# Patient Record
Sex: MALE | Race: WHITE | NOT HISPANIC OR LATINO | Employment: OTHER | URBAN - METROPOLITAN AREA
[De-identification: names, ages, dates, MRNs, and addresses within clinical notes are randomized per-mention and may not be internally consistent; named-entity substitution may affect disease eponyms.]

---

## 2017-01-17 ENCOUNTER — APPOINTMENT (OUTPATIENT)
Dept: RADIOLOGY | Facility: MEDICAL CENTER | Age: 57
DRG: 603 | End: 2017-01-17
Attending: EMERGENCY MEDICINE
Payer: MEDICARE

## 2017-01-17 ENCOUNTER — APPOINTMENT (OUTPATIENT)
Dept: RADIOLOGY | Facility: MEDICAL CENTER | Age: 57
DRG: 603 | End: 2017-01-17
Attending: HOSPITALIST
Payer: MEDICARE

## 2017-01-17 ENCOUNTER — RESOLUTE PROFESSIONAL BILLING HOSPITAL PROF FEE (OUTPATIENT)
Dept: HOSPITALIST | Facility: MEDICAL CENTER | Age: 57
End: 2017-01-17
Payer: MEDICARE

## 2017-01-17 ENCOUNTER — HOSPITAL ENCOUNTER (INPATIENT)
Facility: MEDICAL CENTER | Age: 57
LOS: 7 days | DRG: 603 | End: 2017-01-24
Attending: EMERGENCY MEDICINE | Admitting: HOSPITALIST
Payer: MEDICARE

## 2017-01-17 DIAGNOSIS — L03.119 CELLULITIS OF LOWER EXTREMITY, UNSPECIFIED LATERALITY: ICD-10-CM

## 2017-01-17 PROBLEM — R79.89 TROPONIN LEVEL ELEVATED: Status: ACTIVE | Noted: 2017-01-17

## 2017-01-17 LAB
ALBUMIN SERPL BCP-MCNC: 3.3 G/DL (ref 3.2–4.9)
ALBUMIN/GLOB SERPL: 0.9 G/DL
ALP SERPL-CCNC: 76 U/L (ref 30–99)
ALT SERPL-CCNC: 624 U/L (ref 2–50)
ANION GAP SERPL CALC-SCNC: 11 MMOL/L (ref 0–11.9)
AST SERPL-CCNC: 111 U/L (ref 12–45)
BASOPHILS # BLD AUTO: 0.5 % (ref 0–1.8)
BASOPHILS # BLD: 0.04 K/UL (ref 0–0.12)
BILIRUB SERPL-MCNC: 1.2 MG/DL (ref 0.1–1.5)
BNP SERPL-MCNC: 5 PG/ML (ref 0–100)
BUN SERPL-MCNC: 15 MG/DL (ref 8–22)
CALCIUM SERPL-MCNC: 9.4 MG/DL (ref 8.4–10.2)
CHLORIDE SERPL-SCNC: 95 MMOL/L (ref 96–112)
CO2 SERPL-SCNC: 30 MMOL/L (ref 20–33)
CREAT SERPL-MCNC: 0.74 MG/DL (ref 0.5–1.4)
CRP SERPL HS-MCNC: 12.96 MG/DL (ref 0–0.75)
EKG IMPRESSION: NORMAL
EOSINOPHIL # BLD AUTO: 0.39 K/UL (ref 0–0.51)
EOSINOPHIL NFR BLD: 4.4 % (ref 0–6.9)
ERYTHROCYTE [DISTWIDTH] IN BLOOD BY AUTOMATED COUNT: 51.1 FL (ref 35.9–50)
ERYTHROCYTE [SEDIMENTATION RATE] IN BLOOD BY WESTERGREN METHOD: 41 MM/HOUR (ref 0–20)
EST. AVERAGE GLUCOSE BLD GHB EST-MCNC: 157 MG/DL
GFR SERPL CREATININE-BSD FRML MDRD: >60 ML/MIN/1.73 M 2
GLOBULIN SER CALC-MCNC: 3.6 G/DL (ref 1.9–3.5)
GLUCOSE BLD-MCNC: 127 MG/DL (ref 65–99)
GLUCOSE BLD-MCNC: 128 MG/DL (ref 65–99)
GLUCOSE SERPL-MCNC: 124 MG/DL (ref 65–99)
HBA1C MFR BLD: 7.1 % (ref 0–5.6)
HCT VFR BLD AUTO: 44.1 % (ref 42–52)
HGB BLD-MCNC: 13.8 G/DL (ref 14–18)
IMM GRANULOCYTES # BLD AUTO: 0.04 K/UL (ref 0–0.11)
IMM GRANULOCYTES NFR BLD AUTO: 0.5 % (ref 0–0.9)
LACTATE BLD-SCNC: 1.44 MMOL/L (ref 0.5–2)
LACTATE BLD-SCNC: 1.89 MMOL/L (ref 0.5–2)
LYMPHOCYTES # BLD AUTO: 0.89 K/UL (ref 1–4.8)
LYMPHOCYTES NFR BLD: 10 % (ref 22–41)
MCH RBC QN AUTO: 27.5 PG (ref 27–33)
MCHC RBC AUTO-ENTMCNC: 31.3 G/DL (ref 33.7–35.3)
MCV RBC AUTO: 88 FL (ref 81.4–97.8)
MONOCYTES # BLD AUTO: 0.78 K/UL (ref 0–0.85)
MONOCYTES NFR BLD AUTO: 8.8 % (ref 0–13.4)
NEUTROPHILS # BLD AUTO: 6.73 K/UL (ref 1.82–7.42)
NEUTROPHILS NFR BLD: 75.8 % (ref 44–72)
NRBC # BLD AUTO: 0 K/UL
NRBC BLD AUTO-RTO: 0 /100 WBC
PLATELET # BLD AUTO: 164 K/UL (ref 164–446)
PMV BLD AUTO: 10.7 FL (ref 9–12.9)
POTASSIUM SERPL-SCNC: 3.8 MMOL/L (ref 3.6–5.5)
PROT SERPL-MCNC: 6.9 G/DL (ref 6–8.2)
RBC # BLD AUTO: 5.01 M/UL (ref 4.7–6.1)
SODIUM SERPL-SCNC: 136 MMOL/L (ref 135–145)
SPECIMEN DRAWN FROM PATIENT: NORMAL
SPECIMEN DRAWN FROM PATIENT: NORMAL
TROPONIN I SERPL-MCNC: 0.25 NG/ML (ref 0–0.04)
TROPONIN I SERPL-MCNC: 0.3 NG/ML (ref 0–0.04)
WBC # BLD AUTO: 8.9 K/UL (ref 4.8–10.8)

## 2017-01-17 PROCEDURE — 99285 EMERGENCY DEPT VISIT HI MDM: CPT

## 2017-01-17 PROCEDURE — 304562 HCHG STAT O2 MASK/CANNULA

## 2017-01-17 PROCEDURE — 71010 DX-CHEST-PORTABLE (1 VIEW): CPT

## 2017-01-17 PROCEDURE — 36415 COLL VENOUS BLD VENIPUNCTURE: CPT

## 2017-01-17 PROCEDURE — 87086 URINE CULTURE/COLONY COUNT: CPT

## 2017-01-17 PROCEDURE — A9270 NON-COVERED ITEM OR SERVICE: HCPCS | Performed by: HOSPITALIST

## 2017-01-17 PROCEDURE — 96375 TX/PRO/DX INJ NEW DRUG ADDON: CPT

## 2017-01-17 PROCEDURE — 80053 COMPREHEN METABOLIC PANEL: CPT

## 2017-01-17 PROCEDURE — 83880 ASSAY OF NATRIURETIC PEPTIDE: CPT

## 2017-01-17 PROCEDURE — 87040 BLOOD CULTURE FOR BACTERIA: CPT

## 2017-01-17 PROCEDURE — 96366 THER/PROPH/DIAG IV INF ADDON: CPT

## 2017-01-17 PROCEDURE — 700111 HCHG RX REV CODE 636 W/ 250 OVERRIDE (IP): Performed by: EMERGENCY MEDICINE

## 2017-01-17 PROCEDURE — 700111 HCHG RX REV CODE 636 W/ 250 OVERRIDE (IP): Performed by: HOSPITALIST

## 2017-01-17 PROCEDURE — 84134 ASSAY OF PREALBUMIN: CPT

## 2017-01-17 PROCEDURE — 84484 ASSAY OF TROPONIN QUANT: CPT

## 2017-01-17 PROCEDURE — 770006 HCHG ROOM/CARE - MED/SURG/GYN SEMI*

## 2017-01-17 PROCEDURE — 81003 URINALYSIS AUTO W/O SCOPE: CPT

## 2017-01-17 PROCEDURE — 96367 TX/PROPH/DG ADDL SEQ IV INF: CPT

## 2017-01-17 PROCEDURE — 700102 HCHG RX REV CODE 250 W/ 637 OVERRIDE(OP): Performed by: HOSPITALIST

## 2017-01-17 PROCEDURE — 700105 HCHG RX REV CODE 258: Performed by: EMERGENCY MEDICINE

## 2017-01-17 PROCEDURE — 304561 HCHG STAT O2

## 2017-01-17 PROCEDURE — 99223 1ST HOSP IP/OBS HIGH 75: CPT | Mod: AI | Performed by: HOSPITALIST

## 2017-01-17 PROCEDURE — 85025 COMPLETE CBC W/AUTO DIFF WBC: CPT

## 2017-01-17 PROCEDURE — 700102 HCHG RX REV CODE 250 W/ 637 OVERRIDE(OP): Performed by: EMERGENCY MEDICINE

## 2017-01-17 PROCEDURE — 94760 N-INVAS EAR/PLS OXIMETRY 1: CPT

## 2017-01-17 PROCEDURE — 770001 HCHG ROOM/CARE - MED/SURG/GYN PRIV*

## 2017-01-17 PROCEDURE — 700105 HCHG RX REV CODE 258: Performed by: HOSPITALIST

## 2017-01-17 PROCEDURE — 76937 US GUIDE VASCULAR ACCESS: CPT

## 2017-01-17 PROCEDURE — 36569 INSJ PICC 5 YR+ W/O IMAGING: CPT

## 2017-01-17 PROCEDURE — 86140 C-REACTIVE PROTEIN: CPT

## 2017-01-17 PROCEDURE — 96376 TX/PRO/DX INJ SAME DRUG ADON: CPT

## 2017-01-17 PROCEDURE — 83036 HEMOGLOBIN GLYCOSYLATED A1C: CPT

## 2017-01-17 PROCEDURE — 85652 RBC SED RATE AUTOMATED: CPT

## 2017-01-17 PROCEDURE — 83605 ASSAY OF LACTIC ACID: CPT

## 2017-01-17 PROCEDURE — 82962 GLUCOSE BLOOD TEST: CPT | Mod: 91

## 2017-01-17 PROCEDURE — A9270 NON-COVERED ITEM OR SERVICE: HCPCS | Performed by: EMERGENCY MEDICINE

## 2017-01-17 PROCEDURE — 96365 THER/PROPH/DIAG IV INF INIT: CPT

## 2017-01-17 PROCEDURE — 93005 ELECTROCARDIOGRAM TRACING: CPT | Performed by: EMERGENCY MEDICINE

## 2017-01-17 RX ORDER — LISINOPRIL 20 MG/1
20 TABLET ORAL DAILY
Status: DISCONTINUED | OUTPATIENT
Start: 2017-01-17 | End: 2017-01-18

## 2017-01-17 RX ORDER — AMOXICILLIN 250 MG
1 CAPSULE ORAL NIGHTLY
Status: DISCONTINUED | OUTPATIENT
Start: 2017-01-17 | End: 2017-01-21

## 2017-01-17 RX ORDER — BUDESONIDE AND FORMOTEROL FUMARATE DIHYDRATE 160; 4.5 UG/1; UG/1
2 AEROSOL RESPIRATORY (INHALATION) 2 TIMES DAILY
Status: DISCONTINUED | OUTPATIENT
Start: 2017-01-17 | End: 2017-01-24 | Stop reason: HOSPADM

## 2017-01-17 RX ORDER — ONDANSETRON 2 MG/ML
4 INJECTION INTRAMUSCULAR; INTRAVENOUS ONCE
Status: COMPLETED | OUTPATIENT
Start: 2017-01-17 | End: 2017-01-17

## 2017-01-17 RX ORDER — ALBUTEROL SULFATE 90 UG/1
2 AEROSOL, METERED RESPIRATORY (INHALATION) EVERY 6 HOURS PRN
COMMUNITY

## 2017-01-17 RX ORDER — ONDANSETRON 4 MG/1
4 TABLET, ORALLY DISINTEGRATING ORAL EVERY 4 HOURS PRN
Status: DISCONTINUED | OUTPATIENT
Start: 2017-01-17 | End: 2017-01-24 | Stop reason: HOSPADM

## 2017-01-17 RX ORDER — BUDESONIDE AND FORMOTEROL FUMARATE DIHYDRATE 160; 4.5 UG/1; UG/1
2 AEROSOL RESPIRATORY (INHALATION) 2 TIMES DAILY
COMMUNITY

## 2017-01-17 RX ORDER — OXYCODONE HYDROCHLORIDE 30 MG/1
30 TABLET ORAL EVERY 4 HOURS
Status: ON HOLD | COMMUNITY
End: 2017-02-02

## 2017-01-17 RX ORDER — POTASSIUM CHLORIDE 750 MG/1
10 CAPSULE, EXTENDED RELEASE ORAL DAILY
Status: ON HOLD | COMMUNITY
End: 2018-01-18

## 2017-01-17 RX ORDER — OXYCODONE HYDROCHLORIDE 10 MG/1
30 TABLET ORAL EVERY 4 HOURS
Status: DISCONTINUED | OUTPATIENT
Start: 2017-01-17 | End: 2017-01-19

## 2017-01-17 RX ORDER — BISACODYL 10 MG
10 SUPPOSITORY, RECTAL RECTAL
Status: DISCONTINUED | OUTPATIENT
Start: 2017-01-17 | End: 2017-01-22

## 2017-01-17 RX ORDER — HEPARIN SODIUM 5000 [USP'U]/ML
5000 INJECTION, SOLUTION INTRAVENOUS; SUBCUTANEOUS EVERY 8 HOURS
Status: DISCONTINUED | OUTPATIENT
Start: 2017-01-17 | End: 2017-01-24 | Stop reason: HOSPADM

## 2017-01-17 RX ORDER — PROMETHAZINE HYDROCHLORIDE 25 MG/1
12.5-25 TABLET ORAL EVERY 4 HOURS PRN
Status: DISCONTINUED | OUTPATIENT
Start: 2017-01-17 | End: 2017-01-24 | Stop reason: HOSPADM

## 2017-01-17 RX ORDER — ASPIRIN 81 MG/1
81 TABLET, CHEWABLE ORAL ONCE
Status: COMPLETED | OUTPATIENT
Start: 2017-01-17 | End: 2017-01-17

## 2017-01-17 RX ORDER — DEXTROSE MONOHYDRATE 25 G/50ML
25 INJECTION, SOLUTION INTRAVENOUS
Status: DISCONTINUED | OUTPATIENT
Start: 2017-01-17 | End: 2017-01-24 | Stop reason: HOSPADM

## 2017-01-17 RX ORDER — ONDANSETRON 2 MG/ML
4 INJECTION INTRAMUSCULAR; INTRAVENOUS EVERY 4 HOURS PRN
Status: DISCONTINUED | OUTPATIENT
Start: 2017-01-17 | End: 2017-01-24 | Stop reason: HOSPADM

## 2017-01-17 RX ORDER — PROMETHAZINE HYDROCHLORIDE 25 MG/1
12.5-25 SUPPOSITORY RECTAL EVERY 4 HOURS PRN
Status: DISCONTINUED | OUTPATIENT
Start: 2017-01-17 | End: 2017-01-24 | Stop reason: HOSPADM

## 2017-01-17 RX ORDER — ACETAMINOPHEN 325 MG/1
650 TABLET ORAL EVERY 6 HOURS PRN
Status: DISCONTINUED | OUTPATIENT
Start: 2017-01-17 | End: 2017-01-24 | Stop reason: HOSPADM

## 2017-01-17 RX ORDER — AMPICILLIN AND SULBACTAM 2; 1 G/1; G/1
3 INJECTION, POWDER, FOR SOLUTION INTRAMUSCULAR; INTRAVENOUS ONCE
Status: COMPLETED | OUTPATIENT
Start: 2017-01-17 | End: 2017-01-17

## 2017-01-17 RX ORDER — AMOXICILLIN 250 MG
1 CAPSULE ORAL
Status: DISCONTINUED | OUTPATIENT
Start: 2017-01-17 | End: 2017-01-22

## 2017-01-17 RX ORDER — CARVEDILOL 6.25 MG/1
6.25 TABLET ORAL 2 TIMES DAILY WITH MEALS
Status: DISCONTINUED | OUTPATIENT
Start: 2017-01-17 | End: 2017-01-24 | Stop reason: HOSPADM

## 2017-01-17 RX ORDER — DOCUSATE SODIUM 100 MG/1
100 CAPSULE, LIQUID FILLED ORAL EVERY MORNING
Status: DISCONTINUED | OUTPATIENT
Start: 2017-01-18 | End: 2017-01-21

## 2017-01-17 RX ORDER — ENEMA 19; 7 G/133ML; G/133ML
1 ENEMA RECTAL
Status: DISCONTINUED | OUTPATIENT
Start: 2017-01-17 | End: 2017-01-22

## 2017-01-17 RX ORDER — FUROSEMIDE 40 MG/1
40 TABLET ORAL DAILY
Status: DISCONTINUED | OUTPATIENT
Start: 2017-01-17 | End: 2017-01-18

## 2017-01-17 RX ORDER — POTASSIUM CHLORIDE 750 MG/1
10 TABLET, FILM COATED, EXTENDED RELEASE ORAL DAILY
Status: DISCONTINUED | OUTPATIENT
Start: 2017-01-17 | End: 2017-01-18

## 2017-01-17 RX ORDER — LACTULOSE 20 G/30ML
30 SOLUTION ORAL
Status: DISCONTINUED | OUTPATIENT
Start: 2017-01-17 | End: 2017-01-22

## 2017-01-17 RX ORDER — MORPHINE SULFATE 4 MG/ML
4 INJECTION, SOLUTION INTRAMUSCULAR; INTRAVENOUS
Status: DISCONTINUED | OUTPATIENT
Start: 2017-01-17 | End: 2017-01-18

## 2017-01-17 RX ADMIN — HEPARIN SODIUM 5000 UNITS: 5000 INJECTION, SOLUTION INTRAVENOUS; SUBCUTANEOUS at 21:44

## 2017-01-17 RX ADMIN — AMPICILLIN SODIUM AND SULBACTAM SODIUM 3 G: 2; 1 INJECTION, POWDER, FOR SOLUTION INTRAMUSCULAR; INTRAVENOUS at 18:11

## 2017-01-17 RX ADMIN — SENNOSIDES AND DOCUSATE SODIUM 1 TABLET: 8.6; 5 TABLET ORAL at 21:33

## 2017-01-17 RX ADMIN — MORPHINE SULFATE 4 MG: 4 INJECTION INTRAVENOUS at 10:26

## 2017-01-17 RX ADMIN — MORPHINE SULFATE 4 MG: 4 INJECTION INTRAVENOUS at 12:49

## 2017-01-17 RX ADMIN — AMPICILLIN SODIUM AND SULBACTAM SODIUM 3 G: 2; 1 INJECTION, POWDER, FOR SOLUTION INTRAMUSCULAR; INTRAVENOUS at 23:55

## 2017-01-17 RX ADMIN — AMPICILLIN SODIUM AND SULBACTAM SODIUM 3 G: 2; 1 INJECTION, POWDER, FOR SOLUTION INTRAMUSCULAR; INTRAVENOUS at 10:31

## 2017-01-17 RX ADMIN — ONDANSETRON 4 MG: 2 INJECTION, SOLUTION INTRAMUSCULAR; INTRAVENOUS at 10:26

## 2017-01-17 RX ADMIN — OXYCODONE HYDROCHLORIDE 30 MG: 10 TABLET ORAL at 21:46

## 2017-01-17 RX ADMIN — ASPIRIN 81 MG CHEWABLE TABLET 81 MG: 81 TABLET CHEWABLE at 14:57

## 2017-01-17 RX ADMIN — VANCOMYCIN HYDROCHLORIDE 3000 MG: 10 INJECTION, POWDER, LYOPHILIZED, FOR SOLUTION INTRAVENOUS at 11:01

## 2017-01-17 RX ADMIN — OXYCODONE HYDROCHLORIDE 30 MG: 10 TABLET ORAL at 17:01

## 2017-01-17 ASSESSMENT — PAIN SCALES - GENERAL
PAINLEVEL_OUTOF10: 8
PAINLEVEL_OUTOF10: 6
PAINLEVEL_OUTOF10: 10
PAINLEVEL_OUTOF10: 4

## 2017-01-17 ASSESSMENT — COPD QUESTIONNAIRES
DURING THE PAST 4 WEEKS HOW MUCH DID YOU FEEL SHORT OF BREATH: SOME OF THE TIME
HAVE YOU SMOKED AT LEAST 100 CIGARETTES IN YOUR ENTIRE LIFE: YES
COPD SCREENING SCORE: 4
DO YOU EVER COUGH UP ANY MUCUS OR PHLEGM?: NO/ONLY WITH OCCASIONAL COLDS OR INFECTIONS

## 2017-01-17 ASSESSMENT — LIFESTYLE VARIABLES: EVER_SMOKED: YES

## 2017-01-17 NOTE — ED NOTES
Pt for transfer to room-hx of MRSA noted. Pt iv site LAC with sluggish blood return. erp aware of same. Orders for PICC line obtained

## 2017-01-17 NOTE — ED NOTES
Med rec completed per pt at bedside  Allergies reviewed - NKDA  Pt states that he took 3 doses of a leftover rx for  Keflex that he had. States that he took these last friday

## 2017-01-17 NOTE — ED NOTES
Vs as charted, lunch tray provided as well as med for bilat leg pain per pt request 10/10 with Morphine. Await room assignment

## 2017-01-17 NOTE — ED NOTES
Patient difficult IV access. IV Attempt x1  by Rolf Oneil RN and x 1Sbranden Torres RN. IV placement attempted by [facility] x 2 both attempts unsuccessful.. Lab called and had difficult time obtaining labs. Dr. Reed aware and will attempt to gain access via [facility]. Primary RN Darby made aware. Patient c/o of buttock pain requesting pain medication. Repositioned for comfort.

## 2017-01-17 NOTE — ED PROVIDER NOTES
ED Provider Note    CHIEF COMPLAINT  Chief Complaint   Patient presents with   • Difficulty Breathing   • Wound Infection   • Leg Pain     chronic       HPI  Fer Estrada is a 56 y.o. male who presents to the ER with leg swelling and pain. Patient has a history of chronic venous stasis dermatitis and has had associated cellulitis with resultant sepsis in the past. He was receiving home care. In order to continue home care he had to see his primary doctor, Dr. Aparicio, once a week. He was unable to make his appointments because he had to take a taxi there was costing too much money. His home care was subsequently discontinued. As part of his home care his legs have been wrapped with a compressive dressing. He ended up leaving these dressings on for at least a month probably more. He started noticing fluid weeping through his dressings. He called ambulance and in requested transport to the hospital. He has had some mild cough and feels mildly short of breath. He has not had fever or chest pain. No abdominal pain. He has felt flushed at times, no fevers noted. He has severe burning throbbing neuropathic type pain of his lower extremities equal bilaterally.    REVIEW OF SYSTEMS  As per HPI, otherwise a 10 point review of systems is negative    PAST MEDICAL HISTORY  Past Medical History   Diagnosis Date   • Type II or unspecified type diabetes mellitus without mention of complication, not stated as uncontrolled    • Congestive heart failure (CMS-[facility])    • Hypertension    • Asthma    • Chronic obstructive pulmonary disease (CMS-[facility])        SOCIAL HISTORY  Social History   Substance Use Topics   • Smoking status: Former Smoker     Quit date: 12/14/2005   • Smokeless tobacco: Never Used   • Alcohol Use: No       SURGICAL HISTORY  History reviewed. No pertinent past surgical history.    CURRENT MEDICATIONS  Home Medications     **Home medications have not yet been reviewed for this encounter**          ALLERGIES  No Known  "Allergies    PHYSICAL EXAM  VITAL SIGNS: /68 mmHg  Pulse 104  Temp(Src) 36.8 °C (98.2 °F)  Resp 24  Ht 1.727 m (5' 8\")  Wt 136.079 kg (300 lb)  BMI 45.63 kg/m2  SpO2 97%   Constitutional: Awake and alert, wearing oxygen  HENT:  Atraumatic, Normocephalic.Oropharynx moist mucus membranes, Nose normal inspection.   Eyes: Normal inspection  Neck: Supple  Cardiovascular: Normal heart rate, Normal rhythm.  Symmetric peripheral pulses.   Thorax & Lungs: No respiratory distress, No wheezing, No rales, No rhonchi, No chest tenderness.   Abdomen: Bowel sounds normal, soft, non-distended, nontender, no mass  Skin: Bilateral lower extremities with erythema to the upper and lower legs. There are multiple areas of blistering. Several of these blisters have unroofed. No tracking erythema.  Extremities: Pedal edema and as described above  Neurologic: Grossly normal   Psychiatric: Anxious appearing        Labs:  Results for orders placed or performed during the hospital encounter of 01/17/17   LACTIC ACID   Result Value Ref Range    Lactic Acid 1.89 0.50 - 2.00 mmol/L    Specimen Venous    CBC WITH DIFFERENTIAL   Result Value Ref Range    WBC 8.9 4.8 - 10.8 K/uL    RBC 5.01 4.70 - 6.10 M/uL    Hemoglobin 13.8 (L) 14.0 - 18.0 g/dL    Hematocrit 44.1 42.0 - 52.0 %    MCV 88.0 81.4 - 97.8 fL    MCH 27.5 27.0 - 33.0 pg    MCHC 31.3 (L) 33.7 - 35.3 g/dL    RDW 51.1 (H) 35.9 - 50.0 fL    Platelet Count 164 164 - 446 K/uL    MPV 10.7 9.0 - 12.9 fL    Neutrophils-Polys 75.80 (H) 44.00 - 72.00 %    Lymphocytes 10.00 (L) 22.00 - 41.00 %    Monocytes 8.80 0.00 - 13.40 %    Eosinophils 4.40 0.00 - 6.90 %    Basophils 0.50 0.00 - 1.80 %    Immature Granulocytes 0.50 0.00 - 0.90 %    Nucleated RBC 0.00 /100 WBC    Neutrophils (Absolute) 6.73 1.82 - 7.42 K/uL    Lymphs (Absolute) 0.89 (L) 1.00 - 4.80 K/uL    Monos (Absolute) 0.78 0.00 - 0.85 K/uL    Eos (Absolute) 0.39 0.00 - 0.51 K/uL    Baso (Absolute) 0.04 0.00 - 0.12 K/uL    " Immature Granulocytes (abs) 0.04 0.00 - 0.11 K/uL    NRBC (Absolute) 0.00 K/uL   COMP METABOLIC PANEL   Result Value Ref Range    Sodium 136 135 - 145 mmol/L    Potassium 3.8 3.6 - 5.5 mmol/L    Chloride 95 (L) 96 - 112 mmol/L    Co2 30 20 - 33 mmol/L    Anion Gap 11.0 0.0 - 11.9    Bun 15 8 - 22 mg/dL    Calcium 9.4 8.4 - 10.2 mg/dL    AST(SGOT) 111 (H) 12 - 45 U/L    ALT(SGPT) 624 (H) 2 - 50 U/L    Total Bilirubin 1.2 0.1 - 1.5 mg/dL    Glucose 124 (H) 65 - 99 mg/dL    Creatinine 0.74 0.50 - 1.40 mg/dL    Alkaline Phosphatase 76 30 - 99 U/L    Albumin 3.3 3.2 - 4.9 g/dL    Total Protein 6.9 6.0 - 8.2 g/dL    Globulin 3.6 (H) 1.9 - 3.5 g/dL    A-G Ratio 0.9 g/dL   TROPONIN   Result Value Ref Range    Troponin I 0.25 (H) 0.00 - 0.04 ng/mL   ESTIMATED GFR   Result Value Ref Range    GFR If African American >60 >60 mL/min/1.73 m 2    GFR If Non African American >60 >60 mL/min/1.73 m 2   EKG   Result Value Ref Range    Report       [facility] Emergency Dept.    Test Date:  2017  Pt Name:    TANIA CARRASCO                 Department: [facility]  MRN:        [id number]                      [facility]  Gender:     M                            Technician: SAVANAH  :        1960                   Requested By:LEN SHANNON  Order #:    [id number]                    Reading MD: LEN SHANNON MD    Measurements  Intervals                                Axis  Rate:       105                          P:          25  IN:         181                          QRS:        124  QRSD:       93                           T:          3  QT:         326  QTc:        431    Interpretive Statements  Sinus tachycardia  Probable left atrial enlargement  Left posterior fascicular block  Low voltage, precordial leads  Consider anterior infarct  Compared to ECG 2014 02:25:35  no significant change    Electronically Signed On 2017 11:11:17 PST by LEN SHANNON MD       DX-CHEST-PORTABLE (1  VIEW)   Final Result      Stable bibasilar basilar opacity most likely representing atelectasis. This could obscure consolidation        Procedure note  Ultrasound-guided peripheral IV access by physician  Indication: Unable to obtain IV by traditional methods  Description: Patient was prepped with chlorhexidine. Original attempt in the right antecubital fossa was unsuccessful. Left antecubital fossa was prepped. Ultrasound visualized vein. 18-gauge angiocatheter was inserted with good flow saline. No complications.    COURSE & MEDICAL DECISION MAKING  Patient presents to the ER with lower extremity edema weeping wounds and cellulitis. Additionally complained of some dyspnea. EKG was unremarkable in triage. Laboratory data was ordered. Ordered antibiotics of vancomycin and Unasyn intravenously. Placed an IV as noted above. Data returns notable for an elevated troponin in the indeterminate range. Patient was given aspirin. He does not have any chest pain ongoing dyspnea while in the ER. This will need to be followed. I consulted Dr. Landers. Patient will be admitted to the hospital.    FINAL IMPRESSION  1. Cellulitis, bilateral lower extremities  2. Elevated troponin  3. Elevated liver function tests      This dictation was created using voice recognition software. The accuracy of the dictation is limited to the abilities of the software.  The nursing notes were reviewed and certain aspects of this information were incorporated into this note.      Electronically signed by: Ray Reed, 1/17/2017 8:21 AM

## 2017-01-17 NOTE — PROGRESS NOTES
"Pharmacy Kinetics Vancomycin Day # 1     2017    56 y.o. male    Estimated CrCl =  Estimated Creatinine Clearance: 148.8 mL/min (by C-G formula based on Cr of 0.74).     Recent Labs      17   0954   WBC  8.9   NEUTSPOLYS  75.80*   BUN  15   CREATININE  0.74        Blood pressure 144/91, pulse 121, temperature 36.9 °C (98.4 °F), resp. rate 18, height 1.727 m (5' 8\"), weight 136.079 kg (300 lb), SpO2 91 %.    Temp (24hrs), Av.8 °C (98.3 °F), Min:36.8 °C (98.2 °F), Max:36.9 °C (98.4 °F)          A/P 1.   Vancomycin 3000 mg IVPB X 1 dose given at 1101 hours in ER for LE cellulitis.              2.  BMI 45.6              3.  Vancomycin 2300 mg IV x 1 dose at 0100 hours on 17              4.  Random vancomycin level at 1400 hours on 17 for further dosing.    Rosas Ojeda [facility]    "

## 2017-01-17 NOTE — ED NOTES
Arrived via [facility], hx of chronic pain and chronic wound infection to both lower legs, state legs are worse this week

## 2017-01-17 NOTE — ED NOTES
Assumed care of pt-c/o burning to bilat lower legs. Ivf/meds infusing. Aware of pending admit. Call light in reach vs as charted

## 2017-01-18 ENCOUNTER — APPOINTMENT (OUTPATIENT)
Dept: RADIOLOGY | Facility: MEDICAL CENTER | Age: 57
DRG: 603 | End: 2017-01-18
Attending: HOSPITALIST
Payer: MEDICARE

## 2017-01-18 LAB
ALBUMIN SERPL BCP-MCNC: 3.3 G/DL (ref 3.2–4.9)
ALBUMIN/GLOB SERPL: 0.9 G/DL
ALP SERPL-CCNC: 80 U/L (ref 30–99)
ALT SERPL-CCNC: 425 U/L (ref 2–50)
ANION GAP SERPL CALC-SCNC: 5 MMOL/L (ref 0–11.9)
APPEARANCE UR: CLEAR
AST SERPL-CCNC: 49 U/L (ref 12–45)
BILIRUB SERPL-MCNC: 0.8 MG/DL (ref 0.1–1.5)
BILIRUB UR QL STRIP.AUTO: NEGATIVE
BUN SERPL-MCNC: 16 MG/DL (ref 8–22)
CALCIUM SERPL-MCNC: 8.4 MG/DL (ref 8.4–10.2)
CHLORIDE SERPL-SCNC: 96 MMOL/L (ref 96–112)
CHOLEST SERPL-MCNC: 168 MG/DL (ref 100–199)
CO2 SERPL-SCNC: 37 MMOL/L (ref 20–33)
COLOR UR: YELLOW
CREAT SERPL-MCNC: 0.82 MG/DL (ref 0.5–1.4)
GFR SERPL CREATININE-BSD FRML MDRD: >60 ML/MIN/1.73 M 2
GLOBULIN SER CALC-MCNC: 3.5 G/DL (ref 1.9–3.5)
GLUCOSE BLD-MCNC: 125 MG/DL (ref 65–99)
GLUCOSE BLD-MCNC: 132 MG/DL (ref 65–99)
GLUCOSE BLD-MCNC: 145 MG/DL (ref 65–99)
GLUCOSE BLD-MCNC: 149 MG/DL (ref 65–99)
GLUCOSE SERPL-MCNC: 153 MG/DL (ref 65–99)
GLUCOSE UR STRIP.AUTO-MCNC: NEGATIVE MG/DL
HDLC SERPL-MCNC: 27 MG/DL
KETONES UR STRIP.AUTO-MCNC: NEGATIVE MG/DL
LDLC SERPL CALC-MCNC: 112 MG/DL
LEUKOCYTE ESTERASE UR QL STRIP.AUTO: NEGATIVE
MICRO URNS: NORMAL
NITRITE UR QL STRIP.AUTO: NEGATIVE
PH UR STRIP.AUTO: 6 [PH]
POTASSIUM SERPL-SCNC: 4.3 MMOL/L (ref 3.6–5.5)
PREALB SERPL-MCNC: 5 MG/DL (ref 18–38)
PROT SERPL-MCNC: 6.8 G/DL (ref 6–8.2)
PROT UR QL STRIP: NEGATIVE MG/DL
RBC UR QL AUTO: NEGATIVE
SODIUM SERPL-SCNC: 138 MMOL/L (ref 135–145)
SP GR UR STRIP.AUTO: 1.02
TRIGL SERPL-MCNC: 145 MG/DL (ref 0–149)
TROPONIN I SERPL-MCNC: 0.19 NG/ML (ref 0–0.04)
TROPONIN I SERPL-MCNC: 0.29 NG/ML (ref 0–0.04)

## 2017-01-18 PROCEDURE — 94760 N-INVAS EAR/PLS OXIMETRY 1: CPT

## 2017-01-18 PROCEDURE — 700105 HCHG RX REV CODE 258: Performed by: INTERNAL MEDICINE

## 2017-01-18 PROCEDURE — 82962 GLUCOSE BLOOD TEST: CPT

## 2017-01-18 PROCEDURE — 80061 LIPID PANEL: CPT

## 2017-01-18 PROCEDURE — 99233 SBSQ HOSP IP/OBS HIGH 50: CPT | Performed by: HOSPITALIST

## 2017-01-18 PROCEDURE — 97602 WOUND(S) CARE NON-SELECTIVE: CPT

## 2017-01-18 PROCEDURE — 700112 HCHG RX REV CODE 229: Performed by: HOSPITALIST

## 2017-01-18 PROCEDURE — 700102 HCHG RX REV CODE 250 W/ 637 OVERRIDE(OP): Performed by: HOSPITALIST

## 2017-01-18 PROCEDURE — 80053 COMPREHEN METABOLIC PANEL: CPT

## 2017-01-18 PROCEDURE — 302009 SKINTEGRITY WOUND CLEANSER: Performed by: HOSPITALIST

## 2017-01-18 PROCEDURE — 700105 HCHG RX REV CODE 258: Performed by: HOSPITALIST

## 2017-01-18 PROCEDURE — 700101 HCHG RX REV CODE 250: Performed by: INTERNAL MEDICINE

## 2017-01-18 PROCEDURE — A9270 NON-COVERED ITEM OR SERVICE: HCPCS | Performed by: INTERNAL MEDICINE

## 2017-01-18 PROCEDURE — 700102 HCHG RX REV CODE 250 W/ 637 OVERRIDE(OP): Performed by: INTERNAL MEDICINE

## 2017-01-18 PROCEDURE — G8979 MOBILITY GOAL STATUS: HCPCS | Mod: CK

## 2017-01-18 PROCEDURE — 770001 HCHG ROOM/CARE - MED/SURG/GYN PRIV*

## 2017-01-18 PROCEDURE — 84484 ASSAY OF TROPONIN QUANT: CPT | Mod: 91

## 2017-01-18 PROCEDURE — 700111 HCHG RX REV CODE 636 W/ 250 OVERRIDE (IP): Performed by: HOSPITALIST

## 2017-01-18 PROCEDURE — A9270 NON-COVERED ITEM OR SERVICE: HCPCS | Performed by: HOSPITALIST

## 2017-01-18 PROCEDURE — G8978 MOBILITY CURRENT STATUS: HCPCS | Mod: CM

## 2017-01-18 PROCEDURE — 97162 PT EVAL MOD COMPLEX 30 MIN: CPT

## 2017-01-18 RX ORDER — FLUCONAZOLE 200 MG/1
400 TABLET ORAL DAILY
Status: DISCONTINUED | OUTPATIENT
Start: 2017-01-18 | End: 2017-01-24 | Stop reason: HOSPADM

## 2017-01-18 RX ORDER — POTASSIUM CHLORIDE 1.5 G/1.58G
10 POWDER, FOR SOLUTION ORAL DAILY
Status: DISCONTINUED | OUTPATIENT
Start: 2017-01-18 | End: 2017-01-18

## 2017-01-18 RX ORDER — LISINOPRIL 20 MG/1
20 TABLET ORAL DAILY
Status: DISCONTINUED | OUTPATIENT
Start: 2017-01-19 | End: 2017-01-19

## 2017-01-18 RX ORDER — HYDRALAZINE HYDROCHLORIDE 25 MG/1
25 TABLET, FILM COATED ORAL EVERY 8 HOURS
Status: DISCONTINUED | OUTPATIENT
Start: 2017-01-18 | End: 2017-01-19

## 2017-01-18 RX ORDER — MORPHINE SULFATE 4 MG/ML
1-2 INJECTION, SOLUTION INTRAMUSCULAR; INTRAVENOUS EVERY 4 HOURS PRN
Status: DISCONTINUED | OUTPATIENT
Start: 2017-01-18 | End: 2017-01-24 | Stop reason: HOSPADM

## 2017-01-18 RX ADMIN — CLINDAMYCIN PHOSPHATE 900 MG: 150 INJECTION, SOLUTION INTRAVENOUS at 22:18

## 2017-01-18 RX ADMIN — AMPICILLIN SODIUM AND SULBACTAM SODIUM 3 G: 2; 1 INJECTION, POWDER, FOR SOLUTION INTRAMUSCULAR; INTRAVENOUS at 06:22

## 2017-01-18 RX ADMIN — OXYCODONE HYDROCHLORIDE 30 MG: 10 TABLET ORAL at 13:22

## 2017-01-18 RX ADMIN — HEPARIN SODIUM 5000 UNITS: 5000 INJECTION, SOLUTION INTRAVENOUS; SUBCUTANEOUS at 22:18

## 2017-01-18 RX ADMIN — OXYCODONE HYDROCHLORIDE 30 MG: 10 TABLET ORAL at 06:24

## 2017-01-18 RX ADMIN — HEPARIN SODIUM 5000 UNITS: 5000 INJECTION, SOLUTION INTRAVENOUS; SUBCUTANEOUS at 13:22

## 2017-01-18 RX ADMIN — DOCUSATE SODIUM 100 MG: 100 CAPSULE, LIQUID FILLED ORAL at 09:14

## 2017-01-18 RX ADMIN — POTASSIUM CHLORIDE 10 MEQ: 20 POWDER ORAL at 09:23

## 2017-01-18 RX ADMIN — CLINDAMYCIN PHOSPHATE 900 MG: 150 INJECTION, SOLUTION INTRAVENOUS at 13:34

## 2017-01-18 RX ADMIN — METFORMIN HYDROCHLORIDE 850 MG: 850 TABLET, FILM COATED ORAL at 12:24

## 2017-01-18 RX ADMIN — METFORMIN HYDROCHLORIDE 850 MG: 850 TABLET, FILM COATED ORAL at 09:14

## 2017-01-18 RX ADMIN — CARVEDILOL 6.25 MG: 6.25 TABLET, FILM COATED ORAL at 16:38

## 2017-01-18 RX ADMIN — HEPARIN SODIUM 5000 UNITS: 5000 INJECTION, SOLUTION INTRAVENOUS; SUBCUTANEOUS at 06:23

## 2017-01-18 RX ADMIN — BUDESONIDE AND FORMOTEROL FUMARATE DIHYDRATE 2 PUFF: 160; 4.5 AEROSOL RESPIRATORY (INHALATION) at 20:14

## 2017-01-18 RX ADMIN — ASPIRIN 81 MG: 81 TABLET, COATED ORAL at 09:14

## 2017-01-18 RX ADMIN — OXYCODONE HYDROCHLORIDE 30 MG: 10 TABLET ORAL at 10:15

## 2017-01-18 RX ADMIN — AMPICILLIN SODIUM AND SULBACTAM SODIUM 3 G: 2; 1 INJECTION, POWDER, FOR SOLUTION INTRAMUSCULAR; INTRAVENOUS at 18:00

## 2017-01-18 RX ADMIN — AMPICILLIN SODIUM AND SULBACTAM SODIUM 3 G: 2; 1 INJECTION, POWDER, FOR SOLUTION INTRAMUSCULAR; INTRAVENOUS at 12:24

## 2017-01-18 RX ADMIN — FLUCONAZOLE 400 MG: 200 TABLET ORAL at 13:22

## 2017-01-18 RX ADMIN — OXYCODONE HYDROCHLORIDE 30 MG: 10 TABLET ORAL at 01:31

## 2017-01-18 RX ADMIN — VANCOMYCIN HYDROCHLORIDE 2300 MG: 10 INJECTION, POWDER, LYOPHILIZED, FOR SOLUTION INTRAVENOUS at 01:16

## 2017-01-18 RX ADMIN — FUROSEMIDE 40 MG: 40 TABLET ORAL at 09:14

## 2017-01-18 RX ADMIN — ASPIRIN 325 MG: 325 TABLET, DELAYED RELEASE ORAL at 16:38

## 2017-01-18 RX ADMIN — LISINOPRIL 20 MG: 20 TABLET ORAL at 09:14

## 2017-01-18 RX ADMIN — METFORMIN HYDROCHLORIDE 850 MG: 850 TABLET, FILM COATED ORAL at 16:38

## 2017-01-18 RX ADMIN — CARVEDILOL 6.25 MG: 6.25 TABLET, FILM COATED ORAL at 09:14

## 2017-01-18 RX ADMIN — HYDRALAZINE HYDROCHLORIDE 25 MG: 25 TABLET ORAL at 16:38

## 2017-01-18 RX ADMIN — OXYCODONE HYDROCHLORIDE 30 MG: 10 TABLET ORAL at 18:51

## 2017-01-18 RX ADMIN — AMPICILLIN SODIUM AND SULBACTAM SODIUM 3 G: 2; 1 INJECTION, POWDER, FOR SOLUTION INTRAMUSCULAR; INTRAVENOUS at 23:45

## 2017-01-18 ASSESSMENT — ENCOUNTER SYMPTOMS
CHILLS: 0
COUGH: 0
VOMITING: 0
FEVER: 0
NAUSEA: 0
SHORTNESS OF BREATH: 1

## 2017-01-18 ASSESSMENT — PAIN SCALES - GENERAL
PAINLEVEL_OUTOF10: 4
PAINLEVEL_OUTOF10: 7
PAINLEVEL_OUTOF10: 5
PAINLEVEL_OUTOF10: 8

## 2017-01-18 ASSESSMENT — LIFESTYLE VARIABLES: EVER_SMOKED: YES

## 2017-01-18 ASSESSMENT — GAIT ASSESSMENTS: GAIT LEVEL OF ASSIST: REFUSED

## 2017-01-18 NOTE — PROGRESS NOTES
MD order and indication(s) for peripherally inserted central catheter confirmed. Labs reviewed.Nursing care plan includes knowledge deficit, potential for pain and anxiety, potential for infection. Risks and benefits of procedure explained to patent/family emphasizing risk of central line associated bloodstream infection.POC includes pt teaching, comfort measures, and sterile technique using the 5 step bundle to prevent CR-BSI. Questions answered. Patient verbalized understanding. Consent obtained/ confirmed.    Vessel patency confirmed with ultrasound.    Sterile procedure followed and patient draped per protocol.2.5cc of1% lidocaine injected intradermally to insertion site at RUE. 21 gauge Micro-introducer needle used to access basilic vein. Modified Seldinger technique used to insert  4Fr single lumen 48cm catheter  with blood return noted through each lumen. Each lumen flushed without resistance with 10 cc 0.9% normal saline. PICC secured with Statlock. Biopatch and Tegaderm applied over insertion site.  Patient tolerated procedure well.  # of attempts  1   BARD Power PICC REF# [id number] LOT # [id number].  Post-PICC CXR ordered. Radiologist will interpret to confirm PICC tip location.    Time out and LDA documentation completed. Patient condition and procedure outcome reported to unit RN and/or ordering physician via this post-procedure note.    Pt teaching done with patient/family regarding PICC care, handout given.

## 2017-01-18 NOTE — PROGRESS NOTES
Late Entry    1/18/17, 0745- Patient sitting up to bedside. Patient resting. Diet ordered and patient awaiting breakfast. Call bell at side and patient advised to call for needs.     1/18/17, 1015- AM medications given as charted in MAR.    1/18/17, 1400- Patient up to restroom previously and currently resting back to bed. No distress noted at this time. Patient to have dressing changes to legs completed shortly.

## 2017-01-18 NOTE — DIETARY
Nutrition Services Consult for Diabetic Education.  Visited with patient.  Patient not very interested in education.  Stated he has had this for 11 years and gets education every time he is in the hospital.  He did take some handouts on diabetic meal planning.    Pt with BMI >40 (45.63). Weight loss counseling not appropriate in acute care setting. RECOMMEND - Referral to outpatient nutrition services for weight management after D/C.

## 2017-01-18 NOTE — PROGRESS NOTES
No serial troponins ordered, last trop 0.30. Placed call to MD regarding order for troponin level. MD updated on pts status and pts refusal to wear tele monitor, orders received. Pt up to edge of bed, SOB with activity, denies chest pain. Will continue to monitor.

## 2017-01-18 NOTE — PROGRESS NOTES
Pt resting in bed, no signs of disress, VSS. Discussed POC. Pt denies needs at this time, call light in reach, hourly rounding, fall precautions in place, will continue to monitor.

## 2017-01-18 NOTE — PROGRESS NOTES
Pt refusing NPO status and morning ultrasound. Pt educated on importance of ultrasound and NPO status, pt continues to refuse. Placed call to MD and informed him of pt status. MD aware of refusal, okay to start on ADA diet. Day shift MD to be informed. Pt updated, verbalizes understanding. Will continue to monitor.

## 2017-01-18 NOTE — THERAPY
"Physical Therapy Evaluation completed.   Bed Mobility:  Supine to Sit: Modified Independent  Transfers: Sit to Stand: Refused  Gait: Level Of Assist: Refused with Front-Wheel Walker       Plan of Care: Will benefit from Physical Therapy 3 times per week  Discharge Recommendations: Equipment: motorized wheelchair or electric scooter. Post-acute therapy recommended before discharged home.    55 yo male admitted w/ BLE cellulitis. See chart for complicated PMH. Pt refused ambulation or transfers today due to LE pain and unable to tolerate socks/shoes to protect feet. His goal is to return to his apartment and do OP wound care at [facility] as he lives nearby. He may also benefit from a motorized scooter or electric WC to allow him to get to his appointments as he cannot drive a car and has financial restraints to taxi services. RN updated. PT issued a seated HEP to continue and PT will continue to see patient to progress mobility, strength, ROM while in house.     See \"Rehab Therapy-Acute\" Patient Summary Report for complete documentation.     "

## 2017-01-18 NOTE — H&P
PRIMARY CARE:  Giovani Lara MD    CHIEF COMPLAINT:  Leg swelling and leaking.    HISTORY OF PRESENT ILLNESS:  The patient is a 56-year-old morbidly obese   gentleman with history of multiple medical problems including bilateral lower   extremity edema with stasis dermatitis changes.  The patient has been   receiving wound care.  However, because of cost involved with going to the   wound care evaluation, he stopped going.  This has been approximately 1.5-2   months ago.  In the last couple of days, patient noted that his leg has been   more sore with swelling and leakage.  There has been open wound.  Patient   denied fevers, but has subjective chills.  Patient denied any nausea,   vomiting, or diarrhea.  Patient denied trauma to the lower extremities.  The   patient states that he has lost approximately 40 pounds over the past few   months with intent.    PAST MEDICAL HISTORY:  1.  Recent hospitalization for bilateral lower extremity cellulitis.  2.  Venous stasis ulcers.  3.  Bilateral stasis dermatitis changes.  4.  Morbid obesity with BMI of greater than 40.  5.  Depression.  6.  Chronic pain syndrome, on narcotics.  7.  COPD.  8.  Hypertension.  9.  Diabetes type 2.  10.  Congestive heart failure.    PAST SURGICAL HISTORY:  Unknown.    FAMILY HISTORY:  Reviewed and found to be noncontributory.    SOCIAL HISTORY:  No alcohol, tobacco, or IV drug use.    ALLERGIES:  NKDA.    MEDICATIONS:  Albuterol MDI 2 puffs q. 6 hours p.r.n. for dyspnea, Symbicort 2   puffs b.i.d., furosemide 40 mg daily, lisinopril 20 mg daily, metformin 850   mg b.i.d., oxycodone 30 mg q 4 hours, and potassium chloride 10 mEq daily.    REVIEW OF SYSTEMS:  Patient has been in his usual state of health.  No recent   ill encounters.  No upper respiratory symptoms of cough, sore throat,   rhinorrhea, chest pain, palpitations, nausea, vomiting, or diarrhea.  No   hemoptysis or hematemesis.  No GI or  dysfunction or GI or  bleed.  No    focal neurologic complaints.  Significant ulcerations and redness in the lower   extremities.    PHYSICAL EXAMINATION:  GENERAL:  Morbidly obese white male in no acute distress.  VITAL SIGNS:  Temperature 36.8, heart rate 108, respirations 19, blood   pressure 191/60, saturating 96% on 4 L nasal cannula.  HEENT/NECK:  Normocephalic, atraumatic. Pupils are equal, round and reactive   to light.  Anicteric sclerae.  Extraocular muscles intact.  No cervical   lymphadenopathy appreciated.  Oropharynx is small with Mallampati score of   2-3.  Mucosa is moist and clear without ulcerations or exudates.  PULMONARY:  Clear to auscultation bilaterally.  CARDIOVASCULAR:  Regular rate and rhythm without murmurs, rubs, or gallops.  ABDOMEN:  Obese, positive bowel sounds, soft, nontender, nondistended.  EXTREMITIES:  No clubbing, cyanosis and +4 edema bilaterally in the lower   extremities.  NEUROLOGIC:  Cranial nerves II-XII intact.  SKIN:  Patient has significant stasis dermatitis changes of the lower   extremities bilaterally extending to the proximal shin with ulcerations and   leakage.    LABORATORY:  WBC of 8.9, hemoglobin 13.8, and platelets 164.  Sodium 136,   potassium 3.8, chloride 95, bicarbonate 30, BUN 15, creatinine 0.74, glucose   124, calcium 9.4, alkaline phosphatase 76, , , total bili of   1.2.  Lactic acid 1.44.  Troponin 0.25.  BNP of 5.    IMAGING:  EKG shows sinus tachycardia.  Chest x-ray shows bibasilar   atelectasis.    ASSESSMENT:  A 56-year-old gentleman with history of bilateral lower extremity   stasis dermatitis changes, now with findings of open ulcerations and findings   concerning for cellulitis.    PLAN:  1.  Bilateral lower extremity cellulitis:  We will empirically start patient   on Unasyn and vancomycin.  We will encourage leg elevation.  2.  Hypertension and tachycardia:  We will change the patient's beta blocker   to Coreg for added benefit.  3.  Transaminitis:  We will check  right upper quadrant ultrasound.  We will   follow CMP.  4.  Troponin elevation:  We will monitor patient on telemetry and follow   troponin levels.  Patient is asymptomatic of chest pain.  We will check   fasting lipid profile and initiate low-dose aspirin.  5.  Insomnia:  Recommend outpatient followup.  6.  Morbid obesity:  Encouraged continued dietary kilo calorie restrictions   and weight loss.  7.  Chronic obstructive pulmonary disease:  Provide supplementary oxygen,   respiratory protocol, incentive spirometry, Pneumovax and Fluvax as   appropriate.  8.  Diabetes type 2: Home regimen and cover with insulin sliding scale.  9.  Congestive heart failure history.  We will provide cautious fluid   management.  10.  Stable issues:  Medical history including depression.  11.  Preventives:  Provide stool softener and DVT prophylaxis.    DISPOSITION:  Complex and guarded.       ____________________________________     LEORA SAAB MD    TSM / NTS    DD:  01/17/2017 15:24:07  DT:  01/17/2017 17:11:14    D#:  [id number]  Job#:  [id number]    cc: TERESA ROMERO MD

## 2017-01-18 NOTE — PROGRESS NOTES
Pt. Refusing NPO status and gt SHOEMAKER cancel USD, re-order if patient decides to have study done.

## 2017-01-18 NOTE — THERAPY
Occupational Therapy-  OT orders rec'd, however will hold eval today due to pt with pain and limited tolerance for OOB activities and ADL's.  Refused to stand for PT or attempt to don socks.  Discussed pt's d/c barriers with PT, and perhaps if pt could get scooter or electric w/c from [facility] or [facility] he could more easily access outpt wound care since he lives near to [facility] wound [facility].  Will monitor pt and complete OT eval when pt more able to tolerate and demonstrate simple transfers needed for toileting, dressing, bathing, etc.

## 2017-01-18 NOTE — CARE PLAN
Problem: Venous Thromboembolism (VTW)/Deep Vein Thrombosis (DVT) Prevention:  Goal: Patient will participate in Venous Thrombosis (VTE)/Deep Vein Thrombosis (DVT)Prevention Measures    01/18/17 0800   OTHER   Risk Assessment Score 4   VTE RISK High   Pharmacologic Prophylaxis Used Unfractionated Heparin         Problem: Pain Management  Goal: Pain level will decrease to patient’s comfort goal  Medications ordered and in place to treat patient complaints of pain on an as needed basis per patient request and nursing assessment.

## 2017-01-18 NOTE — PROGRESS NOTES
Hospital Medicine Progress Note, Adult, Complex               Author: Bravo Landers Date & Time created: 1/18/2017  3:11 PM     Interval History:  Admitted for bilateral LE cellulitis and failure to follow up c [facility].  Feeling about the same c slightly increased UOP.    Review of Systems:  Review of Systems   Constitutional: Negative for fever and chills.   Respiratory: Positive for shortness of breath. Negative for cough.    Gastrointestinal: Negative for nausea and vomiting.   Skin: Positive for itching and rash.   All other systems reviewed and are negative.      Physical Exam:  Physical Exam  Nursing note and vitals reviewed.  Constitutional: He is oriented to person, place, and time. He appears well-developed and well-nourished obese.   HENT:   Head: Normocephalic and atraumatic.   Right Ear: External ear normal.   Left Ear: External ear normal.   Nose: Nose normal.   Mouth/Throat: Oropharynx is small with Mallanpati score of 4.  Mucosa is clear and moist.   Eyes: Conjunctivae and extraocular motions are normal. Pupils are equal, round, and reactive to light.   Neck: Normal range of motion. Neck supple.   Cardiovascular: Normal rate, regular rhythm, normal heart sounds and intact distal pulses.    Pulmonary/Chest: Effort normal and breath sounds normal.   Abdominal: Soft. Bowel sounds are normal.   Musculoskeletal: Normal range of motion.   Neurological: He is alert and oriented to person, place, and time.   Skin: Skin is warm and dry. Bilateral LE stasis derm changes, open ulcers and erythema.      Labs:  Recent Labs      01/17/17   0909  01/17/17   1154   ISTATSPEC  Venous  Venous     Recent Labs      01/17/17   0954  01/17/17   1705 01/18/17 01/18/17   0635   TROPONINI  0.25*  0.30*  0.29*  0.19*   BNPBTYPENAT  5   --    --    --      Recent Labs      01/17/17   0954  01/18/17   0635   SODIUM  136  138   POTASSIUM  3.8  4.3   CHLORIDE  95*  96   CO2  30  37*   BUN  15  16   CREATININE  0.74  0.82    CALCIUM  9.4  8.4     Recent Labs      17   0954  17   0635   ALTSGPT  624*  425*   ASTSGOT  111*  49*   ALKPHOSPHAT  76  80   TBILIRUBIN  1.2  0.8   PREALBUMIN  5.0*   --    GLUCOSE  124*  153*     Recent Labs      17   0954   RBC  5.01   HEMOGLOBIN  13.8*   HEMATOCRIT  44.1   PLATELETCT  164     Recent Labs      17   0954  17   0635   WBC  8.9   --    NEUTSPOLYS  75.80*   --    LYMPHOCYTES  10.00*   --    MONOCYTES  8.80   --    EOSINOPHILS  4.40   --    BASOPHILS  0.50   --    ASTSGOT  111*  49*   ALTSGPT  624*  425*   ALKPHOSPHAT  76  80   TBILIRUBIN  1.2  0.8           Hemodynamics:  Temp (24hrs), Av.5 °C (97.7 °F), Min:36.4 °C (97.5 °F), Max:36.8 °C (98.2 °F)  Temperature: 36.8 °C (98.2 °F)  Pulse  Av  Min: 83  Max: 121Heart Rate (Monitored): (!) 112  Blood Pressure: 106/53 mmHg     Respiratory:    Respiration: 18, Pulse Oximetry: 92 %, O2 Daily Delivery Respiratory : Nasal Cannula        RUL Breath Sounds: Clear, RML Breath Sounds: Diminished, RLL Breath Sounds: Diminished, LARY Breath Sounds: Clear, LLL Breath Sounds: Diminished  Fluids:    Intake/Output Summary (Last 24 hours) at 17 1511  Last data filed at 17 1400   Gross per 24 hour   Intake   1650 ml   Output    850 ml   Net    800 ml       GI/Nutrition:  Orders Placed This Encounter   Procedures   • DIET ORDER     Standing Status: Standing      Number of Occurrences: 1      Standing Expiration Date:      Order Specific Question:  Diet:     Answer:  Diabetic [3]     Medical Decision Making, by Problem:  Active Hospital Problems    Diagnosis   • Cellulitis [L03.90] - Unasyn and vancomycin.  Wound care consult and culture.  ID consult.   • Troponin level elevated [R79.89] - asymptomatic of CP and levels decreasing.  Change ASA to 325 mg daily.   Morbid obesity - encourage Kcal restriction.  HTN/Tachycardia - improving.  Encourage taking of Coreg.  Transaminitis - decreasing.  Check hepatitis profile and  AMARI TRIPP.  Insomnia - outpatient follow up.  Chronic pain - judicious use of narcotics.  Decrease IV PRN morphine.  DM 2 - reasonable profile.  Metformin and check HbA1c.  COPD hx - O2, RT, IS, Vax.  Stable issues - med hx (depression),  Preventives - stool soft, DVTP.  Dispo - complex/guarded.        Medications reviewed and Labs reviewed  Montoya catheter: No Montoya      DVT Prophylaxis: Heparin    Ulcer prophylaxis: Not indicated  Antibiotics: Treating active infection/contamination beyond 24 hours perioperative coverage  Assessed for rehab: Patient unable to tolerate rehabilitation therapeutic regimen

## 2017-01-19 LAB
ALBUMIN SERPL BCP-MCNC: 2.7 G/DL (ref 3.2–4.9)
ALBUMIN/GLOB SERPL: 0.7 G/DL
ALP SERPL-CCNC: 78 U/L (ref 30–99)
ALT SERPL-CCNC: 292 U/L (ref 2–50)
ANION GAP SERPL CALC-SCNC: 7 MMOL/L (ref 0–11.9)
AST SERPL-CCNC: 31 U/L (ref 12–45)
BILIRUB SERPL-MCNC: 0.8 MG/DL (ref 0.1–1.5)
BUN SERPL-MCNC: 21 MG/DL (ref 8–22)
CALCIUM SERPL-MCNC: 8.6 MG/DL (ref 8.4–10.2)
CHLORIDE SERPL-SCNC: 95 MMOL/L (ref 96–112)
CO2 SERPL-SCNC: 35 MMOL/L (ref 20–33)
CREAT SERPL-MCNC: 2.06 MG/DL (ref 0.5–1.4)
GFR SERPL CREATININE-BSD FRML MDRD: 33 ML/MIN/1.73 M 2
GLOBULIN SER CALC-MCNC: 3.9 G/DL (ref 1.9–3.5)
GLUCOSE BLD-MCNC: 109 MG/DL (ref 65–99)
GLUCOSE BLD-MCNC: 116 MG/DL (ref 65–99)
GLUCOSE BLD-MCNC: 123 MG/DL (ref 65–99)
GLUCOSE BLD-MCNC: 149 MG/DL (ref 65–99)
GLUCOSE SERPL-MCNC: 145 MG/DL (ref 65–99)
POTASSIUM SERPL-SCNC: 4.8 MMOL/L (ref 3.6–5.5)
PROT SERPL-MCNC: 6.6 G/DL (ref 6–8.2)
SODIUM SERPL-SCNC: 137 MMOL/L (ref 135–145)

## 2017-01-19 PROCEDURE — 770006 HCHG ROOM/CARE - MED/SURG/GYN SEMI*

## 2017-01-19 PROCEDURE — A9270 NON-COVERED ITEM OR SERVICE: HCPCS | Performed by: INTERNAL MEDICINE

## 2017-01-19 PROCEDURE — 700105 HCHG RX REV CODE 258: Performed by: INTERNAL MEDICINE

## 2017-01-19 PROCEDURE — A9270 NON-COVERED ITEM OR SERVICE: HCPCS | Performed by: HOSPITALIST

## 2017-01-19 PROCEDURE — 700102 HCHG RX REV CODE 250 W/ 637 OVERRIDE(OP): Performed by: INTERNAL MEDICINE

## 2017-01-19 PROCEDURE — 80053 COMPREHEN METABOLIC PANEL: CPT

## 2017-01-19 PROCEDURE — 82962 GLUCOSE BLOOD TEST: CPT | Mod: 91

## 2017-01-19 PROCEDURE — 700105 HCHG RX REV CODE 258: Performed by: HOSPITALIST

## 2017-01-19 PROCEDURE — 700111 HCHG RX REV CODE 636 W/ 250 OVERRIDE (IP): Performed by: HOSPITALIST

## 2017-01-19 PROCEDURE — 700102 HCHG RX REV CODE 250 W/ 637 OVERRIDE(OP): Performed by: HOSPITALIST

## 2017-01-19 PROCEDURE — 99233 SBSQ HOSP IP/OBS HIGH 50: CPT | Performed by: HOSPITALIST

## 2017-01-19 PROCEDURE — 700111 HCHG RX REV CODE 636 W/ 250 OVERRIDE (IP): Performed by: INTERNAL MEDICINE

## 2017-01-19 PROCEDURE — 700101 HCHG RX REV CODE 250: Performed by: INTERNAL MEDICINE

## 2017-01-19 RX ORDER — OXYCODONE HYDROCHLORIDE 10 MG/1
30 TABLET ORAL EVERY 4 HOURS PRN
Status: DISCONTINUED | OUTPATIENT
Start: 2017-01-19 | End: 2017-01-24 | Stop reason: HOSPADM

## 2017-01-19 RX ORDER — SODIUM CHLORIDE 9 MG/ML
500 INJECTION, SOLUTION INTRAVENOUS ONCE
Status: COMPLETED | OUTPATIENT
Start: 2017-01-19 | End: 2017-01-19

## 2017-01-19 RX ORDER — WITCH HAZEL 50 %
2 PADS, MEDICATED (EA) TOPICAL
Status: DISCONTINUED | OUTPATIENT
Start: 2017-01-19 | End: 2017-01-24 | Stop reason: HOSPADM

## 2017-01-19 RX ORDER — SODIUM CHLORIDE 9 MG/ML
250 INJECTION, SOLUTION INTRAVENOUS ONCE
Status: COMPLETED | OUTPATIENT
Start: 2017-01-19 | End: 2017-01-19

## 2017-01-19 RX ORDER — SODIUM CHLORIDE 9 MG/ML
INJECTION, SOLUTION INTRAVENOUS CONTINUOUS
Status: DISCONTINUED | OUTPATIENT
Start: 2017-01-19 | End: 2017-01-22

## 2017-01-19 RX ORDER — LISINOPRIL 5 MG/1
5 TABLET ORAL DAILY
Status: DISCONTINUED | OUTPATIENT
Start: 2017-01-19 | End: 2017-01-24 | Stop reason: HOSPADM

## 2017-01-19 RX ADMIN — LACTOBACILLUS TAB 2 TABLET: TAB at 17:57

## 2017-01-19 RX ADMIN — HEPARIN SODIUM 5000 UNITS: 5000 INJECTION, SOLUTION INTRAVENOUS; SUBCUTANEOUS at 05:45

## 2017-01-19 RX ADMIN — CLINDAMYCIN PHOSPHATE 900 MG: 150 INJECTION, SOLUTION INTRAVENOUS at 06:27

## 2017-01-19 RX ADMIN — FLUCONAZOLE 400 MG: 200 TABLET ORAL at 08:45

## 2017-01-19 RX ADMIN — LISINOPRIL 5 MG: 5 TABLET ORAL at 08:45

## 2017-01-19 RX ADMIN — METFORMIN HYDROCHLORIDE 850 MG: 850 TABLET, FILM COATED ORAL at 08:45

## 2017-01-19 RX ADMIN — SODIUM CHLORIDE 500 ML: 9 INJECTION, SOLUTION INTRAVENOUS at 05:45

## 2017-01-19 RX ADMIN — CLINDAMYCIN PHOSPHATE 900 MG: 150 INJECTION, SOLUTION INTRAVENOUS at 14:04

## 2017-01-19 RX ADMIN — OXYCODONE HYDROCHLORIDE 30 MG: 10 TABLET ORAL at 10:25

## 2017-01-19 RX ADMIN — CARVEDILOL 6.25 MG: 6.25 TABLET, FILM COATED ORAL at 17:57

## 2017-01-19 RX ADMIN — MORPHINE SULFATE 2 MG: 4 INJECTION INTRAVENOUS at 21:20

## 2017-01-19 RX ADMIN — BUDESONIDE AND FORMOTEROL FUMARATE DIHYDRATE 2 PUFF: 160; 4.5 AEROSOL RESPIRATORY (INHALATION) at 21:07

## 2017-01-19 RX ADMIN — OXYCODONE HYDROCHLORIDE 30 MG: 10 TABLET ORAL at 19:00

## 2017-01-19 RX ADMIN — AMPICILLIN AND SULBACTAM 1.5 G: 1; .5 INJECTION, POWDER, FOR SOLUTION INTRAVENOUS at 18:00

## 2017-01-19 RX ADMIN — METFORMIN HYDROCHLORIDE 850 MG: 850 TABLET, FILM COATED ORAL at 12:03

## 2017-01-19 RX ADMIN — CARVEDILOL 6.25 MG: 6.25 TABLET, FILM COATED ORAL at 08:45

## 2017-01-19 RX ADMIN — OXYCODONE HYDROCHLORIDE 30 MG: 10 TABLET ORAL at 14:49

## 2017-01-19 RX ADMIN — SODIUM CHLORIDE: 9 INJECTION, SOLUTION INTRAVENOUS at 08:47

## 2017-01-19 RX ADMIN — AMPICILLIN SODIUM AND SULBACTAM SODIUM 3 G: 2; 1 INJECTION, POWDER, FOR SOLUTION INTRAMUSCULAR; INTRAVENOUS at 05:45

## 2017-01-19 RX ADMIN — OXYCODONE HYDROCHLORIDE 30 MG: 10 TABLET ORAL at 23:07

## 2017-01-19 RX ADMIN — HEPARIN SODIUM 5000 UNITS: 5000 INJECTION, SOLUTION INTRAVENOUS; SUBCUTANEOUS at 21:07

## 2017-01-19 RX ADMIN — MORPHINE SULFATE 2 MG: 4 INJECTION INTRAVENOUS at 16:15

## 2017-01-19 RX ADMIN — HEPARIN SODIUM 5000 UNITS: 5000 INJECTION, SOLUTION INTRAVENOUS; SUBCUTANEOUS at 14:04

## 2017-01-19 RX ADMIN — SODIUM CHLORIDE 250 ML: 9 INJECTION, SOLUTION INTRAVENOUS at 01:12

## 2017-01-19 RX ADMIN — OXYCODONE HYDROCHLORIDE 30 MG: 10 TABLET ORAL at 06:27

## 2017-01-19 RX ADMIN — BUDESONIDE AND FORMOTEROL FUMARATE DIHYDRATE 2 PUFF: 160; 4.5 AEROSOL RESPIRATORY (INHALATION) at 10:11

## 2017-01-19 RX ADMIN — SODIUM CHLORIDE: 9 INJECTION, SOLUTION INTRAVENOUS at 19:00

## 2017-01-19 RX ADMIN — ASPIRIN 325 MG: 325 TABLET, DELAYED RELEASE ORAL at 08:45

## 2017-01-19 RX ADMIN — CLINDAMYCIN PHOSPHATE 900 MG: 150 INJECTION, SOLUTION INTRAVENOUS at 21:06

## 2017-01-19 RX ADMIN — AMPICILLIN AND SULBACTAM 1.5 G: 1; .5 INJECTION, POWDER, FOR SOLUTION INTRAVENOUS at 12:00

## 2017-01-19 RX ADMIN — AMPICILLIN AND SULBACTAM 1.5 G: 1; .5 INJECTION, POWDER, FOR SOLUTION INTRAVENOUS at 23:03

## 2017-01-19 ASSESSMENT — PAIN SCALES - GENERAL
PAINLEVEL_OUTOF10: 8
PAINLEVEL_OUTOF10: 8
PAINLEVEL_OUTOF10: 6
PAINLEVEL_OUTOF10: 8
PAINLEVEL_OUTOF10: 8

## 2017-01-19 ASSESSMENT — ENCOUNTER SYMPTOMS
COUGH: 0
SHORTNESS OF BREATH: 1
ABDOMINAL PAIN: 0
FEVER: 0
VOMITING: 0
CHILLS: 0
NAUSEA: 0

## 2017-01-19 NOTE — WOUND TEAM
"[facility]  Inpatient Services  Initial Wound & Skin Care Evaluation    Admission Date:  1/17/2017   HPI, PMH, SH: Reviewed  Unit where seen by Wound Team: 2208/00    WOUND CONSULT RELATED TO: BLE wounds    SUBJECTIVE:   \"The Dr wanted to see me once a week and I can't afford it. That's my grocery money.\"    Self Report / Pain Level: c/o pain with movement and touching despite pre-med      OBJECTIVE:pt was sitting up SOB to eat lunch, drainage onto  Pad underneath legs on floor  WOUND TYPE, LOCATION, CHARACTERISTICS (Pressure ulcers: location, stage, POA or date identified)  Wound POA Venous Ulcer Leg Right Lower-\  Periwound Skin: Discolored (dried dead skin, hemosiderin staining)  Drainage : Moderate, Serous       Tissue Type and %:    Red, purple, yellow and brown  Wound Edges:    Irregular, some open    Odor:     None   Exposed structure(s):   No   Signs and Symptoms of Infection: Edema, Erythema and Pain     Wound POA Venous Ulcer Leg Left Lower  Periwound Skin: Discolored (dried dead skin, hemosiderin staining)  Drainage : Moderate, Serous       Tissue Type and %:    Red, purple, yellow and brown  Wound Edges:    Irregular, some open    Odor:     None   Exposed structure(s):   No   Signs and Symptoms of Infection: Edema, Erythema and Pain     Measurements:     Right/left  Length (cm):  58/54  Width (cm):  47/40  Depth (cm):  (nm eschar)  Tracts/undermining:   None     INTERVENTIONS BY WOUND TEAM: cleaned wounds with wound cleanser, non-selective debridement with gauze. Applied xeroform to BLE covered with abd pads and wrapped with roll gauze. Moisturizer then applied to dorsal foot.   Dressing Options: Petrolatum Gauze (yellow), Absorbent Abdominal Pad, Roll Gauze    Interdisciplinary consultation:   With Nursing;With Patient    EVALUATION: pt has venous stasis ulcer and possible pressure ulcers to posterior calf area BLE r/t pt sleeps in recliner and weight of legs presses against foot rest. " Wound beds are blisters, open red tissue and eschar and slough. He has moderate amount of drainage when legs are dependent causing dried build up of dead skin to soften to where it loosens. Pt unable to raise his feet up on the bed without assistance. He can't lift them off the bed even an inch. Moisturizer to dorsal feet to loosen dried skin. Pt will need follow up with outpt wound care or HH. He reported he's got Circ-aid and regular compression socks but is unable to apply to self. Drsg for drainage and try to debride slough with removal.     Factors affecting wound healing: obesity, DM, CHF, COPD  Goals: drainage control, don't expect healing until infection decreased      NURSING PLAN OF CARE ORDERS (X):    Dressing changes: See Dressing Maintenance orders: x  Skin care: See Skin Care orders:   Rectal tube care: See Rectal Tube Care orders:   Other orders:    RSKIN: CURRENT (X) ORDERED (O)  Q shift Hugh:  X  Q shift pressure point assessments:  X  Pressure redistribution mattress  x      JOSE LUIS      Bariatric JOSE LUIS      Bariatric foam        Heel float boots       Heels floated on pillows      Barrier wipes      Barrier Cream      Barrier paste      Sacral silicone dressing      Padded O2 tubing      Anchorfast      Trach with Optifoam split foam       Waffle cushion      Rectal tube or BMS      Antifungal tx    Turn q 2 hours x encourage pt  Up to chair  Ambulate to bathroom  PT/OT     Dietician      PO  x   TF   TPN     PVN    NPO   # days   Other       WOUND TEAM PLAN OF CARE (X):   NPWT change 3 x week:        Dressing changes by wound team:       Follow up as needed:    x   Other (explain):    Anticipated discharge plans (X):  SNF:           Home Care:           Outpatient Wound Center:            Self Care:            Other:  tbd

## 2017-01-19 NOTE — PROGRESS NOTES
Hospital Medicine Progress Note, Adult, Complex               Author: Bravo Landers Date & Time created: 1/19/2017  2:18 PM     Interval History:  Admitted for bilateral LE cellulitis and failure to follow up c Wound Care Clinic.  Feeling Ok.  Leg swelling decreased slightly but still weeping.    Review of Systems:  Review of Systems   Respiratory: Positive for shortness of breath. Negative for cough.    Cardiovascular: Positive for leg swelling. Negative for chest pain.   Gastrointestinal: Negative for nausea and vomiting.   Skin: Positive for itching and rash.   All other systems reviewed and are negative.      Physical Exam:  Physical Exam  Nursing note and vitals reviewed.  Constitutional: He is oriented to person, place, and time. He appears well-developed and well-nourished obese.   HENT:   Head: Normocephalic and atraumatic.   Right Ear: External ear normal.   Left Ear: External ear normal.   Nose: Nose normal.   Eyes: Conjunctivae and extraocular motions are normal.    Neck: Normal range of motion. Neck supple.   Cardiovascular: Normal rate.    Pulmonary/Chest: Effort normal.   Abdominal: Soft. Bowel sounds are normal.   Musculoskeletal: Normal range of motion.   Neurological: He is alert and oriented to person, place, and time.   Skin: Skin is warm and dry. Bilateral LE stasis derm changes, open ulcers and erythema.  Dressing c serosanguinous fluid stains.      Labs:  Recent Labs      01/17/17   0909  01/17/17   1154   ISTATSPEC  Venous  Venous     Recent Labs      01/17/17   0954  01/17/17   1705 01/18/17 01/18/17   0635   TROPONINI  0.25*  0.30*  0.29*  0.19*   BNPBTYPENAT  5   --    --    --      Recent Labs      01/17/17   0954  01/18/17   0635  01/19/17   0350   SODIUM  136  138  137   POTASSIUM  3.8  4.3  4.8   CHLORIDE  95*  96  95*   CO2  30  37*  35*   BUN  15  16  21   CREATININE  0.74  0.82  2.06*   CALCIUM  9.4  8.4  8.6     Recent Labs      01/17/17   0954  01/18/17   0635  01/19/17   0350    ALTSGPT  624*  425*  292*   ASTSGOT  111*  49*  31   ALKPHOSPHAT  76  80  78   TBILIRUBIN  1.2  0.8  0.8   PREALBUMIN  5.0*   --    --    GLUCOSE  124*  153*  145*     Recent Labs      17   0954   RBC  5.01   HEMOGLOBIN  13.8*   HEMATOCRIT  44.1   PLATELETCT  164     Recent Labs      17   0954  17   0635  17   0350   WBC  8.9   --    --    NEUTSPOLYS  75.80*   --    --    LYMPHOCYTES  10.00*   --    --    MONOCYTES  8.80   --    --    EOSINOPHILS  4.40   --    --    BASOPHILS  0.50   --    --    ASTSGOT  111*  49*  31   ALTSGPT  624*  425*  292*   ALKPHOSPHAT  76  80  78   TBILIRUBIN  1.2  0.8  0.8           Hemodynamics:  Temp (24hrs), Av.8 °C (98.2 °F), Min:36.5 °C (97.7 °F), Max:37.1 °C (98.8 °F)  Temperature: 37.1 °C (98.8 °F)  Pulse  Av  Min: 75  Max: 121  Blood Pressure: 118/65 mmHg     Respiratory:    Respiration: 18, Pulse Oximetry: 95 %     Work Of Breathing / Effort: Mild  RUL Breath Sounds: Clear, RML Breath Sounds: Diminished, RLL Breath Sounds: Diminished, LARY Breath Sounds: Clear, LLL Breath Sounds: Diminished  Fluids:    Intake/Output Summary (Last 24 hours) at 17 1418  Last data filed at 17 0300   Gross per 24 hour   Intake    400 ml   Output    100 ml   Net    300 ml     Weight: (!) 137.5 kg (303 lb 2.1 oz)  GI/Nutrition:  Orders Placed This Encounter   Procedures   • DIET ORDER     Standing Status: Standing      Number of Occurrences: 1      Standing Expiration Date:      Order Specific Question:  Diet:     Answer:  Diabetic [3]     Order Specific Question:  Calorie modifications:     Answer:  1800 kcals [4]     Medical Decision Making, by Problem:  Active Hospital Problems    Diagnosis   • Cellulitis [L03.90] - Abx per ID.  Wound care and ID on.   • Troponin level elevated [R79.89] - asymptomatic of CP and levels decreasing.  ASA to 325 mg daily.   Morbid obesity - encourage Kcal restriction.  Change diet to 1800 Kcal DM.  HTN/Tachycardia - now  hypotensive.  DC Lasix and hydralazine.  IVF bolus and maintenance fluids.  Transaminitis - decreasing.  Await hepatitis profile and RUQ US.  ARF - likely due to hypotension.  Follow c cessation of Lasix and hydralazine and initiation of IVF.  Insomnia - outpatient follow up.  Chronic pain - judicious use of narcotics.  Decrease IV PRN morphine.  DM 2 - reasonable profile.  DC metformin in light of ARF.  HbA1c is 7.1.  COPD hx - O2, RT, IS, Vax.  Stable issues - med hx (depression), preventives.  Dispo - complex/guarded.        Medications reviewed and Labs reviewed  Montoya catheter: No Montoya      DVT Prophylaxis: Heparin    Ulcer prophylaxis: Not indicated  Antibiotics: Treating active infection/contamination beyond 24 hours perioperative coverage  Assessed for rehab: Patient unable to tolerate rehabilitation therapeutic regimen

## 2017-01-19 NOTE — PROGRESS NOTES
Lexie hospitalist regarding patient's low blood pressure of 90/37. Pt asymptomatic. Spoke with Dr. Aguayo and no new orders at this time, but parameters added to blood pressure medications to hold if SBP <90 or HR <50.

## 2017-01-19 NOTE — PROGRESS NOTES
Spoke with Dr. Underwood regarding patient's low urine output and continued low blood pressures, new order received for 500 mL bolus to infuse over 2 hours.

## 2017-01-19 NOTE — PROGRESS NOTES
Pt with low urine output, total of 100 mL dark janneth during shift. Pt denies any lower abdominal pressure or pain. He is refusing a bladder scan. In addition, blood pressures remain low after 250 mL bolus. Paged hospitalist.

## 2017-01-19 NOTE — PROGRESS NOTES
Infectious Disease Progress Note    Author: Annabella Capps M.D. Date & Time created: 2017  10:23 AM    Interval History:  56-year-old morbidly obese male chronic venous stasis with stasis dermatitis, morbid obesity, diabetes, oxygen dependent chronic obstructive pulmonary disease and congestive heart failure who   presents to the hospital with worsening leg ulcerations with associated cellulitis and ulceration   AF WBC 8.9 Apologized for being rude yesterday-tolerating abx well. Eating breakfast  Labs Reviewed, Medications Reviewed, Radiology Reviewed and Wound Reviewed.    Review of Systems:  Review of Systems   Constitutional: Negative for fever and chills.   Respiratory: Negative for cough.    Cardiovascular: Positive for leg swelling.   Gastrointestinal: Negative for nausea, vomiting and abdominal pain.   Skin: Positive for rash.       Hemodynamics:  Temp (24hrs), Av.7 °C (98.1 °F), Min:36.5 °C (97.7 °F), Max:37 °C (98.6 °F)  Temperature: 37 °C (98.6 °F)  Pulse  Av.6  Min: 75  Max: 121  Blood Pressure: 102/56 mmHg       Physical Exam:  Physical Exam   Constitutional: He is oriented to person, place, and time. He appears well-developed and well-nourished. No distress.   HENT:   Head: Normocephalic and atraumatic.   Eyes: EOM are normal. Pupils are equal, round, and reactive to light.   Neck: Neck supple.   Cardiovascular: Normal rate and regular rhythm.    Pulmonary/Chest: Effort normal. No respiratory distress. He has no wheezes.   Abdominal: Soft. He exhibits no distension. There is no tenderness. There is no rebound.   Musculoskeletal: He exhibits edema.   Neurological: He is alert and oriented to person, place, and time.   Skin: Rash noted. There is erythema.   Severe tinea pedis  See pictures and wound care notes  Stasis dermatitis  Multiple ulcerations   Nursing note and vitals reviewed.      Labs:  Recent Labs      17   0954   WBC  8.9   RBC  5.01   HEMOGLOBIN  13.8*    HEMATOCRIT  44.1   MCV  88.0   MCH  27.5   RDW  51.1*   PLATELETCT  164   MPV  10.7   NEUTSPOLYS  75.80*   LYMPHOCYTES  10.00*   MONOCYTES  8.80   EOSINOPHILS  4.40   BASOPHILS  0.50     Recent Labs      01/17/17   0954  01/18/17   0635  01/19/17   0350   SODIUM  136  138  137   POTASSIUM  3.8  4.3  4.8   CHLORIDE  95*  96  95*   CO2  30  37*  35*   GLUCOSE  124*  153*  145*   BUN  15  16  21     Recent Labs      01/17/17   0954  01/18/17   0635  01/19/17   0350   ALBUMIN  3.3  3.3  2.7*   TBILIRUBIN  1.2  0.8  0.8   ALKPHOSPHAT  76  80  78   TOTPROTEIN  6.9  6.8  6.6   ALTSGPT  624*  425*  292*   ASTSGOT  111*  49*  31   CREATININE  0.74  0.82  2.06*       Wound:      BLOOD CULTURE   Date Value Ref Range Status   01/17/2017   Preliminary    No Growth    Note: Blood cultures are incubated for 5 days and  are monitored continuously.Positive blood cultures  are called to the RN and reported as soon as  they are identified.  Blood culture testing and Gram stain, if indicated, are  performed at [facility], [address] [place] [address].  Positive blood cultures are  sent to [facility], [address] [place] [address], for organism identification and  susceptibility testing.            Fluids:  Intake/Output       01/17/17 0700 - 01/18/17 0659 01/18/17 0700 - 01/19/17 0659 01/19/17 0700 - 01/20/17 0659      0375-8974 7599-5384 Total 6296-0413 3756-1720 Total 6187-6673 6514-9538 Total       Intake    P.O.  --  250 250  700  400 1100  --  -- --    P.O. -- 250 250  -- -- --    I.V.  --  600 600  360  -- 360  --  -- --    IV Volume (Normal Saline) -- -- -- 160 -- 160 -- -- --    IV Piggyback Volume -- 600 600 200 -- 200 -- -- --    Total Intake --  400 1460 -- -- --       Output    Urine  --  650 650  200  100 300  --  -- --    Void (ml) -- 650 650 200 100 300 -- -- --    Total Output -- 650 650 200 100 300 -- -- --       Net I/O     -- 200 200 860  300 1160 -- -- --        Weight: (!) 137.5 kg (303 lb 2.1 oz)    Assessment:  Active Hospital Problems    Diagnosis   • Cellulitis [L03.90]   • Troponin level elevated [R79.89]       Plan:  56-year-old morbidly obese male chronic venous stasis with stasis dermatitis, morbid obesity, diabetes, oxygen dependent chronic obstructive pulmonary disease and congestive heart failure who   presents to the hospital with worsening leg ulcerations with associated cellulitis and ulceration  Noncompliant with wound care due to cost  Afebrile  No leukocytosis.    Demand ischemia   ALT decreasing  JOE-multifactorial. oliguric Vanco already stopped  Appreciate wound care evaluation.    Continue clindamycin and Unasyn and clindamycin for cellulitis and ulcerations  Fluc for severe tinea  OOB as tolerated  Prognosis guarded  DW IM

## 2017-01-19 NOTE — CONSULTS
DATE OF SERVICE:  01/18/2017    REASON FOR CONSULTATION:  Bilateral lower extremity stasis dermatitis with   ulcerations.      CONSULTING PHYSICIAN:  Dr. Landers.    HISTORY OF PRESENT ILLNESS:  This is a 56-year-old morbidly obese white male   with history of chronic venous stasis, stasis dermatitis, chronic pain on   narcotics, diabetes, CHF, and noncompliance who presented to the hospital   yesterday due to complaints of increasing leg pain, swelling and chills.  He   did not have any fever, nausea, vomiting or diarrhea.  He does not recall any   trauma.  Patient is known to the ID service from his hospitalization last   July.  When he was seen for worsening cellulitis, stasis dermatitis, and tinea   pedis, his symptoms at that time were not nearly as severe as this admission.    He was supposed to follow up with wound care, but he never did.  Patient   states that it was due to financial difficulty.  He was started on vancomycin   and Unasyn and infectious disease is consulted for antibiotic recommendations   and management.    ALLERGIES:  He has no known antibiotic allergies.    REVIEW OF SYSTEMS:  Negative except for stated in HPI.    PAST MEDICAL HISTORY:  Diabetes, congestive heart failure, hypertension,   asthma, COPD, venous stasis, chronic lower extremity edema, depression,   hypothyroidism and MRSA bacteremia in June 2016.  He is oxygen dependent.    FAMILY HISTORY:  Cancer.    SOCIAL HISTORY:  He is a former smoker.  Quit in 2005.  Denies any alcohol or   illicit drug use.      PHYSICAL EXAMINATION:  GENERAL:  He is a morbidly obese male in no acute distress, currently   afebrile.  VITAL SIGNS:  Temperature is 98.2, blood pressure 106/53, pulse 83,   respiratory rate 18 and oxygen saturation 92% on 3 L nasal cannula.  He weighs   136 kilos.  HEENT:  Normocephalic, atraumatic.  Pupils are equal, round, and reactive to   light.  Extraocular movements intact.  Oropharynx clear.  NECK:   Supple.  CARDIOVASCULAR:  Regular rate and rhythm with occasional ectopic beats.  Heart   sounds are distant.  CHEST:  Grossly clear to auscultation bilaterally, unlabored.  He has   decreased breath sounds at bases.  ABDOMEN:  Obese, soft, nontender.  EXTREMITIES:  Show chronic stasis changes with thickening of the skin and   scaliness.  He has significant deterioration in his wounds with significant   tinea pedis and multiple ulcerations which were not present previously.    Please see media for picture review.  He has a right upper extremity   PICC line in place.  NEURO: Alert and oriented poor strength in LE    CURRENT LABORATORY DATA:  White blood cell count 8.9, H and H 13.8 and 44.1,   platelets 164 and 75% neutrophils.  ESR is 41.  Sodium 138, potassium 4.3,   chloride 96, bicarbonate 37, glucose 153, BUN 16, creatinine 0.82, calcium   8.4, AST 49, ALT of 425, alkaline phosphatase of 80.  Glycohemoglobin was 7.1.    Lactic acid was normal.  Troponins were elevated as high as 0.3.  BNP was   normal.  UA was negative.  Blood cultures x2 are negative.  Chest x-ray,   atelectasis bibasilar.    ASSESSMENT AND PLAN:  This is a 56-year-old morbidly obese male with history   of multiple chronic medical problems, most significantly chronic venous   stasis with stasis dermatitis, morbid obesity, diabetes, oxygen dependent   chronic obstructive pulmonary disease and congestive heart failure who   presents to the hospital with worsening leg ulcerations with associated   cellulitis and evidence of necrosis.  He was noncompliant with wound care.  He   is currently afebrile and without leukocytosis.  However, he does have   evidence of demand ischemia and clinically his wounds have significantly   worsened.  Diuresis will be per the primary care team.  I recommend wound care   evaluation.  I will discontinue vancomycin due to concern for nephrotoxicity.    I will start clindamycin in case of strep infection to shut down  toxic   production.  Continue the Unasyn and clindamycin should add MRSA coverage for   his wounds.  We will escalate if appropriate.  Further recommendations per   culture results and clinical course. Discussed with Internal Medicine    Thank you and we will follow with you.       ____________________________________     MD TAMELA VERA / MATT    DD:  01/18/2017 15:40:17  DT:  01/18/2017 17:27:02    D#:  [id number]  Job#:  [id number]

## 2017-01-19 NOTE — CARE PLAN
Problem: Safety  Goal: Will remain free from injury  Outcome: PROGRESSING AS EXPECTED  Call light and personal belongings within reach, bed in low locked position, room free of clutter. Pt asked to call for assistance prior to getting up and verbalizes/demonstrates understanding of all fall precautions. He is x1-2 person assist using 1 point cane. Hourly rounding in place to ensure pt safety and needs are met.     Problem: Infection  Goal: Will remain free from infection  Outcome: PROGRESSING AS EXPECTED  Wound care team and infectious disease involved in patient's case, IV abx administered per MAR, pt remains afebrile.

## 2017-01-19 NOTE — PROGRESS NOTES
Spoke with Dr. Guillaume regarding patient's continued low blood pressures, new order received for 250 mL NS bolus.

## 2017-01-20 LAB
ANION GAP SERPL CALC-SCNC: 7 MMOL/L (ref 0–11.9)
BACTERIA UR CULT: NORMAL
BUN SERPL-MCNC: 19 MG/DL (ref 8–22)
CALCIUM SERPL-MCNC: 8.9 MG/DL (ref 8.4–10.2)
CHLORIDE SERPL-SCNC: 98 MMOL/L (ref 96–112)
CO2 SERPL-SCNC: 32 MMOL/L (ref 20–33)
CREAT SERPL-MCNC: 1.01 MG/DL (ref 0.5–1.4)
GFR SERPL CREATININE-BSD FRML MDRD: >60 ML/MIN/1.73 M 2
GLUCOSE BLD-MCNC: 104 MG/DL (ref 65–99)
GLUCOSE BLD-MCNC: 111 MG/DL (ref 65–99)
GLUCOSE BLD-MCNC: 132 MG/DL (ref 65–99)
GLUCOSE BLD-MCNC: 142 MG/DL (ref 65–99)
GLUCOSE SERPL-MCNC: 137 MG/DL (ref 65–99)
POTASSIUM SERPL-SCNC: 5.2 MMOL/L (ref 3.6–5.5)
SIGNIFICANT IND 70042: NORMAL
SITE SITE: NORMAL
SODIUM SERPL-SCNC: 137 MMOL/L (ref 135–145)
SOURCE SOURCE: NORMAL

## 2017-01-20 PROCEDURE — A9270 NON-COVERED ITEM OR SERVICE: HCPCS | Performed by: INTERNAL MEDICINE

## 2017-01-20 PROCEDURE — 700102 HCHG RX REV CODE 250 W/ 637 OVERRIDE(OP): Performed by: INTERNAL MEDICINE

## 2017-01-20 PROCEDURE — 82962 GLUCOSE BLOOD TEST: CPT | Mod: 91

## 2017-01-20 PROCEDURE — 700111 HCHG RX REV CODE 636 W/ 250 OVERRIDE (IP): Performed by: INTERNAL MEDICINE

## 2017-01-20 PROCEDURE — A9270 NON-COVERED ITEM OR SERVICE: HCPCS | Performed by: HOSPITALIST

## 2017-01-20 PROCEDURE — 700102 HCHG RX REV CODE 250 W/ 637 OVERRIDE(OP): Performed by: HOSPITALIST

## 2017-01-20 PROCEDURE — 700111 HCHG RX REV CODE 636 W/ 250 OVERRIDE (IP): Performed by: HOSPITALIST

## 2017-01-20 PROCEDURE — 80048 BASIC METABOLIC PNL TOTAL CA: CPT

## 2017-01-20 PROCEDURE — 700101 HCHG RX REV CODE 250: Performed by: INTERNAL MEDICINE

## 2017-01-20 PROCEDURE — 700105 HCHG RX REV CODE 258: Performed by: INTERNAL MEDICINE

## 2017-01-20 PROCEDURE — 770006 HCHG ROOM/CARE - MED/SURG/GYN SEMI*

## 2017-01-20 PROCEDURE — 99233 SBSQ HOSP IP/OBS HIGH 50: CPT | Performed by: HOSPITALIST

## 2017-01-20 RX ORDER — HYDRALAZINE HYDROCHLORIDE 10 MG/1
10 TABLET, FILM COATED ORAL EVERY 8 HOURS
Status: DISCONTINUED | OUTPATIENT
Start: 2017-01-20 | End: 2017-01-21

## 2017-01-20 RX ADMIN — OXYCODONE HYDROCHLORIDE 30 MG: 10 TABLET ORAL at 13:39

## 2017-01-20 RX ADMIN — LACTOBACILLUS TAB 2 TABLET: TAB at 11:29

## 2017-01-20 RX ADMIN — MORPHINE SULFATE 2 MG: 4 INJECTION INTRAVENOUS at 06:20

## 2017-01-20 RX ADMIN — OXYCODONE HYDROCHLORIDE 30 MG: 10 TABLET ORAL at 09:16

## 2017-01-20 RX ADMIN — CARVEDILOL 6.25 MG: 6.25 TABLET, FILM COATED ORAL at 07:48

## 2017-01-20 RX ADMIN — ACETAMINOPHEN 650 MG: 325 TABLET, FILM COATED ORAL at 21:52

## 2017-01-20 RX ADMIN — HYDRALAZINE HYDROCHLORIDE 10 MG: 10 TABLET, FILM COATED ORAL at 13:39

## 2017-01-20 RX ADMIN — OXYCODONE HYDROCHLORIDE 30 MG: 10 TABLET ORAL at 04:10

## 2017-01-20 RX ADMIN — CLINDAMYCIN PHOSPHATE 900 MG: 150 INJECTION, SOLUTION INTRAVENOUS at 21:52

## 2017-01-20 RX ADMIN — OXYCODONE HYDROCHLORIDE 30 MG: 10 TABLET ORAL at 22:05

## 2017-01-20 RX ADMIN — CLINDAMYCIN PHOSPHATE 900 MG: 150 INJECTION, SOLUTION INTRAVENOUS at 06:20

## 2017-01-20 RX ADMIN — CLINDAMYCIN PHOSPHATE 900 MG: 150 INJECTION, SOLUTION INTRAVENOUS at 13:40

## 2017-01-20 RX ADMIN — HEPARIN SODIUM 5000 UNITS: 5000 INJECTION, SOLUTION INTRAVENOUS; SUBCUTANEOUS at 21:52

## 2017-01-20 RX ADMIN — AMPICILLIN AND SULBACTAM 1.5 G: 1; .5 INJECTION, POWDER, FOR SOLUTION INTRAVENOUS at 17:26

## 2017-01-20 RX ADMIN — AMPICILLIN AND SULBACTAM 1.5 G: 1; .5 INJECTION, POWDER, FOR SOLUTION INTRAVENOUS at 05:42

## 2017-01-20 RX ADMIN — LACTOBACILLUS TAB 2 TABLET: TAB at 17:25

## 2017-01-20 RX ADMIN — BUDESONIDE AND FORMOTEROL FUMARATE DIHYDRATE 2 PUFF: 160; 4.5 AEROSOL RESPIRATORY (INHALATION) at 21:52

## 2017-01-20 RX ADMIN — MORPHINE SULFATE 2 MG: 4 INJECTION INTRAVENOUS at 01:36

## 2017-01-20 RX ADMIN — LISINOPRIL 5 MG: 5 TABLET ORAL at 07:48

## 2017-01-20 RX ADMIN — HYDRALAZINE HYDROCHLORIDE 10 MG: 10 TABLET, FILM COATED ORAL at 21:52

## 2017-01-20 RX ADMIN — OXYCODONE HYDROCHLORIDE 30 MG: 10 TABLET ORAL at 17:35

## 2017-01-20 RX ADMIN — CARVEDILOL 6.25 MG: 6.25 TABLET, FILM COATED ORAL at 17:26

## 2017-01-20 RX ADMIN — HEPARIN SODIUM 5000 UNITS: 5000 INJECTION, SOLUTION INTRAVENOUS; SUBCUTANEOUS at 13:39

## 2017-01-20 RX ADMIN — HEPARIN SODIUM 5000 UNITS: 5000 INJECTION, SOLUTION INTRAVENOUS; SUBCUTANEOUS at 06:20

## 2017-01-20 RX ADMIN — LACTOBACILLUS TAB 2 TABLET: TAB at 07:48

## 2017-01-20 RX ADMIN — AMPICILLIN AND SULBACTAM 1.5 G: 1; .5 INJECTION, POWDER, FOR SOLUTION INTRAVENOUS at 11:29

## 2017-01-20 ASSESSMENT — ENCOUNTER SYMPTOMS
NAUSEA: 0
COUGH: 0
VOMITING: 0
CHILLS: 0
ABDOMINAL PAIN: 0
SHORTNESS OF BREATH: 1
FEVER: 0

## 2017-01-20 ASSESSMENT — PAIN SCALES - GENERAL
PAINLEVEL_OUTOF10: 8

## 2017-01-20 NOTE — PROGRESS NOTES
Hospital Medicine Progress Note, Adult, Complex               Author: Bravo Landers Date & Time created: 1/20/2017  3:35 PM     Interval History:  Admitted for bilateral LE cellulitis and failure to follow up [facility].  Feeling Ok.  Leg swelling decreased slightly but still weeping.  Dressings were changed today.      Review of Systems:  Review of Systems   Respiratory: Positive for shortness of breath. Negative for cough.    Cardiovascular: Positive for leg swelling. Negative for chest pain.   Gastrointestinal: Negative for nausea and vomiting.   Skin: Positive for rash. Negative for itching.   All other systems reviewed and are negative.      Physical Exam:  Physical Exam  Nursing note and vitals reviewed.  Constitutional: He is oriented to person, place, and time. He appears well-developed and well-nourished obese.   HENT:   Head: Normocephalic and atraumatic.   Right Ear: External ear normal.   Left Ear: External ear normal.   Nose: Nose normal.   Eyes: Conjunctivae and extraocular motions are normal.    Neck: Normal range of motion. Neck supple.   Pulmonary/Chest: Effort normal.   Abdominal:  Bowel sounds are normal.   Musculoskeletal: Normal range of motion.   Neurological: He is alert and oriented to person, place, and time.   Skin: Skin is warm and dry. Bilateral LE stasis derm changes, open ulcers and erythema.  Dressing without serosanguinous fluid stains.      Labs:        Invalid input(s): [id number]  Recent Labs      01/17/17   1705 01/18/17 01/18/17   0635   TROPONINI  0.30*  0.29*  0.19*     Recent Labs      01/18/17   0635  01/19/17   0350  01/20/17   0414   SODIUM  138  137  137   POTASSIUM  4.3  4.8  5.2   CHLORIDE  96  95*  98   CO2  37*  35*  32   BUN  16  21  19   CREATININE  0.82  2.06*  1.01   CALCIUM  8.4  8.6  8.9     Recent Labs      01/18/17   0635  01/19/17   0350  01/20/17   0414   ALTSGPT  425*  292*   --    ASTSGOT  49*  31   --    ALKPHOSPHAT  80  78   --    TBILIRUBIN   0.8  0.8   --    GLUCOSE  153*  145*  137*     No results for input(s): RBC, HEMOGLOBIN, HEMATOCRIT, PLATELETCT, PROTHROMBTM, APTT, INR, IRON, FERRITIN, TOTIRONBC in the last 72 hours.  Recent Labs      17   0635  17   0350   ASTSGOT  49*  31   ALTSGPT  425*  292*   ALKPHOSPHAT  80  78   TBILIRUBIN  0.8  0.8           Hemodynamics:  Temp (24hrs), Av.9 °C (98.4 °F), Min:36.8 °C (98.2 °F), Max:37 °C (98.6 °F)  Temperature: 36.8 °C (98.2 °F)  Pulse  Av  Min: 75  Max: 121  Blood Pressure: 160/80 mmHg     Respiratory:    Respiration: 20, Pulse Oximetry: 94 %     Work Of Breathing / Effort: Mild  RUL Breath Sounds: Clear;Diminished, RML Breath Sounds: Clear;Diminished, RLL Breath Sounds: Clear;Diminished, LARY Breath Sounds: Clear;Diminished, LLL Breath Sounds: Clear;Diminished  Fluids:    Intake/Output Summary (Last 24 hours) at 17 1535  Last data filed at 17 0800   Gross per 24 hour   Intake   2355 ml   Output   1530 ml   Net    825 ml       GI/Nutrition:  Orders Placed This Encounter   Procedures   • DIET ORDER     Standing Status: Standing      Number of Occurrences: 1      Standing Expiration Date:      Order Specific Question:  Diet:     Answer:  Diabetic [3]     Order Specific Question:  Calorie modifications:     Answer:  1800 kcals [4]     Medical Decision Making, by Problem:  Active Hospital Problems    Diagnosis   • Cellulitis [L03.90] - Abx per ID.  Wound care and ID on.   • Troponin level elevated [R79.89] - asymptomatic of CP and levels decreasing.  ASA to 325 mg daily.   Morbid obesity - encourage Kcal restriction.  Change diet to 1800 Kcal DM.  HTN/Tachycardia - hypotension resolved.  Start low dose hydralazine.  Transaminitis - back to normal.  Await RUQ US.  Insomnia - outpatient follow up.  Chronic pain - judicious use of narcotics.    DM 2 - reasonable profile.  Restart metformin as ARF has resolved.  HbA1c is 7.1.  COPD hx - O2, RT, IS, Vax.  Other - increase  activity.  Stable issues - med hx (depression), preventives, ARF.  Dispo - complex/guarded.        Medications reviewed and Labs reviewed  Montoya catheter: No Montoya      DVT Prophylaxis: Heparin    Ulcer prophylaxis: Not indicated  Antibiotics: Treating active infection/contamination beyond 24 hours perioperative coverage  Assessed for rehab: Patient unable to tolerate rehabilitation therapeutic regimen

## 2017-01-20 NOTE — PROGRESS NOTES
Patient is sitting up at the edge if the bed eating dinner.  Oxycodone 30mg given for c/o pain at 8/10 in his legs.  Gauze dressing to bilat legs dry and intact.  NS infusing at 70ml/hr through PICC.  Patient is alert and oriented and talkative.  Patient appears to be in no distress.

## 2017-01-20 NOTE — PROGRESS NOTES
"Patient removed gauze roll dressings and ABD pads from his left leg but xeroform dressings remain intact.  Patient is refusing to let me reapply outer dressing, stating \"it hurts\".  Morphine 2mg IV given.  Pads changed on bed for weeping of left leg.  Patient has been scratching groin area and bleeding on bedding and floor from groin/upper thigh area - no active bleeding found after area cleaned - will monitor.    "

## 2017-01-20 NOTE — PROGRESS NOTES
Late Entry    1/19/17, 0730- Shift report received from off going RN. Patient resting to bed side. No distress noted at this time. Patient denies any needs at this time. Call bell at side. Patient awaiting breakfast.    1/19/17, 0815- patient sitting to bedside eating breakfast. Blood pressure noted at 102/56, heart rate 77.    1/19/17, 1030- AM medications given as charted in MAR. Patient with complaints of pain to bilateral lower extremities treated with oxycodone as charted in MAR.    1/19/17, 1200- Dressing change to bilateral lower extremities completed.     1/19/17, 1415 Patient resting to bedside. Blood pressure noted at 118/65.     1/19/17, 1615- Patient medicated with Morphine 2 mg as charted in MAR for complaints of breakthrough pain following previously noted dosage of oxycodone.     1/19/17, 1700- Patient resting with legs elevated while in bed.

## 2017-01-20 NOTE — PROGRESS NOTES
1/19/17, 1825- Patient with complaints of burning to legs. Patient request dressing to legs to be removed and reapplied. Such completed. Patient sitting to bedside eating dinner at this time.

## 2017-01-20 NOTE — THERAPY
Occupational Therapy-  Attempted eval again, per RN, pt not approp for therapy intervention at this time.  Pt only sits EOB, does not participate in any other mobility due to severe pain.  Will monitor and eval as approp.

## 2017-01-20 NOTE — CARE PLAN
Problem: Venous Thromboembolism (VTW)/Deep Vein Thrombosis (DVT) Prevention:  Goal: Patient will participate in Venous Thrombosis (VTE)/Deep Vein Thrombosis (DVT)Prevention Measures    01/19/17 2130   OTHER   Risk Assessment Score 3   VTE RISK High   Pharmacologic Prophylaxis Used Unfractionated Heparin         Problem: Pain Management  Goal: Pain level will decrease to patient’s comfort goal  Outcome: PROGRESSING SLOWER THAN EXPECTED    01/19/17 2308   OTHER   Nurse Pain Scale 0 - 10  8   Comfort Goal Comfort at Rest   Patient continues to take oxycodone 30mg Q4H prn and morphine 2mg IV Q4H PRN for breakthrough pain.

## 2017-01-20 NOTE — PROGRESS NOTES
Infectious Disease Progress Note    Author: Annabella Capps M.D. Date & Time created: 2017  2:22 PM    Interval History:  56-year-old morbidly obese male chronic venous stasis with stasis dermatitis, morbid obesity, diabetes, oxygen dependent chronic obstructive pulmonary disease and congestive heart failure who   presents to the hospital with worsening leg ulcerations with associated cellulitis and ulceration   AF WBC 8.9 Apologized for being rude yesterday-tolerating abx well. Eating breakfast   AF no WBC having trouble with any pressure in dressings and keeping legs elevated due to discomfort. Denies SE abx  Labs Reviewed, Medications Reviewed, Radiology Reviewed and Wound Reviewed.    Review of Systems:  Review of Systems   Constitutional: Negative for fever and chills.   Respiratory: Negative for cough.    Cardiovascular: Positive for leg swelling.   Gastrointestinal: Negative for nausea, vomiting and abdominal pain.   Skin: Positive for rash.       Hemodynamics:  Temp (24hrs), Av.9 °C (98.4 °F), Min:36.8 °C (98.2 °F), Max:37 °C (98.6 °F)  Temperature: 36.8 °C (98.2 °F)  Pulse  Av  Min: 75  Max: 121  Blood Pressure: 160/80 mmHg       Physical Exam:  Physical Exam   Constitutional: He is oriented to person, place, and time. He appears well-developed and well-nourished. No distress.   HENT:   Head: Normocephalic and atraumatic.   Eyes: EOM are normal. Pupils are equal, round, and reactive to light.   Neck: Neck supple.   Cardiovascular: Normal rate and regular rhythm.    Pulmonary/Chest: Effort normal. No respiratory distress. He has no wheezes.   Abdominal: Soft. He exhibits no distension. There is no tenderness. There is no rebound.   Musculoskeletal: He exhibits edema.   Neurological: He is alert and oriented to person, place, and time.   Skin: Rash noted. There is erythema.   Severe tinea pedis  See pictures and wound care notes  Stasis dermatitis  Multiple ulcerations   Nursing note  and vitals reviewed.      Labs:  No results for input(s): WBC, RBC, HEMOGLOBIN, HEMATOCRIT, MCV, MCH, RDW, PLATELETCT, MPV, NEUTSPOLYS, LYMPHOCYTES, MONOCYTES, EOSINOPHILS, BASOPHILS, RBCMORPHOLO in the last 72 hours.  Recent Labs      01/18/17   0635  01/19/17   0350  01/20/17   0414   SODIUM  138  137  137   POTASSIUM  4.3  4.8  5.2   CHLORIDE  96  95*  98   CO2  37*  35*  32   GLUCOSE  153*  145*  137*   BUN  16  21  19     Recent Labs      01/18/17   0635  01/19/17   0350  01/20/17   0414   ALBUMIN  3.3  2.7*   --    TBILIRUBIN  0.8  0.8   --    ALKPHOSPHAT  80  78   --    TOTPROTEIN  6.8  6.6   --    ALTSGPT  425*  292*   --    ASTSGOT  49*  31   --    CREATININE  0.82  2.06*  1.01       Wound:      BLOOD CULTURE   Date Value Ref Range Status   01/17/2017   Preliminary    No Growth    Note: Blood cultures are incubated for 5 days and  are monitored continuously.Positive blood cultures  are called to the RN and reported as soon as  they are identified.  Blood culture testing and Gram stain, if indicated, are  performed at [facility], [address] [place] [address].  Positive blood cultures are  sent to [facility], [address] [place] [address], for organism identification and  susceptibility testing.            Fluids:  Intake/Output       01/18/17 0700 - 01/19/17 0659 01/19/17 0700 - 01/20/17 0659 01/20/17 0700 - 01/21/17 0659      8142-2795 1984-9025 Total 1344-9033 7529-2091 Total 9070-5833 1332-2844 Total       Intake    P.O.  700  400 1100  1050  725 1775  --  -- --    P.O.  3476 849 7127 -- -- --    I.V.  360  -- 360  790  940 1730  --  -- --    IV Volume (Normal Saline) 160 -- 160  -- -- --    IV Piggyback Volume 200 -- 200 300 100 400 -- -- --    Total Intake 1554 191 5088 1840 1665 3505 -- -- --       Output    Urine  200  100 300  --  1280 1280  250  -- 250    Number of Times Voided 2 x -- 2 x 3 x 1 x 4 x -- -- --    Void (ml)  200 100 300 -- 1280 1280 250 -- 250    Stool  --  -- --  --  -- --  --  -- --    Number of Times Stooled -- -- -- -- 1 x 1 x -- -- --    Total Output 200 100 300 -- 1280 1280 250 -- 250       Net I/O      6606 742 1478 -250 -- -250            Assessment:  Active Hospital Problems    Diagnosis   • Cellulitis [L03.90]   • Troponin level elevated [R79.89]       Plan:  56-year-old morbidly obese male chronic venous stasis with stasis dermatitis, morbid obesity, diabetes, oxygen dependent chronic obstructive pulmonary disease and congestive heart failure who   presents to the hospital with worsening leg ulcerations with associated cellulitis and ulceration  Noncompliant with wound care due to cost  Afebrile  No leukocytosis.    Demand ischemia   ALT decreasing  JOE-multifactorial. Back to baseline  Appreciate wound care evaluation.    Continue clindamycin and Unasyn and clindamycin for cellulitis and ulcerations  Fluc for severe tinea  Encouraged to keep legs elevated   OOB as tolerated  Prognosis guarded  DW IM

## 2017-01-20 NOTE — CARE PLAN
Problem: Venous Thromboembolism (VTW)/Deep Vein Thrombosis (DVT) Prevention:  Goal: Patient will participate in Venous Thrombosis (VTE)/Deep Vein Thrombosis (DVT)Prevention Measures    01/19/17 1000   OTHER   Risk Assessment Score 3   VTE RISK High   Pharmacologic Prophylaxis Used Unfractionated Heparin         Problem: Pain Management  Goal: Pain level will decrease to patient’s comfort goal  Medications ordered and in place to treat patient complaints of pain on an as needed basis per patient request and nursing assessment.

## 2017-01-21 LAB
ERYTHROCYTE [DISTWIDTH] IN BLOOD BY AUTOMATED COUNT: 53.4 FL (ref 35.9–50)
GLUCOSE BLD-MCNC: 112 MG/DL (ref 65–99)
GLUCOSE BLD-MCNC: 135 MG/DL (ref 65–99)
HCT VFR BLD AUTO: 41 % (ref 42–52)
HGB BLD-MCNC: 12.8 G/DL (ref 14–18)
MCH RBC QN AUTO: 28.1 PG (ref 27–33)
MCHC RBC AUTO-ENTMCNC: 31.2 G/DL (ref 33.7–35.3)
MCV RBC AUTO: 89.9 FL (ref 81.4–97.8)
PLATELET # BLD AUTO: 208 K/UL (ref 164–446)
PMV BLD AUTO: 10.8 FL (ref 9–12.9)
RBC # BLD AUTO: 4.56 M/UL (ref 4.7–6.1)
WBC # BLD AUTO: 7.4 K/UL (ref 4.8–10.8)

## 2017-01-21 PROCEDURE — 700111 HCHG RX REV CODE 636 W/ 250 OVERRIDE (IP): Performed by: HOSPITALIST

## 2017-01-21 PROCEDURE — 700102 HCHG RX REV CODE 250 W/ 637 OVERRIDE(OP): Performed by: INTERNAL MEDICINE

## 2017-01-21 PROCEDURE — 85027 COMPLETE CBC AUTOMATED: CPT

## 2017-01-21 PROCEDURE — 700105 HCHG RX REV CODE 258: Performed by: INTERNAL MEDICINE

## 2017-01-21 PROCEDURE — A9270 NON-COVERED ITEM OR SERVICE: HCPCS | Performed by: INTERNAL MEDICINE

## 2017-01-21 PROCEDURE — 700101 HCHG RX REV CODE 250: Performed by: INTERNAL MEDICINE

## 2017-01-21 PROCEDURE — 82962 GLUCOSE BLOOD TEST: CPT | Mod: 91

## 2017-01-21 PROCEDURE — 700105 HCHG RX REV CODE 258: Performed by: HOSPITALIST

## 2017-01-21 PROCEDURE — 99233 SBSQ HOSP IP/OBS HIGH 50: CPT | Performed by: HOSPITALIST

## 2017-01-21 PROCEDURE — 700111 HCHG RX REV CODE 636 W/ 250 OVERRIDE (IP): Performed by: INTERNAL MEDICINE

## 2017-01-21 PROCEDURE — 700102 HCHG RX REV CODE 250 W/ 637 OVERRIDE(OP): Performed by: HOSPITALIST

## 2017-01-21 PROCEDURE — A9270 NON-COVERED ITEM OR SERVICE: HCPCS | Performed by: HOSPITALIST

## 2017-01-21 PROCEDURE — 770006 HCHG ROOM/CARE - MED/SURG/GYN SEMI*

## 2017-01-21 RX ORDER — HYDRALAZINE HYDROCHLORIDE 25 MG/1
25 TABLET, FILM COATED ORAL EVERY 8 HOURS
Status: DISCONTINUED | OUTPATIENT
Start: 2017-01-21 | End: 2017-01-24 | Stop reason: HOSPADM

## 2017-01-21 RX ORDER — FUROSEMIDE 10 MG/ML
20 INJECTION INTRAMUSCULAR; INTRAVENOUS
Status: DISCONTINUED | OUTPATIENT
Start: 2017-01-21 | End: 2017-01-24 | Stop reason: HOSPADM

## 2017-01-21 RX ADMIN — CLINDAMYCIN PHOSPHATE 900 MG: 150 INJECTION, SOLUTION INTRAVENOUS at 06:35

## 2017-01-21 RX ADMIN — AMPICILLIN AND SULBACTAM 1.5 G: 1; .5 INJECTION, POWDER, FOR SOLUTION INTRAVENOUS at 17:37

## 2017-01-21 RX ADMIN — LACTOBACILLUS TAB 2 TABLET: TAB at 18:30

## 2017-01-21 RX ADMIN — HEPARIN SODIUM 5000 UNITS: 5000 INJECTION, SOLUTION INTRAVENOUS; SUBCUTANEOUS at 22:24

## 2017-01-21 RX ADMIN — AMPICILLIN AND SULBACTAM 1.5 G: 1; .5 INJECTION, POWDER, FOR SOLUTION INTRAVENOUS at 05:40

## 2017-01-21 RX ADMIN — FUROSEMIDE 20 MG: 10 INJECTION, SOLUTION INTRAMUSCULAR; INTRAVENOUS at 18:31

## 2017-01-21 RX ADMIN — CARVEDILOL 6.25 MG: 6.25 TABLET, FILM COATED ORAL at 10:57

## 2017-01-21 RX ADMIN — CLINDAMYCIN PHOSPHATE 900 MG: 150 INJECTION, SOLUTION INTRAVENOUS at 22:25

## 2017-01-21 RX ADMIN — BUDESONIDE AND FORMOTEROL FUMARATE DIHYDRATE 2 PUFF: 160; 4.5 AEROSOL RESPIRATORY (INHALATION) at 10:51

## 2017-01-21 RX ADMIN — HEPARIN SODIUM 5000 UNITS: 5000 INJECTION, SOLUTION INTRAVENOUS; SUBCUTANEOUS at 15:31

## 2017-01-21 RX ADMIN — HYDRALAZINE HYDROCHLORIDE 25 MG: 25 TABLET ORAL at 15:36

## 2017-01-21 RX ADMIN — OXYCODONE HYDROCHLORIDE 30 MG: 10 TABLET ORAL at 15:33

## 2017-01-21 RX ADMIN — SODIUM CHLORIDE: 9 INJECTION, SOLUTION INTRAVENOUS at 22:25

## 2017-01-21 RX ADMIN — HYDRALAZINE HYDROCHLORIDE 10 MG: 10 TABLET, FILM COATED ORAL at 06:35

## 2017-01-21 RX ADMIN — OXYCODONE HYDROCHLORIDE 30 MG: 10 TABLET ORAL at 19:52

## 2017-01-21 RX ADMIN — LACTOBACILLUS TAB 2 TABLET: TAB at 15:35

## 2017-01-21 RX ADMIN — LISINOPRIL 5 MG: 5 TABLET ORAL at 10:55

## 2017-01-21 RX ADMIN — FLUCONAZOLE 400 MG: 200 TABLET ORAL at 10:56

## 2017-01-21 RX ADMIN — AMPICILLIN AND SULBACTAM 1.5 G: 1; .5 INJECTION, POWDER, FOR SOLUTION INTRAVENOUS at 00:26

## 2017-01-21 RX ADMIN — OXYCODONE HYDROCHLORIDE 30 MG: 10 TABLET ORAL at 03:27

## 2017-01-21 RX ADMIN — CARVEDILOL 6.25 MG: 6.25 TABLET, FILM COATED ORAL at 18:30

## 2017-01-21 RX ADMIN — LACTOBACILLUS TAB 2 TABLET: TAB at 10:54

## 2017-01-21 RX ADMIN — OXYCODONE HYDROCHLORIDE 30 MG: 10 TABLET ORAL at 10:55

## 2017-01-21 RX ADMIN — SODIUM CHLORIDE: 9 INJECTION, SOLUTION INTRAVENOUS at 03:30

## 2017-01-21 RX ADMIN — ASPIRIN 325 MG: 325 TABLET, DELAYED RELEASE ORAL at 10:56

## 2017-01-21 RX ADMIN — HEPARIN SODIUM 5000 UNITS: 5000 INJECTION, SOLUTION INTRAVENOUS; SUBCUTANEOUS at 06:35

## 2017-01-21 RX ADMIN — BUDESONIDE AND FORMOTEROL FUMARATE DIHYDRATE 2 PUFF: 160; 4.5 AEROSOL RESPIRATORY (INHALATION) at 22:25

## 2017-01-21 RX ADMIN — MORPHINE SULFATE 2 MG: 4 INJECTION INTRAVENOUS at 22:25

## 2017-01-21 RX ADMIN — AMPICILLIN AND SULBACTAM 1.5 G: 1; .5 INJECTION, POWDER, FOR SOLUTION INTRAVENOUS at 10:58

## 2017-01-21 RX ADMIN — HYDRALAZINE HYDROCHLORIDE 25 MG: 25 TABLET ORAL at 22:24

## 2017-01-21 RX ADMIN — CLINDAMYCIN PHOSPHATE 900 MG: 150 INJECTION, SOLUTION INTRAVENOUS at 15:30

## 2017-01-21 ASSESSMENT — PAIN SCALES - GENERAL
PAINLEVEL_OUTOF10: 8
PAINLEVEL_OUTOF10: 8
PAINLEVEL_OUTOF10: 6
PAINLEVEL_OUTOF10: 8
PAINLEVEL_OUTOF10: 7
PAINLEVEL_OUTOF10: 8

## 2017-01-21 ASSESSMENT — ENCOUNTER SYMPTOMS
VOMITING: 0
SPUTUM PRODUCTION: 0
CHILLS: 0
COUGH: 0
MYALGIAS: 1
FEVER: 0
ABDOMINAL PAIN: 0
SHORTNESS OF BREATH: 1
NAUSEA: 0

## 2017-01-21 NOTE — PROGRESS NOTES
Hospital Medicine Progress Note, Adult, Complex               Author: Bravo Landers Date & Time created: 1/21/2017  3:07 PM     Interval History:  Admitted for bilateral LE cellulitis and failure to follow up c [facility].  Appearing somnolent but wakes up upon calling his name.  Having lots of leg pain either when lowered to the floor or elevated in bed.  Anything that touches it cause pain.      Review of Systems:  Review of Systems   Respiratory: Positive for shortness of breath. Negative for cough and sputum production.    Cardiovascular: Positive for leg swelling. Negative for chest pain.   Gastrointestinal: Negative for nausea and vomiting.   Musculoskeletal: Positive for myalgias.   Skin: Positive for rash. Negative for itching.   All other systems reviewed and are negative.      Physical Exam:  Physical Exam  Nursing note and vitals reviewed.  Constitutional: He is oriented to person, place, and time. He appears well-developed and well-nourished obese.   HENT:   Head: Normocephalic and atraumatic.   Right Ear: External ear normal.   Left Ear: External ear normal.   Nose: Nose normal.   Eyes: Conjunctivae and extraocular motions are normal.    Neck: Normal range of motion. Neck supple.   Pulmonary/Chest: Effort normal.   Musculoskeletal: Normal range of motion.   Neurological: He is alert and oriented to person, place, and time.   Skin: Skin is warm and dry. Bilateral LE stasis derm changes, open ulcers and erythema.  Dressing with serosanguinous fluid stains.      Labs:        Invalid input(s): [id number]      Recent Labs      01/19/17   0350  01/20/17   0414   SODIUM  137  137   POTASSIUM  4.8  5.2   CHLORIDE  95*  98   CO2  35*  32   BUN  21  19   CREATININE  2.06*  1.01   CALCIUM  8.6  8.9     Recent Labs      01/19/17   0350  01/20/17   0414   ALTSGPT  292*   --    ASTSGOT  31   --    ALKPHOSPHAT  78   --    TBILIRUBIN  0.8   --    GLUCOSE  145*  137*     Recent Labs      01/21/17   0330   RBC   4.56*   HEMOGLOBIN  12.8*   HEMATOCRIT  41.0*   PLATELETCT  208     Recent Labs      17   0350  17   0330   WBC   --   7.4   ASTSGOT  31   --    ALTSGPT  292*   --    ALKPHOSPHAT  78   --    TBILIRUBIN  0.8   --            Hemodynamics:  Temp (24hrs), Av.7 °C (98.1 °F), Min:36.4 °C (97.5 °F), Max:36.9 °C (98.5 °F)  Temperature: 36.4 °C (97.5 °F)  Pulse  Av.3  Min: 75  Max: 121  Blood Pressure: 152/73 mmHg     Respiratory:    Respiration: 20, Pulse Oximetry: 98 %     Work Of Breathing / Effort: Mild  RUL Breath Sounds: Clear;Diminished, RML Breath Sounds: Clear;Diminished, RLL Breath Sounds: Clear;Diminished, LARY Breath Sounds: Clear;Diminished, LLL Breath Sounds: Clear;Diminished  Fluids:    Intake/Output Summary (Last 24 hours) at 17 1507  Last data filed at 17 1330   Gross per 24 hour   Intake   1100 ml   Output   1050 ml   Net     50 ml       GI/Nutrition:  Orders Placed This Encounter   Procedures   • DIET ORDER     Standing Status: Standing      Number of Occurrences: 1      Standing Expiration Date:      Order Specific Question:  Diet:     Answer:  Diabetic [3]     Order Specific Question:  Calorie modifications:     Answer:  1800 kcals [4]     Medical Decision Making, by Problem:  Active Hospital Problems    Diagnosis   • Cellulitis [L03.90] - Abx per ID.  Wound care and ID on.   • Somnolence - cautious narc/sed in light of hypoxemia.   Morbid obesity - encourage Kcal restriction.    HTN/Tachycardia - hypotension resolved.  Increase hydralazine.  Transaminitis - back to normal.  Await RUQ US.  Insomnia - outpatient follow up.  Leg edema - encourage leg elevation. Start low dose Lasix.  Chronic pain - judicious use of narcotics.    DM 2 - reasonable profile.  HbA1c is 7.1.  COPD hx - O2, RT, IS, Vax.  Frequent BM's - change laxatives to PRN.  Stable issues - med hx (depression), preventives, ARF, trop elevation.  Dispo - complex/guarded.        Medications reviewed and Labs  reviewed  Montoya catheter: No Montoya      DVT Prophylaxis: Heparin    Ulcer prophylaxis: Not indicated  Antibiotics: Treating active infection/contamination beyond 24 hours perioperative coverage  Assessed for rehab: Patient unable to tolerate rehabilitation therapeutic regimen

## 2017-01-21 NOTE — PROGRESS NOTES
Assumed care of patient at 1900.  Patient is sitting at the edge of the bed with feet on the floor.  Dressing to bilat legs intact.  Discussed plans for the shift including pain control.  Patient denies needs at this time.

## 2017-01-21 NOTE — PROGRESS NOTES
Infectious Disease Progress Note    Author: Olamide Cormier M.D. Date & Time created: 2017  2:48 PM    Interval History:  56-year-old morbidly obese male chronic venous stasis with stasis dermatitis, morbid obesity, diabetes, oxygen dependent chronic obstructive pulmonary disease and congestive heart failure who   presents to the hospital with worsening leg ulcerations with associated cellulitis and ulceration   AF WBC 8.9 Apologized for being rude yesterday-tolerating abx well. Eating breakfast   AF no WBC having trouble with any pressure in dressings and keeping legs elevated due to discomfort. Denies SE abx   AF, WBC 7.4, bilateral lower extremities still wheeping, asking why he is on fluids, diarrhea resolved on probiotics  Labs Reviewed, Medications Reviewed, Radiology Reviewed and Wound Reviewed.    Review of Systems:  Review of Systems   Constitutional: Negative for fever and chills.   Respiratory: Negative for cough.    Cardiovascular: Positive for leg swelling.   Gastrointestinal: Negative for nausea, vomiting and abdominal pain.   Skin: Positive for rash.       Hemodynamics:  Temp (24hrs), Av.7 °C (98.1 °F), Min:36.4 °C (97.5 °F), Max:36.9 °C (98.5 °F)  Temperature: 36.4 °C (97.5 °F)  Pulse  Av.3  Min: 75  Max: 121  Blood Pressure: 152/73 mmHg       Physical Exam:  Physical Exam   Constitutional: He is oriented to person, place, and time. He appears well-developed and well-nourished. No distress.   HENT:   Head: Normocephalic and atraumatic.   Eyes: EOM are normal. Pupils are equal, round, and reactive to light.   Neck: Neck supple.   Cardiovascular: Normal rate and regular rhythm.    Pulmonary/Chest: Effort normal. No respiratory distress. He has no wheezes.   Abdominal: Soft. He exhibits no distension. There is no tenderness. There is no rebound.   Musculoskeletal: He exhibits edema.   Neurological: He is alert and oriented to person, place, and time.   Skin: Rash noted.  There is erythema.   Severe tinea pedis  See pictures and wound care notes  Stasis dermatitis  Multiple ulcerations  +wheeping   Nursing note and vitals reviewed.      Labs:  Recent Labs      01/21/17   0330   WBC  7.4   RBC  4.56*   HEMOGLOBIN  12.8*   HEMATOCRIT  41.0*   MCV  89.9   MCH  28.1   RDW  53.4*   PLATELETCT  208   MPV  10.8     Recent Labs      01/19/17   0350  01/20/17   0414   SODIUM  137  137   POTASSIUM  4.8  5.2   CHLORIDE  95*  98   CO2  35*  32   GLUCOSE  145*  137*   BUN  21 19     Recent Labs      01/19/17   0350  01/20/17   0414   ALBUMIN  2.7*   --    TBILIRUBIN  0.8   --    ALKPHOSPHAT  78   --    TOTPROTEIN  6.6   --    ALTSGPT  292*   --    ASTSGOT  31   --    CREATININE  2.06*  1.01       Wound:      BLOOD CULTURE   Date Value Ref Range Status   01/17/2017   Preliminary    No Growth    Note: Blood cultures are incubated for 5 days and  are monitored continuously.Positive blood cultures  are called to the RN and reported as soon as  they are identified.  Blood culture testing and Gram stain, if indicated, are  performed at [facility], [address] [place] [address].  Positive blood cultures are  sent to [facility], [address] [place] [address], for organism identification and  susceptibility testing.            Fluids:  Intake/Output       01/19/17 0700 - 01/20/17 0659 01/20/17 0700 - 01/21/17 0659 01/21/17 0700 - 01/22/17 0659      5640-6387 6213-1377 Total 5213-1515 4158-1265 Total 8611-8710 2627-5537 Total       Intake    P.O.  1050  725 1775  --  720 720  --  -- --    P.O. 2273 473 0819 -- 720 720 -- -- --    I.V.  790  940 1730  --  380 380  --  -- --    IV Volume (Normal Saline)  -- 280 280 -- -- --    IV Piggyback Volume 300 100 400 -- 100 100 -- -- --    Total Intake 1840 1665 3505 -- 1100 1100 -- -- --       Output    Urine  --  1280 1280  550  500 1050  550  -- 550    Number of Times Voided 3 x 1 x 4 x -- -- -- --  -- --    Void (ml) -- 1280 1280  550 -- 550    Stool  --  -- --  --  -- --  --  -- --    Number of Times Stooled -- 1 x 1 x -- -- -- -- -- --    Total Output -- 1280 1280  550 -- 550       Net I/O     6099 415 2821 -550 600 50 -550 -- -550            Assessment:  Active Hospital Problems    Diagnosis   • Cellulitis [L03.90]   • Troponin level elevated [R79.89]       Plan:  56-year-old morbidly obese male chronic venous stasis with stasis dermatitis, morbid obesity, diabetes, oxygen dependent chronic obstructive pulmonary disease and congestive heart failure who presents to the hospital with worsening leg ulcerations with associated cellulitis and ulceration.  Noncompliant with wound care due to cost  Afebrile  No leukocytosis.    Demand ischemia   ALT decreasing  JOE-multifactorial. Back to baseline  Appreciate wound care evaluation.    Continue clindamycin and Unasyn and clindamycin for cellulitis and ulcerations  Fluc for severe tinea  Encouraged to keep legs elevated   OOB as tolerated  Prognosis guarded  DW IM

## 2017-01-21 NOTE — CARE PLAN
Problem: Venous Thromboembolism (VTW)/Deep Vein Thrombosis (DVT) Prevention:  Goal: Patient will participate in Venous Thrombosis (VTE)/Deep Vein Thrombosis (DVT)Prevention Measures    01/20/17 2204   OTHER   Risk Assessment Score 4   VTE RISK High   Pharmacologic Prophylaxis Used Unfractionated Heparin         Problem: Pain Management  Goal: Pain level will decrease to patient’s comfort goal  Outcome: PROGRESSING AS EXPECTED

## 2017-01-21 NOTE — PROGRESS NOTES
PICC line dressing to VANESSA changed with sterile field.  Dressings to bilat legs with moderate drainage but patient refusing to have them changed.

## 2017-01-22 LAB
ANION GAP SERPL CALC-SCNC: 5 MMOL/L (ref 0–11.9)
BACTERIA BLD CULT: NORMAL
BACTERIA BLD CULT: NORMAL
BUN SERPL-MCNC: 13 MG/DL (ref 8–22)
CALCIUM SERPL-MCNC: 9.1 MG/DL (ref 8.4–10.2)
CHLORIDE SERPL-SCNC: 93 MMOL/L (ref 96–112)
CO2 SERPL-SCNC: 36 MMOL/L (ref 20–33)
CREAT SERPL-MCNC: 0.85 MG/DL (ref 0.5–1.4)
GFR SERPL CREATININE-BSD FRML MDRD: >60 ML/MIN/1.73 M 2
GLUCOSE BLD-MCNC: 121 MG/DL (ref 65–99)
GLUCOSE BLD-MCNC: 124 MG/DL (ref 65–99)
GLUCOSE BLD-MCNC: 142 MG/DL (ref 65–99)
GLUCOSE BLD-MCNC: 148 MG/DL (ref 65–99)
GLUCOSE BLD-MCNC: 95 MG/DL (ref 65–99)
GLUCOSE SERPL-MCNC: 107 MG/DL (ref 65–99)
POTASSIUM SERPL-SCNC: 5.2 MMOL/L (ref 3.6–5.5)
SIGNIFICANT IND 70042: NORMAL
SIGNIFICANT IND 70042: NORMAL
SITE SITE: NORMAL
SITE SITE: NORMAL
SODIUM SERPL-SCNC: 134 MMOL/L (ref 135–145)
SOURCE SOURCE: NORMAL
SOURCE SOURCE: NORMAL

## 2017-01-22 PROCEDURE — 700102 HCHG RX REV CODE 250 W/ 637 OVERRIDE(OP): Performed by: INTERNAL MEDICINE

## 2017-01-22 PROCEDURE — 700102 HCHG RX REV CODE 250 W/ 637 OVERRIDE(OP): Performed by: HOSPITALIST

## 2017-01-22 PROCEDURE — 700105 HCHG RX REV CODE 258: Performed by: INTERNAL MEDICINE

## 2017-01-22 PROCEDURE — A9270 NON-COVERED ITEM OR SERVICE: HCPCS | Performed by: HOSPITALIST

## 2017-01-22 PROCEDURE — 700111 HCHG RX REV CODE 636 W/ 250 OVERRIDE (IP): Performed by: HOSPITALIST

## 2017-01-22 PROCEDURE — 80048 BASIC METABOLIC PNL TOTAL CA: CPT

## 2017-01-22 PROCEDURE — 82962 GLUCOSE BLOOD TEST: CPT | Mod: 91

## 2017-01-22 PROCEDURE — 700101 HCHG RX REV CODE 250: Performed by: INTERNAL MEDICINE

## 2017-01-22 PROCEDURE — 770006 HCHG ROOM/CARE - MED/SURG/GYN SEMI*

## 2017-01-22 PROCEDURE — A9270 NON-COVERED ITEM OR SERVICE: HCPCS | Performed by: INTERNAL MEDICINE

## 2017-01-22 PROCEDURE — 700111 HCHG RX REV CODE 636 W/ 250 OVERRIDE (IP): Performed by: INTERNAL MEDICINE

## 2017-01-22 PROCEDURE — 99233 SBSQ HOSP IP/OBS HIGH 50: CPT | Performed by: HOSPITALIST

## 2017-01-22 RX ORDER — BISACODYL 10 MG
10 SUPPOSITORY, RECTAL RECTAL
Status: DISCONTINUED | OUTPATIENT
Start: 2017-01-22 | End: 2017-01-24 | Stop reason: HOSPADM

## 2017-01-22 RX ORDER — DOCUSATE SODIUM 100 MG/1
100 CAPSULE, LIQUID FILLED ORAL EVERY MORNING
Status: DISCONTINUED | OUTPATIENT
Start: 2017-01-22 | End: 2017-01-24 | Stop reason: HOSPADM

## 2017-01-22 RX ORDER — AMOXICILLIN 250 MG
1 CAPSULE ORAL NIGHTLY
Status: DISCONTINUED | OUTPATIENT
Start: 2017-01-22 | End: 2017-01-24 | Stop reason: HOSPADM

## 2017-01-22 RX ORDER — ENEMA 19; 7 G/133ML; G/133ML
1 ENEMA RECTAL
Status: DISCONTINUED | OUTPATIENT
Start: 2017-01-22 | End: 2017-01-24 | Stop reason: HOSPADM

## 2017-01-22 RX ORDER — AMOXICILLIN 250 MG
1 CAPSULE ORAL
Status: DISCONTINUED | OUTPATIENT
Start: 2017-01-22 | End: 2017-01-24 | Stop reason: HOSPADM

## 2017-01-22 RX ORDER — LACTULOSE 20 G/30ML
30 SOLUTION ORAL
Status: DISCONTINUED | OUTPATIENT
Start: 2017-01-22 | End: 2017-01-24 | Stop reason: HOSPADM

## 2017-01-22 RX ORDER — ISOSORBIDE DINITRATE 10 MG/1
5 TABLET ORAL 3 TIMES DAILY
Status: DISCONTINUED | OUTPATIENT
Start: 2017-01-22 | End: 2017-01-23

## 2017-01-22 RX ADMIN — OXYCODONE HYDROCHLORIDE 30 MG: 10 TABLET ORAL at 13:07

## 2017-01-22 RX ADMIN — FLUCONAZOLE 400 MG: 200 TABLET ORAL at 09:20

## 2017-01-22 RX ADMIN — OXYCODONE HYDROCHLORIDE 30 MG: 10 TABLET ORAL at 04:06

## 2017-01-22 RX ADMIN — FUROSEMIDE 20 MG: 10 INJECTION, SOLUTION INTRAMUSCULAR; INTRAVENOUS at 09:22

## 2017-01-22 RX ADMIN — HEPARIN SODIUM 5000 UNITS: 5000 INJECTION, SOLUTION INTRAVENOUS; SUBCUTANEOUS at 22:01

## 2017-01-22 RX ADMIN — LACTOBACILLUS TAB 2 TABLET: TAB at 17:03

## 2017-01-22 RX ADMIN — OXYCODONE HYDROCHLORIDE 30 MG: 10 TABLET ORAL at 21:58

## 2017-01-22 RX ADMIN — OXYCODONE HYDROCHLORIDE 30 MG: 10 TABLET ORAL at 09:17

## 2017-01-22 RX ADMIN — CLINDAMYCIN PHOSPHATE 900 MG: 150 INJECTION, SOLUTION INTRAVENOUS at 17:01

## 2017-01-22 RX ADMIN — HYDRALAZINE HYDROCHLORIDE 25 MG: 25 TABLET ORAL at 07:11

## 2017-01-22 RX ADMIN — ASPIRIN 325 MG: 325 TABLET, DELAYED RELEASE ORAL at 09:17

## 2017-01-22 RX ADMIN — ISOSORBIDE DINITRATE 5 MG: 10 TABLET ORAL at 09:21

## 2017-01-22 RX ADMIN — LACTOBACILLUS TAB 2 TABLET: TAB at 13:08

## 2017-01-22 RX ADMIN — AMPICILLIN AND SULBACTAM 1.5 G: 1; .5 INJECTION, POWDER, FOR SOLUTION INTRAVENOUS at 18:00

## 2017-01-22 RX ADMIN — OXYCODONE HYDROCHLORIDE 30 MG: 10 TABLET ORAL at 00:03

## 2017-01-22 RX ADMIN — CARVEDILOL 6.25 MG: 6.25 TABLET, FILM COATED ORAL at 09:21

## 2017-01-22 RX ADMIN — ISOSORBIDE DINITRATE 5 MG: 10 TABLET ORAL at 21:59

## 2017-01-22 RX ADMIN — AMPICILLIN AND SULBACTAM 1.5 G: 1; .5 INJECTION, POWDER, FOR SOLUTION INTRAVENOUS at 07:13

## 2017-01-22 RX ADMIN — HEPARIN SODIUM 5000 UNITS: 5000 INJECTION, SOLUTION INTRAVENOUS; SUBCUTANEOUS at 07:11

## 2017-01-22 RX ADMIN — AMPICILLIN AND SULBACTAM 1.5 G: 1; .5 INJECTION, POWDER, FOR SOLUTION INTRAVENOUS at 00:03

## 2017-01-22 RX ADMIN — ISOSORBIDE DINITRATE 5 MG: 10 TABLET ORAL at 17:05

## 2017-01-22 RX ADMIN — CLINDAMYCIN PHOSPHATE 900 MG: 150 INJECTION, SOLUTION INTRAVENOUS at 09:47

## 2017-01-22 RX ADMIN — LACTOBACILLUS TAB 2 TABLET: TAB at 09:19

## 2017-01-22 RX ADMIN — AMPICILLIN AND SULBACTAM 1.5 G: 1; .5 INJECTION, POWDER, FOR SOLUTION INTRAVENOUS at 13:04

## 2017-01-22 RX ADMIN — HYDRALAZINE HYDROCHLORIDE 25 MG: 25 TABLET ORAL at 13:09

## 2017-01-22 RX ADMIN — OXYCODONE HYDROCHLORIDE 30 MG: 10 TABLET ORAL at 17:04

## 2017-01-22 RX ADMIN — HYDRALAZINE HYDROCHLORIDE 25 MG: 25 TABLET ORAL at 21:59

## 2017-01-22 RX ADMIN — LISINOPRIL 5 MG: 5 TABLET ORAL at 09:22

## 2017-01-22 RX ADMIN — MORPHINE SULFATE 2 MG: 4 INJECTION INTRAVENOUS at 02:00

## 2017-01-22 RX ADMIN — CARVEDILOL 6.25 MG: 6.25 TABLET, FILM COATED ORAL at 17:05

## 2017-01-22 ASSESSMENT — PAIN SCALES - GENERAL
PAINLEVEL_OUTOF10: 5
PAINLEVEL_OUTOF10: 8
PAINLEVEL_OUTOF10: 7
PAINLEVEL_OUTOF10: 5
PAINLEVEL_OUTOF10: 8
PAINLEVEL_OUTOF10: 7
PAINLEVEL_OUTOF10: 8

## 2017-01-22 ASSESSMENT — ENCOUNTER SYMPTOMS
VOMITING: 0
MYALGIAS: 1
SPUTUM PRODUCTION: 0
SHORTNESS OF BREATH: 0
ABDOMINAL PAIN: 0
CHILLS: 0
NAUSEA: 0
COUGH: 0
FEVER: 0

## 2017-01-22 NOTE — CARE PLAN
Problem: Knowledge Deficit  Goal: Knowledge of disease process/condition, treatment plan, diagnostic tests, and medications will improve  Intervention: Assess knowledge level of disease process/condition, treatment plan, diagnostic tests, and medications  Discussed meds and treatment plan  Encouraged participation in care but patient very disinterested and non motivated with overall health.

## 2017-01-22 NOTE — PROGRESS NOTES
Patient care.  Medications administered as ordered.  Labs collected.  Patient refused dressing change at this time.

## 2017-01-22 NOTE — PROGRESS NOTES
Patient care.  Pain medications administered as ordered.  Assessment complete.  Patient aware of poc and has no questions or concerns at this time.

## 2017-01-22 NOTE — PROGRESS NOTES
Hospital Medicine Progress Note, Adult, Complex               Author: Bravo Landers Date & Time created: 1/22/2017  3:56 PM     Interval History:  Admitted for bilateral LE cellulitis and failure to follow up c [facility].  Feeling better today.  He thinks the size of his leg is decreasing.    Review of Systems:  Review of Systems   Respiratory: Negative for cough, sputum production and shortness of breath.    Cardiovascular: Positive for leg swelling. Negative for chest pain.   Gastrointestinal: Negative for nausea and vomiting.   Musculoskeletal: Positive for myalgias.   Skin: Positive for rash. Negative for itching.   All other systems reviewed and are negative.      Physical Exam:  Physical Exam  Nursing note and vitals reviewed.  Constitutional: He is oriented to person, place, and time. He appears well-developed and well-nourished obese.   HENT:   Head: Normocephalic and atraumatic.   Right Ear: External ear normal.   Left Ear: External ear normal.   Nose: Nose normal.   Eyes: Conjunctivae and extraocular motions are normal.    Neck: Normal range of motion. Neck supple.   Cardio: +4 BLEE - with slight wrinkling in the dorsum of the L-foot.  Pulmonary/Chest: Effort normal.   Musculoskeletal: Normal range of motion.   Neurological: He is alert and oriented to person, place, and time.   Skin: Skin is warm and dry. Bilateral LE stasis derm changes, open ulcers and erythema.  Dressing with serosanguinous fluid stains.      Labs:        Invalid input(s): [id number]      Recent Labs      01/20/17   0414  01/22/17   0420   SODIUM  137  134*   POTASSIUM  5.2  5.2   CHLORIDE  98  93*   CO2  32  36*   BUN  19  13   CREATININE  1.01  0.85   CALCIUM  8.9  9.1     Recent Labs      01/20/17   0414  01/22/17   0420   GLUCOSE  137*  107*     Recent Labs      01/21/17   0330   RBC  4.56*   HEMOGLOBIN  12.8*   HEMATOCRIT  41.0*   PLATELETCT  208     Recent Labs      01/21/17   0330   WBC  7.4            Hemodynamics:  Temp (24hrs), Av.8 °C (98.2 °F), Min:36.6 °C (97.8 °F), Max:36.9 °C (98.4 °F)  Temperature: 36.6 °C (97.8 °F)  Pulse  Av.7  Min: 70  Max: 121  Blood Pressure: 148/64 mmHg     Respiratory:    Respiration: 20, Pulse Oximetry: 95 %     Work Of Breathing / Effort: Mild  RUL Breath Sounds: Diminished;Clear, RML Breath Sounds: Clear;Diminished, RLL Breath Sounds: Clear;Diminished, LARY Breath Sounds: Diminished;Clear, LLL Breath Sounds: Clear;Diminished  Fluids:    Intake/Output Summary (Last 24 hours) at 17 1556  Last data filed at 17 1400   Gross per 24 hour   Intake   3255 ml   Output   2225 ml   Net   1030 ml       GI/Nutrition:  Orders Placed This Encounter   Procedures   • DIET ORDER     Standing Status: Standing      Number of Occurrences: 1      Standing Expiration Date:      Order Specific Question:  Diet:     Answer:  Diabetic [3]     Order Specific Question:  Calorie modifications:     Answer:  1800 kcals [4]     Medical Decision Making, by Problem:  Active Hospital Problems    Diagnosis   • Cellulitis [L03.90] - Abx per ID.  Wound care and ID on.   • Somnolence - cautious narc/sed in light of hypoxemia.   Morbid obesity - encourage Kcal restriction.    HTN/Tachycardia - SLIVF and start Isordil.  Insomnia - outpatient follow up.  Leg edema - encourage leg elevation. Follow c SLIVF.  Chronic pain - judicious use of narcotics.    DM 2 - reasonable profile.  HbA1c is 7.1.  COPD hx - O2, RT, IS, Vax.  Check ABG.  Debility - increase activity.  Stable issues - med hx (depression), preventives, ARF, trop elevation, transaminitis, frequent BM's.  Dispo - complex/guarded.        Medications reviewed and Labs reviewed  Montoya catheter: No Montoya      DVT Prophylaxis: Heparin    Ulcer prophylaxis: Not indicated  Antibiotics: Treating active infection/contamination beyond 24 hours perioperative coverage  Assessed for rehab: Patient unable to tolerate rehabilitation therapeutic  regimen

## 2017-01-22 NOTE — PROGRESS NOTES
Infectious Disease Progress Note    Author: Olamide Cormier M.D. Date & Time created: 2017  1:23 PM    Interval History:  56-year-old morbidly obese male chronic venous stasis with stasis dermatitis, morbid obesity, diabetes, oxygen dependent chronic obstructive pulmonary disease and congestive heart failure who   presents to the hospital with worsening leg ulcerations with associated cellulitis and ulceration   AF WBC 8.9 Apologized for being rude yesterday-tolerating abx well. Eating breakfast   AF no WBC having trouble with any pressure in dressings and keeping legs elevated due to discomfort. Denies SE abx   AF, WBC 7.4, bilateral lower extremities still wheeping, asking why he is on fluids, diarrhea resolved on probiotics   AF, no WBC, sitting at the edge of the bed, legs and feet are very tender, hoping he can go to SNF near his home  Labs Reviewed, Medications Reviewed, Radiology Reviewed and Wound Reviewed.    Review of Systems:  Review of Systems   Constitutional: Negative for fever and chills.   Respiratory: Negative for cough.    Cardiovascular: Positive for leg swelling.   Gastrointestinal: Negative for nausea, vomiting and abdominal pain.   Skin: Positive for rash.       Hemodynamics:  Temp (24hrs), Av.8 °C (98.2 °F), Min:36.6 °C (97.8 °F), Max:36.9 °C (98.4 °F)  Temperature: 36.6 °C (97.8 °F)  Pulse  Av.7  Min: 70  Max: 121  Blood Pressure: 148/64 mmHg       Physical Exam:  Physical Exam   Constitutional: He is oriented to person, place, and time. He appears well-developed and well-nourished. No distress.   HENT:   Head: Normocephalic and atraumatic.   Eyes: EOM are normal. Pupils are equal, round, and reactive to light.   Neck: Neck supple.   Cardiovascular: Normal rate and regular rhythm.    Pulmonary/Chest: Effort normal. No respiratory distress. He has no wheezes.   Abdominal: Soft. He exhibits no distension. There is no tenderness. There is no rebound.    Musculoskeletal: He exhibits edema.   Neurological: He is alert and oriented to person, place, and time.   Skin: Rash noted. There is erythema.   Severe tinea pedis  See pictures and wound care notes  Stasis dermatitis  Multiple ulcerations  +wheeping   Nursing note and vitals reviewed.      Labs:  Recent Labs      01/21/17   0330   WBC  7.4   RBC  4.56*   HEMOGLOBIN  12.8*   HEMATOCRIT  41.0*   MCV  89.9   MCH  28.1   RDW  53.4*   PLATELETCT  208   MPV  10.8     Recent Labs      01/20/17   0414  01/22/17   0420   SODIUM  137  134*   POTASSIUM  5.2  5.2   CHLORIDE  98  93*   CO2  32  36*   GLUCOSE  137*  107*   BUN  19  13     Recent Labs      01/20/17   0414  01/22/17   0420   CREATININE  1.01  0.85       Wound:      BLOOD CULTURE   Date Value Ref Range Status   01/17/2017   Final    Blood culture testing and Gram stain, if indicated, are  performed at [facility], [address] [place] [address].  Positive blood cultures are  sent to [facility], [address] [place] [address], for organism identification and  susceptibility testing.  No growth after 5 days of incubation.            Fluids:  Intake/Output       01/20/17 0700 - 01/21/17 0659 01/21/17 0700 - 01/22/17 0659 01/22/17 0700 - 01/23/17 0659      3440-3438 0216-7698 Total 4197-2312 1920-4094 Total 8302-8169 9010-1987 Total       Intake    P.O.  --  720 720  480  240 720  1220  -- 1220    P.O. -- 720 720 480  -- 1220    I.V.  --  380 380  900  415 1315  --  -- --    IV Volume (Normal Saline) -- 280 280  -- -- --    IV Piggyback Volume -- 100 100 200 100 300 -- -- --    Total Intake -- 1100 1100 2589 975 4169 1220 -- 1220       Output    Urine  550  500 1050  650  950 1600  875  -- 875    Void (ml)   875 -- 875    Stool  --  -- --  --  -- --  --  -- --    Number of Times Stooled -- -- -- -- -- -- 1 x -- 1 x    Total Output   875 -- 874        Net I/O     -550 600 50 730 -295 435 345 -- 345            Assessment:  Active Hospital Problems    Diagnosis   • Cellulitis [L03.90]   • Troponin level elevated [R79.89]       Plan:  56-year-old morbidly obese male chronic venous stasis with stasis dermatitis, morbid obesity, diabetes, oxygen dependent chronic obstructive pulmonary disease and congestive heart failure who presents to the hospital with worsening leg ulcerations with associated cellulitis and ulceration.  Noncompliant with wound care due to cost  Afebrile  No leukocytosis.    Demand ischemia   ALT decreasing  JOE-multifactorial. Back to baseline  Appreciate wound care evaluation.    Continue clindamycin and Unasyn and clindamycin for cellulitis and ulcerations  Fluc for severe tinea  Encouraged to keep legs elevated   OOB as tolerated  SNF eval  Prognosis guarded  DW IM

## 2017-01-23 PROBLEM — G47.00 INSOMNIA DISORDER: Status: ACTIVE | Noted: 2017-01-23

## 2017-01-23 PROBLEM — E11.9 DIABETES TYPE 2, CONTROLLED (HCC): Status: ACTIVE | Noted: 2017-01-23

## 2017-01-23 PROBLEM — Z91.148 NON COMPLIANCE W MEDICATION REGIMEN: Status: ACTIVE | Noted: 2017-01-23

## 2017-01-23 PROBLEM — I87.2 ACUTE BILATERAL VENOUS STASIS DERMATITIS: Status: ACTIVE | Noted: 2017-01-23

## 2017-01-23 PROBLEM — E66.01 MORBID OBESITY WITH BMI OF 45.0-49.9, ADULT (HCC): Status: ACTIVE | Noted: 2017-01-23

## 2017-01-23 PROBLEM — R74.01 TRANSAMINITIS: Status: ACTIVE | Noted: 2017-01-23

## 2017-01-23 LAB
GLUCOSE BLD-MCNC: 111 MG/DL (ref 65–99)
GLUCOSE BLD-MCNC: 119 MG/DL (ref 65–99)
GLUCOSE BLD-MCNC: 120 MG/DL (ref 65–99)
GLUCOSE BLD-MCNC: 159 MG/DL (ref 65–99)

## 2017-01-23 PROCEDURE — 700111 HCHG RX REV CODE 636 W/ 250 OVERRIDE (IP): Performed by: INTERNAL MEDICINE

## 2017-01-23 PROCEDURE — 700105 HCHG RX REV CODE 258: Performed by: INTERNAL MEDICINE

## 2017-01-23 PROCEDURE — G8989 SELF CARE D/C STATUS: HCPCS | Mod: CJ

## 2017-01-23 PROCEDURE — G8987 SELF CARE CURRENT STATUS: HCPCS | Mod: CJ

## 2017-01-23 PROCEDURE — 97535 SELF CARE MNGMENT TRAINING: CPT

## 2017-01-23 PROCEDURE — 700101 HCHG RX REV CODE 250: Performed by: INTERNAL MEDICINE

## 2017-01-23 PROCEDURE — 97530 THERAPEUTIC ACTIVITIES: CPT

## 2017-01-23 PROCEDURE — 97116 GAIT TRAINING THERAPY: CPT

## 2017-01-23 PROCEDURE — 97165 OT EVAL LOW COMPLEX 30 MIN: CPT

## 2017-01-23 PROCEDURE — A9270 NON-COVERED ITEM OR SERVICE: HCPCS | Performed by: INTERNAL MEDICINE

## 2017-01-23 PROCEDURE — 700102 HCHG RX REV CODE 250 W/ 637 OVERRIDE(OP): Performed by: INTERNAL MEDICINE

## 2017-01-23 PROCEDURE — 99233 SBSQ HOSP IP/OBS HIGH 50: CPT | Performed by: HOSPITALIST

## 2017-01-23 PROCEDURE — 82962 GLUCOSE BLOOD TEST: CPT | Mod: 91

## 2017-01-23 PROCEDURE — 770006 HCHG ROOM/CARE - MED/SURG/GYN SEMI*

## 2017-01-23 PROCEDURE — G8988 SELF CARE GOAL STATUS: HCPCS | Mod: CJ

## 2017-01-23 PROCEDURE — 700111 HCHG RX REV CODE 636 W/ 250 OVERRIDE (IP): Performed by: HOSPITALIST

## 2017-01-23 PROCEDURE — A9270 NON-COVERED ITEM OR SERVICE: HCPCS | Performed by: HOSPITALIST

## 2017-01-23 PROCEDURE — 700102 HCHG RX REV CODE 250 W/ 637 OVERRIDE(OP): Performed by: HOSPITALIST

## 2017-01-23 RX ORDER — ISOSORBIDE DINITRATE 10 MG/1
10 TABLET ORAL 3 TIMES DAILY
Status: DISCONTINUED | OUTPATIENT
Start: 2017-01-23 | End: 2017-01-24 | Stop reason: HOSPADM

## 2017-01-23 RX ADMIN — ISOSORBIDE DINITRATE 10 MG: 10 TABLET ORAL at 09:43

## 2017-01-23 RX ADMIN — HYDRALAZINE HYDROCHLORIDE 25 MG: 25 TABLET ORAL at 22:00

## 2017-01-23 RX ADMIN — HEPARIN SODIUM 5000 UNITS: 5000 INJECTION, SOLUTION INTRAVENOUS; SUBCUTANEOUS at 22:00

## 2017-01-23 RX ADMIN — CARVEDILOL 6.25 MG: 6.25 TABLET, FILM COATED ORAL at 18:00

## 2017-01-23 RX ADMIN — CLINDAMYCIN PHOSPHATE 900 MG: 150 INJECTION, SOLUTION INTRAVENOUS at 03:03

## 2017-01-23 RX ADMIN — AMPICILLIN AND SULBACTAM 1.5 G: 1; .5 INJECTION, POWDER, FOR SOLUTION INTRAVENOUS at 12:38

## 2017-01-23 RX ADMIN — OXYCODONE HYDROCHLORIDE 30 MG: 10 TABLET ORAL at 02:23

## 2017-01-23 RX ADMIN — OXYCODONE HYDROCHLORIDE 30 MG: 10 TABLET ORAL at 18:42

## 2017-01-23 RX ADMIN — OXYCODONE HYDROCHLORIDE 30 MG: 10 TABLET ORAL at 14:48

## 2017-01-23 RX ADMIN — CLINDAMYCIN PHOSPHATE 900 MG: 150 INJECTION, SOLUTION INTRAVENOUS at 09:43

## 2017-01-23 RX ADMIN — ISOSORBIDE DINITRATE 10 MG: 10 TABLET ORAL at 15:00

## 2017-01-23 RX ADMIN — LACTOBACILLUS TAB 2 TABLET: TAB at 06:44

## 2017-01-23 RX ADMIN — LACTOBACILLUS TAB 2 TABLET: TAB at 18:00

## 2017-01-23 RX ADMIN — FUROSEMIDE 20 MG: 10 INJECTION, SOLUTION INTRAMUSCULAR; INTRAVENOUS at 09:43

## 2017-01-23 RX ADMIN — HEPARIN SODIUM 5000 UNITS: 5000 INJECTION, SOLUTION INTRAVENOUS; SUBCUTANEOUS at 06:44

## 2017-01-23 RX ADMIN — ISOSORBIDE DINITRATE 10 MG: 10 TABLET ORAL at 22:00

## 2017-01-23 RX ADMIN — OXYCODONE HYDROCHLORIDE 30 MG: 10 TABLET ORAL at 10:41

## 2017-01-23 RX ADMIN — CLINDAMYCIN PHOSPHATE 900 MG: 150 INJECTION, SOLUTION INTRAVENOUS at 14:48

## 2017-01-23 RX ADMIN — AMPICILLIN AND SULBACTAM 1.5 G: 1; .5 INJECTION, POWDER, FOR SOLUTION INTRAVENOUS at 18:00

## 2017-01-23 RX ADMIN — FLUCONAZOLE 400 MG: 200 TABLET ORAL at 09:43

## 2017-01-23 RX ADMIN — LACTOBACILLUS TAB 2 TABLET: TAB at 10:40

## 2017-01-23 RX ADMIN — AMPICILLIN AND SULBACTAM 1.5 G: 1; .5 INJECTION, POWDER, FOR SOLUTION INTRAVENOUS at 07:33

## 2017-01-23 RX ADMIN — AMPICILLIN AND SULBACTAM 1.5 G: 1; .5 INJECTION, POWDER, FOR SOLUTION INTRAVENOUS at 02:23

## 2017-01-23 RX ADMIN — HEPARIN SODIUM 5000 UNITS: 5000 INJECTION, SOLUTION INTRAVENOUS; SUBCUTANEOUS at 13:22

## 2017-01-23 RX ADMIN — CARVEDILOL 6.25 MG: 6.25 TABLET, FILM COATED ORAL at 06:44

## 2017-01-23 RX ADMIN — OXYCODONE HYDROCHLORIDE 30 MG: 10 TABLET ORAL at 06:53

## 2017-01-23 RX ADMIN — ASPIRIN 325 MG: 325 TABLET, DELAYED RELEASE ORAL at 09:43

## 2017-01-23 RX ADMIN — HYDRALAZINE HYDROCHLORIDE 25 MG: 25 TABLET ORAL at 13:21

## 2017-01-23 RX ADMIN — HYDRALAZINE HYDROCHLORIDE 25 MG: 25 TABLET ORAL at 06:44

## 2017-01-23 RX ADMIN — LISINOPRIL 5 MG: 5 TABLET ORAL at 09:44

## 2017-01-23 RX ADMIN — CLINDAMYCIN PHOSPHATE 900 MG: 150 INJECTION, SOLUTION INTRAVENOUS at 22:00

## 2017-01-23 ASSESSMENT — ENCOUNTER SYMPTOMS
CHILLS: 0
VOMITING: 0
SPUTUM PRODUCTION: 0
FEVER: 0
COUGH: 0
DIARRHEA: 0
ABDOMINAL PAIN: 0
NAUSEA: 0
SHORTNESS OF BREATH: 0
MYALGIAS: 1

## 2017-01-23 ASSESSMENT — ACTIVITIES OF DAILY LIVING (ADL): TOILETING: INDEPENDENT

## 2017-01-23 ASSESSMENT — GAIT ASSESSMENTS
DEVIATION: INCREASED BASE OF SUPPORT;BRADYKINETIC;ANTALGIC
GAIT LEVEL OF ASSIST: CONTACT GUARD ASSIST
DISTANCE (FEET): 15
ASSISTIVE DEVICE: FRONT WHEEL WALKER

## 2017-01-23 ASSESSMENT — PAIN SCALES - GENERAL
PAINLEVEL_OUTOF10: 8
PAINLEVEL_OUTOF10: 7
PAINLEVEL_OUTOF10: 5
PAINLEVEL_OUTOF10: 8

## 2017-01-23 NOTE — PROGRESS NOTES
Infectious Disease Progress Note    Author: Olamide Cormier M.D. Date & Time created: 2017  8:18 AM    Interval History:  56-year-old morbidly obese male chronic venous stasis with stasis dermatitis, morbid obesity, diabetes, oxygen dependent chronic obstructive pulmonary disease and congestive heart failure who   presents to the hospital with worsening leg ulcerations with associated cellulitis and ulceration   AF WBC 8.9 Apologized for being rude yesterday-tolerating abx well. Eating breakfast   AF no WBC having trouble with any pressure in dressings and keeping legs elevated due to discomfort. Denies SE abx   AF, WBC 7.4, bilateral lower extremities still wheeping, asking why he is on fluids, diarrhea resolved on probiotics   AF, no WBC, sitting at the edge of the bed, legs and feet are very tender, hoping he can go to SNF near his home   AF, dressing change yesterday afternoon, states legs are getting better but still have lots of blisters   Labs Reviewed, Medications Reviewed, Radiology Reviewed and Wound Reviewed.    Review of Systems:  Review of Systems   Constitutional: Negative for fever and chills.   Respiratory: Negative for cough.    Cardiovascular: Positive for leg swelling.   Gastrointestinal: Negative for nausea, vomiting and abdominal pain.   Skin: Positive for rash.       Hemodynamics:  Temp (24hrs), Av.5 °C (97.7 °F), Min:36.5 °C (97.7 °F), Max:36.5 °C (97.7 °F)  Temperature: 36.5 °C (97.7 °F)  Pulse  Av.1  Min: 70  Max: 121  Blood Pressure: 139/68 mmHg       Physical Exam:  Physical Exam   Constitutional: He is oriented to person, place, and time. He appears well-developed and well-nourished. No distress.   HENT:   Head: Normocephalic and atraumatic.   Eyes: EOM are normal. Pupils are equal, round, and reactive to light.   Neck: Neck supple.   Cardiovascular: Normal rate and regular rhythm.    Pulmonary/Chest: Effort normal. No respiratory distress. He has no  wheezes.   Abdominal: Soft. He exhibits no distension. There is no tenderness. There is no rebound.   Musculoskeletal: He exhibits edema.   Neurological: He is alert and oriented to person, place, and time.   Skin: Rash noted. There is erythema.   Severe tinea pedis  See pictures and wound care notes - improving  Stasis dermatitis  Multiple ulcerations  +wheeping   Nursing note and vitals reviewed.      Labs:  Recent Labs      01/21/17   0330   WBC  7.4   RBC  4.56*   HEMOGLOBIN  12.8*   HEMATOCRIT  41.0*   MCV  89.9   MCH  28.1   RDW  53.4*   PLATELETCT  208   MPV  10.8     Recent Labs      01/22/17   0420   SODIUM  134*   POTASSIUM  5.2   CHLORIDE  93*   CO2  36*   GLUCOSE  107*   BUN  13     Recent Labs      01/22/17   0420   CREATININE  0.85       Wound:      BLOOD CULTURE   Date Value Ref Range Status   01/17/2017   Final    Blood culture testing and Gram stain, if indicated, are  performed at [facility], [address] [place] [address].  Positive blood cultures are  sent to [facility], [address] [place] [address], for organism identification and  susceptibility testing.  No growth after 5 days of incubation.            Fluids:  Intake/Output       01/21/17 0700 - 01/22/17 0659 01/22/17 0700 - 01/23/17 0659 01/23/17 0700 - 01/24/17 0659      3781-9242 6913-0982 Total 9648-4965 1589-3061 Total 3785-0988 7357-5004 Total       Intake    P.O.  480  240 720  1520  -- 1520  --  -- --    P.O. 480  -- 1520 -- -- --    I.V.  900  415 1315  --  -- --  --  -- --    IV Volume (Normal Saline)  -- -- -- -- -- --    IV Piggyback Volume 200 100 300 -- -- -- -- -- --    Total Intake 2527 355 1715 1520 -- 1520 -- -- --       Output    Urine  650  950 1600  1375  525 1900  --  -- --    Void (ml)  1689 272 9587 -- -- --    Stool  --  -- --  --  -- --  --  -- --    Number of Times Stooled -- -- -- 1 x -- 1 x -- -- --    Total Output 650 950  1600 9114 718 4463 -- -- --       Net I/O     730 -295 435 145 525 -372 -- -- --            Assessment:  Active Hospital Problems    Diagnosis   • Cellulitis [L03.90]   • Troponin level elevated [R79.89]       Plan:  56-year-old morbidly obese male chronic venous stasis with stasis dermatitis, morbid obesity, diabetes, oxygen dependent chronic obstructive pulmonary disease and congestive heart failure who presents to the hospital with worsening leg ulcerations with associated cellulitis and ulceration.  Noncompliant with wound care due to cost  Afebrile  No leukocytosis.    Demand ischemia   ALT decreasing  JOE-multifactorial. Back to baseline  Appreciate wound care evaluation.    Continue clindamycin and Unasyn and clindamycin for cellulitis and ulcerations  Fluconazole for severe tinea  Anticipate 2 weeks of abx  Encouraged to keep legs elevated   OOB as tolerated  SNF eval  Prognosis guarded  DW IM

## 2017-01-23 NOTE — PROGRESS NOTES
Received report sitting at the side of the bed dressing to BLE with serous drainge,agreed to have it changed at 3pm today.Discussed POC encouraged to elevate legs in bed as much as possible,c/o pain on his legs and generalized pain.Recently had pain medications.Instructed to call for any needs call light with in reach.

## 2017-01-23 NOTE — CARE PLAN
Problem: Infection  Goal: Will remain free from infection  Intervention: Assess signs and symptoms of infection  Remains on Contact Isolation for MRSA Drsg change performed to both lower extremeties with still obvious weeping visible  And old dressings completely saturated with yellow serous drainage no pus. Skin reddened, multiple intact blisters and some open Pain well controlled with po Oxycodone. Attempt to have patient more active participant in care and he was helpful and responsive with opening drsg packages and tearing tape for nurse. Dressing change takes 1/2 hour with 5 Xeroform, 5 ABDs and 2.5 kerlix to each leg.Enc to keep legs up in bed or recyliner but declines.

## 2017-01-23 NOTE — PROGRESS NOTES
Patient care.  Administered medications as ordered.  Significant weeping noted from wounds.  Encouraged dressing change to patient.  Patient refused.  Replaced pillows/pillow cases and covered with chucks pad to absorb.

## 2017-01-23 NOTE — THERAPY
"Physical Therapy Treatment completed.   Bed Mobility:  Supine to Sit:  (NT, pt sitting at EOB)  Transfers: Sit to Stand: Minimal Assist  Gait: Level Of Assist: Contact Guard Assist with Front-Wheel Walker x15 ft      Plan of Care: Will benefit from Physical Therapy 3 times per week  Discharge Recommendations: Equipment: Will Continue to Assess for Equipment Needs. Post-acute therapy recommended before discharged home.     See \"Rehab Therapy-Acute\" Patient Summary Report for complete documentation.       "

## 2017-01-23 NOTE — PROGRESS NOTES
Hospital Medicine Progress Note, Adult, Complex               Author: Bravo Landers Date & Time created: 1/23/2017  3:09 PM     Interval History:  Admitted for bilateral LE cellulitis and failure to follow up c Wound Care Clinic.  No new complaints. Nursing relayed that the patient is refusing more than 1/day dressing changes and does not keep his legs elevated.    Review of Systems:  Review of Systems   Respiratory: Negative for cough, sputum production and shortness of breath.    Cardiovascular: Positive for leg swelling. Negative for chest pain.   Gastrointestinal: Negative for nausea, vomiting and diarrhea.   Musculoskeletal: Positive for myalgias.   Skin: Positive for rash. Negative for itching.   All other systems reviewed and are negative.      Physical Exam:  Physical Exam  Nursing note and vitals reviewed.  Constitutional: He is oriented to person, place, and time. He appears well-developed and well-nourished obese.   HENT:   Head: Normocephalic and atraumatic.   Right Ear: External ear normal.   Left Ear: External ear normal.   Nose: Nose normal.   Eyes: Conjunctivae and extraocular motions are normal.    Neck: Normal range of motion. Neck supple.   Cardio: +4 BLEE - with slight wrinkling in the dorsum of the L-foot.  No sig change.  Pulmonary/Chest: Effort normal.   Musculoskeletal: Normal range of motion.   Neurological: He is alert and oriented to person, place, and time.   Skin: Skin is warm and dry. Bilateral LE stasis derm changes, open ulcers and erythema.  Dressing with serous fluid stains.      Labs:        Invalid input(s): [id number]      Recent Labs      01/22/17   0420   SODIUM  134*   POTASSIUM  5.2   CHLORIDE  93*   CO2  36*   BUN  13   CREATININE  0.85   CALCIUM  9.1     Recent Labs      01/22/17   0420   GLUCOSE  107*     Recent Labs      01/21/17   0330   RBC  4.56*   HEMOGLOBIN  12.8*   HEMATOCRIT  41.0*   PLATELETCT  208     Recent Labs      01/21/17   0330   WBC  7.4            Hemodynamics:  Temp (24hrs), Av.8 °C (98.2 °F), Min:36.5 °C (97.7 °F), Max:37 °C (98.6 °F)  Temperature: 37 °C (98.6 °F)  Pulse  Av.4  Min: 70  Max: 121  Blood Pressure: 120/60 mmHg     Respiratory:    Respiration: 19, Pulse Oximetry: 92 %        RUL Breath Sounds: Diminished;Clear, RML Breath Sounds: Diminished, RLL Breath Sounds: Diminished, LARY Breath Sounds: Diminished;Clear, LLL Breath Sounds: Diminished  Fluids:    Intake/Output Summary (Last 24 hours) at 17 1509  Last data filed at 17 1342   Gross per 24 hour   Intake   1280 ml   Output   1425 ml   Net   -145 ml       GI/Nutrition:  Orders Placed This Encounter   Procedures   • DIET ORDER     Standing Status: Standing      Number of Occurrences: 1      Standing Expiration Date:      Order Specific Question:  Diet:     Answer:  Diabetic [3]     Order Specific Question:  Calorie modifications:     Answer:  1800 kcals [4]     Medical Decision Making, by Problem:  Active Hospital Problems    Diagnosis   • Cellulitis [L03.90] - Abx per ID.  Wound care and ID on.   • Somnolence - cautious narc/sed in light of hypoxemia.   Morbid obesity - encourage Kcal restriction.    HTN/Tachycardia - better.  Increase Isordil.  Insomnia - outpatient follow up.  Leg edema - encourage leg elevation.   Chronic pain - judicious use of narcotics.    DM 2 - reasonable profile.  HbA1c is 7.1.  COPD hx - O2, RT, IS, Vax.  Await ABG.  Debility - increase activity.  SNF eval.  Stable issues - med hx (depression), preventives, ARF, trop elevation, transaminitis, frequent BM's.  Dispo - complex/guarded.          Medications reviewed and Labs reviewed  Montoya catheter: No Montoya      DVT Prophylaxis: Heparin    Ulcer prophylaxis: Not indicated  Antibiotics: Treating active infection/contamination beyond 24 hours perioperative coverage  Assessed for rehab: Patient unable to tolerate rehabilitation therapeutic regimen

## 2017-01-23 NOTE — RESPIRATORY CARE
COPD EDUCATION by COPD CLINICAL EDUCATOR  1/23/2017 at 10:52 AM by Lovely Tapia     Patient reviewed by COPD education team. Patient does not qualify for COPD program. SNF.

## 2017-01-24 VITALS
RESPIRATION RATE: 18 BRPM | BODY MASS INDEX: 45.94 KG/M2 | TEMPERATURE: 97.9 F | HEART RATE: 80 BPM | HEIGHT: 68 IN | OXYGEN SATURATION: 98 % | DIASTOLIC BLOOD PRESSURE: 68 MMHG | SYSTOLIC BLOOD PRESSURE: 134 MMHG | WEIGHT: 303.13 LBS

## 2017-01-24 PROBLEM — I25.10 CAD (CORONARY ARTERY DISEASE): Status: ACTIVE | Noted: 2017-01-17

## 2017-01-24 LAB
ANION GAP SERPL CALC-SCNC: 6 MMOL/L (ref 0–11.9)
ANION GAP SERPL CALC-SCNC: 8 MMOL/L (ref 0–11.9)
BUN SERPL-MCNC: 22 MG/DL (ref 8–22)
BUN SERPL-MCNC: 23 MG/DL (ref 8–22)
CALCIUM SERPL-MCNC: 8.9 MG/DL (ref 8.4–10.2)
CALCIUM SERPL-MCNC: 9 MG/DL (ref 8.4–10.2)
CHLORIDE SERPL-SCNC: 90 MMOL/L (ref 96–112)
CHLORIDE SERPL-SCNC: 91 MMOL/L (ref 96–112)
CO2 SERPL-SCNC: 34 MMOL/L (ref 20–33)
CO2 SERPL-SCNC: 36 MMOL/L (ref 20–33)
CREAT SERPL-MCNC: 1.13 MG/DL (ref 0.5–1.4)
CREAT SERPL-MCNC: 1.15 MG/DL (ref 0.5–1.4)
GFR SERPL CREATININE-BSD FRML MDRD: >60 ML/MIN/1.73 M 2
GFR SERPL CREATININE-BSD FRML MDRD: >60 ML/MIN/1.73 M 2
GLUCOSE BLD-MCNC: 112 MG/DL (ref 65–99)
GLUCOSE BLD-MCNC: 143 MG/DL (ref 65–99)
GLUCOSE SERPL-MCNC: 126 MG/DL (ref 65–99)
GLUCOSE SERPL-MCNC: 137 MG/DL (ref 65–99)
POTASSIUM SERPL-SCNC: 5 MMOL/L (ref 3.6–5.5)
POTASSIUM SERPL-SCNC: 5.9 MMOL/L (ref 3.6–5.5)
SODIUM SERPL-SCNC: 132 MMOL/L (ref 135–145)
SODIUM SERPL-SCNC: 133 MMOL/L (ref 135–145)

## 2017-01-24 PROCEDURE — 700111 HCHG RX REV CODE 636 W/ 250 OVERRIDE (IP): Performed by: INTERNAL MEDICINE

## 2017-01-24 PROCEDURE — 80048 BASIC METABOLIC PNL TOTAL CA: CPT

## 2017-01-24 PROCEDURE — 700102 HCHG RX REV CODE 250 W/ 637 OVERRIDE(OP): Performed by: INTERNAL MEDICINE

## 2017-01-24 PROCEDURE — 82962 GLUCOSE BLOOD TEST: CPT

## 2017-01-24 PROCEDURE — 700101 HCHG RX REV CODE 250: Performed by: INTERNAL MEDICINE

## 2017-01-24 PROCEDURE — 99239 HOSP IP/OBS DSCHRG MGMT >30: CPT | Performed by: INTERNAL MEDICINE

## 2017-01-24 PROCEDURE — 700105 HCHG RX REV CODE 258: Performed by: INTERNAL MEDICINE

## 2017-01-24 PROCEDURE — A9270 NON-COVERED ITEM OR SERVICE: HCPCS | Performed by: INTERNAL MEDICINE

## 2017-01-24 PROCEDURE — A9270 NON-COVERED ITEM OR SERVICE: HCPCS | Performed by: HOSPITALIST

## 2017-01-24 PROCEDURE — 700102 HCHG RX REV CODE 250 W/ 637 OVERRIDE(OP): Performed by: HOSPITALIST

## 2017-01-24 PROCEDURE — 700111 HCHG RX REV CODE 636 W/ 250 OVERRIDE (IP): Performed by: HOSPITALIST

## 2017-01-24 RX ORDER — CARVEDILOL 6.25 MG/1
6.25 TABLET ORAL 2 TIMES DAILY WITH MEALS
Qty: 60 TAB | Refills: 0 | Status: SHIPPED | OUTPATIENT
Start: 2017-01-24 | End: 2017-02-23

## 2017-01-24 RX ORDER — WITCH HAZEL 50 %
2 PADS, MEDICATED (EA) TOPICAL
Qty: 180 TAB | Refills: 0 | Status: ON HOLD | OUTPATIENT
Start: 2017-01-24 | End: 2017-01-31

## 2017-01-24 RX ORDER — ISOSORBIDE DINITRATE 10 MG/1
10 TABLET ORAL 3 TIMES DAILY
Qty: 90 TAB | Refills: 0 | Status: SHIPPED | OUTPATIENT
Start: 2017-01-24 | End: 2017-02-23

## 2017-01-24 RX ORDER — HYDRALAZINE HYDROCHLORIDE 25 MG/1
25 TABLET, FILM COATED ORAL EVERY 8 HOURS
Qty: 90 TAB | Refills: 0 | Status: SHIPPED | OUTPATIENT
Start: 2017-01-24 | End: 2017-02-23

## 2017-01-24 RX ORDER — SODIUM POLYSTYRENE SULFONATE 15 G/60ML
15 SUSPENSION ORAL; RECTAL ONCE
Status: DISCONTINUED | OUTPATIENT
Start: 2017-01-24 | End: 2017-01-24 | Stop reason: HOSPADM

## 2017-01-24 RX ORDER — FLUCONAZOLE 200 MG/1
400 TABLET ORAL DAILY
Qty: 10 TAB | Refills: 0 | Status: ON HOLD | OUTPATIENT
Start: 2017-01-24 | End: 2017-01-31

## 2017-01-24 RX ADMIN — FLUCONAZOLE 400 MG: 200 TABLET ORAL at 08:38

## 2017-01-24 RX ADMIN — AMPICILLIN AND SULBACTAM 1.5 G: 1; .5 INJECTION, POWDER, FOR SOLUTION INTRAVENOUS at 00:15

## 2017-01-24 RX ADMIN — CARVEDILOL 6.25 MG: 6.25 TABLET, FILM COATED ORAL at 08:38

## 2017-01-24 RX ADMIN — OXYCODONE HYDROCHLORIDE 30 MG: 10 TABLET ORAL at 04:36

## 2017-01-24 RX ADMIN — ISOSORBIDE DINITRATE 10 MG: 10 TABLET ORAL at 08:39

## 2017-01-24 RX ADMIN — AMPICILLIN AND SULBACTAM 1.5 G: 1; .5 INJECTION, POWDER, FOR SOLUTION INTRAVENOUS at 06:18

## 2017-01-24 RX ADMIN — FUROSEMIDE 20 MG: 10 INJECTION, SOLUTION INTRAMUSCULAR; INTRAVENOUS at 08:39

## 2017-01-24 RX ADMIN — ASPIRIN 325 MG: 325 TABLET, DELAYED RELEASE ORAL at 08:39

## 2017-01-24 RX ADMIN — OXYCODONE HYDROCHLORIDE 30 MG: 10 TABLET ORAL at 12:29

## 2017-01-24 RX ADMIN — OXYCODONE HYDROCHLORIDE 30 MG: 10 TABLET ORAL at 00:14

## 2017-01-24 RX ADMIN — HYDRALAZINE HYDROCHLORIDE 25 MG: 25 TABLET ORAL at 06:24

## 2017-01-24 RX ADMIN — LISINOPRIL 5 MG: 5 TABLET ORAL at 08:39

## 2017-01-24 RX ADMIN — CLINDAMYCIN PHOSPHATE 900 MG: 150 INJECTION, SOLUTION INTRAVENOUS at 13:20

## 2017-01-24 RX ADMIN — OXYCODONE HYDROCHLORIDE 30 MG: 10 TABLET ORAL at 08:38

## 2017-01-24 RX ADMIN — CLINDAMYCIN PHOSPHATE 900 MG: 150 INJECTION, SOLUTION INTRAVENOUS at 06:59

## 2017-01-24 RX ADMIN — LACTOBACILLUS TAB 2 TABLET: TAB at 11:30

## 2017-01-24 RX ADMIN — AMPICILLIN AND SULBACTAM 1.5 G: 1; .5 INJECTION, POWDER, FOR SOLUTION INTRAVENOUS at 12:00

## 2017-01-24 RX ADMIN — HEPARIN SODIUM 5000 UNITS: 5000 INJECTION, SOLUTION INTRAVENOUS; SUBCUTANEOUS at 06:22

## 2017-01-24 RX ADMIN — LACTOBACILLUS TAB 2 TABLET: TAB at 08:38

## 2017-01-24 ASSESSMENT — ENCOUNTER SYMPTOMS
SHORTNESS OF BREATH: 0
MYALGIAS: 1
CHILLS: 0
FEVER: 0
SPUTUM PRODUCTION: 0
COUGH: 0
DIARRHEA: 0
VOMITING: 0
NAUSEA: 0
ABDOMINAL PAIN: 0

## 2017-01-24 ASSESSMENT — PAIN SCALES - GENERAL
PAINLEVEL_OUTOF10: 5
PAINLEVEL_OUTOF10: 5
PAINLEVEL_OUTOF10: 9
PAINLEVEL_OUTOF10: 8

## 2017-01-24 NOTE — ASSESSMENT & PLAN NOTE
- pt states unable to afford wound care  - encourage medication compliance while inpt as well as outpt

## 2017-01-24 NOTE — DISCHARGE SUMMARY
DATE OF ADMISSION:  01/17/2017    DATE OF DISCHARGE:  01/24/2017    PRIMARY DIAGNOSES:  1.  Bilateral lower extremity cellulitis.  2.  Morbid obesity.  3.  Hypertension.  4.  Tachycardia.  5.  Insomnia.  6.  Bilateral lower extremity edema.  7.  Chronic venous stasis syndrome.  8.  Chronic pain syndrome.  9.  Diabetes mellitus type 2.  10.  Chronic obstructive pulmonary disease.  11.  Debility.    HOSPITAL COURSE:  Please see H and P for details regarding initial hospital   presentation.  In summary, this is a 56-year-old male with past medical   history of diabetes mellitus type 2, COPD, chronic venous stasis syndrome of   lower extremities, presented to the hospital with worsening bilateral lower   extremity edema, and redness.  Patient was found to have worsening bilateral   lower extremity cellulitis with worsening lower leg ulcerations.  Patient was   started on IV antibiotic therapy during his inpatient course and wound care   was provided.  Infectious disease was consulted and provided the   recommendations for antibiotic therapy.  Patient was started on IV Unasyn and   vancomycin initially, and deescalated to Unasyn and clindamycin.  Patient was   also started on fluconazole and will continue antibiotics for an additional 2   weeks through 02/01/2017.  Patient was encouraged to keep legs elevated and   encouraged to ambulate as tolerated.    During hospital course, patient also was found to have elevated troponins,   most likely due to demand ischemia.  Troponins continued to trend down with no   other clinical signs of ACS.  Patient to continue outpatient regimen as   stated below.    CONSULTANTS:  Infectious disease, Annabella Capps MD/Olamide Cormier MD    DISCHARGE MEDICATIONS:  1.  Lactinex 2 tabs p.o. t.i.d. to 02/01/2017.  2.  Carvedilol 6.25 mg p.o. b.i.d.  3.  Fluconazole 400 mg p.o. q. 8 hours for 8 days.  4.  Hydralazine 25 mg p.o. t.i.d.  5.  Isosorbide dinitrate 10 mg p.o. t.i.d.  6.   Unasyn 1.5 g IV q.  6 hours for 8 days.  7.  Clindamycin  mg IV q. 8 hours for 8 days.  8.  Albuterol inhaler q. 6 hours p.r.n. shortness of breath.  9.  Symbicort 2 puffs inhaled b.i.d.  10.  Lasix 1 tab p.o. daily.  11.  Lisinopril 20 mg p.o. daily.  12.  Metformin 850 mg p.o. b.i.d.  13.  Oxycodone 30 mg p.o. q. 4 hours p.r.n. for pain.  14.  Potassium chloride 10 mEq p.o. daily.    DISPOSITION:  Skilled nursing facility.    DIET:  ADA.    ACTIVITY:  As tolerated.    INSTRUCTIONS:  The patient was instructed to return to the ED in the event of   worsening symptoms.  I have counseled the patient on importance of medical   compliance and the patient has agreed to proceed with all medical   recommendations and followup as planned.  The patient understands.  The   patient understands that all medications come with benefits and risks.  Risks   may include permanent injury or death and these risks can be minimized with   close assessment and monitoring.      PRIMARY CARE PHYSICIAN:  Giovani Lara MD    FOLLOWUP APPOINTMENTS:  Please follow with primary care physician in the next   1-2 weeks.    Time spent on discharge 41 minutes.       ____________________________________     Waylon Walker MD    MORALES / MATT    DD:  01/24/2017 13:48:52  DT:  01/24/2017 14:26:10    D#:  [id number]  Job#:  [id number]

## 2017-01-24 NOTE — ASSESSMENT & PLAN NOTE
- ID recs appreciated  - cont Unasyn and Clinda for 2 wks (through 2/1)  - cont wound care and encourage compliance if possible

## 2017-01-24 NOTE — CARE PLAN
Problem: Safety  Goal: Will remain free from falls  Outcome: PROGRESSING AS EXPECTED  Educated pt on fall risks and importance of calling staff when getting out of bed, pt refused bed alarm. Fall precautions in place, hourly rounding, call light in reach.    Problem: Bowel/Gastric:  Goal: Normal bowel function is maintained or improved  Outcome: PROGRESSING AS EXPECTED  Last BM 1/22/17, stool softener refused. Educated on importance of maintaining regular bowel function and importance of stool softener.

## 2017-01-24 NOTE — PROGRESS NOTES
"Refused the Kayexalate despite explanation of it's need due to elevated potassium level.Will try and have BM on his own- \"the last time I had one of that I was in the bathroom for a longtime\".  "

## 2017-01-24 NOTE — PROGRESS NOTES
Hospital Medicine Progress Note, Adult, Complex               Author: Waylon Walker Date & Time created: 2017  9:40 AM     Interval History:  B/L LE pain controlled with meds    Review of Systems:  Review of Systems   Respiratory: Negative for cough, sputum production and shortness of breath.    Cardiovascular: Positive for leg swelling. Negative for chest pain.   Gastrointestinal: Negative for nausea, vomiting and diarrhea.   Musculoskeletal: Positive for myalgias.   Skin: Positive for rash. Negative for itching.   All other systems reviewed and are negative.      Physical Exam  Nursing note and vitals reviewed.  Constitutional: He is oriented to person, place, and time. He appears well-developed and well-nourished obese.   HENT:    Head: Normocephalic and atraumatic.   Right Ear: External ear normal.   Left Ear: External ear normal.    Nose: Nose normal.    Eyes: Conjunctivae and extraocular motions are normal.    Neck: Normal range of motion. Neck supple.   Cardio: +4 BLEE - with slight wrinkling in the dorsum of the L-foot.  No sig change.  Pulmonary/Chest: Effort normal.   Musculoskeletal: Normal range of motion.   Neurological: He is alert and oriented to person, place, and time.   Skin: Skin is warm and dry. Bilateral LE stasis derm changes, open ulcers and erythema.  Dressing with serous fluid stains.    Labs:        Invalid input(s): [id number]      Recent Labs      17   0420  17   0345   SODIUM  134*  133*   POTASSIUM  5.2  5.9*   CHLORIDE  93*  91*   CO2  36*  36*   BUN  13  22   CREATININE  0.85  1.15   CALCIUM  9.1  9.0     Recent Labs      17   0420  17   0345   GLUCOSE  107*  126*     No results for input(s): RBC, HEMOGLOBIN, HEMATOCRIT, PLATELETCT, PROTHROMBTM, APTT, INR, IRON, FERRITIN, TOTIRONBC in the last 72 hours.            Hemodynamics:  Temp (24hrs), Av.7 °C (98.1 °F), Min:35.9 °C (96.6 °F), Max:37 °C (98.6 °F)  Temperature: 36.6 °C (97.9 °F)  Pulse  Avg:  85.5  Min: 70  Max: 121  Blood Pressure: 134/68 mmHg     Respiratory:    Respiration: 18, Pulse Oximetry: 98 %        RUL Breath Sounds: Diminished, RML Breath Sounds: Diminished, RLL Breath Sounds: Diminished, LARY Breath Sounds: Diminished, LLL Breath Sounds: Diminished  Fluids:    Intake/Output Summary (Last 24 hours) at 01/24/17 0940  Last data filed at 01/24/17 0858   Gross per 24 hour   Intake   2380 ml   Output   1450 ml   Net    930 ml       GI/Nutrition:  Orders Placed This Encounter   Procedures   • DIET ORDER     Standing Status: Standing      Number of Occurrences: 1      Standing Expiration Date:      Order Specific Question:  Diet:     Answer:  Diabetic [3]     Order Specific Question:  Calorie modifications:     Answer:  1800 kcals [4]     Medical Decision Making, by Problem:  Active Hospital Problems    Diagnosis   • Cellulitis [L03.90]   • Morbid obesity with BMI of 45.0-49.9, adult (CMS-HCC) [E66.01, Z68.42]   • Transaminitis [R74.0]   • Non compliance w medication regimen [Z91.14]   • Insomnia disorder [G47.00]   • Acute bilateral venous stasis dermatitis [I83.11, I83.12]   • Diabetes type 2, controlled (CMS-HCC) [E11.9]   • Troponin level elevated [R79.89]     Troponin level elevated  - trending down; most likely demand ischemia  - clinically asx; cont outpt meds    Cellulitis  - ID recs appreciated  - cont Unasyn and Clinda for 2 wks (through 2/1)  - cont wound care and encourage compliance if possible     Morbid obesity with BMI of 45.0-49.9, adult (CMS-HCC)  - nutrition counseling provided  - encourage sampson restriction and exercise     Transaminitis  - improving    Non compliance w medication regimen  - pt states unable to afford wound care  - encourage medication compliance while inpt as well as outpt    Acute bilateral venous stasis dermatitis  - cont to encourage leg elevation   - cont Lasix to assist with edema; monitoring 'lytes    Diabetes type 2, controlled (CMS-HCC)  - con SSI and  monitor accu-cheks      All other chronic issues appear stable; cont outpt meds and monitor       Labs reviewed, Medications reviewed and Radiology images reviewed  Montoya catheter: No Montoya  Central line in place: Concentrated IV drugs    DVT Prophylaxis: Heparin      Antibiotics: Treating active infection/contamination beyond 24 hours perioperative coverage

## 2017-01-24 NOTE — CARE PLAN
Problem: Knowledge Deficit  Goal: Knowledge of disease process/condition, treatment plan, diagnostic tests, and medications will improve  Outcome: PROGRESSING SLOWER THAN EXPECTED  Continue to encourage to elevate BLE to decrease swelling-swelling and blisters same    Problem: Discharge Barriers/Planning  Goal: Patient’s continuum of care needs will be met  Intervention: Collaborate with Transitional Care Team and Interdisciplinary Team to meet discharge needs  Needs long term antibiotics,wound care,SNF placement in place

## 2017-01-24 NOTE — PROGRESS NOTES
Received report in bed sleeping  HOB up at 45 degrees and right right leg off the bed,rouses easily when name called.Discussed POC,educated on elevation of his legs duue to swelling.Instructed to call for any needs call light with in reach.

## 2017-01-24 NOTE — PROGRESS NOTES
Infectious Disease Progress Note    Author: Olamide Cormier M.D. Date & Time created: 2017  8:06 AM    Interval History:  56-year-old morbidly obese male chronic venous stasis with stasis dermatitis, morbid obesity, diabetes, oxygen dependent chronic obstructive pulmonary disease and congestive heart failure who   presents to the hospital with worsening leg ulcerations with associated cellulitis and ulceration   AF WBC 8.9 Apologized for being rude yesterday-tolerating abx well. Eating breakfast   AF no WBC having trouble with any pressure in dressings and keeping legs elevated due to discomfort. Denies SE abx   AF, WBC 7.4, bilateral lower extremities still wheeping, asking why he is on fluids, diarrhea resolved on probiotics   AF, no WBC, sitting at the edge of the bed, legs and feet are very tender, hoping he can go to SNF near his home   AF, dressing change yesterday afternoon, states legs are getting better but still have lots of blisters    AF, asking why his feet always hurt, having some burning at PICC site with abx infusions  Labs Reviewed, Medications Reviewed, Radiology Reviewed and Wound Reviewed.    Review of Systems:  Review of Systems   Constitutional: Negative for fever and chills.   Respiratory: Negative for cough.    Cardiovascular: Positive for leg swelling.   Gastrointestinal: Negative for nausea, vomiting and abdominal pain.   Skin: Positive for rash.       Hemodynamics:  Temp (24hrs), Av.7 °C (98.1 °F), Min:35.9 °C (96.6 °F), Max:37 °C (98.6 °F)  Temperature: 36.6 °C (97.9 °F)  Pulse  Av.5  Min: 70  Max: 121  Blood Pressure: 134/68 mmHg       Physical Exam:  Physical Exam   Constitutional: He is oriented to person, place, and time. He appears well-developed and well-nourished. No distress.   HENT:   Head: Normocephalic and atraumatic.   Eyes: EOM are normal. Pupils are equal, round, and reactive to light.   Neck: Neck supple.   Cardiovascular: Normal  rate and regular rhythm.    Pulmonary/Chest: Effort normal. No respiratory distress. He has no wheezes.   Abdominal: Soft. He exhibits no distension. There is no tenderness. There is no rebound.   Musculoskeletal: He exhibits edema.   RUE PIC   Neurological: He is alert and oriented to person, place, and time.   Skin: Rash noted. There is erythema.   Severe tinea pedis  See pictures and wound care notes - improving  Stasis dermatitis  Multiple ulcerations  +wheeping   Nursing note and vitals reviewed.      Labs:  No results for input(s): WBC, RBC, HEMOGLOBIN, HEMATOCRIT, MCV, MCH, RDW, PLATELETCT, MPV, NEUTSPOLYS, LYMPHOCYTES, MONOCYTES, EOSINOPHILS, BASOPHILS, RBCMORPHOLO in the last 72 hours.  Recent Labs      01/22/17   0420  01/24/17   0345   SODIUM  134*  133*   POTASSIUM  5.2  5.9*   CHLORIDE  93*  91*   CO2  36*  36*   GLUCOSE  107*  126*   BUN  13  22     Recent Labs      01/22/17   0420  01/24/17   0345   CREATININE  0.85  1.15       Wound:      BLOOD CULTURE   Date Value Ref Range Status   01/17/2017   Final    Blood culture testing and Gram stain, if indicated, are  performed at [facility], [address] [place] [address].  Positive blood cultures are  sent to [facility], [address] [place] [address], for organism identification and  susceptibility testing.  No growth after 5 days of incubation.            Fluids:  Intake/Output       01/22/17 0700 - 01/23/17 0659 01/23/17 0700 - 01/24/17 0659 01/24/17 0700 - 01/25/17 0659      4501-2208 5378-4659 Total 2897-0055 1462-8354 Total 4393-7785 5092-3772 Total       Intake    P.O.  1520  -- 1520  980  1200 2180  --  -- --    P.O. 1520 -- 3545 582 8524 2180 -- -- --    I.V.  --  -- --  300  300 600  --  -- --    IV Piggyback Volume -- -- -- 300 300 600 -- -- --    Total Intake 1520 -- 1520 1280 1500 2780 -- -- --       Output    Urine  1375  525 1900  850  800 1650  --  -- --    Void (ml) 3215 611 9771 850  800 1650 -- -- --    Stool  --  -- --  --  -- --  --  -- --    Number of Times Stooled 1 x -- 1 x -- -- -- -- -- --    Total Output 7445 368 8612  -- -- --       Net I/O     145 -525 -380  -- -- --            Assessment:  Active Hospital Problems    Diagnosis   • Cellulitis [L03.90]   • Troponin level elevated [R79.89]       Plan:  56-year-old morbidly obese male chronic venous stasis with stasis dermatitis, morbid obesity, diabetes, oxygen dependent chronic obstructive pulmonary disease and congestive heart failure who presents to the hospital with worsening leg ulcerations with associated cellulitis and ulceration.  Noncompliant with wound care due to cost  Afebrile  No leukocytosis.    Demand ischemia   ALT decreasing  JOE-multifactorial. Back to baseline  Appreciate wound care evaluation.    Continue clindamycin and Unasyn and clindamycin for cellulitis and ulcerations  Fluconazole for severe tinea  Anticipate 2 weeks of abx. Stop date 02/01/2017  Encouraged to keep legs elevated   OOB as tolerated  [facility] eval  Prognosis guarded  DW IM    ADDENDUM:  Pt accepted to SNF today.  FU ID clinic 1-2 weeks after d/c from SNF

## 2017-01-24 NOTE — PROGRESS NOTES
Pt resting in bed no signs of distress. VSS. AOx4 discussed POC, assessment complete, pt denies needs at this time. Educated on fall risks and to use call light before getting out of bed. Fall precautions in place, call light in reach will continue to monitor.

## 2017-01-30 ENCOUNTER — APPOINTMENT (OUTPATIENT)
Dept: RADIOLOGY | Facility: MEDICAL CENTER | Age: 57
DRG: 683 | End: 2017-01-30
Attending: EMERGENCY MEDICINE
Payer: MEDICARE

## 2017-01-30 ENCOUNTER — HOSPITAL ENCOUNTER (INPATIENT)
Facility: MEDICAL CENTER | Age: 57
LOS: 2 days | DRG: 683 | End: 2017-02-02
Attending: EMERGENCY MEDICINE | Admitting: HOSPITALIST
Payer: MEDICARE

## 2017-01-30 LAB
ALBUMIN SERPL BCP-MCNC: 3.6 G/DL (ref 3.2–4.9)
ALBUMIN/GLOB SERPL: 0.9 G/DL
ALP SERPL-CCNC: 68 U/L (ref 30–99)
ALT SERPL-CCNC: 11 U/L (ref 2–50)
ANION GAP SERPL CALC-SCNC: 9 MMOL/L (ref 0–11.9)
AST SERPL-CCNC: 13 U/L (ref 12–45)
BASOPHILS # BLD AUTO: 0.6 % (ref 0–1.8)
BASOPHILS # BLD: 0.04 K/UL (ref 0–0.12)
BILIRUB SERPL-MCNC: 0.2 MG/DL (ref 0.1–1.5)
BUN SERPL-MCNC: 70 MG/DL (ref 8–22)
CALCIUM SERPL-MCNC: 9.5 MG/DL (ref 8.5–10.5)
CHLORIDE SERPL-SCNC: 96 MMOL/L (ref 96–112)
CO2 SERPL-SCNC: 28 MMOL/L (ref 20–33)
CREAT SERPL-MCNC: 2.5 MG/DL (ref 0.5–1.4)
EOSINOPHIL # BLD AUTO: 0.39 K/UL (ref 0–0.51)
EOSINOPHIL NFR BLD: 6 % (ref 0–6.9)
ERYTHROCYTE [DISTWIDTH] IN BLOOD BY AUTOMATED COUNT: 54.9 FL (ref 35.9–50)
GFR SERPL CREATININE-BSD FRML MDRD: 27 ML/MIN/1.73 M 2
GLOBULIN SER CALC-MCNC: 4.1 G/DL (ref 1.9–3.5)
GLUCOSE SERPL-MCNC: 124 MG/DL (ref 65–99)
HCT VFR BLD AUTO: 40.8 % (ref 42–52)
HGB BLD-MCNC: 12.5 G/DL (ref 14–18)
IMM GRANULOCYTES # BLD AUTO: 0.07 K/UL (ref 0–0.11)
IMM GRANULOCYTES NFR BLD AUTO: 1.1 % (ref 0–0.9)
LACTATE BLD-SCNC: 1.8 MMOL/L (ref 0.5–2)
LYMPHOCYTES # BLD AUTO: 1.41 K/UL (ref 1–4.8)
LYMPHOCYTES NFR BLD: 21.7 % (ref 22–41)
MCH RBC QN AUTO: 27.5 PG (ref 27–33)
MCHC RBC AUTO-ENTMCNC: 30.6 G/DL (ref 33.7–35.3)
MCV RBC AUTO: 89.9 FL (ref 81.4–97.8)
MONOCYTES # BLD AUTO: 0.74 K/UL (ref 0–0.85)
MONOCYTES NFR BLD AUTO: 11.4 % (ref 0–13.4)
NEUTROPHILS # BLD AUTO: 3.84 K/UL (ref 1.82–7.42)
NEUTROPHILS NFR BLD: 59.2 % (ref 44–72)
NRBC # BLD AUTO: 0 K/UL
NRBC BLD AUTO-RTO: 0 /100 WBC
PLATELET # BLD AUTO: 284 K/UL (ref 164–446)
PMV BLD AUTO: 11.1 FL (ref 9–12.9)
POTASSIUM SERPL-SCNC: 5.6 MMOL/L (ref 3.6–5.5)
PROT SERPL-MCNC: 7.7 G/DL (ref 6–8.2)
RBC # BLD AUTO: 4.54 M/UL (ref 4.7–6.1)
SODIUM SERPL-SCNC: 133 MMOL/L (ref 135–145)
WBC # BLD AUTO: 6.5 K/UL (ref 4.8–10.8)

## 2017-01-30 PROCEDURE — 304562 HCHG STAT O2 MASK/CANNULA

## 2017-01-30 PROCEDURE — 700102 HCHG RX REV CODE 250 W/ 637 OVERRIDE(OP): Performed by: EMERGENCY MEDICINE

## 2017-01-30 PROCEDURE — 36415 COLL VENOUS BLD VENIPUNCTURE: CPT

## 2017-01-30 PROCEDURE — 96361 HYDRATE IV INFUSION ADD-ON: CPT

## 2017-01-30 PROCEDURE — 85025 COMPLETE CBC W/AUTO DIFF WBC: CPT

## 2017-01-30 PROCEDURE — 96375 TX/PRO/DX INJ NEW DRUG ADDON: CPT

## 2017-01-30 PROCEDURE — 83605 ASSAY OF LACTIC ACID: CPT

## 2017-01-30 PROCEDURE — 304561 HCHG STAT O2

## 2017-01-30 PROCEDURE — 99285 EMERGENCY DEPT VISIT HI MDM: CPT

## 2017-01-30 PROCEDURE — 71010 DX-CHEST-PORTABLE (1 VIEW): CPT

## 2017-01-30 PROCEDURE — 93005 ELECTROCARDIOGRAM TRACING: CPT | Performed by: EMERGENCY MEDICINE

## 2017-01-30 PROCEDURE — 80053 COMPREHEN METABOLIC PANEL: CPT

## 2017-01-30 PROCEDURE — 700101 HCHG RX REV CODE 250: Performed by: EMERGENCY MEDICINE

## 2017-01-30 PROCEDURE — 700111 HCHG RX REV CODE 636 W/ 250 OVERRIDE (IP): Performed by: EMERGENCY MEDICINE

## 2017-01-30 PROCEDURE — 87040 BLOOD CULTURE FOR BACTERIA: CPT

## 2017-01-30 PROCEDURE — A9270 NON-COVERED ITEM OR SERVICE: HCPCS | Performed by: EMERGENCY MEDICINE

## 2017-01-30 RX ORDER — ONDANSETRON 2 MG/ML
4 INJECTION INTRAMUSCULAR; INTRAVENOUS ONCE
Status: COMPLETED | OUTPATIENT
Start: 2017-01-30 | End: 2017-01-30

## 2017-01-30 RX ORDER — SODIUM POLYSTYRENE SULFONATE 15 G/60ML
30 SUSPENSION ORAL; RECTAL ONCE
Status: COMPLETED | OUTPATIENT
Start: 2017-01-30 | End: 2017-01-30

## 2017-01-30 RX ORDER — DEXTROSE MONOHYDRATE 25 G/50ML
25 INJECTION, SOLUTION INTRAVENOUS ONCE
Status: COMPLETED | OUTPATIENT
Start: 2017-01-30 | End: 2017-01-30

## 2017-01-30 RX ORDER — SODIUM CHLORIDE 9 MG/ML
2000 INJECTION, SOLUTION INTRAVENOUS ONCE
Status: COMPLETED | OUTPATIENT
Start: 2017-01-30 | End: 2017-01-30

## 2017-01-30 RX ADMIN — SODIUM POLYSTYRENE SULFONATE 30 G: 15 SUSPENSION ORAL; RECTAL at 23:07

## 2017-01-30 RX ADMIN — DEXTROSE MONOHYDRATE 50 ML: 500 INJECTION PARENTERAL at 23:07

## 2017-01-30 RX ADMIN — HYDROMORPHONE HYDROCHLORIDE 1 MG: 1 INJECTION, SOLUTION INTRAMUSCULAR; INTRAVENOUS; SUBCUTANEOUS at 23:07

## 2017-01-30 RX ADMIN — INSULIN HUMAN 10 UNITS: 100 INJECTION, SOLUTION PARENTERAL at 23:08

## 2017-01-30 RX ADMIN — SODIUM CHLORIDE 2000 ML: 9 INJECTION, SOLUTION INTRAVENOUS at 22:58

## 2017-01-30 RX ADMIN — ONDANSETRON 4 MG: 2 INJECTION, SOLUTION INTRAMUSCULAR; INTRAVENOUS at 23:07

## 2017-01-30 RX ADMIN — SODIUM BICARBONATE 50 MEQ: 84 INJECTION, SOLUTION INTRAVENOUS at 23:07

## 2017-01-30 ASSESSMENT — PAIN SCALES - GENERAL
PAINLEVEL_OUTOF10: 7
PAINLEVEL_OUTOF10: 6

## 2017-01-30 NOTE — IP AVS SNAPSHOT
in3Depth Access Code: [email or web address]  Expires: 2/10/2017  3:34 PM    Your email address is not on file at [facility]  Email Addresses are required for you to sign up for in3Depth, please contact [phone] to verify your personal information and to provide your email address prior to attempting to register for in3Depth.    Fer Natalie  [address] [place] [address]    in3Depth  A secure, online tool to manage your health information     [facility]s in3Depth® is a secure, online tool that connects you to your personalized health information from the privacy of your home -- day or night - making it very easy for you to manage your healthcare. Once the activation process is completed, you can even access your medical information using the in3Depth clifford, which is available for free in the Apple Clifford store or Google Play store.     To learn more about in3Depth, visit [email or web address]    There are two levels of access available (as shown below):   My Chart Features  [facility] Primary Care Doctor [facility]  Specialists [facility]  Urgent  Care [facility] Primary Care Doctor   Email your healthcare team securely and privately 24/7 X X X    Manage appointments: schedule your next appointment; view details of past/upcoming appointments X      Request prescription refills. X      View recent personal medical records, including lab and immunizations X X X X   View health record, including health history, allergies, medications X X X X   Read reports about your outpatient visits, procedures, consult and ER notes X X X X   See your discharge summary, which is a recap of your hospital and/or ER visit that includes your diagnosis, lab results, and care plan X X  X     How to register for [employer]:  Once your e-mail address has been verified, follow the following steps to sign up for [employer].     1. Go to  [email or web address]  2. Click on the Sign Up Now box, which takes you to the New Member Sign Up page. You will  need to provide the following information:  a. Enter your [employer] Access Code exactly as it appears at the top of this page. (You will not need to use this code after you’ve completed the sign-up process. If you do not sign up before the expiration date, you must request a new code.)   b. Enter your date of birth.   c. Enter your home email address.   d. Click Submit, and follow the next screen’s instructions.  3. Create a CreateTripst ID. This will be your [employer] login ID and cannot be changed, so think of one that is secure and easy to remember.  4. Create a [employer] password. You can change your password at any time.  5. Enter your Password Reset Question and Answer. This can be used at a later time if you forget your password.   6. Enter your e-mail address. This allows you to receive e-mail notifications when new information is available in [employer].  7. Click Sign Up. You can now view your health information.    For assistance activating your [employer] account, call [phone]

## 2017-01-30 NOTE — IP AVS SNAPSHOT
" <p align=\"LEFT\"><IMG SRC=\"[email or web address]\" alt=\"Image\" WIDTH=\"50%\" HEIGHT=\"200\" BORDER=\"\"></p>                   Name:Fer Estrada  Medical Record Number:[id number]  CSN: [id number]    YOB: 1960   Age: 56 y.o.  Sex: male  HT:1.727 m (5' 8\") WT: 135 kg (297 lb 9.9 oz)          Admit Date: 1/30/2017     Discharge Date:   Today's Date: 2/2/2017  Attending Doctor:  Nabeel Hahn M.D.                  Allergies:  Review of patient's allergies indicates no known allergies.          Follow-up Information     1. Follow up with Giovani Lara M.D.. Schedule an appointment as soon as possible for a visit in 1 week.    Specialty:  Family Medicine    Contact information    [address] [address] [phone]           Medication List      Take these Medications        Instructions    * albuterol 108 (90 BASE) MCG/ACT Aers inhalation aerosol    Inhale 2 Puffs by mouth every 6 hours as needed for Shortness of Breath.   Dose:  2 Puff       * albuterol 2.5mg/0.5ml Nebu   Commonly known as:  PROVENTIL    2.5 mg by Nebulization route every four hours as needed for Shortness of Breath.   Dose:  2.5 mg       budesonide-formoterol 160-4.5 MCG/ACT Aero   Commonly known as:  SYMBICORT    Inhale 2 Puffs by mouth 2 Times a Day.   Dose:  2 Puff       carvedilol 6.25 MG Tabs   Commonly known as:  COREG    Take 1 Tab by mouth 2 times a day, with meals for 30 days.   Dose:  6.25 mg       gabapentin 100 MG Caps   Commonly known as:  NEURONTIN    Take 1 Cap by mouth 3 times a day.   Dose:  100 mg       hydrALAZINE 25 MG Tabs   Commonly known as:  APRESOLINE    Take 1 Tab by mouth every 8 hours for 30 days.   Dose:  25 mg       isosorbide dinitrate 10 MG Tabs   Commonly known as:  ISORDIL    Take 1 Tab by mouth 3 times a day for 30 days.   Dose:  10 mg       LACTOBACILLUS RHAMNOSUS (GG) PO    Take 1 Cap by mouth 3 times a day.   Dose:  1 Cap       lisinopril 20 MG Tabs   What changed:  " how much to take   Commonly known as:  PRINIVIL    Take 0.5 Tabs by mouth every day.   Dose:  10 mg       metformin 850 MG Tabs   Commonly known as:  GLUCOPHAGE    Take 1 Tab by mouth 2 times a day, with meals.   Dose:  850 mg       * OxyCODONE HCl ER 30 MG T12a    Take 30 mg by mouth every 12 hours.   Dose:  30 mg       * oxycodone immediate release 10 MG immediate release tablet   Commonly known as:  ROXICODONE    Take 1 Tab by mouth every 6 hours as needed for Severe Pain.   Dose:  10 mg       potassium chloride 10 MEQ capsule   Commonly known as:  MICRO-K    Take 10 mEq by mouth every day. Pt states he cannot take the tablets   Dose:  10 mEq       * Notice:  This list has 4 medication(s) that are the same as other medications prescribed for you. Read the directions carefully, and ask your doctor or other care provider to review them with you.

## 2017-01-30 NOTE — IP AVS SNAPSHOT
2/2/2017          Fer Estrada  [address]  [facility] [address]    Dear Fer:    [facility] wants to ensure your discharge home is safe and you or your loved ones have had all your questions answered regarding your care after you leave the hospital.    You may receive a telephone call within two days of your discharge.  This call is to make certain you understand your discharge instructions as well as ensure we provided you with the best care possible during your stay with us.     The call will only last approximately 3-5 minutes and will be done by a nurse.    Once again, we want to ensure your discharge home is safe and that you have a clear understanding of any next steps in your care.  If you have any questions or concerns, please do not hesitate to contact us, we are here for you.  Thank you for choosing [facility] for your healthcare needs.    Sincerely,    Antonio Gardner    [facility]

## 2017-01-30 NOTE — IP AVS SNAPSHOT
" After Visit Summary                                                                                                                  Name:Fer Estrada  Medical Record Number:[id number]  CSN: [id number]    YOB: 1960   Age: 56 y.o.  Sex: male  HT:1.727 m (5' 8\") WT: 135 kg (297 lb 9.9 oz)          Admit Date: 1/30/2017     Discharge Date:   Today's Date: 2/2/2017  Attending Doctor:  Nabeel Hahn M.D.                  Allergies:  Review of patient's allergies indicates no known allergies.            Discharge Instructions       Discharge Instructions    Discharged to Skilled Nursing Facility by medical transportation with escort. Discharged via wheelchair, hospital escort: Yes.  Special equipment needed: Oxygen    Be sure to schedule a follow-up appointment with your primary care doctor or any specialists as instructed.     Discharge Plan: Diet low fat, low sugar, heart healthy with 9-13 servings of fruits and vegetables, Activities as tolerated, Follow ups with primary care provider in 7-10 days, call for appointment, take your medications as prescribed, no smoking, no alcohol, no caffeine, wear seat belt in motorized vehicle, keep appointments.  If symptoms worsen call primary care doctor, 911 or urgent care.  Diet Plan: Discussed  Activity Level: Discussed  Confirmed Follow up Appointment: Patient to Call and Schedule Appointment  Confirmed Symptoms Management: Discussed  Medication Reconciliation Updated: Yes  Influenza Vaccine Indication: Not indicated: Previously immunized this influenza season and > 8 years of age    I understand that a diet low in cholesterol, fat, and sodium is recommended for good health. Unless I have been given specific instructions below for another diet, I accept this instruction as my diet prescription.   Other diet: Diabetic    Special Instructions: Hyperkalemia  Hyperkalemia is when you have too much potassium in your blood. Potassium is normally removed (excreted) from " your body by your kidneys. If there is too much potassium in your blood, it can affect your heart's ability to function.   CAUSES   Hyperkalemia may be caused by:   · Taking in too much potassium. You can do this by:  · Using salt substitutes. They contain large amounts of potassium.  · Taking potassium supplements.  · Eating foods high in potassium.  · Excreting too little potassium. This can happen if:  · Your kidneys are not working properly. Kidney (renal) disease, including short- or long-term renal failure, is a very common cause of hyperkalemia.  · You are taking medicines that lower your excretion of potassium.  · You have Worth disease.  · You have a urinary tract blockage, such as kidney stones.  · You are on treatment to mechanically clean your blood (dialysis) and you skip a treatment.  · Releasing a high amount of potassium from your cells into your blood. This can happen with:  · Injury to muscles (rhabdomyolysis) or other tissues. Most potassium is stored in your muscles.  · Severe burns or infections.  · Acidic blood plasma (acidosis). Acidosis can result from many diseases, such as uncontrolled diabetes.  RISK FACTORS  The most common risk factor of hyperkalemia is kidney disease. Other risk factors of hyperkalemia include:  · Worth disease. This is a condition where your glands do not produce enough hormones.  · Alcoholism or heavy drug use.    · Using certain blood pressure medicines, such as angiotensin-converting enzyme (ACE) inhibitors, angiotensin II receptor blockers (ARBs), or potassium-sparing diuretics such as spironolactone.  · Severe injury or burn.  SIGNS AND SYMPTOMS   Oftentimes, there are no signs or symptoms of hyperkalemia.  However, when your potassium level becomes high enough, you may experience symptoms such as:  · Irregular or very slow heartbeat.  · Nausea.  · Fatigue.  · Tingling of the skin or numbness of the hands or feet.  · Muscle weakness.  · Fatigue.  · Not being  able to move (paralysis).  You may not have any symptoms of hyperkalemia.   DIAGNOSIS   Hyperkalemia may be diagnosed by:  · Physical exam.  · Blood tests.  · ECG (electrocardiogram).  · Discussion of prescription and non-prescription drug use.  TREATMENT   Treatment for hyperkalemia is often directed at the underlying cause. In some instances, treatment may include:   · Insulin.  · Glucose (sugar) and water solution given through a vein (intravenous or IV ).  · Dialysis.  · Medicines to remove the potassium from your body.  · Medicines to move calcium from your bloodstream into your tissues.  HOME CARE INSTRUCTIONS   · Take medicines only as directed by your health care provider.  · Do not take any supplements, natural products, herbs, or vitamins without reviewing them with your health care provider. Certain supplements and natural food products can have high amounts of potassium.  · Limit your alcohol intake as directed by your health care provider.  · Stop illegal drug use. If you need help quitting, ask your health care provider.  · Keep all follow-up visits as directed by your health care provider. This is important.  · If you have kidney disease, you may need to follow a low potassium diet. A dietitian can help educate you on low potassium foods.  SEEK MEDICAL CARE IF:   · You notice an irregular or very slow heartbeat.  · You feel light-headed.  · You feel weak.  · You are nauseous.  · You have tingling or numbness in your hands or feet.  SEEK IMMEDIATE MEDICAL CARE IF:   · You have shortness of breath.  · You have chest pain or discomfort.  · You pass out.  · You have muscle paralysis.  MAKE SURE YOU:   · Understand these instructions.  · Will watch your condition.  · Will get help right away if you are not doing well or get worse.     This information is not intended to replace advice given to you by your health care provider. Make sure you discuss any questions you have with your health care provider.        Acute Kidney Injury  Acute kidney injury is any condition in which there is sudden (acute) damage to the kidneys. Acute kidney injury was previously known as acute kidney failure or acute renal failure. The kidneys are two organs that lie on either side of the spine between the middle of the back and the front of the abdomen. The kidneys:  · Remove wastes and extra water from the blood.    · Produce important hormones. These help keep bones strong, regulate blood pressure, and help create red blood cells.    · Balance the fluids and chemicals in the blood and tissues.  A small amount of kidney damage may not cause problems, but a large amount of damage may make it difficult or impossible for the kidneys to work the way they should. Acute kidney injury may develop into long-lasting (chronic) kidney disease. It may also develop into a life-threatening disease called end-stage kidney disease. Acute kidney injury can get worse very quickly, so it should be treated right away. Early treatment may prevent other kidney diseases from developing.  CAUSES   · A problem with blood flow to the kidneys. This may be caused by:    · Blood loss.    · Heart disease.    · Severe burns.    · Liver disease.  · Direct damage to the kidneys. This may be caused by:  · Some medicines.    · A kidney infection.    · Poisoning or consuming toxic substances.    · A surgical wound.    · A blow to the kidney area.    · A problem with urine flow. This may be caused by:    · Cancer.    · Kidney stones.    · An enlarged prostate.  SIGNS AND SYMPTOMS   · Swelling (edema) of the legs, ankles, or feet.    · Tiredness (lethargy).    · Nausea or vomiting.    · Confusion.    · Problems with urination, such as:    · Painful or burning feeling during urination.    · Decreased urine production.    · Frequent accidents in children who are potty trained.    · Bloody urine.    · Muscle twitches and cramps.    · Shortness of breath.    · Seizures.    · Chest  pain or pressure.  Sometimes, no symptoms are present.   DIAGNOSIS  Acute kidney injury may be detected and diagnosed by tests, including blood, urine, imaging, or kidney biopsy tests.   TREATMENT  Treatment of acute kidney injury varies depending on the cause and severity of the kidney damage. In mild cases, no treatment may be needed. The kidneys may heal on their own. If acute kidney injury is more severe, your health care provider will treat the cause of the kidney damage, help the kidneys heal, and prevent complications from occurring. Severe cases may require a procedure to remove toxic wastes from the body (dialysis) or surgery to repair kidney damage. Surgery may involve:   · Repair of a torn kidney.    · Removal of an obstruction.  HOME CARE INSTRUCTIONS  · Follow your prescribed diet.  · Take medicines only as directed by your health care provider.   · Do not take any new medicines (prescription, over-the-counter, or nutritional supplements) unless approved by your health care provider. Many medicines can worsen your kidney damage or may need to have the dose adjusted.    · Keep all follow-up visits as directed by your health care provider. This is important.  · Observe your condition to make sure you are healing as expected.  SEEK IMMEDIATE MEDICAL CARE IF:  · You are feeling ill or have severe pain in the back or side.    · Your symptoms return or you have new symptoms.  · You have any symptoms of end-stage kidney disease. These include:    · Persistent itchiness.    · Loss of appetite.    · Headaches.    · Abnormally dark or light skin.  · Numbness in the hands or feet.    · Easy bruising.    · Frequent hiccups.    · Menstruation stops.    · You have a fever.  · You have increased urine production.  · You have pain or bleeding when urinating.  MAKE SURE YOU:   · Understand these instructions.  · Will watch your condition.  · Will get help right away if you are not doing well or get worse.     This  information is not intended to replace advice given to you by your health care provider. Make sure you discuss any questions you have with your health care provider.     Wound Care  Taking care of your wound properly can help to prevent pain and infection. It can also help your wound to heal more quickly.   HOW TO CARE FOR YOUR WOUND   · Take or apply over-the-counter and prescription medicines only as told by your health care provider.  · If you were prescribed antibiotic medicine, take or apply it as told by your health care provider. Do not stop using the antibiotic even if your condition improves.  · Clean the wound each day or as told by your health care provider.  · Wash the wound with mild soap and water.  · Rinse the wound with water to remove all soap.  · Pat the wound dry with a clean towel. Do not rub it.  · There are many different ways to close and cover a wound. For example, a wound can be covered with stitches (sutures), skin glue, or adhesive strips. Follow instructions from your health care provider about:  · How to take care of your wound.  · When and how you should change your bandage (dressing).  · When you should remove your dressing.  · Removing whatever was used to close your wound.  · Check your wound every day for signs of infection. Watch for:  · Redness, swelling, or pain.  · Fluid, blood, or pus.  · Keep the dressing dry until your health care provider says it can be removed. Do not take baths, swim, use a hot tub, or do anything that would put your wound underwater until your health care provider approves.  · Raise (elevate) the injured area above the level of your heart while you are sitting or lying down.  · Do not scratch or pick at the wound.  · Keep all follow-up visits as told by your health care provider. This is important.  SEEK MEDICAL CARE IF:  · You received a tetanus shot and you have swelling, severe pain, redness, or bleeding at the injection site.  · You have a  "fever.  · Your pain is not controlled with medicine.  · You have increased redness, swelling, or pain at the site of your wound.  · You have fluid, blood, or pus coming from your wound.  · You notice a bad smell coming from your wound or your dressing.  SEEK IMMEDIATE MEDICAL CARE IF:  · You have a red streak going away from your wound.     This information is not intended to replace advice given to you by your health care provider. Make sure you discuss any questions you have with your health care provider.    Change dressing when saturated: remove dressings on legs and wash with warm soap and water, removing as much dead skin as is tolerable by patient.  Dry legs thoroughly, especially between all toes.  Then apply hydrofiber silver over open areas and place abdominal dressing pads over these.  Wrap with kerlix ( secure with tape that does not touch skin). Cover bilateral feet dorsums with Xeroform then wrap legs with ace wraps ( two 4\" wraps per leg ) up to knees.       Venous Stasis or Chronic Venous Insufficiency  Chronic venous insufficiency, also called venous stasis, is a condition that affects the veins in the legs. The condition prevents blood from being pumped through these veins effectively. Blood may no longer be pumped effectively from the legs back to the heart. This condition can range from mild to severe. With proper treatment, you should be able to continue with an active life.  CAUSES   Chronic venous insufficiency occurs when the vein walls become stretched, weakened, or damaged or when valves within the vein are damaged. Some common causes of this include:  · High blood pressure inside the veins (venous hypertension).  · Increased blood pressure in the leg veins from long periods of sitting or standing.  · A blood clot that blocks blood flow in a vein (deep vein thrombosis).  · Inflammation of a superficial vein (phlebitis) that causes a blood clot to form.  RISK FACTORS  Various things can make " "you more likely to develop chronic venous insufficiency, including:  · Family history of this condition.  · Obesity.  · Pregnancy.  · Sedentary lifestyle.  · Smoking.  · Jobs requiring long periods of standing or sitting in one place.  · Being a certain age. Women in their 40s and 50s and men in their 70s are more likely to develop this condition.  SIGNS AND SYMPTOMS   Symptoms may include:   · Varicose veins.  · Skin breakdown or ulcers.  · Reddened or discolored skin on the leg.  · Brown, smooth, tight, and painful skin just above the ankle, usually on the inside surface (lipodermatosclerosis).  · Swelling.  DIAGNOSIS   To diagnose this condition, your health care provider will take a medical history and do a physical exam. The following tests may be ordered to confirm the diagnosis:  · Duplex ultrasound--A procedure that produces a picture of a blood vessel and nearby organs and also provides information on blood flow through the blood vessel.  · Plethysmography--A procedure that tests blood flow.  · A venogram, or venography--A procedure used to look at the veins using X-ray and dye.  TREATMENT  The goals of treatment are to help you return to an active life and to minimize pain or disability. Treatment will depend on the severity of the condition. Medical procedures may be needed for severe cases. Treatment options may include:   · Use of compression stockings. These can help with symptoms and lower the chances of the problem getting worse, but they do not cure the problem.  · Sclerotherapy--A procedure involving an injection of a material that \"dissolves\" the damaged veins. Other veins in the network of blood vessels take over the function of the damaged veins.  · Surgery to remove the vein or cut off blood flow through the vein (vein stripping or laser ablation surgery).  · Surgery to repair a valve.  HOME CARE INSTRUCTIONS   · Wear compression stockings as directed by your health care provider.  · Only take " over-the-counter or prescription medicines for pain, discomfort, or fever as directed by your health care provider.  · Follow up with your health care provider as directed.  SEEK MEDICAL CARE IF:   · You have redness, swelling, or increasing pain in the affected area.  · You see a red streak or line that extends up or down from the affected area.  · You have a breakdown or loss of skin in the affected area, even if the breakdown is small.  · You have an injury to the affected area.  SEEK IMMEDIATE MEDICAL CARE IF:   · You have an injury and open wound in the affected area.  · Your pain is severe and does not improve with medicine.  · You have sudden numbness or weakness in the foot or ankle below the affected area, or you have trouble moving your foot or ankle.  · You have a fever or persistent symptoms for more than 2-3 days.  · You have a fever and your symptoms suddenly get worse.  MAKE SURE YOU:   · Understand these instructions.  · Will watch your condition.  · Will get help right away if you are not doing well or get worse.     This information is not intended to replace advice given to you by your health care provider. Make sure you discuss any questions you have with your health care provider.       · Is patient discharged on Warfarin / Coumadin?   No     · Is patient Post Blood Transfusion?  No    Depression / Suicide Risk    As you are discharged from this [facility] facility, it is important to learn how to keep safe from harming yourself.    Recognize the warning signs:  · Abrupt changes in personality, positive or negative- including increase in energy   · Giving away possessions  · Change in eating patterns- significant weight changes-  positive or negative  · Change in sleeping patterns- unable to sleep or sleeping all the time   · Unwillingness or inability to communicate  · Depression  · Unusual sadness, discouragement and loneliness  · Talk of wanting to die  · Neglect of personal  appearance   · Rebelliousness- reckless behavior  · Withdrawal from people/activities they love  · Confusion- inability to concentrate     If you or a loved one observes any of these behaviors or has concerns about self-harm, here's what you can do:  · Talk about it- your feelings and reasons for harming yourself  · Remove any means that you might use to hurt yourself (examples: pills, rope, extension cords, firearm)  · Get professional help from the community (Mental Health, Substance Abuse, psychological counseling)  · Do not be alone:Call your Safe Contact- someone whom you trust who will be there for you.  · Call your local CRISIS HOTLINE 174-7493 or 960-007-1963  · Call your local Children's Mobile Crisis Response Team [place] (231) 911-6147 or www.Vizibility  · Call the toll free National Suicide Prevention Hotlines   · National Suicide Prevention Lifeline 246-931-NNQP (5608)  · [facility] Advance Line Network 800-SUICIDE (323-6857)        Follow-up Information     1. Follow up with Giovani Lara M.D.. Schedule an appointment as soon as possible for a visit in 1 week.    Specialty:  Family Medicine    Contact information    [address] [address]  [phone]           Discharge Medication Instructions:    Below are the medications your physician expects you to take upon discharge:    Review all your home medications and newly ordered medications with your doctor and/or pharmacist. Follow medication instructions as directed by your doctor and/or pharmacist.    Please keep your medication list with you and share with your physician.               Medication List      START taking these medications        Instructions    gabapentin 100 MG Caps   Last time this was given:  100 mg on 2/2/2017  9:28 AM   Commonly known as:  NEURONTIN    Take 1 Cap by mouth 3 times a day.   Dose:  100 mg       * OxyCODONE HCl ER 30 MG T12a   Last time this was given:  30 mg on 2/2/2017  9:28 AM     Take 30 mg by mouth every 12 hours.   Dose:  30 mg       * oxycodone immediate release 10 MG immediate release tablet   Last time this was given:  10 mg on 2/2/2017  7:48 AM   Commonly known as:  ROXICODONE    Take 1 Tab by mouth every 6 hours as needed for Severe Pain.   Dose:  10 mg       * Notice:  This list has 2 medication(s) that are the same as other medications prescribed for you. Read the directions carefully, and ask your doctor or other care provider to review them with you.      CHANGE how you take these medications        Instructions    lisinopril 20 MG Tabs   What changed:  how much to take   Commonly known as:  PRINIVIL    Take 0.5 Tabs by mouth every day.   Dose:  10 mg         CONTINUE taking these medications        Instructions    * albuterol 108 (90 BASE) MCG/ACT Aers inhalation aerosol    Inhale 2 Puffs by mouth every 6 hours as needed for Shortness of Breath.   Dose:  2 Puff       * albuterol 2.5mg/0.5ml Nebu   Commonly known as:  PROVENTIL    2.5 mg by Nebulization route every four hours as needed for Shortness of Breath.   Dose:  2.5 mg       budesonide-formoterol 160-4.5 MCG/ACT Aero   Last time this was given:  2 Puffs on 1/31/2017 10:29 AM   Commonly known as:  SYMBICORT    Inhale 2 Puffs by mouth 2 Times a Day.   Dose:  2 Puff       carvedilol 6.25 MG Tabs   Last time this was given:  3.125 mg on 2/2/2017  7:48 AM   Commonly known as:  COREG    Take 1 Tab by mouth 2 times a day, with meals for 30 days.   Dose:  6.25 mg       hydrALAZINE 25 MG Tabs   Last time this was given:  25 mg on 2/2/2017  5:44 AM   Commonly known as:  APRESOLINE    Take 1 Tab by mouth every 8 hours for 30 days.   Dose:  25 mg       isosorbide dinitrate 10 MG Tabs   Last time this was given:  10 mg on 2/2/2017  9:28 AM   Commonly known as:  ISORDIL    Take 1 Tab by mouth 3 times a day for 30 days.   Dose:  10 mg       LACTOBACILLUS RHAMNOSUS (GG) PO    Take 1 Cap by mouth 3 times a day.   Dose:  1 Cap        metformin 850 MG Tabs   Commonly known as:  GLUCOPHAGE    Take 1 Tab by mouth 2 times a day, with meals.   Dose:  850 mg       potassium chloride 10 MEQ capsule   Commonly known as:  MICRO-K    Take 10 mEq by mouth every day. Pt states he cannot take the tablets   Dose:  10 mEq       * Notice:  This list has 2 medication(s) that are the same as other medications prescribed for you. Read the directions carefully, and ask your doctor or other care provider to review them with you.            Instructions           Diet / Nutrition:    Follow any diet instructions given to you by your doctor or the dietician, including how much salt (sodium) you are allowed each day.    If you are overweight, talk to your doctor about a weight reduction plan.    Activity:    Remain physically active following your doctor's instructions about exercise and activity.    Rest often.     Any time you become even a little tired or short of breath, SIT DOWN and rest.    Worsening Symptoms:    Report any of the following signs and symptoms to the doctor's office immediately:    *Pain of jaw, arm, or neck  *Chest pain not relieved by medication                               *Dizziness or loss of consciousness  *Difficulty breathing even when at rest   *More tired than usual                                       *Bleeding drainage or swelling of surgical site  *Swelling of feet, ankles, legs or stomach                 *Fever (>100ºF)  *Pink or blood tinged sputum  *Weight gain (3lbs/day or 5lbs /week)           *Shock from internal defibrillator (if applicable)  *Palpitations or irregular heartbeats                *Cool and/or numb extremities    Stroke Awareness    Common Risk Factors for Stroke include:    Age  Atrial Fibrillation  Carotid Artery Stenosis  Diabetes Mellitus  Excessive alcohol consumption  High blood pressure  Overweight   Physical inactivity  Smoking    Warning signs and symptoms of a stroke include:    *Sudden numbness or  weakness of the face, arm or leg (especially on one side of the body).  *Sudden confusion, trouble speaking or understanding.  *Sudden trouble seeing in one or both eyes.  *Sudden trouble walking, dizziness, loss of balance or coordination.Sudden severe headache with no known cause.    It is very important to get treatment quickly when a stroke occurs. If you experience any of the above warning signs, call 911 immediately.                   Disclaimer         Quit Smoking / Tobacco Use:    I understand the use of any tobacco products increases my chance of suffering from future heart disease or stroke and could cause other illnesses which may shorten my life. Quitting the use of tobacco products is the single most important thing I can do to improve my health. For further information on smoking / tobacco cessation call a Toll Free Quit Line at 1-856.877.8714 (*[employer] [place]) or 1-206.255.4480 (American [employer] Association) or you can access the web based program at www.lungusa.org.    [place] Tobacco Users Help Line:  (449) 640-4934       Toll Free: 1-789.491.3910  Quit Tobacco Program [facility] (432)[phone]    Crisis Hotline:    [place] Crisis Hotline:  0-170-EYKJLJW or 1-396.216.9332    [place] Crisis Hotline:    1-592.492.5079 or 618-567-3187    Discharge Survey:   Thank you for choosing [facility]. We hope we did everything we could to make your hospital stay a pleasant one. You may be receiving a phone survey and we would appreciate your time and participation in answering the questions. Your input is very valuable to us in our efforts to improve our service to our patients and their families.        My signature on this form indicates that:    1. I have reviewed and understand the above information.  2. My questions regarding this information have been answered to my satisfaction.  3. I have formulated a plan with my discharge nurse to obtain my prescribed medications for  home.                  Disclaimer         __________________________________                     __________       ________                       Patient Signature                                                 Date                    Time

## 2017-01-31 ENCOUNTER — RESOLUTE PROFESSIONAL BILLING HOSPITAL PROF FEE (OUTPATIENT)
Dept: HOSPITALIST | Facility: MEDICAL CENTER | Age: 57
End: 2017-01-31
Payer: MEDICARE

## 2017-01-31 ENCOUNTER — APPOINTMENT (OUTPATIENT)
Dept: RADIOLOGY | Facility: MEDICAL CENTER | Age: 57
DRG: 683 | End: 2017-01-31
Attending: INTERNAL MEDICINE
Payer: MEDICARE

## 2017-01-31 PROBLEM — N17.9 AKI (ACUTE KIDNEY INJURY) (HCC): Status: ACTIVE | Noted: 2017-01-31

## 2017-01-31 PROBLEM — E87.5 HYPERKALEMIA: Status: ACTIVE | Noted: 2017-01-31

## 2017-01-31 LAB
ANION GAP SERPL CALC-SCNC: 8 MMOL/L (ref 0–11.9)
ANION GAP SERPL CALC-SCNC: 8 MMOL/L (ref 0–11.9)
APPEARANCE UR: CLEAR
BILIRUB UR QL STRIP.AUTO: NEGATIVE
BUN SERPL-MCNC: 62 MG/DL (ref 8–22)
BUN SERPL-MCNC: 68 MG/DL (ref 8–22)
CALCIUM SERPL-MCNC: 9.5 MG/DL (ref 8.5–10.5)
CALCIUM SERPL-MCNC: 9.6 MG/DL (ref 8.5–10.5)
CHLORIDE SERPL-SCNC: 100 MMOL/L (ref 96–112)
CHLORIDE SERPL-SCNC: 98 MMOL/L (ref 96–112)
CO2 SERPL-SCNC: 27 MMOL/L (ref 20–33)
CO2 SERPL-SCNC: 29 MMOL/L (ref 20–33)
COLOR UR: COLORLESS
CREAT SERPL-MCNC: 1.92 MG/DL (ref 0.5–1.4)
CREAT SERPL-MCNC: 2.3 MG/DL (ref 0.5–1.4)
EKG IMPRESSION: NORMAL
GFR SERPL CREATININE-BSD FRML MDRD: 29 ML/MIN/1.73 M 2
GFR SERPL CREATININE-BSD FRML MDRD: 36 ML/MIN/1.73 M 2
GLUCOSE BLD-MCNC: 143 MG/DL (ref 65–99)
GLUCOSE BLD-MCNC: 94 MG/DL (ref 65–99)
GLUCOSE BLD-MCNC: 98 MG/DL (ref 65–99)
GLUCOSE BLD-MCNC: 99 MG/DL (ref 65–99)
GLUCOSE SERPL-MCNC: 108 MG/DL (ref 65–99)
GLUCOSE SERPL-MCNC: 110 MG/DL (ref 65–99)
GLUCOSE UR STRIP.AUTO-MCNC: ABNORMAL MG/DL
KETONES UR STRIP.AUTO-MCNC: NEGATIVE MG/DL
LACTATE BLD-SCNC: 1.8 MMOL/L (ref 0.5–2)
LEUKOCYTE ESTERASE UR QL STRIP.AUTO: NEGATIVE
MICRO URNS: ABNORMAL
NITRITE UR QL STRIP.AUTO: NEGATIVE
PH UR STRIP.AUTO: 5 [PH]
POTASSIUM SERPL-SCNC: 5.8 MMOL/L (ref 3.6–5.5)
POTASSIUM SERPL-SCNC: 6 MMOL/L (ref 3.6–5.5)
PROT UR QL STRIP: NEGATIVE MG/DL
RBC UR QL AUTO: NEGATIVE
SODIUM SERPL-SCNC: 135 MMOL/L (ref 135–145)
SODIUM SERPL-SCNC: 135 MMOL/L (ref 135–145)
SP GR UR STRIP.AUTO: 1.01

## 2017-01-31 PROCEDURE — 700102 HCHG RX REV CODE 250 W/ 637 OVERRIDE(OP): Performed by: HOSPITALIST

## 2017-01-31 PROCEDURE — 700102 HCHG RX REV CODE 250 W/ 637 OVERRIDE(OP): Performed by: NURSE PRACTITIONER

## 2017-01-31 PROCEDURE — 770020 HCHG ROOM/CARE - TELE (206)

## 2017-01-31 PROCEDURE — 87040 BLOOD CULTURE FOR BACTERIA: CPT

## 2017-01-31 PROCEDURE — 700105 HCHG RX REV CODE 258: Performed by: HOSPITALIST

## 2017-01-31 PROCEDURE — 700111 HCHG RX REV CODE 636 W/ 250 OVERRIDE (IP): Performed by: HOSPITALIST

## 2017-01-31 PROCEDURE — A9270 NON-COVERED ITEM OR SERVICE: HCPCS | Performed by: NURSE PRACTITIONER

## 2017-01-31 PROCEDURE — A9270 NON-COVERED ITEM OR SERVICE: HCPCS | Performed by: HOSPITALIST

## 2017-01-31 PROCEDURE — 96365 THER/PROPH/DIAG IV INF INIT: CPT

## 2017-01-31 PROCEDURE — 81003 URINALYSIS AUTO W/O SCOPE: CPT

## 2017-01-31 PROCEDURE — 83605 ASSAY OF LACTIC ACID: CPT

## 2017-01-31 PROCEDURE — 87086 URINE CULTURE/COLONY COUNT: CPT

## 2017-01-31 PROCEDURE — 302128 INFUSION PUMP: Performed by: EMERGENCY MEDICINE

## 2017-01-31 PROCEDURE — 99232 SBSQ HOSP IP/OBS MODERATE 35: CPT | Performed by: INTERNAL MEDICINE

## 2017-01-31 PROCEDURE — 700111 HCHG RX REV CODE 636 W/ 250 OVERRIDE (IP): Performed by: INTERNAL MEDICINE

## 2017-01-31 PROCEDURE — 99223 1ST HOSP IP/OBS HIGH 75: CPT | Mod: AI | Performed by: HOSPITALIST

## 2017-01-31 PROCEDURE — 96361 HYDRATE IV INFUSION ADD-ON: CPT

## 2017-01-31 PROCEDURE — 700102 HCHG RX REV CODE 250 W/ 637 OVERRIDE(OP): Performed by: INTERNAL MEDICINE

## 2017-01-31 PROCEDURE — 97161 PT EVAL LOW COMPLEX 20 MIN: CPT

## 2017-01-31 PROCEDURE — 700112 HCHG RX REV CODE 229: Performed by: HOSPITALIST

## 2017-01-31 PROCEDURE — 96375 TX/PRO/DX INJ NEW DRUG ADDON: CPT

## 2017-01-31 PROCEDURE — A9270 NON-COVERED ITEM OR SERVICE: HCPCS | Performed by: INTERNAL MEDICINE

## 2017-01-31 PROCEDURE — 80048 BASIC METABOLIC PNL TOTAL CA: CPT

## 2017-01-31 PROCEDURE — 36415 COLL VENOUS BLD VENIPUNCTURE: CPT

## 2017-01-31 PROCEDURE — 82962 GLUCOSE BLOOD TEST: CPT | Mod: 91

## 2017-01-31 RX ORDER — LACTULOSE 20 G/30ML
30 SOLUTION ORAL
Status: DISCONTINUED | OUTPATIENT
Start: 2017-01-31 | End: 2017-02-02 | Stop reason: HOSPADM

## 2017-01-31 RX ORDER — DIPHENHYDRAMINE HYDROCHLORIDE 50 MG/ML
12.5-25 INJECTION INTRAMUSCULAR; INTRAVENOUS EVERY 6 HOURS PRN
Status: DISCONTINUED | OUTPATIENT
Start: 2017-01-31 | End: 2017-02-02 | Stop reason: HOSPADM

## 2017-01-31 RX ORDER — HYDRALAZINE HYDROCHLORIDE 25 MG/1
25 TABLET, FILM COATED ORAL EVERY 8 HOURS
Status: DISCONTINUED | OUTPATIENT
Start: 2017-01-31 | End: 2017-02-02 | Stop reason: HOSPADM

## 2017-01-31 RX ORDER — BUDESONIDE AND FORMOTEROL FUMARATE DIHYDRATE 160; 4.5 UG/1; UG/1
2 AEROSOL RESPIRATORY (INHALATION) 2 TIMES DAILY
Status: DISCONTINUED | OUTPATIENT
Start: 2017-01-31 | End: 2017-02-02 | Stop reason: HOSPADM

## 2017-01-31 RX ORDER — LORAZEPAM 2 MG/ML
0.5 INJECTION INTRAMUSCULAR EVERY 6 HOURS PRN
Status: DISCONTINUED | OUTPATIENT
Start: 2017-01-31 | End: 2017-02-02 | Stop reason: HOSPADM

## 2017-01-31 RX ORDER — DEXTROSE MONOHYDRATE 25 G/50ML
25 INJECTION, SOLUTION INTRAVENOUS
Status: DISCONTINUED | OUTPATIENT
Start: 2017-01-31 | End: 2017-02-02 | Stop reason: HOSPADM

## 2017-01-31 RX ORDER — GABAPENTIN 100 MG/1
100 CAPSULE ORAL 3 TIMES DAILY
Status: DISCONTINUED | OUTPATIENT
Start: 2017-01-31 | End: 2017-02-02 | Stop reason: HOSPADM

## 2017-01-31 RX ORDER — OXYCODONE HYDROCHLORIDE 30 MG/1
30 TABLET ORAL EVERY 4 HOURS
Status: DISCONTINUED | OUTPATIENT
Start: 2017-01-31 | End: 2017-01-31

## 2017-01-31 RX ORDER — PROMETHAZINE HYDROCHLORIDE 25 MG/1
12.5-25 TABLET ORAL EVERY 4 HOURS PRN
Status: DISCONTINUED | OUTPATIENT
Start: 2017-01-31 | End: 2017-02-02 | Stop reason: HOSPADM

## 2017-01-31 RX ORDER — OXYCODONE HCL 20 MG/1
20 TABLET, FILM COATED, EXTENDED RELEASE ORAL EVERY 12 HOURS
Status: DISCONTINUED | OUTPATIENT
Start: 2017-01-31 | End: 2017-02-01

## 2017-01-31 RX ORDER — ISOSORBIDE DINITRATE 10 MG/1
10 TABLET ORAL 3 TIMES DAILY
Status: DISCONTINUED | OUTPATIENT
Start: 2017-01-31 | End: 2017-02-02 | Stop reason: HOSPADM

## 2017-01-31 RX ORDER — SODIUM CHLORIDE 9 MG/ML
INJECTION, SOLUTION INTRAVENOUS
Status: ACTIVE
Start: 2017-01-31 | End: 2017-02-01

## 2017-01-31 RX ORDER — ONDANSETRON 2 MG/ML
4 INJECTION INTRAMUSCULAR; INTRAVENOUS EVERY 4 HOURS PRN
Status: DISCONTINUED | OUTPATIENT
Start: 2017-01-31 | End: 2017-02-02 | Stop reason: HOSPADM

## 2017-01-31 RX ORDER — PROMETHAZINE HYDROCHLORIDE 25 MG/1
12.5-25 SUPPOSITORY RECTAL EVERY 4 HOURS PRN
Status: DISCONTINUED | OUTPATIENT
Start: 2017-01-31 | End: 2017-02-02 | Stop reason: HOSPADM

## 2017-01-31 RX ORDER — HEPARIN SODIUM 5000 [USP'U]/ML
5000 INJECTION, SOLUTION INTRAVENOUS; SUBCUTANEOUS EVERY 8 HOURS
Status: DISCONTINUED | OUTPATIENT
Start: 2017-01-31 | End: 2017-02-02 | Stop reason: HOSPADM

## 2017-01-31 RX ORDER — FLUCONAZOLE 200 MG/1
400 TABLET ORAL DAILY
Status: ON HOLD | COMMUNITY
End: 2017-02-02

## 2017-01-31 RX ORDER — SODIUM CHLORIDE 9 MG/ML
1000 INJECTION, SOLUTION INTRAVENOUS ONCE
Status: COMPLETED | OUTPATIENT
Start: 2017-01-31 | End: 2017-01-31

## 2017-01-31 RX ORDER — CARVEDILOL 3.12 MG/1
3.12 TABLET ORAL 2 TIMES DAILY WITH MEALS
Status: DISCONTINUED | OUTPATIENT
Start: 2017-01-31 | End: 2017-02-02 | Stop reason: HOSPADM

## 2017-01-31 RX ORDER — OXYCODONE HYDROCHLORIDE 10 MG/1
10 TABLET ORAL EVERY 4 HOURS PRN
Status: DISCONTINUED | OUTPATIENT
Start: 2017-01-31 | End: 2017-02-02 | Stop reason: HOSPADM

## 2017-01-31 RX ORDER — AMOXICILLIN 250 MG
1 CAPSULE ORAL
Status: DISCONTINUED | OUTPATIENT
Start: 2017-01-31 | End: 2017-02-02 | Stop reason: HOSPADM

## 2017-01-31 RX ORDER — KETOROLAC TROMETHAMINE 30 MG/ML
15 INJECTION, SOLUTION INTRAMUSCULAR; INTRAVENOUS EVERY 6 HOURS
Status: DISCONTINUED | OUTPATIENT
Start: 2017-01-31 | End: 2017-01-31

## 2017-01-31 RX ORDER — AMOXICILLIN 250 MG
1 CAPSULE ORAL NIGHTLY
Status: DISCONTINUED | OUTPATIENT
Start: 2017-01-31 | End: 2017-02-02 | Stop reason: HOSPADM

## 2017-01-31 RX ORDER — DIPHENHYDRAMINE HCL 25 MG
25 TABLET ORAL EVERY 6 HOURS PRN
Status: DISCONTINUED | OUTPATIENT
Start: 2017-01-31 | End: 2017-02-02 | Stop reason: HOSPADM

## 2017-01-31 RX ORDER — ONDANSETRON 4 MG/1
4 TABLET, ORALLY DISINTEGRATING ORAL EVERY 4 HOURS PRN
Status: DISCONTINUED | OUTPATIENT
Start: 2017-01-31 | End: 2017-02-02 | Stop reason: HOSPADM

## 2017-01-31 RX ORDER — DOCUSATE SODIUM 100 MG/1
100 CAPSULE, LIQUID FILLED ORAL EVERY MORNING
Status: DISCONTINUED | OUTPATIENT
Start: 2017-01-31 | End: 2017-02-02 | Stop reason: HOSPADM

## 2017-01-31 RX ORDER — ACETAMINOPHEN 500 MG
1000 TABLET ORAL EVERY 6 HOURS
Status: DISCONTINUED | OUTPATIENT
Start: 2017-01-31 | End: 2017-02-02 | Stop reason: HOSPADM

## 2017-01-31 RX ORDER — ENEMA 19; 7 G/133ML; G/133ML
1 ENEMA RECTAL
Status: DISCONTINUED | OUTPATIENT
Start: 2017-01-31 | End: 2017-02-02 | Stop reason: HOSPADM

## 2017-01-31 RX ORDER — CARVEDILOL 6.25 MG/1
6.25 TABLET ORAL 2 TIMES DAILY WITH MEALS
Status: DISCONTINUED | OUTPATIENT
Start: 2017-01-31 | End: 2017-01-31

## 2017-01-31 RX ORDER — LORAZEPAM 0.5 MG/1
0.5 TABLET ORAL EVERY 6 HOURS PRN
Status: DISCONTINUED | OUTPATIENT
Start: 2017-01-31 | End: 2017-02-02 | Stop reason: HOSPADM

## 2017-01-31 RX ORDER — ALBUTEROL SULFATE 90 UG/1
2 AEROSOL, METERED RESPIRATORY (INHALATION) EVERY 6 HOURS PRN
Status: DISCONTINUED | OUTPATIENT
Start: 2017-01-31 | End: 2017-02-02 | Stop reason: HOSPADM

## 2017-01-31 RX ORDER — BISACODYL 10 MG
10 SUPPOSITORY, RECTAL RECTAL
Status: DISCONTINUED | OUTPATIENT
Start: 2017-01-31 | End: 2017-02-02 | Stop reason: HOSPADM

## 2017-01-31 RX ADMIN — GABAPENTIN 100 MG: 100 CAPSULE ORAL at 09:53

## 2017-01-31 RX ADMIN — SODIUM BICARBONATE 150 MEQ: 84 INJECTION, SOLUTION INTRAVENOUS at 15:27

## 2017-01-31 RX ADMIN — OXYCODONE HYDROCHLORIDE 30 MG: 30 TABLET ORAL at 09:53

## 2017-01-31 RX ADMIN — OXYCODONE HYDROCHLORIDE 30 MG: 30 TABLET ORAL at 05:09

## 2017-01-31 RX ADMIN — DOCUSATE SODIUM 100 MG: 100 CAPSULE ORAL at 01:36

## 2017-01-31 RX ADMIN — ACETAMINOPHEN 1000 MG: 500 TABLET, FILM COATED ORAL at 12:39

## 2017-01-31 RX ADMIN — HEPARIN SODIUM 5000 UNITS: 5000 INJECTION, SOLUTION INTRAVENOUS; SUBCUTANEOUS at 13:52

## 2017-01-31 RX ADMIN — GABAPENTIN 100 MG: 100 CAPSULE ORAL at 13:52

## 2017-01-31 RX ADMIN — ACETAMINOPHEN 1000 MG: 500 TABLET, FILM COATED ORAL at 05:10

## 2017-01-31 RX ADMIN — SODIUM CHLORIDE 1000 ML: 9 INJECTION, SOLUTION INTRAVENOUS at 01:38

## 2017-01-31 RX ADMIN — KETOROLAC TROMETHAMINE 15 MG: 30 INJECTION, SOLUTION INTRAMUSCULAR; INTRAVENOUS at 01:36

## 2017-01-31 RX ADMIN — AMPICILLIN SODIUM AND SULBACTAM SODIUM 1.5 G: 1; .5 INJECTION, POWDER, FOR SOLUTION INTRAMUSCULAR; INTRAVENOUS at 23:25

## 2017-01-31 RX ADMIN — AMPICILLIN SODIUM AND SULBACTAM SODIUM 1.5 G: 1; .5 INJECTION, POWDER, FOR SOLUTION INTRAMUSCULAR; INTRAVENOUS at 01:37

## 2017-01-31 RX ADMIN — OXYCODONE HYDROCHLORIDE 30 MG: 30 TABLET ORAL at 13:52

## 2017-01-31 RX ADMIN — DOCUSATE SODIUM 100 MG: 100 CAPSULE ORAL at 09:53

## 2017-01-31 RX ADMIN — BUDESONIDE AND FORMOTEROL FUMARATE DIHYDRATE 2 PUFF: 160; 4.5 AEROSOL RESPIRATORY (INHALATION) at 10:29

## 2017-01-31 RX ADMIN — HYDROMORPHONE HYDROCHLORIDE 1 MG: 1 INJECTION, SOLUTION INTRAMUSCULAR; INTRAVENOUS; SUBCUTANEOUS at 17:33

## 2017-01-31 RX ADMIN — OXYCODONE HYDROCHLORIDE 20 MG: 20 TABLET, FILM COATED, EXTENDED RELEASE ORAL at 20:17

## 2017-01-31 RX ADMIN — ACETAMINOPHEN 1000 MG: 500 TABLET, FILM COATED ORAL at 23:25

## 2017-01-31 RX ADMIN — OXYCODONE HYDROCHLORIDE 30 MG: 30 TABLET ORAL at 01:36

## 2017-01-31 RX ADMIN — HYDROMORPHONE HYDROCHLORIDE 1 MG: 1 INJECTION, SOLUTION INTRAMUSCULAR; INTRAVENOUS; SUBCUTANEOUS at 04:57

## 2017-01-31 RX ADMIN — CARVEDILOL 3.12 MG: 3.12 TABLET, FILM COATED ORAL at 18:42

## 2017-01-31 RX ADMIN — HEPARIN SODIUM 5000 UNITS: 5000 INJECTION, SOLUTION INTRAVENOUS; SUBCUTANEOUS at 20:17

## 2017-01-31 RX ADMIN — HYDROMORPHONE HYDROCHLORIDE 1 MG: 1 INJECTION, SOLUTION INTRAMUSCULAR; INTRAVENOUS; SUBCUTANEOUS at 09:55

## 2017-01-31 RX ADMIN — AMPICILLIN SODIUM AND SULBACTAM SODIUM 1.5 G: 1; .5 INJECTION, POWDER, FOR SOLUTION INTRAMUSCULAR; INTRAVENOUS at 12:39

## 2017-01-31 RX ADMIN — ONDANSETRON 4 MG: 2 INJECTION, SOLUTION INTRAMUSCULAR; INTRAVENOUS at 17:35

## 2017-01-31 RX ADMIN — GABAPENTIN 100 MG: 100 CAPSULE ORAL at 20:20

## 2017-01-31 RX ADMIN — AMPICILLIN SODIUM AND SULBACTAM SODIUM 1.5 G: 1; .5 INJECTION, POWDER, FOR SOLUTION INTRAMUSCULAR; INTRAVENOUS at 17:42

## 2017-01-31 RX ADMIN — HYDROMORPHONE HYDROCHLORIDE 1 MG: 1 INJECTION, SOLUTION INTRAMUSCULAR; INTRAVENOUS; SUBCUTANEOUS at 23:25

## 2017-01-31 RX ADMIN — AMPICILLIN SODIUM AND SULBACTAM SODIUM 1.5 G: 1; .5 INJECTION, POWDER, FOR SOLUTION INTRAMUSCULAR; INTRAVENOUS at 06:45

## 2017-01-31 RX ADMIN — ACETAMINOPHEN 1000 MG: 500 TABLET, FILM COATED ORAL at 01:36

## 2017-01-31 RX ADMIN — OXYCODONE HYDROCHLORIDE 10 MG: 10 TABLET ORAL at 23:25

## 2017-01-31 RX ADMIN — SODIUM BICARBONATE 150 MEQ: 84 INJECTION, SOLUTION INTRAVENOUS at 04:44

## 2017-01-31 ASSESSMENT — PAIN SCALES - GENERAL
PAINLEVEL_OUTOF10: 8
PAINLEVEL_OUTOF10: 5
PAINLEVEL_OUTOF10: 8
PAINLEVEL_OUTOF10: 5
PAINLEVEL_OUTOF10: 6
PAINLEVEL_OUTOF10: 8
PAINLEVEL_OUTOF10: 6
PAINLEVEL_OUTOF10: 8
PAINLEVEL_OUTOF10: 8
PAINLEVEL_OUTOF10: 10
PAINLEVEL_OUTOF10: 4

## 2017-01-31 ASSESSMENT — COPD QUESTIONNAIRES
HAVE YOU SMOKED AT LEAST 100 CIGARETTES IN YOUR ENTIRE LIFE: NO/DON'T KNOW
DURING THE PAST 4 WEEKS HOW MUCH DID YOU FEEL SHORT OF BREATH: NONE/LITTLE OF THE TIME
COPD SCREENING SCORE: 1
DO YOU EVER COUGH UP ANY MUCUS OR PHLEGM?: NO/ONLY WITH OCCASIONAL COLDS OR INFECTIONS

## 2017-01-31 ASSESSMENT — LIFESTYLE VARIABLES
EVER_SMOKED: YES
DO YOU DRINK ALCOHOL: NO
ALCOHOL_USE: NO
EVER_SMOKED: YES

## 2017-01-31 NOTE — ED NOTES
Patient transferred into bedside chair per his request. Currently sitting in bedside chair with call bell within reach.

## 2017-01-31 NOTE — PROGRESS NOTES
Med rec complete per med list from [facility]. Per staff, pt last took medications last night.No changes made to med rec.

## 2017-01-31 NOTE — PROGRESS NOTES
"Pt refusing Renal ultrasound.  Pt states: \"I can't lay on my back because as soon as my legs touch the sheets it feels like my legs are scrapping glass.  It hurts too much.  Besides I had this same test about 4 months ago.\"  Educated pt on need for ultrasound. Received verbal understanding.  However, pt continues to refuse at this time.  Will attempt to do test at a later time.  "

## 2017-01-31 NOTE — CARE PLAN
Problem: Pain Management  Goal: Pain level will decrease to patient’s comfort goal  Pt has 8 out of 10 bilateral lower extremity pain.  Administered PRN pain medications per MAR and MD order.  Discussed pain management with MD during rounds.    Problem: Respiratory:  Goal: Respiratory status will improve  Pt on respiratory protocol.  Inhaler administered.    Problem: Mobility  Goal: Risk for activity intolerance will decrease  Pt able to sit at edge of bed independently.  Requires 2 person assistance with walking.    Problem: Infection  Goal: Will remain free from infection  Pt has no s/s of infection at this time.  Blood cultures pending.

## 2017-01-31 NOTE — ED NOTES
Chief Complaint   Patient presents with   • Abnormal Labs     Pt sent from [facility] for elevated potassium. Pts potassium changed from 6.6 to 7.2 in 4 hours per report.     /47 mmHg  Pulse 90  Temp(Src) 36.6 °C (97.9 °F)  Resp 18  Wt 133.358 kg (294 lb)  SpO2 96%    Pt brought in by [facility] from [facility], pt currently receiving abx treatment for bilateral leg cellulitis at facility. PICC in place pta, blood drawn and sent to lab.Placed in room RD 2. Complaints and vitals as above. Pt on monitors, all alarms audible. ERP at bedside.

## 2017-01-31 NOTE — PROGRESS NOTES
Received report from night shift RN. Assumed care of patient. Pt assessed and stable. VSS. Patient reports 0/10 pain at this time. Discussed plan of care for day with patient and received verbal understanding. Call light within reach, bed in low position.  Educated pt re fall precautions and received verbal understanding.  However, pt continues to refuse bed alarms.  Pt is alert, oriented, and calls appropriately.

## 2017-01-31 NOTE — DIETARY
NUTRITION SERVICES: BMI - Pt with BMI >40 (=44.71). Weight loss counseling not appropriate in acute care setting. Pt with hx of chronic edema, Chronic bilateral lower extremity wounds with cellulitis and venous  insufficiency, chronic venous stasis ulcers,stasis dermatitis, morbid obesity, depression, chronic pain syndrome, chronic obstructive pulmonary disease, hypertension, and type 2 diabetes. Diet= Diabetic. Per ADL, pt ate % of breakfast this am.     Pertinent Labs:  K+ 6.0, am Glu 110, BUN 62, Creat 1.92. FSBS within acceptable limits. Pt admitted with JOE, Nephrology consulted.  Pertinent Meds: Bowel protocol, SSI, Isordil, Zofran PRN. Completed med: Kayexalate.      RECOMMEND - Referral to outpatient nutrition services for weight management after D/C. If Renal labs remain elevated, consider Renal diet restrictions. RD available PRN.

## 2017-01-31 NOTE — ED PROVIDER NOTES
ED Provider Note    Scribed for Ronnie Trivedi M.D. by Yanna Bcaon. 1/30/2017, 10:37 PM.    Primary care provider: Pcp Not In Computer  Means of arrival: Ambulance  History obtained from: Patient  History limited by: None    CHIEF COMPLAINT  Chief Complaint   Patient presents with   • Abnormal Labs     Pt sent from [facility] for elevated potassium. Pts potassium changed from 6.6 to 7.2 in 4 hours per report.       HPI  Fer Estrada is a 56 y.o. male who presents to the Emergency Department for abnormal labs. Patient reports he was sent from [facility] today for an elevated potassium level. Per [facility] report, his potassium went from 6.6 to 7.2 in a period of 4 hours. Patient has a chronic infection in his lower legs with redness, swelling, and drainage and takes vancomycin and clindamycin. He complains of constant associated pain in his bilateral legs, rated 8/10 on the pain scale. He denies fever, chest pain, abdominal pain, or other symptoms.     REVIEW OF SYSTEMS  Pertinent positives include redness, drainage, swelling, leg pain. Pertinent negatives include no fever, chest pain, abdominal pain. Otherwise ten systems reviewed and otherwise negative.     PAST MEDICAL HISTORY   has a past medical history of Type II or unspecified type diabetes mellitus without mention of complication, not stated as uncontrolled; Congestive heart failure (CMS-[facility]); Hypertension; Asthma; and Chronic obstructive pulmonary disease (CMS-[facility]).    SURGICAL HISTORY  patient denies any surgical history    SOCIAL HISTORY  Social History   Substance Use Topics   • Smoking status: Former Smoker     Quit date: 12/14/2005   • Smokeless tobacco: Never Used   • Alcohol Use: No      History   Drug Use No       FAMILY HISTORY  Non-Contributory    CURRENT MEDICATIONS  Reviewed.  See Encounter Summary.     ALLERGIES  No Known Allergies    PHYSICAL EXAM  VITAL SIGNS: /47 mmHg  Pulse 90  Temp(Src) 36.6 °C (97.9 °F)  Resp  18  Wt 133.358 kg (294 lb)  SpO2 96%  Pulse ox interpretation: I interpret this pulse ox as normal.  Constitutional: Alert and oriented x 3, moderate Distress  HEENT: Atraumatic normocephalic, pupils are equal round reactive to light extraocular movements are intact.   Neck: Supple, no JVD no tracheal deviation  Cardiovascular: Regular rate and rhythm no murmur rub or gallop 2+ pulses peripherally x4  Thorax & Lungs: No respiratory distress, no wheezes rales or rhonchi, No chest tenderness.   GI: Soft nontender nondistended positive bowel sounds, no peritoneal signs  Skin: Grade 3+ pitting edema in bilateral lower extremities, circumferential erythema to the knees bilaterally with purulent weeping discharge.   Musculoskeletal: Moving all extremities with full range and 5 of 5 strength, no acute  deformity  Neurologic: Cranial nerves III through XII are grossly intact, no sensory deficit, no cerebellar dysfunction   Psychiatric: Appropriate affect for situation at this time      DIAGNOSTIC STUDIES / PROCEDURES  LABS  Results for orders placed or performed during the hospital encounter of 01/30/17   LACTIC ACID   Result Value Ref Range    Lactic Acid 1.8 0.5 - 2.0 mmol/L   CBC WITH DIFFERENTIAL   Result Value Ref Range    WBC 6.5 4.8 - 10.8 K/uL    RBC 4.54 (L) 4.70 - 6.10 M/uL    Hemoglobin 12.5 (L) 14.0 - 18.0 g/dL    Hematocrit 40.8 (L) 42.0 - 52.0 %    MCV 89.9 81.4 - 97.8 fL    MCH 27.5 27.0 - 33.0 pg    MCHC 30.6 (L) 33.7 - 35.3 g/dL    RDW 54.9 (H) 35.9 - 50.0 fL    Platelet Count 284 164 - 446 K/uL    MPV 11.1 9.0 - 12.9 fL    Neutrophils-Polys 59.20 44.00 - 72.00 %    Lymphocytes 21.70 (L) 22.00 - 41.00 %    Monocytes 11.40 0.00 - 13.40 %    Eosinophils 6.00 0.00 - 6.90 %    Basophils 0.60 0.00 - 1.80 %    Immature Granulocytes 1.10 (H) 0.00 - 0.90 %    Nucleated RBC 0.00 /100 WBC    Neutrophils (Absolute) 3.84 1.82 - 7.42 K/uL    Lymphs (Absolute) 1.41 1.00 - 4.80 K/uL    Monos (Absolute) 0.74 0.00 - 0.85  K/uL    Eos (Absolute) 0.39 0.00 - 0.51 K/uL    Baso (Absolute) 0.04 0.00 - 0.12 K/uL    Immature Granulocytes (abs) 0.07 0.00 - 0.11 K/uL    NRBC (Absolute) 0.00 K/uL   COMP METABOLIC PANEL   Result Value Ref Range    Sodium 133 (L) 135 - 145 mmol/L    Potassium 5.6 (H) 3.6 - 5.5 mmol/L    Chloride 96 96 - 112 mmol/L    Co2 28 20 - 33 mmol/L    Anion Gap 9.0 0.0 - 11.9    Glucose 124 (H) 65 - 99 mg/dL    Bun 70 (H) 8 - 22 mg/dL    Creatinine 2.50 (H) 0.50 - 1.40 mg/dL    Calcium 9.5 8.5 - 10.5 mg/dL    AST(SGOT) 13 12 - 45 U/L    ALT(SGPT) 11 2 - 50 U/L    Alkaline Phosphatase 68 30 - 99 U/L    Total Bilirubin 0.2 0.1 - 1.5 mg/dL    Albumin 3.6 3.2 - 4.9 g/dL    Total Protein 7.7 6.0 - 8.2 g/dL    Globulin 4.1 (H) 1.9 - 3.5 g/dL    A-G Ratio 0.9 g/dL   URINALYSIS   Result Value Ref Range    Color Colorless     Character Clear     Specific Gravity 1.013 <1.035    Ph 5.0 5.0-8.0    Glucose Trace (A) Negative mg/dL    Ketones Negative Negative mg/dL    Protein Negative Negative mg/dL    Bilirubin Negative Negative    Nitrite Negative Negative    Leukocyte Esterase Negative Negative    Occult Blood Negative Negative    Micro Urine Req see below    ESTIMATED GFR   Result Value Ref Range    GFR If  32 (A) >60 mL/min/1.73 m 2    GFR If Non  27 (A) >60 mL/min/1.73 m 2   EKG (ER)   Result Value Ref Range    Report       [facility] Emergency Dept.    Test Date:  2017  Pt Name:    TANIA CARRASCO                 Department: ER  MRN:        [id number]                      Room:       RD 02  Gender:     M                            Technician: [id number]  :        1960                   Requested By:LAURA HUIZAR  Order #:    [id number]                    Reading MD:    Measurements  Intervals                                Axis  Rate:       95                           P:          38  MS:         200                          QRS:        109  QRSD:       96                            T:          8  QT:         332  QTc:        418    Interpretive Statements  SINUS RHYTHM  BORDERLINE AV CONDUCTION DELAY  RIGHT AXIS DEVIATION  Compared to ECG 01/17/2017 09:30:43  Right-axis deviation now present  Sinus tachycardia no longer present  Left posterior fascicular block no longer present  Myocardial infarct finding no longer present        All labs reviewed by me.    EKG Interpretation  Interpreted by me    Rhythm:  Normal sinus rhythm   Rate: 95  Axis: Rightward deviation   Ectopy: none  Conduction: normal  ST Segments: ST elevation in V3  T Waves: inversion in lead III, no peaked T waves  Q Waves: none  No elongation in the QRS segment  Clinical Impression: No changes related to hypokalemia     RADIOLOGY  DX-CHEST-PORTABLE (1 VIEW)   Final Result      Mild pulmonary interstitial edema        The radiologist's interpretation of all radiological studies have been reviewed by me.    COURSE & MEDICAL DECISION MAKING  Pertinent Labs & Imaging studies reviewed. (See chart for details)    Reviewed patient's old medical records which showed the following lab results:  Potassium 6.6  Creatinine 2.4  BUN 58  Repeat potassium 7.2    10:37 PM - Patient seen and examined at bedside. Patient was not given 30 mL/kg fluids due to concern of third spacing. Patient will be treated with Dilaudid 1 mg IV, Zofran 4 mg IV, Kayexalate 30 g IV, sodium bicarbonate 50 mEq IV, Humulin R 10 units IV, dextrose 50 mL IV. Ordered DX-chest, lactic acid, CBC, CMP, urinalysis, urine culture, blood culture, EKG to evaluate his symptoms.     11:35 PM Lexie hospitalist.     11:39 PM Spoke with Dr. Fitzpatrick, hospitalist, concerning patient case. She requested I consult nephrology and that she will admit the patient. [facility] nephrology.    11:49 PM I discussed the patient's case and the above findings with Dr. Vu (nephrology) who will consult the patient in the morning.     11:57 PM I spoke to Dr. Fitzpatrick (hospitalist)  regarding my consult with Dr. Vu. Care transferred to Dr. Fitzpatrick at this time.       DISPOSITION:  Patient will be admitted to Dr. Fitzpatrick in guarded condition.     FINAL IMPRESSION  1. Acute renal dysfunction  2. Hyperkalemia  3. Peripheral edema  4. Cellulitis, chronic       This dictation has been created using voice recognition software and/or scribes. The accuracy of the dictation is limited by the abilities of the software and the expertise of the scribes. I expect there may be some errors of grammar and possibly content. I made every attempt to manually correct the errors within my dictation. However, errors related to voice recognition software and/or scribes may still exist and should be interpreted within the appropriate context.     IYanna (Scribe), am scribing for, and in the presence of, Ronnie Trivedi M.D..    Electronically signed by: Yanna Bacon (Scribe), 1/30/2017    IRonnie M.D. personally performed the services described in this documentation, as scribed by Yanna Bacon in my presence, and it is both accurate and complete.    The note accurately reflects work and decisions made by me.  Ronnie Trivedi  1/31/2017  1:02 AM

## 2017-01-31 NOTE — H&P
CHIEF COMPLAINT:  Abnormal labs.    PRIMARY CARE PROVIDER:  Not listed.    HISTORY OF PRESENT ILLNESS:  This is a 56-year-old man with severe brawny   edema.  He has chronic wounds of the lower leg that weep constantly.  He was   admitted here [facility] and he was discharged on 01/24/2017.  The   patient states that he has been feeling dehydrated.  They have been working   on his chronic edema but they had him on IV vancomycin and clindamycin at the   advanced care facility.  They joanie labs and found that he had a potassium of   7.2.  He was transferred here for further evaluation.  The patient states that   he constantly has pain in his legs, itching, burning, numbness of the toes   and really nothing has really relieved it very well.  He has tried Atarax and   he has tried Benadryl without relief.  He is on scheduled oral oxycodone as   well.  His legs have been constantly weeping serous fluid.  He denies any   fever or chills.  Blood and urine cultures from his previous admission did not   grow anything.    REVIEW OF SYSTEMS:  A complete review of systems has been performed and other   than what is stated in the history present illness is otherwise negative.    PAST MEDICAL HISTORY:  1.  Chronic bilateral lower extremity wounds with cellulitis and venous   insufficiency.  2.  Chronic venous stasis ulcers.  3.  Stasis dermatitis.  4.  Morbid obesity.  5.  Depression.  6.  Chronic pain syndrome.  7.  Chronic obstructive pulmonary disease.  8.  Hypertension.  9.  Type 2 diabetes.    PAST SURGICAL HISTORY:  None.    FAMILY HISTORY:  No history of diabetes to the patient's knowledge.    SOCIAL HISTORY:  He denies use of tobacco, alcohol or drugs.    ALLERGIES:  He has no known drug allergies.    CURRENT MEDICATIONS:  Lactinex 2 tablets 3 times daily with meals for 30 days,   albuterol 1-2 puffs every 4-6 hours as needed for shortness of breath or   wheezing or nebulized Symbicort 160/4.5 two puffs b.i.d.,  carvedilol 6.25 mg   b.i.d., Diflucan 400 mg daily for 8 days, furosemide 40 mg daily, hydralazine   25 mg every 8 hours, isosorbide 10 mg 3 times daily, Prinivil 20 mg daily,   metformin 850 mg twice daily, ampicillin sulbactam 1.5 g every 6 hours for 8   days, clindamycin 900 mg IV every 8 hours for 8 days, oxycodone 30 mg every 4   hours, potassium chloride 10 mEq daily.    PHYSICAL EXAMINATION:  VITAL SIGNS:  Temperature 97.9 degrees, heart rate 106, respirations 19, pulse   oximetry 96% on 4 L per nasal cannula, blood pressure is 126/42.  GENERAL:  This is an obese man.  He is in distress from pain in his legs.  He   is otherwise awake, alert, oriented x3 and he cooperates with the examination.  HEENT:  Normocephalic, atraumatic.  Pupils are equal and reactive to light.    Sclerae are nonicteric.  Oropharynx is clear.  Mucous membranes slightly dry.  NECK:  Supple, without lymphadenopathy.  No jugular venous distension is   present.  The trachea is midline without stridor.  CHEST:  Clear to auscultation bilaterally.  Distant breath sounds without   rales, rhonchi, or wheezes.  No tachypnea or accessory muscle use is noted.  CARDIOVASCULAR:  Distant heart tones, regular rate.  No murmur, rub or gallop   is appreciated.  No ventricular heave is present.  Radial pulses are distant   due to body habitus are palpable bilaterally.  Capillary refill is within   normal limits.  MUSCULOSKELETAL:  He has chronic severe venous stasis ulcerations and changes   of both lower extremities from the knees down.  He has raw weeping wounds   present in the circumference of both legs.  He has firm swelling of both feet.    He has tenderness even to light palpation of any portion of the affected   parts of the leg.  I am unable to palpate peripheral pulses secondary to pain.  SKIN:  Warm, dry and he has flaking with greasy flakes on the face and on his   upper legs and dermatitis changes as described above.  No petechia or    ecchymoses are seen.  NEUROLOGIC:  He is alert and oriented x3.  He is moving all of his extremities   equally.  Cranial nerves are intact.    LABORATORY DATA:  White blood cell count is 6.5, hemoglobin is 12.5,   hematocrit is 40.8, MCV is 89.9, and platelet count is 284.  Sodium is 133,   potassium 5.6, after being given Kayexalate and insulin, chloride is 96,   bicarbonate is 28, glucose is 124, BUN 70, creatinine 2.5, calcium 9.5, AST is   13, ALT is 11, alkaline phosphatase is 68, total bilirubin is 0.2, albumin is   3.6.  Urinalysis is unremarkable.  Chest x-ray, mild pulmonary interstitial   edema.  I do not see any infiltrate or effusion.    ASSESSMENT:  1.  Acute kidney injury with hyperkalemia:  he was given Kayexalate and   insulin, his glucose on arrival to emergency department with improvement in   his potassium.  I am going to draw a followup BMP now, to see where he is at   and start him on a bicarbonate drip.  I am going to give him another liter of   saline here.  The patient has listed a history of heart failure; however, on   recent echocardiogram within the last 2 years he has a normal ejection   fraction and no evidence of diastolic dysfunction or valve failure.  The   patient currently does not appear to be overt heart failure and I do not see   that he has any severe pulmonary edema.  He sort of got some chronic changes   on his chest x-ray.  Otherwise, he is at his baseline of his chronic oxygen   use.  Nephrology has been consulted.  2.  Chronic venous stasis ulcerations and chronic wounds of the lower legs:  I   am going to resume Unasyn.  I have to stop the vancomycin and the fluconazole   because of his renal function.  We will call infectious disease again in the   morning.  I have ordered a wound care consult.  He has severe pain associated   with this and I put him on Dilaudid IV 1 mg every 2 hours as needed, besides   his scheduled oral oxycodone.  Benadryl given 25 mg IV q. 4 hours  p.r.n. for   his itching in his legs and hopefully will help some of the anxiety as well.    I have also ordered Toradol p.r.n. and scheduled acetaminophen.  3.  Type 2 diabetes Accu-Cheks, diabetic diet and sliding scale insulin,   holding metformin due to his renal failure.  4.  History of hypertension, continue Coreg, hydralazine and isosorbide as   above.  This is a critically ill patient with elevated potassium level in his   decision making level is complex.       ____________________________________     MD RM KELLY / NTS    DD:  01/31/2017 01:44:00  DT:  01/31/2017 02:43:32    D#:  [id number]  Job#:  [id number]

## 2017-01-31 NOTE — ED NOTES
Lab called to draw second set of blood cultures per orders.    Patient informed of need for urine sample - states that he does not need to urinate at this time. Specimen container provided to patient.

## 2017-01-31 NOTE — ED NOTES
Patient transported to floor in stable condition accompanied by transport. All belongings accounted for.

## 2017-02-01 ENCOUNTER — APPOINTMENT (OUTPATIENT)
Dept: RADIOLOGY | Facility: MEDICAL CENTER | Age: 57
DRG: 683 | End: 2017-02-01
Attending: INTERNAL MEDICINE
Payer: MEDICARE

## 2017-02-01 ENCOUNTER — TELEPHONE (OUTPATIENT)
Dept: INFECTIOUS DISEASES | Facility: MEDICAL CENTER | Age: 57
End: 2017-02-01

## 2017-02-01 LAB
25(OH)D3 SERPL-MCNC: 9 NG/ML (ref 30–100)
ANION GAP SERPL CALC-SCNC: 7 MMOL/L (ref 0–11.9)
BASOPHILS # BLD AUTO: 0.6 % (ref 0–1.8)
BASOPHILS # BLD: 0.04 K/UL (ref 0–0.12)
BUN SERPL-MCNC: 49 MG/DL (ref 8–22)
CALCIUM SERPL-MCNC: 9.6 MG/DL (ref 8.5–10.5)
CHLORIDE SERPL-SCNC: 94 MMOL/L (ref 96–112)
CO2 SERPL-SCNC: 36 MMOL/L (ref 20–33)
CREAT SERPL-MCNC: 1.27 MG/DL (ref 0.5–1.4)
EOSINOPHIL # BLD AUTO: 0.41 K/UL (ref 0–0.51)
EOSINOPHIL NFR BLD: 6.5 % (ref 0–6.9)
ERYTHROCYTE [DISTWIDTH] IN BLOOD BY AUTOMATED COUNT: 54.8 FL (ref 35.9–50)
FERRITIN SERPL-MCNC: 230.5 NG/ML (ref 22–322)
GFR SERPL CREATININE-BSD FRML MDRD: 58 ML/MIN/1.73 M 2
GLUCOSE BLD-MCNC: 113 MG/DL (ref 65–99)
GLUCOSE BLD-MCNC: 159 MG/DL (ref 65–99)
GLUCOSE BLD-MCNC: 90 MG/DL (ref 65–99)
GLUCOSE BLD-MCNC: 94 MG/DL (ref 65–99)
GLUCOSE SERPL-MCNC: 103 MG/DL (ref 65–99)
HCT VFR BLD AUTO: 41.8 % (ref 42–52)
HGB BLD-MCNC: 12.7 G/DL (ref 14–18)
IMM GRANULOCYTES # BLD AUTO: 0.05 K/UL (ref 0–0.11)
IMM GRANULOCYTES NFR BLD AUTO: 0.8 % (ref 0–0.9)
IRON SATN MFR SERPL: 21 % (ref 15–55)
IRON SERPL-MCNC: 73 UG/DL (ref 50–180)
LYMPHOCYTES # BLD AUTO: 1.26 K/UL (ref 1–4.8)
LYMPHOCYTES NFR BLD: 20.1 % (ref 22–41)
MAGNESIUM SERPL-MCNC: 2 MG/DL (ref 1.5–2.5)
MCH RBC QN AUTO: 27.5 PG (ref 27–33)
MCHC RBC AUTO-ENTMCNC: 30.4 G/DL (ref 33.7–35.3)
MCV RBC AUTO: 90.7 FL (ref 81.4–97.8)
MONOCYTES # BLD AUTO: 0.75 K/UL (ref 0–0.85)
MONOCYTES NFR BLD AUTO: 12 % (ref 0–13.4)
NEUTROPHILS # BLD AUTO: 3.75 K/UL (ref 1.82–7.42)
NEUTROPHILS NFR BLD: 60 % (ref 44–72)
NRBC # BLD AUTO: 0 K/UL
NRBC BLD AUTO-RTO: 0 /100 WBC
PHOSPHATE SERPL-MCNC: 4.1 MG/DL (ref 2.5–4.5)
PLATELET # BLD AUTO: 267 K/UL (ref 164–446)
PMV BLD AUTO: 11.3 FL (ref 9–12.9)
POTASSIUM SERPL-SCNC: 5.6 MMOL/L (ref 3.6–5.5)
PTH-INTACT SERPL-MCNC: 150.7 PG/ML (ref 14–72)
RBC # BLD AUTO: 4.61 M/UL (ref 4.7–6.1)
SODIUM SERPL-SCNC: 137 MMOL/L (ref 135–145)
TIBC SERPL-MCNC: 344 UG/DL (ref 250–450)
WBC # BLD AUTO: 6.3 K/UL (ref 4.8–10.8)

## 2017-02-01 PROCEDURE — 82962 GLUCOSE BLOOD TEST: CPT

## 2017-02-01 PROCEDURE — 700102 HCHG RX REV CODE 250 W/ 637 OVERRIDE(OP): Performed by: INTERNAL MEDICINE

## 2017-02-01 PROCEDURE — 83735 ASSAY OF MAGNESIUM: CPT

## 2017-02-01 PROCEDURE — A9270 NON-COVERED ITEM OR SERVICE: HCPCS | Performed by: INTERNAL MEDICINE

## 2017-02-01 PROCEDURE — 85025 COMPLETE CBC W/AUTO DIFF WBC: CPT

## 2017-02-01 PROCEDURE — A9270 NON-COVERED ITEM OR SERVICE: HCPCS | Performed by: HOSPITALIST

## 2017-02-01 PROCEDURE — 700111 HCHG RX REV CODE 636 W/ 250 OVERRIDE (IP): Performed by: INTERNAL MEDICINE

## 2017-02-01 PROCEDURE — 700102 HCHG RX REV CODE 250 W/ 637 OVERRIDE(OP): Performed by: NURSE PRACTITIONER

## 2017-02-01 PROCEDURE — 80048 BASIC METABOLIC PNL TOTAL CA: CPT

## 2017-02-01 PROCEDURE — 770020 HCHG ROOM/CARE - TELE (206)

## 2017-02-01 PROCEDURE — A9270 NON-COVERED ITEM OR SERVICE: HCPCS | Performed by: NURSE PRACTITIONER

## 2017-02-01 PROCEDURE — 97166 OT EVAL MOD COMPLEX 45 MIN: CPT

## 2017-02-01 PROCEDURE — 82728 ASSAY OF FERRITIN: CPT

## 2017-02-01 PROCEDURE — 700105 HCHG RX REV CODE 258: Performed by: HOSPITALIST

## 2017-02-01 PROCEDURE — 700112 HCHG RX REV CODE 229: Performed by: HOSPITALIST

## 2017-02-01 PROCEDURE — 84100 ASSAY OF PHOSPHORUS: CPT

## 2017-02-01 PROCEDURE — 83540 ASSAY OF IRON: CPT

## 2017-02-01 PROCEDURE — 83550 IRON BINDING TEST: CPT

## 2017-02-01 PROCEDURE — 700105 HCHG RX REV CODE 258: Performed by: INTERNAL MEDICINE

## 2017-02-01 PROCEDURE — G8987 SELF CARE CURRENT STATUS: HCPCS | Mod: CK

## 2017-02-01 PROCEDURE — G8988 SELF CARE GOAL STATUS: HCPCS | Mod: CJ

## 2017-02-01 PROCEDURE — 99232 SBSQ HOSP IP/OBS MODERATE 35: CPT | Performed by: INTERNAL MEDICINE

## 2017-02-01 PROCEDURE — 700102 HCHG RX REV CODE 250 W/ 637 OVERRIDE(OP): Performed by: HOSPITALIST

## 2017-02-01 PROCEDURE — 97535 SELF CARE MNGMENT TRAINING: CPT

## 2017-02-01 PROCEDURE — 83970 ASSAY OF PARATHORMONE: CPT

## 2017-02-01 PROCEDURE — 700111 HCHG RX REV CODE 636 W/ 250 OVERRIDE (IP): Performed by: HOSPITALIST

## 2017-02-01 PROCEDURE — 82306 VITAMIN D 25 HYDROXY: CPT

## 2017-02-01 RX ORDER — OXYCODONE HCL 10 MG/1
30 TABLET, FILM COATED, EXTENDED RELEASE ORAL EVERY 12 HOURS
Status: DISCONTINUED | OUTPATIENT
Start: 2017-02-01 | End: 2017-02-02 | Stop reason: HOSPADM

## 2017-02-01 RX ORDER — SODIUM CHLORIDE 9 MG/ML
INJECTION, SOLUTION INTRAVENOUS CONTINUOUS
Status: DISPENSED | OUTPATIENT
Start: 2017-02-01 | End: 2017-02-02

## 2017-02-01 RX ORDER — SODIUM POLYSTYRENE SULFONATE 15 G/60ML
30 SUSPENSION ORAL; RECTAL ONCE
Status: DISCONTINUED | OUTPATIENT
Start: 2017-02-01 | End: 2017-02-02 | Stop reason: HOSPADM

## 2017-02-01 RX ADMIN — SODIUM BICARBONATE 150 MEQ: 84 INJECTION, SOLUTION INTRAVENOUS at 01:35

## 2017-02-01 RX ADMIN — ACETAMINOPHEN 1000 MG: 500 TABLET, FILM COATED ORAL at 05:33

## 2017-02-01 RX ADMIN — ACETAMINOPHEN 1000 MG: 500 TABLET, FILM COATED ORAL at 18:19

## 2017-02-01 RX ADMIN — ISOSORBIDE DINITRATE 10 MG: 10 TABLET ORAL at 08:33

## 2017-02-01 RX ADMIN — HYDRALAZINE HYDROCHLORIDE 25 MG: 25 TABLET ORAL at 21:02

## 2017-02-01 RX ADMIN — HEPARIN SODIUM 5000 UNITS: 5000 INJECTION, SOLUTION INTRAVENOUS; SUBCUTANEOUS at 21:00

## 2017-02-01 RX ADMIN — CARVEDILOL 3.12 MG: 3.12 TABLET, FILM COATED ORAL at 18:19

## 2017-02-01 RX ADMIN — GABAPENTIN 100 MG: 100 CAPSULE ORAL at 08:34

## 2017-02-01 RX ADMIN — ISOSORBIDE DINITRATE 10 MG: 10 TABLET ORAL at 15:54

## 2017-02-01 RX ADMIN — GABAPENTIN 100 MG: 100 CAPSULE ORAL at 15:54

## 2017-02-01 RX ADMIN — SODIUM CHLORIDE 1000 ML: 9 INJECTION, SOLUTION INTRAVENOUS at 09:23

## 2017-02-01 RX ADMIN — ISOSORBIDE DINITRATE 10 MG: 10 TABLET ORAL at 21:01

## 2017-02-01 RX ADMIN — AMPICILLIN SODIUM AND SULBACTAM SODIUM 1.5 G: 1; .5 INJECTION, POWDER, FOR SOLUTION INTRAMUSCULAR; INTRAVENOUS at 05:33

## 2017-02-01 RX ADMIN — OXYCODONE HYDROCHLORIDE 10 MG: 10 TABLET ORAL at 12:58

## 2017-02-01 RX ADMIN — OXYCODONE HYDROCHLORIDE 20 MG: 20 TABLET, FILM COATED, EXTENDED RELEASE ORAL at 08:34

## 2017-02-01 RX ADMIN — AMPICILLIN SODIUM AND SULBACTAM SODIUM 1.5 G: 1; .5 INJECTION, POWDER, FOR SOLUTION INTRAMUSCULAR; INTRAVENOUS at 12:58

## 2017-02-01 RX ADMIN — OXYCODONE HYDROCHLORIDE 10 MG: 10 TABLET ORAL at 03:53

## 2017-02-01 RX ADMIN — VITAMIN D, TAB 1000IU (100/BT) 5000 UNITS: 25 TAB at 08:34

## 2017-02-01 RX ADMIN — DOCUSATE SODIUM 100 MG: 100 CAPSULE ORAL at 08:34

## 2017-02-01 RX ADMIN — HEPARIN SODIUM 5000 UNITS: 5000 INJECTION, SOLUTION INTRAVENOUS; SUBCUTANEOUS at 12:58

## 2017-02-01 RX ADMIN — OXYCODONE HYDROCHLORIDE 10 MG: 10 TABLET ORAL at 08:33

## 2017-02-01 RX ADMIN — HYDROMORPHONE HYDROCHLORIDE 1 MG: 1 INJECTION, SOLUTION INTRAMUSCULAR; INTRAVENOUS; SUBCUTANEOUS at 19:14

## 2017-02-01 RX ADMIN — CARVEDILOL 3.12 MG: 3.12 TABLET, FILM COATED ORAL at 08:33

## 2017-02-01 RX ADMIN — AMPICILLIN SODIUM AND SULBACTAM SODIUM 1.5 G: 1; .5 INJECTION, POWDER, FOR SOLUTION INTRAMUSCULAR; INTRAVENOUS at 18:19

## 2017-02-01 RX ADMIN — HYDROMORPHONE HYDROCHLORIDE 1 MG: 1 INJECTION, SOLUTION INTRAMUSCULAR; INTRAVENOUS; SUBCUTANEOUS at 05:33

## 2017-02-01 RX ADMIN — HYDRALAZINE HYDROCHLORIDE 25 MG: 25 TABLET ORAL at 12:58

## 2017-02-01 RX ADMIN — GABAPENTIN 100 MG: 100 CAPSULE ORAL at 21:01

## 2017-02-01 RX ADMIN — HYDROMORPHONE HYDROCHLORIDE 1 MG: 1 INJECTION, SOLUTION INTRAMUSCULAR; INTRAVENOUS; SUBCUTANEOUS at 12:55

## 2017-02-01 RX ADMIN — HEPARIN SODIUM 5000 UNITS: 5000 INJECTION, SOLUTION INTRAVENOUS; SUBCUTANEOUS at 05:33

## 2017-02-01 RX ADMIN — OXYCODONE HYDROCHLORIDE 30 MG: 10 TABLET, FILM COATED, EXTENDED RELEASE ORAL at 21:01

## 2017-02-01 RX ADMIN — OXYCODONE HYDROCHLORIDE 10 MG: 10 TABLET ORAL at 18:19

## 2017-02-01 RX ADMIN — ACETAMINOPHEN 1000 MG: 500 TABLET, FILM COATED ORAL at 12:58

## 2017-02-01 ASSESSMENT — PAIN SCALES - GENERAL
PAINLEVEL_OUTOF10: 9
PAINLEVEL_OUTOF10: 8
PAINLEVEL_OUTOF10: 9
PAINLEVEL_OUTOF10: 9
PAINLEVEL_OUTOF10: 7
PAINLEVEL_OUTOF10: 9
PAINLEVEL_OUTOF10: 7
PAINLEVEL_OUTOF10: 9
PAINLEVEL_OUTOF10: 7
PAINLEVEL_OUTOF10: 9
PAINLEVEL_OUTOF10: 9

## 2017-02-01 ASSESSMENT — ENCOUNTER SYMPTOMS
EYE PAIN: 0
DEPRESSION: 0
CLAUDICATION: 0
BACK PAIN: 1
ABDOMINAL PAIN: 0
CHILLS: 0
LOSS OF CONSCIOUSNESS: 0
SHORTNESS OF BREATH: 0
COUGH: 0
DIAPHORESIS: 0
BACK PAIN: 0
NECK PAIN: 0
MYALGIAS: 0
PALPITATIONS: 0
SPEECH CHANGE: 0
WEAKNESS: 1
DIARRHEA: 0
WEIGHT LOSS: 0
SPUTUM PRODUCTION: 0
FOCAL WEAKNESS: 0
NERVOUS/ANXIOUS: 0
HEADACHES: 0
HALLUCINATIONS: 0
BLOOD IN STOOL: 0
PND: 0
HEMOPTYSIS: 0
VOMITING: 0
BLURRED VISION: 0
EYE DISCHARGE: 0
HEARTBURN: 0
WHEEZING: 0
CONSTIPATION: 0
DIZZINESS: 0
FEVER: 0
ORTHOPNEA: 0
NAUSEA: 0
SENSORY CHANGE: 0

## 2017-02-01 ASSESSMENT — ACTIVITIES OF DAILY LIVING (ADL): TOILETING: INDEPENDENT

## 2017-02-01 NOTE — CARE PLAN
Problem: Pain Management  Goal: Pain level will decrease to patient’s comfort goal  Outcome: PROGRESSING SLOWER THAN EXPECTED  Pt constantly complaining of pain, MD added another PRN med.     Problem: Mobility  Goal: Risk for activity intolerance will decrease  Outcome: PROGRESSING SLOWER THAN EXPECTED  Pain continues to impede on patients ability to participate in moving, standing, and getting around.    Problem: Skin Integrity  Goal: Risk for impaired skin integrity will decrease  Outcome: PROGRESSING SLOWER THAN EXPECTED

## 2017-02-01 NOTE — THERAPY
"Physical Therapy Evaluation attempted    Bed Mobility:  Supine to Sit:  (recieved seated eob )    PT consult received; pt with medicare coverage, return to SNF tomorrow as plan; when discussed with pt, pt would like to wait to begin physical therapy upon return to facility as he does not have his offloading shoes here. Will defer acute PT evaluation currently given aforementioned factors, if d/c is delayed will follow up; pt would like to discuss with  Medicaid eligiability.   Stephania Couch, PT, DPT Pager: [phone]    See \"Rehab Therapy-Acute\" Patient Summary Report for complete documentation.     "

## 2017-02-01 NOTE — PROGRESS NOTES
[facility] Nephrology Progress Note, Adult, Complex               Author: Karlo Camacho Date & Time created: 2/1/2017  1:00 PM     Interval History:  56-year-old male with a longstanding history of   chronic extremity edema, stasis changes, history of venous ulcers and    bilateral lower extremity cellulitis.  The patient was discharged from [facility] on January 24 following admission for lower extremity cellulitis.  The   [facility].  The patient    was feeling slightly better with some decrease in his edema and redness of the   lower extremities.  However, he had blood work done at that facility and was    found to have a potassium of 7.2.  He was sent to the emergency room.     Evaluation in the emergency room revealed a creatinine of 2.3 and a potassium    of 5.8.  He had a normal urinalysis.  He was admitted for inpatient evaluation   and therapy.    DAILY NEPHROLOGY SUMMARY:  01/31/17 - Consult done.On IV Bicarbonate.  02/01/17 - Feels OK.Comfortable.Legs about the same.Creatinine down to 1.27.    Review of Systems:  Review of Systems   Constitutional: Positive for malaise/fatigue. Negative for fever, chills, weight loss and diaphoresis.   HENT: Negative for congestion.    Eyes: Negative for pain and discharge.   Respiratory: Negative for cough, hemoptysis, sputum production, shortness of breath and wheezing.    Cardiovascular: Positive for leg swelling. Negative for chest pain, palpitations, orthopnea, claudication and PND.   Gastrointestinal: Negative for heartburn, nausea, vomiting, abdominal pain, diarrhea, constipation and blood in stool.   Genitourinary: Negative for dysuria, urgency and frequency.   Musculoskeletal: Negative for back pain and neck pain.   Skin: Positive for rash.   Neurological: Positive for weakness. Negative for dizziness, sensory change, speech change, focal weakness and headaches.   Psychiatric/Behavioral: Negative for  depression and hallucinations. The patient is not nervous/anxious.        Physical Exam:  Physical Exam   Constitutional: He is oriented to person, place, and time. He appears well-developed and well-nourished. No distress.   HENT:   Head: Normocephalic and atraumatic.   Nose: Nose normal.   Eyes: Conjunctivae are normal. Pupils are equal, round, and reactive to light. No scleral icterus.   Neck: Normal range of motion. Neck supple. No thyromegaly present.   Cardiovascular: Normal rate and regular rhythm.  Exam reveals no friction rub.    No murmur heard.  Pulmonary/Chest: Effort normal and breath sounds normal. No respiratory distress. He has no wheezes. He has no rales.   Abdominal: Soft. Bowel sounds are normal. He exhibits no distension. There is no tenderness. There is no rebound and no guarding.   OBESE   Musculoskeletal: He exhibits edema.   Legs wrapped   Lymphadenopathy:     He has no cervical adenopathy.   Neurological: He is alert and oriented to person, place, and time.   Skin: Skin is warm and dry.   Psychiatric: He has a normal mood and affect. His behavior is normal. Judgment and thought content normal.   Nursing note and vitals reviewed.      Labs:        Invalid input(s): [id number]      Recent Labs      01/31/17 0112 01/31/17 0530  02/01/17   0400   SODIUM  135  135  137   POTASSIUM  5.8*  6.0*  5.6*   CHLORIDE  98  100  94*   CO2  29  27  36*   BUN  68*  62*  49*   CREATININE  2.30*  1.92*  1.27   MAGNESIUM   --    --   2.0   PHOSPHORUS   --    --   4.1   CALCIUM  9.6  9.5  9.6     Recent Labs      01/30/17 2235 01/31/17 0112 01/31/17 0530  02/01/17   0400   ALTSGPT  11   --    --    --    ASTSGOT  13   --    --    --    ALKPHOSPHAT  68   --    --    --    TBILIRUBIN  0.2   --    --    --    GLUCOSE  124*  108*  110*  103*     Recent Labs      01/30/17 2235 02/01/17   0400   RBC  4.54*  4.61*   HEMOGLOBIN  12.5*  12.7*   HEMATOCRIT  40.8*  41.8*   PLATELETCT  284  267   IRON    --   73   FERRITIN   --   230.5   [facility]   --   344     Recent Labs      17   2235  17   0400   WBC  6.5  6.3   NEUTSPOLYS  59.20  60.00   LYMPHOCYTES  21.70*  20.10*   MONOCYTES  11.40  12.00   EOSINOPHILS  6.00  6.50   BASOPHILS  0.60  0.60   ASTSGOT  13   --    ALTSGPT  11   --    ALKPHOSPHAT  68   --    TBILIRUBIN  0.2   --            Hemodynamics:  Temp (24hrs), Av.4 °C (97.5 °F), Min:36.1 °C (97 °F), Max:36.7 °C (98.1 °F)  Temperature: 36.2 °C (97.2 °F)  Pulse  Av.6  Min: 77  Max: 115   Blood Pressure: 101/54 mmHg     Respiratory:    Respiration: 16, Pulse Oximetry: 97 %     Work Of Breathing / Effort: Mild  RUL Breath Sounds: Diminished, RML Breath Sounds: Diminished, RLL Breath Sounds: Diminished, LARY Breath Sounds: Diminished, LLL Breath Sounds: Diminished  Fluids:    Intake/Output Summary (Last 24 hours) at 17 1300  Last data filed at 17 0800   Gross per 24 hour   Intake   2480 ml   Output   1100 ml   Net   1380 ml     Weight: (!) 134.5 kg (296 lb 8.3 oz)  GI/Nutrition:  Orders Placed This Encounter   Procedures   • Diet Order     Standing Status: Standing      Number of Occurrences: 1      Standing Expiration Date:      Order Specific Question:  Diet:     Answer:  Diabetic [3]     Medical Decision Making, by Problem:  Active Hospital Problems    Diagnosis   • Stasis dermatitis [I83.10]   • Cellulitis [L03.90]   • Hypothyroidism [E03.9]   • COPD (chronic obstructive pulmonary disease) (CMS-HCC) [J44.9]   • HTN (hypertension) [I10]   • DM type 2, uncontrolled, with renal complications (CMS-HCC) [E11.29, E11.65]   • Hyperkalemia [E87.5]   • JOE (acute kidney injury) (CMS-HCC) [N17.9]   • Morbid obesity with BMI of 45.0-49.9, adult (CMS-HCC) [E66.01, Z68.42]   • Chronic respiratory failure (CMS-HCC) [J96.10]       PMH// reviewed    IMPRESSION:  1.  Acute kidney injury.  His urinalysis is completely normal.  Probably   hypoperfusion secondary to hypovolemia secondary to  diuresis. Was also on Toradol.    His creatinine is getting better.  2.  Hyperkalemia, secondary to acute kidney injury plus use of angiotensin    converting enzyme inhibitor and potassium supplements.Was also on Toradol.  3.  Chronic lower extremity edema with venous insufficiency.  4.  Chronic lower extremity wounds.  5.  Bilateral lower extremity cellulitis, improving.  6.  Venous stasis ulcers.  7.  Morbid obesity.  8.  History of depression.  9.  Chronic pain.  10.  Chronic obstructive pulmonary disease, on home oxygen.  11.  Hypertension, controlled.  12.  Diabetes mellitus.Per primary team.  13.  Congestive heart failure, questionable.  Reportedly    the echocardiogram was unremarkable.  14.  Anemia, chronic.  15.  Elevation of liver function tests, resolved.  16.  Hypothyroidism.    PLAN:  #   Stop intravenous bicarbonate.IV NS at lower rate  #   Low Potassium diet  #   Renal ultrasound pending.  #   Urine protein/creatinine pending  #   We are going to follow his labs closely.    #   He is off angiotensin converting   enzyme inhibitor,Toradol  and potassium supplements.  #   He is off Metformin. May restart later depending on renal function  #   Add vitamin D  #  There is no indication for hemodialysis at this time.      Labs reviewed, Medications reviewed and Radiology images reviewed

## 2017-02-01 NOTE — THERAPY
"Occupational Therapy Evaluation completed.   Functional Status:  Pt presented to hospital from SNF follwing JOE, hyperkalemia. Pt has mutiple co-morbidities and longstanding hx of chronic extremity edema, stasis changes, history of venous ulcers and bilateral lower extremity cellulitis. Pt demonstrating significant decline with LB ADLs and functional mobility 2/2 above mentioned deficits. Pt reports not having enough care available at home to assist with IADLs and reporting family is \"feeling burn out\" from caring for pt. Pt would benefit from discussion with SW prior to d/c home to address above mentioned concerns. Pt would benefit from acute and post acute skilled services prior to d/c home.     Plan of Care: Will benefit from Occupational Therapy 3 times per week  Discharge Recommendations:  Equipment: Will Continue to Assess for Equipment Needs. Post-acute therapy recommended before discharged home.    See \"Rehab Therapy-Acute\" Patient Summary Report for complete documentation.    "

## 2017-02-01 NOTE — PROGRESS NOTES
Pt is refusing kidney ultrasound today due to inability to lay back on bed, and uncontrolled pain.  Will inform MD in rounds.

## 2017-02-01 NOTE — PROGRESS NOTES
Hospital Medicine Progress Note, Adult, Complex               Author: Nabeel Hahn Date & Time created: 2/1/2017  2:10 PM     Interval History:  57 y/o male with JOE and hyperkalemia    JOE/hyperkalemia-Cr is improving, K+ is slowly coming down. No events on tele. Urinating well.  Chronic venous stasis ulcers-stop abx's today. Pt feels that his pain is not well controlled.    Review of Systems:  Review of Systems   Constitutional: Negative for fever and chills.   Eyes: Negative for blurred vision.   Respiratory: Negative for shortness of breath.    Cardiovascular: Positive for leg swelling. Negative for chest pain.   Gastrointestinal: Negative for nausea, vomiting and abdominal pain.   Genitourinary: Negative for dysuria.   Musculoskeletal: Positive for back pain and joint pain. Negative for myalgias.   Skin: Negative for itching.   Neurological: Negative for dizziness, focal weakness, loss of consciousness and headaches.   Psychiatric/Behavioral: Negative for depression.       Physical Exam:  Physical Exam   Constitutional: He is oriented to person, place, and time. He appears well-developed and well-nourished. No distress.   HENT:   Head: Normocephalic and atraumatic.   Mouth/Throat: No oropharyngeal exudate.   Eyes: Pupils are equal, round, and reactive to light. No scleral icterus.   Neck: Normal range of motion. Neck supple. No thyromegaly present.   Cardiovascular: Normal rate, regular rhythm, normal heart sounds and intact distal pulses.    No murmur heard.  Pulmonary/Chest: Effort normal. No respiratory distress. He has rales.   Abdominal: Soft. Bowel sounds are normal. There is no tenderness.   Musculoskeletal: Normal range of motion. He exhibits edema and tenderness.   B/L LE's wrapped in bandages, e/o chronic venous stasis, no e/o weeping ulcers at this time  Massive non pitting edema of the B/L LE's, 4+   Neurological: He is alert and oriented to person, place, and time. No cranial nerve deficit.    Skin: Skin is warm and dry. No rash noted.   Psychiatric: He has a normal mood and affect.   Nursing note and vitals reviewed.      Labs:        Invalid input(s): [id number]      Recent Labs      17   0400   SODIUM  135  135  137   POTASSIUM  5.8*  6.0*  5.6*   CHLORIDE  98  100  94*   CO2  29  27  36*   BUN  68*  62*  49*   CREATININE  2.30*  1.92*  1.27   MAGNESIUM   --    --   2.0   PHOSPHORUS   --    --   4.1   CALCIUM  9.6  9.5  9.6     Recent Labs      17   040   ALTSGPT  11   --    --    --    ASTSGOT  13   --    --    --    ALKPHOSPHAT  68   --    --    --    TBILIRUBIN  0.2   --    --    --    GLUCOSE  124*  108*  110*  103*     Recent Labs      17   040   RBC  4.54*  4.61*   HEMOGLOBIN  12.5*  12.7*   HEMATOCRIT  40.8*  41.8*   PLATELETCT  284  267   IRON   --   73   FERRITIN   --   230.5   TOTIRONBC   --   344     Recent Labs      17   0400   WBC  6.5  6.3   NEUTSPOLYS  59.20  60.00   LYMPHOCYTES  21.70*  20.10*   MONOCYTES  11.40  12.00   EOSINOPHILS  6.00  6.50   BASOPHILS  0.60  0.60   ASTSGOT  13   --    ALTSGPT  11   --    ALKPHOSPHAT  68   --    TBILIRUBIN  0.2   --            Hemodynamics:  Temp (24hrs), Av.4 °C (97.5 °F), Min:36.1 °C (97 °F), Max:36.7 °C (98.1 °F)  Temperature: 36.2 °C (97.2 °F)  Pulse  Av.6  Min: 77  Max: 115   Blood Pressure: 101/54 mmHg     Respiratory:    Respiration: 16, Pulse Oximetry: 97 %     Work Of Breathing / Effort: Mild  RUL Breath Sounds: Diminished, RML Breath Sounds: Diminished, RLL Breath Sounds: Diminished, LARY Breath Sounds: Diminished, LLL Breath Sounds: Diminished  Fluids:    Intake/Output Summary (Last 24 hours) at 17 1410  Last data filed at 17 0800   Gross per 24 hour   Intake   2480 ml   Output    700 ml   Net   1780 ml     Weight: (!) 134.5 kg (296 lb 8.3 oz)  GI/Nutrition:  Orders  Placed This Encounter   Procedures   • Diet Order     Standing Status: Standing      Number of Occurrences: 1      Standing Expiration Date:      Order Specific Question:  Diet:     Answer:  Diabetic [3]     Medical Decision Making, by Problem:  Active Hospital Problems    Diagnosis   • Stasis dermatitis [I83.10]-wound care  -stop abx's tonight  -remove PICC tomorrow before going back to SNF  -elevate legs as much as possible  -increase oxycontin to 30 mg BID, continue IV dilaudid PRN   • Cellulitis [L03.90]-as above   • Hypothyroidism [E03.9]-replacement   • COPD (chronic obstructive pulmonary disease) (CMS-HCC) [J44.9]-at baseline, no current exacerbation  -continue current meds   • HTN (hypertension) [I10]-welll controlled  -continue current meds   • DM type 2, uncontrolled, with renal complications (CMS-HCC) [E11.29, E11.65]-sugars are ok  -continue lantus and ISS, adjust PRN   • Hyperkalemia [E87.5]-slowly coming down, give another round of kayexelate   • JOE (acute kidney injury) (CMS-HCC) [N17.9]-improving with fluids and holding vanc  -continue to trend Cr and UOP   • Morbid obesity with BMI of 45.0-49.9, adult (CMS-HCC) [E66.01, Z68.42]-encourage weight loss   • Chronic respiratory failure (CMS-HCC) [J96.10]-at baseline     Will go back to SNF tomorrow    Labs reviewed and Medications reviewed  Montoya catheter: No Montoya      DVT Prophylaxis: Heparin        Assessed for rehab: Patient was assess for and/or received rehabilitation services during this hospitalization

## 2017-02-01 NOTE — WOUND TEAM
"[facility]  Inpatient Services  Initial Wound and Skin Care Evaluation    Admission Date:   1/30/17  HPI, PMH, SH: Reviewed  Unit where seen by Wound Team: T 7    WOUND CONSULT RELATED TO: Chronic venous ulcers, BLE, brawny edema and chronic weeping BLE., cellulitis BLE    SUBJECTIVE: \" These hurt when you even touch them.\"     Self Report / Pain Level: 10/10 posteriorly on BLE    OBJECTIVE: Cooperative, familiar with the tx he has had before, tolerated cleaning and wrapping.    WOUND TYPE, LOCATION, CHARACTERISTICS (Pressure ulcers: location, stage, POA or date identified)    Location and type of wound: RLE venous ulcers and suspected unstageable pressure ulcers posterior leg        Periwound: circumferentially leg is red     Drainage: moderate serous, continuous     Tissue Type and %:  suspected unstageable PU  On post leg- brown/black, venous ulcers red,yellow   Wound Edges: open    Odor:  none     Exposed structure(s): none  S&S of Infection: edema, erythema      Measurements:   RLE posterior 2 suspected unstageable pressure ulcers: proximal ulcer-2cm x4.5cm, distal ulcer 2cm x4cm.         Location and type of wound: LLE venous ulcers        Periwound: red     Drainage: mod serous     Tissue Type and %:  90% yellow/brown   Wound Edges: open    Odor:  none     Exposed structure(s): none  S&S of Infection: erythema, edema      Measurements:   LLE post/lateral venous ulcer 10cm x 14 cmx .1 cm  LLE medial venous ulcer 8 cm x 6 cm x .1 cm  LLE ant leg venous ulcer 5 cm x 3 cm x .1 cm     Location and type of wound:  POA bilateral ischial DTI's Patient would not wait for me to picture and measure. Both DTI's are painful to palpation, purple with red periwound L is approx 1 cm x 1 cm, R is approx 1 cm x .5 cm          INTERVENTIONS BY WOUND TEAM: BLE were covered with thick zinc( appearing )  paste. Patient said it was sprayed on but wound care is unaware of a product that could be sprayed on. BLE " "washed carefully with warm soap and water, carefully non selectively debriding skin to patients tolerance. Wounds were pictured and measured then dressed with hydrofiber silver covered with abd pads then kerlix. Dorsal surfaces of both feet were covered with Xeroform and then BLE ace wrapped to patients tolerance up to the knees with two 4\" wraps per leg.Dressing maintenance and skin care orders written. Nsg communication written re: picture and measurement of DTI's when patient is standing next.      Interdisciplinary consultation: Nsg, patient, wound team consulting    EVALUATION: Patient is known to service. Patient was up at EOB. BLE were extremely sensitive to touch. Patient has had no recent arterial studies.  Nsg noted patient has a suspected pressure over sacrum/coccyx but patient refused return to bed to allow examination of wounds noted by nsg - later  needed to go to bathroom and allowed me to look at his wounds briefly without allowing me to measure or picture because he was in a hurry. Bilateral DTIs over ischiums noted. Intact blisters noted on dorsal and lateral surfaces of both feet.    Factors affecting wound healing:obesity, cellulitis, chronic venous insufficiency, DM, COPD, stasis dermatitis  Goals: decrease wound sizes by 1%/ week.    NURSING PLAN OF CARE ORDERS (X):    Dressing changes: See Dressing Maintenance orders: X  Skin care: See Skin Care orders:   Rectal tube care: See Rectal Tube Care orders:   Other orders:    RSKIN: CURRENT (X) ORDERED (O)  Q shift Hugh:  X  Q shift pressure point assessments:  X  Pressure redistribution mattress X       JOSE LUIS      Bariatric JOSE LUIS      Bariatric foam        Heel float boots       Heels floated on pillows X     Barrier wipes      Barrier Cream      Barrier paste      Sacral silicone dressing      Silicone O2 tubing      Anchorfast      Trach with Optifoam split foam       Waffle cushion      Rectal tube or BMS      Antifungal tx    Turn q 2 hours   "   Up to chair   X  Ambulate   PT/OT     Dietician      PO     TF   TPN     PVN    NPO   # days   Other       WOUND TEAM PLAN OF CARE (X):   NPWT change 3 x week:        Dressing changes by wound team:       Follow up as needed:  X     Other (explain):    Anticipated discharge plans (X):  SNF:           Home Care:           Outpatient Wound Center:            Self Care:            Other:  tbd

## 2017-02-01 NOTE — PROGRESS NOTES
Accepted patient admitted by Dr Fitzpatrick. Stopped IV toradol given JOE. Tapered out IV dilaudid to Q6 hours PRN, do not escalate further. Spoke with ID, no change in abx's. Hold vancomycin, continue HCO3 gtt, and finish unasyn tomorrow. Will need to go back to SNF.

## 2017-02-01 NOTE — PROGRESS NOTES
Assumed care of pt, bedside report received.  Pt denies chest pain or SOB.  VSS.  Pt complains of uncontrolled pain.  Pt states was taking oxycontin 30 mg QID previously, and the skilled nursing facility had rotated him to oxycontin ER 30 mg BID with 10 mg oxy IR QID for breakthrough.  Pt had not had a chance to see if this new regimen would work prior to admission here.  Since admission, he has had his regimen changed and pain is now out of control    Bed low and locked, call light in reach.  Discussed POC.

## 2017-02-01 NOTE — DISCHARGE PLANNING
[facility] received a SNF choice form. Per the choice form the referral has been sent to [facility]

## 2017-02-01 NOTE — RESPIRATORY CARE
COPD EDUCATION by COPD CLINICAL EDUCATOR  2/1/2017 at 6:20 AM by Elis Russell     Patient reviewed by COPD education team. Patient does not qualify for COPD program.

## 2017-02-01 NOTE — CONSULTS
DATE OF SERVICE:  01/31/2017    REQUESTING PHYSICIAN:  Dr. Fitzpatrick.    CONSULTING PHYSICIAN:  Karlo Camacho MD.    REASON FOR CONSULTATION:  Renal failure.    HISTORY OF PRESENT ILLNESS:  A 56-year-old male with a longstanding history of   chronic extremity edema, stasis changes, history of venous ulcers and   bilateral lower extremity cellulitis.  The patient was discharged from [facility] on January 24 following admission for lower extremity cellulitis.  [facility]  The patient   was feeling slightly better with some decrease in his edema and redness of the   lower extremities.  However, he had blood work done at that facility and was   found to have a potassium of 7.2.  He was sent to the emergency room.    Evaluation in the emergency room revealed a creatinine of 2.3 and a potassium   of 5.8.  He had a normal urinalysis.  He was admitted for inpatient evaluation   and therapy.    PAST SURGICAL HISTORY:  1.  Bilateral carpal tunnel release.  2.  Cervical spine, 5-6 neck fusion twice.    MEDICAL ILLNESSES:  1.  Hypertension, on pharmacologic therapy.  2.  Diabetes mellitus type 2.  3.  Chronic lower extremity edema.  4.  Chronic stasis changes in the lower extremities.  5.  History of lower extremity cellulitis.  6.  History of venous stasis ulcers.  7.  Morbid obesity.  8.  Depression.  9.  Chronic pain.  10.  Chronic obstructive pulmonary disease on home oxygen.  11.  History of congestive heart failure, although his echocardiogram has been   apparently unremarkable.  12.  Anemia, chronic.  13.  History of elevated liver function test.  14.  Hypothyroidism, on replacement.  15.  History of MRSA bacteremia.    ALLERGIES:  None known.    OUTPATIENT MEDICINES:  Lactinex, albuterol p.r.n., Symbicort 160-4.5 two puffs   twice a day, carvedilol 6.25 mg twice a day, Diflucan 400 mg a day,   furosemide 40 mg a day, Hydralazine 25 mg every 8 hours, Isordil  10 mg 3 times   a day, Prinivil 20 mg a day, metformin 850 mg twice a day, ampicillin   sulbactam 1.5 g every 6 hours, clindamycin 100 mg every 8 hours, oxycodone   p.r.n., potassium chloride 10 mEq a day.    FAMILY HISTORY:  Negative for kidney disease.    SOCIAL HISTORY:  Patient is .  [facility].  He used to smoke on/off for  total for 15 years and stopped   in 2006.  He does not drink alcohol.  He is  and has 2 children.  He   used to work as a  and  as a .    REVIEW OF SYSTEMS:  He is feeling better than he had been before.  His edema   is down.  He has no dyspnea.  No cough.  No fever, chills, sputum production,   skin rash.  He has had a fair appetite with no nausea, vomiting, diarrhea,   change in bowel habits.  He has had no voiding dysfunction, burning with   urination and urinary retention.  No focal neurologic symptoms.    PHYSICAL EXAMINATION:  GENERAL:  Patient is chronic ill appearing.  He is in no acute distress.  VITAL SIGNS:  Blood pressure is 108/52, temperature 36.9, output not recorded.  SKIN:  With no generalized rash.  He has erythema of the lower extremities.  LYMPH NODES:  No cervical adenopathies.  HEENT:  Pupils are round.  Oral mucosa with no lesions.  Dentition in good   state of repair.  NECK:  With no bruits, masses, or thyroid enlargement.  LUNGS:  Clear to percussion and auscultation.  No wheezes, no crackles.  HEART:  Regular rhythm with no pericardial rub present, no murmurs.  ABDOMEN:  Obese.  Bowel sounds present, soft and nontender, no organomegaly or   masses.  EXTREMITIES:  With the venous stasis changes and ulcerations.  Difficult to   examine because it is covered with cream now.  He had wounds on both   legs.  NEUROLOGIC:  Nonfocal.    LABORATORY DATA:  Chest x-ray revealed basilar opacities.  White count of 6.5,   hemoglobin 12.5, platelets 194,000.  Latest sodium 135,  potassium 6, chloride   100, CO2 of 27, glucose 110, BUN 62, creatinine 1.92, calcium 9.5.  On   admission, he had a creatinine of 2.5, potassium of 5.6, CO2 of 28, albumin   3.6.  SGPT 11.  On previous admission, he had high SGPT .  Urinalysis   completely normal with no protein on blood, cultures are pending.    IMPRESSION:  1.  Acute kidney injury.  His urinalysis is completely normal.  He could have   some element of hypoperfusion secondary to hypovolemia secondary to diuresis.    His creatinine is getting better.  2.  Hyperkalemia, secondary to acute kidney injury plus use of angiotensin   converting enzyme inhibitor and potassium supplements.  3.  Chronic lower extremity edema with venous insufficiency.  4.  Chronic lower extremity wounds.  5.  Bilateral lower extremity cellulitis, improving.  6.  Venous stasis ulcers.  7.  Morbid obesity.  8.  History of depression.  9.  Chronic pain.  10.  Chronic obstructive pulmonary disease, on home oxygen.  11.  Hypertension, controlled.  12.  Diabetes mellitus.  13.  Congestive heart failure, questionable.  Reportedly   the echocardiogram was unremarkable.  14.  Anemia, chronic.  15.  Elevation of liver function tests, resolved.  16.  Hypothyroidism.  17.  History of MRSA bacteremia.  18.  Past history of tobacco use.    PLAN:  1.  Patient has been getting intravenous bicarbonate.  2.  We are going to send urine for protein creatinine ratio.  3.  We are going to order a renal ultrasound.  4.  We are going to follow his labs closely.  He is off angiotensin converting   enzyme inhibitor and potassium supplements.  5.  He is off metformin.  6.  We are going to check PTH, vitamin D, iron, TIBC and ferritin.  7.  There is no indication for hemodialysis at this time.    Thanks for this consultation.  We will follow with you.       ____________________________________     MD JUICE BARRERA / MATT    DD:  01/31/2017 13:30:42  DT:  01/31/2017 18:13:13    D#:  [id number]   Job#:  [id number]

## 2017-02-01 NOTE — DISCHARGE PLANNING
Transitional Care Navigator:    Met with pt at bedside to discuss transitional care services. Discussed d/c plan. Pt is agreeable to returning to SNF. Choice is to return to [facility]. Choice form completed and faxed to [facility]. TCN to follow as needed.

## 2017-02-02 ENCOUNTER — APPOINTMENT (OUTPATIENT)
Dept: RADIOLOGY | Facility: MEDICAL CENTER | Age: 57
DRG: 683 | End: 2017-02-02
Attending: INTERNAL MEDICINE
Payer: MEDICARE

## 2017-02-02 VITALS
RESPIRATION RATE: 16 BRPM | OXYGEN SATURATION: 90 % | SYSTOLIC BLOOD PRESSURE: 112 MMHG | BODY MASS INDEX: 45.11 KG/M2 | WEIGHT: 297.62 LBS | DIASTOLIC BLOOD PRESSURE: 51 MMHG | HEART RATE: 87 BPM | TEMPERATURE: 98 F | HEIGHT: 68 IN

## 2017-02-02 LAB
ANION GAP SERPL CALC-SCNC: 7 MMOL/L (ref 0–11.9)
ANISOCYTOSIS BLD QL SMEAR: ABNORMAL
BACTERIA UR CULT: NORMAL
BASOPHILS # BLD AUTO: 2.6 % (ref 0–1.8)
BASOPHILS # BLD: 0.13 K/UL (ref 0–0.12)
BUN SERPL-MCNC: 32 MG/DL (ref 8–22)
CALCIUM SERPL-MCNC: 9.1 MG/DL (ref 8.5–10.5)
CHLORIDE SERPL-SCNC: 98 MMOL/L (ref 96–112)
CO2 SERPL-SCNC: 34 MMOL/L (ref 20–33)
CREAT SERPL-MCNC: 0.87 MG/DL (ref 0.5–1.4)
CREAT UR-MCNC: 146.6 MG/DL
EOSINOPHIL # BLD AUTO: 0.27 K/UL (ref 0–0.51)
EOSINOPHIL NFR BLD: 5.3 % (ref 0–6.9)
ERYTHROCYTE [DISTWIDTH] IN BLOOD BY AUTOMATED COUNT: 56.1 FL (ref 35.9–50)
GFR SERPL CREATININE-BSD FRML MDRD: >60 ML/MIN/1.73 M 2
GLUCOSE BLD-MCNC: 88 MG/DL (ref 65–99)
GLUCOSE SERPL-MCNC: 93 MG/DL (ref 65–99)
HCT VFR BLD AUTO: 38.7 % (ref 42–52)
HGB BLD-MCNC: 11.2 G/DL (ref 14–18)
LG PLATELETS BLD QL SMEAR: NORMAL
LYMPHOCYTES # BLD AUTO: 1.54 K/UL (ref 1–4.8)
LYMPHOCYTES NFR BLD: 30.7 % (ref 22–41)
MAGNESIUM SERPL-MCNC: 1.8 MG/DL (ref 1.5–2.5)
MANUAL DIFF BLD: NORMAL
MCH RBC QN AUTO: 27 PG (ref 27–33)
MCHC RBC AUTO-ENTMCNC: 28.9 G/DL (ref 33.7–35.3)
MCV RBC AUTO: 93.3 FL (ref 81.4–97.8)
MONOCYTES # BLD AUTO: 0.27 K/UL (ref 0–0.85)
MONOCYTES NFR BLD AUTO: 5.3 % (ref 0–13.4)
MORPHOLOGY BLD-IMP: NORMAL
NEUTROPHILS # BLD AUTO: 2.81 K/UL (ref 1.82–7.42)
NEUTROPHILS NFR BLD: 51.7 % (ref 44–72)
NEUTS BAND NFR BLD MANUAL: 4.4 % (ref 0–10)
NRBC # BLD AUTO: 0 K/UL
NRBC BLD AUTO-RTO: 0 /100 WBC
PHOSPHATE SERPL-MCNC: 3.4 MG/DL (ref 2.5–4.5)
PLATELET # BLD AUTO: 214 K/UL (ref 164–446)
PLATELET BLD QL SMEAR: NORMAL
PMV BLD AUTO: 12 FL (ref 9–12.9)
POTASSIUM SERPL-SCNC: 4.9 MMOL/L (ref 3.6–5.5)
PROT UR-MCNC: 17.9 MG/DL (ref 0–15)
RBC # BLD AUTO: 4.15 M/UL (ref 4.7–6.1)
RBC BLD AUTO: PRESENT
SIGNIFICANT IND 70042: NORMAL
SITE SITE: NORMAL
SODIUM SERPL-SCNC: 139 MMOL/L (ref 135–145)
SOURCE SOURCE: NORMAL
WBC # BLD AUTO: 5 K/UL (ref 4.8–10.8)

## 2017-02-02 PROCEDURE — 99239 HOSP IP/OBS DSCHRG MGMT >30: CPT | Performed by: INTERNAL MEDICINE

## 2017-02-02 PROCEDURE — A9270 NON-COVERED ITEM OR SERVICE: HCPCS | Performed by: INTERNAL MEDICINE

## 2017-02-02 PROCEDURE — 700102 HCHG RX REV CODE 250 W/ 637 OVERRIDE(OP): Performed by: NURSE PRACTITIONER

## 2017-02-02 PROCEDURE — 85027 COMPLETE CBC AUTOMATED: CPT

## 2017-02-02 PROCEDURE — A9270 NON-COVERED ITEM OR SERVICE: HCPCS | Performed by: HOSPITALIST

## 2017-02-02 PROCEDURE — 700102 HCHG RX REV CODE 250 W/ 637 OVERRIDE(OP): Performed by: INTERNAL MEDICINE

## 2017-02-02 PROCEDURE — 700112 HCHG RX REV CODE 229: Performed by: HOSPITALIST

## 2017-02-02 PROCEDURE — 83735 ASSAY OF MAGNESIUM: CPT

## 2017-02-02 PROCEDURE — 700102 HCHG RX REV CODE 250 W/ 637 OVERRIDE(OP): Performed by: HOSPITALIST

## 2017-02-02 PROCEDURE — 82570 ASSAY OF URINE CREATININE: CPT

## 2017-02-02 PROCEDURE — 80048 BASIC METABOLIC PNL TOTAL CA: CPT

## 2017-02-02 PROCEDURE — 82962 GLUCOSE BLOOD TEST: CPT

## 2017-02-02 PROCEDURE — 84156 ASSAY OF PROTEIN URINE: CPT

## 2017-02-02 PROCEDURE — 700111 HCHG RX REV CODE 636 W/ 250 OVERRIDE (IP): Performed by: INTERNAL MEDICINE

## 2017-02-02 PROCEDURE — 85007 BL SMEAR W/DIFF WBC COUNT: CPT

## 2017-02-02 PROCEDURE — A9270 NON-COVERED ITEM OR SERVICE: HCPCS | Performed by: NURSE PRACTITIONER

## 2017-02-02 PROCEDURE — 84100 ASSAY OF PHOSPHORUS: CPT

## 2017-02-02 RX ORDER — LISINOPRIL 20 MG/1
10 TABLET ORAL DAILY
Qty: 30 TAB | Refills: 1 | Status: ON HOLD | DISCHARGE
Start: 2017-02-02 | End: 2018-04-15

## 2017-02-02 RX ORDER — GABAPENTIN 100 MG/1
100 CAPSULE ORAL 3 TIMES DAILY
Qty: 90 CAP | Refills: 0 | Status: ON HOLD | DISCHARGE
Start: 2017-02-02 | End: 2018-01-18

## 2017-02-02 RX ORDER — OXYCODONE HYDROCHLORIDE 30 MG/1
30 TABLET, FILM COATED, EXTENDED RELEASE ORAL EVERY 12 HOURS
Qty: 60 EACH | Refills: 0 | Status: ON HOLD | DISCHARGE
Start: 2017-02-02 | End: 2018-01-18

## 2017-02-02 RX ORDER — OXYCODONE HYDROCHLORIDE 10 MG/1
10 TABLET ORAL EVERY 6 HOURS PRN
Qty: 30 TAB | Refills: 0 | Status: SHIPPED | DISCHARGE
Start: 2017-02-02 | End: 2018-01-12

## 2017-02-02 RX ADMIN — ACETAMINOPHEN 1000 MG: 500 TABLET, FILM COATED ORAL at 00:27

## 2017-02-02 RX ADMIN — OXYCODONE HYDROCHLORIDE 10 MG: 10 TABLET ORAL at 02:15

## 2017-02-02 RX ADMIN — GABAPENTIN 100 MG: 100 CAPSULE ORAL at 09:28

## 2017-02-02 RX ADMIN — HYDROMORPHONE HYDROCHLORIDE 1 MG: 1 INJECTION, SOLUTION INTRAMUSCULAR; INTRAVENOUS; SUBCUTANEOUS at 07:52

## 2017-02-02 RX ADMIN — ISOSORBIDE DINITRATE 10 MG: 10 TABLET ORAL at 09:28

## 2017-02-02 RX ADMIN — CARVEDILOL 3.12 MG: 3.12 TABLET, FILM COATED ORAL at 07:48

## 2017-02-02 RX ADMIN — DOCUSATE SODIUM 100 MG: 100 CAPSULE ORAL at 09:28

## 2017-02-02 RX ADMIN — HYDRALAZINE HYDROCHLORIDE 25 MG: 25 TABLET ORAL at 05:44

## 2017-02-02 RX ADMIN — VITAMIN D, TAB 1000IU (100/BT) 5000 UNITS: 25 TAB at 09:28

## 2017-02-02 RX ADMIN — HEPARIN SODIUM 5000 UNITS: 5000 INJECTION, SOLUTION INTRAVENOUS; SUBCUTANEOUS at 05:43

## 2017-02-02 RX ADMIN — ACETAMINOPHEN 1000 MG: 500 TABLET, FILM COATED ORAL at 05:44

## 2017-02-02 RX ADMIN — OXYCODONE HYDROCHLORIDE 30 MG: 10 TABLET, FILM COATED, EXTENDED RELEASE ORAL at 09:28

## 2017-02-02 RX ADMIN — OXYCODONE HYDROCHLORIDE 10 MG: 10 TABLET ORAL at 07:48

## 2017-02-02 ASSESSMENT — ENCOUNTER SYMPTOMS
HEMOPTYSIS: 0
HALLUCINATIONS: 0
ABDOMINAL PAIN: 0
HEARTBURN: 0
NERVOUS/ANXIOUS: 0
PALPITATIONS: 0
VOMITING: 0
NECK PAIN: 0
DEPRESSION: 0
DIZZINESS: 0
DIARRHEA: 0
SPEECH CHANGE: 0
BACK PAIN: 0
EYE DISCHARGE: 0
WHEEZING: 0
CONSTIPATION: 0
SENSORY CHANGE: 0
ORTHOPNEA: 0
COUGH: 0
WEIGHT LOSS: 0
SPUTUM PRODUCTION: 0
SHORTNESS OF BREATH: 0
CHILLS: 0
FOCAL WEAKNESS: 0
BLOOD IN STOOL: 0
NAUSEA: 0
PND: 0
EYE PAIN: 0
DIAPHORESIS: 0
CLAUDICATION: 0
FEVER: 0
WEAKNESS: 1
HEADACHES: 0

## 2017-02-02 ASSESSMENT — PAIN SCALES - GENERAL
PAINLEVEL_OUTOF10: 7
PAINLEVEL_OUTOF10: 5
PAINLEVEL_OUTOF10: 10
PAINLEVEL_OUTOF10: 8

## 2017-02-02 NOTE — DISCHARGE PLANNING
Medical Social Work    Update: Per IDT rounds, pt medically clear to d/c to [facility]. Sw faxed transport form to [facility].

## 2017-02-02 NOTE — PROGRESS NOTES
Discharge orders received.  IV discontinued.  Instructions and education given to patient.  Medications reviewed.  Follow up appointments discussed.  Patient verbalized understanding of dc instructions and medications.  Patient signed dc instructions.  Patient verbalized he had all belongings with him.  Patient waiting for transport to take him to [facility].

## 2017-02-02 NOTE — DISCHARGE PLANNING
[facility] received a transport form to arrange transportation to transfer the patient to [facility]. [facility] called and spoke to Orlando at [facility]. Orlando has arranged for the [facility] Care van to transfer the patient today at 11:30. ALEXX on floor Lela has been notified via phone.

## 2017-02-02 NOTE — DISCHARGE PLANNING
Care Transition Team Final Discharge Disposition    Actual Discharge Information  Actual Discharge Date: 02/02/17  Care Transitions Team Assiting with Transportation: Yes  Method of Transportation: Van  Scheduled Transportation Date: 02/02/17  Scheduled Transportation Time: 1130  Actual Disposition: D/T to SNF with Medicare cert in anticipation of skilled care (03)

## 2017-02-02 NOTE — CARE PLAN
Problem: Psychosocial Needs:  Goal: Level of anxiety will decrease  Outcome: PROGRESSING AS EXPECTED    Problem: Bowel/Gastric:  Goal: Normal bowel function is maintained or improved  Outcome: PROGRESSING AS EXPECTED

## 2017-02-02 NOTE — PROGRESS NOTES
[facility] Nephrology Progress Note, Adult, Complex               Author: Karlo Camacho Date & Time created: 2/2/2017  9:59 AM     Interval History:  56-year-old male with a longstanding history of   chronic extremity edema, stasis changes, history of venous ulcers and    bilateral lower extremity cellulitis.  The patient was discharged from [facility] on January 24 following admission for lower extremity cellulitis.  The   [facility].  The patient    was feeling slightly better with some decrease in his edema and redness of the   lower extremities.  However, he had blood work done at that facility and was    found to have a potassium of 7.2.  He was sent to the emergency room.     Evaluation in the emergency room revealed a creatinine of 2.3 and a potassium    of 5.8.  He had a normal urinalysis.  He was admitted for inpatient evaluation   and therapy.    DAILY NEPHROLOGY SUMMARY:  01/31/17 - Consult done.On IV Bicarbonate.  02/01/17 - Feels OK.Comfortable.Legs about the same.Creatinine down to 1.27.  02/02/17 - No new events.UOP 1300 mL.Creatinine down to 0.87.K 4.9.Edema better.    Review of Systems:  Review of Systems   Constitutional: Positive for malaise/fatigue. Negative for fever, chills, weight loss and diaphoresis.   HENT: Negative for congestion.    Eyes: Negative for pain and discharge.   Respiratory: Negative for cough, hemoptysis, sputum production, shortness of breath and wheezing.    Cardiovascular: Positive for leg swelling. Negative for chest pain, palpitations, orthopnea, claudication and PND.   Gastrointestinal: Negative for heartburn, nausea, vomiting, abdominal pain, diarrhea, constipation and blood in stool.   Genitourinary: Negative for dysuria, urgency and frequency.   Musculoskeletal: Negative for back pain and neck pain.   Skin: Positive for rash.   Neurological: Positive for weakness. Negative for dizziness, sensory change,  speech change, focal weakness and headaches.   Psychiatric/Behavioral: Negative for depression and hallucinations. The patient is not nervous/anxious.        Physical Exam:  Physical Exam   Constitutional: He is oriented to person, place, and time. He appears well-developed and well-nourished. No distress.   HENT:   Head: Normocephalic and atraumatic.   Nose: Nose normal.   Eyes: Conjunctivae are normal. Pupils are equal, round, and reactive to light. No scleral icterus.   Neck: Normal range of motion. Neck supple. No thyromegaly present.   Cardiovascular: Normal rate and regular rhythm.  Exam reveals no friction rub.    No murmur heard.  Pulmonary/Chest: Effort normal and breath sounds normal. No respiratory distress. He has no wheezes. He has no rales.   Abdominal: Soft. Bowel sounds are normal. He exhibits no distension. There is no tenderness. There is no rebound and no guarding.   OBESE   Musculoskeletal: He exhibits edema.   Legs wrapped   Lymphadenopathy:     He has no cervical adenopathy.   Neurological: He is alert and oriented to person, place, and time.   Skin: Skin is warm and dry.   Psychiatric: He has a normal mood and affect. His behavior is normal. Judgment and thought content normal.   Nursing note and vitals reviewed.      Labs:        Invalid input(s): [id number]      Recent Labs      01/31/17 0530 02/01/17   0400  02/02/17   0345   SODIUM  135  137  139   POTASSIUM  6.0*  5.6*  4.9   CHLORIDE  100  94*  98   CO2  27  36*  34*   BUN  62*  49*  32*   CREATININE  1.92*  1.27  0.87   MAGNESIUM   --   2.0  1.8   PHOSPHORUS   --   4.1  3.4   CALCIUM  9.5  9.6  9.1     Recent Labs      01/30/17   2235   01/31/17   0530  02/01/17   0400  02/02/17   0345   ALTSGPT  11   --    --    --    --    ASTSGOT  13   --    --    --    --    ALKPHOSPHAT  68   --    --    --    --    TBILIRUBIN  0.2   --    --    --    --    GLUCOSE  124*   < >  110*  103*  93    < > = values in this interval not displayed.      Recent Labs      17   0400   RBC  4.54*  4.61*   HEMOGLOBIN  12.5*  12.7*   HEMATOCRIT  40.8*  41.8*   PLATELETCT  284  267   IRON   --   73   FERRITIN   --   230.5   TOTIRONBC   --   344     Recent Labs      17   0400   WBC  6.5  6.3   NEUTSPOLYS  59.20  60.00   LYMPHOCYTES  21.70*  20.10*   MONOCYTES  11.40  12.00   EOSINOPHILS  6.00  6.50   BASOPHILS  0.60  0.60   ASTSGOT  13   --    ALTSGPT  11   --    ALKPHOSPHAT  68   --    TBILIRUBIN  0.2   --            Hemodynamics:  Temp (24hrs), Av.4 °C (97.5 °F), Min:36.2 °C (97.2 °F), Max:36.8 °C (98.2 °F)  Temperature: 36.8 °C (98.2 °F)  Pulse  Av.6  Min: 75  Max: 115   Blood Pressure: 134/65 mmHg     Respiratory:    Respiration: 18, Pulse Oximetry: 90 %     Work Of Breathing / Effort: Mild  RUL Breath Sounds: Diminished, RML Breath Sounds: Diminished, RLL Breath Sounds: Diminished, LARY Breath Sounds: Diminished, LLL Breath Sounds: Diminished  Fluids:    Intake/Output Summary (Last 24 hours) at 17 0959  Last data filed at 17 0400   Gross per 24 hour   Intake   1770 ml   Output    900 ml   Net    870 ml     Weight: (!) 135 kg (297 lb 9.9 oz)  GI/Nutrition:  Orders Placed This Encounter   Procedures   • Diet Order     Standing Status: Standing      Number of Occurrences: 1      Standing Expiration Date:      Order Specific Question:  Diet:     Answer:  Diabetic [3]     Medical Decision Making, by Problem:  Active Hospital Problems    Diagnosis   • Stasis dermatitis [I83.10]   • Cellulitis [L03.90]   • Hypothyroidism [E03.9]   • COPD (chronic obstructive pulmonary disease) (CMS-HCC) [J44.9]   • HTN (hypertension) [I10]   • DM type 2, uncontrolled, with renal complications (CMS-HCC) [E11.29, E11.65]   • Hyperkalemia [E87.5]   • JOE (acute kidney injury) (CMS-HCC) [N17.9]   • Morbid obesity with BMI of 45.0-49.9, adult (CMS-HCC) [E66.01, Z68.42]   • Chronic respiratory failure (CMS-HCC) [J96.10]        PMH/FH/SH reviewed    IMPRESSION:  1.  Acute kidney injury.  His urinalysis is completely normal. No proteinuria. Probably   hypoperfusion secondary to hypovolemia secondary to diuresis. Was also on Toradol.    Resolved.  2.  Hyperkalemia, secondary to acute kidney injury plus use of angiotensin    converting enzyme inhibitor and potassium supplements.Was also on Toradol.Corrected.  3.  Chronic lower extremity edema with venous insufficiency.  4.  Chronic lower extremity wounds.  5.  Bilateral lower extremity cellulitis, improving.  6.  Venous stasis ulcers.  7.  Morbid obesity.  8.  History of depression.  9.  Chronic pain.  10.  Chronic obstructive pulmonary disease, on home oxygen.  11.  Hypertension, controlled.  12.  Diabetes mellitus.Per primary team.  13.  Congestive heart failure, questionable.  Reportedly    the echocardiogram was unremarkable.  14.  Anemia, chronic.  15.  Elevation of liver function tests, resolved.  16.  Hypothyroidism.    PLAN:  #   Off IVF's  #   Low Potassium diet  #   He is off angiotensin converting   enzyme inhibitor,Toradol  and potassium supplements.  #   He is off Metformin. May restart given improvement in renal function  #   Added vitamin D  #   OK for transfer to SNF    Labs reviewed, Medications reviewed and Radiology images reviewed

## 2017-02-02 NOTE — PROGRESS NOTES
Pt taken by wheelchair to [facility] by [facility] staff member.  Wished patient a quick recovery.

## 2017-02-02 NOTE — DISCHARGE INSTRUCTIONS
Discharge Instructions    Discharged to Skilled Nursing Facility by medical transportation with escort. Discharged via wheelchair, hospital escort: Yes.  Special equipment needed: Oxygen    Be sure to schedule a follow-up appointment with your primary care doctor or any specialists as instructed.     Discharge Plan: Diet low fat, low sugar, heart healthy with 9-13 servings of fruits and vegetables, Activities as tolerated, Follow ups with primary care provider in 7-10 days, call for appointment, take your medications as prescribed, no smoking, no alcohol, no caffeine, wear seat belt in motorized vehicle, keep appointments.  If symptoms worsen call primary care doctor, 911 or urgent care.  Diet Plan: Discussed  Activity Level: Discussed  Confirmed Follow up Appointment: Patient to Call and Schedule Appointment  Confirmed Symptoms Management: Discussed  Medication Reconciliation Updated: Yes  Influenza Vaccine Indication: Not indicated: Previously immunized this influenza season and > 8 years of age    I understand that a diet low in cholesterol, fat, and sodium is recommended for good health. Unless I have been given specific instructions below for another diet, I accept this instruction as my diet prescription.   Other diet: Diabetic    Special Instructions: Hyperkalemia  Hyperkalemia is when you have too much potassium in your blood. Potassium is normally removed (excreted) from your body by your kidneys. If there is too much potassium in your blood, it can affect your heart's ability to function.   CAUSES   Hyperkalemia may be caused by:   · Taking in too much potassium. You can do this by:  · Using salt substitutes. They contain large amounts of potassium.  · Taking potassium supplements.  · Eating foods high in potassium.  · Excreting too little potassium. This can happen if:  · Your kidneys are not working properly. Kidney (renal) disease, including short- or long-term renal failure, is a very common cause of  hyperkalemia.  · You are taking medicines that lower your excretion of potassium.  · You have Bailey disease.  · You have a urinary tract blockage, such as kidney stones.  · You are on treatment to mechanically clean your blood (dialysis) and you skip a treatment.  · Releasing a high amount of potassium from your cells into your blood. This can happen with:  · Injury to muscles (rhabdomyolysis) or other tissues. Most potassium is stored in your muscles.  · Severe burns or infections.  · Acidic blood plasma (acidosis). Acidosis can result from many diseases, such as uncontrolled diabetes.  RISK FACTORS  The most common risk factor of hyperkalemia is kidney disease. Other risk factors of hyperkalemia include:  · Bailey disease. This is a condition where your glands do not produce enough hormones.  · Alcoholism or heavy drug use.    · Using certain blood pressure medicines, such as angiotensin-converting enzyme (ACE) inhibitors, angiotensin II receptor blockers (ARBs), or potassium-sparing diuretics such as spironolactone.  · Severe injury or burn.  SIGNS AND SYMPTOMS   Oftentimes, there are no signs or symptoms of hyperkalemia.  However, when your potassium level becomes high enough, you may experience symptoms such as:  · Irregular or very slow heartbeat.  · Nausea.  · Fatigue.  · Tingling of the skin or numbness of the hands or feet.  · Muscle weakness.  · Fatigue.  · Not being able to move (paralysis).  You may not have any symptoms of hyperkalemia.   DIAGNOSIS   Hyperkalemia may be diagnosed by:  · Physical exam.  · Blood tests.  · ECG (electrocardiogram).  · Discussion of prescription and non-prescription drug use.  TREATMENT   Treatment for hyperkalemia is often directed at the underlying cause. In some instances, treatment may include:   · Insulin.  · Glucose (sugar) and water solution given through a vein (intravenous or IV ).  · Dialysis.  · Medicines to remove the potassium from your body.  · Medicines to  move calcium from your bloodstream into your tissues.  HOME CARE INSTRUCTIONS   · Take medicines only as directed by your health care provider.  · Do not take any supplements, natural products, herbs, or vitamins without reviewing them with your health care provider. Certain supplements and natural food products can have high amounts of potassium.  · Limit your alcohol intake as directed by your health care provider.  · Stop illegal drug use. If you need help quitting, ask your health care provider.  · Keep all follow-up visits as directed by your health care provider. This is important.  · If you have kidney disease, you may need to follow a low potassium diet. A dietitian can help educate you on low potassium foods.  SEEK MEDICAL CARE IF:   · You notice an irregular or very slow heartbeat.  · You feel light-headed.  · You feel weak.  · You are nauseous.  · You have tingling or numbness in your hands or feet.  SEEK IMMEDIATE MEDICAL CARE IF:   · You have shortness of breath.  · You have chest pain or discomfort.  · You pass out.  · You have muscle paralysis.  MAKE SURE YOU:   · Understand these instructions.  · Will watch your condition.  · Will get help right away if you are not doing well or get worse.     This information is not intended to replace advice given to you by your health care provider. Make sure you discuss any questions you have with your health care provider.     Acute Kidney Injury  Acute kidney injury is any condition in which there is sudden (acute) damage to the kidneys. Acute kidney injury was previously known as acute kidney failure or acute renal failure. The kidneys are two organs that lie on either side of the spine between the middle of the back and the front of the abdomen. The kidneys:  · Remove wastes and extra water from the blood.    · Produce important hormones. These help keep bones strong, regulate blood pressure, and help create red blood cells.    · Balance the fluids and  chemicals in the blood and tissues.  A small amount of kidney damage may not cause problems, but a large amount of damage may make it difficult or impossible for the kidneys to work the way they should. Acute kidney injury may develop into long-lasting (chronic) kidney disease. It may also develop into a life-threatening disease called end-stage kidney disease. Acute kidney injury can get worse very quickly, so it should be treated right away. Early treatment may prevent other kidney diseases from developing.  CAUSES   · A problem with blood flow to the kidneys. This may be caused by:    · Blood loss.    · Heart disease.    · Severe burns.    · Liver disease.  · Direct damage to the kidneys. This may be caused by:  · Some medicines.    · A kidney infection.    · Poisoning or consuming toxic substances.    · A surgical wound.    · A blow to the kidney area.    · A problem with urine flow. This may be caused by:    · Cancer.    · Kidney stones.    · An enlarged prostate.  SIGNS AND SYMPTOMS   · Swelling (edema) of the legs, ankles, or feet.    · Tiredness (lethargy).    · Nausea or vomiting.    · Confusion.    · Problems with urination, such as:    · Painful or burning feeling during urination.    · Decreased urine production.    · Frequent accidents in children who are potty trained.    · Bloody urine.    · Muscle twitches and cramps.    · Shortness of breath.    · Seizures.    · Chest pain or pressure.  Sometimes, no symptoms are present.   DIAGNOSIS  Acute kidney injury may be detected and diagnosed by tests, including blood, urine, imaging, or kidney biopsy tests.   TREATMENT  Treatment of acute kidney injury varies depending on the cause and severity of the kidney damage. In mild cases, no treatment may be needed. The kidneys may heal on their own. If acute kidney injury is more severe, your health care provider will treat the cause of the kidney damage, help the kidneys heal, and prevent complications from  occurring. Severe cases may require a procedure to remove toxic wastes from the body (dialysis) or surgery to repair kidney damage. Surgery may involve:   · Repair of a torn kidney.    · Removal of an obstruction.  HOME CARE INSTRUCTIONS  · Follow your prescribed diet.  · Take medicines only as directed by your health care provider.   · Do not take any new medicines (prescription, over-the-counter, or nutritional supplements) unless approved by your health care provider. Many medicines can worsen your kidney damage or may need to have the dose adjusted.    · Keep all follow-up visits as directed by your health care provider. This is important.  · Observe your condition to make sure you are healing as expected.  SEEK IMMEDIATE MEDICAL CARE IF:  · You are feeling ill or have severe pain in the back or side.    · Your symptoms return or you have new symptoms.  · You have any symptoms of end-stage kidney disease. These include:    · Persistent itchiness.    · Loss of appetite.    · Headaches.    · Abnormally dark or light skin.  · Numbness in the hands or feet.    · Easy bruising.    · Frequent hiccups.    · Menstruation stops.    · You have a fever.  · You have increased urine production.  · You have pain or bleeding when urinating.  MAKE SURE YOU:   · Understand these instructions.  · Will watch your condition.  · Will get help right away if you are not doing well or get worse.     This information is not intended to replace advice given to you by your health care provider. Make sure you discuss any questions you have with your health care provider.     Wound Care  Taking care of your wound properly can help to prevent pain and infection. It can also help your wound to heal more quickly.   HOW TO CARE FOR YOUR WOUND   · Take or apply over-the-counter and prescription medicines only as told by your health care provider.  · If you were prescribed antibiotic medicine, take or apply it as told by your health care  provider. Do not stop using the antibiotic even if your condition improves.  · Clean the wound each day or as told by your health care provider.  · Wash the wound with mild soap and water.  · Rinse the wound with water to remove all soap.  · Pat the wound dry with a clean towel. Do not rub it.  · There are many different ways to close and cover a wound. For example, a wound can be covered with stitches (sutures), skin glue, or adhesive strips. Follow instructions from your health care provider about:  · How to take care of your wound.  · When and how you should change your bandage (dressing).  · When you should remove your dressing.  · Removing whatever was used to close your wound.  · Check your wound every day for signs of infection. Watch for:  · Redness, swelling, or pain.  · Fluid, blood, or pus.  · Keep the dressing dry until your health care provider says it can be removed. Do not take baths, swim, use a hot tub, or do anything that would put your wound underwater until your health care provider approves.  · Raise (elevate) the injured area above the level of your heart while you are sitting or lying down.  · Do not scratch or pick at the wound.  · Keep all follow-up visits as told by your health care provider. This is important.  SEEK MEDICAL CARE IF:  · You received a tetanus shot and you have swelling, severe pain, redness, or bleeding at the injection site.  · You have a fever.  · Your pain is not controlled with medicine.  · You have increased redness, swelling, or pain at the site of your wound.  · You have fluid, blood, or pus coming from your wound.  · You notice a bad smell coming from your wound or your dressing.  SEEK IMMEDIATE MEDICAL CARE IF:  · You have a red streak going away from your wound.     This information is not intended to replace advice given to you by your health care provider. Make sure you discuss any questions you have with your health care provider.    Change dressing when  "saturated: remove dressings on legs and wash with warm soap and water, removing as much dead skin as is tolerable by patient.  Dry legs thoroughly, especially between all toes.  Then apply hydrofiber silver over open areas and place abdominal dressing pads over these.  Wrap with kerlix ( secure with tape that does not touch skin). Cover bilateral feet dorsums with Xeroform then wrap legs with ace wraps ( two 4\" wraps per leg ) up to knees.       Venous Stasis or Chronic Venous Insufficiency  Chronic venous insufficiency, also called venous stasis, is a condition that affects the veins in the legs. The condition prevents blood from being pumped through these veins effectively. Blood may no longer be pumped effectively from the legs back to the heart. This condition can range from mild to severe. With proper treatment, you should be able to continue with an active life.  CAUSES   Chronic venous insufficiency occurs when the vein walls become stretched, weakened, or damaged or when valves within the vein are damaged. Some common causes of this include:  · High blood pressure inside the veins (venous hypertension).  · Increased blood pressure in the leg veins from long periods of sitting or standing.  · A blood clot that blocks blood flow in a vein (deep vein thrombosis).  · Inflammation of a superficial vein (phlebitis) that causes a blood clot to form.  RISK FACTORS  Various things can make you more likely to develop chronic venous insufficiency, including:  · Family history of this condition.  · Obesity.  · Pregnancy.  · Sedentary lifestyle.  · Smoking.  · Jobs requiring long periods of standing or sitting in one place.  · Being a certain age. Women in their 40s and 50s and men in their 70s are more likely to develop this condition.  SIGNS AND SYMPTOMS   Symptoms may include:   · Varicose veins.  · Skin breakdown or ulcers.  · Reddened or discolored skin on the leg.  · Brown, smooth, tight, and painful skin just " "above the ankle, usually on the inside surface (lipodermatosclerosis).  · Swelling.  DIAGNOSIS   To diagnose this condition, your health care provider will take a medical history and do a physical exam. The following tests may be ordered to confirm the diagnosis:  · Duplex ultrasound--A procedure that produces a picture of a blood vessel and nearby organs and also provides information on blood flow through the blood vessel.  · Plethysmography--A procedure that tests blood flow.  · A venogram, or venography--A procedure used to look at the veins using X-ray and dye.  TREATMENT  The goals of treatment are to help you return to an active life and to minimize pain or disability. Treatment will depend on the severity of the condition. Medical procedures may be needed for severe cases. Treatment options may include:   · Use of compression stockings. These can help with symptoms and lower the chances of the problem getting worse, but they do not cure the problem.  · Sclerotherapy--A procedure involving an injection of a material that \"dissolves\" the damaged veins. Other veins in the network of blood vessels take over the function of the damaged veins.  · Surgery to remove the vein or cut off blood flow through the vein (vein stripping or laser ablation surgery).  · Surgery to repair a valve.  HOME CARE INSTRUCTIONS   · Wear compression stockings as directed by your health care provider.  · Only take over-the-counter or prescription medicines for pain, discomfort, or fever as directed by your health care provider.  · Follow up with your health care provider as directed.  SEEK MEDICAL CARE IF:   · You have redness, swelling, or increasing pain in the affected area.  · You see a red streak or line that extends up or down from the affected area.  · You have a breakdown or loss of skin in the affected area, even if the breakdown is small.  · You have an injury to the affected area.  SEEK IMMEDIATE MEDICAL CARE IF:   · You have " an injury and open wound in the affected area.  · Your pain is severe and does not improve with medicine.  · You have sudden numbness or weakness in the foot or ankle below the affected area, or you have trouble moving your foot or ankle.  · You have a fever or persistent symptoms for more than 2-3 days.  · You have a fever and your symptoms suddenly get worse.  MAKE SURE YOU:   · Understand these instructions.  · Will watch your condition.  · Will get help right away if you are not doing well or get worse.     This information is not intended to replace advice given to you by your health care provider. Make sure you discuss any questions you have with your health care provider.       · Is patient discharged on Warfarin / Coumadin?   No     · Is patient Post Blood Transfusion?  No    Depression / Suicide Risk    As you are discharged from this [facility] facility, it is important to learn how to keep safe from harming yourself.    Recognize the warning signs:  · Abrupt changes in personality, positive or negative- including increase in energy   · Giving away possessions  · Change in eating patterns- significant weight changes-  positive or negative  · Change in sleeping patterns- unable to sleep or sleeping all the time   · Unwillingness or inability to communicate  · Depression  · Unusual sadness, discouragement and loneliness  · Talk of wanting to die  · Neglect of personal appearance   · Rebelliousness- reckless behavior  · Withdrawal from people/activities they love  · Confusion- inability to concentrate     If you or a loved one observes any of these behaviors or has concerns about self-harm, here's what you can do:  · Talk about it- your feelings and reasons for harming yourself  · Remove any means that you might use to hurt yourself (examples: pills, rope, extension cords, firearm)  · Get professional help from the community (Mental Health, Substance Abuse, psychological counseling)  · Do not be alone:Call  your Safe Contact- someone whom you trust who will be there for you.  · Call your local CRISIS HOTLINE 429-1870 or 691-304-2111  · Call your local Children's Mobile Crisis Response Team [place] (120) 570-3492 or www.[employer]  · Call the toll free National Suicide Prevention Hotlines   · National Suicide Prevention Lifeline 192-265-LRNB (9123)  · National [place] Line Network 800-SUICIDE (825-9487)

## 2017-02-02 NOTE — PROGRESS NOTES
Received report from night shift RN. Assumed care of patient. Pt assessed and stable. VSS. Patient reports 10/10 pain at this time.  Administered medication for pain.  Discussed plan of care for day with patient and received verbal understanding. Call light within reach, bed in low position.  Educated pt re fall precautions and received verbal understanding.  However, pt continues to refuse bed alarms.  Pt is alert, oriented, and calls appropriately.

## 2017-02-02 NOTE — CARE PLAN
Problem: Venous Thromboembolism (VTW)/Deep Vein Thrombosis (DVT) Prevention:  Goal: Patient will participate in Venous Thrombosis (VTE)/Deep Vein Thrombosis (DVT)Prevention Measures  Pt receiving subq heparin every 8 hours.    Problem: Bowel/Gastric:  Goal: Normal bowel function is maintained or improved  Pt reports normal bowel function.  Pt reports that he only has 2 or 3 bowel movements per week normally.    Problem: Discharge Barriers/Planning  Goal: Patient’s continuum of care needs will be met  Patient to be discharged back to SNF today.  PICC will be discontinued as antibiotics are completed. Dressing changes to be performed at SNF.  Discharge instructions provided.  Received verbal understanding.

## 2017-02-02 NOTE — PROGRESS NOTES
Received report on patient. Patient A&O having pain. Recently medicated. Will continue to monitor his progress.

## 2017-02-02 NOTE — CARE PLAN
Problem: Communication  Goal: The ability to communicate needs accurately and effectively will improve  Outcome: PROGRESSING AS EXPECTED  Patient communicates effectively.  All needs met. Will continue to monitor.         Problem: Safety  Goal: Will remain free from injury  Outcome: PROGRESSING AS EXPECTED  Bed in lowest, locked position.  Treaded slipper socks on, appropriate signs in place, and call light in reach.

## 2017-02-02 NOTE — PROGRESS NOTES
Called [facility] to give report to receiving RN.  No answer.  Left message with Kimberlyn , to have receiving RN return my call.

## 2017-02-02 NOTE — DISCHARGE SUMMARY
CHIEF COMPLAINT ON ADMISSION  Chief Complaint   Patient presents with   • Abnormal Labs     Pt sent from [facility] for elevated potassium. Pts potassium changed from 6.6 to 7.2 in 4 hours per report.       CODE STATUS  Full Code    HPI & HOSPITAL COURSE  This is a 56 y.o. year old male here with hyperkalemia and acute kidney injury. Patient was placed on IV fluids, his IV vancomycin was held and his ACE inhibitor was held. His Cr on admission was 2.50 and down-trended to 0.87 on day of discharge. His potassium normalized. Renal was consulted. Renal U/S was unremarkable. His urine output remained excellent. I talked with infectious disease and patient finished his IV antibiotics on 2/1/17 and no need for further treatment at this time. Wound care was consulted and managed patient's lower extremity chronic venous stasis issues. Afebrile during admission here. I stopped his short acting oxycodone and started oxycontin 30 mg Q12 hours. This could be titrated further in the near future at the SNF. His renal issues were likely a result of dehydration and concurrent use of IV vancomycin for his lower extremity wounds, which is an agent that has well described nephrotoxic qualities. He was evaluated by PT/OT and is in need of additional therapies. He is medically stable for transfer to SNF. Additionally, his PICC line should be removed before discharge today as well.    Therefore, he is discharged in fair and stable condition for further post-acute management.     SPECIFIC OUTPATIENT FOLLOW-UP  -F/U his PCP in 1-2 weeks    DISCHARGE PROBLEM LIST  Active Problems:    Cellulitis POA: Yes    Stasis dermatitis POA: Yes    DM type 2, uncontrolled, with renal complications (CMS-HCC) POA: Yes    HTN (hypertension) POA: Yes    COPD (chronic obstructive pulmonary disease) (CMS-HCC) POA: Yes    Hypothyroidism POA: Yes    [place]ronic respiratory [place] POA: Yes    Morbid obesity with BMI of 45.0-49.9, adult (CMS-HCC)  POA: Yes    Hyperkalemia POA: Yes    JOE (acute kidney injury) (CMS-HCC) POA: Yes  Resolved Problems:   -JOE      FOLLOW UP  No future appointments.  No follow-up provider specified.    MEDICATIONS ON DISCHARGE   Fer Estrada   Home Medication Instructions HIPOLITO:[id number]    Printed on:02/02/17 0803   Medication Information                      albuterol (PROVENTIL) 2.5mg/0.5ml Nebu Soln  2.5 mg by Nebulization route every four hours as needed for Shortness of Breath.             albuterol 108 (90 BASE) MCG/ACT Aero Soln inhalation aerosol  Inhale 2 Puffs by mouth every 6 hours as needed for Shortness of Breath.             budesonide-formoterol (SYMBICORT) 160-4.5 MCG/ACT Aerosol  Inhale 2 Puffs by mouth 2 Times a Day.             carvedilol (COREG) 6.25 MG Tab  Take 1 Tab by mouth 2 times a day, with meals for 30 days.             gabapentin (NEURONTIN) 100 MG Cap  Take 1 Cap by mouth 3 times a day.             hydrALAZINE (APRESOLINE) 25 MG Tab  Take 1 Tab by mouth every 8 hours for 30 days.             isosorbide dinitrate (ISORDIL) 10 MG Tab  Take 1 Tab by mouth 3 times a day for 30 days.             LACTOBACILLUS RHAMNOSUS, GG, PO  Take 1 Cap by mouth 3 times a day.             lisinopril (PRINIVIL) 20 MG Tab  Take 0.5 Tabs by mouth every day.             metformin (GLUCOPHAGE) 850 MG Tab  Take 1 Tab by mouth 2 times a day, with meals.             oxyCODONE CR 30 MG Tablet Extended Release 12 hour Abuse-Deterrent  Take 30 mg by mouth every 12 hours.             oxycodone immediate release (ROXICODONE) 10 MG immediate release tablet  Take 1 Tab by mouth every 6 hours as needed for Severe Pain.             potassium chloride (MICRO-K) 10 MEQ capsule  Take 10 mEq by mouth every day. Pt states he cannot take the tablets                 DIET  Orders Placed This Encounter   Procedures   • Diet Order     Standing Status: Standing      Number of Occurrences: 1      Standing Expiration Date:      Order Specific Question:   Diet:     Answer:  Diabetic [3]       ACTIVITY  As tolerated and directed by skilled nursing.  Class 1 - no symptoms of any kind, and for whom ordinary physical activity does not cause fatigue, palpitations, dyspnea, or anginal pain.    LINES, DRAINS, AND WOUNDS  This is an automated list. Peripheral IVs will be removed prior to discharge.  Central Line Group Right;Basilic Single Lumen;PICC;Power Injection Catheter 4 (Active)   Line Secured Transparent 2/1/2017  8:00 PM   Patency and Function Check Performed at Beginning of Shift 2/1/2017  8:00 PM   Line Necessity Assessed Antibiotic Therapy Greater than 7 Days 2/1/2017  8:00 PM   Consider Removal of Femoral Line Not Applicable 2/1/2017  8:00 PM   Closed Tubing Set Up Yes 2/1/2017  8:00 PM   Hand Washing / Gloves Prior to Every Access Yes 2/1/2017  8:00 PM   Next Daily Chlorhexidine Bath Due (Regional ONLY) 02/01/17 2/1/2017  8:00 PM   Port Access  Scrub the Hub Prior to Access 2/1/2017  8:00 PM   Site Condition / Description Assessed;Patent;Clean;Dry;Intact 2/1/2017  8:00 PM   Signs and Symptoms of Infection None Apparent at this Time 2/1/2017  8:00 PM   Dressing Type / Description Transparent;Clean;Dry;Intact 2/1/2017  8:00 PM   Dressing Status Observed 2/1/2017  8:00 PM   Next Dressing Change  02/04/17 2/1/2017  8:00 PM   Date Primary Tubing Changed 01/31/17 1/31/2017  9:50 AM   Date Secondary Tubing Changed 01/31/17 1/31/2017  9:50 AM   NEXT Primary Tubing Change  02/04/17 2/1/2017  8:00 PM   NEXT Secondary Tubing Change  02/01/17 2/1/2017  8:00 PM   NEXT IV Connector(s) Change Date 02/04/17 1/31/2017  9:50 AM   Waveform Not Applicable 2/1/2017  8:00 PM   Line Calibrated Not Applicable 2/1/2017  8:00 PM       Full Thickness Wound Diabetic Ulcer Left Lateral Digit 2 ;Foot (Active)       Partial Thickness / Superficial Wound Venous Right Lower Leg (Active)       Partial Thickness / Superficial Wound Right;Left Lower Leg (Active)       Pressure Ulcer  Deep Tissue  Injury POA Ischium Right;Left (Active)   Wound Bed Purple 2/1/2017  8:00 PM   Drainage  None 2/1/2017  8:00 PM   Periwound Skin Red 2/1/2017  8:00 PM   Dressing Options Mepilex 2/1/2017  8:00 PM   Dressing Status / Change Observed 2/1/2017  8:00 PM   Dressing Cleansing/Solutions Not Applicable 2/1/2017  8:00 PM   Periwound Protectant Not Applicable 2/1/2017  8:00 PM   Time Spent with Patient (mins) 90 1/31/2017  7:00 PM       Wound POA Venous Ulcer Leg  Lower (Active)       Wound POA Other (comment) Leg Right Lower (Active)       Wound POA Other (comment) Leg Left Lower (Active)       Wound POA Venous Ulcer Leg Right Lower (Active)       Wound POA Venous Ulcer Leg Left Lower (Active)       Wound POA Venous Ulcer Leg Right (Active)   Wound Bed Red;Yellow;Brown 2/1/2017  8:00 PM   Drainage  Moderate;Serous 2/1/2017  8:00 PM   Periwound Skin Red;Discolored;Edematous 2/1/2017  8:00 PM   Cleansing Not Applicable 2/1/2017  8:00 PM   Dressing Options Hydrofiber Silver 2/1/2017  8:00 PM   Dressing Status / Change Observed 2/1/2017  8:00 PM   Dressing Cleansing/Solutions Not Applicable 2/1/2017  8:00 PM   Periwound Protectant Not Applicable 2/1/2017  8:00 PM   Weekly Photo (Inpatient Only) 01/31/17 1/31/2017  6:00 PM   Dressing Change Frequency As Needed 2/1/2017  8:00 PM   Time Spent with Patient (mins) 90 1/31/2017  6:00 PM       Wound POA Venous Ulcer Leg Left (Active)   Wound Bed Red;Yellow 2/1/2017  8:00 PM   Drainage  Moderate;Serous 2/1/2017  8:00 PM   Periwound Skin Pink;Red;Edematous 2/1/2017  8:00 PM   Cleansing Not Applicable 2/1/2017  8:00 PM   Dressing Options Hydrofiber Silver 1/31/2017  8:00 PM   Dressing Status / Change Observed 2/1/2017  8:00 PM   Dressing Cleansing/Solutions Not Applicable 2/1/2017  8:00 PM   Periwound Protectant Not Applicable 2/1/2017  8:00 PM   Weekly Photo (Inpatient Only) 01/31/17 1/31/2017  6:00 PM   Dressing Change Frequency As Needed 2/1/2017  8:00 PM   Time Spent with Patient  (mins) 90 1/31/2017  6:00 PM       Wound Other (comment) Thigh Left Upper (Active)   Wound Bed Red;Pink 1/31/2017  9:50 AM   Drainage  None 1/31/2017  9:50 AM   Periwound Skin Pink 1/31/2017  9:50 AM   Cleansing Not Applicable 1/31/2017  9:50 AM   Dressing Options Open to Air 1/31/2017  9:50 AM   Dressing Status / Change Not Applicable 1/31/2017  9:50 AM   Dressing Cleansing/Solutions Not Applicable 1/31/2017  9:50 AM   Periwound Protectant Not Applicable 1/31/2017  9:50 AM                  MENTAL STATUS ON TRANSFER  Level of Consciousness: Alert  Orientation : Oriented x 4  Speech: Speech Clear    CONSULTATIONS  -Renal    PROCEDURES  -None    IMAGING  Renal U/S-unremarkable    Portable CXR-      Mild pulmonary interstitial edema         LABORATORY  Lab Results   Component Value Date/Time    SODIUM 139 02/02/2017 03:45 AM    POTASSIUM 4.9 02/02/2017 03:45 AM    CHLORIDE 98 02/02/2017 03:45 AM    CO2 34* 02/02/2017 03:45 AM    GLUCOSE 93 02/02/2017 03:45 AM    BUN 32* 02/02/2017 03:45 AM    CREATININE 0.87 02/02/2017 03:45 AM        Lab Results   Component Value Date/Time    WBC 6.3 02/01/2017 04:00 AM    HEMOGLOBIN 12.7* 02/01/2017 04:00 AM    HEMATOCRIT 41.8* 02/01/2017 04:00 AM    PLATELET COUNT 267 02/01/2017 04:00 AM        Total time of the discharge process exceeds 36 minutes.

## 2017-02-05 LAB
BACTERIA BLD CULT: NORMAL
BACTERIA BLD CULT: NORMAL
SIGNIFICANT IND 70042: NORMAL
SIGNIFICANT IND 70042: NORMAL
SITE SITE: NORMAL
SITE SITE: NORMAL
SOURCE SOURCE: NORMAL
SOURCE SOURCE: NORMAL

## 2017-02-07 ENCOUNTER — TELEPHONE (OUTPATIENT)
Dept: INFECTIOUS DISEASES | Facility: MEDICAL CENTER | Age: 57
End: 2017-02-07

## 2017-02-14 ENCOUNTER — TELEPHONE (OUTPATIENT)
Dept: INFECTIOUS DISEASES | Facility: MEDICAL CENTER | Age: 57
End: 2017-02-14

## 2017-02-17 ENCOUNTER — TELEPHONE (OUTPATIENT)
Dept: INFECTIOUS DISEASES | Facility: MEDICAL CENTER | Age: 57
End: 2017-02-17

## 2017-02-17 NOTE — TELEPHONE ENCOUNTER
3rd attempt call. Needs hospital f/v. Left message to call back. Checked with SNF, no patient with this name there.  Letter sent today.

## 2017-02-17 NOTE — Clinical Note
February 17, 2017          Dear Fer Estrada,      Please call us regarding your care.  One of the nurses is available to speak with you Monday through Friday, 8:30 a.m. to 5 p.m.    Please call us at [phone]; thank you for your prompt attention.        Sincerely,          Olamide Cormier M.D.    Electronically Signed

## 2017-02-22 NOTE — TELEPHONE ENCOUNTER
Found out from SNF ([facility]), this patient is no longer with them.  Left message to call back once again

## 2017-07-15 ENCOUNTER — RESOLUTE PROFESSIONAL BILLING HOSPITAL PROF FEE (OUTPATIENT)
Dept: HOSPITALIST | Facility: MEDICAL CENTER | Age: 57
End: 2017-07-15
Payer: MEDICARE

## 2017-07-15 ENCOUNTER — APPOINTMENT (OUTPATIENT)
Dept: RADIOLOGY | Facility: MEDICAL CENTER | Age: 57
DRG: 291 | End: 2017-07-15
Attending: EMERGENCY MEDICINE
Payer: MEDICARE

## 2017-07-15 ENCOUNTER — HOSPITAL ENCOUNTER (INPATIENT)
Facility: MEDICAL CENTER | Age: 57
LOS: 4 days | DRG: 291 | End: 2017-07-19
Attending: EMERGENCY MEDICINE | Admitting: INTERNAL MEDICINE
Payer: MEDICARE

## 2017-07-15 DIAGNOSIS — I50.9 ACUTE CONGESTIVE HEART FAILURE, UNSPECIFIED CONGESTIVE HEART FAILURE TYPE: ICD-10-CM

## 2017-07-15 PROBLEM — I45.10 RIGHT BUNDLE BRANCH BLOCK: Status: ACTIVE | Noted: 2017-07-15

## 2017-07-15 PROBLEM — J96.00 ACUTE RESPIRATORY FAILURE (HCC): Status: ACTIVE | Noted: 2017-07-15

## 2017-07-15 LAB
ALBUMIN SERPL BCP-MCNC: 4.1 G/DL (ref 3.2–4.9)
ALBUMIN/GLOB SERPL: 1.1 G/DL
ALP SERPL-CCNC: 112 U/L (ref 30–99)
ALT SERPL-CCNC: 8 U/L (ref 2–50)
ANION GAP SERPL CALC-SCNC: 8 MMOL/L (ref 0–11.9)
APPEARANCE UR: CLEAR
AST SERPL-CCNC: 16 U/L (ref 12–45)
BASOPHILS # BLD AUTO: 0.2 % (ref 0–1.8)
BASOPHILS # BLD: 0.02 K/UL (ref 0–0.12)
BILIRUB SERPL-MCNC: 0.6 MG/DL (ref 0.1–1.5)
BILIRUB UR QL STRIP.AUTO: NEGATIVE
BNP SERPL-MCNC: 7 PG/ML (ref 0–100)
BUN SERPL-MCNC: 15 MG/DL (ref 8–22)
CALCIUM SERPL-MCNC: 10 MG/DL (ref 8.5–10.5)
CHLORIDE SERPL-SCNC: 99 MMOL/L (ref 96–112)
CO2 SERPL-SCNC: 30 MMOL/L (ref 20–33)
COLOR UR: YELLOW
CREAT SERPL-MCNC: 0.55 MG/DL (ref 0.5–1.4)
EKG IMPRESSION: NORMAL
EOSINOPHIL # BLD AUTO: 0.14 K/UL (ref 0–0.51)
EOSINOPHIL NFR BLD: 1.6 % (ref 0–6.9)
ERYTHROCYTE [DISTWIDTH] IN BLOOD BY AUTOMATED COUNT: 47.1 FL (ref 35.9–50)
GFR SERPL CREATININE-BSD FRML MDRD: >60 ML/MIN/1.73 M 2
GLOBULIN SER CALC-MCNC: 3.9 G/DL (ref 1.9–3.5)
GLUCOSE BLD-MCNC: 149 MG/DL (ref 65–99)
GLUCOSE BLD-MCNC: 217 MG/DL (ref 65–99)
GLUCOSE SERPL-MCNC: 125 MG/DL (ref 65–99)
GLUCOSE UR STRIP.AUTO-MCNC: NEGATIVE MG/DL
HCT VFR BLD AUTO: 43.7 % (ref 42–52)
HGB BLD-MCNC: 13.5 G/DL (ref 14–18)
IMM GRANULOCYTES # BLD AUTO: 0.03 K/UL (ref 0–0.11)
IMM GRANULOCYTES NFR BLD AUTO: 0.3 % (ref 0–0.9)
INR PPP: 1.55 (ref 0.87–1.13)
KETONES UR STRIP.AUTO-MCNC: ABNORMAL MG/DL
LACTATE BLD-SCNC: 1.3 MMOL/L (ref 0.5–2)
LACTATE BLD-SCNC: 1.7 MMOL/L (ref 0.5–2)
LEUKOCYTE ESTERASE UR QL STRIP.AUTO: NEGATIVE
LYMPHOCYTES # BLD AUTO: 1.04 K/UL (ref 1–4.8)
LYMPHOCYTES NFR BLD: 11.8 % (ref 22–41)
MCH RBC QN AUTO: 27.3 PG (ref 27–33)
MCHC RBC AUTO-ENTMCNC: 30.9 G/DL (ref 33.7–35.3)
MCV RBC AUTO: 88.3 FL (ref 81.4–97.8)
MICRO URNS: ABNORMAL
MONOCYTES # BLD AUTO: 0.47 K/UL (ref 0–0.85)
MONOCYTES NFR BLD AUTO: 5.3 % (ref 0–13.4)
NEUTROPHILS # BLD AUTO: 7.13 K/UL (ref 1.82–7.42)
NEUTROPHILS NFR BLD: 80.8 % (ref 44–72)
NITRITE UR QL STRIP.AUTO: NEGATIVE
NRBC # BLD AUTO: 0 K/UL
NRBC BLD AUTO-RTO: 0 /100 WBC
PH UR STRIP.AUTO: 8.5 [PH]
PLATELET # BLD AUTO: 199 K/UL (ref 164–446)
PMV BLD AUTO: 11.6 FL (ref 9–12.9)
POTASSIUM SERPL-SCNC: 4.1 MMOL/L (ref 3.6–5.5)
PROT SERPL-MCNC: 8 G/DL (ref 6–8.2)
PROT UR QL STRIP: NEGATIVE MG/DL
PROTHROMBIN TIME: 19.1 SEC (ref 12–14.6)
RBC # BLD AUTO: 4.95 M/UL (ref 4.7–6.1)
RBC UR QL AUTO: NEGATIVE
SODIUM SERPL-SCNC: 137 MMOL/L (ref 135–145)
SP GR UR STRIP.AUTO: 1.02
TROPONIN I SERPL-MCNC: <0.01 NG/ML (ref 0–0.04)
UROBILINOGEN UR STRIP.AUTO-MCNC: 0.2 MG/DL
WBC # BLD AUTO: 8.8 K/UL (ref 4.8–10.8)

## 2017-07-15 PROCEDURE — 80053 COMPREHEN METABOLIC PANEL: CPT

## 2017-07-15 PROCEDURE — 700102 HCHG RX REV CODE 250 W/ 637 OVERRIDE(OP): Performed by: INTERNAL MEDICINE

## 2017-07-15 PROCEDURE — 96374 THER/PROPH/DIAG INJ IV PUSH: CPT

## 2017-07-15 PROCEDURE — 83880 ASSAY OF NATRIURETIC PEPTIDE: CPT

## 2017-07-15 PROCEDURE — 700111 HCHG RX REV CODE 636 W/ 250 OVERRIDE (IP): Performed by: INTERNAL MEDICINE

## 2017-07-15 PROCEDURE — 93010 ELECTROCARDIOGRAM REPORT: CPT | Performed by: INTERNAL MEDICINE

## 2017-07-15 PROCEDURE — 93005 ELECTROCARDIOGRAM TRACING: CPT | Performed by: EMERGENCY MEDICINE

## 2017-07-15 PROCEDURE — 36415 COLL VENOUS BLD VENIPUNCTURE: CPT

## 2017-07-15 PROCEDURE — 81003 URINALYSIS AUTO W/O SCOPE: CPT

## 2017-07-15 PROCEDURE — 770020 HCHG ROOM/CARE - TELE (206)

## 2017-07-15 PROCEDURE — 96375 TX/PRO/DX INJ NEW DRUG ADDON: CPT

## 2017-07-15 PROCEDURE — 82962 GLUCOSE BLOOD TEST: CPT | Mod: 91

## 2017-07-15 PROCEDURE — 71010 DX-CHEST-PORTABLE (1 VIEW): CPT

## 2017-07-15 PROCEDURE — 93005 ELECTROCARDIOGRAM TRACING: CPT

## 2017-07-15 PROCEDURE — 99285 EMERGENCY DEPT VISIT HI MDM: CPT

## 2017-07-15 PROCEDURE — 93005 ELECTROCARDIOGRAM TRACING: CPT | Performed by: INTERNAL MEDICINE

## 2017-07-15 PROCEDURE — 700111 HCHG RX REV CODE 636 W/ 250 OVERRIDE (IP): Performed by: EMERGENCY MEDICINE

## 2017-07-15 PROCEDURE — 87077 CULTURE AEROBIC IDENTIFY: CPT

## 2017-07-15 PROCEDURE — A9270 NON-COVERED ITEM OR SERVICE: HCPCS | Performed by: NURSE PRACTITIONER

## 2017-07-15 PROCEDURE — 84484 ASSAY OF TROPONIN QUANT: CPT

## 2017-07-15 PROCEDURE — 85610 PROTHROMBIN TIME: CPT

## 2017-07-15 PROCEDURE — 87040 BLOOD CULTURE FOR BACTERIA: CPT

## 2017-07-15 PROCEDURE — 85025 COMPLETE CBC W/AUTO DIFF WBC: CPT

## 2017-07-15 PROCEDURE — 83605 ASSAY OF LACTIC ACID: CPT

## 2017-07-15 PROCEDURE — 94760 N-INVAS EAR/PLS OXIMETRY 1: CPT

## 2017-07-15 PROCEDURE — A9270 NON-COVERED ITEM OR SERVICE: HCPCS | Performed by: INTERNAL MEDICINE

## 2017-07-15 PROCEDURE — 99223 1ST HOSP IP/OBS HIGH 75: CPT | Performed by: INTERNAL MEDICINE

## 2017-07-15 PROCEDURE — 700102 HCHG RX REV CODE 250 W/ 637 OVERRIDE(OP): Performed by: NURSE PRACTITIONER

## 2017-07-15 RX ORDER — ONDANSETRON 2 MG/ML
4 INJECTION INTRAMUSCULAR; INTRAVENOUS EVERY 4 HOURS PRN
Status: DISCONTINUED | OUTPATIENT
Start: 2017-07-15 | End: 2017-07-19 | Stop reason: HOSPADM

## 2017-07-15 RX ORDER — LORAZEPAM 0.5 MG/1
0.5 TABLET ORAL EVERY 6 HOURS PRN
Status: DISCONTINUED | OUTPATIENT
Start: 2017-07-15 | End: 2017-07-19 | Stop reason: HOSPADM

## 2017-07-15 RX ORDER — POTASSIUM CHLORIDE 20 MEQ/1
40 TABLET, EXTENDED RELEASE ORAL 2 TIMES DAILY
Status: DISCONTINUED | OUTPATIENT
Start: 2017-07-15 | End: 2017-07-15

## 2017-07-15 RX ORDER — BUTALBITAL, ACETAMINOPHEN AND CAFFEINE 50; 325; 40 MG/1; MG/1; MG/1
1 TABLET ORAL EVERY 6 HOURS PRN
Status: DISCONTINUED | OUTPATIENT
Start: 2017-07-15 | End: 2017-07-19 | Stop reason: HOSPADM

## 2017-07-15 RX ORDER — BISACODYL 10 MG
10 SUPPOSITORY, RECTAL RECTAL
Status: DISCONTINUED | OUTPATIENT
Start: 2017-07-15 | End: 2017-07-19 | Stop reason: HOSPADM

## 2017-07-15 RX ORDER — POTASSIUM CHLORIDE 20 MEQ/1
40 TABLET, EXTENDED RELEASE ORAL 2 TIMES DAILY
Status: DISCONTINUED | OUTPATIENT
Start: 2017-07-15 | End: 2017-07-19

## 2017-07-15 RX ORDER — SODIUM CHLORIDE 9 MG/ML
1000 INJECTION, SOLUTION INTRAVENOUS
Status: DISCONTINUED | OUTPATIENT
Start: 2017-07-15 | End: 2017-07-15

## 2017-07-15 RX ORDER — OXYCODONE HYDROCHLORIDE 30 MG/1
30 TABLET ORAL EVERY 8 HOURS PRN
Status: DISCONTINUED | OUTPATIENT
Start: 2017-07-15 | End: 2017-07-16

## 2017-07-15 RX ORDER — POLYETHYLENE GLYCOL 3350 17 G/17G
1 POWDER, FOR SOLUTION ORAL
Status: DISCONTINUED | OUTPATIENT
Start: 2017-07-15 | End: 2017-07-19 | Stop reason: HOSPADM

## 2017-07-15 RX ORDER — PROMETHAZINE HYDROCHLORIDE 25 MG/1
12.5-25 SUPPOSITORY RECTAL EVERY 4 HOURS PRN
Status: DISCONTINUED | OUTPATIENT
Start: 2017-07-15 | End: 2017-07-19 | Stop reason: HOSPADM

## 2017-07-15 RX ORDER — POTASSIUM CHLORIDE 20 MEQ/1
40 TABLET, EXTENDED RELEASE ORAL 2 TIMES DAILY
Status: DISCONTINUED | OUTPATIENT
Start: 2017-07-16 | End: 2017-07-15

## 2017-07-15 RX ORDER — ONDANSETRON 4 MG/1
4 TABLET, ORALLY DISINTEGRATING ORAL EVERY 4 HOURS PRN
Status: DISCONTINUED | OUTPATIENT
Start: 2017-07-15 | End: 2017-07-19 | Stop reason: HOSPADM

## 2017-07-15 RX ORDER — OXYCODONE HYDROCHLORIDE 10 MG/1
10 TABLET ORAL EVERY 6 HOURS PRN
Status: DISCONTINUED | OUTPATIENT
Start: 2017-07-15 | End: 2017-07-15

## 2017-07-15 RX ORDER — FUROSEMIDE 10 MG/ML
40 INJECTION INTRAMUSCULAR; INTRAVENOUS
Status: DISCONTINUED | OUTPATIENT
Start: 2017-07-15 | End: 2017-07-18

## 2017-07-15 RX ORDER — ACETAMINOPHEN 325 MG/1
650 TABLET ORAL EVERY 6 HOURS PRN
Status: DISCONTINUED | OUTPATIENT
Start: 2017-07-15 | End: 2017-07-19 | Stop reason: HOSPADM

## 2017-07-15 RX ORDER — HEPARIN SODIUM 5000 [USP'U]/ML
5000 INJECTION, SOLUTION INTRAVENOUS; SUBCUTANEOUS EVERY 8 HOURS
Status: DISCONTINUED | OUTPATIENT
Start: 2017-07-15 | End: 2017-07-19 | Stop reason: HOSPADM

## 2017-07-15 RX ORDER — LORAZEPAM 2 MG/ML
0.5 INJECTION INTRAMUSCULAR EVERY 6 HOURS PRN
Status: DISCONTINUED | OUTPATIENT
Start: 2017-07-15 | End: 2017-07-19 | Stop reason: HOSPADM

## 2017-07-15 RX ORDER — GABAPENTIN 100 MG/1
100 CAPSULE ORAL 3 TIMES DAILY
Status: DISCONTINUED | OUTPATIENT
Start: 2017-07-15 | End: 2017-07-19 | Stop reason: HOSPADM

## 2017-07-15 RX ORDER — LISINOPRIL 10 MG/1
10 TABLET ORAL DAILY
Status: DISCONTINUED | OUTPATIENT
Start: 2017-07-15 | End: 2017-07-18

## 2017-07-15 RX ORDER — OXYCODONE HCL 10 MG/1
30 TABLET, FILM COATED, EXTENDED RELEASE ORAL EVERY 12 HOURS
Status: DISCONTINUED | OUTPATIENT
Start: 2017-07-15 | End: 2017-07-15

## 2017-07-15 RX ORDER — PROMETHAZINE HYDROCHLORIDE 25 MG/1
12.5-25 TABLET ORAL EVERY 4 HOURS PRN
Status: DISCONTINUED | OUTPATIENT
Start: 2017-07-15 | End: 2017-07-19 | Stop reason: HOSPADM

## 2017-07-15 RX ORDER — ONDANSETRON 2 MG/ML
4 INJECTION INTRAMUSCULAR; INTRAVENOUS ONCE
Status: COMPLETED | OUTPATIENT
Start: 2017-07-15 | End: 2017-07-15

## 2017-07-15 RX ORDER — LABETALOL HYDROCHLORIDE 5 MG/ML
10 INJECTION, SOLUTION INTRAVENOUS EVERY 4 HOURS PRN
Status: DISCONTINUED | OUTPATIENT
Start: 2017-07-15 | End: 2017-07-19 | Stop reason: HOSPADM

## 2017-07-15 RX ORDER — AMOXICILLIN 250 MG
2 CAPSULE ORAL 2 TIMES DAILY
Status: DISCONTINUED | OUTPATIENT
Start: 2017-07-15 | End: 2017-07-19 | Stop reason: HOSPADM

## 2017-07-15 RX ORDER — MORPHINE SULFATE 4 MG/ML
4 INJECTION, SOLUTION INTRAMUSCULAR; INTRAVENOUS ONCE
Status: COMPLETED | OUTPATIENT
Start: 2017-07-15 | End: 2017-07-15

## 2017-07-15 RX ORDER — BUDESONIDE AND FORMOTEROL FUMARATE DIHYDRATE 160; 4.5 UG/1; UG/1
2 AEROSOL RESPIRATORY (INHALATION) 2 TIMES DAILY
Status: DISCONTINUED | OUTPATIENT
Start: 2017-07-15 | End: 2017-07-19 | Stop reason: HOSPADM

## 2017-07-15 RX ADMIN — LISINOPRIL 10 MG: 10 TABLET ORAL at 09:12

## 2017-07-15 RX ADMIN — OXYCODONE HYDROCHLORIDE 30 MG: 30 TABLET ORAL at 16:15

## 2017-07-15 RX ADMIN — GABAPENTIN 100 MG: 100 CAPSULE ORAL at 16:15

## 2017-07-15 RX ADMIN — FUROSEMIDE 40 MG: 10 INJECTION, SOLUTION INTRAMUSCULAR; INTRAVENOUS at 05:06

## 2017-07-15 RX ADMIN — OXYCODONE HYDROCHLORIDE 10 MG: 10 TABLET ORAL at 03:44

## 2017-07-15 RX ADMIN — ONDANSETRON 4 MG: 2 INJECTION INTRAMUSCULAR; INTRAVENOUS at 02:57

## 2017-07-15 RX ADMIN — HEPARIN SODIUM 5000 UNITS: 5000 INJECTION, SOLUTION INTRAVENOUS; SUBCUTANEOUS at 13:02

## 2017-07-15 RX ADMIN — ACETAMINOPHEN 650 MG: 325 TABLET, FILM COATED ORAL at 05:05

## 2017-07-15 RX ADMIN — BUTALBITAL, ACETAMINOPHEN, AND CAFFEINE 1 TABLET: 50; 325; 40 TABLET ORAL at 22:49

## 2017-07-15 RX ADMIN — LORAZEPAM 0.5 MG: 0.5 TABLET ORAL at 09:17

## 2017-07-15 RX ADMIN — POTASSIUM CHLORIDE 40 MEQ: 1500 TABLET, EXTENDED RELEASE ORAL at 21:10

## 2017-07-15 RX ADMIN — ACETAMINOPHEN 650 MG: 325 TABLET, FILM COATED ORAL at 13:01

## 2017-07-15 RX ADMIN — MORPHINE SULFATE 4 MG: 4 INJECTION INTRAVENOUS at 01:50

## 2017-07-15 RX ADMIN — OXYCODONE HYDROCHLORIDE 10 MG: 10 TABLET ORAL at 10:43

## 2017-07-15 RX ADMIN — GABAPENTIN 100 MG: 100 CAPSULE ORAL at 20:09

## 2017-07-15 RX ADMIN — GABAPENTIN 100 MG: 100 CAPSULE ORAL at 09:12

## 2017-07-15 RX ADMIN — ONDANSETRON 4 MG: 4 TABLET, ORALLY DISINTEGRATING ORAL at 05:17

## 2017-07-15 RX ADMIN — HEPARIN SODIUM 5000 UNITS: 5000 INJECTION, SOLUTION INTRAVENOUS; SUBCUTANEOUS at 20:06

## 2017-07-15 RX ADMIN — HEPARIN SODIUM 5000 UNITS: 5000 INJECTION, SOLUTION INTRAVENOUS; SUBCUTANEOUS at 05:06

## 2017-07-15 RX ADMIN — OXYCODONE HYDROCHLORIDE 30 MG: 10 TABLET, FILM COATED, EXTENDED RELEASE ORAL at 09:10

## 2017-07-15 RX ADMIN — FUROSEMIDE 40 MG: 10 INJECTION, SOLUTION INTRAMUSCULAR; INTRAVENOUS at 16:15

## 2017-07-15 RX ADMIN — INSULIN LISPRO 3 UNITS: 100 INJECTION, SOLUTION INTRAVENOUS; SUBCUTANEOUS at 18:09

## 2017-07-15 ASSESSMENT — ENCOUNTER SYMPTOMS
COUGH: 0
DIZZINESS: 0
ORTHOPNEA: 0
STRIDOR: 0
LOSS OF CONSCIOUSNESS: 0
ABDOMINAL PAIN: 1
WEAKNESS: 0
CHILLS: 0
SHORTNESS OF BREATH: 1
FEVER: 0
NAUSEA: 1
CONSTIPATION: 0
CLAUDICATION: 0
HEADACHES: 0
FALLS: 0
MYALGIAS: 0
SPUTUM PRODUCTION: 0
DEPRESSION: 0
VOMITING: 1
DIARRHEA: 1
PND: 0
PALPITATIONS: 0
TINGLING: 0

## 2017-07-15 ASSESSMENT — LIFESTYLE VARIABLES
EVER HAD A DRINK FIRST THING IN THE MORNING TO STEADY YOUR NERVES TO GET RID OF A HANGOVER: NO
DO YOU DRINK ALCOHOL: YES
ALCOHOL_USE: NO
TOTAL SCORE: 0
EVER_SMOKED: YES
HAVE PEOPLE ANNOYED YOU BY CRITICIZING YOUR DRINKING: NO
EVER FELT BAD OR GUILTY ABOUT YOUR DRINKING: NO
HAVE YOU EVER FELT YOU SHOULD CUT DOWN ON YOUR DRINKING: NO
TOTAL SCORE: 0
TOTAL SCORE: 0
CONSUMPTION TOTAL: INCOMPLETE

## 2017-07-15 ASSESSMENT — PAIN SCALES - GENERAL
PAINLEVEL_OUTOF10: 6
PAINLEVEL_OUTOF10: 6
PAINLEVEL_OUTOF10: 4
PAINLEVEL_OUTOF10: 10
PAINLEVEL_OUTOF10: 3
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
## 2017-07-15 NOTE — ASSESSMENT & PLAN NOTE
Presented on admission with dyspnea. Improved with diuresis.  - likely non-adherent with low salt diet; medications; missed some lasix doses  - echo nl LVEF with grade II diastolic dysfunction  - he does not have systolic dysfunction  - CHF teaching at bedside provided by navigator  - Reviewed I/Os; net negative  - Switched to Lasix PO  - reduced potassium supplementation as K was rising to 4.8.

## 2017-07-15 NOTE — ED NOTES
"Fer Estrada  56 y.o.  Chief Complaint   Patient presents with   • Shortness of Breath     pt. is having a CHF  exacerbation with increasing SOB and bilateral 4+ pitting edema and redness in his legs.   • Drug Withdrawal     Pt. states having withdrawals from oxycodone.   • Chest Pain     Pt. states centralized chest pain that has not been relieved with 0.4mg of nitro. 325 ASA, and 1inch of nitro paste per EMS.     /72 mmHg  Pulse 87  Temp(Src) 37.1 °C (98.7 °F)  Resp 23  Ht 1.727 m (5' 8\")  Wt 131.543 kg (290 lb)  BMI 44.10 kg/m2  SpO2 98%   Pt. Has a 22 G IV in his Right thumb placed PTA by EMS  "

## 2017-07-15 NOTE — ED NOTES
"Pt. Sitting at edge of gurney with steady posture as pt. States \"it is easier to breath.\" Pt. Updated on the plan of care to be admitted. Will continue to monitor.  "

## 2017-07-15 NOTE — ASSESSMENT & PLAN NOTE
- chronic severe stasis dermatitis and lymphedema of both LE's  - wound care saw him today - no gross evidence of cellulitis or abscess -- there were two spots with superficial drainage noted when scabs removed

## 2017-07-15 NOTE — ASSESSMENT & PLAN NOTE
- d/t chf exacerbation and obesity  - wean O2 as tolerated; as he is diuresed  - exercise oximetry done, he required oxygen at dischagre; O2 being arranged.

## 2017-07-15 NOTE — ASSESSMENT & PLAN NOTE
- on chronic high oxycodone dose at home  - patient does not have any more nausea, and has no withdrawal symptoms at this time; eg no anxiety, tremors, hypertension or tachycardia  - he is requesting to go back on his schedule of outpatient oxycodone (his nurse verified his prescription with his pharmacy and pt received oxycodone IR 30mg Q4-6 hours prn at home  - f/u to his primary care physician/pain clinic for opioid tapering

## 2017-07-15 NOTE — IP AVS SNAPSHOT
" <p align=\"LEFT\"><IMG SRC=\"//[email or web address]$/[email or web address]\" alt=\"Image\" WIDTH=\"50%\" HEIGHT=\"200\" BORDER=\"\"></p>                   Name:Fer Estrada  Medical Record Number:[id number]  CSN: [id number]    YOB: 1960   Age: 56 y.o.  Sex: male  HT:1.727 m (5' 8\") WT: 125.8 kg (277 lb 5.4 oz)          Admit Date: 7/15/2017     Discharge Date:   Today's Date: 7/19/2017  Attending Doctor:  Benjie Marcus M.D.                  Allergies:  Review of patient's allergies indicates no known allergies.          Your appointments     Jul 20, 2017  9:20 AM   CARE MANAGER TELEPHONE VISIT with CARE MANAGER   [address]  [place] [id number] [phone]           ***IMPORTANT**** This is a phone visit only. Do not come into the office. The Care Manager will call you at the scheduled time for Care Manager Telephone Visit.            Jul 21, 2017 10:00 AM   Established Patient with HUNTER Castelan   [address]  [place] [id number] [phone]           You will be receiving a confirmation call a few days before your appointment from our automated call confirmation system.            Aug 21, 2017  3:15 PM   Heart Failure New with Zay Avilez M.D.   [facility] (--)    [address]  [place] [id number] [phone]              Follow-up Information     1. Follow up with [facility] ([facility]) .    Specialty:  Home Health Services    Contact information    [address]  [facility] [address] [phone]         Medication List      Take these Medications        Instructions    acetaminophen 325 MG Tabs   Commonly known as:  TYLENOL    Take 2 Tabs by mouth every 6 hours as needed (Mild Pain; (Pain scale 1-3); Temp greater than 100.5 F).   Dose:  650 mg       * albuterol 108 (90 BASE) MCG/ACT Aers inhalation aerosol    Inhale 2 Puffs by " mouth every 6 hours as needed for Shortness of Breath.   Dose:  2 Puff       * albuterol 2.5mg/0.5ml Nebu   Commonly known as:  PROVENTIL    2.5 mg by Nebulization route every four hours as needed for Shortness of Breath.   Dose:  2.5 mg       ammonium lactate 12 % Lotn   Commonly known as:  LAC-HYDRIN    Apply 1 Application to affected area(s) 2 Times a Day.   Dose:  1 Application       budesonide-formoterol 160-4.5 MCG/ACT Aero   Commonly known as:  SYMBICORT    Inhale 2 Puffs by mouth 2 Times a Day.   Dose:  2 Puff       furosemide 40 MG Tabs   Commonly known as:  LASIX    Take 1 Tab by mouth every day.   Dose:  40 mg       gabapentin 100 MG Caps   Commonly known as:  NEURONTIN    Take 1 Cap by mouth 3 times a day.   Dose:  100 mg       LACTOBACILLUS RHAMNOSUS (GG) PO    Take 1 Cap by mouth 3 times a day.   Dose:  1 Cap       lisinopril 20 MG Tabs   Commonly known as:  PRINIVIL    Take 0.5 Tabs by mouth every day.   Dose:  10 mg       metformin 850 MG Tabs   Commonly known as:  GLUCOPHAGE    Take 1 Tab by mouth 2 times a day, with meals.   Dose:  850 mg       * OxyCODONE HCl ER 30 MG T12a    Take 30 mg by mouth every 12 hours.   Dose:  30 mg       * oxycodone immediate release 10 MG immediate release tablet   Commonly known as:  ROXICODONE    Take 1 Tab by mouth every 6 hours as needed for Severe Pain.   Dose:  10 mg       polyethylene glycol/lytes Pack   Commonly known as:  MIRALAX    Take 1 Packet by mouth 1 time daily as needed (if sennosides and docusate ineffective after 24 hours).   Dose:  17 g       potassium chloride 10 MEQ capsule   Commonly known as:  MICRO-K    Take 10 mEq by mouth every day. Pt states he cannot take the tablets   Dose:  10 mEq       senna-docusate 8.6-50 MG Tabs   Commonly known as:  PERICOLACE or SENOKOT S    Take 2 Tabs by mouth 2 Times a Day.   Dose:  2 Tab       * Notice:  This list has 4 medication(s) that are the same as other medications prescribed for you. Read the directions  carefully, and ask your doctor or other care provider to review them with you.

## 2017-07-15 NOTE — IP AVS SNAPSHOT
Capical Access Code: [email or web address]  Expires: 8/3/2017  4:14 AM    Your email address is not on file at [facility].  Email Addresses are required for you to sign up for Capical, please contact [phone] to verify your personal information and to provide your email address prior to attempting to register for Capical.    Fer Estrada  [address] [place] [address]    Capical  A secure, online tool to manage your health information     [facility]’s Capical® is a secure, online tool that connects you to your personalized health information from the privacy of your home -- day or night - making it very easy for you to manage your healthcare. Once the activation process is completed, you can even access your medical information using the Capical clifford, which is available for free in the Apple Clifford store or Google Play store.     To learn more about Capical, visit [email or web address]    There are two levels of access available (as shown below):   My Chart Features  [facility] Primary Care Doctor [facility]  Specialists [facility]  Urgent  Care Non-[facility] Primary Care Doctor   Email your healthcare team securely and privately 24/7 X X X    Manage appointments: schedule your next appointment; view details of past/upcoming appointments X      Request prescription refills. X      View recent personal medical records, including lab and immunizations X X X X   View health record, including health history, allergies, medications X X X X   Read reports about your outpatient visits, procedures, consult and ER notes X X X X   See your discharge summary, which is a recap of your hospital and/or ER visit that includes your diagnosis, lab results, and care plan X X  X     How to register for Cylandet:  Once your e-mail address has been verified, follow the following steps to sign up for Cylandet.     1. Go to  [email or web address]  2. Click on the Sign Up Now box, which takes you to the New Member Sign Up page. You will  need to provide the following information:  a. Enter your Lingor[facility] Access Code exactly as it appears at the top of this page. (You will not need to use this code after you’ve completed the sign-up process. If you do not sign up before the expiration date, you must request a new code.)   b. Enter your date of birth.   c. Enter your home email address.   d. Click Submit, and follow the next screen’s instructions.  3. Create a Axedat ID. This will be your Lingorami login ID and cannot be changed, so think of one that is secure and easy to remember.  4. Create a Lingorami password. You can change your password at any time.  5. Enter your Password Reset Question and Answer. This can be used at a later time if you forget your password.   6. Enter your e-mail address. This allows you to receive e-mail notifications when new information is available in Lingorami.  7. Click Sign Up. You can now view your health information.    For assistance activating your Lingorami account, call [phone]

## 2017-07-15 NOTE — IP AVS SNAPSHOT
7/19/2017    Fer Estrada  [address]  [facility] [address]    Dear Fer:    [facility] wants to ensure your discharge home is safe and you or your loved ones have had all of your questions answered regarding your care after you leave the hospital.    Below is a list of resources and contact information should you have any questions regarding your hospital stay, follow-up instructions, or active medical symptoms.    Questions or Concerns Regarding… Contact   Medical Questions Related to Your Discharge  (7 days a week, 8am-5pm) Contact a Nurse Care Coordinator   [phone]   Medical Questions Not Related to Your Discharge  (24 hours a day / 7 days a week)  Contact the Nurse Health Line   [phone]    Medications or Discharge Instructions Refer to your discharge packet   or contact your [facility] Primary Care Provider   [phone]   Follow-up Appointment(s) Schedule your appointment via Vig[facility]   or contact Scheduling [phone]   Billing Review your statement via Vigilos  or contact Billing [phone]   Medical Records Review your records via Vigilos   or contact Medical Records [phone]     You may receive a telephone call within two days of discharge. This call is to make certain you understand your discharge instructions and have the opportunity to have any questions answered. You can also easily access your medical information, test results and upcoming appointments via the Vigilos free online health management tool. You can learn more and sign up at Pump!/Vigilos. For assistance setting up your Vigilos account, please call [phone].    Once again, we want to ensure your discharge home is safe and that you have a clear understanding of any next steps in your care. If you have any questions or concerns, please do not hesitate to contact us, we are here for you. Thank you for choosing [facility] for your healthcare needs.    Sincerely,    Your [facility] Healthcare Team

## 2017-07-15 NOTE — ASSESSMENT & PLAN NOTE
- continue lisinopril  - adjust dose as tolerates  - now low normal SBPs, reduced dose of lisinopril and diuretics also transitioned PO

## 2017-07-15 NOTE — CONSULTS
Cardiology Consult Note    Date & Time note created:    7/15/2017   6:09 AM       Patient ID:   Name:             Fer Estrada     YOB: 1960  Age:                 56 y.o.  male   MRN:               [id number]               Referring Physician: Dr. Vicente                                                  Reason for Consult:      STEMI    History of Present Illness:    56-year-old male with reported history of hypertension, diabetes, COPD and congestive heart failure who presented to the ER with worsening shortness of breath and lower extremity edema. Reports that he has been having worsening dyspnea for the past few days. He also reports lower extremity edema for the past many years which has now worsened. Reports compliance with his medications. Denies any chest discomfort. No PND or orthopnea. Patient is unable to give me any history about his congestive heart failure. No cardiology notes that I could find based on my review of the chart.  Complaints about pain right now as he lost his narcotics.    Quit smoking many years ago. No alcohol or recreational drug use.    Review of Systems:      A full review of systems was completed and all pertinent positives and negatives are included in the HPI above.    Past Medical History:   Past Medical History   Diagnosis Date   • Type II or unspecified type diabetes mellitus without mention of complication, not stated as uncontrolled    • Congestive heart failure (CMS-[facility])    • Hypertension    • Asthma    • Chronic obstructive pulmonary disease (CMS-[facility])      Active Hospital Problems    Diagnosis   • Acute on chronic congestive heart failure (CMS-[facility]) [I50.9]     Priority: High   • Acute respiratory failure (CMS-[facility]) [J96.00]     Priority: High   • Stasis dermatitis [I87.2]     Priority: Medium   • COPD (chronic obstructive pulmonary disease) (CMS-[facility]) [J44.9]     Priority: Medium   • HTN (hypertension) [I10]     Priority: Medium   • Narcotic withdrawal  (CMS-[facility]) [F11.23]     Priority: Medium   • Right bundle branch block [I45.10]   • Diabetes type 2, controlled (CMS-[facility]) [E11.9]       Past Surgical History:  History reviewed. No pertinent past surgical history.    Family History:  History reviewed. No pertinent family history.    Social History:  Social History     Social History   • Marital Status:      Spouse Name: N/A   • Number of Children: N/A   • Years of Education: N/A     Occupational History   • Not on file.     Social History Main Topics   • Smoking status: Former Smoker     Quit date: 12/14/2005   • Smokeless tobacco: Never Used   • Alcohol Use: No   • Drug Use: No   • Sexual Activity: Not on file     Other Topics Concern   • Not on file     Social History Narrative     Quit smoking many years ago. No alcohol or recreational drug use.    Hospital Medications:    Current facility-administered medications:   •  albuterol (PROVENTIL) 2.5mg/0.5ml nebulizer solution 2.5 mg, 2.5 mg, Nebulization, Q4HRS PRN, DAVONTE LawsO.  •  budesonide-formoterol (SYMBICORT) 160-4.5 MCG/ACT inhaler 2 Puff, 2 Puff, Inhalation, BID, YVROSE Laws.O.  •  gabapentin (NEURONTIN) capsule 100 mg, 100 mg, Oral, TID, YVROSE Laws.O.  •  lisinopril (PRINIVIL) 10 MG tablet 10 mg, 10 mg, Oral, DAILY, YVROSE Laws.O.  •  oxyCODONE CR (OXYCONTIN) tablet 30 mg, 30 mg, Oral, Q12HRS, DAVONTE LawsO.  •  oxycodone immediate release (ROXICODONE) tablet 10 mg, 10 mg, Oral, Q6HRS PRN, YVROSE Laws.O., 10 mg at 07/15/17 0344  •  senna-docusate (PERICOLACE or SENOKOT S) 8.6-50 MG per tablet 2 Tab, 2 Tab, Oral, BID **AND** polyethylene glycol/lytes (MIRALAX) PACKET 1 Packet, 1 Packet, Oral, QDAY PRN **AND** magnesium hydroxide (MILK OF MAGNESIA) suspension 30 mL, 30 mL, Oral, QDAY PRN **AND** bisacodyl (DULCOLAX) suppository 10 mg, 10 mg, Rectal, QDAY PRN, DAVONTE LawsO.  •  heparin injection 5,000 Units, 5,000 Units, Subcutaneous, Q8HRS, Fer ANDERS  DAVONTE ElO., 5,000 Units at 07/15/17 0506  •  acetaminophen (TYLENOL) tablet 650 mg, 650 mg, Oral, Q6HRS PRN, DAVONTE LawsO., 650 mg at 07/15/17 0505  •  labetalol (NORMODYNE,TRANDATE) injection 10 mg, 10 mg, Intravenous, Q4HRS PRN, DAVONTE LawsO.  •  furosemide (LASIX) injection 40 mg, 40 mg, Intravenous, BID DIURETIC, DAVONTE LawsO., 40 mg at 07/15/17 0506  •  potassium chloride SA (Kdur) tablet 40 mEq, 40 mEq, Oral, BID, DAVONTE LawsO.  •  insulin lispro (HUMALOG) injection 2-9 Units, 2-9 Units, Subcutaneous, 4X/DAY ACHS, DAVONTE LawsO.  •  ondansetron (ZOFRAN) syringe/vial injection 4 mg, 4 mg, Intravenous, Q4HRS PRN, DAVONTE LawsO.  •  ondansetron (ZOFRAN ODT) dispertab 4 mg, 4 mg, Oral, Q4HRS PRN, YVROSE Laws.O., 4 mg at 07/15/17 0517  •  promethazine (PHENERGAN) tablet 12.5-25 mg, 12.5-25 mg, Oral, Q4HRS PRN, DAVONTE LawsO.  •  promethazine (PHENERGAN) suppository 12.5-25 mg, 12.5-25 mg, Rectal, Q4HRS PRN, YVROSE Laws.O.  •  prochlorperazine (COMPAZINE) injection 5-10 mg, 5-10 mg, Intravenous, Q4HRS PRN, YVROSE Laws.O.  •  lorazepam (ATIVAN) tablet 0.5 mg, 0.5 mg, Oral, Q6HRS PRN **OR** lorazepam (ATIVAN) injection 0.5 mg, 0.5 mg, Intravenous, Q6HRS PRN, DAVONTE LawsO.    Current Outpatient Medications:  Prescriptions prior to admission   Medication Sig Dispense Refill Last Dose   • lisinopril (PRINIVIL) 20 MG Tab Take 0.5 Tabs by mouth every day. 30 Tab 1    • gabapentin (NEURONTIN) 100 MG Cap Take 1 Cap by mouth 3 times a day. 90 Cap 0    • oxyCODONE CR 30 MG Tablet Extended Release 12 hour Abuse-Deterrent Take 30 mg by mouth every 12 hours. 60 Each 0    • oxycodone immediate release (ROXICODONE) 10 MG immediate release tablet Take 1 Tab by mouth every 6 hours as needed for Severe Pain. 30 Tab 0    • LACTOBACILLUS RHAMNOSUS, GG, PO Take 1 Cap by mouth 3 times a day.   1/30/2017 at Unknown time   • albuterol (PROVENTIL) 2.5mg/0.5ml  "Nebu Soln 2.5 mg by Nebulization route every four hours as needed for Shortness of Breath.   unknown at Unknown time   • potassium chloride (MICRO-K) 10 MEQ capsule Take 10 mEq by mouth every day. Pt states he cannot take the tablets   1/30/2017 at am   • budesonide-formoterol (SYMBICORT) 160-4.5 MCG/ACT Aerosol Inhale 2 Puffs by mouth 2 Times a Day.   1/30/2017 at pm   • albuterol 108 (90 BASE) MCG/ACT Aero Soln inhalation aerosol Inhale 2 Puffs by mouth every 6 hours as needed for Shortness of Breath.   unknown at Unknown time   • metformin (GLUCOPHAGE) 850 MG Tab Take 1 Tab by mouth 2 times a day, with meals. 60 Tab 1 1/30/2017 at pm       Medication Allergy:  No Known Allergies    Physical Exam:  Vitals/ General Appearance:   Weight/BMI: Body mass index is 44.1 kg/(m^2).  Blood pressure 145/80, pulse 70, temperature 36.4 °C (97.6 °F), resp. rate 14, height 1.727 m (5' 8\"), weight 131.543 kg (290 lb), SpO2 94 %.  Filed Vitals:    07/15/17 0300 07/15/17 0330 07/15/17 0400 07/15/17 0436   BP:    145/80   Pulse: 77 79 71 70   Temp:    36.4 °C (97.6 °F)   Resp: 27 31 30 14   Height:       Weight:       SpO2: 97% 97% 97% 94%       Constitutional:   Well developed, Well nourished, appears short of breath  HEENT:  Normocephalic, Atraumatic  Eyes: Conjunctiva normal  Neck:  Normal range of motion, no JVD.  Cardiovascular: Faint heart sounds, regular. Extremitites with chronic changes suggestive of lymphedema bilaterally   Lungs:  diminished breath sounds bilaterally .   Abdomen:  Soft, No tenderness  Skin: Chronic skin changes in the lower extremities  Neurologic: Alert & oriented x 3  Psychiatric: Affect normal    MDM (Data Review):     Records reviewed and summarized in current documentation    Lab Data Review:  Recent Labs      07/15/17   0142   WBC  8.8   RBC  4.95   HEMOGLOBIN  13.5*   HEMATOCRIT  43.7   MCV  88.3   MCH  27.3   MCHC  30.9*   RDW  47.1   PLATELETCT  199   MPV  11.6     Recent Labs      07/15/17   " 0142   SODIUM  137   POTASSIUM  4.1   CHLORIDE  99   CO2  30   GLUCOSE  125*   BUN  15   CREATININE  0.55   CALCIUM  10.0     Recent Labs      07/15/17   0142   TROPONINI  <0.01   BNPBTYPENAT  7       Labs reviewed as noted above.    Imaging/Procedures Review:      EKG:    From today was reviewed and shows NSR at 89bpm, 1st degree AVB, RBBB. ST-T changes associated with RBBB.     Prior ECG from Jan 2017 showed NSR at 95bpm, 1st degree AVB, possible IVCD (QRS ~100)    ECHO: bedside echo attempted. IVC is plethoric. Unable to visualize LV. Limited acoustic windows.       MDM (Assessment and Plan):     Active Hospital Problems    Diagnosis   • Acute on chronic congestive heart failure (CMS[facility]) [I50.9]     Priority: High   • Acute respiratory failure ([facility]) [J96.00]     Priority: High   • Stasis dermatitis [I87.2]     Priority: Medium   • COPD (chronic obstructive pulmonary disease) (CMS-[facility]) [J44.9]     Priority: Medium   • HTN (hypertension) [I10]     Priority: Medium   • Narcotic withdrawal ([facility]) [F11.23]     Priority: Medium   • Right bundle branch block [I45.10]   • Diabetes type 2, controlled (CMS-[facility]) [E11.9]     Concern for STEMI - ECG was reviewed. Patient has a new right bundle branch block. ST-T changes noted are consistent with a right bundle-branch block. I attempted to perform a bedside echocardiogram however this was limited due to poor acoustic windows. ECG does not meet criteria for STEMI. Continue to manage medically.    Congestive heart failure-we'll need to order an echocardiogram for further evaluation. For now agree with primary team regarding diuresis. On lisinopril. Can consider addition of beta blocker, however will await echocardiogram results.    Hypertension-blood pressure is not at goal, however patient reports severe pain due to loss of his narcotics. Could be pain related. For now will monitor blood pressure after receiving his morning medications if he continues to be  hypertensive, we can consider changing his medications.    Last . Given patient's risk factors, ideally should be on a statin. I will discuss this with him in detail once the echocardiogram results.    Thank you for allowing me to participate in the care of this patient. Please do not hesitate to contact me with any questions.    Yolande Gleason MD  Cardiologist  [facility]

## 2017-07-15 NOTE — DISCHARGE PLANNING
Medical Social Work:  SW called to room for a STEMI. Pt awake, talking. Cardiologist at bedside, cancelled STEMI.    SW contacted Pts daughter Howard Dee [phone] per Pt request. SW able to reach daughter and update on Pt condition.     No other needs at this time.

## 2017-07-15 NOTE — PROGRESS NOTES
Report received at bedside from RN. Patient A & O x 4. Patient resting in bed. No acute distress. Patient complains of pain 6/10 in head. Medicated per MAR. No behavioral pain indicators noted.  No SOB/dyspnea noted. No cough noted. Patient denies chest pain/palpitations.  Patient denies nausea. Abdomen: nondistended, nontender. + bowel sounds in all quadrants. Patient passing flatus.  No S/S hypoglycemia/hyperglycemia noted.  Skin: Intact with bilateral weeping dry and flaky white skin. See two RN skin check. Activity: 2 person assist   Fall and safety precautions maintained.Hourly rounding in progress.

## 2017-07-15 NOTE — IP AVS SNAPSHOT
" Home Care Instructions                                                                                                                  Name:Fer Estrada  Medical Record Number:[id number]  CSN: [id number]    YOB: 1960   Age: 56 y.o.  Sex: male  HT:1.727 m (5' 8\") WT: 125.8 kg (277 lb 5.4 oz)          Admit Date: 7/15/2017     Discharge Date:   Today's Date: 7/19/2017  Attending Doctor:  Benjie Marcus M.D.                  Allergies:  Review of patient's allergies indicates no known allergies.            Discharge Instructions       Discharge Instructions    Discharged to home by [facility] van with self. Discharged via wheelchair, hospital escort: Yes.  Special equipment needed: Cane and Oxygen    Be sure to schedule a follow-up appointment with your primary care doctor or any specialists as instructed.     Discharge Plan:   Diet Plan: Discussed  Activity Level: Discussed  Confirmed Follow up Appointment: Appointment Scheduled  Confirmed Symptoms Management: Discussed  Medication Reconciliation Updated: Yes  Influenza Vaccine Indication: Indicated: Not available from distributor/    I understand that a diet low in cholesterol, fat, and sodium is recommended for good health. Unless I have been given specific instructions below for another diet, I accept this instruction as my diet prescription.   Other diet: Heart-Healthy Eating Plan  Many factors influence your heart health, including eating and exercise habits. Heart (coronary) risk increases with abnormal blood fat (lipid) levels. Heart-healthy meal planning includes limiting unhealthy fats, increasing healthy fats, and making other small dietary changes. This includes maintaining a healthy body weight to help keep lipid levels within a normal range.  WHAT IS MY PLAN?   Your health care provider recommends that you:  1. Get no more than _________% of the total calories in your daily diet from fat.  2. Limit your intake of saturated fat to less " "than _________% of your total calories each day.  3. Limit the amount of cholesterol in your diet to less than _________ mg per day.  WHAT TYPES OF FAT SHOULD I CHOOSE?  1. Choose healthy fats more often. Choose monounsaturated and polyunsaturated fats, such as olive oil and canola oil, flaxseeds, walnuts, almonds, and seeds.  2. Eat more omega-3 fats. Good choices include salmon, mackerel, sardines, tuna, flaxseed oil, and ground flaxseeds. Aim to eat fish at least two times each week.  3. Limit saturated fats. Saturated fats are primarily found in animal products, such as meats, butter, and cream. Plant sources of saturated fats include palm oil, palm kernel oil, and coconut oil.  4. Avoid foods with partially hydrogenated oils in them. These contain trans fats. Examples of foods that contain trans fats are stick margarine, some tub margarines, cookies, crackers, and other baked goods.  WHAT GENERAL GUIDELINES DO I NEED TO FOLLOW?  1. Check food labels carefully to identify foods with trans fats or high amounts of saturated fat.  2. Fill one half of your plate with vegetables and green salads. Eat 4-5 servings of vegetables per day. A serving of vegetables equals 1 cup of raw leafy vegetables, ½ cup of raw or cooked cut-up vegetables, or ½ cup of vegetable juice.  3. Fill one fourth of your plate with whole grains. Look for the word \"whole\" as the first word in the ingredient list.  4. Fill one fourth of your plate with lean protein foods.  5. Eat 4-5 servings of fruit per day. A serving of fruit equals one medium whole fruit, ¼ cup of dried fruit, ½ cup of fresh, frozen, or canned fruit, or ½ cup of 100% fruit juice.  6. Eat more foods that contain soluble fiber. Examples of foods that contain this type of fiber are apples, broccoli, carrots, beans, peas, and barley. Aim to get 20-30 g of fiber per day.  7. Eat more home-cooked food and less restaurant, buffet, and fast food.  8. Limit or avoid alcohol.  9. Limit " foods that are high in starch and sugar.  10. Avoid fried foods.  11. Cook foods by using methods other than frying. Baking, boiling, grilling, and broiling are all great options. Other fat-reducing suggestions include:  1. Removing the skin from poultry.  2. Removing all visible fats from meats.  3. Skimming the fat off of stews, soups, and gravies before serving them.  4. Steaming vegetables in water or broth.  12. Lose weight if you are overweight. Losing just 5-10% of your initial body weight can help your overall health and prevent diseases such as diabetes and heart disease.  13. Increase your consumption of nuts, legumes, and seeds to 4-5 servings per week. One serving of dried beans or legumes equals ½ cup after being cooked, one serving of nuts equals 1½ ounces, and one serving of seeds equals ½ ounce or 1 tablespoon.  14. You may need to monitor your salt (sodium) intake, especially if you have high blood pressure. Talk with your health care provider or dietitian to get more information about reducing sodium.  WHAT FOODS CAN I EAT?  Grains  Breads, including Zimbabwean, white, johnson, wheat, raisin, rye, oatmeal, and Italian. Tortillas that are neither fried nor made with lard or trans fat. Low-fat rolls, including hotdog and hamburger buns and English muffins. Biscuits. Muffins. Waffles. Pancakes. Light popcorn. Whole-grain cereals. Flatbread. Rhonda toast. Pretzels. Breadsticks. Rusks. Low-fat snacks and crackers, including oyster, saltine, matzo, meaghan, animal, and rye. Rice and pasta, including brown rice and those that are made with whole wheat.  Vegetables  All vegetables.  Fruits  All fruits, but limit coconut.  Meats and Other Protein Sources  Lean, well-trimmed beef, veal, pork, and lamb. Chicken and turkey without skin. All fish and shellfish. Wild duck, rabbit, pheasant, and venison. Egg whites or low-cholesterol egg substitutes. Dried beans, peas, lentils, and tofu. Seeds and most  nuts.  Dairy  Low-fat or nonfat cheeses, including ricotta, string, and mozzarella. Skim or 1% milk that is liquid, powdered, or evaporated. Buttermilk that is made with low-fat milk. Nonfat or low-fat yogurt.  Beverages  Mineral water. Diet carbonated beverages.  Sweets and Desserts  Sherbets and fruit ices. Honey, jam, marmalade, jelly, and syrups. Meringues and gelatins. Pure sugar candy, such as hard candy, jelly beans, gumdrops, mints, marshmallows, and small amounts of dark chocolate. Vlad food cake.  Eat all sweets and desserts in moderation.  Fats and Oils  Nonhydrogenated (trans-free) margarines. Vegetable oils, including soybean, sesame, sunflower, olive, peanut, safflower, corn, canola, and cottonseed. Salad dressings or mayonnaise that are made with a vegetable oil. Limit added fats and oils that you use for cooking, baking, salads, and as spreads.  Other  Cocoa powder. Coffee and tea. All seasonings and condiments.  The items listed above may not be a complete list of recommended foods or beverages. Contact your dietitian for more options.  WHAT FOODS ARE NOT RECOMMENDED?  Grains  Breads that are made with saturated or trans fats, oils, or whole milk. Croissants. Butter rolls. Cheese breads. Sweet rolls. Donuts. Buttered popcorn. Chow mein noodles. High-fat crackers, such as cheese or butter crackers.  Meats and Other Protein Sources  Fatty meats, such as hotdogs, short ribs, sausage, spareribs, freeman, ribeye roast or steak, and mutton. High-fat deli meats, such as salami and bologna. Caviar. Domestic duck and goose. Organ meats, such as kidney, liver, sweetbreads, brains, gizzard, chitterlings, and heart.  Dairy  Cream, sour cream, cream cheese, and creamed cottage cheese. Whole milk cheeses, including blue (bryan), [place]haw Oscar, Brie, Harry, American, Havarti, Swiss, cheddar, Camembert, and [place].  Whole or 2% milk that is liquid, evaporated, or condensed. Whole buttermilk. Cream sauce or high-fat  cheese sauce. Yogurt that is made from whole milk.  Beverages  Regular sodas and drinks with added sugar.  Sweets and Desserts  Frosting. Pudding. Cookies. Cakes other than nydia food cake. Candy that has milk chocolate or white chocolate, hydrogenated fat, butter, coconut, or unknown ingredients. Buttered syrups. Full-fat ice cream or ice cream drinks.  Fats and Oils  Gravy that has suet, meat fat, or shortening. Cocoa butter, hydrogenated oils, palm oil, coconut oil, palm kernel oil. These can often be found in baked products, candy, fried foods, nondairy creamers, and whipped toppings. Solid fats and shortenings, including freeman fat, salt pork, lard, and butter. Nondairy cream substitutes, such as coffee creamers and sour cream substitutes. Salad dressings that are made of unknown oils, cheese, or sour cream.  The items listed above may not be a complete list of foods and beverages to avoid. Contact your dietitian for more information.     This information is not intended to replace advice given to you by your health care provider. Make sure you discuss any questions you have with your health care provider.     Document Released: 09/26/2009 Document Revised: 01/08/2016 Document Reviewed: 06/11/2015  [employer] Interactive Patient Education ©2016 [employer]    Diabetes Mellitus and Food  It is important for you to manage your blood sugar (glucose) level. Your blood glucose level can be greatly affected by what you eat. Eating healthier foods in the appropriate amounts throughout the day at about the same time each day will help you control your blood glucose level. It can also help slow or prevent worsening of your diabetes mellitus. Healthy eating may even help you improve the level of your blood pressure and reach or maintain a healthy weight.   General recommendations for healthful eating and cooking habits include:  4. Eating meals and snacks regularly. Avoid going long periods of time without eating to lose  weight.  5. Eating a diet that consists mainly of plant-based foods, such as fruits, vegetables, nuts, legumes, and whole grains.  6. Using low-heat cooking methods, such as baking, instead of high-heat cooking methods, such as deep frying.  Work with your dietitian to make sure you understand how to use the Nutrition Facts information on food labels.  HOW CAN FOOD AFFECT ME?  Carbohydrates  Carbohydrates affect your blood glucose level more than any other type of food. Your dietitian will help you determine how many carbohydrates to eat at each meal and teach you how to count carbohydrates. Counting carbohydrates is important to keep your blood glucose at a healthy level, especially if you are using insulin or taking certain medicines for diabetes mellitus.  Alcohol  Alcohol can cause sudden decreases in blood glucose (hypoglycemia), especially if you use insulin or take certain medicines for diabetes mellitus. Hypoglycemia can be a life-threatening condition. Symptoms of hypoglycemia (sleepiness, dizziness, and disorientation) are similar to symptoms of having too much alcohol.   If your health care provider has given you approval to drink alcohol, do so in moderation and use the following guidelines:  5. Women should not have more than one drink per day, and men should not have more than two drinks per day. One drink is equal to:  1. 12 oz of beer.  2. 5 oz of wine.  3. 1½ oz of hard liquor.  6. Do not drink on an empty stomach.  7. Keep yourself hydrated. Have water, diet soda, or unsweetened iced tea.  8. Regular soda, juice, and other mixers might contain a lot of carbohydrates and should be counted.  WHAT FOODS ARE NOT RECOMMENDED?  As you make food choices, it is important to remember that all foods are not the same. Some foods have fewer nutrients per serving than other foods, even though they might have the same number of calories or carbohydrates. It is difficult to get your body what it needs when you  eat foods with fewer nutrients. Examples of foods that you should avoid that are high in calories and carbohydrates but low in nutrients include:  15. Trans fats (most processed foods list trans fats on the Nutrition Facts label).  16. Regular soda.  17. Juice.  18. Candy.  19. Sweets, such as cake, pie, doughnuts, and cookies.  20. Fried foods.  WHAT FOODS CAN I EAT?  Eat nutrient-rich foods, which will nourish your body and keep you healthy. The food you should eat also will depend on several factors, includin. The calories you need.  2. The medicines you take.  3. Your weight.  4. Your blood glucose level.  5. Your blood pressure level.  6. Your cholesterol level.  You should eat a variety of foods, including:  · Protein.  ¨ Lean cuts of meat.  ¨ Proteins low in saturated fats, such as fish, egg whites, and beans. Avoid processed meats.  · Fruits and vegetables.  ¨ Fruits and vegetables that may help control blood glucose levels, such as apples, mangoes, and yams.  · Dairy products.  ¨ Choose fat-free or low-fat dairy products, such as milk, yogurt, and cheese.  · Grains, bread, pasta, and rice.  ¨ Choose whole grain products, such as multigrain bread, whole oats, and brown rice. These foods may help control blood pressure.  · Fats.  ¨ Foods containing healthful fats, such as nuts, avocado, olive oil, canola oil, and fish.  DOES EVERYONE WITH DIABETES MELLITUS HAVE THE SAME MEAL PLAN?  Because every person with diabetes mellitus is different, there is not one meal plan that works for everyone. It is very important that you meet with a dietitian who will help you create a meal plan that is just right for you.     This information is not intended to replace advice given to you by your health care provider. Make sure you discuss any questions you have with your health care provider.     Document Released: 2006 Document Revised: 2016 Document Reviewed: 2014  [employer] Interactive Patient  Education ©2016 [employer].        Special Instructions:   HF Patient Discharge Instructions  · Monitor your weight daily, and maintain a weight chart, to track your weight changes.   · Activity as tolerated, unless your Doctor has ordered otherwise. Other activity order: walking as tolerated.  · Follow a low fat, low cholesterol, low salt diet unless instructed otherwise by your Doctor. Read the labels on the back of food products and track your intake of fat, cholesterol and salt.   · Fluid Restriction No. If a Fluid Restriction has been ordered by your Doctor, measure fluids with a measuring cup to ensure that you are not exceeding the restriction.   · No smoking.  · Oxygen Yes. If your Doctor has ordered that you wear Oxygen at home, it is important to wear it as ordered.  · Did you receive an explanation from staff on the importance of taking each of your medications and why it is necessary to keep taking them unless your doctor says to stop? Yes  · Were all of your questions answered about how to manage your heart failure and what to do if you have increased signs and symptoms after you go home? Yes  · Do you feel like your heart failure care team involved you in the care treatment plan and allowed you to make decisions regarding your care while in the hospital and addressed any discharge needs you might have? Yes    See the educational handout provided at discharge for more information on monitoring your daily weight, activity and diet. This also explains more about Heart Failure, symptoms of a flare-up and some of the tests that you have undergone.     Warning Signs of a Flare-Up include:  · Swelling in the ankles or lower legs.  · Shortness of breath, while at rest, or while doing normal activities.   · Shortness of breath at night when in bed, or coughing in bed.   · Requiring more pillows to sleep at night, or needing to sit up at night to sleep.  · Feeling weak, dizzy or fatigued.     When to call your  Doctor:  · Call [facility] seven days a week from 8:00 a.m. to 8:00 p.m. for medical questions [phone].  · Call your Primary Care Physician or Cardiologist if:   1. You experience any pain radiating to your jaw or neck.  2. You have any difficulty breathing.  3. You experience weight gain of 3 lbs in a day or 5 lbs in a week.   4. You feel any palpitations or irregular heartbeats.  5. You become dizzy or lose consciousness.   If you have had an angiogram or had a pacemaker or AICD placed, and experience:  1. Bleeding, drainage or swelling at the surgical / puncture site.  2. Fever greater than 100.0 F  3. Shock from internal defibrillator.  4. Cool and / or numb extremities.      · Is patient discharged on Warfarin / Coumadin?   No     · Is patient Post Blood Transfusion?  No    Depression / Suicide Risk    As you are discharged from this [facility], it is important to learn how to keep safe from harming yourself.    Recognize the warning signs:  · Abrupt changes in personality, positive or negative- including increase in energy   · Giving away possessions  · Change in eating patterns- significant weight changes-  positive or negative  · Change in sleeping patterns- unable to sleep or sleeping all the time   · Unwillingness or inability to communicate  · Depression  · Unusual sadness, discouragement and loneliness  · Talk of wanting to die  · Neglect of personal appearance   · Rebelliousness- reckless behavior  · Withdrawal from people/activities they love  · Confusion- inability to concentrate     If you or a loved one observes any of these behaviors or has concerns about self-harm, here's what you can do:  · Talk about it- your feelings and reasons for harming yourself  · Remove any means that you might use to hurt yourself (examples: pills, rope, extension cords, firearm)  · Get professional help from the community (Mental Health, Substance Abuse, psychological counseling)  · Do not  be alone:Call your Safe Contact- someone whom you trust who will be there for you.  · Call your local CRISIS HOTLINE 757-3840 or 472-083-3095  · Call your local Children's Mobile Crisis Response Team [place] (703) 523-6527 or www.[employer]  · Call the toll free National Suicide Prevention Hotlines   · National Suicide Prevention Lifeline 072-923-QFOQ (5232)  · [employer] Hope Line Network 800-SUICIDE (984-7119)        Your appointments     Jul 20, 2017  9:20 AM   CARE MANAGER TELEPHONE VISIT with CARE MANAGER   [facility]    [address]  [place] [address] [phone]           ***IMPORTANT**** This is a phone visit only. Do not come into the office. The Care Manager will call you at the scheduled time for Care Manager Telephone Visit.            Jul 21, 2017 10:00 AM   Established Patient with HUNTER Castelan   [address]  [place] [id number] [phone]           You will be receiving a confirmation call a few days before your appointment from our automated call confirmation system.            Aug 21, 2017  3:15 PM   Heart Failure New with Zay Avilez M.D.   [facility] (--)    [address]  [place] [id number] [phone]              Follow-up Information     1. Follow up with [facility] ([facility]) .    Specialty:  Home Health Services    Contact information    [address]  [facility] [id number] [phone]         Discharge Medication Instructions:    Below are the medications your physician expects you to take upon discharge:    Review all your home medications and newly ordered medications with your doctor and/or pharmacist. Follow medication instructions as directed by your doctor and/or pharmacist.    Please keep your medication list with you and share with your physician.               Medication List       START taking these medications        Instructions    Morning Afternoon Evening Bedtime    acetaminophen 325 MG Tabs   Last time this was given:  650 mg on 7/17/2017  1:43 AM   Commonly known as:  TYLENOL        Take 2 Tabs by mouth every 6 hours as needed (Mild Pain; (Pain scale 1-3); Temp greater than 100.5 F).   Dose:  650 mg                        ammonium lactate 12 % Lotn   Last time this was given:  7/19/2017  9:11 AM   Commonly known as:  LAC-HYDRIN        Apply 1 Application to affected area(s) 2 Times a Day.   Dose:  1 Application                        furosemide 40 MG Tabs   Last time this was given:  40 mg on 7/19/2017  5:18 AM   Commonly known as:  LASIX        Take 1 Tab by mouth every day.   Dose:  40 mg                        polyethylene glycol/lytes Pack   Commonly known as:  MIRALAX        Take 1 Packet by mouth 1 time daily as needed (if sennosides and docusate ineffective after 24 hours).   Dose:  17 g                        senna-docusate 8.6-50 MG Tabs   Last time this was given:  2 Tabs on 7/18/2017  9:35 AM   Commonly known as:  PERICOLACE or SENOKOT S        Take 2 Tabs by mouth 2 Times a Day.   Dose:  2 Tab                          CONTINUE taking these medications        Instructions    Morning Afternoon Evening Bedtime    * albuterol 108 (90 BASE) MCG/ACT Aers inhalation aerosol        Inhale 2 Puffs by mouth every 6 hours as needed for Shortness of Breath.   Dose:  2 Puff                        * albuterol 2.5mg/0.5ml Nebu   Commonly known as:  PROVENTIL        2.5 mg by Nebulization route every four hours as needed for Shortness of Breath.   Dose:  2.5 mg                        budesonide-formoterol 160-4.5 MCG/ACT Aero   Commonly known as:  SYMBICORT        Inhale 2 Puffs by mouth 2 Times a Day.   Dose:  2 Puff                        gabapentin 100 MG Caps   Last time this was given:  100 mg on 7/19/2017  9:11 AM   Commonly known as:  NEURONTIN        Take 1 Cap by mouth 3 times a  day.   Dose:  100 mg                        LACTOBACILLUS RHAMNOSUS (GG) PO        Take 1 Cap by mouth 3 times a day.   Dose:  1 Cap                        lisinopril 20 MG Tabs   Last time this was given:  5 mg on 7/19/2017  9:11 AM   Commonly known as:  PRINIVIL        Take 0.5 Tabs by mouth every day.   Dose:  10 mg                        metformin 850 MG Tabs   Commonly known as:  GLUCOPHAGE        Take 1 Tab by mouth 2 times a day, with meals.   Dose:  850 mg                        * OxyCODONE HCl ER 30 MG T12a   Last time this was given:  30 mg on 7/15/2017  9:10 AM        Take 30 mg by mouth every 12 hours.   Dose:  30 mg                        * oxycodone immediate release 10 MG immediate release tablet   Last time this was given:  30 mg on 7/19/2017 10:49 AM   Commonly known as:  ROXICODONE        Take 1 Tab by mouth every 6 hours as needed for Severe Pain.   Dose:  10 mg                        potassium chloride 10 MEQ capsule   Commonly known as:  MICRO-K        Take 10 mEq by mouth every day. Pt states he cannot take the tablets   Dose:  10 mEq                        * Notice:  This list has 4 medication(s) that are the same as other medications prescribed for you. Read the directions carefully, and ask your doctor or other care provider to review them with you.         Where to Get Your Medications      Information about where to get these medications is not yet available     ! Ask your nurse or doctor about these medications    - ammonium lactate 12 % Lotn  - furosemide 40 MG Tabs            Orders for after discharge     REFERRAL TO HOME HEALTH    Complete by:  As directed    Home health will create and establish a plan of care             Instructions           Diet / Nutrition:    Follow any diet instructions given to you by your doctor or the dietician, including how much salt (sodium) you are allowed each day.    If you are overweight, talk to your doctor about a weight reduction  plan.    Activity:    Remain physically active following your doctor's instructions about exercise and activity.    Rest often.     Any time you become even a little tired or short of breath, SIT DOWN and rest.    Worsening Symptoms:    Report any of the following signs and symptoms to the doctor's office immediately:    *Pain of jaw, arm, or neck  *Chest pain not relieved by medication                               *Dizziness or loss of consciousness  *Difficulty breathing even when at rest   *More tired than usual                                       *Bleeding drainage or swelling of surgical site  *Swelling of feet, ankles, legs or stomach                 *Fever (>100ºF)  *Pink or blood tinged sputum  *Weight gain (3lbs/day or 5lbs /week)           *Shock from internal defibrillator (if applicable)  *Palpitations or irregular heartbeats                *Cool and/or numb extremities    Stroke Awareness    Common Risk Factors for Stroke include:    Age  Atrial Fibrillation  Carotid Artery Stenosis  Diabetes Mellitus  Excessive alcohol consumption  High blood pressure  Overweight   Physical inactivity  Smoking    Warning signs and symptoms of a stroke include:    *Sudden numbness or weakness of the face, arm or leg (especially on one side of the body).  *Sudden confusion, trouble speaking or understanding.  *Sudden trouble seeing in one or both eyes.  *Sudden trouble walking, dizziness, loss of balance or coordination.Sudden severe headache with no known cause.    It is very important to get treatment quickly when a stroke occurs. If you experience any of the above warning signs, call 911 immediately.                   Disclaimer         Quit Smoking / Tobacco Use:    I understand the use of any tobacco products increases my chance of suffering from future heart disease or stroke and could cause other illnesses which may shorten my life. Quitting the use of tobacco products is the single most important thing I can  do to improve my health. For further information on smoking / tobacco cessation call a Toll Free Quit Line at 1-915.918.8815 (*[employer] [place]) or 1-630.171.7261 (American [employer] Association) or you can access the web based program at www.lungusa.org.    [place] Tobacco Users Help Line:  (334) 162-7432       Toll Free: 1-626.226.7483  Quit Tobacco Program [facility] [phone]    Crisis Hotline:    [place] Crisis Hotline:  8-559-HXPTBOL or 1-223.195.4598    [place] Crisis Hotline:    1-787.486.5368 or 152-551-6410    Discharge Survey:   Thank you for choosing [facility]. We hope we did everything we could to make your hospital stay a pleasant one. You may be receiving a phone survey and we would appreciate your time and participation in answering the questions. Your input is very valuable to us in our efforts to improve our service to our patients and their families.        My signature on this form indicates that:    1. I have reviewed and understand the above information.  2. My questions regarding this information have been answered to my satisfaction.  3. I have formulated a plan with my discharge nurse to obtain my prescribed medications for home.                  Disclaimer         __________________________________                     __________       ________                       Patient Signature                                                 Date                    Time

## 2017-07-15 NOTE — PROGRESS NOTES
"Cardiology Progress Note               Author: Denzel Santana Date & Time created: 7/15/2017  12:42 PM     Interval History:  Consultation for \" STEMI\", but EKG did not meet criteria for STEMI, EKG showed new right bundle branch block    Admitted with worsening dyspnea, lower extremity edema , narcotic withdrawal    History of hypertension, diabetes 2, COPD, chronic LE edema, chronic stasis changes, morbid obesity. COPD, depression,      Labs reviewed  Troponin negative ×1  BNP = 7  Sodium, potassium, creatinine stable    BP = 134/65  HR = 79 NSR      Well-preserved EF by echo in     Review of Systems   Respiratory: Positive for shortness of breath.    Cardiovascular: Positive for leg swelling. Negative for chest pain, palpitations, orthopnea, claudication and PND.        Chronic  Stasis dermatitis        Physical Exam   Constitutional: He is oriented to person, place, and time.   HENT:   Head: Normocephalic.   Eyes: Conjunctivae are normal.   Neck: No JVD present. No thyromegaly present.   Cardiovascular: Normal rate and regular rhythm.    Pulses:       Carotid pulses are 2+ on the right side, and 2+ on the left side.       Radial pulses are 2+ on the right side, and 2+ on the left side.   Pulmonary/Chest: He has no wheezes.   Abdominal: Soft.   Musculoskeletal: He exhibits edema.   Neurological: He is oriented to person, place, and time.   Skin: Skin is warm and dry.       Hemodynamics:  Temp (24hrs), Av.7 °C (98 °F), Min:36.4 °C (97.6 °F), Max:37.1 °C (98.7 °F)  Temperature: 36.7 °C (98.1 °F)  Pulse  Av.5  Min: 68  Max: 87Heart Rate (Monitored): 78  Blood Pressure: 134/65 mmHg, NIBP: 145/80 mmHg     Respiratory:    Respiration: 16, Pulse Oximetry: 93 %        RUL Breath Sounds: Diminished, RML Breath Sounds: Diminished;Fine Crackles, RLL Breath Sounds: Crackles, LARY Breath Sounds: Diminished, LLL Breath Sounds: Crackles  Fluids:  Date 07/15/17 0700 - 17 0659   Shift 7201-7543 2963-6463 " 8021-0745 24 Hour Total   I  N  T  A  K  E   Shift Total       O  U  T  P  U  T   Urine 825   825    Shift Total 825   825   Weight (kg) 131.5 131.5 131.5 131.5       Weight: (!) 131.543 kg (290 lb)  GI/Nutrition:  Orders Placed This Encounter   Procedures   • Diet Order     Standing Status: Standing      Number of Occurrences: 1      Standing Expiration Date:      Order Specific Question:  Diet:     Answer:  Diabetic [3]     Order Specific Question:  Diet:     Answer:  Cardiac [6]     Lab Results:  Recent Labs      07/15/17   0142   WBC  8.8   RBC  4.95   HEMOGLOBIN  13.5*   HEMATOCRIT  43.7   MCV  88.3   MCH  27.3   MCHC  30.9*   RDW  47.1   PLATELETCT  199   MPV  11.6     Recent Labs      07/15/17   0142   SODIUM  137   POTASSIUM  4.1   CHLORIDE  99   CO2  30   GLUCOSE  125*   BUN  15   CREATININE  0.55   CALCIUM  10.0     Recent Labs      07/15/17   0142   INR  1.55*     Recent Labs      07/15/17   0142   BNPBTYPENAT  7     Recent Labs      07/15/17   0142   TROPONINI  <0.01   BNPBTYPENAT  7             Medical Decision Making, by Problem:  Active Hospital Problems    Diagnosis   • Acute on chronic congestive heart failure (CMS-HCC) [I50.9]   • Acute respiratory failure (CMS-HCC) [J96.00]   • Stasis dermatitis [I87.2]   • COPD (chronic obstructive pulmonary disease) (CMS-HCC) [J44.9]   • HTN (hypertension) [I10]   • Narcotic withdrawal (CMS-HCC) [F11.23]   • Right bundle branch block [I45.10]   • Diabetes type 2, controlled (CMS-HCC) [E11.9]       Plan:    Echo pending  BNP 7 on admission  CXR with mild edema  ON Lasix 40 IV  BP well controlled on lisinopril 10   Trop neg   Reports untreated sleep apnea  LE edema, chronic, stasis dermatitis, needs wound care  NO rhythm issues over night   Will await for echo    Medications reviewed and Labs reviewed

## 2017-07-15 NOTE — ASSESSMENT & PLAN NOTE
- initially concern for STEMI, cards has seen and no STEMI  - new RBBB  - echo shows nl LVEF; gr II diastolic dysfunction  - cards signed off

## 2017-07-16 LAB
ANION GAP SERPL CALC-SCNC: 9 MMOL/L (ref 0–11.9)
BUN SERPL-MCNC: 15 MG/DL (ref 8–22)
CALCIUM SERPL-MCNC: 9.9 MG/DL (ref 8.5–10.5)
CHLORIDE SERPL-SCNC: 96 MMOL/L (ref 96–112)
CHOLEST SERPL-MCNC: 167 MG/DL (ref 100–199)
CO2 SERPL-SCNC: 33 MMOL/L (ref 20–33)
CREAT SERPL-MCNC: 0.7 MG/DL (ref 0.5–1.4)
EKG IMPRESSION: NORMAL
ERYTHROCYTE [DISTWIDTH] IN BLOOD BY AUTOMATED COUNT: 47 FL (ref 35.9–50)
GFR SERPL CREATININE-BSD FRML MDRD: >60 ML/MIN/1.73 M 2
GLUCOSE BLD-MCNC: 105 MG/DL (ref 65–99)
GLUCOSE BLD-MCNC: 111 MG/DL (ref 65–99)
GLUCOSE BLD-MCNC: 153 MG/DL (ref 65–99)
GLUCOSE BLD-MCNC: 156 MG/DL (ref 65–99)
GLUCOSE BLD-MCNC: 92 MG/DL (ref 65–99)
GLUCOSE SERPL-MCNC: 118 MG/DL (ref 65–99)
HCT VFR BLD AUTO: 46.7 % (ref 42–52)
HDLC SERPL-MCNC: 42 MG/DL
HGB BLD-MCNC: 14.7 G/DL (ref 14–18)
LDLC SERPL CALC-MCNC: 105 MG/DL
LV EJECT FRACT  99904: 60
LV EJECT FRACT MOD 4C 99902: 76.64
MCH RBC QN AUTO: 27.2 PG (ref 27–33)
MCHC RBC AUTO-ENTMCNC: 31.5 G/DL (ref 33.7–35.3)
MCV RBC AUTO: 86.5 FL (ref 81.4–97.8)
PLATELET # BLD AUTO: 205 K/UL (ref 164–446)
PMV BLD AUTO: 12.1 FL (ref 9–12.9)
POTASSIUM SERPL-SCNC: 3.8 MMOL/L (ref 3.6–5.5)
RBC # BLD AUTO: 5.4 M/UL (ref 4.7–6.1)
SODIUM SERPL-SCNC: 138 MMOL/L (ref 135–145)
TRIGL SERPL-MCNC: 102 MG/DL (ref 0–149)
TROPONIN I SERPL-MCNC: <0.01 NG/ML (ref 0–0.04)
WBC # BLD AUTO: 8.6 K/UL (ref 4.8–10.8)

## 2017-07-16 PROCEDURE — 700102 HCHG RX REV CODE 250 W/ 637 OVERRIDE(OP): Performed by: INTERNAL MEDICINE

## 2017-07-16 PROCEDURE — 99233 SBSQ HOSP IP/OBS HIGH 50: CPT | Performed by: INTERNAL MEDICINE

## 2017-07-16 PROCEDURE — 700111 HCHG RX REV CODE 636 W/ 250 OVERRIDE (IP): Performed by: INTERNAL MEDICINE

## 2017-07-16 PROCEDURE — 700102 HCHG RX REV CODE 250 W/ 637 OVERRIDE(OP): Performed by: NURSE PRACTITIONER

## 2017-07-16 PROCEDURE — 93306 TTE W/DOPPLER COMPLETE: CPT | Mod: 26 | Performed by: INTERNAL MEDICINE

## 2017-07-16 PROCEDURE — 82962 GLUCOSE BLOOD TEST: CPT

## 2017-07-16 PROCEDURE — A9270 NON-COVERED ITEM OR SERVICE: HCPCS | Performed by: INTERNAL MEDICINE

## 2017-07-16 PROCEDURE — 93306 TTE W/DOPPLER COMPLETE: CPT

## 2017-07-16 PROCEDURE — A9270 NON-COVERED ITEM OR SERVICE: HCPCS | Performed by: NURSE PRACTITIONER

## 2017-07-16 PROCEDURE — 93010 ELECTROCARDIOGRAM REPORT: CPT | Performed by: INTERNAL MEDICINE

## 2017-07-16 PROCEDURE — 84484 ASSAY OF TROPONIN QUANT: CPT

## 2017-07-16 PROCEDURE — 770020 HCHG ROOM/CARE - TELE (206)

## 2017-07-16 PROCEDURE — 85027 COMPLETE CBC AUTOMATED: CPT

## 2017-07-16 PROCEDURE — 93005 ELECTROCARDIOGRAM TRACING: CPT | Performed by: NURSE PRACTITIONER

## 2017-07-16 PROCEDURE — 87040 BLOOD CULTURE FOR BACTERIA: CPT | Mod: 91

## 2017-07-16 PROCEDURE — 80061 LIPID PANEL: CPT

## 2017-07-16 PROCEDURE — 80048 BASIC METABOLIC PNL TOTAL CA: CPT

## 2017-07-16 PROCEDURE — 36415 COLL VENOUS BLD VENIPUNCTURE: CPT

## 2017-07-16 RX ORDER — AMMONIUM LACTATE 12 G/100G
LOTION TOPICAL TWICE DAILY
Status: DISCONTINUED | OUTPATIENT
Start: 2017-07-16 | End: 2017-07-19 | Stop reason: HOSPADM

## 2017-07-16 RX ORDER — OXYCODONE HYDROCHLORIDE 30 MG/1
30 TABLET ORAL
Status: DISCONTINUED | OUTPATIENT
Start: 2017-07-16 | End: 2017-07-19 | Stop reason: HOSPADM

## 2017-07-16 RX ADMIN — FUROSEMIDE 40 MG: 10 INJECTION, SOLUTION INTRAMUSCULAR; INTRAVENOUS at 05:51

## 2017-07-16 RX ADMIN — GABAPENTIN 100 MG: 100 CAPSULE ORAL at 08:17

## 2017-07-16 RX ADMIN — OXYCODONE HYDROCHLORIDE 30 MG: 30 TABLET ORAL at 16:29

## 2017-07-16 RX ADMIN — OXYCODONE HYDROCHLORIDE 30 MG: 30 TABLET ORAL at 20:52

## 2017-07-16 RX ADMIN — GABAPENTIN 100 MG: 100 CAPSULE ORAL at 20:47

## 2017-07-16 RX ADMIN — INSULIN LISPRO 2 UNITS: 100 INJECTION, SOLUTION INTRAVENOUS; SUBCUTANEOUS at 05:59

## 2017-07-16 RX ADMIN — GABAPENTIN 100 MG: 100 CAPSULE ORAL at 15:19

## 2017-07-16 RX ADMIN — OXYCODONE HYDROCHLORIDE 30 MG: 30 TABLET ORAL at 00:14

## 2017-07-16 RX ADMIN — HEPARIN SODIUM 5000 UNITS: 5000 INJECTION, SOLUTION INTRAVENOUS; SUBCUTANEOUS at 15:19

## 2017-07-16 RX ADMIN — OXYCODONE HYDROCHLORIDE 30 MG: 30 TABLET ORAL at 08:17

## 2017-07-16 RX ADMIN — OXYCODONE HYDROCHLORIDE 30 MG: 30 TABLET ORAL at 12:25

## 2017-07-16 RX ADMIN — POTASSIUM CHLORIDE 40 MEQ: 1500 TABLET, EXTENDED RELEASE ORAL at 20:48

## 2017-07-16 RX ADMIN — HEPARIN SODIUM 5000 UNITS: 5000 INJECTION, SOLUTION INTRAVENOUS; SUBCUTANEOUS at 20:49

## 2017-07-16 RX ADMIN — LISINOPRIL 10 MG: 10 TABLET ORAL at 08:17

## 2017-07-16 RX ADMIN — INSULIN LISPRO 2 UNITS: 100 INJECTION, SOLUTION INTRAVENOUS; SUBCUTANEOUS at 20:57

## 2017-07-16 RX ADMIN — HEPARIN SODIUM 5000 UNITS: 5000 INJECTION, SOLUTION INTRAVENOUS; SUBCUTANEOUS at 05:50

## 2017-07-16 RX ADMIN — FUROSEMIDE 40 MG: 10 INJECTION, SOLUTION INTRAMUSCULAR; INTRAVENOUS at 15:19

## 2017-07-16 ASSESSMENT — PAIN SCALES - GENERAL
PAINLEVEL_OUTOF10: 6
PAINLEVEL_OUTOF10: 6
PAINLEVEL_OUTOF10: 5
PAINLEVEL_OUTOF10: 4
PAINLEVEL_OUTOF10: 8
PAINLEVEL_OUTOF10: 8
PAINLEVEL_OUTOF10: 5
PAINLEVEL_OUTOF10: 8
PAINLEVEL_OUTOF10: 4
PAINLEVEL_OUTOF10: 8

## 2017-07-16 ASSESSMENT — ENCOUNTER SYMPTOMS
SHORTNESS OF BREATH: 0
SHORTNESS OF BREATH: 1
PALPITATIONS: 0
PND: 0
CLAUDICATION: 0
ORTHOPNEA: 0

## 2017-07-16 NOTE — PROGRESS NOTES
Bedside report received, Pt care assumed. Tele box on. VSS, Pt assessment complete. Pt AOX4, no signs of distress noted at this time.  Pt c/o of 10/10 pain, PRN and scheduled pain meds given per MAR. Pt denies any additional needs at this time. Bed in lowest position, bed alarm is on, ambulates with X1 assistance with single-point cane. POC discussed with Pt/family, verbalizes understanding, no questions at this time. Pt educated on fall risk and verbalizes understanding, call light within reach, will continue to monitor.

## 2017-07-16 NOTE — RESPIRATORY CARE
COPD EDUCATION by COPD CLINICAL EDUCATOR  7/16/2017 at 7:01 AM by Tawana Hendrickson     Patient reviewed by COPD education team. Patient does not qualify for COPD program.

## 2017-07-16 NOTE — PROGRESS NOTES
Patient blood culture back positive for staph. MD notified.   Patient resting in bed. All needs met at this time. Call light within reach and hourly rounding in progress. Fall and safety precautions maintained.

## 2017-07-16 NOTE — PROGRESS NOTES
"Cardiology Progress Note               Author: Denzel Santana Date & Time created: 2017  2:15 PM     Interval History:  Consultation for \" STEMI\", but EKG did not meet criteria for STEMI, EKG showed new right bundle branch block    Admitted with worsening dyspnea, lower extremity edema , narcotic withdrawal    History of hypertension, diabetes 2, COPD, chronic LE edema, chronic stasis changes, morbid obesity. COPD, depression,      Labs reviewed  Troponin negative ×1  BNP = 7  Sodium, potassium, creatinine stable    BP = 140/65  HR = 79 NSR      Well-preserved EF by echo in     Review of Systems   Respiratory: Positive for shortness of breath.    Cardiovascular: Positive for leg swelling. Negative for chest pain, palpitations, orthopnea, claudication and PND.        Chronic  Stasis dermatitis        Physical Exam   Constitutional: He is oriented to person, place, and time.   HENT:   Head: Normocephalic.   Eyes: Conjunctivae are normal.   Neck: No JVD present. No thyromegaly present.   Cardiovascular: Normal rate and regular rhythm.    Pulses:       Carotid pulses are 2+ on the right side, and 2+ on the left side.       Radial pulses are 2+ on the right side, and 2+ on the left side.   Pulmonary/Chest: He has no wheezes.   Abdominal: Soft.   Musculoskeletal: He exhibits edema.   Neurological: He is oriented to person, place, and time.   Skin: Skin is warm and dry.       Hemodynamics:  Temp (24hrs), Av.9 °C (98.4 °F), Min:36.6 °C (97.8 °F), Max:37.2 °C (99 °F)  Temperature: 36.6 °C (97.8 °F)  Pulse  Av.7  Min: 68  Max: 93   Blood Pressure: 145/73 mmHg     Respiratory:    Respiration: 18, Pulse Oximetry: 97 %        RUL Breath Sounds: Diminished, RML Breath Sounds: Diminished;Fine Crackles, RLL Breath Sounds: Crackles, LARY Breath Sounds: Diminished, LLL Breath Sounds: Crackles  Fluids:  Date 07/15/17 0700 - 17 0659   Shift 0779-8076 1370-3784 2999-2644 24 Hour Total   I  N  T  A  K  E   Shift " Total       O  U  T  P  U  T   Urine 825   825    Shift Total 825   825   Weight (kg) 131.5 131.5 131.5 131.5       Weight: 124.4 kg (274 lb 4 oz)  GI/Nutrition:  Orders Placed This Encounter   Procedures   • Diet Order     Standing Status: Standing      Number of Occurrences: 1      Standing Expiration Date:      Order Specific Question:  Diet:     Answer:  Diabetic [3]     Order Specific Question:  Diet:     Answer:  Cardiac [6]     Lab Results:  Recent Labs      07/15/17   0142  07/16/17   0214   WBC  8.8  8.6   RBC  4.95  5.40   HEMOGLOBIN  13.5*  14.7   HEMATOCRIT  43.7  46.7   MCV  88.3  86.5   MCH  27.3  27.2   MCHC  30.9*  31.5*   RDW  47.1  47.0   PLATELETCT  199  205   MPV  11.6  12.1     Recent Labs      07/15/17   0142  07/16/17   0214   SODIUM  137  138   POTASSIUM  4.1  3.8   CHLORIDE  99  96   CO2  30  33   GLUCOSE  125*  118*   BUN  15  15   CREATININE  0.55  0.70   CALCIUM  10.0  9.9     Recent Labs      07/15/17   0142   INR  1.55*     Recent Labs      07/15/17   0142   BNPBTYPENAT  7     Recent Labs      07/15/17   0142 07/16/17   0545   TROPONINI  <0.01  <0.01   BNPBTYPENAT  7   --      Recent Labs      07/16/17   0214   TRIGLYCERIDE  102   HDL  42   LDL  105*         Medical Decision Making, by Problem:  Active Hospital Problems    Diagnosis   • Acute on chronic congestive heart failure (CMS-HCC) [I50.9]   • Acute respiratory failure (CMS-HCC) [J96.00]   • Stasis dermatitis [I87.2]   • COPD (chronic obstructive pulmonary disease) (CMS-HCC) [J44.9]   • HTN (hypertension) [I10]   • Narcotic withdrawal (CMS-HCC) [F11.23]   • Right bundle branch block [I45.10]   • Diabetes type 2, controlled (CMS-HCC) [E11.9]       Plan:    Echo done, awaiting result     BNP 7 on admission  CXR with mild edema  Has COPD,but  has never seen pulmonary  ON Lasix 40 IV  I/O= -365,( 2600 intake ) , discussed  fluid discretion  BP semi controlled on lisinopril 10 , SXO=503  Trop neg   Reports untreated sleep  apnea  Obesity  LE edema, chronic, stasis dermatitis, appreciate wound care, Cx was done  NO rhythm issues over night , NSR with RBBB      Medications reviewed and Labs reviewed

## 2017-07-16 NOTE — PROGRESS NOTES
Received bedside report from Tangela AMAYA, Pt assessed, sitting in chair by bed, A&Ox4, VSS, pt has 5/10 of pain, no SOB Noted, on 4L O2.  Pt updated on plan of care and goals for today, Call light within reach, personal belongings within reach.  Bed in lowest position, Bed Strip alarm is on.  Pt ambulates via one person assist. Tele box on. Patient a little agitated about pain control.
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
## 2017-07-16 NOTE — PROGRESS NOTES
"Patient reporting chest pain that feels sharp and intermittent. MD notified. STAT trops and EKG ordered. Patient resting in bed. RN at bedside. Will continue to monitor.   Blood pressure 140/75, pulse 93, temperature 37 °C (98.6 °F), resp. rate 18, height 1.727 m (5' 8\"), weight 124.4 kg (274 lb 4 oz), SpO2 94 %.  "

## 2017-07-16 NOTE — CARE PLAN
Problem: Communication  Goal: The ability to communicate needs accurately and effectively will improve  Outcome: PROGRESSING SLOWER THAN EXPECTED  Patient educated on medications, procedures and use of call light. Patient will continue to verbalize and improve on education and communication in the plan of care.      Problem: Safety  Goal: Will remain free from injury  Outcome: PROGRESSING AS EXPECTED  Educated patient on use of call light, no slip socks on, bed lowest position. All needs attended to. Patient verbalized understanding.

## 2017-07-17 PROBLEM — R78.81 POSITIVE BLOOD CULTURE: Status: ACTIVE | Noted: 2017-07-17

## 2017-07-17 LAB
ANION GAP SERPL CALC-SCNC: 7 MMOL/L (ref 0–11.9)
BACTERIA BLD CULT: ABNORMAL
BACTERIA BLD CULT: ABNORMAL
BASOPHILS # BLD AUTO: 0.5 % (ref 0–1.8)
BASOPHILS # BLD: 0.04 K/UL (ref 0–0.12)
BUN SERPL-MCNC: 20 MG/DL (ref 8–22)
CALCIUM SERPL-MCNC: 9.4 MG/DL (ref 8.5–10.5)
CHLORIDE SERPL-SCNC: 94 MMOL/L (ref 96–112)
CO2 SERPL-SCNC: 35 MMOL/L (ref 20–33)
CREAT SERPL-MCNC: 0.75 MG/DL (ref 0.5–1.4)
EOSINOPHIL # BLD AUTO: 0.38 K/UL (ref 0–0.51)
EOSINOPHIL NFR BLD: 4.8 % (ref 0–6.9)
ERYTHROCYTE [DISTWIDTH] IN BLOOD BY AUTOMATED COUNT: 47.3 FL (ref 35.9–50)
GFR SERPL CREATININE-BSD FRML MDRD: >60 ML/MIN/1.73 M 2
GLUCOSE BLD-MCNC: 118 MG/DL (ref 65–99)
GLUCOSE BLD-MCNC: 121 MG/DL (ref 65–99)
GLUCOSE BLD-MCNC: 124 MG/DL (ref 65–99)
GLUCOSE BLD-MCNC: 134 MG/DL (ref 65–99)
GLUCOSE SERPL-MCNC: 139 MG/DL (ref 65–99)
HCT VFR BLD AUTO: 46.6 % (ref 42–52)
HGB BLD-MCNC: 14.4 G/DL (ref 14–18)
IMM GRANULOCYTES # BLD AUTO: 0.04 K/UL (ref 0–0.11)
IMM GRANULOCYTES NFR BLD AUTO: 0.5 % (ref 0–0.9)
LYMPHOCYTES # BLD AUTO: 1.77 K/UL (ref 1–4.8)
LYMPHOCYTES NFR BLD: 22.5 % (ref 22–41)
MCH RBC QN AUTO: 27.1 PG (ref 27–33)
MCHC RBC AUTO-ENTMCNC: 30.9 G/DL (ref 33.7–35.3)
MCV RBC AUTO: 87.6 FL (ref 81.4–97.8)
MONOCYTES # BLD AUTO: 0.68 K/UL (ref 0–0.85)
MONOCYTES NFR BLD AUTO: 8.6 % (ref 0–13.4)
NEUTROPHILS # BLD AUTO: 4.97 K/UL (ref 1.82–7.42)
NEUTROPHILS NFR BLD: 63.1 % (ref 44–72)
NRBC # BLD AUTO: 0 K/UL
NRBC BLD AUTO-RTO: 0 /100 WBC
PLATELET # BLD AUTO: 175 K/UL (ref 164–446)
PMV BLD AUTO: 11.4 FL (ref 9–12.9)
POTASSIUM SERPL-SCNC: 3.7 MMOL/L (ref 3.6–5.5)
RBC # BLD AUTO: 5.32 M/UL (ref 4.7–6.1)
SIGNIFICANT IND 70042: ABNORMAL
SITE SITE: ABNORMAL
SODIUM SERPL-SCNC: 136 MMOL/L (ref 135–145)
SOURCE SOURCE: ABNORMAL
WBC # BLD AUTO: 7.9 K/UL (ref 4.8–10.8)

## 2017-07-17 PROCEDURE — 85025 COMPLETE CBC W/AUTO DIFF WBC: CPT

## 2017-07-17 PROCEDURE — 80048 BASIC METABOLIC PNL TOTAL CA: CPT

## 2017-07-17 PROCEDURE — 36415 COLL VENOUS BLD VENIPUNCTURE: CPT

## 2017-07-17 PROCEDURE — A9270 NON-COVERED ITEM OR SERVICE: HCPCS | Performed by: INTERNAL MEDICINE

## 2017-07-17 PROCEDURE — 700102 HCHG RX REV CODE 250 W/ 637 OVERRIDE(OP): Performed by: INTERNAL MEDICINE

## 2017-07-17 PROCEDURE — 87040 BLOOD CULTURE FOR BACTERIA: CPT

## 2017-07-17 PROCEDURE — 770020 HCHG ROOM/CARE - TELE (206)

## 2017-07-17 PROCEDURE — 700111 HCHG RX REV CODE 636 W/ 250 OVERRIDE (IP): Performed by: INTERNAL MEDICINE

## 2017-07-17 PROCEDURE — 99233 SBSQ HOSP IP/OBS HIGH 50: CPT | Performed by: INTERNAL MEDICINE

## 2017-07-17 PROCEDURE — 82962 GLUCOSE BLOOD TEST: CPT | Mod: 91

## 2017-07-17 RX ADMIN — OXYCODONE HYDROCHLORIDE 30 MG: 30 TABLET ORAL at 10:06

## 2017-07-17 RX ADMIN — STANDARDIZED SENNA CONCENTRATE AND DOCUSATE SODIUM 2 TABLET: 8.6; 5 TABLET, FILM COATED ORAL at 22:01

## 2017-07-17 RX ADMIN — LISINOPRIL 10 MG: 10 TABLET ORAL at 10:02

## 2017-07-17 RX ADMIN — FUROSEMIDE 40 MG: 10 INJECTION, SOLUTION INTRAMUSCULAR; INTRAVENOUS at 06:13

## 2017-07-17 RX ADMIN — STANDARDIZED SENNA CONCENTRATE AND DOCUSATE SODIUM 2 TABLET: 8.6; 5 TABLET, FILM COATED ORAL at 10:02

## 2017-07-17 RX ADMIN — HEPARIN SODIUM 5000 UNITS: 5000 INJECTION, SOLUTION INTRAVENOUS; SUBCUTANEOUS at 06:13

## 2017-07-17 RX ADMIN — OXYCODONE HYDROCHLORIDE 30 MG: 30 TABLET ORAL at 18:30

## 2017-07-17 RX ADMIN — Medication: at 14:21

## 2017-07-17 RX ADMIN — OXYCODONE HYDROCHLORIDE 30 MG: 30 TABLET ORAL at 06:13

## 2017-07-17 RX ADMIN — OXYCODONE HYDROCHLORIDE 30 MG: 30 TABLET ORAL at 01:43

## 2017-07-17 RX ADMIN — OXYCODONE HYDROCHLORIDE 30 MG: 30 TABLET ORAL at 22:42

## 2017-07-17 RX ADMIN — GABAPENTIN 100 MG: 100 CAPSULE ORAL at 22:01

## 2017-07-17 RX ADMIN — OXYCODONE HYDROCHLORIDE 30 MG: 30 TABLET ORAL at 14:21

## 2017-07-17 RX ADMIN — HEPARIN SODIUM 5000 UNITS: 5000 INJECTION, SOLUTION INTRAVENOUS; SUBCUTANEOUS at 14:20

## 2017-07-17 RX ADMIN — ACETAMINOPHEN 650 MG: 325 TABLET, FILM COATED ORAL at 01:43

## 2017-07-17 RX ADMIN — HEPARIN SODIUM 5000 UNITS: 5000 INJECTION, SOLUTION INTRAVENOUS; SUBCUTANEOUS at 22:01

## 2017-07-17 RX ADMIN — FUROSEMIDE 40 MG: 10 INJECTION, SOLUTION INTRAMUSCULAR; INTRAVENOUS at 16:59

## 2017-07-17 RX ADMIN — GABAPENTIN 100 MG: 100 CAPSULE ORAL at 14:21

## 2017-07-17 RX ADMIN — GABAPENTIN 100 MG: 100 CAPSULE ORAL at 10:02

## 2017-07-17 ASSESSMENT — PAIN SCALES - GENERAL
PAINLEVEL_OUTOF10: 8
PAINLEVEL_OUTOF10: 4
PAINLEVEL_OUTOF10: 8
PAINLEVEL_OUTOF10: 6
PAINLEVEL_OUTOF10: 8
PAINLEVEL_OUTOF10: 8
PAINLEVEL_OUTOF10: 7
PAINLEVEL_OUTOF10: 8
PAINLEVEL_OUTOF10: 7

## 2017-07-17 ASSESSMENT — ENCOUNTER SYMPTOMS
SHORTNESS OF BREATH: 0
ORTHOPNEA: 0
CLAUDICATION: 0
PALPITATIONS: 0
PND: 0

## 2017-07-17 NOTE — WOUND TEAM
"[facility]  Inpatient Services  Initial Wound and Skin Care Evaluation    Admission Date:  07/15/17     HPI, PMH, SH: Reviewed  Unit where seen by Wound Team:  T7    WOUND CONSULT RELATED TO:  BLE      SUBJECTIVE:  \"I have had chills.\"         Self Report / Pain Level:  Tolerated      OBJECTIVE:  Wound crusted over.    WOUND TYPE, LOCATION, CHARACTERISTICS (Pressure ulcers: location, stage, POA or date identified)    Location and type of wound:  ~3 scattered partial thickness wounds hypo dermatosclerosis           Periwound:  Dry, crusted           Drainage:   Scant, purulent at first once cleansed none    Tissue Type and %:  100% pink    Wound Edges:  Attached    Odor:    None    Exposed structure(s): None   S&S of Infection:  Drainage       Measurements:  (Taken 07/16/17)   Length:   0.5 cm    Width:    0.5 cm    Depth:   <0.5 cm     INTERVENTIONS BY WOUND TEAM:  Thin layer of crust removed from left anterior lower leg, small amount of pus expressed, cleansed to reveal pink intact base of wounds.  No tracking or depth noted.  Covered BLE with daily moisturizer and wound beds with cut pieces of hydrofera blue, covered with ABD pad and secured with roll gauze.  Order for ammonium lactate taken from Dr. Suresh.     Interdisciplinary consultation:  Dr. Suresh, patient        EVALUATION:  Ammonium lactate for treatment of hypo dermatosclerosis     Factors affecting wound healing:  CHF, narcotic withdrawal     Goals:  Decrease in wound size by 1% each week.     NURSING PLAN OF CARE ORDERS (X):    Dressing changes: See Dressing Maintenance orders:  X  Skin care: See Skin Care orders:  X  Rectal tube care: See Rectal Tube Care orders:   Other orders:    RSKIN: CURRENT (X) ORDERED (O)  Q shift Hugh:  X  Q shift pressure point assessments:  X  Pressure redistribution mattress      X  JOSE LUIS      Bariatric JSOE LUIS      Bariatric foam        Heel float boots       Heels floated on pillows    X  Barrier wipes    "   Barrier Cream      Barrier paste      Sacral silicone dressing    PRN  Silicone O2 tubing      Anchorfast      Trach with Optifoam split foam       Waffle cushion      Rectal tube or BMS      Antifungal tx    Turn q 2 hours   X  Up to chair     Ambulate   PT/OT     Dietician      PO  X   TF   TPN     PVN    NPO   # days   Other       WOUND TEAM PLAN OF CARE (X):   NPWT change 3 x week:        Dressing changes by wound team:       Follow up as needed:     X  Other (explain):    Anticipated discharge plans (X):  SNF:           Home Care:         X  Outpatient Wound Center:            Self Care:            Other:

## 2017-07-17 NOTE — PROGRESS NOTES
Report received at bedside from RN. Patient A & O x 4. Patient resting in bed. No acute distress. Patient complains of pain 6/10 in legs. Medicated per MAR. No behavioral pain indicators noted.  No SOB/dyspnea noted. No cough noted. Patient denies chest pain/palpitations.  Patient denies nausea. Abdomen: nondistended, nontender. + bowel sounds in all quadrants. Patient passing flatus.  No S/S hypoglycemia/hyperglycemia noted.  Skin: Intact with venous statis effects noted on BLE Activity: 1 person assist   Fall and safety precautions maintained.Hourly rounding in progress.

## 2017-07-17 NOTE — DIETARY
NUTRITION SERVICES: BMI - Pt with BMI >40 (=Body mass index is 42.04 kg/(m^2).  ). Weight loss counseling not appropriate in acute care setting. RECOMMEND - Referral to outpatient nutrition services for weight management after D/C.

## 2017-07-17 NOTE — CARE PLAN
Problem: Communication  Goal: The ability to communicate needs accurately and effectively will improve  Outcome: PROGRESSING AS EXPECTED  Patient educated on medications, procedures and use of call light. Patient will continue to verbalize and improve on education and communication in the plan of care.      Problem: Safety  Goal: Will remain free from injury  Outcome: PROGRESSING AS EXPECTED  Educated patient on use of call light, no slip socks on, bed lowest position. All needs attended to. Patient verbalized understanding.

## 2017-07-17 NOTE — ASSESSMENT & PLAN NOTE
Coagulase negative Staph species growing in one of initial blood cultures on admission 7/15  However pt has no fever, leukocytosis to support infection and false positive due to contamination is possible  Antibiotics were not started by Dr. Suresh because most likely contamination  7/16 blood cultures no growth to date.

## 2017-07-17 NOTE — PROGRESS NOTES
[facility]ist Progress Note    Date of Service: 2017    Chief Complaint  56 y.o. male admitted 7/15/2017 with acute diastolic CHF exacerbation; query staph bacteremia; repeat bl cx's negative; pt with chronic pain history    Interval Problem Update  No signs of sepsis or severe infection  No complaints of pain while on home pain medications  No signs of withdrawal    Consultants/Specialty  CHF navigator    Disposition  TBD        Review of Systems   Respiratory: Negative for shortness of breath.    Cardiovascular: Positive for leg swelling. Negative for chest pain, palpitations, orthopnea, claudication and PND.        Chronic Stasis dermatitis with scabs and desquamation throughout lower extremities. Pt with BL lymphedema and two spots with slight superficial drainage.   Musculoskeletal:        Joint pains mostly in the low back      Physical Exam  Laboratory/Imaging   Hemodynamics  Temp (24hrs), Av.7 °C (98 °F), Min:35.9 °C (96.7 °F), Max:37.4 °C (99.3 °F)   Temperature: 36.8 °C (98.3 °F)  Pulse  Av.1  Min: 68  Max: 93    Blood Pressure: 115/63 mmHg      Respiratory      Respiration: 18, Pulse Oximetry: 98 %        RUL Breath Sounds: Diminished, RML Breath Sounds: Diminished;Fine Crackles, RLL Breath Sounds: Crackles, LARY Breath Sounds: Diminished, LLL Breath Sounds: Fine Crackles    Fluids    Intake/Output Summary (Last 24 hours) at 17 1408  Last data filed at 17 1000   Gross per 24 hour   Intake    500 ml   Output   1600 ml   Net  -1100 ml       Nutrition  Orders Placed This Encounter   Procedures   • Diet Order     Standing Status: Standing      Number of Occurrences: 1      Standing Expiration Date:      Order Specific Question:  Diet:     Answer:  Diabetic [3]     Order Specific Question:  Diet:     Answer:  Cardiac [6]     Physical Exam   Constitutional: He is oriented to person, place, and time.   HENT:   Head: Normocephalic.   Eyes: Conjunctivae are normal.   Neck: No JVD  present. No thyromegaly present.   Cardiovascular: Normal rate and regular rhythm.    Pulses:       Carotid pulses are 2+ on the right side, and 2+ on the left side.       Radial pulses are 2+ on the right side, and 2+ on the left side.   Pulmonary/Chest: He has no wheezes.   Abdominal: Soft.   Musculoskeletal: He exhibits edema.   Neurological: He is alert and oriented to person, place, and time. He displays normal reflexes. A cranial nerve deficit is present. He exhibits normal muscle tone. Coordination normal.   Skin: Skin is warm and dry. Rash noted.   BL LE lymphedema with chronic venous stasis and extensive flaking of the skin. Two spots on left anterior LE with scabs - when removed - superficial drainage noted.   Psychiatric: He has a normal mood and affect. His behavior is normal. Judgment and thought content normal.       Recent Labs      07/15/17   0142  07/16/17   0214  07/17/17   0017   WBC  8.8  8.6  7.9   RBC  4.95  5.40  5.32   HEMOGLOBIN  13.5*  14.7  14.4   HEMATOCRIT  43.7  46.7  46.6   MCV  88.3  86.5  87.6   MCH  27.3  27.2  27.1   MCHC  30.9*  31.5*  30.9*   RDW  47.1  47.0  47.3   PLATELETCT  199  205  175   MPV  11.6  12.1  11.4     Recent Labs      07/15/17   0142  07/16/17   0214  07/17/17   0017   SODIUM  137  138  136   POTASSIUM  4.1  3.8  3.7   CHLORIDE  99  96  94*   CO2  30  33  35*   GLUCOSE  125*  118*  139*   BUN  15  15  20   CREATININE  0.55  0.70  0.75   CALCIUM  10.0  9.9  9.4     Recent Labs      07/15/17   0142   INR  1.55*     Recent Labs      07/15/17   0142   BNPBTYPENAT  7     Recent Labs      07/16/17 0214   TRIGLYCERIDE  102   HDL  42   LDL  105*          Assessment/Plan     Acute on chronic congestive heart failure (CMS-HCC) (present on admission)  Assessment & Plan  - likely non-adherent with low salt diet; medications; missed some lasix doses  - continue lasix 40mg iv bid  - echo nl LVEF with grade II diastolic dysfunction  - he does not have systolic  dysfunction    - CHF teaching at bedside provided by navigator  - continue lasix diuresis and monitor labs  - not sure what is adherence and compliance is.      Acute respiratory failure (CMS-HCC) (present on admission)  Assessment & Plan  - d/t chf exacerbation and obesity  - wean O2 as tolerated; as he is diuresed    Positive blood culture (present on admission)  Assessment & Plan  Coagulase negative Staph species growing in one of initial blood cultures on admission 7/15  However pt has no fever, leukocytosis to support infection and false positive due to contamination is possible  I repeated blood cultures on 7/16 - so far no growth    Hold abx for now  Pt does give hx prior MRSA infection and if MRSA grows in blood; would start antibiotics eg vancomycin and ID consultation  If blood cultures turn out to be true; I would suspect the leg to be the source (small area left ant shin with slight drainage noted)    Narcotic withdrawal (CMS-HCC) (present on admission)  Assessment & Plan  - patient does not have any more nausea, and has no withdrawal symptoms at this time; eg no anxiety, tremors, hypertension or tachycardia    - he is requesting to go back on his schedule of outpatient oxycodone (his nurse verified his prescription with his pharmacy and pt received oxycodone IR 30mg Q4-6 hours prn at home    - will continue this regimen for now; I advised him to seek to taper off opioids in the future    HTN (hypertension) (present on admission)  Assessment & Plan  - continue lisinopril  - adjust dose as tolerates    COPD (chronic obstructive pulmonary disease) (CMS-HCC) (present on admission)  Assessment & Plan  - no acute exacerbation at this time      Stasis dermatitis (present on admission)  Assessment & Plan  - chronic severe stasis dermatitis and lymphedema of both LE's  - wound care saw him today - no gross evidence of cellulitis or abscess -- there were two spots with superficial drainage noted when scabs  removed    - pt had a blood culture reported positive for staph - ID and sens pending  --?clinically he has no fever, leukocytosis, etc and will repeat a blood culture today; may start antibiotics if repeat bl cx also positive or if MRSA present; will follow.    Diabetes type 2, controlled (CMS-HCC) (present on admission)  Assessment & Plan  - will give ISS, adjust as needed    Right bundle branch block (present on admission)  Assessment & Plan  - initially concern for STEMI, cards has seen and no STEMI  - new RBBB  - echo shows nl LVEF; gr II diastolic dysfunction  -  cards signed off      EKG reviewed, Labs reviewed, Medications reviewed and Radiology images reviewed

## 2017-07-17 NOTE — PROGRESS NOTES
[facility]ist Progress Note    Date of Service: 2017    Chief Complaint  56 y.o. male admitted 7/15/2017 with fluid overload; concern for CHF exacerbation;    Interval Problem Update  One blood cx positive for staph today -->pt afebrile, normal WBC, nontoxic, nonseptic appearing  C/o uncontrolled pain and requests his home opioids to be restarted    Consultants/Specialty  Cardiology - signed off    Disposition  TBD        Review of Systems   Respiratory: Negative for shortness of breath.    Cardiovascular: Positive for leg swelling. Negative for chest pain, palpitations, orthopnea, claudication and PND.        Chronic Stasis dermatitis with scabs and desquamation throughout lower extremities. Pt with BL lymphedema and two spots with slight superficial drainage.   Musculoskeletal:        Joint pains mostly in the low back      Physical Exam  Laboratory/Imaging   Hemodynamics  Temp (24hrs), Av °C (98.6 °F), Min:36.6 °C (97.8 °F), Max:37.4 °C (99.3 °F)   Temperature: 37.4 °C (99.3 °F)  Pulse  Av.1  Min: 68  Max: 93    Blood Pressure: 129/76 mmHg      Respiratory      Respiration: 18, Pulse Oximetry: 93 %        RUL Breath Sounds: Diminished, RML Breath Sounds: Diminished;Fine Crackles, RLL Breath Sounds: Crackles, LARY Breath Sounds: Diminished, LLL Breath Sounds: Crackles    Fluids    Intake/Output Summary (Last 24 hours) at 17 1803  Last data filed at 17 1600   Gross per 24 hour   Intake   1180 ml   Output   2700 ml   Net  -1520 ml       Nutrition  Orders Placed This Encounter   Procedures   • Diet Order     Standing Status: Standing      Number of Occurrences: 1      Standing Expiration Date:      Order Specific Question:  Diet:     Answer:  Diabetic [3]     Order Specific Question:  Diet:     Answer:  Cardiac [6]     Physical Exam   Constitutional: He is oriented to person, place, and time.   HENT:   Head: Normocephalic.   Eyes: Conjunctivae are normal.   Neck: No JVD present. No  thyromegaly present.   Cardiovascular: Normal rate and regular rhythm.    Pulses:       Carotid pulses are 2+ on the right side, and 2+ on the left side.       Radial pulses are 2+ on the right side, and 2+ on the left side.   Pulmonary/Chest: He has no wheezes.   Abdominal: Soft.   Musculoskeletal: He exhibits edema.   Neurological: He is alert and oriented to person, place, and time. He displays normal reflexes. A cranial nerve deficit is present. He exhibits normal muscle tone. Coordination normal.   Skin: Skin is warm and dry. Rash noted.   BL LE lymphedema with chronic venous stasis and extensive flaking of the skin. Two spots on left anterior LE with scabs - when removed - superficial drainage noted.   Psychiatric: He has a normal mood and affect. His behavior is normal. Judgment and thought content normal.       Recent Labs      07/15/17   0142  07/16/17   0214   WBC  8.8  8.6   RBC  4.95  5.40   HEMOGLOBIN  13.5*  14.7   HEMATOCRIT  43.7  46.7   MCV  88.3  86.5   MCH  27.3  27.2   MCHC  30.9*  31.5*   RDW  47.1  47.0   PLATELETCT  199  205   MPV  11.6  12.1     Recent Labs      07/15/17   0142  07/16/17   0214   SODIUM  137  138   POTASSIUM  4.1  3.8   CHLORIDE  99  96   CO2  30  33   GLUCOSE  125*  118*   BUN  15  15   CREATININE  0.55  0.70   CALCIUM  10.0  9.9     Recent Labs      07/15/17   0142   INR  1.55*     Recent Labs      07/15/17   0142   BNPBTYPENAT  7     Recent Labs      07/16/17 0214   TRIGLYCERIDE  102   HDL  42   LDL  105*          Assessment/Plan     Acute on chronic congestive heart failure (CMS-HCC) (present on admission)  Assessment & Plan  - likely non-adherent with low salt diet; medications; missed some lasix doses  - continue lasix 40mg iv bid  - echo nl LVEF with grade II diastolic dysfunction  - he does not have systolic dysfunction    Acute respiratory failure (CMS-[facility]) (present on admission)  Assessment & Plan  - d/t chf exacerbation and obesity  - wean O2 as tolerated; as he  is diuresed    Narcotic withdrawal (CMS-HCC) (present on admission)  Assessment & Plan  - patient does not have any more nausea, and has no withdrawal symptoms at this time; eg no anxiety, tremors, hypertension or tachycardia    - he is requesting to go back on his schedule of outpatient oxycodone (his nurse verified his prescription with his pharmacy and pt received oxycodone IR 30mg Q4-6 hours prn at home    - will continue this regimen for now; I advised him to seek to taper off opioids in the future    HTN (hypertension) (present on admission)  Assessment & Plan  - continue lisinopril  - adjust dose as tolerates    COPD (chronic obstructive pulmonary disease) (CMS-HCC) (present on admission)  Assessment & Plan  - no acute exacerbation at this time      Stasis dermatitis (present on admission)  Assessment & Plan  - chronic severe stasis dermatitis and lymphedema of both LE's  - wound care saw him today - no gross evidence of cellulitis or abscess -- there were two spots with superficial drainage noted when scabs removed    - pt had a blood culture reported positive for staph - ID and sens pending  --?clinically he has no fever, leukocytosis, etc and will repeat a blood culture today; may start antibiotics if repeat bl cx also positive or if MRSA present; will follow.    Diabetes type 2, controlled (CMS-HCC) (present on admission)  Assessment & Plan  - will give ISS, adjust as needed    Right bundle branch block (present on admission)  Assessment & Plan  - initially concern for STEMI, cards has seen and no STEMI  - new RBBB  - echo shows nl LVEF; gr II diastolic dysfunction  -  cards signed off      EKG reviewed, Labs reviewed, Medications reviewed and Radiology images reviewed

## 2017-07-17 NOTE — HEART FAILURE PROGRAM
"Cardiovascular Nurse Navigator () Progress Note:     This [employer] met with patient at bedside. Pt resides locally. Pt owns a truck but can not get himself up in to it. Pt has a supportive daughter and son who help take him to appointments. However this CNN received the impression that his children's repeated encouragement for him to \"get out and do something\" is challenging for him to handle. Pt also admits to struggling with depression in the previous months but states that he does not want to take meds because his brother started taking them and gained 80 pounds.     Pt states that at home he hardly sleeps anymore but when he does, it's sitting up in a chair. Here in the hospital is the first time the pt has been able to lay down and sleep \"in years\". This was used as a teaching point for monitoring for worsening HF.    Pt is not eligible for the [facility] due to Medicare Coverage. Order placed for schedulers to arrange f/u at the [facility].    Pt owns a scale. Reviewed anatomy and physiology of HFpEF, COPD, HTN, obesity and DM with patient. Discussed daily weights, sodium restriction, worsening signs and symptoms to report to physician, heart medications, and importance of adherence to medication regimen.   Pt able to participate in teach back successfully. Pt endorses difficulty with transportation. This CNN placed an order to SW to request their help with this and to ensure that pt can afford medications. Pt said he'd like to have his son talk to [facility]. Contact information provided and encouraged pt to have family call.       "

## 2017-07-18 ENCOUNTER — PATIENT OUTREACH (OUTPATIENT)
Dept: HEALTH INFORMATION MANAGEMENT | Facility: OTHER | Age: 57
End: 2017-07-18

## 2017-07-18 PROBLEM — I50.33 ACUTE ON CHRONIC DIASTOLIC (CONGESTIVE) HEART FAILURE (HCC): Status: ACTIVE | Noted: 2017-07-15

## 2017-07-18 PROBLEM — J96.01 ACUTE RESPIRATORY FAILURE WITH HYPOXIA (HCC): Status: ACTIVE | Noted: 2017-07-15

## 2017-07-18 LAB
ANION GAP SERPL CALC-SCNC: 7 MMOL/L (ref 0–11.9)
BUN SERPL-MCNC: 29 MG/DL (ref 8–22)
CALCIUM SERPL-MCNC: 9.9 MG/DL (ref 8.5–10.5)
CHLORIDE SERPL-SCNC: 92 MMOL/L (ref 96–112)
CO2 SERPL-SCNC: 35 MMOL/L (ref 20–33)
CREAT SERPL-MCNC: 0.82 MG/DL (ref 0.5–1.4)
GFR SERPL CREATININE-BSD FRML MDRD: >60 ML/MIN/1.73 M 2
GLUCOSE BLD-MCNC: 112 MG/DL (ref 65–99)
GLUCOSE BLD-MCNC: 124 MG/DL (ref 65–99)
GLUCOSE BLD-MCNC: 130 MG/DL (ref 65–99)
GLUCOSE BLD-MCNC: 134 MG/DL (ref 65–99)
GLUCOSE SERPL-MCNC: 132 MG/DL (ref 65–99)
MAGNESIUM SERPL-MCNC: 1.8 MG/DL (ref 1.5–2.5)
POTASSIUM SERPL-SCNC: 4.1 MMOL/L (ref 3.6–5.5)
SODIUM SERPL-SCNC: 134 MMOL/L (ref 135–145)

## 2017-07-18 PROCEDURE — 700102 HCHG RX REV CODE 250 W/ 637 OVERRIDE(OP): Performed by: NURSE PRACTITIONER

## 2017-07-18 PROCEDURE — 83735 ASSAY OF MAGNESIUM: CPT

## 2017-07-18 PROCEDURE — 700102 HCHG RX REV CODE 250 W/ 637 OVERRIDE(OP): Performed by: INTERNAL MEDICINE

## 2017-07-18 PROCEDURE — A9270 NON-COVERED ITEM OR SERVICE: HCPCS | Performed by: INTERNAL MEDICINE

## 2017-07-18 PROCEDURE — 770020 HCHG ROOM/CARE - TELE (206)

## 2017-07-18 PROCEDURE — 700111 HCHG RX REV CODE 636 W/ 250 OVERRIDE (IP): Performed by: INTERNAL MEDICINE

## 2017-07-18 PROCEDURE — A9270 NON-COVERED ITEM OR SERVICE: HCPCS | Performed by: NURSE PRACTITIONER

## 2017-07-18 PROCEDURE — 80048 BASIC METABOLIC PNL TOTAL CA: CPT

## 2017-07-18 PROCEDURE — 82962 GLUCOSE BLOOD TEST: CPT

## 2017-07-18 PROCEDURE — 36415 COLL VENOUS BLD VENIPUNCTURE: CPT

## 2017-07-18 PROCEDURE — 99233 SBSQ HOSP IP/OBS HIGH 50: CPT | Performed by: INTERNAL MEDICINE

## 2017-07-18 RX ORDER — LISINOPRIL 5 MG/1
5 TABLET ORAL DAILY
Status: DISCONTINUED | OUTPATIENT
Start: 2017-07-18 | End: 2017-07-19 | Stop reason: HOSPADM

## 2017-07-18 RX ORDER — FUROSEMIDE 40 MG/1
40 TABLET ORAL
Status: DISCONTINUED | OUTPATIENT
Start: 2017-07-18 | End: 2017-07-19 | Stop reason: HOSPADM

## 2017-07-18 RX ADMIN — FUROSEMIDE 40 MG: 10 INJECTION, SOLUTION INTRAMUSCULAR; INTRAVENOUS at 05:56

## 2017-07-18 RX ADMIN — HEPARIN SODIUM 5000 UNITS: 5000 INJECTION, SOLUTION INTRAVENOUS; SUBCUTANEOUS at 20:21

## 2017-07-18 RX ADMIN — OXYCODONE HYDROCHLORIDE 30 MG: 30 TABLET ORAL at 06:46

## 2017-07-18 RX ADMIN — GABAPENTIN 100 MG: 100 CAPSULE ORAL at 09:36

## 2017-07-18 RX ADMIN — POTASSIUM CHLORIDE 40 MEQ: 1500 TABLET, EXTENDED RELEASE ORAL at 09:36

## 2017-07-18 RX ADMIN — STANDARDIZED SENNA CONCENTRATE AND DOCUSATE SODIUM 2 TABLET: 8.6; 5 TABLET, FILM COATED ORAL at 09:35

## 2017-07-18 RX ADMIN — LISINOPRIL 5 MG: 5 TABLET ORAL at 09:35

## 2017-07-18 RX ADMIN — HEPARIN SODIUM 5000 UNITS: 5000 INJECTION, SOLUTION INTRAVENOUS; SUBCUTANEOUS at 05:56

## 2017-07-18 RX ADMIN — FUROSEMIDE 40 MG: 40 TABLET ORAL at 15:00

## 2017-07-18 RX ADMIN — Medication: at 09:36

## 2017-07-18 RX ADMIN — HEPARIN SODIUM 5000 UNITS: 5000 INJECTION, SOLUTION INTRAVENOUS; SUBCUTANEOUS at 15:00

## 2017-07-18 RX ADMIN — GABAPENTIN 100 MG: 100 CAPSULE ORAL at 15:00

## 2017-07-18 RX ADMIN — OXYCODONE HYDROCHLORIDE 30 MG: 30 TABLET ORAL at 20:19

## 2017-07-18 RX ADMIN — GABAPENTIN 100 MG: 100 CAPSULE ORAL at 20:19

## 2017-07-18 RX ADMIN — OXYCODONE HYDROCHLORIDE 30 MG: 30 TABLET ORAL at 02:48

## 2017-07-18 RX ADMIN — FUROSEMIDE 40 MG: 40 TABLET ORAL at 09:35

## 2017-07-18 RX ADMIN — OXYCODONE HYDROCHLORIDE 30 MG: 30 TABLET ORAL at 11:06

## 2017-07-18 RX ADMIN — OXYCODONE HYDROCHLORIDE 30 MG: 30 TABLET ORAL at 15:16

## 2017-07-18 ASSESSMENT — ENCOUNTER SYMPTOMS
DIZZINESS: 0
FOCAL WEAKNESS: 0
MYALGIAS: 0
SHORTNESS OF BREATH: 0
CHILLS: 0
NAUSEA: 0
HEADACHES: 0
WEAKNESS: 0
NERVOUS/ANXIOUS: 0
BLOOD IN STOOL: 0
ORTHOPNEA: 0
DIARRHEA: 0
NECK PAIN: 1
PALPITATIONS: 0
CONSTIPATION: 0
INSOMNIA: 0
LOSS OF CONSCIOUSNESS: 0
HEMOPTYSIS: 0
COUGH: 0
TREMORS: 0
EYE PAIN: 0
SEIZURES: 0
PND: 0
EYE REDNESS: 0
VOMITING: 0
WHEEZING: 0
FEVER: 0
CLAUDICATION: 0
ABDOMINAL PAIN: 0
FALLS: 0

## 2017-07-18 ASSESSMENT — PAIN SCALES - GENERAL
PAINLEVEL_OUTOF10: 8
PAINLEVEL_OUTOF10: 4
PAINLEVEL_OUTOF10: 8
PAINLEVEL_OUTOF10: 4
PAINLEVEL_OUTOF10: 3

## 2017-07-18 NOTE — CARE PLAN
Problem: Safety  Goal: Will remain free from falls  Intervention: Assess risk factors for falls  Patient at risk to fall. Sitting up in chair, chair alarm on. Patient refuses to wear socks. Educated patient on importance of calling the nurses before getting out of bed. Call light within patient's reach. Hourly rounding in place.       Problem: Venous Thromboembolism (VTW)/Deep Vein Thrombosis (DVT) Prevention:  Goal: Patient will participate in Venous Thrombosis (VTE)/Deep Vein Thrombosis (DVT)Prevention Measures  Intervention: Assess and monitor for anticoagulation complications  Patient receiving SQ heparin

## 2017-07-18 NOTE — PROGRESS NOTES
Bedside report completed.  Assumed pt care. Patient sitting in bedside chair, in no apparent distress.  Safety precautions in place. Call light & personal belongings within reach.  Plan of care discussed.  Patient reports 7/10 pain in legs and back, no intervention requested at this time.  IV is saline locked, on room air.  No needs expressed at this time.  Will continue to monitor with hourly rounding.

## 2017-07-18 NOTE — PROGRESS NOTES
Care of patient assumed after report. Assessment completed, all needs met. Patient sitting up in chair, denies having any pain at this time. Call light in reach, fall precautions in place. Discussed plan of care with patient. Will continue to monitor.

## 2017-07-18 NOTE — PROGRESS NOTES
· Chart reviewed.  Patient is scheduled for care manager telephone visit today 7/18/17 at 2:20 PM, however currently admitted at [facility].  Placed phone call to Malini in scheduling to request that care manager telephone visit and discharge clinic appt be canceled and/or rescheduled.

## 2017-07-18 NOTE — CARE PLAN
Problem: Pain Management  Goal: Pain level will decrease to patient’s comfort goal  Outcome: PROGRESSING SLOWER THAN EXPECTED  Pt educated about alternative methods of pain management including guided imagery and distraction techniques in an effort to control pain, in addition to opiod management.      Problem: Safety  Goal: Will remain free from injury  Outcome: PROGRESSING AS EXPECTED  Bed in lowest, locked position.  Treaded slipper socks on, appropriate signs in place, and call light in reach.

## 2017-07-18 NOTE — FACE TO FACE
Face to Face Supporting Documentation - Home Health    The encounter with this patient was in whole or in part the primary reason for home health admission.    Date of encounter:   Patient:                    MRN:                       YOB: 2018  Fer Estrada  [id number]  1960     Home health to see patient for:  Skilled Nursing care for assessment, interventions & education  Wound care  Skilled need for:  Medications, wound care    Skilled nursing interventions to include:  Wound Care    Homebound status evidenced by:  Needs the assistance of another person in order to leave the home. Leaving home requires a considerable and taxing effort. There is a normal inability to leave the home.    Community Physician to provide follow up care: Pcp Not In Computer     Optional Interventions? No      I certify the face to face encounter for this home health care referral meets the CMS requirements and the encounter/clinical assessment with the patient was, in whole, or in part, for the medical condition(s) listed above, which is the primary reason for home health care. Based on my clinical findings: the service(s) are medically necessary, support the need for home health care, and the homebound criteria are met.  I certify that this patient has had a face to face encounter by myself.  Benjie Marcus M.D. - NPI: [id number]

## 2017-07-18 NOTE — CARE PLAN
Problem: Communication  Goal: The ability to communicate needs accurately and effectively will improve  Outcome: PROGRESSING AS EXPECTED  Intervention: [place] patient and significant other/support system to call light to alert staff of needs  Patient oriented to surroundings, the unit policies and routines.  Patient educated and understands the use of the call button for assistance.      Problem: Safety  Goal: Will remain free from falls  Outcome: PROGRESSING AS EXPECTED  Intervention: Implement fall precautions  Patient educated and understands the need for the safety precautions that are in place to prevent injury from falls.  Patient educated and understands the use of the call button for assistance with mobility.

## 2017-07-18 NOTE — PROGRESS NOTES
[facility]    Date of Service: 2017    Chief Complaint  56 y.o. male admitted 7/15/2017 with acute diastolic CHF exacerbation; query staph bacteremia; repeat bl cx's negative; pt with chronic pain history    Interval Problem Update  No symptoms of withdrawal, stable. He still has leg swelling. He seems to be anxious at times and wants HHC. He qualifies for oxygen    Consultants/Specialty  CHF navigator    Disposition  HHC being arranged.        Review of Systems   Constitutional: Negative for fever and chills.   HENT: Negative for congestion, hearing loss and nosebleeds.    Eyes: Negative for pain and redness.   Respiratory: Negative for cough, hemoptysis, shortness of breath and wheezing.    Cardiovascular: Positive for leg swelling. Negative for chest pain, palpitations, orthopnea, claudication and PND.   Gastrointestinal: Negative for nausea, vomiting, abdominal pain, diarrhea, constipation and blood in stool.   Genitourinary: Negative for dysuria, frequency and hematuria.   Musculoskeletal: Positive for neck pain. Negative for myalgias, joint pain and falls.   Skin: Positive for rash (venous stasis changes).   Neurological: Negative for dizziness, tremors, focal weakness, seizures, loss of consciousness, weakness and headaches.   Psychiatric/Behavioral: The patient is not nervous/anxious and does not have insomnia.    All other systems reviewed and are negative.     Physical Exam  Laboratory/Imaging   Hemodynamics  Temp (24hrs), Av.3 °C (97.4 °F), Min:36.1 °C (97 °F), Max:36.6 °C (97.8 °F)   Temperature: 36.3 °C (97.3 °F)  Pulse  Av.9  Min: 68  Max: 93    Blood Pressure: (!) 96/50 mmHg (RN notoified)      Respiratory      Respiration: 19, Pulse Oximetry: 92 %        RUL Breath Sounds: Diminished, RML Breath Sounds: Diminished, RLL Breath Sounds: Diminished, LARY Breath Sounds: Diminished, LLL Breath Sounds: Diminished    Fluids    Intake/Output Summary (Last 24 hours) at  07/18/17 1304  Last data filed at 07/18/17 0600   Gross per 24 hour   Intake    700 ml   Output    900 ml   Net   -200 ml       Nutrition  Orders Placed This Encounter   Procedures   • Diet Order     Standing Status: Standing      Number of Occurrences: 1      Standing Expiration Date:      Order Specific Question:  Diet:     Answer:  Diabetic [3]     Order Specific Question:  Diet:     Answer:  Cardiac [6]     Physical Exam   Constitutional: He is oriented to person, place, and time.   HENT:   Head: Normocephalic.   Eyes: Conjunctivae are normal.   Neck: No JVD present. No thyromegaly present.   Cardiovascular: Normal rate and regular rhythm.    Pulses:       Carotid pulses are 2+ on the right side, and 2+ on the left side.       Radial pulses are 2+ on the right side, and 2+ on the left side.   Pulmonary/Chest: He has no wheezes.   Abdominal: Soft.   Musculoskeletal: He exhibits edema.   Neurological: He is alert and oriented to person, place, and time. He displays normal reflexes. A cranial nerve deficit is present. He exhibits normal muscle tone. Coordination normal.   Skin: Skin is warm and dry. Rash (venous stasis changes. R spots L anterior with scabs, currently clean, not draining) noted.   Psychiatric: He has a normal mood and affect. His behavior is normal. Judgment and thought content normal.       Recent Labs      07/16/17   0214  07/17/17   0017   WBC  8.6  7.9   RBC  5.40  5.32   HEMOGLOBIN  14.7  14.4   HEMATOCRIT  46.7  46.6   MCV  86.5  87.6   MCH  27.2  27.1   MCHC  31.5*  30.9*   RDW  47.0  47.3   PLATELETCT  205  175   MPV  12.1  11.4     Recent Labs      07/16/17   0214  07/17/17   0017  07/18/17   0325   SODIUM  138  136  134*   POTASSIUM  3.8  3.7  4.1   CHLORIDE  96  94*  92*   CO2  33  35*  35*   GLUCOSE  118*  139*  132*   BUN  15  20  29*   CREATININE  0.70  0.75  0.82   CALCIUM  9.9  9.4  9.9             Recent Labs      07/16/17 0214   TRIGLYCERIDE  102   HDL  42   LDL  105*           Assessment/Plan     Acute on chronic diastolic (congestive) heart failure (CMS-HCC) (present on admission)  Assessment & Plan  Presented on admission with dyspnea. Improved with diuresis.  - likely non-adherent with low salt diet; medications; missed some lasix doses  - echo nl LVEF with grade II diastolic dysfunction  - he does not have systolic dysfunction  - CHF teaching at bedside provided by navigator  - Reviewed I/Os; net negative  - Switched to Lasix PO      Acute respiratory failure with hypoxia (CMS-HCC) (present on admission)  Assessment & Plan  - d/t chf exacerbation and obesity  - wean O2 as tolerated; as he is diuresed  - exercise oximetry done, he required oxygen at dischagre; O2 being arranged.    Narcotic withdrawal (CMS-HCC) (present on admission)  Assessment & Plan  - on chronic high oxycodone dose at home  - patient does not have any more nausea, and has no withdrawal symptoms at this time; eg no anxiety, tremors, hypertension or tachycardia  - he is requesting to go back on his schedule of outpatient oxycodone (his nurse verified his prescription with his pharmacy and pt received oxycodone IR 30mg Q4-6 hours prn at home  - f/u to his primary care physician/pain clinic for opioid tapering    HTN (hypertension) (present on admission)  Assessment & Plan  - continue lisinopril  - adjust dose as tolerates  - now low normal SBPs, reduced dose of lisinopril and diuretics also transitioned PO    COPD (chronic obstructive pulmonary disease) (CMS-HCC) (present on admission)  Assessment & Plan  - no acute exacerbation at this time      Stasis dermatitis (present on admission)  Assessment & Plan  - chronic severe stasis dermatitis and lymphedema of both LE's  - wound care saw him today - no gross evidence of cellulitis or abscess -- there were two spots with superficial drainage noted when scabs removed    Positive blood culture (present on admission)  Assessment & Plan  Coagulase negative Staph species  growing in one of initial blood cultures on admission 7/15  However pt has no fever, leukocytosis to support infection and false positive due to contamination is possible  Antibiotics were not started by Dr. Suresh because most likely contamination  7/16 blood cultures no growth to date.     Diabetes type 2, controlled (CMS-HCC) (present on admission)  Assessment & Plan  - will give ISS, adjust as needed    Right bundle branch block (present on admission)  Assessment & Plan  - initially concern for STEMI, cards has seen and no STEMI  - new RBBB  - echo shows nl LVEF; gr II diastolic dysfunction  - cards signed off    I spent 36 minutes, reviewing the chart, notes, vitals, labs, imaging, ordering labs, evaluating Fer Estrada for assessment, enacting the plan above. 50% of the time was spent in counseling Fer Estrada andanswering questions. Time was devoted to counseling and coordinating care including review of records, pertinent lab data and studies, as well as discussing diagnostic evaluation and work up, planned therapeutic interventions and future disposition of care. Where indicated, the assessment and plan reflect discussion of patient with consultants, other healthcare providers, family members, and additional research needed to obtain further information in formulating the plan of care for Fer Estrada.     EKG reviewed, Labs reviewed, Medications reviewed and Radiology images reviewed        DVT Prophylaxis: Heparin

## 2017-07-19 ENCOUNTER — PATIENT OUTREACH (OUTPATIENT)
Dept: HEALTH INFORMATION MANAGEMENT | Facility: OTHER | Age: 57
End: 2017-07-19

## 2017-07-19 VITALS
BODY MASS INDEX: 42.03 KG/M2 | WEIGHT: 277.34 LBS | TEMPERATURE: 98.2 F | DIASTOLIC BLOOD PRESSURE: 55 MMHG | SYSTOLIC BLOOD PRESSURE: 108 MMHG | HEART RATE: 75 BPM | RESPIRATION RATE: 18 BRPM | HEIGHT: 68 IN | OXYGEN SATURATION: 95 %

## 2017-07-19 PROBLEM — J96.11 CHRONIC RESPIRATORY FAILURE WITH HYPOXIA (HCC): Status: ACTIVE | Noted: 2017-07-19

## 2017-07-19 LAB
ANION GAP SERPL CALC-SCNC: 7 MMOL/L (ref 0–11.9)
BUN SERPL-MCNC: 32 MG/DL (ref 8–22)
CALCIUM SERPL-MCNC: 9.8 MG/DL (ref 8.5–10.5)
CHLORIDE SERPL-SCNC: 92 MMOL/L (ref 96–112)
CO2 SERPL-SCNC: 34 MMOL/L (ref 20–33)
CREAT SERPL-MCNC: 0.66 MG/DL (ref 0.5–1.4)
ERYTHROCYTE [DISTWIDTH] IN BLOOD BY AUTOMATED COUNT: 48 FL (ref 35.9–50)
GFR SERPL CREATININE-BSD FRML MDRD: >60 ML/MIN/1.73 M 2
GLUCOSE BLD-MCNC: 103 MG/DL (ref 65–99)
GLUCOSE BLD-MCNC: 131 MG/DL (ref 65–99)
GLUCOSE SERPL-MCNC: 98 MG/DL (ref 65–99)
HCT VFR BLD AUTO: 49.5 % (ref 42–52)
HGB BLD-MCNC: 15.2 G/DL (ref 14–18)
MCH RBC QN AUTO: 26.8 PG (ref 27–33)
MCHC RBC AUTO-ENTMCNC: 30.7 G/DL (ref 33.7–35.3)
MCV RBC AUTO: 87.3 FL (ref 81.4–97.8)
PLATELET # BLD AUTO: 177 K/UL (ref 164–446)
PMV BLD AUTO: 12.1 FL (ref 9–12.9)
POTASSIUM SERPL-SCNC: 4.8 MMOL/L (ref 3.6–5.5)
RBC # BLD AUTO: 5.67 M/UL (ref 4.7–6.1)
SODIUM SERPL-SCNC: 133 MMOL/L (ref 135–145)
WBC # BLD AUTO: 8.1 K/UL (ref 4.8–10.8)

## 2017-07-19 PROCEDURE — 700102 HCHG RX REV CODE 250 W/ 637 OVERRIDE(OP): Performed by: INTERNAL MEDICINE

## 2017-07-19 PROCEDURE — 80048 BASIC METABOLIC PNL TOTAL CA: CPT

## 2017-07-19 PROCEDURE — A9270 NON-COVERED ITEM OR SERVICE: HCPCS | Performed by: INTERNAL MEDICINE

## 2017-07-19 PROCEDURE — 36415 COLL VENOUS BLD VENIPUNCTURE: CPT

## 2017-07-19 PROCEDURE — 85027 COMPLETE CBC AUTOMATED: CPT

## 2017-07-19 PROCEDURE — 99239 HOSP IP/OBS DSCHRG MGMT >30: CPT | Performed by: INTERNAL MEDICINE

## 2017-07-19 PROCEDURE — 82962 GLUCOSE BLOOD TEST: CPT

## 2017-07-19 PROCEDURE — 700111 HCHG RX REV CODE 636 W/ 250 OVERRIDE (IP): Performed by: INTERNAL MEDICINE

## 2017-07-19 RX ORDER — AMOXICILLIN 250 MG
2 CAPSULE ORAL 2 TIMES DAILY
Qty: 30 TAB | Refills: 0 | Status: ON HOLD | COMMUNITY
Start: 2017-07-19 | End: 2018-01-18

## 2017-07-19 RX ORDER — POTASSIUM CHLORIDE 20 MEQ/1
20 TABLET, EXTENDED RELEASE ORAL DAILY
Status: DISCONTINUED | OUTPATIENT
Start: 2017-07-19 | End: 2017-07-19 | Stop reason: HOSPADM

## 2017-07-19 RX ORDER — ACETAMINOPHEN 325 MG/1
650 TABLET ORAL EVERY 6 HOURS PRN
Qty: 30 TAB | Refills: 0 | COMMUNITY
Start: 2017-07-19 | End: 2018-04-24

## 2017-07-19 RX ORDER — FUROSEMIDE 40 MG/1
40 TABLET ORAL DAILY
Qty: 30 TAB | Refills: 0 | Status: ON HOLD | OUTPATIENT
Start: 2017-07-19 | End: 2018-04-15

## 2017-07-19 RX ORDER — AMMONIUM LACTATE 12 G/100G
1 LOTION TOPICAL 2 TIMES DAILY
Qty: 1 BOTTLE | Refills: 0 | Status: SHIPPED | OUTPATIENT
Start: 2017-07-19 | End: 2018-03-16

## 2017-07-19 RX ORDER — POLYETHYLENE GLYCOL 3350 17 G/17G
17 POWDER, FOR SOLUTION ORAL
Refills: 3 | COMMUNITY
Start: 2017-07-19 | End: 2018-01-12

## 2017-07-19 RX ADMIN — OXYCODONE HYDROCHLORIDE 30 MG: 30 TABLET ORAL at 10:49

## 2017-07-19 RX ADMIN — OXYCODONE HYDROCHLORIDE 30 MG: 30 TABLET ORAL at 06:20

## 2017-07-19 RX ADMIN — OXYCODONE HYDROCHLORIDE 30 MG: 30 TABLET ORAL at 02:19

## 2017-07-19 RX ADMIN — GABAPENTIN 100 MG: 100 CAPSULE ORAL at 09:11

## 2017-07-19 RX ADMIN — GABAPENTIN 100 MG: 100 CAPSULE ORAL at 15:07

## 2017-07-19 RX ADMIN — POTASSIUM CHLORIDE 20 MEQ: 1500 TABLET, EXTENDED RELEASE ORAL at 09:10

## 2017-07-19 RX ADMIN — FUROSEMIDE 40 MG: 40 TABLET ORAL at 05:18

## 2017-07-19 RX ADMIN — OXYCODONE HYDROCHLORIDE 30 MG: 30 TABLET ORAL at 15:07

## 2017-07-19 RX ADMIN — LISINOPRIL 5 MG: 5 TABLET ORAL at 09:11

## 2017-07-19 RX ADMIN — Medication: at 09:11

## 2017-07-19 RX ADMIN — HEPARIN SODIUM 5000 UNITS: 5000 INJECTION, SOLUTION INTRAVENOUS; SUBCUTANEOUS at 05:18

## 2017-07-19 ASSESSMENT — PAIN SCALES - GENERAL
PAINLEVEL_OUTOF10: 6
PAINLEVEL_OUTOF10: 8
PAINLEVEL_OUTOF10: 6
PAINLEVEL_OUTOF10: 8
PAINLEVEL_OUTOF10: 8

## 2017-07-19 ASSESSMENT — LIFESTYLE VARIABLES: EVER_SMOKED: NEVER

## 2017-07-19 NOTE — CARE PLAN
Problem: Safety  Goal: Will remain free from injury  Intervention: Provide assistance with mobility  Pt mobility assessed at beginning of shift, pt one assist with cane to bathroom, steady.  Fall precautions in place, bed in lowest, locked position and call light is within reach.  Pt educated to call for assistance and verbalizes understanding. Pt refusing bed/chair alarm, but educated regarding fall risk and pt agrees to call before attempting to ambulate on his own.        Problem: Knowledge Deficit  Goal: Knowledge of disease process/condition, treatment plan, diagnostic tests, and medications will improve  Intervention: Assess knowledge level of disease process/condition, treatment plan, diagnostic tests, and medications  Educated pt on disease process, treatment plan and reason for medications he is on.  Pt also educated on how to deep breath and cough efficiently.

## 2017-07-19 NOTE — DISCHARGE PLANNING
Medical Social Work    Transport arranged for 3:30pm via [facility] van to home. Bedside RN updated.

## 2017-07-19 NOTE — PROGRESS NOTES
Discharge instructions given and discussed. Pt verbalized understanding. Paper prescriptions given. Pt discharged home in stable condition on 3L O2. VSS, IV removed and tolerated well. Tele box removed, monitor room notified.

## 2017-07-19 NOTE — DISCHARGE SUMMARY
HOSPITAL MEDICINE DISCHARGE SUMMARY    Name: Fer Estrada  MRN: [id number]  : 1960  Admit Date: 7/15/2017  Discharge Date: 2017  Attending Provider: Benjie Marcus M.D.  Primary Care Physician: Pcp Not In Computer    CHIEF COMPLAINT  Chief Complaint   Patient presents with   • Shortness of Breath     pt. is having a CHF  exacerbation with increasing SOB and bilateral 4+ pitting edema and redness in his legs.   • Drug Withdrawal     Pt. states having withdrawals from oxycodone.   • Chest Pain     Pt. states centralized chest pain that has not been relieved with 0.4mg of nitro. 325 ASA, and 1inch of nitro paste per EMS.       CODE STATUS  Full Code    DISCHARGE DIAGNOSES WITH THEIR RESPECTIVE HOSPITAL COURSE, PLAN AND FOLLOW-UP  Please review Dr. Fer El D.O. notes for further details of history of present illness, past medical/social/family histories, allergies and medications.    Acute on chronic diastolic (congestive) heart failure (CMS-HCC) (present on admission)  Assessment & Plan  Presented on admission with dyspnea. Improved with diuresis.  Likely non-adherent with low salt diet; medications; missed some lasix doses  Echo nl LVEF with grade II diastolic dysfunctionH.  e does not have systolic dysfunction  CHF teaching at bedside provided by navigator  Reviewed I/Os; net negative  Switched to Lasix PO  Reduced potassium supplementation as K was rising to 4.8.  At discharge he is improved. His respiratory status is stable. He still has leg swelling, improved but chronic. He will be provided with compression stockings. HHC has been activated and daily weights as well as symptom management will be addressed at home with coordination to Dr. Avilez, Cardiology and Heart failure outpatient team.  Assign and follow up with [facility] primary provider in 1-2 weeks  Follow up with Dr. Avilez, Cardiology in 1 week. BMP should be obtained to follow Cr- and K+ as he is on Lasix and potassium  supplementation.    Acute respiratory failure with hypoxia (CMS-HCC) (present on admission)  Chronic respiratory failure with hypoxia  Assessment & Plan  - d/t chf exacerbation and obesity  - wean O2 as tolerated; as he is diuresed  - exercise oximetry done, he required oxygen at dischagre; O2 being arranged. He will go home with his chronic oxygen of 3-4 liters  Assign and follow up with [facility] provider in 1-2 weeks, defer Pain Clinic referral.    Narcotic withdrawal (CMS-HCC) (present on admission)  Assessment & Plan  - on chronic high oxycodone dose at home  - patient does not have any more nausea, and has no withdrawal symptoms at this time; eg no anxiety, tremors, hypertension or tachycardia  - he is requesting to go back on his schedule of outpatient oxycodone (his nurse verified his prescription with his pharmacy and pt received oxycodone IR 30mg Q4-6 hours prn at home  - f/u to his primary care physician/pain clinic for opioid tapering  Assign and follow up with [facility] provider in 1-2 weeks, defer Pain Clinic referral.    HTN (hypertension) (present on admission)  Assessment & Plan  Blood pressures remains stable and will resume his home blood pressure doses in addition to Lasix.    COPD (chronic obstructive pulmonary disease) (CMS-HCC) (present on admission)  Assessment & Plan  - no acute exacerbation at this time  Assign and follow up with [facility] provider in 1-2 weeks.    Stasis dermatitis (present on admission)  Assessment & Plan  - chronic severe stasis dermatitis and lymphedema of both LE's  - wound care saw him today - no gross evidence of cellulitis or abscess -- there were two spots with superficial drainage noted when scabs removed    Positive blood culture (present on admission)  Assessment & Plan  Coagulase negative Staph species growing in one of initial blood cultures on admission 7/15  However pt has no fever, leukocytosis to support infection and false positive due to contamination is  possible  Antibiotics were not started by Dr. Suresh because most likely contamination  7/16 blood cultures no growth to date.   No further management.    Diabetes type 2, controlled (CMS-HCC) (present on admission)  Assessment & Plan  - will give ISS, adjust as needed  Assign and follow up with [facility] provider in 1-2 weeks, defer Pain Clinic referral.    Right bundle branch block (present on admission)  Assessment & Plan  - initially concern for STEMI, cards has seen and no STEMI  - new RBBB  - echo shows nl LVEF; gr II diastolic dysfunction  - cards signed off  Follow up with Dr. Avilez, Cardiology in 1 week or as scheduled for further Cardiology needs.      DISCHARGE PHYSICAL EXAM  Physical Exam   Constitutional: He appears well-developed and well-nourished.   HENT:   Head: Normocephalic and atraumatic.   Eyes: Conjunctivae and EOM are normal. No scleral icterus.   Neck: Normal range of motion. Neck supple.   Cardiovascular: Normal rate and regular rhythm.  Exam reveals no gallop and no friction rub.    No murmur heard.  Pulmonary/Chest: Effort normal and breath sounds normal. No respiratory distress. He has no wheezes. He has no rales.   Abdominal: Soft. Bowel sounds are normal. He exhibits no distension. There is no tenderness. There is no rebound and no guarding.   Musculoskeletal: He exhibits edema (bilateral). He exhibits no tenderness.   Neurological: He is alert.   Skin: Skin is warm. Rash (chronic venous stasis changes leg) noted.   Psychiatric: He has a normal mood and affect. His behavior is normal.       Fer Estrada improved and was deemed ready to be discharged from the hospital as there were no further inpatient needs. Fer Estrada felt comfortable going home with home health and on his O2. The discharge plan was discussed with Fer Estrada, and agreed to it. Fer Estrada was subsequently discharged in improved and stable condition.    DISCHARGE MEDICATIONS:    Fer Estrada   Home Medication  Instructions [facility]:[id number]    Printed on:07/19/17 122   Medication Information                      acetaminophen (TYLENOL) 325 MG Tab  Take 2 Tabs by mouth every 6 hours as needed (Mild Pain; (Pain scale 1-3); Temp greater than 100.5 F).             albuterol (PROVENTIL) 2.5mg/0.5ml Nebu Soln  2.5 mg by Nebulization route every four hours as needed for Shortness of Breath.             albuterol 108 (90 BASE) MCG/ACT Aero Soln inhalation aerosol  Inhale 2 Puffs by mouth every 6 hours as needed for Shortness of Breath.             ammonium lactate (LAC-HYDRIN) 12 % Lotion  Apply 1 Application to affected area(s) 2 Times a Day.             budesonide-formoterol (SYMBICORT) 160-4.5 MCG/ACT Aerosol  Inhale 2 Puffs by mouth 2 Times a Day.             furosemide (LASIX) 40 MG Tab  Take 1 Tab by mouth every day.             gabapentin (NEURONTIN) 100 MG Cap  Take 1 Cap by mouth 3 times a day.             LACTOBACILLUS RHAMNOSUS, GG, PO  Take 1 Cap by mouth 3 times a day.             lisinopril (PRINIVIL) 20 MG Tab  Take 0.5 Tabs by mouth every day.             metformin (GLUCOPHAGE) 850 MG Tab  Take 1 Tab by mouth 2 times a day, with meals.             oxyCODONE CR 30 MG Tablet Extended Release 12 hour Abuse-Deterrent  Take 30 mg by mouth every 12 hours.             oxycodone immediate release (ROXICODONE) 10 MG immediate release tablet  Take 1 Tab by mouth every 6 hours as needed for Severe Pain.             polyethylene glycol/lytes (MIRALAX) Pack  Take 1 Packet by mouth 1 time daily as needed (if sennosides and docusate ineffective after 24 hours).             potassium chloride (MICRO-K) 10 MEQ capsule  Take 10 mEq by mouth every day. Pt states he cannot take the tablets             senna-docusate (PERICOLACE OR SENOKOT S) 8.6-50 MG Tab  Take 2 Tabs by mouth 2 Times a Day.                 DISCHARGE VITALS, LABS and IMAGING  Filed Vitals:    07/19/17 0000 07/19/17 0143 07/19/17 0400 07/19/17 0715   BP: 110/52 137/69  115/70 115/65   Pulse: 79 78 89 80   Temp: 36.4 °C (97.6 °F)  36.1 °C (96.9 °F) 36.8 °C (98.3 °F)   Resp: 20 16 20 20   Height:       Weight:       SpO2: 96%  97% 93%     Lab Results   Component Value Date/Time    WBC 8.1 07/19/2017 02:09 AM    RBC 5.67 07/19/2017 02:09 AM    HEMOGLOBIN 15.2 07/19/2017 02:09 AM    HEMATOCRIT 49.5 07/19/2017 02:09 AM    MCV 87.3 07/19/2017 02:09 AM    MCH 26.8* 07/19/2017 02:09 AM    MCHC 30.7* 07/19/2017 02:09 AM    MPV 12.1 07/19/2017 02:09 AM    NEUTROPHILS-POLYS 63.10 07/17/2017 12:17 AM    LYMPHOCYTES 22.50 07/17/2017 12:17 AM    MONOCYTES 8.60 07/17/2017 12:17 AM    EOSINOPHILS 4.80 07/17/2017 12:17 AM    BASOPHILS 0.50 07/17/2017 12:17 AM    HYPOCHROMIA 1+ 05/03/2014 03:54 AM    ANISOCYTOSIS 1+ 02/02/2017 03:45 AM      Lab Results   Component Value Date/Time    SODIUM 133* 07/19/2017 02:09 AM    POTASSIUM 4.8 07/19/2017 02:09 AM    CHLORIDE 92* 07/19/2017 02:09 AM    CO2 34* 07/19/2017 02:09 AM    GLUCOSE 98 07/19/2017 02:09 AM    BUN 32* 07/19/2017 02:09 AM    CREATININE 0.66 07/19/2017 02:09 AM      Lab Results   Component Value Date/Time    PT 19.1* 07/15/2017 01:42 AM    INR 1.55* 07/15/2017 01:42 AM        Dx-chest-portable (1 View)    7/15/2017  7/15/2017 1:15 AM HISTORY/REASON FOR EXAM:  Shortness of breath and chest pain TECHNIQUE/EXAM DESCRIPTION AND NUMBER OF VIEWS: Single portable view of the chest. COMPARISON: 1/30/2017 FINDINGS: The heart is enlarged. Interstitial opacities and coarse curvilinear opacities persist in the lower lung fields bilaterally. The mid and upper lung fields are clear.     7/15/2017  Bibasilar opacities, consistent with a combination of atelectasis and mild pulmonary edema.    Echocardiogram-comp W/ Cont    7/16/2017  Transthoracic Echo Report Echocardiography Laboratory CONCLUSIONS Contrast was used to enhance definition of the endocardial borders. Mild concentric left ventricular hypertrophy. Normal left ventricular size and systolic  function. Left ventricular ejection fraction is visually estimated to be 60%. Grade II diastolic dysfunction. TANIA CARRASCO Exam Date:         2017                    12:42 Exam Location:     Inpatient Priority:          Routine Ordering Physician:        TANIA CRUZ Referring Physician: Sonographer:               Javon Hinson RDCS Age:    56     Gender:    M MRN:    [id number] :    1960 BSA:    2.39   Ht (in):    68     Wt (lb):    290 Exam Type:     Complete Indications:     Congestive Heart Failure ICD Codes:       428 CPT Codes:       29479 BP:   157    /   72     HR: Technical Quality:       Technically difficult study -                          adequate information is obtained MEASUREMENTS  (Male / Female) Normal Values 2D ECHO LV Diastolic Diameter PLAX        3.6 cm                4.2 - 5.9 / 3.9 - 5.3 cm LV Systolic Diameter PLAX         2.7 cm                2.1 - 4.0 cm LV Fractional Shortening PLAX     25.6 %                25 - 46  % IVS Diastolic Thickness           1.3 cm                LVPW Diastolic Thickness          1.3 cm                Estimated LV Ejection Fraction    60 %                  LV Ejection Fraction MOD 4C       76.6 %                M-MODE Aortic Root Diameter MM           3.2 cm                DOPPLER AV Peak Velocity                  1.6 m/s               AV Peak Gradient                  10.6 mmHg             AV Mean Gradient                  6.4 mmHg              LVOT Peak Velocity                1.4 m/s               Mitral E Point Velocity           0.99 m/s              Mitral E to A Ratio               1.2                   Mitral A Duration                 118 ms                MV Pressure Half Time             49.6 ms               MV Area PHT                       4.4 cm²               MV Deceleration Time              171 ms                PV Peak Velocity                  1 m/s                 PV Peak Gradient                  4.4 mmHg              *  Indicates values subject to auto-interpretation LV EF:  60    % FINDINGS Left Ventricle Normal left ventricular size and systolic function. Mild concentric left ventricular hypertrophy. Grade II diastolic dysfunction. Left ventricular ejection fraction is visually estimated to be 60%.normal regional wall motion. Right Ventricle Normal right ventricular size and systolic function. Right Atrium Normal right atrial size.normal inferior vena cava size and inspiratory collapse. Left Atrium Normal left atrial size. Mitral Valve Mitral annular calcification.no mitral regurgitation. Aortic Valve Structurally normal aortic valve without significant stenosis or regurgitation. Tricuspid Valve Structurally normal tricuspid valve without significant stenosis or regurgitation. Pulmonic Valve The pulmonic valve is not well visualized. No pulmonic insufficiency. Pericardium Normal pericardium without effusion. Aorta Normal aortic root for body surface area. Yolande Gleason MD (Electronically Signed) Final Date:     16 July 2017                 15:25      Please see discharge diagnoses, plan and follow up above for details of pending tests and postdischarge instructions for the clinic providers and specialists.    Please CC Assigned [facility] provider, Dr. Avilez, Cardiology    For further details on discharge medications, patient education, diet, and activity, please refer to electronic copy of discharge instructions.       TIME SPENT: 40 minutes, with greater than 50% of the time spent on face-to-face encounter, addressing medical issues, coordination of care, counseling, discharge planning, medication reconciliation, and documentation.

## 2017-07-19 NOTE — DISCHARGE PLANNING
Received transport form from [employer] [facility]. Contacted [facility] transport spoke to [facility] transport time 1530. Trip #[id number]. Notified [employer] [facility] via voicemail.

## 2017-07-19 NOTE — PROGRESS NOTES
Patient refusing bed alarm despite education.  Educated pt on importance of calling before attempting to ambulate on own, pt agrees to do so.  All other safety precautions in place: call light within reach, bed in low and locked position.

## 2017-07-19 NOTE — PROGRESS NOTES
Pt resting in chair at this time (pt did not want to lay in bed, prefers the chair), even visible chest rise, no apparent signs of distress, call light in place, bed in low and locked position. Pt refusing chair alarm, educated, pt agreed to call before ambulating by himself, all other safety precautions in place: Hourly rounding, call light and belongings within reach.  Pt refusing to wear no-slip socks due to severe edema of BLE.

## 2017-07-19 NOTE — DISCHARGE PLANNING
Received call from [facility] needing a PCP. Notified [employer] via phone. PCP is Dr Giovani Whitlock. Notified Ela with [facility] via phone, who will verify and let us know.

## 2017-07-19 NOTE — DISCHARGE PLANNING
Received choice form from [employer] for HH. Notified [employer] via phone that we will need an order for HH before the referral can be sent.

## 2017-07-19 NOTE — DISCHARGE INSTRUCTIONS
Discharge Instructions    Discharged to home by [facility] van with self. Discharged via wheelchair, hospital escort: Yes.  Special equipment needed: Cane and Oxygen    Be sure to schedule a follow-up appointment with your primary care doctor or any specialists as instructed.     Discharge Plan:   Diet Plan: Discussed  Activity Level: Discussed  Confirmed Follow up Appointment: Appointment Scheduled  Confirmed Symptoms Management: Discussed  Medication Reconciliation Updated: Yes  Influenza Vaccine Indication: Indicated: Not available from distributor/    I understand that a diet low in cholesterol, fat, and sodium is recommended for good health. Unless I have been given specific instructions below for another diet, I accept this instruction as my diet prescription.   Other diet: Heart-Healthy Eating Plan  Many factors influence your heart health, including eating and exercise habits. Heart (coronary) risk increases with abnormal blood fat (lipid) levels. Heart-healthy meal planning includes limiting unhealthy fats, increasing healthy fats, and making other small dietary changes. This includes maintaining a healthy body weight to help keep lipid levels within a normal range.  WHAT IS MY PLAN?   Your health care provider recommends that you:  1. Get no more than _________% of the total calories in your daily diet from fat.  2. Limit your intake of saturated fat to less than _________% of your total calories each day.  3. Limit the amount of cholesterol in your diet to less than _________ mg per day.  WHAT TYPES OF FAT SHOULD I CHOOSE?  1. Choose healthy fats more often. Choose monounsaturated and polyunsaturated fats, such as olive oil and canola oil, flaxseeds, walnuts, almonds, and seeds.  2. Eat more omega-3 fats. Good choices include salmon, mackerel, sardines, tuna, flaxseed oil, and ground flaxseeds. Aim to eat fish at least two times each week.  3. Limit saturated fats. Saturated fats are primarily  "found in animal products, such as meats, butter, and cream. Plant sources of saturated fats include palm oil, palm kernel oil, and coconut oil.  4. Avoid foods with partially hydrogenated oils in them. These contain trans fats. Examples of foods that contain trans fats are stick margarine, some tub margarines, cookies, crackers, and other baked goods.  WHAT GENERAL GUIDELINES DO I NEED TO FOLLOW?  1. Check food labels carefully to identify foods with trans fats or high amounts of saturated fat.  2. Fill one half of your plate with vegetables and green salads. Eat 4-5 servings of vegetables per day. A serving of vegetables equals 1 cup of raw leafy vegetables, ½ cup of raw or cooked cut-up vegetables, or ½ cup of vegetable juice.  3. Fill one fourth of your plate with whole grains. Look for the word \"whole\" as the first word in the ingredient list.  4. Fill one fourth of your plate with lean protein foods.  5. Eat 4-5 servings of fruit per day. A serving of fruit equals one medium whole fruit, ¼ cup of dried fruit, ½ cup of fresh, frozen, or canned fruit, or ½ cup of 100% fruit juice.  6. Eat more foods that contain soluble fiber. Examples of foods that contain this type of fiber are apples, broccoli, carrots, beans, peas, and barley. Aim to get 20-30 g of fiber per day.  7. Eat more home-cooked food and less restaurant, buffet, and fast food.  8. Limit or avoid alcohol.  9. Limit foods that are high in starch and sugar.  10. Avoid fried foods.  11. Cook foods by using methods other than frying. Baking, boiling, grilling, and broiling are all great options. Other fat-reducing suggestions include:  1. Removing the skin from poultry.  2. Removing all visible fats from meats.  3. Skimming the fat off of stews, soups, and gravies before serving them.  4. Steaming vegetables in water or broth.  12. Lose weight if you are overweight. Losing just 5-10% of your initial body weight can help your overall health and prevent " diseases such as diabetes and heart disease.  13. Increase your consumption of nuts, legumes, and seeds to 4-5 servings per week. One serving of dried beans or legumes equals ½ cup after being cooked, one serving of nuts equals 1½ ounces, and one serving of seeds equals ½ ounce or 1 tablespoon.  14. You may need to monitor your salt (sodium) intake, especially if you have high blood pressure. Talk with your health care provider or dietitian to get more information about reducing sodium.  WHAT FOODS CAN I EAT?  Grains  Breads, including Kyrgyz, white, johnson, wheat, raisin, rye, oatmeal, and Italian. Tortillas that are neither fried nor made with lard or trans fat. Low-fat rolls, including hotdog and hamburger buns and English muffins. Biscuits. Muffins. Waffles. Pancakes. Light popcorn. Whole-grain cereals. Flatbread. Rhonda toast. Pretzels. Breadsticks. Rusks. Low-fat snacks and crackers, including oyster, saltine, matzo, meaghan, animal, and rye. Rice and pasta, including brown rice and those that are made with whole wheat.  Vegetables  All vegetables.  Fruits  All fruits, but limit coconut.  Meats and Other Protein Sources  Lean, well-trimmed beef, veal, pork, and lamb. Chicken and turkey without skin. All fish and shellfish. Wild duck, rabbit, pheasant, and venison. Egg whites or low-cholesterol egg substitutes. Dried beans, peas, lentils, and tofu. Seeds and most nuts.  Dairy  Low-fat or nonfat cheeses, including ricotta, string, and mozzarella. Skim or 1% milk that is liquid, powdered, or evaporated. Buttermilk that is made with low-fat milk. Nonfat or low-fat yogurt.  Beverages  Mineral water. Diet carbonated beverages.  Sweets and Desserts  Sherbets and fruit ices. Honey, jam, marmalade, jelly, and syrups. Meringues and gelatins. Pure sugar candy, such as hard candy, jelly beans, gumdrops, mints, marshmallows, and small amounts of dark chocolate. Vlad food cake.  Eat all sweets and desserts in moderation.  Fats  and Oils  Nonhydrogenated (trans-free) margarines. Vegetable oils, including soybean, sesame, sunflower, olive, peanut, safflower, corn, canola, and cottonseed. Salad dressings or mayonnaise that are made with a vegetable oil. Limit added fats and oils that you use for cooking, baking, salads, and as spreads.  Other  Cocoa powder. Coffee and tea. All seasonings and condiments.  The items listed above may not be a complete list of recommended foods or beverages. Contact your dietitian for more options.  WHAT FOODS ARE NOT RECOMMENDED?  Grains  Breads that are made with saturated or trans fats, oils, or whole milk. Croissants. Butter rolls. Cheese breads. Sweet rolls. Donuts. Buttered popcorn. Chow mein noodles. High-fat crackers, such as cheese or butter crackers.  Meats and Other Protein Sources  Fatty meats, such as hotdogs, short ribs, sausage, spareribs, freeman, ribeye roast or steak, and mutton. High-fat deli meats, such as salami and bologna. Caviar. Domestic duck and goose. Organ meats, such as kidney, liver, sweetbreads, brains, gizzard, chitterlings, and heart.  Dairy  Cream, sour cream, cream cheese, and creamed cottage cheese. Whole milk cheeses, including blue (bryan), [place] Oscar, Brie, Harry, American, Havarti, Swiss, cheddar, Camembert, and Clinton.  Whole or 2% milk that is liquid, evaporated, or condensed. Whole buttermilk. Cream sauce or high-fat cheese sauce. Yogurt that is made from whole milk.  Beverages  Regular sodas and drinks with added sugar.  Sweets and Desserts  Frosting. Pudding. Cookies. Cakes other than nydia food cake. Candy that has milk chocolate or white chocolate, hydrogenated fat, butter, coconut, or unknown ingredients. Buttered syrups. Full-fat ice cream or ice cream drinks.  Fats and Oils  Gravy that has suet, meat fat, or shortening. Cocoa butter, hydrogenated oils, palm oil, coconut oil, palm kernel oil. These can often be found in baked products, candy, fried foods,  nondairy creamers, and whipped toppings. Solid fats and shortenings, including freeman fat, salt pork, lard, and butter. Nondairy cream substitutes, such as coffee creamers and sour cream substitutes. Salad dressings that are made of unknown oils, cheese, or sour cream.  The items listed above may not be a complete list of foods and beverages to avoid. Contact your dietitian for more information.     This information is not intended to replace advice given to you by your health care provider. Make sure you discuss any questions you have with your health care provider.     Document Released: 09/26/2009 Document Revised: 01/08/2016 Document Reviewed: 06/11/2015  [employer] Interactive Patient Education ©2016 [employer]    Diabetes Mellitus and Food  It is important for you to manage your blood sugar (glucose) level. Your blood glucose level can be greatly affected by what you eat. Eating healthier foods in the appropriate amounts throughout the day at about the same time each day will help you control your blood glucose level. It can also help slow or prevent worsening of your diabetes mellitus. Healthy eating may even help you improve the level of your blood pressure and reach or maintain a healthy weight.   General recommendations for healthful eating and cooking habits include:  4. Eating meals and snacks regularly. Avoid going long periods of time without eating to lose weight.  5. Eating a diet that consists mainly of plant-based foods, such as fruits, vegetables, nuts, legumes, and whole grains.  6. Using low-heat cooking methods, such as baking, instead of high-heat cooking methods, such as deep frying.  Work with your dietitian to make sure you understand how to use the Nutrition Facts information on food labels.  HOW CAN FOOD AFFECT ME?  Carbohydrates  Carbohydrates affect your blood glucose level more than any other type of food. Your dietitian will help you determine how many carbohydrates to eat at each  meal and teach you how to count carbohydrates. Counting carbohydrates is important to keep your blood glucose at a healthy level, especially if you are using insulin or taking certain medicines for diabetes mellitus.  Alcohol  Alcohol can cause sudden decreases in blood glucose (hypoglycemia), especially if you use insulin or take certain medicines for diabetes mellitus. Hypoglycemia can be a life-threatening condition. Symptoms of hypoglycemia (sleepiness, dizziness, and disorientation) are similar to symptoms of having too much alcohol.   If your health care provider has given you approval to drink alcohol, do so in moderation and use the following guidelines:  5. Women should not have more than one drink per day, and men should not have more than two drinks per day. One drink is equal to:  1. 12 oz of beer.  2. 5 oz of wine.  3. 1½ oz of hard liquor.  6. Do not drink on an empty stomach.  7. Keep yourself hydrated. Have water, diet soda, or unsweetened iced tea.  8. Regular soda, juice, and other mixers might contain a lot of carbohydrates and should be counted.  WHAT FOODS ARE NOT RECOMMENDED?  As you make food choices, it is important to remember that all foods are not the same. Some foods have fewer nutrients per serving than other foods, even though they might have the same number of calories or carbohydrates. It is difficult to get your body what it needs when you eat foods with fewer nutrients. Examples of foods that you should avoid that are high in calories and carbohydrates but low in nutrients include:  15. Trans fats (most processed foods list trans fats on the Nutrition Facts label).  16. Regular soda.  17. Juice.  18. Candy.  19. Sweets, such as cake, pie, doughnuts, and cookies.  20. Fried foods.  WHAT FOODS CAN I EAT?  Eat nutrient-rich foods, which will nourish your body and keep you healthy. The food you should eat also will depend on several factors, includin. The calories you need.  2. The  medicines you take.  3. Your weight.  4. Your blood glucose level.  5. Your blood pressure level.  6. Your cholesterol level.  You should eat a variety of foods, including:  · Protein.  ¨ Lean cuts of meat.  ¨ Proteins low in saturated fats, such as fish, egg whites, and beans. Avoid processed meats.  · Fruits and vegetables.  ¨ Fruits and vegetables that may help control blood glucose levels, such as apples, mangoes, and yams.  · Dairy products.  ¨ Choose fat-free or low-fat dairy products, such as milk, yogurt, and cheese.  · Grains, bread, pasta, and rice.  ¨ Choose whole grain products, such as multigrain bread, whole oats, and brown rice. These foods may help control blood pressure.  · Fats.  ¨ Foods containing healthful fats, such as nuts, avocado, olive oil, canola oil, and fish.  DOES EVERYONE WITH DIABETES MELLITUS HAVE THE SAME MEAL PLAN?  Because every person with diabetes mellitus is different, there is not one meal plan that works for everyone. It is very important that you meet with a dietitian who will help you create a meal plan that is just right for you.     This information is not intended to replace advice given to you by your health care provider. Make sure you discuss any questions you have with your health care provider.     Document Released: 09/14/2006 Document Revised: 01/08/2016 Document Reviewed: 11/14/2014  [employer] Interactive Patient Education ©2016 [employer]        Special Instructions:   HF Patient Discharge Instructions  · Monitor your weight daily, and maintain a weight chart, to track your weight changes.   · Activity as tolerated, unless your Doctor has ordered otherwise. Other activity order: walking as tolerated.  · Follow a low fat, low cholesterol, low salt diet unless instructed otherwise by your Doctor. Read the labels on the back of food products and track your intake of fat, cholesterol and salt.   · Fluid Restriction No. If a Fluid Restriction has been ordered by  your Doctor, measure fluids with a measuring cup to ensure that you are not exceeding the restriction.   · No smoking.  · Oxygen Yes. If your Doctor has ordered that you wear Oxygen at home, it is important to wear it as ordered.  · Did you receive an explanation from staff on the importance of taking each of your medications and why it is necessary to keep taking them unless your doctor says to stop? Yes  · Were all of your questions answered about how to manage your heart failure and what to do if you have increased signs and symptoms after you go home? Yes  · Do you feel like your heart failure care team involved you in the care treatment plan and allowed you to make decisions regarding your care while in the hospital and addressed any discharge needs you might have? Yes    See the educational handout provided at discharge for more information on monitoring your daily weight, activity and diet. This also explains more about Heart Failure, symptoms of a flare-up and some of the tests that you have undergone.     Warning Signs of a Flare-Up include:  · Swelling in the ankles or lower legs.  · Shortness of breath, while at rest, or while doing normal activities.   · Shortness of breath at night when in bed, or coughing in bed.   · Requiring more pillows to sleep at night, or needing to sit up at night to sleep.  · Feeling weak, dizzy or fatigued.     When to call your Doctor:  · Call [facility] seven days a week from 8:00 a.m. to 8:00 p.m. for medical questions [phone].  · Call your Primary Care Physician or Cardiologist if:   1. You experience any pain radiating to your jaw or neck.  2. You have any difficulty breathing.  3. You experience weight gain of 3 lbs in a day or 5 lbs in a week.   4. You feel any palpitations or irregular heartbeats.  5. You become dizzy or lose consciousness.   If you have had an angiogram or had a pacemaker or AICD placed, and experience:  1. Bleeding, drainage or  swelling at the surgical / puncture site.  2. Fever greater than 100.0 F  3. Shock from internal defibrillator.  4. Cool and / or numb extremities.      · Is patient discharged on Warfarin / Coumadin?   No     · Is patient Post Blood Transfusion?  No    Depression / Suicide Risk    As you are discharged from this [facility] facility, it is important to learn how to keep safe from harming yourself.    Recognize the warning signs:  · Abrupt changes in personality, positive or negative- including increase in energy   · Giving away possessions  · Change in eating patterns- significant weight changes-  positive or negative  · Change in sleeping patterns- unable to sleep or sleeping all the time   · Unwillingness or inability to communicate  · Depression  · Unusual sadness, discouragement and loneliness  · Talk of wanting to die  · Neglect of personal appearance   · Rebelliousness- reckless behavior  · Withdrawal from people/activities they love  · Confusion- inability to concentrate     If you or a loved one observes any of these behaviors or has concerns about self-harm, here's what you can do:  · Talk about it- your feelings and reasons for harming yourself  · Remove any means that you might use to hurt yourself (examples: pills, rope, extension cords, firearm)  · Get professional help from the community (Mental Health, Substance Abuse, psychological counseling)  · Do not be alone:Call your Safe Contact- someone whom you trust who will be there for you.  · Call your local CRISIS HOTLINE 293-8308 or 549-342-7047  · Call your local Children's Mobile Crisis Response Team [place] (416) 994-4970 or www.mSpoke  · Call the toll free National Suicide Prevention Hotlines   · National Suicide Prevention Lifeline 026-003-YNIA (2510)  · National Hope Line Network 800-SUICIDE (961-0021)

## 2017-07-19 NOTE — PROGRESS NOTES
"Pt requested snack stating he is \"starving\".  Provided microwaveble meal for pt.  Pt sitting in chair at this time, even visible chest rise, no apparent signs of distress, call light in place.  "

## 2017-07-19 NOTE — PROGRESS NOTES
Bedside report completed. Assumed pt care. Pt A&O 4, resting in chair comfortably at bedside, no signs of labored breathing on 4L O2. Pt tele monitor in place, cardiac rhythm being monitored. Pt call light within reach, bed in low position, upper bed rails up. Pt denies any pain or other distress at this time. Patient was updated on the plan of care for the night. All fall precautions in place. Will continue to monitor.

## 2017-07-19 NOTE — PROGRESS NOTES
Pt observed, no change from original assessment, vss, moderate c/o pain at this time; prn pain meds given per MAR.  Pt AAO4, no other signs or symptoms of distress, fall precautions in place, call light within reach, all questions answered, will continue to monitor.

## 2017-07-19 NOTE — PROGRESS NOTES
Bedside report received, Pt care assumed. Tele box on. VSS, Pt assessment complete. Pt AOX4, no signs of distress noted at this time.  Pt c/o of 6/10 pain. Meds given per MAR.  Pt denies any additional needs at this time. Pt is up in chair. pt is up-self and tolerates well. POC discussed with pt and verbalizes understanding, no questions at this time. Pt educated on fall risk and verbalizes understanding, call light within reach, will continue to monitor.  Plan is to discharge pt home today.

## 2017-07-20 ENCOUNTER — PATIENT OUTREACH (OUTPATIENT)
Dept: HEALTH INFORMATION MANAGEMENT | Facility: OTHER | Age: 57
End: 2017-07-20

## 2017-07-20 PROBLEM — R74.01 TRANSAMINITIS: Status: RESOLVED | Noted: 2017-01-23 | Resolved: 2017-07-20

## 2017-07-20 PROBLEM — E87.5 HYPERKALEMIA: Status: RESOLVED | Noted: 2017-01-31 | Resolved: 2017-07-20

## 2017-07-20 LAB
BACTERIA BLD CULT: NORMAL
SIGNIFICANT IND 70042: NORMAL
SITE SITE: NORMAL
SOURCE SOURCE: NORMAL

## 2017-07-20 NOTE — PROGRESS NOTES
07/20/2017 0919 - Discharge Outreach attempt -   07/20/2017 1131 - Discharge Outreach attempt -   07/20/2017 1351 - Discharge Outreach attempt -

## 2017-07-22 LAB
BACTERIA BLD CULT: NORMAL
SIGNIFICANT IND 70042: NORMAL
SITE SITE: NORMAL
SOURCE SOURCE: NORMAL

## 2017-09-06 NOTE — ED NOTES
"Patient insistent that he sit on the edge of the bed - states that he is extremely uncomfortable in the gurney despite the back being at 90 degrees because \"i have a sore on my ass. It's sticking.\" Patient currently sitting on edge of bed.  " Chief Complaint   Patient presents with    Medication Refill       SUBJECTIVE:  Yasmeen Valderrama is a 48 y.o. female here for follow up of her throat condition, has surgery tomorrow to further insure no cancer and to remove scar tissue to help get her voice back.  She has some pain in the neck and throat from strain but most of her pain is in lower back and going into the right leg and seems to be worsening, could be from catastrophizing based on history and the stress/anxiety with her breathing and cancer diagnosis and struggling to quit tobacco.  No abuse/diversion of her medications.  See below for most recent update on her symptoms..  Currently has co-morbidities including per problem list.    Answers for HPI/ROS submitted by the patient on 9/2/2017   Back pain  Chronicity: chronic  Onset: more than 1 year ago  Frequency: constantly  Progression since onset: rapidly worsening  Pain location: sacro-iliac  Pain quality: aching, burning, shooting, stabbing  Radiates to: right foot, right knee, right thigh  Pain - numeric: 9/10  Pain is: the same all the time  Aggravated by: lying down, sitting, standing, twisting  Stiffness is present: in the morning  bladder incontinence: No  bowel incontinence: No  leg pain: Yes  numbness: Yes  paresis: No  paresthesias: Yes  pelvic pain: No  perianal numbness: No  genital pain: No  Risk factors: lack of exercise, menopause  Pain severity: severe  Treatments tried: analgesics, NSAIDs, bed rest  Improvement on treatment: mild    Social History   Substance Use Topics    Smoking status: Current Every Day Smoker     Packs/day: 2.00     Years: 35.00     Types: Cigarettes     Start date: 12/1/1983    Smokeless tobacco: Never Used    Alcohol use No       Patient Active Problem List    Diagnosis Date Noted    Localized cancer of throat 06/02/2017     Squamous cell cancer  Need radiation and possibly surgery      Chronic pain syndrome 05/03/2017     New guidelines  Wean to 0.5  "mg 25/month of xanax  Stay with 10/325 mg of hydrocodone      On home oxygen therapy 04/03/2017     Nightly still not controlled      Anxiety 02/21/2017    Cervical stenosis of spine 01/04/2017 12/2016 MRI severe disease C5/6      Lumbar radiculopathy, chronic 09/09/2015     L3-4, mild annular bulging with superimposed broad based left posterior lateral and far lateral disk protrusion resulting in moderate effacement of the left foramen and may contact the exiting L4 nerve root within the foramen.    Degenerative changes of the L2-3 disk noted with mild bulging of the disk resulting in mild flattening of the adjacent ventral thecal sac.      Electronically signed by: Dr. Spring Lanza  Date: 04/24/15    PT/OT several times prior, modest benefit.    Tylenol/NSAID OTC and Rx of minimal help with pain, some side effects.  cymbalta and norco help      Unilateral emphysema 12/01/2014     Atelectasis or parenchymal scarring in the right lower lobe laterally and anteriorly and in the inferior lingular portion of the left upper lobe.    Paraseptal emphysematous changes in the medial aspect of the right lung apex.      Electronically signed by: JUAN JOSE JOYCE MD  Date: 04/12/16  Time: 07:47     CT scan      Tobacco use, since teenager, has tried to quit, is trying to quit 12/01/2014     Counseled on tobacco cessation 05/03/2017        Chronic hoarseness 12/01/2014     H/o vocal cord nodules as a child      SOB (shortness of breath) 12/01/2014       ROS  Per HPI    OBJECTIVE:  /76   Pulse 93   Temp 98.4 °F (36.9 °C) (Oral)   Ht 5' 3" (1.6 m)   Wt 62 kg (136 lb 11 oz)   SpO2 95%   BMI 24.21 kg/m²     Wt Readings from Last 3 Encounters:   09/06/17 62 kg (136 lb 11 oz)   08/07/17 61 kg (134 lb 7.7 oz)   07/03/17 63.6 kg (140 lb 3.4 oz)     BP Readings from Last 3 Encounters:   09/06/17 134/76   08/07/17 126/74   07/03/17 124/82       She appears somewhat chronically ill, alert and oriented  Very " hoarse, painful to speak loudly  S1 and S2 normal, no murmurs, clicks, gallops or rubs. Regular rate and rhythm. Chest is clear; no wheezes or rales. No edema or JVD.  No overt lesions in mouth/throat  The neck is supple and free of adenopathy or masses, the thyroid is normal without enlargement or nodules.  Back without lesions  Right leg with pain on ROM, mainly in her hip and back  She does have ? Diminished pulses on this side as well    Review of old Records:  Reviewed per epic and [facility]  Weaning to 30 MME will stop for now as she has surgery upcoming    Review of old labs:    Reviewed per epic    Review of old imaging:  Lab Results   Component Value Date    TSH 1.450 03/08/2016     Lab Results   Component Value Date    WBC 13.77 (H) 06/02/2017    HGB 14.5 06/02/2017    HCT 44.3 06/02/2017     (H) 06/02/2017     06/02/2017       Chemistry        Component Value Date/Time     06/02/2017 0815    K 4.2 06/02/2017 0815     06/02/2017 0815    CO2 27 06/02/2017 0815    BUN 15 06/02/2017 0815    CREATININE 0.7 06/02/2017 0815     (H) 06/02/2017 0815        Component Value Date/Time    CALCIUM 10.0 06/02/2017 0815    ALKPHOS 91 06/02/2017 0815    AST 19 06/02/2017 0815    ALT 21 06/02/2017 0815    BILITOT 0.7 06/02/2017 0815    ESTGFRAFRICA >60 06/02/2017 0815    EGFRNONAA >60 06/02/2017 0815        Lab Results   Component Value Date    CHOL 182 09/09/2015     Lab Results   Component Value Date    HDL 54 09/09/2015     Lab Results   Component Value Date    LDLCALC 110.0 09/09/2015     Lab Results   Component Value Date    TRIG 90 09/09/2015     Lab Results   Component Value Date    CHOLHDL 29.7 09/09/2015           ASSESSMENT:  Problem List Items Addressed This Visit     Chronic pain syndrome    Relevant Medications    hydrocodone-acetaminophen 7.5-325mg (NORCO) 7.5-325 mg per tablet    busPIRone (BUSPAR) 30 MG Tab    carisoprodol (SOMA) 350 MG tablet    hydrocodone-acetaminophen  7.5-325mg (NORCO) 7.5-325 mg per tablet (Start on 10/3/2017)    carisoprodol (SOMA) 350 MG tablet (Start on 10/3/2017)      Other Visit Diagnoses     Right leg pain    -  Primary    Relevant Orders    US Ankle Brachial Indices Ext LTD WO Str          ICD-10-CM ICD-9-CM   1. Right leg pain M79.604 729.5   2. Chronic pain syndrome G89.4 338.4               PLAN:     we will get BERNADETTE on legs to assess for vascular stenosis    Continue with her hydrocodone, stop wean for now  Continue with xanax low quantity to use PRN  Focus on tobacco cessation  Use soma very sparingly as well as it has helped her a lot.    We understand xanax/soma/hydrocodone has been linked to dependence/euphoria, but we are using very PRN for worst of flares in a month and not daily.  She has done well, we will continue.  Medication List with Changes/Refills   New Medications    BUSPIRONE (BUSPAR) 30 MG TAB    Take 1 tablet (30 mg total) by mouth 2 (two) times daily.    CARISOPRODOL (SOMA) 350 MG TABLET    Take 1 tablet (350 mg total) by mouth 4 (four) times daily as needed for Muscle spasms.    CARISOPRODOL (SOMA) 350 MG TABLET    Take 1 tablet (350 mg total) by mouth 4 (four) times daily as needed for Muscle spasms.    HYDROCODONE-ACETAMINOPHEN 7.5-325MG (NORCO) 7.5-325 MG PER TABLET    Take 1 tablet by mouth every 4 (four) hours as needed for Pain.   Current Medications    ALBUTEROL (PROVENTIL) 2.5 MG /3 ML (0.083 %) NEBULIZER SOLUTION    Take 3 mLs (2.5 mg total) by nebulization every 6 (six) hours as needed for Wheezing.    ALBUTEROL (VENTOLIN HFA) 90 MCG/ACTUATION INHALER    Inhale 2 puffs into the lungs every 6 (six) hours as needed for Wheezing.    ALPRAZOLAM (XANAX) 2 MG TAB    TAKE ONE TABLET TWICE DAILY *MAY CAUSE DROWSINESS*    DOCUSATE SODIUM (COLACE) 100 MG CAPSULE    Take 1 capsule (100 mg total) by mouth 3 (three) times daily as needed for Constipation.    FLUTICASONE-VILANTEROL (BREO) 200-25 MCG/DOSE DSDV DISKUS INHALER    Inhale 1  puff into the lungs once daily. Controller    LIDOCAINE VISCOUS 2 % SOLUTION        PREDNISONE (DELTASONE) 10 MG TABLET        TIOTROPIUM (SPIRIVA) 18 MCG INHALATION CAPSULE    Inhale 1 capsule (18 mcg total) into the lungs once daily.    VARENICLINE (CHANTIX) 1 MG TAB    Take 1 tablet (1 mg total) by mouth 2 (two) times daily.   Changed and/or Refilled Medications    Modified Medication Previous Medication    HYDROCODONE-ACETAMINOPHEN 7.5-325MG (NORCO) 7.5-325 MG PER TABLET hydrocodone-acetaminophen 7.5-325mg (NORCO) 7.5-325 mg per tablet       Take 1 tablet by mouth every 4 (four) hours as needed for Pain.    Take 1 tablet by mouth every 4 (four) hours as needed for Pain.       Return in about 2 months (around 11/6/2017) for assess treatment plan.

## 2017-11-24 NOTE — PROGRESS NOTES
Informed by SW that patient scheduled for transport to SNF at 1130 today.  Removed PICC line per orders and [facility] policy.  Patient tolerated well.   Statement Selected

## 2017-12-21 ENCOUNTER — HOME HEALTH ADMISSION (OUTPATIENT)
Dept: HOME HEALTH SERVICES | Facility: HOME HEALTHCARE | Age: 57
End: 2017-12-21
Payer: MEDICARE

## 2018-01-12 ENCOUNTER — RESOLUTE PROFESSIONAL BILLING HOSPITAL PROF FEE (OUTPATIENT)
Dept: HOSPITALIST | Facility: MEDICAL CENTER | Age: 58
End: 2018-01-12
Payer: MEDICARE

## 2018-01-12 ENCOUNTER — HOSPITAL ENCOUNTER (INPATIENT)
Facility: MEDICAL CENTER | Age: 58
LOS: 6 days | DRG: 638 | End: 2018-01-18
Attending: EMERGENCY MEDICINE | Admitting: HOSPITALIST
Payer: MEDICARE

## 2018-01-12 DIAGNOSIS — I87.2 VENOUS STASIS DERMATITIS, UNSPECIFIED LATERALITY: ICD-10-CM

## 2018-01-12 DIAGNOSIS — F51.01 PRIMARY INSOMNIA: ICD-10-CM

## 2018-01-12 DIAGNOSIS — G89.4 CHRONIC PAIN SYNDROME: ICD-10-CM

## 2018-01-12 DIAGNOSIS — E66.01 MORBID OBESITY WITH BMI OF 45.0-49.9, ADULT (HCC): ICD-10-CM

## 2018-01-12 DIAGNOSIS — L03.119 CELLULITIS OF LOWER EXTREMITY, UNSPECIFIED LATERALITY: ICD-10-CM

## 2018-01-12 PROBLEM — G89.29 CHRONIC PAIN: Status: ACTIVE | Noted: 2018-01-12

## 2018-01-12 LAB
ALBUMIN SERPL BCP-MCNC: 3.9 G/DL (ref 3.2–4.9)
ALBUMIN/GLOB SERPL: 1 G/DL
ALP SERPL-CCNC: 76 U/L (ref 30–99)
ALT SERPL-CCNC: 5 U/L (ref 2–50)
ANION GAP SERPL CALC-SCNC: 4 MMOL/L (ref 0–11.9)
AST SERPL-CCNC: 13 U/L (ref 12–45)
BASOPHILS # BLD AUTO: 0.3 % (ref 0–1.8)
BASOPHILS # BLD: 0.02 K/UL (ref 0–0.12)
BILIRUB SERPL-MCNC: 0.4 MG/DL (ref 0.1–1.5)
BUN SERPL-MCNC: 16 MG/DL (ref 8–22)
CALCIUM SERPL-MCNC: 9.2 MG/DL (ref 8.5–10.5)
CHLORIDE SERPL-SCNC: 92 MMOL/L (ref 96–112)
CO2 SERPL-SCNC: 42 MMOL/L (ref 20–33)
CREAT SERPL-MCNC: 0.62 MG/DL (ref 0.5–1.4)
EOSINOPHIL # BLD AUTO: 0.39 K/UL (ref 0–0.51)
EOSINOPHIL NFR BLD: 6.1 % (ref 0–6.9)
ERYTHROCYTE [DISTWIDTH] IN BLOOD BY AUTOMATED COUNT: 49.4 FL (ref 35.9–50)
EST. AVERAGE GLUCOSE BLD GHB EST-MCNC: 137 MG/DL
GLOBULIN SER CALC-MCNC: 3.9 G/DL (ref 1.9–3.5)
GLUCOSE BLD-MCNC: 103 MG/DL (ref 65–99)
GLUCOSE BLD-MCNC: 178 MG/DL (ref 65–99)
GLUCOSE SERPL-MCNC: 170 MG/DL (ref 65–99)
HBA1C MFR BLD: 6.4 % (ref 0–5.6)
HCT VFR BLD AUTO: 43.5 % (ref 42–52)
HGB BLD-MCNC: 13.3 G/DL (ref 14–18)
IMM GRANULOCYTES # BLD AUTO: 0.03 K/UL (ref 0–0.11)
IMM GRANULOCYTES NFR BLD AUTO: 0.5 % (ref 0–0.9)
LYMPHOCYTES # BLD AUTO: 0.85 K/UL (ref 1–4.8)
LYMPHOCYTES NFR BLD: 13.4 % (ref 22–41)
MCH RBC QN AUTO: 27.7 PG (ref 27–33)
MCHC RBC AUTO-ENTMCNC: 30.6 G/DL (ref 33.7–35.3)
MCV RBC AUTO: 90.4 FL (ref 81.4–97.8)
MONOCYTES # BLD AUTO: 0.57 K/UL (ref 0–0.85)
MONOCYTES NFR BLD AUTO: 9 % (ref 0–13.4)
NEUTROPHILS # BLD AUTO: 4.49 K/UL (ref 1.82–7.42)
NEUTROPHILS NFR BLD: 70.7 % (ref 44–72)
NRBC # BLD AUTO: 0 K/UL
NRBC BLD-RTO: 0 /100 WBC
PLATELET # BLD AUTO: 152 K/UL (ref 164–446)
PMV BLD AUTO: 11.6 FL (ref 9–12.9)
POTASSIUM SERPL-SCNC: 4.4 MMOL/L (ref 3.6–5.5)
PROT SERPL-MCNC: 7.8 G/DL (ref 6–8.2)
RBC # BLD AUTO: 4.81 M/UL (ref 4.7–6.1)
SODIUM SERPL-SCNC: 138 MMOL/L (ref 135–145)
WBC # BLD AUTO: 6.4 K/UL (ref 4.8–10.8)

## 2018-01-12 PROCEDURE — 700105 HCHG RX REV CODE 258: Performed by: HOSPITALIST

## 2018-01-12 PROCEDURE — 96365 THER/PROPH/DIAG IV INF INIT: CPT

## 2018-01-12 PROCEDURE — 80053 COMPREHEN METABOLIC PANEL: CPT

## 2018-01-12 PROCEDURE — 99285 EMERGENCY DEPT VISIT HI MDM: CPT

## 2018-01-12 PROCEDURE — 87040 BLOOD CULTURE FOR BACTERIA: CPT

## 2018-01-12 PROCEDURE — A9270 NON-COVERED ITEM OR SERVICE: HCPCS | Performed by: HOSPITALIST

## 2018-01-12 PROCEDURE — 700102 HCHG RX REV CODE 250 W/ 637 OVERRIDE(OP): Performed by: HOSPITALIST

## 2018-01-12 PROCEDURE — 700111 HCHG RX REV CODE 636 W/ 250 OVERRIDE (IP): Performed by: STUDENT IN AN ORGANIZED HEALTH CARE EDUCATION/TRAINING PROGRAM

## 2018-01-12 PROCEDURE — 700102 HCHG RX REV CODE 250 W/ 637 OVERRIDE(OP): Performed by: STUDENT IN AN ORGANIZED HEALTH CARE EDUCATION/TRAINING PROGRAM

## 2018-01-12 PROCEDURE — 700111 HCHG RX REV CODE 636 W/ 250 OVERRIDE (IP): Performed by: HOSPITALIST

## 2018-01-12 PROCEDURE — G8987 SELF CARE CURRENT STATUS: HCPCS | Mod: CK

## 2018-01-12 PROCEDURE — 82962 GLUCOSE BLOOD TEST: CPT | Mod: 91

## 2018-01-12 PROCEDURE — 99223 1ST HOSP IP/OBS HIGH 75: CPT | Mod: AI | Performed by: HOSPITALIST

## 2018-01-12 PROCEDURE — 700111 HCHG RX REV CODE 636 W/ 250 OVERRIDE (IP): Performed by: EMERGENCY MEDICINE

## 2018-01-12 PROCEDURE — 770004 HCHG ROOM/CARE - ONCOLOGY PRIVATE *

## 2018-01-12 PROCEDURE — 85025 COMPLETE CBC W/AUTO DIFF WBC: CPT

## 2018-01-12 PROCEDURE — G8988 SELF CARE GOAL STATUS: HCPCS | Mod: CJ

## 2018-01-12 PROCEDURE — 700105 HCHG RX REV CODE 258: Performed by: EMERGENCY MEDICINE

## 2018-01-12 PROCEDURE — 83036 HEMOGLOBIN GLYCOSYLATED A1C: CPT

## 2018-01-12 PROCEDURE — 97165 OT EVAL LOW COMPLEX 30 MIN: CPT

## 2018-01-12 PROCEDURE — A9270 NON-COVERED ITEM OR SERVICE: HCPCS | Performed by: STUDENT IN AN ORGANIZED HEALTH CARE EDUCATION/TRAINING PROGRAM

## 2018-01-12 PROCEDURE — 96375 TX/PRO/DX INJ NEW DRUG ADDON: CPT

## 2018-01-12 RX ORDER — MORPHINE SULFATE 4 MG/ML
2-4 INJECTION, SOLUTION INTRAMUSCULAR; INTRAVENOUS
Status: DISCONTINUED | OUTPATIENT
Start: 2018-01-12 | End: 2018-01-12

## 2018-01-12 RX ORDER — LEVOTHYROXINE SODIUM 0.05 MG/1
50 TABLET ORAL
Status: DISCONTINUED | OUTPATIENT
Start: 2018-01-12 | End: 2018-01-18 | Stop reason: HOSPADM

## 2018-01-12 RX ORDER — ZOLPIDEM TARTRATE 5 MG/1
5 TABLET ORAL
Status: DISCONTINUED | OUTPATIENT
Start: 2018-01-12 | End: 2018-01-18 | Stop reason: HOSPADM

## 2018-01-12 RX ORDER — LEVOTHYROXINE SODIUM 0.05 MG/1
50 TABLET ORAL
COMMUNITY

## 2018-01-12 RX ORDER — OXYCODONE HCL 10 MG/1
30 TABLET, FILM COATED, EXTENDED RELEASE ORAL EVERY 12 HOURS
Status: DISCONTINUED | OUTPATIENT
Start: 2018-01-12 | End: 2018-01-12

## 2018-01-12 RX ORDER — ONDANSETRON 2 MG/ML
4 INJECTION INTRAMUSCULAR; INTRAVENOUS EVERY 4 HOURS PRN
Status: DISCONTINUED | OUTPATIENT
Start: 2018-01-12 | End: 2018-01-18 | Stop reason: HOSPADM

## 2018-01-12 RX ORDER — BISACODYL 10 MG
10 SUPPOSITORY, RECTAL RECTAL
Status: DISCONTINUED | OUTPATIENT
Start: 2018-01-12 | End: 2018-01-18 | Stop reason: HOSPADM

## 2018-01-12 RX ORDER — OXYCODONE HYDROCHLORIDE 5 MG/1
5 TABLET ORAL
Status: DISCONTINUED | OUTPATIENT
Start: 2018-01-12 | End: 2018-01-12

## 2018-01-12 RX ORDER — DEXTROSE MONOHYDRATE 25 G/50ML
25 INJECTION, SOLUTION INTRAVENOUS
Status: DISCONTINUED | OUTPATIENT
Start: 2018-01-12 | End: 2018-01-18 | Stop reason: HOSPADM

## 2018-01-12 RX ORDER — ONDANSETRON 4 MG/1
4 TABLET, ORALLY DISINTEGRATING ORAL EVERY 4 HOURS PRN
Status: DISCONTINUED | OUTPATIENT
Start: 2018-01-12 | End: 2018-01-18 | Stop reason: HOSPADM

## 2018-01-12 RX ORDER — CLONIDINE HYDROCHLORIDE 0.1 MG/1
0.1 TABLET ORAL EVERY 6 HOURS PRN
Status: DISCONTINUED | OUTPATIENT
Start: 2018-01-12 | End: 2018-01-18 | Stop reason: HOSPADM

## 2018-01-12 RX ORDER — OXYCODONE HYDROCHLORIDE 30 MG/1
30 TABLET ORAL EVERY 4 HOURS PRN
Status: DISCONTINUED | OUTPATIENT
Start: 2018-01-12 | End: 2018-01-14

## 2018-01-12 RX ORDER — DEXTROSE MONOHYDRATE 25 G/50ML
25 INJECTION, SOLUTION INTRAVENOUS
Status: DISCONTINUED | OUTPATIENT
Start: 2018-01-12 | End: 2018-01-12

## 2018-01-12 RX ORDER — PROMETHAZINE HYDROCHLORIDE 25 MG/1
12.5-25 TABLET ORAL EVERY 4 HOURS PRN
Status: DISCONTINUED | OUTPATIENT
Start: 2018-01-12 | End: 2018-01-18 | Stop reason: HOSPADM

## 2018-01-12 RX ORDER — FUROSEMIDE 40 MG/1
40 TABLET ORAL DAILY
Status: DISCONTINUED | OUTPATIENT
Start: 2018-01-12 | End: 2018-01-18 | Stop reason: HOSPADM

## 2018-01-12 RX ORDER — MORPHINE SULFATE 4 MG/ML
2-4 INJECTION, SOLUTION INTRAMUSCULAR; INTRAVENOUS
Status: DISCONTINUED | OUTPATIENT
Start: 2018-01-12 | End: 2018-01-14

## 2018-01-12 RX ORDER — PROMETHAZINE HYDROCHLORIDE 25 MG/1
12.5-25 SUPPOSITORY RECTAL EVERY 4 HOURS PRN
Status: DISCONTINUED | OUTPATIENT
Start: 2018-01-12 | End: 2018-01-18 | Stop reason: HOSPADM

## 2018-01-12 RX ORDER — POLYETHYLENE GLYCOL 3350 17 G/17G
1 POWDER, FOR SOLUTION ORAL
Status: DISCONTINUED | OUTPATIENT
Start: 2018-01-12 | End: 2018-01-18 | Stop reason: HOSPADM

## 2018-01-12 RX ORDER — OMEPRAZOLE 20 MG/1
20 CAPSULE, DELAYED RELEASE ORAL DAILY
COMMUNITY

## 2018-01-12 RX ORDER — OXYCODONE HYDROCHLORIDE 5 MG/1
2.5 TABLET ORAL
Status: DISCONTINUED | OUTPATIENT
Start: 2018-01-12 | End: 2018-01-12

## 2018-01-12 RX ORDER — LISINOPRIL 10 MG/1
10 TABLET ORAL DAILY
Status: DISCONTINUED | OUTPATIENT
Start: 2018-01-12 | End: 2018-01-18 | Stop reason: HOSPADM

## 2018-01-12 RX ORDER — SODIUM CHLORIDE 9 MG/ML
INJECTION, SOLUTION INTRAVENOUS CONTINUOUS
Status: DISCONTINUED | OUTPATIENT
Start: 2018-01-12 | End: 2018-01-13

## 2018-01-12 RX ORDER — SODIUM CHLORIDE 9 MG/ML
20 INJECTION, SOLUTION INTRAVENOUS ONCE
Status: COMPLETED | OUTPATIENT
Start: 2018-01-12 | End: 2018-01-12

## 2018-01-12 RX ORDER — OMEPRAZOLE 20 MG/1
20 CAPSULE, DELAYED RELEASE ORAL DAILY
Status: DISCONTINUED | OUTPATIENT
Start: 2018-01-12 | End: 2018-01-18 | Stop reason: HOSPADM

## 2018-01-12 RX ORDER — BUDESONIDE AND FORMOTEROL FUMARATE DIHYDRATE 160; 4.5 UG/1; UG/1
2 AEROSOL RESPIRATORY (INHALATION) 2 TIMES DAILY
Status: DISCONTINUED | OUTPATIENT
Start: 2018-01-12 | End: 2018-01-12

## 2018-01-12 RX ORDER — GABAPENTIN 100 MG/1
200 CAPSULE ORAL 3 TIMES DAILY
Status: DISCONTINUED | OUTPATIENT
Start: 2018-01-12 | End: 2018-01-16

## 2018-01-12 RX ORDER — DIPHENHYDRAMINE HYDROCHLORIDE 50 MG/ML
12.5 INJECTION INTRAMUSCULAR; INTRAVENOUS ONCE
Status: COMPLETED | OUTPATIENT
Start: 2018-01-12 | End: 2018-01-12

## 2018-01-12 RX ORDER — BUDESONIDE AND FORMOTEROL FUMARATE DIHYDRATE 160; 4.5 UG/1; UG/1
1 AEROSOL RESPIRATORY (INHALATION) 2 TIMES DAILY
Status: DISCONTINUED | OUTPATIENT
Start: 2018-01-12 | End: 2018-01-18 | Stop reason: HOSPADM

## 2018-01-12 RX ORDER — AMOXICILLIN 250 MG
2 CAPSULE ORAL 2 TIMES DAILY
Status: DISCONTINUED | OUTPATIENT
Start: 2018-01-12 | End: 2018-01-18 | Stop reason: HOSPADM

## 2018-01-12 RX ORDER — ACETAMINOPHEN 325 MG/1
650 TABLET ORAL EVERY 6 HOURS PRN
Status: DISCONTINUED | OUTPATIENT
Start: 2018-01-12 | End: 2018-01-18 | Stop reason: HOSPADM

## 2018-01-12 RX ORDER — GABAPENTIN 100 MG/1
100 CAPSULE ORAL 3 TIMES DAILY
Status: DISCONTINUED | OUTPATIENT
Start: 2018-01-12 | End: 2018-01-12

## 2018-01-12 RX ORDER — MORPHINE SULFATE 4 MG/ML
4 INJECTION, SOLUTION INTRAMUSCULAR; INTRAVENOUS ONCE
Status: COMPLETED | OUTPATIENT
Start: 2018-01-12 | End: 2018-01-12

## 2018-01-12 RX ORDER — MORPHINE SULFATE 4 MG/ML
2 INJECTION, SOLUTION INTRAMUSCULAR; INTRAVENOUS
Status: DISCONTINUED | OUTPATIENT
Start: 2018-01-12 | End: 2018-01-12

## 2018-01-12 RX ADMIN — MORPHINE SULFATE 4 MG: 4 INJECTION INTRAVENOUS at 13:22

## 2018-01-12 RX ADMIN — FUROSEMIDE 40 MG: 40 TABLET ORAL at 08:13

## 2018-01-12 RX ADMIN — ENOXAPARIN SODIUM 40 MG: 100 INJECTION SUBCUTANEOUS at 08:12

## 2018-01-12 RX ADMIN — OXYCODONE HYDROCHLORIDE 30 MG: 30 TABLET ORAL at 14:56

## 2018-01-12 RX ADMIN — LISINOPRIL 10 MG: 10 TABLET ORAL at 08:13

## 2018-01-12 RX ADMIN — OXYCODONE HYDROCHLORIDE 30 MG: 10 TABLET, FILM COATED, EXTENDED RELEASE ORAL at 06:02

## 2018-01-12 RX ADMIN — OXYCODONE HYDROCHLORIDE 5 MG: 5 TABLET ORAL at 08:45

## 2018-01-12 RX ADMIN — MORPHINE SULFATE 2 MG: 4 INJECTION INTRAVENOUS at 06:02

## 2018-01-12 RX ADMIN — OMEPRAZOLE 20 MG: 20 CAPSULE, DELAYED RELEASE ORAL at 09:44

## 2018-01-12 RX ADMIN — GABAPENTIN 200 MG: 100 CAPSULE ORAL at 22:15

## 2018-01-12 RX ADMIN — VANCOMYCIN HYDROCHLORIDE 3000 MG: 100 INJECTION, POWDER, LYOPHILIZED, FOR SOLUTION INTRAVENOUS at 06:01

## 2018-01-12 RX ADMIN — MORPHINE SULFATE 2 MG: 4 INJECTION INTRAVENOUS at 05:33

## 2018-01-12 RX ADMIN — SODIUM CHLORIDE: 9 INJECTION, SOLUTION INTRAVENOUS at 06:04

## 2018-01-12 RX ADMIN — OXYCODONE HYDROCHLORIDE 30 MG: 30 TABLET ORAL at 09:44

## 2018-01-12 RX ADMIN — GABAPENTIN 100 MG: 100 CAPSULE ORAL at 09:00

## 2018-01-12 RX ADMIN — MORPHINE SULFATE 4 MG: 4 INJECTION INTRAVENOUS at 22:15

## 2018-01-12 RX ADMIN — GABAPENTIN 200 MG: 100 CAPSULE ORAL at 15:48

## 2018-01-12 RX ADMIN — INSULIN HUMAN 1 UNITS: 100 INJECTION, SOLUTION PARENTERAL at 08:21

## 2018-01-12 RX ADMIN — AMPICILLIN SODIUM AND SULBACTAM SODIUM: 100; 50 INJECTION, POWDER, FOR SOLUTION INTRAVENOUS at 17:19

## 2018-01-12 RX ADMIN — OXYCODONE HYDROCHLORIDE 30 MG: 30 TABLET ORAL at 23:28

## 2018-01-12 RX ADMIN — BUDESONIDE AND FORMOTEROL FUMARATE DIHYDRATE 2 PUFF: 160; 4.5 AEROSOL RESPIRATORY (INHALATION) at 08:23

## 2018-01-12 RX ADMIN — VANCOMYCIN HYDROCHLORIDE 1500 MG: 100 INJECTION, POWDER, LYOPHILIZED, FOR SOLUTION INTRAVENOUS at 17:31

## 2018-01-12 RX ADMIN — MORPHINE SULFATE 4 MG: 4 INJECTION INTRAVENOUS at 03:10

## 2018-01-12 RX ADMIN — OXYCODONE HYDROCHLORIDE 30 MG: 30 TABLET ORAL at 18:57

## 2018-01-12 RX ADMIN — AMPICILLIN SODIUM AND SULBACTAM SODIUM 3 G: 2; 1 INJECTION, POWDER, FOR SOLUTION INTRAMUSCULAR; INTRAVENOUS at 03:42

## 2018-01-12 RX ADMIN — SODIUM CHLORIDE 2630 ML: 9 INJECTION, SOLUTION INTRAVENOUS at 03:13

## 2018-01-12 RX ADMIN — DIPHENHYDRAMINE HYDROCHLORIDE 12.5 MG: 50 INJECTION, SOLUTION INTRAMUSCULAR; INTRAVENOUS at 06:30

## 2018-01-12 ASSESSMENT — PAIN SCALES - GENERAL
PAINLEVEL_OUTOF10: 8
PAINLEVEL_OUTOF10: 9
PAINLEVEL_OUTOF10: 8
PAINLEVEL_OUTOF10: 9

## 2018-01-12 ASSESSMENT — ENCOUNTER SYMPTOMS
BACK PAIN: 1
DEPRESSION: 1
NAUSEA: 0
GASTROINTESTINAL NEGATIVE: 1
SHORTNESS OF BREATH: 0
DIZZINESS: 0
DEPRESSION: 0
CONSTIPATION: 0
WEAKNESS: 0
ABDOMINAL PAIN: 0
HEADACHES: 0
FEVER: 0
LOSS OF CONSCIOUSNESS: 0
BRUISES/BLEEDS EASILY: 0
MYALGIAS: 1
SPUTUM PRODUCTION: 0
CARDIOVASCULAR NEGATIVE: 1
COUGH: 0
NERVOUS/ANXIOUS: 0
FLANK PAIN: 0
PSYCHIATRIC NEGATIVE: 1
PALPITATIONS: 0
NEUROLOGICAL NEGATIVE: 1
RESPIRATORY NEGATIVE: 1
CONSTITUTIONAL NEGATIVE: 1
WEIGHT LOSS: 0
DOUBLE VISION: 0
BLURRED VISION: 0
FALLS: 1
CHILLS: 0
VOMITING: 0
DIARRHEA: 0

## 2018-01-12 ASSESSMENT — PATIENT HEALTH QUESTIONNAIRE - PHQ9
SUM OF ALL RESPONSES TO PHQ9 QUESTIONS 1 AND 2: 2
6. FEELING BAD ABOUT YOURSELF - OR THAT YOU ARE A FAILURE OR HAVE LET YOURSELF OR YOUR FAMILY DOWN: SEVERAL DAYS
7. TROUBLE CONCENTRATING ON THINGS, SUCH AS READING THE NEWSPAPER OR WATCHING TELEVISION: NOT AT ALL
5. POOR APPETITE OR OVEREATING: NOT AT ALL
4. FEELING TIRED OR HAVING LITTLE ENERGY: NOT AT ALL
SUM OF ALL RESPONSES TO PHQ QUESTIONS 1-9: 3
3. TROUBLE FALLING OR STAYING ASLEEP OR SLEEPING TOO MUCH: NOT AT ALL
2. FEELING DOWN, DEPRESSED, IRRITABLE, OR HOPELESS: SEVERAL DAYS
8. MOVING OR SPEAKING SO SLOWLY THAT OTHER PEOPLE COULD HAVE NOTICED. OR THE OPPOSITE, BEING SO FIGETY OR RESTLESS THAT YOU HAVE BEEN MOVING AROUND A LOT MORE THAN USUAL: NOT AT ALL
9. THOUGHTS THAT YOU WOULD BE BETTER OFF DEAD, OR OF HURTING YOURSELF: NOT AT ALL
1. LITTLE INTEREST OR PLEASURE IN DOING THINGS: SEVERAL DAYS

## 2018-01-12 ASSESSMENT — COGNITIVE AND FUNCTIONAL STATUS - GENERAL
DRESSING REGULAR UPPER BODY CLOTHING: A LITTLE
TOILETING: A LITTLE
SUGGESTED CMS G CODE MODIFIER DAILY ACTIVITY: CK
DRESSING REGULAR LOWER BODY CLOTHING: TOTAL
HELP NEEDED FOR BATHING: A LOT
DAILY ACTIVITIY SCORE: 17

## 2018-01-12 ASSESSMENT — LIFESTYLE VARIABLES
DO YOU DRINK ALCOHOL: NO
EVER_SMOKED: YES

## 2018-01-12 ASSESSMENT — ACTIVITIES OF DAILY LIVING (ADL): TOILETING: REQUIRES ASSIST

## 2018-01-12 NOTE — ASSESSMENT & PLAN NOTE
Severe and Chronic, with recent worsening.  Unable to wear shoes due to significant swelling and pain, complicated by DM   Discussed with ; patient needs outpatient resources/transportation to get to wound care appointments, consult placed they're working him  Wound care team discussed, no Unna boots due to infection, recommend LacHydrin lotion to legs  Treating cellulitis and pain, bedside debridement  Continue vitamin c, zinc and fish oil supplements to help with healing  Continue lasix  skilled placement at discharge

## 2018-01-12 NOTE — ED NOTES
Fer Estrada  57 y.o. male  Chief Complaint   Patient presents with   • Extremity Pain     BLE pain r/t cellulitis.        Pt BIB [facility] for above complaint.   Per report pt had home health nurse visiting. However pt has not had nursing care for several months. Pt's family has been changing pt's dressings. Dressing has not been changed in aprox one week.  3+ edema and a foul odor noted to BLE

## 2018-01-12 NOTE — DIETARY
NUTRITION SERVICES:   BMI - Pt with BMI >40 (=42.83). Weight loss counseling not appropriate in acute care setting.   RECOMMEND - Referral to outpatient nutrition services for weight management after D/C.     Alert received for newly identified wound. Wound team consult pending, will await wound staging to make recommendations if appropriate. RD will monitor per dept policy.

## 2018-01-12 NOTE — PROGRESS NOTES
"Pharmacy Kinetics 57 y.o. male on vancomycin day # 1 2018    Currently on Vancomycin 3000 mg IV x 1 loading dose    Indication for Treatment: SSTI    Pertinent history per medical record: Admitted on 2018 for BLE pain due to cellulitis. Patient has chronic lymphedema and venous stasis changes to BLE. He reportedly noticed worsening odor from his BLE several days ago with increased pain. Wound consultation ordered. Empiric abx initiated for cellulitis.     Other antibiotics: Ampicillin/Sulbactam 12g CIVI    Allergies: Patient has no known allergies.     List concerns for renal function: BMI 42.83 kg/m2; TIIDM; BUN:SCr>20:1; CHF    Pertinent cultures to date:   18 - blood (peripheral) x 2: in process    Recent Labs      18   0220   WBC  6.4   NEUTSPOLYS  70.70     Recent Labs      18   0220   BUN  16   CREATININE  0.62   ALBUMIN  3.9     Intake/Output Summary (Last 24 hours) at 18 0901  Last data filed at 18 0700   Gross per 24 hour   Intake              480 ml   Output              275 ml   Net              205 ml      Blood pressure 131/66, pulse 90, temperature 36.7 °C (98.1 °F), resp. rate 17, height 1.753 m (5' 9\"), weight (!) 131.5 kg (290 lb), SpO2 95 %. Temp (24hrs), Av.9 °C (98.4 °F), Min:36.7 °C (98.1 °F), Max:36.9 °C (98.5 °F)    A/P   1. Vancomycin dose change: Start vancomycin 1500 mg IV Q12h (~11 mg/kg; due @ )  2. Next vancomycin level:  @ 0530  3. Goal trough: 12-16 mcg/mL  4. A/P: Patient afebrile without leukocytosis. Tachycardic on admit, otherwise vital signs stable. Renal indices appear to be near baseline compared to patient controls. Multiple concerns for accumulation/clearance noted above. Blood cultures currently in process; wound cultures not ordered. Patient has received vancomycin at this facility previously (2016). At that time, vanco trough was supratherapeutic on ~ 14 mg/kg IV Q12h. Will initiate vancomycin ~11 mg/kg IV Q12h as " outlined above and check a level prior to the 3rd total dose. Recommend tight glycemic control to improve wound healing (HbA1c in process) and obtaining wound cx to further guide therapy. Pharmacy to monitor and adjust as needed.     Madelaine Cage, PharmD, BCOP

## 2018-01-12 NOTE — THERAPY
"Occupational Therapy Evaluation completed.   Functional Status:  Pt is SBA sit to stand, SBA xfer to chair, SBA to scoot at EOB, max a for LB bathing or dressing, SBA UB dressing, SBA toileting with urinal. Pt limited by 10/10 pain in bilateral LEs and generalized weakness. Pt with inconsistent social support from family.   Plan of Care: Will benefit from Occupational Therapy 3 times per week  Discharge Recommendations:  Equipment: Bedside Commode. Post-acute therapy Discharge to a transitional care facility for continued skilled therapy services.    See \"Rehab Therapy-Acute\" Patient Summary Report for complete documentation.    "

## 2018-01-12 NOTE — PROGRESS NOTES
"Pharmacy Kinetics 57 y.o. male on vancomycin day # 1 2018    Currently on Vancomycin 3000 mg iv load x1    Indication for Treatment: SSTI    Pertinent history per medical record: Admitted on 2018 for Cellulitis.    56 yo male with PMH of asthma, COPD, CHF, HTN, T2DM, chronic lymphedema reports with worsening smell from bilateral lower extremities and increased pain with walking.  Pt likely experiencing venous stasis changes in the setting of DM. Wound care consultation ordered.    Other antibiotics: Unasyn CIVI    Allergies: Patient has no known allergies.     List concerns for renal function: BMI 42.8; CHF    Pertinent cultures to date:   -Wound cultures taken    Recent Labs      18   0220   WBC  6.4   NEUTSPOLYS  70.70     Recent Labs      18   0220   BUN  16   CREATININE  0.62   ALBUMIN  3.9     No results for input(s): VANCOTROUGH, VANCOPEAK, VANCORANDOM in the last 72 hours.No intake or output data in the 24 hours ending 18 0539   Blood pressure 138/67, pulse (!) 106, temperature 36.9 °C (98.5 °F), resp. rate 18, height 1.753 m (5' 9\"), weight (!) 131.5 kg (290 lb), SpO2 97 %. Temp (24hrs), Av.9 °C (98.5 °F), Min:36.9 °C (98.4 °F), Max:36.9 °C (98.5 °F)      A/P   1. Vancomycin dose change: Pulse dose due to BMI  2. Next vancomycin level: 12 hour random (ordered)  3. Goal trough: 12 to 16 mcg/mL  4. Comments: Due to pt's obesity and risk of accumulation, will pulse dose maintenance dose based on random level. Day time clinical pharmacist will assess random level and adjust dose as clinically appropriate.      Antoine Lobo, PharmD    "

## 2018-01-12 NOTE — ASSESSMENT & PLAN NOTE
With severe venous stasis dermatitis  Cultures done on admission  ID consult appreciated: PO clinda on dc  Clinically slowly improving continue current therapy

## 2018-01-12 NOTE — ASSESSMENT & PLAN NOTE
Glycemic control improving on high dose sliding scale, goal to get most sugars under 150  Diabetic diet, Accu-Cheks.

## 2018-01-12 NOTE — H&P
" Hospital Medicine History and Physical    Date of Service  1/12/2018    Chief Complaint  Chief Complaint   Patient presents with   • Extremity Pain     BLE pain r/t cellulitis.        History of Presenting Illness  57 y.o. male with chronic lymphedema and venous stasis changes to bilateral lower extremities was apparently in his usual state of health until several days prior to admission.  He noticed worsening smell from his bilateral lower extremities.  He reported difficulty walking due to associated pain in the same. The pain is increased with ambulation and somewhat improved with rest.  He reports not taking his health \"seriously\", and not having recent follow up for his medical problems.  He reports intermittently taking his metformin.  He currently denies headache, vision change, chest pain, shortness of breath, abdominal pain, nausea or vomiting, diarrhea or constipation.  No other complaints reported.  No fever or chills.     Primary Care Physician  Giovani Lara M.D.    Consultants  none    Code Status  Full code     Review of Systems  Review of Systems   Constitutional: Negative for chills and fever.   Eyes: Negative for blurred vision and double vision.   Respiratory: Negative for cough, sputum production and shortness of breath.    Cardiovascular: Negative for chest pain and palpitations.   Gastrointestinal: Negative for abdominal pain, constipation, diarrhea, nausea and vomiting.   Genitourinary: Negative for dysuria.   Musculoskeletal:        Bilateral leg pain   Skin: Positive for rash.   Neurological: Negative for headaches.   Psychiatric/Behavioral: Positive for depression.        Past Medical History  Past Medical History:   Diagnosis Date   • Asthma    • Chronic obstructive pulmonary disease (CMS-HCC)    • Congestive heart failure (CMS-HCC)    • Hypertension    • Type II or unspecified type diabetes mellitus without mention of complication, not stated as uncontrolled        Surgical " History  No past surgical history on file.    Medications  No current facility-administered medications on file prior to encounter.      Current Outpatient Prescriptions on File Prior to Encounter   Medication Sig Dispense Refill   • acetaminophen (TYLENOL) 325 MG Tab Take 2 Tabs by mouth every 6 hours as needed (Mild Pain; (Pain scale 1-3); Temp greater than 100.5 F). 30 Tab 0   • ammonium lactate (LAC-HYDRIN) 12 % Lotion Apply 1 Application to affected area(s) 2 Times a Day. 1 Bottle 0   • furosemide (LASIX) 40 MG Tab Take 1 Tab by mouth every day. 30 Tab 0   • senna-docusate (PERICOLACE OR SENOKOT S) 8.6-50 MG Tab Take 2 Tabs by mouth 2 Times a Day. 30 Tab 0   • polyethylene glycol/lytes (MIRALAX) Pack Take 1 Packet by mouth 1 time daily as needed (if sennosides and docusate ineffective after 24 hours).  3   • lisinopril (PRINIVIL) 20 MG Tab Take 0.5 Tabs by mouth every day. 30 Tab 1   • gabapentin (NEURONTIN) 100 MG Cap Take 1 Cap by mouth 3 times a day. 90 Cap 0   • oxyCODONE CR 30 MG Tablet Extended Release 12 hour Abuse-Deterrent Take 30 mg by mouth every 12 hours. 60 Each 0   • oxycodone immediate release (ROXICODONE) 10 MG immediate release tablet Take 1 Tab by mouth every 6 hours as needed for Severe Pain. 30 Tab 0   • LACTOBACILLUS RHAMNOSUS, GG, PO Take 1 Cap by mouth 3 times a day.     • albuterol (PROVENTIL) 2.5mg/0.5ml Nebu Soln 2.5 mg by Nebulization route every four hours as needed for Shortness of Breath.     • potassium chloride (MICRO-K) 10 MEQ capsule Take 10 mEq by mouth every day. Pt states he cannot take the tablets     • budesonide-formoterol (SYMBICORT) 160-4.5 MCG/ACT Aerosol Inhale 2 Puffs by mouth 2 Times a Day.     • albuterol 108 (90 BASE) MCG/ACT Aero Soln inhalation aerosol Inhale 2 Puffs by mouth every 6 hours as needed for Shortness of Breath.     • metformin (GLUCOPHAGE) 850 MG Tab Take 1 Tab by mouth 2 times a day, with meals. 60 Tab 1       Family History  Family history  reviewed and non-contributory     Social History  Social History   Substance Use Topics   • Smoking status: Former Smoker     Quit date: 2005   • Smokeless tobacco: Never Used   • Alcohol use No       Allergies  No Known Allergies     Physical Exam  Laboratory   Hemodynamics  Temp (24hrs), Av.9 °C (98.5 °F), Min:36.9 °C (98.4 °F), Max:36.9 °C (98.5 °F)   Temperature: 36.9 °C (98.5 °F)  Pulse  Av.3  Min: 99  Max: 102 Heart Rate (Monitored): 97  Blood Pressure: (!) 97/61, NIBP: 108/61      Respiratory      Respiration: 14, Pulse Oximetry: 96 %             Physical Exam   Constitutional: He is oriented to person, place, and time. He appears well-developed and well-nourished. No distress.   HENT:   Head: Normocephalic.   Eyes: Pupils are equal, round, and reactive to light.   Neck: Normal range of motion. Neck supple.   Cardiovascular: Normal rate, regular rhythm and normal heart sounds.  Exam reveals no gallop and no friction rub.    No murmur heard.  Pulmonary/Chest: Effort normal and breath sounds normal. No respiratory distress. He has no wheezes.   Abdominal: Soft. Bowel sounds are normal. He exhibits no distension and no mass. There is tenderness. There is no rebound and no guarding.   Musculoskeletal: Normal range of motion. He exhibits deformity. He exhibits no edema.   Bilateral lower extremities with chronic venous stasis changes, weeping noted of the dependent portions bilaterally.  Poor skin care otherwise, pieces of socks seen stuck to the skin.    Neurological: He is alert and oriented to person, place, and time. No cranial nerve deficit.   Skin: He is not diaphoretic.   Nursing note and vitals reviewed.      Recent Labs      18   WBC  6.4   RBC  4.81   HEMOGLOBIN  13.3*   HEMATOCRIT  43.5   MCV  90.4   MCH  27.7   MCHC  30.6*   RDW  49.4   PLATELETCT  152*   MPV  11.6     Recent Labs      18   SODIUM  138   POTASSIUM  4.4   CHLORIDE  92*   CO2  42*   GLUCOSE  170*    BUN  16   CREATININE  0.62   CALCIUM  9.2     Recent Labs      01/12/18   0220   ALTSGPT  5   ASTSGOT  13   ALKPHOSPHAT  76   TBILIRUBIN  0.4   GLUCOSE  170*                 Lab Results   Component Value Date    TROPONINI <0.01 07/16/2017     Urinalysis:    Lab Results  Component Value Date/Time   SPECGRAVITY 1.017 07/15/2017 0534   GLUCOSEUR Negative 07/15/2017 0534   KETONES Trace (A) 07/15/2017 0534   NITRITE Negative 07/15/2017 0534        Imaging  No new imaging for review    Assessment/Plan     I anticipate this patient will require at least two midnights for appropriate medical management, necessitating inpatient admission.    * Cellulitis   Assessment & Plan    In setting of DM, probable venous stasis changes.  Will start IV vancomycin and unasyn, follow up cultures.  Wound care consultation ordered.         Stasis dermatitis- (present on admission)   Assessment & Plan    Chronic, with recent worsening.  Unable to wear shoes due to significant swelling and pain, complicated by DM         COPD (chronic obstructive pulmonary disease) (CMS-HCC)- (present on admission)   Assessment & Plan    No evidence of current exacerbation.  On home oxygen therapy which will be continued        Diabetes type 2, controlled (CMS-HCC)- (present on admission)   Assessment & Plan    Monitor.  Insulin correction dose ordered.          Morbid obesity with BMI of 45.0-49.9, adult (CMS-HCC)- (present on admission)   Assessment & Plan    Body mass index is 42.83 kg/m².              VTE prophylaxis: SCD Lovenox.

## 2018-01-12 NOTE — ED NOTES
"BIB by EMS after patient has had increasing weakness and unable to ambulated for the last week and 1/2. Patient states that he had home health until about 2 months ago. Wounds have not been changed in 1 week.     Chief Complaint   Patient presents with   • Extremity Pain     BLE pain r/t cellulitis.      BP (!) 97/61   Pulse 99   Temp 36.9 °C (98.5 °F)   Resp 20   Ht 1.753 m (5' 9\")   Wt (!) 131.5 kg (290 lb)   SpO2 94%   BMI 42.83 kg/m²     "

## 2018-01-12 NOTE — ED PROVIDER NOTES
"ED Provider Note    Scribed for Malcom Santoro M.D. by Tanisha Roche. 1/12/2018, 2:22 AM.    Primary care provider: Giovani Lara M.D.  Means of arrival: EMS  History obtained from: Patient  History limited by: none    CHIEF COMPLAINT  Chief Complaint   Patient presents with   • Extremity Pain     BLE pain r/t cellulitis.      HPI  Fer Estrada is a 57 y.o. Male with a history of diabetes, congestive heart failure, COPD, and chronic cellulitis who presents to the Emergency Department for bilateral lower leg pain and cellulitis.  He states that his legs have been progressively worsening over the last several months but states that they recently have started smelling. The patient has home health and has been seen by a wound care specialist but he notes that he has been \"slacking on his own healthcare\".  The patient denies any fevers, chills, nausea, vomiting, or diarrhea.  The patient states that he barely is able to ambulate secondary to pain.     REVIEW OF SYSTEMS  Pertinent positives include bilateral lower leg pain, cellulitis smell and discharge.  Patient denies fevers, chills, nausea, vomiting, or diarrhea.   As above, all other systems negative.  C    PAST MEDICAL HISTORY   has a past medical history of Asthma; Chronic obstructive pulmonary disease (CMS-[facility]); Congestive heart failure (CMS-[facility]); Hypertension; and Type II or unspecified type diabetes mellitus without mention of complication, not stated as uncontrolled.    SURGICAL HISTORY  patient denies any surgical history    SOCIAL HISTORY  Social History   Substance Use Topics   • Smoking status: Former Smoker     Quit date: 12/14/2005   • Smokeless tobacco: Never Used   • Alcohol use No      History   Drug Use No     FAMILY HISTORY  No family history noted    CURRENT MEDICATIONS  Reviewed.  See Encounter Summary.     ALLERGIES  No Known Allergies    PHYSICAL EXAM  VITAL SIGNS: BP (!) 97/61   Pulse 99   Temp 36.9 °C (98.5 °F)   Resp " "20   Ht 1.753 m (5' 9\")   Wt (!) 131.5 kg (290 lb)   SpO2 94%   BMI 42.83 kg/m²    Pulse ox interpretation: I interpret this pulse ox as normal.  Constitutional: Alert in no apparent distress.  HENT: Head normocephalic, atraumatic, Bilateral external ears normal, Nose normal.  Eyes: Pupils are equal, extraocular movements intact, Conjunctiva normal, Non-icteric.   Neck: Normal range of motion, Supple, No stridor.   Cardiovascular: Regular rate and rhythm   Thorax & Lungs: No acute respiratory distress, No wheezing, No increased work of breathing.   Abdomen: Soft, Non tender, Non-distended, No pulsatile masses, No peritoneal signs.  Skin: Warm, Dry, No erythema, No rash.   Extremities: Intact distal pulses, 3+ non pitting BLE edema up to the level of the thigh, severe hypercarontenosis up to the level of the knee bilaterally with underlying erythema and induration, on the dependent area of the posterior legs there is stage 3 diabetic pressure ulcers along that area of skin which is grossly purulent and  Neurologic: Alert , Normal motor function, Normal sensory function, No focal deficits noted.   Psychiatric: Affect normal, Judgment normal, Mood normal.     DIAGNOSTIC STUDIES / PROCEDURES     LABS  Results for orders placed or performed during the hospital encounter of 01/12/18   COMP METABOLIC PANEL   Result Value Ref Range    Sodium 138 135 - 145 mmol/L    Potassium 4.4 3.6 - 5.5 mmol/L    Chloride 92 (L) 96 - 112 mmol/L    Co2 42 (HH) 20 - 33 mmol/L    Anion Gap 4.0 0.0 - 11.9    Glucose 170 (H) 65 - 99 mg/dL    Bun 16 8 - 22 mg/dL    Creatinine 0.62 0.50 - 1.40 mg/dL    Calcium 9.2 8.5 - 10.5 mg/dL    AST(SGOT) 13 12 - 45 U/L    ALT(SGPT) 5 2 - 50 U/L    Alkaline Phosphatase 76 30 - 99 U/L    Total Bilirubin 0.4 0.1 - 1.5 mg/dL    Albumin 3.9 3.2 - 4.9 g/dL    Total Protein 7.8 6.0 - 8.2 g/dL    Globulin 3.9 (H) 1.9 - 3.5 g/dL    A-G Ratio 1.0 g/dL   CBC WITH DIFFERENTIAL   Result Value Ref Range    WBC 6.4 " 4.8 - 10.8 K/uL    RBC 4.81 4.70 - 6.10 M/uL    Hemoglobin 13.3 (L) 14.0 - 18.0 g/dL    Hematocrit 43.5 42.0 - 52.0 %    MCV 90.4 81.4 - 97.8 fL    MCH 27.7 27.0 - 33.0 pg    MCHC 30.6 (L) 33.7 - 35.3 g/dL    RDW 49.4 35.9 - 50.0 fL    Platelet Count 152 (L) 164 - 446 K/uL    MPV 11.6 9.0 - 12.9 fL    Neutrophils-Polys 70.70 44.00 - 72.00 %    Lymphocytes 13.40 (L) 22.00 - 41.00 %    Monocytes 9.00 0.00 - 13.40 %    Eosinophils 6.10 0.00 - 6.90 %    Basophils 0.30 0.00 - 1.80 %    Immature Granulocytes 0.50 0.00 - 0.90 %    Nucleated RBC 0.00 /100 WBC    Neutrophils (Absolute) 4.49 1.82 - 7.42 K/uL    Lymphs (Absolute) 0.85 (L) 1.00 - 4.80 K/uL    Monos (Absolute) 0.57 0.00 - 0.85 K/uL    Eos (Absolute) 0.39 0.00 - 0.51 K/uL    Baso (Absolute) 0.02 0.00 - 0.12 K/uL    Immature Granulocytes (abs) 0.03 0.00 - 0.11 K/uL    NRBC (Absolute) 0.00 K/uL   ESTIMATED GFR   Result Value Ref Range    GFR If African American >60 >60 mL/min/1.73 m 2    GFR If Non African American >60 >60 mL/min/1.73 m 2   All labs were reviewed by me.    COURSE & MEDICAL DECISION MAKING  Pertinent Labs & Imaging studies reviewed. (See chart for details)    2:22 AM - Patient seen and examined at bedside. Patient will be treated with NS infusion 2630 mL for dehydration, morphine 4mg IV, Unasyn 3g in NS 100mL IVPB. Ordered CMP, CBC with differential, blood culture, estimated GFR to evaluate his symptoms.     3:09 AM Paged Hospitalist     3:12 AM Spoke with Dr. Martin, Hospitalist, about the patient's condition. He will admit the patient, care is transferred at this time.     3:58 AM Patient has a critical lab value CO2 40    Decision Making:  This is a 57 y.o. year old male who presents with bilateral lower extremity swelling, pain, and foul odor. The patient has a history of congestive heart failure, and chronic lower extremity edema as well as wounds. Here on examination the patient has edematous legs, and indurated skin suggestive of significant  bilateral lower extremity cellulitis. Additionally, the patient has diabetic pressure ulcers over the dependent areas of his legs, and these appear to be grossly infected. The patient also has severe hyperkeratosis of the skin bilaterally, and likely is going to need extensive debridement of the lower extremities. The patient also appeared somewhat dehydrated, was borderline tachycardic on arrival, and given normal saline for this. Additionally, the patient was given 3 g of Unasyn and treatment for his infection, will be admitted to the hospitalist service for continued evaluation and treatment.    DISPOSITION:  Patient will be admitted to Dr. Martin in guarded condition.    FINAL IMPRESSION  1. Bilateral lower extremity cellulitis  2. Grade 3 Diabetic pressure ulcers  3. Chronic lower extremity edema  4. Congestive heart failure  5. Type II diabetes   6. COPD     Tanisha AIKEN (Scribe), am scribing for, and in the presence of, Malcom Santoro M.D..    Electronically signed by: Tanisha Roche (Mery), 1/12/2018    Malcom AIKEN M.D. personally performed the services described in this documentation, as scribed by Tanisha Roche in my presence, and it is both accurate and complete.    The note accurately reflects work and decisions made by me.  Malcom Santoro  1/12/2018  4:25 AM

## 2018-01-12 NOTE — PROGRESS NOTES
Received bedside report from ED RN. Maikel foley. Patient reports 9/10 pain, medicated see EMAR. Denies nausea and vomiting. PIV + flushing, patent. BLE 4+ generalized, peeling, redness. Tachycardic, MD aware. POC discussed with patient. Patient very anxious, crying, irritable and agitated. Therapeutic conversations given. Call light within reach. Bed in lowest and locked position. Q2 rounding in place.

## 2018-01-13 ENCOUNTER — APPOINTMENT (OUTPATIENT)
Dept: RADIOLOGY | Facility: MEDICAL CENTER | Age: 58
DRG: 638 | End: 2018-01-13
Attending: STUDENT IN AN ORGANIZED HEALTH CARE EDUCATION/TRAINING PROGRAM
Payer: MEDICARE

## 2018-01-13 LAB
ANION GAP SERPL CALC-SCNC: 10 MMOL/L (ref 0–11.9)
BASOPHILS # BLD AUTO: 0.3 % (ref 0–1.8)
BASOPHILS # BLD: 0.02 K/UL (ref 0–0.12)
BUN SERPL-MCNC: 21 MG/DL (ref 8–22)
CALCIUM SERPL-MCNC: 8.3 MG/DL (ref 8.5–10.5)
CHLORIDE SERPL-SCNC: 97 MMOL/L (ref 96–112)
CO2 SERPL-SCNC: 23 MMOL/L (ref 20–33)
COMMENT 1642: NORMAL
CREAT SERPL-MCNC: 0.97 MG/DL (ref 0.5–1.4)
EOSINOPHIL # BLD AUTO: 0.39 K/UL (ref 0–0.51)
EOSINOPHIL NFR BLD: 5.7 % (ref 0–6.9)
ERYTHROCYTE [DISTWIDTH] IN BLOOD BY AUTOMATED COUNT: 49.7 FL (ref 35.9–50)
GLUCOSE BLD-MCNC: 131 MG/DL (ref 65–99)
GLUCOSE BLD-MCNC: 134 MG/DL (ref 65–99)
GLUCOSE BLD-MCNC: 150 MG/DL (ref 65–99)
GLUCOSE BLD-MCNC: 151 MG/DL (ref 65–99)
GLUCOSE BLD-MCNC: 166 MG/DL (ref 65–99)
GLUCOSE SERPL-MCNC: 144 MG/DL (ref 65–99)
HCT VFR BLD AUTO: 46.5 % (ref 42–52)
HGB BLD-MCNC: 13.9 G/DL (ref 14–18)
IMM GRANULOCYTES # BLD AUTO: 0.04 K/UL (ref 0–0.11)
IMM GRANULOCYTES NFR BLD AUTO: 0.6 % (ref 0–0.9)
LYMPHOCYTES # BLD AUTO: 0.62 K/UL (ref 1–4.8)
LYMPHOCYTES NFR BLD: 9 % (ref 22–41)
MCH RBC QN AUTO: 27.2 PG (ref 27–33)
MCHC RBC AUTO-ENTMCNC: 29.9 G/DL (ref 33.7–35.3)
MCV RBC AUTO: 91 FL (ref 81.4–97.8)
MONOCYTES # BLD AUTO: 0.53 K/UL (ref 0–0.85)
MONOCYTES NFR BLD AUTO: 7.7 % (ref 0–13.4)
MORPHOLOGY BLD-IMP: NORMAL
NEUTROPHILS # BLD AUTO: 5.3 K/UL (ref 1.82–7.42)
NEUTROPHILS NFR BLD: 76.7 % (ref 44–72)
NRBC # BLD AUTO: 0 K/UL
NRBC BLD-RTO: 0 /100 WBC
PLATELET # BLD AUTO: 138 K/UL (ref 164–446)
PLATELET BLD QL SMEAR: NORMAL
PMV BLD AUTO: 11.6 FL (ref 9–12.9)
POTASSIUM SERPL-SCNC: 4.6 MMOL/L (ref 3.6–5.5)
RBC # BLD AUTO: 5.11 M/UL (ref 4.7–6.1)
RBC BLD AUTO: NORMAL
SODIUM SERPL-SCNC: 130 MMOL/L (ref 135–145)
VANCOMYCIN SERPL-MCNC: 21 UG/ML
WBC # BLD AUTO: 6.9 K/UL (ref 4.8–10.8)

## 2018-01-13 PROCEDURE — 80202 ASSAY OF VANCOMYCIN: CPT

## 2018-01-13 PROCEDURE — 700111 HCHG RX REV CODE 636 W/ 250 OVERRIDE (IP): Performed by: HOSPITALIST

## 2018-01-13 PROCEDURE — 82962 GLUCOSE BLOOD TEST: CPT

## 2018-01-13 PROCEDURE — 770004 HCHG ROOM/CARE - ONCOLOGY PRIVATE *

## 2018-01-13 PROCEDURE — 90686 IIV4 VACC NO PRSV 0.5 ML IM: CPT | Performed by: HOSPITALIST

## 2018-01-13 PROCEDURE — 80048 BASIC METABOLIC PNL TOTAL CA: CPT

## 2018-01-13 PROCEDURE — 700105 HCHG RX REV CODE 258: Performed by: HOSPITALIST

## 2018-01-13 PROCEDURE — 85025 COMPLETE CBC W/AUTO DIFF WBC: CPT

## 2018-01-13 PROCEDURE — 700102 HCHG RX REV CODE 250 W/ 637 OVERRIDE(OP): Performed by: HOSPITALIST

## 2018-01-13 PROCEDURE — 36415 COLL VENOUS BLD VENIPUNCTURE: CPT

## 2018-01-13 PROCEDURE — 3E0234Z INTRODUCTION OF SERUM, TOXOID AND VACCINE INTO MUSCLE, PERCUTANEOUS APPROACH: ICD-10-PCS | Performed by: HOSPITALIST

## 2018-01-13 PROCEDURE — 90471 IMMUNIZATION ADMIN: CPT

## 2018-01-13 PROCEDURE — A9270 NON-COVERED ITEM OR SERVICE: HCPCS | Performed by: HOSPITALIST

## 2018-01-13 PROCEDURE — 99233 SBSQ HOSP IP/OBS HIGH 50: CPT | Performed by: HOSPITALIST

## 2018-01-13 PROCEDURE — 360977 HOYER XLARGE SLING FROM 301-500LBS: Performed by: HOSPITALIST

## 2018-01-13 RX ORDER — ASCORBIC ACID 500 MG
500 TABLET ORAL DAILY
Status: DISCONTINUED | OUTPATIENT
Start: 2018-01-13 | End: 2018-01-18 | Stop reason: HOSPADM

## 2018-01-13 RX ORDER — CHLORAL HYDRATE 500 MG
1000 CAPSULE ORAL
Status: DISCONTINUED | OUTPATIENT
Start: 2018-01-13 | End: 2018-01-18 | Stop reason: HOSPADM

## 2018-01-13 RX ORDER — ZINC SULFATE 50(220)MG
220 CAPSULE ORAL DAILY
Status: DISCONTINUED | OUTPATIENT
Start: 2018-01-13 | End: 2018-01-18 | Stop reason: HOSPADM

## 2018-01-13 RX ADMIN — OMEGA-3 FATTY ACIDS CAP 1000 MG 1000 MG: 1000 CAP at 18:16

## 2018-01-13 RX ADMIN — INFLUENZA A VIRUS A/MICHIGAN/45/2015 X-275 (H1N1) ANTIGEN (FORMALDEHYDE INACTIVATED), INFLUENZA A VIRUS A/HONG KONG/4801/2014 X-263B (H3N2) ANTIGEN (FORMALDEHYDE INACTIVATED), INFLUENZA B VIRUS B/PHUKET/3073/2013 ANTIGEN (FORMALDEHYDE INACTIVATED), AND INFLUENZA B VIRUS B/BRISBANE/60/2008 ANTIGEN (FORMALDEHYDE INACTIVATED) 0.5 ML: 15; 15; 15; 15 INJECTION, SUSPENSION INTRAMUSCULAR at 05:46

## 2018-01-13 RX ADMIN — FUROSEMIDE 40 MG: 40 TABLET ORAL at 08:21

## 2018-01-13 RX ADMIN — GABAPENTIN 200 MG: 100 CAPSULE ORAL at 14:32

## 2018-01-13 RX ADMIN — INSULIN HUMAN 3 UNITS: 100 INJECTION, SOLUTION PARENTERAL at 19:52

## 2018-01-13 RX ADMIN — BUDESONIDE AND FORMOTEROL FUMARATE DIHYDRATE 1 PUFF: 160; 4.5 AEROSOL RESPIRATORY (INHALATION) at 08:20

## 2018-01-13 RX ADMIN — Medication 220 MG: at 15:44

## 2018-01-13 RX ADMIN — OXYCODONE HYDROCHLORIDE 30 MG: 30 TABLET ORAL at 22:31

## 2018-01-13 RX ADMIN — LEVOTHYROXINE SODIUM 50 MCG: 50 TABLET ORAL at 05:45

## 2018-01-13 RX ADMIN — MORPHINE SULFATE 4 MG: 4 INJECTION INTRAVENOUS at 09:52

## 2018-01-13 RX ADMIN — MORPHINE SULFATE 4 MG: 4 INJECTION INTRAVENOUS at 19:36

## 2018-01-13 RX ADMIN — VANCOMYCIN HYDROCHLORIDE 1500 MG: 100 INJECTION, POWDER, LYOPHILIZED, FOR SOLUTION INTRAVENOUS at 05:53

## 2018-01-13 RX ADMIN — ONDANSETRON 4 MG: 4 TABLET, ORALLY DISINTEGRATING ORAL at 15:56

## 2018-01-13 RX ADMIN — ONDANSETRON 4 MG: 2 INJECTION INTRAMUSCULAR; INTRAVENOUS at 02:38

## 2018-01-13 RX ADMIN — MORPHINE SULFATE 4 MG: 4 INJECTION INTRAVENOUS at 14:28

## 2018-01-13 RX ADMIN — OXYCODONE HYDROCHLORIDE AND ACETAMINOPHEN 500 MG: 500 TABLET ORAL at 15:44

## 2018-01-13 RX ADMIN — LISINOPRIL 10 MG: 10 TABLET ORAL at 08:21

## 2018-01-13 RX ADMIN — STANDARDIZED SENNA CONCENTRATE AND DOCUSATE SODIUM 2 TABLET: 8.6; 5 TABLET, FILM COATED ORAL at 08:21

## 2018-01-13 RX ADMIN — OXYCODONE HYDROCHLORIDE 30 MG: 30 TABLET ORAL at 04:04

## 2018-01-13 RX ADMIN — OXYCODONE HYDROCHLORIDE 30 MG: 30 TABLET ORAL at 08:21

## 2018-01-13 RX ADMIN — MORPHINE SULFATE 4 MG: 4 INJECTION INTRAVENOUS at 05:45

## 2018-01-13 RX ADMIN — OXYCODONE HYDROCHLORIDE 30 MG: 30 TABLET ORAL at 18:14

## 2018-01-13 RX ADMIN — ENOXAPARIN SODIUM 40 MG: 100 INJECTION SUBCUTANEOUS at 08:22

## 2018-01-13 RX ADMIN — OXYCODONE HYDROCHLORIDE 30 MG: 30 TABLET ORAL at 12:58

## 2018-01-13 RX ADMIN — BUDESONIDE AND FORMOTEROL FUMARATE DIHYDRATE 1 PUFF: 160; 4.5 AEROSOL RESPIRATORY (INHALATION) at 19:38

## 2018-01-13 RX ADMIN — INSULIN HUMAN 3 UNITS: 100 INJECTION, SOLUTION PARENTERAL at 12:55

## 2018-01-13 RX ADMIN — GABAPENTIN 200 MG: 100 CAPSULE ORAL at 19:36

## 2018-01-13 RX ADMIN — AMPICILLIN SODIUM AND SULBACTAM SODIUM: 100; 50 INJECTION, POWDER, FOR SOLUTION INTRAVENOUS at 15:47

## 2018-01-13 RX ADMIN — OMEPRAZOLE 20 MG: 20 CAPSULE, DELAYED RELEASE ORAL at 08:21

## 2018-01-13 RX ADMIN — GABAPENTIN 200 MG: 100 CAPSULE ORAL at 09:00

## 2018-01-13 ASSESSMENT — ENCOUNTER SYMPTOMS
CARDIOVASCULAR NEGATIVE: 1
CONSTITUTIONAL NEGATIVE: 1
FALLS: 1
RESPIRATORY NEGATIVE: 1
BRUISES/BLEEDS EASILY: 0
GASTROINTESTINAL NEGATIVE: 1
COUGH: 0
MYALGIAS: 1
NEUROLOGICAL NEGATIVE: 1
VOMITING: 0
ABDOMINAL PAIN: 0
DEPRESSION: 1
FEVER: 0
NERVOUS/ANXIOUS: 0
DIZZINESS: 0
LOSS OF CONSCIOUSNESS: 0
BACK PAIN: 1
WEAKNESS: 0
NAUSEA: 0
WEIGHT LOSS: 0
CHILLS: 0
FLANK PAIN: 0
SHORTNESS OF BREATH: 0

## 2018-01-13 ASSESSMENT — PAIN SCALES - GENERAL
PAINLEVEL_OUTOF10: 8
PAINLEVEL_OUTOF10: 10
PAINLEVEL_OUTOF10: 8

## 2018-01-13 ASSESSMENT — LIFESTYLE VARIABLES: DO YOU DRINK ALCOHOL: NO

## 2018-01-13 NOTE — PROGRESS NOTES
[facility] Hospitalist Progress Note    Date of Service: 2018    Chief Complaint  57 y.o. male admitted 2018 with cellulitis of the legs, chronic venous stasis.    Interval Problem Update  Pain is better  Patient states he feels sad and unmotivated a lot due to being so limited in activity due to his feet and legs  Discussed strategies of care and care delivery, education greater than 50% of time; 38 minutes.    Consultants/Specialty  NOne.    Disposition  TBD.        Review of Systems   Constitutional: Negative.  Negative for chills, fever, malaise/fatigue and weight loss.   HENT: Negative.    Respiratory: Negative.  Negative for cough and shortness of breath.    Cardiovascular: Negative.  Negative for chest pain and leg swelling.   Gastrointestinal: Negative.  Negative for abdominal pain, nausea and vomiting.   Genitourinary: Negative.  Negative for dysuria and flank pain.   Musculoskeletal: Positive for back pain, falls, joint pain and myalgias.   Skin: Positive for itching and rash.   Neurological: Negative.  Negative for dizziness, loss of consciousness and weakness.   Endo/Heme/Allergies: Negative.  Does not bruise/bleed easily.   Psychiatric/Behavioral: Positive for depression. The patient is not nervous/anxious.    All other systems reviewed and are negative.     Physical Exam  Laboratory/Imaging   Hemodynamics  Temp (24hrs), Av.9 °C (98.5 °F), Min:36.6 °C (97.9 °F), Max:37.2 °C (98.9 °F)   Temperature: 37.2 °C (98.9 °F)  Pulse  Av  Min: 73  Max: 111   Blood Pressure: 111/58      Respiratory      Respiration: 18, Pulse Oximetry: 94 %     Work Of Breathing / Effort: Moderate       Fluids    Intake/Output Summary (Last 24 hours) at 18 1548  Last data filed at 18 1400   Gross per 24 hour   Intake                0 ml   Output             1225 ml   Net            -1225 ml       Nutrition  Orders Placed This Encounter   Procedures   • Diet Order     Standing Status:   Standing     Number  of Occurrences:   1     Order Specific Question:   Diet:     Answer:   Consistent Carbohydrate [4]     Physical Exam   Constitutional: He appears well-developed and well-nourished. No distress.   HENT:   Nose: Nose normal.   Mouth/Throat: Oropharynx is clear and moist. No oropharyngeal exudate.   Eyes: Conjunctivae are normal. Right eye exhibits no discharge. Left eye exhibits no discharge. No scleral icterus.   Neck: No JVD present. No tracheal deviation present.   Cardiovascular: Normal rate, regular rhythm and normal heart sounds.    Pulmonary/Chest: Effort normal and breath sounds normal. No stridor. No respiratory distress. He has no wheezes. He has no rales. He exhibits no tenderness.   Abdominal: Soft. Bowel sounds are normal. He exhibits no distension. There is no tenderness.   Musculoskeletal: He exhibits edema, tenderness and deformity.   Severe, chronic changes of both lower legs, woody brawny edema present, erythema, white/dry flaking skin present   Neurological: He is alert. No cranial nerve deficit. He exhibits normal muscle tone.   Skin: Skin is warm and dry. He is not diaphoretic. No pallor.   Psychiatric: He has a normal mood and affect. His behavior is normal.   Nursing note and vitals reviewed.      Recent Labs      01/12/18   0220  01/13/18   0046   WBC  6.4  6.9   RBC  4.81  5.11   HEMOGLOBIN  13.3*  13.9*   HEMATOCRIT  43.5  46.5   MCV  90.4  91.0   MCH  27.7  27.2   MCHC  30.6*  29.9*   RDW  49.4  49.7   PLATELETCT  152*  138*   MPV  11.6  11.6     Recent Labs      01/12/18   0220  01/13/18   0006   SODIUM  138  130*   POTASSIUM  4.4  4.6   CHLORIDE  92*  97   CO2  42*  23   GLUCOSE  170*  144*   BUN  16  21   CREATININE  0.62  0.97   CALCIUM  9.2  8.3*                      Assessment/Plan     * Cellulitis   Assessment & Plan    With severe venous stasis dermatitis  Continuous Unasyn  Small but definite clinical improvement  Cultures done on admission        Stasis dermatitis- (present on  admission)   Assessment & Plan    SevereChronic, with recent worsening.  Unable to wear shoes due to significant swelling and pain, complicated by DM   Discussed with ; patient needs outpatient resources/transportation to get to wound care appointments, consult placed  Wound care team planning to place Unna boots  Treating cellulitis and pain  Recommend vitamin c, zinc and fish oil supplements to help with healing        COPD (chronic obstructive pulmonary disease) (CMS-HCC)- (present on admission)   Assessment & Plan    No evidence of current exacerbation.  On home oxygen therapy which will be continued        Chronic pain   Assessment & Plan    Associated with the severe dermatitis  Patient takes oxycodone immediate release at home, continue. She has not been able to afford long-acting pain medication. He states his pain control is satisfactory until the medication wears off.  Pain is better today with the treatment of the infection        Diabetes type 2, controlled (CMS-HCC)- (present on admission)   Assessment & Plan    Glycemic control improving on high dose sliding scale, goal to get most sugars under 150  Diabetic diet, Accu-Cheks.          Morbid obesity with BMI of 45.0-49.9, adult (CMS-HCC)- (present on admission)   Assessment & Plan    Body mass index is 42.83 kg/m².            Quality-Core Measures

## 2018-01-13 NOTE — RESPIRATORY CARE
COPD EDUCATION by COPD CLINICAL EDUCATOR  1/13/2018 at 6:30 AM by Carmen Martinez     Patient reviewed by COPD education team. Patient does not qualify for COPD program.

## 2018-01-13 NOTE — ASSESSMENT & PLAN NOTE
Associated with the severe dermatitis  Trial of long acting pain medication, currently inadequate dose  MS Contin ineffective, better with Oxycontin 60 q 12 hours but still having frequent need of PRN oxy - will change to q8h  20mg of oxycodone q 4 hours for breakthrough  Continue to titrate up on neurontin 300mg tid.

## 2018-01-13 NOTE — CARE PLAN
Problem: Safety  Goal: Will remain free from injury  Outcome: PROGRESSING AS EXPECTED  Pt aware of limitations. Calling for assist as needed. Call bell in reach, bed in low locked position.     Problem: Infection  Goal: Will remain free from infection  Outcome: PROGRESSING AS EXPECTED  Continues on IV abx w/o issue. No fevers this shift.

## 2018-01-13 NOTE — PROGRESS NOTES
Patient AAOx4, sitting at side of bed, unable to karan bear on b/l legs. C/O pain x1, medication administered as ordered. Continues on 4L NC.  No further needs at this time.  Will continue to monitor.

## 2018-01-13 NOTE — PROGRESS NOTES
"Pharmacy Kinetics 57 y.o. male on vancomycin day # 2 2018    Currently on Vancomycin 1500 mg iv q12hr    Indication for Treatment: SSTI    Pertinent history per medical record: Admitted on 2018 for BLE pain due to cellulitis. Patient has chronic lymphedema and venous stasis changes to BLE. He reportedly noticed worsening odor from his BLE several days ago with increased pain. Wound consultation ordered. Empiric abx initiated for cellulitis.      Other antibiotics: Ampicillin/Sulbactam 12g CIVI     Allergies: Patient has no known allergies.      List concerns for renal function: BMI 42.83 kg/m2; TIIDM; BUN:SCr>20:1; CHF     Pertinent cultures to date:   18 - blood (peripheral) x 2: NGTD    Recent Labs      18   0046   WBC  6.4  6.9   NEUTSPOLYS  70.70  76.70*     Recent Labs      18   02218   0006   BUN  16  21   CREATININE  0.62  0.97   ALBUMIN  3.9   --      Recent Labs      18   0521   VANCORANDOM  21.0     Intake/Output Summary (Last 24 hours) at 18 1034  Last data filed at 18 0500   Gross per 24 hour   Intake                0 ml   Output             1425 ml   Net            -1425 ml      Blood pressure 111/58, pulse 73, temperature 37.2 °C (98.9 °F), resp. rate 18, height 1.753 m (5' 9\"), weight (!) 131.5 kg (290 lb), SpO2 94 %. Temp (24hrs), Av.9 °C (98.5 °F), Min:36.6 °C (97.9 °F), Max:37.2 °C (98.9 °F)    A/P   1. Vancomycin dose change: Start vancomycin 2000 mg IV Q24h (~15 mg/kg; due @ )  2. Next vancomycin level: 2-3 days (not ordered)  3. Goal trough: 12-16 mcg/mL  4. A/P: Patient afebrile overnight without leukocytosis. Vital signs stable. Bump in SCr overnight. Additionally, vanco trough prior to the 3rd total dose resulted supratherapeutic at 21 mcg/mL. Preliminary blood cx negative thus far; recommend obtaining wound cx (if possible) to further guide antibiotic therapy. Wound images reviewed, no portal of entry or pus " production overtly present; wounds appear dry and flaky for the most part. Vanco trough likely to continue to trend up given administration of 3rd dose of vanco and patient's larger volume of distribution. Given bump in SCr and risk factors for accumulation/clearance will dose vanco conservatively. It is unlikely that patient will achieve steady state levels on lowest (10 mg/kg) Q12h dosing per protocol, thus reasoning behind starting Q24h dosing. Will start vancomycin ~15 mg/kg IV Q24h dosing as outlined above, starting 24 hours from previous dose to allow for washout period. Repeat vanco level in 2-3 days. Pharmacy to monitor and adjust as needed.     Madelaine Cage, PharmD, BCOP

## 2018-01-13 NOTE — CARE PLAN
Problem: Communication  Goal: The ability to communicate needs accurately and effectively will improve  Outcome: PROGRESSING AS EXPECTED  Patient calls appropriately.      Problem: Safety  Goal: Will remain free from falls  Patient calls appropriately, call light with in reach, bed in lowest position, staff provides assistance.

## 2018-01-13 NOTE — PROGRESS NOTES
[facility] Hospitalist Progress Note    Date of Service: 2018    Chief Complaint  57 y.o. male admitted 2018 with cellulitis of the legs, chronic venous stasis.    Interval Problem Update  Pain in feet severe, can't stand.  Severe flaking and itching of legs    Consultants/Specialty  NOne.    Disposition  TBD.        Review of Systems   Constitutional: Negative.  Negative for chills, fever, malaise/fatigue and weight loss.   HENT: Negative.    Respiratory: Negative.  Negative for cough and shortness of breath.    Cardiovascular: Negative.  Negative for chest pain and leg swelling.   Gastrointestinal: Negative.  Negative for abdominal pain, nausea and vomiting.   Genitourinary: Negative.  Negative for dysuria and flank pain.   Musculoskeletal: Positive for back pain, falls, joint pain and myalgias.   Skin: Positive for itching and rash.   Neurological: Negative.  Negative for dizziness, loss of consciousness and weakness.   Endo/Heme/Allergies: Negative.  Does not bruise/bleed easily.   Psychiatric/Behavioral: Negative.  Negative for depression. The patient is not nervous/anxious.    All other systems reviewed and are negative.     Physical Exam  Laboratory/Imaging   Hemodynamics  Temp (24hrs), Av.8 °C (98.3 °F), Min:36.6 °C (97.9 °F), Max:36.9 °C (98.5 °F)   Temperature: 36.6 °C (97.9 °F)  Pulse  Av.6  Min: 90  Max: 106 Heart Rate (Monitored): (!) 105  Blood Pressure: 130/63, NIBP: 110/63      Respiratory      Respiration: 17, Pulse Oximetry: 93 %     Work Of Breathing / Effort: Moderate       Fluids    Intake/Output Summary (Last 24 hours) at 18 1716  Last data filed at 18 1300   Gross per 24 hour   Intake              480 ml   Output             1675 ml   Net            -1195 ml       Nutrition  Orders Placed This Encounter   Procedures   • Diet Order     Standing Status:   Standing     Number of Occurrences:   1     Order Specific Question:   Diet:     Answer:   Consistent Carbohydrate  [4]     Physical Exam   Constitutional: He appears well-developed and well-nourished. No distress.   HENT:   Nose: Nose normal.   Mouth/Throat: Oropharynx is clear and moist. No oropharyngeal exudate.   Eyes: Conjunctivae are normal. Right eye exhibits no discharge. Left eye exhibits no discharge. No scleral icterus.   Neck: No JVD present. No tracheal deviation present.   Cardiovascular: Normal rate, regular rhythm and normal heart sounds.    Pulmonary/Chest: Effort normal and breath sounds normal. No stridor. No respiratory distress. He has no wheezes. He has no rales. He exhibits no tenderness.   Abdominal: Soft. Bowel sounds are normal. He exhibits no distension. There is no tenderness.   Musculoskeletal: He exhibits edema, tenderness and deformity.   Severe, chronic changes of both lower legs, woody brawny edema present, erythema, white/dry flaking skin present   Neurological: He is alert. No cranial nerve deficit. He exhibits normal muscle tone.   Skin: Skin is warm and dry. He is not diaphoretic. No pallor.   Psychiatric: He has a normal mood and affect. His behavior is normal.   Nursing note and vitals reviewed.      Recent Labs      01/12/18   0220   WBC  6.4   RBC  4.81   HEMOGLOBIN  13.3*   HEMATOCRIT  43.5   MCV  90.4   MCH  27.7   MCHC  30.6*   RDW  49.4   PLATELETCT  152*   MPV  11.6     Recent Labs      01/12/18   0220   SODIUM  138   POTASSIUM  4.4   CHLORIDE  92*   CO2  42*   GLUCOSE  170*   BUN  16   CREATININE  0.62   CALCIUM  9.2                      Assessment/Plan     * Cellulitis   Assessment & Plan    With severe venous stasis dermatitis  Continuous Unasyn ordered  Cultures done on admission        Stasis dermatitis- (present on admission)   Assessment & Plan    SevereChronic, with recent worsening.  Unable to wear shoes due to significant swelling and pain, complicated by DM   Discussed with ; patient needs outpatient resources/transportation to get to wound care  appointments  Wound care team ordered  Treat cellulitis and pain        COPD (chronic obstructive pulmonary disease) (CMS-HCC)- (present on admission)   Assessment & Plan    No evidence of current exacerbation.  On home oxygen therapy which will be continued        Chronic pain   Assessment & Plan    Associated with the severe dermatitis  Patient takes oxycodone immediate release at home, continue. She has not been able to afford long-acting pain medication. He states his pain control is satisfactory until the medication wears off.        Diabetes type 2, controlled (CMS-HCC)- (present on admission)   Assessment & Plan    Sugars over 150 consistently, older get sugars 150 or lower for wound healing.  Diabetic diet, Accu-Cheks.  Increased to high dose sliding scale.        Morbid obesity with BMI of 45.0-49.9, adult (CMS-HCC)- (present on admission)   Assessment & Plan    Body mass index is 42.83 kg/m².            Quality-Core Measures

## 2018-01-14 LAB
GLUCOSE BLD-MCNC: 115 MG/DL (ref 65–99)
GLUCOSE BLD-MCNC: 131 MG/DL (ref 65–99)
GLUCOSE BLD-MCNC: 148 MG/DL (ref 65–99)
GLUCOSE BLD-MCNC: 168 MG/DL (ref 65–99)

## 2018-01-14 PROCEDURE — 700111 HCHG RX REV CODE 636 W/ 250 OVERRIDE (IP): Performed by: HOSPITALIST

## 2018-01-14 PROCEDURE — 770004 HCHG ROOM/CARE - ONCOLOGY PRIVATE *

## 2018-01-14 PROCEDURE — 700102 HCHG RX REV CODE 250 W/ 637 OVERRIDE(OP): Performed by: INTERNAL MEDICINE

## 2018-01-14 PROCEDURE — 700105 HCHG RX REV CODE 258: Performed by: HOSPITALIST

## 2018-01-14 PROCEDURE — 306398 CUSHION, WAFFLE BARIATRIC 28-22: Performed by: HOSPITALIST

## 2018-01-14 PROCEDURE — 99233 SBSQ HOSP IP/OBS HIGH 50: CPT | Performed by: HOSPITALIST

## 2018-01-14 PROCEDURE — 700101 HCHG RX REV CODE 250: Performed by: INTERNAL MEDICINE

## 2018-01-14 PROCEDURE — 700102 HCHG RX REV CODE 250 W/ 637 OVERRIDE(OP): Performed by: HOSPITALIST

## 2018-01-14 PROCEDURE — 82962 GLUCOSE BLOOD TEST: CPT | Mod: 91

## 2018-01-14 PROCEDURE — A9270 NON-COVERED ITEM OR SERVICE: HCPCS | Performed by: HOSPITALIST

## 2018-01-14 PROCEDURE — A9270 NON-COVERED ITEM OR SERVICE: HCPCS | Performed by: INTERNAL MEDICINE

## 2018-01-14 RX ORDER — MORPHINE SULFATE 60 MG/1
60 TABLET, FILM COATED, EXTENDED RELEASE ORAL EVERY 12 HOURS
Status: DISCONTINUED | OUTPATIENT
Start: 2018-01-14 | End: 2018-01-15

## 2018-01-14 RX ORDER — MORPHINE SULFATE 4 MG/ML
2-4 INJECTION, SOLUTION INTRAMUSCULAR; INTRAVENOUS
Status: DISCONTINUED | OUTPATIENT
Start: 2018-01-14 | End: 2018-01-17

## 2018-01-14 RX ORDER — FLUCONAZOLE 100 MG/1
200 TABLET ORAL DAILY
Status: DISCONTINUED | OUTPATIENT
Start: 2018-01-14 | End: 2018-01-18 | Stop reason: HOSPADM

## 2018-01-14 RX ORDER — OXYCODONE HYDROCHLORIDE 5 MG/1
15 TABLET ORAL EVERY 4 HOURS PRN
Status: DISCONTINUED | OUTPATIENT
Start: 2018-01-14 | End: 2018-01-17

## 2018-01-14 RX ORDER — AMMONIUM LACTATE 12 G/100G
LOTION TOPICAL
Status: DISCONTINUED | OUTPATIENT
Start: 2018-01-14 | End: 2018-01-18 | Stop reason: HOSPADM

## 2018-01-14 RX ORDER — CLINDAMYCIN PHOSPHATE 600 MG/50ML
600 INJECTION, SOLUTION INTRAVENOUS EVERY 8 HOURS
Status: DISCONTINUED | OUTPATIENT
Start: 2018-01-14 | End: 2018-01-18 | Stop reason: HOSPADM

## 2018-01-14 RX ORDER — L. ACIDOPHILUS/L.BULGARICUS 100MM CELL
1 GRANULES IN PACKET (EA) ORAL
Status: DISCONTINUED | OUTPATIENT
Start: 2018-01-14 | End: 2018-01-18 | Stop reason: HOSPADM

## 2018-01-14 RX ADMIN — OXYCODONE HYDROCHLORIDE AND ACETAMINOPHEN 500 MG: 500 TABLET ORAL at 08:06

## 2018-01-14 RX ADMIN — CLINDAMYCIN IN 5 PERCENT DEXTROSE 600 MG: 12 INJECTION, SOLUTION INTRAVENOUS at 20:50

## 2018-01-14 RX ADMIN — INSULIN HUMAN 12 UNITS: 100 INJECTION, SOLUTION PARENTERAL at 21:00

## 2018-01-14 RX ADMIN — GABAPENTIN 200 MG: 100 CAPSULE ORAL at 14:36

## 2018-01-14 RX ADMIN — VANCOMYCIN HYDROCHLORIDE 2000 MG: 100 INJECTION, POWDER, LYOPHILIZED, FOR SOLUTION INTRAVENOUS at 05:30

## 2018-01-14 RX ADMIN — GABAPENTIN 200 MG: 100 CAPSULE ORAL at 20:50

## 2018-01-14 RX ADMIN — STANDARDIZED SENNA CONCENTRATE AND DOCUSATE SODIUM 2 TABLET: 8.6; 5 TABLET, FILM COATED ORAL at 08:05

## 2018-01-14 RX ADMIN — ENOXAPARIN SODIUM 40 MG: 100 INJECTION SUBCUTANEOUS at 08:06

## 2018-01-14 RX ADMIN — OXYCODONE HYDROCHLORIDE 30 MG: 30 TABLET ORAL at 05:39

## 2018-01-14 RX ADMIN — MORPHINE SULFATE 60 MG: 60 TABLET, EXTENDED RELEASE ORAL at 12:37

## 2018-01-14 RX ADMIN — BUDESONIDE AND FORMOTEROL FUMARATE DIHYDRATE 1 PUFF: 160; 4.5 AEROSOL RESPIRATORY (INHALATION) at 08:18

## 2018-01-14 RX ADMIN — BUDESONIDE AND FORMOTEROL FUMARATE DIHYDRATE 1 PUFF: 160; 4.5 AEROSOL RESPIRATORY (INHALATION) at 20:50

## 2018-01-14 RX ADMIN — MORPHINE SULFATE 60 MG: 60 TABLET, EXTENDED RELEASE ORAL at 20:50

## 2018-01-14 RX ADMIN — LISINOPRIL 10 MG: 10 TABLET ORAL at 08:06

## 2018-01-14 RX ADMIN — FUROSEMIDE 40 MG: 40 TABLET ORAL at 08:06

## 2018-01-14 RX ADMIN — ONDANSETRON 4 MG: 2 INJECTION INTRAMUSCULAR; INTRAVENOUS at 19:05

## 2018-01-14 RX ADMIN — OXYCODONE HYDROCHLORIDE 15 MG: 5 TABLET ORAL at 14:36

## 2018-01-14 RX ADMIN — OMEGA-3 FATTY ACIDS CAP 1000 MG 1000 MG: 1000 CAP at 17:39

## 2018-01-14 RX ADMIN — FLUCONAZOLE 200 MG: 100 TABLET ORAL at 12:36

## 2018-01-14 RX ADMIN — LACTOBACILLUS ACIDOPHILUS / LACTOBACILLUS BULGARICUS 1 PACKET: 100 MILLION CFU STRENGTH GRANULES at 17:38

## 2018-01-14 RX ADMIN — OMEGA-3 FATTY ACIDS CAP 1000 MG 1000 MG: 1000 CAP at 05:41

## 2018-01-14 RX ADMIN — LEVOTHYROXINE SODIUM 50 MCG: 50 TABLET ORAL at 05:39

## 2018-01-14 RX ADMIN — AMPICILLIN SODIUM AND SULBACTAM SODIUM: 100; 50 INJECTION, POWDER, FOR SOLUTION INTRAVENOUS at 16:44

## 2018-01-14 RX ADMIN — Medication 220 MG: at 08:07

## 2018-01-14 RX ADMIN — OMEPRAZOLE 20 MG: 20 CAPSULE, DELAYED RELEASE ORAL at 08:07

## 2018-01-14 RX ADMIN — OMEGA-3 FATTY ACIDS CAP 1000 MG 1000 MG: 1000 CAP at 12:41

## 2018-01-14 RX ADMIN — MORPHINE SULFATE 4 MG: 4 INJECTION INTRAVENOUS at 08:07

## 2018-01-14 RX ADMIN — MORPHINE SULFATE 4 MG: 4 INJECTION INTRAVENOUS at 00:11

## 2018-01-14 RX ADMIN — CLINDAMYCIN IN 5 PERCENT DEXTROSE 600 MG: 12 INJECTION, SOLUTION INTRAVENOUS at 14:37

## 2018-01-14 RX ADMIN — GABAPENTIN 200 MG: 100 CAPSULE ORAL at 08:07

## 2018-01-14 ASSESSMENT — ENCOUNTER SYMPTOMS
FEVER: 0
TREMORS: 0
FLANK PAIN: 0
NERVOUS/ANXIOUS: 0
WEAKNESS: 1
CARDIOVASCULAR NEGATIVE: 1
SEIZURES: 0
SENSORY CHANGE: 0
GASTROINTESTINAL NEGATIVE: 1
LOSS OF CONSCIOUSNESS: 0
WEIGHT LOSS: 0
ABDOMINAL PAIN: 0
MYALGIAS: 1
RESPIRATORY NEGATIVE: 1
DEPRESSION: 0
FALLS: 1
TINGLING: 0
DIZZINESS: 0
BRUISES/BLEEDS EASILY: 0
SPEECH CHANGE: 0
SHORTNESS OF BREATH: 0
NAUSEA: 0
FOCAL WEAKNESS: 0
BACK PAIN: 1
HEADACHES: 0
VOMITING: 0
CHILLS: 0
COUGH: 0

## 2018-01-14 ASSESSMENT — PAIN SCALES - GENERAL
PAINLEVEL_OUTOF10: 9
PAINLEVEL_OUTOF10: 7
PAINLEVEL_OUTOF10: 7
PAINLEVEL_OUTOF10: 9
PAINLEVEL_OUTOF10: 9

## 2018-01-14 ASSESSMENT — LIFESTYLE VARIABLES: DO YOU DRINK ALCOHOL: NO

## 2018-01-14 NOTE — PROGRESS NOTES
Patient yelling and found on floor.  Stated was adjusting with his cane and slide to the floor.  Hospitalist paged, charge nurse notified.

## 2018-01-14 NOTE — PROGRESS NOTES
"AAOx4, VS stable BP (!) 89/45 Comment: RN notified.  Pulse 86   Temp 36.6 °C (97.9 °F)   Resp 18   Ht 1.753 m (5' 9\")   Wt (!) 131.5 kg (290 lb)   SpO2 96%   BMI 42.83 kg/m²   MD notified of low BP - pt asymptomatic, denies light headed/dizzyness, just slight nausea. Zofran given with good effect. No new orders per M.D. Continued pain in BLE. Pt calling appropriately, PRNS given with good effect. No new complaints at this time.   "

## 2018-01-14 NOTE — WOUND TEAM
"[facility]  Inpatient Services  Initial Wound & Skin Care Evaluation    Admission Date:  1/12/2018   HPI, PMH, SH: Reviewed  Unit where seen by Wound Team: R325/00    WOUND CONSULT RELATED TO: BLE    SUBJECTIVE: \"I finished home health last month. It's gotten worse so I had to come in.\"      Self Report / Pain Level: c/o pain with lifting legs off floor      OBJECTIVE:sitting on sob both feet on saturated towel  WOUND TYPE, LOCATION, CHARACTERISTICS (Pressure ulcers: location, stage, POA or date identified)  Wound POA Venous Ulcer Leg Right Lower  Periwound Skin: Edematous (build up dead skin, dry drainage)  Drainage : Moderate, Serous  Tissue Type and %:    Macerated, pink red  Wound Edges:    attached    Odor:     foul   Exposed structure(s):   No   Signs and Symptoms of Infection: Edema, Erythema, Malodorous and Pain       Wound POA Venous Ulcer Leg Left Lower  Periwound Skin: Other (Comments), Edematous (build up dead skin, dry drainage)  Drainage : Moderate, Serous     Tissue Type and %:    Macerated, pink red  Wound Edges:    attached    Odor:     foul   Exposed structure(s):   No   Signs and Symptoms of Infection: Edema, Erythema, Malodorous and Pain     Measurements: Taken 1/14/18    RLE/LLE  Length (cm):  42/40  Width (cm):  64/60  Depth (cm):   nm both    Tracts/undermining: None     INTERVENTIONS BY WOUND TEAM: placed clean pad underneath pt. Applied moist warm towels with foaming cleanser around BLE and pt held in place. Let soak x 5 min while supplies gathered. Gently scrubbed BLE and cleaned between toes with warm moist washcloths and foaming cleanser. NS to opened areas to BLE, dried. Covered wet areas with silver hydrofiber and covered with abd pads and wrapped with roll gauze. Discussion with MD about using amlactin to try to decrease build up of dry skin and crust to BLE. Discussion with RN about placing bariatric waffle cushion under pt while he is sitting sob.   Dressing Options: " Hydrofiber Silver, Absorbent Abdominal Pad, Roll Gauze    Interdisciplinary consultation:   With Nursing;With Patient;With Physician    EVALUATION:pt has stasis ulcers on BLE, unable to view wounds well r/t build up of dead skin and crust to BLE, wounds mostly to posterior BLE and lateral RLE. Both heels wet in creases. Amlactin for build up. Silver hydrofiber for antimicrobial, abd pads for drainage. Difficult to assess wounds r/t pt pain and inability to raise his legs. Concern that pt sits @ sob with legs dependent.     Factors affecting wound healing:chronic lymphedema, obesity, venous stasis, COPD, DM, CHF, HTN  Goals: decreased build up of dead skin 5% each week      NURSING PLAN OF CARE ORDERS (X):    Dressing changes: See Dressing Maintenance orders: x  Skin care: See Skin Care orders: x  Rectal tube care: See Rectal Tube Care orders:   Other orders: amlactin order    RSKIN: CURRENT (X) ORDERED (O)  Q shift Hugh:  X  Q shift pressure point assessments:  X  Pressure redistribution mattress  x      JOSE LUIS      Bariatric JOSE LUIS      Bariatric foam        Heel float boots       Heels floated on pillows      Barrier wipes      Barrier Cream      Barrier paste      Sacral silicone dressing      Padded O2 tubing      Anchorfast      Trach with Optifoam split foam       Waffle cushion    o  Rectal tube or BMS      Antifungal tx    Turn q 2 hours x remind pt sitting on sob  Up to chair  Ambulate   PT/OT     Dietician      PO   x  TF   TPN     PVN    NPO   # days   Other       WOUND TEAM PLAN OF CARE (X):   NPWT change 3 x week:        Dressing changes by wound team:       Follow up as needed:   x    Other (explain):    Anticipated discharge plans (X):  SNF:           Home Care:           Outpatient Wound Center:            Self Care:            Other:   tbd

## 2018-01-14 NOTE — PROGRESS NOTES
[facility]ist Progress Note    Date of Service: 2018    Chief Complaint  57 y.o. male admitted 2018 with cellulitis of the legs, chronic venous stasis.    Interval Problem Update  Feeling better psychologically, he feels like [facility] is really trying to help him with his issues.  He wants to try long acting pain medications again  C/o yellow drainage from both legs  Slipped on chucks and fell last night did not sustain injury, refused head CT  Denies neurological changes    Consultants/Specialty  NOne.    Disposition  TBD.        Review of Systems   Constitutional: Negative for chills, fever, malaise/fatigue and weight loss.   HENT: Negative.    Respiratory: Negative.  Negative for cough and shortness of breath.    Cardiovascular: Negative.  Negative for chest pain and leg swelling.   Gastrointestinal: Negative.  Negative for abdominal pain, nausea and vomiting.   Genitourinary: Negative.  Negative for dysuria and flank pain.   Musculoskeletal: Positive for back pain, falls, joint pain and myalgias.   Skin: Positive for itching and rash.   Neurological: Positive for weakness. Negative for dizziness, tingling, tremors, sensory change, speech change, focal weakness, seizures, loss of consciousness and headaches.   Endo/Heme/Allergies: Negative.  Does not bruise/bleed easily.   Psychiatric/Behavioral: Negative for depression. The patient is not nervous/anxious.    All other systems reviewed and are negative.     Physical Exam  Laboratory/Imaging   Hemodynamics  Temp (24hrs), Av.5 °C (97.7 °F), Min:36.3 °C (97.4 °F), Max:36.6 °C (97.9 °F)   Temperature: 36.3 °C (97.4 °F)  Pulse  Av.7  Min: 73  Max: 111   Blood Pressure: 107/54      Respiratory      Respiration: 18, Pulse Oximetry: 94 %     Work Of Breathing / Effort: Moderate  RUL Breath Sounds: Clear, RLL Breath Sounds: Diminished, LARY Breath Sounds: Clear, LLL Breath Sounds: Diminished    Fluids    Intake/Output Summary (Last 24 hours) at  01/14/18 1226  Last data filed at 01/14/18 1100   Gross per 24 hour   Intake              450 ml   Output             1400 ml   Net             -950 ml       Nutrition  Orders Placed This Encounter   Procedures   • Diet Order     Standing Status:   Standing     Number of Occurrences:   1     Order Specific Question:   Diet:     Answer:   Consistent Carbohydrate [4]     Physical Exam   Constitutional: He appears well-developed and well-nourished. No distress.   HENT:   Nose: Nose normal.   Mouth/Throat: Oropharynx is clear and moist. No oropharyngeal exudate.   Eyes: Conjunctivae are normal. Right eye exhibits no discharge. Left eye exhibits no discharge. No scleral icterus.   Neck: No JVD present. No tracheal deviation present.   Cardiovascular: Normal rate, regular rhythm and normal heart sounds.    Pulmonary/Chest: Effort normal and breath sounds normal. No stridor. No respiratory distress. He has no wheezes. He has no rales. He exhibits no tenderness.   Abdominal: Soft. Bowel sounds are normal. He exhibits no distension. There is no tenderness.   Musculoskeletal: He exhibits edema, tenderness and deformity.   Severe, chronic changes of both lower legs, woody brawny edema present, erythema, white/dry flaking skin present   Neurological: He is alert. No cranial nerve deficit. He exhibits normal muscle tone.   Skin: Skin is warm and dry. He is not diaphoretic. No pallor.   Psychiatric: He has a normal mood and affect. His behavior is normal.   Nursing note and vitals reviewed.      Recent Labs      01/12/18   0220  01/13/18   0046   WBC  6.4  6.9   RBC  4.81  5.11   HEMOGLOBIN  13.3*  13.9*   HEMATOCRIT  43.5  46.5   MCV  90.4  91.0   MCH  27.7  27.2   MCHC  30.6*  29.9*   RDW  49.4  49.7   PLATELETCT  152*  138*   MPV  11.6  11.6     Recent Labs      01/12/18   0220  01/13/18   0006   SODIUM  138  130*   POTASSIUM  4.4  4.6   CHLORIDE  92*  97   CO2  42*  23   GLUCOSE  170*  144*   BUN  16  21   CREATININE  0.62   0.97   CALCIUM  9.2  8.3*                      Assessment/Plan     * Cellulitis   Assessment & Plan    With severe venous stasis dermatitis  Cultures done on admission  ID consult appreciated: unasyn, vancomycin and fluconazole        Stasis dermatitis- (present on admission)   Assessment & Plan    SevereChronic, with recent worsening.  Unable to wear shoes due to significant swelling and pain, complicated by DM   Discussed with ; patient needs outpatient resources/transportation to get to wound care appointments, consult placed  Wound care team discussed, no Unna boots due to infection, recommend LacHydrin lotion to legs  Treating cellulitis and pain  Recommend vitamin c, zinc and fish oil supplements to help with healing  Continue lasix        COPD (chronic obstructive pulmonary disease) (CMS-HCC)- (present on admission)   Assessment & Plan    No evidence of current exacerbation.  On home oxygen therapy which will be continued        Chronic pain   Assessment & Plan    Associated with the severe dermatitis  Trial of long acting pain medicatoins  MS contin 60 mg Q12 hours; patient was on 180mg of oxycodone per day in divided doses; per online calculator the equivalent dose is around 135mg per day (25% dose reduction)  Start at 120 mg per day with 15mg of oxycodone q 4 hours for breakthrough  Continue to titrate up on neurontin dose; pt was on 100 tid on admission now up to 200mg tid        Diabetes type 2, controlled (CMS-HCC)- (present on admission)   Assessment & Plan    Glycemic control improving on high dose sliding scale, goal to get most sugars under 150  Diabetic diet, Accu-Cheks.          Morbid obesity with BMI of 45.0-49.9, adult (CMS-HCC)- (present on admission)   Assessment & Plan    Body mass index is 42.83 kg/m².            Quality-Core Measures

## 2018-01-14 NOTE — CARE PLAN
Problem: Fluid Volume:  Goal: Will maintain balanced intake and output  Outcome: PROGRESSING SLOWER THAN EXPECTED  Pt continues to have BLE swelling r/t venous stasis. Lasix given as ordered. No change in size of BLE. Continuous IVF stopped per orders. Will continue to monitor I/Os.    Problem: Pain Management  Goal: Pain level will decrease to patient's comfort goal  Outcome: PROGRESSING AS EXPECTED  Continues to c/o BLE pain. Calling appropriately for pain interventions. PRNs given with good effect.

## 2018-01-14 NOTE — CARE PLAN
Problem: Safety  Goal: Will remain free from falls  Outcome: PROGRESSING AS EXPECTED  Fall precautions in place: mobility signs posted, hourly rounding, bed in lowest position, belongings and call light are within reach    Problem: Pain Management  Goal: Pain level will decrease to patient's comfort goal  Outcome: PROGRESSING AS EXPECTED  Pain managed well with PRN medication at this time.  Patient uses pharmacological and non pharmacological pain-relief strategies. Patient displays improvement in mood, coping.    Problem: Skin Integrity  Goal: Risk for impaired skin integrity will decrease  Outcome: PROGRESSING AS EXPECTED  Wound team following, dressing orders in place, waffle cushion under buttocks, buttocks pink and blanching. Educated patient about the importance of frequent repositioning to prevent skin breakdown. Patient expressed understanding of importance of repositioning.

## 2018-01-14 NOTE — CONSULTS
INFECTIOUS DISEASES INPATIENT CONSULT NOTE     Date of Service: 01/14/18    Consult Requested By: Debra Fitzpatrick M.D.    Reason for Consultation: Bilateral lower extremity cellulitis    History of Present Illness:   Fer Estrada is a 57 y.o. Morbidly obese man well known to ID for multiple admissions for bilateral lower extremity cellulitis previously noncompliant with wound care with a history of chronic lymphedema, venous stasis, DM2, HTN and O2 dependent COPD admitted 1/12/2018 progressive swelling and malodorous drainage from his lower extremities. Most of his drainage is on the posterior side of his legs. He states he has had increasing swelling and drainage for 2 weeks. The drainage has been bloody and foul smelling. He does not wear shoes as he cannot find any that fit him. At home he wears socks. He denies any recent fevers or chills. He is unable to move his legs well. Due to concerns for worsening infection, he presented to the ED for further evaluation. On presentation, he was afebrile and without leukocytosis. No imaging was performed on admission. He was started on vancomycin and unasyn. Blood cultures are negative. Due to concerns for lack of improvement, ID consulted for additional abx recommendations.     Review Of Systems:  All other ROS were reviewed and are negative except as noted above in the HPI.    PMH:   Past Medical History:   Diagnosis Date   • Asthma    • Chronic obstructive pulmonary disease (CMS-HCC)    • Congestive heart failure (CMS-HCC)    • Hypertension    • Type II or unspecified type diabetes mellitus without mention of complication, not stated as uncontrolled    Venous stasis  Chronic LE edema  Depression  Hypothyroidism  Recurrent cellulitis of BLE    PSH:  Carpal tunnel release  Cervical spine surgery    FAMILY HX:  Positive for cancer    SOCIAL HX:  Social History     Social History   • Marital status:      Spouse name: N/A   • Number of children: N/A   • Years of  education: N/A     Occupational History   • Not on file.     Social History Main Topics   • Smoking status: Former Smoker     Quit date: 12/14/2005   • Smokeless tobacco: Never Used   • Alcohol use No   • Drug use: No   • Sexual activity: Not on file     Other Topics Concern   • Not on file     Social History Narrative   • No narrative on file     History   Smoking Status   • Former Smoker   • Quit date: 12/14/2005   Smokeless Tobacco   • Never Used     History   Alcohol Use No       Allergies/Intolerances:  No Known Allergies    History reviewed with the patient    Other Current Medications:    Current Facility-Administered Medications:   •  ammonium lactate (LAC-HYDRIN) 12 % lotion, , Topical, Daily-1400, Debra Fitzpatrick M.D.  •  vancomycin 2,000 mg in  mL IVPB, 2,000 mg, Intravenous, Q24HR, Debra Fitzpatrick M.D., Stopped at 01/14/18 0730  •  ascorbic acid (ascorbic acid) tablet 500 mg, 500 mg, Oral, DAILY, Debra Fitzpatrick M.D., 500 mg at 01/14/18 0806  •  fish oil capsule 1,000 mg, 1,000 mg, Oral, TID WITH MEALS, Debra Fitzpatrick M.D., 1,000 mg at 01/14/18 0541  •  zinc sulfate (ZINCATE) capsule 220 mg, 220 mg, Oral, DAILY, Debra Fitzpatrick M.D., 220 mg at 01/14/18 0807  •  furosemide (LASIX) tablet 40 mg, 40 mg, Oral, DAILY, Cralos Martin M.D., 40 mg at 01/14/18 0806  •  lisinopril (PRINIVIL) 10 MG tablet 10 mg, 10 mg, Oral, DAILY, Carlos Martin M.D., 10 mg at 01/14/18 0806  •  [DISCONTINUED] ampicillin/sulbactam (UNASYN) 3 g in  mL IVPB, 3 g, Intravenous, Once **AND** ampicillin-sulbactam 12 g in  mL infusion, , Intravenous, Continuous Abx, Carlos Martin M.D., Last Rate: 20.83 mL/hr at 01/13/18 1547  •  MD ALERT... vancomycin per pharmacy protocol, , Other, pharmacy to dose, Carlos Martin M.D.  •  enoxaparin (LOVENOX) inj 40 mg, 40 mg, Subcutaneous, DAILY, Carlos Martin M.D., 40 mg at 01/14/18 0806  •  clonidine (CATAPRES) tablet 0.1 mg, 0.1 mg, Oral, Q6HRS PRN, Carlos Martin M.D.  •   ondansetron (ZOFRAN) syringe/vial injection 4 mg, 4 mg, Intravenous, Q4HRS PRN, Carlos Martin M.D., 4 mg at 01/13/18 0238  •  ondansetron (ZOFRAN ODT) dispertab 4 mg, 4 mg, Oral, Q4HRS PRN, Carlos Martin M.D., 4 mg at 01/13/18 1556  •  promethazine (PHENERGAN) tablet 12.5-25 mg, 12.5-25 mg, Oral, Q4HRS PRN, Carlos Martin M.D.  •  promethazine (PHENERGAN) suppository 12.5-25 mg, 12.5-25 mg, Rectal, Q4HRS PRN, Carlos Martin M.D.  •  prochlorperazine (COMPAZINE) injection 5-10 mg, 5-10 mg, Intravenous, Q4HRS PRN, Carlos Martin M.D.  •  zolpidem (AMBIEN) tablet 5 mg, 5 mg, Oral, HS PRN - MR X 1, Carlos Martin M.D.  •  senna-docusate (PERICOLACE or SENOKOT S) 8.6-50 MG per tablet 2 Tab, 2 Tab, Oral, BID, 2 Tab at 01/14/18 0805 **AND** polyethylene glycol/lytes (MIRALAX) PACKET 1 Packet, 1 Packet, Oral, QDAY PRN **AND** magnesium hydroxide (MILK OF MAGNESIA) suspension 30 mL, 30 mL, Oral, QDAY PRN **AND** bisacodyl (DULCOLAX) suppository 10 mg, 10 mg, Rectal, QDAY PRN, Carlos Martin M.D.  •  acetaminophen (TYLENOL) tablet 650 mg, 650 mg, Oral, Q6HRS PRN, Carlos Martin M.D.  •  budesonide-formoterol (SYMBICORT) 160-4.5 MCG/ACT inhaler 1 Puff, 1 Puff, Inhalation, BID, Debra Fitzpatrick M.D., 1 Puff at 01/14/18 0818  •  levothyroxine (SYNTHROID) tablet 50 mcg, 50 mcg, Oral, AM ES, Debra Fitzpatrick M.D., 50 mcg at 01/14/18 0539  •  omeprazole (PRILOSEC) capsule 20 mg, 20 mg, Oral, DAILY, Debra Fitzpatrick M.D., 20 mg at 01/14/18 0807  •  Notify provider if pain remains uncontrolled, , , CONTINUOUS **AND** Use the numeric rating scale (NRS-11) on regular floors and Critical-Care Pain Observation Tool (CPOT) on ICUs/Trauma to assess pain, , , CONTINUOUS **AND** Pulse Ox (Oximetry), , , CONTINUOUS **AND** Pharmacy Consult Request ...Pain Management Review, , Other, PRN **AND** If patient difficult to arouse and/or has respiratory depression, stop any opiates that are currently infusing and call a Rapid Response., , ,  "CONTINUOUS **AND** [DISCONTINUED] oxycodone immediate-release (ROXICODONE) tablet 2.5 mg, 2.5 mg, Oral, Q3HRS PRN **AND** oxycodone (OXY-IR) TABS 30 mg, 30 mg, Oral, Q4HRS PRN, 30 mg at 18 0539 **AND** morphine (pf) 4 mg/ml injection 2-4 mg, 2-4 mg, Intravenous, Q3HRS PRN, Debra Fitzpatrick M.D., 4 mg at 18 0807  •  gabapentin (NEURONTIN) capsule 200 mg, 200 mg, Oral, TID, Debra Fitzpatrick M.D., 200 mg at 18 0807  •  insulin regular (HUMULIN R) injection 3-14 Units, 3-14 Units, Subcutaneous, 4X/DAY ACHS, Stopped at 18 0700 **AND** Accu-Chek ACHS, , , Q AC AND BEDTIME(S) **AND** NOTIFY MD and PharmD, , , Once **AND** glucose 4 g chewable tablet 16 g, 16 g, Oral, Q15 MIN PRN **AND** dextrose 50% (D50W) injection 25 mL, 25 mL, Intravenous, Q15 MIN PRN, Debra Fitzpatrick M.D.  [unfilled]    Most Recent Vital Signs:  /54   Pulse (!) 103   Temp 36.3 °C (97.4 °F)   Resp 18   Ht 1.753 m (5' 9\")   Wt (!) 131.5 kg (290 lb)   SpO2 94%   BMI 42.83 kg/m²   Temp  Av.8 °C (98.2 °F)  Min: 36.3 °C (97.4 °F)  Max: 37.2 °C (98.9 °F)    Physical Exam:  General: morbidly obese but well-appearing, no acute distress  HEENT: sclera anicteric, PERRL, EOMI, MMM, no oral lesions, +seborrheic dermatitis on forehead  Neck: supple, no lymphadenopathy  Chest: CTAB, no r/r/w, normal work of breathing.  Cardiac: RRR, normal S1 S2, no m/r/g   Abdomen: + bowel sounds, soft, non-tender, non-distended, no HSM  Extremities: bilateral lower extremity chronic lymphedema with severe tinea pedis. Thick skin plaques on both feet. +serous drainage  Skin: see above  Neuro: Alert and oriented times 3, non-focal exam, speech fluent, moves all extremities but has more limited ROM of lower extremities    Pertinent Lab Results:  Recent Labs      18   0220  18   0046   WBC  6.4  6.9      Recent Labs      180  18   0046   HEMOGLOBIN  13.3*  13.9*   HEMATOCRIT  43.5  46.5   MCV  90.4  91.0   MCH  " "27.7  27.2   PLATELETCT  152*  138*         Recent Labs      01/12/18 0220 01/13/18   0006   SODIUM  138  130*   POTASSIUM  4.4  4.6   CHLORIDE  92*  97   CO2  42*  23   CREATININE  0.62  0.97        Recent Labs      01/12/18 0220   ALBUMIN  3.9        Pertinent Micro:  Results     Procedure Component Value Units Date/Time    BLOOD CULTURE [[id number]] Collected:  01/12/18 0220    Order Status:  Completed Specimen:  Blood from Peripheral Updated:  01/13/18 0847     Significant Indicator NEG     Source BLD     Site PERIPHERAL     Blood Culture --     No Growth    Note: Blood cultures are incubated for 5 days and  are monitored continuously.Positive blood cultures  are called to the RN and reported as soon as  they are identified.      Narrative:       Per Hospital Policy: Only change Specimen Src: to \"Line\" if  specified by physician order.    BLOOD CULTURE [[id number]] Collected:  01/12/18 0305    Order Status:  Completed Specimen:  Blood from Peripheral Updated:  01/13/18 0847     Significant Indicator NEG     Source BLD     Site PERIPHERAL     Blood Culture --     No Growth    Note: Blood cultures are incubated for 5 days and  are monitored continuously.Positive blood cultures  are called to the RN and reported as soon as  they are identified.      Narrative:       Per Hospital Policy: Only change Specimen Src: to \"Line\" if  specified by physician order.        Blood Culture   Date Value Ref Range Status   01/12/2018   Preliminary    No Growth    Note: Blood cultures are incubated for 5 days and  are monitored continuously.Positive blood cultures  are called to the RN and reported as soon as  they are identified.          Studies:  None new to review    IMPRESSION:   1. Cellulitis of bilateral lower extremities  2. Severe tinea pedis  3. Chronic lymphedema  4. DM2      PLAN:   Fer Estrada is a 57 y.o. Morbidly obese man with a history of DM2, chronic venous stasis with chronic lymphedema, and recurrent " cellulitis admitted for progressive swelling and malodorous drainage from his lower extremities. He has significant swelling and evidence of significant tinea pedis on exam. Will DC vancomycin and start clindamycin and fluconazole. Continue wound care and leg elevation with diuresis. Further recommendations per clinical course.    Plan of care discussed with DANIEL Fitzpatrick M.D.. Will continue to follow    Olamide Cormier M.D.

## 2018-01-14 NOTE — PROGRESS NOTES
Patient is AAOx4, sitting on side of bed.  B/L painful and weeping, unable to weight bear.  PRN medication as ordered. Continues on 4L Nc.  No further needs at this time.  Will continue to monitor.

## 2018-01-15 LAB
GLUCOSE BLD-MCNC: 144 MG/DL (ref 65–99)
GLUCOSE BLD-MCNC: 190 MG/DL (ref 65–99)
GLUCOSE BLD-MCNC: 377 MG/DL (ref 65–99)

## 2018-01-15 PROCEDURE — 306398 CUSHION, WAFFLE BARIATRIC 28-22: Performed by: HOSPITALIST

## 2018-01-15 PROCEDURE — G8979 MOBILITY GOAL STATUS: HCPCS | Mod: CJ

## 2018-01-15 PROCEDURE — 82962 GLUCOSE BLOOD TEST: CPT

## 2018-01-15 PROCEDURE — G8978 MOBILITY CURRENT STATUS: HCPCS | Mod: CL

## 2018-01-15 PROCEDURE — 700105 HCHG RX REV CODE 258: Performed by: HOSPITALIST

## 2018-01-15 PROCEDURE — 700102 HCHG RX REV CODE 250 W/ 637 OVERRIDE(OP): Performed by: HOSPITALIST

## 2018-01-15 PROCEDURE — 700111 HCHG RX REV CODE 636 W/ 250 OVERRIDE (IP): Performed by: HOSPITALIST

## 2018-01-15 PROCEDURE — 700102 HCHG RX REV CODE 250 W/ 637 OVERRIDE(OP): Performed by: INTERNAL MEDICINE

## 2018-01-15 PROCEDURE — A9270 NON-COVERED ITEM OR SERVICE: HCPCS | Performed by: HOSPITALIST

## 2018-01-15 PROCEDURE — 97162 PT EVAL MOD COMPLEX 30 MIN: CPT

## 2018-01-15 PROCEDURE — 770004 HCHG ROOM/CARE - ONCOLOGY PRIVATE *

## 2018-01-15 PROCEDURE — 99233 SBSQ HOSP IP/OBS HIGH 50: CPT | Performed by: HOSPITALIST

## 2018-01-15 PROCEDURE — A9270 NON-COVERED ITEM OR SERVICE: HCPCS | Performed by: INTERNAL MEDICINE

## 2018-01-15 PROCEDURE — 700101 HCHG RX REV CODE 250: Performed by: INTERNAL MEDICINE

## 2018-01-15 RX ORDER — MORPHINE SULFATE 60 MG/1
60 TABLET, FILM COATED, EXTENDED RELEASE ORAL EVERY 8 HOURS
Status: DISCONTINUED | OUTPATIENT
Start: 2018-01-15 | End: 2018-01-16

## 2018-01-15 RX ADMIN — OXYCODONE HYDROCHLORIDE 15 MG: 5 TABLET ORAL at 12:01

## 2018-01-15 RX ADMIN — CLINDAMYCIN IN 5 PERCENT DEXTROSE 600 MG: 12 INJECTION, SOLUTION INTRAVENOUS at 21:44

## 2018-01-15 RX ADMIN — GABAPENTIN 200 MG: 100 CAPSULE ORAL at 08:55

## 2018-01-15 RX ADMIN — ONDANSETRON 4 MG: 2 INJECTION INTRAMUSCULAR; INTRAVENOUS at 04:26

## 2018-01-15 RX ADMIN — OXYCODONE HYDROCHLORIDE 15 MG: 5 TABLET ORAL at 00:21

## 2018-01-15 RX ADMIN — MORPHINE SULFATE 60 MG: 60 TABLET, EXTENDED RELEASE ORAL at 08:55

## 2018-01-15 RX ADMIN — LACTOBACILLUS ACIDOPHILUS / LACTOBACILLUS BULGARICUS 1 PACKET: 100 MILLION CFU STRENGTH GRANULES at 12:01

## 2018-01-15 RX ADMIN — MORPHINE SULFATE 60 MG: 60 TABLET, EXTENDED RELEASE ORAL at 15:57

## 2018-01-15 RX ADMIN — AMPICILLIN SODIUM AND SULBACTAM SODIUM: 100; 50 INJECTION, POWDER, FOR SOLUTION INTRAVENOUS at 15:56

## 2018-01-15 RX ADMIN — CLINDAMYCIN IN 5 PERCENT DEXTROSE 600 MG: 12 INJECTION, SOLUTION INTRAVENOUS at 15:57

## 2018-01-15 RX ADMIN — FUROSEMIDE 40 MG: 40 TABLET ORAL at 08:55

## 2018-01-15 RX ADMIN — INSULIN HUMAN 3 UNITS: 100 INJECTION, SOLUTION PARENTERAL at 22:00

## 2018-01-15 RX ADMIN — OXYCODONE HYDROCHLORIDE AND ACETAMINOPHEN 500 MG: 500 TABLET ORAL at 08:55

## 2018-01-15 RX ADMIN — Medication: at 15:57

## 2018-01-15 RX ADMIN — OXYCODONE HYDROCHLORIDE 15 MG: 5 TABLET ORAL at 06:05

## 2018-01-15 RX ADMIN — GABAPENTIN 200 MG: 100 CAPSULE ORAL at 15:57

## 2018-01-15 RX ADMIN — OMEPRAZOLE 20 MG: 20 CAPSULE, DELAYED RELEASE ORAL at 08:55

## 2018-01-15 RX ADMIN — MORPHINE SULFATE 60 MG: 60 TABLET, EXTENDED RELEASE ORAL at 21:45

## 2018-01-15 RX ADMIN — OXYCODONE HYDROCHLORIDE 15 MG: 5 TABLET ORAL at 15:56

## 2018-01-15 RX ADMIN — GABAPENTIN 200 MG: 100 CAPSULE ORAL at 21:45

## 2018-01-15 RX ADMIN — CLINDAMYCIN IN 5 PERCENT DEXTROSE 600 MG: 12 INJECTION, SOLUTION INTRAVENOUS at 05:23

## 2018-01-15 RX ADMIN — LEVOTHYROXINE SODIUM 50 MCG: 50 TABLET ORAL at 05:22

## 2018-01-15 RX ADMIN — ENOXAPARIN SODIUM 40 MG: 100 INJECTION SUBCUTANEOUS at 08:54

## 2018-01-15 RX ADMIN — INSULIN HUMAN 3 UNITS: 100 INJECTION, SOLUTION PARENTERAL at 17:32

## 2018-01-15 RX ADMIN — Medication 220 MG: at 08:54

## 2018-01-15 RX ADMIN — LISINOPRIL 10 MG: 10 TABLET ORAL at 08:55

## 2018-01-15 RX ADMIN — LACTOBACILLUS ACIDOPHILUS / LACTOBACILLUS BULGARICUS 1 PACKET: 100 MILLION CFU STRENGTH GRANULES at 08:54

## 2018-01-15 RX ADMIN — FLUCONAZOLE 200 MG: 100 TABLET ORAL at 08:54

## 2018-01-15 ASSESSMENT — ENCOUNTER SYMPTOMS
TREMORS: 0
SPEECH CHANGE: 0
WEAKNESS: 1
RESPIRATORY NEGATIVE: 1
BRUISES/BLEEDS EASILY: 0
FOCAL WEAKNESS: 0
FEVER: 0
SENSORY CHANGE: 0
VOMITING: 0
CHILLS: 0
COUGH: 0
CARDIOVASCULAR NEGATIVE: 1
LOSS OF CONSCIOUSNESS: 0
SEIZURES: 0
GASTROINTESTINAL NEGATIVE: 1
TINGLING: 0
WEIGHT LOSS: 0
FALLS: 1
NAUSEA: 0
INSOMNIA: 1
NERVOUS/ANXIOUS: 0
FLANK PAIN: 0
BACK PAIN: 1
HEADACHES: 0
SHORTNESS OF BREATH: 0
DIZZINESS: 0
DEPRESSION: 0
ABDOMINAL PAIN: 0
MYALGIAS: 1

## 2018-01-15 ASSESSMENT — GAIT ASSESSMENTS: GAIT LEVEL OF ASSIST: UNABLE TO PARTICIPATE

## 2018-01-15 ASSESSMENT — COGNITIVE AND FUNCTIONAL STATUS - GENERAL
WALKING IN HOSPITAL ROOM: A LOT
MOVING TO AND FROM BED TO CHAIR: UNABLE
SUGGESTED CMS G CODE MODIFIER MOBILITY: CL
MOVING FROM LYING ON BACK TO SITTING ON SIDE OF FLAT BED: A LOT
STANDING UP FROM CHAIR USING ARMS: A LITTLE
MOBILITY SCORE: 10
TURNING FROM BACK TO SIDE WHILE IN FLAT BAD: UNABLE
CLIMB 3 TO 5 STEPS WITH RAILING: TOTAL

## 2018-01-15 ASSESSMENT — PAIN SCALES - GENERAL
PAINLEVEL_OUTOF10: 8
PAINLEVEL_OUTOF10: 9
PAINLEVEL_OUTOF10: 8
PAINLEVEL_OUTOF10: 8
PAINLEVEL_OUTOF10: 9
PAINLEVEL_OUTOF10: 8

## 2018-01-15 ASSESSMENT — LIFESTYLE VARIABLES: DO YOU DRINK ALCOHOL: NO

## 2018-01-15 NOTE — PROGRESS NOTES
[facility] is NOT responsible for bariatric beds, bed control takes care of bariatric bed equipment. If any further assistance needed, please call extension [phone] or place order for Ortho Technician assistance as a communication order in [facility].

## 2018-01-15 NOTE — THERAPY
"Physical Therapy Evaluation completed.   Bed Mobility:   Found in chair; returned to chair; pt reports sleeps in chair/recliner at home  Transfers: Sit to Stand: Minimal Assist (from lower chair surface; anticiapte CGA from EOB)  Gait: Level Of Assist: Unable to Participate 2' to current pain   Plan of Care: Will benefit from Physical Therapy 3 times per week  Discharge Recommendations: Equipment: Will Continue to Assess for Equipment Needs. See below    Pt presents to PT for risk reduction for LOB and falling as well as impaired balance, gait, endurance and general locomotion associated with deconditoining and medical co-morbidities. He is notably limited 2' to pain with standing/WB activity during initial evaluation though noted pt has been able to tranfsers with min A/CGA/HHA when pain controlled. He will benefit from continued skilled acute PT while here. In current condition, would strongly recommend continued skilled PT/placement prior to medical d/c to home for optimal functional recovery and progression of independence. Of note, pt reports that he has been to SNFs in past and reports regains OOB activity/mobility though still has difficulty with independence with ADls as regarding to hygeine and self care when returning home (which he then self limits mobility 2' to pain and deconditions with worsening co-morbidities and returns to acute facility). WIll continue to follow. Note that pt may be best served with additional caregiver assist/group home environment upon eventual d/c to home (given multiple readmissions and current home care and pt's seemingly inability to effectively care for self, would defer to OT recs as well with d/c planning)    See \"Rehab Therapy-Acute\" Patient Summary Report for complete documentation.     "

## 2018-01-15 NOTE — DISCHARGE PLANNING
Received choice form from [facility] for SNF. Referral sent to [facility] and [facility] at 1221. Notified [facility] that we will need a SNF order.

## 2018-01-15 NOTE — DISCHARGE PLANNING
Received voicemail from [place] [facility] with [facility] accepting. Notified [facility] via voicemail.

## 2018-01-15 NOTE — DISCHARGE PLANNING
"Clinical Note per Chart Review:   CC: Extremity pain   PMH: Venous stasis, chronic lymphedema  DM II, COPD  ID Consult  Wound Consult  PT/OT- recommending SNF for continued skilled care.   Sp02 94% 5 lpm NC  Bariatric Bed    Discharge Plan: Met with pt at bedside.  Sitting up in chair, Aox4.  Pt agreeable to SNF, sts \"I want to go to [facility].  I owe them $300.00 but I can pay them. I also would go to the new one.\" Provided choice form, Regine COFFEY to f/u with referral. Pt has had multiple HHC agencies in the past as well as multiple SNF admissions.      Care Transition Team Assessment    Information Source  Orientation : Oriented x 4  Information Given By: Patient  Who is responsible for making decisions for patient? : Patient    Readmission Evaluation  Is this a readmission?: No    Elopement Risk  Legal Hold: No  Ambulatory or Self Mobile in Wheelchair: No-Not an Elopement Risk  Disoriented: No  Psychiatric Symptoms: None  History of Wandering: No  Elopement this Admit: No  Vocalizing Wanting to Leave: No  Displays Behaviors, Body Language Wanting to Leave: No-Not at Risk for Elopement  Elopement Risk: Not at Risk for Elopement    Interdisciplinary Discharge Planning  Does Admitting Nurse Feel This Could be a Complex Discharge?: No  Primary Care Physician:  (Dr. Lara)  Lives with - Patient's Self Care Capacity: Adult Children  Patient or legal guardian wants to designate a caregiver (see row info): No  Support Systems: Home Care Staff, Children  Housing / Facility: 1 Story Apartment / Condo  Able to Return to Previous ADL's: Future Time w/Therapy  Mobility Issues: Yes  Prior Services: Skilled Home Health Services  Patient Expects to be Discharged to:: Home with support of dgt and HHC or SNF  Assistance Needed: Yes  Durable Medical Equipment: Home Oxygen, Walker, Commode  DME Provider / Phone: [employer] [facility]    Discharge Preparedness  What is your plan after discharge?: Uncertain - pending medical " team collaboration, Skilled nursing facility, Home health care, Home with help  What are your discharge supports?: Child  Prior Functional Level: Needs Assist with Activities of Daily Living, Uses Cane, Uses Walker  Difficulity with ADLs: Toileting, Walking, Bathing    Functional Assesment  Prior Functional Level: Needs Assist with Activities of Daily Living, Uses Cane, Uses Walker    Finances  Financial Barriers to Discharge: Yes  Average Monthly Income: 1262 $  Source of Income: Social Security Disability  Prescription Coverage: Yes (Preferred pharmacy: [facility])         Values / Beliefs / Concerns  Values / Beliefs Concerns : No    [facility] Directive  [facility] Directive?: None (would like this information)    Domestic Abuse  Have you ever been the victim of abuse or violence?: Not Sure         Discharge Risks or Barriers  Discharge risks or barriers?: Transportation, Post-acute placement / services, Complex medical needs, Non-adherence to medication or treatment    Anticipated Discharge Information  Anticipated discharge disposition: Assist with transportation, HHC, Home, SNF, Wound Care Center (outpatient)    SW to continue with discharge plan as appropriate.

## 2018-01-15 NOTE — PROGRESS NOTES
Infectious Disease Progress Note    Author: Annabella Capps M.D. Date & Time of service: 1/15/2018  1:39 PM    Chief Complaint:  Bilateral lower extremity stasis ulceration/tinea      Interval History:  57 y.o. morbidly obese diabetic WM with multiple admissions for bilateral lower extremity cellulitis due to noncompliance with wound care/lymphedema treatment   1/15 AF up to chair-legs not raised-staing he regrets noncompliance with care and self-neglect    Labs Reviewed, Medications Reviewed, Radiology Reviewed and Wound Reviewed.    Review of Systems:  Review of Systems   Constitutional: Negative for chills and fever.   Musculoskeletal: Positive for joint pain and myalgias.   Psychiatric/Behavioral: The patient has insomnia.    All other systems reviewed and are negative.      Hemodynamics:  Temp (24hrs), Av.7 °C (98.1 °F), Min:36.6 °C (97.9 °F), Max:37 °C (98.6 °F)  Temperature: 36.7 °C (98 °F)  Pulse  Av.6  Min: 73  Max: 111  Blood Pressure: 121/69       Physical Exam:  Physical Exam   Constitutional: He is oriented to person, place, and time. He appears well-developed. No distress.   Obese   HENT:   Head: Normocephalic and atraumatic.   Eyes: EOM are normal. Pupils are equal, round, and reactive to light.   Cardiovascular: Normal rate.    Pulmonary/Chest: Effort normal. No respiratory distress.   Decreased breath sounds   Abdominal: Soft. He exhibits no distension.   Musculoskeletal: He exhibits edema and tenderness.   Neurological: He is alert and oriented to person, place, and time.   Skin: There is erythema.   Skin thickened and crusted  Pressure ulcerations posterior calves with serous drainage   Nursing note and vitals reviewed.      Meds:    Current Facility-Administered Medications:   •  ammonium lactate  •  clindamycin (CLEOCIN) IV  •  fluconazole  •  morphine ER  •  Notify provider if pain remains uncontrolled **AND** Use the numeric rating scale (NRS-11) on regular floors and  Critical-Care Pain Observation Tool (CPOT) on ICUs/Trauma to assess pain **AND** Pulse Ox (Oximetry) **AND** Pharmacy Consult Request **AND** If patient difficult to arouse and/or has respiratory depression, stop any opiates that are currently infusing and call a Rapid Response. **AND** [DISCONTINUED] oxycodone immediate-release **AND** oxycodone immediate-release **AND** morphine injection  •  lactobacillus granules  •  ascorbic acid  •  fish oil  •  zinc sulfate  •  furosemide  •  lisinopril  •  [DISCONTINUED] ampicillin-sulbactam (UNASYN) IV **AND** ampicillin-sulbactam (UNASYN) continuous infusion  •  enoxaparin  •  clonidine  •  ondansetron  •  ondansetron  •  promethazine  •  promethazine  •  prochlorperazine  •  zolpidem  •  senna-docusate **AND** polyethylene glycol/lytes **AND** magnesium hydroxide **AND** bisacodyl  •  acetaminophen  •  budesonide-formoterol  •  levothyroxine  •  omeprazole  •  gabapentin  •  insulin regular **AND** Accu-Chek ACHS **AND** NOTIFY MD and PharmD **AND** glucose 4 g **AND** dextrose 50%    Labs:  Recent Labs      01/13/18   0046   WBC  6.9   RBC  5.11   HEMOGLOBIN  13.9*   HEMATOCRIT  46.5   MCV  91.0   MCH  27.2   RDW  49.7   PLATELETCT  138*   MPV  11.6   NEUTSPOLYS  76.70*   LYMPHOCYTES  9.00*   MONOCYTES  7.70   EOSINOPHILS  5.70   BASOPHILS  0.30   RBCMORPHOLO  Normal     Recent Labs      01/13/18   0006   SODIUM  130*   POTASSIUM  4.6   CHLORIDE  97   CO2  23   GLUCOSE  144*   BUN  21     Recent Labs      01/13/18   0006   CREATININE  0.97       Imaging:  No results found.    Micro:  Results     Procedure Component Value Units Date/Time    BLOOD CULTURE [[id number]] Collected:  01/12/18 0220    Order Status:  Completed Specimen:  Blood from Peripheral Updated:  01/13/18 0838     Significant Indicator NEG     Source BLD     Site PERIPHERAL     Blood Culture --     No Growth    Note: Blood cultures are incubated for 5 days and  are monitored continuously.Positive blood  "cultures  are called to the RN and reported as soon as  they are identified.      Narrative:       Per Hospital Policy: Only change Specimen Src: to \"Line\" if  specified by physician order.    BLOOD CULTURE [[id number]] Collected:  01/12/18 0305    Order Status:  Completed Specimen:  Blood from Peripheral Updated:  01/13/18 0847     Significant Indicator NEG     Source BLD     Site PERIPHERAL     Blood Culture --     No Growth    Note: Blood cultures are incubated for 5 days and  are monitored continuously.Positive blood cultures  are called to the RN and reported as soon as  they are identified.      Narrative:       Per Hospital Policy: Only change Specimen Src: to \"Line\" if  specified by physician order.          Assessment:  Active Hospital Problems    Diagnosis   • *Cellulitis [L03.90]   • Stasis dermatitis [I87.2]   • COPD (chronic obstructive pulmonary disease) (CMS-HCC) [J44.9]   • Chronic pain [G89.29]   • Morbid obesity with BMI of 45.0-49.9, adult (CMS-HCC) [E66.01, Z68.42]   • Diabetes type 2, controlled (CMS-HCC) [E11.9]       Plan:  BLE stasis ulcerations  Afebrile  No leukocytosis  Significant swelling still  Blood cxs neg  Continue clinda for 10 days  Continue wound care, leg elevation with diuresis    Tinea pedis    Continue fluconazole for 10 days    Diabetes  Keep BS under 150 to help control current infection    Discussed with internal medicine.  "

## 2018-01-15 NOTE — DOCUMENTATION QUERY
"DOCUMENTATION QUERY    PROVIDERS: Please select “Cosign w/ note”to reply to query.    To better represent the severity of illness of your patient, please review the following information and exercise your independent professional judgment in responding to this query.     \"COPD on home oxygen therapy\" is documented in the Progress Notes. Based upon the clinical findings, risk factors, and treatment, can a diagnosis be provided to support this finding?     • Chronic Respiratory Failure  • Chronic Hypoxemia  • Other explanation of clinical findings  • Unable to determine    The medical record reflects the following:   Clinical Findings  \"COPD on home oxygen therapy\" documented.  Has been on 5L O2 since admission.     Treatment Supplemental Oxygen, monitoring O2 saturations.    Risk Factors  Cellulitis BLE, Morbidly Obese, COPD, DM type 2   Location within medical record  Progress Notes     Thank you,   Barbara Mcgraw RN  Clinical   [phone]      "

## 2018-01-15 NOTE — DISCHARGE PLANNING
Received call from Marley with [facility]. Patient is declined, due to being non compliant. Notified ALEXX Weiner via phone.

## 2018-01-15 NOTE — DISCHARGE PLANNING
Clinical Note per Chart Review:   CC: Extremity pain   PMH: Venous stasis, chronic lymphedema  DM II, COPD  ID Consult  Wound Consult  PT/OT- recommending SNF for continued skilled care.   Sp02 94% 5 lpm NC  Bariatric Bed    Discharge Plan:   SW to continue with discharge plan as appropriate.

## 2018-01-15 NOTE — PROGRESS NOTES
Patient is AAOx4, sitting on side of bed.  B/L painful and weeping. Pain control a little better tonight with adjustments made earlier. Dessing CDI. Patient up to BSC.  PRN medication as ordered. Continues on 4L NC.  No further needs at this time.  Will continue to monitor.

## 2018-01-15 NOTE — CARE PLAN
Problem: Safety  Goal: Will remain free from falls  Outcome: PROGRESSING AS EXPECTED  Fall precautions in place: treaded slipper socks on, mobility signs posted, hourly rounding, bed in lowest position, belongings and call light are within reach.    Problem: Venous Thromboembolism (VTW)/Deep Vein Thrombosis (DVT) Prevention:  Goal: Patient will participate in Venous Thrombosis (VTE)/Deep Vein Thrombosis (DVT)Prevention Measures  Outcome: PROGRESSING AS EXPECTED  Pt given Lovenox for DVT/VTE prophylaxis.    Problem: Pain Management  Goal: Pain level will decrease to patient's comfort goal  Outcome: PROGRESSING AS EXPECTED  Pain managed well with PRN medication at this time.  Patient uses pharmacological and non pharmacological pain-relief strategies. Patient displays improvement in mood, coping.

## 2018-01-15 NOTE — PROGRESS NOTES
Due to patient weight, Hugh score of 13, and current b/l wounds bariatric bed ordered. EVS checking if available and will deliver.

## 2018-01-16 LAB
GLUCOSE BLD-MCNC: 120 MG/DL (ref 65–99)
GLUCOSE BLD-MCNC: 137 MG/DL (ref 65–99)
GLUCOSE BLD-MCNC: 144 MG/DL (ref 65–99)
GLUCOSE BLD-MCNC: 170 MG/DL (ref 65–99)
GLUCOSE BLD-MCNC: 191 MG/DL (ref 65–99)
GLUCOSE BLD-MCNC: 98 MG/DL (ref 65–99)

## 2018-01-16 PROCEDURE — 700101 HCHG RX REV CODE 250: Performed by: INTERNAL MEDICINE

## 2018-01-16 PROCEDURE — 97110 THERAPEUTIC EXERCISES: CPT

## 2018-01-16 PROCEDURE — 700102 HCHG RX REV CODE 250 W/ 637 OVERRIDE(OP): Performed by: INTERNAL MEDICINE

## 2018-01-16 PROCEDURE — 700102 HCHG RX REV CODE 250 W/ 637 OVERRIDE(OP): Performed by: HOSPITALIST

## 2018-01-16 PROCEDURE — A9270 NON-COVERED ITEM OR SERVICE: HCPCS | Performed by: INTERNAL MEDICINE

## 2018-01-16 PROCEDURE — 99233 SBSQ HOSP IP/OBS HIGH 50: CPT | Performed by: INTERNAL MEDICINE

## 2018-01-16 PROCEDURE — 82962 GLUCOSE BLOOD TEST: CPT | Mod: 91

## 2018-01-16 PROCEDURE — A9270 NON-COVERED ITEM OR SERVICE: HCPCS | Performed by: HOSPITALIST

## 2018-01-16 PROCEDURE — 700111 HCHG RX REV CODE 636 W/ 250 OVERRIDE (IP): Performed by: HOSPITALIST

## 2018-01-16 PROCEDURE — 770004 HCHG ROOM/CARE - ONCOLOGY PRIVATE *

## 2018-01-16 RX ORDER — NYSTATIN 100000 U/G
CREAM TOPICAL 2 TIMES DAILY
Status: DISCONTINUED | OUTPATIENT
Start: 2018-01-16 | End: 2018-01-18 | Stop reason: HOSPADM

## 2018-01-16 RX ORDER — OXYCODONE HCL 20 MG/1
60 TABLET, FILM COATED, EXTENDED RELEASE ORAL EVERY 12 HOURS
Status: DISCONTINUED | OUTPATIENT
Start: 2018-01-16 | End: 2018-01-17

## 2018-01-16 RX ORDER — GABAPENTIN 300 MG/1
300 CAPSULE ORAL 3 TIMES DAILY
Status: DISCONTINUED | OUTPATIENT
Start: 2018-01-16 | End: 2018-01-18 | Stop reason: HOSPADM

## 2018-01-16 RX ADMIN — INSULIN HUMAN 3 UNITS: 100 INJECTION, SOLUTION PARENTERAL at 21:08

## 2018-01-16 RX ADMIN — NYSTATIN: 100000 CREAM TOPICAL at 11:31

## 2018-01-16 RX ADMIN — Medication: at 15:34

## 2018-01-16 RX ADMIN — OXYCODONE HYDROCHLORIDE 15 MG: 5 TABLET ORAL at 04:44

## 2018-01-16 RX ADMIN — CLINDAMYCIN IN 5 PERCENT DEXTROSE 600 MG: 12 INJECTION, SOLUTION INTRAVENOUS at 04:44

## 2018-01-16 RX ADMIN — LEVOTHYROXINE SODIUM 50 MCG: 50 TABLET ORAL at 04:41

## 2018-01-16 RX ADMIN — OXYCODONE HYDROCHLORIDE 15 MG: 5 TABLET ORAL at 12:39

## 2018-01-16 RX ADMIN — MORPHINE SULFATE 60 MG: 60 TABLET, EXTENDED RELEASE ORAL at 04:41

## 2018-01-16 RX ADMIN — ENOXAPARIN SODIUM 40 MG: 100 INJECTION SUBCUTANEOUS at 08:30

## 2018-01-16 RX ADMIN — OXYCODONE HYDROCHLORIDE AND ACETAMINOPHEN 500 MG: 500 TABLET ORAL at 08:30

## 2018-01-16 RX ADMIN — OXYCODONE HYDROCHLORIDE 15 MG: 5 TABLET ORAL at 16:47

## 2018-01-16 RX ADMIN — BUDESONIDE AND FORMOTEROL FUMARATE DIHYDRATE 1 PUFF: 160; 4.5 AEROSOL RESPIRATORY (INHALATION) at 21:04

## 2018-01-16 RX ADMIN — GABAPENTIN 300 MG: 300 CAPSULE ORAL at 21:00

## 2018-01-16 RX ADMIN — OXYCODONE HYDROCHLORIDE 15 MG: 5 TABLET ORAL at 08:30

## 2018-01-16 RX ADMIN — MORPHINE SULFATE 60 MG: 60 TABLET, EXTENDED RELEASE ORAL at 12:39

## 2018-01-16 RX ADMIN — CLINDAMYCIN IN 5 PERCENT DEXTROSE 600 MG: 12 INJECTION, SOLUTION INTRAVENOUS at 21:08

## 2018-01-16 RX ADMIN — LISINOPRIL 10 MG: 10 TABLET ORAL at 08:29

## 2018-01-16 RX ADMIN — CLINDAMYCIN IN 5 PERCENT DEXTROSE 600 MG: 12 INJECTION, SOLUTION INTRAVENOUS at 14:49

## 2018-01-16 RX ADMIN — GABAPENTIN 200 MG: 100 CAPSULE ORAL at 08:44

## 2018-01-16 RX ADMIN — GABAPENTIN 200 MG: 100 CAPSULE ORAL at 15:34

## 2018-01-16 RX ADMIN — OMEPRAZOLE 20 MG: 20 CAPSULE, DELAYED RELEASE ORAL at 08:28

## 2018-01-16 RX ADMIN — Medication 220 MG: at 08:30

## 2018-01-16 RX ADMIN — LACTOBACILLUS ACIDOPHILUS / LACTOBACILLUS BULGARICUS 1 PACKET: 100 MILLION CFU STRENGTH GRANULES at 16:47

## 2018-01-16 RX ADMIN — FLUCONAZOLE 200 MG: 100 TABLET ORAL at 08:29

## 2018-01-16 RX ADMIN — OXYCODONE HYDROCHLORIDE 15 MG: 5 TABLET ORAL at 23:53

## 2018-01-16 RX ADMIN — LACTOBACILLUS ACIDOPHILUS / LACTOBACILLUS BULGARICUS 1 PACKET: 100 MILLION CFU STRENGTH GRANULES at 11:31

## 2018-01-16 RX ADMIN — OXYCODONE HYDROCHLORIDE 60 MG: 20 TABLET, FILM COATED, EXTENDED RELEASE ORAL at 21:00

## 2018-01-16 RX ADMIN — LACTOBACILLUS ACIDOPHILUS / LACTOBACILLUS BULGARICUS 1 PACKET: 100 MILLION CFU STRENGTH GRANULES at 08:30

## 2018-01-16 RX ADMIN — FUROSEMIDE 40 MG: 40 TABLET ORAL at 08:28

## 2018-01-16 ASSESSMENT — ENCOUNTER SYMPTOMS
NERVOUS/ANXIOUS: 0
ROS SKIN COMMENTS: FACE
ABDOMINAL PAIN: 0
BLURRED VISION: 0
MYALGIAS: 1
SPEECH CHANGE: 0
CONSTIPATION: 0
NECK PAIN: 1
SHORTNESS OF BREATH: 0
CHILLS: 0
HEADACHES: 0
INSOMNIA: 0
SENSORY CHANGE: 0
DEPRESSION: 1
COUGH: 0
CLAUDICATION: 0
WEAKNESS: 0
INSOMNIA: 1
FEVER: 0
PHOTOPHOBIA: 0
HEARTBURN: 0
BACK PAIN: 1
DIZZINESS: 0
VOMITING: 0
DIARRHEA: 0

## 2018-01-16 ASSESSMENT — PAIN SCALES - GENERAL
PAINLEVEL_OUTOF10: 9
PAINLEVEL_OUTOF10: 8
PAINLEVEL_OUTOF10: 9
PAINLEVEL_OUTOF10: 8

## 2018-01-16 ASSESSMENT — LIFESTYLE VARIABLES: DO YOU DRINK ALCOHOL: NO

## 2018-01-16 NOTE — PROGRESS NOTES
Patient AAOx4, sleeping upright at times.  Moved Bariatric bed and patient sat on side for a while.  Pain better controlled but still needing breakthrough pain at regular intervals.  IV patent. ABT running as ordered. Up 2 assist.  No further needs at this time.  Will continue to monitor.

## 2018-01-16 NOTE — PROGRESS NOTES
Patient sitting up in chair, legs wrapped upon assessment, c/o bilateral legs and feet pain throbbing, stabbing type pain 9 out of 10, lungs clear, abdomen firm and obese, large soft BM this morning, will medicate for pain, cane at side and call light in reach.

## 2018-01-16 NOTE — PROGRESS NOTES
[facility] Hospitalist Progress Note    Date of Service: 1/15/2018    Chief Complaint  57 y.o. male admitted 2018 with cellulitis of the legs, chronic venous stasis.    Interval Problem Update  He reports that he is not getting sustained pain relief from the current dose of MS Contin. I discovered that I miscalculated his daily dose of oxycodone at 120 mg when it was actually 180 mg per day.  Otherwise his legs are better as far as drainage and smell.    Consultants/Specialty  [facility].    Disposition  TBD. Recommend skilled        Review of Systems   Constitutional: Negative for chills, fever, malaise/fatigue and weight loss.   HENT: Negative.    Respiratory: Negative.  Negative for cough and shortness of breath.    Cardiovascular: Negative.  Negative for chest pain and leg swelling.   Gastrointestinal: Negative.  Negative for abdominal pain, nausea and vomiting.   Genitourinary: Negative.  Negative for dysuria and flank pain.   Musculoskeletal: Positive for back pain, falls, joint pain and myalgias.   Skin: Positive for itching and rash.   Neurological: Positive for weakness. Negative for dizziness, tingling, tremors, sensory change, speech change, focal weakness, seizures, loss of consciousness and headaches.   Endo/Heme/Allergies: Negative.  Does not bruise/bleed easily.   Psychiatric/Behavioral: Negative for depression. The patient is not nervous/anxious.    All other systems reviewed and are negative.     Physical Exam  Laboratory/Imaging   Hemodynamics  Temp (24hrs), Av.7 °C (98 °F), Min:36.6 °C (97.9 °F), Max:36.7 °C (98 °F)   Temperature: 36.7 °C (98 °F)  Pulse  Av.6  Min: 73  Max: 111   Blood Pressure: 121/69      Respiratory      Respiration: 18, Pulse Oximetry: 94 %     Work Of Breathing / Effort: Moderate  RUL Breath Sounds: Clear, RLL Breath Sounds: Diminished, LARY Breath Sounds: Clear, LLL Breath Sounds: Diminished    Fluids    Intake/Output Summary (Last 24 hours) at 01/15/18 2698  Last data  filed at 01/15/18 1300   Gross per 24 hour   Intake              820 ml   Output             1850 ml   Net            -1030 ml       Nutrition  Orders Placed This Encounter   Procedures   • Diet Order     Standing Status:   Standing     Number of Occurrences:   1     Order Specific Question:   Diet:     Answer:   Consistent Carbohydrate [4]     Physical Exam   Constitutional: He appears well-developed. No distress.   Morbidly obese   HENT:   Nose: Nose normal.   Mouth/Throat: Oropharynx is clear and moist. No oropharyngeal exudate.   Eyes: Conjunctivae are normal. Right eye exhibits no discharge. Left eye exhibits no discharge. No scleral icterus.   Neck: No JVD present. No tracheal deviation present.   Cardiovascular: Normal rate, regular rhythm and normal heart sounds.    Pulmonary/Chest: Effort normal and breath sounds normal. No stridor. No respiratory distress. He has no wheezes. He has no rales. He exhibits no tenderness.   Abdominal: Soft. Bowel sounds are normal. He exhibits no distension. There is no tenderness.   Musculoskeletal: He exhibits edema, tenderness and deformity.   Severe, chronic changes of both lower legs, woody brawny edema present, erythema, white/dry flaking skin present  Foul smell improving  Exudate decreasing   Neurological: He is alert. No cranial nerve deficit. He exhibits normal muscle tone.   Skin: Skin is warm and dry. He is not diaphoretic. No pallor.   Psychiatric: He has a normal mood and affect. His behavior is normal.   Nursing note and vitals reviewed.      Recent Labs      01/13/18   0046   WBC  6.9   RBC  5.11   HEMOGLOBIN  13.9*   HEMATOCRIT  46.5   MCV  91.0   MCH  27.2   MCHC  29.9*   RDW  49.7   PLATELETCT  138*   MPV  11.6     Recent Labs      01/13/18   0006   SODIUM  130*   POTASSIUM  4.6   CHLORIDE  97   CO2  23   GLUCOSE  144*   BUN  21   CREATININE  0.97   CALCIUM  8.3*                      Assessment/Plan     * Cellulitis   Assessment & Plan    With severe venous  stasis dermatitis  Cultures done on admission  ID consult appreciated: unasyn, vancomycin and fluconazole  Clinically slowly improving continue current therapy        Stasis dermatitis- (present on admission)   Assessment & Plan    Severe and Chronic, with recent worsening.  Unable to wear shoes due to significant swelling and pain, complicated by DM   Discussed with ; patient needs outpatient resources/transportation to get to wound care appointments, consult placed they're working him  Wound care team discussed, no Unna boots due to infection, recommend LacHydrin lotion to legs  Treating cellulitis and pain, bedside debridement  Continue vitamin c, zinc and fish oil supplements to help with healing  Continue lasix  Recommended skilled placement at discharge        COPD (chronic obstructive pulmonary disease) (CMS-HCC)- (present on admission)   Assessment & Plan    No evidence of current exacerbation.  On home oxygen therapy which will be continued        Chronic pain   Assessment & Plan    Associated with the severe dermatitis  Trial of long acting pain medication, currently inadequate dose  Continued MS contin patient was actually on 240mg of oxycodone per day in divided doses; per online calculator the equivalent dose is around 180 mg per day (25% dose reduction)  Start at 180 mg per day of MS Contin in 3 divided doses with 15mg of oxycodone q 4 hours for breakthrough  Continue to titrate up on neurontin dose; pt was on 100 tid on admission now up to 200mg tid        Diabetes type 2, controlled (CMS-HCC)- (present on admission)   Assessment & Plan    Glycemic control improving on high dose sliding scale, goal to get most sugars under 150  Diabetic diet, Accu-Cheks.          Morbid obesity with BMI of 45.0-49.9, adult (CMS-HCC)- (present on admission)   Assessment & Plan    Body mass index is 42.83 kg/m².            Quality-Core Measures

## 2018-01-16 NOTE — PROGRESS NOTES
Infectious Disease Progress Note    Author: Annabella Capps M.D. Date & Time of service: 2018  8:16 AM    Chief Complaint:  Bilateral lower extremity stasis ulceration/tinea      Interval History:  57 y.o. morbidly obese diabetic WM with multiple admissions for bilateral lower extremity cellulitis due to noncompliance with wound care/lymphedema treatment   1/15 AF up to chair-legs not raised-staing he regrets noncompliance with care and self-neglect   AF Up to chair-ate all of breakfast. appears depressed  Labs Reviewed, Medications Reviewed, Radiology Reviewed and Wound Reviewed.    Review of Systems:  Review of Systems   Constitutional: Negative for chills and fever.   Musculoskeletal: Positive for joint pain and myalgias.   Skin: Positive for rash.        face   Psychiatric/Behavioral: The patient has insomnia.    All other systems reviewed and are negative.      Hemodynamics:  Temp (24hrs), Av.9 °C (98.4 °F), Min:36.2 °C (97.2 °F), Max:38 °C (100.4 °F)  Temperature: 36.4 °C (97.5 °F)  Pulse  Av.5  Min: 69  Max: 111  Blood Pressure: 109/74       Physical Exam:  Physical Exam   Constitutional: He is oriented to person, place, and time. He appears well-developed. No distress.   Obese   HENT:   Head: Normocephalic and atraumatic.   Flaky rash on face   Eyes: EOM are normal. Pupils are equal, round, and reactive to light.   Cardiovascular: Normal rate.    Pulmonary/Chest: Effort normal. No respiratory distress.   Decreased breath sounds   Abdominal: Soft. He exhibits no distension.   Musculoskeletal: He exhibits edema and tenderness.   Neurological: He is alert and oriented to person, place, and time.   Skin: There is erythema.   Skin thickened and crusted  Pressure ulcerations posterior calves with serous drainage   Nursing note and vitals reviewed.      Meds:    Current Facility-Administered Medications:   •  nystatin  •  morphine ER  •  ammonium lactate  •  clindamycin (CLEOCIN) IV  •   fluconazole  •  Notify provider if pain remains uncontrolled **AND** Use the numeric rating scale (NRS-11) on regular floors and Critical-Care Pain Observation Tool (CPOT) on ICUs/Trauma to assess pain **AND** Pulse Ox (Oximetry) **AND** Pharmacy Consult Request **AND** If patient difficult to arouse and/or has respiratory depression, stop any opiates that are currently infusing and call a Rapid Response. **AND** [DISCONTINUED] oxycodone immediate-release **AND** oxycodone immediate-release **AND** morphine injection  •  lactobacillus granules  •  ascorbic acid  •  fish oil  •  zinc sulfate  •  furosemide  •  lisinopril  •  enoxaparin  •  clonidine  •  ondansetron  •  ondansetron  •  promethazine  •  promethazine  •  prochlorperazine  •  zolpidem  •  senna-docusate **AND** polyethylene glycol/lytes **AND** magnesium hydroxide **AND** bisacodyl  •  acetaminophen  •  budesonide-formoterol  •  levothyroxine  •  omeprazole  •  gabapentin  •  insulin regular **AND** Accu-Chek ACHS **AND** NOTIFY MD and PharmD **AND** glucose 4 g **AND** dextrose 50%    Labs:  No results for input(s): WBC, RBC, HEMOGLOBIN, HEMATOCRIT, MCV, MCH, RDW, PLATELETCT, MPV, NEUTSPOLYS, LYMPHOCYTES, MONOCYTES, EOSINOPHILS, BASOPHILS, RBCMORPHOLO in the last 72 hours.  No results for input(s): SODIUM, POTASSIUM, CHLORIDE, CO2, GLUCOSE, BUN, CPKTOTAL in the last 72 hours.  No results for input(s): ALBUMIN, TBILIRUBIN, ALKPHOSPHAT, TOTPROTEIN, ALTSGPT, ASTSGOT, CREATININE in the last 72 hours.    Imaging:  No results found.    Micro:  Results     Procedure Component Value Units Date/Time    BLOOD CULTURE [[id number]] Collected:  01/12/18 0220    Order Status:  Completed Specimen:  Blood from Peripheral Updated:  01/13/18 0847     Significant Indicator NEG     Source BLD     Site PERIPHERAL     Blood Culture --     No Growth    Note: Blood cultures are incubated for 5 days and  are monitored continuously.Positive blood cultures  are called to the RN  "and reported as soon as  they are identified.      Narrative:       Per Hospital Policy: Only change Specimen Src: to \"Line\" if  specified by physician order.    BLOOD CULTURE [[id number]] Collected:  01/12/18 0305    Order Status:  Completed Specimen:  Blood from Peripheral Updated:  01/13/18 0847     Significant Indicator NEG     Source BLD     Site PERIPHERAL     Blood Culture --     No Growth    Note: Blood cultures are incubated for 5 days and  are monitored continuously.Positive blood cultures  are called to the RN and reported as soon as  they are identified.      Narrative:       Per Hospital Policy: Only change Specimen Src: to \"Line\" if  specified by physician order.          Assessment:  Active Hospital Problems    Diagnosis   • *Cellulitis [L03.90]   • Stasis dermatitis [I87.2]   • COPD (chronic obstructive pulmonary disease) (CMS-HCC) [J44.9]   • Chronic pain [G89.29]   • Morbid obesity with BMI of 45.0-49.9, adult (CMS-HCC) [E66.01, Z68.42]   • Diabetes type 2, controlled (CMS-HCC) [E11.9]       Plan:  BLE stasis ulcerations  Afebrile  No leukocytosis  Significant swelling-noncompliant with keeping elevated  Blood cxs neg  Continue clinda for 10 days  Continue wound care, encouraged leg elevation     Tinea pedis    Continue fluconazole for 10 days    Senorrheic dermatitis, new  Nizoral cream to face BID    Diabetes  Keep BS under 150 to help control current infection    Discussed with internal medicine.Dr Beck  "

## 2018-01-16 NOTE — DISCHARGE PLANNING
Received voicemail to call Edinson with [facility]. Contacted Edinson left message for a call back.

## 2018-01-17 LAB
BACTERIA BLD CULT: NORMAL
BACTERIA BLD CULT: NORMAL
C DIFF DNA SPEC QL NAA+PROBE: NEGATIVE
C DIFF TOX GENS STL QL NAA+PROBE: NEGATIVE
GLUCOSE BLD-MCNC: 120 MG/DL (ref 65–99)
GLUCOSE BLD-MCNC: 143 MG/DL (ref 65–99)
GLUCOSE BLD-MCNC: 148 MG/DL (ref 65–99)
GLUCOSE BLD-MCNC: 149 MG/DL (ref 65–99)
SIGNIFICANT IND 70042: NORMAL
SIGNIFICANT IND 70042: NORMAL
SITE SITE: NORMAL
SITE SITE: NORMAL
SOURCE SOURCE: NORMAL
SOURCE SOURCE: NORMAL

## 2018-01-17 PROCEDURE — 700102 HCHG RX REV CODE 250 W/ 637 OVERRIDE(OP): Performed by: HOSPITALIST

## 2018-01-17 PROCEDURE — A9270 NON-COVERED ITEM OR SERVICE: HCPCS | Performed by: INTERNAL MEDICINE

## 2018-01-17 PROCEDURE — 99232 SBSQ HOSP IP/OBS MODERATE 35: CPT | Performed by: INTERNAL MEDICINE

## 2018-01-17 PROCEDURE — 700102 HCHG RX REV CODE 250 W/ 637 OVERRIDE(OP): Performed by: INTERNAL MEDICINE

## 2018-01-17 PROCEDURE — 770004 HCHG ROOM/CARE - ONCOLOGY PRIVATE *

## 2018-01-17 PROCEDURE — 700101 HCHG RX REV CODE 250: Performed by: INTERNAL MEDICINE

## 2018-01-17 PROCEDURE — 97535 SELF CARE MNGMENT TRAINING: CPT

## 2018-01-17 PROCEDURE — A9270 NON-COVERED ITEM OR SERVICE: HCPCS | Performed by: HOSPITALIST

## 2018-01-17 PROCEDURE — 700111 HCHG RX REV CODE 636 W/ 250 OVERRIDE (IP): Performed by: HOSPITALIST

## 2018-01-17 PROCEDURE — 87493 C DIFF AMPLIFIED PROBE: CPT

## 2018-01-17 PROCEDURE — 82962 GLUCOSE BLOOD TEST: CPT

## 2018-01-17 PROCEDURE — 97530 THERAPEUTIC ACTIVITIES: CPT

## 2018-01-17 RX ORDER — OXYCODONE HYDROCHLORIDE 10 MG/1
20 TABLET ORAL EVERY 4 HOURS PRN
Status: DISCONTINUED | OUTPATIENT
Start: 2018-01-17 | End: 2018-01-18 | Stop reason: HOSPADM

## 2018-01-17 RX ORDER — OXYCODONE HCL 20 MG/1
60 TABLET, FILM COATED, EXTENDED RELEASE ORAL EVERY 8 HOURS
Status: DISCONTINUED | OUTPATIENT
Start: 2018-01-17 | End: 2018-01-18 | Stop reason: HOSPADM

## 2018-01-17 RX ORDER — MORPHINE SULFATE 4 MG/ML
2-4 INJECTION, SOLUTION INTRAMUSCULAR; INTRAVENOUS
Status: DISCONTINUED | OUTPATIENT
Start: 2018-01-17 | End: 2018-01-18 | Stop reason: HOSPADM

## 2018-01-17 RX ADMIN — FUROSEMIDE 40 MG: 40 TABLET ORAL at 08:02

## 2018-01-17 RX ADMIN — LEVOTHYROXINE SODIUM 50 MCG: 50 TABLET ORAL at 06:06

## 2018-01-17 RX ADMIN — Medication 220 MG: at 08:04

## 2018-01-17 RX ADMIN — CLONIDINE HYDROCHLORIDE 0.1 MG: 0.1 TABLET ORAL at 08:04

## 2018-01-17 RX ADMIN — LACTOBACILLUS ACIDOPHILUS / LACTOBACILLUS BULGARICUS 1 PACKET: 100 MILLION CFU STRENGTH GRANULES at 08:02

## 2018-01-17 RX ADMIN — OXYCODONE HYDROCHLORIDE 60 MG: 20 TABLET, FILM COATED, EXTENDED RELEASE ORAL at 08:03

## 2018-01-17 RX ADMIN — NYSTATIN: 100000 CREAM TOPICAL at 08:05

## 2018-01-17 RX ADMIN — ENOXAPARIN SODIUM 40 MG: 100 INJECTION SUBCUTANEOUS at 08:04

## 2018-01-17 RX ADMIN — OXYCODONE HYDROCHLORIDE 60 MG: 20 TABLET, FILM COATED, EXTENDED RELEASE ORAL at 14:49

## 2018-01-17 RX ADMIN — GABAPENTIN 300 MG: 300 CAPSULE ORAL at 22:02

## 2018-01-17 RX ADMIN — OXYCODONE HYDROCHLORIDE AND ACETAMINOPHEN 500 MG: 500 TABLET ORAL at 08:03

## 2018-01-17 RX ADMIN — NYSTATIN: 100000 CREAM TOPICAL at 21:00

## 2018-01-17 RX ADMIN — GABAPENTIN 300 MG: 300 CAPSULE ORAL at 14:50

## 2018-01-17 RX ADMIN — LACTOBACILLUS ACIDOPHILUS / LACTOBACILLUS BULGARICUS 1 PACKET: 100 MILLION CFU STRENGTH GRANULES at 11:30

## 2018-01-17 RX ADMIN — BUDESONIDE AND FORMOTEROL FUMARATE DIHYDRATE 1 PUFF: 160; 4.5 AEROSOL RESPIRATORY (INHALATION) at 08:04

## 2018-01-17 RX ADMIN — OMEPRAZOLE 20 MG: 20 CAPSULE, DELAYED RELEASE ORAL at 08:03

## 2018-01-17 RX ADMIN — CLINDAMYCIN IN 5 PERCENT DEXTROSE 600 MG: 12 INJECTION, SOLUTION INTRAVENOUS at 06:06

## 2018-01-17 RX ADMIN — FLUCONAZOLE 200 MG: 100 TABLET ORAL at 08:03

## 2018-01-17 RX ADMIN — MORPHINE SULFATE 4 MG: 4 INJECTION INTRAVENOUS at 08:04

## 2018-01-17 RX ADMIN — OXYCODONE HYDROCHLORIDE 60 MG: 20 TABLET, FILM COATED, EXTENDED RELEASE ORAL at 22:02

## 2018-01-17 RX ADMIN — GABAPENTIN 300 MG: 300 CAPSULE ORAL at 08:03

## 2018-01-17 RX ADMIN — OXYCODONE HYDROCHLORIDE 15 MG: 5 TABLET ORAL at 11:56

## 2018-01-17 RX ADMIN — LISINOPRIL 10 MG: 10 TABLET ORAL at 08:03

## 2018-01-17 RX ADMIN — OXYCODONE HYDROCHLORIDE 15 MG: 5 TABLET ORAL at 06:09

## 2018-01-17 RX ADMIN — CLINDAMYCIN IN 5 PERCENT DEXTROSE 600 MG: 12 INJECTION, SOLUTION INTRAVENOUS at 14:50

## 2018-01-17 RX ADMIN — OXYCODONE HYDROCHLORIDE 20 MG: 10 TABLET ORAL at 21:04

## 2018-01-17 RX ADMIN — ONDANSETRON 4 MG: 4 TABLET, ORALLY DISINTEGRATING ORAL at 22:06

## 2018-01-17 ASSESSMENT — COGNITIVE AND FUNCTIONAL STATUS - GENERAL
SUGGESTED CMS G CODE MODIFIER DAILY ACTIVITY: CK
DRESSING REGULAR UPPER BODY CLOTHING: A LITTLE
TOILETING: A LOT
HELP NEEDED FOR BATHING: A LOT
DRESSING REGULAR LOWER BODY CLOTHING: TOTAL
DAILY ACTIVITIY SCORE: 15
PERSONAL GROOMING: A LITTLE

## 2018-01-17 ASSESSMENT — ENCOUNTER SYMPTOMS
NERVOUS/ANXIOUS: 0
ABDOMINAL PAIN: 1
DIARRHEA: 1
CHILLS: 0
SENSORY CHANGE: 0
HEARTBURN: 0
PHOTOPHOBIA: 0
MYALGIAS: 1
HEADACHES: 0
NECK PAIN: 1
FEVER: 0
DIZZINESS: 0
DEPRESSION: 1
CLAUDICATION: 0
BACK PAIN: 1
ROS SKIN COMMENTS: FACE
SHORTNESS OF BREATH: 0
VOMITING: 0
CONSTIPATION: 0
WEAKNESS: 0
COUGH: 0
BLURRED VISION: 0
INSOMNIA: 1
SPEECH CHANGE: 0
INSOMNIA: 0

## 2018-01-17 ASSESSMENT — PAIN SCALES - GENERAL
PAINLEVEL_OUTOF10: 8
PAINLEVEL_OUTOF10: 8
PAINLEVEL_OUTOF10: 10
PAINLEVEL_OUTOF10: 9
PAINLEVEL_OUTOF10: 8

## 2018-01-17 NOTE — DISCHARGE PLANNING
[facility] unable to accept patient due to no Bariatric bed.  Zari(ALEXX) notified.

## 2018-01-17 NOTE — CARE PLAN
Problem: Safety  Goal: Will remain free from falls  Outcome: PROGRESSING AS EXPECTED  Call light in reach, cane at bedside

## 2018-01-17 NOTE — DISCHARGE PLANNING
ALEXX received authorization for approved services from supervisor Gus Blanc for bariatric bed from [facility] for a one-month rental ($200.00).  ALEXX faxed completed approved services form to [facility] and left VM for Carmen to request we order bed to be delivered to [facility].

## 2018-01-17 NOTE — DISCHARGE PLANNING
Received call for [facility] to get prices for a bariatric bed.   Contacted [facility] spoke to Annabella they don't have a bariactric bed in stock but it would be $350 per month or purchase for $2000 and would take a week to week 1/2.  Contacted [facility] spoke to Gavin purchase $2000, Monthly rental is $200. Notified ALEXX Weiner via phone.

## 2018-01-17 NOTE — PROGRESS NOTES
Infectious Disease Progress Note    Author: Annabella Capps M.D. Date & Time of service: 2018  1:40 PM    Chief Complaint:  Bilateral lower extremity stasis ulceration/tinea      Interval History:  57 y.o. morbidly obese diabetic WM with multiple admissions for bilateral lower extremity cellulitis due to noncompliance with wound care/lymphedema treatment   1/15 AF up to chair-legs not raised-staing he regrets noncompliance with care and self-neglect   AF Up to chair-ate all of breakfast. appears depressed   AF remains depressed  Labs Reviewed, Medications Reviewed, Radiology Reviewed and Wound Reviewed.    Review of Systems:  Review of Systems   Constitutional: Negative for chills and fever.   Musculoskeletal: Positive for joint pain and myalgias.   Skin: Positive for rash.        face   Psychiatric/Behavioral: The patient has insomnia.    All other systems reviewed and are negative.      Hemodynamics:  Temp (24hrs), Av.6 °C (97.8 °F), Min:36.3 °C (97.4 °F), Max:36.8 °C (98.2 °F)  Temperature: 36.6 °C (97.8 °F)  Pulse  Av.1  Min: 69  Max: 111  Blood Pressure: 114/61       Physical Exam:  Physical Exam   Constitutional: He is oriented to person, place, and time. He appears well-developed. No distress.   Obese   HENT:   Head: Normocephalic and atraumatic.   Flaky rash on face   Eyes: EOM are normal. Pupils are equal, round, and reactive to light.   Cardiovascular: Normal rate.    Pulmonary/Chest: Effort normal. No respiratory distress.   Decreased breath sounds   Abdominal: Soft. He exhibits no distension.   Musculoskeletal: He exhibits edema and tenderness.   Neurological: He is alert and oriented to person, place, and time.   Skin: There is erythema.   Skin thickened and crusted  Pressure ulcerations posterior calves with serous drainage   Nursing note and vitals reviewed.      Meds:    Current Facility-Administered Medications:   •  nystatin  •  oxyCODONE CR  •  gabapentin  •  ammonium  "lactate  •  clindamycin (CLEOCIN) IV  •  fluconazole  •  Notify provider if pain remains uncontrolled **AND** Use the numeric rating scale (NRS-11) on regular floors and Critical-Care Pain Observation Tool (CPOT) on ICUs/Trauma to assess pain **AND** Pulse Ox (Oximetry) **AND** Pharmacy Consult Request **AND** If patient difficult to arouse and/or has respiratory depression, stop any opiates that are currently infusing and call a Rapid Response. **AND** [DISCONTINUED] oxycodone immediate-release **AND** oxycodone immediate-release **AND** morphine injection  •  lactobacillus granules  •  ascorbic acid  •  fish oil  •  zinc sulfate  •  furosemide  •  lisinopril  •  enoxaparin  •  clonidine  •  ondansetron  •  ondansetron  •  promethazine  •  promethazine  •  prochlorperazine  •  zolpidem  •  senna-docusate **AND** polyethylene glycol/lytes **AND** magnesium hydroxide **AND** bisacodyl  •  acetaminophen  •  budesonide-formoterol  •  levothyroxine  •  omeprazole  •  insulin regular **AND** Accu-Chek ACHS **AND** NOTIFY MD and PharmD **AND** glucose 4 g **AND** dextrose 50%    Labs:  No results for input(s): WBC, RBC, HEMOGLOBIN, HEMATOCRIT, MCV, MCH, RDW, PLATELETCT, MPV, NEUTSPOLYS, LYMPHOCYTES, MONOCYTES, EOSINOPHILS, BASOPHILS, RBCMORPHOLO in the last 72 hours.  No results for input(s): SODIUM, POTASSIUM, CHLORIDE, CO2, GLUCOSE, BUN, CPKTOTAL in the last 72 hours.  No results for input(s): ALBUMIN, TBILIRUBIN, ALKPHOSPHAT, TOTPROTEIN, ALTSGPT, ASTSGOT, CREATININE in the last 72 hours.    Imaging:  No results found.    Micro:  Results     Procedure Component Value Units Date/Time    BLOOD CULTURE [[id number]] Collected:  01/12/18 0220    Order Status:  Completed Specimen:  Blood from Peripheral Updated:  01/17/18 0500     Significant Indicator NEG     Source BLD     Site PERIPHERAL     Blood Culture No growth after 5 days of incubation.    Narrative:       Per Hospital Policy: Only change Specimen Src: to \"Line\" " "if  specified by physician order.    BLOOD CULTURE [[id number]] Collected:  01/12/18 0305    Order Status:  Completed Specimen:  Blood from Peripheral Updated:  01/17/18 0500     Significant Indicator NEG     Source BLD     Site PERIPHERAL     Blood Culture No growth after 5 days of incubation.    Narrative:       Per Hospital Policy: Only change Specimen Src: to \"Line\" if  specified by physician order.          Assessment:  Active Hospital Problems    Diagnosis   • *Cellulitis [L03.90]   • Stasis dermatitis [I87.2]   • COPD (chronic obstructive pulmonary disease) (CMS-HCC) [J44.9]   • Chronic pain [G89.29]   • Morbid obesity with BMI of 45.0-49.9, adult (CMS-HCC) [E66.01, Z68.42]   • Diabetes type 2, controlled (CMS-HCC) [E11.9]       Plan:  BLE stasis ulcerations  Afebrile  No leukocytosis  Significant swelling-noncompliant with keeping elevated  Blood cxs neg  Continue clinda for 10 days. Stop date 1/21  Can be PO  Continue wound care, encouraged leg elevation     Tinea pedis    Continue fluconazole for 10 days    Senorrheic dermatitis  Nizoral cream to face BID    Diabetes  Keep BS under 150 to help control current infection    placement  "

## 2018-01-17 NOTE — THERAPY
"Occupational Therapy Treatment completed with focus on ADLs, ADL transfers and patient education.  Functional Status:  Pt was seen for Occupational Therapy treatment today, see Therapy Kardex for details. Treatment included education in breath control with activity and at rest, self pacing techs and energy conservation for pain management. Educated pt in safety awareness techs as well.  Psychosocial intervention addressed.pt is self limiting due to pain, fatigue and appears to be depressed. When asked if he would work on oral hygiene and grooming, pt stated, \"Why do I have bad breath and do I stink?\" Informed pt that we are working on building endurance and strength through activity. SBA for sit to stand with SPC from chair; SBA/CGA for transfers to BS. Pt required Max A for toilet hygiene standing. Pt stated he cannot reach buttocks (around girth). Pt demonstrated SBA for UB dressing change of gown, declined to attempt LB dressing due to pain 10/10 in  BLE's. Max A for bathing LB refusing to attempt. But did stated he needed a lot of help. \"I'm not supposed  to shower because I'll get my feet wet and the MD says', \"Don't get your feet wet\". Pt stated he is unable to walk to BR wanting to keep BSC at his side. Pt was left up in the recliner chair sitting upright , call light in reach, bedside table in front of him  and nursing is aware. RN updated on OT tx and recommendations. Pt did apologize at end of session stating, \"I am just so exhausted and fed up with these health issues\". Will continue Occupational Therapy services as per plan.    Plan of Care: Will benefit from Occupational Therapy 3 times per week  Discharge Recommendations:  Equipment Will Continue to Assess for Equipment Needs. Post-acute therapy Discharge to a transitional care facility for continued skilled therapy services.    See \"Rehab Therapy-Acute\" Patient Summary Report for complete documentation.   "

## 2018-01-17 NOTE — PROGRESS NOTES
Assumed care of patient. Receiving scheduled and PRN pain medication per MAR for pain management of BLE. Elevated BP at 189/91, received PRN medication per MAR for HTN, will monitor for effect. Patient sitting upright in bedside chair. A+Ox4. Calls appropriately. Continues on 4l/min supplemental oxygen, denies SOB. Denies abd pain, states having frequent Bm's, stool softeners held. Safety maintained.

## 2018-01-17 NOTE — DISCHARGE PLANNING
Received call from Clair with [facility]. Care exceeds capacity. Notified ALEXX Weiner via phone.

## 2018-01-17 NOTE — DISCHARGE PLANNING
F/u phone contact with [facility] director Crystal (#[phone]) to see if they are willing to re-consider referral as pt has expressed this would be his first choice and he is willing to take care of an outstanding balance per ALEXX Herrera.  Spoke with Crystal who states she staffed case with her director of rehab and director of nursing who both declined referral due to hx of poor participation with therapies.  Crystal confirmed they are not declining pt due to outstanding balance.    [facility] had initially accepted pt but then declined due to not having any bariatric beds.     Left VM for admissions/Cortney at [facility] (p#[phone]) to see if they would be willing to accept pt if we are able to help facilitate renting a bariatric bed.  Also left VM for snf liaison Coreen. Sent email communication to leader-on-call Sandy Forrest to see if renting bariatric bed/DME is something care transitions can help facilitate.      Verified PAS#[id number].

## 2018-01-17 NOTE — CARE PLAN
Problem: Infection  Goal: Will remain free from infection  Outcome: PROGRESSING AS EXPECTED  Clindamycin in use for cellulitis. Q day dressing changes  For BLE.     Problem: Pain Management  Goal: Pain level will decrease to patient's comfort goal  Outcome: PROGRESSING AS EXPECTED  Appropriately verbalizes 0 to 10 pain scale. Calls for medications as needed.

## 2018-01-17 NOTE — DISCHARGE PLANNING
ALEXX received phone contact from [facility] and [facility] calling to confirm they will accept pt if we can assist with bariatric bed.  ALEXX sent email communication to leader-on-call DAVONTE Forrest in order to initiate approved services process.     Per DAVONTE Forrest have [facility] begin process for approved services.  ALEXX spoke with [facility] to request we please begin approved services process for bariatric bed rental.

## 2018-01-17 NOTE — PROGRESS NOTES
"[facility] Hospitalist Progress Note    Date of Service: 2018    Chief Complaint  57 y.o. male admitted 2018 with worsening BLE and uncontrolled pain.    Interval Problem Update  Patient states his pain remains uncontrolled and the \"morphine just doesn't work\".  I switched his long acting pain medication to oxycontin 60mg bid and will continue with PRN oxycodone.  He spent 5 minutes describing all of the things that are causing him pain ranging from his feet to a rotator cuff tear and everything in-between.  He is requiring placement at SNF for continued wound care and PO antibiotics on discharge.    Consultants/Specialty  ID - Génesis    Disposition  snf        Review of Systems   Constitutional: Negative for chills and fever.   HENT: Negative for congestion.    Eyes: Negative for blurred vision and photophobia.   Respiratory: Negative for cough and shortness of breath.    Cardiovascular: Negative for chest pain, claudication and leg swelling.   Gastrointestinal: Negative for abdominal pain, constipation, diarrhea, heartburn and vomiting.   Genitourinary: Negative for dysuria and hematuria.   Musculoskeletal: Positive for back pain, joint pain, myalgias and neck pain.   Skin: Negative for itching and rash.   Neurological: Negative for dizziness, sensory change, speech change, weakness and headaches.   Psychiatric/Behavioral: Positive for depression. The patient is not nervous/anxious and does not have insomnia.       Physical Exam  Laboratory/Imaging   Hemodynamics  Temp (24hrs), Av.3 °C (97.4 °F), Min:36.1 °C (97 °F), Max:36.6 °C (97.8 °F)   Temperature: 36.6 °C (97.8 °F)  Pulse  Av.8  Min: 69  Max: 111   Blood Pressure: 136/64      Respiratory      Respiration: 18, Pulse Oximetry: 97 %     Work Of Breathing / Effort: Moderate  RUL Breath Sounds: Clear, RLL Breath Sounds: Diminished, LARY Breath Sounds: Clear, LLL Breath Sounds: Diminished    Fluids    Intake/Output Summary (Last 24 hours) at 18 " 1910  Last data filed at 01/16/18 1800   Gross per 24 hour   Intake                0 ml   Output             1500 ml   Net            -1500 ml       Nutrition  Orders Placed This Encounter   Procedures   • Diet Order     Standing Status:   Standing     Number of Occurrences:   1     Order Specific Question:   Diet:     Answer:   Consistent Carbohydrate [4]     Physical Exam   Constitutional: He is oriented to person, place, and time. He appears well-developed and well-nourished. No distress.   HENT:   Head: Normocephalic and atraumatic.   Eyes: Conjunctivae are normal. No scleral icterus.   Neck: Neck supple. No JVD present.   Cardiovascular: Normal rate and regular rhythm.  Exam reveals no gallop and no friction rub.    No murmur heard.  Pulmonary/Chest: Effort normal and breath sounds normal. No respiratory distress. He has no wheezes. He exhibits no tenderness.   Abdominal: Soft. Bowel sounds are normal. He exhibits no distension and no mass. There is no tenderness.   Musculoskeletal: He exhibits no edema or tenderness.   Neurological: He is alert and oriented to person, place, and time. No cranial nerve deficit.   Skin: Skin is warm and dry. He is not diaphoretic. No erythema. No pallor.   Severely crusted feet bilaterally, edematous.   Psychiatric: He has a normal mood and affect. His behavior is normal.   Nursing note and vitals reviewed.                               Assessment/Plan     * Cellulitis   Assessment & Plan    With severe venous stasis dermatitis  Cultures done on admission  ID consult appreciated: PO clinda on dc  Clinically slowly improving continue current therapy        Stasis dermatitis- (present on admission)   Assessment & Plan    Severe and Chronic, with recent worsening.  Unable to wear shoes due to significant swelling and pain, complicated by DM   Discussed with ; patient needs outpatient resources/transportation to get to wound care appointments, consult placed they're  working him  Wound care team discussed, no Unna boots due to infection, recommend LacHydrin lotion to legs  Treating cellulitis and pain, bedside debridement  Continue vitamin c, zinc and fish oil supplements to help with healing  Continue lasix  skilled placement at discharge        COPD (chronic obstructive pulmonary disease) (CMS-HCC)- (present on admission)   Assessment & Plan    No evidence of current exacerbation.  On home oxygen therapy which will be continued        Chronic pain   Assessment & Plan    Associated with the severe dermatitis  Trial of long acting pain medication, currently inadequate dose  MS Contin ineffective, start Oxycontin 60 q 12 hours  15mg of oxycodone q 4 hours for breakthrough  Continue to titrate up on neurontin 300mg tid.        Diabetes type 2, controlled (CMS-HCC)- (present on admission)   Assessment & Plan    Glycemic control improving on high dose sliding scale, goal to get most sugars under 150  Diabetic diet, Accu-Cheks.          Morbid obesity with BMI of 45.0-49.9, adult (CMS-HCC)- (present on admission)   Assessment & Plan    Body mass index is 42.83 kg/m².              Reviewed items::  Labs reviewed, Medications reviewed and Radiology images reviewed  Montoya catheter::  No Montoya  DVT prophylaxis pharmacological::  Enoxaparin (Lovenox)  Antibiotics:  Treating active infection/contamination beyond 24 hours perioperative coverage

## 2018-01-17 NOTE — PROGRESS NOTES
Assumed care of patient Patient A&O. POC discussed. Calls for assistance as needed. Pain in lower extremities. Improved pain with ER oxy, gabapentin and IR oxy.

## 2018-01-17 NOTE — CARE PLAN
Problem: Skin Integrity  Goal: Risk for impaired skin integrity will decrease  Outcome: PROGRESSING AS EXPECTED  Dressing change daily to BLE, legs wrapped

## 2018-01-17 NOTE — DISCHARGE PLANNING
"Care Transition Team Discharge Planning    Anticipated Discharge Information  Anticipated discharge disposition: Assist with transportation, HHC, Home, SNF, Wound Care Center (outpatient)              Discharge Plan:  Discussed pt's case in MDT rounds, pt is medically cleared to transfer to SNF. [facility] has [facility] accepted [facility]. Called [facility] to f/u with [facility] and inquire about bed availability.      Received call back from [facility] relaying that [facility] does not have a bariatric bed and the company they use do not have any in stock therefore they have to decline pt at this time. Updated attending MD.     Met with pt at bedside and met with him at length and discussed referring to other local SNF's. Pt has concerns about most of the local SNF's and states \"I would not go back to any of them\", provided re-assurance and support to pt. Provided choice form and discussed each facility and pt states he has not been to [facility] and states \"I have actually heard good things\". Pt provided verbal permission only for referral to be sent to [facility]. Pt expressed he really liked [facility] and states he owes them approx. $300 and he is willing to pay them if this is the reason for their decline. Relayed to pt that this writer will call and leave a message for admissions updating them on this information and if they are willing to re-consider.    Called and left a message for Crystal admissions director #[phone] and requested return call to discuss.     Needs:   [facility] sending referral to [facility], will await to see if they can accept pt and accommodate his needs.     Message left for Crystal with [facility] to discuss pt's case as he really enjoyed their facility and is willing to pay what he thinks he owes approx. $300.       "

## 2018-01-17 NOTE — CARE PLAN
Problem: Respiratory:  Goal: Respiratory status will improve    Intervention: Assess and monitor pulmonary status  Patient received scheduled inhaler, continues on supplemental oxygen humidified. Denies SOB at rest. Encouraged to use incentive spirometer.       Problem: Pain Management  Goal: Pain level will decrease to patient's comfort goal  Outcome: PROGRESSING SLOWER THAN EXPECTED  Patient continues to require PRN medication in addition to scheduled medication in order to feel comfortable sitting/lying down. Patient states pain is never truly gone, just more manageable with medications.

## 2018-01-18 VITALS
SYSTOLIC BLOOD PRESSURE: 135 MMHG | BODY MASS INDEX: 46.65 KG/M2 | RESPIRATION RATE: 18 BRPM | WEIGHT: 315 LBS | DIASTOLIC BLOOD PRESSURE: 70 MMHG | HEART RATE: 86 BPM | OXYGEN SATURATION: 94 % | HEIGHT: 69 IN | TEMPERATURE: 97.3 F

## 2018-01-18 LAB
ANION GAP SERPL CALC-SCNC: 5 MMOL/L (ref 0–11.9)
BASOPHILS # BLD AUTO: 0.5 % (ref 0–1.8)
BASOPHILS # BLD: 0.03 K/UL (ref 0–0.12)
BUN SERPL-MCNC: 24 MG/DL (ref 8–22)
CALCIUM SERPL-MCNC: 9.4 MG/DL (ref 8.5–10.5)
CHLORIDE SERPL-SCNC: 91 MMOL/L (ref 96–112)
CO2 SERPL-SCNC: 40 MMOL/L (ref 20–33)
CREAT SERPL-MCNC: 0.78 MG/DL (ref 0.5–1.4)
EOSINOPHIL # BLD AUTO: 0.49 K/UL (ref 0–0.51)
EOSINOPHIL NFR BLD: 7.6 % (ref 0–6.9)
ERYTHROCYTE [DISTWIDTH] IN BLOOD BY AUTOMATED COUNT: 49.6 FL (ref 35.9–50)
GLUCOSE BLD-MCNC: 126 MG/DL (ref 65–99)
GLUCOSE BLD-MCNC: 131 MG/DL (ref 65–99)
GLUCOSE BLD-MCNC: 161 MG/DL (ref 65–99)
GLUCOSE SERPL-MCNC: 160 MG/DL (ref 65–99)
HCT VFR BLD AUTO: 40.9 % (ref 42–52)
HGB BLD-MCNC: 11.9 G/DL (ref 14–18)
IMM GRANULOCYTES # BLD AUTO: 0.03 K/UL (ref 0–0.11)
IMM GRANULOCYTES NFR BLD AUTO: 0.5 % (ref 0–0.9)
LYMPHOCYTES # BLD AUTO: 1.03 K/UL (ref 1–4.8)
LYMPHOCYTES NFR BLD: 15.9 % (ref 22–41)
MCH RBC QN AUTO: 26.6 PG (ref 27–33)
MCHC RBC AUTO-ENTMCNC: 29.1 G/DL (ref 33.7–35.3)
MCV RBC AUTO: 91.5 FL (ref 81.4–97.8)
MONOCYTES # BLD AUTO: 0.65 K/UL (ref 0–0.85)
MONOCYTES NFR BLD AUTO: 10 % (ref 0–13.4)
NEUTROPHILS # BLD AUTO: 4.24 K/UL (ref 1.82–7.42)
NEUTROPHILS NFR BLD: 65.5 % (ref 44–72)
NRBC # BLD AUTO: 0 K/UL
NRBC BLD-RTO: 0 /100 WBC
PLATELET # BLD AUTO: 225 K/UL (ref 164–446)
PMV BLD AUTO: 10.7 FL (ref 9–12.9)
POTASSIUM SERPL-SCNC: 4.8 MMOL/L (ref 3.6–5.5)
RBC # BLD AUTO: 4.47 M/UL (ref 4.7–6.1)
SODIUM SERPL-SCNC: 136 MMOL/L (ref 135–145)
WBC # BLD AUTO: 6.5 K/UL (ref 4.8–10.8)

## 2018-01-18 PROCEDURE — 700102 HCHG RX REV CODE 250 W/ 637 OVERRIDE(OP): Performed by: HOSPITALIST

## 2018-01-18 PROCEDURE — 85025 COMPLETE CBC W/AUTO DIFF WBC: CPT

## 2018-01-18 PROCEDURE — A9270 NON-COVERED ITEM OR SERVICE: HCPCS | Performed by: INTERNAL MEDICINE

## 2018-01-18 PROCEDURE — 80048 BASIC METABOLIC PNL TOTAL CA: CPT

## 2018-01-18 PROCEDURE — 82962 GLUCOSE BLOOD TEST: CPT | Mod: 91

## 2018-01-18 PROCEDURE — 36415 COLL VENOUS BLD VENIPUNCTURE: CPT

## 2018-01-18 PROCEDURE — 700111 HCHG RX REV CODE 636 W/ 250 OVERRIDE (IP): Performed by: HOSPITALIST

## 2018-01-18 PROCEDURE — 700102 HCHG RX REV CODE 250 W/ 637 OVERRIDE(OP): Performed by: INTERNAL MEDICINE

## 2018-01-18 PROCEDURE — A9270 NON-COVERED ITEM OR SERVICE: HCPCS | Performed by: HOSPITALIST

## 2018-01-18 PROCEDURE — 700101 HCHG RX REV CODE 250: Performed by: INTERNAL MEDICINE

## 2018-01-18 PROCEDURE — 99239 HOSP IP/OBS DSCHRG MGMT >30: CPT | Performed by: INTERNAL MEDICINE

## 2018-01-18 RX ORDER — CHLORAL HYDRATE 500 MG
1000 CAPSULE ORAL
Qty: 90 CAP
Start: 2018-01-18

## 2018-01-18 RX ORDER — GABAPENTIN 300 MG/1
300 CAPSULE ORAL 3 TIMES DAILY
Qty: 90 CAP
Start: 2018-01-18 | End: 2018-03-16

## 2018-01-18 RX ORDER — L. ACIDOPHILUS/L.BULGARICUS 100MM CELL
1 GRANULES IN PACKET (EA) ORAL
Start: 2018-01-18 | End: 2018-03-16

## 2018-01-18 RX ORDER — ZOLPIDEM TARTRATE 5 MG/1
5 TABLET ORAL
Qty: 30 TAB | Refills: 0
Start: 2018-01-18 | End: 2018-01-19

## 2018-01-18 RX ORDER — OXYCODONE HYDROCHLORIDE 20 MG/1
20 TABLET ORAL EVERY 4 HOURS PRN
Qty: 28 TAB | Refills: 0 | Status: SHIPPED | OUTPATIENT
Start: 2018-01-18 | End: 2018-01-25

## 2018-01-18 RX ORDER — CLINDAMYCIN HYDROCHLORIDE 300 MG/1
300 CAPSULE ORAL 3 TIMES DAILY
Qty: 42 CAP | Refills: 0
Start: 2018-01-18 | End: 2018-02-01

## 2018-01-18 RX ORDER — OXYCODONE HYDROCHLORIDE 60 MG/1
60 TABLET, FILM COATED, EXTENDED RELEASE ORAL EVERY 8 HOURS
Qty: 21 EACH | Refills: 0 | Status: SHIPPED | OUTPATIENT
Start: 2018-01-18 | End: 2018-01-25

## 2018-01-18 RX ORDER — NYSTATIN 100000 U/G
CREAM TOPICAL
Start: 2018-01-18 | End: 2018-03-16

## 2018-01-18 RX ORDER — ASCORBIC ACID 500 MG
500 TABLET ORAL DAILY
Qty: 30 TAB | Refills: 3 | Status: SHIPPED | OUTPATIENT
Start: 2018-01-19

## 2018-01-18 RX ORDER — ZINC SULFATE 50(220)MG
220 CAPSULE ORAL DAILY
Qty: 30 CAP | Refills: 3 | Status: SHIPPED | OUTPATIENT
Start: 2018-01-19 | End: 2018-03-16

## 2018-01-18 RX ORDER — FLUCONAZOLE 200 MG/1
200 TABLET ORAL DAILY
Qty: 14 TAB | Refills: 0
Start: 2018-01-19 | End: 2018-02-02

## 2018-01-18 RX ADMIN — FLUCONAZOLE 200 MG: 100 TABLET ORAL at 09:15

## 2018-01-18 RX ADMIN — LEVOTHYROXINE SODIUM 50 MCG: 50 TABLET ORAL at 05:56

## 2018-01-18 RX ADMIN — CLINDAMYCIN IN 5 PERCENT DEXTROSE 600 MG: 12 INJECTION, SOLUTION INTRAVENOUS at 00:08

## 2018-01-18 RX ADMIN — GABAPENTIN 300 MG: 300 CAPSULE ORAL at 14:22

## 2018-01-18 RX ADMIN — LACTOBACILLUS ACIDOPHILUS / LACTOBACILLUS BULGARICUS 1 PACKET: 100 MILLION CFU STRENGTH GRANULES at 09:15

## 2018-01-18 RX ADMIN — Medication 220 MG: at 09:16

## 2018-01-18 RX ADMIN — CLINDAMYCIN IN 5 PERCENT DEXTROSE 600 MG: 12 INJECTION, SOLUTION INTRAVENOUS at 09:26

## 2018-01-18 RX ADMIN — OXYCODONE HYDROCHLORIDE 20 MG: 10 TABLET ORAL at 09:25

## 2018-01-18 RX ADMIN — NYSTATIN: 100000 CREAM TOPICAL at 09:33

## 2018-01-18 RX ADMIN — OXYCODONE HYDROCHLORIDE 20 MG: 10 TABLET ORAL at 19:39

## 2018-01-18 RX ADMIN — ENOXAPARIN SODIUM 40 MG: 100 INJECTION SUBCUTANEOUS at 09:15

## 2018-01-18 RX ADMIN — CLINDAMYCIN IN 5 PERCENT DEXTROSE 600 MG: 12 INJECTION, SOLUTION INTRAVENOUS at 16:16

## 2018-01-18 RX ADMIN — OXYCODONE HYDROCHLORIDE 60 MG: 20 TABLET, FILM COATED, EXTENDED RELEASE ORAL at 14:22

## 2018-01-18 RX ADMIN — Medication: at 14:00

## 2018-01-18 RX ADMIN — INSULIN HUMAN 3 UNITS: 100 INJECTION, SOLUTION PARENTERAL at 09:11

## 2018-01-18 RX ADMIN — OXYCODONE HYDROCHLORIDE 20 MG: 10 TABLET ORAL at 15:09

## 2018-01-18 RX ADMIN — OXYCODONE HYDROCHLORIDE 60 MG: 20 TABLET, FILM COATED, EXTENDED RELEASE ORAL at 05:56

## 2018-01-18 RX ADMIN — OMEPRAZOLE 20 MG: 20 CAPSULE, DELAYED RELEASE ORAL at 09:16

## 2018-01-18 RX ADMIN — LISINOPRIL 10 MG: 10 TABLET ORAL at 09:16

## 2018-01-18 RX ADMIN — OXYCODONE HYDROCHLORIDE AND ACETAMINOPHEN 500 MG: 500 TABLET ORAL at 09:16

## 2018-01-18 RX ADMIN — GABAPENTIN 300 MG: 300 CAPSULE ORAL at 09:15

## 2018-01-18 RX ADMIN — FUROSEMIDE 40 MG: 40 TABLET ORAL at 09:16

## 2018-01-18 ASSESSMENT — PAIN SCALES - GENERAL
PAINLEVEL_OUTOF10: 8

## 2018-01-18 NOTE — DISCHARGE PLANNING
Contacted [facility] for a [place] quote is $50 Tranportation arranged for pickup at 1900. Notified ALEXX Weiner via phone. Notified Edinson with [facility].

## 2018-01-18 NOTE — PROGRESS NOTES
Patient A&O X4. Repositions self in chair. Requesting to sleep in recliner. Refusing to have toes palpated due to nerve pain. Wiggles toes and reports feeling in toes. IV access lost at 2130. Unable to gain new IV access ICU nurse came to unit and placed a 20 gauge in the left forearm. POC discussed with patient. Patient verbalize understanding. Non compliant with low carb diet.

## 2018-01-18 NOTE — ASSESSMENT & PLAN NOTE
Started today, h/o c diff, on clinda  R/o c diff with reflex toxin, contact isolation pending result.

## 2018-01-18 NOTE — DISCHARGE PLANNING
Received voicemail from [facility] with [place]. Returned call to [facility], patient will be in room 122A. Follow up with [facility] spoke to Mervat who requested referral be faxed to [phone]. Done. Notified ALEXX Weiner via phone.

## 2018-01-18 NOTE — DISCHARGE PLANNING
Received call from ALEXX Weiner that referral should be sent to [facility] for bed. Referral sent to [facility] at 0922.

## 2018-01-18 NOTE — DISCHARGE PLANNING
ALEXX met with pt at bedside to notify him of 1630 transfer time.  Bedside RN was assisting pt with wound care at the time of visit, he is aware of transfer time as well.  Pt consented to transfer, declined having family contacted.  ALEXX placed COBRA packet on pt chart.    Faxed D/C summary to [facility] for continuity of care, fax#[phone].

## 2018-01-18 NOTE — DISCHARGE PLANNING
Phone contact with [facility] and [facility] who confirmed pt's accepting physician will be Dr. Cho.    Spoke with [facility] Jared who reports she had f/u phone contact with [facility] and they are expecting bed to be delivered this afternoon and are requesting transport between [phone].  SW faxed internal transport request form to [facility].  Spoke with transport supervisor re: pt's weight (350.24lbs), supervisor states they will be able to accommodate transport.

## 2018-01-18 NOTE — CARE PLAN
Problem: Pain Management  Goal: Pain level will decrease to patient's comfort goal  Outcome: PROGRESSING AS EXPECTED  Appropriately verbalizes 0 to 10 pain scale. Calls for medication as needed.     Problem: Skin Integrity  Goal: Risk for impaired skin integrity will decrease  Outcome: PROGRESSING AS EXPECTED  Refusing waffle cushing. Moves self appropriately in bed. Q day dressing changes on lower extremities.

## 2018-01-18 NOTE — FACE TO FACE
Face to Face Note  -  Durable Medical Equipment    Sanjuanita Beck D.O. - NPI: [id number]  I certify that this patient is under my care and that they had a durable medical equipment(DME)face to face encounter by myself that meets the physician DME face-to-face encounter requirements with this patient on:    Date of encounter:   Patient:                    MRN:                       YOB: 2019  Fer Estrada  [id number]  1960     The encounter with the patient was in whole, or in part, for the following medical condition, which is the primary reason for durable medical equipment:  Other - massive stasis dermatitis, morbid obesity    I certify that, based on my findings, the following durable medical equipment is medically necessary:  Beds.    HOME O2 Saturation Measurements:(Values must be present for Home Oxygen orders)         ,     ,         My Clinical findings support the need for the above equipment due to:  Other - stasis dermatitis and cellulitis, morbidly obese    Supporting Symptoms: bariatric bed needed

## 2018-01-18 NOTE — PROGRESS NOTES
"[facility] Hospitalist Progress Note    Date of Service: 2018    Chief Complaint  57 y.o. male admitted 2018 with worsening BLE and uncontrolled pain.    Interval Problem Update   Patient states his pain remains uncontrolled and the \"morphine just doesn't work\".  I switched his long acting pain medication to oxycontin 60mg bid and will continue with PRN oxycodone.  He spent 5 minutes describing all of the things that are causing him pain ranging from his feet to a rotator cuff tear and everything in-between.  He is requiring placement at SNF for continued wound care and PO antibiotics on discharge.   Patient feeling better pain control today but still states it is not well controlled.  I will increase his oxycontin tid.  He started having diarrhea today with severe cramping and has a h/o c diff colitis years prior.  Stool study pending and could be antibiotic induced diarrhea only.  Bariatric bed ordered pending dc to snf.    Consultants/Specialty  ID - Génesis    Disposition  snf        Review of Systems   Constitutional: Negative for chills and fever.   HENT: Negative for congestion.    Eyes: Negative for blurred vision and photophobia.   Respiratory: Negative for cough and shortness of breath.    Cardiovascular: Negative for chest pain, claudication and leg swelling.   Gastrointestinal: Positive for abdominal pain and diarrhea. Negative for constipation, heartburn and vomiting.   Genitourinary: Negative for dysuria and hematuria.   Musculoskeletal: Positive for back pain, joint pain, myalgias and neck pain.   Skin: Negative for itching and rash.   Neurological: Negative for dizziness, sensory change, speech change, weakness and headaches.   Psychiatric/Behavioral: Positive for depression. The patient is not nervous/anxious and does not have insomnia.       Physical Exam  Laboratory/Imaging   Hemodynamics  Temp (24hrs), Av.6 °C (97.9 °F), Min:36.3 °C (97.4 °F), Max:36.8 °C (98.2 °F)   Temperature: " 36.7 °C (98.1 °F)  Pulse  Av.3  Min: 69  Max: 111   Blood Pressure: 119/72      Respiratory      Respiration: 18, Pulse Oximetry: 94 %     Work Of Breathing / Effort: Moderate  RUL Breath Sounds: Clear, RLL Breath Sounds: Diminished, LARY Breath Sounds: Clear, LLL Breath Sounds: Diminished    Fluids    Intake/Output Summary (Last 24 hours) at 18 1651  Last data filed at 18 0936   Gross per 24 hour   Intake              905 ml   Output             1200 ml   Net             -295 ml       Nutrition  Orders Placed This Encounter   Procedures   • Diet Order     Standing Status:   Standing     Number of Occurrences:   1     Order Specific Question:   Diet:     Answer:   Consistent Carbohydrate [4]     Physical Exam   Constitutional: He is oriented to person, place, and time. He appears well-developed and well-nourished. No distress.   HENT:   Head: Normocephalic and atraumatic.   Eyes: Conjunctivae are normal. No scleral icterus.   Neck: Neck supple. No JVD present.   Cardiovascular: Normal rate, regular rhythm and normal heart sounds.  Exam reveals no gallop and no friction rub.    No murmur heard.  Pulmonary/Chest: Effort normal and breath sounds normal. No respiratory distress. He has no wheezes. He exhibits no tenderness.   Abdominal: Soft. Bowel sounds are normal. He exhibits no distension and no mass. There is no tenderness.   Musculoskeletal: He exhibits no edema or tenderness.   Neurological: He is alert and oriented to person, place, and time. No cranial nerve deficit.   Skin: Skin is warm and dry. He is not diaphoretic. No erythema. No pallor.   Severely crusted feet bilaterally, edematous.   Psychiatric: He has a normal mood and affect. His behavior is normal.   Nursing note and vitals reviewed.                               Assessment/Plan     * Cellulitis   Assessment & Plan    With severe venous stasis dermatitis  Cultures done on admission  ID consult appreciated: PO clinda on dc  Clinically  slowly improving continue current therapy        Stasis dermatitis- (present on admission)   Assessment & Plan    Severe and Chronic, with recent worsening.  Unable to wear shoes due to significant swelling and pain, complicated by DM   Discussed with ; patient needs outpatient resources/transportation to get to wound care appointments, consult placed they're working him  Wound care team discussed, no Unna boots due to infection, recommend LacHydrin lotion to legs  Treating cellulitis and pain, bedside debridement  Continue vitamin c, zinc and fish oil supplements to help with healing  Continue lasix  skilled placement at discharge        COPD (chronic obstructive pulmonary disease) (CMS-HCC)- (present on admission)   Assessment & Plan    No evidence of current exacerbation.  On home oxygen therapy which will be continued        Chronic pain   Assessment & Plan    Associated with the severe dermatitis  Trial of long acting pain medication, currently inadequate dose  MS Contin ineffective, better with Oxycontin 60 q 12 hours but still having frequent need of PRN oxy - will change to q8h  20mg of oxycodone q 4 hours for breakthrough  Continue to titrate up on neurontin 300mg tid.        Diabetes type 2, controlled (CMS-HCC)- (present on admission)   Assessment & Plan    Glycemic control improving on high dose sliding scale, goal to get most sugars under 150  Diabetic diet, Accu-Cheks.          Morbid obesity with BMI of 45.0-49.9, adult (CMS-HCC)- (present on admission)   Assessment & Plan    Body mass index is 42.83 kg/m².          Diarrhea- (present on admission)   Assessment & Plan    Started today, h/o c diff, on clinda  R/o c diff with reflex toxin, contact isolation pending result.            Reviewed items::  Labs reviewed, Medications reviewed and Radiology images reviewed  Montoya catheter::  No Montoya  DVT prophylaxis pharmacological::  Enoxaparin (Lovenox)  Antibiotics:  Treating active  infection/contamination beyond 24 hours perioperative coverage

## 2018-01-18 NOTE — DISCHARGE PLANNING
Received voicemail from Remy with [facility]. Contacted [facility] Mervat re-faxed Approved services form to [phone].   Received voicemail from Edinson with [facility] that the bed should be delivered by 1500. [facility] can accept patient between [id number]. Notified ALEXX Weiner via voicemail.

## 2018-01-18 NOTE — DISCHARGE PLANNING
Received call from ALEXX Weiner that [facility] transport can take patient in the van.  Received voicemail from Ross with [facility] transport the only time is 1630.   Contacted [employer] transportation arranged for pickup at 1630.    Contacted [employer] spoke to Diego and cancelled transport. Contacted ALEXX Weiner via phone.  Notified Edinson with [facility] of new transport time.

## 2018-01-18 NOTE — DISCHARGE PLANNING
F/u phone contact with Edinson in admissions at [facility] to confirm pt's accepting physician.  Accepting MD is Dr. Cho.  Edinson states the bed has not been delivered yet but agrees to f/u with AURELIA Coleman once it is delivered so that we can make arrangements for transportation.

## 2018-01-18 NOTE — DISCHARGE SUMMARY
CHIEF COMPLAINT ON ADMISSION  Chief Complaint   Patient presents with   • Extremity Pain     BLE pain r/t cellulitis.        CODE STATUS  Full Code    HPI & HOSPITAL COURSE  This is a 57 y.o. male here with complaints of increased pain of bilateral lower extremity secondary to return of cellulitis. [facility] however noticed that he started having worsening smell come from his bilateral lower extremities and worsening pain with ambulation. Patient reports he's had poor follow-up for his medical problems and has been intermittently taking care of his diabetes. The patient was seen by infectious disease and started on IV antibiotics. He's had difficult to control pain and is requiring large doses of oxycodone and OxyContin to adequately treat his pain but is doing better. His been seen by wound care as well and needs continued treatment of his bilateral lower extremities to help with healing. This time is appropriate to transition to oral antibiotics and continue with wound care and therapies to return to his independent state. Currently he is on clindamycin will be transitioned from IV to by mouth and will also continue with fluconazole and Nizoral.  On discharge from the skilled nursing facility patient will likely be needing to be on insulin to maintain appropriate glucose control as oral anti-hyperglycemics have been ineffective, especially with the patient's intermittent compliance.    Therefore, he is discharged in fair and stable condition with close outpatient follow-up.    SPECIFIC OUTPATIENT FOLLOW-UP  Follow-up with primary care practitioner after discharge from skilled nursing    DISCHARGE PROBLEM LIST  Principal Problem:    Cellulitis POA: Unknown  Active Problems:    COPD (chronic obstructive pulmonary disease) (CMS-HCC) POA: Yes    Stasis dermatitis POA: Yes    Diarrhea POA: Yes    Morbid obesity with BMI of 45.0-49.9, adult (CMS-HCC)  POA: Yes    Diabetes type 2, controlled (CMS-[facility]) POA: Yes    Chronic pain POA: Unknown  Resolved Problems:    * No resolved hospital problems. *      FOLLOW UP  No future appointments.  [facility] ([facility])  [address] [address]  [phone]        Giovani Lara M.D.  [address]  [address]  [facility] [address]  [phone]            MEDICATIONS ON DISCHARGE   Fer Estrada   Home Medication Instructions HIPOLITO:[id number]    Printed on:01/18/18 1518   Medication Information                      acetaminophen (TYLENOL) 325 MG Tab  Take 2 Tabs by mouth every 6 hours as needed (Mild Pain; (Pain scale 1-3); Temp greater than 100.5 F).             albuterol (PROVENTIL) 2.5mg/0.5ml Nebu Soln  2.5 mg by Nebulization route every four hours as needed for Shortness of Breath.             albuterol 108 (90 BASE) MCG/ACT Aero Soln inhalation aerosol  Inhale 2 Puffs by mouth every 6 hours as needed for Shortness of Breath.             ammonium lactate (LAC-HYDRIN) 12 % Lotion  Apply 1 Application to affected area(s) 2 Times a Day.             ascorbic acid (VITAMIN C) 500 MG tablet  Take 1 Tab by mouth every day.             budesonide-formoterol (SYMBICORT) 160-4.5 MCG/ACT Aerosol  Inhale 2 Puffs by mouth 2 Times a Day.             clindamycin (CLEOCIN) 300 MG Cap  Take 1 Cap by mouth 3 times a day for 14 days.             fluconazole (DIFLUCAN) 200 MG Tab  Take 1 Tab by mouth every day for 14 days.             furosemide (LASIX) 40 MG Tab  Take 1 Tab by mouth every day.             gabapentin (NEURONTIN) 300 MG Cap  Take 1 Cap by mouth 3 times a day.             insulin regular (HUMULIN R) 100 Unit/mL Solution  Inject 3-14 Units as instructed 4 Times a Day,Before Meals and at Bedtime.             lactobacillus granules (LACTINEX/FLORANEX) Pack  Take 1 Packet by mouth 3 times a day, with meals.             LACTOBACILLUS RHAMNOSUS, GG, PO  Take 1 Cap by mouth 3 times a day.              levothyroxine (SYNTHROID) 50 MCG Tab  Take 50 mcg by mouth Every morning on an empty stomach.             lisinopril (PRINIVIL) 20 MG Tab  Take 0.5 Tabs by mouth every day.             nystatin (MYCOSTATIN) 176459 UNIT/GM Cream topical cream  Apply to face BID             Omega-3 Fatty Acids (FISH OIL) 1000 MG Cap capsule  Take 1 Cap by mouth 3 times a day, with meals.             omeprazole (PRILOSEC) 20 MG delayed-release capsule  Take 20 mg by mouth every day.             oxyCODONE CR (OXYCONTIN) 60 MG Tablet Extended Release 12 hour Abuse-Deterrent tablet  Take 1 Tab by mouth every 8 hours for 7 days.             oxycodone immediate release 20 MG Tab  Take 1 Tab by mouth every four hours as needed for Moderate Pain or Severe Pain (Severe Pain (NRS Pain Scale 7-10; CPOT Pain Scale 6-8) this is the patient's home dose) for up to 7 days.             zinc sulfate (ZINCATE) 220 (50 Zn) MG Cap  Take 1 Cap by mouth every day.             zolpidem (AMBIEN) 5 MG Tab  Take 1 Tab by mouth at bedtime as needed.  May repeat 1 time. for up to 1 day.                 DIET  Orders Placed This Encounter   Procedures   • Diet Order     Standing Status:   Standing     Number of Occurrences:   1     Order Specific Question:   Diet:     Answer:   Consistent Carbohydrate [4]       ACTIVITY  As tolerated and directed by skilled nursing.  Weight bearing as tolerated      CONSULTATIONS  Infectious disease-Dr. Cormier    PROCEDURES  none    LABORATORY  Lab Results   Component Value Date/Time    SODIUM 136 01/18/2018 12:12 AM    POTASSIUM 4.8 01/18/2018 12:12 AM    CHLORIDE 91 (L) 01/18/2018 12:12 AM    CO2 40 (H) 01/18/2018 12:12 AM    GLUCOSE 160 (H) 01/18/2018 12:12 AM    BUN 24 (H) 01/18/2018 12:12 AM    CREATININE 0.78 01/18/2018 12:12 AM        Lab Results   Component Value Date/Time    WBC 6.5 01/18/2018 12:12 AM    HEMOGLOBIN 11.9 (L) 01/18/2018 12:12 AM    HEMATOCRIT 40.9 (L) 01/18/2018 12:12 AM    PLATELETCT 225 01/18/2018  12:12 AM        Total time of the discharge process exceeds 36 minutes

## 2018-01-18 NOTE — DISCHARGE PLANNING
ALEXX met with pt at bedside to discuss discharge plan and community resources.  Updated pt that [facility] has declined pt as well as [facility] snf.  He is open to going to [place].  ALEXX advised we are working on getting a bariatric bed for pt.    ALEXX provided pt with [facility] application.  Pt states he is familiar with [facility] and has applied in the past but was unable to attend the initial screening appointment.  He states his daughter lives with him now and she may be able to assist him with getting to this appointment.  No other questions/concerns at this time.    ALEXX will continue to work on arrangements for snf transfer.

## 2018-01-18 NOTE — DISCHARGE PLANNING
Spoke with [facility] Jared who confirmed 1630 transport time via [facility] van.  SW notified Dr. Beck of transfer time and requested D/C Summary.  Attempted to meet with pt to notify of transfer time, pt was using bedside commode.  SW will return.

## 2018-01-19 NOTE — DISCHARGE PLANNING
Medical Social Work     Estella received a page from the oncology unit requesting assistance with setting up transportation for the pt to [facility]. Estella faxed PCS form to [facility]. Estella spoke with Leonardo and set up transport time for 1917. ESTELLA called an updated the oncology unit of transport time.     ESTELLA will remain available for pt support.

## 2018-01-19 NOTE — PROGRESS NOTES
Pt back from transport stating that he is unable to tolerate being on the transport chair and that transport is unable to maneuver the chair to face forward. Pt their way back to the unit. Charge and MD notified. Md stated that she will follow up with on call  .

## 2018-01-19 NOTE — DISCHARGE INSTRUCTIONS
Discharge Instructions    Discharged to group home by ambulance with self. Discharged via ambulance, hospital escort: Refused.  Special equipment needed: Oxygen    Be sure to schedule a follow-up appointment with your primary care doctor or any specialists as instructed.     Discharge Plan:   Influenza Vaccine Indication: Indicated: Adults > or equal to 18 years of age with severe allergy to eggs (Flublok vaccine)  Influenza Vaccine Given - only chart on this line when given: Influenza Vaccine Given (See MAR)    I understand that a diet low in cholesterol, fat, and sodium is recommended for good health. Unless I have been given specific instructions below for another diet, I accept this instruction as my diet prescription.   Other diet: consistent carb    Special Instructions: None    · Is patient discharged on Warfarin / Coumadin?   No     Depression / Suicide Risk    As you are discharged from this [facility] facility, it is important to learn how to keep safe from harming yourself.    Recognize the warning signs:  · Abrupt changes in personality, positive or negative- including increase in energy   · Giving away possessions  · Change in eating patterns- significant weight changes-  positive or negative  · Change in sleeping patterns- unable to sleep or sleeping all the time   · Unwillingness or inability to communicate  · Depression  · Unusual sadness, discouragement and loneliness  · Talk of wanting to die  · Neglect of personal appearance   · Rebelliousness- reckless behavior  · Withdrawal from people/activities they love  · Confusion- inability to concentrate     If you or a loved one observes any of these behaviors or has concerns about self-harm, here's what you can do:  · Talk about it- your feelings and reasons for harming yourself  · Remove any means that you might use to hurt yourself (examples: pills, rope, extension cords, firearm)  · Get professional help from the community (Mental Health,  Substance Abuse, psychological counseling)  · Do not be alone:Call your Safe Contact- someone whom you trust who will be there for you.  · Call your local CRISIS HOTLINE 388-9222 or 402-459-8130  · Call your local Children's Mobile Crisis Response Team [place] (676) 051-3585 or www.Sweeten  · Call the toll free National Suicide Prevention Hotlines   · National Suicide Prevention Lifeline 960-691-QYWO (6795)  · National Hope Line Network 800-SUICIDE (837-5907)

## 2018-01-19 NOTE — PROGRESS NOTES
Dressing cleaned and changed to bilateral lower extremities. Pt updated on POC to DC to [facility]. Pt agreed to POC.  at bedside.

## 2018-01-22 ENCOUNTER — TELEPHONE (OUTPATIENT)
Dept: INFECTIOUS DISEASES | Facility: MEDICAL CENTER | Age: 58
End: 2018-01-22

## 2018-01-29 ENCOUNTER — TELEPHONE (OUTPATIENT)
Dept: INFECTIOUS DISEASES | Facility: MEDICAL CENTER | Age: 58
End: 2018-01-29

## 2018-02-02 ENCOUNTER — TELEPHONE (OUTPATIENT)
Dept: INFECTIOUS DISEASES | Facility: MEDICAL CENTER | Age: 58
End: 2018-02-02

## 2018-02-02 NOTE — TELEPHONE ENCOUNTER
Called and LM for pt to return my call to schedule a follow up appointment. JAH    Letter sent. JAH

## 2018-03-16 ENCOUNTER — APPOINTMENT (OUTPATIENT)
Dept: RADIOLOGY | Facility: MEDICAL CENTER | Age: 58
DRG: 987 | End: 2018-03-16
Attending: EMERGENCY MEDICINE
Payer: MEDICARE

## 2018-03-16 ENCOUNTER — RESOLUTE PROFESSIONAL BILLING HOSPITAL PROF FEE (OUTPATIENT)
Dept: HOSPITALIST | Facility: MEDICAL CENTER | Age: 58
End: 2018-03-16
Payer: MEDICARE

## 2018-03-16 ENCOUNTER — HOSPITAL ENCOUNTER (INPATIENT)
Facility: MEDICAL CENTER | Age: 58
LOS: 31 days | DRG: 987 | End: 2018-04-16
Attending: EMERGENCY MEDICINE | Admitting: INTERNAL MEDICINE
Payer: MEDICARE

## 2018-03-16 DIAGNOSIS — L03.115 BILATERAL LOWER LEG CELLULITIS: ICD-10-CM

## 2018-03-16 DIAGNOSIS — R19.7 DIARRHEA, UNSPECIFIED TYPE: ICD-10-CM

## 2018-03-16 DIAGNOSIS — L03.116 BILATERAL LOWER LEG CELLULITIS: ICD-10-CM

## 2018-03-16 DIAGNOSIS — J96.22 ACUTE ON CHRONIC RESPIRATORY FAILURE WITH HYPOXIA AND HYPERCAPNIA (HCC): ICD-10-CM

## 2018-03-16 DIAGNOSIS — L03.90 CELLULITIS, UNSPECIFIED CELLULITIS SITE: ICD-10-CM

## 2018-03-16 DIAGNOSIS — J44.9 CHRONIC OBSTRUCTIVE PULMONARY DISEASE, UNSPECIFIED COPD TYPE (HCC): ICD-10-CM

## 2018-03-16 DIAGNOSIS — G89.4 CHRONIC PAIN SYNDROME: ICD-10-CM

## 2018-03-16 DIAGNOSIS — J96.21 ACUTE ON CHRONIC RESPIRATORY FAILURE WITH HYPOXIA AND HYPERCAPNIA (HCC): ICD-10-CM

## 2018-03-16 DIAGNOSIS — I87.2 VENOUS STASIS DERMATITIS OF BOTH LOWER EXTREMITIES: ICD-10-CM

## 2018-03-16 DIAGNOSIS — E43 SEVERE PROTEIN-CALORIE MALNUTRITION (HCC): ICD-10-CM

## 2018-03-16 DIAGNOSIS — R60.9 PERIPHERAL EDEMA: ICD-10-CM

## 2018-03-16 DIAGNOSIS — E66.01 MORBID OBESITY (HCC): ICD-10-CM

## 2018-03-16 DIAGNOSIS — F11.20 CHRONIC NARCOTIC DEPENDENCE (HCC): ICD-10-CM

## 2018-03-16 DIAGNOSIS — Z91.148 NON COMPLIANCE W MEDICATION REGIMEN: ICD-10-CM

## 2018-03-16 DIAGNOSIS — R53.1 WEAKNESS: ICD-10-CM

## 2018-03-16 PROBLEM — R62.7 ADULT FAILURE TO THRIVE: Status: ACTIVE | Noted: 2018-03-16

## 2018-03-16 PROBLEM — R60.0 BILATERAL EDEMA OF LOWER EXTREMITY: Status: ACTIVE | Noted: 2018-03-16

## 2018-03-16 LAB
ALBUMIN SERPL BCP-MCNC: 4 G/DL (ref 3.2–4.9)
ALBUMIN/GLOB SERPL: 0.9 G/DL
ALP SERPL-CCNC: 75 U/L (ref 30–99)
ALT SERPL-CCNC: 12 U/L (ref 2–50)
ANION GAP SERPL CALC-SCNC: 5 MMOL/L (ref 0–11.9)
ANISOCYTOSIS BLD QL SMEAR: ABNORMAL
APPEARANCE UR: CLEAR
AST SERPL-CCNC: 17 U/L (ref 12–45)
BASE EXCESS BLDA CALC-SCNC: 5 MMOL/L (ref -4–3)
BASO STIPL BLD QL SMEAR: NORMAL
BASOPHILS # BLD AUTO: 0.9 % (ref 0–1.8)
BASOPHILS # BLD: 0.09 K/UL (ref 0–0.12)
BILIRUB SERPL-MCNC: 0.5 MG/DL (ref 0.1–1.5)
BILIRUB UR QL STRIP.AUTO: NEGATIVE
BNP SERPL-MCNC: <2 PG/ML (ref 0–100)
BODY TEMPERATURE: ABNORMAL CENTIGRADE
BUN SERPL-MCNC: 33 MG/DL (ref 8–22)
C DIFF DNA SPEC QL NAA+PROBE: NEGATIVE
C DIFF TOX GENS STL QL NAA+PROBE: NEGATIVE
CALCIUM SERPL-MCNC: 10.5 MG/DL (ref 8.5–10.5)
CHLORIDE SERPL-SCNC: 90 MMOL/L (ref 96–112)
CO2 SERPL-SCNC: 39 MMOL/L (ref 20–33)
COLOR UR: YELLOW
CREAT SERPL-MCNC: 0.79 MG/DL (ref 0.5–1.4)
EKG IMPRESSION: NORMAL
EOSINOPHIL # BLD AUTO: 0.17 K/UL (ref 0–0.51)
EOSINOPHIL NFR BLD: 1.7 % (ref 0–6.9)
ERYTHROCYTE [DISTWIDTH] IN BLOOD BY AUTOMATED COUNT: 54.9 FL (ref 35.9–50)
GLOBULIN SER CALC-MCNC: 4.7 G/DL (ref 1.9–3.5)
GLUCOSE BLD-MCNC: 96 MG/DL (ref 65–99)
GLUCOSE SERPL-MCNC: 100 MG/DL (ref 65–99)
GLUCOSE UR STRIP.AUTO-MCNC: NEGATIVE MG/DL
HCO3 BLDA-SCNC: 34 MMOL/L (ref 17–25)
HCT VFR BLD AUTO: 44.8 % (ref 42–52)
HGB BLD-MCNC: 13.2 G/DL (ref 14–18)
KETONES UR STRIP.AUTO-MCNC: ABNORMAL MG/DL
LACTATE BLD-SCNC: 1.2 MMOL/L (ref 0.5–2)
LACTATE BLD-SCNC: 1.4 MMOL/L (ref 0.5–2)
LACTATE BLD-SCNC: 1.6 MMOL/L (ref 0.5–2)
LACTATE BLD-SCNC: 2 MMOL/L (ref 0.5–2)
LEUKOCYTE ESTERASE UR QL STRIP.AUTO: NEGATIVE
LYMPHOCYTES # BLD AUTO: 0.43 K/UL (ref 1–4.8)
LYMPHOCYTES NFR BLD: 4.3 % (ref 22–41)
MACROCYTES BLD QL SMEAR: ABNORMAL
MANUAL DIFF BLD: NORMAL
MCH RBC QN AUTO: 28 PG (ref 27–33)
MCHC RBC AUTO-ENTMCNC: 29.5 G/DL (ref 33.7–35.3)
MCV RBC AUTO: 95.1 FL (ref 81.4–97.8)
MICRO URNS: ABNORMAL
MONOCYTES # BLD AUTO: 0.61 K/UL (ref 0–0.85)
MONOCYTES NFR BLD AUTO: 6.1 % (ref 0–13.4)
MORPHOLOGY BLD-IMP: NORMAL
NEUTROPHILS # BLD AUTO: 8.7 K/UL (ref 1.82–7.42)
NEUTROPHILS NFR BLD: 86.1 % (ref 44–72)
NEUTS BAND NFR BLD MANUAL: 0.9 % (ref 0–10)
NITRITE UR QL STRIP.AUTO: NEGATIVE
NRBC # BLD AUTO: 0 K/UL
NRBC BLD-RTO: 0 /100 WBC
O2/TOTAL GAS SETTING VFR VENT: 60 % (ref 30–60)
PCO2 BLDA: 68.1 MMHG (ref 26–37)
PH BLDA: 7.31 [PH] (ref 7.4–7.5)
PH UR STRIP.AUTO: 5.5 [PH]
PLATELET # BLD AUTO: 220 K/UL (ref 164–446)
PMV BLD AUTO: 10.5 FL (ref 9–12.9)
PO2 BLDA: 88.7 MMHG (ref 64–87)
POIKILOCYTOSIS BLD QL SMEAR: NORMAL
POTASSIUM SERPL-SCNC: 4.4 MMOL/L (ref 3.6–5.5)
PROCALCITONIN SERPL-MCNC: <0.05 NG/ML
PROT SERPL-MCNC: 8.7 G/DL (ref 6–8.2)
PROT UR QL STRIP: NEGATIVE MG/DL
RBC # BLD AUTO: 4.71 M/UL (ref 4.7–6.1)
RBC BLD AUTO: PRESENT
RBC UR QL AUTO: NEGATIVE
SAO2 % BLDA: 95.5 % (ref 93–99)
SODIUM SERPL-SCNC: 134 MMOL/L (ref 135–145)
SP GR UR STRIP.AUTO: 1.02
STOMATOCYTES BLD QL SMEAR: NORMAL
TROPONIN I SERPL-MCNC: <0.01 NG/ML (ref 0–0.04)
TROPONIN I SERPL-MCNC: <0.01 NG/ML (ref 0–0.04)
UROBILINOGEN UR STRIP.AUTO-MCNC: 1 MG/DL
WBC # BLD AUTO: 10 K/UL (ref 4.8–10.8)

## 2018-03-16 PROCEDURE — 700105 HCHG RX REV CODE 258: Performed by: INTERNAL MEDICINE

## 2018-03-16 PROCEDURE — 96365 THER/PROPH/DIAG IV INF INIT: CPT

## 2018-03-16 PROCEDURE — 700111 HCHG RX REV CODE 636 W/ 250 OVERRIDE (IP): Performed by: EMERGENCY MEDICINE

## 2018-03-16 PROCEDURE — 71045 X-RAY EXAM CHEST 1 VIEW: CPT

## 2018-03-16 PROCEDURE — 84145 PROCALCITONIN (PCT): CPT

## 2018-03-16 PROCEDURE — 87493 C DIFF AMPLIFIED PROBE: CPT

## 2018-03-16 PROCEDURE — 85007 BL SMEAR W/DIFF WBC COUNT: CPT

## 2018-03-16 PROCEDURE — 94760 N-INVAS EAR/PLS OXIMETRY 1: CPT

## 2018-03-16 PROCEDURE — 85027 COMPLETE CBC AUTOMATED: CPT

## 2018-03-16 PROCEDURE — 700111 HCHG RX REV CODE 636 W/ 250 OVERRIDE (IP): Performed by: INTERNAL MEDICINE

## 2018-03-16 PROCEDURE — 93005 ELECTROCARDIOGRAM TRACING: CPT

## 2018-03-16 PROCEDURE — A9270 NON-COVERED ITEM OR SERVICE: HCPCS | Performed by: INTERNAL MEDICINE

## 2018-03-16 PROCEDURE — 82803 BLOOD GASES ANY COMBINATION: CPT

## 2018-03-16 PROCEDURE — 99291 CRITICAL CARE FIRST HOUR: CPT | Performed by: INTERNAL MEDICINE

## 2018-03-16 PROCEDURE — 700102 HCHG RX REV CODE 250 W/ 637 OVERRIDE(OP): Performed by: INTERNAL MEDICINE

## 2018-03-16 PROCEDURE — 82962 GLUCOSE BLOOD TEST: CPT

## 2018-03-16 PROCEDURE — 87086 URINE CULTURE/COLONY COUNT: CPT

## 2018-03-16 PROCEDURE — 80053 COMPREHEN METABOLIC PANEL: CPT

## 2018-03-16 PROCEDURE — 81003 URINALYSIS AUTO W/O SCOPE: CPT

## 2018-03-16 PROCEDURE — 96366 THER/PROPH/DIAG IV INF ADDON: CPT

## 2018-03-16 PROCEDURE — 96367 TX/PROPH/DG ADDL SEQ IV INF: CPT

## 2018-03-16 PROCEDURE — 87186 SC STD MICRODIL/AGAR DIL: CPT

## 2018-03-16 PROCEDURE — 96361 HYDRATE IV INFUSION ADD-ON: CPT

## 2018-03-16 PROCEDURE — 94002 VENT MGMT INPAT INIT DAY: CPT

## 2018-03-16 PROCEDURE — 770022 HCHG ROOM/CARE - ICU (200)

## 2018-03-16 PROCEDURE — 87077 CULTURE AEROBIC IDENTIFY: CPT

## 2018-03-16 PROCEDURE — 83605 ASSAY OF LACTIC ACID: CPT | Mod: 91

## 2018-03-16 PROCEDURE — 99291 CRITICAL CARE FIRST HOUR: CPT

## 2018-03-16 PROCEDURE — 87070 CULTURE OTHR SPECIMN AEROBIC: CPT

## 2018-03-16 PROCEDURE — 84484 ASSAY OF TROPONIN QUANT: CPT

## 2018-03-16 PROCEDURE — 700105 HCHG RX REV CODE 258: Performed by: EMERGENCY MEDICINE

## 2018-03-16 PROCEDURE — 83880 ASSAY OF NATRIURETIC PEPTIDE: CPT

## 2018-03-16 PROCEDURE — 87205 SMEAR GRAM STAIN: CPT

## 2018-03-16 PROCEDURE — 93005 ELECTROCARDIOGRAM TRACING: CPT | Performed by: EMERGENCY MEDICINE

## 2018-03-16 PROCEDURE — 87040 BLOOD CULTURE FOR BACTERIA: CPT | Mod: 91

## 2018-03-16 RX ORDER — OXYCODONE HYDROCHLORIDE 30 MG/1
30 TABLET ORAL EVERY 4 HOURS PRN
Status: ON HOLD | COMMUNITY
End: 2018-04-09

## 2018-03-16 RX ORDER — BISACODYL 10 MG
10 SUPPOSITORY, RECTAL RECTAL
Status: DISCONTINUED | OUTPATIENT
Start: 2018-03-16 | End: 2018-03-19

## 2018-03-16 RX ORDER — POLYETHYLENE GLYCOL 3350 17 G/17G
1 POWDER, FOR SOLUTION ORAL
Status: DISCONTINUED | OUTPATIENT
Start: 2018-03-16 | End: 2018-03-19

## 2018-03-16 RX ORDER — ASPIRIN 300 MG/1
300 SUPPOSITORY RECTAL DAILY
Status: DISCONTINUED | OUTPATIENT
Start: 2018-03-16 | End: 2018-03-18

## 2018-03-16 RX ORDER — L. ACIDOPHILUS/L.BULGARICUS 100MM CELL
1 GRANULES IN PACKET (EA) ORAL
Status: DISCONTINUED | OUTPATIENT
Start: 2018-03-16 | End: 2018-03-19

## 2018-03-16 RX ORDER — FUROSEMIDE 40 MG/1
40 TABLET ORAL DAILY
Status: DISCONTINUED | OUTPATIENT
Start: 2018-03-16 | End: 2018-03-19

## 2018-03-16 RX ORDER — SODIUM CHLORIDE 9 MG/ML
500 INJECTION, SOLUTION INTRAVENOUS
Status: DISCONTINUED | OUTPATIENT
Start: 2018-03-16 | End: 2018-03-17

## 2018-03-16 RX ORDER — LINEZOLID 2 MG/ML
600 INJECTION, SOLUTION INTRAVENOUS EVERY 12 HOURS
Status: DISCONTINUED | OUTPATIENT
Start: 2018-03-17 | End: 2018-03-17

## 2018-03-16 RX ORDER — ACETAMINOPHEN 325 MG/1
650 TABLET ORAL EVERY 6 HOURS PRN
Status: DISCONTINUED | OUTPATIENT
Start: 2018-03-16 | End: 2018-03-19

## 2018-03-16 RX ORDER — SODIUM CHLORIDE 9 MG/ML
30 INJECTION, SOLUTION INTRAVENOUS ONCE
Status: COMPLETED | OUTPATIENT
Start: 2018-03-16 | End: 2018-03-16

## 2018-03-16 RX ORDER — ASPIRIN 325 MG
325 TABLET ORAL DAILY
Status: DISCONTINUED | OUTPATIENT
Start: 2018-03-16 | End: 2018-03-18

## 2018-03-16 RX ORDER — LISINOPRIL 20 MG/1
10 TABLET ORAL DAILY
Status: DISCONTINUED | OUTPATIENT
Start: 2018-03-17 | End: 2018-03-19

## 2018-03-16 RX ORDER — DEXTROSE MONOHYDRATE 25 G/50ML
25 INJECTION, SOLUTION INTRAVENOUS
Status: DISCONTINUED | OUTPATIENT
Start: 2018-03-16 | End: 2018-03-20

## 2018-03-16 RX ORDER — LEVOTHYROXINE SODIUM 0.03 MG/1
50 TABLET ORAL
Status: DISCONTINUED | OUTPATIENT
Start: 2018-03-16 | End: 2018-03-19

## 2018-03-16 RX ORDER — AMOXICILLIN 250 MG
2 CAPSULE ORAL 2 TIMES DAILY
Status: DISCONTINUED | OUTPATIENT
Start: 2018-03-16 | End: 2018-03-19

## 2018-03-16 RX ORDER — BUDESONIDE AND FORMOTEROL FUMARATE DIHYDRATE 160; 4.5 UG/1; UG/1
2 AEROSOL RESPIRATORY (INHALATION) 2 TIMES DAILY
Status: DISCONTINUED | OUTPATIENT
Start: 2018-03-16 | End: 2018-03-20

## 2018-03-16 RX ORDER — CHLORAL HYDRATE 500 MG
1000 CAPSULE ORAL
Status: DISCONTINUED | OUTPATIENT
Start: 2018-03-16 | End: 2018-03-19

## 2018-03-16 RX ORDER — OMEPRAZOLE 20 MG/1
20 CAPSULE, DELAYED RELEASE ORAL DAILY
Status: DISCONTINUED | OUTPATIENT
Start: 2018-03-16 | End: 2018-03-19

## 2018-03-16 RX ORDER — IPRATROPIUM BROMIDE AND ALBUTEROL SULFATE 2.5; .5 MG/3ML; MG/3ML
3 SOLUTION RESPIRATORY (INHALATION)
Status: DISCONTINUED | OUTPATIENT
Start: 2018-03-16 | End: 2018-04-16 | Stop reason: HOSPADM

## 2018-03-16 RX ORDER — ASCORBIC ACID 500 MG
500 TABLET ORAL DAILY
Status: DISCONTINUED | OUTPATIENT
Start: 2018-03-16 | End: 2018-03-19

## 2018-03-16 RX ORDER — SODIUM CHLORIDE 9 MG/ML
1000 INJECTION, SOLUTION INTRAVENOUS
Status: DISCONTINUED | OUTPATIENT
Start: 2018-03-16 | End: 2018-03-17

## 2018-03-16 RX ORDER — ASPIRIN 81 MG/1
324 TABLET, CHEWABLE ORAL DAILY
Status: DISCONTINUED | OUTPATIENT
Start: 2018-03-16 | End: 2018-03-18

## 2018-03-16 RX ORDER — FUROSEMIDE 10 MG/ML
60 INJECTION INTRAMUSCULAR; INTRAVENOUS ONCE
Status: COMPLETED | OUTPATIENT
Start: 2018-03-16 | End: 2018-03-16

## 2018-03-16 RX ORDER — ALBUTEROL SULFATE 90 UG/1
2 AEROSOL, METERED RESPIRATORY (INHALATION) EVERY 6 HOURS PRN
Status: DISCONTINUED | OUTPATIENT
Start: 2018-03-16 | End: 2018-04-16 | Stop reason: HOSPADM

## 2018-03-16 RX ORDER — OXYCODONE HYDROCHLORIDE 10 MG/1
30 TABLET ORAL EVERY 4 HOURS PRN
Status: DISCONTINUED | OUTPATIENT
Start: 2018-03-16 | End: 2018-03-19

## 2018-03-16 RX ADMIN — ASPIRIN 300 MG: 300 SUPPOSITORY RECTAL at 20:02

## 2018-03-16 RX ADMIN — AMPICILLIN SODIUM AND SULBACTAM SODIUM 3 G: 2; 1 INJECTION, POWDER, FOR SOLUTION INTRAMUSCULAR; INTRAVENOUS at 23:27

## 2018-03-16 RX ADMIN — SODIUM CHLORIDE 4422 ML: 9 INJECTION, SOLUTION INTRAVENOUS at 14:08

## 2018-03-16 RX ADMIN — VANCOMYCIN HYDROCHLORIDE 3000 MG: 100 INJECTION, POWDER, LYOPHILIZED, FOR SOLUTION INTRAVENOUS at 16:58

## 2018-03-16 RX ADMIN — FUROSEMIDE 60 MG: 10 INJECTION, SOLUTION INTRAMUSCULAR; INTRAVENOUS at 17:22

## 2018-03-16 RX ADMIN — AMPICILLIN SODIUM AND SULBACTAM SODIUM 3 G: 2; 1 INJECTION, POWDER, FOR SOLUTION INTRAMUSCULAR; INTRAVENOUS at 16:18

## 2018-03-16 ASSESSMENT — PATIENT HEALTH QUESTIONNAIRE - PHQ9
1. LITTLE INTEREST OR PLEASURE IN DOING THINGS: NOT AT ALL
SUM OF ALL RESPONSES TO PHQ QUESTIONS 1-9: 0
2. FEELING DOWN, DEPRESSED, IRRITABLE, OR HOPELESS: NOT AT ALL
SUM OF ALL RESPONSES TO PHQ9 QUESTIONS 1 AND 2: 0

## 2018-03-16 ASSESSMENT — PULMONARY FUNCTION TESTS
EPAP_CMH2O: 8

## 2018-03-16 ASSESSMENT — COPD QUESTIONNAIRES
HAVE YOU SMOKED AT LEAST 100 CIGARETTES IN YOUR ENTIRE LIFE: YES
COPD SCREENING SCORE: 5
DO YOU EVER COUGH UP ANY MUCUS OR PHLEGM?: NO/ONLY WITH OCCASIONAL COLDS OR INFECTIONS
DURING THE PAST 4 WEEKS HOW MUCH DID YOU FEEL SHORT OF BREATH: SOME OF THE TIME

## 2018-03-16 ASSESSMENT — LIFESTYLE VARIABLES
EVER_SMOKED: YES
ALCOHOL_USE: NO
EVER_SMOKED: YES

## 2018-03-16 ASSESSMENT — PAIN SCALES - GENERAL
PAINLEVEL_OUTOF10: 8
PAINLEVEL_OUTOF10: 5
PAINLEVEL_OUTOF10: 0

## 2018-03-16 NOTE — ED TRIAGE NOTES
Pt arrived via [facility] from home. Per EMS pt daughter called 911 because she can't take care of him any longer. Per EMS pt is having difficulty standing to go to the bathroom. Pt has bilateral LE swelling with wounds noted, states this has been ongoing for 13 years.     Per EMS pt also had sores to his buttocks region. Pt c/o bilateral lower abdomen, +diarrhea.     Pt is on 4L NC at home. Pt a/o x4, speaking in full sentences.

## 2018-03-16 NOTE — ED NOTES
Med rec partially complete per pt and Moustapha (2).  Unable to verify anything with pharmacies except Oxy-IR.  Per Pt he is on Metformin not Insulin.    Neither pharmacies have  filled Metformin. Last fill for any other medications April 2017.  Unable to reach daughter.   Allergies reviewed  Asked pt if he fills anywhere else and he shakes his head No.

## 2018-03-16 NOTE — ASSESSMENT & PLAN NOTE
Reinforced compliance   Patient has also been noncompliant with BIPAP as he does not like the feeling  Counseled and educated again  Improving compliance now  Will continue to reinforce care

## 2018-03-16 NOTE — ED NOTES
Pt taken off of bed pan. Pt had large amount of soft brown stool noted. Pt cleaned. Noted to have healing pressure ulcer to L buttocks. Pt also had skin breakdown noted to L hip/buttocks. Pt states he receives wound care 3x a week.

## 2018-03-16 NOTE — ED PROVIDER NOTES
ED Provider Note    CHIEF COMPLAINT  Chief Complaint   Patient presents with   • Open Wound   • Leg Swelling   • Abdominal Pain       HPI  Fer Estrada is a 57 y.o. male who presents complaining of leg swelling. The patient states she's had this for about 13 years. It gets worse and better. This is a time that it is worse. He denies any fever. He did have some diarrhea beginning yesterday and then again today. He was recent antibiotics, clindamycin. Initially he does not endorse any shortness of breath or breathing difficulty, however upon further questioning, he does admit that he is somewhat more short of breath than usual. He is on 4 L nasal cannula. He carries a history of congestive heart failure as well as COPD. He is not sure how long this has been going on. He denies any bladder changes. He denies any fever or chills. He just does not feel well. Per EMS, the patient is at home with his daughter who is his primary caregiver. Although they do have home health, she is unable to care for him any longer. The patient describes a progressive decline. The record is notable for cellulitis lower extremities in January, his last admission was in July prior to that for congestive heart failure.    PAST MEDICAL HISTORY  Past Medical History:   Diagnosis Date   • Asthma    • Chronic obstructive pulmonary disease (CMS-HCC)    • Congestive heart failure (CMS-[facility])    • Hypertension    • Type II or unspecified type diabetes mellitus without mention of complication, not stated as uncontrolled        FAMILY HISTORY  History reviewed. No pertinent family history.    SOCIAL HISTORY  Social History   Substance Use Topics   • Smoking status: Former Smoker     Quit date: 12/14/2005   • Smokeless tobacco: Never Used   • Alcohol use No         SURGICAL HISTORY  History reviewed. No pertinent surgical history.    CURRENT MEDICATIONS  Home Medications     Reviewed by Gabriella Calixto R.N. (Registered Nurse) on 03/16/18 at 1258  Med  "List Status: Partial   Medication Last Dose Status   acetaminophen (TYLENOL) 325 MG Tab 11/1/2017 Active   albuterol (PROVENTIL) 2.5mg/0.5ml Nebu Soln 12/1/2017 Active   albuterol 108 (90 BASE) MCG/ACT Aero Soln inhalation aerosol 12/1/2017 Active   ammonium lactate (LAC-HYDRIN) 12 % Lotion 11/1/2017 Active   ascorbic acid (VITAMIN C) 500 MG tablet  Active   budesonide-formoterol (SYMBICORT) 160-4.5 MCG/ACT Aerosol 1/11/2018 Active   furosemide (LASIX) 40 MG Tab 1/11/2018 Active   gabapentin (NEURONTIN) 300 MG Cap  Active   insulin regular (HUMULIN R) 100 Unit/mL Solution  Active   lactobacillus granules (LACTINEX/FLORANEX) Pack  Active   LACTOBACILLUS RHAMNOSUS, GG, PO 10/1/2016 Active   levothyroxine (SYNTHROID) 50 MCG Tab 1/11/2018 Active   lisinopril (PRINIVIL) 20 MG Tab 1/11/2018 Active   nystatin (MYCOSTATIN) 068489 UNIT/GM Cream topical cream  Active   Omega-3 Fatty Acids (FISH OIL) 1000 MG Cap capsule  Active   omeprazole (PRILOSEC) 20 MG delayed-release capsule 1/11/2018 Active   zinc sulfate (ZINCATE) 220 (50 Zn) MG Cap  Active                I have reviewed the nurses notes and/or the list brought with the patient.    ALLERGIES  No Known Allergies    REVIEW OF SYSTEMS  See HPI for further details. Review of systems as above, otherwise all other systems are negative.     PHYSICAL EXAM  VITAL SIGNS: /70   Pulse 83   Temp 35.9 °C (96.6 °F)   Resp 20   Ht 1.753 m (5' 9\")   Wt (!) 147.4 kg (325 lb)   SpO2 92%   BMI 47.99 kg/m²    Constitutional: Appears chronically ill. Not acutely toxic. However, he does have an increased respiratory rate.  HENT: Mucus membranes moist.  Oropharynx is clear.  Eyes: Pupils equally round.  No scleral icterus.   Neck: Full nontender range of motion.  Lymphatic: No cervical lymphadenopathy noted.   Cardiovascular: Regular heart rate and rhythm.  No murmurs, rubs, nor gallop appreciated. Habitus precludes evaluation of JVD.  Thorax & Lungs: Chest is nontender.  Lungs " are notable for Rales at the bases. Increased respiratory rate as noted.  Abdomen: Soft, with no tenderness, rebound nor guarding.  No mass, pulsatile mass, nor hepatosplenomegaly appreciated.  Skin: No purpura nor petechia noted. Skin exam on his buttocks noted in nursing chart. His lower extremities both exhibit red, brawny, chronic appearing edema of both legs, the left which is weeping. There is little bit of edema cephalad to this bilaterally as well over the thighs.  Extremities/Musculoskeletal: No sign of trauma. As noted above. There is no Homans sign. Pulses are intact throughout.  Neurologic: Alert & oriented.  Strength and sensation is intact all around.  Gait is not assessed.  Psychiatric: Normal affect appropriate for the clinical situation.    EKG #1  I interpreted this EKG myself.  This is a 12-lead study.  The rhythm is sinus with a rate of 76.  There is a right bundle with a left posterior fascicular block, appears unchanged from his prior tracing. No ST segment changes.  Interpretation: No ST segment elevation myocardial infarction. No change from prior.    EKG #2  I interpreted this EKG myself.  This is a 12-lead study.  The rhythm is sinus tachycardia with a rate of 110.  There conduction delay is unchanged..  Interpretation: New sinus tachycardia    LABS  Labs Reviewed   CBC WITH DIFFERENTIAL - Abnormal; Notable for the following:        Result Value    Hemoglobin 13.2 (*)     MCHC 29.5 (*)     RDW 54.9 (*)     Neutrophils-Polys 86.10 (*)     Lymphocytes 4.30 (*)     Neutrophils (Absolute) 8.70 (*)     Lymphs (Absolute) 0.43 (*)     All other components within normal limits   COMP METABOLIC PANEL - Abnormal; Notable for the following:     Sodium 134 (*)     Chloride 90 (*)     Co2 39 (*)     Glucose 100 (*)     Bun 33 (*)     Total Protein 8.7 (*)     Globulin 4.7 (*)     All other components within normal limits   LACTIC ACID   LACTIC ACID   BLOOD CULTURE    Narrative:     Per Hospital Policy:  "Only change Specimen Src: to \"Line\" if  specified by physician order.   BLOOD CULTURE    Narrative:     Per Hospital Policy: Only change Specimen Src: to \"Line\" if  specified by physician order.   TROPONIN   BTYPE NATRIURETIC PEPTIDE   CULTURE WOUND W/ GRAM STAIN   CDIFF BY PCR RFLX TOXIN    Narrative:     Does this patient have risk factors for C-diff?->Yes   ESTIMATED GFR   DIFFERENTIAL MANUAL   PERIPHERAL SMEAR REVIEW   MORPHOLOGY   URINALYSIS   URINE CULTURE(NEW)   LACTIC ACID       MEDICAL RECORD  I have reviewed patient's medical record and pertinent results are listed above.    COURSE & MEDICAL DECISION MAKING  I have reviewed any medical record information, laboratory studies and radiographic results as noted above.  Patient presents with leg swelling and edema, increasing weakness. He is not febrile. Although his respiratory rate is somewhat elevated, he is on home oxygen chronically and I don't see a change in his oxygen requirements. His lungs have some rales at the bases but are otherwise clear. His legs are erythematous and brawny. I spoke with the pharmacist about this to cover him for cellulitis, however also concerned about that recent diarrhea, as obviously concern for C. diff. a PCR is pending. His laboratories returned showing a normal lactate. I spoke with the [facility] hospitalist, Dr. Carr who has seen and admitted the patient.    Although the patient has already been admitted, nursing did contact me that the patient was having worsening breathing. As Dr. Carr was tied up with another patient, I took the liberty of seeing the patient, and he is in fact breathing harder. His lungs are unchanged to auscultation. However, I see that he has gotten IV fluids. When I initiated a sepsis protocol, I included a fluid bolus to be delivered should he have an elevated lactate or become hypotensive; this was read as my intention was to give it to him right away. I've asked nursing to stop that fluid " bolus, give him a dose of Lasix, and to initiate BiPAP. I discussed the patient's case as well with pulmonary medicine, Dr. Lepe, who will be seeing him. I again discussed the patient with Dr. Carr who has reevaluated him and is again taking over his care.    FINAL IMPRESSION  1. Cellulitis, unspecified cellulitis site    2. Peripheral edema    3. Diarrhea, unspecified type    4. Weakness    5. Dyspnea  6.Critical care time, 35 minutes, which includes obtaining history from patient and/or family/prehospital historians, examination of patient, reevaluation of patient, direction of nursing staff, and discussion with admitting and consulting physicians. It does not include any procedures.       This dictation was created using voice recognition software.    Electronically signed by: Estiven Gomes, 3/16/2018 1:35 PM

## 2018-03-16 NOTE — ED NOTES
"Xray at bedside, pt refusing.     Pt states he is \"too cold\" to do xray at this time. Pt given warm blankets, xray called to come to bedside to do xray.   "

## 2018-03-17 ENCOUNTER — APPOINTMENT (OUTPATIENT)
Dept: RADIOLOGY | Facility: MEDICAL CENTER | Age: 58
DRG: 987 | End: 2018-03-17
Attending: INTERNAL MEDICINE
Payer: MEDICARE

## 2018-03-17 LAB
ANION GAP SERPL CALC-SCNC: 6 MMOL/L (ref 0–11.9)
BUN SERPL-MCNC: 30 MG/DL (ref 8–22)
CALCIUM SERPL-MCNC: 8.9 MG/DL (ref 8.5–10.5)
CHLORIDE SERPL-SCNC: 98 MMOL/L (ref 96–112)
CO2 SERPL-SCNC: 35 MMOL/L (ref 20–33)
CREAT SERPL-MCNC: 0.61 MG/DL (ref 0.5–1.4)
EKG IMPRESSION: NORMAL
ERYTHROCYTE [DISTWIDTH] IN BLOOD BY AUTOMATED COUNT: 56 FL (ref 35.9–50)
GLUCOSE BLD-MCNC: 102 MG/DL (ref 65–99)
GLUCOSE BLD-MCNC: 122 MG/DL (ref 65–99)
GLUCOSE BLD-MCNC: 87 MG/DL (ref 65–99)
GLUCOSE BLD-MCNC: 88 MG/DL (ref 65–99)
GLUCOSE SERPL-MCNC: 98 MG/DL (ref 65–99)
GRAM STN SPEC: NORMAL
HCT VFR BLD AUTO: 38.3 % (ref 42–52)
HGB BLD-MCNC: 11.3 G/DL (ref 14–18)
MCH RBC QN AUTO: 28.1 PG (ref 27–33)
MCHC RBC AUTO-ENTMCNC: 29.5 G/DL (ref 33.7–35.3)
MCV RBC AUTO: 95.3 FL (ref 81.4–97.8)
MORPHOLOGY BLD-IMP: NORMAL
PLATELET # BLD AUTO: 202 K/UL (ref 164–446)
PMV BLD AUTO: 10.9 FL (ref 9–12.9)
POTASSIUM SERPL-SCNC: 4.6 MMOL/L (ref 3.6–5.5)
RBC # BLD AUTO: 4.02 M/UL (ref 4.7–6.1)
SIGNIFICANT IND 70042: NORMAL
SITE SITE: NORMAL
SODIUM SERPL-SCNC: 139 MMOL/L (ref 135–145)
SOURCE SOURCE: NORMAL
WBC # BLD AUTO: 12.3 K/UL (ref 4.8–10.8)

## 2018-03-17 PROCEDURE — 94003 VENT MGMT INPAT SUBQ DAY: CPT

## 2018-03-17 PROCEDURE — 700111 HCHG RX REV CODE 636 W/ 250 OVERRIDE (IP): Performed by: INTERNAL MEDICINE

## 2018-03-17 PROCEDURE — A9270 NON-COVERED ITEM OR SERVICE: HCPCS | Performed by: INTERNAL MEDICINE

## 2018-03-17 PROCEDURE — 700102 HCHG RX REV CODE 250 W/ 637 OVERRIDE(OP): Performed by: INTERNAL MEDICINE

## 2018-03-17 PROCEDURE — 71045 X-RAY EXAM CHEST 1 VIEW: CPT

## 2018-03-17 PROCEDURE — 770022 HCHG ROOM/CARE - ICU (200)

## 2018-03-17 PROCEDURE — 80048 BASIC METABOLIC PNL TOTAL CA: CPT

## 2018-03-17 PROCEDURE — 97602 WOUND(S) CARE NON-SELECTIVE: CPT

## 2018-03-17 PROCEDURE — A9270 NON-COVERED ITEM OR SERVICE: HCPCS | Performed by: HOSPITALIST

## 2018-03-17 PROCEDURE — 85027 COMPLETE CBC AUTOMATED: CPT

## 2018-03-17 PROCEDURE — 700102 HCHG RX REV CODE 250 W/ 637 OVERRIDE(OP): Performed by: HOSPITALIST

## 2018-03-17 PROCEDURE — 82962 GLUCOSE BLOOD TEST: CPT

## 2018-03-17 PROCEDURE — 99232 SBSQ HOSP IP/OBS MODERATE 35: CPT | Performed by: HOSPITALIST

## 2018-03-17 PROCEDURE — 700105 HCHG RX REV CODE 258: Performed by: INTERNAL MEDICINE

## 2018-03-17 RX ORDER — LINEZOLID 600 MG/1
600 TABLET, FILM COATED ORAL EVERY 12 HOURS
Status: DISCONTINUED | OUTPATIENT
Start: 2018-03-17 | End: 2018-03-18

## 2018-03-17 RX ORDER — ZINC SULFATE 50(220)MG
220 CAPSULE ORAL DAILY
Status: DISCONTINUED | OUTPATIENT
Start: 2018-03-18 | End: 2018-04-16 | Stop reason: HOSPADM

## 2018-03-17 RX ADMIN — AMPICILLIN SODIUM AND SULBACTAM SODIUM 3 G: 2; 1 INJECTION, POWDER, FOR SOLUTION INTRAMUSCULAR; INTRAVENOUS at 17:36

## 2018-03-17 RX ADMIN — OMEPRAZOLE 20 MG: 20 CAPSULE, DELAYED RELEASE ORAL at 09:25

## 2018-03-17 RX ADMIN — LACTOBACILLUS ACIDOPHILUS / LACTOBACILLUS BULGARICUS 1 PACKET: 100 MILLION CFU STRENGTH GRANULES at 11:30

## 2018-03-17 RX ADMIN — ASPIRIN 324 MG: 81 TABLET, CHEWABLE ORAL at 09:25

## 2018-03-17 RX ADMIN — FUROSEMIDE 40 MG: 40 TABLET ORAL at 11:35

## 2018-03-17 RX ADMIN — LISINOPRIL 10 MG: 20 TABLET ORAL at 09:25

## 2018-03-17 RX ADMIN — AMPICILLIN SODIUM AND SULBACTAM SODIUM 3 G: 2; 1 INJECTION, POWDER, FOR SOLUTION INTRAMUSCULAR; INTRAVENOUS at 06:03

## 2018-03-17 RX ADMIN — OMEGA-3 FATTY ACIDS CAP 1000 MG 1000 MG: 1000 CAP at 17:00

## 2018-03-17 RX ADMIN — LEVOTHYROXINE SODIUM 50 MCG: 25 TABLET ORAL at 09:25

## 2018-03-17 RX ADMIN — OMEGA-3 FATTY ACIDS CAP 1000 MG 1000 MG: 1000 CAP at 11:43

## 2018-03-17 RX ADMIN — LACTOBACILLUS ACIDOPHILUS / LACTOBACILLUS BULGARICUS 1 PACKET: 100 MILLION CFU STRENGTH GRANULES at 17:00

## 2018-03-17 RX ADMIN — STANDARDIZED SENNA CONCENTRATE AND DOCUSATE SODIUM 2 TABLET: 8.6; 5 TABLET, FILM COATED ORAL at 09:26

## 2018-03-17 RX ADMIN — OMEGA-3 FATTY ACIDS CAP 1000 MG 1000 MG: 1000 CAP at 09:26

## 2018-03-17 RX ADMIN — BUDESONIDE AND FORMOTEROL FUMARATE DIHYDRATE 2 PUFF: 160; 4.5 AEROSOL RESPIRATORY (INHALATION) at 21:07

## 2018-03-17 RX ADMIN — OXYCODONE HYDROCHLORIDE 30 MG: 10 TABLET ORAL at 23:31

## 2018-03-17 RX ADMIN — AMPICILLIN SODIUM AND SULBACTAM SODIUM 3 G: 2; 1 INJECTION, POWDER, FOR SOLUTION INTRAMUSCULAR; INTRAVENOUS at 11:36

## 2018-03-17 RX ADMIN — STANDARDIZED SENNA CONCENTRATE AND DOCUSATE SODIUM 2 TABLET: 8.6; 5 TABLET, FILM COATED ORAL at 21:06

## 2018-03-17 RX ADMIN — BUDESONIDE AND FORMOTEROL FUMARATE DIHYDRATE 2 PUFF: 160; 4.5 AEROSOL RESPIRATORY (INHALATION) at 07:06

## 2018-03-17 RX ADMIN — LINEZOLID 600 MG: 600 INJECTION, SOLUTION INTRAVENOUS at 09:29

## 2018-03-17 RX ADMIN — LINEZOLID 600 MG: 600 TABLET, FILM COATED ORAL at 21:06

## 2018-03-17 RX ADMIN — ENOXAPARIN SODIUM 40 MG: 100 INJECTION SUBCUTANEOUS at 09:24

## 2018-03-17 RX ADMIN — ENOXAPARIN SODIUM 40 MG: 100 INJECTION SUBCUTANEOUS at 21:06

## 2018-03-17 ASSESSMENT — ENCOUNTER SYMPTOMS
BLURRED VISION: 0
SORE THROAT: 0
CONSTIPATION: 0
SPUTUM PRODUCTION: 0
NAUSEA: 0
WEAKNESS: 1
PALPITATIONS: 0
SHORTNESS OF BREATH: 1
DIARRHEA: 0
ABDOMINAL PAIN: 0
COUGH: 0
FEVER: 0
NERVOUS/ANXIOUS: 0
SPEECH CHANGE: 0
DIZZINESS: 0
CHILLS: 0

## 2018-03-17 ASSESSMENT — PAIN SCALES - GENERAL
PAINLEVEL_OUTOF10: 0
PAINLEVEL_OUTOF10: 5
PAINLEVEL_OUTOF10: 0
PAINLEVEL_OUTOF10: 3
PAINLEVEL_OUTOF10: 9
PAINLEVEL_OUTOF10: 0

## 2018-03-17 ASSESSMENT — LIFESTYLE VARIABLES: DO YOU DRINK ALCOHOL: NO

## 2018-03-17 ASSESSMENT — PULMONARY FUNCTION TESTS
EPAP_CMH2O: 8

## 2018-03-17 NOTE — PROGRESS NOTES
Daughter Howard arrived at bedside to visit patient. Family updated per patient request and plan of care discussed. Per Howard, patient has been home from SNF for approx 3 weeks and has not been able to care for self. Patient has been nodding off in chair, but not sleeping and not ambulating.   Daughter placed white rosary at patient bedside.  Daughter requesting social work consult to discuss Power of  paper work. Consult to be placed.

## 2018-03-17 NOTE — ASSESSMENT & PLAN NOTE
Stop amlodipine as this will contribute to worsening edema  Hold lisinopril due to AKA and hypotension  Consider resuming at a very low dose, 2.5 daily, holding off now due to potassium on the high end of normal  Titrate therapy as clinically appropriate

## 2018-03-17 NOTE — PROGRESS NOTES
Received report from ER nurse at 1820.  Went with critical care tech and brought patient up with RT and tech.  Patient in room by 1905.  Patient not in any distress at this time.

## 2018-03-17 NOTE — PROGRESS NOTES
Patient states that a cell phone, ID card, and gray shirt were present on arrival to ED . Upon admission to ICU items were not present with patient. ER was called to locate items as well as security and transport.

## 2018-03-17 NOTE — CARE PLAN
Problem: Ventilation Defect:  Goal: Ability to achieve and maintain unassisted ventilation or tolerate decreased levels of ventilator support    Intervention: Support and monitor invasive and noninvasive mechanical ventilation  Adult Noninvasive Ventilation    BiPap Day 2  IPAP (cmH2O): 12 (03/17/18 0241)  EPAP (cm H2O): 8 (03/17/18 0241)  FiO2: 50 (03/17/18 0241)    Events/Summary/Plan: BiPAP (03/17/18 0241)

## 2018-03-17 NOTE — PROGRESS NOTES
Patient arrived from ED on monitor with vancomycin infusing. Patient on BiPAP with no respiratory distress noted. Patient lethargic and A&O x4. Denies chest pain. Dual RN skin assessment performed with charge RN Sallie on arrival. Patient found to have DTI pressure ulcer to left posterior thigh- large area is red/pink/purple with 1 x 4 cm open area. A pressure ulcer to the left lateral foot- appears purple, red, pink. BL LE reddened/pink with flaking/crusty/ yellow skin from knee to toes. Pictures taken. Wound consult already placed. Patient turned and heels floating with pillows. Sacrum pink/blanchable, barrier cream applied.

## 2018-03-17 NOTE — ASSESSMENT & PLAN NOTE
Body mass index is 48.02 kg/m².   Complicated by obstructive sleep apnea and obesity hypoventilation syndrome  Encouraged weight loss

## 2018-03-17 NOTE — ASSESSMENT & PLAN NOTE
No apparent manifestations  ASA 81, Atorvastatin 40 mg  Consider Januvia rather than metformin due to developing JOE

## 2018-03-17 NOTE — ASSESSMENT & PLAN NOTE
Chronic issues, nearly baseline now  Intubated 3-20 extubated 3/25/2018 this hospital stay  Morbid obesity, Body mass index is 42.97 kg/m².  Obesity hypoventilation syndrome / ALIREZA is contributing   Suspect underlying pulmonary hypertension / RV dysfunction  Underlying history of COPD  Non compliance with intervention / medical recommendations & therapy complicates care  Underlying debility with poor inspiratory effort  Narcotic dependence further decreasing inspiratory efforts    Plan:  At least 8 hours of BIPAP therapy at night  Recommend BIPAP twice daily for 1 hours (11am-12 pm) & (3pm-4pm)  Aggressive nursing interventions, RT protocol  Ambulate as able  Hold lasix due to JOE  Symbicort / Spiriva  Wean off opioids as these are not helping him  Nocturnal desaturation study done, have pulm request outpatient BIPAP, arranged    Poor prognosis 2/2 multiple factors listed above most importantly 2/2 patient's personal non compliance and lack of motivation

## 2018-03-17 NOTE — ASSESSMENT & PLAN NOTE
With MSSA cellulitis this hospital course   Completed antibiotic course   Continue wound care / Skin care- he has extensive dry skin, however refuses showering and hygiene.  Aggressive nursing interventions  He has a 24-hour caregiver at home who can assist PT, OT and wound care.

## 2018-03-17 NOTE — ASSESSMENT & PLAN NOTE
Chronic lymphedema and stasis dermatitis  Hold Lasix due to JOE, consider resuming at a very low-dose  Monitor intake output and fluid status  Ambulation as able  Significant contribution from debility

## 2018-03-17 NOTE — H&P
Hospital Medicine History and Physical    Date of Service  3/16/2018    Chief Complaint  Chief Complaint   Patient presents with   • Open Wound   • Leg Swelling   • Abdominal Pain       History of Presenting Illness  57 y.o. male who presented 3/16/2018 with Open Wound; Leg Swelling; and Abdominal Pain  He is somewhat somnolent but arousable. When aroused, he reminds me that he should be on his oxycodone. Therefore he is a poor historian.   Basically he has chronic leg edema, poor self cares, has HHC and also cared for by daughter, who wasn't present. He mentioned he came in because he is weak, ca';t get up, sleepy. He is pretty guarded in the interview.  He is on chronic O2. He has diagnoses of COPD and possible ALIREZA  At the ED, he is afebrile, hemodynamically stable. CXR looks clear. ED wanted to admit for cellulitis and placement.  When I saw him at ED, somnolent but arousable. Auscultation distant. LE edema with venous stasis changes, erythema and crusts. No discharge.  Later on ED physician was called because he woke up hypoxic and seemed to be jolting. Lasix was given as he had been given boluses by him for infection. BiPap started. Intensivist notified.     Primary Care Physician  Giovani Lara M.D.    Consultants  Dr. figueredo intensivist    Code Status  full    Review of Systems  Review of Systems   Unable to perform ROS: Medical condition        Past Medical History  Past Medical History:   Diagnosis Date   • Asthma    • Chronic obstructive pulmonary disease (CMS-[facility])    • Congestive heart failure (CMS-HCC)    • Hypertension    • Type II or unspecified type diabetes mellitus without mention of complication, not stated as uncontrolled        Surgical History  No past surgical history on file.    Medications  No current facility-administered medications on file prior to encounter.      Current Outpatient Prescriptions on File Prior to Encounter   Medication Sig Dispense Refill   • ascorbic acid  (VITAMIN C) 500 MG tablet Take 1 Tab by mouth every day. 30 Tab 3   • Omega-3 Fatty Acids (FISH OIL) 1000 MG Cap capsule Take 1 Cap by mouth 3 times a day, with meals. 90 Cap    • levothyroxine (SYNTHROID) 50 MCG Tab Take 50 mcg by mouth Every morning on an empty stomach.     • omeprazole (PRILOSEC) 20 MG delayed-release capsule Take 20 mg by mouth every day.     • acetaminophen (TYLENOL) 325 MG Tab Take 2 Tabs by mouth every 6 hours as needed (Mild Pain; (Pain scale 1-3); Temp greater than 100.5 F). 30 Tab 0   • furosemide (LASIX) 40 MG Tab Take 1 Tab by mouth every day. 30 Tab 0   • lisinopril (PRINIVIL) 20 MG Tab Take 0.5 Tabs by mouth every day. 30 Tab 1   • budesonide-formoterol (SYMBICORT) 160-4.5 MCG/ACT Aerosol Inhale 2 Puffs by mouth 2 Times a Day.     • albuterol 108 (90 BASE) MCG/ACT Aero Soln inhalation aerosol Inhale 2 Puffs by mouth every 6 hours as needed for Shortness of Breath.         Family History  History reviewed. No pertinent family history.    Social History  Social History   Substance Use Topics   • Smoking status: Former Smoker     Quit date: 2005   • Smokeless tobacco: Never Used   • Alcohol use No       Allergies  No Known Allergies     Physical Exam  Laboratory   Hemodynamics  Temp (24hrs), Av.9 °C (96.6 °F), Min:35.9 °C (96.6 °F), Max:35.9 °C (96.6 °F)   Temperature: 35.9 °C (96.6 °F)  Pulse  Av.8  Min: 83  Max: 111 Heart Rate (Monitored): 99  Blood Pressure: 142/70, NIBP: 141/61      Respiratory      Respiration: (!) 28, Pulse Oximetry: 100 %     Work Of Breathing / Effort: Moderate  RUL Breath Sounds: Diminished, RML Breath Sounds: Diminished, RLL Breath Sounds: Diminished, LARY Breath Sounds: Diminished, LLL Breath Sounds: Diminished    Physical Exam   Constitutional: He appears well-developed and well-nourished.   Unkempt   HENT:   Head: Normocephalic and atraumatic.   Eyes: Conjunctivae and EOM are normal. No scleral icterus.   Neck: Normal range of motion. Neck  supple.   Cardiovascular: Normal rate and regular rhythm.  Exam reveals no gallop and no friction rub.    No murmur heard.  Distant heart sounds   Pulmonary/Chest: Effort normal and breath sounds normal. No respiratory distress. He has no wheezes. He has no rales.   Distant lung sounds   Abdominal: Soft. Bowel sounds are normal. He exhibits no distension. There is no tenderness. There is no rebound and no guarding.   Musculoskeletal: He exhibits no edema or tenderness.   Neurological: He is alert.   Somnolent   Skin: Skin is warm. Rash (Stasis dermatitis, bilateral lower ext crusting and erythema; no discharge) noted.   Psychiatric: He has a normal mood and affect. His behavior is normal.   Guarded, withdrawn, flat affect       Recent Labs      03/16/18   1330   WBC  10.0   RBC  4.71   HEMOGLOBIN  13.2*   HEMATOCRIT  44.8   MCV  95.1   MCH  28.0   MCHC  29.5*   RDW  54.9*   PLATELETCT  220   MPV  10.5     Recent Labs      03/16/18   1330   SODIUM  134*   POTASSIUM  4.4   CHLORIDE  90*   CO2  39*   GLUCOSE  100*   BUN  33*   CREATININE  0.79   CALCIUM  10.5     Recent Labs      03/16/18   1330   ALTSGPT  12   ASTSGOT  17   ALKPHOSPHAT  75   TBILIRUBIN  0.5   GLUCOSE  100*         Recent Labs      03/16/18   1330   BNPBTYPENAT  <2         Lab Results   Component Value Date    TROPONINI <0.01 03/16/2018     Urinalysis:    Lab Results  Component Value Date/Time   SPECGRAVITY 1.024 03/16/2018 1800   GLUCOSEUR Negative 03/16/2018 1800   KETONES Trace (A) 03/16/2018 1800   NITRITE Negative 03/16/2018 1800        Imaging  Dx-chest-portable (1 View)    Result Date: 3/16/2018  3/16/2018 5:10 PM HISTORY/REASON FOR EXAM: Sepsis. Leg swelling. TECHNIQUE/EXAM DESCRIPTION AND NUMBER OF VIEWS: Single portable view of the chest. COMPARISON: None FINDINGS: There is bibasilar atelectasis. There is no pleural effusion. The heart is mildly enlarged.     1.  Bibasilar atelectasis. 2.  Mild cardiomegaly.     Assessment/Plan     I  anticipate this patient will require at least two midnights for appropriate medical management, necessitating inpatient admission.    * Acute on chronic respiratory failure with hypoxia (CMS-HCC)- (present on admission)   Assessment & Plan    Occurred at the ER  Seemed to be like an apneic event  Was already somnolent on presentation and asking for narcotics  Also received IVF boluses for infection        Bilateral lower leg cellulitis- (present on admission)   Assessment & Plan    Has chronic edema, poor self care; HHC at home for wound care  Venous stasis changes; no drainage  IV antibiotics  Wound Care consult        Narcotic addiction (CMS-HCC)- (present on admission)   Assessment & Plan    Already asking for pain meds while he is somnolent  Avoid giving him narcotics        Stasis dermatitis- (present on admission)   Assessment & Plan    Wound care consult        Hypothyroidism- (present on admission)   Assessment & Plan    Continue Synthroid  Check TSH        COPD (chronic obstructive pulmonary disease) (CMS-HCC)- (present on admission)   Assessment & Plan    Resp and O2 per protocol        HTN (hypertension)- (present on admission)   Assessment & Plan    Stable. Continue home antihypertensives        Adult failure to thrive   Assessment & Plan    Per collateral information, he does not seem to be able to take care of himself despite reported HHC        Bilateral edema of lower extremity   Assessment & Plan    Chronic  Get Duplex        Diabetes type 2, controlled (CMS-HCC)- (present on admission)   Assessment & Plan    Correctional insulin, get A1c, hold metformin        Non compliance w medication regimen- (present on admission)   Assessment & Plan    Missed appointments with primary provider and Cardiology        Morbid obesity (CMS-HCC)- (present on admission)   Assessment & Plan    Body mass index is 48.02 kg/m².  When better encourage weight loss, lifestyle changes            VTE prophylaxis:  pharmacologic.    I spent 110 minutes, 40 minutes of critical care, reviewing the chart, notes, vitals, labs, imaging, ordering labs, evaluating Fer Estrada for assessment, enacting the plan above. 50% of the time was spent in evaluating Fer Estrada and doping ED mangement. Discussed with ED physician. Discussed with Dr. Lepe. Medical decision making is therefore complex. Time was devoted to counseling and coordinating care including review of records, pertinent lab data and studies, as well as discussing diagnostic evaluation and work up, planned therapeutic interventions and future disposition of care. Where indicated, the assessment and plan reflect discussion of patient with consultants, other healthcare providers, family members, and additional research needed to obtain further information in formulating the plan of care for Fer Estrada.

## 2018-03-17 NOTE — CONSULTS
Critical Care/Pulmonary Consultation    Date of service: 3/16/2018    Consulting Physician: Estiven Gomes MD    Chief Complaint: Leg wounds, dyspnea    History of Present Illness: Fer Estrada is a 57 y.o. male with a past medical history of multiple admissions for bilateral lower extremity cellulitis. In the past he has been noncompliant with wound care. He has a history of chronic lymphedema, venous stasis, diabetes mellitus type 2, systemic hypertension and actually dependent COPD. He is morbidly obese and has chronic pain with narcotic dependence. He was most recently admitted in January 2018 for recurrent bilateral lower extremity cellulitis. Today he presented to the ED after his daughter called EMS stating she could not take care of him any longer. He has been having difficulty standing and going to the bathroom. He has been having recent diarrhea and increasing sores to his buttocks as well as her chronic leg wounds. He denies fever or chills. He did report some diarrhea starting yesterday. He had a soft brown stool in the ED. He is recently been on clindamycin. In the ED he was given IV fluid and began having increasing dyspnea. He is noted to have diastolic heart dysfunction. He was given some Lasix but developed increasing respiratory distress and was placed on BiPAP. At time I evaluation is in BiPAP in the ICU. He has diminished air entry throughout. He arouses but is somewhat somnolent. He follows commands but is unable to give much additional history.    Review of Systems   Unable to perform ROS: Acuity of condition       No current facility-administered medications on file prior to encounter.      Current Outpatient Prescriptions on File Prior to Encounter   Medication Sig Dispense Refill   • ascorbic acid (VITAMIN C) 500 MG tablet Take 1 Tab by mouth every day. 30 Tab 3   • Omega-3 Fatty Acids (FISH OIL) 1000 MG Cap capsule Take 1 Cap by mouth 3 times a day, with meals. 90 Cap    • levothyroxine  "(SYNTHROID) 50 MCG Tab Take 50 mcg by mouth Every morning on an empty stomach.     • omeprazole (PRILOSEC) 20 MG delayed-release capsule Take 20 mg by mouth every day.     • acetaminophen (TYLENOL) 325 MG Tab Take 2 Tabs by mouth every 6 hours as needed (Mild Pain; (Pain scale 1-3); Temp greater than 100.5 F). 30 Tab 0   • furosemide (LASIX) 40 MG Tab Take 1 Tab by mouth every day. 30 Tab 0   • lisinopril (PRINIVIL) 20 MG Tab Take 0.5 Tabs by mouth every day. 30 Tab 1   • budesonide-formoterol (SYMBICORT) 160-4.5 MCG/ACT Aerosol Inhale 2 Puffs by mouth 2 Times a Day.     • albuterol 108 (90 BASE) MCG/ACT Aero Soln inhalation aerosol Inhale 2 Puffs by mouth every 6 hours as needed for Shortness of Breath.         Social History   Substance Use Topics   • Smoking status: Former Smoker     Quit date: 12/14/2005   • Smokeless tobacco: Never Used   • Alcohol use No        Past Medical History:   Diagnosis Date   • Asthma    • Chronic obstructive pulmonary disease (CMS-HCC)    • Congestive heart failure (CMS-HCC)    • Hypertension    • Type II or unspecified type diabetes mellitus without mention of complication, not stated as uncontrolled        History reviewed. No pertinent surgical history.    Allergies: Patient has no known allergies.    History reviewed. No pertinent family history.    Vitals:    03/16/18 1256 03/16/18 1401 03/16/18 1420 03/16/18 1431   Height: 1.753 m (5' 9\")      Weight: (!) 147.4 kg (325 lb)      Weight % change since last entry.: 0 %      BP: 142/70      Pulse: 83 95 87 91   BMI (Calculated): 47.99      Resp: 20 20 18 (!) 24   Temp: 35.9 °C (96.6 °F)       03/16/18 1530 03/16/18 1621 03/16/18 1630 03/16/18 1702   Height:       Weight:       Weight % change since last entry.:       BP:       Pulse: 85 92 89 (!) 111   BMI (Calculated):       Resp: 19 (!) 22 20 (!) 24   Temp:        03/16/18 1731 03/16/18 1745 03/16/18 1800 03/16/18 1815   Height:       Weight:       Weight % change since last " "entry.:       BP:       Pulse: (!) 104 (!) 102  (!) 102   BMI (Calculated):       Resp: (!) 26 (!) 25 (!) 28 (!) 27   Temp:        03/16/18 1817 03/16/18 1900 03/16/18 2000 03/16/18 2100   Height:   1.753 m (5' 9\")    Weight:   (!) 147.5 kg (325 lb 2.9 oz)    Weight % change since last entry.:   0.06 %    BP:       Pulse: (!) 108 (!) 103 83 87   BMI (Calculated):   48.02    Resp: (!) 28 (!) 23 16 15   Temp:   36.7 °C (98 °F)     03/16/18 2200 03/16/18 2300 03/17/18 0000   Height:      Weight:      Weight % change since last entry.:      BP:      Pulse: 88 94    BMI (Calculated):      Resp: (!) 23 18    Temp: 36.1 °C (97 °F)  36.2 °C (97.2 °F)       Physical Examination  General: Morbidly obese, currently on BiPAP, lethargic but arouses, follows appropriately  Neuro/Psych: Results from asymmetrically, somnolent but no focal neurologic findings  HEENT: PERRL, mucous membranes not examined, BiPAP in place, neck thick, supple, trachea midline, no crepitus  CVS: Distant, regular, no official murmur  Respiratory: Diminished breath sounds throughout left more diminished than right, mild bruits  Abdomen/: Obese, soft, nontender, hypoactive bowel sounds  Extremities: Chronic venous stasis, erythema and crusting scaly skin lesions throughout the lower extremities, foul odor, no clear fluctuance or discharge, there is a focal skin breakdown and discoloration over the left buttocks and upper thigh.  Skin: As above    Recent Labs      03/16/18   1330   WBC  10.0   NEUTSPOLYS  86.10*   LYMPHOCYTES  4.30*   MONOCYTES  6.10   EOSINOPHILS  1.70   BASOPHILS  0.90   ASTSGOT  17   ALTSGPT  12   ALKPHOSPHAT  75   TBILIRUBIN  0.5     Recent Labs      03/16/18   1330   SODIUM  134*   POTASSIUM  4.4   CHLORIDE  90*   CO2  39*   BUN  33*   CREATININE  0.79   CALCIUM  10.5     Recent Labs      03/16/18   1330   ALTSGPT  12   ASTSGOT  17   ALKPHOSPHAT  75   TBILIRUBIN  0.5   GLUCOSE  100*     Recent Labs      03/16/18   1836   OWYNP12J  " 7.31*   ZBLMSJ305L  68.1*   [id number]  88.7*   AELB4RFA  95.5   ARTHCO3  34*   FIO2  60   ARTBE  5*     DX-CHEST-PORTABLE (1 VIEW)   Final Result      1.  Bibasilar atelectasis.      2.  Mild cardiomegaly.      LE VENOUS DUPLEX (DVT)    (Results Pending)   ECHOCARDIOGRAM COMP W/O CONT    (Results Pending)       Assessment and Plan:    Acute on chronic hypoxemic and hypercapnic respiratory failure   - Multifactorial, possibly exacerbated by some fluid administration the ED   - Underlying COPD with some component of acute bronchospasm and hypercapnia   - He received IV Lasix and BiPAP was placed.   - I will continue with intermittent noninvasive BiPAP therapy tonight with short breaks and trial of the morning  Oxygen dependent COPD   - Acute exacerbation as above   - He's been placed on IV antibiotics for possible lower extremity cellulitis.   - Will continue as scheduled and as needed bronchodilators   - He is diabetic and I'll hold off on IV corticosteroids at this time   - He is a former smoker, cessation 2005  Chronic lymphedema and significant stasis dermatitis   - Now with recurrent lower extremity cellulitis   - Somatic chronic recurrent problem over the years, followed by infectious disease   - Continue with antibiotic therapy   - Wound care and infectious disease consults to follow   - C. difficile was excluded  Morbid obesity  Diabetes mellitus, type II   - Isometric control  Systemic hypertension, currently controlled  Chronic pain syndrome and chronic narcotic dependence  He's been appropriately admitted to the intensive care unit. We'll continue with noninvasive BiPAP rescue ventilation for hypoxemic and hypercapnic respiratory failure. He does have risk for further vital organ deterioration may require an inpatient. He received IV furosemide and will follow fluid status closely. Overall he appears mildly volume overloaded and continue with diuretic particularly in the setting of his lower extremity  edema. Will follow electrolytes and fluid status closely and further diuretic as appropriate. He'll continue on antibiotic therapy. Wound care will be consulted. It appears he may not be able to function at home independently as his daughter is his primary caregiver may require skilled care long-term.  will be consulted as well.     The patient remains critically ill.  Critical care time = 43 minutes in directly providing and coordinating critical care and extensive data review.  No time overlap and excludes procedures.

## 2018-03-17 NOTE — ED NOTES
Vancomycin started. Pt on call light immediately after starting stating he feels very SOB. Pt pulse ox reading does not have good wave form, placed on non rebreather at 15L as pt does have increased WOB and RR 34. EKG ordered as pt is tachycardic with HR at 109. MD Gomes and Angeline notified.

## 2018-03-17 NOTE — PROGRESS NOTES
[facility]    Date of Service: 3/17/2018    Chief Complaint  57 y.o. male admitted 3/16/2018 with ongoing cellulitis of bilateral lower extremities with a history of chronic venous stasis and lymphedema. He had acute respiratory distress in ER.    Interval Problem Update  In bed on 4L NC.  A+O with flat affect.  NSR/BBB.  Stable BP. Generalized edema.  Wound care seeing patient.  ADA diet.  Had BM.  Had 1L UOP overnight.  Off bipap since 10am.     Consultants/Specialty  Intensivist    Disposition  Home vs SNF placement.        Review of Systems   Constitutional: Positive for malaise/fatigue. Negative for chills and fever.   HENT: Negative for congestion and sore throat.    Eyes: Negative for blurred vision.   Respiratory: Positive for shortness of breath. Negative for cough and sputum production.    Cardiovascular: Positive for leg swelling. Negative for palpitations.   Gastrointestinal: Negative for abdominal pain, constipation, diarrhea and nausea.   Genitourinary: Negative for dysuria and hematuria.   Neurological: Positive for weakness. Negative for dizziness and speech change.   Psychiatric/Behavioral: The patient is not nervous/anxious.       Physical Exam  Laboratory/Imaging   Hemodynamics  Temp (24hrs), Av.6 °C (97.9 °F), Min:36.2 °C (97.2 °F), Max:36.9 °C (98.4 °F)   Temperature: 36.3 °C (97.4 °F)  Pulse  Av.6  Min: 68  Max: 111 Heart Rate (Monitored): 90  NIBP: (!) 95/46      Respiratory      Respiration: (!) 21, Pulse Oximetry: 93 %, O2 Daily Delivery Respiratory : Silicone Nasal Cannula     #MDI/DPI Given: MDI/DPI x 1, Work Of Breathing / Effort: Moderate  RUL Breath Sounds: Clear, RML Breath Sounds: Clear, RLL Breath Sounds: Diminished, LARY Breath Sounds: Clear, LLL Breath Sounds: Diminished    Fluids    Intake/Output Summary (Last 24 hours) at 18 2250  Last data filed at 18 2200   Gross per 24 hour   Intake             1065 ml   Output             1300 ml    Net             -235 ml       Nutrition  Orders Placed This Encounter   Procedures   • Diet Order     Standing Status:   Standing     Number of Occurrences:   1     Order Specific Question:   Diet:     Answer:   Diabetic [3]     Order Specific Question:   Diet:     Answer:   Cardiac [6]     Physical Exam   Constitutional: He is oriented to person, place, and time. No distress.   obesity   HENT:   Head: Normocephalic and atraumatic.   Nose: Nose normal.   Mouth/Throat: No oropharyngeal exudate.   Eyes: Conjunctivae and EOM are normal. Right eye exhibits no discharge. Left eye exhibits no discharge.   Neck: No tracheal deviation present.   Cardiovascular: Normal rate, regular rhythm, normal heart sounds and intact distal pulses.    No murmur heard.  Pulmonary/Chest: Effort normal. No stridor. No respiratory distress. He has no wheezes.   Abdominal: Soft. Bowel sounds are normal. He exhibits no distension. There is no tenderness.   Musculoskeletal: He exhibits edema.   Neurological: He is alert and oriented to person, place, and time. No cranial nerve deficit.   Skin: Skin is warm. He is not diaphoretic.   Bilateral lower legs covered with ACE wrap.  Some oozing of left lateral foot. Thickened skin bilateral feet.   Psychiatric: He has a normal mood and affect. His behavior is normal.   Vitals reviewed.      Recent Labs      03/16/18   1330  03/17/18   0523   WBC  10.0  12.3*   RBC  4.71  4.02*   HEMOGLOBIN  13.2*  11.3*   HEMATOCRIT  44.8  38.3*   MCV  95.1  95.3   MCH  28.0  28.1   MCHC  29.5*  29.5*   RDW  54.9*  56.0*   PLATELETCT  220  202   MPV  10.5  10.9     Recent Labs      03/16/18   1330  03/17/18   0523   SODIUM  134*  139   POTASSIUM  4.4  4.6   CHLORIDE  90*  98   CO2  39*  35*   GLUCOSE  100*  98   BUN  33*  30*   CREATININE  0.79  0.61   CALCIUM  10.5  8.9         Recent Labs      03/16/18   1330   BNPBTYPENAT  <2              Assessment/Plan     * Acute on chronic respiratory failure with hypoxia  (CMS-HCC)- (present on admission)   Assessment & Plan    Care with narcotics.  RT protocol  Intensivist consulting.  Likely chronic ALIREZA component  Supplemental oxygen to keep SpO2>90  Improving CO2 with bipap use        Bilateral lower leg cellulitis- (present on admission)   Assessment & Plan    Wound care  Antibiotics  Dressing changes  Vit C and zinc.  Tight glucose control        Narcotic addiction (CMS-HCC)- (present on admission)   Assessment & Plan    Judicial pain management.  Attempt additional nonnarcotic pain medications        Stasis dermatitis- (present on admission)   Assessment & Plan    Wound care consult  Vitamin C.  Add zinc        Hypothyroidism- (present on admission)   Assessment & Plan    Continue Synthroid  Check TSH        COPD (chronic obstructive pulmonary disease) (CMS-HCC)- (present on admission)   Assessment & Plan    Resp and O2 per protocol  Monitor SpO2        HTN (hypertension)- (present on admission)   Assessment & Plan    Monitor vitals  Continue active treatment with lisinopril and furosemide        Adult failure to thrive   Assessment & Plan    Per H+P the patient isnot able to care for himself despite home health care        Bilateral edema of lower extremity   Assessment & Plan    Chronic        Diabetes type 2, controlled (CMS-HCC)- (present on admission)   Assessment & Plan    HgbA1c:6.4  SSI  Was on metformin outpatient.  Needs ongoing weight loss, dietary and lifestyle modifications        Non compliance w medication regimen- (present on admission)   Assessment & Plan    Will need future strict adhearence and f/u with PCP/specialist        Morbid obesity (CMS-HCC)- (present on admission)   Assessment & Plan    Body mass index is 48.02 kg/m².  Future weight loss and lifestyle modification needed          Quality-Core Measures   Reviewed items::  Radiology images reviewed, Labs reviewed and Medications reviewed  Montoya catheter::  No Montoya  DVT prophylaxis pharmacological::   Enoxaparin (Lovenox)  Antibiotics:  Treating active infection/contamination beyond 24 hours perioperative coverage

## 2018-03-17 NOTE — CARE PLAN
Problem: Bowel/Gastric:  Goal: Normal bowel function is maintained or improved  Outcome: PROGRESSING AS EXPECTED  Patient experiencing multiple loose BM at home and on arrival to hospital. C diff sample sent and resulted negative. Stool softeners held.     Problem: Respiratory:  Goal: Respiratory status will improve  Outcome: PROGRESSING AS EXPECTED  Patient placed on BiPAP after reports of SOB in ED . Patient LS diminished throughout. Patient educated on importance of use of BiPAP. Per MD patient to remain on BiPAP overnight. Adequate O2 saturations.     Problem: Skin Integrity  Goal: Risk for impaired skin integrity will decrease  Outcome: PROGRESSING AS EXPECTED  Patient admitted to ICU overnight with pressure ulcers present on arrival. Photos were taken. Wound consult placed. Patient educated on importance of frequent repositioning. Barrier wipes utilizes. BL heels floating with pillows. Patient turned and repositioned q 2 hours.

## 2018-03-17 NOTE — PROGRESS NOTES
"Pulmonary Critical Care Progress Note        Admission Date: 3/16/2018    Chief Complaint: dyspnea    History of Present Illness: \"Fer Estrada is a 57 y.o. male with a past medical history of multiple admissions for bilateral lower extremity cellulitis. In the past he has been noncompliant with wound care. He has a history of chronic lymphedema, venous stasis, diabetes mellitus type 2, systemic hypertension and actually dependent COPD. He is morbidly obese and has chronic pain with narcotic dependence. He was most recently admitted in January 2018 for recurrent bilateral lower extremity cellulitis. Today he presented to the ED after his daughter called EMS stating she could not take care of him any longer. He has been having difficulty standing and going to the bathroom. He has been having recent diarrhea and increasing sores to his buttocks as well as her chronic leg wounds. He denies fever or chills. He did report some diarrhea starting yesterday. He had a soft brown stool in the ED. He is recently been on clindamycin. In the ED he was given IV fluid and began having increasing dyspnea. He is noted to have diastolic heart dysfunction. He was given some Lasix but developed increasing respiratory distress and was placed on BiPAP. At time I evaluation is in BiPAP in the ICU. He has diminished air entry throughout. He arouses but is somewhat somnolent. He follows commands but is unable to give much additional history.\"    Review of Systems   Respiratory: Positive for cough, sputum production and shortness of breath.    Cardiovascular: Positive for leg swelling.       Interval Events:  24 hour interval history reviewed   - Brought in for inability to ambulate/care for at home   - Neuro: drowsy, AOx4   - HR: 80s-100s SR-ST, BBB   - BP: 120-150s systolic   - GI: card, ADA   - UOP: 1L ON   - Montoya: condom   - Tm: afeb   - Lines: PIV   - PPx: GI PPI - home, DVT lovenox   - off BiPAP   - CXR (compared to prior): " hypoinflation, edema   - C diff negative    PFSH:  No change.    Physical Exam   Constitutional: He appears unhealthy. He appears distressed.   HENT:   Head: Normocephalic and atraumatic.   Right Ear: External ear normal.   Left Ear: External ear normal.   Nose: Nose normal.   Mouth/Throat: Oropharynx is clear and moist.   Eyes: Conjunctivae and EOM are normal. Right eye exhibits no discharge. Left eye exhibits no discharge.   Neck: Neck supple. No JVD present. No tracheal deviation present.   Cardiovascular: Normal rate, regular rhythm, normal heart sounds and intact distal pulses.    No murmur heard.  Pulmonary/Chest: He is in respiratory distress. He has decreased breath sounds. He has wheezes.   Abdominal: Soft. Bowel sounds are normal. He exhibits no distension. There is no tenderness.   obese   Musculoskeletal: He exhibits edema (3-4+ b/l LE) and tenderness (calves).   Neurological: He is alert.   Sleepy, but arouses. Not very co-operative with exam.   Skin:   Chronic LE venous stasis changes with thick crusting, area of erythema and warmth. See pictures below. Left posterior thigh wound as well   Nursing note and vitals reviewed.            Respiratory:     Pulse Oximetry: 96 %  Chest Tube Drains:  ImagingAvailable data reviewed   Recent Labs      03/16/18   1836   [id number]  7.31*   [id number]  68.1*   [id number]  88.7*   [id number]  95.5   ARTHCO3  34*   FIO2  60   ARTBE  5*       HemoDynamics:  Pulse: 98, Heart Rate (Monitored): 75  Blood Pressure: 142/70, NIBP: 134/61     Imaging: Available data reviewed  Recent Labs      03/16/18   1330  03/16/18   2139   TROPONINI  <0.01  <0.01   BNPBTYPENAT  <2   --        Neuro:  GCS Total [place] Coma Score: 14  Imaging: Available data reviewed    Fluids:  Intake/Output       03/15/18 0700 - 03/16/18 0659 (Not Admitted) 03/16/18 0700 - 03/17/18 0659 03/17/18 0700 - 03/18/18 0659      3043-9281 7326-2415 Total 2827-9266 1877-9295 Total 4367-3528 9627-2712 Total        Intake    I.V.  --  -- --  --  730 730  --  -- --    IV Piggyback Volume (IV Piggyback) -- -- -- -- 700 700 -- -- --    IV Volume (NS TKO) -- -- -- -- 30 30 -- -- --    Total Intake -- -- -- -- 730 730 -- -- --       Output    Urine  --  -- --  550  1000 1550  --  -- --    Condom Catheter -- -- -- -- 1000 1000 -- -- --    Void (ml) -- -- -- 550 -- 550 -- -- --    Total Output -- -- -- 550 1000 1550 -- -- --       Net I/O     -- -- -- -550 -270 -820 -- -- --        Weight: (!) 147.5 kg (325 lb 2.9 oz)  Recent Labs      18   0523   SODIUM  134*  139   POTASSIUM  4.4  4.6   CHLORIDE  90*  98   CO2  39*  35*   BUN  33*  30*   CREATININE  0.79  0.61   CALCIUM  10.5  8.9       GI/Nutrition:  taking PO  Liver Function  Recent Labs      18   0523   ALTSGPT  12   --    ASTSGOT  17   --    ALKPHOSPHAT  75   --    TBILIRUBIN  0.5   --    GLUCOSE  100*  98       Heme:  Recent Labs      18   0523   RBC  4.71  4.02*   HEMOGLOBIN  13.2*  11.3*   HEMATOCRIT  44.8  38.3*   PLATELETCT  220  202       Infectious Disease:  Temp  Av.4 °C (97.6 °F)  Min: 35.9 °C (96.6 °F)  Max: 36.9 °C (98.4 °F)  Micro: antibiotics reviewed and cultures pending  Recent Labs      18   0523   WBC  10.0  12.3*   NEUTSPOLYS  86.10*   --    LYMPHOCYTES  4.30*   --    MONOCYTES  6.10   --    EOSINOPHILS  1.70   --    BASOPHILS  0.90   --    ASTSGOT  17   --    ALTSGPT  12   --    ALKPHOSPHAT  75   --    TBILIRUBIN  0.5   --      Current Facility-Administered Medications   Medication Dose Frequency Provider Last Rate Last Dose   • NS (BOLUS) infusion 1,000 mL  1,000 mL Once PRN Estiven Gomes M.D.       • albuterol inhaler 2 Puff  2 Puff Q6HRS PRN Benjie Marcus M.D.       • ascorbic acid (ascorbic acid) tablet 500 mg  500 mg DAILY Benjie Marcus M.D.   Stopped at 18   • budesonide-formoterol (SYMBICORT) 160-4.5 MCG/ACT inhaler 2 Puff  2 Puff BID  Benjie Marcus M.D.   2 Puff at 03/17/18 0706   • furosemide (LASIX) tablet 40 mg  40 mg DAILY Benjie Marcus M.D.   Stopped at 03/16/18 1615   • levothyroxine (SYNTHROID) tablet 50 mcg  50 mcg AM ES Benjie Marcus M.D.   50 mcg at 03/17/18 0925   • lisinopril (PRINIVIL) 10 MG tablet 10 mg  10 mg DAILY Benjie Marcus M.D.   10 mg at 03/17/18 0925   • fish oil capsule 1,000 mg  1,000 mg TID WITH MEALS Benjie Marcus M.D.   1,000 mg at 03/17/18 0926   • omeprazole (PRILOSEC) capsule 20 mg  20 mg DAILY Benjie Marcus M.D.   20 mg at 03/17/18 0925   • oxyCODONE (OXY-IR) TABS 30 mg  30 mg Q4HRS PRN Benjie Marcus M.D.       • senna-docusate (PERICOLACE or SENOKOT S) 8.6-50 MG per tablet 2 Tab  2 Tab BID Benjie Marcus M.D.   2 Tab at 03/17/18 0926    And   • polyethylene glycol/lytes (MIRALAX) PACKET 1 Packet  1 Packet QDAY PRN Benjie Marcus M.D.        And   • magnesium hydroxide (MILK OF MAGNESIA) suspension 30 mL  30 mL QDAY PRN Benjie Marcus M.D.        And   • bisacodyl (DULCOLAX) suppository 10 mg  10 mg QDAY PRN Benjie Marcus M.D.       • enoxaparin (LOVENOX) inj 40 mg  40 mg DAILY Benjie Marcus M.D.   40 mg at 03/17/18 0924   • acetaminophen (TYLENOL) tablet 650 mg  650 mg Q6HRS PRN Benjie Marcus M.D.       • insulin regular (HUMULIN R) injection 1-6 Units  1-6 Units 4X/DAY ACHS Benjie Marcus M.D.   Stopped at 03/16/18 2100    And   • glucose 4 g chewable tablet 16 g  16 g Q15 MIN PRN Benjie Marcus M.D.        And   • dextrose 50% (D50W) injection 25 mL  25 mL Q15 MIN PRN Benjie Marcus M.D.       • NS (BOLUS) infusion 500 mL  500 mL Once PRN Benjie Marcus M.D.       • ampicillin/sulbactam (UNASYN) 3 g in  mL IVPB  3 g Q6HRS Benjie Marcus M.D.   Stopped at 03/17/18 0633   • lactobacillus granules (LACTINEX/FLORANEX) packet 1 Packet  1 Packet TID WITH MEALS Benjie Marcus M.D.   Stopped at 03/16/18 1954   • Respiratory Care per Protocol    Continuous RT Benjie Marcus M.D.       • aspirin (ASA) tablet 325 mg  325 mg DAILY Benjie Marcus M.D.        Or   • aspirin (ASA) chewable tab 324 mg  324 mg DAILY Benjie Marcus M.D.   324 mg at 03/17/18 0925    Or   • aspirin (ASA) suppository 300 mg  300 mg DAILY Benjie Mracus M.D.   300 mg at 03/16/18 2002   • ipratropium-albuterol (DUONEB) nebulizer solution 3 mL  3 mL Q4H PRN (RT) Benjie Marcus M.D.       • Linezolid (ZYVOX) premix 600 mg  600 mg Q12HRS Benjie Marcus M.D.         Last reviewed on 3/16/2018  3:34 PM by [facility]    Quality  Measures:  Radiology images reviewed, Medications reviewed and Labs reviewed  Montoya catheter: Critically Ill - Requiring Accurate Measurement of Urinary Output      DVT Prophylaxis: Enoxaparin (Lovenox)    Ulcer prophylaxis: Not indicated  Antibiotics: Treating active infection/contamination beyond 24 hours perioperative coverage        Problems/Plan:  Acute on chronic hypoxemic and hypercapnic respiratory failure              - Multifactorial, possibly exacerbated by some fluid administration the ED              - Underlying COPD with some component of acute bronchospasm and hypercapnia              - Continue IV Lasix and BiPAP.              - I will continue with intermittent noninvasive BiPAP therapy tonight with short breaks and trial of the morning    Oxygen dependent COPD              - Acute exacerbation as above              - on IV antibiotics for possible lower extremity cellulitis.              - Will continue as scheduled and as needed bronchodilators              - no steroid due to DM              - He is a former smoker, cessation 2005    Chronic lymphedema and significant stasis dermatitis              - Now with recurrent lower extremity cellulitis              - chronic recurrent problem over the years, followed by infectious disease as outpatient              - Continue with antibiotic therapy              - Wound care  consult to follow   - wound culture pending              - C. difficile was excluded    Morbid obesity    Diabetes mellitus, type II              - Glycemic control with ISS and accucheks    Systemic hypertension   - currently controlled    Chronic pain syndrome and chronic narcotic dependence    Discussed patient condition and risk of morbidity and/or mortality with RN, RT, Pharmacy, , Charge nurse / hot rounds, Patient and hospitalist.    The patient remains critically ill.  Critical care time = 37 minutes in directly providing and coordinating critical care and extensive data review.  No time overlap and excludes procedures.

## 2018-03-18 ENCOUNTER — APPOINTMENT (OUTPATIENT)
Dept: RADIOLOGY | Facility: MEDICAL CENTER | Age: 58
DRG: 987 | End: 2018-03-18
Attending: INTERNAL MEDICINE
Payer: MEDICARE

## 2018-03-18 LAB
ANION GAP SERPL CALC-SCNC: 2 MMOL/L (ref 0–11.9)
BACTERIA UR CULT: NORMAL
BACTERIA WND AEROBE CULT: ABNORMAL
BACTERIA WND AEROBE CULT: ABNORMAL
BUN SERPL-MCNC: 27 MG/DL (ref 8–22)
CALCIUM SERPL-MCNC: 9.1 MG/DL (ref 8.5–10.5)
CHLORIDE SERPL-SCNC: 97 MMOL/L (ref 96–112)
CO2 SERPL-SCNC: 42 MMOL/L (ref 20–33)
CREAT SERPL-MCNC: 0.63 MG/DL (ref 0.5–1.4)
ERYTHROCYTE [DISTWIDTH] IN BLOOD BY AUTOMATED COUNT: 58.7 FL (ref 35.9–50)
GLUCOSE BLD-MCNC: 103 MG/DL (ref 65–99)
GLUCOSE BLD-MCNC: 118 MG/DL (ref 65–99)
GLUCOSE BLD-MCNC: 132 MG/DL (ref 65–99)
GLUCOSE BLD-MCNC: 132 MG/DL (ref 65–99)
GLUCOSE SERPL-MCNC: 121 MG/DL (ref 65–99)
GRAM STN SPEC: ABNORMAL
HCT VFR BLD AUTO: 37.3 % (ref 42–52)
HGB BLD-MCNC: 10.5 G/DL (ref 14–18)
LV EJECT FRACT  99904: 70
MCH RBC QN AUTO: 27.9 PG (ref 27–33)
MCHC RBC AUTO-ENTMCNC: 28.2 G/DL (ref 33.7–35.3)
MCV RBC AUTO: 98.9 FL (ref 81.4–97.8)
PLATELET # BLD AUTO: 200 K/UL (ref 164–446)
PMV BLD AUTO: 10.5 FL (ref 9–12.9)
POTASSIUM SERPL-SCNC: 4.5 MMOL/L (ref 3.6–5.5)
RBC # BLD AUTO: 3.77 M/UL (ref 4.7–6.1)
SIGNIFICANT IND 70042: ABNORMAL
SIGNIFICANT IND 70042: NORMAL
SITE SITE: ABNORMAL
SITE SITE: NORMAL
SODIUM SERPL-SCNC: 141 MMOL/L (ref 135–145)
SOURCE SOURCE: ABNORMAL
SOURCE SOURCE: NORMAL
TSH SERPL DL<=0.005 MIU/L-ACNC: 1.15 UIU/ML (ref 0.38–5.33)
WBC # BLD AUTO: 7.4 K/UL (ref 4.8–10.8)

## 2018-03-18 PROCEDURE — 700111 HCHG RX REV CODE 636 W/ 250 OVERRIDE (IP): Performed by: INTERNAL MEDICINE

## 2018-03-18 PROCEDURE — 93970 EXTREMITY STUDY: CPT

## 2018-03-18 PROCEDURE — 700105 HCHG RX REV CODE 258: Performed by: INTERNAL MEDICINE

## 2018-03-18 PROCEDURE — A9270 NON-COVERED ITEM OR SERVICE: HCPCS | Performed by: HOSPITALIST

## 2018-03-18 PROCEDURE — 93010 ELECTROCARDIOGRAM REPORT: CPT | Performed by: INTERNAL MEDICINE

## 2018-03-18 PROCEDURE — 80048 BASIC METABOLIC PNL TOTAL CA: CPT

## 2018-03-18 PROCEDURE — A9270 NON-COVERED ITEM OR SERVICE: HCPCS | Performed by: INTERNAL MEDICINE

## 2018-03-18 PROCEDURE — 71045 X-RAY EXAM CHEST 1 VIEW: CPT

## 2018-03-18 PROCEDURE — 94660 CPAP INITIATION&MGMT: CPT

## 2018-03-18 PROCEDURE — 51798 US URINE CAPACITY MEASURE: CPT

## 2018-03-18 PROCEDURE — 93306 TTE W/DOPPLER COMPLETE: CPT

## 2018-03-18 PROCEDURE — 700102 HCHG RX REV CODE 250 W/ 637 OVERRIDE(OP): Performed by: HOSPITALIST

## 2018-03-18 PROCEDURE — 93306 TTE W/DOPPLER COMPLETE: CPT | Mod: 26 | Performed by: INTERNAL MEDICINE

## 2018-03-18 PROCEDURE — 93970 EXTREMITY STUDY: CPT | Mod: 26 | Performed by: SURGERY

## 2018-03-18 PROCEDURE — 770022 HCHG ROOM/CARE - ICU (200)

## 2018-03-18 PROCEDURE — 84443 ASSAY THYROID STIM HORMONE: CPT

## 2018-03-18 PROCEDURE — 82962 GLUCOSE BLOOD TEST: CPT | Mod: 91

## 2018-03-18 PROCEDURE — 99232 SBSQ HOSP IP/OBS MODERATE 35: CPT | Performed by: HOSPITALIST

## 2018-03-18 PROCEDURE — 93005 ELECTROCARDIOGRAM TRACING: CPT | Performed by: INTERNAL MEDICINE

## 2018-03-18 PROCEDURE — 94003 VENT MGMT INPAT SUBQ DAY: CPT

## 2018-03-18 PROCEDURE — 85027 COMPLETE CBC AUTOMATED: CPT

## 2018-03-18 PROCEDURE — 700102 HCHG RX REV CODE 250 W/ 637 OVERRIDE(OP): Performed by: INTERNAL MEDICINE

## 2018-03-18 RX ORDER — ASPIRIN 81 MG/1
81 TABLET, CHEWABLE ORAL DAILY
Status: DISCONTINUED | OUTPATIENT
Start: 2018-03-19 | End: 2018-03-19

## 2018-03-18 RX ADMIN — AMPICILLIN SODIUM AND SULBACTAM SODIUM 3 G: 2; 1 INJECTION, POWDER, FOR SOLUTION INTRAMUSCULAR; INTRAVENOUS at 06:31

## 2018-03-18 RX ADMIN — OMEGA-3 FATTY ACIDS CAP 1000 MG 1000 MG: 1000 CAP at 10:21

## 2018-03-18 RX ADMIN — AMPICILLIN SODIUM AND SULBACTAM SODIUM 3 G: 2; 1 INJECTION, POWDER, FOR SOLUTION INTRAMUSCULAR; INTRAVENOUS at 00:08

## 2018-03-18 RX ADMIN — ASPIRIN 325 MG: 325 TABLET ORAL at 10:22

## 2018-03-18 RX ADMIN — OXYCODONE HYDROCHLORIDE 30 MG: 10 TABLET ORAL at 23:27

## 2018-03-18 RX ADMIN — LEVOTHYROXINE SODIUM 50 MCG: 25 TABLET ORAL at 06:31

## 2018-03-18 RX ADMIN — OXYCODONE HYDROCHLORIDE 30 MG: 10 TABLET ORAL at 13:48

## 2018-03-18 RX ADMIN — AMPICILLIN SODIUM AND SULBACTAM SODIUM 3 G: 2; 1 INJECTION, POWDER, FOR SOLUTION INTRAMUSCULAR; INTRAVENOUS at 17:57

## 2018-03-18 RX ADMIN — AMPICILLIN SODIUM AND SULBACTAM SODIUM 3 G: 2; 1 INJECTION, POWDER, FOR SOLUTION INTRAMUSCULAR; INTRAVENOUS at 12:37

## 2018-03-18 RX ADMIN — ENOXAPARIN SODIUM 40 MG: 100 INJECTION SUBCUTANEOUS at 10:21

## 2018-03-18 RX ADMIN — OXYCODONE HYDROCHLORIDE AND ACETAMINOPHEN 500 MG: 500 TABLET ORAL at 10:22

## 2018-03-18 RX ADMIN — OMEPRAZOLE 20 MG: 20 CAPSULE, DELAYED RELEASE ORAL at 10:22

## 2018-03-18 RX ADMIN — LISINOPRIL 10 MG: 20 TABLET ORAL at 10:22

## 2018-03-18 RX ADMIN — OXYCODONE HYDROCHLORIDE 30 MG: 10 TABLET ORAL at 17:56

## 2018-03-18 RX ADMIN — Medication 220 MG: at 10:21

## 2018-03-18 RX ADMIN — BUDESONIDE AND FORMOTEROL FUMARATE DIHYDRATE 2 PUFF: 160; 4.5 AEROSOL RESPIRATORY (INHALATION) at 10:38

## 2018-03-18 RX ADMIN — FUROSEMIDE 40 MG: 40 TABLET ORAL at 10:22

## 2018-03-18 RX ADMIN — LACTOBACILLUS ACIDOPHILUS / LACTOBACILLUS BULGARICUS 1 PACKET: 100 MILLION CFU STRENGTH GRANULES at 17:56

## 2018-03-18 RX ADMIN — ENOXAPARIN SODIUM 40 MG: 100 INJECTION SUBCUTANEOUS at 21:02

## 2018-03-18 RX ADMIN — STANDARDIZED SENNA CONCENTRATE AND DOCUSATE SODIUM 2 TABLET: 8.6; 5 TABLET, FILM COATED ORAL at 10:22

## 2018-03-18 RX ADMIN — OXYCODONE HYDROCHLORIDE 30 MG: 10 TABLET ORAL at 09:02

## 2018-03-18 RX ADMIN — AMPICILLIN SODIUM AND SULBACTAM SODIUM 3 G: 2; 1 INJECTION, POWDER, FOR SOLUTION INTRAMUSCULAR; INTRAVENOUS at 23:32

## 2018-03-18 RX ADMIN — LINEZOLID 600 MG: 600 TABLET, FILM COATED ORAL at 10:22

## 2018-03-18 RX ADMIN — OMEGA-3 FATTY ACIDS CAP 1000 MG 1000 MG: 1000 CAP at 17:56

## 2018-03-18 RX ADMIN — LACTOBACILLUS ACIDOPHILUS / LACTOBACILLUS BULGARICUS 1 PACKET: 100 MILLION CFU STRENGTH GRANULES at 10:22

## 2018-03-18 ASSESSMENT — ENCOUNTER SYMPTOMS
SPEECH CHANGE: 0
ABDOMINAL PAIN: 0
SHORTNESS OF BREATH: 1
NERVOUS/ANXIOUS: 0
COUGH: 0
MYALGIAS: 0
SPUTUM PRODUCTION: 0
PALPITATIONS: 0
NAUSEA: 0
SORE THROAT: 0
WEAKNESS: 1
FEVER: 0
DIZZINESS: 0

## 2018-03-18 ASSESSMENT — PULMONARY FUNCTION TESTS
EPAP_CMH2O: 10

## 2018-03-18 ASSESSMENT — PAIN SCALES - GENERAL
PAINLEVEL_OUTOF10: 3
PAINLEVEL_OUTOF10: 3
PAINLEVEL_OUTOF10: 0
PAINLEVEL_OUTOF10: 9
PAINLEVEL_OUTOF10: 9
PAINLEVEL_OUTOF10: 0
PAINLEVEL_OUTOF10: 9
PAINLEVEL_OUTOF10: 4
PAINLEVEL_OUTOF10: 0
PAINLEVEL_OUTOF10: 0
PAINLEVEL_OUTOF10: 3
PAINLEVEL_OUTOF10: 0
PAINLEVEL_OUTOF10: 2
PAINLEVEL_OUTOF10: 2
PAINLEVEL_OUTOF10: 9

## 2018-03-18 ASSESSMENT — PATIENT HEALTH QUESTIONNAIRE - PHQ9
SUM OF ALL RESPONSES TO PHQ9 QUESTIONS 1 AND 2: 0
SUM OF ALL RESPONSES TO PHQ QUESTIONS 1-9: 0
1. LITTLE INTEREST OR PLEASURE IN DOING THINGS: NOT AT ALL
2. FEELING DOWN, DEPRESSED, IRRITABLE, OR HOPELESS: NOT AT ALL

## 2018-03-18 NOTE — PROGRESS NOTES
Daughter of patient at bedside. All personal belongings including cell phone, ID, and credit cards given to patient's daughter.

## 2018-03-18 NOTE — PROGRESS NOTES
"Pulmonary Critical Care Progress Note        Chief Complaint: dyspnea    History of Present Illness: \"Fer Estrada is a 57 y.o. male with a past medical history of multiple admissions for bilateral lower extremity cellulitis. In the past he has been noncompliant with wound care. He has a history of chronic lymphedema, venous stasis, diabetes mellitus type 2, systemic hypertension and actually dependent COPD. He is morbidly obese and has chronic pain with narcotic dependence. He was most recently admitted in January 2018 for recurrent bilateral lower extremity cellulitis. Today he presented to the ED after his daughter called EMS stating she could not take care of him any longer. He has been having difficulty standing and going to the bathroom. He has been having recent diarrhea and increasing sores to his buttocks as well as her chronic leg wounds. He denies fever or chills. He did report some diarrhea starting yesterday. He had a soft brown stool in the ED. He is recently been on clindamycin. In the ED he was given IV fluid and began having increasing dyspnea. He is noted to have diastolic heart dysfunction. He was given some Lasix but developed increasing respiratory distress and was placed on BiPAP. At time I evaluation is in BiPAP in the ICU. He has diminished air entry throughout. He arouses but is somewhat somnolent. He follows commands but is unable to give much additional history.\"    ROS    Interval Events:  24 hour interval history reviewed   - refuse NIPPV overnight, CO2 up, more somnulent   - Neuro: agitated AOx4   - HR: 60s-90s sinus   - BP: 90s-120s systolic   - GI: tolerating diet   - UOP: 850 mL - low   - Montoya: condom   - Tm: afeb   - Lines: PIV   - PPx: GI PPI, DVT lovenox   - on NC   - CXR (compared to prior): continued edema/effusions    Yesterday    - Brought in for inability to ambulate/care for at home   - Neuro: drowsy, AOx4   - HR: 80s-100s SR-ST, BBB   - BP: 120-150s systolic   - GI: card, " ADA   - UOP: 1L ON   - Montoya: condom   - Tm: afeb   - Lines: PIV   - PPx: GI PPI - home, DVT lovenox   - off BiPAP   - CXR (compared to prior): hypoinflation, edema   - C diff negative    PFSH:  No change.    Physical Exam   Constitutional: He is oriented to person, place, and time. He appears unhealthy. He has a sickly appearance. He appears distressed.   HENT:   Head: Normocephalic and atraumatic.   Right Ear: External ear normal.   Left Ear: External ear normal.   Nose: Nose normal.   Mouth/Throat: Oropharynx is clear and moist.   Eyes: Conjunctivae and EOM are normal.   Neck: Neck supple. Tracheal deviation present.   Pulmonary/Chest: Accessory muscle usage present. He has decreased breath sounds. He has wheezes.   Abdominal: Soft. Bowel sounds are normal. He exhibits no distension. There is no rebound.   obese   Musculoskeletal: He exhibits edema (3-4+ B/L LE) and tenderness.   Neurological: He is alert and oriented to person, place, and time. No cranial nerve deficit. GCS score is 15.   Skin: Rash (significant B/L LE venous stasis changes and wounds on LE. See attached images) noted.   Psychiatric: His affect is blunt. He is agitated.   Nursing note and vitals reviewed.                    Respiratory:     Pulse Oximetry: 93 %  Chest Tube Drains:  ImagingAvailable data reviewed   Recent Labs      03/16/18   1836   [id number]  7.31*   [id number]  68.1*   [id number]  88.7*   [id number]  95.5   ARTHCO3  34*   FIO2  60   ARTBE  5*       HemoDynamics:  Pulse: 88, Heart Rate (Monitored): 87  NIBP: 103/53     Imaging: Available data reviewed  Recent Labs      03/16/18   1330  03/16/18   2139   TROPONINI  <0.01  <0.01   BNPBTYPENAT  <2   --        Neuro:  GCS Total [place] Coma Score: 15  Imaging: Available data reviewed    Fluids:  Intake/Output       03/16/18 0700 - 03/17/18 0659 03/17/18 0700 - 03/18/18 0659 03/18/18 0700 - 03/19/18 0659      2115-9222 2047-7918 Total 7934-6838 8482-1223 Total 1060-8011 0222-4048 Total          Intake    P.O.  --  -- --  495  390 885  --  -- --    P.O. -- -- -- 495 390 885 -- -- --    I.V.  --  730 730  120  257.5 377.5  --  -- --    IV Piggyback Volume (IV Piggyback) -- 700 700 -- 200 200 -- -- --    IV Volume (NS TKO) -- 30 30 120 57.5 177.5 -- -- --    Total Intake -- 730 730 615 647.5 1262.5 -- -- --       Output    Urine  550  1000 1550  750  100 850  --  -- --    Condom Catheter -- 1000 1000 750 100 850 -- -- --    Void (ml) 550 -- 550 -- -- -- -- -- --    Stool  --  -- --  --  -- --  --  -- --    Number of Times Stooled -- -- -- -- 0 x 0 x -- -- --    Total Output 550 1000 1550 750 100 850 -- -- --       Net I/O     -550 -270 -820 -135 547.5 412.5 -- -- --          Recent Labs      18   1330  18   0523  18   0609   SODIUM  134*  139  141   POTASSIUM  4.4  4.6  4.5   CHLORIDE  90*  98  97   CO2  39*  35*  42*   BUN  33*  30*  27*   CREATININE  0.79  0.61  0.63   CALCIUM  10.5  8.9  9.1       GI/Nutrition:  taking PO  Liver Function  Recent Labs      18   1330  18   0523  18   0609   ALTSGPT  12   --    --    ASTSGOT  17   --    --    ALKPHOSPHAT  75   --    --    TBILIRUBIN  0.5   --    --    GLUCOSE  100*  98  121*       Heme:  Recent Labs      18   1330  18   0523  18   0609   RBC  4.71  4.02*  3.77*   HEMOGLOBIN  13.2*  11.3*  10.5*   HEMATOCRIT  44.8  38.3*  37.3*   PLATELETCT  220  202  200       Infectious Disease:  Temp  Av.6 °C (97.8 °F)  Min: 36.3 °C (97.3 °F)  Max: 37 °C (98.6 °F)  Micro: antibiotics reviewed and cultures reviewed  Recent Labs      18   1330  18   0523  18   0609   WBC  10.0  12.3*  7.4   NEUTSPOLYS  86.10*   --    --    LYMPHOCYTES  4.30*   --    --    MONOCYTES  6.10   --    --    EOSINOPHILS  1.70   --    --    BASOPHILS  0.90   --    --    ASTSGOT  17   --    --    ALTSGPT  12   --    --    ALKPHOSPHAT  75   --    --    TBILIRUBIN  0.5   --    --      Current Facility-Administered  Medications   Medication Dose Frequency Provider Last Rate Last Dose   • enoxaparin (LOVENOX) inj 40 mg  40 mg BID DAVONTE Connolly Jr.ODorina   40 mg at 03/17/18 2106   • linezolid (ZYVOX) tablet 600 mg  600 mg Q12HRS Scott Almodovar D.O.   600 mg at 03/17/18 2106   • zinc sulfate (ZINCATE) capsule 220 mg  220 mg DAILY Scott Almodovar D.O.       • albuterol inhaler 2 Puff  2 Puff Q6HRS PRN Benjie Marcus M.D.       • ascorbic acid (ascorbic acid) tablet 500 mg  500 mg DAILY Benjie Marcus M.D.   Stopped at 03/16/18 1955   • budesonide-formoterol (SYMBICORT) 160-4.5 MCG/ACT inhaler 2 Puff  2 Puff BID Benjie Marcus M.D.   2 Puff at 03/17/18 2107   • furosemide (LASIX) tablet 40 mg  40 mg DAILY Benjie Marcus M.D.   40 mg at 03/17/18 1135   • levothyroxine (SYNTHROID) tablet 50 mcg  50 mcg AM ES Benjie Marcus M.D.   50 mcg at 03/18/18 0631   • lisinopril (PRINIVIL) 10 MG tablet 10 mg  10 mg DAILY Benjie Marcus M.D.   10 mg at 03/17/18 0925   • fish oil capsule 1,000 mg  1,000 mg TID WITH MEALS Bejnie Marcus M.D.   1,000 mg at 03/17/18 1700   • omeprazole (PRILOSEC) capsule 20 mg  20 mg DAILY Benjie Marcus M.D.   20 mg at 03/17/18 0925   • oxyCODONE (OXY-IR) TABS 30 mg  30 mg Q4HRS PRN Benjie Marcus M.D.   30 mg at 03/18/18 0902   • senna-docusate (PERICOLACE or SENOKOT S) 8.6-50 MG per tablet 2 Tab  2 Tab BID Benjie Marcus M.D.   2 Tab at 03/17/18 2106    And   • polyethylene glycol/lytes (MIRALAX) PACKET 1 Packet  1 Packet QDAY PRN Benjie Marcus M.D.        And   • magnesium hydroxide (MILK OF MAGNESIA) suspension 30 mL  30 mL QDAY PRN Benjie Marcus M.D.        And   • bisacodyl (DULCOLAX) suppository 10 mg  10 mg QDAY PRN Benjie Marcus M.D.       • acetaminophen (TYLENOL) tablet 650 mg  650 mg Q6HRS PRN Benjie Marcus M.D.       • insulin regular (HUMULIN R) injection 1-6 Units  1-6 Units 4X/DAY ACHS Benjie Marcus M.D.   Stopped at 03/16/18 2100    And   •  glucose 4 g chewable tablet 16 g  16 g Q15 MIN PRN Benjie Marcus M.D.        And   • dextrose 50% (D50W) injection 25 mL  25 mL Q15 MIN PRN Benjie Marcus M.D.       • ampicillin/sulbactam (UNASYN) 3 g in  mL IVPB  3 g Q6HRS Benjie Marcus M.D.   Stopped at 03/18/18 0701   • lactobacillus granules (LACTINEX/FLORANEX) packet 1 Packet  1 Packet TID WITH MEALS Benjie Marcus M.D.   1 Packet at 03/17/18 1700   • Respiratory Care per Protocol   Continuous RT Benjie Marcus M.D.       • aspirin (ASA) tablet 325 mg  325 mg DAILY Benjie Marcus M.D.        Or   • aspirin (ASA) chewable tab 324 mg  324 mg DAILY Benjie Marcus M.D.   324 mg at 03/17/18 0925    Or   • aspirin (ASA) suppository 300 mg  300 mg DAILY Benjie Marcus M.D.   300 mg at 03/16/18 2002   • ipratropium-albuterol (DUONEB) nebulizer solution 3 mL  3 mL Q4H PRN (RT) Benjie Marcus M.D.         Last reviewed on 3/16/2018  3:34 PM by Luc[facility]    Quality  Measures:  Core Measures & Quality Metrics    Problems/Plan:  Acute on chronic hypoxemic and hypercapnic respiratory failure              - Multifactorial, possibly exacerbated by some fluid administration the ED              - Underlying COPD with some component of acute bronchospasm and hypercapnia              - continue IV Lasix and BiPAP   - Needs NIPPV overnight without fail   - will place back on NIPPV now to correct hypercapnia    Oxygen dependent COPD              - Acute exacerbation as above              - He's been placed on IV antibiotics for possible lower extremity cellulitis.              - Will continue as scheduled and as needed bronchodilators              - No  IV corticosteroids at this time              - He is a former smoker, cessation 2005    Chronic lymphedema and significant stasis dermatitis              - Now with recurrent lower extremity cellulitis              - chronic recurrent problem over the years, followed by infectious  disease              - Continue with antibiotic therapy              - Wound care to follow              - C. difficile was excluded    Morbid obesity   - RD consult    Diabetes mellitus, type II              - accucheks and ISS    Systemic hypertension, currently controlled    Chronic pain syndrome and chronic narcotic dependence    Discussed patient condition and risk of morbidity and/or mortality with RN, RT, Pharmacy, Charge nurse / hot rounds, Patient and hospitalist.      The patient remains critically ill.  Critical care time = 32 minutes in directly providing and coordinating critical care and extensive data review.  No time overlap and excludes procedures.

## 2018-03-18 NOTE — PROGRESS NOTES
Israel from Lab called with critical result of Co2 of 42 at 0706. Critical lab result read back to [place].   Dr. Pendleton notified of critical lab result at 0717.  Critical lab result read back by Dr. Pendleton.

## 2018-03-18 NOTE — CONSULTS
"Reason for PC Consult: Advance Care Planning    Consulted by: Dr. Scott Almodovar    Assessment:  General: Pt is a 57 year old male w/pmh of asthma, COPD, CHF, HTN, and DMII; admit for COPD exacerbation, and bilateral LE cellulitis.     Dyspnea: Yes  (pt currently on supplemental O2; BiPAP therapy initiating.)  Last BM: 03/16/18    Pain: No    Depression: Mood appropriate for situation    Dementia: No       Spiritual:  Is Restorationist or spirituality important for coping with this illness? No Pt states he grew-up Pentec[employer]alism; denies need for  at this time.  Has a  or spiritual provider visit been requested? No    Palliative Performance Scale: 40%    Advance Directive: None    DPOA:      POLST: No      Code Status: Full      Outcome:  Palliative RN met w/pt at bedside. Introduced Palliative role/support and answered all questions. Education offered regarding pt's code status and wishes. Pt states, \"well yeah I want you to try and save of me, of course.\" Further discussion had regarding pt's goals and Advance Directives. Pt reports his daughter is currently working on a medical POA for him and has the AD paperwork. Palliative RN offered a blank packet should pt's daughter need one. Pt also educated by RT regarding his BiPAP therapy. Pt hesitant to begin BiPAP but verbalizes understanding that this is the only option available to help him at this time. Emotional support offered throughout conversation. Contact information left at bedside and encouraged pt to call w/further questions/needs.     Updated: Bedside RN    Plan: Continue to follow for education and support.     Recommendations: I do not recommend an ethics or hospice consult at this time because pt is able to make his own decisions and wishes to continue w/aggressive treatment. .    Thank you for allowing Palliative Care to participate in this patient's care. Please feel free to call [phone] with any questions or concerns.  "

## 2018-03-18 NOTE — PROGRESS NOTES
[facility]    Date of Service: 3/18/2018    Chief Complaint  57 y.o. male admitted 3/16/2018 with ongoing cellulitis of bilateral lower extremities with a history of chronic venous stasis and lymphedema. He had acute respiratory distress in ER.    Interval Problem Update  On 10L oxymask this am.  Question of his use of bipap overnight per RN (patient state he used it, RT states patient refused bipap).  Ate entire breakfast.  UOP:750cc yesterday.  S/P Echo and LE doppler and await results.  Afebrile. Later in day RT got patient to be compliant wearing bipap.  Patient follows commands.    Consultants/Specialty  Intensivist    Disposition  Home vs SNF placement.        Review of Systems   Constitutional: Positive for malaise/fatigue. Negative for fever.   HENT: Negative for congestion and sore throat.    Respiratory: Positive for shortness of breath. Negative for cough and sputum production.    Cardiovascular: Positive for leg swelling. Negative for palpitations.   Gastrointestinal: Negative for abdominal pain and nausea.   Genitourinary: Negative for dysuria and hematuria.   Musculoskeletal: Negative for myalgias.   Neurological: Positive for weakness. Negative for dizziness and speech change.   Psychiatric/Behavioral: The patient is not nervous/anxious.       Physical Exam  Laboratory/Imaging   Hemodynamics  Temp (24hrs), Av °C (98.6 °F), Min:36.3 °C (97.3 °F), Max:37.3 °C (99.1 °F)   Temperature: 37 °C (98.6 °F)  Pulse  Av.2  Min: 68  Max: 111 Heart Rate (Monitored): 78  NIBP: (!) 95/48      Respiratory      Respiration: (!) 21, Pulse Oximetry: 97 %, O2 Daily Delivery Respiratory : Silicone Nasal Cannula     Work Of Breathing / Effort: Moderate  RUL Breath Sounds: Clear, RML Breath Sounds: Clear, RLL Breath Sounds: Diminished, LARY Breath Sounds: Clear, LLL Breath Sounds: Diminished    Fluids    Intake/Output Summary (Last 24 hours) at 18 0836  Last data filed at 18    Gross per 24 hour   Intake           1132.5 ml   Output              650 ml   Net            482.5 ml       Nutrition  Orders Placed This Encounter   Procedures   • Diet Order     Standing Status:   Standing     Number of Occurrences:   1     Order Specific Question:   Diet:     Answer:   Diabetic [3]     Order Specific Question:   Diet:     Answer:   Cardiac [6]     Physical Exam   Constitutional: He is oriented to person, place, and time. No distress.   obesity   HENT:   Head: Normocephalic and atraumatic.   Nose: Nose normal.   Eyes: Conjunctivae and EOM are normal. Right eye exhibits no discharge. Left eye exhibits no discharge.   Neck: No tracheal deviation present.   Cardiovascular: Normal rate, regular rhythm, normal heart sounds and intact distal pulses.    No murmur heard.  Pulmonary/Chest: Effort normal. No respiratory distress. He has no wheezes.   Abdominal: Soft. Bowel sounds are normal. He exhibits no distension. There is no tenderness.   Musculoskeletal: He exhibits edema.   Lymphadenopathy:     He has no cervical adenopathy.   Neurological: He is alert and oriented to person, place, and time. No cranial nerve deficit.   Skin: Skin is warm. He is not diaphoretic.   Bilateral lower legs covered with ACE wrap.  Some oozing of left lateral foot. Thickened skin bilateral feet.   Psychiatric: He has a normal mood and affect. His behavior is normal.   Vitals reviewed.      Recent Labs      03/16/18   1330  03/17/18   0523  03/18/18   0609   WBC  10.0  12.3*  7.4   RBC  4.71  4.02*  3.77*   HEMOGLOBIN  13.2*  11.3*  10.5*   HEMATOCRIT  44.8  38.3*  37.3*   MCV  95.1  95.3  98.9*   MCH  28.0  28.1  27.9   MCHC  29.5*  29.5*  28.2*   RDW  54.9*  56.0*  58.7*   PLATELETCT  220  202  200   MPV  10.5  10.9  10.5     Recent Labs      03/16/18   1330  03/17/18   0523  03/18/18   0609   SODIUM  134*  139  141   POTASSIUM  4.4  4.6  4.5   CHLORIDE  90*  98  97   CO2  39*  35*  42*   GLUCOSE  100*  98  121*   BUN  33*   30*  27*   CREATININE  0.79  0.61  0.63   CALCIUM  10.5  8.9  9.1         Recent Labs      03/16/18   1330   BNPBTYPENAT  <2              Assessment/Plan     * Acute on chronic respiratory failure with hypoxia (CMS-HCC)- (present on admission)   Assessment & Plan    Care with narcotics.  RT protocol  Intensivist consulting.  Likely chronic ALIREZA component  Supplemental oxygen to keep SpO2>90  Improving CO2 with bipap use        Bilateral lower leg cellulitis- (present on admission)   Assessment & Plan    Wound care  Antibiotics  Dressing changes  Vit C and zinc.  Tight glucose control        Narcotic addiction (CMS-HCC)- (present on admission)   Assessment & Plan    Judicial pain management.  Attempt additional nonnarcotic pain medications        Stasis dermatitis- (present on admission)   Assessment & Plan    Wound care consulting  Vitamin C & zinc        Hypothyroidism- (present on admission)   Assessment & Plan    Continue Synthroid  TSH: 1.15        COPD (chronic obstructive pulmonary disease) (CMS-HCC)- (present on admission)   Assessment & Plan    Resp and O2 per protocol  Monitor SpO2        HTN (hypertension)- (present on admission)   Assessment & Plan    Monitor vitals  Continue active treatment with lisinopril and furosemide        Adult failure to thrive   Assessment & Plan    Per H+P the patient isnot able to care for himself despite home health care        Bilateral edema of lower extremity   Assessment & Plan    Chronic        Diabetes type 2, controlled (CMS-HCC)- (present on admission)   Assessment & Plan    HgbA1c:6.4  SSI  Was on metformin outpatient.  Needs ongoing weight loss, dietary and lifestyle modifications        Non compliance w medication regimen- (present on admission)   Assessment & Plan    Will need future strict adhearence and f/u with PCP/specialist        Morbid obesity (CMS-HCC)- (present on admission)   Assessment & Plan    Body mass index is 48.02 kg/m².  Future weight loss and  lifestyle modification needed          Quality-Core Measures   Reviewed items::  Radiology images reviewed, Labs reviewed and Medications reviewed  Montoya catheter::  No Montoya  DVT prophylaxis pharmacological::  Enoxaparin (Lovenox)  Antibiotics:  Treating active infection/contamination beyond 24 hours perioperative coverage

## 2018-03-18 NOTE — DIETARY
Nutrition Services: Consult received for diabetes diet education.     56 yo M admitted for Lower extremity cellulitis, Acute and chronic respiratory failure with hypoxia. On day 2 of admit and on a diabetic/cardiac diet.     Attempted to visit pt for diet education however pt was with MD at time of visit. Noted pt was also wearing Bipap at time of visit. As pt is still in ICU and on high O2 requirements, pt is not appropriate for diet education at this time per RD judgment. RD to follow up closer to d/c as pt will likely be more appropriate and more receptive to diet education.     RD to follow up for diabetes diet education consult

## 2018-03-18 NOTE — CARE PLAN
Problem: Safety  Goal: Will remain free from injury  Outcome: PROGRESSING AS EXPECTED  Patient room near nursing station. Patient's bed in lowest locked position with bed exit alarm set. Patient educated on fall risk precautions and preventions. Personal belongings and call light within reach.    Problem: Skin Integrity  Goal: Risk for impaired skin integrity will decrease  Outcome: PROGRESSING AS EXPECTED  Assess for skin breakdown related to bowel incontinence and instruct patient on proper skin care and strategies to reduce skin irritation. Condom catheter in use for urinary incontinence and to help prevent skin breakdown from urinary incontinence.

## 2018-03-18 NOTE — WOUND TEAM
"[facility]  Inpatient Services  Initial Wound and Skin Care Evaluation    Admission Date: 03/16/2018    HPI, PMH, SH: Reviewed  Unit where seen by Wound Team: [id number]    WOUND CONSULT RELATED TO:  BLE, 3 pressure injuries.    SUBJECTIVE:  \"Go ahead.\"    Self Report / Pain Level: When left thigh wound palpated.    OBJECTIVE: Flat affect, no engagement in care.    WOUND TYPE, LOCATION, CHARACTERISTICS (Pressure ulcers: location, stage, POA or date identified)    Location and type of wound: Bilateral lower extremities: venous stasis, hyperkeratotic, liposcleratodermatosis.        Periwound:     Intact from knees up.  Drainage:     Moderate, yellow.  Tissue Type and %:    40% red/pink, 60% thick pieces of flaking tissue.  Wound Edges:    Attached.  Odor:      Strongly malodorous.  Exposed structure(s):  None.  S&S of Infection:   Exudate and malodor.      Measurements:   03/17/2018 From distal to knee to tips of toes.  Length:      Width:       Depth:      Location and type of wound: Left lateral foot: Deep Tissue Injury (DTI). POA.        Periwound:     See above.  Drainage:      Scant, yellow.  Tissue Type and %:    30% yellow, 70% purple.  Wound Edges:    Attached.  Odor:      None.  Exposed structure(s):  None.  S&S of Infection:   Yellow exudate.      Measurements:   03/17/2018  Length:     7.2 cm  Width:      2.8 cm  Depth:     0.2 cm    Location and type of wound:  Right lateral plantar foot: DTI. POA.        Periwound:     See BLE  Drainage:     None.  Tissue Type and %:    100% red/purple.  Wound Edges:    Attached.  Odor:      None.  Exposed structure(s):  None.  S&S of Infection:   None.      Measurements:   03/17/2018  Length:     3.1 cm  Width:      2.3 cm  Depth:     TR    Location and type of wound: Left posterior upper thigh: DTI with abrasion.         Periwound:     Intact.  Drainage:     None.  Tissue Type and %:    90% red/purple, 10% pink  Wound Edges:    Attached.  Odor:     "  None.  Exposed structure(s):  None.  S&S of Infection:   None.      Measurements:   03/17/2018  Length:     17 cm  Width:      12 cm  Depth:     TR.    INTERVENTIONS BY WOUND TEAM: BLE addressed first. Anterior skin dry, weeping over calves. Able to non-selectively debride off a considerable amount of the hyperkeratotic tissue, Washed BLE from knees to toes with warm water, baby soap, and washcloth. Rinsed well, dried well, especially in between the toes as the tissue there is macerated. Applied thin layer of barrier cream on the web spaces. Dressed BLE with hydrofiber silver to the calves, abd pads to cover circumferentially, Roll gauze. Sent RN stockinet to place over dressings to keep them in place. There is an open area in the DTI on left foot, this was covered with silver hydrofiber and secured with roll gauze. DTI to left posterior upper thigh dressed with 2 sacral mepilex. Right foot DTI left pen to air.      Interdisciplinary consultation: Patient, RN.    EVALUATION: Silver hydrofiber absorbs exudate without laterally wicking to periwound, is also antimicrobial. Abdominal pads for absorption.    Factors affecting wound healing: Poor self care, venous stasis/lipsclerodermatosis, infection.  Goals: Manage exudate, more debridement as needed.    NURSING PLAN OF CARE ORDERS (X):    Dressing changes: See Dressing Care orders: X  Skin care: See Skin Care orders:   Rectal tube care: See Rectal Tube Care orders:   Other orders:    RSKIN: CURRENT (X) ORDERED (O)  Q shift Hugh:  X  Q shift pressure point assessments:  X  Pressure redistribution mattress        JOSE LUIS    X ICU bed.  Bariatric JOSE LUIS      Bariatric foam        Heel float boots       Heels floated on pillows    X  Barrier wipes      Barrier Cream      Barrier paste      Sacral silicone dressing    X  Silicone O2 tubing  X    Anchorfast      Trach with Optifoam split foam       Waffle cushion      Rectal tube or BMS      Antifungal tx    Turn q 2 hours    X  Up to chair     Ambulate   PT/OT     Dietician      PO  X   TF   TPN     PVN    NPO   # days   Other       WOUND TEAM PLAN OF CARE (X):   NPWT change 3 x week:        Dressing changes by wound team:       Follow up as needed:  X     Other (explain):    Anticipated discharge plans (X): To be determined.  SNF:           Home Care:           Outpatient Wound Center:            Self Care:            Other:

## 2018-03-19 ENCOUNTER — APPOINTMENT (OUTPATIENT)
Dept: RADIOLOGY | Facility: MEDICAL CENTER | Age: 58
DRG: 987 | End: 2018-03-19
Attending: INTERNAL MEDICINE
Payer: MEDICARE

## 2018-03-19 LAB
ACTION RANGE TRIGGERED IACRT: YES
ANION GAP SERPL CALC-SCNC: 5 MMOL/L (ref 0–11.9)
BASE EXCESS BLDA CALC-SCNC: 20 MMOL/L (ref -4–3)
BODY TEMPERATURE: ABNORMAL DEGREES
BUN SERPL-MCNC: 22 MG/DL (ref 8–22)
CALCIUM SERPL-MCNC: 8.8 MG/DL (ref 8.5–10.5)
CHLORIDE SERPL-SCNC: 96 MMOL/L (ref 96–112)
CO2 BLDA-SCNC: >50 MMOL/L (ref 20–33)
CO2 SERPL-SCNC: 39 MMOL/L (ref 20–33)
CREAT SERPL-MCNC: 0.6 MG/DL (ref 0.5–1.4)
EKG IMPRESSION: NORMAL
ERYTHROCYTE [DISTWIDTH] IN BLOOD BY AUTOMATED COUNT: 57 FL (ref 35.9–50)
GLUCOSE BLD-MCNC: 109 MG/DL (ref 65–99)
GLUCOSE BLD-MCNC: 117 MG/DL (ref 65–99)
GLUCOSE BLD-MCNC: 119 MG/DL (ref 65–99)
GLUCOSE BLD-MCNC: 143 MG/DL (ref 65–99)
GLUCOSE SERPL-MCNC: 133 MG/DL (ref 65–99)
HCO3 BLDA-SCNC: 49.3 MMOL/L (ref 17–25)
HCT VFR BLD AUTO: 34.3 % (ref 42–52)
HGB BLD-MCNC: 9.9 G/DL (ref 14–18)
INST. QUALIFIED PATIENT IIQPT: YES
MCH RBC QN AUTO: 28.4 PG (ref 27–33)
MCHC RBC AUTO-ENTMCNC: 28.9 G/DL (ref 33.7–35.3)
MCV RBC AUTO: 98.6 FL (ref 81.4–97.8)
O2/TOTAL GAS SETTING VFR VENT: 70 %
PCO2 BLDA: 91.7 MMHG (ref 26–37)
PCO2 TEMP ADJ BLDA: 93.3 MMHG (ref 26–37)
PH BLDA: 7.34 [PH] (ref 7.4–7.5)
PH TEMP ADJ BLDA: 7.33 [PH] (ref 7.4–7.5)
PLATELET # BLD AUTO: 182 K/UL (ref 164–446)
PMV BLD AUTO: 10.4 FL (ref 9–12.9)
PO2 BLDA: 69 MMHG (ref 64–87)
PO2 TEMP ADJ BLDA: 71 MMHG (ref 64–87)
POTASSIUM SERPL-SCNC: 4.2 MMOL/L (ref 3.6–5.5)
RBC # BLD AUTO: 3.48 M/UL (ref 4.7–6.1)
SAO2 % BLDA: 90 % (ref 93–99)
SODIUM SERPL-SCNC: 140 MMOL/L (ref 135–145)
SPECIMEN DRAWN FROM PATIENT: ABNORMAL
UFH PPP CHRO-ACNC: 0.1 U/ML
WBC # BLD AUTO: 7.1 K/UL (ref 4.8–10.8)

## 2018-03-19 PROCEDURE — 700102 HCHG RX REV CODE 250 W/ 637 OVERRIDE(OP): Performed by: INTERNAL MEDICINE

## 2018-03-19 PROCEDURE — 700111 HCHG RX REV CODE 636 W/ 250 OVERRIDE (IP): Performed by: INTERNAL MEDICINE

## 2018-03-19 PROCEDURE — 770022 HCHG ROOM/CARE - ICU (200)

## 2018-03-19 PROCEDURE — 85027 COMPLETE CBC AUTOMATED: CPT

## 2018-03-19 PROCEDURE — 82962 GLUCOSE BLOOD TEST: CPT | Mod: 91

## 2018-03-19 PROCEDURE — 82803 BLOOD GASES ANY COMBINATION: CPT

## 2018-03-19 PROCEDURE — A9270 NON-COVERED ITEM OR SERVICE: HCPCS | Performed by: HOSPITALIST

## 2018-03-19 PROCEDURE — A9270 NON-COVERED ITEM OR SERVICE: HCPCS | Performed by: INTERNAL MEDICINE

## 2018-03-19 PROCEDURE — 700102 HCHG RX REV CODE 250 W/ 637 OVERRIDE(OP): Performed by: HOSPITALIST

## 2018-03-19 PROCEDURE — 97162 PT EVAL MOD COMPLEX 30 MIN: CPT

## 2018-03-19 PROCEDURE — 97166 OT EVAL MOD COMPLEX 45 MIN: CPT

## 2018-03-19 PROCEDURE — 71045 X-RAY EXAM CHEST 1 VIEW: CPT

## 2018-03-19 PROCEDURE — G8987 SELF CARE CURRENT STATUS: HCPCS | Mod: CM

## 2018-03-19 PROCEDURE — G8979 MOBILITY GOAL STATUS: HCPCS | Mod: CL

## 2018-03-19 PROCEDURE — G8978 MOBILITY CURRENT STATUS: HCPCS | Mod: CM

## 2018-03-19 PROCEDURE — 99232 SBSQ HOSP IP/OBS MODERATE 35: CPT | Performed by: HOSPITALIST

## 2018-03-19 PROCEDURE — 700105 HCHG RX REV CODE 258: Performed by: INTERNAL MEDICINE

## 2018-03-19 PROCEDURE — G8988 SELF CARE GOAL STATUS: HCPCS | Mod: CK

## 2018-03-19 PROCEDURE — 85520 HEPARIN ASSAY: CPT

## 2018-03-19 PROCEDURE — 80048 BASIC METABOLIC PNL TOTAL CA: CPT

## 2018-03-19 PROCEDURE — 97602 WOUND(S) CARE NON-SELECTIVE: CPT

## 2018-03-19 PROCEDURE — 94003 VENT MGMT INPAT SUBQ DAY: CPT

## 2018-03-19 PROCEDURE — 36600 WITHDRAWAL OF ARTERIAL BLOOD: CPT

## 2018-03-19 RX ORDER — BISACODYL 10 MG
10 SUPPOSITORY, RECTAL RECTAL
Status: DISCONTINUED | OUTPATIENT
Start: 2018-03-19 | End: 2018-03-20

## 2018-03-19 RX ORDER — OXYCODONE HYDROCHLORIDE 10 MG/1
30 TABLET ORAL EVERY 4 HOURS PRN
Status: DISCONTINUED | OUTPATIENT
Start: 2018-03-19 | End: 2018-03-19

## 2018-03-19 RX ORDER — OXYCODONE HYDROCHLORIDE 10 MG/1
40 TABLET ORAL EVERY 4 HOURS PRN
Status: DISCONTINUED | OUTPATIENT
Start: 2018-03-19 | End: 2018-03-19

## 2018-03-19 RX ORDER — LEVOTHYROXINE SODIUM 0.05 MG/1
50 TABLET ORAL
Status: DISCONTINUED | OUTPATIENT
Start: 2018-03-20 | End: 2018-04-16 | Stop reason: HOSPADM

## 2018-03-19 RX ORDER — POLYETHYLENE GLYCOL 3350 17 G/17G
1 POWDER, FOR SOLUTION ORAL
Status: DISCONTINUED | OUTPATIENT
Start: 2018-03-19 | End: 2018-03-20

## 2018-03-19 RX ORDER — L. ACIDOPHILUS/L.BULGARICUS 100MM CELL
1 GRANULES IN PACKET (EA) ORAL
Status: DISCONTINUED | OUTPATIENT
Start: 2018-03-19 | End: 2018-04-06

## 2018-03-19 RX ORDER — FUROSEMIDE 10 MG/ML
20 INJECTION INTRAMUSCULAR; INTRAVENOUS EVERY 6 HOURS
Status: COMPLETED | OUTPATIENT
Start: 2018-03-19 | End: 2018-03-20

## 2018-03-19 RX ORDER — ASCORBIC ACID 500 MG
500 TABLET ORAL DAILY
Status: DISCONTINUED | OUTPATIENT
Start: 2018-03-20 | End: 2018-04-06

## 2018-03-19 RX ORDER — AMOXICILLIN 250 MG
2 CAPSULE ORAL 2 TIMES DAILY
Status: DISCONTINUED | OUTPATIENT
Start: 2018-03-19 | End: 2018-03-20

## 2018-03-19 RX ORDER — POTASSIUM CHLORIDE 20 MEQ/1
20 TABLET, EXTENDED RELEASE ORAL 2 TIMES DAILY
Status: DISCONTINUED | OUTPATIENT
Start: 2018-03-19 | End: 2018-03-19

## 2018-03-19 RX ORDER — LISINOPRIL 10 MG/1
10 TABLET ORAL DAILY
Status: DISCONTINUED | OUTPATIENT
Start: 2018-03-20 | End: 2018-04-06

## 2018-03-19 RX ORDER — OXYCODONE HYDROCHLORIDE 10 MG/1
40 TABLET ORAL EVERY 4 HOURS PRN
Status: DISCONTINUED | OUTPATIENT
Start: 2018-03-19 | End: 2018-04-01

## 2018-03-19 RX ORDER — ASPIRIN 81 MG/1
81 TABLET, CHEWABLE ORAL DAILY
Status: DISCONTINUED | OUTPATIENT
Start: 2018-03-20 | End: 2018-04-06

## 2018-03-19 RX ORDER — CHLORAL HYDRATE 500 MG
1000 CAPSULE ORAL
Status: DISCONTINUED | OUTPATIENT
Start: 2018-03-19 | End: 2018-03-23

## 2018-03-19 RX ORDER — ACETAMINOPHEN 325 MG/1
650 TABLET ORAL EVERY 6 HOURS PRN
Status: DISCONTINUED | OUTPATIENT
Start: 2018-03-19 | End: 2018-04-16 | Stop reason: HOSPADM

## 2018-03-19 RX ORDER — AMMONIUM LACTATE 12 G/100G
LOTION TOPICAL DAILY
Status: DISCONTINUED | OUTPATIENT
Start: 2018-03-19 | End: 2018-03-26

## 2018-03-19 RX ORDER — OXYCODONE HYDROCHLORIDE 30 MG/1
30 TABLET ORAL EVERY 4 HOURS PRN
Status: DISCONTINUED | OUTPATIENT
Start: 2018-03-19 | End: 2018-04-03

## 2018-03-19 RX ADMIN — Medication: at 22:01

## 2018-03-19 RX ADMIN — OXYCODONE HYDROCHLORIDE 40 MG: 10 TABLET ORAL at 23:53

## 2018-03-19 RX ADMIN — OMEGA-3 FATTY ACIDS CAP 1000 MG 1000 MG: 1000 CAP at 12:46

## 2018-03-19 RX ADMIN — STANDARDIZED SENNA CONCENTRATE AND DOCUSATE SODIUM 2 TABLET: 8.6; 5 TABLET, FILM COATED ORAL at 09:57

## 2018-03-19 RX ADMIN — LACTOBACILLUS ACIDOPHILUS / LACTOBACILLUS BULGARICUS 1 PACKET: 100 MILLION CFU STRENGTH GRANULES at 17:25

## 2018-03-19 RX ADMIN — POTASSIUM BICARBONATE 25 MEQ: 25 TABLET, EFFERVESCENT ORAL at 22:01

## 2018-03-19 RX ADMIN — BUDESONIDE AND FORMOTEROL FUMARATE DIHYDRATE 2 PUFF: 160; 4.5 AEROSOL RESPIRATORY (INHALATION) at 09:56

## 2018-03-19 RX ADMIN — ENOXAPARIN SODIUM 40 MG: 100 INJECTION SUBCUTANEOUS at 22:01

## 2018-03-19 RX ADMIN — AMPICILLIN SODIUM AND SULBACTAM SODIUM 3 G: 2; 1 INJECTION, POWDER, FOR SOLUTION INTRAMUSCULAR; INTRAVENOUS at 06:05

## 2018-03-19 RX ADMIN — OXYCODONE HYDROCHLORIDE AND ACETAMINOPHEN 500 MG: 500 TABLET ORAL at 09:08

## 2018-03-19 RX ADMIN — Medication 220 MG: at 09:00

## 2018-03-19 RX ADMIN — LACTOBACILLUS ACIDOPHILUS / LACTOBACILLUS BULGARICUS 1 PACKET: 100 MILLION CFU STRENGTH GRANULES at 12:46

## 2018-03-19 RX ADMIN — FUROSEMIDE 20 MG: 10 INJECTION, SOLUTION INTRAMUSCULAR; INTRAVENOUS at 17:26

## 2018-03-19 RX ADMIN — AMPICILLIN SODIUM AND SULBACTAM SODIUM 3 G: 2; 1 INJECTION, POWDER, FOR SOLUTION INTRAMUSCULAR; INTRAVENOUS at 12:48

## 2018-03-19 RX ADMIN — ASPIRIN 81 MG: 81 TABLET, CHEWABLE ORAL at 09:06

## 2018-03-19 RX ADMIN — POTASSIUM BICARBONATE 25 MEQ: 25 TABLET, EFFERVESCENT ORAL at 12:48

## 2018-03-19 RX ADMIN — OMEPRAZOLE 20 MG: 20 CAPSULE, DELAYED RELEASE ORAL at 09:04

## 2018-03-19 RX ADMIN — AMPICILLIN SODIUM AND SULBACTAM SODIUM 3 G: 2; 1 INJECTION, POWDER, FOR SOLUTION INTRAMUSCULAR; INTRAVENOUS at 17:27

## 2018-03-19 RX ADMIN — LEVOTHYROXINE SODIUM 50 MCG: 25 TABLET ORAL at 06:05

## 2018-03-19 RX ADMIN — LISINOPRIL 10 MG: 20 TABLET ORAL at 09:58

## 2018-03-19 RX ADMIN — FUROSEMIDE 20 MG: 10 INJECTION, SOLUTION INTRAMUSCULAR; INTRAVENOUS at 12:46

## 2018-03-19 RX ADMIN — OXYCODONE HYDROCHLORIDE 40 MG: 10 TABLET ORAL at 17:24

## 2018-03-19 RX ADMIN — FUROSEMIDE 40 MG: 40 TABLET ORAL at 09:57

## 2018-03-19 RX ADMIN — OMEGA-3 FATTY ACIDS CAP 1000 MG 1000 MG: 1000 CAP at 09:04

## 2018-03-19 RX ADMIN — OMEGA-3 FATTY ACIDS CAP 1000 MG 1000 MG: 1000 CAP at 17:23

## 2018-03-19 RX ADMIN — OXYCODONE HYDROCHLORIDE 30 MG: 10 TABLET ORAL at 09:09

## 2018-03-19 RX ADMIN — OXYCODONE HYDROCHLORIDE 30 MG: 10 TABLET ORAL at 04:00

## 2018-03-19 RX ADMIN — OXYCODONE HYDROCHLORIDE 40 MG: 10 TABLET ORAL at 13:13

## 2018-03-19 RX ADMIN — LACTOBACILLUS ACIDOPHILUS / LACTOBACILLUS BULGARICUS 1 PACKET: 100 MILLION CFU STRENGTH GRANULES at 09:07

## 2018-03-19 RX ADMIN — ENOXAPARIN SODIUM 40 MG: 100 INJECTION SUBCUTANEOUS at 09:57

## 2018-03-19 ASSESSMENT — COGNITIVE AND FUNCTIONAL STATUS - GENERAL
DAILY ACTIVITIY SCORE: 13
PERSONAL GROOMING: A LITTLE
STANDING UP FROM CHAIR USING ARMS: TOTAL
MOBILITY SCORE: 6
TOILETING: TOTAL
SUGGESTED CMS G CODE MODIFIER MOBILITY: CN
HELP NEEDED FOR BATHING: A LOT
WALKING IN HOSPITAL ROOM: TOTAL
MOVING FROM LYING ON BACK TO SITTING ON SIDE OF FLAT BED: UNABLE
TURNING FROM BACK TO SIDE WHILE IN FLAT BAD: UNABLE
DRESSING REGULAR LOWER BODY CLOTHING: TOTAL
DRESSING REGULAR UPPER BODY CLOTHING: A LITTLE
MOVING TO AND FROM BED TO CHAIR: UNABLE
EATING MEALS: A LITTLE
CLIMB 3 TO 5 STEPS WITH RAILING: TOTAL
SUGGESTED CMS G CODE MODIFIER DAILY ACTIVITY: CL

## 2018-03-19 ASSESSMENT — GAIT ASSESSMENTS: GAIT LEVEL OF ASSIST: UNABLE TO PARTICIPATE

## 2018-03-19 ASSESSMENT — PAIN SCALES - GENERAL
PAINLEVEL_OUTOF10: 3
PAINLEVEL_OUTOF10: 5
PAINLEVEL_OUTOF10: 9
PAINLEVEL_OUTOF10: 9
PAINLEVEL_OUTOF10: 10
PAINLEVEL_OUTOF10: 9
PAINLEVEL_OUTOF10: 8
PAINLEVEL_OUTOF10: 8
PAINLEVEL_OUTOF10: 9
PAINLEVEL_OUTOF10: 9
PAINLEVEL_OUTOF10: 7
PAINLEVEL_OUTOF10: 8
PAINLEVEL_OUTOF10: 9
PAINLEVEL_OUTOF10: 7
PAINLEVEL_OUTOF10: 9

## 2018-03-19 ASSESSMENT — ENCOUNTER SYMPTOMS
NERVOUS/ANXIOUS: 0
COUGH: 0
NAUSEA: 0
SHORTNESS OF BREATH: 1
SORE THROAT: 0
DIZZINESS: 0
PALPITATIONS: 0
SPUTUM PRODUCTION: 0
COUGH: 1
SPUTUM PRODUCTION: 1
SPEECH CHANGE: 0
MYALGIAS: 0
FEVER: 0
WEAKNESS: 1
ABDOMINAL PAIN: 0

## 2018-03-19 ASSESSMENT — PULMONARY FUNCTION TESTS
EPAP_CMH2O: 10
EPAP_CMH2O: 8
EPAP_CMH2O: 10
EPAP_CMH2O: 10

## 2018-03-19 ASSESSMENT — ACTIVITIES OF DAILY LIVING (ADL): TOILETING: REQUIRES ASSIST

## 2018-03-19 NOTE — RESPIRATORY CARE
COPD EDUCATION by COPD CLINICAL EDUCATOR  3/18/2018 at 5:35 PM by Carmen Martinez     Patient reviewed by COPD education team. Patient does not qualify for COPD program at this time. When condition changes we can re-visit.

## 2018-03-19 NOTE — CARE PLAN
Problem: Pain Management  Goal: Pain level will decrease to patient's comfort goal  Outcome: PROGRESSING AS EXPECTED  Assess and classify patient's pain/physical discomfort using Nurse Pain 0-10 scale. Perform ongoing assessment to determine if the pain management plan meets the patient's stated goal.        Problem: Skin Integrity  Goal: Risk for impaired skin integrity will decrease  Outcome: PROGRESSING AS EXPECTED  Assess for skin breakdown related to bowel incontinence and instruct patient on proper skin care and strategies to reduce skin irritation.

## 2018-03-19 NOTE — THERAPY
"Physical Therapy Evaluation completed.   Bed Mobility:  Supine to Sit: Maximal Assist (X2)  Transfers: Sit to Stand: Maximal Assist (x2)  Gait: Level Of Assist: Unable to Participate   Plan of Care: Will benefit from Physical Therapy 3 times per week and Plan to complete next treatment by Thursday 3/22  Discharge Recommendations: Equipment: Will Continue to Assess for Equipment Needs. Post-acute therapy Discharge to a transitional care facility for continued skilled therapy services.    Pt is a 57 year old male admitted to the hospital for B LE swelling and cellulitis. Pt's daughter also reports she is not longer able to properly care for him at home. Pt reports hospitalization at the end of last year then spent 2 months in SNF following DC. Pt had only been home about 1 week prior to re-admission. At time of initial evaluation, pt presents with decreased functional mobility, decreased functional strength, difficulty with bed mobility and transfers, inability to ambulate, poor activity tolerance and pain limiting function. Pt required maximal assist X 2 for all mobility. Pt would benefit from skilled PT intervention while in the acute care setting to address the listed deficits and improve mobility prior to DC. PT will require post acute transitional care with SNF upon DC as he is unable to care for himself and burden of care is too great for family.     See \"Rehab Therapy-Acute\" Patient Summary Report for complete documentation.     "

## 2018-03-19 NOTE — THERAPY
"Occupational Therapy Evaluation completed.   Functional Status: Pt is a 56 y/o male admitted with respiratory failure, BLE cellulitis, and failure to thrive. He is cooperative, but requires frequent encouragement from therapists as he lacks initiation for tasks due to pain and fatigue. He requires maxA x2 for bed mobility. Once seated EOB, maxA x2 for sit<>stand three times with HHA for toileting. Setup for grooming seated EOB. TotalA for LB cares. Pt limited by weakness, fatigue, impaired balance which impacts independence in ADLs and functional mobility.   Plan of Care: Will benefit from Occupational Therapy 3 times per week  Discharge Recommendations:  Equipment: Will Continue to Assess for Equipment Needs. Post-acute therapy before d/c home.    See \"Rehab Therapy-Acute\" Patient Summary Report for complete documentation.    "

## 2018-03-19 NOTE — PROGRESS NOTES
[facility]    Date of Service: 3/19/2018    Chief Complaint  57 y.o. male admitted 3/16/2018 with ongoing cellulitis of bilateral lower extremities with a history of chronic venous stasis and lymphedema. He had acute respiratory distress in ER.    Interval Problem Update  On bipap this am. BP:'s.  More compliant with bipap and states it is helpin g him breath better.  His speech is clear while on nasal canula.  Talking in full sentences.        Consultants/Specialty  Intensivist    Disposition  Home vs SNF placement.        Review of Systems   Constitutional: Positive for malaise/fatigue. Negative for fever.   HENT: Negative for congestion and sore throat.    Respiratory: Positive for shortness of breath. Negative for cough and sputum production.    Cardiovascular: Positive for leg swelling. Negative for palpitations.   Gastrointestinal: Negative for abdominal pain and nausea.   Genitourinary: Negative for dysuria and hematuria.   Musculoskeletal: Negative for myalgias.   Neurological: Positive for weakness. Negative for dizziness and speech change.   Psychiatric/Behavioral: The patient is not nervous/anxious.       Physical Exam  Laboratory/Imaging   Hemodynamics  Temp (24hrs), Av.7 °C (98 °F), Min:35.9 °C (96.7 °F), Max:37.4 °C (99.3 °F)   Temperature: 36.4 °C (97.6 °F)  Pulse  Av.5  Min: 63  Max: 111 Heart Rate (Monitored): 90  NIBP: 108/54      Respiratory      Respiration: 15, Pulse Oximetry: 95 %, O2 Daily Delivery Respiratory : Silicone Nasal Cannula     Work Of Breathing / Effort: Moderate  RUL Breath Sounds: Clear;Diminished, RML Breath Sounds: Clear;Diminished, RLL Breath Sounds: Diminished, LARY Breath Sounds: Clear;Diminished, LLL Breath Sounds: Diminished    Fluids    Intake/Output Summary (Last 24 hours) at 188  Last data filed at 18   Gross per 24 hour   Intake           2137.5 ml   Output             2500 ml   Net           -362.5 ml        Nutrition  Orders Placed This Encounter   Procedures   • Diet Order     Standing Status:   Standing     Number of Occurrences:   1     Order Specific Question:   Diet:     Answer:   Diabetic [3]     Order Specific Question:   Diet:     Answer:   Cardiac [6]     Physical Exam   Constitutional: He is oriented to person, place, and time. No distress.   obesity   HENT:   Head: Normocephalic and atraumatic.   Nose: Nose normal.   Eyes: Conjunctivae and EOM are normal. Right eye exhibits no discharge. Left eye exhibits no discharge.   Neck: No tracheal deviation present.   Cardiovascular: Normal rate, regular rhythm, normal heart sounds and intact distal pulses.    No murmur heard.  Pulmonary/Chest: Effort normal. No respiratory distress. He has no wheezes.   Abdominal: Soft. Bowel sounds are normal. He exhibits no distension. There is no tenderness.   Musculoskeletal: He exhibits edema.   Lymphadenopathy:     He has no cervical adenopathy.   Neurological: He is alert and oriented to person, place, and time. No cranial nerve deficit.   Skin: Skin is warm. He is not diaphoretic.   Bilateral lower legs covered with ACE wrap.  Some oozing of left lateral foot. Thickened skin bilateral feet.   Psychiatric: He has a normal mood and affect. His behavior is normal.   Vitals reviewed.      Recent Labs      03/17/18   0523  03/18/18   0609  03/19/18   0444   WBC  12.3*  7.4  7.1   RBC  4.02*  3.77*  3.48*   HEMOGLOBIN  11.3*  10.5*  9.9*   HEMATOCRIT  38.3*  37.3*  34.3*   MCV  95.3  98.9*  98.6*   MCH  28.1  27.9  28.4   MCHC  29.5*  28.2*  28.9*   RDW  56.0*  58.7*  57.0*   PLATELETCT  202  200  182   MPV  10.9  10.5  10.4     Recent Labs      03/17/18   0523  03/18/18   0609  03/19/18   0444   SODIUM  139  141  140   POTASSIUM  4.6  4.5  4.2   CHLORIDE  98  97  96   CO2  35*  42*  39*   GLUCOSE  98  121*  133*   BUN  30*  27*  22   CREATININE  0.61  0.63  0.60   CALCIUM  8.9  9.1  8.8                      Assessment/Plan      * Acute on chronic respiratory failure with hypoxia (CMS-HCC)- (present on admission)   Assessment & Plan    Care with narcotics.  RT protocol  Intensivist consulting.  Likely chronic ALIREZA component  Supplemental oxygen to keep SpO2>90  Improving CO2 with bipap use        Bilateral lower leg cellulitis- (present on admission)   Assessment & Plan    Wound care  pictures from 3/16 in media tab reviewed  Antibiotics  Dressing changes  Vit C and zinc.  Tight glucose control  Prevent further posterior skin breakdown.  Needs frequent turns, bedding change, and keep skin clean/dry        Narcotic addiction (CMS-HCC)- (present on admission)   Assessment & Plan    Judicial pain management.  Attempt additional nonnarcotic pain medications        Stasis dermatitis- (present on admission)   Assessment & Plan    Wound care consulting  Vitamin C & zinc        Hypothyroidism- (present on admission)   Assessment & Plan    Continue Synthroid  TSH: 1.15        COPD (chronic obstructive pulmonary disease) (CMS-HCC)- (present on admission)   Assessment & Plan    Resp and O2 per protocol  Monitor SpO2        HTN (hypertension)- (present on admission)   Assessment & Plan    Monitor vitals  Continue active treatment with lisinopril and furosemide        Adult failure to thrive   Assessment & Plan    Per H+P the patient isnot able to care for himself despite home health care        Bilateral edema of lower extremity   Assessment & Plan    Chronic        Diabetes type 2, controlled (CMS-HCC)- (present on admission)   Assessment & Plan    HgbA1c:6.4  SSI  Was on metformin outpatient.  Needs ongoing weight loss, dietary and lifestyle modifications        Non compliance w medication regimen- (present on admission)   Assessment & Plan    Will need future strict adhearence and f/u with PCP/specialist        Morbid obesity (CMS-HCC)- (present on admission)   Assessment & Plan    Body mass index is 48.02 kg/m².  Future weight loss and lifestyle  modification needed          Quality-Core Measures   Reviewed items::  Radiology images reviewed, Labs reviewed and Medications reviewed  Montoya catheter::  No Montoya  DVT prophylaxis pharmacological::  Enoxaparin (Lovenox)  Antibiotics:  Treating active infection/contamination beyond 24 hours perioperative coverage

## 2018-03-19 NOTE — PROGRESS NOTES
Pulmonary Critical Care Progress Note        Chief Complaint: Acute hypoxemic respiratory failure, chronic leg wounds with possible cellulitis.    History of Present Illness: Fer Estrada is a 57 y.o. male with a past medical history of multiple admissions for bilateral lower extremity cellulitis. In the past he has been noncompliant with wound care. He has a history of chronic lymphedema, venous stasis, diabetes mellitus type 2, systemic hypertension and actually dependent COPD. He is morbidly obese and has chronic pain with narcotic dependence. He was most recently admitted in January 2018 for recurrent bilateral lower extremity cellulitis. Today he presented to the ED after his daughter called EMS stating she could not take care of him any longer. He has been having difficulty standing and going to the bathroom. He has been having recent diarrhea and increasing sores to his buttocks as well as her chronic leg wounds. He denies fever or chills. He did report some diarrhea starting yesterday. He had a soft brown stool in the ED. He is recently been on clindamycin. In the ED he was given IV fluid and began having increasing dyspnea. He is noted to have diastolic heart dysfunction. He was given some Lasix but developed increasing respiratory distress and was placed on BiPAP. At time I evaluation is in BiPAP in the ICU. He has diminished air entry throughout. He arouses but is somewhat somnolent. He follows commands but is unable to give much additional history.    Please note above history obtained by my partner Dr. Sewell on 3/16/2018.    ROS:  Respiratory: unable to perform due to the patient's inability to effectively communicate, Cardiac: unable to perform due to the patient's inability to effectively communicate, GI: unable to perform due to the patient's inability to effectively communicate.  Note that the patient on BiPAP and difficult to talk. All other systems negative.    Interval Events:  24 hour interval  history reviewed    LE cellulitis bilaterally with probable right heart failure.  High LA, and then fluid overloaded initially.  MS intact  Hx of 5 second pause cardiac rhythm days ago, however no recurrence  Diabetic diet.   Condom cath, though not consistently staying in place   KVO IF  IPAP/EPAP settings 14/10, 40%  7.33/930/71  Lasix 20 q6 hours with k-supplement  Increase OXY by 10 mg  Anti Xa level pending  MSSA from wound.   Diamox 500 mg to be started..     PFSH:  No change.    Respiratory:     Pulse Oximetry: 91 %  Chest Tube Drains:          Exam: tachypnea  ImagingCXR  I have personally reviewed the chest x-ray my impression is  there is slight improvement in his bibasilar atelectasis with evidence of possible infiltrate/effusion.  Recent Labs      03/16/18   1836  03/19/18   0354   [id number]  7.31*   --    [id number]  68.1*   --    [id number]  88.7*   --    [id number]  95.5   --    ARTHCO3  34*   --    FIO2  60   --    ARTBE  5*   --    ISTATAPH   --   7.339*   ISTATAPCO2   --   91.7*   ISTATAPO2   --   69   ISTATATCO2   --   >50*   [id number]   --   90*   ISTATARTHCO3   --   49.3*   ISTATARTBE   --   20*   ISTATTEMP   --   99.3 F   ISTATFIO2   --   70   ISTATSPEC   --   Arterial   ISTATAPHTC   --   7.333*   [id number]   --   71   Interpretation of arterial blood gas shows significant hypercapnia as related to ongoing diuresis and restrictive and obstructive lung disease. This is on BiPAP modality.    HemoDynamics:  Pulse: 63, Heart Rate (Monitored): 62  NIBP: (!) 93/47       Exam: regular rate and rhythm  Imaging: echo Reviewed     Echocardiogram from 3/18/2018:    CONCLUSIONS  Technically difficult study incomplete information is obtained.   Grossly normal left ventricular systolic function.  Right ventricle not well visualized.  No valve disease noted by blind   Doppler interrogation.  No noted changes from prior.  Recent Labs      03/16/18   1330  03/16/18   2139   TROPONINI  <0.01  <0.01   BNPBTYPENAT   <2   --        Neuro:  GCS Total Siomara Coma Score: 15       Exam: follows commands, raises two fingers. Patient has significant weakness on the lower extremities due to severe edema and chronic venous stasis changes. Patient has crusting scaly lesions as well. There is evidence of lichenification of the lower extremities. Dressings were not removed based on wound care request.  Imaging: None - Reviewed    Fluids:  Intake/Output       03/17/18 0700 - 03/18/18 0659 03/18/18 0700 - 03/19/18 0659 03/19/18 0700 - 03/20/18 0659      0700-1859 6829-9993 Total 2213-1802 4309-5744 Total 1602-4729 7753-8533 Total       Intake    P.O.  495  390 885  220  500 720  --  -- --    P.O. 495 390 885 220 500 720 -- -- --    I.V.  120  257.5 377.5  255  257.5 512.5  --  -- --    IV Piggyback Volume (IV Piggyback) -- 200 200 200 200 400 -- -- --    IV Volume (NS TKO) 120 57.5 177.5 55 57.5 112.5 -- -- --    Total Intake 615 647.5 1262.5 475 757.5 1232.5 -- -- --       Output    Urine  750  100 850  550  500 1050  --  -- --    Condom Catheter 750 100 850  -- -- --    Stool  --  -- --  --  -- --  --  -- --    Number of Times Stooled -- 0 x 0 x -- -- -- -- -- --    Total Output 750 100 850  -- -- --       Net I/O     -135 547.5 412.5 -75 257.5 182.5 -- -- --          Recent Labs      03/17/18   0523  03/18/18   0609  03/19/18   0444   SODIUM  139  141  140   POTASSIUM  4.6  4.5  4.2   CHLORIDE  98  97  96   CO2  35*  42*  39*   BUN  30*  27*  22   CREATININE  0.61  0.63  0.60   CALCIUM  8.9  9.1  8.8       GI/Nutrition:  Exam: normal active bowel sounds morbidly obese.  Imaging: None - Reviewed  taking PO  Liver Function  Recent Labs      03/16/18   1330  03/17/18   0523  03/18/18   0609  03/19/18   0444   ALTSGPT  12   --    --    --    ASTSGOT  17   --    --    --    ALKPHOSPHAT  75   --    --    --    TBILIRUBIN  0.5   --    --    --    GLUCOSE  100*  98  121*  133*       Heme:  Recent Labs      03/17/18    0518   0609  18   0444   RBC  4.02*  3.77*  3.48*   HEMOGLOBIN  11.3*  10.5*  9.9*   HEMATOCRIT  38.3*  37.3*  34.3*   PLATELETCT  202  200  182       Infectious Disease:  Temp  Av.2 °C (98.9 °F)  Min: 36.8 °C (98.3 °F)  Max: 37.4 °C (99.3 °F)  Micro: cultures reviewed. On Unasyn at this time. Large ulceration noted in the left proximal posterior thigh. Was previously on linezolid.  Recent Labs      18   1330  18   0523  18   0609  18   0444   WBC  10.0  12.3*  7.4  7.1   NEUTSPOLYS  86.10*   --    --    --    LYMPHOCYTES  4.30*   --    --    --    MONOCYTES  6.10   --    --    --    EOSINOPHILS  1.70   --    --    --    BASOPHILS  0.90   --    --    --    ASTSGOT  17   --    --    --    ALTSGPT  12   --    --    --    ALKPHOSPHAT  75   --    --    --    TBILIRUBIN  0.5   --    --    --      Current Facility-Administered Medications   Medication Dose Frequency Provider Last Rate Last Dose   • aspirin (ASA) chewable tab 81 mg  81 mg DAILY Scott Almodovar, D.O.       • enoxaparin (LOVENOX) inj 40 mg  40 mg BID Vic Pendleton Jr., D.O.   40 mg at 18 2102   • zinc sulfate (ZINCATE) capsule 220 mg  220 mg DAILY YVROSE Romero.O.   220 mg at 18 1021   • albuterol inhaler 2 Puff  2 Puff Q6HRS PRN Benjie Marcus M.D.       • ascorbic acid (ascorbic acid) tablet 500 mg  500 mg DAILY Benjie Marcus M.D.   500 mg at 18 1022   • budesonide-formoterol (SYMBICORT) 160-4.5 MCG/ACT inhaler 2 Puff  2 Puff BID Benjie Marcus M.D.   2 Puff at 18 1038   • furosemide (LASIX) tablet 40 mg  40 mg DAILY Waldrop O M Angeline, M.D.   40 mg at 18 1022   • levothyroxine (SYNTHROID) tablet 50 mcg  50 mcg AM ES Benjie Marcus M.D.   50 mcg at 18 0605   • lisinopril (PRINIVIL) 10 MG tablet 10 mg  10 mg DAILY Benjie Marcus M.D.   10 mg at 18 1022   • fish oil capsule 1,000 mg  1,000 mg TID WITH MEALS Benjie Marcus M.D.   1,000 mg at 18  1756   • omeprazole (PRILOSEC) capsule 20 mg  20 mg DAILY Benjie Marcus M.D.   20 mg at 03/18/18 1022   • oxyCODONE (OXY-IR) TABS 30 mg  30 mg Q4HRS PRN Benjie Marcus M.D.   30 mg at 03/19/18 0400   • senna-docusate (PERICOLACE or SENOKOT S) 8.6-50 MG per tablet 2 Tab  2 Tab BID Benjie Marcus M.D.   2 Tab at 03/18/18 1022    And   • polyethylene glycol/lytes (MIRALAX) PACKET 1 Packet  1 Packet QDAY PRN Benjie Marcus M.D.        And   • magnesium hydroxide (MILK OF MAGNESIA) suspension 30 mL  30 mL QDAY PRN Benjie Marcus M.D.        And   • bisacodyl (DULCOLAX) suppository 10 mg  10 mg QDAY PRN Benjie Marcus M.D.       • acetaminophen (TYLENOL) tablet 650 mg  650 mg Q6HRS PRN Benjie Marcus M.D.       • insulin regular (HUMULIN R) injection 1-6 Units  1-6 Units 4X/DAY ACHS Benjie Marcus M.D.   Stopped at 03/16/18 2100    And   • glucose 4 g chewable tablet 16 g  16 g Q15 MIN PRN Benjie Marcus M.D.        And   • dextrose 50% (D50W) injection 25 mL  25 mL Q15 MIN PRN Benjie Marcus M.D.       • ampicillin/sulbactam (UNASYN) 3 g in  mL IVPB  3 g Q6HRS Benjie Marcus M.D. 200 mL/hr at 03/19/18 0605 3 g at 03/19/18 0605   • lactobacillus granules (LACTINEX/FLORANEX) packet 1 Packet  1 Packet TID WITH MEALS Benjie Marcus M.D.   1 Packet at 03/18/18 1756   • Respiratory Care per Protocol   Continuous RT Benjie Marcus M.D.       • ipratropium-albuterol (DUONEB) nebulizer solution 3 mL  3 mL Q4H PRN (RT) Benjie Marcus M.D.         Last reviewed on 3/16/2018  3:34 PM by May Degroot    Quality  Measures:  Labs reviewed, Medications reviewed and Radiology images reviewed  Montoya catheter: Critically Ill - Requiring Accurate Measurement of Urinary Output  Central line in place: Sepsis              Assessment and plan:     1.  Acute on chronic hypoxemic and hypercapnic respiratory failure.    -Patient's arterial blood gases showing slightly worsened hypercapnia  although patient tolerating lower levels of noninvasive ventilation today.  -Plan to trial off BiPAP as able.  -Continue with aggressive diuresis and add Diamox today.  - Follow-up BNP level tomorrow.  -Continue with treatment for obstructive lung disease as well.    2. Chronic severe lymphedema with stasis dermatitis.    -Continue with wound care treatment.  -Patient also to possibly have evaluation of the left posterior thigh by surgical team.  -Will observe wound more closely tomorrow.  -Will likely require infectious disease evaluation pending patient's response to ongoing treatment.    3. Type II diabetes mellitus.    -Continue with sliding scale at this time.  -Avoiding IV steroids as this may worsen his glycemic control.    4. Morbid obesity.    5. Hypertension.    -Under adequate control at this time.  -Continue with oral therapy.    Discussed patient condition and risk of morbidity and/or mortality with at length on M.D. rounds.   The patient remains critically ill.  Critical care time = 35 minutes in directly providing and coordinating critical care and extensive data review.  No time overlap and excludes procedures.    Chris Ching D.O.

## 2018-03-19 NOTE — DISCHARGE PLANNING
Received Consult for family questions regarding a POA. TC to pt's daughter, Howard [phone]. Left message requesting she call this SW back.

## 2018-03-20 ENCOUNTER — APPOINTMENT (OUTPATIENT)
Dept: RADIOLOGY | Facility: MEDICAL CENTER | Age: 58
DRG: 987 | End: 2018-03-20
Attending: INTERNAL MEDICINE
Payer: MEDICARE

## 2018-03-20 LAB
ACTION RANGE TRIGGERED IACRT: YES
ANION GAP SERPL CALC-SCNC: 5 MMOL/L (ref 0–11.9)
APPEARANCE UR: CLEAR
BACTERIA #/AREA URNS HPF: NEGATIVE /HPF
BASE EXCESS BLDA CALC-SCNC: 22 MMOL/L (ref -4–3)
BASE EXCESS BLDA CALC-SCNC: 25 MMOL/L (ref -4–3)
BASE EXCESS BLDA CALC-SCNC: 26 MMOL/L (ref -4–3)
BILIRUB UR QL STRIP.AUTO: NEGATIVE
BNP SERPL-MCNC: 20 PG/ML (ref 0–100)
BODY TEMPERATURE: ABNORMAL DEGREES
BUN SERPL-MCNC: 18 MG/DL (ref 8–22)
CALCIUM SERPL-MCNC: 9.2 MG/DL (ref 8.5–10.5)
CHLORIDE SERPL-SCNC: 92 MMOL/L (ref 96–112)
CO2 BLDA-SCNC: 48 MMOL/L (ref 20–33)
CO2 BLDA-SCNC: >50 MMOL/L (ref 20–33)
CO2 BLDA-SCNC: >50 MMOL/L (ref 20–33)
CO2 SERPL-SCNC: 44 MMOL/L (ref 20–33)
COLOR UR: YELLOW
CREAT SERPL-MCNC: 0.67 MG/DL (ref 0.5–1.4)
EKG IMPRESSION: NORMAL
EKG IMPRESSION: NORMAL
EPI CELLS #/AREA URNS HPF: ABNORMAL /HPF
ERYTHROCYTE [DISTWIDTH] IN BLOOD BY AUTOMATED COUNT: 56.4 FL (ref 35.9–50)
GLUCOSE BLD-MCNC: 112 MG/DL (ref 65–99)
GLUCOSE BLD-MCNC: 114 MG/DL (ref 65–99)
GLUCOSE BLD-MCNC: 128 MG/DL (ref 65–99)
GLUCOSE SERPL-MCNC: 105 MG/DL (ref 65–99)
GLUCOSE UR STRIP.AUTO-MCNC: NEGATIVE MG/DL
HCO3 BLDA-SCNC: 46.4 MMOL/L (ref 17–25)
HCO3 BLDA-SCNC: 50.7 MMOL/L (ref 17–25)
HCO3 BLDA-SCNC: 53.4 MMOL/L (ref 17–25)
HCT VFR BLD AUTO: 34.4 % (ref 42–52)
HGB BLD-MCNC: 10 G/DL (ref 14–18)
HYALINE CASTS #/AREA URNS LPF: ABNORMAL /LPF
INST. QUALIFIED PATIENT IIQPT: YES
KETONES UR STRIP.AUTO-MCNC: NEGATIVE MG/DL
LEUKOCYTE ESTERASE UR QL STRIP.AUTO: ABNORMAL
MCH RBC QN AUTO: 28.3 PG (ref 27–33)
MCHC RBC AUTO-ENTMCNC: 29.1 G/DL (ref 33.7–35.3)
MCV RBC AUTO: 97.5 FL (ref 81.4–97.8)
MICRO URNS: ABNORMAL
NITRITE UR QL STRIP.AUTO: NEGATIVE
O2/TOTAL GAS SETTING VFR VENT: 100 %
O2/TOTAL GAS SETTING VFR VENT: 100 %
O2/TOTAL GAS SETTING VFR VENT: 75 %
PCO2 BLDA: 46.1 MMHG (ref 26–37)
PCO2 BLDA: 54.1 MMHG (ref 26–37)
PCO2 BLDA: 81.2 MMHG (ref 26–37)
PCO2 TEMP ADJ BLDA: 46.1 MMHG (ref 26–37)
PCO2 TEMP ADJ BLDA: 54.1 MMHG (ref 26–37)
PCO2 TEMP ADJ BLDA: 80.7 MMHG (ref 26–37)
PH BLDA: 7.43 [PH] (ref 7.4–7.5)
PH BLDA: 7.58 [PH] (ref 7.4–7.5)
PH BLDA: 7.61 [PH] (ref 7.4–7.5)
PH TEMP ADJ BLDA: 7.43 [PH] (ref 7.4–7.5)
PH TEMP ADJ BLDA: 7.58 [PH] (ref 7.4–7.5)
PH TEMP ADJ BLDA: 7.61 [PH] (ref 7.4–7.5)
PH UR STRIP.AUTO: >=9 [PH]
PLATELET # BLD AUTO: 175 K/UL (ref 164–446)
PMV BLD AUTO: 10.6 FL (ref 9–12.9)
PO2 BLDA: 34 MMHG (ref 64–87)
PO2 BLDA: 46 MMHG (ref 64–87)
PO2 BLDA: 63 MMHG (ref 64–87)
PO2 TEMP ADJ BLDA: 34 MMHG (ref 64–87)
PO2 TEMP ADJ BLDA: 46 MMHG (ref 64–87)
PO2 TEMP ADJ BLDA: 63 MMHG (ref 64–87)
POTASSIUM SERPL-SCNC: 4 MMOL/L (ref 3.6–5.5)
PROT UR QL STRIP: 30 MG/DL
RBC # BLD AUTO: 3.53 M/UL (ref 4.7–6.1)
RBC # URNS HPF: ABNORMAL /HPF
RBC UR QL AUTO: NEGATIVE
RENAL EPI CELLS #/AREA URNS HPF: NEGATIVE /HPF
SAO2 % BLDA: 73 % (ref 93–99)
SAO2 % BLDA: 88 % (ref 93–99)
SAO2 % BLDA: 91 % (ref 93–99)
SODIUM SERPL-SCNC: 141 MMOL/L (ref 135–145)
SP GR UR STRIP.AUTO: 1.03
SPECIMEN DRAWN FROM PATIENT: ABNORMAL
TRIGL SERPL-MCNC: 233 MG/DL (ref 0–149)
UROBILINOGEN UR STRIP.AUTO-MCNC: 0.2 MG/DL
WBC # BLD AUTO: 6.2 K/UL (ref 4.8–10.8)
WBC #/AREA URNS HPF: ABNORMAL /HPF

## 2018-03-20 PROCEDURE — 31500 INSERT EMERGENCY AIRWAY: CPT

## 2018-03-20 PROCEDURE — 302214 INTUBATION BOX: Performed by: INTERNAL MEDICINE

## 2018-03-20 PROCEDURE — 0B9J8ZX DRAINAGE OF LEFT LOWER LUNG LOBE, VIA NATURAL OR ARTIFICIAL OPENING ENDOSCOPIC, DIAGNOSTIC: ICD-10-PCS | Performed by: INTERNAL MEDICINE

## 2018-03-20 PROCEDURE — 700111 HCHG RX REV CODE 636 W/ 250 OVERRIDE (IP): Performed by: HOSPITALIST

## 2018-03-20 PROCEDURE — 700105 HCHG RX REV CODE 258: Performed by: HOSPITALIST

## 2018-03-20 PROCEDURE — 87205 SMEAR GRAM STAIN: CPT

## 2018-03-20 PROCEDURE — 700105 HCHG RX REV CODE 258: Performed by: INTERNAL MEDICINE

## 2018-03-20 PROCEDURE — 36556 INSERT NON-TUNNEL CV CATH: CPT

## 2018-03-20 PROCEDURE — 0BH17EZ INSERTION OF ENDOTRACHEAL AIRWAY INTO TRACHEA, VIA NATURAL OR ARTIFICIAL OPENING: ICD-10-PCS | Performed by: INTERNAL MEDICINE

## 2018-03-20 PROCEDURE — A9270 NON-COVERED ITEM OR SERVICE: HCPCS | Performed by: HOSPITALIST

## 2018-03-20 PROCEDURE — 99233 SBSQ HOSP IP/OBS HIGH 50: CPT | Performed by: HOSPITALIST

## 2018-03-20 PROCEDURE — B543ZZA ULTRASONOGRAPHY OF RIGHT JUGULAR VEINS, GUIDANCE: ICD-10-PCS | Performed by: INTERNAL MEDICINE

## 2018-03-20 PROCEDURE — 83880 ASSAY OF NATRIURETIC PEPTIDE: CPT

## 2018-03-20 PROCEDURE — 94640 AIRWAY INHALATION TREATMENT: CPT

## 2018-03-20 PROCEDURE — 92950 HEART/LUNG RESUSCITATION CPR: CPT

## 2018-03-20 PROCEDURE — 82803 BLOOD GASES ANY COMBINATION: CPT

## 2018-03-20 PROCEDURE — 87102 FUNGUS ISOLATION CULTURE: CPT | Mod: 91

## 2018-03-20 PROCEDURE — 81001 URINALYSIS AUTO W/SCOPE: CPT

## 2018-03-20 PROCEDURE — 302978 HCHG BRONCHOSCOPY-DIAGNOSTIC

## 2018-03-20 PROCEDURE — 5A1955Z RESPIRATORY VENTILATION, GREATER THAN 96 CONSECUTIVE HOURS: ICD-10-PCS | Performed by: INTERNAL MEDICINE

## 2018-03-20 PROCEDURE — 700102 HCHG RX REV CODE 250 W/ 637 OVERRIDE(OP): Performed by: HOSPITALIST

## 2018-03-20 PROCEDURE — 94002 VENT MGMT INPAT INIT DAY: CPT

## 2018-03-20 PROCEDURE — 80048 BASIC METABOLIC PNL TOTAL CA: CPT

## 2018-03-20 PROCEDURE — 87206 SMEAR FLUORESCENT/ACID STAI: CPT | Mod: 91

## 2018-03-20 PROCEDURE — 87015 SPECIMEN INFECT AGNT CONCNTJ: CPT | Mod: 91

## 2018-03-20 PROCEDURE — 0B9F8ZX DRAINAGE OF RIGHT LOWER LUNG LOBE, VIA NATURAL OR ARTIFICIAL OPENING ENDOSCOPIC, DIAGNOSTIC: ICD-10-PCS | Performed by: INTERNAL MEDICINE

## 2018-03-20 PROCEDURE — 02HV33Z INSERTION OF INFUSION DEVICE INTO SUPERIOR VENA CAVA, PERCUTANEOUS APPROACH: ICD-10-PCS | Performed by: INTERNAL MEDICINE

## 2018-03-20 PROCEDURE — C1751 CATH, INF, PER/CENT/MIDLINE: HCPCS

## 2018-03-20 PROCEDURE — 700111 HCHG RX REV CODE 636 W/ 250 OVERRIDE (IP): Performed by: INTERNAL MEDICINE

## 2018-03-20 PROCEDURE — 770022 HCHG ROOM/CARE - ICU (200)

## 2018-03-20 PROCEDURE — 94003 VENT MGMT INPAT SUBQ DAY: CPT

## 2018-03-20 PROCEDURE — 71045 X-RAY EXAM CHEST 1 VIEW: CPT

## 2018-03-20 PROCEDURE — 700101 HCHG RX REV CODE 250: Performed by: INTERNAL MEDICINE

## 2018-03-20 PROCEDURE — 88112 CYTOPATH CELL ENHANCE TECH: CPT | Mod: 91

## 2018-03-20 PROCEDURE — 71250 CT THORAX DX C-: CPT

## 2018-03-20 PROCEDURE — 93010 ELECTROCARDIOGRAM REPORT: CPT | Performed by: INTERNAL MEDICINE

## 2018-03-20 PROCEDURE — 93005 ELECTROCARDIOGRAM TRACING: CPT | Performed by: INTERNAL MEDICINE

## 2018-03-20 PROCEDURE — 82962 GLUCOSE BLOOD TEST: CPT

## 2018-03-20 PROCEDURE — 87116 MYCOBACTERIA CULTURE: CPT | Mod: 91

## 2018-03-20 PROCEDURE — 36600 WITHDRAWAL OF ARTERIAL BLOOD: CPT

## 2018-03-20 PROCEDURE — 85027 COMPLETE CBC AUTOMATED: CPT

## 2018-03-20 PROCEDURE — 87070 CULTURE OTHR SPECIMN AEROBIC: CPT

## 2018-03-20 PROCEDURE — 700102 HCHG RX REV CODE 250 W/ 637 OVERRIDE(OP): Performed by: INTERNAL MEDICINE

## 2018-03-20 PROCEDURE — A9270 NON-COVERED ITEM OR SERVICE: HCPCS | Performed by: INTERNAL MEDICINE

## 2018-03-20 PROCEDURE — 87106 FUNGI IDENTIFICATION YEAST: CPT

## 2018-03-20 PROCEDURE — 84478 ASSAY OF TRIGLYCERIDES: CPT

## 2018-03-20 RX ORDER — BISACODYL 10 MG
10 SUPPOSITORY, RECTAL RECTAL
Status: DISCONTINUED | OUTPATIENT
Start: 2018-03-20 | End: 2018-04-06

## 2018-03-20 RX ORDER — IPRATROPIUM BROMIDE AND ALBUTEROL SULFATE 2.5; .5 MG/3ML; MG/3ML
3 SOLUTION RESPIRATORY (INHALATION)
Status: DISCONTINUED | OUTPATIENT
Start: 2018-03-20 | End: 2018-03-23

## 2018-03-20 RX ORDER — ROCURONIUM BROMIDE 10 MG/ML
60 INJECTION, SOLUTION INTRAVENOUS ONCE
Status: COMPLETED | OUTPATIENT
Start: 2018-03-20 | End: 2018-03-20

## 2018-03-20 RX ORDER — CHLORHEXIDINE GLUCONATE ORAL RINSE 1.2 MG/ML
15 SOLUTION DENTAL 2 TIMES DAILY
Status: DISCONTINUED | OUTPATIENT
Start: 2018-03-20 | End: 2018-03-25

## 2018-03-20 RX ORDER — POLYETHYLENE GLYCOL 3350 17 G/17G
1 POWDER, FOR SOLUTION ORAL
Status: DISCONTINUED | OUTPATIENT
Start: 2018-03-20 | End: 2018-04-06

## 2018-03-20 RX ORDER — AMOXICILLIN 250 MG
2 CAPSULE ORAL 2 TIMES DAILY
Status: DISCONTINUED | OUTPATIENT
Start: 2018-03-20 | End: 2018-04-06

## 2018-03-20 RX ORDER — IPRATROPIUM BROMIDE AND ALBUTEROL SULFATE 2.5; .5 MG/3ML; MG/3ML
3 SOLUTION RESPIRATORY (INHALATION)
Status: DISCONTINUED | OUTPATIENT
Start: 2018-03-20 | End: 2018-03-25

## 2018-03-20 RX ORDER — ETOMIDATE 2 MG/ML
20 INJECTION INTRAVENOUS ONCE
Status: COMPLETED | OUTPATIENT
Start: 2018-03-20 | End: 2018-03-20

## 2018-03-20 RX ORDER — DEXTROSE MONOHYDRATE 25 G/50ML
25 INJECTION, SOLUTION INTRAVENOUS
Status: DISCONTINUED | OUTPATIENT
Start: 2018-03-20 | End: 2018-03-20

## 2018-03-20 RX ADMIN — OXYCODONE HYDROCHLORIDE 40 MG: 10 TABLET ORAL at 05:17

## 2018-03-20 RX ADMIN — OXYCODONE HYDROCHLORIDE 10 MG: 10 TABLET ORAL at 09:41

## 2018-03-20 RX ADMIN — PROPOFOL 65 MCG/KG/MIN: 10 INJECTION, EMULSION INTRAVENOUS at 14:10

## 2018-03-20 RX ADMIN — OXYCODONE HYDROCHLORIDE AND ACETAMINOPHEN 500 MG: 500 TABLET ORAL at 09:05

## 2018-03-20 RX ADMIN — POTASSIUM BICARBONATE 25 MEQ: 25 TABLET, EFFERVESCENT ORAL at 09:05

## 2018-03-20 RX ADMIN — STANDARDIZED SENNA CONCENTRATE AND DOCUSATE SODIUM 2 TABLET: 8.6; 5 TABLET, FILM COATED ORAL at 20:20

## 2018-03-20 RX ADMIN — OXYCODONE HYDROCHLORIDE 40 MG: 10 TABLET ORAL at 17:48

## 2018-03-20 RX ADMIN — ENOXAPARIN SODIUM 40 MG: 100 INJECTION SUBCUTANEOUS at 09:03

## 2018-03-20 RX ADMIN — PIPERACILLIN SODIUM AND TAZOBACTAM SODIUM 3.38 G: 2; .25 INJECTION, POWDER, FOR SOLUTION INTRAVENOUS at 20:20

## 2018-03-20 RX ADMIN — PROPOFOL 50 MCG/KG/MIN: 10 INJECTION, EMULSION INTRAVENOUS at 21:49

## 2018-03-20 RX ADMIN — AMPICILLIN SODIUM AND SULBACTAM SODIUM 3 G: 2; 1 INJECTION, POWDER, FOR SOLUTION INTRAMUSCULAR; INTRAVENOUS at 06:15

## 2018-03-20 RX ADMIN — LACTOBACILLUS ACIDOPHILUS / LACTOBACILLUS BULGARICUS 1 PACKET: 100 MILLION CFU STRENGTH GRANULES at 17:48

## 2018-03-20 RX ADMIN — ROCURONIUM BROMIDE 60 MG: 10 INJECTION, SOLUTION INTRAVENOUS at 12:20

## 2018-03-20 RX ADMIN — Medication 220 MG: at 11:42

## 2018-03-20 RX ADMIN — FENTANYL CITRATE 100 MCG: 50 INJECTION, SOLUTION INTRAMUSCULAR; INTRAVENOUS at 12:20

## 2018-03-20 RX ADMIN — AMPICILLIN SODIUM AND SULBACTAM SODIUM 3 G: 2; 1 INJECTION, POWDER, FOR SOLUTION INTRAMUSCULAR; INTRAVENOUS at 11:41

## 2018-03-20 RX ADMIN — Medication: at 09:20

## 2018-03-20 RX ADMIN — ACETAMINOPHEN 650 MG: 325 TABLET, FILM COATED ORAL at 09:05

## 2018-03-20 RX ADMIN — ETOMIDATE 20 MG: 2 INJECTION INTRAVENOUS at 12:20

## 2018-03-20 RX ADMIN — ASPIRIN 81 MG: 81 TABLET, CHEWABLE ORAL at 09:03

## 2018-03-20 RX ADMIN — PROPOFOL 10 MCG/KG/MIN: 10 INJECTION, EMULSION INTRAVENOUS at 12:28

## 2018-03-20 RX ADMIN — OMEGA-3 FATTY ACIDS CAP 1000 MG 1000 MG: 1000 CAP at 17:48

## 2018-03-20 RX ADMIN — OMEGA-3 FATTY ACIDS CAP 1000 MG 1000 MG: 1000 CAP at 11:41

## 2018-03-20 RX ADMIN — FENTANYL CITRATE 100 MCG: 50 INJECTION, SOLUTION INTRAMUSCULAR; INTRAVENOUS at 12:24

## 2018-03-20 RX ADMIN — LACTOBACILLUS ACIDOPHILUS / LACTOBACILLUS BULGARICUS 1 PACKET: 100 MILLION CFU STRENGTH GRANULES at 11:42

## 2018-03-20 RX ADMIN — BUDESONIDE AND FORMOTEROL FUMARATE DIHYDRATE 2 PUFF: 160; 4.5 AEROSOL RESPIRATORY (INHALATION) at 09:13

## 2018-03-20 RX ADMIN — PROPOFOL 60 MCG/KG/MIN: 10 INJECTION, EMULSION INTRAVENOUS at 19:25

## 2018-03-20 RX ADMIN — OXYCODONE HYDROCHLORIDE 30 MG: 10 TABLET ORAL at 09:44

## 2018-03-20 RX ADMIN — ACETAMINOPHEN 650 MG: 325 TABLET, FILM COATED ORAL at 09:03

## 2018-03-20 RX ADMIN — VANCOMYCIN HYDROCHLORIDE 3000 MG: 100 INJECTION, POWDER, LYOPHILIZED, FOR SOLUTION INTRAVENOUS at 20:20

## 2018-03-20 RX ADMIN — CHLORHEXIDINE GLUCONATE 15 ML: 1.2 RINSE ORAL at 14:13

## 2018-03-20 RX ADMIN — IPRATROPIUM BROMIDE AND ALBUTEROL SULFATE 3 ML: .5; 3 SOLUTION RESPIRATORY (INHALATION) at 14:38

## 2018-03-20 RX ADMIN — PROPOFOL 65 MCG/KG/MIN: 10 INJECTION, EMULSION INTRAVENOUS at 17:57

## 2018-03-20 RX ADMIN — OXYCODONE HYDROCHLORIDE 40 MG: 10 TABLET ORAL at 22:33

## 2018-03-20 RX ADMIN — PROPOFOL 65 MCG/KG/MIN: 10 INJECTION, EMULSION INTRAVENOUS at 16:00

## 2018-03-20 RX ADMIN — LEVOTHYROXINE SODIUM 50 MCG: 50 TABLET ORAL at 06:15

## 2018-03-20 RX ADMIN — IPRATROPIUM BROMIDE AND ALBUTEROL SULFATE 3 ML: .5; 3 SOLUTION RESPIRATORY (INHALATION) at 20:41

## 2018-03-20 RX ADMIN — AMPICILLIN SODIUM AND SULBACTAM SODIUM 3 G: 2; 1 INJECTION, POWDER, FOR SOLUTION INTRAMUSCULAR; INTRAVENOUS at 17:48

## 2018-03-20 RX ADMIN — FUROSEMIDE 20 MG: 10 INJECTION, SOLUTION INTRAMUSCULAR; INTRAVENOUS at 01:13

## 2018-03-20 RX ADMIN — LACTOBACILLUS ACIDOPHILUS / LACTOBACILLUS BULGARICUS 1 PACKET: 100 MILLION CFU STRENGTH GRANULES at 09:03

## 2018-03-20 RX ADMIN — AMPICILLIN SODIUM AND SULBACTAM SODIUM 3 G: 2; 1 INJECTION, POWDER, FOR SOLUTION INTRAMUSCULAR; INTRAVENOUS at 01:13

## 2018-03-20 RX ADMIN — OMEPRAZOLE 40 MG: 20 CAPSULE, DELAYED RELEASE ORAL at 09:03

## 2018-03-20 RX ADMIN — OMEGA-3 FATTY ACIDS CAP 1000 MG 1000 MG: 1000 CAP at 09:05

## 2018-03-20 RX ADMIN — FUROSEMIDE 20 MG: 10 INJECTION, SOLUTION INTRAMUSCULAR; INTRAVENOUS at 06:14

## 2018-03-20 RX ADMIN — LISINOPRIL 10 MG: 20 TABLET ORAL at 09:06

## 2018-03-20 RX ADMIN — CHLORHEXIDINE GLUCONATE 15 ML: 1.2 RINSE ORAL at 20:21

## 2018-03-20 RX ADMIN — IPRATROPIUM BROMIDE AND ALBUTEROL SULFATE 3 ML: .5; 3 SOLUTION RESPIRATORY (INHALATION) at 23:03

## 2018-03-20 RX ADMIN — STANDARDIZED SENNA CONCENTRATE AND DOCUSATE SODIUM 2 TABLET: 8.6; 5 TABLET, FILM COATED ORAL at 14:13

## 2018-03-20 ASSESSMENT — PAIN SCALES - GENERAL
PAINLEVEL_OUTOF10: 9
PAINLEVEL_OUTOF10: 8
PAINLEVEL_OUTOF10: 9
PAINLEVEL_OUTOF10: 5
PAINLEVEL_OUTOF10: 9
PAINLEVEL_OUTOF10: 8
PAINLEVEL_OUTOF10: 8
PAINLEVEL_OUTOF10: 5
PAINLEVEL_OUTOF10: 8

## 2018-03-20 ASSESSMENT — PULMONARY FUNCTION TESTS
EPAP_CMH2O: 8

## 2018-03-20 NOTE — CARE PLAN
Problem: Infection  Goal: Will remain free from infection  Outcome: PROGRESSING AS EXPECTED  Blood pressures being monitored for signs of infection along with heart rate, temperature, and respirations. Patient has only indicated lines and standard precautions and hand hygiene is followed by staff. Patient received education about hand hygiene and was given sani-hands to perform hand hygiene before and after meals and before and after personal hygiene.     Problem: Venous Thromboembolism (VTW)/Deep Vein Thrombosis (DVT) Prevention:  Goal: Patient will participate in Venous Thrombosis (VTE)/Deep Vein Thrombosis (DVT)Prevention Measures  Outcome: PROGRESSING AS EXPECTED  Lovenox given as scheduled. No SCDs due to cellulitis

## 2018-03-20 NOTE — WOUND TEAM
"[facility]  Inpatient Services  Wound and Skin Care Progress Note    HPI, PMH, SH: Reviewed  Unit where seen by Wound Team: R110/00    WOUND TEAM FOLLOW UP: BLE wounds     SUBJECTIVE:  \"I don't even remember who did it.\"    Self Report / Pain Level: c/o pain moving legs and cleaning  OBJECTIVE: drsg intact , on JOSE LUIS bed  WOUND TYPE, LOCATION, CHARACTERISTICS:  Pressure Ulcer  Deep Tissue Injury POA Foot Left Lateral  Periwound Skin: Other (Comments) (see venous ulcer)  Drainage : None  Tissue Type and %:    100% red/purple  Wound Edges:    attached    Odor:     None   Exposed structure(s):   No   Signs and Symptoms of Infection: none    Wound POA Venous Ulcer Leg Left Lower  Periwound Skin: Discolored (lipodermatoclerosis)  Drainage : Scant, Serosanguinous  Tissue Type and %:    100% red/pink  Wound Edges:    attached    Odor:     None   Exposed structure(s):   No   Signs and Symptoms of Infection: Edema    Wound POA Venous Ulcer Leg Right Lower  Periwound Skin: Discolored (lipodermatoclerosis)  Drainage : Scant, Serous  Tissue Type and %:    100% red/pink  Wound Edges:    attached    Odor:     None   Exposed structure(s):   No   Signs and Symptoms of Infection: Edema    Pressure Ulcer  Deep Tissue Injury POA Foot Right Lateral;Plantar  Periwound Skin: Intact  Drainage : None       Tissue Type and %:    100% red/purple  Wound Edges:    attached    Odor:     None   Exposed structure(s):   No   Signs and Symptoms of Infection: None     Measurements : taken 3/17/18    Foot Left Lateral LLE  RLE  R foot  Length (cm):  7.2   knees distal  to toes  Length (cm): 3.1  Width (cm):  2.8       Circumference  2.3  Depth (cm):  0.2    NM   intact     Tracts/undermining:  All None       INTERVENTIONS BY WOUND TEAM: removed drsg. Cleaned BLE with foaming cleanser and wet washcloths. Cleaned wounds with NS, dried. Moisturizer tp adrian-wound. Silver hydrofiber and abd pads over posterior wounds and Lateral RLE, wrapped " with roll gauze.   Foot Left Lateral-Dressing Options: Roll Gauze  Leg Left and Right Lower-Dressing Options: Hydrofiber Silver, Absorbent Abdominal Pad, Roll Gauze  Foot Right Lateral;Plantar-Dressing Options: Open to Air  Interdisciplinary consultation:  RN, pt    EVALUATION AND PROGRESS OF WOUND(S): BLE improved since pt was seen Sat. Edema and drainage has decreased since pt's legs have been in bed vs dependent. Amlactin ordered to try to decrease lipodermatosclerosis changes to skin. Continue silver hydrofiber only on areas of drainage.     Factors affecting wound healing: poor self care, venous stasis, infection, COPD, CHF, DM     Goals: decreased wound size 1% each week    NURSING PLAN OF CARE:    Dressing changes: Continue previous Dressing Maintenance orders:        See new Dressing Maintenance orders:  x     Skin care: See Skin Care orders:        Rectal tube care: See Rectal Tube Care orders:      Other orders:           WOUND TEAM PLAN OF CARE (X):   NPWT change 3 x week:        Dressing changes:       Follow up as needed:     weekly  Other:

## 2018-03-20 NOTE — PROCEDURES
INTUBATION PROCEDURE NOTE    Date: 3/20/2018  Time: 1220      Procedure: Intubation    Indication: Acute respiratory failure, Bipap dependence  Consent: Emergency/implied    Procedure: A time-out was performed. Airway assessed and patient found to have patent airway.  Equipment prepared and RT/RN at bedside. Patient pre-oxygenated with 100% FiO2.  After medication delivery, a 4.0 blade used to acheive direct visualization and a grade one view. A 8.0 ETT was placed with one attempt(s) into the trachea directly through the vocal cords. Appropriate placement confirmed by direct visualization, bilateral chest and epigastric auscultation, misting in the ETT, and ETCO2 detector. ETT secured in place at 23 cm at the teeth and patient connected to ventilator. Sedation/analgesia continued as appropriate. Patient tolerated procedure well without any difficulties and remains in care of bedside nurse and respiratory therapy. CXR will be performed to confirm appropriate placement of ETT.    Medications: 100 mcg Fentanyl, 20 mg etomidate, 60 mg Rocuronium  Complications: None immediate  CXR: STATPCXR pending.     Chris Ching D.O.  Critical Care Medicine

## 2018-03-20 NOTE — CARE PLAN
Problem: Ventilation Defect:  Goal: Ability to achieve and maintain unassisted ventilation or tolerate decreased levels of ventilator support    Pt on and off BiPAP today, BiPAP settings of 12/8 with 10-12L O2 bleed in    Pt tolerated being off BiPAP for 4 hours

## 2018-03-20 NOTE — PROGRESS NOTES
Anjelica from Lab called with critical result of Co2 44 at 0613. Critical lab result read back to Anjelica.   Dr. Ching notified of critical lab result at 0627.  Critical lab result read back by Dr. Ching.

## 2018-03-20 NOTE — CARE PLAN
Problem: Safety  Goal: Free from accidental injury    Intervention: Initiate Safety Measures  Bed in low, locked position, near nursing station, call light within reach. Bed alarm activated, appropriate fall identifiers in place.      Problem: Psychosocial needs  Goal: Anxiety reduction    Intervention: Stimuli reduction, calming techniques  Patient verbalized anxiety regarding intubation and subsequent procedures. RN reviewed procedures at length following MD discussion and provided therapeutic touch, presence and reassurance. Pt verbalizes and demonstrates anxiety reduction.

## 2018-03-20 NOTE — PROGRESS NOTES
MD at bedside for endotracheal intubation, bronchoscopy and central line placement. Consents obtained from patient. See chart. Pt intubated at 1222.

## 2018-03-20 NOTE — PROCEDURES
BRONCHOSCOPY PROCEDURE NOTE    Date: 3/20/2018  Time: 1254    Procedure: Diagnostic bronchoscopy and Therapeutic bronchoscopy    Indication: Acute respiratory failure.   Consent: Informed consent obtained from patient or designated decision maker after explaining the benefits/risks of the procedure including but not limited to bleeding, infection, airway trauma or loss therof, pneumothorax/hemothorax, arrythmia, or death. Patient or surrogate expressed understanding and agreement and signed consent which can be found in the patient's chart.    Procedure: After obtaining consent, a time-out was performed. Respiratory therapy and nursing at bedside throughout procedure. Patient provided sedation and analgesia throughout the procedure. Placed on full ventilator support with an FiO2 of 100% throughout the procedure. The bronchoscope was lubricated. Using a fiberoptic bronchoscope, trachea entered via airways visualized directly and the following intervention was performed. Findings as below. Patient tolerated procedure well without any immediate difficulties.     Medications: As per for the intubation.      Findings: Upper airway - Not visualized as bronchoscope passed through ETT.        Trachea to phylicia - ETT in good position, and normal appearing mucosa without lesions or mass, ETT tip measured 2 cm from the phylicia.        R proximal and distal airways - Essentially normal appearing mucosa without mass/lesion/anatomic variance, secretions: Minimal, with slightly friable mucosa without significant mucous production.         L proximal and distal airways - Mild friable appearing mucosa without mass/lesion/anatomic variance, secretions: Minimal        Samples - 1.  BAL from the right lower lobe.  2.  BAL from the left lower lobe.      Samples: As above     Complications: None immediate     CXR (if applicable): STAT PCXR pending.     Chris Ching D.O.  Critical Care Medicine

## 2018-03-20 NOTE — PROCEDURES
Procedure Note    Date: 3/20/2018  Time: 1245 pm    Procedure: Central Venous Line Placement   Site:Right IJ catheter    Indication: Acute respiratory failure.   Consent: Informed consent obtained from patient or designated decision maker after explaining the benefits/risks of the procedure including but not limited to bleeding, infection, nerve or other deep structure injury, pneumothorax/hemothorax, arrythmia, or death. Patient or surrogate expressed understanding and agreement and signed consent which can be found in the patient's chart.    Procedure:   After obtaining consent, a time-out was performed. Appropriate site confirmed with ultrasound and patient positioned, prepped, and draped in sterile fashion. All those present wearing cap and mask and those physically participating remained adhering to sterile fashion with cap, mask, gloves, and gown. 3 mL of local anesthetic injected (1% lidocaine without epinephrine) achieving appropriate comfort level for patient. Vein localized and accessed using ultrasound guidance/palpation and 7 Fr 3 lumen catheter placed using Seldinger technique. Able to aspirate dark, non-pulsatile blood through each lumen and sterile saline flushed easily before capping. Line secured and dressed by RN.. Patient tolerated procedure well without any difficulties and remains in care of bedside nurse. STAT PCXR will be performed to confirm appropriate placement for internal jugular or subclavian CVLs.    EBL: minimal  Complications: None immediate   CXR: STAT PCXR pending.       Chris Ching D.O.  Critical Care Medicine

## 2018-03-20 NOTE — PROGRESS NOTES
Pt transported to CT at 1445 via ACLS RN, RT and transport, on monitor. No complications, VSS. Pt back in room resting comfortably. Lines, drips verified.

## 2018-03-20 NOTE — CARE PLAN
Problem: Pain Management  Goal: Pain level will decrease to patient's comfort goal  Outcome: PROGRESSING SLOWER THAN EXPECTED  Assess and classify patient's pain/physical discomfort using Nurse Pain 0-10 scale. Perform ongoing assessment to determine if the pain management plan meets the patient's stated goal.        Problem: Skin Integrity  Goal: Risk for impaired skin integrity will decrease  Outcome: PROGRESSING AS EXPECTED  Assess for skin breakdown related to bowel incontinence and instruct patient on proper skin care and strategies to reduce skin irritation. Condom cath in place to help reduce skin breakdown due to urinary incontinence. Q2 hour turns and assessments in place.

## 2018-03-20 NOTE — PROGRESS NOTES
Pulmonary Critical Care Progress Note        Chief Complaint: Acute hypoxemic respiratory failure, chronic leg wounds with possible cellulitis.    History of Present Illness: Fer Estrada is a 57 y.o. male with a past medical history of multiple admissions for bilateral lower extremity cellulitis. In the past he has been noncompliant with wound care. He has a history of chronic lymphedema, venous stasis, diabetes mellitus type 2, systemic hypertension and actually dependent COPD. He is morbidly obese and has chronic pain with narcotic dependence. He was most recently admitted in January 2018 for recurrent bilateral lower extremity cellulitis. Today he presented to the ED after his daughter called EMS stating she could not take care of him any longer. He has been having difficulty standing and going to the bathroom. He has been having recent diarrhea and increasing sores to his buttocks as well as her chronic leg wounds. He denies fever or chills. He did report some diarrhea starting yesterday. He had a soft brown stool in the ED. He is recently been on clindamycin. In the ED he was given IV fluid and began having increasing dyspnea. He is noted to have diastolic heart dysfunction. He was given some Lasix but developed increasing respiratory distress and was placed on BiPAP. At time I evaluation is in BiPAP in the ICU. He has diminished air entry throughout. He arouses but is somewhat somnolent. He follows commands but is unable to give much additional history.    Please note above history obtained by my partner Dr. Sewell on 3/16/2018.    ROS:  Respiratory: unable to perform due to the patient's inability to effectively communicate, Cardiac: unable to perform due to the patient's inability to effectively communicate, GI: unable to perform due to the patient's inability to effectively communicate.  Note that the patient on BiPAP and difficult to talk. All other systems negative.    Interval Events:  24 hour interval  history reviewed    LE cellulitis and cor pulmonale  Mental status change  Diabetic diet  Condom cath, and diuresing  On Bipap, but continues to have desaturation  Wound  BiPAP settings are 12/8.  6 liters and then 10 liter  Intubation? To be discussed with patient and family with follow-up bronchoscopy and CT of the thorax  Lovenox with normal anti Xa level    PFSH:  No change.    Respiratory:     Pulse Oximetry: 93 %  Chest Tube Drains:          Exam: tachypnea  ImagingCXR  I have personally reviewed the chest x-ray my impression is  no chest x-ray prior to intubation.  Recent Labs      03/19/18   0354  03/20/18   0523   ISTATAPH  7.339*  7.426   ISTATAPCO2  91.7*  81.2*   ISTATAPO2  69  63*   ISTATATCO2  >50*  >50*   PBSNAZP3LRM  90*  91*   ISTATARTHCO3  49.3*  53.4*   ISTATARTBE  20*  25*   ISTATTEMP  99.3 F  98.3 F   ISTATFIO2  70  75   ISTATSPEC  Arterial  Arterial   ISTATAPHTC  7.333*  7.428   XYAMSHII6VL  71  63*   Interpretation of arterial blood gas shows significant hypercapnia as related to ongoing diuresis and restrictive and obstructive lung disease. This is on BiPAP modality.    HemoDynamics:  Pulse: 78, Heart Rate (Monitored): 77  NIBP: 113/56       Exam: regular rate and rhythm  Imaging: echo Reviewed     Echocardiogram from 3/18/2018:    CONCLUSIONS  Technically difficult study incomplete information is obtained.   Grossly normal left ventricular systolic function.  Right ventricle not well visualized.  No valve disease noted by blind   Doppler interrogation.  No noted changes from prior.  Recent Labs      03/20/18   0513   BNPBTYPENAT  20       Neuro:  GCS Total Casa Coma Score: 15       Exam: follows commands, raises two fingers. Patient has significant weakness on the lower extremities due to severe edema and chronic venous stasis changes. Patient has crusting scaly lesions as well. There is evidence of lichenification of the lower extremities. Dressings were not removed based on wound care  request. There was a Mepelex dressing over the left posterior thigh wound which is reportedly improved per the nursing and wound care specialist.  Imaging: None - Reviewed    Fluids:  Intake/Output       18 07 - 18 0659 18 07 - 18 0659 18 07 - 18 0659      8930-6021 9728-3235 Total 8218-5702 3923-8232 Total 3924-1435 5218-4903 Total       Intake    P.O.  220  500 720  1200  600 1800  --  -- --    P.O. 220  600 1800 -- -- --    I.V.  255  257.5 512.5  150  157.5 307.5  --  -- --    IV Piggyback Volume (IV Piggyback) 200 200 400 100 100 200 -- -- --    IV Volume (NS TKO) 55 57.5 112.5 50 57.5 107.5 -- -- --    Total Intake 475 757.5 1232.5 1350 757.5 2107.5 -- -- --       Output    Urine  550  500 1050    1200 3200  --  -- --    Condom Catheter   1200 3200 -- -- --    Stool  --  -- --  --  -- --  --  -- --    Number of Times Stooled -- -- -- 2 x -- 2 x -- -- --    Total Output   1200 3200 -- -- --       Net I/O     -75 257.5 182.5 -650 -442.5 -1092.5 -- -- --          Recent Labs      18   0609  18   0444  18   0513   SODIUM  141  140  141   POTASSIUM  4.5  4.2  4.0   CHLORIDE  97  96  92*   CO2  42*  39*  44*   BUN  27*  22  18   CREATININE  0.63  0.60  0.67   CALCIUM  9.1  8.8  9.2       GI/Nutrition:  Exam: normal active bowel sounds morbidly obese. No obvious rebound rigidity or guarding.  Imaging: None - Reviewed  taking PO  Liver Function  Recent Labs      18   0609  18   0444  18   0513   GLUCOSE  121*  133*  105*       Heme:  Recent Labs      18   0609  18   0444  18   0513   RBC  3.77*  3.48*  3.53*   HEMOGLOBIN  10.5*  9.9*  10.0*   HEMATOCRIT  37.3*  34.3*  34.4*   PLATELETCT  200  182  175       Infectious Disease:  Temp  Av.7 °C (98.1 °F)  Min: 35.9 °C (96.7 °F)  Max: 37.5 °C (99.5 °F)  Micro: cultures reviewed. On Unasyn at this time. Large ulceration noted  in the left proximal posterior thigh, slightly improved. Was previously on linezolid. After intubation changed to Zosyn and vancomycin at this time. Bronchoscopy and BAL was performed although no significant mucus is noted. CT imaging does show bibasilar pneumonia. Will likely require infectious disease consultation.  Recent Labs      03/18/18   0609  03/19/18   0444  03/20/18   0513   WBC  7.4  7.1  6.2     Current Facility-Administered Medications   Medication Dose Frequency Provider Last Rate Last Dose   • Pharmacy Consult: Enteral tube feeding - review meds/change route/product selection   Once Chris Ching D.O.       • ascorbic acid (ascorbic acid) tablet 500 mg  500 mg DAILY Chris Ching D.O.       • aspirin (ASA) chewable tab 81 mg  81 mg DAILY Chris Ching D.O.       • fish oil capsule 1,000 mg  1,000 mg TID WITH MEALS DAVONTE ColladoO.   1,000 mg at 03/19/18 1723   • lactobacillus granules (LACTINEX/FLORANEX) packet 1 Packet  1 Packet TID WITH MEALS DAVONTE ColladoO.   1 Packet at 03/19/18 1725   • levothyroxine (SYNTHROID) tablet 50 mcg  50 mcg AM ES YVROSE Collado.O.   50 mcg at 03/20/18 0615   • lisinopril (PRINIVIL) tablet 10 mg  10 mg DAILY Chris Ching D.O.       • omeprazole 2 mg/mL in sodium bicarbonate (PRILOSEC) oral susp 40 mg  40 mg DAILY Chris Ching D.O.       • potassium bicarbonate (KLYTE) 25 MEQ effervescent tablet TBEF 25 mEq  25 mEq BID YVROSE Collado.O.   25 mEq at 03/19/18 2201   • senna-docusate (PERICOLACE or SENOKOT S) 8.6-50 MG per tablet 2 Tab  2 Tab BID YVROSE Collado.O.   Stopped at 03/19/18 2100    And   • polyethylene glycol/lytes (MIRALAX) PACKET 1 Packet  1 Packet QDAY PRN DAVONTE ColladoO.        And   • magnesium hydroxide (MILK OF MAGNESIA) suspension 30 mL  30 mL QDAY PRN DAVONTE ColladoO.        And   • bisacodyl (DULCOLAX) suppository 10 mg  10 mg QDAY PRN Chris Ching D.O.       • acetaminophen (TYLENOL) tablet 650 mg   650 mg Q6HRS PRN Chris Ching D.O.       • oxyCODONE immediate release (ROXICODONE) tablet 30 mg  30 mg Q4HRS PRN Chris Ching D.O.       • oxyCODONE immediate release (ROXICODONE) tablet 40 mg  40 mg Q4HRS PRN YVROSE Collado.O.   40 mg at 03/20/18 0517   • ammonium lactate (LAC-HYDRIN) 12 % lotion   DAILY Chris Ching D.O.       • enoxaparin (LOVENOX) inj 40 mg  40 mg BID Vic Pendleton Jr. D.O.   40 mg at 03/19/18 2201   • zinc sulfate (ZINCATE) capsule 220 mg  220 mg DAILY YVROSE Romero.ODorina   220 mg at 03/19/18 0900   • albuterol inhaler 2 Puff  2 Puff Q6HRS PRN Benjie Marcus M.D.       • budesonide-formoterol (SYMBICORT) 160-4.5 MCG/ACT inhaler 2 Puff  2 Puff BID Benjie Marcus M.D.   2 Puff at 03/19/18 0956   • insulin regular (HUMULIN R) injection 1-6 Units  1-6 Units 4X/DAY ACHS Benjie Marcus M.D.   Stopped at 03/16/18 2100    And   • glucose 4 g chewable tablet 16 g  16 g Q15 MIN PRN Benjie Marcus M.D.        And   • dextrose 50% (D50W) injection 25 mL  25 mL Q15 MIN PRN Benjie Marcus M.D.       • ampicillin/sulbactam (UNASYN) 3 g in  mL IVPB  3 g Q6HRS Benjie Marcus M.D. 200 mL/hr at 03/20/18 0615 3 g at 03/20/18 0615   • Respiratory Care per Protocol   Continuous RT Benjie Marcus M.D.       • ipratropium-albuterol (DUONEB) nebulizer solution 3 mL  3 mL Q4H PRN (RT) Benjie Marcus M.D.         Last reviewed on 3/16/2018  3:34 PM by Lucía Scott TOLU    Quality  Measures:   Labs reviewed, Medications reviewed and Radiology images reviewed   Montoya catheter: Critically Ill - Requiring Accurate Measurement of Urinary Output   Central line in place: Sepsis              Assessment and plan:     1.  Acute on chronic hypoxemic and hypercapnic respiratory failure.    -Both the patient remains alert, he has failed BiPAP and has been on at least 3 days of support.  -Patient is failed BiPAP and is elected to agree to intubation, bronchoscopy, and CT scan  imaging. Of also discussed with patient's daughter personally and patient consents as well.  -Continue with aggressive diuresis and add Diamox today.  - Follow-up BNP level tomorrow. BNP was low and therefore I believe adequate diuresis is being performed.  -Continue with treatment for obstructive lung disease as well.  -Will broaden antibiotics again to Zosyn and vancomycin considering worsen respiratory failure.    2. Chronic severe lymphedema with stasis dermatitis.    -Continue with wound care treatment.  -Patient also to possibly have evaluation of the left posterior thigh by surgical team.  -Will observe wound more closely tomorrow.  -Will likely require infectious disease evaluation pending patient's response to ongoing treatment.    3. Type II diabetes mellitus.    -Continue with sliding scale at this time.  -Avoiding IV steroids as this may worsen his glycemic control.    4. Morbid obesity.    5. Hypertension.    -Under adequate control at this time.  -Continue with oral therapy.    6. Chronic right bundle-branch block with wide QRS. No obvious sign of acute ischemic event although EKG is been ordered as well as troponin.     Discussed patient condition and risk of morbidity and/or mortality with at length on M.D. rounds.   The patient remains critically ill.  Critical care time = 50 minutes in directly providing and coordinating critical care and extensive data review.  No time overlap and excludes procedures.    Chris Ching D.O.

## 2018-03-20 NOTE — CARE PLAN
Problem: Ventilation Defect:  Goal: Ability to achieve and maintain unassisted ventilation or tolerate decreased levels of ventilator support  Outcome: PROGRESSING AS EXPECTED  Adult Ventilation Update    Total Vent Days: 1    Patient Lines/Drains/Airways Status    Active Airway     Name: Placement date: Placement time: Site: Days:    Airway Group ET Tube 8.0 03/20/18 1215      less than 1              Static Compliance (ml / cm H2O): 29 (03/20/18 1439)    Patient failed trials because of Barriers to Wean:  (recently intubated) (03/20/18 1215)  Barriers to SBT    Length of Weaning Trial      Cough: Productive (03/20/18 1439)  Sputum Amount: Large (03/20/18 1600)  Sputum Color: Clear (03/20/18 1600)  Sputum Consistency: Thin (03/20/18 1600)    Mobility Group  Activity Performed: Unable to mobilize (03/20/18 1400)  Reason Not Mobilized: Unstable condition (03/20/18 1400)  Mobilization Comments: FiO2 100%, PEEP 14 (03/20/18 1400)    Events/Summary/Plan: decreased RR to 14 and increased PEEP to 14 post ABG (03/20/18 1439)      Problem: Ventilation Defect:  Goal: Ability to achieve and maintain unassisted ventilation or tolerate decreased levels of ventilator support  Outcome: PROGRESSING AS EXPECTED  Adult Ventilation Update    Total Vent Days: 1    Patient Lines/Drains/Airways Status    Active Airway     Name: Placement date: Placement time: Site: Days:    Airway Group ET Tube 8.0 03/20/18 1215      less than 1              Static Compliance (ml / cm H2O): 29 (03/20/18 1439)    Patient failed trials because of Barriers to Wean:  (recently intubated) (03/20/18 1215)  Barriers to SBT    Length of Weaning Trial      Cough: Productive (03/20/18 1439)  Sputum Amount: Large (03/20/18 1600)  Sputum Color: Clear (03/20/18 1600)  Sputum Consistency: Thin (03/20/18 1600)    Mobility Group  Activity Performed: Unable to mobilize (03/20/18 1400)  Reason Not Mobilized: Unstable condition (03/20/18 1400)  Mobilization Comments:  FiO2 100%, PEEP 14 (03/20/18 1400)    Events/Summary/Plan: decreased RR to 14 and increased PEEP to 14 post ABG (03/20/18 3249)

## 2018-03-21 ENCOUNTER — APPOINTMENT (OUTPATIENT)
Dept: RADIOLOGY | Facility: MEDICAL CENTER | Age: 58
DRG: 987 | End: 2018-03-21
Attending: INTERNAL MEDICINE
Payer: MEDICARE

## 2018-03-21 LAB
ACTION RANGE TRIGGERED IACRT: YES
ACTION RANGE TRIGGERED IACRT: YES
ANION GAP SERPL CALC-SCNC: 7 MMOL/L (ref 0–11.9)
BACTERIA BLD CULT: NORMAL
BACTERIA BLD CULT: NORMAL
BASE EXCESS BLDA CALC-SCNC: 17 MMOL/L (ref -4–3)
BASE EXCESS BLDA CALC-SCNC: 17 MMOL/L (ref -4–3)
BASOPHILS # BLD AUTO: 0.3 % (ref 0–1.8)
BASOPHILS # BLD: 0.02 K/UL (ref 0–0.12)
BODY TEMPERATURE: ABNORMAL DEGREES
BODY TEMPERATURE: ABNORMAL DEGREES
BUN SERPL-MCNC: 18 MG/DL (ref 8–22)
CALCIUM SERPL-MCNC: 9.3 MG/DL (ref 8.5–10.5)
CHLORIDE SERPL-SCNC: 91 MMOL/L (ref 96–112)
CO2 BLDA-SCNC: 44 MMOL/L (ref 20–33)
CO2 BLDA-SCNC: 44 MMOL/L (ref 20–33)
CO2 SERPL-SCNC: 39 MMOL/L (ref 20–33)
CREAT SERPL-MCNC: 0.75 MG/DL (ref 0.5–1.4)
EOSINOPHIL # BLD AUTO: 0.32 K/UL (ref 0–0.51)
EOSINOPHIL NFR BLD: 4.3 % (ref 0–6.9)
ERYTHROCYTE [DISTWIDTH] IN BLOOD BY AUTOMATED COUNT: 55.4 FL (ref 35.9–50)
GLUCOSE BLD-MCNC: 138 MG/DL (ref 65–99)
GLUCOSE SERPL-MCNC: 118 MG/DL (ref 65–99)
GRAM STN SPEC: NORMAL
GRAM STN SPEC: NORMAL
HCO3 BLDA-SCNC: 41.9 MMOL/L (ref 17–25)
HCO3 BLDA-SCNC: 42.2 MMOL/L (ref 17–25)
HCT VFR BLD AUTO: 33.9 % (ref 42–52)
HGB BLD-MCNC: 10.5 G/DL (ref 14–18)
IMM GRANULOCYTES # BLD AUTO: 0.05 K/UL (ref 0–0.11)
IMM GRANULOCYTES NFR BLD AUTO: 0.7 % (ref 0–0.9)
INST. QUALIFIED PATIENT IIQPT: YES
INST. QUALIFIED PATIENT IIQPT: YES
LYMPHOCYTES # BLD AUTO: 1.05 K/UL (ref 1–4.8)
LYMPHOCYTES NFR BLD: 14.2 % (ref 22–41)
MAGNESIUM SERPL-MCNC: 1.5 MG/DL (ref 1.5–2.5)
MCH RBC QN AUTO: 29.1 PG (ref 27–33)
MCHC RBC AUTO-ENTMCNC: 31 G/DL (ref 33.7–35.3)
MCV RBC AUTO: 93.9 FL (ref 81.4–97.8)
MONOCYTES # BLD AUTO: 0.52 K/UL (ref 0–0.85)
MONOCYTES NFR BLD AUTO: 7 % (ref 0–13.4)
NEUTROPHILS # BLD AUTO: 5.45 K/UL (ref 1.82–7.42)
NEUTROPHILS NFR BLD: 73.5 % (ref 44–72)
NRBC # BLD AUTO: 0 K/UL
NRBC BLD-RTO: 0 /100 WBC
O2/TOTAL GAS SETTING VFR VENT: 50 %
O2/TOTAL GAS SETTING VFR VENT: 50 %
PCO2 BLDA: 51.1 MMHG (ref 26–37)
PCO2 BLDA: 53.6 MMHG (ref 26–37)
PCO2 TEMP ADJ BLDA: 49.7 MMHG (ref 26–37)
PCO2 TEMP ADJ BLDA: 53.3 MMHG (ref 26–37)
PH BLDA: 7.5 [PH] (ref 7.4–7.5)
PH BLDA: 7.53 [PH] (ref 7.4–7.5)
PH TEMP ADJ BLDA: 7.5 [PH] (ref 7.4–7.5)
PH TEMP ADJ BLDA: 7.54 [PH] (ref 7.4–7.5)
PHOSPHATE SERPL-MCNC: 2.9 MG/DL (ref 2.5–4.5)
PLATELET # BLD AUTO: 186 K/UL (ref 164–446)
PMV BLD AUTO: 11.1 FL (ref 9–12.9)
PO2 BLDA: 60 MMHG (ref 64–87)
PO2 BLDA: 89 MMHG (ref 64–87)
PO2 TEMP ADJ BLDA: 58 MMHG (ref 64–87)
PO2 TEMP ADJ BLDA: 88 MMHG (ref 64–87)
POTASSIUM SERPL-SCNC: 3.7 MMOL/L (ref 3.6–5.5)
RBC # BLD AUTO: 3.61 M/UL (ref 4.7–6.1)
RHODAMINE-AURAMINE STN SPEC: NORMAL
RHODAMINE-AURAMINE STN SPEC: NORMAL
SAO2 % BLDA: 93 % (ref 93–99)
SAO2 % BLDA: 97 % (ref 93–99)
SIGNIFICANT IND 70042: NORMAL
SITE SITE: NORMAL
SODIUM SERPL-SCNC: 137 MMOL/L (ref 135–145)
SOURCE SOURCE: NORMAL
SPECIMEN DRAWN FROM PATIENT: ABNORMAL
SPECIMEN DRAWN FROM PATIENT: ABNORMAL
TROPONIN I SERPL-MCNC: <0.01 NG/ML (ref 0–0.04)
VANCOMYCIN SERPL-MCNC: 53.7 UG/ML
WBC # BLD AUTO: 7.4 K/UL (ref 4.8–10.8)

## 2018-03-21 PROCEDURE — 71045 X-RAY EXAM CHEST 1 VIEW: CPT

## 2018-03-21 PROCEDURE — 83735 ASSAY OF MAGNESIUM: CPT

## 2018-03-21 PROCEDURE — 700102 HCHG RX REV CODE 250 W/ 637 OVERRIDE(OP): Performed by: HOSPITALIST

## 2018-03-21 PROCEDURE — 700105 HCHG RX REV CODE 258: Performed by: HOSPITALIST

## 2018-03-21 PROCEDURE — A9270 NON-COVERED ITEM OR SERVICE: HCPCS | Performed by: HOSPITALIST

## 2018-03-21 PROCEDURE — 700111 HCHG RX REV CODE 636 W/ 250 OVERRIDE (IP): Performed by: INTERNAL MEDICINE

## 2018-03-21 PROCEDURE — 82803 BLOOD GASES ANY COMBINATION: CPT

## 2018-03-21 PROCEDURE — 94003 VENT MGMT INPAT SUBQ DAY: CPT

## 2018-03-21 PROCEDURE — 770022 HCHG ROOM/CARE - ICU (200)

## 2018-03-21 PROCEDURE — 700111 HCHG RX REV CODE 636 W/ 250 OVERRIDE (IP): Performed by: HOSPITALIST

## 2018-03-21 PROCEDURE — 36600 WITHDRAWAL OF ARTERIAL BLOOD: CPT

## 2018-03-21 PROCEDURE — 80202 ASSAY OF VANCOMYCIN: CPT

## 2018-03-21 PROCEDURE — 302136 NUTRITION PUMP: Performed by: INTERNAL MEDICINE

## 2018-03-21 PROCEDURE — A6250 SKIN SEAL PROTECT MOISTURIZR: HCPCS | Performed by: INTERNAL MEDICINE

## 2018-03-21 PROCEDURE — 700101 HCHG RX REV CODE 250: Performed by: INTERNAL MEDICINE

## 2018-03-21 PROCEDURE — A9270 NON-COVERED ITEM OR SERVICE: HCPCS | Performed by: INTERNAL MEDICINE

## 2018-03-21 PROCEDURE — 82962 GLUCOSE BLOOD TEST: CPT

## 2018-03-21 PROCEDURE — 84484 ASSAY OF TROPONIN QUANT: CPT

## 2018-03-21 PROCEDURE — 94640 AIRWAY INHALATION TREATMENT: CPT

## 2018-03-21 PROCEDURE — 84100 ASSAY OF PHOSPHORUS: CPT

## 2018-03-21 PROCEDURE — 80048 BASIC METABOLIC PNL TOTAL CA: CPT

## 2018-03-21 PROCEDURE — 85025 COMPLETE CBC W/AUTO DIFF WBC: CPT

## 2018-03-21 PROCEDURE — 99233 SBSQ HOSP IP/OBS HIGH 50: CPT | Performed by: HOSPITALIST

## 2018-03-21 PROCEDURE — 700102 HCHG RX REV CODE 250 W/ 637 OVERRIDE(OP): Performed by: INTERNAL MEDICINE

## 2018-03-21 RX ORDER — FUROSEMIDE 10 MG/ML
20 INJECTION INTRAMUSCULAR; INTRAVENOUS DAILY
Status: DISCONTINUED | OUTPATIENT
Start: 2018-03-21 | End: 2018-03-22

## 2018-03-21 RX ORDER — MAGNESIUM SULFATE HEPTAHYDRATE 40 MG/ML
4 INJECTION, SOLUTION INTRAVENOUS ONCE
Status: COMPLETED | OUTPATIENT
Start: 2018-03-21 | End: 2018-03-21

## 2018-03-21 RX ADMIN — OXYCODONE HYDROCHLORIDE AND ACETAMINOPHEN 500 MG: 500 TABLET ORAL at 09:05

## 2018-03-21 RX ADMIN — MAGNESIUM SULFATE IN WATER 4 G: 40 INJECTION, SOLUTION INTRAVENOUS at 10:08

## 2018-03-21 RX ADMIN — ENOXAPARIN SODIUM 40 MG: 100 INJECTION SUBCUTANEOUS at 20:50

## 2018-03-21 RX ADMIN — IPRATROPIUM BROMIDE AND ALBUTEROL SULFATE 3 ML: .5; 3 SOLUTION RESPIRATORY (INHALATION) at 22:51

## 2018-03-21 RX ADMIN — IPRATROPIUM BROMIDE AND ALBUTEROL SULFATE 3 ML: .5; 3 SOLUTION RESPIRATORY (INHALATION) at 10:13

## 2018-03-21 RX ADMIN — IPRATROPIUM BROMIDE AND ALBUTEROL SULFATE 3 ML: .5; 3 SOLUTION RESPIRATORY (INHALATION) at 03:18

## 2018-03-21 RX ADMIN — PROPOFOL 55 MCG/KG/MIN: 10 INJECTION, EMULSION INTRAVENOUS at 21:54

## 2018-03-21 RX ADMIN — OMEGA-3 FATTY ACIDS CAP 1000 MG 1000 MG: 1000 CAP at 17:22

## 2018-03-21 RX ADMIN — PROPOFOL 50 MCG/KG/MIN: 10 INJECTION, EMULSION INTRAVENOUS at 06:33

## 2018-03-21 RX ADMIN — OMEGA-3 FATTY ACIDS CAP 1000 MG 1000 MG: 1000 CAP at 11:38

## 2018-03-21 RX ADMIN — PROPOFOL 40 MCG/KG/MIN: 10 INJECTION, EMULSION INTRAVENOUS at 04:08

## 2018-03-21 RX ADMIN — PROPOFOL 50 MCG/KG/MIN: 10 INJECTION, EMULSION INTRAVENOUS at 00:36

## 2018-03-21 RX ADMIN — LACTOBACILLUS ACIDOPHILUS / LACTOBACILLUS BULGARICUS 1 PACKET: 100 MILLION CFU STRENGTH GRANULES at 17:22

## 2018-03-21 RX ADMIN — OXYCODONE HYDROCHLORIDE 40 MG: 10 TABLET ORAL at 05:05

## 2018-03-21 RX ADMIN — VANCOMYCIN HYDROCHLORIDE 2000 MG: 100 INJECTION, POWDER, LYOPHILIZED, FOR SOLUTION INTRAVENOUS at 07:50

## 2018-03-21 RX ADMIN — PIPERACILLIN SODIUM AND TAZOBACTAM SODIUM 3.38 G: 2; .25 INJECTION, POWDER, FOR SOLUTION INTRAVENOUS at 11:39

## 2018-03-21 RX ADMIN — PROPOFOL 50 MCG/KG/MIN: 10 INJECTION, EMULSION INTRAVENOUS at 02:19

## 2018-03-21 RX ADMIN — PROPOFOL 50 MCG/KG/MIN: 10 INJECTION, EMULSION INTRAVENOUS at 15:15

## 2018-03-21 RX ADMIN — OXYCODONE HYDROCHLORIDE 40 MG: 10 TABLET ORAL at 11:38

## 2018-03-21 RX ADMIN — PIPERACILLIN SODIUM AND TAZOBACTAM SODIUM 3.38 G: 2; .25 INJECTION, POWDER, FOR SOLUTION INTRAVENOUS at 00:37

## 2018-03-21 RX ADMIN — STANDARDIZED SENNA CONCENTRATE AND DOCUSATE SODIUM 2 TABLET: 8.6; 5 TABLET, FILM COATED ORAL at 20:50

## 2018-03-21 RX ADMIN — CHLORHEXIDINE GLUCONATE 15 ML: 1.2 RINSE ORAL at 20:50

## 2018-03-21 RX ADMIN — OMEGA-3 FATTY ACIDS CAP 1000 MG 1000 MG: 1000 CAP at 07:22

## 2018-03-21 RX ADMIN — LACTOBACILLUS ACIDOPHILUS / LACTOBACILLUS BULGARICUS 1 PACKET: 100 MILLION CFU STRENGTH GRANULES at 11:38

## 2018-03-21 RX ADMIN — STANDARDIZED SENNA CONCENTRATE AND DOCUSATE SODIUM 2 TABLET: 8.6; 5 TABLET, FILM COATED ORAL at 09:05

## 2018-03-21 RX ADMIN — PIPERACILLIN SODIUM AND TAZOBACTAM SODIUM 3.38 G: 2; .25 INJECTION, POWDER, FOR SOLUTION INTRAVENOUS at 05:12

## 2018-03-21 RX ADMIN — CHLORHEXIDINE GLUCONATE 15 ML: 1.2 RINSE ORAL at 09:04

## 2018-03-21 RX ADMIN — PROPOFOL 50 MCG/KG/MIN: 10 INJECTION, EMULSION INTRAVENOUS at 10:55

## 2018-03-21 RX ADMIN — IPRATROPIUM BROMIDE AND ALBUTEROL SULFATE 3 ML: .5; 3 SOLUTION RESPIRATORY (INHALATION) at 20:04

## 2018-03-21 RX ADMIN — LISINOPRIL 10 MG: 20 TABLET ORAL at 09:05

## 2018-03-21 RX ADMIN — PROPOFOL 50 MCG/KG/MIN: 10 INJECTION, EMULSION INTRAVENOUS at 17:26

## 2018-03-21 RX ADMIN — ASPIRIN 81 MG: 81 TABLET, CHEWABLE ORAL at 09:05

## 2018-03-21 RX ADMIN — LEVOTHYROXINE SODIUM 50 MCG: 50 TABLET ORAL at 05:13

## 2018-03-21 RX ADMIN — PROPOFOL 50 MCG/KG/MIN: 10 INJECTION, EMULSION INTRAVENOUS at 12:54

## 2018-03-21 RX ADMIN — POTASSIUM BICARBONATE 25 MEQ: 25 TABLET, EFFERVESCENT ORAL at 20:50

## 2018-03-21 RX ADMIN — IPRATROPIUM BROMIDE AND ALBUTEROL SULFATE 3 ML: .5; 3 SOLUTION RESPIRATORY (INHALATION) at 14:02

## 2018-03-21 RX ADMIN — PROPOFOL 50 MCG/KG/MIN: 10 INJECTION, EMULSION INTRAVENOUS at 20:00

## 2018-03-21 RX ADMIN — OXYCODONE HYDROCHLORIDE 30 MG: 10 TABLET ORAL at 21:04

## 2018-03-21 RX ADMIN — FUROSEMIDE 20 MG: 10 INJECTION, SOLUTION INTRAMUSCULAR; INTRAVENOUS at 18:45

## 2018-03-21 RX ADMIN — PIPERACILLIN SODIUM AND TAZOBACTAM SODIUM 3.38 G: 2; .25 INJECTION, POWDER, FOR SOLUTION INTRAVENOUS at 18:47

## 2018-03-21 RX ADMIN — POTASSIUM BICARBONATE 25 MEQ: 25 TABLET, EFFERVESCENT ORAL at 09:05

## 2018-03-21 RX ADMIN — Medication 220 MG: at 09:05

## 2018-03-21 RX ADMIN — OMEPRAZOLE 40 MG: 20 CAPSULE, DELAYED RELEASE ORAL at 09:05

## 2018-03-21 RX ADMIN — LACTOBACILLUS ACIDOPHILUS / LACTOBACILLUS BULGARICUS 1 PACKET: 100 MILLION CFU STRENGTH GRANULES at 07:22

## 2018-03-21 RX ADMIN — IPRATROPIUM BROMIDE AND ALBUTEROL SULFATE 3 ML: .5; 3 SOLUTION RESPIRATORY (INHALATION) at 07:31

## 2018-03-21 RX ADMIN — ENOXAPARIN SODIUM 40 MG: 100 INJECTION SUBCUTANEOUS at 09:04

## 2018-03-21 RX ADMIN — PROPOFOL 50 MCG/KG/MIN: 10 INJECTION, EMULSION INTRAVENOUS at 08:53

## 2018-03-21 ASSESSMENT — LIFESTYLE VARIABLES: DO YOU DRINK ALCOHOL: NO

## 2018-03-21 NOTE — CARE PLAN
Problem: Skin Integrity  Goal: Risk for impaired skin integrity will decrease  Outcome: PROGRESSING AS EXPECTED    Intervention: Implement precautions to protect skin integrity in collaboration with the interdisciplinary team  In place.  Intervention: Assess and monitor skin integrity, appearance and/or temperature  In place. Following wound team's instructions for wound care.       Problem: Oxygenation/Respiratory Function  Goal: Patient will Achieve/Maintain Optimum Respiratory Rate/Effort  Outcome: PROGRESSING SLOWER THAN EXPECTED  Patient was intubated today.

## 2018-03-21 NOTE — PROGRESS NOTES
[facility] Hospitalist Progress Note    Date of Service: 3/20/2018    Chief Complaint  57 y.o. male admitted 3/16/2018 with ongoing cellulitis of bilateral lower extremities with a history of chronic venous stasis and lymphedema. He had acute respiratory distress in ER.    Interval Problem Update  worsening respiratory failure requiring intubation    Consultants/Specialty  Intensivist    Disposition  Home vs SNF placement.        Review of Systems   Unable to perform ROS: Intubated      Physical Exam  Laboratory/Imaging   Hemodynamics  Temp (24hrs), Av.9 °C (98.5 °F), Min:36.3 °C (97.4 °F), Max:37.5 °C (99.5 °F)   Temperature: 37.4 °C (99.4 °F)  Pulse  Av.1  Min: 63  Max: 111 Heart Rate (Monitored): 68  NIBP: 126/70      Respiratory  Anthony Vent Mode: APVCMV, Rate (breaths/min): 14, PEEP/CPAP: 14, PEEP/CPAP: 14, FiO2: 80, P Peak (PIP): 29, P MEAN: 17   Respiration: 14, Pulse Oximetry: 98 %     Work Of Breathing / Effort: Moderate  RUL Breath Sounds: Clear;Diminished, RML Breath Sounds: Diminished, RLL Breath Sounds: Diminished, LARY Breath Sounds: Clear;Diminished, LLL Breath Sounds: Diminished    Fluids    Intake/Output Summary (Last 24 hours) at 18 1924  Last data filed at 18 1800   Gross per 24 hour   Intake          1865.33 ml   Output             3810 ml   Net         -1944.67 ml       Nutrition  Orders Placed This Encounter   Procedures   • Diet NPO     Standing Status:   Standing     Number of Occurrences:   1     Order Specific Question:   Type:     Answer:   Now [1]     Order Specific Question:   Restrict to:     Answer:   Strict [1]     Physical Exam   Constitutional: He is intubated.   obese   HENT:   Head: Normocephalic and atraumatic.   Right Ear: External ear normal.   Left Ear: External ear normal.   Nose: Nose normal.   Eyes: Conjunctivae and EOM are normal. Pupils are equal, round, and reactive to light. Right eye exhibits no discharge. Left eye exhibits no discharge.   Neck: No  JVD present. No tracheal deviation present.   Cardiovascular: Normal rate, regular rhythm and normal heart sounds.    No murmur heard.  Pulmonary/Chest: He is intubated. No respiratory distress. He has decreased breath sounds. He has no wheezes. He has rales. He exhibits no tenderness.   Abdominal: Soft. Bowel sounds are normal. He exhibits no distension. There is no tenderness. There is no rebound and no guarding.   Musculoskeletal: He exhibits edema.   Lymphadenopathy:     He has no cervical adenopathy.   Neurological: No cranial nerve deficit. He exhibits normal muscle tone.   sedated   Skin: Skin is warm. He is not diaphoretic. There is erythema.   Chronic stasis dermatitis changes  Lower extremity wounds  dressed   Psychiatric:   Sedated   Vitals reviewed.      Recent Labs      03/18/18   0609  03/19/18   0444  03/20/18   0513   WBC  7.4  7.1  6.2   RBC  3.77*  3.48*  3.53*   HEMOGLOBIN  10.5*  9.9*  10.0*   HEMATOCRIT  37.3*  34.3*  34.4*   MCV  98.9*  98.6*  97.5   MCH  27.9  28.4  28.3   MCHC  28.2*  28.9*  29.1*   RDW  58.7*  57.0*  56.4*   PLATELETCT  200  182  175   MPV  10.5  10.4  10.6     Recent Labs      03/18/18   0609  03/19/18   0444  03/20/18   0513   SODIUM  141  140  141   POTASSIUM  4.5  4.2  4.0   CHLORIDE  97  96  92*   CO2  42*  39*  44*   GLUCOSE  121*  133*  105*   BUN  27*  22  18   CREATININE  0.63  0.60  0.67   CALCIUM  9.1  8.8  9.2         Recent Labs      03/20/18   0513   BNPBTYPENAT  20     Recent Labs      03/20/18   1420   TRIGLYCERIDE  233*          Assessment/Plan     * Acute on chronic respiratory failure with hypoxia (CMS-HCC)- (present on admission)   Assessment & Plan    Intubated 3-20  Vent management per CC discussed with Dr Ching        Bilateral lower leg cellulitis- (present on admission)   Assessment & Plan    Wound care  Sec to MSSA on unasyn abx broadened to cover HCAP        Pneumonia- (present on admission)   Assessment & Plan    Start Zosyn and  vancomycin  Follow-up on bronch results and de-escalate accordingly        Narcotic addiction (CMS-HCC)- (present on admission)   Assessment & Plan    Judicial pain management.          Stasis dermatitis- (present on admission)   Assessment & Plan    With MSSA cellulitis    Wound care consulting  Vitamin C & zinc        Hypothyroidism- (present on admission)   Assessment & Plan    Stable on  Synthroid           COPD (chronic obstructive pulmonary disease) (CMS-HCC)- (present on admission)   Assessment & Plan    RT protocol        HTN (hypertension)- (present on admission)   Assessment & Plan    Stable on lisinopril        Adult failure to thrive   Assessment & Plan    Per H+P the patient isnot able to care for himself despite home health care        Bilateral edema of lower extremity   Assessment & Plan    Chronic  Consider resumin diuresis        Diabetes type 2, controlled (CMS-HCC)- (present on admission)   Assessment & Plan    HgbA1c:6.4  ISS and monitor         Non compliance w medication regimen- (present on admission)   Assessment & Plan    Will need future strict adhearence and f/u with PCP/specialist        Morbid obesity (CMS-HCC)- (present on admission)   Assessment & Plan    Body mass index is 48.02 kg/m².            Quality-Core Measures   Reviewed items::  Radiology images reviewed, Labs reviewed and Medications reviewed  Montoya catheter::  Unconscious / Sedated Patient on a Ventilator  Central line in place:  Need for access  DVT prophylaxis pharmacological::  Enoxaparin (Lovenox)  Antibiotics:  Treating active infection/contamination beyond 24 hours perioperative coverage

## 2018-03-21 NOTE — PROGRESS NOTES
Patient's necklace was placed in a specimen cup and locked in medication drawer for safe keeping.

## 2018-03-21 NOTE — PROGRESS NOTES
1500: Central line bleeding excessively. Correct placement previously verified via STAT CXR. Placement correct. Sandbag placed at 1530.    1800: Bleeding continues, MD notified. Dr. Ching to place another suture.

## 2018-03-21 NOTE — DIETARY
"Nutrition Services: Nutrition Support Assessment     Day 5 of admit.  56 yo male with admitting diagnosis: ongoing cellulitis of bilateral lower extremities      Current problem list:  1. Acute on Chronic Respiratory Failure with Hypoxia  2. PNA  3. COPD  4. HTN  5. Adult FTT   6. DM type 2     Assessment:  Estimated Nutritional Needs: based on height: 69\", weight 143.3 kg vai bed fatuma 3/20, IBW 72.5 kg (197% of ideal body weight), BMI: 46.6 - morbid obesity, class III      Calculation/Equation: MSJ x 1 = 2255 kcals; PSU 2003b = 2360 kcals; 65-70% of RMR = 1535 - 1650 kcals   Calories/day: 1585 - 2015 kcals (11 - 14 kcals/kg of actual body weight)   Protein/day: >180 gm protein (>2.5 gm/kg of ideal body weight)      Evaluation:   1. Pt is currently intubated therefore nutrition support is to be initiated   2. Gastric cortrak placed and TF protocol initiated   3. Pt was intubated 3/20 d/t respiratory failure   4. Prior to intubation pt was receiving a DM, cardiac diet with great PO intake of %   5. Receiving Ascorbic Acid, Zinc, Fish Oil, Lactobacillus, Synthroid and Propofol infusing at 44.1 mL/hr which is providing 1165 kcals/day  6. Per wound team, pt noted with DTI to bilateral feet and venous ulcer to right lower leg   7. Due to pt's morbid obesity, will hypocalorically feed pt per SCCM guidelines   8. Will provide specialized, high-protein TF formula of Peptamen Intense VHP to ensure pt receives adequate nutrition        Recommendations/Plan:  1. On propofol, TF goal: Peptamen Intense VHP at 50 mL/hr which will provide 1200 kcals (+Kcals from prop), 110 gm protein and 1010 mL free water epr day   2. Off propofol, TF goal: Peptamen Intense VHP at 80 mL/hr which will provide 1920 kcals, 177 gm protein and 1615 mL free water per day   3. Fluids per MD   4. Monitor wt and lab trends   5. RD monitor TF tolerance and meds; will adjust accordingly     RD following         "

## 2018-03-21 NOTE — PROCEDURES
CRITICAL CARE MEDICINE   BRIEF PROGRESS NOTE    Date of service: 3/20/2018  Time: 1846      I was asked to evaluate the central line was placed earlier in the right IJ position due to some moderate bleeding. She was adequately clotting and after removal of the dressing and sandbag, there was no evidence of active bleeding. However because of the degree of bleeding previously noted, I did place under sterile conditions (1) 2-0 Vicryl stitch at the stab incision site and there was adequate hemostasis.    Patient tolerated procedure well and dressing placed by nursing. No active bleeding is noted.    Chris Ching D.O.  Critical Care Medicine

## 2018-03-21 NOTE — PROGRESS NOTES
"Pharmacy Kinetics 57 y.o. male on vancomycin day # 1 3/21/2018    Currently on Vancomycin 3000 mg iv loading dose followed by Vancomycin 2000 mg q12 hours    Indication for Treatment: Pneumonia    Pertinent history per medical record: Admitted on 3/16/2018 for cellulitis.  Patient has ongoing bilateral lower extremity cellulitis which has grown MSSA.  Antibiotics broadened to cover for Pneumonia.      Other antibiotics: Zosyn 3.375 g q6 hours    Allergies: Patient has no known allergies.     List concerns for renal function: BUN/SCr > 20:1, BMI 46    Pertinent cultures to date:      Wound culture -- MSSA    Bronch culture -- in process    Recent Labs      18   0609  18   0444  18   0513   WBC  7.4  7.1  6.2     Recent Labs      18   0609  18   0444  18   0513   BUN  27*  22  18   CREATININE  0.63  0.60  0.67     No results for input(s): VANCOTROUGH, VANCOPEAK, VANCORANDOM in the last 72 hours.  Intake/Output Summary (Last 24 hours) at 18 0150  Last data filed at 18 0000   Gross per 24 hour   Intake          2539.46 ml   Output             4265 ml   Net         -1725.54 ml      Blood pressure 142/70, pulse 75, temperature 37 °C (98.6 °F), resp. rate 14, height 1.753 m (5' 9\"), weight (!) 143.3 kg (315 lb 14.7 oz), SpO2 99 %. Temp (24hrs), Av.9 °C (98.5 °F), Min:36.3 °C (97.4 °F), Max:37.5 °C (99.5 °F)      A/P   1. Vancomycin dose change: New start  2. Next vancomycin level: Prior to 4th dose (not ordered)  3. Goal trough: 16-20 mcg/mL  4. Comments: Vancomycin started for r/o MRSA pneumonia.    a. Patient has been on this dose in the past with a trough level of 18 prior to the 4th dose  b. Pharmacy will continue to follow.      Leonel Hutchison, PharmD    Addendum:  Trough referenced above was in 2016  Pt had a random level prior to the 3rd dose of 21 on 1500mg q12hr in January of this year. SCr increased after starting vanco.  Will DC scheduled dosing and " obtain a 12 hour level after 2000mg dose (3/21 @2000)  Pharmacy will follow and adjust as needed    Kingsley Ayala, PharmD, BCPS

## 2018-03-21 NOTE — PROGRESS NOTES
Pulmonary Critical Care Progress Note        Chief Complaint: Acute hypoxemic respiratory failure, chronic leg wounds with possible cellulitis.    History of Present Illness: Fer Estrada is a 57 y.o. male with a past medical history of multiple admissions for bilateral lower extremity cellulitis. In the past he has been noncompliant with wound care. He has a history of chronic lymphedema, venous stasis, diabetes mellitus type 2, systemic hypertension and actually dependent COPD. He is morbidly obese and has chronic pain with narcotic dependence. He was most recently admitted in January 2018 for recurrent bilateral lower extremity cellulitis. Today he presented to the ED after his daughter called EMS stating she could not take care of him any longer. He has been having difficulty standing and going to the bathroom. He has been having recent diarrhea and increasing sores to his buttocks as well as her chronic leg wounds. He denies fever or chills. He did report some diarrhea starting yesterday. He had a soft brown stool in the ED. He is recently been on clindamycin. In the ED he was given IV fluid and began having increasing dyspnea. He is noted to have diastolic heart dysfunction. He was given some Lasix but developed increasing respiratory distress and was placed on BiPAP. At time I evaluation is in BiPAP in the ICU. He has diminished air entry throughout. He arouses but is somewhat somnolent. He follows commands but is unable to give much additional history.    Please note above history obtained by my partner Dr. Sewell on 3/16/2018.    ROS:  Respiratory: unable to perform due to the patient's inability to effectively communicate, Cardiac: unable to perform due to the patient's inability to effectively communicate, GI: unable to perform due to the patient's inability to effectively communicate.  Note that the patient on BiPAP and difficult to talk. All other systems negative.    Interval Events:  24 hour interval  history reviewed    Overnight intubated  S/p bronchoscopy  Neuro intact  BP stable  Cortrack and feeds ongoing  Normal BM  Pull back ETT 2 CM  Propofol  PEEP 14, 50%, however O2 sat 98 and decrease to 50%.  ABG at 2 pm for today planned.  Lovenox 40 bid to continue.  Anti Xa level lower rate.  Prilosec  Vanco/Zosyn  BAL neg so far.  Was on Unasyn, but switched to broad-spectrum hospital associated pneumonia coverage. Yesterday.    PFSH:  No change.    Respiratory:  Anthony Vent Mode: APVCMV, Rate (breaths/min): 14, Vt Target (mL): 430, PEEP/CPAP: 14, FiO2: 50, Static Compliance (ml / cm H2O): 36, Control VTE (exp VT): 428  Pulse Oximetry: 99 %  Chest Tube Drains:          Exam: tachypnea  ImagingCXR  I have personally reviewed the chest x-ray my impression is  patient has ET tube slightly caudad in position which was adjusted. There was bibasilar infiltrates. Low lung volumes remain. Slightly improved with increased PEEP compared to yesterday's exam. Right IJ central catheter in good position.  Recent Labs      03/20/18   1342  03/20/18   1353  03/21/18   0426   ISTATAPH  7.579*  7.611*  7.526*   ISTATAPCO2  54.1*  46.1*  51.1*   ISTATAPO2  34*  46*  60*   ISTATATCO2  >50*  48*  44*   [id number]  73*  88*  93   ISTATARTHCO3  50.7*  46.4*  42.2*   ISTATARTBE  26*  22*  17*   ISTATTEMP  37.0 C  37.0 C  97.5 F   ISTATFIO2  100  100  50   ISTATSPEC  Arterial  Arterial  Arterial   ISTATAPHTC  7.579*  7.611*  7.535*   [id number]  34*  46*  58*     HemoDynamics:  Pulse: 79, Heart Rate (Monitored): 79  NIBP: 127/58       Exam: regular rate and rhythm  Imaging: echo Reviewed     Echocardiogram from 3/18/2018:    CONCLUSIONS  Technically difficult study incomplete information is obtained.   Grossly normal left ventricular systolic function.  Right ventricle not well visualized.  No valve disease noted by blind   Doppler interrogation.  No noted changes from prior.    Recent Labs      03/20/18   0513  03/21/18   4951    TROPONINI   --   <0.01   BNPBTYPENAT  20   --      Neuro:  GCS Total Sidney Coma Score: 10     Exam: follows commands, raises two fingers. Patient has significant weakness on the lower extremities due to severe edema and chronic venous stasis changes. Patient has crusting scaly lesions as well. There is evidence of lichenification of the lower extremities. Dressings were not removed based on wound care request. There was a Mepelex dressing over the left posterior thigh wound which is reportedly improved per the nursing and wound care specialist.  Imaging: None - Reviewed    Fluids:  Intake/Output       03/19/18 0700 - 03/20/18 0659 03/20/18 0700 - 03/21/18 0659 03/21/18 0700 - 03/22/18 0659      0700-1859 2643-4358 Total 2010-4123 4396-8533 Total 8795-1385 2890-7949 Total       Intake    P.O.  1200  600 1800  400  -- 400  --  -- --    P.O. 5655 870 7007 400 -- 400 -- -- --    I.V.  150  157.5 307.5  527.8  1460.7 1988.6  --  -- --    Propofol Volume -- -- -- 312.8 540.7 853.6 -- -- --    IV Piggyback Volume (IV Piggyback) 100 100 200 100 800 900 -- -- --    IV Volume (NS TKO) 50 57.5 107.5 115 120 235 -- -- --    Other  --  -- --  60  90 150  --  -- --    Medications (P.O./ Enteral Liquids) -- -- -- 60 90 150 -- -- --    Enteral  --  -- --  120  260 380  --  -- --    Enteral Volume -- -- -- -- 50 50 -- -- --    Free Water / Tube Flush -- -- -- 120 210 330 -- -- --    Total Intake 1350 757.5 2107.5 1107.8 1810.7 2918.6 -- -- --       Output    Urine  2000  1200 3200  2610  855 3465  --  -- --    Condom Catheter 2000 1200 3200 1150 -- 1150 -- -- --    Indwelling Cathether -- -- -- 7229 694 6369 -- -- --    Stool  --  -- --  --  -- --  --  -- --    Number of Times Stooled 2 x -- 2 x 1 x -- 1 x -- -- --    Total Output 2000 1200 3200 [phone] -- -- --       Net I/O     -650 -442.5 -1092.5 -1502.2 955.7 -546.4 -- -- --        Weight: (!) 143.3 kg (315 lb 14.7 oz)  Recent Labs      03/19/18   0444  03/20/18   0513   18   0410   SODIUM  140  141  137   POTASSIUM  4.2  4.0  3.7   CHLORIDE  96  92*  91*   CO2  39*  44*  39*   BUN     CREATININE  0.60  0.67  0.75   MAGNESIUM   --    --   1.5   PHOSPHORUS   --    --   2.9   CALCIUM  8.8  9.2  9.3       GI/Nutrition:  Exam: normal active bowel sounds morbidly obese. No obvious rebound rigidity or guarding.  Imaging: None - Reviewed  taking PO  Liver Function  Recent Labs      184  18   0518   GLUCOSE  133*  105*  118*       Heme:  Recent Labs      18   0518   RBC  3.48*  3.53*  3.61*   HEMOGLOBIN  9.9*  10.0*  10.5*   HEMATOCRIT  34.3*  34.4*  33.9*   PLATELETCT  182  175  186       Infectious Disease:  Temp  Av.8 °C (98.3 °F)  Min: 36.3 °C (97.4 °F)  Max: 37.4 °C (99.4 °F)  Micro: cultures reviewed. On Unasyn at this time. Large ulceration noted in the left proximal posterior thigh, slightly improved. Was previously on linezolid. After intubation changed to Zosyn and vancomycin at this time. Bronchoscopy and BAL was performed although no significant mucus is noted. CT imaging does show bibasilar pneumonia. Will likely require infectious disease consultation which was ordered today.  Recent Labs      18   WBC  7.1  6.2  7.4   NEUTSPOLYS   --    --   73.50*   LYMPHOCYTES   --    --   14.20*   MONOCYTES   --    --   7.00   EOSINOPHILS   --    --   4.30   BASOPHILS   --    --   0.30     Current Facility-Administered Medications   Medication Dose Frequency Provider Last Rate Last Dose   • vancomycin 2,000 mg in  mL IVPB  2,000 mg Q12HR Amarjit Martines M.D.       • propofol (DIPRIVAN) injection  0-80 mcg/kg/min Continuous Chris Ching D.O. 44.3 mL/hr at 18 0633 50 mcg/kg/min at 18 0633   • Respiratory Care per Protocol   Continuous RT Chris Ching D.O.       • senna-docusate (PERICOLACE or SENOKOT S) 8.6-50 MG per  tablet 2 Tab  2 Tab BID Chris Ching D.O.   2 Tab at 03/20/18 2020    And   • polyethylene glycol/lytes (MIRALAX) PACKET 1 Packet  1 Packet QDAY PRN Chris Ching D.O.        And   • magnesium hydroxide (MILK OF MAGNESIA) suspension 30 mL  30 mL QDAY PRN Chris Ching D.O.        And   • bisacodyl (DULCOLAX) suppository 10 mg  10 mg QDAY PRN Chris Ching D.O.       • chlorhexidine (PERIDEX) 0.12 % solution 15 mL  15 mL BID DAVONTE ColladoODorina   15 mL at 03/20/18 2021   • MD ALERT...Adult ICU Electrolyte Replacement per Pharmacy Protocol   pharmacy to dose Chris Ching D.O.       • Pharmacy Consult: Enteral tube feeding - review meds/change route/product selection   Once Chris Ching D.O.   Stopped at 03/20/18 1315   • fentaNYL (SUBLIMAZE) injection 25 mcg  25 mcg Q HOUR PRN Chris Ching D.O.        Or   • fentaNYL (SUBLIMAZE) injection 50 mcg  50 mcg Q HOUR PRN Chris Ching D.O.        Or   • fentaNYL (SUBLIMAZE) injection 100 mcg  100 mcg Q HOUR PRN DAVONTE ColladoO.       • ipratropium-albuterol (DUONEB) nebulizer solution 3 mL  3 mL Q2HRS PRN (RT) Chris Ching D.O.       • ipratropium-albuterol (DUONEB) nebulizer solution 3 mL  3 mL Q4HRS (RT) DAVONTE ColladoO.   3 mL at 03/21/18 0318   • piperacillin-tazobactam (ZOSYN) 3.375 g in  mL IVPB  3.375 g Q6HRS Amarjit Martines M.D.   Stopped at 03/21/18 0542   • MD ALERT... vancomycin per pharmacy protocol   pharmacy to dose Amarjit Martines M.D.       • ascorbic acid (ascorbic acid) tablet 500 mg  500 mg DAILY DAVONTE ColladoO.   500 mg at 03/20/18 0905   • aspirin (ASA) chewable tab 81 mg  81 mg DAILY DAVONTE ColladoODorina   81 mg at 03/20/18 0903   • fish oil capsule 1,000 mg  1,000 mg TID WITH MEALS DAVONTE ColladoO.   1,000 mg at 03/20/18 1748   • lactobacillus granules (LACTINEX/FLORANEX) packet 1 Packet  1 Packet TID WITH MEALS DAVONTE ColladoO.   1 Packet at 03/20/18 1748   • levothyroxine  (SYNTHROID) tablet 50 mcg  50 mcg AM ES Chris Ching D.O.   50 mcg at 03/21/18 0513   • lisinopril (PRINIVIL) tablet 10 mg  10 mg DAILY YVROSE Collado.O.   10 mg at 03/20/18 0906   • omeprazole 2 mg/mL in sodium bicarbonate (PRILOSEC) oral susp 40 mg  40 mg DAILY YVROSE Collado.O.   40 mg at 03/20/18 0903   • acetaminophen (TYLENOL) tablet 650 mg  650 mg Q6HRS PRN Chris Ching D.O.   650 mg at 03/20/18 0905   • oxyCODONE immediate release (ROXICODONE) tablet 30 mg  30 mg Q4HRS PRN YVROSE Collado.O.   30 mg at 03/20/18 0944   • oxyCODONE immediate release (ROXICODONE) tablet 40 mg  40 mg Q4HRS PRN YVROSE Collado.O.   40 mg at 03/21/18 0505   • ammonium lactate (LAC-HYDRIN) 12 % lotion   DAILY Chris Ching D.O.       • enoxaparin (LOVENOX) inj 40 mg  40 mg BID Vic Pendleton Jr., D.O.   Stopped at 03/20/18 2100   • zinc sulfate (ZINCATE) capsule 220 mg  220 mg DAILY Scott Almodovar D.O.   220 mg at 03/20/18 1142   • albuterol inhaler 2 Puff  2 Puff Q6HRS PRN Benjie Marcus M.D.       • Respiratory Care per Protocol   Continuous RT Benjie Marcus M.D.       • ipratropium-albuterol (DUONEB) nebulizer solution 3 mL  3 mL Q4H PRN (RT) Benjie Marcus M.D.         Last reviewed on 3/16/2018  3:34 PM by May Degroot    Quality  Measures:  Labs reviewed, Medications reviewed and Radiology images reviewed  Montoya catheter: Critically Ill - Requiring Accurate Measurement of Urinary Output  Central line in place: Sepsis              Assessment and plan:     1.  Acute on chronic hypoxemic and hypercapnic respiratory failure.    -Patient is failed BiPAP and is elected to agree to intubation, bronchoscopy, and CT scan imaging, performed yesterday which revealed significant by lateral pneumonia. There is also compressive atelectasis. Of also discussed with patient's daughter personally and patient consents as well.  -BNP was low and therefore I believe adequate diuresis is being  performed.  -Continue with treatment for obstructive lung disease as well.  -Will broaden antibiotics again to Zosyn and vancomycin considering worsen respiratory failure.    2. Chronic severe lymphedema with stasis dermatitis.    -Continue with wound care treatment.  -Patient also to possibly have evaluation of the left posterior thigh by surgical team, no follow-up exam today shows improvement and no need for surgical intervention.  -Will likely require infectious disease evaluation pending patient's response to ongoing treatment.    3. Type II diabetes mellitus.    -Continue with sliding scale at this time.  -Avoiding IV steroids as this may worsen his glycemic control.    4. Morbid obesity.    5. Hypertension.    -Under adequate control at this time.  -Continue with oral therapy, with ACE inhibitor but observe for worsening renal function. At this time tolerating.    6. Chronic right bundle-branch block with wide QRS.     -No obvious sign of acute ischemic event although EKG is been ordered as well as troponin.   -Levels were negative overnight.  -Compared to previous EKGs no significant change.    7. COPD.    -Continue with respiratory therapy protocols.  -Holding on IV steroids at this point due to multiple wounds of the lower extremities.  -May consider as possible rescue therapy.    Discussed patient condition and risk of morbidity and/or mortality with at length on M.D. rounds.   The patient remains critically ill.  Critical care time = 40 minutes in directly providing and coordinating critical care and extensive data review.  No time overlap and excludes procedures.    Chris Ching D.O.

## 2018-03-22 ENCOUNTER — APPOINTMENT (OUTPATIENT)
Dept: RADIOLOGY | Facility: MEDICAL CENTER | Age: 58
DRG: 987 | End: 2018-03-22
Attending: INTERNAL MEDICINE
Payer: MEDICARE

## 2018-03-22 LAB
ACTION RANGE TRIGGERED IACRT: YES
ANION GAP SERPL CALC-SCNC: 6 MMOL/L (ref 0–11.9)
BACTERIA BRONCH AEROBE CULT: NORMAL
BACTERIA BRONCH AEROBE CULT: NORMAL
BASE EXCESS BLDA CALC-SCNC: 17 MMOL/L (ref -4–3)
BASOPHILS # BLD AUTO: 0.3 % (ref 0–1.8)
BASOPHILS # BLD: 0.02 K/UL (ref 0–0.12)
BODY TEMPERATURE: ABNORMAL DEGREES
BUN SERPL-MCNC: 17 MG/DL (ref 8–22)
CALCIUM SERPL-MCNC: 8.5 MG/DL (ref 8.5–10.5)
CHLORIDE SERPL-SCNC: 97 MMOL/L (ref 96–112)
CO2 BLDA-SCNC: 44 MMOL/L (ref 20–33)
CO2 SERPL-SCNC: 37 MMOL/L (ref 20–33)
CREAT SERPL-MCNC: 0.74 MG/DL (ref 0.5–1.4)
EOSINOPHIL # BLD AUTO: 0.3 K/UL (ref 0–0.51)
EOSINOPHIL NFR BLD: 4.2 % (ref 0–6.9)
ERYTHROCYTE [DISTWIDTH] IN BLOOD BY AUTOMATED COUNT: 56.4 FL (ref 35.9–50)
GLUCOSE BLD-MCNC: 133 MG/DL (ref 65–99)
GLUCOSE SERPL-MCNC: 139 MG/DL (ref 65–99)
GRAM STN SPEC: NORMAL
GRAM STN SPEC: NORMAL
HCO3 BLDA-SCNC: 42.7 MMOL/L (ref 17–25)
HCT VFR BLD AUTO: 30.4 % (ref 42–52)
HGB BLD-MCNC: 9.4 G/DL (ref 14–18)
IMM GRANULOCYTES # BLD AUTO: 0.05 K/UL (ref 0–0.11)
IMM GRANULOCYTES NFR BLD AUTO: 0.7 % (ref 0–0.9)
INST. QUALIFIED PATIENT IIQPT: YES
LYMPHOCYTES # BLD AUTO: 0.84 K/UL (ref 1–4.8)
LYMPHOCYTES NFR BLD: 11.6 % (ref 22–41)
MAGNESIUM SERPL-MCNC: 2.3 MG/DL (ref 1.5–2.5)
MCH RBC QN AUTO: 28.8 PG (ref 27–33)
MCHC RBC AUTO-ENTMCNC: 30.9 G/DL (ref 33.7–35.3)
MCV RBC AUTO: 93.3 FL (ref 81.4–97.8)
MONOCYTES # BLD AUTO: 0.58 K/UL (ref 0–0.85)
MONOCYTES NFR BLD AUTO: 8 % (ref 0–13.4)
NEUTROPHILS # BLD AUTO: 5.43 K/UL (ref 1.82–7.42)
NEUTROPHILS NFR BLD: 75.2 % (ref 44–72)
NRBC # BLD AUTO: 0 K/UL
NRBC BLD-RTO: 0 /100 WBC
O2/TOTAL GAS SETTING VFR VENT: 40 %
PCO2 BLDA: 59.2 MMHG (ref 26–37)
PCO2 TEMP ADJ BLDA: 61.2 MMHG (ref 26–37)
PH BLDA: 7.47 [PH] (ref 7.4–7.5)
PH TEMP ADJ BLDA: 7.45 [PH] (ref 7.4–7.5)
PHOSPHATE SERPL-MCNC: 3.7 MG/DL (ref 2.5–4.5)
PLATELET # BLD AUTO: 174 K/UL (ref 164–446)
PMV BLD AUTO: 11 FL (ref 9–12.9)
PO2 BLDA: 72 MMHG (ref 64–87)
PO2 TEMP ADJ BLDA: 76 MMHG (ref 64–87)
POTASSIUM SERPL-SCNC: 3.5 MMOL/L (ref 3.6–5.5)
RBC # BLD AUTO: 3.26 M/UL (ref 4.7–6.1)
SAO2 % BLDA: 95 % (ref 93–99)
SIGNIFICANT IND 70042: NORMAL
SIGNIFICANT IND 70042: NORMAL
SITE SITE: NORMAL
SITE SITE: NORMAL
SODIUM SERPL-SCNC: 140 MMOL/L (ref 135–145)
SOURCE SOURCE: NORMAL
SOURCE SOURCE: NORMAL
SPECIMEN DRAWN FROM PATIENT: ABNORMAL
TRIGL SERPL-MCNC: 210 MG/DL (ref 0–149)
TROPONIN I SERPL-MCNC: <0.01 NG/ML (ref 0–0.04)
VANCOMYCIN SERPL-MCNC: 31 UG/ML
WBC # BLD AUTO: 7.2 K/UL (ref 4.8–10.8)

## 2018-03-22 PROCEDURE — 770022 HCHG ROOM/CARE - ICU (200)

## 2018-03-22 PROCEDURE — 71045 X-RAY EXAM CHEST 1 VIEW: CPT

## 2018-03-22 PROCEDURE — 700111 HCHG RX REV CODE 636 W/ 250 OVERRIDE (IP): Performed by: HOSPITALIST

## 2018-03-22 PROCEDURE — 84478 ASSAY OF TRIGLYCERIDES: CPT

## 2018-03-22 PROCEDURE — 36600 WITHDRAWAL OF ARTERIAL BLOOD: CPT

## 2018-03-22 PROCEDURE — 82803 BLOOD GASES ANY COMBINATION: CPT

## 2018-03-22 PROCEDURE — 82962 GLUCOSE BLOOD TEST: CPT

## 2018-03-22 PROCEDURE — 700105 HCHG RX REV CODE 258: Performed by: HOSPITALIST

## 2018-03-22 PROCEDURE — 700102 HCHG RX REV CODE 250 W/ 637 OVERRIDE(OP): Performed by: HOSPITALIST

## 2018-03-22 PROCEDURE — A9270 NON-COVERED ITEM OR SERVICE: HCPCS | Performed by: HOSPITALIST

## 2018-03-22 PROCEDURE — A9270 NON-COVERED ITEM OR SERVICE: HCPCS | Performed by: INTERNAL MEDICINE

## 2018-03-22 PROCEDURE — 700111 HCHG RX REV CODE 636 W/ 250 OVERRIDE (IP): Performed by: INTERNAL MEDICINE

## 2018-03-22 PROCEDURE — 80048 BASIC METABOLIC PNL TOTAL CA: CPT

## 2018-03-22 PROCEDURE — 94003 VENT MGMT INPAT SUBQ DAY: CPT

## 2018-03-22 PROCEDURE — 84484 ASSAY OF TROPONIN QUANT: CPT

## 2018-03-22 PROCEDURE — 700102 HCHG RX REV CODE 250 W/ 637 OVERRIDE(OP): Performed by: INTERNAL MEDICINE

## 2018-03-22 PROCEDURE — 83735 ASSAY OF MAGNESIUM: CPT

## 2018-03-22 PROCEDURE — 94640 AIRWAY INHALATION TREATMENT: CPT

## 2018-03-22 PROCEDURE — 84100 ASSAY OF PHOSPHORUS: CPT

## 2018-03-22 PROCEDURE — 85025 COMPLETE CBC W/AUTO DIFF WBC: CPT

## 2018-03-22 PROCEDURE — 700101 HCHG RX REV CODE 250: Performed by: INTERNAL MEDICINE

## 2018-03-22 PROCEDURE — 80202 ASSAY OF VANCOMYCIN: CPT

## 2018-03-22 PROCEDURE — 99233 SBSQ HOSP IP/OBS HIGH 50: CPT | Performed by: HOSPITALIST

## 2018-03-22 RX ORDER — DEXTROSE MONOHYDRATE 25 G/50ML
25 INJECTION, SOLUTION INTRAVENOUS
Status: DISCONTINUED | OUTPATIENT
Start: 2018-03-22 | End: 2018-04-06

## 2018-03-22 RX ORDER — FUROSEMIDE 10 MG/ML
40 INJECTION INTRAMUSCULAR; INTRAVENOUS 2 TIMES DAILY
Status: DISCONTINUED | OUTPATIENT
Start: 2018-03-22 | End: 2018-03-23

## 2018-03-22 RX ORDER — METHYLPREDNISOLONE SODIUM SUCCINATE 40 MG/ML
40 INJECTION, POWDER, LYOPHILIZED, FOR SOLUTION INTRAMUSCULAR; INTRAVENOUS ONCE
Status: COMPLETED | OUTPATIENT
Start: 2018-03-22 | End: 2018-03-22

## 2018-03-22 RX ORDER — FUROSEMIDE 10 MG/ML
20 INJECTION INTRAMUSCULAR; INTRAVENOUS ONCE
Status: COMPLETED | OUTPATIENT
Start: 2018-03-22 | End: 2018-03-22

## 2018-03-22 RX ADMIN — Medication: at 08:24

## 2018-03-22 RX ADMIN — PROPOFOL 60 MCG/KG/MIN: 10 INJECTION, EMULSION INTRAVENOUS at 04:15

## 2018-03-22 RX ADMIN — IPRATROPIUM BROMIDE AND ALBUTEROL SULFATE 3 ML: .5; 3 SOLUTION RESPIRATORY (INHALATION) at 11:06

## 2018-03-22 RX ADMIN — PROPOFOL 60 MCG/KG/MIN: 10 INJECTION, EMULSION INTRAVENOUS at 10:28

## 2018-03-22 RX ADMIN — PIPERACILLIN SODIUM AND TAZOBACTAM SODIUM 3.38 G: 2; .25 INJECTION, POWDER, FOR SOLUTION INTRAVENOUS at 17:12

## 2018-03-22 RX ADMIN — ENOXAPARIN SODIUM 40 MG: 100 INJECTION SUBCUTANEOUS at 08:22

## 2018-03-22 RX ADMIN — LEVOTHYROXINE SODIUM 50 MCG: 50 TABLET ORAL at 06:15

## 2018-03-22 RX ADMIN — IPRATROPIUM BROMIDE AND ALBUTEROL SULFATE 3 ML: .5; 3 SOLUTION RESPIRATORY (INHALATION) at 19:54

## 2018-03-22 RX ADMIN — PROPOFOL 60 MCG/KG/MIN: 10 INJECTION, EMULSION INTRAVENOUS at 00:06

## 2018-03-22 RX ADMIN — IPRATROPIUM BROMIDE AND ALBUTEROL SULFATE 3 ML: .5; 3 SOLUTION RESPIRATORY (INHALATION) at 07:02

## 2018-03-22 RX ADMIN — LACTOBACILLUS ACIDOPHILUS / LACTOBACILLUS BULGARICUS 1 PACKET: 100 MILLION CFU STRENGTH GRANULES at 08:22

## 2018-03-22 RX ADMIN — PROPOFOL 80 MCG/KG/MIN: 10 INJECTION, EMULSION INTRAVENOUS at 17:12

## 2018-03-22 RX ADMIN — Medication 220 MG: at 08:22

## 2018-03-22 RX ADMIN — PROPOFOL 80 MCG/KG/MIN: 10 INJECTION, EMULSION INTRAVENOUS at 15:41

## 2018-03-22 RX ADMIN — OXYCODONE HYDROCHLORIDE 40 MG: 10 TABLET ORAL at 13:26

## 2018-03-22 RX ADMIN — POTASSIUM BICARBONATE 25 MEQ: 25 TABLET, EFFERVESCENT ORAL at 08:23

## 2018-03-22 RX ADMIN — PIPERACILLIN SODIUM AND TAZOBACTAM SODIUM 3.38 G: 2; .25 INJECTION, POWDER, FOR SOLUTION INTRAVENOUS at 05:07

## 2018-03-22 RX ADMIN — PROPOFOL 70 MCG/KG/MIN: 10 INJECTION, EMULSION INTRAVENOUS at 23:36

## 2018-03-22 RX ADMIN — PROPOFOL 60 MCG/KG/MIN: 10 INJECTION, EMULSION INTRAVENOUS at 08:21

## 2018-03-22 RX ADMIN — PROPOFOL 60 MCG/KG/MIN: 10 INJECTION, EMULSION INTRAVENOUS at 18:40

## 2018-03-22 RX ADMIN — OMEGA-3 FATTY ACIDS CAP 1000 MG 1000 MG: 1000 CAP at 12:27

## 2018-03-22 RX ADMIN — PROPOFOL 60 MCG/KG/MIN: 10 INJECTION, EMULSION INTRAVENOUS at 12:20

## 2018-03-22 RX ADMIN — INSULIN HUMAN 2 UNITS: 100 INJECTION, SOLUTION PARENTERAL at 23:56

## 2018-03-22 RX ADMIN — OXYCODONE HYDROCHLORIDE 40 MG: 10 TABLET ORAL at 22:04

## 2018-03-22 RX ADMIN — LACTOBACILLUS ACIDOPHILUS / LACTOBACILLUS BULGARICUS 1 PACKET: 100 MILLION CFU STRENGTH GRANULES at 17:12

## 2018-03-22 RX ADMIN — OXYCODONE HYDROCHLORIDE AND ACETAMINOPHEN 500 MG: 500 TABLET ORAL at 08:22

## 2018-03-22 RX ADMIN — FUROSEMIDE 20 MG: 10 INJECTION, SOLUTION INTRAMUSCULAR; INTRAVENOUS at 10:33

## 2018-03-22 RX ADMIN — CHLORHEXIDINE GLUCONATE 15 ML: 1.2 RINSE ORAL at 08:22

## 2018-03-22 RX ADMIN — OMEGA-3 FATTY ACIDS CAP 1000 MG 1000 MG: 1000 CAP at 17:12

## 2018-03-22 RX ADMIN — OMEPRAZOLE 40 MG: 20 CAPSULE, DELAYED RELEASE ORAL at 08:22

## 2018-03-22 RX ADMIN — IPRATROPIUM BROMIDE AND ALBUTEROL SULFATE 3 ML: .5; 3 SOLUTION RESPIRATORY (INHALATION) at 02:58

## 2018-03-22 RX ADMIN — METHYLPREDNISOLONE SODIUM SUCCINATE 40 MG: 40 INJECTION, POWDER, FOR SOLUTION INTRAMUSCULAR; INTRAVENOUS at 17:12

## 2018-03-22 RX ADMIN — PROPOFOL 60 MCG/KG/MIN: 10 INJECTION, EMULSION INTRAVENOUS at 01:57

## 2018-03-22 RX ADMIN — PIPERACILLIN SODIUM AND TAZOBACTAM SODIUM 3.38 G: 2; .25 INJECTION, POWDER, FOR SOLUTION INTRAVENOUS at 12:28

## 2018-03-22 RX ADMIN — PIPERACILLIN SODIUM AND TAZOBACTAM SODIUM 3.38 G: 2; .25 INJECTION, POWDER, FOR SOLUTION INTRAVENOUS at 00:07

## 2018-03-22 RX ADMIN — ASPIRIN 81 MG: 81 TABLET, CHEWABLE ORAL at 08:22

## 2018-03-22 RX ADMIN — ENOXAPARIN SODIUM 40 MG: 100 INJECTION SUBCUTANEOUS at 21:03

## 2018-03-22 RX ADMIN — OMEGA-3 FATTY ACIDS CAP 1000 MG 1000 MG: 1000 CAP at 07:30

## 2018-03-22 RX ADMIN — PROPOFOL 60 MCG/KG/MIN: 10 INJECTION, EMULSION INTRAVENOUS at 21:00

## 2018-03-22 RX ADMIN — PROPOFOL 55 MCG/KG/MIN: 10 INJECTION, EMULSION INTRAVENOUS at 06:15

## 2018-03-22 RX ADMIN — FUROSEMIDE 20 MG: 10 INJECTION, SOLUTION INTRAMUSCULAR; INTRAVENOUS at 08:22

## 2018-03-22 RX ADMIN — PIPERACILLIN SODIUM AND TAZOBACTAM SODIUM 3.38 G: 2; .25 INJECTION, POWDER, FOR SOLUTION INTRAVENOUS at 23:55

## 2018-03-22 RX ADMIN — PROPOFOL 80 MCG/KG/MIN: 10 INJECTION, EMULSION INTRAVENOUS at 14:07

## 2018-03-22 RX ADMIN — POTASSIUM BICARBONATE 25 MEQ: 25 TABLET, EFFERVESCENT ORAL at 21:03

## 2018-03-22 RX ADMIN — IPRATROPIUM BROMIDE AND ALBUTEROL SULFATE 3 ML: .5; 3 SOLUTION RESPIRATORY (INHALATION) at 22:29

## 2018-03-22 RX ADMIN — STANDARDIZED SENNA CONCENTRATE AND DOCUSATE SODIUM 2 TABLET: 8.6; 5 TABLET, FILM COATED ORAL at 21:03

## 2018-03-22 RX ADMIN — LACTOBACILLUS ACIDOPHILUS / LACTOBACILLUS BULGARICUS 1 PACKET: 100 MILLION CFU STRENGTH GRANULES at 12:27

## 2018-03-22 RX ADMIN — STANDARDIZED SENNA CONCENTRATE AND DOCUSATE SODIUM 2 TABLET: 8.6; 5 TABLET, FILM COATED ORAL at 08:22

## 2018-03-22 RX ADMIN — IPRATROPIUM BROMIDE AND ALBUTEROL SULFATE 3 ML: .5; 3 SOLUTION RESPIRATORY (INHALATION) at 15:14

## 2018-03-22 RX ADMIN — LISINOPRIL 10 MG: 20 TABLET ORAL at 08:22

## 2018-03-22 RX ADMIN — CHLORHEXIDINE GLUCONATE 15 ML: 1.2 RINSE ORAL at 21:03

## 2018-03-22 RX ADMIN — FUROSEMIDE 40 MG: 10 INJECTION, SOLUTION INTRAMUSCULAR; INTRAVENOUS at 21:03

## 2018-03-22 ASSESSMENT — LIFESTYLE VARIABLES: REASON UNABLE TO ASSESS: PT INTUBATED

## 2018-03-22 NOTE — PROGRESS NOTES
Pharmacy Kinetics Addendum:    57 y.o. male on vancomycin day # 1 3/22/2018    Currently on Vancomycin 3000 mg iv loading dose followed by Vancomycin 2000 mg x1 12 hours later    Indication for Treatment: Pneumonia    Recent Labs      03/20/18   0513  03/21/18   0410   BUN  18 18   CREATININE  0.67  0.75     Recent Labs      03/21/18   2120   VANCORANDOM  53.7*       A/P   1. Vancomycin dose change: Pulse dose vancomycin  2. Next vancomycin level: 3/22 @0800  3. Goal trough: 16-20 mcg/mL  4. Comments: 12 hour random level very high. Will obtain level 12 hours from now, (24 hour post dose level) and adjust based on levels. UOP appears adequate.    Kingsley Ayala, PharmD, BCPS

## 2018-03-22 NOTE — CARE PLAN
Problem: Safety  Goal: Will remain free from falls    Intervention: Implement fall precautions  All safety and fall precautions in place.      Problem: Risk of Aspiration  Goal: Absence of aspiration    Intervention: Maintain upright position during and after feeding  HOB maintained at 30 degrees while TFs infusing.

## 2018-03-22 NOTE — PROGRESS NOTES
Pulmonary Critical Care Progress Note        Chief Complaint: Acute hypoxemic respiratory failure, chronic leg wounds with possible cellulitis.    History of Present Illness: Fer Estrada is a 57 y.o. male with a past medical history of multiple admissions for bilateral lower extremity cellulitis. In the past he has been noncompliant with wound care. He has a history of chronic lymphedema, venous stasis, diabetes mellitus type 2, systemic hypertension and actually dependent COPD. He is morbidly obese and has chronic pain with narcotic dependence. He was most recently admitted in January 2018 for recurrent bilateral lower extremity cellulitis. Today he presented to the ED after his daughter called EMS stating she could not take care of him any longer. He has been having difficulty standing and going to the bathroom. He has been having recent diarrhea and increasing sores to his buttocks as well as her chronic leg wounds. He denies fever or chills. He did report some diarrhea starting yesterday. He had a soft brown stool in the ED. He is recently been on clindamycin. In the ED he was given IV fluid and began having increasing dyspnea. He is noted to have diastolic heart dysfunction. He was given some Lasix but developed increasing respiratory distress and was placed on BiPAP. At time I evaluation is in BiPAP in the ICU. He has diminished air entry throughout. He arouses but is somewhat somnolent. He follows commands but is unable to give much additional history.    Please note above history obtained by my partner Dr. Sewell on 3/16/2018.    ROS:  Respiratory: unable to perform due to the patient's inability to effectively communicate, Cardiac: unable to perform due to the patient's inability to effectively communicate, GI: unable to perform due to the patient's inability to effectively communicate.  Note that the patient on BiPAP and difficult to talk. All other systems negative.    Interval Events:  24 hour interval  history reviewed    Bilateral wounds to legs and thigh  Day 3 of vent 40% PEEP 12  Recruitment maneuvers to be performed today.  Increase with Lasix 40 bid  Lovenox and PPI  Vanco and Zosyn, however bronchoscopy data does not reveal gram-positive cocci and therefore treat withZosyn for now.     PFSH:  No change.    Respiratory:  Anthony Vent Mode: APVCMV, Rate (breaths/min): 12, Vt Target (mL): 430, PEEP/CPAP: 12, FiO2: 40, Static Compliance (ml / cm H2O): 33.5, Control VTE (exp VT): 422  Pulse Oximetry: 94 %  Chest Tube Drains:          Exam: tachypnea bilateral basilar rhonchi with slight inspiratory crackles noted anteriorly laterally.  ImagingCXR  I have personally reviewed the chest x-ray my impression is  right IJ central catheter in good position. Patient's ET tube is in good position. There remains bibasilar infiltrates with probable pleural effusions and atelectasis and infiltrates. Infiltrates are slightly improved from yesterday.  Recent Labs      03/21/18   0426  03/21/18   1354  03/22/18   0439   ISTATAPH  7.526*  7.501*  7.466   ISTATAPCO2  51.1*  53.6*  59.2*   ISTATAPO2  60*  89*  72   ISTATATCO2  44*  44*  44*   FAVCNHR2AYE  93  97  95   ISTATARTHCO3  42.2*  41.9*  42.7*   ISTATARTBE  17*  17*  17*   ISTATTEMP  97.5 F  98.4 F  100.0 F   ISTATFIO2  50  50  40   ISTATSPEC  Arterial  Arterial  Arterial   ISTATAPHTC  7.535*  7.503*  7.454   QDAXTNPO0CE  58*  88*  76     HemoDynamics:  Pulse: 73, Heart Rate (Monitored): 73  NIBP: 132/60       Exam: regular rate and rhythm  Imaging: echo Reviewed     Echocardiogram from 3/18/2018:    CONCLUSIONS  Technically difficult study incomplete information is obtained.   Grossly normal left ventricular systolic function.  Right ventricle not well visualized.  No valve disease noted by blind   Doppler interrogation.  No noted changes from prior.    Recent Labs      03/20/18   0513  03/21/18   0410  03/22/18   0440   TROPONINI   --   <0.01  <0.01   BNPBTYPENAT  20    --    --      Neuro:  GCS Total Siomara Coma Score: 10     Exam: follows commands, raises two fingers. Patient follows commands of his upper extremities with bilateral  strength to command however he is quite heavily sedate at this time. Because of his significant edema and of the bilateral lower extremities, the patient is unable to lift his legs against gravity. He wiggles his toes to command with stimulation.   Imaging: None - Reviewed    Fluids:  Intake/Output       03/20/18 0700 - 03/21/18 0659 03/21/18 0700 - 03/22/18 0659 03/22/18 0700 - 03/23/18 0659      6689-7022 8208-0484 Total 3106-7822 9541-1066 Total 0489-5987 0054-0677 Total       Intake    P.O.  400  -- 400  --  -- --  --  -- --    P.O. 400 -- 400 -- -- -- -- -- --    I.V.  527.8  1460.7 1988.6  1231.6  923.1 2154.7  222.1  -- 222.1    Propofol Volume 312.8 540.7 853.6 531.6 503.1 1034.7 202.1 -- 202.1    IV Piggyback Volume (IV Piggyback) 100 800 900 600 330 930 -- -- --    IV Volume (NS TKO) 115 120 235 100 90 190 20 -- 20    Other  60  90 150  195  130 325  60  -- 60    Medications (P.O./ Enteral Liquids) 60 90 150 195 130 325 60 -- 60    Enteral  120  260 380  560  750 1310  230  -- 230    Enteral Volume -- 50 50 350 600 950 200 -- 200    Free Water / Tube Flush 120 210 330 210 150 360 30 -- 30    Total Intake 1107.8 1810.7 2918.6 1986.6 1803.1 3789.7 512.1 -- 512.1       Output    Urine  2610  855 3465  1615  2685 4300  450  -- 450    Condom Catheter 1150 -- 1150 -- -- -- -- -- --    Indwelling Cathether 9857 926 0999 1615 2685 4300 450 -- 450    Stool  --  -- --  --  -- --  --  -- --    Number of Times Stooled 1 x -- 1 x 0 x 0 x 0 x -- -- --    Total Output 2610 855 3465 1615 2685 4300 450 -- 450       Net I/O     -1502.2 955.7 -546.4 371.6 -881.9 -510.3 62.1 -- 62.1        Weight: (!) 143.8 kg (317 lb 0.3 oz)  Recent Labs      03/20/18   0513  03/21/18   0410  03/22/18   0440   SODIUM  141  137  140   POTASSIUM  4.0  3.7  3.5*   CHLORIDE   92*  91*  97   CO2  44*  39*  37*   BUN    17   CREATININE  0.67  0.75  0.74   MAGNESIUM   --   1.5  2.3   PHOSPHORUS   --   2.9  3.7   CALCIUM  9.2  9.3  8.5       GI/Nutrition:  Exam: normal active bowel sounds morbidly obese. No obvious rebound rigidity or guarding. Morbidly obese with mild intertriginous use noted.  Imaging: None - Reviewed  Tube feeds.  Liver Function  Recent Labs      18   GLUCOSE  105*  118*  139*       Heme:  Recent Labs      18   05180   RBC  3.53*  3.61*  3.26*   HEMOGLOBIN  10.0*  10.5*  9.4*   HEMATOCRIT  34.4*  33.9*  30.4*   PLATELETCT  175  186  174       Infectious Disease:  Temp  Av.4 °C (99.3 °F)  Min: 36.6 °C (97.8 °F)  Max: 37.9 °C (100.3 °F)  Micro: cultures reviewed. On Unasyn at this time. Large ulceration noted in the left proximal posterior thigh, reportedly improved by wound care. Was previously on linezolid. After intubation changed to Zosyn and vancomycin, but consolidation of antibiotics to Zosyn alone. Bronchoscopy and BAL data was performed, and full culture sensitivities pending although no significant mucus is noted. CT imaging does show bibasilar pneumonia.  Recent Labs      18   WBC  6.2  7.4  7.2   NEUTSPOLYS   --   73.50*  75.20*   LYMPHOCYTES   --   14.20*  11.60*   MONOCYTES   --   7.00  8.00   EOSINOPHILS   --   4.30  4.20   BASOPHILS   --   0.30  0.30     Current Facility-Administered Medications   Medication Dose Frequency Provider Last Rate Last Dose   • furosemide (LASIX) injection 20 mg  20 mg DAILY Amarjit Martines M.D.   20 mg at 18 0822   • potassium bicarbonate (KLYTE) 25 MEQ effervescent tablet TBEF 25 mEq  25 mEq BID Amarjit Martines M.D.   25 mEq at 03/22/18 0823   • propofol (DIPRIVAN) injection  0-80 mcg/kg/min Continuous Chris Ching D.O. 53.1 mL/hr at 18 0821 60 mcg/kg/min at  03/22/18 0821   • Respiratory Care per Protocol   Continuous RT Chris Ching D.O.       • senna-docusate (PERICOLACE or SENOKOT S) 8.6-50 MG per tablet 2 Tab  2 Tab BID YVROSE Collado.O.   2 Tab at 03/22/18 0822    And   • polyethylene glycol/lytes (MIRALAX) PACKET 1 Packet  1 Packet QDAY PRN Chris Ching D.O.        And   • magnesium hydroxide (MILK OF MAGNESIA) suspension 30 mL  30 mL QDAY PRN YVROSE Collado.O.        And   • bisacodyl (DULCOLAX) suppository 10 mg  10 mg QDAY PRN DAVONTE ColladoO.       • chlorhexidine (PERIDEX) 0.12 % solution 15 mL  15 mL BID YVROSE Collado.ODorina   15 mL at 03/22/18 0822   • MD ALERT...Adult ICU Electrolyte Replacement per Pharmacy Protocol   pharmacy to dose Chris Ching D.O.       • fentaNYL (SUBLIMAZE) injection 25 mcg  25 mcg Q HOUR PRN Chris Ching D.O.        Or   • fentaNYL (SUBLIMAZE) injection 50 mcg  50 mcg Q HOUR PRN Chris Ching D.O.        Or   • fentaNYL (SUBLIMAZE) injection 100 mcg  100 mcg Q HOUR PRN YVROSE Collado.O.       • ipratropium-albuterol (DUONEB) nebulizer solution 3 mL  3 mL Q2HRS PRN (RT) Chris Ching D.O.       • ipratropium-albuterol (DUONEB) nebulizer solution 3 mL  3 mL Q4HRS (RT) DAVONTE ColladoO.   3 mL at 03/22/18 0702   • piperacillin-tazobactam (ZOSYN) 3.375 g in  mL IVPB  3.375 g Q6HRS Amarjit Martines M.D.   Stopped at 03/22/18 0537   • MD ALERT... vancomycin per pharmacy protocol   pharmacy to dose Amarjit Martines M.D.       • ascorbic acid (ascorbic acid) tablet 500 mg  500 mg DAILY DAVONTE ColladoO.   500 mg at 03/22/18 0822   • aspirin (ASA) chewable tab 81 mg  81 mg DAILY DAVONTE ColladoODorina   81 mg at 03/22/18 0822   • fish oil capsule 1,000 mg  1,000 mg TID WITH MEALS DAVONTE ColladoO.   1,000 mg at 03/21/18 1722   • lactobacillus granules (LACTINEX/FLORANEX) packet 1 Packet  1 Packet TID WITH MEALS DAVONTE ColladoO.   1 Packet at 03/22/18 0822   •  levothyroxine (SYNTHROID) tablet 50 mcg  50 mcg AM ES YVROSE Collado.O.   50 mcg at 03/22/18 0615   • lisinopril (PRINIVIL) tablet 10 mg  10 mg DAILY Chris Ching D.O.   10 mg at 03/22/18 0822   • omeprazole 2 mg/mL in sodium bicarbonate (PRILOSEC) oral susp 40 mg  40 mg DAILY YVROSE Collado.O.   40 mg at 03/22/18 0822   • acetaminophen (TYLENOL) tablet 650 mg  650 mg Q6HRS PRN Chris Ching D.O.   650 mg at 03/20/18 0905   • oxyCODONE immediate release (ROXICODONE) tablet 30 mg  30 mg Q4HRS PRN YVROSE Collado.O.   30 mg at 03/21/18 2104   • oxyCODONE immediate release (ROXICODONE) tablet 40 mg  40 mg Q4HRS PRN Chris Ching D.O.   40 mg at 03/21/18 1138   • ammonium lactate (LAC-HYDRIN) 12 % lotion   DAILY Chris Ching D.O.       • enoxaparin (LOVENOX) inj 40 mg  40 mg BID Vic Pendleton Jr., D.O.   40 mg at 03/22/18 0822   • zinc sulfate (ZINCATE) capsule 220 mg  220 mg DAILY Scott Almodovar D.O.   220 mg at 03/22/18 0822   • albuterol inhaler 2 Puff  2 Puff Q6HRS PRN Benjie Marcus M.D.       • ipratropium-albuterol (DUONEB) nebulizer solution 3 mL  3 mL Q4H PRN (RT) Benjie Marcus M.D.         Last reviewed on 3/16/2018  3:34 PM by May Degroot    Quality  Measures:   Labs reviewed, Medications reviewed and Radiology images reviewed   Montoya catheter: Critically Ill - Requiring Accurate Measurement of Urinary Output   Central line in place: Sepsis              Assessment and plan:     1.  Acute on chronic hypoxemic and hypercapnic respiratory failure.    -Patient is failed BiPAP and is elected to agree to intubation, bronchoscopy, and CT scan imaging, performed yesterday which revealed significant by lateral pneumonia. There is also compressive atelectasis. Of also discussed with patient's daughter personally and patient consents as well.  -BNP was low and therefore I believe adequate diuresis is being performed.  -Continue with treatment for obstructive lung disease as  well.  -Will broaden antibiotics again to Zosyn considering worsen respiratory failure.    2. Chronic severe lymphedema with stasis dermatitis.    -Continue with wound care treatment.  -Patient also to possibly have evaluation of the left posterior thigh by surgical team, no follow-up exam today shows improvement and no need for surgical intervention.  -Will likely require infectious disease evaluation pending patient's response to ongoing treatment.  -We'll begin more aggressive diuresis again.    3. Type II diabetes mellitus.    -Continue with sliding scale at this time.  -Avoiding IV steroids as this may worsen his glycemic control.    4. Morbid obesity.    5. Hypertension.    -Under adequate control at this time.  -Continue with oral therapy, with ACE inhibitor but observe for worsening renal function. At this time tolerating.    6. Chronic right bundle-branch block with wide QRS.     -No obvious sign of acute ischemic event although EKG is been ordered as well as troponin.   -Levels were negative overnight.  -Compared to previous EKGs no significant change.    7. Restrictive and obstructive lung disease.    -Continue with respiratory therapy protocols.  -Holding on IV steroids at this point due to multiple wounds of the lower extremities.  -May consider as possible rescue therapy.  -Will begin steroids to attempt to optimize current pulmonary treatment on mechanical ventilation.  -Discussion regarding Flolan with the pulmonary partners as well as pharmacy today.    Discussed patient condition and risk of morbidity and/or mortality with at length on M.D. rounds.   The patient remains critically ill.  Critical care time = 35 minutes in directly providing and coordinating critical care and extensive data review.  No time overlap and excludes procedures.    Chris Ching D.O.

## 2018-03-22 NOTE — CARE PLAN
Problem: Bronchoconstriction:  Goal: Improve in air movement and diminished wheezing  Duoneb Q4    Problem: Ventilation Defect:  Goal: Ability to achieve and maintain unassisted ventilation or tolerate decreased levels of ventilator support  Adult Ventilation Update    Total Vent Days: 2    APVcmv 12, 430, +12, 40%    Patient Lines/Drains/Airways Status    Active Airway     Name: Placement date: Placement time: Site: Days:    Airway Group ET Tube Oral 8.0 03/20/18   1215   Oral   1              Cough: Productive (03/21/18 0732)  Sputum Amount: Small (03/21/18 1600)  Sputum Color: Clear;White (03/21/18 1600)  Sputum Consistency: Thin (03/21/18 1600)    Mobility Group  Activity Performed: Unable to mobilize (03/20/18 2000)  Reason Not Mobilized: Unstable condition;Other (comment) (P +14/FIO2 60%) (03/21/18 0733)  Mobilization Comments:  (Intubated today) (03/20/18 2000)    Events/Summary/Plan: ETT withdrawn 2cm per CXR, no SBTs today for high PEEP, rate and PEEP decreased to 12 from 14 today,

## 2018-03-22 NOTE — PROGRESS NOTES
[facility]    Date of Service: 3/21/2018    Chief Complaint  57 y.o. male admitted 3/16/2018 with ongoing cellulitis of bilateral lower extremities with a history of chronic venous stasis and lymphedema. He had acute respiratory distress in ER.    Interval Problem Update  Remains intubated following command  On propofol 50  Consultants/Specialty  Intensivist    Disposition  Home vs SNF placement.        Review of Systems   Unable to perform ROS: Intubated      Physical Exam  Laboratory/Imaging   Hemodynamics  Temp (24hrs), Av.9 °C (98.4 °F), Min:36.4 °C (97.5 °F), Max:37.3 °C (99.1 °F)   Temperature: 37 °C (98.6 °F)  Pulse  Av.2  Min: 63  Max: 111 Heart Rate (Monitored): 75  NIBP: (!) 99/50      Respiratory  Anthony Vent Mode: APVCMV, Rate (breaths/min): 14, PEEP/CPAP: 14, PEEP/CPAP: 14, FiO2: 40, P Peak (PIP): 27, P MEAN: 17   Respiration: 14, Pulse Oximetry: 95 %     Work Of Breathing / Effort: Mild  RUL Breath Sounds: Clear, RML Breath Sounds: Clear, RLL Breath Sounds: Diminished, LARY Breath Sounds: Clear, LLL Breath Sounds: Diminished    Fluids    Intake/Output Summary (Last 24 hours) at 18 1831  Last data filed at 18 1600   Gross per 24 hour   Intake          3588.73 ml   Output             1995 ml   Net          1593.73 ml       Nutrition  Orders Placed This Encounter   Procedures   • Diet NPO     Standing Status:   Standing     Number of Occurrences:   1     Order Specific Question:   Type:     Answer:   Now [1]     Order Specific Question:   Restrict to:     Answer:   Strict [1]     Physical Exam   Constitutional: He appears well-developed.   obese   HENT:   Head: Normocephalic and atraumatic.   Mouth/Throat: No oropharyngeal exudate.   Eyes: EOM are normal. Pupils are equal, round, and reactive to light. Right eye exhibits no discharge. Left eye exhibits no discharge. No scleral icterus.   Neck: No JVD present. No tracheal deviation present.   Cardiovascular:  Normal rate, regular rhythm and normal heart sounds.    No murmur heard.  Pulmonary/Chest: No stridor. No respiratory distress. He has decreased breath sounds. He has no wheezes. He has no rhonchi. He has rales. He exhibits no tenderness.   Intubated   Abdominal: Soft. Bowel sounds are normal. He exhibits no distension. There is no tenderness. There is no rebound and no guarding.   Musculoskeletal: He exhibits edema.   Lymphadenopathy:     He has no cervical adenopathy.   Neurological:   Sedated, no focal deficits noted   Skin: Skin is warm. He is not diaphoretic. There is erythema.   Chronic stasis dermatitis changes  Lower extremity wounds  dressed   Psychiatric:   Sedated   Vitals reviewed.      Recent Labs      03/19/18 0444  03/20/18   0513  03/21/18   0410   WBC  7.1  6.2  7.4   RBC  3.48*  3.53*  3.61*   HEMOGLOBIN  9.9*  10.0*  10.5*   HEMATOCRIT  34.3*  34.4*  33.9*   MCV  98.6*  97.5  93.9   MCH  28.4  28.3  29.1   MCHC  28.9*  29.1*  31.0*   RDW  57.0*  56.4*  55.4*   PLATELETCT  182  175  186   MPV  10.4  10.6  11.1     Recent Labs      03/19/18   0444  03/20/18   0513  03/21/18   0410   SODIUM  140  141  137   POTASSIUM  4.2  4.0  3.7   CHLORIDE  96  92*  91*   CO2  39*  44*  39*   GLUCOSE  133*  105*  118*   BUN  22 18  18   CREATININE  0.60  0.67  0.75   CALCIUM  8.8  9.2  9.3         Recent Labs      03/20/18   0513   BNPBTYPENAT  20     Recent Labs      03/20/18   1420   TRIGLYCERIDE  233*          Assessment/Plan     * Acute on chronic respiratory failure with hypoxia (CMS-HCC)- (present on admission)   Assessment & Plan    Intubated 3-20  Vent management per CC discussed with Dr Ching        Bilateral lower leg cellulitis- (present on admission)   Assessment & Plan    Continue Wound care  Cx  MSSA on Zosyn for HCAP coverage        Pneumonia- (present on admission)   Assessment & Plan    Start Zosyn and vancomycin complete 5 day course  Follow-up on bronch results and de-escalate accordingly         Narcotic addiction (CMS-HCC)- (present on admission)   Assessment & Plan    Judicial pain management.          Stasis dermatitis- (present on admission)   Assessment & Plan    With MSSA cellulitis    Wound care consulting  Vitamin C & zinc        Hypothyroidism- (present on admission)   Assessment & Plan    Stable on  Synthroid           COPD (chronic obstructive pulmonary disease) (CMS-HCC)- (present on admission)   Assessment & Plan    RT protocol        HTN (hypertension)- (present on admission)   Assessment & Plan    Continue lisinopril        Adult failure to thrive   Assessment & Plan    Per H+P the patient isnot able to care for himself despite home health care        Bilateral edema of lower extremity   Assessment & Plan    Chronic  Resume Lasix        Diabetes type 2, controlled (CMS-HCC)- (present on admission)   Assessment & Plan    HgbA1c:6.4  Stable on ISS  , monitor CBG's        Non compliance w medication regimen- (present on admission)   Assessment & Plan    Reinforced compliance after extubation        Morbid obesity (CMS-HCC)- (present on admission)   Assessment & Plan    Body mass index is 48.02 kg/m².            Quality-Core Measures   Reviewed items::  Radiology images reviewed, Labs reviewed and Medications reviewed  Montoya catheter::  Unconscious / Sedated Patient on a Ventilator  Central line in place:  Need for access  DVT prophylaxis pharmacological::  Enoxaparin (Lovenox)  Antibiotics:  Treating active infection/contamination beyond 24 hours perioperative coverage

## 2018-03-22 NOTE — PROGRESS NOTES
Report received from off-going RN. Chart, MAR and orders reviewed. All lines and drips verified.

## 2018-03-23 ENCOUNTER — APPOINTMENT (OUTPATIENT)
Dept: RADIOLOGY | Facility: MEDICAL CENTER | Age: 58
DRG: 987 | End: 2018-03-23
Attending: INTERNAL MEDICINE
Payer: MEDICARE

## 2018-03-23 ENCOUNTER — APPOINTMENT (OUTPATIENT)
Dept: RADIOLOGY | Facility: MEDICAL CENTER | Age: 58
DRG: 987 | End: 2018-03-23
Attending: HOSPITALIST
Payer: MEDICARE

## 2018-03-23 PROBLEM — K56.7 ILEUS (HCC): Status: ACTIVE | Noted: 2018-03-23

## 2018-03-23 PROBLEM — E87.6 HYPOKALEMIA: Status: ACTIVE | Noted: 2018-03-23

## 2018-03-23 PROBLEM — R41.0 DELIRIUM: Status: ACTIVE | Noted: 2018-03-23

## 2018-03-23 LAB
ACTION RANGE TRIGGERED IACRT: YES
ANION GAP SERPL CALC-SCNC: 6 MMOL/L (ref 0–11.9)
ANION GAP SERPL CALC-SCNC: 7 MMOL/L (ref 0–11.9)
BASE EXCESS BLDA CALC-SCNC: 17 MMOL/L (ref -4–3)
BASOPHILS # BLD AUTO: 0.1 % (ref 0–1.8)
BASOPHILS # BLD: 0.01 K/UL (ref 0–0.12)
BODY TEMPERATURE: ABNORMAL DEGREES
BUN SERPL-MCNC: 21 MG/DL (ref 8–22)
BUN SERPL-MCNC: 22 MG/DL (ref 8–22)
CALCIUM SERPL-MCNC: 8.9 MG/DL (ref 8.5–10.5)
CALCIUM SERPL-MCNC: 9 MG/DL (ref 8.5–10.5)
CHLORIDE SERPL-SCNC: 95 MMOL/L (ref 96–112)
CHLORIDE SERPL-SCNC: 96 MMOL/L (ref 96–112)
CO2 BLDA-SCNC: 45 MMOL/L (ref 20–33)
CO2 SERPL-SCNC: 38 MMOL/L (ref 20–33)
CO2 SERPL-SCNC: 39 MMOL/L (ref 20–33)
CREAT SERPL-MCNC: 0.63 MG/DL (ref 0.5–1.4)
CREAT SERPL-MCNC: 0.7 MG/DL (ref 0.5–1.4)
EOSINOPHIL # BLD AUTO: 0.01 K/UL (ref 0–0.51)
EOSINOPHIL NFR BLD: 0.1 % (ref 0–6.9)
ERYTHROCYTE [DISTWIDTH] IN BLOOD BY AUTOMATED COUNT: 54.5 FL (ref 35.9–50)
GLUCOSE BLD-MCNC: 112 MG/DL (ref 65–99)
GLUCOSE BLD-MCNC: 137 MG/DL (ref 65–99)
GLUCOSE BLD-MCNC: 167 MG/DL (ref 65–99)
GLUCOSE BLD-MCNC: 191 MG/DL (ref 65–99)
GLUCOSE SERPL-MCNC: 137 MG/DL (ref 65–99)
GLUCOSE SERPL-MCNC: 176 MG/DL (ref 65–99)
HCO3 BLDA-SCNC: 43 MMOL/L (ref 17–25)
HCT VFR BLD AUTO: 33.7 % (ref 42–52)
HGB BLD-MCNC: 10.4 G/DL (ref 14–18)
IMM GRANULOCYTES # BLD AUTO: 0.08 K/UL (ref 0–0.11)
IMM GRANULOCYTES NFR BLD AUTO: 1.1 % (ref 0–0.9)
INST. QUALIFIED PATIENT IIQPT: YES
LYMPHOCYTES # BLD AUTO: 0.54 K/UL (ref 1–4.8)
LYMPHOCYTES NFR BLD: 7.5 % (ref 22–41)
MAGNESIUM SERPL-MCNC: 2.2 MG/DL (ref 1.5–2.5)
MCH RBC QN AUTO: 28.8 PG (ref 27–33)
MCHC RBC AUTO-ENTMCNC: 30.9 G/DL (ref 33.7–35.3)
MCV RBC AUTO: 93.4 FL (ref 81.4–97.8)
MONOCYTES # BLD AUTO: 0.38 K/UL (ref 0–0.85)
MONOCYTES NFR BLD AUTO: 5.3 % (ref 0–13.4)
NEUTROPHILS # BLD AUTO: 6.15 K/UL (ref 1.82–7.42)
NEUTROPHILS NFR BLD: 85.9 % (ref 44–72)
NRBC # BLD AUTO: 0 K/UL
NRBC BLD-RTO: 0 /100 WBC
O2/TOTAL GAS SETTING VFR VENT: 50 %
PCO2 BLDA: 61.5 MMHG (ref 26–37)
PCO2 TEMP ADJ BLDA: 59.8 MMHG (ref 26–37)
PH BLDA: 7.45 [PH] (ref 7.4–7.5)
PH TEMP ADJ BLDA: 7.46 [PH] (ref 7.4–7.5)
PHOSPHATE SERPL-MCNC: 3.9 MG/DL (ref 2.5–4.5)
PLATELET # BLD AUTO: 168 K/UL (ref 164–446)
PMV BLD AUTO: 10.7 FL (ref 9–12.9)
PO2 BLDA: 74 MMHG (ref 64–87)
PO2 TEMP ADJ BLDA: 71 MMHG (ref 64–87)
POTASSIUM SERPL-SCNC: 3.2 MMOL/L (ref 3.6–5.5)
POTASSIUM SERPL-SCNC: 3.9 MMOL/L (ref 3.6–5.5)
RBC # BLD AUTO: 3.61 M/UL (ref 4.7–6.1)
SAO2 % BLDA: 95 % (ref 93–99)
SODIUM SERPL-SCNC: 139 MMOL/L (ref 135–145)
SODIUM SERPL-SCNC: 142 MMOL/L (ref 135–145)
SPECIMEN DRAWN FROM PATIENT: ABNORMAL
TROPONIN I SERPL-MCNC: <0.01 NG/ML (ref 0–0.04)
WBC # BLD AUTO: 7.2 K/UL (ref 4.8–10.8)

## 2018-03-23 PROCEDURE — 82962 GLUCOSE BLOOD TEST: CPT | Mod: 91

## 2018-03-23 PROCEDURE — 85025 COMPLETE CBC W/AUTO DIFF WBC: CPT

## 2018-03-23 PROCEDURE — 36600 WITHDRAWAL OF ARTERIAL BLOOD: CPT

## 2018-03-23 PROCEDURE — A9270 NON-COVERED ITEM OR SERVICE: HCPCS | Performed by: INTERNAL MEDICINE

## 2018-03-23 PROCEDURE — A9270 NON-COVERED ITEM OR SERVICE: HCPCS | Performed by: HOSPITALIST

## 2018-03-23 PROCEDURE — 94640 AIRWAY INHALATION TREATMENT: CPT

## 2018-03-23 PROCEDURE — 84484 ASSAY OF TROPONIN QUANT: CPT

## 2018-03-23 PROCEDURE — 80048 BASIC METABOLIC PNL TOTAL CA: CPT

## 2018-03-23 PROCEDURE — 700105 HCHG RX REV CODE 258: Performed by: HOSPITALIST

## 2018-03-23 PROCEDURE — 71045 X-RAY EXAM CHEST 1 VIEW: CPT

## 2018-03-23 PROCEDURE — 700111 HCHG RX REV CODE 636 W/ 250 OVERRIDE (IP): Performed by: INTERNAL MEDICINE

## 2018-03-23 PROCEDURE — 700111 HCHG RX REV CODE 636 W/ 250 OVERRIDE (IP)

## 2018-03-23 PROCEDURE — 700111 HCHG RX REV CODE 636 W/ 250 OVERRIDE (IP): Performed by: HOSPITALIST

## 2018-03-23 PROCEDURE — 82803 BLOOD GASES ANY COMBINATION: CPT

## 2018-03-23 PROCEDURE — 700102 HCHG RX REV CODE 250 W/ 637 OVERRIDE(OP): Performed by: HOSPITALIST

## 2018-03-23 PROCEDURE — 97530 THERAPEUTIC ACTIVITIES: CPT

## 2018-03-23 PROCEDURE — 99233 SBSQ HOSP IP/OBS HIGH 50: CPT | Performed by: HOSPITALIST

## 2018-03-23 PROCEDURE — 94003 VENT MGMT INPAT SUBQ DAY: CPT

## 2018-03-23 PROCEDURE — 700105 HCHG RX REV CODE 258: Performed by: INTERNAL MEDICINE

## 2018-03-23 PROCEDURE — 770022 HCHG ROOM/CARE - ICU (200)

## 2018-03-23 PROCEDURE — 700102 HCHG RX REV CODE 250 W/ 637 OVERRIDE(OP): Performed by: INTERNAL MEDICINE

## 2018-03-23 PROCEDURE — 700101 HCHG RX REV CODE 250: Performed by: INTERNAL MEDICINE

## 2018-03-23 PROCEDURE — 83735 ASSAY OF MAGNESIUM: CPT

## 2018-03-23 PROCEDURE — 84100 ASSAY OF PHOSPHORUS: CPT

## 2018-03-23 RX ORDER — QUETIAPINE FUMARATE 25 MG/1
50 TABLET, FILM COATED ORAL 3 TIMES DAILY
Status: DISCONTINUED | OUTPATIENT
Start: 2018-03-23 | End: 2018-03-25

## 2018-03-23 RX ORDER — METHYLPREDNISOLONE SODIUM SUCCINATE 40 MG/ML
40 INJECTION, POWDER, LYOPHILIZED, FOR SOLUTION INTRAMUSCULAR; INTRAVENOUS EVERY 6 HOURS
Status: COMPLETED | OUTPATIENT
Start: 2018-03-23 | End: 2018-03-24

## 2018-03-23 RX ORDER — FUROSEMIDE 10 MG/ML
INJECTION INTRAMUSCULAR; INTRAVENOUS
Status: COMPLETED
Start: 2018-03-23 | End: 2018-03-23

## 2018-03-23 RX ORDER — FUROSEMIDE 10 MG/ML
20 INJECTION INTRAMUSCULAR; INTRAVENOUS EVERY 4 HOURS
Status: DISCONTINUED | OUTPATIENT
Start: 2018-03-23 | End: 2018-03-23

## 2018-03-23 RX ADMIN — FUROSEMIDE 20 MG: 10 INJECTION, SOLUTION INTRAMUSCULAR; INTRAVENOUS at 08:45

## 2018-03-23 RX ADMIN — LEVOTHYROXINE SODIUM 50 MCG: 50 TABLET ORAL at 06:11

## 2018-03-23 RX ADMIN — POTASSIUM BICARBONATE 25 MEQ: 25 TABLET, EFFERVESCENT ORAL at 08:35

## 2018-03-23 RX ADMIN — BISACODYL 10 MG: 10 SUPPOSITORY RECTAL at 15:25

## 2018-03-23 RX ADMIN — PROPOFOL 60 MCG/KG/MIN: 10 INJECTION, EMULSION INTRAVENOUS at 19:25

## 2018-03-23 RX ADMIN — AMPICILLIN SODIUM AND SULBACTAM SODIUM 3 G: 2; 1 INJECTION, POWDER, FOR SOLUTION INTRAMUSCULAR; INTRAVENOUS at 17:43

## 2018-03-23 RX ADMIN — PROPOFOL 50 MCG/KG/MIN: 10 INJECTION, EMULSION INTRAVENOUS at 23:38

## 2018-03-23 RX ADMIN — QUETIAPINE FUMARATE 50 MG: 25 TABLET ORAL at 20:21

## 2018-03-23 RX ADMIN — ENOXAPARIN SODIUM 40 MG: 100 INJECTION SUBCUTANEOUS at 08:34

## 2018-03-23 RX ADMIN — FUROSEMIDE 20 MG: 10 INJECTION, SOLUTION INTRAMUSCULAR; INTRAVENOUS at 10:15

## 2018-03-23 RX ADMIN — CHLORHEXIDINE GLUCONATE 15 ML: 1.2 RINSE ORAL at 08:34

## 2018-03-23 RX ADMIN — LISINOPRIL 10 MG: 20 TABLET ORAL at 08:35

## 2018-03-23 RX ADMIN — PROPOFOL 60 MCG/KG/MIN: 10 INJECTION, EMULSION INTRAVENOUS at 08:00

## 2018-03-23 RX ADMIN — Medication: at 12:31

## 2018-03-23 RX ADMIN — PROPOFOL 70 MCG/KG/MIN: 10 INJECTION, EMULSION INTRAVENOUS at 00:27

## 2018-03-23 RX ADMIN — ASPIRIN 81 MG: 81 TABLET, CHEWABLE ORAL at 08:35

## 2018-03-23 RX ADMIN — PROPOFOL 50 MCG/KG/MIN: 10 INJECTION, EMULSION INTRAVENOUS at 17:09

## 2018-03-23 RX ADMIN — FUROSEMIDE 40 MG: 10 INJECTION, SOLUTION INTRAMUSCULAR; INTRAVENOUS at 08:35

## 2018-03-23 RX ADMIN — Medication 220 MG: at 09:00

## 2018-03-23 RX ADMIN — OXYCODONE HYDROCHLORIDE AND ACETAMINOPHEN 500 MG: 500 TABLET ORAL at 08:35

## 2018-03-23 RX ADMIN — IPRATROPIUM BROMIDE AND ALBUTEROL SULFATE 3 ML: .5; 3 SOLUTION RESPIRATORY (INHALATION) at 07:07

## 2018-03-23 RX ADMIN — OXYCODONE HYDROCHLORIDE 40 MG: 10 TABLET ORAL at 10:05

## 2018-03-23 RX ADMIN — PROPOFOL 50 MCG/KG/MIN: 10 INJECTION, EMULSION INTRAVENOUS at 10:32

## 2018-03-23 RX ADMIN — OXYCODONE HYDROCHLORIDE 40 MG: 10 TABLET ORAL at 20:21

## 2018-03-23 RX ADMIN — POTASSIUM BICARBONATE 25 MEQ: 25 TABLET, EFFERVESCENT ORAL at 20:21

## 2018-03-23 RX ADMIN — IPRATROPIUM BROMIDE AND ALBUTEROL SULFATE 3 ML: .5; 3 SOLUTION RESPIRATORY (INHALATION) at 22:55

## 2018-03-23 RX ADMIN — PROPOFOL 50 MCG/KG/MIN: 10 INJECTION, EMULSION INTRAVENOUS at 21:49

## 2018-03-23 RX ADMIN — CHLORHEXIDINE GLUCONATE 15 ML: 1.2 RINSE ORAL at 20:21

## 2018-03-23 RX ADMIN — PROPOFOL 50 MCG/KG/MIN: 10 INJECTION, EMULSION INTRAVENOUS at 14:56

## 2018-03-23 RX ADMIN — PIPERACILLIN SODIUM AND TAZOBACTAM SODIUM 3.38 G: 2; .25 INJECTION, POWDER, FOR SOLUTION INTRAVENOUS at 06:11

## 2018-03-23 RX ADMIN — PROPOFOL 50 MCG/KG/MIN: 10 INJECTION, EMULSION INTRAVENOUS at 02:22

## 2018-03-23 RX ADMIN — QUETIAPINE FUMARATE 50 MG: 25 TABLET ORAL at 17:11

## 2018-03-23 RX ADMIN — OMEPRAZOLE 40 MG: 20 CAPSULE, DELAYED RELEASE ORAL at 08:35

## 2018-03-23 RX ADMIN — LACTOBACILLUS ACIDOPHILUS / LACTOBACILLUS BULGARICUS 1 PACKET: 100 MILLION CFU STRENGTH GRANULES at 08:00

## 2018-03-23 RX ADMIN — IPRATROPIUM BROMIDE AND ALBUTEROL SULFATE 3 ML: .5; 3 SOLUTION RESPIRATORY (INHALATION) at 11:38

## 2018-03-23 RX ADMIN — PROPOFOL 50 MCG/KG/MIN: 10 INJECTION, EMULSION INTRAVENOUS at 12:31

## 2018-03-23 RX ADMIN — PIPERACILLIN SODIUM AND TAZOBACTAM SODIUM 3.38 G: 2; .25 INJECTION, POWDER, FOR SOLUTION INTRAVENOUS at 13:00

## 2018-03-23 RX ADMIN — ENOXAPARIN SODIUM 40 MG: 100 INJECTION SUBCUTANEOUS at 20:20

## 2018-03-23 RX ADMIN — STANDARDIZED SENNA CONCENTRATE AND DOCUSATE SODIUM 2 TABLET: 8.6; 5 TABLET, FILM COATED ORAL at 20:21

## 2018-03-23 RX ADMIN — PROPOFOL 60 MCG/KG/MIN: 10 INJECTION, EMULSION INTRAVENOUS at 06:14

## 2018-03-23 RX ADMIN — STANDARDIZED SENNA CONCENTRATE AND DOCUSATE SODIUM 2 TABLET: 8.6; 5 TABLET, FILM COATED ORAL at 08:35

## 2018-03-23 RX ADMIN — PROPOFOL 60 MCG/KG/MIN: 10 INJECTION, EMULSION INTRAVENOUS at 04:29

## 2018-03-23 RX ADMIN — IPRATROPIUM BROMIDE AND ALBUTEROL SULFATE 3 ML: .5; 3 SOLUTION RESPIRATORY (INHALATION) at 18:23

## 2018-03-23 RX ADMIN — AMPICILLIN SODIUM AND SULBACTAM SODIUM 3 G: 2; 1 INJECTION, POWDER, FOR SOLUTION INTRAMUSCULAR; INTRAVENOUS at 23:38

## 2018-03-23 RX ADMIN — FUROSEMIDE 5 MG/HR: 10 INJECTION, SOLUTION INTRAMUSCULAR; INTRAVENOUS at 11:35

## 2018-03-23 RX ADMIN — IPRATROPIUM BROMIDE AND ALBUTEROL SULFATE 3 ML: .5; 3 SOLUTION RESPIRATORY (INHALATION) at 15:31

## 2018-03-23 RX ADMIN — IPRATROPIUM BROMIDE AND ALBUTEROL SULFATE 3 ML: .5; 3 SOLUTION RESPIRATORY (INHALATION) at 02:48

## 2018-03-23 RX ADMIN — METHYLPREDNISOLONE SODIUM SUCCINATE 40 MG: 40 INJECTION, POWDER, FOR SOLUTION INTRAMUSCULAR; INTRAVENOUS at 21:08

## 2018-03-23 RX ADMIN — INSULIN HUMAN 2 UNITS: 100 INJECTION, SOLUTION PARENTERAL at 06:09

## 2018-03-23 ASSESSMENT — COGNITIVE AND FUNCTIONAL STATUS - GENERAL
TOILETING: TOTAL
SUGGESTED CMS G CODE MODIFIER DAILY ACTIVITY: CM
DRESSING REGULAR LOWER BODY CLOTHING: TOTAL
EATING MEALS: TOTAL
PERSONAL GROOMING: A LOT
DAILY ACTIVITIY SCORE: 7
HELP NEEDED FOR BATHING: TOTAL
DRESSING REGULAR UPPER BODY CLOTHING: TOTAL

## 2018-03-23 ASSESSMENT — LIFESTYLE VARIABLES: REASON UNABLE TO ASSESS: PT INTUBATED

## 2018-03-23 NOTE — CARE PLAN
Problem: Safety  Goal: Will remain free from injury  Outcome: PROGRESSING AS EXPECTED  Soft bilateral wrist restraints in place per MD order. Patient educated on use and purpose of soft bilateral wrist restraints. Q2 hour assessments in place. Patient's room near nursing station with bed in lowest locked position with bed exit alarm set.     Problem: Skin Integrity  Goal: Risk for impaired skin integrity will decrease  Outcome: PROGRESSING AS EXPECTED  Assess for skin breakdown related to bowel incontinence and instruct patient on proper skin care and strategies to reduce skin irritation. Catheter in place for urinary incontinence and to help prevent skin breakdown from urinary incontinence.

## 2018-03-23 NOTE — CARE PLAN
Problem: Ventilation Defect:  Goal: Ability to achieve and maintain unassisted ventilation or tolerate decreased levels of ventilator support    Intervention: Support and monitor invasive and noninvasive mechanical ventilation  Adult Ventilation Update    Total Vent Days: 3  Vent:  x 12, 40% +12    Patient Lines/Drains/Airways Status    Active Airway     Name: Placement date: Placement time: Site: Days:    Airway Group ET Tube Oral 8.0 03/20/18   1215   Oral   3              Mobility Group  Activity Performed: Unable to mobilize     Events/Summary/Plan: Patient stable on vent. No changes.

## 2018-03-23 NOTE — PROGRESS NOTES
[facility] Hospitalist Progress Note    Date of Service: 3/22/2018    Chief Complaint  57 y.o. male admitted 3/16/2018 with ongoing cellulitis of bilateral lower extremities with a history of chronic venous stasis and lymphedema. He had acute respiratory distress in ER.    Interval Problem Update    Remains intubated on high vent settings    Consultants/Specialty  Intensivist    Disposition  TBD        Review of Systems   Unable to perform ROS: Intubated      Physical Exam  Laboratory/Imaging   Hemodynamics  Temp (24hrs), Av.6 °C (99.7 °F), Min:36.9 °C (98.5 °F), Max:37.9 °C (100.3 °F)   Temperature: 37.7 °C (99.9 °F)  Pulse  Av.9  Min: 63  Max: 111 Heart Rate (Monitored): 65  NIBP: 118/55      Respiratory  Anthony Vent Mode: APVCMV, Rate (breaths/min): 12, PEEP/CPAP: 12, PEEP/CPAP: 12, FiO2: 40, P Peak (PIP): 25, P MEAN: 15   Respiration: (!) 26, Pulse Oximetry: 94 %     Work Of Breathing / Effort: Vented  RUL Breath Sounds: Clear, RML Breath Sounds: Clear, RLL Breath Sounds: Diminished, LARY Breath Sounds: Clear, LLL Breath Sounds: Diminished    Fluids    Intake/Output Summary (Last 24 hours) at 18 1803  Last data filed at 18 1800   Gross per 24 hour   Intake          3500.79 ml   Output             5995 ml   Net         -2494.21 ml       Nutrition  Orders Placed This Encounter   Procedures   • Diet NPO     Standing Status:   Standing     Number of Occurrences:   1     Order Specific Question:   Type:     Answer:   Now [1]     Order Specific Question:   Restrict to:     Answer:   Strict [1]     Physical Exam   Constitutional: He appears well-developed.   Morbidly obese   HENT:   Head: Normocephalic and atraumatic.   Right Ear: External ear normal.   Left Ear: External ear normal.   Mouth/Throat: No oropharyngeal exudate.   Eyes: Conjunctivae and EOM are normal. Pupils are equal, round, and reactive to light. Right eye exhibits no discharge. Left eye exhibits no discharge. No scleral icterus.    Neck: No JVD present. No tracheal deviation present.   Cardiovascular: Normal rate, regular rhythm and normal heart sounds.    No murmur heard.  Pulmonary/Chest: No stridor. No respiratory distress. He has rhonchi. He has rales. He exhibits no tenderness and no crepitus.   Intubated   Abdominal: Soft. Bowel sounds are normal. He exhibits no distension. There is no tenderness. There is no rebound and no guarding.   Musculoskeletal: He exhibits edema (2+).   Neurological: No cranial nerve deficit. He exhibits normal muscle tone.   Sedated    Skin: Skin is warm. He is not diaphoretic.   Chronic stasis dermatitis changes in both lower extremities  Lower extremity wounds  dressed   Psychiatric:   Sedated   Vitals reviewed.      Recent Labs      03/20/18   0513 03/21/18   0410  03/22/18   0440   WBC  6.2  7.4  7.2   RBC  3.53*  3.61*  3.26*   HEMOGLOBIN  10.0*  10.5*  9.4*   HEMATOCRIT  34.4*  33.9*  30.4*   MCV  97.5  93.9  93.3   MCH  28.3  29.1  28.8   MCHC  29.1*  31.0*  30.9*   RDW  56.4*  55.4*  56.4*   PLATELETCT  175  186  174   MPV  10.6  11.1  11.0     Recent Labs      03/20/18   0513  03/21/18   0410  03/22/18   0440   SODIUM  141  137  140   POTASSIUM  4.0  3.7  3.5*   CHLORIDE  92*  91*  97   CO2  44*  39*  37*   GLUCOSE  105*  118*  139*   BUN  18  18  17   CREATININE  0.67  0.75  0.74   CALCIUM  9.2  9.3  8.5         Recent Labs      03/20/18   0513   BNPBTYPENAT  20     Recent Labs      03/20/18   1420  03/22/18   0440   TRIGLYCERIDE  233*  210*          Assessment/Plan     * Acute on chronic respiratory failure with hypoxia (CMS-HCC)- (present on admission)   Assessment & Plan    Intubated 3-20  Vent management per CC discussed with Dr Humza blanco IV lasix        Bilateral lower leg cellulitis- (present on admission)   Assessment & Plan    Continue Wound care  Cx  MSSA continue  Zosyn also for HCAP coverage        Pneumonia- (present on admission)   Assessment & Plan    De-escalate antibiotics to Zosyn  and stop vancomycin   Cultures are negative follow-up on final results        Narcotic addiction (CMS-HCC)- (present on admission)   Assessment & Plan    Judicial pain management.          Stasis dermatitis- (present on admission)   Assessment & Plan    With MSSA cellulitis    Continue wound care  Continue Zosyn  Vitamin C & zinc        Hypothyroidism- (present on admission)   Assessment & Plan    Stable continue Synthroid           COPD (chronic obstructive pulmonary disease) (CMS-HCC)- (present on admission)   Assessment & Plan    RT protocol        HTN (hypertension)- (present on admission)   Assessment & Plan    Continue lisinopril  Add Lasix        Adult failure to thrive   Assessment & Plan    Per H+P the patient isnot able to care for himself despite home health care        Bilateral edema of lower extremity   Assessment & Plan    Chronic  IV Lasix. Monitor intake and output          Diabetes type 2, controlled (CMS-HCC)- (present on admission)   Assessment & Plan    HgbA1c:6.4  -138 continuen ISS  , monitor CBG's        Non compliance w medication regimen- (present on admission)   Assessment & Plan    Reinforce compliance after extubation        Morbid obesity (CMS-HCC)- (present on admission)   Assessment & Plan    Body mass index is 48.02 kg/m².            Quality-Core Measures   Reviewed items::  Radiology images reviewed, Labs reviewed and Medications reviewed  Montoya catheter::  Unconscious / Sedated Patient on a Ventilator  Central line in place:  Need for access  DVT prophylaxis pharmacological::  Enoxaparin (Lovenox)  Antibiotics:  Treating active infection/contamination beyond 24 hours perioperative coverage

## 2018-03-23 NOTE — CARE PLAN
Problem: Nutritional:  Goal: Nutrition support tolerated and meeting greater than 85% of estimated needs  Outcome: PROGRESSING SLOWER THAN EXPECTED  Pt previously at goal while on prop @44.1ml/hr (1164kcals); however, TF held at this time 2' pt c/o abdominal distention, and nausea/vomiting. Per RN - will f/u in a few hours to determine pt's ability to tolerate TF re-initiation. RD to monitor and leave recommendations accordingly.

## 2018-03-23 NOTE — PROGRESS NOTES
Mobilized to EOB with PT this morning.  Tolerated well, after 4 minutes his feet went from pink to black.  Back to bed, feet returned to a pink color.

## 2018-03-23 NOTE — PROGRESS NOTES
Pulmonary Critical Care Progress Note        Chief Complaint: Acute hypoxemic respiratory failure, chronic leg wounds with possible cellulitis.    History of Present Illness: Fer Estrada is a 57 y.o. male with a past medical history of multiple admissions for bilateral lower extremity cellulitis. In the past he has been noncompliant with wound care. He has a history of chronic lymphedema, venous stasis, diabetes mellitus type 2, systemic hypertension and actually dependent COPD. He is morbidly obese and has chronic pain with narcotic dependence. He was most recently admitted in January 2018 for recurrent bilateral lower extremity cellulitis. Today he presented to the ED after his daughter called EMS stating she could not take care of him any longer. He has been having difficulty standing and going to the bathroom. He has been having recent diarrhea and increasing sores to his buttocks as well as her chronic leg wounds. He denies fever or chills. He did report some diarrhea starting yesterday. He had a soft brown stool in the ED. He is recently been on clindamycin. In the ED he was given IV fluid and began having increasing dyspnea. He is noted to have diastolic heart dysfunction. He was given some Lasix but developed increasing respiratory distress and was placed on BiPAP. At time I evaluation is in BiPAP in the ICU. He has diminished air entry throughout. He arouses but is somewhat somnolent. He follows commands but is unable to give much additional history.    Please note above history obtained by my partner Dr. Sewell on 3/16/2018.    ROS:  Respiratory: unable to perform due to the patient's inability to effectively communicate, Cardiac: unable to perform due to the patient's inability to effectively communicate, GI: unable to perform due to the patient's inability to effectively communicate.  Note that the patient on BiPAP and difficult to talk. All other systems negative.    Interval Events:  24 hour interval  history reviewed    Complaning of pain of legs  MS intact, but requiring increase in sedation.   SBP improved  Diuresis has been adequate, but still not tolerating SBT well.   Loose stool and held miralax  1650 from dasilva  BNP today.  Propofol infusion high  Vent day 4, PEEP 10   SBT trial   Lovenox and PPI  Zosyn, and change to Unasyn  Lasix at 5 mg/hour  BMP q8  K supplement increase    PFSH:  No change.    Respiratory:  Anthony Vent Mode: APVCMV, Rate (breaths/min): 12, Vt Target (mL): 430, PEEP/CPAP: 12, FiO2: 50, Static Compliance (ml / cm H2O): 40, Control VTE (exp VT): 432  Pulse Oximetry: 93 %  Chest Tube Drains:          Exam: Patient sent for this with ventilator and there is bibasilar diminished breath sounds. Respiratory crackles are still heard but improved.  ImagingCXR  I have personally reviewed the chest x-ray my impression is  ET tube is in good position. Right IJ central catheter in good position. There continues to be bibasilar infiltrates with atelectasis. There may be slight worsening on the left side which may be related to BAL performed previously. Suspect predominantly pneumonia and atelectasis.  Recent Labs      03/21/18   1354  03/22/18   0439  03/23/18   0416   ISTATAPH  7.501*  7.466  7.452   ISTATAPCO2  53.6*  59.2*  61.5*   ISTATAPO2  89*  72  74   ISTATATCO2  44*  44*  45*   [id number]  97  95  95   ISTATARTHCO3  41.9*  42.7*  43.0*   ISTATARTBE  17*  17*  17*   ISTATTEMP  98.4 F  100.0 F  97.4 F   ISTATFIO2  50  40  50   ISTATSPEC  Arterial  Arterial  Arterial   ISTATAPHTC  7.503*  7.454  7.463   [id number]  88*  76  71     HemoDynamics:  Pulse: (!) 57, Heart Rate (Monitored): (!) 56  NIBP: (!) 97/54       Exam: regular rate and rhythm occasional bradycardia noted.  Imaging: echo Reviewed     Echocardiogram from 3/18/2018:    CONCLUSIONS  Technically difficult study incomplete information is obtained.   Grossly normal left ventricular systolic function.  Right ventricle not well  visualized.  No valve disease noted by blind   Doppler interrogation.  No noted changes from prior.    Recent Labs      03/21/18   0410  03/22/18   0440  03/23/18   0356   TROPONINI  <0.01  <0.01  <0.01     Neuro:  GCS Total Siomara Coma Score: 11     Exam: follows commands, raises two fingers. Patient has significant weakness on the lower extremities due to severe edema and chronic venous stasis changes. Patient has crusting scaly lesions as well. There is evidence of lichenification of the lower extremities. Dressings were not removed based on wound care request. There was a Mepelex dressing over the left posterior thigh wound which is reportedly improved per the nursing and wound care specialist. As reported by nursing today, there is less induration. There is no significant palpable mass nor fluctuance.  Imaging: None - Reviewed    Fluids:  Intake/Output       03/21/18 0700 - 03/22/18 0659 03/22/18 0700 - 03/23/18 0659 03/23/18 0700 - 03/24/18 0659      6457-7927 5423-4132 Total 1525-8378 4994-2436 Total 1356-0384 0830-5650 Total       Intake    I.V.  1231.6  923.1 2154.7  947.7  927.8 1875.4  --  -- --    Propofol Volume 531.6 503.1 1034.7 697.7 670.3 1367.9 -- -- --    IV Piggyback Volume (IV Piggyback) 600 330 930 200 200 400 -- -- --    IV Volume (NS TKO) 100 90 190 50 57.5 107.5 -- -- --    Other  195  130 325  60  105 165  --  -- --    Medications (P.O./ Enteral Liquids) 195 130 325 60 105 165 -- -- --    Enteral  560  750 1310  690  720 1410  --  -- --    Enteral Volume 350 600 950  -- -- --    Free Water / Tube Flush 210 150 360 90 120 210 -- -- --    Total Intake 1986.6 1803.1 3789.7 1697.7 1752.8 3450.4 -- -- --       Output    Urine  1615  2685 4300  3310  1775 0722  --  -- --    Indwelling Cathether 1615 2685 4304 3313 7478 0275 -- -- --    Stool  --  -- --  --  -- --  --  -- --    Number of Times Stooled 0 x 0 x 0 x -- 1 x 1 x -- -- --    Total Output 5231 3028 4309 3319 0255 2062 -- --  --       Net I/O     371.6 -881.9 -510.3 -1612.3 -22.2 -1634.6 -- -- --          Recent Labs      18   035   SODIUM  137  140  142   POTASSIUM  3.7  3.5*  3.9   CHLORIDE  91*  97  96   CO2  39*  37*  39*   BUN     CREATININE  0.75  0.74  0.70   MAGNESIUM  1.5  2.3  2.2   PHOSPHORUS  2.9  3.7  3.9   CALCIUM  9.3  8.5  9.0       GI/Nutrition:  Exam: normal active bowel sounds morbidly obese. No obvious rebound rigidity or guarding. Slightly more distended today. Bowel sounds are present. There is reported bowel movement. KUB is pending.  Imaging: None - Reviewed  taking PO  Liver Function  Recent Labs      18   GLUCOSE  118*  139*  176*       Heme:  Recent Labs      18   RBC  3.61*  3.26*  3.61*   HEMOGLOBIN  10.5*  9.4*  10.4*   HEMATOCRIT  33.9*  30.4*  33.7*   PLATELETCT  186  174  168       Infectious Disease:  Temp  Av.3 °C (99.1 °F)  Min: 36.3 °C (97.3 °F)  Max: 37.7 °C (99.9 °F)  Micro: cultures reviewed. On Unasyn at this time. Large ulceration noted in the left proximal posterior thigh, slightly improved. Was previously on linezolid. After intubation changed to Zosyn and vancomycin at this time. Today we have consolidated to Unasyn. Bronchoscopy and BAL was performed although no significant mucus is noted. CT imaging does show bibasilar pneumonia. Will likely require infectious disease consultation which was ordered today. BAL done at this point is negative for organisms.  Recent Labs      18   035   WBC  7.4  7.2  7.2   NEUTSPOLYS  73.50*  75.20*  85.90*   LYMPHOCYTES  14.20*  11.60*  7.50*   MONOCYTES  7.00  8.00  5.30   EOSINOPHILS  4.30  4.20  0.10   BASOPHILS  0.30  0.30  0.10     Current Facility-Administered Medications   Medication Dose Frequency Provider Last Rate Last Dose   • furosemide (LASIX) injection 40 mg   40 mg BID Chris Ching D.O.   40 mg at 03/22/18 2103   • insulin regular (HUMULIN R) injection 2-9 Units  2-9 Units Q6HRS Chris Ching D.O.   2 Units at 03/23/18 0609    And   • glucose 4 g chewable tablet 16 g  16 g Q15 MIN PRN Chris Ching D.O.        And   • dextrose 50% (D50W) injection 25 mL  25 mL Q15 MIN PRN Chris Ching D.O.       • potassium bicarbonate (KLYTE) 25 MEQ effervescent tablet TBEF 25 mEq  25 mEq BID Amarjit MARLO Martines M.D.   25 mEq at 03/22/18 2103   • propofol (DIPRIVAN) injection  0-80 mcg/kg/min Continuous Chris Ching D.O. 53.1 mL/hr at 03/23/18 0614 60 mcg/kg/min at 03/23/18 0614   • Respiratory Care per Protocol   Continuous RT Chris Ching D.O.       • senna-docusate (PERICOLACE or SENOKOT S) 8.6-50 MG per tablet 2 Tab  2 Tab BID Chris Ching D.O.   2 Tab at 03/22/18 2103    And   • polyethylene glycol/lytes (MIRALAX) PACKET 1 Packet  1 Packet QDAY PRN Chris Ching D.O.        And   • magnesium hydroxide (MILK OF MAGNESIA) suspension 30 mL  30 mL QDAY PRN Chris Ching D.O.        And   • bisacodyl (DULCOLAX) suppository 10 mg  10 mg QDAY PRN Chris Ching D.O.       • chlorhexidine (PERIDEX) 0.12 % solution 15 mL  15 mL BID DAVONTE ColladoODorina   15 mL at 03/22/18 2103   • MD ALERT...Adult ICU Electrolyte Replacement per Pharmacy Protocol   pharmacy to dose Chris Ching D.O.       • fentaNYL (SUBLIMAZE) injection 25 mcg  25 mcg Q HOUR PRN Chris Ching D.O.        Or   • fentaNYL (SUBLIMAZE) injection 50 mcg  50 mcg Q HOUR PRN Chris Ching D.O.        Or   • fentaNYL (SUBLIMAZE) injection 100 mcg  100 mcg Q HOUR PRN Chris Ching D.O.       • ipratropium-albuterol (DUONEB) nebulizer solution 3 mL  3 mL Q2HRS PRN (RT) Chris Ching D.O.       • ipratropium-albuterol (DUONEB) nebulizer solution 3 mL  3 mL Q4HRS (RT) DAVONTE ColladoODorina   3 mL at 03/23/18 0248   • piperacillin-tazobactam (ZOSYN) 3.375 g in  mL IVPB  3.375 g  Q6HRS Amarjit Martines M.D. 200 mL/hr at 03/23/18 0611 3.375 g at 03/23/18 0611   • ascorbic acid (ascorbic acid) tablet 500 mg  500 mg DAILY YVROSE Collado.O.   500 mg at 03/22/18 0822   • aspirin (ASA) chewable tab 81 mg  81 mg DAILY VYROSE Collado.O.   81 mg at 03/22/18 0822   • fish oil capsule 1,000 mg  1,000 mg TID WITH MEALS YVROSE Collado.O.   1,000 mg at 03/22/18 1712   • lactobacillus granules (LACTINEX/FLORANEX) packet 1 Packet  1 Packet TID WITH MEALS DAVONTE ColladoO.   1 Packet at 03/22/18 1712   • levothyroxine (SYNTHROID) tablet 50 mcg  50 mcg AM ES YVROSE Collado.O.   50 mcg at 03/23/18 0611   • lisinopril (PRINIVIL) tablet 10 mg  10 mg DAILY YVROSE Collado.O.   10 mg at 03/22/18 0822   • omeprazole 2 mg/mL in sodium bicarbonate (PRILOSEC) oral susp 40 mg  40 mg DAILY YVROSE Collado.O.   40 mg at 03/22/18 0822   • acetaminophen (TYLENOL) tablet 650 mg  650 mg Q6HRS PRN YVROSE Collado.O.   650 mg at 03/20/18 0905   • oxyCODONE immediate release (ROXICODONE) tablet 30 mg  30 mg Q4HRS PRN YVROSE Collado.O.   30 mg at 03/21/18 2104   • oxyCODONE immediate release (ROXICODONE) tablet 40 mg  40 mg Q4HRS PRN YVROSE Collado.O.   40 mg at 03/22/18 2204   • ammonium lactate (LAC-HYDRIN) 12 % lotion   DAILY YVROSE Collado.O.       • enoxaparin (LOVENOX) inj 40 mg  40 mg BID Vic Pendleton Jr., D.O.   40 mg at 03/22/18 2103   • zinc sulfate (ZINCATE) capsule 220 mg  220 mg DAILY YVROSE Romero.O.   220 mg at 03/22/18 0822   • albuterol inhaler 2 Puff  2 Puff Q6HRS PRN Benjie Marcus M.D.       • ipratropium-albuterol (DUONEB) nebulizer solution 3 mL  3 mL Q4H PRN (RT) Benjie Marcus M.D.         Last reviewed on 3/16/2018  3:34 PM by May Degroot    Quality  Measures:   Labs reviewed, Medications reviewed and Radiology images reviewed   Montoya catheter: Critically Ill - Requiring Accurate Measurement of Urinary Output   Central line in place:  Sepsis              Assessment and plan:     1.  Acute on chronic hypoxemic and hypercapnic respiratory failure.    -Patient is failed BiPAP and is elected to agree to intubation, bronchoscopy, and CT scan imaging, performed 2 days ago which revealed significant by lateral pneumonia. There is also compressive atelectasis. Of also discussed with patient's daughter personally and patient consents as well.  -BNP was low and therefore I believe adequate diuresis is being performed.  -Today, the patient does have possible ongoing moderate fluid overload and an attempt at aggressive diuresis with diuretic infusion was agreed upon.  -Continue with treatment for obstructive lung disease as well.  -Consolidation to Unasyn from Zosyn and vancomycin.  -We'll rediscuss the use of Flolan if patient fails current strategy.    2. Chronic severe lymphedema with stasis dermatitis.    -Continue with wound care treatment.  -Patient also to possibly have evaluation of the left posterior thigh by surgical team, no follow-up exam today shows improvement and no need for surgical intervention. Please see physical exam above.  -Will likely require infectious disease evaluation pending patient's response to ongoing treatment.    3. Type II diabetes mellitus.    -Continue with sliding scale at this time.  -Have elected to begin low-dose steroids to attempt at treating instructed component of his underlying lung disease. He has probable potential worsening of his blood sugar and longer acting insulin such as NPH or Lantus may be considered.    4. Morbid obesity.    5. Hypertension.    -Under adequate control at this time.  -Continue with oral therapy, with ACE inhibitor but observe for worsening renal function. At this time tolerating.    6. Chronic right bundle-branch block with wide QRS.     -No obvious sign of acute ischemic event although EKG is been ordered as well as troponin.   -Levels were negative overnight.  -Compared to previous  EKGs no significant change.    7. COPD.    -Continue with respiratory therapy protocols.  -Adding IV steroids for treatment of obstructive disease component.   -May consider as possible rescue therapy.    Discussed patient condition and risk of morbidity and/or mortality with at length on M.D. rounds.   The patient remains critically ill.  Critical care time = 35 minutes in directly providing and coordinating critical care and extensive data review.  No time overlap and excludes procedures.     Chris Ching D.O.

## 2018-03-24 ENCOUNTER — APPOINTMENT (OUTPATIENT)
Dept: RADIOLOGY | Facility: MEDICAL CENTER | Age: 58
DRG: 987 | End: 2018-03-24
Attending: INTERNAL MEDICINE
Payer: MEDICARE

## 2018-03-24 LAB
ACTION RANGE TRIGGERED IACRT: YES
ANION GAP SERPL CALC-SCNC: 11 MMOL/L (ref 0–11.9)
BASE EXCESS BLDA CALC-SCNC: 15 MMOL/L (ref -4–3)
BASOPHILS # BLD AUTO: 0.3 % (ref 0–1.8)
BASOPHILS # BLD: 0.03 K/UL (ref 0–0.12)
BNP SERPL-MCNC: 29 PG/ML (ref 0–100)
BODY TEMPERATURE: ABNORMAL DEGREES
BUN SERPL-MCNC: 22 MG/DL (ref 8–22)
CALCIUM SERPL-MCNC: 8.9 MG/DL (ref 8.5–10.5)
CHLORIDE SERPL-SCNC: 94 MMOL/L (ref 96–112)
CO2 BLDA-SCNC: 44 MMOL/L (ref 20–33)
CO2 SERPL-SCNC: 36 MMOL/L (ref 20–33)
COMMENT 1642: NORMAL
CREAT SERPL-MCNC: 0.76 MG/DL (ref 0.5–1.4)
EOSINOPHIL # BLD AUTO: 0.03 K/UL (ref 0–0.51)
EOSINOPHIL NFR BLD: 0.3 % (ref 0–6.9)
ERYTHROCYTE [DISTWIDTH] IN BLOOD BY AUTOMATED COUNT: 54.4 FL (ref 35.9–50)
GLUCOSE BLD-MCNC: 164 MG/DL (ref 65–99)
GLUCOSE BLD-MCNC: 174 MG/DL (ref 65–99)
GLUCOSE BLD-MCNC: 178 MG/DL (ref 65–99)
GLUCOSE BLD-MCNC: 197 MG/DL (ref 65–99)
GLUCOSE SERPL-MCNC: 193 MG/DL (ref 65–99)
HCO3 BLDA-SCNC: 41.9 MMOL/L (ref 17–25)
HCT VFR BLD AUTO: 35.2 % (ref 42–52)
HGB BLD-MCNC: 10.3 G/DL (ref 14–18)
IMM GRANULOCYTES # BLD AUTO: 0.11 K/UL (ref 0–0.11)
IMM GRANULOCYTES NFR BLD AUTO: 1.1 % (ref 0–0.9)
INST. QUALIFIED PATIENT IIQPT: YES
LYMPHOCYTES # BLD AUTO: 0.54 K/UL (ref 1–4.8)
LYMPHOCYTES NFR BLD: 5.4 % (ref 22–41)
MCH RBC QN AUTO: 27.5 PG (ref 27–33)
MCHC RBC AUTO-ENTMCNC: 29.3 G/DL (ref 33.7–35.3)
MCV RBC AUTO: 93.9 FL (ref 81.4–97.8)
MONOCYTES # BLD AUTO: 0.28 K/UL (ref 0–0.85)
MONOCYTES NFR BLD AUTO: 2.8 % (ref 0–13.4)
MORPHOLOGY BLD-IMP: NORMAL
NEUTROPHILS # BLD AUTO: 9.08 K/UL (ref 1.82–7.42)
NEUTROPHILS NFR BLD: 90.1 % (ref 44–72)
NRBC # BLD AUTO: 0 K/UL
NRBC BLD-RTO: 0 /100 WBC
O2/TOTAL GAS SETTING VFR VENT: 50 %
PCO2 BLDA: 62.5 MMHG (ref 26–37)
PCO2 TEMP ADJ BLDA: 62.5 MMHG (ref 26–37)
PH BLDA: 7.43 [PH] (ref 7.4–7.5)
PH TEMP ADJ BLDA: 7.43 [PH] (ref 7.4–7.5)
PLATELET # BLD AUTO: 193 K/UL (ref 164–446)
PLATELET BLD QL SMEAR: NORMAL
PMV BLD AUTO: 10.8 FL (ref 9–12.9)
PO2 BLDA: 83 MMHG (ref 64–87)
PO2 TEMP ADJ BLDA: 83 MMHG (ref 64–87)
POLYCHROMASIA BLD QL SMEAR: NORMAL
POTASSIUM SERPL-SCNC: 3.6 MMOL/L (ref 3.6–5.5)
RBC # BLD AUTO: 3.75 M/UL (ref 4.7–6.1)
RBC BLD AUTO: PRESENT
SAO2 % BLDA: 96 % (ref 93–99)
SODIUM SERPL-SCNC: 141 MMOL/L (ref 135–145)
SPECIMEN DRAWN FROM PATIENT: ABNORMAL
TROPONIN I SERPL-MCNC: <0.01 NG/ML (ref 0–0.04)
WBC # BLD AUTO: 10.1 K/UL (ref 4.8–10.8)

## 2018-03-24 PROCEDURE — 84484 ASSAY OF TROPONIN QUANT: CPT

## 2018-03-24 PROCEDURE — 700101 HCHG RX REV CODE 250: Performed by: INTERNAL MEDICINE

## 2018-03-24 PROCEDURE — 700111 HCHG RX REV CODE 636 W/ 250 OVERRIDE (IP): Performed by: INTERNAL MEDICINE

## 2018-03-24 PROCEDURE — 36600 WITHDRAWAL OF ARTERIAL BLOOD: CPT

## 2018-03-24 PROCEDURE — 700101 HCHG RX REV CODE 250: Performed by: HOSPITALIST

## 2018-03-24 PROCEDURE — 94003 VENT MGMT INPAT SUBQ DAY: CPT

## 2018-03-24 PROCEDURE — 770022 HCHG ROOM/CARE - ICU (200)

## 2018-03-24 PROCEDURE — 71045 X-RAY EXAM CHEST 1 VIEW: CPT

## 2018-03-24 PROCEDURE — 82803 BLOOD GASES ANY COMBINATION: CPT

## 2018-03-24 PROCEDURE — 94640 AIRWAY INHALATION TREATMENT: CPT

## 2018-03-24 PROCEDURE — 700102 HCHG RX REV CODE 250 W/ 637 OVERRIDE(OP): Performed by: HOSPITALIST

## 2018-03-24 PROCEDURE — A9270 NON-COVERED ITEM OR SERVICE: HCPCS | Performed by: HOSPITALIST

## 2018-03-24 PROCEDURE — 83880 ASSAY OF NATRIURETIC PEPTIDE: CPT

## 2018-03-24 PROCEDURE — 700102 HCHG RX REV CODE 250 W/ 637 OVERRIDE(OP): Performed by: INTERNAL MEDICINE

## 2018-03-24 PROCEDURE — 80048 BASIC METABOLIC PNL TOTAL CA: CPT

## 2018-03-24 PROCEDURE — 74018 RADEX ABDOMEN 1 VIEW: CPT

## 2018-03-24 PROCEDURE — 99233 SBSQ HOSP IP/OBS HIGH 50: CPT | Performed by: HOSPITALIST

## 2018-03-24 PROCEDURE — A9270 NON-COVERED ITEM OR SERVICE: HCPCS | Performed by: INTERNAL MEDICINE

## 2018-03-24 PROCEDURE — 700105 HCHG RX REV CODE 258: Performed by: INTERNAL MEDICINE

## 2018-03-24 PROCEDURE — 85025 COMPLETE CBC W/AUTO DIFF WBC: CPT

## 2018-03-24 PROCEDURE — 700111 HCHG RX REV CODE 636 W/ 250 OVERRIDE (IP): Performed by: HOSPITALIST

## 2018-03-24 PROCEDURE — 82962 GLUCOSE BLOOD TEST: CPT | Mod: 91

## 2018-03-24 RX ORDER — METOCLOPRAMIDE HYDROCHLORIDE 5 MG/ML
10 INJECTION INTRAMUSCULAR; INTRAVENOUS EVERY 6 HOURS
Status: DISCONTINUED | OUTPATIENT
Start: 2018-03-24 | End: 2018-03-25

## 2018-03-24 RX ORDER — AMLODIPINE BESYLATE 5 MG/1
5 TABLET ORAL
Status: DISCONTINUED | OUTPATIENT
Start: 2018-03-24 | End: 2018-04-06

## 2018-03-24 RX ORDER — LABETALOL HYDROCHLORIDE 5 MG/ML
10-20 INJECTION, SOLUTION INTRAVENOUS EVERY 4 HOURS PRN
Status: DISCONTINUED | OUTPATIENT
Start: 2018-03-24 | End: 2018-04-06

## 2018-03-24 RX ADMIN — FENTANYL CITRATE 100 MCG: 50 INJECTION, SOLUTION INTRAMUSCULAR; INTRAVENOUS at 17:51

## 2018-03-24 RX ADMIN — PROPOFOL 50 MCG/KG/MIN: 10 INJECTION, EMULSION INTRAVENOUS at 14:17

## 2018-03-24 RX ADMIN — Medication 220 MG: at 09:20

## 2018-03-24 RX ADMIN — LABETALOL HYDROCHLORIDE 10 MG: 5 INJECTION, SOLUTION INTRAVENOUS at 11:48

## 2018-03-24 RX ADMIN — OMEPRAZOLE 40 MG: 20 CAPSULE, DELAYED RELEASE ORAL at 09:22

## 2018-03-24 RX ADMIN — PROPOFOL 50 MCG/KG/MIN: 10 INJECTION, EMULSION INTRAVENOUS at 17:33

## 2018-03-24 RX ADMIN — AMLODIPINE BESYLATE 5 MG: 5 TABLET ORAL at 21:29

## 2018-03-24 RX ADMIN — PROPOFOL 60 MCG/KG/MIN: 10 INJECTION, EMULSION INTRAVENOUS at 22:58

## 2018-03-24 RX ADMIN — LISINOPRIL 10 MG: 20 TABLET ORAL at 09:21

## 2018-03-24 RX ADMIN — INSULIN HUMAN 2 UNITS: 100 INJECTION, SOLUTION PARENTERAL at 05:59

## 2018-03-24 RX ADMIN — POTASSIUM BICARBONATE 25 MEQ: 25 TABLET, EFFERVESCENT ORAL at 09:21

## 2018-03-24 RX ADMIN — INSULIN HUMAN 2 UNITS: 100 INJECTION, SOLUTION PARENTERAL at 00:42

## 2018-03-24 RX ADMIN — FENTANYL CITRATE 100 MCG: 50 INJECTION, SOLUTION INTRAMUSCULAR; INTRAVENOUS at 21:29

## 2018-03-24 RX ADMIN — QUETIAPINE FUMARATE 50 MG: 25 TABLET ORAL at 13:26

## 2018-03-24 RX ADMIN — CHLORHEXIDINE GLUCONATE 15 ML: 1.2 RINSE ORAL at 21:29

## 2018-03-24 RX ADMIN — OXYCODONE HYDROCHLORIDE 40 MG: 10 TABLET ORAL at 21:29

## 2018-03-24 RX ADMIN — OXYCODONE HYDROCHLORIDE AND ACETAMINOPHEN 500 MG: 500 TABLET ORAL at 09:20

## 2018-03-24 RX ADMIN — METOCLOPRAMIDE 10 MG: 5 INJECTION, SOLUTION INTRAMUSCULAR; INTRAVENOUS at 17:33

## 2018-03-24 RX ADMIN — STANDARDIZED SENNA CONCENTRATE AND DOCUSATE SODIUM 2 TABLET: 8.6; 5 TABLET, FILM COATED ORAL at 21:29

## 2018-03-24 RX ADMIN — POTASSIUM BICARBONATE 25 MEQ: 25 TABLET, EFFERVESCENT ORAL at 13:26

## 2018-03-24 RX ADMIN — PROPOFOL 60 MCG/KG/MIN: 10 INJECTION, EMULSION INTRAVENOUS at 03:49

## 2018-03-24 RX ADMIN — IPRATROPIUM BROMIDE AND ALBUTEROL SULFATE 3 ML: .5; 3 SOLUTION RESPIRATORY (INHALATION) at 12:40

## 2018-03-24 RX ADMIN — LACTOBACILLUS ACIDOPHILUS / LACTOBACILLUS BULGARICUS 1 PACKET: 100 MILLION CFU STRENGTH GRANULES at 09:20

## 2018-03-24 RX ADMIN — PROPOFOL 60 MCG/KG/MIN: 10 INJECTION, EMULSION INTRAVENOUS at 21:00

## 2018-03-24 RX ADMIN — IPRATROPIUM BROMIDE AND ALBUTEROL SULFATE 3 ML: .5; 3 SOLUTION RESPIRATORY (INHALATION) at 03:49

## 2018-03-24 RX ADMIN — QUETIAPINE FUMARATE 50 MG: 25 TABLET ORAL at 09:22

## 2018-03-24 RX ADMIN — PROPOFOL 60 MCG/KG/MIN: 10 INJECTION, EMULSION INTRAVENOUS at 18:56

## 2018-03-24 RX ADMIN — METOCLOPRAMIDE 10 MG: 5 INJECTION, SOLUTION INTRAMUSCULAR; INTRAVENOUS at 23:05

## 2018-03-24 RX ADMIN — INSULIN HUMAN 2 UNITS: 100 INJECTION, SOLUTION PARENTERAL at 17:44

## 2018-03-24 RX ADMIN — IPRATROPIUM BROMIDE AND ALBUTEROL SULFATE 3 ML: .5; 3 SOLUTION RESPIRATORY (INHALATION) at 22:28

## 2018-03-24 RX ADMIN — METHYLPREDNISOLONE SODIUM SUCCINATE 40 MG: 40 INJECTION, POWDER, FOR SOLUTION INTRAMUSCULAR; INTRAVENOUS at 15:06

## 2018-03-24 RX ADMIN — PROPOFOL 50 MCG/KG/MIN: 10 INJECTION, EMULSION INTRAVENOUS at 05:46

## 2018-03-24 RX ADMIN — PROPOFOL 50 MCG/KG/MIN: 10 INJECTION, EMULSION INTRAVENOUS at 10:23

## 2018-03-24 RX ADMIN — OXYCODONE HYDROCHLORIDE 40 MG: 10 TABLET ORAL at 13:26

## 2018-03-24 RX ADMIN — INSULIN HUMAN 2 UNITS: 100 INJECTION, SOLUTION PARENTERAL at 12:03

## 2018-03-24 RX ADMIN — AMPICILLIN SODIUM AND SULBACTAM SODIUM 3 G: 2; 1 INJECTION, POWDER, FOR SOLUTION INTRAMUSCULAR; INTRAVENOUS at 11:49

## 2018-03-24 RX ADMIN — IPRATROPIUM BROMIDE AND ALBUTEROL SULFATE 3 ML: .5; 3 SOLUTION RESPIRATORY (INHALATION) at 07:23

## 2018-03-24 RX ADMIN — ASPIRIN 81 MG: 81 TABLET, CHEWABLE ORAL at 09:20

## 2018-03-24 RX ADMIN — STANDARDIZED SENNA CONCENTRATE AND DOCUSATE SODIUM 2 TABLET: 8.6; 5 TABLET, FILM COATED ORAL at 09:22

## 2018-03-24 RX ADMIN — AMPICILLIN SODIUM AND SULBACTAM SODIUM 3 G: 2; 1 INJECTION, POWDER, FOR SOLUTION INTRAMUSCULAR; INTRAVENOUS at 17:33

## 2018-03-24 RX ADMIN — OXYCODONE HYDROCHLORIDE 40 MG: 10 TABLET ORAL at 09:22

## 2018-03-24 RX ADMIN — ENOXAPARIN SODIUM 40 MG: 100 INJECTION SUBCUTANEOUS at 21:35

## 2018-03-24 RX ADMIN — PROPOFOL 50 MCG/KG/MIN: 10 INJECTION, EMULSION INTRAVENOUS at 12:05

## 2018-03-24 RX ADMIN — PROPOFOL 60 MCG/KG/MIN: 10 INJECTION, EMULSION INTRAVENOUS at 01:45

## 2018-03-24 RX ADMIN — Medication: at 09:23

## 2018-03-24 RX ADMIN — IPRATROPIUM BROMIDE AND ALBUTEROL SULFATE 3 ML: .5; 3 SOLUTION RESPIRATORY (INHALATION) at 19:04

## 2018-03-24 RX ADMIN — INSULIN HUMAN 2 UNITS: 100 INJECTION, SOLUTION PARENTERAL at 23:12

## 2018-03-24 RX ADMIN — METOCLOPRAMIDE 10 MG: 5 INJECTION, SOLUTION INTRAMUSCULAR; INTRAVENOUS at 11:49

## 2018-03-24 RX ADMIN — POTASSIUM BICARBONATE 25 MEQ: 25 TABLET, EFFERVESCENT ORAL at 21:29

## 2018-03-24 RX ADMIN — LEVOTHYROXINE SODIUM 50 MCG: 50 TABLET ORAL at 06:04

## 2018-03-24 RX ADMIN — IPRATROPIUM BROMIDE AND ALBUTEROL SULFATE 3 ML: .5; 3 SOLUTION RESPIRATORY (INHALATION) at 15:38

## 2018-03-24 RX ADMIN — METHYLPREDNISOLONE SODIUM SUCCINATE 40 MG: 40 INJECTION, POWDER, FOR SOLUTION INTRAMUSCULAR; INTRAVENOUS at 03:34

## 2018-03-24 RX ADMIN — PROPOFOL 50 MCG/KG/MIN: 10 INJECTION, EMULSION INTRAVENOUS at 09:27

## 2018-03-24 RX ADMIN — METHYLPREDNISOLONE SODIUM SUCCINATE 40 MG: 40 INJECTION, POWDER, FOR SOLUTION INTRAMUSCULAR; INTRAVENOUS at 09:20

## 2018-03-24 RX ADMIN — AMPICILLIN SODIUM AND SULBACTAM SODIUM 3 G: 2; 1 INJECTION, POWDER, FOR SOLUTION INTRAMUSCULAR; INTRAVENOUS at 23:06

## 2018-03-24 RX ADMIN — QUETIAPINE FUMARATE 50 MG: 25 TABLET ORAL at 21:29

## 2018-03-24 RX ADMIN — AMPICILLIN SODIUM AND SULBACTAM SODIUM 3 G: 2; 1 INJECTION, POWDER, FOR SOLUTION INTRAMUSCULAR; INTRAVENOUS at 05:56

## 2018-03-24 RX ADMIN — ENOXAPARIN SODIUM 40 MG: 100 INJECTION SUBCUTANEOUS at 09:21

## 2018-03-24 RX ADMIN — CHLORHEXIDINE GLUCONATE 15 ML: 1.2 RINSE ORAL at 09:21

## 2018-03-24 ASSESSMENT — LIFESTYLE VARIABLES
DO YOU DRINK ALCOHOL: NO
REASON UNABLE TO ASSESS: PT INTUBATED

## 2018-03-24 NOTE — PROGRESS NOTES
Patient transported to X-Ray. Patient transported on the monitor and ventilator with RN, RT and unit tech. Patient transported with IV medications infusing. No signs of pain or discomfort. Vital signs stable. Patient transported back to ICU unit. Patient handled transport well.

## 2018-03-24 NOTE — PROGRESS NOTES
Pulmonary Critical Care Progress Note        Chief Complaint: Acute hypoxemic respiratory failure, chronic leg wounds with possible cellulitis.    History of Present Illness: Fer Estrada is a 57 y.o. male with a past medical history of multiple admissions for bilateral lower extremity cellulitis. In the past he has been noncompliant with wound care. He has a history of chronic lymphedema, venous stasis, diabetes mellitus type 2, systemic hypertension and actually dependent COPD. He is morbidly obese and has chronic pain with narcotic dependence. He was most recently admitted in January 2018 for recurrent bilateral lower extremity cellulitis. Today he presented to the ED after his daughter called EMS stating she could not take care of him any longer. He has been having difficulty standing and going to the bathroom. He has been having recent diarrhea and increasing sores to his buttocks as well as her chronic leg wounds. He denies fever or chills. He did report some diarrhea starting yesterday. He had a soft brown stool in the ED. He is recently been on clindamycin. In the ED he was given IV fluid and began having increasing dyspnea. He is noted to have diastolic heart dysfunction. He was given some Lasix but developed increasing respiratory distress and was placed on BiPAP. At time I evaluation is in BiPAP in the ICU. He has diminished air entry throughout. He arouses but is somewhat somnolent. He follows commands but is unable to give much additional history.    Please note above history obtained by my partner Dr. Sewell on 3/16/2018.    ROS:  Respiratory: unable to perform due to the patient's inability to effectively communicate, Cardiac: unable to perform due to the patient's inability to effectively communicate, GI: unable to perform due to the patient's inability to effectively communicate.  Note that the patient on BiPAP and difficult to talk. All other systems negative.    Interval Events:  24 hour interval  history reviewed    S/p intubation for respiratory failure  Moderately agitated  Hypertensive overnight  PRN BP meds  Ileus on Xray  Day 5 on vent, PEEP to 10 SBT at 9 RSBI at 54  No VC or NIF  1.5 SBT  Lovenox and PPI  Unasyn, and day 9/10   D/C lasix drip  Reglan 10 q 6 today.     PFSH:  No change.    Respiratory:  Anthony Vent Mode: APVCMV, Rate (breaths/min): 12, Vt Target (mL): 430, PEEP/CPAP: 12, FiO2: 50, Static Compliance (ml / cm H2O): 36, Control VTE (exp VT): 424  Pulse Oximetry: 96 %  Chest Tube Drains:          Exam: Patient sent for this with ventilator and there is bibasilar diminished breath sounds. Respiratory crackles are still heard but improved.  ImagingCXR  I have personally reviewed the chest x-ray my impression is  ET tube is in good position. Right IJ central catheter in good position. There continues to be bibasilar infiltrates with atelectasis. There may be slight worsening on the left side which may be related to BAL performed previously. Suspect predominantly pneumonia and atelectasis.  Recent Labs      03/22/18   0439  03/23/18   0416  03/24/18   0431   ISTATAPH  7.466  7.452  7.434   ISTATAPCO2  59.2*  61.5*  62.5*   ISTATAPO2  72  74  83   ISTATATCO2  44*  45*  44*   QAZOJAI1ELE  95  95  96   ISTATARTHCO3  42.7*  43.0*  41.9*   ISTATARTBE  17*  17*  15*   ISTATTEMP  100.0 F  97.4 F  98.6 F   ISTATFIO2  40  50  50   ISTATSPEC  Arterial  Arterial  Arterial   ISTATAPHTC  7.454  7.463  7.434   URGFSOCO8AF  76  71  83     HemoDynamics:  Pulse: (!) 59, Heart Rate (Monitored): (!) 58  NIBP: 124/60       Exam: regular rate and rhythm occasional bradycardia noted.  Imaging: echo Reviewed     Echocardiogram from 3/18/2018:    CONCLUSIONS  Technically difficult study incomplete information is obtained.   Grossly normal left ventricular systolic function.  Right ventricle not well visualized.  No valve disease noted by blind   Doppler interrogation.  No noted changes from prior.    Recent Labs       03/22/18   0440  03/23/18   0356  03/24/18   0342   TROPONINI  <0.01  <0.01  <0.01   BNPBTYPENAT   --    --   29     Neuro:  GCS Total Siomara Coma Score: 10     Exam: follows commands, raises two fingers. Patient has significant weakness on the lower extremities due to severe edema and chronic venous stasis changes. Patient has crusting scaly lesions as well. There is evidence of lichenification of the lower extremities. Dressings were not removed based on wound care request. There was a Mepelex dressing over the left posterior thigh wound which is reportedly improved per the nursing and wound care specialist. As reported by nursing today, there is less induration. There is no significant palpable mass nor fluctuance.  Imaging: None - Reviewed    Fluids:  Intake/Output       03/22/18 0700 - 03/23/18 0659 03/23/18 0700 - 03/24/18 0659 03/24/18 0700 - 03/25/18 0659      6745-3363 4160-3351 Total 8700-1223 9641-5783 Total 8089-6729 8684-7540 Total       Intake    I.V.  947.7  927.8 1875.4  476.4  1223.9 1700.2  --  -- --    Propofol Volume 697.7 670.3 1367.9 256.4 853.9 1110.2 -- -- --    IV Piggyback Volume (IV Piggyback) 200 200 400 200 200 400 -- -- --    IV Volume (Lasix) -- -- -- -- 60 60 -- -- --    IV Volume (NS TKO) 50 57.5 107.5 20 110 130 -- -- --    Other  60  105 165  50  135 185  --  -- --    Medications (P.O./ Enteral Liquids) 60 105 165 50 135 185 -- -- --    Enteral  690  720 1410  375  120 495  --  -- --    Enteral Volume  175 -- 175 -- -- --    Free Water / Tube Flush 90 120 210 200 120 320 -- -- --    Total Intake 1697.7 1752.8 3450.4 901.4 1478.9 2380.2 -- -- --       Output    Urine  3310  1775 5085  2460  1725 4185  --  -- --    Indwelling Cathether 3310 1775 2832 9604 1725 4475 -- -- --    Stool  --  -- --  --  -- --  --  -- --    Number of Times Stooled -- 1 x 1 x -- 1 x 1 x -- -- --    Total Output 3310 1773 2453 4830 4667 1853 -- -- --       Net I/O     -1612.3 -22.2 -1634.6  -1558.6 -246.1 -1804.8 -- -- --          Recent Labs      18   0440  18   0356  18   1614  18   0342   SODIUM  140  142  139  141   POTASSIUM  3.5*  3.9  3.2*  3.6   CHLORIDE  97  96  95*  94*   CO2  37*  39*  38*  36*   BUN  17    22   CREATININE  0.74  0.70  0.63  0.76   MAGNESIUM  2.3  2.2   --    --    PHOSPHORUS  3.7  3.9   --    --    CALCIUM  8.5  9.0  8.9  8.9       GI/Nutrition:  Exam: normal active bowel sounds morbidly obese. No obvious rebound rigidity or guarding. Slightly more distended today. Bowel sounds are present. There is reported bowel movement. KUB is pending.  Imaging: None - Reviewed  taking PO  Liver Function  Recent Labs      18   1614  18   0342   GLUCOSE  176*  137*  193*       Heme:  Recent Labs      186  18   0342   RBC  3.26*  3.61*  3.75*   HEMOGLOBIN  9.4*  10.4*  10.3*   HEMATOCRIT  30.4*  33.7*  35.2*   PLATELETCT  174  168  193       Infectious Disease:  Temp  Av.1 °C (98.7 °F)  Min: 36.2 °C (97.1 °F)  Max: 37.5 °C (99.5 °F)  Micro: cultures reviewed. On Unasyn at this time. Large ulceration noted in the left proximal posterior thigh, slightly improved. Was previously on linezolid. After intubation changed to Zosyn and vancomycin at this time. Today we have consolidated to Unasyn. Bronchoscopy and BAL was performed although no significant mucus is noted. CT imaging does show bibasilar pneumonia. Will likely require infectious disease consultation which was ordered today. BAL done at this point is negative for organisms.   Recent Labs      18   0356  18   0342   WBC  7.2  7.2  10.1   NEUTSPOLYS  75.20*  85.90*  90.10*   LYMPHOCYTES  11.60*  7.50*  5.40*   MONOCYTES  8.00  5.30  2.80   EOSINOPHILS  4.20  0.10  0.30   BASOPHILS  0.30  0.10  0.30     Current Facility-Administered Medications   Medication Dose Frequency Provider Last Rate Last Dose   •  furosemide (LASIX) 100 mg in  mL infusion  5 mg/hr Continuous YVROSE Collado.O. 5 mL/hr at 03/23/18 1135 5 mg/hr at 03/23/18 1135   • ampicillin/sulbactam (UNASYN) 3 g in  mL IVPB  3 g Q6HRS Chris Ching D.O. 200 mL/hr at 03/24/18 0556 3 g at 03/24/18 0556   • QUEtiapine (SEROQUEL) tablet 50 mg  50 mg TID Amarjit Martines M.D.   50 mg at 03/23/18 2021   • potassium bicarbonate (KLYTE) 25 MEQ effervescent tablet TBEF 25 mEq  25 mEq TID Amarjit Martines M.D.   25 mEq at 03/23/18 2021   • methylPREDNISolone (SOLU-MEDROL) 40 MG injection 40 mg  40 mg Q6HRS YVROSE Collado.O.   40 mg at 03/24/18 0334   • insulin regular (HUMULIN R) injection 2-9 Units  2-9 Units Q6HRS Chris Ching D.O.   2 Units at 03/24/18 0559    And   • glucose 4 g chewable tablet 16 g  16 g Q15 MIN PRN YVROSE Collado.O.        And   • dextrose 50% (D50W) injection 25 mL  25 mL Q15 MIN PRN YVROSE Collado.O.       • propofol (DIPRIVAN) injection  0-80 mcg/kg/min Continuous YVROSE Collado.O. 44.3 mL/hr at 03/24/18 0546 50 mcg/kg/min at 03/24/18 0546   • Respiratory Care per Protocol   Continuous RT YVROSE Collado.O.       • senna-docusate (PERICOLACE or SENOKOT S) 8.6-50 MG per tablet 2 Tab  2 Tab BID YVROSE Collado.O.   2 Tab at 03/23/18 2021    And   • polyethylene glycol/lytes (MIRALAX) PACKET 1 Packet  1 Packet QDAY PRN DAVONTE ColladoO.        And   • magnesium hydroxide (MILK OF MAGNESIA) suspension 30 mL  30 mL QDAY PRN Chris Ching D.O.        And   • bisacodyl (DULCOLAX) suppository 10 mg  10 mg QDAY PRN Chris Ching D.O.   10 mg at 03/23/18 1525   • chlorhexidine (PERIDEX) 0.12 % solution 15 mL  15 mL BID Chris Ching D.O.   15 mL at 03/23/18 2021   • MD ALERT...Adult ICU Electrolyte Replacement per Pharmacy Protocol   pharmacy to dose Chris Ching D.O.       • fentaNYL (SUBLIMAZE) injection 25 mcg  25 mcg Q HOUR PRN Chris Ching D.O.        Or   • fentaNYL  (SUBLIMAZE) injection 50 mcg  50 mcg Q HOUR PRN DAVONTE ColladoO.        Or   • fentaNYL (SUBLIMAZE) injection 100 mcg  100 mcg Q HOUR PRN DAVONTE ColladoO.       • ipratropium-albuterol (DUONEB) nebulizer solution 3 mL  3 mL Q4HRS (RT) YVROSE Collado.O.   3 mL at 03/24/18 0349   • ascorbic acid (ascorbic acid) tablet 500 mg  500 mg DAILY YVROSE Collado.O.   500 mg at 03/23/18 0835   • aspirin (ASA) chewable tab 81 mg  81 mg DAILY YVROSE Collado.O.   81 mg at 03/23/18 0835   • lactobacillus granules (LACTINEX/FLORANEX) packet 1 Packet  1 Packet TID WITH MEALS DAVONTE ColladoODorina   Stopped at 03/23/18 1130   • levothyroxine (SYNTHROID) tablet 50 mcg  50 mcg AM ES YVROSE Collado.O.   50 mcg at 03/24/18 0604   • lisinopril (PRINIVIL) tablet 10 mg  10 mg DAILY YVROSE Collado.O.   10 mg at 03/23/18 0835   • omeprazole 2 mg/mL in sodium bicarbonate (PRILOSEC) oral susp 40 mg  40 mg DAILY YVROSE Collado.O.   40 mg at 03/23/18 0835   • acetaminophen (TYLENOL) tablet 650 mg  650 mg Q6HRS PRN YVROSE Collado.O.   650 mg at 03/20/18 0905   • oxyCODONE immediate release (ROXICODONE) tablet 30 mg  30 mg Q4HRS PRN YVROSE Collado.O.   30 mg at 03/21/18 2104   • oxyCODONE immediate release (ROXICODONE) tablet 40 mg  40 mg Q4HRS PRN YVROSE Collado.O.   40 mg at 03/23/18 2021   • ammonium lactate (LAC-HYDRIN) 12 % lotion   DAILY YVROSE Collado.O.       • enoxaparin (LOVENOX) inj 40 mg  40 mg BID Vic Pendleton Jr., D.O.   40 mg at 03/23/18 2020   • zinc sulfate (ZINCATE) capsule 220 mg  220 mg DAILY Scott Almodovar D.O.   220 mg at 03/23/18 0900   • albuterol inhaler 2 Puff  2 Puff Q6HRS PRN Benjie Marcus M.D.       • ipratropium-albuterol (DUONEB) nebulizer solution 3 mL  3 mL Q4H PRN (RT) Benjie Marcus M.D.         Last reviewed on 3/16/2018  3:34 PM by May Degroot    Quality  Measures:  Labs reviewed, Medications reviewed and Radiology images reviewed  Lindsay  catheter: Critically Ill - Requiring Accurate Measurement of Urinary Output  Central line in place: Sepsis              Assessment and plan:     1.  Acute on chronic hypoxemic and hypercapnic respiratory failure.    -Patient is failed BiPAP and is elected to agree to intubation, bronchoscopy, and CT scan imaging, performed 5 days ago which revealed significant by lateral pneumonia.   -There is also compressive atelectasis.   -BNP was low and therefore I believe adequate diuresis is being performed.  -Today, the patient does have possible ongoing moderate fluid overload and an attempt at aggressive diuresis with diuretic infusion was agreed upon.  -Continue with treatment for obstructive lung disease as well.  -Consolidation to Unasyn alone yesterday  -Aggressive Lasix diuresis infusion was discontinued today as he did have significant output.  -Decrease PEEP level and will attempt spontaneous breathing trials again this afternoon with plan to work to extubation by tomorrow.  -We'll discuss this with the family further tomorrow as well.    2. Chronic severe lymphedema with stasis dermatitis.    -Continue with wound care treatment.  -Left posterior thigh wound is improved with conservative treatment. No need for I&D.  -Will likely require infectious disease evaluation pending patient's response to ongoing treatment.    3. Type II diabetes mellitus.    -Continue with sliding scale at this time.  -Have elected to begin low-dose steroids to attempt at treating instructed component of his underlying lung disease. He has probable potential worsening of his blood sugar and longer acting insulin such as NPH or Lantus may be considered.    4. Morbid obesity.    5. Hypertension.    -Under adequate control at this time, though did require intermittent medication for hypertension.  -Continue with oral therapy, with ACE inhibitor but observe for worsening renal function.   -At this time tolerating.    6. Chronic right  bundle-branch block with wide QRS.     -No obvious sign of acute ischemic event although EKG is been ordered as well as troponin.   -Levels were negative overnight.  -Compared to previous EKGs no significant change.    7. COPD.    -Continue with respiratory therapy protocols.  -Adding IV steroids for treatment of obstructive disease component.   -May consider as possible rescue therapy.    Discussed patient condition and risk of morbidity and/or mortality with at length on M.D. rounds.   The patient remains critically ill.  Critical care time = 40 minutes in directly providing and coordinating critical care and extensive data review.  No time overlap and excludes procedures.     Chris Ching D.O.

## 2018-03-24 NOTE — CARE PLAN
Problem: Bowel/Gastric:  Goal: Will not experience complications related to bowel motility  Outcome: PROGRESSING AS EXPECTED  Instruct patient in strategies to promote bowel elimination and management of diarrhea, constipation and incontinence.      Problem: Pain Management  Goal: Pain level will decrease to patient's comfort goal  Outcome: PROGRESSING AS EXPECTED  Assess and classify patient's pain/physical discomfort using CPOT scale. Perform ongoing assessment to determine if the pain management plan meets the patient's stated goal.

## 2018-03-24 NOTE — CARE PLAN
Problem: Ventilation Defect:  Goal: Ability to achieve and maintain unassisted ventilation or tolerate decreased levels of ventilator support  Outcome: PROGRESSING SLOWER THAN EXPECTED  Adult Ventilation Update    Total Vent Days: 4  CMV 12/430/10/50%    Patient Lines/Drains/Airways Status    Active Airway     Name: Placement date: Placement time: Site: Days:    Airway Group ET Tube Oral 8.0 03/20/18   1215   Oral   3              In the last 24 hours, No SBT due to 12 of peep.     Plateau Pressure (Q Shift): 24 (03/23/18 0707)  Static Compliance (ml / cm H2O): 34 (03/23/18 1531)    Patient failed trials because of Barriers to Wean: FiO2 >60% or PEEP >10 CM H2O (03/21/18 0732)  Barriers to SBT    Length of Weaning Trial      Cough: Productive (03/23/18 1531)  Sputum Amount: Small (03/23/18 1531)  Sputum Color: White (03/23/18 1531)  Sputum Consistency: Thick (03/23/18 1531)    Mobility Group  Activity Performed: Edge of bed (03/23/18 1012)  Time Activity Tolerated: 4 min (03/23/18 1012)  Assistance / Tolerance: Assistance of Two or More;Tolerates Well (03/23/18 1012)  Pt Calls for Assistance: No (03/23/18 1012)  Staff Present for Mobilization: RN;PT (03/23/18 1012)  Reason Not Mobilized: Unstable condition (03/22/18 2000)  Mobilization Comments: Desats with turns; PEEP 12 (03/22/18 2000)    Events/Summary/Plan: Peep decreased to 10.  No other vent changes at this time.  Will continue to monitor patient.

## 2018-03-24 NOTE — PROGRESS NOTES
[facility] Hospitalist Progress Note    Date of Service: 3/23/2018    Chief Complaint  57 y.o. male admitted 3/16/2018 with ongoing cellulitis of bilateral lower extremities with a history of chronic venous stasis and lymphedema. He had acute respiratory distress in ER.    Interval Problem Update    Remains intubated on high vent settings  Distended abdomen  Agitated    Consultants/Specialty  Intensivist    Disposition  TBD        Review of Systems   Unable to perform ROS: Intubated      Physical Exam  Laboratory/Imaging   Hemodynamics  Temp (24hrs), Av.9 °C (98.5 °F), Min:36.2 °C (97.1 °F), Max:37.5 °C (99.5 °F)   Temperature: 37.5 °C (99.5 °F)  Pulse  Av.7  Min: 57  Max: 111 Heart Rate (Monitored): 93  NIBP: 101/49      Respiratory  Anthony Vent Mode: APVCMV, Rate (breaths/min): 12, PEEP/CPAP: 10, PEEP/CPAP: 10, FiO2: 50, P Peak (PIP): 24, P MEAN: 14   Respiration: (!) 33, Pulse Oximetry: 94 %     Work Of Breathing / Effort: Vented  RUL Breath Sounds: Clear, RML Breath Sounds: Diminished, RLL Breath Sounds: Diminished, LARY Breath Sounds: Clear, LLL Breath Sounds: Diminished    Fluids    Intake/Output Summary (Last 24 hours) at 18 1806  Last data filed at 18 1800   Gross per 24 hour   Intake          2654.15 ml   Output             4235 ml   Net         -1580.85 ml       Nutrition  Orders Placed This Encounter   Procedures   • Diet NPO     Standing Status:   Standing     Number of Occurrences:   1     Order Specific Question:   Type:     Answer:   Now [1]     Order Specific Question:   Restrict to:     Answer:   Strict [1]     Physical Exam   Constitutional: He appears well-developed.   Morbidly obese   HENT:   Head: Normocephalic and atraumatic.   Right Ear: External ear normal.   Left Ear: External ear normal.   Mouth/Throat: No oropharyngeal exudate.   Eyes: Conjunctivae and EOM are normal. Pupils are equal, round, and reactive to light. Right eye exhibits no discharge. Left eye exhibits no  discharge. No scleral icterus.   Neck: No JVD present. No tracheal deviation present.   Cardiovascular: Regular rhythm and normal heart sounds.    No murmur heard.  Tachycardia   Pulmonary/Chest: No stridor. No respiratory distress. He has rhonchi. He has rales. He exhibits no tenderness and no crepitus.   Intubated   Abdominal: Soft. He exhibits distension. There is no tenderness. There is no rebound and no guarding.   Musculoskeletal: He exhibits edema (2+). He exhibits no tenderness.   Neurological: No cranial nerve deficit. He exhibits normal muscle tone.   Sedated    Skin: Skin is warm and dry. He is not diaphoretic.   Chronic stasis dermatitis changes in both lower extremities    LE wounds with clean dressing   Psychiatric:   Sedated   Vitals reviewed.      Recent Labs      03/21/18   0410  03/22/18   0440  03/23/18   0356   WBC  7.4  7.2  7.2   RBC  3.61*  3.26*  3.61*   HEMOGLOBIN  10.5*  9.4*  10.4*   HEMATOCRIT  33.9*  30.4*  33.7*   MCV  93.9  93.3  93.4   MCH  29.1  28.8  28.8   MCHC  31.0*  30.9*  30.9*   RDW  55.4*  56.4*  54.5*   PLATELETCT  186  174  168   MPV  11.1  11.0  10.7     Recent Labs      03/22/18   0440  03/23/18   0356  03/23/18   1614   SODIUM  140  142  139   POTASSIUM  3.5*  3.9  3.2*   CHLORIDE  97  96  95*   CO2  37*  39*  38*   GLUCOSE  139*  176*  137*   BUN  17  21  22   CREATININE  0.74  0.70  0.63   CALCIUM  8.5  9.0  8.9             Recent Labs      03/22/18   0440   TRIGLYCERIDE  210*          Assessment/Plan     * Acute on chronic respiratory failure with hypoxia (CMS-HCC)- (present on admission)   Assessment & Plan    Intubated 3-20  Vent management per CC discussed with Dr Humza blanco IV lasix        Bilateral lower leg cellulitis- (present on admission)   Assessment & Plan    Continue Wound care  Cx  MSSA     IV Unasyn        Pneumonia- (present on admission)   Assessment & Plan    Cultures negative will de-escalate antibiotic to Unasyn           Narcotic addiction  (CMS-HCC)- (present on admission)   Assessment & Plan    Continue pain management          Stasis dermatitis- (present on admission)   Assessment & Plan    With MSSA cellulitis    Continue wound care    unasyn  Vitamin C & zinc        Hypothyroidism- (present on admission)   Assessment & Plan    Stable continue Synthroid           COPD (chronic obstructive pulmonary disease) (CMS-HCC)- (present on admission)   Assessment & Plan    RT protocol        HTN (hypertension)- (present on admission)   Assessment & Plan    Continue lisinopril  Started on Lasix drip        Hypokalemia   Assessment & Plan    Replete and monitor         Delirium   Assessment & Plan    Start seroquel        Ileus (CMS-HCC)   Assessment & Plan    abd x-ray  Hold TF  Consider reglan        Adult failure to thrive   Assessment & Plan    Per H+P the patient isnot able to care for himself despite home health care        Bilateral edema of lower extremity   Assessment & Plan    Chronic  Lasix drip          Diabetes type 2, controlled (CMS-HCC)- (present on admission)   Assessment & Plan    HgbA1c:6.4    ISS, monitor chem BG        Non compliance w medication regimen- (present on admission)   Assessment & Plan    Reinforce compliance after extubation        Morbid obesity (CMS-HCC)- (present on admission)   Assessment & Plan    Body mass index is 48.02 kg/m².            Quality-Core Measures   Reviewed items::  Radiology images reviewed, Labs reviewed and Medications reviewed  Montoya catheter::  Unconscious / Sedated Patient on a Ventilator  Central line in place:  Need for access  DVT prophylaxis pharmacological::  Enoxaparin (Lovenox)  Antibiotics:  Treating active infection/contamination beyond 24 hours perioperative coverage

## 2018-03-25 ENCOUNTER — APPOINTMENT (OUTPATIENT)
Dept: RADIOLOGY | Facility: MEDICAL CENTER | Age: 58
DRG: 987 | End: 2018-03-25
Attending: INTERNAL MEDICINE
Payer: MEDICARE

## 2018-03-25 LAB
ACTION RANGE TRIGGERED IACRT: YES
ANION GAP SERPL CALC-SCNC: 7 MMOL/L (ref 0–11.9)
BASE EXCESS BLDA CALC-SCNC: 14 MMOL/L (ref -4–3)
BASOPHILS # BLD AUTO: 0.2 % (ref 0–1.8)
BASOPHILS # BLD: 0.02 K/UL (ref 0–0.12)
BODY TEMPERATURE: ABNORMAL DEGREES
BUN SERPL-MCNC: 26 MG/DL (ref 8–22)
CALCIUM SERPL-MCNC: 9.4 MG/DL (ref 8.5–10.5)
CHLORIDE SERPL-SCNC: 96 MMOL/L (ref 96–112)
CO2 BLDA-SCNC: 42 MMOL/L (ref 20–33)
CO2 SERPL-SCNC: 38 MMOL/L (ref 20–33)
CREAT SERPL-MCNC: 0.48 MG/DL (ref 0.5–1.4)
EOSINOPHIL # BLD AUTO: 0.02 K/UL (ref 0–0.51)
EOSINOPHIL NFR BLD: 0.2 % (ref 0–6.9)
ERYTHROCYTE [DISTWIDTH] IN BLOOD BY AUTOMATED COUNT: 51.8 FL (ref 35.9–50)
GLUCOSE BLD-MCNC: 103 MG/DL (ref 65–99)
GLUCOSE BLD-MCNC: 104 MG/DL (ref 65–99)
GLUCOSE BLD-MCNC: 113 MG/DL (ref 65–99)
GLUCOSE BLD-MCNC: 131 MG/DL (ref 65–99)
GLUCOSE BLD-MCNC: 152 MG/DL (ref 65–99)
GLUCOSE SERPL-MCNC: 142 MG/DL (ref 65–99)
HCO3 BLDA-SCNC: 40.6 MMOL/L (ref 17–25)
HCT VFR BLD AUTO: 32.5 % (ref 42–52)
HGB BLD-MCNC: 9.9 G/DL (ref 14–18)
IMM GRANULOCYTES # BLD AUTO: 0.1 K/UL (ref 0–0.11)
IMM GRANULOCYTES NFR BLD AUTO: 1 % (ref 0–0.9)
INST. QUALIFIED PATIENT IIQPT: YES
LYMPHOCYTES # BLD AUTO: 1.32 K/UL (ref 1–4.8)
LYMPHOCYTES NFR BLD: 13.2 % (ref 22–41)
MCH RBC QN AUTO: 28.2 PG (ref 27–33)
MCHC RBC AUTO-ENTMCNC: 30.5 G/DL (ref 33.7–35.3)
MCV RBC AUTO: 92.6 FL (ref 81.4–97.8)
MONOCYTES # BLD AUTO: 0.9 K/UL (ref 0–0.85)
MONOCYTES NFR BLD AUTO: 9 % (ref 0–13.4)
NEUTROPHILS # BLD AUTO: 7.67 K/UL (ref 1.82–7.42)
NEUTROPHILS NFR BLD: 76.4 % (ref 44–72)
NRBC # BLD AUTO: 0 K/UL
NRBC BLD-RTO: 0 /100 WBC
O2/TOTAL GAS SETTING VFR VENT: 40 %
PCO2 BLDA: 61.6 MMHG (ref 26–37)
PCO2 TEMP ADJ BLDA: 64.3 MMHG (ref 26–37)
PH BLDA: 7.43 [PH] (ref 7.4–7.5)
PH TEMP ADJ BLDA: 7.41 [PH] (ref 7.4–7.5)
PLATELET # BLD AUTO: 202 K/UL (ref 164–446)
PMV BLD AUTO: 10.8 FL (ref 9–12.9)
PO2 BLDA: 61 MMHG (ref 64–87)
PO2 TEMP ADJ BLDA: 66 MMHG (ref 64–87)
POTASSIUM SERPL-SCNC: 3.1 MMOL/L (ref 3.6–5.5)
RBC # BLD AUTO: 3.51 M/UL (ref 4.7–6.1)
SAO2 % BLDA: 91 % (ref 93–99)
SODIUM SERPL-SCNC: 141 MMOL/L (ref 135–145)
SPECIMEN DRAWN FROM PATIENT: ABNORMAL
TRIGL SERPL-MCNC: 331 MG/DL (ref 0–149)
TROPONIN I SERPL-MCNC: <0.01 NG/ML (ref 0–0.04)
WBC # BLD AUTO: 10 K/UL (ref 4.8–10.8)

## 2018-03-25 PROCEDURE — 700111 HCHG RX REV CODE 636 W/ 250 OVERRIDE (IP): Performed by: HOSPITALIST

## 2018-03-25 PROCEDURE — 99233 SBSQ HOSP IP/OBS HIGH 50: CPT | Performed by: HOSPITALIST

## 2018-03-25 PROCEDURE — 770022 HCHG ROOM/CARE - ICU (200)

## 2018-03-25 PROCEDURE — 80048 BASIC METABOLIC PNL TOTAL CA: CPT

## 2018-03-25 PROCEDURE — 94640 AIRWAY INHALATION TREATMENT: CPT

## 2018-03-25 PROCEDURE — 700102 HCHG RX REV CODE 250 W/ 637 OVERRIDE(OP): Performed by: INTERNAL MEDICINE

## 2018-03-25 PROCEDURE — 84484 ASSAY OF TROPONIN QUANT: CPT

## 2018-03-25 PROCEDURE — 700111 HCHG RX REV CODE 636 W/ 250 OVERRIDE (IP): Performed by: INTERNAL MEDICINE

## 2018-03-25 PROCEDURE — 700102 HCHG RX REV CODE 250 W/ 637 OVERRIDE(OP): Performed by: HOSPITALIST

## 2018-03-25 PROCEDURE — A9270 NON-COVERED ITEM OR SERVICE: HCPCS | Performed by: HOSPITALIST

## 2018-03-25 PROCEDURE — 82803 BLOOD GASES ANY COMBINATION: CPT

## 2018-03-25 PROCEDURE — 94003 VENT MGMT INPAT SUBQ DAY: CPT

## 2018-03-25 PROCEDURE — 84478 ASSAY OF TRIGLYCERIDES: CPT

## 2018-03-25 PROCEDURE — 71045 X-RAY EXAM CHEST 1 VIEW: CPT

## 2018-03-25 PROCEDURE — 82962 GLUCOSE BLOOD TEST: CPT | Mod: 91

## 2018-03-25 PROCEDURE — 85025 COMPLETE CBC W/AUTO DIFF WBC: CPT

## 2018-03-25 PROCEDURE — 700105 HCHG RX REV CODE 258: Performed by: INTERNAL MEDICINE

## 2018-03-25 PROCEDURE — 700101 HCHG RX REV CODE 250: Performed by: INTERNAL MEDICINE

## 2018-03-25 PROCEDURE — A9270 NON-COVERED ITEM OR SERVICE: HCPCS | Performed by: INTERNAL MEDICINE

## 2018-03-25 PROCEDURE — 36600 WITHDRAWAL OF ARTERIAL BLOOD: CPT

## 2018-03-25 RX ORDER — QUETIAPINE FUMARATE 25 MG/1
25 TABLET, FILM COATED ORAL 3 TIMES DAILY
Status: DISCONTINUED | OUTPATIENT
Start: 2018-03-25 | End: 2018-03-25

## 2018-03-25 RX ORDER — FUROSEMIDE 10 MG/ML
40 SOLUTION ORAL
Status: DISCONTINUED | OUTPATIENT
Start: 2018-03-25 | End: 2018-03-25

## 2018-03-25 RX ORDER — FUROSEMIDE 40 MG/1
40 TABLET ORAL
Status: DISCONTINUED | OUTPATIENT
Start: 2018-03-25 | End: 2018-04-12

## 2018-03-25 RX ORDER — BUDESONIDE AND FORMOTEROL FUMARATE DIHYDRATE 160; 4.5 UG/1; UG/1
2 AEROSOL RESPIRATORY (INHALATION)
Status: DISCONTINUED | OUTPATIENT
Start: 2018-03-25 | End: 2018-03-26

## 2018-03-25 RX ORDER — ACETAZOLAMIDE 500 MG/5ML
500 INJECTION, POWDER, LYOPHILIZED, FOR SOLUTION INTRAVENOUS ONCE
Status: COMPLETED | OUTPATIENT
Start: 2018-03-25 | End: 2018-03-25

## 2018-03-25 RX ORDER — QUETIAPINE FUMARATE 25 MG/1
25 TABLET, FILM COATED ORAL 2 TIMES DAILY
Status: DISCONTINUED | OUTPATIENT
Start: 2018-03-25 | End: 2018-03-26

## 2018-03-25 RX ADMIN — AMPICILLIN SODIUM AND SULBACTAM SODIUM 3 G: 2; 1 INJECTION, POWDER, FOR SOLUTION INTRAMUSCULAR; INTRAVENOUS at 12:00

## 2018-03-25 RX ADMIN — METOCLOPRAMIDE 10 MG: 5 INJECTION, SOLUTION INTRAMUSCULAR; INTRAVENOUS at 05:29

## 2018-03-25 RX ADMIN — FUROSEMIDE 40 MG: 40 TABLET ORAL at 11:42

## 2018-03-25 RX ADMIN — IPRATROPIUM BROMIDE AND ALBUTEROL SULFATE 3 ML: .5; 3 SOLUTION RESPIRATORY (INHALATION) at 06:19

## 2018-03-25 RX ADMIN — PROPOFOL 60 MCG/KG/MIN: 10 INJECTION, EMULSION INTRAVENOUS at 03:08

## 2018-03-25 RX ADMIN — Medication 220 MG: at 09:00

## 2018-03-25 RX ADMIN — QUETIAPINE FUMARATE 25 MG: 25 TABLET ORAL at 13:02

## 2018-03-25 RX ADMIN — AMPICILLIN SODIUM AND SULBACTAM SODIUM 3 G: 2; 1 INJECTION, POWDER, FOR SOLUTION INTRAMUSCULAR; INTRAVENOUS at 17:07

## 2018-03-25 RX ADMIN — QUETIAPINE FUMARATE 25 MG: 25 TABLET ORAL at 20:56

## 2018-03-25 RX ADMIN — AMPICILLIN SODIUM AND SULBACTAM SODIUM 3 G: 2; 1 INJECTION, POWDER, FOR SOLUTION INTRAMUSCULAR; INTRAVENOUS at 05:29

## 2018-03-25 RX ADMIN — PROPOFOL 60 MCG/KG/MIN: 10 INJECTION, EMULSION INTRAVENOUS at 01:47

## 2018-03-25 RX ADMIN — LACTOBACILLUS ACIDOPHILUS / LACTOBACILLUS BULGARICUS 1 PACKET: 100 MILLION CFU STRENGTH GRANULES at 06:27

## 2018-03-25 RX ADMIN — POTASSIUM BICARBONATE 25 MEQ: 25 TABLET, EFFERVESCENT ORAL at 09:00

## 2018-03-25 RX ADMIN — BUDESONIDE AND FORMOTEROL FUMARATE DIHYDRATE 2 PUFF: 160; 4.5 AEROSOL RESPIRATORY (INHALATION) at 19:43

## 2018-03-25 RX ADMIN — OXYCODONE HYDROCHLORIDE AND ACETAMINOPHEN 500 MG: 500 TABLET ORAL at 09:01

## 2018-03-25 RX ADMIN — ENOXAPARIN SODIUM 40 MG: 100 INJECTION SUBCUTANEOUS at 09:01

## 2018-03-25 RX ADMIN — OMEPRAZOLE 40 MG: 20 CAPSULE, DELAYED RELEASE ORAL at 09:03

## 2018-03-25 RX ADMIN — ENOXAPARIN SODIUM 40 MG: 100 INJECTION SUBCUTANEOUS at 20:55

## 2018-03-25 RX ADMIN — OXYCODONE HYDROCHLORIDE 40 MG: 10 TABLET ORAL at 13:01

## 2018-03-25 RX ADMIN — Medication: at 09:06

## 2018-03-25 RX ADMIN — LEVOTHYROXINE SODIUM 50 MCG: 50 TABLET ORAL at 06:27

## 2018-03-25 RX ADMIN — QUETIAPINE FUMARATE 50 MG: 25 TABLET ORAL at 05:32

## 2018-03-25 RX ADMIN — LACTOBACILLUS ACIDOPHILUS / LACTOBACILLUS BULGARICUS 1 PACKET: 100 MILLION CFU STRENGTH GRANULES at 17:06

## 2018-03-25 RX ADMIN — ASPIRIN 81 MG: 81 TABLET, CHEWABLE ORAL at 09:02

## 2018-03-25 RX ADMIN — POTASSIUM CHLORIDE 40 MEQ: 2 INJECTION, SOLUTION, CONCENTRATE INTRAVENOUS at 11:26

## 2018-03-25 RX ADMIN — ACETAZOLAMIDE 500 MG: 500 INJECTION, POWDER, LYOPHILIZED, FOR SOLUTION INTRAVENOUS at 11:42

## 2018-03-25 RX ADMIN — OXYCODONE HYDROCHLORIDE 40 MG: 10 TABLET ORAL at 17:06

## 2018-03-25 RX ADMIN — OXYCODONE HYDROCHLORIDE 40 MG: 10 TABLET ORAL at 09:02

## 2018-03-25 RX ADMIN — POTASSIUM BICARBONATE 25 MEQ: 25 TABLET, EFFERVESCENT ORAL at 17:06

## 2018-03-25 RX ADMIN — OXYCODONE HYDROCHLORIDE 40 MG: 10 TABLET ORAL at 20:56

## 2018-03-25 RX ADMIN — POTASSIUM BICARBONATE 25 MEQ: 25 TABLET, EFFERVESCENT ORAL at 20:56

## 2018-03-25 RX ADMIN — CHLORHEXIDINE GLUCONATE 15 ML: 1.2 RINSE ORAL at 09:00

## 2018-03-25 RX ADMIN — LISINOPRIL 10 MG: 20 TABLET ORAL at 09:02

## 2018-03-25 RX ADMIN — IPRATROPIUM BROMIDE AND ALBUTEROL SULFATE 3 ML: .5; 3 SOLUTION RESPIRATORY (INHALATION) at 02:29

## 2018-03-25 RX ADMIN — PROPOFOL 40 MCG/KG/MIN: 10 INJECTION, EMULSION INTRAVENOUS at 05:30

## 2018-03-25 RX ADMIN — AMLODIPINE BESYLATE 5 MG: 5 TABLET ORAL at 09:01

## 2018-03-25 RX ADMIN — LACTOBACILLUS ACIDOPHILUS / LACTOBACILLUS BULGARICUS 1 PACKET: 100 MILLION CFU STRENGTH GRANULES at 11:49

## 2018-03-25 RX ADMIN — STANDARDIZED SENNA CONCENTRATE AND DOCUSATE SODIUM 2 TABLET: 8.6; 5 TABLET, FILM COATED ORAL at 09:01

## 2018-03-25 ASSESSMENT — PAIN SCALES - GENERAL
PAINLEVEL_OUTOF10: 6
PAINLEVEL_OUTOF10: 8
PAINLEVEL_OUTOF10: 5
PAINLEVEL_OUTOF10: 3
PAINLEVEL_OUTOF10: 4
PAINLEVEL_OUTOF10: 5
PAINLEVEL_OUTOF10: 8
PAINLEVEL_OUTOF10: 4
PAINLEVEL_OUTOF10: 8
PAINLEVEL_OUTOF10: 8

## 2018-03-25 NOTE — PROGRESS NOTES
Despite patient being in soft wrist restraints and mittens patient is flexible and wiggled down in bed and able to displace enteral tube.  Patient medicated and tube advanced back in place.  Continue to monitor.  Enteral tube now secured to patient's forehead with 2 tegaderms.  Continue to monitor.

## 2018-03-25 NOTE — PROGRESS NOTES
Pulmonary Critical Care Progress Note        Chief Complaint: Acute hypoxemic respiratory failure, chronic leg wounds with possible cellulitis.    History of Present Illness: Fer Estrada is a 57 y.o. male with a past medical history of multiple admissions for bilateral lower extremity cellulitis. In the past he has been noncompliant with wound care. He has a history of chronic lymphedema, venous stasis, diabetes mellitus type 2, systemic hypertension and actually dependent COPD. He is morbidly obese and has chronic pain with narcotic dependence. He was most recently admitted in January 2018 for recurrent bilateral lower extremity cellulitis. Today he presented to the ED after his daughter called EMS stating she could not take care of him any longer. He has been having difficulty standing and going to the bathroom. He has been having recent diarrhea and increasing sores to his buttocks as well as her chronic leg wounds. He denies fever or chills. He did report some diarrhea starting yesterday. He had a soft brown stool in the ED. He is recently been on clindamycin. In the ED he was given IV fluid and began having increasing dyspnea. He is noted to have diastolic heart dysfunction. He was given some Lasix but developed increasing respiratory distress and was placed on BiPAP. At time I evaluation is in BiPAP in the ICU. He has diminished air entry throughout. He arouses but is somewhat somnolent. He follows commands but is unable to give much additional history.    Please note above history obtained by my partner Dr. Sewell on 3/16/2018.    ROS:  Respiratory: unable to perform due to the patient's inability to effectively communicate, Cardiac: unable to perform due to the patient's inability to effectively communicate, GI: unable to perform due to the patient's inability to effectively communicate.  Note that the patient on BiPAP and difficult to talk. All other systems negative.    Interval Events:  24 hour interval  history reviewed    Extubation today 840  5l NC  Swallow evaluation today.  No diuretics today  NS TKO  PT/OT order  CPAP at night  BM again today  D/c reglan  Lovenox and prilsec  10 days of Unasyn  Seroquel wean  Symbicort  Lasix 40 qd and diamox x 1    PFSH:  No change.    Respiratory:  Anthony Vent Mode: APVCMV, Rate (breaths/min): 10, Vt Target (mL): 430, PEEP/CPAP: 10, FiO2: 40, Static Compliance (ml / cm H2O): 54, Control VTE (exp VT): 427  Pulse Oximetry: 100 %  Chest Tube Drains:        Exam: Patient sent for this with ventilator and there is bibasilar diminished breath sounds. Respiratory crackles are still heard but improved.  ImagingCXR  I have personally reviewed the chest x-ray my impression is    Recent Labs      03/23/18   0416  03/24/18   0431  03/25/18   0413   ISTATAPH  7.452  7.434  7.427   ISTATAPCO2  61.5*  62.5*  61.6*   ISTATAPO2  74  83  61*   ISTATATCO2  45*  44*  42*   ENUXUHY0OVE  95  96  91*   ISTATARTHCO3  43.0*  41.9*  40.6*   ISTATARTBE  17*  15*  14*   ISTATTEMP  97.4 F  98.6 F  38.0 C   ISTATFIO2  50  50  40   ISTATSPEC  Arterial  Arterial  Arterial   ISTATAPHTC  7.463  7.434  7.412   GAZUMNKE0KO  71  83  66     HemoDynamics:  Pulse: 65, Heart Rate (Monitored): 63  NIBP: 105/52       Exam: regular rate and rhythm occasional bradycardia noted.  Imaging: echo Reviewed     Echocardiogram from 3/18/2018:    CONCLUSIONS  Technically difficult study incomplete information is obtained.   Grossly normal left ventricular systolic function.  Right ventricle not well visualized.  No valve disease noted by blind   Doppler interrogation.  No noted changes from prior.    Recent Labs      03/23/18   0356  03/24/18   0342  03/25/18   0520   TROPONINI  <0.01  <0.01  <0.01   BNPBTYPENAT   --   29   --      Neuro:  GCS Total Siomara Coma Score: 11     Exam: follows commands, raises two fingers. Patient continues to have weak bilateral lower extremities due to the significant edema, chronic venous  stasis ulcerations and pulmonary hypertension. Patient does have normal  strength. Patient is coming off of all sedation at this time. He has decreased sensation peripherally due to the edema. Normal upper extremity strength without significant edema.  Imaging: None - Reviewed    Fluids:  Intake/Output       03/23/18 0700 - 03/24/18 0659 03/24/18 0700 - 03/25/18 0659 03/25/18 0700 - 03/26/18 0659      0700-1859 1900-0659 Total 0700-1859 1900-0659 Total 1840-3483 6121-8426 Total       Intake    I.V.  476.4  1223.9 1700.2  741.6  918.7 1660.3  --  -- --    Propofol Volume 256.4 853.9 1110.2 531.6 598.7 1130.3 -- -- --    IV Piggyback Volume (IV Piggyback) 200 200 400 100 200 300 -- -- --    IV Volume (Lasix) -- 60 60 10 -- 10 -- -- --    IV Volume (NS TKO) 20 110 130 100 120 220 -- -- --    Other  50  135 185  --  -- --  --  -- --    Medications (P.O./ Enteral Liquids) 50 135 185 -- -- -- -- -- --    Enteral  375  120 495  --  -- --  --  -- --    Enteral Volume 175 -- 175 -- -- -- -- -- --    Free Water / Tube Flush 200 120 320 -- -- -- -- -- --    Total Intake 901.4 1478.9 2380.2 741.6 918.7 1660.3 -- -- --       Output    Urine  2460  1725 4185  1375  855 2230  --  -- --    Indwelling Cathether 2460 1725 4185 7840 837 6617 -- -- --    Stool  --  -- --  --  -- --  --  -- --    Number of Times Stooled -- 1 x 1 x 0 x 2 x 2 x -- -- --    Total Output 2460 1725 4185 7730 058 5729 -- -- --       Net I/O     -1558.6 -246.1 -1804.8 -633.4 63.7 -569.7 -- -- --        Weight: (!) 138.5 kg (305 lb 5.4 oz)  Recent Labs      03/23/18   0356  03/23/18   1614  03/24/18   0342  03/25/18   0520   SODIUM  142  139  141  141   POTASSIUM  3.9  3.2*  3.6  3.1*   CHLORIDE  96  95*  94*  96   CO2  39*  38*  36*  38*   BUN  21  22  22  26*   CREATININE  0.70  0.63  0.76  0.48*   MAGNESIUM  2.2   --    --    --    PHOSPHORUS  3.9   --    --    --    CALCIUM  9.0  8.9  8.9  9.4       GI/Nutrition:  Exam: normal active bowel sounds  morbidly obese. No obvious rebound rigidity or guarding. Less abdominal distention and patient's KUB appears improved. There is only a mild ileus noted. Bowel sounds are present. There is reported bowel movement.  Imaging: None - Reviewed  taking PO  Liver Function  Recent Labs      18   1614  18   0342  18   0520   GLUCOSE  137*  193*  142*       Heme:  Recent Labs      18   0356  18   0342  18   0520   RBC  3.61*  3.75*  3.51*   HEMOGLOBIN  10.4*  10.3*  9.9*   HEMATOCRIT  33.7*  35.2*  32.5*   PLATELETCT  168  193  202       Infectious Disease:  Temp  Av.8 °C (98.2 °F)  Min: 36.6 °C (97.8 °F)  Max: 36.9 °C (98.5 °F)  Micro: cultures reviewed. On Unasyn at this time. Large ulceration noted in the left proximal posterior thigh, slightly improved. Was previously on linezolid. After intubation changed to Zosyn and vancomycin at this time. Today we have consolidated to Unasyn. Bronchoscopy and BAL was performed although no significant mucus is noted. CT imaging does show bibasilar pneumonia. BAL done at this point is negative for organisms.   Recent Labs      18   0356  18   0342  18   0520   WBC  7.2  10.1  10.0   NEUTSPOLYS  85.90*  90.10*  76.40*   LYMPHOCYTES  7.50*  5.40*  13.20*   MONOCYTES  5.30  2.80  9.00   EOSINOPHILS  0.10  0.30  0.20   BASOPHILS  0.10  0.30  0.20     Current Facility-Administered Medications   Medication Dose Frequency Provider Last Rate Last Dose   • labetalol (NORMODYNE,TRANDATE) injection 10-20 mg  10-20 mg Q4HRS PRN Amarjit Martines M.D.   10 mg at 18 1148   • metoclopramide (REGLAN) injection 10 mg  10 mg Q6HRS Amarjit Martines M.D.   10 mg at 18 0529   • amLODIPine (NORVASC) tablet 5 mg  5 mg Q DAY Amarjit Martines M.D.   5 mg at 18   • ampicillin/sulbactam (UNASYN) 3 g in  mL IVPB  3 g Q6HRS Chris Ching D.O.   Stopped at 18 8554   • QUEtiapine (SEROQUEL) tablet 50 mg  50 mg  TID Amarjit Martines M.D.   50 mg at 03/25/18 0532   • potassium bicarbonate (KLYTE) 25 MEQ effervescent tablet TBEF 25 mEq  25 mEq TID Amarjit Martines M.D.   25 mEq at 03/24/18 2129   • insulin regular (HUMULIN R) injection 2-9 Units  2-9 Units Q6HRS DAVONTE ColladoODorian   Stopped at 03/25/18 0600    And   • glucose 4 g chewable tablet 16 g  16 g Q15 MIN PRN DAVONTE ColladoO.        And   • dextrose 50% (D50W) injection 25 mL  25 mL Q15 MIN PRN DAVONTE ColladoO.       • propofol (DIPRIVAN) injection  0-80 mcg/kg/min Continuous DAVONTE ColladoO. 26.6 mL/hr at 03/25/18 0543 30 mcg/kg/min at 03/25/18 0543   • Respiratory Care per Protocol   Continuous RT Chris Ching D.O.       • senna-docusate (PERICOLACE or SENOKOT S) 8.6-50 MG per tablet 2 Tab  2 Tab BID DAVONTE ColladoO.   2 Tab at 03/24/18 2129    And   • polyethylene glycol/lytes (MIRALAX) PACKET 1 Packet  1 Packet QDAY PRN Chris Ching D.O.        And   • magnesium hydroxide (MILK OF MAGNESIA) suspension 30 mL  30 mL QDAY PRN Chris Ching D.O.        And   • bisacodyl (DULCOLAX) suppository 10 mg  10 mg QDAY PRN YVROSE Collado.O.   10 mg at 03/23/18 1525   • chlorhexidine (PERIDEX) 0.12 % solution 15 mL  15 mL BID DAVONTE ColladoO.   15 mL at 03/24/18 2129   • MD ALERT...Adult ICU Electrolyte Replacement per Pharmacy Protocol   pharmacy to dose Chris Ching D.O.       • fentaNYL (SUBLIMAZE) injection 25 mcg  25 mcg Q HOUR PRN Chris Ching D.O.        Or   • fentaNYL (SUBLIMAZE) injection 50 mcg  50 mcg Q HOUR PRN DAVONTE ColladoO.        Or   • fentaNYL (SUBLIMAZE) injection 100 mcg  100 mcg Q HOUR PRN Chris Ching D.O.   100 mcg at 03/24/18 2129   • ipratropium-albuterol (DUONEB) nebulizer solution 3 mL  3 mL Q4HRS (RT) Chris Ching D.O.   3 mL at 03/25/18 0619   • ascorbic acid (ascorbic acid) tablet 500 mg  500 mg DAILY DAVONTE ColladoODorina   500 mg at 03/24/18 0920   • aspirin (ASA) chewable  tab 81 mg  81 mg DAILY YVROSE Collado.O.   81 mg at 03/24/18 0920   • lactobacillus granules (LACTINEX/FLORANEX) packet 1 Packet  1 Packet TID WITH MEALS DAVONTE ColladoO.   1 Packet at 03/25/18 0627   • levothyroxine (SYNTHROID) tablet 50 mcg  50 mcg AM ES YVROSE Collado.O.   50 mcg at 03/25/18 0627   • lisinopril (PRINIVIL) tablet 10 mg  10 mg DAILY YVROSE Collado.O.   10 mg at 03/24/18 0921   • omeprazole 2 mg/mL in sodium bicarbonate (PRILOSEC) oral susp 40 mg  40 mg DAILY YVROSE Collado.O.   40 mg at 03/24/18 0922   • acetaminophen (TYLENOL) tablet 650 mg  650 mg Q6HRS PRN YVROSE Collado.O.   650 mg at 03/20/18 0905   • oxyCODONE immediate release (ROXICODONE) tablet 30 mg  30 mg Q4HRS PRN YVROSE Collado.O.   30 mg at 03/21/18 2104   • oxyCODONE immediate release (ROXICODONE) tablet 40 mg  40 mg Q4HRS PRN YVROSE Collado.O.   40 mg at 03/24/18 2129   • ammonium lactate (LAC-HYDRIN) 12 % lotion   DAILY YVROSE Collado.O.       • enoxaparin (LOVENOX) inj 40 mg  40 mg BID Vic Pendleton Jr., D.O.   40 mg at 03/24/18 2135   • zinc sulfate (ZINCATE) capsule 220 mg  220 mg DAILY Scott Almodovar, D.O.   220 mg at 03/24/18 0920   • albuterol inhaler 2 Puff  2 Puff Q6HRS PRN Benjie Marcus M.D.       • ipratropium-albuterol (DUONEB) nebulizer solution 3 mL  3 mL Q4H PRN (RT) Benjie Marcus M.D.         Last reviewed on 3/16/2018  3:34 PM by May Degroot    Quality  Measures:   Labs reviewed, Medications reviewed and Radiology images reviewed   Montoya catheter: Critically Ill - Requiring Accurate Measurement of Urinary Output   Central line in place: Sepsis              Assessment and plan:     1.  Acute on chronic hypoxemic and hypercapnic respiratory failure.    -Patient is failed BiPAP and is elected to agree to intubation, bronchoscopy, and CT scan imaging, performed 5 days ago which revealed significant by lateral pneumonia.   -There is also compressive atelectasis.    -BNP was low and therefore I believe adequate diuresis is being performed.  -Today, the patient does have possible ongoing moderate fluid overload and an attempt at aggressive diuresis with diuretic infusion was agreed upon.  -Continue with treatment for obstructive lung disease as well.  -Consolidation to Unasyn alone yesterday  -Aggressive Lasix diuresis infusion was discontinued today as he did have significant output.  -2 aggressive treatments with diuresis were performed.  -Extubation today. Patient may require BiPAP at night due to his obstructive sleep apnea which we discussed.    2. Chronic severe lymphedema with stasis dermatitis.    -Continue with wound care treatment.  -Left posterior thigh wound is improved with conservative treatment. No need for I&D.  -Will likely require infectious disease evaluation pending patient's response to ongoing treatment.    3. Type II diabetes mellitus.    -Continue with sliding scale at this time.  -Have elected to begin low-dose steroids to attempt at treating instructed component of his underlying lung disease. He has probable potential worsening of his blood sugar and longer acting insulin such as NPH or Lantus may be considered.    4. Morbid obesity.    -Obstructive sleep apnea will place the patient on BiPAP at night if the patient tolerates.    5. Hypertension.    -Under adequate control at this time, though did require intermittent medication for hypertension.  -Continue with oral therapy, with ACE inhibitor but observe for worsening renal function.   -At this time tolerating.    6. Chronic right bundle-branch block with wide QRS.     -No obvious sign of acute ischemic event although EKG is been ordered as well as troponin.   -Levels were negative overnight.  -Compared to previous EKGs no significant change.    7. COPD.    -Continue with respiratory therapy protocols.  -Adding IV steroids for treatment of obstructive disease component.   -May consider as possible  rescue therapy.    Discussed patient condition and risk of morbidity and/or mortality with at length on M.D. rounds.   The patient remains critically ill.  Critical care time = 40 minutes in directly providing and coordinating critical care and extensive data review.  No time overlap and excludes procedures.     Chris Ching D.O.

## 2018-03-25 NOTE — FLOWSHEET NOTE
Ventilator Weaning Update    Patient is on vent day 2.5 Hrs.  SBT was tolerated for a minimum of 2.5 (Per Dr Ching) hours on settings of 8/10    Events/Summary/Plan: (P) PT extubated to 3 L N/C

## 2018-03-25 NOTE — PROGRESS NOTES
[facility] Hospitalist Progress Note    Date of Service: 3/24/2018    Chief Complaint  57 y.o. male admitted 3/16/2018 with ongoing cellulitis of bilateral lower extremities with a history of chronic venous stasis and lymphedema. He had acute respiratory distress in ER.    Interval Problem Update    X-ray suggestive of ileus every feeding on hold  Blood pressure elevated      Consultants/Specialty  Intensivist    Disposition  TBD        Review of Systems   Unable to perform ROS: Intubated      Physical Exam  Laboratory/Imaging   Hemodynamics  Temp (24hrs), Av.9 °C (98.5 °F), Min:36.6 °C (97.8 °F), Max:37.4 °C (99.3 °F)   Temperature: 36.9 °C (98.4 °F)  Pulse  Av  Min: 57  Max: 111 Heart Rate (Monitored): 75  NIBP: (!) 161/72      Respiratory  Anthony Vent Mode: APVCMV, Rate (breaths/min): 10, PEEP/CPAP: 10, PEEP/CPAP: 10, FiO2: 50, P Peak (PIP): 27, P MEAN: 16   Respiration: 15, Pulse Oximetry: 96 %     Work Of Breathing / Effort: Vented  RUL Breath Sounds: Clear, RML Breath Sounds: Clear, RLL Breath Sounds: Diminished, LARY Breath Sounds: Clear, LLL Breath Sounds: Diminished    Fluids    Intake/Output Summary (Last 24 hours) at 18 1845  Last data filed at 18 1800   Gross per 24 hour   Intake          2220.47 ml   Output             3100 ml   Net          -879.53 ml       Nutrition  Orders Placed This Encounter   Procedures   • Diet NPO     Standing Status:   Standing     Number of Occurrences:   1     Order Specific Question:   Type:     Answer:   Now [1]     Order Specific Question:   Restrict to:     Answer:   Strict [1]     Physical Exam   Constitutional: He appears well-developed.   Morbidly obese   HENT:   Head: Normocephalic and atraumatic.   Mouth/Throat: No oropharyngeal exudate.   Eyes: Right eye exhibits no discharge. Left eye exhibits no discharge. No scleral icterus.   Neck: No JVD present. No tracheal deviation present.   Cardiovascular: Regular rhythm and normal heart sounds.    No  murmur heard.  Tachycardia   Pulmonary/Chest: No respiratory distress. He has no wheezes. He has rhonchi. He has rales. He exhibits no tenderness and no crepitus.   Intubated   Abdominal: Soft. He exhibits distension. There is no tenderness. There is no rebound and no guarding.   Bowel sounds diminished   Musculoskeletal: He exhibits edema (improved). He exhibits no tenderness.   Neurological: No cranial nerve deficit. He exhibits normal muscle tone.   Sedated    Skin: Skin is warm and dry. He is not diaphoretic.   Chronic stasis dermatitis changes in both lower extremities    Lower extremity wounds dressed with clean dressing   Psychiatric:   Sedated   Vitals reviewed.      Recent Labs      03/22/18   0440  03/23/18   0356  03/24/18   0342   WBC  7.2  7.2  10.1   RBC  3.26*  3.61*  3.75*   HEMOGLOBIN  9.4*  10.4*  10.3*   HEMATOCRIT  30.4*  33.7*  35.2*   MCV  93.3  93.4  93.9   MCH  28.8  28.8  27.5   MCHC  30.9*  30.9*  29.3*   RDW  56.4*  54.5*  54.4*   PLATELETCT  174  168  193   MPV  11.0  10.7  10.8     Recent Labs      03/23/18   0356  03/23/18   1614  03/24/18   0342   SODIUM  142  139  141   POTASSIUM  3.9  3.2*  3.6   CHLORIDE  96  95*  94*   CO2  39*  38*  36*   GLUCOSE  176*  137*  193*   BUN  21  22  22   CREATININE  0.70  0.63  0.76   CALCIUM  9.0  8.9  8.9         Recent Labs      03/24/18   0342   BNPBTYPENAT  29     Recent Labs      03/22/18   0440   TRIGLYCERIDE  210*          Assessment/Plan     * Acute on chronic respiratory failure with hypoxia (CMS-[facility])- (present on admission)   Assessment & Plan    Intubated 3-20  Vent management per CC discussed with Dr Ching          Bilateral lower leg cellulitis- (present on admission)   Assessment & Plan    Continue Wound care  Cx  MSSA     IV Unasyn day 9 of 10        Pneumonia- (present on admission)   Assessment & Plan    Cultures negative will de-escalate antibiotic to Unasyn           Narcotic addiction (CMS-[facility])- (present on admission)   Assessment  & Plan    Continue pain management          Stasis dermatitis- (present on admission)   Assessment & Plan    With MSSA cellulitis    Continue wound care    unasyn complete 10 day course tomorrow  Vitamin C & zinc        Hypothyroidism- (present on admission)   Assessment & Plan    Stable continue Synthroid           COPD (chronic obstructive pulmonary disease) (CMS-HCC)- (present on admission)   Assessment & Plan    RT protocol        HTN (hypertension)- (present on admission)   Assessment & Plan    Uncontrolled will add amlodipine Continue lisinopril          Hypokalemia   Assessment & Plan    K 3.6 Replete and monitor         Delirium   Assessment & Plan    Continue seroquel        Ileus (CMS-HCC)   Assessment & Plan    Start reglan  Close clinical monitoring  Consider relistor        Bilateral edema of lower extremity   Assessment & Plan    Chronic  Improved Lasix drip stopped        Diabetes type 2, controlled (CMS-HCC)- (present on admission)   Assessment & Plan    HgbA1c:6.4    ISS, monitor chem BG        Non compliance w medication regimen- (present on admission)   Assessment & Plan    Reinforce compliance after extubation        Morbid obesity (CMS-HCC)- (present on admission)   Assessment & Plan    Body mass index is 48.02 kg/m².            Quality-Core Measures   Reviewed items::  Radiology images reviewed, Labs reviewed and Medications reviewed  Montoya catheter::  Unconscious / Sedated Patient on a Ventilator  Central line in place:  Need for access  DVT prophylaxis pharmacological::  Enoxaparin (Lovenox)  Antibiotics:  Treating active infection/contamination beyond 24 hours perioperative coverage

## 2018-03-25 NOTE — CARE PLAN
Problem: Pain Management  Goal: Pain level will decrease to patient's comfort goal    Intervention: Follow pain managment plan developed in collaboration with patient and Interdisciplinary Team  Assess and medicate as needed for signs/symptoms of chronic pain      Problem: Respiratory:  Goal: Respiratory status will improve    Intervention: Assess and monitor pulmonary status  Wean off ventilator,   Titrate FIO2 to keep SPO2>92

## 2018-03-25 NOTE — CARE PLAN
Problem: Risk for injury related to physical restraint use  Goal: Safe and appropriate use of physical restraints. Restraints discontinued at the earliest possibility while ensuring patient safety.    Intervention: Restraint risk assessment; Psychosocial alternatives, Environmental alternatives, Physiological alternatives, Daily reatraint documentation, Restraint Discontinuation  Assessed need for continuation of soft wrist restraints.       Problem: Skin Integrity  Goal: Skin Integrity is maintained or improved    Intervention: Hugh Skin Risk Assessment  Hugh Skin Risk Assessment completed see flow sheet. Moisturizers in use  used, Q 2 hour turns, draw sheets/ pillows used for repositioning.

## 2018-03-25 NOTE — CARE PLAN
Problem: Ventilation Defect:  Goal: Ability to achieve and maintain unassisted ventilation or tolerate decreased levels of ventilator support  Outcome: PROGRESSING AS EXPECTED  Adult Ventilation Update    Total Vent Days: 5  CMV 12/430/10/40%    Patient Lines/Drains/Airways Status    Active Airway     Name: Placement date: Placement time: Site: Days:    Airway Group ET Tube Oral 8.0 03/20/18   1215   Oral   4              In the last 24 hours, the patient tolerated SBT for 2.5 on settings of 5/10.          Rapid Shallow Breathing Index (RR/VT): 54 (03/24/18 1222)  Plateau Pressure (Q Shift): 24 (03/24/18 1241)  Static Compliance (ml / cm H2O): 28 (03/24/18 1538)    Patient failed trials because of Barriers to Wean: FiO2 >60% or PEEP >10 CM H2O (03/21/18 0732)  Barriers to SBT    Length of Weaning Trial Length of Weaning Trial (Hours): 2.5 (Per Dr Ching) (03/24/18 1222)      Cough: Productive (03/24/18 1600)  Sputum Amount: Moderate (03/24/18 1600)  Sputum Color: Tan;White (03/24/18 1600)  Sputum Consistency: Thick (03/24/18 1600)    Mobility Group  Activity Performed: Unable to mobilize (03/23/18 2200)  Time Activity Tolerated: 4 min (03/23/18 1012)  Assistance / Tolerance: Assistance of Two or More;Tolerates Well (03/23/18 1012)  Pt Calls for Assistance: No (03/23/18 1012)  Staff Present for Mobilization: RN;PT (03/23/18 1012)  Reason Not Mobilized: Unstable condition (03/23/18 2200)  Mobilization Comments: Desats when turing; PEEP of 12 (03/23/18 2200)    Events/Summary/Plan:Decreased peep to 10.  No other vent changes at this time. Will continue to monitor patient.

## 2018-03-25 NOTE — CARE PLAN
Problem: Safety  Goal: Free from accidental injury  Patient in soft restraints to prevent him from pulling out tubes and devices. Patient mouthing that he does not want the ET Tube in his mouth restrains in use to prevent unplanned extubation.     Problem: Pain  Goal: Alleviation of Pain or a reduction in pain to the patient's comfort goal    Intervention: Pain Management--Medications  Patient assessed for pain medications given.

## 2018-03-26 ENCOUNTER — APPOINTMENT (OUTPATIENT)
Dept: RADIOLOGY | Facility: MEDICAL CENTER | Age: 58
DRG: 987 | End: 2018-03-26
Attending: INTERNAL MEDICINE
Payer: MEDICARE

## 2018-03-26 LAB
ALBUMIN SERPL BCP-MCNC: 3.3 G/DL (ref 3.2–4.9)
ALBUMIN/GLOB SERPL: 1 G/DL
ALP SERPL-CCNC: 61 U/L (ref 30–99)
ALT SERPL-CCNC: 15 U/L (ref 2–50)
ANION GAP SERPL CALC-SCNC: 4 MMOL/L (ref 0–11.9)
AST SERPL-CCNC: 12 U/L (ref 12–45)
BASOPHILS # BLD AUTO: 0.5 % (ref 0–1.8)
BASOPHILS # BLD: 0.05 K/UL (ref 0–0.12)
BILIRUB SERPL-MCNC: 0.3 MG/DL (ref 0.1–1.5)
BUN SERPL-MCNC: 28 MG/DL (ref 8–22)
CALCIUM SERPL-MCNC: 9.1 MG/DL (ref 8.5–10.5)
CHLORIDE SERPL-SCNC: 99 MMOL/L (ref 96–112)
CO2 SERPL-SCNC: 38 MMOL/L (ref 20–33)
CREAT SERPL-MCNC: 0.64 MG/DL (ref 0.5–1.4)
CRP SERPL HS-MCNC: 3.2 MG/DL (ref 0–0.75)
EOSINOPHIL # BLD AUTO: 0.22 K/UL (ref 0–0.51)
EOSINOPHIL NFR BLD: 2.3 % (ref 0–6.9)
ERYTHROCYTE [DISTWIDTH] IN BLOOD BY AUTOMATED COUNT: 54.5 FL (ref 35.9–50)
GLOBULIN SER CALC-MCNC: 3.3 G/DL (ref 1.9–3.5)
GLUCOSE BLD-MCNC: 103 MG/DL (ref 65–99)
GLUCOSE BLD-MCNC: 110 MG/DL (ref 65–99)
GLUCOSE BLD-MCNC: 123 MG/DL (ref 65–99)
GLUCOSE BLD-MCNC: 131 MG/DL (ref 65–99)
GLUCOSE SERPL-MCNC: 110 MG/DL (ref 65–99)
HCT VFR BLD AUTO: 37.2 % (ref 42–52)
HGB BLD-MCNC: 10.6 G/DL (ref 14–18)
IMM GRANULOCYTES # BLD AUTO: 0.09 K/UL (ref 0–0.11)
IMM GRANULOCYTES NFR BLD AUTO: 0.9 % (ref 0–0.9)
INR PPP: 1.11 (ref 0.87–1.13)
LYMPHOCYTES # BLD AUTO: 1.35 K/UL (ref 1–4.8)
LYMPHOCYTES NFR BLD: 14.2 % (ref 22–41)
MCH RBC QN AUTO: 27.5 PG (ref 27–33)
MCHC RBC AUTO-ENTMCNC: 28.5 G/DL (ref 33.7–35.3)
MCV RBC AUTO: 96.4 FL (ref 81.4–97.8)
MONOCYTES # BLD AUTO: 0.95 K/UL (ref 0–0.85)
MONOCYTES NFR BLD AUTO: 10 % (ref 0–13.4)
NEUTROPHILS # BLD AUTO: 6.84 K/UL (ref 1.82–7.42)
NEUTROPHILS NFR BLD: 72.1 % (ref 44–72)
NRBC # BLD AUTO: 0 K/UL
NRBC BLD-RTO: 0 /100 WBC
PLATELET # BLD AUTO: 205 K/UL (ref 164–446)
PMV BLD AUTO: 10.5 FL (ref 9–12.9)
POTASSIUM SERPL-SCNC: 3.5 MMOL/L (ref 3.6–5.5)
PREALB SERPL-MCNC: 22 MG/DL (ref 18–38)
PROT SERPL-MCNC: 6.6 G/DL (ref 6–8.2)
PROTHROMBIN TIME: 14 SEC (ref 12–14.6)
RBC # BLD AUTO: 3.86 M/UL (ref 4.7–6.1)
SODIUM SERPL-SCNC: 141 MMOL/L (ref 135–145)
TROPONIN I SERPL-MCNC: <0.01 NG/ML (ref 0–0.04)
WBC # BLD AUTO: 9.5 K/UL (ref 4.8–10.8)

## 2018-03-26 PROCEDURE — 85025 COMPLETE CBC W/AUTO DIFF WBC: CPT

## 2018-03-26 PROCEDURE — 85610 PROTHROMBIN TIME: CPT

## 2018-03-26 PROCEDURE — 86140 C-REACTIVE PROTEIN: CPT

## 2018-03-26 PROCEDURE — 84134 ASSAY OF PREALBUMIN: CPT

## 2018-03-26 PROCEDURE — G8996 SWALLOW CURRENT STATUS: HCPCS | Mod: CL

## 2018-03-26 PROCEDURE — 92610 EVALUATE SWALLOWING FUNCTION: CPT

## 2018-03-26 PROCEDURE — A9270 NON-COVERED ITEM OR SERVICE: HCPCS | Performed by: INTERNAL MEDICINE

## 2018-03-26 PROCEDURE — 700102 HCHG RX REV CODE 250 W/ 637 OVERRIDE(OP): Performed by: INTERNAL MEDICINE

## 2018-03-26 PROCEDURE — 302136 NUTRITION PUMP: Performed by: HOSPITALIST

## 2018-03-26 PROCEDURE — 99232 SBSQ HOSP IP/OBS MODERATE 35: CPT | Performed by: HOSPITALIST

## 2018-03-26 PROCEDURE — 97530 THERAPEUTIC ACTIVITIES: CPT

## 2018-03-26 PROCEDURE — 82962 GLUCOSE BLOOD TEST: CPT

## 2018-03-26 PROCEDURE — A9270 NON-COVERED ITEM OR SERVICE: HCPCS | Performed by: HOSPITALIST

## 2018-03-26 PROCEDURE — G8997 SWALLOW GOAL STATUS: HCPCS | Mod: CH

## 2018-03-26 PROCEDURE — 700102 HCHG RX REV CODE 250 W/ 637 OVERRIDE(OP): Performed by: HOSPITALIST

## 2018-03-26 PROCEDURE — 302242 IV POLE: Performed by: HOSPITALIST

## 2018-03-26 PROCEDURE — 84484 ASSAY OF TROPONIN QUANT: CPT

## 2018-03-26 PROCEDURE — 700111 HCHG RX REV CODE 636 W/ 250 OVERRIDE (IP): Performed by: HOSPITALIST

## 2018-03-26 PROCEDURE — 770020 HCHG ROOM/CARE - TELE (206)

## 2018-03-26 PROCEDURE — 80053 COMPREHEN METABOLIC PANEL: CPT

## 2018-03-26 PROCEDURE — 71045 X-RAY EXAM CHEST 1 VIEW: CPT

## 2018-03-26 PROCEDURE — 700111 HCHG RX REV CODE 636 W/ 250 OVERRIDE (IP): Performed by: INTERNAL MEDICINE

## 2018-03-26 RX ORDER — AMMONIUM LACTATE 12 G/100G
LOTION TOPICAL DAILY
Status: DISCONTINUED | OUTPATIENT
Start: 2018-03-27 | End: 2018-04-16 | Stop reason: HOSPADM

## 2018-03-26 RX ORDER — AMMONIUM LACTATE 12 G/100G
LOTION TOPICAL
Status: DISCONTINUED | OUTPATIENT
Start: 2018-03-27 | End: 2018-03-26

## 2018-03-26 RX ORDER — BUDESONIDE AND FORMOTEROL FUMARATE DIHYDRATE 160; 4.5 UG/1; UG/1
2 AEROSOL RESPIRATORY (INHALATION) 2 TIMES DAILY
Status: DISCONTINUED | OUTPATIENT
Start: 2018-03-26 | End: 2018-04-16 | Stop reason: HOSPADM

## 2018-03-26 RX ADMIN — POTASSIUM BICARBONATE 25 MEQ: 25 TABLET, EFFERVESCENT ORAL at 08:00

## 2018-03-26 RX ADMIN — OXYCODONE HYDROCHLORIDE 40 MG: 10 TABLET ORAL at 18:10

## 2018-03-26 RX ADMIN — LACTOBACILLUS ACIDOPHILUS / LACTOBACILLUS BULGARICUS 1 PACKET: 100 MILLION CFU STRENGTH GRANULES at 11:03

## 2018-03-26 RX ADMIN — OXYCODONE HYDROCHLORIDE 40 MG: 10 TABLET ORAL at 01:22

## 2018-03-26 RX ADMIN — BUDESONIDE AND FORMOTEROL FUMARATE DIHYDRATE 2 PUFF: 160; 4.5 AEROSOL RESPIRATORY (INHALATION) at 07:27

## 2018-03-26 RX ADMIN — POTASSIUM BICARBONATE 50 MEQ: 25 TABLET, EFFERVESCENT ORAL at 11:03

## 2018-03-26 RX ADMIN — AMMONIUM LACTATE: 12 LOTION TOPICAL at 16:00

## 2018-03-26 RX ADMIN — OXYCODONE HYDROCHLORIDE 40 MG: 10 TABLET ORAL at 22:14

## 2018-03-26 RX ADMIN — ENOXAPARIN SODIUM 40 MG: 100 INJECTION SUBCUTANEOUS at 08:00

## 2018-03-26 RX ADMIN — POTASSIUM BICARBONATE 25 MEQ: 25 TABLET, EFFERVESCENT ORAL at 21:13

## 2018-03-26 RX ADMIN — FUROSEMIDE 40 MG: 40 TABLET ORAL at 08:00

## 2018-03-26 RX ADMIN — STANDARDIZED SENNA CONCENTRATE AND DOCUSATE SODIUM 2 TABLET: 8.6; 5 TABLET, FILM COATED ORAL at 11:03

## 2018-03-26 RX ADMIN — OMEPRAZOLE 40 MG: 20 CAPSULE, DELAYED RELEASE ORAL at 08:00

## 2018-03-26 RX ADMIN — OXYCODONE HYDROCHLORIDE AND ACETAMINOPHEN 500 MG: 500 TABLET ORAL at 08:00

## 2018-03-26 RX ADMIN — AMLODIPINE BESYLATE 5 MG: 5 TABLET ORAL at 08:00

## 2018-03-26 RX ADMIN — BUDESONIDE AND FORMOTEROL FUMARATE DIHYDRATE 2 PUFF: 160; 4.5 AEROSOL RESPIRATORY (INHALATION) at 21:13

## 2018-03-26 RX ADMIN — ENOXAPARIN SODIUM 40 MG: 100 INJECTION SUBCUTANEOUS at 21:13

## 2018-03-26 RX ADMIN — METHYLNALTREXONE BROMIDE 12 MG: 12 INJECTION, SOLUTION SUBCUTANEOUS at 11:45

## 2018-03-26 RX ADMIN — POTASSIUM BICARBONATE 25 MEQ: 25 TABLET, EFFERVESCENT ORAL at 14:05

## 2018-03-26 RX ADMIN — OXYCODONE HYDROCHLORIDE 40 MG: 10 TABLET ORAL at 10:09

## 2018-03-26 RX ADMIN — LISINOPRIL 10 MG: 20 TABLET ORAL at 08:00

## 2018-03-26 RX ADMIN — OXYCODONE HYDROCHLORIDE 40 MG: 10 TABLET ORAL at 14:04

## 2018-03-26 RX ADMIN — LACTOBACILLUS ACIDOPHILUS / LACTOBACILLUS BULGARICUS 1 PACKET: 100 MILLION CFU STRENGTH GRANULES at 06:11

## 2018-03-26 RX ADMIN — OXYCODONE HYDROCHLORIDE 40 MG: 10 TABLET ORAL at 05:23

## 2018-03-26 RX ADMIN — ASPIRIN 81 MG: 81 TABLET, CHEWABLE ORAL at 08:00

## 2018-03-26 RX ADMIN — LACTOBACILLUS ACIDOPHILUS / LACTOBACILLUS BULGARICUS 1 PACKET: 100 MILLION CFU STRENGTH GRANULES at 18:10

## 2018-03-26 RX ADMIN — QUETIAPINE FUMARATE 25 MG: 25 TABLET ORAL at 08:00

## 2018-03-26 RX ADMIN — Medication 220 MG: at 08:00

## 2018-03-26 RX ADMIN — LEVOTHYROXINE SODIUM 50 MCG: 50 TABLET ORAL at 06:11

## 2018-03-26 ASSESSMENT — COGNITIVE AND FUNCTIONAL STATUS - GENERAL
PERSONAL GROOMING: A LITTLE
SUGGESTED CMS G CODE MODIFIER DAILY ACTIVITY: CL
TOILETING: A LOT
DRESSING REGULAR LOWER BODY CLOTHING: A LOT
DRESSING REGULAR UPPER BODY CLOTHING: A LOT
DAILY ACTIVITIY SCORE: 12
HELP NEEDED FOR BATHING: A LOT
EATING MEALS: TOTAL

## 2018-03-26 ASSESSMENT — PAIN SCALES - GENERAL
PAINLEVEL_OUTOF10: 7
PAINLEVEL_OUTOF10: 8
PAINLEVEL_OUTOF10: 8
PAINLEVEL_OUTOF10: 4
PAINLEVEL_OUTOF10: 8
PAINLEVEL_OUTOF10: 5
PAINLEVEL_OUTOF10: 6
PAINLEVEL_OUTOF10: 5
PAINLEVEL_OUTOF10: 8
PAINLEVEL_OUTOF10: 6
PAINLEVEL_OUTOF10: 5

## 2018-03-26 ASSESSMENT — ENCOUNTER SYMPTOMS
COUGH: 1
MEMORY LOSS: 0
EYE DISCHARGE: 0
SEIZURES: 0
FEVER: 0
FLANK PAIN: 0
FOCAL WEAKNESS: 0
HALLUCINATIONS: 0
VOMITING: 0
BACK PAIN: 0
EYE REDNESS: 0
BLOOD IN STOOL: 0
ABDOMINAL PAIN: 0
CHILLS: 0
NAUSEA: 0
SHORTNESS OF BREATH: 1
PALPITATIONS: 0
SPUTUM PRODUCTION: 1

## 2018-03-26 NOTE — THERAPY
"Speech Language Therapy Clinical Swallow Evaluation completed.    Functional Status: Patient AAOx3 with confusion regarding day. Patient slow to respond to verbal stimuli and presented with slowed speech rate. Oral motor examination was WFL. PO trials were given and consisted of 3 ice chips and 2 oz NTL via tsp and single cup sips. Patient presented with minimum delay in swallow trigger, weak laryngeal elevation, and immediate and persisting coughing in >75% of all trials. No further trials were given d/t serious concern for aspiration. Patient required extensive education regarding current swallow function and SLP recs. At this time, patient remains at high risk for aspiration and would recommend continue NPO/cortrak.     Recommendations - Diet: continue NPO/cortrak                             Strategies: To be determined                             Medication Administration: Via Gastric Tube     Plan of Care: Will benefit from Speech Therapy 3 times per week    Post-Acute Therapy: To be determined     See \"Rehab Therapy-Acute\" Patient Summary Report for complete documentation. Thank you for the consult.         "

## 2018-03-26 NOTE — CARE PLAN
Problem: Nutritional:  Goal: Nutrition support tolerated and meeting greater than 85% of estimated needs  Outcome: PROGRESSING SLOWER THAN EXPECTED  TF running at 25 mL/hr

## 2018-03-26 NOTE — CARE PLAN
Problem: Communication  Goal: The ability to communicate needs accurately and effectively will improve  Outcome: PROGRESSING AS EXPECTED  Pt is now extubated and able to communicate his needs effectively and participates in care.    Problem: Venous Thromboembolism (VTW)/Deep Vein Thrombosis (DVT) Prevention:  Goal: Patient will participate in Venous Thrombosis (VTE)/Deep Vein Thrombosis (DVT)Prevention Measures  Outcome: PROGRESSING AS EXPECTED  Pt is unable to wear SCDs due to chronic cellulitis/wounds to BLE, but it receiving Lovenox subQ and educated to move legs around as much as possible to prevent DVTs.

## 2018-03-26 NOTE — PROGRESS NOTES
[facility] Hospitalist Progress Note    Date of Service: 3/25/2018    Chief Complaint  57 y.o. male admitted 3/16/2018 with ongoing cellulitis of bilateral lower extremities with a history of chronic venous stasis and lymphedema. He had acute respiratory distress in ER.    Interval Problem Update    Extubated this morning  On high flow nasal cannula  Abdominal distention improved had bowel movement    Consultants/Specialty  Intensivist    Disposition  TBD        Review of Systems   Unable to perform ROS: Acuity of condition      Physical Exam  Laboratory/Imaging   Hemodynamics  Temp (24hrs), Av.7 °C (98.1 °F), Min:36.2 °C (97.1 °F), Max:37.2 °C (99 °F)   Temperature: 37.2 °C (99 °F)  Pulse  Av  Min: 54  Max: 111 Heart Rate (Monitored): 96  NIBP: (!) 166/69      Respiratory  Anthony Vent Mode: Spont, Rate (breaths/min): 10, PEEP/CPAP: 10, PEEP/CPAP: 10, FiO2: 40, P Peak (PIP): 20, P MEAN: 14   Respiration: 16, Pulse Oximetry: 95 %, O2 Daily Delivery Respiratory : Highflow Nasal Cannula     Work Of Breathing / Effort: Mild  RUL Breath Sounds: Rhonchi, RML Breath Sounds: Rhonchi, RLL Breath Sounds: Diminished, LARY Breath Sounds: Rhonchi, LLL Breath Sounds: Diminished    Fluids    Intake/Output Summary (Last 24 hours) at 18 1756  Last data filed at 18 0600   Gross per 24 hour   Intake          1027.26 ml   Output             1030 ml   Net            -2.74 ml       Nutrition  Orders Placed This Encounter   Procedures   • Diet NPO     Standing Status:   Standing     Number of Occurrences:   1     Order Specific Question:   Type:     Answer:   Now [1]     Order Specific Question:   Restrict to:     Answer:   Strict [1]     Physical Exam   Constitutional: He is oriented to person, place, and time. He appears well-developed.   Morbidly obese   HENT:   Head: Normocephalic and atraumatic.   Mouth/Throat: No oropharyngeal exudate.   Hoarseness   Eyes: EOM are normal. Pupils are equal, round, and reactive to  light. Right eye exhibits no discharge. Left eye exhibits no discharge. No scleral icterus.   Neck: No JVD present. No tracheal deviation present.   Cardiovascular: Regular rhythm and normal heart sounds.    No murmur heard.  Tachycardia   Pulmonary/Chest: No respiratory distress. He has decreased breath sounds. He has no wheezes. He has rhonchi. He exhibits no tenderness and no crepitus.   Abdominal: Soft. Bowel sounds are normal. He exhibits distension (improved). There is no tenderness. There is no rebound.   Musculoskeletal: He exhibits edema (2+). He exhibits no tenderness.   Neurological: He is alert and oriented to person, place, and time. No cranial nerve deficit. He exhibits normal muscle tone.   Skin: Skin is warm and dry. He is not diaphoretic.     LE wounds dressed  with clean dressing   Psychiatric: He has a normal mood and affect. His behavior is normal.   Vitals reviewed.      Recent Labs      03/23/18   0356  03/24/18   0342  03/25/18   0520   WBC  7.2  10.1  10.0   RBC  3.61*  3.75*  3.51*   HEMOGLOBIN  10.4*  10.3*  9.9*   HEMATOCRIT  33.7*  35.2*  32.5*   MCV  93.4  93.9  92.6   MCH  28.8  27.5  28.2   MCHC  30.9*  29.3*  30.5*   RDW  54.5*  54.4*  51.8*   PLATELETCT  168  193  202   MPV  10.7  10.8  10.8     Recent Labs      03/23/18   1614  03/24/18   0342  03/25/18   0520   SODIUM  139  141  141   POTASSIUM  3.2*  3.6  3.1*   CHLORIDE  95*  94*  96   CO2  38*  36*  38*   GLUCOSE  137*  193*  142*   BUN  22  22  26*   CREATININE  0.63  0.76  0.48*   CALCIUM  8.9  8.9  9.4         Recent Labs      03/24/18   0342   BNPBTYPENAT  29     Recent Labs      03/25/18   0520   TRIGLYCERIDE  331*          Assessment/Plan     * Acute on chronic respiratory failure with hypoxia (CMS-HCC)- (present on admission)   Assessment & Plan    Intubated 3-20 extubated 3/25/2018  Continue close monitoring in ICU currently on high flow nasal cannula PRN Bipap          Bilateral lower leg cellulitis- (present on  admission)   Assessment & Plan    Continue Wound care  Cx  MSSA     Will complete 10 day course of Unasyn today        Pneumonia- (present on admission)   Assessment & Plan    Completing course of Unasyn           Narcotic addiction (CMS-HCC)- (present on admission)   Assessment & Plan     pain management minimize sedating agents and closely monitor respiratory status          Stasis dermatitis- (present on admission)   Assessment & Plan    With MSSA cellulitis    Continue wound care    Will complete Unasyn 10 day course today  Vitamin C & zinc        Hypothyroidism- (present on admission)   Assessment & Plan    Stable continue Synthroid           COPD (chronic obstructive pulmonary disease) (CMS-HCC)- (present on admission)   Assessment & Plan    RT protocol        HTN (hypertension)- (present on admission)   Assessment & Plan    Continue  amlodipine and  Lisinopril  Monitor BP and adjust          Hypokalemia   Assessment & Plan    Replete and monitor         Delirium   Assessment & Plan    Improved  Start tapering seroquel        Ileus (CMS-HCC)   Assessment & Plan    Clinically resolved  DC Reglan  Resume feeding as tolerated with close monitoring        Bilateral edema of lower extremity   Assessment & Plan    Chronic  Transition to oral Lasix  Monitor intake output and fluid status        Diabetes type 2, controlled (CMS-HCC)- (present on admission)   Assessment & Plan    HgbA1c:6.4  Stable continue  ISS, monitor BS and adjst        Non compliance w medication regimen- (present on admission)   Assessment & Plan    Reinforce compliance after extubation        Morbid obesity (CMS-HCC)- (present on admission)   Assessment & Plan    Body mass index is 48.02 kg/m².            Quality-Core Measures   Reviewed items::  Radiology images reviewed, Labs reviewed and Medications reviewed  Montoya catheter::  Unconscious / Sedated Patient on a Ventilator  Central line in place:  Need for access  DVT prophylaxis  pharmacological::  Enoxaparin (Lovenox)  Antibiotics:  Treating active infection/contamination beyond 24 hours perioperative coverage

## 2018-03-26 NOTE — CARE PLAN
Problem: Mobility  Goal: Risk for activity intolerance will decrease  Outcome: PROGRESSING SLOWER THAN EXPECTED  PT/OT involved in patient care.  Pt sat up at edge of bed for approximately 10 minutes, tolerated well but feet turned dusky while sitting up.  Will continue to mobilize patient Qshift and PRN.    Problem: Urinary Elimination:  Goal: Ability to reestablish a normal urinary elimination pattern will improve  Outcome: PROGRESSING AS EXPECTED  Discussed discontinuation of dasilva with patient.  He states that he has issues with urinals, discussed placement of condom catheter and pt agreeable to this option.  Will discontinue dasilva and place condom catheter and watch for s/s of urinary retention.

## 2018-03-27 ENCOUNTER — APPOINTMENT (OUTPATIENT)
Dept: RADIOLOGY | Facility: MEDICAL CENTER | Age: 58
DRG: 987 | End: 2018-03-27
Attending: INTERNAL MEDICINE
Payer: MEDICARE

## 2018-03-27 LAB
ANION GAP SERPL CALC-SCNC: 11 MMOL/L (ref 0–11.9)
BASOPHILS # BLD AUTO: 0.4 % (ref 0–1.8)
BASOPHILS # BLD: 0.03 K/UL (ref 0–0.12)
BUN SERPL-MCNC: 32 MG/DL (ref 8–22)
CALCIUM SERPL-MCNC: 9.6 MG/DL (ref 8.5–10.5)
CHLORIDE SERPL-SCNC: 98 MMOL/L (ref 96–112)
CO2 SERPL-SCNC: 29 MMOL/L (ref 20–33)
CREAT SERPL-MCNC: 0.56 MG/DL (ref 0.5–1.4)
EOSINOPHIL # BLD AUTO: 0.37 K/UL (ref 0–0.51)
EOSINOPHIL NFR BLD: 4.4 % (ref 0–6.9)
ERYTHROCYTE [DISTWIDTH] IN BLOOD BY AUTOMATED COUNT: 51.3 FL (ref 35.9–50)
GLUCOSE BLD-MCNC: 119 MG/DL (ref 65–99)
GLUCOSE BLD-MCNC: 123 MG/DL (ref 65–99)
GLUCOSE BLD-MCNC: 156 MG/DL (ref 65–99)
GLUCOSE SERPL-MCNC: 134 MG/DL (ref 65–99)
HCT VFR BLD AUTO: 37.9 % (ref 42–52)
HGB BLD-MCNC: 11.6 G/DL (ref 14–18)
IMM GRANULOCYTES # BLD AUTO: 0.1 K/UL (ref 0–0.11)
IMM GRANULOCYTES NFR BLD AUTO: 1.2 % (ref 0–0.9)
LYMPHOCYTES # BLD AUTO: 1.13 K/UL (ref 1–4.8)
LYMPHOCYTES NFR BLD: 13.5 % (ref 22–41)
MCH RBC QN AUTO: 28.6 PG (ref 27–33)
MCHC RBC AUTO-ENTMCNC: 30.6 G/DL (ref 33.7–35.3)
MCV RBC AUTO: 93.6 FL (ref 81.4–97.8)
MONOCYTES # BLD AUTO: 0.74 K/UL (ref 0–0.85)
MONOCYTES NFR BLD AUTO: 8.9 % (ref 0–13.4)
NEUTROPHILS # BLD AUTO: 5.97 K/UL (ref 1.82–7.42)
NEUTROPHILS NFR BLD: 71.6 % (ref 44–72)
NRBC # BLD AUTO: 0 K/UL
NRBC BLD-RTO: 0 /100 WBC
PLATELET # BLD AUTO: 187 K/UL (ref 164–446)
PMV BLD AUTO: 11.3 FL (ref 9–12.9)
POTASSIUM SERPL-SCNC: 4 MMOL/L (ref 3.6–5.5)
RBC # BLD AUTO: 4.05 M/UL (ref 4.7–6.1)
SODIUM SERPL-SCNC: 138 MMOL/L (ref 135–145)
TROPONIN I SERPL-MCNC: <0.01 NG/ML (ref 0–0.04)
WBC # BLD AUTO: 8.3 K/UL (ref 4.8–10.8)

## 2018-03-27 PROCEDURE — 71045 X-RAY EXAM CHEST 1 VIEW: CPT

## 2018-03-27 PROCEDURE — 82962 GLUCOSE BLOOD TEST: CPT

## 2018-03-27 PROCEDURE — 700102 HCHG RX REV CODE 250 W/ 637 OVERRIDE(OP): Performed by: HOSPITALIST

## 2018-03-27 PROCEDURE — 700111 HCHG RX REV CODE 636 W/ 250 OVERRIDE (IP): Performed by: HOSPITALIST

## 2018-03-27 PROCEDURE — 80048 BASIC METABOLIC PNL TOTAL CA: CPT

## 2018-03-27 PROCEDURE — 84484 ASSAY OF TROPONIN QUANT: CPT

## 2018-03-27 PROCEDURE — A9270 NON-COVERED ITEM OR SERVICE: HCPCS | Performed by: INTERNAL MEDICINE

## 2018-03-27 PROCEDURE — 700102 HCHG RX REV CODE 250 W/ 637 OVERRIDE(OP): Performed by: INTERNAL MEDICINE

## 2018-03-27 PROCEDURE — A9270 NON-COVERED ITEM OR SERVICE: HCPCS | Performed by: HOSPITALIST

## 2018-03-27 PROCEDURE — 94760 N-INVAS EAR/PLS OXIMETRY 1: CPT

## 2018-03-27 PROCEDURE — 36415 COLL VENOUS BLD VENIPUNCTURE: CPT

## 2018-03-27 PROCEDURE — 770020 HCHG ROOM/CARE - TELE (206)

## 2018-03-27 PROCEDURE — 97530 THERAPEUTIC ACTIVITIES: CPT

## 2018-03-27 PROCEDURE — 85025 COMPLETE CBC W/AUTO DIFF WBC: CPT

## 2018-03-27 PROCEDURE — 99232 SBSQ HOSP IP/OBS MODERATE 35: CPT | Performed by: INTERNAL MEDICINE

## 2018-03-27 RX ADMIN — ENOXAPARIN SODIUM 40 MG: 100 INJECTION SUBCUTANEOUS at 09:45

## 2018-03-27 RX ADMIN — LEVOTHYROXINE SODIUM 50 MCG: 50 TABLET ORAL at 06:14

## 2018-03-27 RX ADMIN — ACETAMINOPHEN 650 MG: 325 TABLET, FILM COATED ORAL at 03:17

## 2018-03-27 RX ADMIN — INSULIN HUMAN 2 UNITS: 100 INJECTION, SOLUTION PARENTERAL at 12:33

## 2018-03-27 RX ADMIN — STANDARDIZED SENNA CONCENTRATE AND DOCUSATE SODIUM 2 TABLET: 8.6; 5 TABLET, FILM COATED ORAL at 09:46

## 2018-03-27 RX ADMIN — POTASSIUM BICARBONATE 25 MEQ: 25 TABLET, EFFERVESCENT ORAL at 15:59

## 2018-03-27 RX ADMIN — BUDESONIDE AND FORMOTEROL FUMARATE DIHYDRATE 2 PUFF: 160; 4.5 AEROSOL RESPIRATORY (INHALATION) at 20:11

## 2018-03-27 RX ADMIN — OMEPRAZOLE 40 MG: 20 CAPSULE, DELAYED RELEASE ORAL at 10:12

## 2018-03-27 RX ADMIN — ENOXAPARIN SODIUM 40 MG: 100 INJECTION SUBCUTANEOUS at 20:11

## 2018-03-27 RX ADMIN — OXYCODONE HYDROCHLORIDE 40 MG: 10 TABLET ORAL at 20:11

## 2018-03-27 RX ADMIN — OXYCODONE HYDROCHLORIDE 40 MG: 10 TABLET ORAL at 10:17

## 2018-03-27 RX ADMIN — OXYCODONE HYDROCHLORIDE 40 MG: 10 TABLET ORAL at 15:59

## 2018-03-27 RX ADMIN — ASPIRIN 81 MG: 81 TABLET, CHEWABLE ORAL at 09:46

## 2018-03-27 RX ADMIN — ACETAMINOPHEN 650 MG: 325 TABLET, FILM COATED ORAL at 10:12

## 2018-03-27 RX ADMIN — Medication: at 09:48

## 2018-03-27 RX ADMIN — FUROSEMIDE 40 MG: 40 TABLET ORAL at 09:45

## 2018-03-27 RX ADMIN — BUDESONIDE AND FORMOTEROL FUMARATE DIHYDRATE 2 PUFF: 160; 4.5 AEROSOL RESPIRATORY (INHALATION) at 09:47

## 2018-03-27 RX ADMIN — AMLODIPINE BESYLATE 5 MG: 5 TABLET ORAL at 09:46

## 2018-03-27 RX ADMIN — LACTOBACILLUS ACIDOPHILUS / LACTOBACILLUS BULGARICUS 1 PACKET: 100 MILLION CFU STRENGTH GRANULES at 18:51

## 2018-03-27 RX ADMIN — LISINOPRIL 10 MG: 20 TABLET ORAL at 09:45

## 2018-03-27 RX ADMIN — POTASSIUM BICARBONATE 25 MEQ: 25 TABLET, EFFERVESCENT ORAL at 09:45

## 2018-03-27 RX ADMIN — LACTOBACILLUS ACIDOPHILUS / LACTOBACILLUS BULGARICUS 1 PACKET: 100 MILLION CFU STRENGTH GRANULES at 12:44

## 2018-03-27 RX ADMIN — OXYCODONE HYDROCHLORIDE AND ACETAMINOPHEN 500 MG: 500 TABLET ORAL at 09:46

## 2018-03-27 RX ADMIN — LACTOBACILLUS ACIDOPHILUS / LACTOBACILLUS BULGARICUS 1 PACKET: 100 MILLION CFU STRENGTH GRANULES at 06:14

## 2018-03-27 RX ADMIN — OXYCODONE HYDROCHLORIDE 40 MG: 10 TABLET ORAL at 02:05

## 2018-03-27 RX ADMIN — Medication 220 MG: at 09:47

## 2018-03-27 RX ADMIN — OXYCODONE HYDROCHLORIDE 40 MG: 10 TABLET ORAL at 06:14

## 2018-03-27 ASSESSMENT — COGNITIVE AND FUNCTIONAL STATUS - GENERAL
TURNING FROM BACK TO SIDE WHILE IN FLAT BAD: UNABLE
SUGGESTED CMS G CODE MODIFIER MOBILITY: CN
MOBILITY SCORE: 6
MOVING FROM LYING ON BACK TO SITTING ON SIDE OF FLAT BED: UNABLE
STANDING UP FROM CHAIR USING ARMS: TOTAL
WALKING IN HOSPITAL ROOM: TOTAL
MOVING TO AND FROM BED TO CHAIR: UNABLE
CLIMB 3 TO 5 STEPS WITH RAILING: TOTAL

## 2018-03-27 ASSESSMENT — ENCOUNTER SYMPTOMS
FEVER: 0
BLOOD IN STOOL: 0
SPUTUM PRODUCTION: 1
PALPITATIONS: 0
COUGH: 1
ABDOMINAL PAIN: 0
BACK PAIN: 0
CHILLS: 0
EYE REDNESS: 0
FLANK PAIN: 0
MEMORY LOSS: 0
SEIZURES: 0
NAUSEA: 0
VOMITING: 0
FOCAL WEAKNESS: 0
SHORTNESS OF BREATH: 1
EYE DISCHARGE: 0
HALLUCINATIONS: 0

## 2018-03-27 ASSESSMENT — PAIN SCALES - GENERAL
PAINLEVEL_OUTOF10: 8
PAINLEVEL_OUTOF10: 8
PAINLEVEL_OUTOF10: 7
PAINLEVEL_OUTOF10: 8
PAINLEVEL_OUTOF10: 8
PAINLEVEL_OUTOF10: 6
PAINLEVEL_OUTOF10: 8
PAINLEVEL_OUTOF10: 4
PAINLEVEL_OUTOF10: 6

## 2018-03-27 ASSESSMENT — GAIT ASSESSMENTS: GAIT LEVEL OF ASSIST: UNABLE TO PARTICIPATE

## 2018-03-27 NOTE — PROGRESS NOTES
Wound care provided to bilateral lower extremities - no open wounds noted to calloused legs.  [place] wound RN notified and down to assess patient.  Amlactin lotion applied and will leave open to air per wound care RN's order.

## 2018-03-27 NOTE — PROGRESS NOTES
Patient alert and oriented and able to make needs known. Tolerating tube feeding, orders updated but dietary said to let the current bag complete and then switch to new formula. Pain controlled with PO oxycodone, pt said 40mg Q4H is home dose, although he would like to switch to longer acting but insurance will not cover. 6L NC in place to keep sat >90%. PT said that patient de sat with movement, oxymask placed in room to use while moving around. Will continue to monitor.

## 2018-03-27 NOTE — PROGRESS NOTES
Report received. Pt A&Ox4, in no signs of distress. LDAs functioning properly. Discussed POC with pt. Educated regarding unit routine. Reports pain at this time, recently medicated per MAR. Call light within reach. Fall precautions in place. Denies any additional needs at this time.

## 2018-03-27 NOTE — CARE PLAN
Problem: Nutritional:  Goal: Nutrition support tolerated and meeting greater than 85% of estimated needs  Outcome: MET Date Met: 03/27/18  PI at 80 ml/hr; patient failed SLP evaluation yesterday.  Cortrak with TF to continue at goal rate.

## 2018-03-27 NOTE — PROGRESS NOTES
[facility]    Date of Service: 3/26/2018    Chief Complaint  57 y.o. male admitted 3/16/2018 with ongoing cellulitis of bilateral lower extremities with a history of chronic venous stasis and lymphedema. He had acute respiratory distress in ER.    Interval Problem Update    Improved oxygen requirements  Failed swallow eval    Consultants/Specialty  Intensivist    Disposition  TBD        Review of Systems   Constitutional: Negative for chills and fever.   HENT: Negative for congestion and nosebleeds.    Eyes: Negative for discharge and redness.   Respiratory: Positive for cough, sputum production and shortness of breath.    Cardiovascular: Negative for chest pain and palpitations.   Gastrointestinal: Negative for abdominal pain, blood in stool, melena, nausea and vomiting.   Genitourinary: Negative for flank pain and hematuria.   Musculoskeletal: Positive for joint pain. Negative for back pain.   Skin: Negative for rash.   Neurological: Negative for focal weakness and seizures.   Psychiatric/Behavioral: Negative for hallucinations and memory loss.      Physical Exam  Laboratory/Imaging   Hemodynamics  Temp (24hrs), Av.6 °C (97.9 °F), Min:36.6 °C (97.8 °F), Max:36.8 °C (98.2 °F)   Temperature: 36.6 °C (97.8 °F)  Pulse  Av.7  Min: 54  Max: 111 Heart Rate (Monitored): 84  NIBP: 136/60      Respiratory      Respiration: 15, Pulse Oximetry: 93 %, O2 Daily Delivery Respiratory : Silicone Nasal Cannula     #MDI/DPI Given: MDI/DPI x 1, Work Of Breathing / Effort: Mild  RUL Breath Sounds: Clear, RML Breath Sounds: Diminished, RLL Breath Sounds: Diminished, LARY Breath Sounds: Clear, LLL Breath Sounds: Diminished    Fluids    Intake/Output Summary (Last 24 hours) at 18 0018  Last data filed at 18 1600   Gross per 24 hour   Intake             1410 ml   Output             2100 ml   Net             -690 ml       Nutrition  Orders Placed This Encounter   Procedures   • Diet NPO      Standing Status:   Standing     Number of Occurrences:   1     Order Specific Question:   Type:     Answer:   Now [1]     Order Specific Question:   Restrict to:     Answer:   Strict [1]     Physical Exam   Constitutional: He is oriented to person, place, and time. He appears well-developed.   Morbidly obese   HENT:   Head: Normocephalic and atraumatic.   Right Ear: External ear normal.   Left Ear: External ear normal.   Mouth/Throat: No oropharyngeal exudate.   Hoarseness   Eyes: Conjunctivae and EOM are normal. Pupils are equal, round, and reactive to light. Right eye exhibits no discharge. Left eye exhibits no discharge. No scleral icterus.   Neck: Neck supple. No JVD present. No tracheal deviation present.   Cardiovascular: Regular rhythm and normal heart sounds.    No murmur heard.  Tachycardia   Pulmonary/Chest: No stridor. No respiratory distress. He has decreased breath sounds. He has no wheezes. He has no rhonchi. He has rales. He exhibits no tenderness and no crepitus.   Abdominal: Soft. Bowel sounds are normal. He exhibits distension (improved). There is no tenderness. There is no rebound and no guarding.   Musculoskeletal: He exhibits edema (2+ LE). He exhibits no tenderness.   Neurological: He is alert and oriented to person, place, and time. No cranial nerve deficit. He exhibits normal muscle tone.   Skin: Skin is warm and dry. He is not diaphoretic.     LE wounds dressed  with clean dressing   Psychiatric: He has a normal mood and affect. His behavior is normal. Thought content normal.   Vitals reviewed.      Recent Labs      03/24/18   0342  03/25/18   0520  03/26/18   0450   WBC  10.1  10.0  9.5   RBC  3.75*  3.51*  3.86*   HEMOGLOBIN  10.3*  9.9*  10.6*   HEMATOCRIT  35.2*  32.5*  37.2*   MCV  93.9  92.6  96.4   MCH  27.5  28.2  27.5   MCHC  29.3*  30.5*  28.5*   RDW  54.4*  51.8*  54.5*   PLATELETCT  193  202  205   MPV  10.8  10.8  10.5     Recent Labs      03/24/18   0342  03/25/18   0520   03/26/18   0450   SODIUM  141  141  141   POTASSIUM  3.6  3.1*  3.5*   CHLORIDE  94*  96  99   CO2  36*  38*  38*   GLUCOSE  193*  142*  110*   BUN  22  26*  28*   CREATININE  0.76  0.48*  0.64   CALCIUM  8.9  9.4  9.1     Recent Labs      03/26/18   1030   INR  1.11     Recent Labs      03/24/18   0342   BNPBTYPENAT  29     Recent Labs      03/25/18   0520   TRIGLYCERIDE  331*          Assessment/Plan     * Acute on chronic respiratory failure with hypoxia (CMS-HCC)- (present on admission)   Assessment & Plan    Intubated 3-20 extubated 3/25/2018  Improved oxygen requirements  RT protocol          Bilateral lower leg cellulitis- (present on admission)   Assessment & Plan    Continue Wound care  Cx  MSSA     Completed 10 days of unasyn        Pneumonia- (present on admission)   Assessment & Plan    Completed 10 day course of Unasyn          Narcotic addiction (CMS-HCC)- (present on admission)   Assessment & Plan      minimize sedating agents        Stasis dermatitis- (present on admission)   Assessment & Plan    With MSSA cellulitis    Completed antibiotic course Continue wound care            Hypothyroidism- (present on admission)   Assessment & Plan    Stable continue Synthroid           COPD (chronic obstructive pulmonary disease) (CMS-HCC)- (present on admission)   Assessment & Plan    RT protocol        HTN (hypertension)- (present on admission)   Assessment & Plan    Stable continue lasix  amlodipine and  Lisinopril  Monitor BP and adjust          Hypokalemia   Assessment & Plan    Replete K and monitor         Delirium   Assessment & Plan    Resolved d/c seroquel        Ileus (CMS-HCC)   Assessment & Plan    Clinically resolved  Failed swallow tube  feeding as tolerated with close monitoring  Received Relistor        Bilateral edema of lower extremity   Assessment & Plan    Chronic  Improved continue po Lasix  Monitor intake output and fluid status        Diabetes type 2, controlled (CMS-HCC)- (present on  admission)   Assessment & Plan    HgbA1c:6.4  Stable on ISS         Non compliance w medication regimen- (present on admission)   Assessment & Plan    Reinforced compliance         Morbid obesity (CMS-HCC)- (present on admission)   Assessment & Plan    Body mass index is 48.02 kg/m².            Quality-Core Measures   Reviewed items::  Radiology images reviewed, Labs reviewed and Medications reviewed  Montoya catheter::  Unconscious / Sedated Patient on a Ventilator  Central line in place:  Need for access  DVT prophylaxis pharmacological::  Enoxaparin (Lovenox)  Antibiotics:  Treating active infection/contamination beyond 24 hours perioperative coverage      Stable to transfer out of ICU  Plan of care reviewed with patient and discussed with nursing staff and Dr Reyes

## 2018-03-27 NOTE — CARE PLAN
Problem: Safety  Goal: Will remain free from falls  Outcome: PROGRESSING AS EXPECTED  Pt educated regarding fall precautions, confirms understanding. Bed alarm on. Call light within reach. Bed in low position. Non-skid socks in place.     Problem: Infection  Goal: Will remain free from infection  Outcome: PROGRESSING AS EXPECTED  Assess for s/s of infection. Promote hand hygiene. Utilizing standard precautions.

## 2018-03-27 NOTE — PROGRESS NOTES
Patient alert and oriented and able to make needs known. Prn oxycodone for chronic pain. Encouraging IS throughout shift, 6L oxymask, occasionally bump up to 8L with boosting or movement. 1BM this shift. Voiding. NPO, changed tube feeding formula, see new orders. Daily wound care, per orders, open to air. Changed mepilex on the back on left thigh after incont episode. Will continue to monitor.

## 2018-03-27 NOTE — PROGRESS NOTES
Charge RN notified this RN of pt transport to [facility], bed 1. Report called to Tele 7 Rn. Medications and belonging packed and at bedside. Awaiting transport for transfer to [facility], bed 1 via bed.

## 2018-03-27 NOTE — DIETARY
Nutrition support weekly update:  Day 11 of admit.  Fer Estrada is a 57 y.o. male with admitting DX of Lower extremity cellulitis, acute on chronic resp failure with hypoxia.  TF via gastric Cortrak - Peptamen Intense VHP at goal rate of 80 ml/hr continuously = 1920 kcal, 177 grams protein, 1615 mL free water.    Assessment:  Weight 138.5 kg, per bed scale on 3/25 - trending downward expected while patient receiving Lasix.   Re-estimation of nutrient needs per MSJ x1 = 9225-7926 kcal (15-17 kcal/kg),  grams protein (1.3-1.5 g/kg IBW)    Evaluation:   1. Patient transferred to floor; ileus resolving s/p Relistor injection.  2. Pt failed SLP evaluation on 3/26 - continue enteral nutrition support to meet est nutrient needs.  3. Semi-elemental TF formula no longer indicated at this time.      Recommendations/Plan:  1. Please change TF formula to Replete with Fiber - start at low rate of 25 ml/hr and advance by 25 ml/hr q8 hours to goal rate of 85 ml/hr continuously (to gradually introduce fiber back to gut) = 2040 kcal (15 kcal/kg), 131 grams protein, 31 grams fiber, 1693 ml free water per day.    2. PO diet per SLP/MD when able.   3. Fluids per MD.

## 2018-03-27 NOTE — PROGRESS NOTES
[facility] Hospitalist Progress Note    Date of Service: 3/27/2018    Chief Complaint  57 y.o. male admitted 3/16/2018 with ongoing cellulitis of bilateral lower extremities with a history of chronic venous stasis and lymphedema. He had acute respiratory distress in ER.    Interval Problem Update  Pt seen and examined, afebrile, transferred overnight from ICU, on 6L O2.      Consultants/Specialty  Intensivist    Disposition  TBD        Review of Systems   Constitutional: Negative for chills and fever.   HENT: Negative for congestion and nosebleeds.    Eyes: Negative for discharge and redness.   Respiratory: Positive for cough, sputum production and shortness of breath.    Cardiovascular: Negative for chest pain and palpitations.   Gastrointestinal: Negative for abdominal pain, blood in stool, melena, nausea and vomiting.   Genitourinary: Negative for flank pain and hematuria.   Musculoskeletal: Positive for joint pain. Negative for back pain.   Skin: Negative for rash.   Neurological: Negative for focal weakness and seizures.   Psychiatric/Behavioral: Negative for hallucinations and memory loss.      Physical Exam  Laboratory/Imaging   Hemodynamics  Temp (24hrs), Av.6 °C (97.9 °F), Min:36.2 °C (97.2 °F), Max:36.8 °C (98.2 °F)   Temperature: 36.7 °C (98 °F)  Pulse  Av.2  Min: 54  Max: 111 Heart Rate (Monitored): 94  Blood Pressure: 148/82, NIBP: 141/64      Respiratory      Respiration: 20, Pulse Oximetry: 92 %, O2 Daily Delivery Respiratory : Silicone Nasal Cannula     Work Of Breathing / Effort: Mild  RUL Breath Sounds: Clear, RML Breath Sounds: Clear, RLL Breath Sounds: Diminished, LARY Breath Sounds: Clear, LLL Breath Sounds: Diminished    Fluids    Intake/Output Summary (Last 24 hours) at 18 1457  Last data filed at 18 1200   Gross per 24 hour   Intake             2190 ml   Output             2875 ml   Net             -685 ml       Nutrition  Orders Placed This Encounter   Procedures   • Diet NPO      Standing Status:   Standing     Number of Occurrences:   1     Order Specific Question:   Type:     Answer:   Now [1]     Order Specific Question:   Restrict to:     Answer:   Strict [1]     Physical Exam   Constitutional: He is oriented to person, place, and time. He appears well-developed.   Morbidly obese   HENT:   Head: Normocephalic and atraumatic.   Right Ear: External ear normal.   Left Ear: External ear normal.   Mouth/Throat: No oropharyngeal exudate.   Hoarseness   Eyes: Conjunctivae and EOM are normal. Pupils are equal, round, and reactive to light. Right eye exhibits no discharge. Left eye exhibits no discharge. No scleral icterus.   Neck: Neck supple. No JVD present. No tracheal deviation present.   Cardiovascular: Regular rhythm and normal heart sounds.    No murmur heard.  Tachycardia   Pulmonary/Chest: No stridor. No respiratory distress. He has decreased breath sounds. He has no wheezes. He has no rhonchi. He has rales. He exhibits no tenderness and no crepitus.   Abdominal: Soft. Bowel sounds are normal. He exhibits distension (improved). There is no tenderness. There is no rebound and no guarding.   Musculoskeletal: He exhibits edema (2+ LE). He exhibits no tenderness.   Neurological: He is alert and oriented to person, place, and time. No cranial nerve deficit. He exhibits normal muscle tone.   Skin: Skin is warm and dry. He is not diaphoretic.     LE wounds dressed  with clean dressing   Psychiatric: He has a normal mood and affect. His behavior is normal. Thought content normal.   Vitals reviewed.      Recent Labs      03/25/18 0520  03/26/18 0450  03/27/18   0456   WBC  10.0  9.5  8.3   RBC  3.51*  3.86*  4.05*   HEMOGLOBIN  9.9*  10.6*  11.6*   HEMATOCRIT  32.5*  37.2*  37.9*   MCV  92.6  96.4  93.6   MCH  28.2  27.5  28.6   MCHC  30.5*  28.5*  30.6*   RDW  51.8*  54.5*  51.3*   PLATELETCT  202  205  187   MPV  10.8  10.5  11.3     Recent Labs      03/25/18   0520  03/26/18   0450   03/27/18   0456   SODIUM  141  141  138   POTASSIUM  3.1*  3.5*  4.0   CHLORIDE  96  99  98   CO2  38*  38*  29   GLUCOSE  142*  110*  134*   BUN  26*  28*  32*   CREATININE  0.48*  0.64  0.56   CALCIUM  9.4  9.1  9.6     Recent Labs      03/26/18   1030   INR  1.11         Recent Labs      03/25/18   0520   TRIGLYCERIDE  331*          Assessment/Plan     * Acute on chronic respiratory failure with hypoxia (CMS-HCC)- (present on admission)   Assessment & Plan    Intubated 3-20 extubated 3/25/2018  Improved oxygen requirements  RT protocol          Bilateral lower leg cellulitis- (present on admission)   Assessment & Plan    Continue Wound care  Cx  MSSA     Completed 10 days of unasyn        Pneumonia- (present on admission)   Assessment & Plan    Completed 10 day course of Unasyn          Narcotic addiction (CMS-HCC)- (present on admission)   Assessment & Plan      minimize sedating agents        Stasis dermatitis- (present on admission)   Assessment & Plan    With MSSA cellulitis    Completed antibiotic course Continue wound care            Hypothyroidism- (present on admission)   Assessment & Plan    Stable continue Synthroid           COPD (chronic obstructive pulmonary disease) (CMS-HCC)- (present on admission)   Assessment & Plan    RT protocol        HTN (hypertension)- (present on admission)   Assessment & Plan    Stable continue lasix  amlodipine and  Lisinopril  Monitor BP and adjust          Hypokalemia   Assessment & Plan    Replete K and monitor         Delirium   Assessment & Plan    Resolved d/c seroquel        Ileus (CMS-HCC)   Assessment & Plan    Clinically resolved  Failed swallow tube  feeding as tolerated with close monitoring  Received Relistor        Bilateral edema of lower extremity   Assessment & Plan    Chronic  Improved continue po Lasix  Monitor intake output and fluid status        Diabetes type 2, controlled (CMS-HCC)- (present on admission)   Assessment & Plan    HgbA1c:6.4  Stable on  ISS         Non compliance w medication regimen- (present on admission)   Assessment & Plan    Reinforced compliance         Morbid obesity (CMS-HCC)- (present on admission)   Assessment & Plan    Body mass index is 48.02 kg/m².            Quality-Core Measures   Reviewed items::  Radiology images reviewed, Labs reviewed and Medications reviewed  Montoya catheter::  Unconscious / Sedated Patient on a Ventilator  Central line in place:  Need for access  DVT prophylaxis pharmacological::  Enoxaparin (Lovenox)  Antibiotics:  Treating active infection/contamination beyond 24 hours perioperative coverage

## 2018-03-27 NOTE — PROGRESS NOTES
Two RN skin check complete with Mayte AMAYA. Venous ulcers on BLE, Deep tissue injury bilateral feet and L thigh, generalized integumentary is dry and flaky secondary to eczema. Behind ears is red and non-blanching. Wound care following pt.

## 2018-03-27 NOTE — PROGRESS NOTES
Daughter notified of pt transfer and new room/bed numbers. Pt administered Oxycodone 40mg PT for chronic pain and pre-med for transport. Transporter to bedside, pt placed on portable O2 and monitor, and this RN assisted transfer to T 708, bed 1. VSS during trip.

## 2018-03-27 NOTE — WOUND TEAM
"[facility]  Inpatient Services  Wound and Skin Care Progress Note    HPI, PMH, SH: Reviewed  Unit where seen by Wound Team: R110/00    WOUND TEAM FOLLOW UP: BLE wounds     SUBJECTIVE:  \"Lift my head up.\"    Self Report / Pain Level: c/o pain moving legs and cleaning  OBJECTIVE: drsg intact , on JOSE LUIS bed  WOUND TYPE, LOCATION, CHARACTERISTICS:  Pressure Ulcer  Deep Tissue Injury POA Foot Left Lateral  Periwound Skin: Discolored (lipodermatoclerosis)  Drainage : None  Tissue Type and %:    100% red/purple  Wound Edges:    attached    Odor:     None   Exposed structure(s):   No   Signs and Symptoms of Infection: none    Wound POA Venous Ulcer Leg Left Lower  Periwound Skin: Discolored (lipodermatoclerosis)  Drainage : none  Tissue Type and %:    100%pink  Wound Edges:    attached    Odor:     None   Exposed structure(s):   No   Signs and Symptoms of Infection: decreasing edema    Wound POA Venous Ulcer Leg Right Lower  Periwound Skin: Discolored (lipodermatoclerosis)  Drainage : none  Tissue Type and %:    100% pink  Wound Edges:    attached    Odor:     None   Exposed structure(s):   No   Signs and Symptoms of Infection: decreasing edema    Pressure Ulcer  Deep Tissue Injury POA Foot Right Lateral;Plantar  Periwound Skin: Intact  Drainage : None       Tissue Type and %:    100% red/purple  Wound Edges:    attached    Odor:     None   Exposed structure(s):   No   Signs and Symptoms of Infection: None     Measurements : taken 3/26/18    Foot Left Lateral  R foot  Length (cm):  10    3.1  Width (cm):  3.2        2.3  Depth (cm):  nm    intact     Tracts/undermining:  All None       INTERVENTIONS BY WOUND TEAM: removed drsg. Cleaned BLE with foaming cleanser and wet washcloths. Cleaned wounds with NS, dried. Lac hydrin  applied to BLE, no cover drsg to wounds r/t no drainage and pink tissue present.  Foot Left Lateral Foot & Right Lateral;Plantar-Dressing Options:DEMARCO  Leg Left and Right Lower-Dressing " "Options: Lac hydrin daily  Interdisciplinary consultation:  RN, pt    EVALUATION AND PROGRESS OF WOUND(S): BLE improved since pt was seen last week. Edema and drainage have decreased since pt's legs have been in bed vs dependent. Wounds no longer open, pink tissue present. Fissure visible in skin since edema decreased. Amlactin helping to decrease lipodermatosclerosis changes to skin. No longer needs a cover drsg while pt's legs are not dependent. Once pt out of ICU and if he sits up on sob then wounds may reopen and edema will return. Educated pt about this happening. He verbalized understanding. \"I need my head up.\" HOB raised.     Factors affecting wound healing: poor self care, venous stasis, infection, COPD, CHF, DM     Goals: decreased wound size 1% each week    NURSING PLAN OF CARE:    Dressing changes: Continue previous Dressing Maintenance orders:        See new Dressing Maintenance orders:       Skin care: See Skin Care orders: Lac hydrin orders       Rectal tube care: See Rectal Tube Care orders:      Other orders:           WOUND TEAM PLAN OF CARE (X):   NPWT change 3 x week:        Dressing changes:       Follow up as needed:     PRN   Other:    "

## 2018-03-27 NOTE — THERAPY
"Physical Therapy Treatment completed.   Bed Mobility:  Supine to Sit: Maximal Assist (x2)  Transfers: Sit to Stand: Maximal Assist (X2)  Gait: Level Of Assist: Unable to Participate   Plan of Care: Will benefit from Physical Therapy 3 times per week and Plan to complete next treatment by Thursday 3/29  Discharge Recommendations: Equipment: Will Continue to Assess for Equipment Needs. Post-acute therapy Discharge to a transitional care facility for continued skilled therapy services.    Pt continues to require maximal assist X 2 for all bed mobility and transfers. Pt with improved sitting balance at EOB today compared to initial evaluation and slight improvements in B LE strength. Today was able to lift LE's against gravity while seated EOB. Pt continues to be significantly limited by pain, endurance and O2 needs. Pt on 6L upon arrival and desaturated frequently while sitting EOB, with therapeutic exercises or with any transitions. Even when O2 bumped up to 7L, still having desaturation into the mid to high 80's and highest O2 saturation was 91%. Pt would benefit from ongoing PT intervention while in the acute care setting. Pt will require post acute SNF as he is unable to care for himself and family was not able to care for him prior to admit.      See \"Rehab Therapy-Acute\" Patient Summary Report for complete documentation.       "

## 2018-03-27 NOTE — PROGRESS NOTES
Report received, pt care assumed, tele box on SR 90 per monitor tech. VSS, pt assessment complete. Pt aaox4, no signs of distress noted at this time. POC discussed with pt and verbalizes no questions. Pt c/o of chronic pain in BLE, PRN pain med administered this am. Pt denies any additional needs at this time. Bed in lowest position, bed alarm on, pt educated on fall risk and verbalized understanding, call light within reach, will continue to monitor.

## 2018-03-28 ENCOUNTER — APPOINTMENT (OUTPATIENT)
Dept: RADIOLOGY | Facility: MEDICAL CENTER | Age: 58
DRG: 987 | End: 2018-03-28
Attending: INTERNAL MEDICINE
Payer: MEDICARE

## 2018-03-28 LAB
ANION GAP SERPL CALC-SCNC: 5 MMOL/L (ref 0–11.9)
BASOPHILS # BLD AUTO: 0.4 % (ref 0–1.8)
BASOPHILS # BLD: 0.03 K/UL (ref 0–0.12)
BUN SERPL-MCNC: 33 MG/DL (ref 8–22)
CALCIUM SERPL-MCNC: 9.4 MG/DL (ref 8.5–10.5)
CHLORIDE SERPL-SCNC: 92 MMOL/L (ref 96–112)
CO2 SERPL-SCNC: 38 MMOL/L (ref 20–33)
CREAT SERPL-MCNC: 0.67 MG/DL (ref 0.5–1.4)
EOSINOPHIL # BLD AUTO: 0.44 K/UL (ref 0–0.51)
EOSINOPHIL NFR BLD: 5.3 % (ref 0–6.9)
ERYTHROCYTE [DISTWIDTH] IN BLOOD BY AUTOMATED COUNT: 51.8 FL (ref 35.9–50)
GLUCOSE BLD-MCNC: 123 MG/DL (ref 65–99)
GLUCOSE BLD-MCNC: 124 MG/DL (ref 65–99)
GLUCOSE BLD-MCNC: 127 MG/DL (ref 65–99)
GLUCOSE BLD-MCNC: 146 MG/DL (ref 65–99)
GLUCOSE SERPL-MCNC: 148 MG/DL (ref 65–99)
HCT VFR BLD AUTO: 40.1 % (ref 42–52)
HGB BLD-MCNC: 12.2 G/DL (ref 14–18)
IMM GRANULOCYTES # BLD AUTO: 0.15 K/UL (ref 0–0.11)
IMM GRANULOCYTES NFR BLD AUTO: 1.8 % (ref 0–0.9)
LYMPHOCYTES # BLD AUTO: 1.08 K/UL (ref 1–4.8)
LYMPHOCYTES NFR BLD: 12.9 % (ref 22–41)
MCH RBC QN AUTO: 28.7 PG (ref 27–33)
MCHC RBC AUTO-ENTMCNC: 30.4 G/DL (ref 33.7–35.3)
MCV RBC AUTO: 94.4 FL (ref 81.4–97.8)
MONOCYTES # BLD AUTO: 0.81 K/UL (ref 0–0.85)
MONOCYTES NFR BLD AUTO: 9.7 % (ref 0–13.4)
NEUTROPHILS # BLD AUTO: 5.85 K/UL (ref 1.82–7.42)
NEUTROPHILS NFR BLD: 69.9 % (ref 44–72)
NRBC # BLD AUTO: 0 K/UL
NRBC BLD-RTO: 0 /100 WBC
PLATELET # BLD AUTO: 241 K/UL (ref 164–446)
PMV BLD AUTO: 10.7 FL (ref 9–12.9)
POTASSIUM SERPL-SCNC: 4.5 MMOL/L (ref 3.6–5.5)
RBC # BLD AUTO: 4.25 M/UL (ref 4.7–6.1)
SODIUM SERPL-SCNC: 135 MMOL/L (ref 135–145)
TROPONIN I SERPL-MCNC: <0.01 NG/ML (ref 0–0.04)
WBC # BLD AUTO: 8.4 K/UL (ref 4.8–10.8)

## 2018-03-28 PROCEDURE — 82962 GLUCOSE BLOOD TEST: CPT | Mod: 91

## 2018-03-28 PROCEDURE — 92526 ORAL FUNCTION THERAPY: CPT

## 2018-03-28 PROCEDURE — A9270 NON-COVERED ITEM OR SERVICE: HCPCS | Performed by: HOSPITALIST

## 2018-03-28 PROCEDURE — 80048 BASIC METABOLIC PNL TOTAL CA: CPT

## 2018-03-28 PROCEDURE — A9270 NON-COVERED ITEM OR SERVICE: HCPCS | Performed by: INTERNAL MEDICINE

## 2018-03-28 PROCEDURE — 36415 COLL VENOUS BLD VENIPUNCTURE: CPT

## 2018-03-28 PROCEDURE — 700102 HCHG RX REV CODE 250 W/ 637 OVERRIDE(OP): Performed by: INTERNAL MEDICINE

## 2018-03-28 PROCEDURE — 71045 X-RAY EXAM CHEST 1 VIEW: CPT

## 2018-03-28 PROCEDURE — 84484 ASSAY OF TROPONIN QUANT: CPT

## 2018-03-28 PROCEDURE — 700102 HCHG RX REV CODE 250 W/ 637 OVERRIDE(OP): Performed by: HOSPITALIST

## 2018-03-28 PROCEDURE — 770020 HCHG ROOM/CARE - TELE (206)

## 2018-03-28 PROCEDURE — 700111 HCHG RX REV CODE 636 W/ 250 OVERRIDE (IP): Performed by: HOSPITALIST

## 2018-03-28 PROCEDURE — 85025 COMPLETE CBC W/AUTO DIFF WBC: CPT

## 2018-03-28 PROCEDURE — 99232 SBSQ HOSP IP/OBS MODERATE 35: CPT | Performed by: INTERNAL MEDICINE

## 2018-03-28 RX ORDER — LISINOPRIL 10 MG/1
10 TABLET ORAL ONCE
Status: COMPLETED | OUTPATIENT
Start: 2018-03-28 | End: 2018-03-28

## 2018-03-28 RX ADMIN — LACTOBACILLUS ACIDOPHILUS / LACTOBACILLUS BULGARICUS 1 PACKET: 100 MILLION CFU STRENGTH GRANULES at 16:56

## 2018-03-28 RX ADMIN — POTASSIUM BICARBONATE 25 MEQ: 25 TABLET, EFFERVESCENT ORAL at 08:12

## 2018-03-28 RX ADMIN — STANDARDIZED SENNA CONCENTRATE AND DOCUSATE SODIUM 2 TABLET: 8.6; 5 TABLET, FILM COATED ORAL at 08:13

## 2018-03-28 RX ADMIN — ASPIRIN 81 MG: 81 TABLET, CHEWABLE ORAL at 08:12

## 2018-03-28 RX ADMIN — Medication 220 MG: at 08:12

## 2018-03-28 RX ADMIN — BUDESONIDE AND FORMOTEROL FUMARATE DIHYDRATE 2 PUFF: 160; 4.5 AEROSOL RESPIRATORY (INHALATION) at 08:12

## 2018-03-28 RX ADMIN — OXYCODONE HYDROCHLORIDE 40 MG: 10 TABLET ORAL at 20:58

## 2018-03-28 RX ADMIN — OXYCODONE HYDROCHLORIDE 40 MG: 10 TABLET ORAL at 00:45

## 2018-03-28 RX ADMIN — OMEPRAZOLE 40 MG: 20 CAPSULE, DELAYED RELEASE ORAL at 08:12

## 2018-03-28 RX ADMIN — LEVOTHYROXINE SODIUM 50 MCG: 50 TABLET ORAL at 05:07

## 2018-03-28 RX ADMIN — ENOXAPARIN SODIUM 40 MG: 100 INJECTION SUBCUTANEOUS at 08:11

## 2018-03-28 RX ADMIN — Medication: at 08:19

## 2018-03-28 RX ADMIN — LACTOBACILLUS ACIDOPHILUS / LACTOBACILLUS BULGARICUS 1 PACKET: 100 MILLION CFU STRENGTH GRANULES at 09:41

## 2018-03-28 RX ADMIN — ALBUTEROL SULFATE 2 PUFF: 90 AEROSOL, METERED RESPIRATORY (INHALATION) at 06:39

## 2018-03-28 RX ADMIN — OXYCODONE HYDROCHLORIDE 40 MG: 10 TABLET ORAL at 04:51

## 2018-03-28 RX ADMIN — FUROSEMIDE 40 MG: 40 TABLET ORAL at 08:13

## 2018-03-28 RX ADMIN — ENOXAPARIN SODIUM 40 MG: 100 INJECTION SUBCUTANEOUS at 20:59

## 2018-03-28 RX ADMIN — OXYCODONE HYDROCHLORIDE 40 MG: 10 TABLET ORAL at 16:55

## 2018-03-28 RX ADMIN — LACTOBACILLUS ACIDOPHILUS / LACTOBACILLUS BULGARICUS 1 PACKET: 100 MILLION CFU STRENGTH GRANULES at 11:10

## 2018-03-28 RX ADMIN — BUDESONIDE AND FORMOTEROL FUMARATE DIHYDRATE 2 PUFF: 160; 4.5 AEROSOL RESPIRATORY (INHALATION) at 20:54

## 2018-03-28 RX ADMIN — POTASSIUM BICARBONATE 25 MEQ: 25 TABLET, EFFERVESCENT ORAL at 15:51

## 2018-03-28 RX ADMIN — OXYCODONE HYDROCHLORIDE AND ACETAMINOPHEN 500 MG: 500 TABLET ORAL at 08:13

## 2018-03-28 RX ADMIN — ACETAMINOPHEN 650 MG: 325 TABLET, FILM COATED ORAL at 08:12

## 2018-03-28 RX ADMIN — LISINOPRIL 10 MG: 10 TABLET ORAL at 13:13

## 2018-03-28 RX ADMIN — OXYCODONE HYDROCHLORIDE 40 MG: 10 TABLET ORAL at 11:10

## 2018-03-28 ASSESSMENT — ENCOUNTER SYMPTOMS
BACK PAIN: 0
FLANK PAIN: 0
SHORTNESS OF BREATH: 1
SPUTUM PRODUCTION: 1
ABDOMINAL PAIN: 0
BLOOD IN STOOL: 0
SEIZURES: 0
VOMITING: 0
COUGH: 1
EYE DISCHARGE: 0
FEVER: 0
MEMORY LOSS: 0
CHILLS: 0
HALLUCINATIONS: 0
PALPITATIONS: 0
FOCAL WEAKNESS: 0
EYE REDNESS: 0
NAUSEA: 0

## 2018-03-28 ASSESSMENT — PAIN SCALES - GENERAL
PAINLEVEL_OUTOF10: 9
PAINLEVEL_OUTOF10: 7
PAINLEVEL_OUTOF10: 7
PAINLEVEL_OUTOF10: 8
PAINLEVEL_OUTOF10: 7
PAINLEVEL_OUTOF10: 7
PAINLEVEL_OUTOF10: 5
PAINLEVEL_OUTOF10: 8
PAINLEVEL_OUTOF10: 7
PAINLEVEL_OUTOF10: 7
PAINLEVEL_OUTOF10: 9

## 2018-03-28 NOTE — DISCHARGE PLANNING
[facility] received notification from [facility] the referral has been denied due to behaviors, Care Exceed Capacity, No Discharge Plan and no Open Beds,

## 2018-03-28 NOTE — PROGRESS NOTES
Spoke with respiratory therapist who said that the CPAP machine should have been delievered last night, assured her that it was not in the room and had not been put on patient. RT will bring a CPAP for tonight.

## 2018-03-28 NOTE — PROGRESS NOTES
Patient alert and oriented and able to make needs known. Pain well controlled with PO tylenol and oxycodone. Continue to turn and reposition every two hours. Wound care as ordered. Changed mepilex on left thigh as it was soiled. Continue to encourage incentive spirometer throughout shift. Ate 50-75% of lunch. Asked Dr Guillaume  If Ok to DC cortrak, he would like to keep overnight and reevaluate in the morning. Will continue to monitor.

## 2018-03-28 NOTE — PROGRESS NOTES
Patient alert and oriented and able to make needs known. Oxycodone and tylenol for pain control. Continue to turn and reposition, although patient has refused 1000 turn. Feeding stopped at 1100 to help patient feel hungry for lunch, per speech. Upgraded to full liquid, nectar thick. AM blood pressure was lower than it has been running, notified Dr Belcher who said OK to hold am lisinopril and amlodipine and monitor BP throughout the day. Decreased O2 to 6l, continue to monitor.

## 2018-03-28 NOTE — DISCHARGE PLANNING
[facility] received an SNF choice form. Per the choice form the referral has been sent to all [facility] and [facility] SNF's

## 2018-03-28 NOTE — PROGRESS NOTES
After giving pt his medications, hand got caught around Cortrak and pulled it out roughly 10 cm. Tube feed stopped, Cortrak advanced back into nostril, X-Ray ordered to verify tube placement.

## 2018-03-28 NOTE — PROGRESS NOTES
[facility] Hospitalist Progress Note    Date of Service: 3/28/2018    Chief Complaint  57 y.o. male admitted 3/16/2018 with ongoing cellulitis of bilateral lower extremities with a history of chronic venous stasis and lymphedema. He had acute respiratory distress in ER.    Interval Problem Update  Pt seen and examined, afebrile, transferred overnight from ICU, was requiring 8 L of o2 overnight. Denies any nausea , vomiting or chest pain.       Consultants/Specialty  Intensivist    Disposition  TBD        Review of Systems   Constitutional: Negative for chills and fever.   HENT: Negative for congestion and nosebleeds.    Eyes: Negative for discharge and redness.   Respiratory: Positive for cough, sputum production and shortness of breath.    Cardiovascular: Negative for chest pain and palpitations.   Gastrointestinal: Negative for abdominal pain, blood in stool, melena, nausea and vomiting.   Genitourinary: Negative for flank pain and hematuria.   Musculoskeletal: Positive for joint pain. Negative for back pain.   Skin: Negative for rash.   Neurological: Negative for focal weakness and seizures.   Psychiatric/Behavioral: Negative for hallucinations and memory loss.      Physical Exam  Laboratory/Imaging   Hemodynamics  Temp (24hrs), Av.3 °C (97.3 °F), Min:36.1 °C (97 °F), Max:36.5 °C (97.7 °F)   Temperature: 36.2 °C (97.1 °F)  Pulse  Av.5  Min: 54  Max: 111    Blood Pressure: 122/62      Respiratory      Respiration: 18, Pulse Oximetry: 96 %     Work Of Breathing / Effort: Mild  RUL Breath Sounds: Clear, RML Breath Sounds: Clear, RLL Breath Sounds: Diminished, LARY Breath Sounds: Clear, LLL Breath Sounds: Diminished    Fluids    Intake/Output Summary (Last 24 hours) at 18 1358  Last data filed at 18 1100   Gross per 24 hour   Intake             1595 ml   Output             1550 ml   Net               45 ml       Nutrition  Orders Placed This Encounter   Procedures   • DIET ORDER     Standing Status:    Standing     Number of Occurrences:   1     Order Specific Question:   Diet:     Answer:   Full Liquid [11]     Order Specific Question:   Consistency/Fluid modifications:     Answer:   Nectar Thick [2]     Physical Exam   Constitutional: He is oriented to person, place, and time. He appears well-developed.   Morbidly obese   HENT:   Head: Normocephalic and atraumatic.   Right Ear: External ear normal.   Left Ear: External ear normal.   Mouth/Throat: No oropharyngeal exudate.   Hoarseness   Eyes: Conjunctivae and EOM are normal. Pupils are equal, round, and reactive to light. Right eye exhibits no discharge. Left eye exhibits no discharge. No scleral icterus.   Neck: Neck supple. No JVD present. No tracheal deviation present.   Cardiovascular: Regular rhythm and normal heart sounds.    No murmur heard.  Tachycardia   Pulmonary/Chest: No stridor. No respiratory distress. He has decreased breath sounds. He has no wheezes. He has no rhonchi. He has rales. He exhibits no tenderness and no crepitus.   Abdominal: Soft. Bowel sounds are normal. He exhibits distension (improved). There is no tenderness. There is no rebound and no guarding.   Musculoskeletal: He exhibits edema (2+ LE). He exhibits no tenderness.   Neurological: He is alert and oriented to person, place, and time. No cranial nerve deficit. He exhibits normal muscle tone.   Skin: Skin is warm and dry. He is not diaphoretic.     LE wounds dressed  with clean dressing   Psychiatric: He has a normal mood and affect. His behavior is normal. Thought content normal.   Vitals reviewed.      Recent Labs      03/26/18   0450  03/27/18   0456  03/28/18   0523   WBC  9.5  8.3  8.4   RBC  3.86*  4.05*  4.25*   HEMOGLOBIN  10.6*  11.6*  12.2*   HEMATOCRIT  37.2*  37.9*  40.1*   MCV  96.4  93.6  94.4   MCH  27.5  28.6  28.7   MCHC  28.5*  30.6*  30.4*   RDW  54.5*  51.3*  51.8*   PLATELETCT  205  187  241   MPV  10.5  11.3  10.7     Recent Labs      03/26/18   0450   03/27/18   0456  03/28/18   0523   SODIUM  141  138  135   POTASSIUM  3.5*  4.0  4.5   CHLORIDE  99  98  92*   CO2  38*  29  38*   GLUCOSE  110*  134*  148*   BUN  28*  32*  33*   CREATININE  0.64  0.56  0.67   CALCIUM  9.1  9.6  9.4     Recent Labs      03/26/18   1030   INR  1.11                  Assessment/Plan     * Acute on chronic respiratory failure with hypoxia (CMS-HCC)- (present on admission)   Assessment & Plan    Intubated 3-20 extubated 3/25/2018  Improved oxygen requirements  RT protocol          Bilateral lower leg cellulitis- (present on admission)   Assessment & Plan    Continue Wound care  Cx  MSSA     Completed 10 days of unasyn        Pneumonia- (present on admission)   Assessment & Plan    Completed 10 day course of Unasyn          Narcotic addiction (CMS-HCC)- (present on admission)   Assessment & Plan      minimize sedating agents        Stasis dermatitis- (present on admission)   Assessment & Plan    With MSSA cellulitis    Completed antibiotic course Continue wound care            Hypothyroidism- (present on admission)   Assessment & Plan    Stable continue Synthroid           COPD (chronic obstructive pulmonary disease) (CMS-HCC)- (present on admission)   Assessment & Plan    RT protocol        HTN (hypertension)- (present on admission)   Assessment & Plan    Stable continue lasix  amlodipine and  Lisinopril  Monitor BP and adjust          Hypokalemia   Assessment & Plan    Replete K and monitor         Delirium   Assessment & Plan    Resolved d/c seroquel        Ileus (CMS-HCC)   Assessment & Plan    Clinically resolved  Failed swallow tube  feeding as tolerated with close monitoring  Received Relistor        Bilateral edema of lower extremity   Assessment & Plan    Chronic  Improved continue po Lasix  Monitor intake output and fluid status        Diabetes type 2, controlled (CMS-HCC)- (present on admission)   Assessment & Plan    HgbA1c:6.4  Stable on ISS         Non compliance w  medication regimen- (present on admission)   Assessment & Plan    Reinforced compliance         Morbid obesity (CMS-HCC)- (present on admission)   Assessment & Plan    Body mass index is 48.02 kg/m².            Quality-Core Measures   Reviewed items::  Radiology images reviewed, Labs reviewed and Medications reviewed  Montoya catheter::  Unconscious / Sedated Patient on a Ventilator  Central line in place:  Need for access  DVT prophylaxis pharmacological::  Enoxaparin (Lovenox)  Antibiotics:  Treating active infection/contamination beyond 24 hours perioperative coverage

## 2018-03-28 NOTE — PROGRESS NOTES
Report received. Pt A&Ox4, in no signs of distress. LDAs functioning properly. Discussed POC with pt. Reports pain at this time, will medicate per MAR. Call light within reach. Fall precautions in place. Denies any additional needs at this time.

## 2018-03-28 NOTE — DISCHARGE PLANNING
CCS received Notification from [facility] and rehab the referral has been denied cannot acccommodate a beriatric bed

## 2018-03-28 NOTE — CARE PLAN
Problem: Pain Management  Goal: Pain level will decrease to patient's comfort goal  Outcome: PROGRESSING AS EXPECTED  Pt encouraged to notified staff if in pain. Pain controlled with non-pharmacologic and pharmacologic methods. Pain under control overnight.        Problem: Skin Integrity  Goal: Risk for impaired skin integrity will decrease  Outcome: PROGRESSING AS EXPECTED  Skin assessed. Reminded pt to shift weight frequently. B0covev in place. Heels elevated. Ensure skin is clean and dry. Pt encouraged to ambulate to bathroom.

## 2018-03-28 NOTE — DISCHARGE PLANNING
"Medical Social Work    Referral: MDT rounds    Intervention: Discussed pt's case in MDT rounds, pt is a new transfer out of [facility] and MD is recommending SNF placement. Met with pt at bedside and discussed d/c planning. This writer is familiar with pt from preveious admission he has had. Pt updated that he was recently at [facility] and stated \"they released me too soon\". Pt states he was at home approx. 2 weeks. Pt is agreeable to going back to SNF and discussed sending out to all local [facility] SNF's and [facility] SNF's. Pt prefers to stay in [facility] area but is willing to consider [facility].    Emailed [facility] to screen pt for Medicaid.     Faxed choice form to [facility] to process SNF referral.     Plan: As above, will await acceptance from SNF (sent to all local [facility] and [facility]). Will await medical clearance.   "

## 2018-03-28 NOTE — PROGRESS NOTES
Report received, pt care assumed, tele box on, SR 96 per tele monitor. VSS, pt assessment complete. Pt aaox4, no signs of distress noted at this time. POC discussed with pt and verbalizes no questions. Pt c/o of  Headache pain in waiting for cortrak placement verification to administer tylenol. Pt denies any additional needs at this time. Bed in lowest position, bed alarm on, pt educated on fall risk and verbalized understanding, call light within reach, will continue to monitor.

## 2018-03-28 NOTE — THERAPY
"Speech Language Therapy dysphagia treatment completed.   Functional Status:  Patient currently NPO with Cortrak and eager for PO trials. Patient requiring assistance and encouragement to elevate HOB to adequate height for PO trials. Patient reported anxiety w/ mild SOB, but SpO2 remained above 94%. Patient consumed PO trials of single ice chips, NTL via tsp, cup sip, and straw, purees, pudding, and thin liquids via tsp and cup sip. Patient presented with throat clear x2 on thin liquids via cup sip. Mild fatigue was noted on PO trials, so no solids were tested. Laryngeal elevation palpated as complete and initiation of swallow trigger was timely. At this time, recommend patient start NTFL diet with assistance as needed. Okay to remove Cortrak from SLP standpoint after patient tolerates first meal without difficulty. RN aware. SLP is following for diet tolerance and trial upgrades.     Recommendations: At this time, recommend patient start NTFL diet with assistance as needed. Okay to remove Cortrak from SLP standpoint after patient tolerates first meal without difficulty.   Plan of Care: Will benefit from Speech Therapy 3 times per week  Post-Acute Therapy: Discharge to a transitional care facility for continued skilled therapy services.    See \"Rehab Therapy-Acute\" Patient Summary Report for complete documentation.     "

## 2018-03-29 LAB
ANION GAP SERPL CALC-SCNC: 7 MMOL/L (ref 0–11.9)
BASOPHILS # BLD AUTO: 0.4 % (ref 0–1.8)
BASOPHILS # BLD: 0.04 K/UL (ref 0–0.12)
BUN SERPL-MCNC: 24 MG/DL (ref 8–22)
CALCIUM SERPL-MCNC: 9.8 MG/DL (ref 8.5–10.5)
CHLORIDE SERPL-SCNC: 92 MMOL/L (ref 96–112)
CO2 SERPL-SCNC: 38 MMOL/L (ref 20–33)
CREAT SERPL-MCNC: 0.51 MG/DL (ref 0.5–1.4)
EOSINOPHIL # BLD AUTO: 0.39 K/UL (ref 0–0.51)
EOSINOPHIL NFR BLD: 3.9 % (ref 0–6.9)
ERYTHROCYTE [DISTWIDTH] IN BLOOD BY AUTOMATED COUNT: 50.2 FL (ref 35.9–50)
GLUCOSE BLD-MCNC: 123 MG/DL (ref 65–99)
GLUCOSE BLD-MCNC: 132 MG/DL (ref 65–99)
GLUCOSE BLD-MCNC: 136 MG/DL (ref 65–99)
GLUCOSE BLD-MCNC: 173 MG/DL (ref 65–99)
GLUCOSE SERPL-MCNC: 127 MG/DL (ref 65–99)
HCT VFR BLD AUTO: 40.6 % (ref 42–52)
HGB BLD-MCNC: 12.5 G/DL (ref 14–18)
IMM GRANULOCYTES # BLD AUTO: 0.12 K/UL (ref 0–0.11)
IMM GRANULOCYTES NFR BLD AUTO: 1.2 % (ref 0–0.9)
LYMPHOCYTES # BLD AUTO: 1.45 K/UL (ref 1–4.8)
LYMPHOCYTES NFR BLD: 14.6 % (ref 22–41)
MCH RBC QN AUTO: 28.7 PG (ref 27–33)
MCHC RBC AUTO-ENTMCNC: 30.8 G/DL (ref 33.7–35.3)
MCV RBC AUTO: 93.3 FL (ref 81.4–97.8)
MONOCYTES # BLD AUTO: 0.92 K/UL (ref 0–0.85)
MONOCYTES NFR BLD AUTO: 9.3 % (ref 0–13.4)
NEUTROPHILS # BLD AUTO: 7.01 K/UL (ref 1.82–7.42)
NEUTROPHILS NFR BLD: 70.6 % (ref 44–72)
NRBC # BLD AUTO: 0 K/UL
NRBC BLD-RTO: 0 /100 WBC
PLATELET # BLD AUTO: 224 K/UL (ref 164–446)
PMV BLD AUTO: 11 FL (ref 9–12.9)
POTASSIUM SERPL-SCNC: 4.4 MMOL/L (ref 3.6–5.5)
RBC # BLD AUTO: 4.35 M/UL (ref 4.7–6.1)
SODIUM SERPL-SCNC: 137 MMOL/L (ref 135–145)
WBC # BLD AUTO: 9.9 K/UL (ref 4.8–10.8)

## 2018-03-29 PROCEDURE — 80048 BASIC METABOLIC PNL TOTAL CA: CPT

## 2018-03-29 PROCEDURE — 700102 HCHG RX REV CODE 250 W/ 637 OVERRIDE(OP): Performed by: HOSPITALIST

## 2018-03-29 PROCEDURE — 700111 HCHG RX REV CODE 636 W/ 250 OVERRIDE (IP): Performed by: HOSPITALIST

## 2018-03-29 PROCEDURE — A9270 NON-COVERED ITEM OR SERVICE: HCPCS | Performed by: HOSPITALIST

## 2018-03-29 PROCEDURE — A9270 NON-COVERED ITEM OR SERVICE: HCPCS | Performed by: INTERNAL MEDICINE

## 2018-03-29 PROCEDURE — 36415 COLL VENOUS BLD VENIPUNCTURE: CPT

## 2018-03-29 PROCEDURE — 770020 HCHG ROOM/CARE - TELE (206)

## 2018-03-29 PROCEDURE — 99232 SBSQ HOSP IP/OBS MODERATE 35: CPT | Performed by: INTERNAL MEDICINE

## 2018-03-29 PROCEDURE — 82962 GLUCOSE BLOOD TEST: CPT

## 2018-03-29 PROCEDURE — 700102 HCHG RX REV CODE 250 W/ 637 OVERRIDE(OP): Performed by: INTERNAL MEDICINE

## 2018-03-29 PROCEDURE — 94760 N-INVAS EAR/PLS OXIMETRY 1: CPT

## 2018-03-29 PROCEDURE — 85025 COMPLETE CBC W/AUTO DIFF WBC: CPT

## 2018-03-29 RX ORDER — OMEPRAZOLE 20 MG/1
20 CAPSULE, DELAYED RELEASE ORAL DAILY
Status: DISCONTINUED | OUTPATIENT
Start: 2018-03-30 | End: 2018-04-16 | Stop reason: HOSPADM

## 2018-03-29 RX ADMIN — OXYCODONE HYDROCHLORIDE 40 MG: 10 TABLET ORAL at 11:57

## 2018-03-29 RX ADMIN — BUDESONIDE AND FORMOTEROL FUMARATE DIHYDRATE 2 PUFF: 160; 4.5 AEROSOL RESPIRATORY (INHALATION) at 08:13

## 2018-03-29 RX ADMIN — ENOXAPARIN SODIUM 40 MG: 100 INJECTION SUBCUTANEOUS at 20:27

## 2018-03-29 RX ADMIN — ASPIRIN 81 MG: 81 TABLET, CHEWABLE ORAL at 08:11

## 2018-03-29 RX ADMIN — STANDARDIZED SENNA CONCENTRATE AND DOCUSATE SODIUM 2 TABLET: 8.6; 5 TABLET, FILM COATED ORAL at 08:11

## 2018-03-29 RX ADMIN — Medication: at 08:12

## 2018-03-29 RX ADMIN — OXYCODONE HYDROCHLORIDE AND ACETAMINOPHEN 500 MG: 500 TABLET ORAL at 08:11

## 2018-03-29 RX ADMIN — OXYCODONE HYDROCHLORIDE 40 MG: 10 TABLET ORAL at 16:02

## 2018-03-29 RX ADMIN — OMEPRAZOLE 40 MG: 20 CAPSULE, DELAYED RELEASE ORAL at 08:13

## 2018-03-29 RX ADMIN — ENOXAPARIN SODIUM 40 MG: 100 INJECTION SUBCUTANEOUS at 08:11

## 2018-03-29 RX ADMIN — BUDESONIDE AND FORMOTEROL FUMARATE DIHYDRATE 2 PUFF: 160; 4.5 AEROSOL RESPIRATORY (INHALATION) at 20:28

## 2018-03-29 RX ADMIN — LACTOBACILLUS ACIDOPHILUS / LACTOBACILLUS BULGARICUS 1 PACKET: 100 MILLION CFU STRENGTH GRANULES at 17:20

## 2018-03-29 RX ADMIN — LACTOBACILLUS ACIDOPHILUS / LACTOBACILLUS BULGARICUS 1 PACKET: 100 MILLION CFU STRENGTH GRANULES at 11:51

## 2018-03-29 RX ADMIN — OXYCODONE HYDROCHLORIDE 40 MG: 10 TABLET ORAL at 06:15

## 2018-03-29 RX ADMIN — AMLODIPINE BESYLATE 5 MG: 5 TABLET ORAL at 08:11

## 2018-03-29 RX ADMIN — INSULIN HUMAN 2 UNITS: 100 INJECTION, SOLUTION PARENTERAL at 11:54

## 2018-03-29 RX ADMIN — POTASSIUM BICARBONATE 25 MEQ: 25 TABLET, EFFERVESCENT ORAL at 08:11

## 2018-03-29 RX ADMIN — LACTOBACILLUS ACIDOPHILUS / LACTOBACILLUS BULGARICUS 1 PACKET: 100 MILLION CFU STRENGTH GRANULES at 08:11

## 2018-03-29 RX ADMIN — LEVOTHYROXINE SODIUM 50 MCG: 50 TABLET ORAL at 06:09

## 2018-03-29 RX ADMIN — LISINOPRIL 10 MG: 20 TABLET ORAL at 08:11

## 2018-03-29 RX ADMIN — OXYCODONE HYDROCHLORIDE 40 MG: 10 TABLET ORAL at 01:24

## 2018-03-29 RX ADMIN — FUROSEMIDE 40 MG: 40 TABLET ORAL at 08:11

## 2018-03-29 RX ADMIN — Medication 220 MG: at 08:13

## 2018-03-29 ASSESSMENT — ENCOUNTER SYMPTOMS
SPUTUM PRODUCTION: 1
SEIZURES: 0
FOCAL WEAKNESS: 0
NAUSEA: 0
FLANK PAIN: 0
VOMITING: 0
FEVER: 0
BLOOD IN STOOL: 0
EYE DISCHARGE: 0
MEMORY LOSS: 0
HALLUCINATIONS: 0
EYE REDNESS: 0
SHORTNESS OF BREATH: 1
PALPITATIONS: 0
BACK PAIN: 0
COUGH: 1
ABDOMINAL PAIN: 0
CHILLS: 0

## 2018-03-29 ASSESSMENT — PAIN SCALES - GENERAL
PAINLEVEL_OUTOF10: 8
PAINLEVEL_OUTOF10: 5
PAINLEVEL_OUTOF10: 8
PAINLEVEL_OUTOF10: 9
PAINLEVEL_OUTOF10: 9
PAINLEVEL_OUTOF10: 5
PAINLEVEL_OUTOF10: 8

## 2018-03-29 NOTE — PROGRESS NOTES
Full liquid diet, nectar thick ordered. Called Dr Guillaume to see what he wanted to do with cortak and TF order. Dr Guillaume said OK to leave the tube feed order open for now. Cortrak will remain in place until patient eating >50%

## 2018-03-29 NOTE — PROGRESS NOTES
Report received at bedside, assumed care. Pt is resting in bed. A&O x 4. Pain present in lower extremities, will administer pain meds. Tube feeding present in right nare at 80 cm, no feed running at this time. Patient ate 75% of dinner, no coughing or difficulty swallowing. No other concerns, complains or distress. Tele box on. Chart reviewed. Bed in lowest position and call light within reach.

## 2018-03-29 NOTE — PROGRESS NOTES
RN noticed Cortrak pulled out to 55 cm accidentally, previously 80 cm, Cortrak completely removed. Patient is resting comfortably in bed, no signs of distress, even unlabored breathing, satting at 95% on 6 L.

## 2018-03-29 NOTE — PROGRESS NOTES
[facility] Hospitalist Progress Note    Date of Service: 3/29/2018    Chief Complaint  57 y.o. male admitted 3/16/2018 with ongoing cellulitis of bilateral lower extremities with a history of chronic venous stasis and lymphedema. He had acute respiratory distress in ER.    Interval Problem Update  3/28: Pt seen and examined, afebrile, transferred overnight from ICU, was requiring 8 L of o2 overnight. Denies any nausea , vomiting or chest pain.     3/29: No overnight events, on ^l of O2, as per pt his baseline is between 2-3 L. Denies any chest pain,     Consultants/Specialty  Intensivist    Disposition  TBD        Review of Systems   Constitutional: Negative for chills and fever.   HENT: Negative for congestion and nosebleeds.    Eyes: Negative for discharge and redness.   Respiratory: Positive for cough, sputum production and shortness of breath.    Cardiovascular: Negative for chest pain and palpitations.   Gastrointestinal: Negative for abdominal pain, blood in stool, melena, nausea and vomiting.   Genitourinary: Negative for flank pain and hematuria.   Musculoskeletal: Positive for joint pain. Negative for back pain.   Skin: Negative for rash.   Neurological: Negative for focal weakness and seizures.   Psychiatric/Behavioral: Negative for hallucinations and memory loss.      Physical Exam  Laboratory/Imaging   Hemodynamics  Temp (24hrs), Av.4 °C (97.5 °F), Min:36 °C (96.8 °F), Max:36.7 °C (98.1 °F)   Temperature: 36.7 °C (98.1 °F)  Pulse  Av.8  Min: 54  Max: 111    Blood Pressure: 119/56      Respiratory      Respiration: 18, Pulse Oximetry: 95 %, O2 Daily Delivery Respiratory : OxyMask     Work Of Breathing / Effort: Mild  RUL Breath Sounds: Clear, RML Breath Sounds: Clear, RLL Breath Sounds: Diminished, LARY Breath Sounds: Clear, LLL Breath Sounds: Diminished    Fluids    Intake/Output Summary (Last 24 hours) at 18 1248  Last data filed at 18 0600   Gross per 24 hour   Intake              720  ml   Output             1675 ml   Net             -955 ml       Nutrition  Orders Placed This Encounter   Procedures   • DIET ORDER     Standing Status:   Standing     Number of Occurrences:   1     Order Specific Question:   Diet:     Answer:   Full Liquid [11]     Order Specific Question:   Consistency/Fluid modifications:     Answer:   Nectar Thick [2]     Physical Exam   Constitutional: He is oriented to person, place, and time. He appears well-developed.   Morbidly obese   HENT:   Head: Normocephalic and atraumatic.   Right Ear: External ear normal.   Left Ear: External ear normal.   Mouth/Throat: No oropharyngeal exudate.   Hoarseness   Eyes: Conjunctivae and EOM are normal. Pupils are equal, round, and reactive to light. Right eye exhibits no discharge. Left eye exhibits no discharge. No scleral icterus.   Neck: Neck supple. No JVD present. No tracheal deviation present.   Cardiovascular: Regular rhythm and normal heart sounds.    No murmur heard.  Tachycardia   Pulmonary/Chest: No stridor. No respiratory distress. He has decreased breath sounds. He has no wheezes. He has no rhonchi. He has rales. He exhibits no tenderness and no crepitus.   Abdominal: Soft. Bowel sounds are normal. He exhibits distension (improved). There is no tenderness. There is no rebound and no guarding.   Musculoskeletal: He exhibits edema (2+ LE). He exhibits no tenderness.   Neurological: He is alert and oriented to person, place, and time. No cranial nerve deficit. He exhibits normal muscle tone.   Skin: Skin is warm and dry. He is not diaphoretic.     LE wounds dressed  with clean dressing   Psychiatric: He has a normal mood and affect. His behavior is normal. Thought content normal.   Vitals reviewed.      Recent Labs      03/27/18   0456  03/28/18   0523  03/29/18   0508   WBC  8.3  8.4  9.9   RBC  4.05*  4.25*  4.35*   HEMOGLOBIN  11.6*  12.2*  12.5*   HEMATOCRIT  37.9*  40.1*  40.6*   MCV  93.6  94.4  93.3   MCH  28.6  28.7   28.7   MCHC  30.6*  30.4*  30.8*   RDW  51.3*  51.8*  50.2*   PLATELETCT  187  241  224   MPV  11.3  10.7  11.0     Recent Labs      03/27/18   0456  03/28/18   0523  03/29/18   0508   SODIUM  138  135  137   POTASSIUM  4.0  4.5  4.4   CHLORIDE  98  92*  92*   CO2  29  38*  38*   GLUCOSE  134*  148*  127*   BUN  32*  33*  24*   CREATININE  0.56  0.67  0.51   CALCIUM  9.6  9.4  9.8                      Assessment/Plan     * Acute on chronic respiratory failure with hypoxia (CMS-HCC)- (present on admission)   Assessment & Plan    Intubated 3-20 extubated 3/25/2018  Improving oxygen requirements  RT protocol          Bilateral lower leg cellulitis- (present on admission)   Assessment & Plan    Continue Wound care  Cx  MSSA     Completed 10 days of unasyn        Pneumonia- (present on admission)   Assessment & Plan    Completed 10 day course of Unasyn          Narcotic addiction (CMS-HCC)- (present on admission)   Assessment & Plan      minimize sedating agents        Stasis dermatitis- (present on admission)   Assessment & Plan    With MSSA cellulitis    Completed antibiotic course Continue wound care            Hypothyroidism- (present on admission)   Assessment & Plan    Stable continue Synthroid           COPD (chronic obstructive pulmonary disease) (CMS-HCC)- (present on admission)   Assessment & Plan    RT protocol        HTN (hypertension)- (present on admission)   Assessment & Plan    Stable continue lasix  amlodipine and  Lisinopril  Monitor BP and adjust          Hypokalemia   Assessment & Plan    Replete K and monitor         Delirium   Assessment & Plan    Resolved d/c seroquel        Ileus (CMS-HCC)   Assessment & Plan    Clinically resolved  Received Relistor        Bilateral edema of lower extremity   Assessment & Plan    Chronic  Improved continue po Lasix  Monitor intake output and fluid status        Diabetes type 2, controlled (CMS-HCC)- (present on admission)   Assessment & Plan    HgbA1c:6.4  Stable  on ISS         Non compliance w medication regimen- (present on admission)   Assessment & Plan    Reinforced compliance         Morbid obesity (CMS-HCC)- (present on admission)   Assessment & Plan    Body mass index is 48.02 kg/m².            Quality-Core Measures   Reviewed items::  Radiology images reviewed, Labs reviewed and Medications reviewed  Montoya catheter::  Unconscious / Sedated Patient on a Ventilator  Central line in place:  Need for access  DVT prophylaxis pharmacological::  Enoxaparin (Lovenox)  Antibiotics:  Treating active infection/contamination beyond 24 hours perioperative coverage

## 2018-03-29 NOTE — CARE PLAN
Problem: Pain Management  Goal: Pain level will decrease to patient's comfort goal    Intervention: Follow pain managment plan developed in collaboration with patient and Interdisciplinary Team  Listened to patients discussion of pain. Discussed with patient pain goal and management through medications. Supported and educated patient by addressing specific questions regarding diagnosis, risks, benefits of pain management treatment.         Problem: Skin Integrity  Goal: Risk for impaired skin integrity will decrease    Intervention: Implement precautions to protect skin integrity in collaboration with the interdisciplinary team  Assessing skin integrity, appearance and temperature as needed. Performing a1symam, floating heels and using extra pillows for positioning. Waffle cushion in place. Changing bed sheets PRN to prevent skin breakdown. Following wound orders.

## 2018-03-29 NOTE — DISCHARGE PLANNING
CCS spoke to Marley at [facility]. Marley did not received the referral CCS re-sent the referral.

## 2018-03-29 NOTE — CARE PLAN
Problem: Communication  Goal: The ability to communicate needs accurately and effectively will improve  Outcome: PROGRESSING AS EXPECTED  Encourage pt to use call light    Problem: Safety  Goal: Will remain free from injury  Outcome: PROGRESSING AS EXPECTED  Bed alarm on, supervision for meals.

## 2018-03-29 NOTE — PROGRESS NOTES
Patient is sleeping comfortably in bed, no signs of distress, even unlabored breathing, satting at 93% on 6 L, will continue to monitor.

## 2018-03-29 NOTE — DIETARY
Nutrition Services: Brief Update    -Pt previously receiving TF and SLP advanced diet to PO NTFL on 3/28.   -Cortrak has been removed, and pt consumed 25-75% 2 meals thus far.   -Glucose: 136 via accu-check and trending up. Note pt is with type 2 Dm.  -Pertinent meds: Ascorbic Acid, Lasix, Klyte, Zinc Sulfate    Recommendations/Plan:    1. Please update diet order to include Diabetic restriction per pt with DM and glucose levels elevated and trending up  2. Encourage intake of meals  3. Document intake of all meals as % taken in ADL's to provide interdisciplinary communication across all shifts.   4. Monitor weight.  5. Nutrition rep will continue to see patient for ongoing meal and snack preferences.     RD following and will make recommendations as appropriate.

## 2018-03-30 LAB
ANION GAP SERPL CALC-SCNC: 4 MMOL/L (ref 0–11.9)
BASOPHILS # BLD AUTO: 0.7 % (ref 0–1.8)
BASOPHILS # BLD: 0.06 K/UL (ref 0–0.12)
BUN SERPL-MCNC: 24 MG/DL (ref 8–22)
CALCIUM SERPL-MCNC: 9.7 MG/DL (ref 8.5–10.5)
CHLORIDE SERPL-SCNC: 93 MMOL/L (ref 96–112)
CO2 SERPL-SCNC: 39 MMOL/L (ref 20–33)
CREAT SERPL-MCNC: 0.47 MG/DL (ref 0.5–1.4)
EOSINOPHIL # BLD AUTO: 0.43 K/UL (ref 0–0.51)
EOSINOPHIL NFR BLD: 4.9 % (ref 0–6.9)
ERYTHROCYTE [DISTWIDTH] IN BLOOD BY AUTOMATED COUNT: 50.5 FL (ref 35.9–50)
GLUCOSE BLD-MCNC: 114 MG/DL (ref 65–99)
GLUCOSE BLD-MCNC: 118 MG/DL (ref 65–99)
GLUCOSE BLD-MCNC: 120 MG/DL (ref 65–99)
GLUCOSE BLD-MCNC: 165 MG/DL (ref 65–99)
GLUCOSE SERPL-MCNC: 135 MG/DL (ref 65–99)
HCT VFR BLD AUTO: 40.6 % (ref 42–52)
HGB BLD-MCNC: 12.4 G/DL (ref 14–18)
IMM GRANULOCYTES # BLD AUTO: 0.17 K/UL (ref 0–0.11)
IMM GRANULOCYTES NFR BLD AUTO: 2 % (ref 0–0.9)
LYMPHOCYTES # BLD AUTO: 1.54 K/UL (ref 1–4.8)
LYMPHOCYTES NFR BLD: 17.7 % (ref 22–41)
MCH RBC QN AUTO: 28.7 PG (ref 27–33)
MCHC RBC AUTO-ENTMCNC: 30.5 G/DL (ref 33.7–35.3)
MCV RBC AUTO: 94 FL (ref 81.4–97.8)
MONOCYTES # BLD AUTO: 0.96 K/UL (ref 0–0.85)
MONOCYTES NFR BLD AUTO: 11 % (ref 0–13.4)
NEUTROPHILS # BLD AUTO: 5.53 K/UL (ref 1.82–7.42)
NEUTROPHILS NFR BLD: 63.7 % (ref 44–72)
NRBC # BLD AUTO: 0 K/UL
NRBC BLD-RTO: 0 /100 WBC
PLATELET # BLD AUTO: 217 K/UL (ref 164–446)
PMV BLD AUTO: 10.6 FL (ref 9–12.9)
POTASSIUM SERPL-SCNC: 4.4 MMOL/L (ref 3.6–5.5)
RBC # BLD AUTO: 4.32 M/UL (ref 4.7–6.1)
SODIUM SERPL-SCNC: 136 MMOL/L (ref 135–145)
WBC # BLD AUTO: 8.7 K/UL (ref 4.8–10.8)

## 2018-03-30 PROCEDURE — 80048 BASIC METABOLIC PNL TOTAL CA: CPT

## 2018-03-30 PROCEDURE — 700102 HCHG RX REV CODE 250 W/ 637 OVERRIDE(OP): Performed by: HOSPITALIST

## 2018-03-30 PROCEDURE — 770020 HCHG ROOM/CARE - TELE (206)

## 2018-03-30 PROCEDURE — 700102 HCHG RX REV CODE 250 W/ 637 OVERRIDE(OP): Performed by: INTERNAL MEDICINE

## 2018-03-30 PROCEDURE — A9270 NON-COVERED ITEM OR SERVICE: HCPCS | Performed by: HOSPITALIST

## 2018-03-30 PROCEDURE — 700111 HCHG RX REV CODE 636 W/ 250 OVERRIDE (IP): Performed by: HOSPITALIST

## 2018-03-30 PROCEDURE — 36415 COLL VENOUS BLD VENIPUNCTURE: CPT

## 2018-03-30 PROCEDURE — A9270 NON-COVERED ITEM OR SERVICE: HCPCS | Performed by: INTERNAL MEDICINE

## 2018-03-30 PROCEDURE — 82962 GLUCOSE BLOOD TEST: CPT | Mod: 91

## 2018-03-30 PROCEDURE — 92526 ORAL FUNCTION THERAPY: CPT

## 2018-03-30 PROCEDURE — 85025 COMPLETE CBC W/AUTO DIFF WBC: CPT

## 2018-03-30 PROCEDURE — 99232 SBSQ HOSP IP/OBS MODERATE 35: CPT | Performed by: HOSPITALIST

## 2018-03-30 RX ADMIN — LISINOPRIL 10 MG: 20 TABLET ORAL at 08:08

## 2018-03-30 RX ADMIN — BUDESONIDE AND FORMOTEROL FUMARATE DIHYDRATE 2 PUFF: 160; 4.5 AEROSOL RESPIRATORY (INHALATION) at 21:14

## 2018-03-30 RX ADMIN — FUROSEMIDE 40 MG: 40 TABLET ORAL at 08:08

## 2018-03-30 RX ADMIN — OXYCODONE HYDROCHLORIDE 40 MG: 10 TABLET ORAL at 10:46

## 2018-03-30 RX ADMIN — Medication: at 08:07

## 2018-03-30 RX ADMIN — OMEPRAZOLE 20 MG: 20 CAPSULE, DELAYED RELEASE ORAL at 08:09

## 2018-03-30 RX ADMIN — OXYCODONE HYDROCHLORIDE 40 MG: 10 TABLET ORAL at 21:13

## 2018-03-30 RX ADMIN — ENOXAPARIN SODIUM 40 MG: 100 INJECTION SUBCUTANEOUS at 08:09

## 2018-03-30 RX ADMIN — AMLODIPINE BESYLATE 5 MG: 5 TABLET ORAL at 08:08

## 2018-03-30 RX ADMIN — OXYCODONE HYDROCHLORIDE 40 MG: 10 TABLET ORAL at 00:17

## 2018-03-30 RX ADMIN — POTASSIUM BICARBONATE 25 MEQ: 25 TABLET, EFFERVESCENT ORAL at 08:09

## 2018-03-30 RX ADMIN — LACTOBACILLUS ACIDOPHILUS / LACTOBACILLUS BULGARICUS 1 PACKET: 100 MILLION CFU STRENGTH GRANULES at 08:08

## 2018-03-30 RX ADMIN — OXYCODONE HYDROCHLORIDE 40 MG: 10 TABLET ORAL at 05:05

## 2018-03-30 RX ADMIN — LACTOBACILLUS ACIDOPHILUS / LACTOBACILLUS BULGARICUS 1 PACKET: 100 MILLION CFU STRENGTH GRANULES at 10:46

## 2018-03-30 RX ADMIN — OXYCODONE HYDROCHLORIDE 40 MG: 10 TABLET ORAL at 16:34

## 2018-03-30 RX ADMIN — OXYCODONE HYDROCHLORIDE AND ACETAMINOPHEN 500 MG: 500 TABLET ORAL at 08:08

## 2018-03-30 RX ADMIN — ACETAMINOPHEN 650 MG: 325 TABLET, FILM COATED ORAL at 17:34

## 2018-03-30 RX ADMIN — BUDESONIDE AND FORMOTEROL FUMARATE DIHYDRATE 2 PUFF: 160; 4.5 AEROSOL RESPIRATORY (INHALATION) at 08:08

## 2018-03-30 RX ADMIN — LEVOTHYROXINE SODIUM 50 MCG: 50 TABLET ORAL at 06:05

## 2018-03-30 RX ADMIN — ENOXAPARIN SODIUM 40 MG: 100 INJECTION SUBCUTANEOUS at 21:14

## 2018-03-30 RX ADMIN — LACTOBACILLUS ACIDOPHILUS / LACTOBACILLUS BULGARICUS 1 PACKET: 100 MILLION CFU STRENGTH GRANULES at 16:34

## 2018-03-30 RX ADMIN — Medication 220 MG: at 08:08

## 2018-03-30 RX ADMIN — ASPIRIN 81 MG: 81 TABLET, CHEWABLE ORAL at 08:08

## 2018-03-30 RX ADMIN — STANDARDIZED SENNA CONCENTRATE AND DOCUSATE SODIUM 2 TABLET: 8.6; 5 TABLET, FILM COATED ORAL at 08:08

## 2018-03-30 ASSESSMENT — ENCOUNTER SYMPTOMS
NAUSEA: 0
SHORTNESS OF BREATH: 1
MEMORY LOSS: 0
VOMITING: 0
COUGH: 1
BACK PAIN: 0
CHILLS: 0
ABDOMINAL PAIN: 0
FEVER: 0
SPUTUM PRODUCTION: 1

## 2018-03-30 ASSESSMENT — PAIN SCALES - GENERAL
PAINLEVEL_OUTOF10: 5
PAINLEVEL_OUTOF10: 8
PAINLEVEL_OUTOF10: 6
PAINLEVEL_OUTOF10: 5
PAINLEVEL_OUTOF10: 8

## 2018-03-30 NOTE — DISCHARGE PLANNING
CCS called and spoke to Marley at [facility]. Marley stated that she did not received the referral. CCS re-sent the referral via email.

## 2018-03-30 NOTE — CARE PLAN
Problem: Safety  Goal: Will remain free from falls  Outcome: PROGRESSING AS EXPECTED  Patient educated on patient safety and fall precautions. Patient verbalized and demonstrated understanding of education. Patient will use call light for assistance. Pt  Unable to mobilize without assistance    Problem: Pain Management  Goal: Pain level will decrease to patient's comfort goal  Outcome: PROGRESSING AS EXPECTED  Pt did not need his pain meds this evening. Will reevaluate later in the shift.

## 2018-03-30 NOTE — DISCHARGE PLANNING
Medical SW    ALEXX spoke with Marley from [facility]. Pt is out of SNF days. Pt has copay amount of 167.50$ a day. Pt reports he can not pay this. SW will ask supervisors for [facility] for SNF.    Plan: SW to f/u with supervisors for [facility]. Pt first choice is advanced.     *SW spoke with pt about income monthly. Pt reports he makes 1200$ a month and 1000$ goes to insurance and rent with the other 200$ left for food.

## 2018-03-30 NOTE — PROGRESS NOTES
[facility] Hospitalist Progress Note    Date of Service: 3/30/2018    Chief Complaint  57 y.o. male admitted 3/16/2018 with ongoing cellulitis of bilateral lower extremities with a history of chronic venous stasis and lymphedema. He had acute respiratory distress in ER.    Interval Problem Update  3/28: Pt seen and examined, afebrile, transferred overnight from ICU, was requiring 8 L of o2 overnight. Denies any nausea , vomiting or chest pain.   3/29: No overnight events, on ^l of O2, as per pt his baseline is between 2-3 L. Denies any chest pain,   3/30: Speech advanced patient's diet to dysphagia two. Continue diuresis, possible transfer to SNF tomorrow if improved    Consultants/Specialty  Intensivist    Disposition  Continue diuresis, possible transfer to SNF tomorrow if improved    Review of Systems   Constitutional: Negative for chills and fever.   Respiratory: Positive for cough, sputum production and shortness of breath.    Cardiovascular: Negative for chest pain.   Gastrointestinal: Negative for abdominal pain, nausea and vomiting.   Musculoskeletal: Positive for joint pain. Negative for back pain.   Skin: Negative for rash.   Psychiatric/Behavioral: Negative for memory loss.      Physical Exam  Laboratory/Imaging   Hemodynamics  Temp (24hrs), Av.6 °C (97.8 °F), Min:36.3 °C (97.3 °F), Max:36.8 °C (98.3 °F)   Temperature: 36.6 °C (97.9 °F)  Pulse  Av  Min: 54  Max: 111    Blood Pressure: 120/61      Respiratory      Respiration: 18, Pulse Oximetry: 95 %     Work Of Breathing / Effort: Mild  RUL Breath Sounds: Clear, RML Breath Sounds: Clear, RLL Breath Sounds: Diminished, LARY Breath Sounds: Clear, LLL Breath Sounds: Diminished    Fluids    Intake/Output Summary (Last 24 hours) at 18 1559  Last data filed at 18 1000   Gross per 24 hour   Intake              640 ml   Output              900 ml   Net             -260 ml       Nutrition  Orders Placed This Encounter   Procedures   • DIET ORDER      Standing Status:   Standing     Number of Occurrences:   1     Order Specific Question:   Diet:     Answer:   Regular [1]     Order Specific Question:   Texture/Fiber modifications:     Answer:   Dysphagia 2(Pureed/Chopped)specify fluid consistency(question 6) [2]     Order Specific Question:   Consistency/Fluid modifications:     Answer:   Nectar Thick [2]     Physical Exam   Constitutional: He is oriented to person, place, and time. He appears well-developed.   Morbidly obese   HENT:   Head: Normocephalic and atraumatic.   Mouth/Throat: No oropharyngeal exudate.   Hoarseness   Eyes: Conjunctivae are normal. Right eye exhibits no discharge. Left eye exhibits no discharge. No scleral icterus.   Cardiovascular: Regular rhythm and normal heart sounds.    No murmur heard.  Tachycardia   Pulmonary/Chest: No respiratory distress. He has decreased breath sounds. He has no wheezes. He has no rhonchi. He has rales. He exhibits no tenderness and no crepitus.   Abdominal: Soft. Bowel sounds are normal. He exhibits distension (improved). There is no tenderness. There is no rebound and no guarding.   Musculoskeletal: He exhibits edema (2+ LE). He exhibits no tenderness.   Neurological: He is alert and oriented to person, place, and time.   Skin: Skin is warm and dry.   Scaly skin on head  Lower extremity wounds   Vitals reviewed.      Recent Labs      03/28/18   0523  03/29/18   0508  03/30/18   0457   WBC  8.4  9.9  8.7   RBC  4.25*  4.35*  4.32*   HEMOGLOBIN  12.2*  12.5*  12.4*   HEMATOCRIT  40.1*  40.6*  40.6*   MCV  94.4  93.3  94.0   MCH  28.7  28.7  28.7   MCHC  30.4*  30.8*  30.5*   RDW  51.8*  50.2*  50.5*   PLATELETCT  241  224  217   MPV  10.7  11.0  10.6     Recent Labs      03/28/18   0523  03/29/18   0508  03/30/18   0457   SODIUM  135  137  136   POTASSIUM  4.5  4.4  4.4   CHLORIDE  92*  92*  93*   CO2  38*  38*  39*   GLUCOSE  148*  127*  135*   BUN  33*  24*  24*   CREATININE  0.67  0.51  0.47*   CALCIUM   9.4  9.8  9.7                      Assessment/Plan     * Acute on chronic respiratory failure with hypoxia (CMS-HCC)- (present on admission)   Assessment & Plan    Intubated 3-20 extubated 3/25/2018  Improving oxygen requirements  RT protocol  Diuresing with Lasix        Bilateral lower leg cellulitis- (present on admission)   Assessment & Plan    Continue Wound care  Cx  MSSA     Completed 10 days of unasyn        Pneumonia- (present on admission)   Assessment & Plan    Completed 10 day course of Unasyn          Narcotic addiction (CMS-HCC)- (present on admission)   Assessment & Plan      minimize sedating agents        Stasis dermatitis- (present on admission)   Assessment & Plan    With MSSA cellulitis    Completed antibiotic course Continue wound care            Hypothyroidism- (present on admission)   Assessment & Plan    Stable continue Synthroid           COPD (chronic obstructive pulmonary disease) (CMS-HCC)- (present on admission)   Assessment & Plan    RT protocol        HTN (hypertension)- (present on admission)   Assessment & Plan    Stable continue lasix  amlodipine and  Lisinopril  Monitor BP and adjust          Hypokalemia   Assessment & Plan    Replete K and monitor         Delirium   Assessment & Plan    Resolved d/c seroquel        Ileus (CMS-HCC)   Assessment & Plan    Clinically resolved  Received Relistor        Bilateral edema of lower extremity   Assessment & Plan    Chronic  Improved continue po Lasix  Monitor intake output and fluid status        Diabetes type 2, controlled (CMS-HCC)- (present on admission)   Assessment & Plan    HgbA1c:6.4  Stable on ISS         Non compliance w medication regimen- (present on admission)   Assessment & Plan    Reinforced compliance         Morbid obesity (CMS-HCC)- (present on admission)   Assessment & Plan    Body mass index is 48.02 kg/m².            Quality-Core Measures   Reviewed items::  Labs reviewed and Medications reviewed  Montoya catheter::   Unconscious / Sedated Patient on a Ventilator  DVT prophylaxis pharmacological::  Enoxaparin (Lovenox)  Antibiotics:  Treating active infection/contamination beyond 24 hours perioperative coverage

## 2018-03-30 NOTE — PROGRESS NOTES
Assumed care of pt. Bedside report received from day R.N. Pt denies chest pain or SOB. VSS. Pt resting comfortably. All needs met. Bed low and locked, call light in reach. Discussed POC.

## 2018-03-30 NOTE — THERAPY
"Speech Language Therapy dysphagia treatment completed.   Functional Status:  Patient seen this date for dysphagia tx session per hospitalist RN and RN request, as patient is eager for upgraded trials. Patient currently on NTFL diet and per RN, appears to be tolerating diet without difficult. Patient consumed PO trials of NTL via cup sip, purees, soft solids, mixed consistencies, crackers, and thin liquids via cup sip. Patient presented with minimal cough x1 on mixed consistencies, and crackers, and cough x1 and throat clear x1 on thin liquids via cup sip. Patient had no overt s/sx of aspiration on soft solids or NTL. No fatigue was noted today, which is improvement from previous tx sessions. Laryngeal elevation palpated as complete and initiation of swallow trigger was timely. Patient required minimal verbal cues to elevate HOB, but followed all other swallow precautions independently. Patient appears at the level to upgrade to Dys2/NTL diet with intermittent supervision. Okay to float pills in applesauce or give whole w/ NTL wash. RN aware. SLP is following.     Recommendations: Patient appears at the level to upgrade to Dys2/NTL diet with intermittent supervision. Okay to float pills in applesauce or give whole w/ NTL wash.   Plan of Care: Will benefit from Speech Therapy 3 times per week  Post-Acute Therapy: Discharge to a transitional care facility for continued skilled therapy services.    See \"Rehab Therapy-Acute\" Patient Summary Report for complete documentation.     "

## 2018-03-31 LAB
ANION GAP SERPL CALC-SCNC: 3 MMOL/L (ref 0–11.9)
BASOPHILS # BLD AUTO: 0.4 % (ref 0–1.8)
BASOPHILS # BLD: 0.04 K/UL (ref 0–0.12)
BUN SERPL-MCNC: 24 MG/DL (ref 8–22)
CALCIUM SERPL-MCNC: 10.1 MG/DL (ref 8.5–10.5)
CHLORIDE SERPL-SCNC: 92 MMOL/L (ref 96–112)
CO2 SERPL-SCNC: 42 MMOL/L (ref 20–33)
CREAT SERPL-MCNC: 0.59 MG/DL (ref 0.5–1.4)
EOSINOPHIL # BLD AUTO: 0.45 K/UL (ref 0–0.51)
EOSINOPHIL NFR BLD: 4.3 % (ref 0–6.9)
ERYTHROCYTE [DISTWIDTH] IN BLOOD BY AUTOMATED COUNT: 51.3 FL (ref 35.9–50)
GLUCOSE SERPL-MCNC: 164 MG/DL (ref 65–99)
HCT VFR BLD AUTO: 40.7 % (ref 42–52)
HGB BLD-MCNC: 12.3 G/DL (ref 14–18)
IMM GRANULOCYTES # BLD AUTO: 0.14 K/UL (ref 0–0.11)
IMM GRANULOCYTES NFR BLD AUTO: 1.3 % (ref 0–0.9)
LYMPHOCYTES # BLD AUTO: 1.36 K/UL (ref 1–4.8)
LYMPHOCYTES NFR BLD: 13.1 % (ref 22–41)
MCH RBC QN AUTO: 28.7 PG (ref 27–33)
MCHC RBC AUTO-ENTMCNC: 30.2 G/DL (ref 33.7–35.3)
MCV RBC AUTO: 94.9 FL (ref 81.4–97.8)
MONOCYTES # BLD AUTO: 1.1 K/UL (ref 0–0.85)
MONOCYTES NFR BLD AUTO: 10.6 % (ref 0–13.4)
NEUTROPHILS # BLD AUTO: 7.3 K/UL (ref 1.82–7.42)
NEUTROPHILS NFR BLD: 70.3 % (ref 44–72)
NRBC # BLD AUTO: 0 K/UL
NRBC BLD-RTO: 0 /100 WBC
PLATELET # BLD AUTO: 232 K/UL (ref 164–446)
PMV BLD AUTO: 10.6 FL (ref 9–12.9)
POTASSIUM SERPL-SCNC: 4.1 MMOL/L (ref 3.6–5.5)
RBC # BLD AUTO: 4.29 M/UL (ref 4.7–6.1)
SODIUM SERPL-SCNC: 137 MMOL/L (ref 135–145)
WBC # BLD AUTO: 10.4 K/UL (ref 4.8–10.8)

## 2018-03-31 PROCEDURE — 700102 HCHG RX REV CODE 250 W/ 637 OVERRIDE(OP): Performed by: HOSPITALIST

## 2018-03-31 PROCEDURE — 770020 HCHG ROOM/CARE - TELE (206)

## 2018-03-31 PROCEDURE — 85025 COMPLETE CBC W/AUTO DIFF WBC: CPT

## 2018-03-31 PROCEDURE — A9270 NON-COVERED ITEM OR SERVICE: HCPCS | Performed by: HOSPITALIST

## 2018-03-31 PROCEDURE — A9270 NON-COVERED ITEM OR SERVICE: HCPCS | Performed by: INTERNAL MEDICINE

## 2018-03-31 PROCEDURE — 99232 SBSQ HOSP IP/OBS MODERATE 35: CPT | Performed by: HOSPITALIST

## 2018-03-31 PROCEDURE — 700102 HCHG RX REV CODE 250 W/ 637 OVERRIDE(OP): Performed by: INTERNAL MEDICINE

## 2018-03-31 PROCEDURE — 36415 COLL VENOUS BLD VENIPUNCTURE: CPT

## 2018-03-31 PROCEDURE — 80048 BASIC METABOLIC PNL TOTAL CA: CPT

## 2018-03-31 PROCEDURE — 700111 HCHG RX REV CODE 636 W/ 250 OVERRIDE (IP): Performed by: HOSPITALIST

## 2018-03-31 RX ADMIN — OXYCODONE HYDROCHLORIDE 40 MG: 10 TABLET ORAL at 20:15

## 2018-03-31 RX ADMIN — ENOXAPARIN SODIUM 40 MG: 100 INJECTION SUBCUTANEOUS at 08:34

## 2018-03-31 RX ADMIN — OXYCODONE HYDROCHLORIDE AND ACETAMINOPHEN 500 MG: 500 TABLET ORAL at 08:34

## 2018-03-31 RX ADMIN — Medication: at 08:36

## 2018-03-31 RX ADMIN — LACTOBACILLUS ACIDOPHILUS / LACTOBACILLUS BULGARICUS 1 PACKET: 100 MILLION CFU STRENGTH GRANULES at 07:07

## 2018-03-31 RX ADMIN — Medication 220 MG: at 08:34

## 2018-03-31 RX ADMIN — STANDARDIZED SENNA CONCENTRATE AND DOCUSATE SODIUM 2 TABLET: 8.6; 5 TABLET, FILM COATED ORAL at 21:00

## 2018-03-31 RX ADMIN — OXYCODONE HYDROCHLORIDE 40 MG: 10 TABLET ORAL at 07:07

## 2018-03-31 RX ADMIN — OXYCODONE HYDROCHLORIDE 40 MG: 10 TABLET ORAL at 01:24

## 2018-03-31 RX ADMIN — OMEPRAZOLE 20 MG: 20 CAPSULE, DELAYED RELEASE ORAL at 08:34

## 2018-03-31 RX ADMIN — BUDESONIDE AND FORMOTEROL FUMARATE DIHYDRATE 2 PUFF: 160; 4.5 AEROSOL RESPIRATORY (INHALATION) at 20:15

## 2018-03-31 RX ADMIN — OXYCODONE HYDROCHLORIDE 40 MG: 10 TABLET ORAL at 14:48

## 2018-03-31 RX ADMIN — ASPIRIN 81 MG: 81 TABLET, CHEWABLE ORAL at 08:34

## 2018-03-31 RX ADMIN — LISINOPRIL 10 MG: 20 TABLET ORAL at 08:34

## 2018-03-31 RX ADMIN — AMLODIPINE BESYLATE 5 MG: 5 TABLET ORAL at 08:34

## 2018-03-31 RX ADMIN — ENOXAPARIN SODIUM 40 MG: 100 INJECTION SUBCUTANEOUS at 20:15

## 2018-03-31 RX ADMIN — BUDESONIDE AND FORMOTEROL FUMARATE DIHYDRATE 2 PUFF: 160; 4.5 AEROSOL RESPIRATORY (INHALATION) at 08:33

## 2018-03-31 RX ADMIN — LEVOTHYROXINE SODIUM 50 MCG: 50 TABLET ORAL at 06:06

## 2018-03-31 RX ADMIN — FUROSEMIDE 40 MG: 40 TABLET ORAL at 08:34

## 2018-03-31 ASSESSMENT — PAIN SCALES - GENERAL
PAINLEVEL_OUTOF10: 8
PAINLEVEL_OUTOF10: 6
PAINLEVEL_OUTOF10: 6
PAINLEVEL_OUTOF10: 8
PAINLEVEL_OUTOF10: 8

## 2018-03-31 ASSESSMENT — ENCOUNTER SYMPTOMS
BACK PAIN: 0
CHILLS: 0
VOMITING: 0
FEVER: 0
MEMORY LOSS: 0
COUGH: 1
NERVOUS/ANXIOUS: 1
ABDOMINAL PAIN: 0
SPUTUM PRODUCTION: 1
SHORTNESS OF BREATH: 0
NAUSEA: 0

## 2018-03-31 ASSESSMENT — PATIENT HEALTH QUESTIONNAIRE - PHQ9
1. LITTLE INTEREST OR PLEASURE IN DOING THINGS: NOT AT ALL
2. FEELING DOWN, DEPRESSED, IRRITABLE, OR HOPELESS: NOT AT ALL
SUM OF ALL RESPONSES TO PHQ9 QUESTIONS 1 AND 2: 0

## 2018-03-31 ASSESSMENT — LIFESTYLE VARIABLES: DO YOU DRINK ALCOHOL: NO

## 2018-03-31 NOTE — CARE PLAN
Problem: Safety  Goal: Will remain free from injury  Outcome: PROGRESSING AS EXPECTED  Bed alarm     Problem: Skin Integrity  Goal: Risk for impaired skin integrity will decrease  Outcome: PROGRESSING SLOWER THAN EXPECTED  Patient refusing turns overnight. Educated patient.

## 2018-03-31 NOTE — PROGRESS NOTES
[facility]    Date of Service: 3/31/2018    Chief Complaint  57 y.o. male admitted 3/16/2018 with ongoing cellulitis of bilateral lower extremities with a history of chronic venous stasis and lymphedema. He had acute respiratory distress in ER.    Interval Problem Update  3/28: Pt seen and examined, afebrile, transferred overnight from ICU, was requiring 8 L of o2 overnight. Denies any nausea , vomiting or chest pain.   3/29: No overnight events, on ^l of O2, as per pt his baseline is between 2-3 L. Denies any chest pain,   3/30: Speech advanced patient's diet to dysphagia two. Continue diuresis, possible transfer to SNF tomorrow if improved  3/31: Still on 6L O2. HCO3 increased to 42, encouraged pt to wear CPAP here and get a sleep study outpt.     Consultants/Specialty  Intensivist    Disposition  Continue diuresis, SNF requires $167/d copay. Counseled pt on getting sleep study outpatient.     Review of Systems   Constitutional: Negative for chills and fever.   Respiratory: Positive for cough and sputum production. Negative for shortness of breath.    Cardiovascular: Negative for chest pain.   Gastrointestinal: Negative for abdominal pain, nausea and vomiting.   Musculoskeletal: Positive for joint pain. Negative for back pain.   Skin: Negative for rash.   Psychiatric/Behavioral: Negative for memory loss. The patient is nervous/anxious.       Physical Exam  Laboratory/Imaging   Hemodynamics  Temp (24hrs), Av.3 °C (97.4 °F), Min:36.1 °C (96.9 °F), Max:37.3 °C (99.2 °F)   Temperature: 37.3 °C (99.2 °F)  Pulse  Av.3  Min: 54  Max: 111    Blood Pressure: 132/78      Respiratory      Respiration: 16, Pulse Oximetry: 94 %     Work Of Breathing / Effort: Shallow  RUL Breath Sounds: Diminished, RML Breath Sounds: Diminished, RLL Breath Sounds: Diminished, LARY Breath Sounds: Diminished, LLL Breath Sounds: Diminished    Fluids    Intake/Output Summary (Last 24 hours) at 18 1433  Last data  filed at 03/31/18 1300   Gross per 24 hour   Intake             1280 ml   Output              900 ml   Net              380 ml       Nutrition  Orders Placed This Encounter   Procedures   • DIET ORDER     Standing Status:   Standing     Number of Occurrences:   1     Order Specific Question:   Diet:     Answer:   Regular [1]     Order Specific Question:   Texture/Fiber modifications:     Answer:   Dysphagia 2(Pureed/Chopped)specify fluid consistency(question 6) [2]     Order Specific Question:   Consistency/Fluid modifications:     Answer:   Nectar Thick [2]     Physical Exam   Constitutional: He is oriented to person, place, and time. He appears well-developed.   Morbidly obese   HENT:   Head: Normocephalic and atraumatic.   Eyes: Conjunctivae are normal.   Cardiovascular: Regular rhythm and normal heart sounds.    No murmur heard.  Tachycardia   Pulmonary/Chest: No respiratory distress. He has decreased breath sounds. He has no wheezes. He has no rhonchi. He has rales. He exhibits no crepitus.   Abdominal: Soft. Bowel sounds are normal. He exhibits distension. There is no tenderness. There is no rebound and no guarding.   Musculoskeletal: He exhibits edema (2+ LE). He exhibits no tenderness.   Neurological: He is alert and oriented to person, place, and time.   Skin: Skin is warm and dry.   Scaly skin on head  Lower extremity wounds   Vitals reviewed.      Recent Labs      03/29/18   0508  03/30/18   0457  03/31/18   0452   WBC  9.9  8.7  10.4   RBC  4.35*  4.32*  4.29*   HEMOGLOBIN  12.5*  12.4*  12.3*   HEMATOCRIT  40.6*  40.6*  40.7*   MCV  93.3  94.0  94.9   MCH  28.7  28.7  28.7   MCHC  30.8*  30.5*  30.2*   RDW  50.2*  50.5*  51.3*   PLATELETCT  224  217  232   MPV  11.0  10.6  10.6     Recent Labs      03/29/18   0508  03/30/18   0457  03/31/18   0452   SODIUM  137  136  137   POTASSIUM  4.4  4.4  4.1   CHLORIDE  92*  93*  92*   CO2  38*  39*  42*   GLUCOSE  127*  135*  164*   BUN  24*  24*  24*    CREATININE  0.51  0.47*  0.59   CALCIUM  9.8  9.7  10.1                      Assessment/Plan     * Acute on chronic respiratory failure with hypoxia (CMS-HCC)- (present on admission)   Assessment & Plan    Intubated 3-20 extubated 3/25/2018  Improving oxygen requirements  RT protocol  Diuresing with Lasix  CPAP at night for now, encouraged pt to get a sleep study outpatient        Bilateral lower leg cellulitis- (present on admission)   Assessment & Plan    Continue Wound care  Cx  MSSA     Completed 10 days of unasyn        Pneumonia- (present on admission)   Assessment & Plan    Completed 10 day course of Unasyn          Narcotic addiction (CMS-HCC)- (present on admission)   Assessment & Plan      minimize sedating agents        Stasis dermatitis- (present on admission)   Assessment & Plan    With MSSA cellulitis    Completed antibiotic course Continue wound care            Hypothyroidism- (present on admission)   Assessment & Plan    Stable continue Synthroid           COPD (chronic obstructive pulmonary disease) (CMS-HCC)- (present on admission)   Assessment & Plan    RT protocol        HTN (hypertension)- (present on admission)   Assessment & Plan    Stable continue lasix  amlodipine and  Lisinopril  Monitor BP and adjust          Hypokalemia   Assessment & Plan    Replete K and monitor         Delirium   Assessment & Plan    Resolved d/c seroquel        Ileus (CMS-HCC)   Assessment & Plan    Clinically resolved  Received Relistor        Bilateral edema of lower extremity   Assessment & Plan    Chronic  Improved continue po Lasix  Monitor intake output and fluid status        Diabetes type 2, controlled (CMS-HCC)- (present on admission)   Assessment & Plan    HgbA1c:6.4  Stable on ISS         Non compliance w medication regimen- (present on admission)   Assessment & Plan    Reinforced compliance         Morbid obesity (CMS-HCC)- (present on admission)   Assessment & Plan    Body mass index is 48.02 kg/m².             Quality-Core Measures   Reviewed items::  Labs reviewed and Medications reviewed  Montoya catheter::  Unconscious / Sedated Patient on a Ventilator  DVT prophylaxis pharmacological::  Enoxaparin (Lovenox)  Antibiotics:  Treating active infection/contamination beyond 24 hours perioperative coverage

## 2018-03-31 NOTE — CARE PLAN
Problem: Skin Integrity  Goal: Risk for impaired skin integrity will decrease  Outcome: PROGRESSING SLOWER THAN EXPECTED  Pt is refusing 2 hour turns when he is comfortable. Educated on importance.

## 2018-03-31 NOTE — PROGRESS NOTES
Assumed care of pt. No pain present. Pt sitting up in bed eating. VSS. Pt resting comfortably. All needs met. Bed low and locked, call light in reach. Discussed POC.

## 2018-03-31 NOTE — PROGRESS NOTES
Dr Dodson notified regarding tachycardia. Full set of vitals taken. Patient remains tachycardic after repositioning and pain medication.

## 2018-04-01 PROBLEM — R06.89 CO2 NARCOSIS: Status: ACTIVE | Noted: 2018-04-01

## 2018-04-01 PROBLEM — J96.22 ACUTE ON CHRONIC RESPIRATORY FAILURE WITH HYPOXIA AND HYPERCAPNIA (HCC): Status: ACTIVE | Noted: 2017-07-19

## 2018-04-01 LAB
ANION GAP SERPL CALC-SCNC: 3 MMOL/L (ref 0–11.9)
BASE EXCESS BLDA CALC-SCNC: 17 MMOL/L (ref -4–3)
BASOPHILS # BLD AUTO: 0.3 % (ref 0–1.8)
BASOPHILS # BLD: 0.04 K/UL (ref 0–0.12)
BODY TEMPERATURE: ABNORMAL CENTIGRADE
BUN SERPL-MCNC: 26 MG/DL (ref 8–22)
CALCIUM SERPL-MCNC: 9.6 MG/DL (ref 8.5–10.5)
CHLORIDE SERPL-SCNC: 90 MMOL/L (ref 96–112)
CO2 SERPL-SCNC: 43 MMOL/L (ref 20–33)
CREAT SERPL-MCNC: 0.56 MG/DL (ref 0.5–1.4)
EOSINOPHIL # BLD AUTO: 0.28 K/UL (ref 0–0.51)
EOSINOPHIL NFR BLD: 2.4 % (ref 0–6.9)
ERYTHROCYTE [DISTWIDTH] IN BLOOD BY AUTOMATED COUNT: 51.4 FL (ref 35.9–50)
GLUCOSE BLD-MCNC: 152 MG/DL (ref 65–99)
GLUCOSE SERPL-MCNC: 147 MG/DL (ref 65–99)
HCO3 BLDA-SCNC: 45 MMOL/L (ref 17–25)
HCT VFR BLD AUTO: 39.6 % (ref 42–52)
HGB BLD-MCNC: 12.1 G/DL (ref 14–18)
IMM GRANULOCYTES # BLD AUTO: 0.13 K/UL (ref 0–0.11)
IMM GRANULOCYTES NFR BLD AUTO: 1.1 % (ref 0–0.9)
LYMPHOCYTES # BLD AUTO: 1.71 K/UL (ref 1–4.8)
LYMPHOCYTES NFR BLD: 14.4 % (ref 22–41)
MCH RBC QN AUTO: 28.9 PG (ref 27–33)
MCHC RBC AUTO-ENTMCNC: 30.6 G/DL (ref 33.7–35.3)
MCV RBC AUTO: 94.5 FL (ref 81.4–97.8)
MONOCYTES # BLD AUTO: 1.02 K/UL (ref 0–0.85)
MONOCYTES NFR BLD AUTO: 8.6 % (ref 0–13.4)
NEUTROPHILS # BLD AUTO: 8.72 K/UL (ref 1.82–7.42)
NEUTROPHILS NFR BLD: 73.2 % (ref 44–72)
NRBC # BLD AUTO: 0 K/UL
NRBC BLD-RTO: 0 /100 WBC
PCO2 BLDA: 74.5 MMHG (ref 26–37)
PH BLDA: 7.4 [PH] (ref 7.4–7.5)
PLATELET # BLD AUTO: 225 K/UL (ref 164–446)
PMV BLD AUTO: 10.7 FL (ref 9–12.9)
PO2 BLDA: 55.6 MMHG (ref 64–87)
POTASSIUM SERPL-SCNC: 4.3 MMOL/L (ref 3.6–5.5)
RBC # BLD AUTO: 4.19 M/UL (ref 4.7–6.1)
SAO2 % BLDA: 89.2 % (ref 93–99)
SODIUM SERPL-SCNC: 136 MMOL/L (ref 135–145)
WBC # BLD AUTO: 11.9 K/UL (ref 4.8–10.8)

## 2018-04-01 PROCEDURE — 82803 BLOOD GASES ANY COMBINATION: CPT

## 2018-04-01 PROCEDURE — 80048 BASIC METABOLIC PNL TOTAL CA: CPT

## 2018-04-01 PROCEDURE — A9270 NON-COVERED ITEM OR SERVICE: HCPCS | Performed by: HOSPITALIST

## 2018-04-01 PROCEDURE — 99291 CRITICAL CARE FIRST HOUR: CPT | Performed by: HOSPITALIST

## 2018-04-01 PROCEDURE — 770020 HCHG ROOM/CARE - TELE (206)

## 2018-04-01 PROCEDURE — 85025 COMPLETE CBC W/AUTO DIFF WBC: CPT

## 2018-04-01 PROCEDURE — 700111 HCHG RX REV CODE 636 W/ 250 OVERRIDE (IP): Performed by: HOSPITALIST

## 2018-04-01 PROCEDURE — A9270 NON-COVERED ITEM OR SERVICE: HCPCS | Performed by: INTERNAL MEDICINE

## 2018-04-01 PROCEDURE — 700102 HCHG RX REV CODE 250 W/ 637 OVERRIDE(OP): Performed by: INTERNAL MEDICINE

## 2018-04-01 PROCEDURE — 82962 GLUCOSE BLOOD TEST: CPT

## 2018-04-01 PROCEDURE — 36415 COLL VENOUS BLD VENIPUNCTURE: CPT

## 2018-04-01 PROCEDURE — 700102 HCHG RX REV CODE 250 W/ 637 OVERRIDE(OP): Performed by: HOSPITALIST

## 2018-04-01 RX ADMIN — ALBUTEROL SULFATE 2 PUFF: 90 AEROSOL, METERED RESPIRATORY (INHALATION) at 09:30

## 2018-04-01 RX ADMIN — Medication 220 MG: at 09:38

## 2018-04-01 RX ADMIN — OXYCODONE HYDROCHLORIDE 40 MG: 10 TABLET ORAL at 09:51

## 2018-04-01 RX ADMIN — AMLODIPINE BESYLATE 5 MG: 5 TABLET ORAL at 09:51

## 2018-04-01 RX ADMIN — ASPIRIN 81 MG: 81 TABLET, CHEWABLE ORAL at 09:39

## 2018-04-01 RX ADMIN — OXYCODONE HYDROCHLORIDE 40 MG: 10 TABLET ORAL at 15:10

## 2018-04-01 RX ADMIN — BUDESONIDE AND FORMOTEROL FUMARATE DIHYDRATE 2 PUFF: 160; 4.5 AEROSOL RESPIRATORY (INHALATION) at 09:30

## 2018-04-01 RX ADMIN — ENOXAPARIN SODIUM 40 MG: 100 INJECTION SUBCUTANEOUS at 09:39

## 2018-04-01 RX ADMIN — OXYCODONE HYDROCHLORIDE AND ACETAMINOPHEN 500 MG: 500 TABLET ORAL at 09:39

## 2018-04-01 RX ADMIN — LACTOBACILLUS ACIDOPHILUS / LACTOBACILLUS BULGARICUS 1 PACKET: 100 MILLION CFU STRENGTH GRANULES at 13:34

## 2018-04-01 RX ADMIN — OXYCODONE HYDROCHLORIDE 30 MG: 10 TABLET ORAL at 19:57

## 2018-04-01 RX ADMIN — ACETAMINOPHEN 650 MG: 325 TABLET, FILM COATED ORAL at 02:36

## 2018-04-01 RX ADMIN — POTASSIUM BICARBONATE 25 MEQ: 25 TABLET, EFFERVESCENT ORAL at 13:34

## 2018-04-01 RX ADMIN — INSULIN HUMAN 2 UNITS: 100 INJECTION, SOLUTION PARENTERAL at 13:36

## 2018-04-01 RX ADMIN — OXYCODONE HYDROCHLORIDE 40 MG: 10 TABLET ORAL at 01:23

## 2018-04-01 RX ADMIN — OMEPRAZOLE 20 MG: 20 CAPSULE, DELAYED RELEASE ORAL at 09:39

## 2018-04-01 RX ADMIN — ENOXAPARIN SODIUM 40 MG: 100 INJECTION SUBCUTANEOUS at 21:04

## 2018-04-01 RX ADMIN — STANDARDIZED SENNA CONCENTRATE AND DOCUSATE SODIUM 2 TABLET: 8.6; 5 TABLET, FILM COATED ORAL at 09:39

## 2018-04-01 RX ADMIN — FUROSEMIDE 40 MG: 40 TABLET ORAL at 09:39

## 2018-04-01 RX ADMIN — LEVOTHYROXINE SODIUM 50 MCG: 50 TABLET ORAL at 06:08

## 2018-04-01 RX ADMIN — OXYCODONE HYDROCHLORIDE 30 MG: 10 TABLET ORAL at 23:59

## 2018-04-01 RX ADMIN — LISINOPRIL 10 MG: 20 TABLET ORAL at 09:39

## 2018-04-01 ASSESSMENT — PAIN SCALES - GENERAL
PAINLEVEL_OUTOF10: 4
PAINLEVEL_OUTOF10: 8
PAINLEVEL_OUTOF10: 8
PAINLEVEL_OUTOF10: 4
PAINLEVEL_OUTOF10: 5
PAINLEVEL_OUTOF10: 7
PAINLEVEL_OUTOF10: 0
PAINLEVEL_OUTOF10: 9
PAINLEVEL_OUTOF10: 7
PAINLEVEL_OUTOF10: 4
PAINLEVEL_OUTOF10: 5
PAINLEVEL_OUTOF10: 0
PAINLEVEL_OUTOF10: 5

## 2018-04-01 ASSESSMENT — ENCOUNTER SYMPTOMS
NERVOUS/ANXIOUS: 1
COUGH: 1
CHILLS: 0
DEPRESSION: 0
FEVER: 0
BACK PAIN: 0
SPUTUM PRODUCTION: 1
VOMITING: 0
NAUSEA: 0
MEMORY LOSS: 0
SHORTNESS OF BREATH: 1
ABDOMINAL PAIN: 0

## 2018-04-01 NOTE — PROGRESS NOTES
Patient assessed and resting comfortably at this time. Patient has no complaints of pain or other needs voiced. Blood pressure running a little low however patient asymptomatic and MAP remains above 60. Call light within reach. Will continue to monitor.

## 2018-04-01 NOTE — PROGRESS NOTES
Patient assessed and resting comfortably at this time with no complaints of pain or other needs voiced. Call light within reach. Continuing to monitor.

## 2018-04-01 NOTE — PROGRESS NOTES
Discussed with patient MD recommendations for Bipap.  Patient verbalized understanding of CO2 retention, but states he does not want Bipap at this time.

## 2018-04-01 NOTE — PROGRESS NOTES
Patient converted to Afib with 1.3-1.9 second paused.  When awoken, patient A&O x 4 but appears somnolent.  Notified Dr. Dodson, new orders received.

## 2018-04-01 NOTE — PROGRESS NOTES
Pavan from Lab called with critical result of blood gasses, pH 7.4, P CO2 75 at 0935. Critical lab result read back to Pavan.   Dr. Dodson notified of critical lab result at 940.  Critical lab result read back by Dr. Dodson.

## 2018-04-01 NOTE — CARE PLAN
Problem: Communication  Goal: The ability to communicate needs accurately and effectively will improve  Outcome: PROGRESSING AS EXPECTED  Patient communicates needs effectively by using call light

## 2018-04-01 NOTE — PROGRESS NOTES
"Pulmonary Progress Note    Patient ID:   Name:             Fer Estrada     YOB: 1960  Age:                 57 y.o.  male   MRN:               [id number]                                                  Subjective:  Non compliant with CPAP ; has SOB    Interval Changes:was on ventilator x 5 days and extubated on 3/25. Subsequent blood gases shows increase in PCO2. The hospitalist me to see the patient again. At the time of interview the patient is alert awake and not having any respiratory distress. He will try to use tonight his CPAP and repeat another ABG. He may need to be on BiPAP to improve his blood gases and eliminate CO2 retention     Objective:   Vitals/ General Appearance:   Weight/BMI: Body mass index is 44.34 kg/m².  Blood pressure 106/64, pulse (!) 103, temperature 36.6 °C (97.8 °F), resp. rate 18, height 1.753 m (5' 9\"), weight (!) 136.2 kg (300 lb 4.3 oz), SpO2 93 %.  Vitals:    04/01/18 0400 04/01/18 0406 04/01/18 0900 04/01/18 1209   BP: (!) 89/49  134/61 106/64   Pulse: 97  65 (!) 103   Resp: 18  18 18   Temp: 36.4 °C (97.6 °F)  36.1 °C (97 °F) 36.6 °C (97.8 °F)   SpO2: (!) 84% 94% 94% 93%   Weight:       Height:         Oxygen Therapy:  Pulse Oximetry: 93 %, O2 (LPM): 6, O2 Delivery: Silicone Nasal Cannula    Constitutional:   Well developed, obese, No acute distress  HENMT:  Normocephalic, Atraumatic, Oropharynx moist mucous membranes, No oral exudates, Nose normal.  No thyromegaly.  Eyes:  EOMI, Conjunctiva normal, No discharge.  Neck:  Normal range of motion, No cervical tenderness,  no JVD.  Cardiovascular:  Normal heart rate, Normal rhythm, No murmurs, No rubs, No gallops.   Extremitites with intact distal pulses, no cyanosis, less edema with persistent eliphantiasis of lower extremities  Lungs:  Normal breath sounds, has few rales to auscultation bilaterally on the basis,   no wheezing.   Abdomen: Bowel sounds normal, Soft, No tenderness, No guarding, No rebound, No masses, " No hepatosplenomegaly.  Skin: Warm, Dry, No erythema, No rash, no induration.  Neurologic: Alert & oriented x 3, No focal deficits noted, cranial nerves II through X are grossly intact.  Psychiatric: Affect normal, Judgment normal, Mood normal.    Labs:  Recent Labs      03/30/18 0457 03/31/18 0452 04/01/18   0433   WBC  8.7  10.4  11.9*   RBC  4.32*  4.29*  4.19*   HEMOGLOBIN  12.4*  12.3*  12.1*   HEMATOCRIT  40.6*  40.7*  39.6*   MCV  94.0  94.9  94.5   MCH  28.7  28.7  28.9   MCHC  30.5*  30.2*  30.6*   RDW  50.5*  51.3*  51.4*   PLATELETCT  217  232  225   MPV  10.6  10.6  10.7                 Recent Labs      03/30/18 0457 03/31/18 0452 04/01/18   0433   SODIUM  136  137  136   POTASSIUM  4.4  4.1  4.3   CHLORIDE  93*  92*  90*   CO2  39*  42*  43*   GLUCOSE  135*  164*  147*   BUN  24*  24*  26*     Recent Labs      03/30/18 0457 03/31/18 0452 04/01/18   0433   SODIUM  136  137  136   POTASSIUM  4.4  4.1  4.3   CHLORIDE  93*  92*  90*   CO2  39*  42*  43*   BUN  24*  24*  26*   CREATININE  0.47*  0.59  0.56   CALCIUM  9.7  10.1  9.6     No results found for this or any previous visit.      Imaging:   DX-CHEST-PORTABLE (1 VIEW)   Final Result         1. No significant interval change.         DX-ABDOMEN FOR TUBE PLACEMENT   Final Result         Feeding tube with tip projecting over the expected area of the stomach.      DX-CHEST-PORTABLE (1 VIEW)   Final Result         1. Interval removal of right central venous catheter. No other significant interval change.         DX-CHEST-PORTABLE (1 VIEW)   Final Result         1. Interval extubation with lower lung volumes and bibasilar atelectasis.      DX-ABDOMEN FOR TUBE PLACEMENT   Final Result      Feeding tube tip overlies the gastric fundus.      DX-CHEST-PORTABLE (1 VIEW)   Final Result      Stable bibasilar opacities, likely due to a combination of pulmonary edema and atelectasis.      DX-CHEST-PORTABLE (1 VIEW)   Final Result      No  significant change compared with 3/23.      [id number] VIEW   Final Result      Nonspecific gaseous distention of colon and small bowel. Adynamic ileus would be a possibility.      DX-CHEST-PORTABLE (1 VIEW)   Final Result      Improving bibasilar opacities as described above.      DX-CHEST-PORTABLE (1 VIEW)   Final Result      Findings consistent with a combination of pulmonary edema and atelectasis, with no significant change.      DX-CHEST-PORTABLE (1 VIEW)   Final Result         1. No significant interval change.      CT-CHEST (THORAX) W/O   Final Result      Patchy left perihilar opacities likely represent pneumonia.      Bilateral lower lobe and right middle lobe opacities likely represent atelectasis/consolidation.      Mild atelectasis versus pneumonitis is seen in the lingula.      Trace pleural fluid bilaterally.      Prominence of the main pulmonary artery compatible with pulmonary arterial hypertension.      Mildly prominent mediastinal and right hilar lymph nodes are nonspecific and may be reactive.      Cardiomegaly.      Follow-up to radiographic resolution is recommended.            DX-CHEST-PORTABLE (1 VIEW)   Final Result      Endotracheal tube projects over the distal trachea 1.8 cm from the phylicia.      Right central line projects over the SVC. No pneumothorax.      Elevation of the right hemidiaphragm with overlying atelectasis.      Retrocardiac opacity may represent atelectasis or consolidation.      Interstitial edema is noted.      Cardiomegaly.         DX-ABDOMEN FOR TUBE PLACEMENT   Final Result      Enteric tube projects over the stomach.      DX-CHEST-PORTABLE (1 VIEW)   Final Result         1. Lower lung volume with hypoventilatory change and scattered bilateral atelectasis.      DX-CHEST-PORTABLE (1 VIEW)   Final Result      Slight increase inflation and improvement of bibasilar atelectasis.      ECHOCARDIOGRAM-COMP W/ CONT   Final Result      LE VENOUS DUPLEX (DVT)   Final Result       DX-CHEST-PORTABLE (1 VIEW)   Final Result      No significant interval change compared to the prior examination. .      DX-CHEST-PORTABLE (1 VIEW)   Final Result      Bibasilar opacities may represent hypoinflation atelectasis. Underlying pleural effusions not excluded.         DX-CHEST-PORTABLE (1 VIEW)   Final Result      1.  Bibasilar atelectasis.      2.  Mild cardiomegaly.          Hospital Medications:    Current Facility-Administered Medications:   •  omeprazole (PRILOSEC) capsule 20 mg, 20 mg, Oral, DAILY, Chris Ching D.ODorina, 20 mg at 04/01/18 0939  •  enoxaparin (LOVENOX) inj 40 mg, 40 mg, Subcutaneous, Q12HRS, Amarjit Martines M.D., 40 mg at 04/01/18 0939  •  budesonide-formoterol (SYMBICORT) 160-4.5 MCG/ACT inhaler 2 Puff, 2 Puff, Inhalation, BID, Baron Sewell M.D., 2 Puff at 04/01/18 0930  •  ammonium lactate (LAC-HYDRIN) 12 % lotion, , Topical, DAILY, Shelley Reyes M.D.  •  furosemide (LASIX) tablet 40 mg, 40 mg, Oral, Q DAY, Chris Ching DDorinaODorina, 40 mg at 04/01/18 0939  •  labetalol (NORMODYNE,TRANDATE) injection 10-20 mg, 10-20 mg, Intravenous, Q4HRS PRN, Amarjit Martines M.D., 10 mg at 03/24/18 1148  •  amLODIPine (NORVASC) tablet 5 mg, 5 mg, Oral, Q DAY, Amarjit Martines M.D., 5 mg at 04/01/18 0951  •  potassium bicarbonate (KLYTE) 25 MEQ effervescent tablet TBEF 25 mEq, 25 mEq, Oral, TID, Amarjit Martines M.D., 25 mEq at 04/01/18 1334  •  insulin regular (HUMULIN R) injection 2-9 Units, 2-9 Units, Subcutaneous, Q6HRS, 2 Units at 04/01/18 1336 **AND** Accu-Chek Q6 if NPO, , , Q6H **AND** NOTIFY MD and PharmD, , , Once **AND** glucose 4 g chewable tablet 16 g, 16 g, Oral, Q15 MIN PRN **AND** dextrose 50% (D50W) injection 25 mL, 25 mL, Intravenous, Q15 MIN PRN, YVROSE Collado.O.  •  Respiratory Care per Protocol, , Nebulization, Continuous RT, Chris Ching D.O.  •  senna-docusate (PERICOLACE or SENOKOT S) 8.6-50 MG per tablet 2 Tab, 2 Tab, Oral, BID, 2 Tab at 04/01/18  0939 **AND** polyethylene glycol/lytes (MIRALAX) PACKET 1 Packet, 1 Packet, Oral, QDAY PRN **AND** magnesium hydroxide (MILK OF MAGNESIA) suspension 30 mL, 30 mL, Oral, QDAY PRN **AND** bisacodyl (DULCOLAX) suppository 10 mg, 10 mg, Rectal, QDAY PRN, Chris Ching D.O., 10 mg at 03/23/18 1525  •  ascorbic acid (ascorbic acid) tablet 500 mg, 500 mg, Per NG Tube, DAILY, YVROSE Collado.O., 500 mg at 04/01/18 0939  •  aspirin (ASA) chewable tab 81 mg, 81 mg, Per NG Tube, DAILY, YVROSE Collado.O., 81 mg at 04/01/18 0939  •  lactobacillus granules (LACTINEX/FLORANEX) packet 1 Packet, 1 Packet, Nasogastric, TID WITH MEALS, YVROSE Collado.O., 1 Packet at 04/01/18 1334  •  levothyroxine (SYNTHROID) tablet 50 mcg, 50 mcg, Per NG Tube, AM ES, YVROSE Collado.O., 50 mcg at 04/01/18 0608  •  lisinopril (PRINIVIL) tablet 10 mg, 10 mg, Per NG Tube, DAILY, YVROSE Collado.O., 10 mg at 04/01/18 0939  •  acetaminophen (TYLENOL) tablet 650 mg, 650 mg, Per NG Tube, Q6HRS PRN, YVROSE Collado.O., 650 mg at 04/01/18 0236  •  oxyCODONE immediate release (ROXICODONE) tablet 30 mg, 30 mg, Per NG Tube, Q4HRS PRN, YVROSE Collado.O., 30 mg at 03/21/18 2104  •  oxyCODONE immediate release (ROXICODONE) tablet 40 mg, 40 mg, Per NG Tube, Q4HRS PRN, YVROSE Collado.O., 40 mg at 04/01/18 0951  •  zinc sulfate (ZINCATE) capsule 220 mg, 220 mg, Oral, DAILY, DAVONTE RomeroO., 220 mg at 04/01/18 0938  •  albuterol inhaler 2 Puff, 2 Puff, Inhalation, Q6HRS PRN, Benjie Marcus M.D., 2 Puff at 04/01/18 0930  •  ipratropium-albuterol (DUONEB) nebulizer solution 3 mL, 3 mL, Nebulization, Q4H PRN (RT), Benjie Marcus M.D.    Current Outpatient Medications:  Prescriptions Prior to Admission   Medication Sig Dispense Refill Last Dose   • oxyCODONE (OXY-IR) 30 MG immediate release tablet Take 30 mg by mouth every four hours as needed for Moderate Pain.   3/15/2018 at Unknown time   • METFORMIN HCL PO Take 1 Tab by mouth 2  Times a Day.   3/15/2018 at pm   • ascorbic acid (VITAMIN C) 500 MG tablet Take 1 Tab by mouth every day. 30 Tab 3 3/15/2018 at Unknown time   • Omega-3 Fatty Acids (FISH OIL) 1000 MG Cap capsule Take 1 Cap by mouth 3 times a day, with meals. 90 Cap  3/15/2018 at Unknown time   • levothyroxine (SYNTHROID) 50 MCG Tab Take 50 mcg by mouth Every morning on an empty stomach.   3/15/2018 at unknown   • omeprazole (PRILOSEC) 20 MG delayed-release capsule Take 20 mg by mouth every day.   3/15/2018 at unknown   • acetaminophen (TYLENOL) 325 MG Tab Take 2 Tabs by mouth every 6 hours as needed (Mild Pain; (Pain scale 1-3); Temp greater than 100.5 F). 30 Tab 0 3/11/2018 at Unknown time   • furosemide (LASIX) 40 MG Tab Take 1 Tab by mouth every day. 30 Tab 0 3/15/2018 at unknown   • lisinopril (PRINIVIL) 20 MG Tab Take 0.5 Tabs by mouth every day. 30 Tab 1 3/15/2018 at unknown   • budesonide-formoterol (SYMBICORT) 160-4.5 MCG/ACT Aerosol Inhale 2 Puffs by mouth 2 Times a Day.   3/14/2018 at unknown   • albuterol 108 (90 BASE) MCG/ACT Aero Soln inhalation aerosol Inhale 2 Puffs by mouth every 6 hours as needed for Shortness of Breath.   3/2/2018 at Unknown time       Medication Allergy:  No Known Allergies    Assessment and Plan:     1.  Acute on chronic hypoxemic and hypercapnic respiratory failure.   Hospital chronic CO2 retention. Patient was on ventilatory support for five days and has been extubated five days ago and now PCO2 has gone up to 74. He has not been compliant with CPAP therapy but after talking to him he'll try to use it again every night and if possible with BiPAP the blowouts CO2 retention    -2. Chronic severe lymphedema with stasis dermatitis.     -Continue with wound care treatment.  -Left posterior thigh wound is improved with conservative treatment. No need for I&D.  -Will likely require infectious disease evaluation pending patient's response to ongoing treatment.     3. Type II diabetes  mellitus.     -Continue with sliding scale at this time.  -Have elected to begin low-dose steroids to attempt at treating instructed component of his underlying lung disease. He has probable potential worsening of his blood sugar and longer acting insulin such as NPH or Lantus may be considered.     4. Morbid obesity.     -Obstructive sleep apnea will place the patient on BiPAP at night if the patient tolerates.   We'll TRY TO AVOID SEDATION MEDICATIONS    5. Hypertension.     -Under adequate control at this time, though did require intermittent medication for hypertension.  -Continue with oral therapy, with ACE inhibitor but observe for worsening renal function.   -At this time tolerating.     6. Chronic right bundle-branch block with wide QRS.      -No obvious sign of acute ischemic event although EKG is been ordered as well as troponin.   -Levels were negative overnight.  -Compared to previous EKGs no significant change.     7. COPD.   Continue with albuterol ipratropium nebulizer and maintenance with Symbicort inhaler  -Continue with respiratory therapy protocols and also again to keep saturation above 90%.  -Adding IV steroids for treatment of obstructive disease component.   -May consider as possible rescue therapy.

## 2018-04-01 NOTE — PROGRESS NOTES
Bedside report received, pt care assumed, tele box on.  Pt sleepingt this time. Bed in lowest position, call light within reach.

## 2018-04-01 NOTE — PROGRESS NOTES
Bedside report received from day shift nurse. Patient assessed and resting comfortably at this time. Patient having some mild pain. SEE MAR for intervention. No other complaints or needs are voiced. Call light within reach. Will continue to monitor.

## 2018-04-01 NOTE — PROGRESS NOTES
Lab called with critical result of co2 at 43. Critical lab result read back to lab.   Dr. Shipman notified of critical lab result at 530.  Critical lab result read back by Dr. Shipman.    Patient continues to refuse ordered cpap. Education provided to patient that this is ordered to assist with his breathing and to prevent his Co2 level from continuing to rise. Will continue to assess for readiness from patient to wear cpap.

## 2018-04-01 NOTE — PROGRESS NOTES
[facility] Hospitalist Progress Note    Date of Service: 4/1/2018    Chief Complaint  57 y.o. male admitted 3/16/2018 with ongoing cellulitis of bilateral lower extremities with a history of chronic venous stasis and lymphedema. He had acute respiratory distress in ER.    Interval Problem Update  3/28: Pt seen and examined, afebrile, transferred overnight from ICU, was requiring 8 L of o2 overnight. Denies any nausea , vomiting or chest pain.   3/29: No overnight events, on ^l of O2, as per pt his baseline is between 2-3 L. Denies any chest pain,   3/30: Speech advanced patient's diet to dysphagia two. Continue diuresis, possible transfer to SNF tomorrow if improved  3/31: Still on 6L O2. HCO3 increased to 42, encouraged pt to wear CPAP here and get a sleep study outpt.   4/1: Patient refused CPAP again overnight but says it was never given to him. PCO2 as well as bicarb kaylynn this morning and patient became somnolent. Consulted pulmonologist Dr. Evans who recommended BiPAP but patient refused and says he will do CPAP tonight.    Patient is critically ill.   The patient continues to have: CO2 narcosis  The vital organ system that is affected is the: Lungs and circulation  If untreated there is a high chance of deterioration into: Shock  And eventually death.   The critical care that I am providing today is: Exam, medications, consultating pulm  The critical that has been undertaken is medically complex.   There has been no overlap in critical care time.   Critical Care Time not including procedures: 34 mins      Consultants/Specialty  Intensivist    Disposition  Continue diuresis, SNF requires $167/d copay. Counseled pt on getting sleep study outpatient.     Review of Systems   Constitutional: Negative for chills and fever.   Respiratory: Positive for cough, sputum production and shortness of breath.    Cardiovascular: Negative for chest pain.   Gastrointestinal: Negative for abdominal pain, nausea and vomiting.    Musculoskeletal: Positive for joint pain. Negative for back pain.   Skin: Negative for rash.   Psychiatric/Behavioral: Negative for depression and memory loss. The patient is nervous/anxious.       Physical Exam  Laboratory/Imaging   Hemodynamics  Temp (24hrs), Av.5 °C (97.7 °F), Min:36.1 °C (97 °F), Max:36.8 °C (98.3 °F)   Temperature: 36.6 °C (97.8 °F)  Pulse  Av.8  Min: 54  Max: 116    Blood Pressure: 106/64      Respiratory      Respiration: 18, Pulse Oximetry: 93 %     Work Of Breathing / Effort: Shallow;Tachypnea;Moderate  RUL Breath Sounds: Diminished, RML Breath Sounds: Diminished, RLL Breath Sounds: Diminished, LARY Breath Sounds: Diminished, LLL Breath Sounds: Diminished    Fluids    Intake/Output Summary (Last 24 hours) at 18 1619  Last data filed at 18 0600   Gross per 24 hour   Intake                0 ml   Output              475 ml   Net             -475 ml       Nutrition  Orders Placed This Encounter   Procedures   • DIET ORDER     Standing Status:   Standing     Number of Occurrences:   1     Order Specific Question:   Diet:     Answer:   Regular [1]     Order Specific Question:   Texture/Fiber modifications:     Answer:   Dysphagia 2(Pureed/Chopped)specify fluid consistency(question 6) [2]     Order Specific Question:   Consistency/Fluid modifications:     Answer:   Nectar Thick [2]     Physical Exam   Constitutional: He is oriented to person, place, and time. He appears well-developed.   Morbidly obese   HENT:   Head: Normocephalic.   Eyes: Conjunctivae are normal.   Cardiovascular: Regular rhythm and normal heart sounds.    No murmur heard.  Tachycardia   Pulmonary/Chest: No respiratory distress. He has decreased breath sounds. He has no wheezes. He has no rhonchi. He has rales. He exhibits no crepitus.   Abdominal: Soft. Bowel sounds are normal. He exhibits distension. There is no tenderness. There is no rebound.   Musculoskeletal: He exhibits edema (2+ LE). He exhibits no  tenderness.   Neurological: He is alert and oriented to person, place, and time.   Skin: Skin is warm and dry.   Scaly skin on head  Lower extremity wounds   Vitals reviewed.      Recent Labs      03/30/18 0457 03/31/18 0452 04/01/18 0433   WBC  8.7  10.4  11.9*   RBC  4.32*  4.29*  4.19*   HEMOGLOBIN  12.4*  12.3*  12.1*   HEMATOCRIT  40.6*  40.7*  39.6*   MCV  94.0  94.9  94.5   MCH  28.7  28.7  28.9   MCHC  30.5*  30.2*  30.6*   RDW  50.5*  51.3*  51.4*   PLATELETCT  217  232  225   MPV  10.6  10.6  10.7     Recent Labs      03/30/18 0457 03/31/18 0452 04/01/18 0433   SODIUM  136  137  136   POTASSIUM  4.4  4.1  4.3   CHLORIDE  93*  92*  90*   CO2  39*  42*  43*   GLUCOSE  135*  164*  147*   BUN  24*  24*  26*   CREATININE  0.47*  0.59  0.56   CALCIUM  9.7  10.1  9.6                      Assessment/Plan     * Acute on chronic respiratory failure with hypoxia and hypercapnia (CMS-HCC)- (present on admission)   Assessment & Plan    Intubated 3-20 extubated 3/25/2018  Improving oxygen requirements  RT protocol  Diuresing with Lasix  CPAP at night for now, encouraged pt to get a sleep study outpatient        Bilateral lower leg cellulitis- (present on admission)   Assessment & Plan    Continue Wound care  Cx  MSSA     Completed 10 days of unasyn        Pneumonia- (present on admission)   Assessment & Plan    Completed 10 day course of Unasyn          Narcotic addiction (CMS-HCC)- (present on admission)   Assessment & Plan      minimize sedating agents        Stasis dermatitis- (present on admission)   Assessment & Plan    With MSSA cellulitis    Completed antibiotic course Continue wound care            Hypothyroidism- (present on admission)   Assessment & Plan    Stable continue Synthroid           COPD (chronic obstructive pulmonary disease) (CMS-HCC)- (present on admission)   Assessment & Plan    RT protocol        HTN (hypertension)- (present on admission)   Assessment & Plan    Stable continue  lasix  amlodipine and  Lisinopril  Monitor BP and adjust          CO2 narcosis- (present on admission)   Assessment & Plan    - Has chronic CO2 retention with metabolic compensation  - Now has additional elevated PCO2  - Pulmonologist Dr. Evans consulted who recommended BiPAP  - Patient refuses BiPAP and will try CPAP at night          Hypokalemia   Assessment & Plan    Replete K and monitor         Delirium   Assessment & Plan    Resolved d/c seroquel        Ileus (CMS-HCC)   Assessment & Plan    Clinically resolved  Received Relistor        Bilateral edema of lower extremity   Assessment & Plan    Chronic  Improved continue po Lasix  Monitor intake output and fluid status        Diabetes type 2, controlled (CMS-HCC)- (present on admission)   Assessment & Plan    HgbA1c:6.4  Stable on ISS         Non compliance w medication regimen- (present on admission)   Assessment & Plan    Reinforced compliance         Morbid obesity (CMS-HCC)- (present on admission)   Assessment & Plan    Body mass index is 48.02 kg/m².            Quality-Core Measures   Reviewed items::  Labs reviewed and Medications reviewed  DVT prophylaxis pharmacological::  Enoxaparin (Lovenox)  Antibiotics:  Treating active infection/contamination beyond 24 hours perioperative coverage

## 2018-04-01 NOTE — DISCHARGE PLANNING
Medical Social Work     Anticipated Discharge Disposition: SNF     Action: Per chart review, pt pending acceptance to SNF with [facility]. [facility] and [facility] are showing as accepted.     Barriers: Pt unable to afford SNF co-pay. SW will need to f/u on Monday with supervisors to see if [facility] for SNF co-pay will be approved.     Plan: F/u with supervisors on Monday.

## 2018-04-01 NOTE — ASSESSMENT & PLAN NOTE
Has chronic CO2 retention with metabolic compensation  Secondary to underlying obesity hypoventilation syndrome, obstructive sleep apnea, morbid obesity  Evaluate by Dr Song  HS BIPAP recommended  LTAC placement if able  Wean oxycodone as this is worsening underlying CO2 retention

## 2018-04-01 NOTE — PROGRESS NOTES
Patient educated about importance of Q2 turns to prevent skin breakdown.  Patient verbalized understanding but continues to refuse.

## 2018-04-02 LAB
ALBUMIN SERPL BCP-MCNC: 3.5 G/DL (ref 3.2–4.9)
ALBUMIN/GLOB SERPL: 0.9 G/DL
ALP SERPL-CCNC: 72 U/L (ref 30–99)
ALT SERPL-CCNC: 7 U/L (ref 2–50)
ANION GAP SERPL CALC-SCNC: 4 MMOL/L (ref 0–11.9)
ANION GAP SERPL CALC-SCNC: 6 MMOL/L (ref 0–11.9)
AST SERPL-CCNC: 10 U/L (ref 12–45)
BASE EXCESS BLDA CALC-SCNC: 13 MMOL/L (ref -4–3)
BASOPHILS # BLD AUTO: 0.4 % (ref 0–1.8)
BASOPHILS # BLD: 0.04 K/UL (ref 0–0.12)
BILIRUB SERPL-MCNC: 0.3 MG/DL (ref 0.1–1.5)
BODY TEMPERATURE: ABNORMAL CENTIGRADE
BUN SERPL-MCNC: 28 MG/DL (ref 8–22)
BUN SERPL-MCNC: 29 MG/DL (ref 8–22)
CALCIUM SERPL-MCNC: 9.6 MG/DL (ref 8.5–10.5)
CALCIUM SERPL-MCNC: 9.8 MG/DL (ref 8.5–10.5)
CHLORIDE SERPL-SCNC: 91 MMOL/L (ref 96–112)
CHLORIDE SERPL-SCNC: 91 MMOL/L (ref 96–112)
CO2 SERPL-SCNC: 40 MMOL/L (ref 20–33)
CO2 SERPL-SCNC: 41 MMOL/L (ref 20–33)
CREAT SERPL-MCNC: 0.58 MG/DL (ref 0.5–1.4)
CREAT SERPL-MCNC: 0.61 MG/DL (ref 0.5–1.4)
CRP SERPL HS-MCNC: 9.19 MG/DL (ref 0–0.75)
EOSINOPHIL # BLD AUTO: 0.33 K/UL (ref 0–0.51)
EOSINOPHIL NFR BLD: 3.5 % (ref 0–6.9)
ERYTHROCYTE [DISTWIDTH] IN BLOOD BY AUTOMATED COUNT: 50 FL (ref 35.9–50)
GLOBULIN SER CALC-MCNC: 3.8 G/DL (ref 1.9–3.5)
GLUCOSE BLD-MCNC: 140 MG/DL (ref 65–99)
GLUCOSE BLD-MCNC: 146 MG/DL (ref 65–99)
GLUCOSE BLD-MCNC: 158 MG/DL (ref 65–99)
GLUCOSE BLD-MCNC: 169 MG/DL (ref 65–99)
GLUCOSE SERPL-MCNC: 144 MG/DL (ref 65–99)
GLUCOSE SERPL-MCNC: 146 MG/DL (ref 65–99)
HCO3 BLDA-SCNC: 40 MMOL/L (ref 17–25)
HCT VFR BLD AUTO: 40.2 % (ref 42–52)
HGB BLD-MCNC: 12.4 G/DL (ref 14–18)
IMM GRANULOCYTES # BLD AUTO: 0.1 K/UL (ref 0–0.11)
IMM GRANULOCYTES NFR BLD AUTO: 1.1 % (ref 0–0.9)
LYMPHOCYTES # BLD AUTO: 1.84 K/UL (ref 1–4.8)
LYMPHOCYTES NFR BLD: 19.6 % (ref 22–41)
MCH RBC QN AUTO: 28.5 PG (ref 27–33)
MCHC RBC AUTO-ENTMCNC: 30.8 G/DL (ref 33.7–35.3)
MCV RBC AUTO: 92.4 FL (ref 81.4–97.8)
MONOCYTES # BLD AUTO: 0.8 K/UL (ref 0–0.85)
MONOCYTES NFR BLD AUTO: 8.5 % (ref 0–13.4)
NEUTROPHILS # BLD AUTO: 6.27 K/UL (ref 1.82–7.42)
NEUTROPHILS NFR BLD: 66.9 % (ref 44–72)
NRBC # BLD AUTO: 0 K/UL
NRBC BLD-RTO: 0 /100 WBC
PCO2 BLDA: 63.3 MMHG (ref 26–37)
PH BLDA: 7.42 [PH] (ref 7.4–7.5)
PLATELET # BLD AUTO: 218 K/UL (ref 164–446)
PMV BLD AUTO: 11 FL (ref 9–12.9)
PO2 BLDA: 81.2 MMHG (ref 64–87)
POTASSIUM SERPL-SCNC: 4.1 MMOL/L (ref 3.6–5.5)
POTASSIUM SERPL-SCNC: 4.1 MMOL/L (ref 3.6–5.5)
PREALB SERPL-MCNC: 9 MG/DL (ref 18–38)
PROT SERPL-MCNC: 7.3 G/DL (ref 6–8.2)
RBC # BLD AUTO: 4.35 M/UL (ref 4.7–6.1)
SAO2 % BLDA: 95.7 % (ref 93–99)
SODIUM SERPL-SCNC: 136 MMOL/L (ref 135–145)
SODIUM SERPL-SCNC: 137 MMOL/L (ref 135–145)
WBC # BLD AUTO: 9.4 K/UL (ref 4.8–10.8)

## 2018-04-02 PROCEDURE — 85025 COMPLETE CBC W/AUTO DIFF WBC: CPT

## 2018-04-02 PROCEDURE — 84134 ASSAY OF PREALBUMIN: CPT

## 2018-04-02 PROCEDURE — 82803 BLOOD GASES ANY COMBINATION: CPT

## 2018-04-02 PROCEDURE — 700102 HCHG RX REV CODE 250 W/ 637 OVERRIDE(OP): Performed by: INTERNAL MEDICINE

## 2018-04-02 PROCEDURE — 700111 HCHG RX REV CODE 636 W/ 250 OVERRIDE (IP): Performed by: HOSPITALIST

## 2018-04-02 PROCEDURE — 770020 HCHG ROOM/CARE - TELE (206)

## 2018-04-02 PROCEDURE — 82962 GLUCOSE BLOOD TEST: CPT | Mod: 91

## 2018-04-02 PROCEDURE — 80053 COMPREHEN METABOLIC PANEL: CPT

## 2018-04-02 PROCEDURE — 86140 C-REACTIVE PROTEIN: CPT

## 2018-04-02 PROCEDURE — A9270 NON-COVERED ITEM OR SERVICE: HCPCS | Performed by: HOSPITALIST

## 2018-04-02 PROCEDURE — 80048 BASIC METABOLIC PNL TOTAL CA: CPT

## 2018-04-02 PROCEDURE — 700102 HCHG RX REV CODE 250 W/ 637 OVERRIDE(OP): Performed by: HOSPITALIST

## 2018-04-02 PROCEDURE — 36415 COLL VENOUS BLD VENIPUNCTURE: CPT

## 2018-04-02 PROCEDURE — A9270 NON-COVERED ITEM OR SERVICE: HCPCS | Performed by: INTERNAL MEDICINE

## 2018-04-02 PROCEDURE — 99232 SBSQ HOSP IP/OBS MODERATE 35: CPT | Performed by: HOSPITALIST

## 2018-04-02 RX ADMIN — OXYCODONE HYDROCHLORIDE 30 MG: 10 TABLET ORAL at 15:03

## 2018-04-02 RX ADMIN — Medication 220 MG: at 09:36

## 2018-04-02 RX ADMIN — AMLODIPINE BESYLATE 5 MG: 5 TABLET ORAL at 09:35

## 2018-04-02 RX ADMIN — LACTOBACILLUS ACIDOPHILUS / LACTOBACILLUS BULGARICUS 1 PACKET: 100 MILLION CFU STRENGTH GRANULES at 09:34

## 2018-04-02 RX ADMIN — OXYCODONE HYDROCHLORIDE 30 MG: 10 TABLET ORAL at 10:45

## 2018-04-02 RX ADMIN — FUROSEMIDE 40 MG: 40 TABLET ORAL at 09:34

## 2018-04-02 RX ADMIN — OMEPRAZOLE 20 MG: 20 CAPSULE, DELAYED RELEASE ORAL at 09:35

## 2018-04-02 RX ADMIN — INSULIN HUMAN 2 UNITS: 100 INJECTION, SOLUTION PARENTERAL at 00:08

## 2018-04-02 RX ADMIN — LEVOTHYROXINE SODIUM 50 MCG: 50 TABLET ORAL at 06:09

## 2018-04-02 RX ADMIN — ENOXAPARIN SODIUM 40 MG: 100 INJECTION SUBCUTANEOUS at 20:13

## 2018-04-02 RX ADMIN — ASPIRIN 81 MG: 81 TABLET, CHEWABLE ORAL at 09:35

## 2018-04-02 RX ADMIN — OXYCODONE HYDROCHLORIDE AND ACETAMINOPHEN 500 MG: 500 TABLET ORAL at 09:35

## 2018-04-02 RX ADMIN — OXYCODONE HYDROCHLORIDE 30 MG: 10 TABLET ORAL at 19:32

## 2018-04-02 RX ADMIN — OXYCODONE HYDROCHLORIDE 30 MG: 10 TABLET ORAL at 06:09

## 2018-04-02 RX ADMIN — ENOXAPARIN SODIUM 40 MG: 100 INJECTION SUBCUTANEOUS at 09:34

## 2018-04-02 RX ADMIN — LISINOPRIL 10 MG: 20 TABLET ORAL at 09:35

## 2018-04-02 ASSESSMENT — PAIN SCALES - GENERAL
PAINLEVEL_OUTOF10: 8
PAINLEVEL_OUTOF10: 8
PAINLEVEL_OUTOF10: 9
PAINLEVEL_OUTOF10: 4
PAINLEVEL_OUTOF10: 5
PAINLEVEL_OUTOF10: 5
PAINLEVEL_OUTOF10: 7
PAINLEVEL_OUTOF10: 4
PAINLEVEL_OUTOF10: 7
PAINLEVEL_OUTOF10: 5
PAINLEVEL_OUTOF10: 8

## 2018-04-02 ASSESSMENT — ENCOUNTER SYMPTOMS
VOMITING: 0
BACK PAIN: 0
SHORTNESS OF BREATH: 1
COUGH: 1
NERVOUS/ANXIOUS: 1
ABDOMINAL PAIN: 0
CHILLS: 0
FEVER: 0
DEPRESSION: 0
SPUTUM PRODUCTION: 1
NAUSEA: 0

## 2018-04-02 NOTE — PROGRESS NOTES
"Discussed with patient need for CPAP. Offered to call RT to set patient up on CPAP, patient refusing at this time. Stating \"I want to speak with my daughter and not go on and off of it.\" Educated patient about about critical CO2 levels and importance of CPAP, all questions answered, patient continued to refuse CPAP. Will continue to offer and provide education when needed.   "

## 2018-04-02 NOTE — PROGRESS NOTES
Venancio from lab called with critical result of PCo2 63.3 at 0321. Critical lab result read back to Venancio.   Dr. Shipman notified of critical lab result at 0329.  Critical lab result read back by Dr. Shipman.

## 2018-04-02 NOTE — CARE PLAN
Problem: Safety  Goal: Will remain free from injury  Outcome: PROGRESSING AS EXPECTED  Fall precautions in place. Treaded socks on pt. Appropriate signs on doorway.. Bedrails up. Bed in lowest position and locked.  Call light and phone within reach. Patient educated on importance of calling nurses before attempting to mobilize, verbalizes understanding.     Problem: Knowledge Deficit  Goal: Knowledge of disease process/condition, treatment plan, diagnostic tests, and medications will improve  Outcome: PROGRESSING AS EXPECTED  Patient educated about POC, including importance of CPAP at night and Q2 turns to prevent skin breakdown.  All questions answered in regards to disease process, treatment, and diet.  Patient verbalized understanding and voiced no further questions at this time.

## 2018-04-02 NOTE — PROGRESS NOTES
"Discussed importance of CPAP with patient, continued to refuse. Education provided. Patient stating he does not wants CPAP because \"It's four o'clock\"  "

## 2018-04-02 NOTE — PROGRESS NOTES
Report received, pt care assumed, tele box on. VSS, pt assessment complete. Pt aaox4, no signs of distress noted at this time. POC discussed with pt and verbalizes no questions. Pt denies any additional needs at this time. Bed in lowest position, bed alarm on, pt educated on fall risk and verbalized understanding, call light within reach, will continue to monitor.

## 2018-04-02 NOTE — WOUND TEAM
"[facility]  Inpatient Services  Wound and Skin Care Progress Note    HPI, PMH, SH: Reviewed  Unit where seen by Wound Team:  T7        WOUND TEAM FOLLOW UP: BLE wounds     SUBJECTIVE:  \"I just had 10 people in here while I was trying to take a sh*t.\"    Self Report / Pain Level: c/o pain moving legs and cleaning  OBJECTIVE: drsg intact , on JOSE LUIS bed  WOUND TYPE, LOCATION, CHARACTERISTICS:  Pressure Ulcer  Deep Tissue Injury POA Foot Left Lateral  Periwound Skin: Discolored (lipodermatoclerosis)  Drainage : None  Tissue Type and %:    100% red/purple  Wound Edges:    attached    Odor:     None   Exposed structure(s):   No   Signs and Symptoms of Infection: none    Wound POA Venous Ulcer Leg Left Lower  Periwound Skin: Discolored (lipodermatoclerosis)  Drainage : none  Tissue Type and %:    100%pink  Wound Edges:    attached    Odor:     None   Exposed structure(s):   No   Signs and Symptoms of Infection: decreasing edema    Wound POA Venous Ulcer Leg Right Lower  Periwound Skin: Discolored (lipodermatoclerosis)  Drainage : none  Tissue Type and %:    100% pink  Wound Edges:    attached    Odor:     None   Exposed structure(s):   No   Signs and Symptoms of Infection: decreasing edema    Pressure Ulcer  Deep Tissue Injury POA Foot Right Lateral;Plantar  Periwound Skin: Intact  Drainage : None       Tissue Type and %:    100% red/purple  Wound Edges:    attached    Odor:     None   Exposed structure(s):   No   Signs and Symptoms of Infection: None     Measurements : taken 3/26/18    Foot Left Lateral  R foot  Length (cm):  10    3.1  Width (cm):  3.2        2.3  Depth (cm):  nm    intact     Tracts/undermining:  All None       INTERVENTIONS BY WOUND TEAM:  Patient reluctant to have assessment today.  Agreeable to allow to look at legs without applying lotion or cleaning.  no drainage and pink tissue present.  Foot Left Lateral Foot & Right Lateral;Plantar-Dressing Options:DEMARCO  Leg Left and Right " Lower-Dressing Options: Lac hydrin daily  Interdisciplinary consultation:  RN, pt    EVALUATION AND PROGRESS OF WOUND(S): BLE improved since pt was seen last week. Edema and drainage have decreased since pt's legs have been in bed vs dependent. Wounds no longer open, pink tissue present. Fissure visible in skin since edema decreased. Amlactin helping to decrease lipodermatosclerosis changes to skin. No longer needs a cover drsg while pt's legs are not dependent. Once pt out of ICU and if he sits up on sob then wounds may reopen and edema will return. Educated pt about this happening. He verbalized understanding.    No other wound care needs at this time, please re-consult if any other needs come up.     Factors affecting wound healing: poor self care, venous stasis, infection, COPD, CHF, DM     Goals: decreased wound size 1% each week    NURSING PLAN OF CARE:    Dressing changes: Continue previous Dressing Maintenance orders:        See new Dressing Maintenance orders:       Skin care: See Skin Care orders: Lac hydrin orders       Rectal tube care: See Rectal Tube Care orders:      Other orders:           WOUND TEAM PLAN OF CARE (X):   NPWT change 3 x week:        Dressing changes:       Follow up as needed:     PRN   Other:

## 2018-04-02 NOTE — CARE PLAN
Problem: Nutritional:  Goal: Achieve adequate nutritional intake  Patient will consume >50% of meals   Outcome: MET Date Met: 04/02/18  Patient eating % of meals in the last few days.   RD is available PRN.

## 2018-04-02 NOTE — PROGRESS NOTES
"RT at bedside. Pt told RT to come back and stated \"I'll think about it\" when re-educated patient about importance of CPAP and his critical Co2 levels. Pt still wants to think about it.  "

## 2018-04-02 NOTE — DISCHARGE PLANNING
Call placed to Kelley with [facility], patient can be accepted for SNF services and [facility] can be taken for Co-pay cost.

## 2018-04-02 NOTE — PROGRESS NOTES
Pt still refusing CPAP at this time. Pt re-educated about importance of CPAP and educated about his critical high Co2 levels. Pt stated he wanted to talk to RT, RT called.

## 2018-04-02 NOTE — PROGRESS NOTES
[facility]ist Progress Note    Date of Service: 2018    Chief Complaint  57 y.o. male admitted 3/16/2018 with ongoing cellulitis of bilateral lower extremities with a history of chronic venous stasis and lymphedema. He had acute respiratory distress in ER.    Interval Problem Update  3/28: Pt seen and examined, afebrile, transferred overnight from ICU, was requiring 8 L of o2 overnight. Denies any nausea , vomiting or chest pain.   3/29: No overnight events, on ^l of O2, as per pt his baseline is between 2-3 L. Denies any chest pain,   3/30: Speech advanced patient's diet to dysphagia two. Continue diuresis, possible transfer to SNF tomorrow if improved  3/31: Still on 6L O2. HCO3 increased to 42, encouraged pt to wear CPAP here and get a sleep study outpt.   : Patient refused CPAP again overnight but says it was never given to him. PCO2 as well as bicarb kaylynn this morning and patient became somnolent. Consulted pulmonologist Dr. Evans who recommended BiPAP but patient refused and says he will do CPAP tonight.  : After significant education overnight, patient continued to refuse CPAP. Discussed with patient today who is agreeable to trying CPAP tonight.    Consultants/Specialty  Intensivist    Disposition  Continue diuresis, SNF requires $167/d copay. Counseled pt on getting sleep study outpatient.     Review of Systems   Constitutional: Negative for chills and fever.   Respiratory: Positive for cough, sputum production and shortness of breath.    Cardiovascular: Negative for chest pain.   Gastrointestinal: Negative for abdominal pain, nausea and vomiting.   Musculoskeletal: Positive for joint pain. Negative for back pain.   Psychiatric/Behavioral: Negative for depression. The patient is nervous/anxious.       Physical Exam  Laboratory/Imaging   Hemodynamics  Temp (24hrs), Av.2 °C (97.1 °F), Min:35.9 °C (96.7 °F), Max:36.3 °C (97.4 °F)   Temperature: 36.2 °C (97.2 °F)  Pulse  Av.8  Min: 54   Max: 116    Blood Pressure: 127/71      Respiratory      Respiration: 18, Pulse Oximetry: 98 %     Work Of Breathing / Effort: Tachypnea;Mild  RUL Breath Sounds: Diminished, RML Breath Sounds: Diminished, RLL Breath Sounds: Diminished, LARY Breath Sounds: Diminished, LLL Breath Sounds: Diminished    Fluids    Intake/Output Summary (Last 24 hours) at 04/02/18 1600  Last data filed at 04/02/18 0600   Gross per 24 hour   Intake              640 ml   Output              225 ml   Net              415 ml       Nutrition  Orders Placed This Encounter   Procedures   • DIET ORDER     Standing Status:   Standing     Number of Occurrences:   1     Order Specific Question:   Diet:     Answer:   Regular [1]     Order Specific Question:   Diet:     Answer:   Diabetic [3]     Order Specific Question:   Texture/Fiber modifications:     Answer:   Dysphagia 2(Pureed/Chopped)specify fluid consistency(question 6) [2]     Order Specific Question:   Consistency/Fluid modifications:     Answer:   Nectar Thick [2]     Physical Exam   Constitutional: He is oriented to person, place, and time. He appears well-developed.   Morbidly obese   HENT:   Head: Normocephalic.   Cardiovascular: Regular rhythm and normal heart sounds.    No murmur heard.  Tachycardia   Pulmonary/Chest: No respiratory distress. He has decreased breath sounds. He has no wheezes. He has no rhonchi. He has rales. He exhibits no crepitus.   Abdominal: Soft. Bowel sounds are normal. He exhibits distension. There is no tenderness. There is no rebound and no guarding.   Musculoskeletal: He exhibits edema (2+ LE).   Neurological: He is alert and oriented to person, place, and time.   Skin: Skin is warm and dry.   Scaly skin on head  Lower extremity wounds   Vitals reviewed.      Recent Labs      03/31/18   0452  04/01/18   0433  04/02/18   0307   WBC  10.4  11.9*  9.4   RBC  4.29*  4.19*  4.35*   HEMOGLOBIN  12.3*  12.1*  12.4*   HEMATOCRIT  40.7*  39.6*  40.2*   MCV  94.9   94.5  92.4   MCH  28.7  28.9  28.5   MCHC  30.2*  30.6*  30.8*   RDW  51.3*  51.4*  50.0   PLATELETCT  232  225  218   MPV  10.6  10.7  11.0     Recent Labs      03/31/18   0452  04/01/18   0433  04/02/18   0307   SODIUM  137  136  136  137   POTASSIUM  4.1  4.3  4.1  4.1   CHLORIDE  92*  90*  91*  91*   CO2  42*  43*  41*  40*   GLUCOSE  164*  147*  146*  144*   BUN  24*  26*  28*  29*   CREATININE  0.59  0.56  0.61  0.58   CALCIUM  10.1  9.6  9.6  9.8                      Assessment/Plan     * Acute on chronic respiratory failure with hypoxia and hypercapnia (CMS-HCC)- (present on admission)   Assessment & Plan    Intubated 3-20 extubated 3/25/2018  Improving oxygen requirements  RT protocol  Diuresing with Lasix  CPAP at night for now, encouraged pt to get a sleep study outpatient        Bilateral lower leg cellulitis- (present on admission)   Assessment & Plan    Continue Wound care  Cx  MSSA     Completed 10 days of unasyn        Pneumonia- (present on admission)   Assessment & Plan    Completed 10 day course of Unasyn          Narcotic addiction (CMS-HCC)- (present on admission)   Assessment & Plan      minimize sedating agents        Stasis dermatitis- (present on admission)   Assessment & Plan    With MSSA cellulitis    Completed antibiotic course Continue wound care            Hypothyroidism- (present on admission)   Assessment & Plan    Stable continue Synthroid           COPD (chronic obstructive pulmonary disease) (CMS-HCC)- (present on admission)   Assessment & Plan    RT protocol        HTN (hypertension)- (present on admission)   Assessment & Plan    Stable continue lasix  amlodipine and  Lisinopril  Monitor BP and adjust          CO2 narcosis- (present on admission)   Assessment & Plan    - Has chronic CO2 retention with metabolic compensation  - Now has additional elevated PCO2  - Pulmonologist Dr. Evans consulted who recommended BiPAP  - Patient refuses BiPAP and will try CPAP at  night          Hypokalemia   Assessment & Plan    Repleting as needed        Delirium   Assessment & Plan    Resolved d/c seroquel        Ileus (CMS-HCC)   Assessment & Plan    Clinically resolved  Received Relistor        Bilateral edema of lower extremity   Assessment & Plan    Chronic  Improved continue po Lasix  Monitor intake output and fluid status        Diabetes type 2, controlled (CMS-HCC)- (present on admission)   Assessment & Plan    HgbA1c:6.4  Stable on ISS         Non compliance w medication regimen- (present on admission)   Assessment & Plan    Reinforced compliance   Patient has also been noncompliant with CPAP as he does not like the feeling        Morbid obesity (CMS-HCC)- (present on admission)   Assessment & Plan    Body mass index is 48.02 kg/m².            Quality-Core Measures   Reviewed items::  Labs reviewed and Medications reviewed  DVT prophylaxis pharmacological::  Enoxaparin (Lovenox)  Antibiotics:  Treating active infection/contamination beyond 24 hours perioperative coverage

## 2018-04-02 NOTE — PROGRESS NOTES
"Pulmonary Progress Note    Patient ID:   Name:             Fer Estrada     YOB: 1960  Age:                 57 y.o.  male   MRN:               [id number]                                                  Subjective:  Non compliant with CPAP ; has SOB    Interval Changes:was on ventilator x 5 days and extubated on 3/25. Has CO2 retention either from significant COPD and/or obesity hypoventilation syndrome. Is noncompliant with suggested BiPAP.    Objective:   Vitals/ General Appearance:   Weight/BMI: Body mass index is 43.85 kg/m².  Blood pressure 104/59, pulse 73, temperature 36.4 °C (97.5 °F), resp. rate 18, height 1.753 m (5' 9\"), weight (!) 134.7 kg (296 lb 15.4 oz), SpO2 95 %.  Vitals:    04/02/18 0450 04/02/18 0850 04/02/18 1314 04/02/18 1618   BP: 106/60 111/70 127/71 104/59   Pulse: 91 72 98 73   Resp: 18 18 18 18   Temp: 36.1 °C (96.9 °F) 36.2 °C (97.1 °F) 36.2 °C (97.2 °F) 36.4 °C (97.5 °F)   SpO2: 95% 92% 98% 95%   Weight:       Height:         Oxygen Therapy:  Pulse Oximetry: 95 %, O2 (LPM): 6, O2 Delivery: Silicone Nasal Cannula    Constitutional:   Well developed, obese, No acute distress  HENMT:  Normocephalic, Atraumatic, Oropharynx moist mucous membranes, No oral exudates, Nose normal.  No thyromegaly.  Eyes:  EOMI, Conjunctiva normal, No discharge.  Neck:  Normal range of motion, No cervical tenderness,  no JVD.  Cardiovascular:  Normal heart rate, Normal rhythm, No murmurs, No rubs, No gallops.   Extremitites with intact distal pulses, no cyanosis, less edema with persistent eliphantiasis of lower extremities  Lungs:  Normal breath sounds, has few rales to auscultation bilaterally on the basis,   no wheezing.   Abdomen: Bowel sounds normal, Soft, No tenderness, No guarding, No rebound, No masses, No hepatosplenomegaly.  Skin: Warm, Dry, No erythema, No rash, no induration.  Neurologic: Alert & oriented x 3, No focal deficits noted, cranial nerves II through X are grossly " intact.  Psychiatric: Affect normal, Judgment normal, Mood normal.    Labs:  Recent Labs      03/31/18 0452 04/01/18 0433 04/02/18   0307   WBC  10.4  11.9*  9.4   RBC  4.29*  4.19*  4.35*   HEMOGLOBIN  12.3*  12.1*  12.4*   HEMATOCRIT  40.7*  39.6*  40.2*   MCV  94.9  94.5  92.4   MCH  28.7  28.9  28.5   MCHC  30.2*  30.6*  30.8*   RDW  51.3*  51.4*  50.0   PLATELETCT  232  225  218   MPV  10.6  10.7  11.0                 Recent Labs      03/31/18 0452 04/01/18 0433 04/02/18   0307   SODIUM  137  136  136  137   POTASSIUM  4.1  4.3  4.1  4.1   CHLORIDE  92*  90*  91*  91*   CO2  42*  43*  41*  40*   GLUCOSE  164*  147*  146*  144*   BUN  24*  26*  28*  29*     Recent Labs      03/31/18 0452 04/01/18 0433 04/02/18   0307   SODIUM  137  136  136  137   POTASSIUM  4.1  4.3  4.1  4.1   CHLORIDE  92*  90*  91*  91*   CO2  42*  43*  41*  40*   BUN  24*  26*  28*  29*   CREATININE  0.59  0.56  0.61  0.58   CALCIUM  10.1  9.6  9.6  9.8     No results found for this or any previous visit.      Imaging:   DX-CHEST-PORTABLE (1 VIEW)   Final Result         1. No significant interval change.         DX-ABDOMEN FOR TUBE PLACEMENT   Final Result         Feeding tube with tip projecting over the expected area of the stomach.      DX-CHEST-PORTABLE (1 VIEW)   Final Result         1. Interval removal of right central venous catheter. No other significant interval change.         DX-CHEST-PORTABLE (1 VIEW)   Final Result         1. Interval extubation with lower lung volumes and bibasilar atelectasis.      DX-ABDOMEN FOR TUBE PLACEMENT   Final Result      Feeding tube tip overlies the gastric fundus.      DX-CHEST-PORTABLE (1 VIEW)   Final Result      Stable bibasilar opacities, likely due to a combination of pulmonary edema and atelectasis.      DX-CHEST-PORTABLE (1 VIEW)   Final Result      No significant change compared with 3/23.      [id number] VIEW   Final Result      Nonspecific gaseous  distention of colon and small bowel. Adynamic ileus would be a possibility.      DX-CHEST-PORTABLE (1 VIEW)   Final Result      Improving bibasilar opacities as described above.      DX-CHEST-PORTABLE (1 VIEW)   Final Result      Findings consistent with a combination of pulmonary edema and atelectasis, with no significant change.      DX-CHEST-PORTABLE (1 VIEW)   Final Result         1. No significant interval change.      CT-CHEST (THORAX) W/O   Final Result      Patchy left perihilar opacities likely represent pneumonia.      Bilateral lower lobe and right middle lobe opacities likely represent atelectasis/consolidation.      Mild atelectasis versus pneumonitis is seen in the lingula.      Trace pleural fluid bilaterally.      Prominence of the main pulmonary artery compatible with pulmonary arterial hypertension.      Mildly prominent mediastinal and right hilar lymph nodes are nonspecific and may be reactive.      Cardiomegaly.      Follow-up to radiographic resolution is recommended.            DX-CHEST-PORTABLE (1 VIEW)   Final Result      Endotracheal tube projects over the distal trachea 1.8 cm from the phylicia.      Right central line projects over the SVC. No pneumothorax.      Elevation of the right hemidiaphragm with overlying atelectasis.      Retrocardiac opacity may represent atelectasis or consolidation.      Interstitial edema is noted.      Cardiomegaly.         DX-ABDOMEN FOR TUBE PLACEMENT   Final Result      Enteric tube projects over the stomach.      DX-CHEST-PORTABLE (1 VIEW)   Final Result         1. Lower lung volume with hypoventilatory change and scattered bilateral atelectasis.      DX-CHEST-PORTABLE (1 VIEW)   Final Result      Slight increase inflation and improvement of bibasilar atelectasis.      ECHOCARDIOGRAM-COMP W/ CONT   Final Result      LE VENOUS DUPLEX (DVT)   Final Result      DX-CHEST-PORTABLE (1 VIEW)   Final Result      No significant interval change compared to the prior  examination. .      DX-CHEST-PORTABLE (1 VIEW)   Final Result      Bibasilar opacities may represent hypoinflation atelectasis. Underlying pleural effusions not excluded.         DX-CHEST-PORTABLE (1 VIEW)   Final Result      1.  Bibasilar atelectasis.      2.  Mild cardiomegaly.          Hospital Medications:    Current Facility-Administered Medications:   •  omeprazole (PRILOSEC) capsule 20 mg, 20 mg, Oral, DAILY, Chris Ching DRYLAN, 20 mg at 04/02/18 0935  •  enoxaparin (LOVENOX) inj 40 mg, 40 mg, Subcutaneous, Q12HRS, Amarjit Martines M.D., 40 mg at 04/02/18 0934  •  budesonide-formoterol (SYMBICORT) 160-4.5 MCG/ACT inhaler 2 Puff, 2 Puff, Inhalation, BID, Baron Sewell M.D., 2 Puff at 04/01/18 0930  •  ammonium lactate (LAC-HYDRIN) 12 % lotion, , Topical, DAILY, Shelley Reyes M.D., Stopped at 04/02/18 0900  •  furosemide (LASIX) tablet 40 mg, 40 mg, Oral, Q DAY, Chris Ching DRYLAN, 40 mg at 04/02/18 0934  •  labetalol (NORMODYNE,TRANDATE) injection 10-20 mg, 10-20 mg, Intravenous, Q4HRS PRN, Amarjit Martines M.D., 10 mg at 03/24/18 1148  •  amLODIPine (NORVASC) tablet 5 mg, 5 mg, Oral, Q DAY, Amarjit Martines M.D., 5 mg at 04/02/18 0935  •  potassium bicarbonate (KLYTE) 25 MEQ effervescent tablet TBEF 25 mEq, 25 mEq, Oral, TID, Amarjit Martines M.D., 25 mEq at 04/01/18 1334  •  insulin regular (HUMULIN R) injection 2-9 Units, 2-9 Units, Subcutaneous, Q6HRS, Stopped at 04/02/18 0600 **AND** Accu-Chek Q6 if NPO, , , Q6H **AND** NOTIFY MD and PharmD, , , Once **AND** glucose 4 g chewable tablet 16 g, 16 g, Oral, Q15 MIN PRN **AND** dextrose 50% (D50W) injection 25 mL, 25 mL, Intravenous, Q15 MIN PRN, YVROSE Collado.O.  •  Respiratory Care per Protocol, , Nebulization, Continuous RT, Chris Ching D.O.  •  senna-docusate (PERICOLACE or SENOKOT S) 8.6-50 MG per tablet 2 Tab, 2 Tab, Oral, BID, 2 Tab at 04/01/18 0939 **AND** polyethylene glycol/lytes (MIRALAX) PACKET 1 Packet, 1 Packet,  Oral, QDAY PRN **AND** magnesium hydroxide (MILK OF MAGNESIA) suspension 30 mL, 30 mL, Oral, QDAY PRN **AND** bisacodyl (DULCOLAX) suppository 10 mg, 10 mg, Rectal, QDAY PRN, YVROSE Collado.O., 10 mg at 03/23/18 1525  •  ascorbic acid (ascorbic acid) tablet 500 mg, 500 mg, Per NG Tube, DAILY, DAVONTE ColladoO., 500 mg at 04/02/18 0935  •  aspirin (ASA) chewable tab 81 mg, 81 mg, Per NG Tube, DAILY, YVROSE Collado.O., 81 mg at 04/02/18 0935  •  lactobacillus granules (LACTINEX/FLORANEX) packet 1 Packet, 1 Packet, Nasogastric, TID WITH MEALS, DAVONTE ColladoO., 1 Packet at 04/02/18 0934  •  levothyroxine (SYNTHROID) tablet 50 mcg, 50 mcg, Per NG Tube, AM ES, YVROSE Collado.O., 50 mcg at 04/02/18 0609  •  lisinopril (PRINIVIL) tablet 10 mg, 10 mg, Per NG Tube, DAILY, DAVONTE ColladoO., 10 mg at 04/02/18 0935  •  acetaminophen (TYLENOL) tablet 650 mg, 650 mg, Per NG Tube, Q6HRS PRN, YVROSE Collado.O., 650 mg at 04/01/18 0236  •  oxyCODONE immediate release (ROXICODONE) tablet 30 mg, 30 mg, Per NG Tube, Q4HRS PRN, YVROSE Collado.O., 30 mg at 04/02/18 1503  •  zinc sulfate (ZINCATE) capsule 220 mg, 220 mg, Oral, DAILY, DAVONTE RomeroODorina, 220 mg at 04/02/18 0936  •  albuterol inhaler 2 Puff, 2 Puff, Inhalation, Q6HRS PRN, Benjie Marcus M.D., 2 Puff at 04/01/18 0930  •  ipratropium-albuterol (DUONEB) nebulizer solution 3 mL, 3 mL, Nebulization, Q4H PRN (RT), Benjie Marcus M.D.    Current Outpatient Medications:  Prescriptions Prior to Admission   Medication Sig Dispense Refill Last Dose   • oxyCODONE (OXY-IR) 30 MG immediate release tablet Take 30 mg by mouth every four hours as needed for Moderate Pain.   3/15/2018 at Unknown time   • METFORMIN HCL PO Take 1 Tab by mouth 2 Times a Day.   3/15/2018 at pm   • ascorbic acid (VITAMIN C) 500 MG tablet Take 1 Tab by mouth every day. 30 Tab 3 3/15/2018 at Unknown time   • Omega-3 Fatty Acids (FISH OIL) 1000 MG Cap capsule Take 1 Cap by  mouth 3 times a day, with meals. 90 Cap  3/15/2018 at Unknown time   • levothyroxine (SYNTHROID) 50 MCG Tab Take 50 mcg by mouth Every morning on an empty stomach.   3/15/2018 at unknown   • omeprazole (PRILOSEC) 20 MG delayed-release capsule Take 20 mg by mouth every day.   3/15/2018 at unknown   • acetaminophen (TYLENOL) 325 MG Tab Take 2 Tabs by mouth every 6 hours as needed (Mild Pain; (Pain scale 1-3); Temp greater than 100.5 F). 30 Tab 0 3/11/2018 at Unknown time   • furosemide (LASIX) 40 MG Tab Take 1 Tab by mouth every day. 30 Tab 0 3/15/2018 at unknown   • lisinopril (PRINIVIL) 20 MG Tab Take 0.5 Tabs by mouth every day. 30 Tab 1 3/15/2018 at unknown   • budesonide-formoterol (SYMBICORT) 160-4.5 MCG/ACT Aerosol Inhale 2 Puffs by mouth 2 Times a Day.   3/14/2018 at unknown   • albuterol 108 (90 BASE) MCG/ACT Aero Soln inhalation aerosol Inhale 2 Puffs by mouth every 6 hours as needed for Shortness of Breath.   3/2/2018 at Unknown time       Medication Allergy:  No Known Allergies    Assessment and Plan:     1.  Acute on chronic hypoxemic and hypercapnic respiratory failure.   Hospital chronic CO2 retention. Patient was on ventilatory support for five days and has been extubated five days ago and now PCO2 has gone up to 74. He has not been compliant with CPAP therapy. Not in any current distress. But obviously could fail and required transfer back to the ICU.    -2. Chronic severe lymphedema with stasis dermatitis.     -Continue with wound care treatment.  -Left posterior thigh wound is improved with conservative treatment. No need for I&D.  -Will likely require infectious disease evaluation pending patient's response to ongoing treatment.     3. Type II diabetes mellitus.     -Continue with sliding scale at this time.  -Have elected to begin low-dose steroids to attempt at treating instructed component of his underlying lung disease. He has probable potential worsening of his blood sugar and longer acting  insulin such as NPH or Lantus may be considered.     4. Morbid obesity.     -Obstructive sleep apnea will place the patient on BiPAP at night if the patient tolerates.   We'll TRY TO AVOID SEDATION MEDICATIONS    5. Hypertension.     -Under adequate control at this time, though did require intermittent medication for hypertension.  -Continue with oral therapy, with ACE inhibitor but observe for worsening renal function.   -At this time tolerating.     6. Chronic right bundle-branch block with wide QRS.      -No obvious sign of acute ischemic event although EKG is been ordered as well as troponin.   -Levels were negative overnight.  -Compared to previous EKGs no significant change.     7. COPD.   Continue with albuterol ipratropium nebulizer and maintenance with Symbicort inhaler  -Continue with respiratory therapy protocols and also again to keep saturation above 90%.  -Adding IV steroids for treatment of obstructive disease component.   -May consider as possible rescue therapy.

## 2018-04-02 NOTE — PROGRESS NOTES
MS  Patient converted to Afib at 0759  Afib  down to 45 nonsustained.  BBB  1.9 second pause  -/12/-

## 2018-04-02 NOTE — DISCHARGE PLANNING
Call placed to [facility], [facility] and [facility], all facility state they are able to a payment plan with patient after stay at SNF is completed.   Call to Marley with [employer], patient will need pay $1100 for 7 days upfront and $1100 for each additional 7 days in advance per week for stay.     ALEXX Bojorquez has been notified.

## 2018-04-03 PROBLEM — K56.7 ILEUS (HCC): Status: RESOLVED | Noted: 2018-03-23 | Resolved: 2018-04-03

## 2018-04-03 PROBLEM — R41.0 DELIRIUM: Status: RESOLVED | Noted: 2018-03-23 | Resolved: 2018-04-03

## 2018-04-03 PROBLEM — E87.6 HYPOKALEMIA: Status: RESOLVED | Noted: 2018-03-23 | Resolved: 2018-04-03

## 2018-04-03 LAB
ANION GAP SERPL CALC-SCNC: 9 MMOL/L (ref 0–11.9)
BASOPHILS # BLD AUTO: 0.7 % (ref 0–1.8)
BASOPHILS # BLD: 0.05 K/UL (ref 0–0.12)
BUN SERPL-MCNC: 27 MG/DL (ref 8–22)
CALCIUM SERPL-MCNC: 9.6 MG/DL (ref 8.4–10.2)
CHLORIDE SERPL-SCNC: 92 MMOL/L (ref 96–112)
CO2 SERPL-SCNC: 38 MMOL/L (ref 20–33)
CREAT SERPL-MCNC: 0.63 MG/DL (ref 0.5–1.4)
EOSINOPHIL # BLD AUTO: 0.37 K/UL (ref 0–0.51)
EOSINOPHIL NFR BLD: 5.1 % (ref 0–6.9)
ERYTHROCYTE [DISTWIDTH] IN BLOOD BY AUTOMATED COUNT: 50.1 FL (ref 35.9–50)
GLUCOSE BLD-MCNC: 114 MG/DL (ref 65–99)
GLUCOSE BLD-MCNC: 133 MG/DL (ref 65–99)
GLUCOSE BLD-MCNC: 168 MG/DL (ref 65–99)
GLUCOSE BLD-MCNC: 181 MG/DL (ref 65–99)
GLUCOSE SERPL-MCNC: 128 MG/DL (ref 65–99)
HCT VFR BLD AUTO: 44 % (ref 42–52)
HGB BLD-MCNC: 13.4 G/DL (ref 14–18)
IMM GRANULOCYTES # BLD AUTO: 0.13 K/UL (ref 0–0.11)
IMM GRANULOCYTES NFR BLD AUTO: 1.8 % (ref 0–0.9)
LYMPHOCYTES # BLD AUTO: 1.77 K/UL (ref 1–4.8)
LYMPHOCYTES NFR BLD: 24.4 % (ref 22–41)
MCH RBC QN AUTO: 28.3 PG (ref 27–33)
MCHC RBC AUTO-ENTMCNC: 30.5 G/DL (ref 33.7–35.3)
MCV RBC AUTO: 93 FL (ref 81.4–97.8)
MONOCYTES # BLD AUTO: 0.77 K/UL (ref 0–0.85)
MONOCYTES NFR BLD AUTO: 10.6 % (ref 0–13.4)
NEUTROPHILS # BLD AUTO: 4.15 K/UL (ref 1.82–7.42)
NEUTROPHILS NFR BLD: 57.4 % (ref 44–72)
NRBC # BLD AUTO: 0 K/UL
NRBC BLD-RTO: 0 /100 WBC
PLATELET # BLD AUTO: 210 K/UL (ref 164–446)
PMV BLD AUTO: 10.8 FL (ref 9–12.9)
POTASSIUM SERPL-SCNC: 4.2 MMOL/L (ref 3.6–5.5)
RBC # BLD AUTO: 4.73 M/UL (ref 4.7–6.1)
SODIUM SERPL-SCNC: 139 MMOL/L (ref 135–145)
WBC # BLD AUTO: 7.2 K/UL (ref 4.8–10.8)

## 2018-04-03 PROCEDURE — 700102 HCHG RX REV CODE 250 W/ 637 OVERRIDE(OP): Performed by: HOSPITALIST

## 2018-04-03 PROCEDURE — 36415 COLL VENOUS BLD VENIPUNCTURE: CPT

## 2018-04-03 PROCEDURE — 770020 HCHG ROOM/CARE - TELE (206)

## 2018-04-03 PROCEDURE — 99232 SBSQ HOSP IP/OBS MODERATE 35: CPT | Performed by: INTERNAL MEDICINE

## 2018-04-03 PROCEDURE — 94660 CPAP INITIATION&MGMT: CPT

## 2018-04-03 PROCEDURE — 82962 GLUCOSE BLOOD TEST: CPT

## 2018-04-03 PROCEDURE — 85025 COMPLETE CBC W/AUTO DIFF WBC: CPT

## 2018-04-03 PROCEDURE — 700111 HCHG RX REV CODE 636 W/ 250 OVERRIDE (IP): Performed by: HOSPITALIST

## 2018-04-03 PROCEDURE — 92526 ORAL FUNCTION THERAPY: CPT

## 2018-04-03 PROCEDURE — 80048 BASIC METABOLIC PNL TOTAL CA: CPT

## 2018-04-03 PROCEDURE — 700102 HCHG RX REV CODE 250 W/ 637 OVERRIDE(OP): Performed by: INTERNAL MEDICINE

## 2018-04-03 PROCEDURE — A9270 NON-COVERED ITEM OR SERVICE: HCPCS | Performed by: HOSPITALIST

## 2018-04-03 PROCEDURE — A9270 NON-COVERED ITEM OR SERVICE: HCPCS | Performed by: INTERNAL MEDICINE

## 2018-04-03 RX ORDER — TIOTROPIUM BROMIDE 18 UG/1
1 CAPSULE ORAL; RESPIRATORY (INHALATION)
Status: DISCONTINUED | OUTPATIENT
Start: 2018-04-03 | End: 2018-04-09

## 2018-04-03 RX ORDER — OXYCODONE HYDROCHLORIDE 10 MG/1
20 TABLET ORAL EVERY 4 HOURS PRN
Status: DISCONTINUED | OUTPATIENT
Start: 2018-04-03 | End: 2018-04-16 | Stop reason: HOSPADM

## 2018-04-03 RX ADMIN — ENOXAPARIN SODIUM 40 MG: 100 INJECTION SUBCUTANEOUS at 09:19

## 2018-04-03 RX ADMIN — BUDESONIDE AND FORMOTEROL FUMARATE DIHYDRATE 2 PUFF: 160; 4.5 AEROSOL RESPIRATORY (INHALATION) at 09:37

## 2018-04-03 RX ADMIN — INSULIN HUMAN 2 UNITS: 100 INJECTION, SOLUTION PARENTERAL at 00:38

## 2018-04-03 RX ADMIN — LEVOTHYROXINE SODIUM 50 MCG: 50 TABLET ORAL at 05:39

## 2018-04-03 RX ADMIN — OXYCODONE HYDROCHLORIDE 30 MG: 10 TABLET ORAL at 21:34

## 2018-04-03 RX ADMIN — OXYCODONE HYDROCHLORIDE 30 MG: 10 TABLET ORAL at 00:31

## 2018-04-03 RX ADMIN — OXYCODONE HYDROCHLORIDE AND ACETAMINOPHEN 500 MG: 500 TABLET ORAL at 09:19

## 2018-04-03 RX ADMIN — STANDARDIZED SENNA CONCENTRATE AND DOCUSATE SODIUM 1 TABLET: 8.6; 5 TABLET, FILM COATED ORAL at 21:34

## 2018-04-03 RX ADMIN — ENOXAPARIN SODIUM 40 MG: 100 INJECTION SUBCUTANEOUS at 21:33

## 2018-04-03 RX ADMIN — INSULIN HUMAN 2 UNITS: 100 INJECTION, SOLUTION PARENTERAL at 16:35

## 2018-04-03 RX ADMIN — OXYCODONE HYDROCHLORIDE 30 MG: 10 TABLET ORAL at 09:32

## 2018-04-03 RX ADMIN — Medication 220 MG: at 09:35

## 2018-04-03 RX ADMIN — OXYCODONE HYDROCHLORIDE 30 MG: 10 TABLET ORAL at 17:39

## 2018-04-03 RX ADMIN — ASPIRIN 81 MG: 81 TABLET, CHEWABLE ORAL at 09:19

## 2018-04-03 RX ADMIN — OXYCODONE HYDROCHLORIDE 30 MG: 10 TABLET ORAL at 13:35

## 2018-04-03 RX ADMIN — LISINOPRIL 10 MG: 20 TABLET ORAL at 09:20

## 2018-04-03 RX ADMIN — OXYCODONE HYDROCHLORIDE 30 MG: 10 TABLET ORAL at 05:31

## 2018-04-03 RX ADMIN — FUROSEMIDE 40 MG: 40 TABLET ORAL at 09:20

## 2018-04-03 ASSESSMENT — COGNITIVE AND FUNCTIONAL STATUS - GENERAL
EATING MEALS: A LOT
HELP NEEDED FOR BATHING: A LOT
SUGGESTED CMS G CODE MODIFIER DAILY ACTIVITY: CL
DAILY ACTIVITIY SCORE: 12
MOBILITY SCORE: 6
WALKING IN HOSPITAL ROOM: TOTAL
CLIMB 3 TO 5 STEPS WITH RAILING: TOTAL
STANDING UP FROM CHAIR USING ARMS: TOTAL
EATING MEALS: A LOT
TOILETING: A LOT
HELP NEEDED FOR BATHING: A LOT
TURNING FROM BACK TO SIDE WHILE IN FLAT BAD: UNABLE
DRESSING REGULAR LOWER BODY CLOTHING: A LOT
MOVING FROM LYING ON BACK TO SITTING ON SIDE OF FLAT BED: UNABLE
MOBILITY SCORE: 6
DRESSING REGULAR LOWER BODY CLOTHING: A LOT
SUGGESTED CMS G CODE MODIFIER MOBILITY: CN
DRESSING REGULAR UPPER BODY CLOTHING: A LOT
MOVING TO AND FROM BED TO CHAIR: UNABLE
SUGGESTED CMS G CODE MODIFIER DAILY ACTIVITY: CL
CLIMB 3 TO 5 STEPS WITH RAILING: TOTAL
DRESSING REGULAR UPPER BODY CLOTHING: A LOT
MOVING FROM LYING ON BACK TO SITTING ON SIDE OF FLAT BED: UNABLE
PERSONAL GROOMING: A LOT
PERSONAL GROOMING: A LOT
STANDING UP FROM CHAIR USING ARMS: TOTAL
SUGGESTED CMS G CODE MODIFIER MOBILITY: CN
TOILETING: A LOT
TURNING FROM BACK TO SIDE WHILE IN FLAT BAD: UNABLE
WALKING IN HOSPITAL ROOM: TOTAL
MOVING TO AND FROM BED TO CHAIR: UNABLE
DAILY ACTIVITIY SCORE: 12

## 2018-04-03 ASSESSMENT — ENCOUNTER SYMPTOMS
COUGH: 1
CHILLS: 0
NERVOUS/ANXIOUS: 1
BACK PAIN: 0
SHORTNESS OF BREATH: 1
DEPRESSION: 0
ABDOMINAL PAIN: 0
VOMITING: 0
FEVER: 0
SPUTUM PRODUCTION: 1
NAUSEA: 0

## 2018-04-03 ASSESSMENT — PAIN SCALES - GENERAL
PAINLEVEL_OUTOF10: 9
PAINLEVEL_OUTOF10: 9
PAINLEVEL_OUTOF10: 7
PAINLEVEL_OUTOF10: 8
PAINLEVEL_OUTOF10: 7
PAINLEVEL_OUTOF10: 9
PAINLEVEL_OUTOF10: 5
PAINLEVEL_OUTOF10: 8
PAINLEVEL_OUTOF10: 6

## 2018-04-03 ASSESSMENT — LIFESTYLE VARIABLES: EVER_SMOKED: YES

## 2018-04-03 NOTE — PROGRESS NOTES
Pt agreeable to CPAP if given pain meds. Pain med given, RT called and at bedside. Pt educated about importance of CPAP and his critical Co2 levels. Pt on CPAP at this time.

## 2018-04-03 NOTE — PROGRESS NOTES
"Pt refused to take several am scheduled medications-see mar. Pt insists on having oxygen up to 6-7 liters nc despite being informed by RT and this nurse that it could increase CO2 level, causing more feelings of sob.  Pt refuses to have oxygen turned down to home O2 baseline 4-5 liters. Pt refused to turn on side for this nurse and for Dr. Ortiz for skin and lung assessment. Pt irritable, currently resting in bed. When RT entered room, pt reports to RT  that \"No one on days does my wound care \". This nurse just had conversation with pt prior to Rt entering room that wound care would be completed after he and his roommates medications where given. Pt also reports that \"no one will help me get out of bed\" but pt refuses to move legs or turn in bed.\"  "

## 2018-04-03 NOTE — PROGRESS NOTES
"Pulmonary Progress Note    Patient ID:   Name:             Fer Estrada     YOB: 1960  Age:                 57 y.o.  male   MRN:               [id number]                                                  Subjective: He did try CPAP last night and was able to tolerate.    Interval Changes:was on ventilator x 5 days and extubated on 3/25. Has CO2 retention either from significant COPD and/or obesity hypoventilation syndrome. Has had difficulty tolerating BiPAP. However, he understands it will help.    Objective:   Vitals/ General Appearance:   Weight/BMI: Body mass index is 43.79 kg/m².  Blood pressure 120/68, pulse (!) 103, temperature 36.2 °C (97.1 °F), resp. rate 19, height 1.753 m (5' 9\"), weight (!) 134.5 kg (296 lb 8.3 oz), SpO2 96 %.  Vitals:    04/03/18 0400 04/03/18 0800 04/03/18 0829 04/03/18 0915   BP: 120/93 (!) 95/58  120/68   Pulse: 80 81 95 (!) 103   Resp: 19 20 19    Temp: 36.1 °C (97 °F) 36.2 °C (97.1 °F)     SpO2: 97% 95% 96%    Weight:       Height:         Oxygen Therapy:  Pulse Oximetry: 96 %, O2 (LPM): 6, O2 Delivery: Silicone Nasal Cannula    Constitutional:   Well developed, obese, No acute distress  HENMT:  Normocephalic, Atraumatic, Oropharynx moist mucous membranes, No oral exudates, Nose normal.  No thyromegaly.  Eyes:  EOMI, Conjunctiva normal, No discharge.  Neck:  Normal range of motion, No cervical tenderness,  no JVD.  Cardiovascular:  Normal heart rate, Normal rhythm, No murmurs, No rubs, No gallops.   Extremitites with intact distal pulses, no cyanosis, less edema with persistent eliphantiasis of lower extremities  Lungs:  Normal breath sounds, has few rales to auscultation bilaterally on the basis,   no wheezing.   Abdomen: Bowel sounds normal, Soft, No tenderness, No guarding, No rebound, No masses, No hepatosplenomegaly.  Skin: Warm, Dry, No erythema, No rash, no induration.  Neurologic: Alert & oriented x 3, No focal deficits noted, cranial nerves II through X are " grossly intact.  Psychiatric: Affect normal, Judgment normal, Mood normal.    Labs:  Recent Labs      04/01/18 0433 04/02/18 0307 04/03/18 0457   WBC  11.9*  9.4  7.2   RBC  4.19*  4.35*  4.73   HEMOGLOBIN  12.1*  12.4*  13.4*   HEMATOCRIT  39.6*  40.2*  44.0   MCV  94.5  92.4  93.0   MCH  28.9  28.5  28.3   MCHC  30.6*  30.8*  30.5*   RDW  51.4*  50.0  50.1*   PLATELETCT  225  218  210   MPV  10.7  11.0  10.8                 Recent Labs      04/01/18 0433 04/02/18 0307 04/03/18 0457   SODIUM  136  136  137  139   POTASSIUM  4.3  4.1  4.1  4.2   CHLORIDE  90*  91*  91*  92*   CO2  43*  41*  40*  38*   GLUCOSE  147*  146*  144*  128*   BUN  26*  28*  29*  27*     Recent Labs      04/01/18 0433 04/02/18 0307 04/03/18 0457   SODIUM  136  136  137  139   POTASSIUM  4.3  4.1  4.1  4.2   CHLORIDE  90*  91*  91*  92*   CO2  43*  41*  40*  38*   BUN  26*  28*  29*  27*   CREATININE  0.56  0.61  0.58  0.63   CALCIUM  9.6  9.6  9.8  9.6     No results found for this or any previous visit.      Imaging:   DX-CHEST-PORTABLE (1 VIEW)   Final Result         1. No significant interval change.         DX-ABDOMEN FOR TUBE PLACEMENT   Final Result         Feeding tube with tip projecting over the expected area of the stomach.      DX-CHEST-PORTABLE (1 VIEW)   Final Result         1. Interval removal of right central venous catheter. No other significant interval change.         DX-CHEST-PORTABLE (1 VIEW)   Final Result         1. Interval extubation with lower lung volumes and bibasilar atelectasis.      DX-ABDOMEN FOR TUBE PLACEMENT   Final Result      Feeding tube tip overlies the gastric fundus.      DX-CHEST-PORTABLE (1 VIEW)   Final Result      Stable bibasilar opacities, likely due to a combination of pulmonary edema and atelectasis.      DX-CHEST-PORTABLE (1 VIEW)   Final Result      No significant change compared with 3/23.      DM-DSLOMRS-4 VIEW   Final Result      Nonspecific gaseous  distention of colon and small bowel. Adynamic ileus would be a possibility.      DX-CHEST-PORTABLE (1 VIEW)   Final Result      Improving bibasilar opacities as described above.      DX-CHEST-PORTABLE (1 VIEW)   Final Result      Findings consistent with a combination of pulmonary edema and atelectasis, with no significant change.      DX-CHEST-PORTABLE (1 VIEW)   Final Result         1. No significant interval change.      CT-CHEST (THORAX) W/O   Final Result      Patchy left perihilar opacities likely represent pneumonia.      Bilateral lower lobe and right middle lobe opacities likely represent atelectasis/consolidation.      Mild atelectasis versus pneumonitis is seen in the lingula.      Trace pleural fluid bilaterally.      Prominence of the main pulmonary artery compatible with pulmonary arterial hypertension.      Mildly prominent mediastinal and right hilar lymph nodes are nonspecific and may be reactive.      Cardiomegaly.      Follow-up to radiographic resolution is recommended.            DX-CHEST-PORTABLE (1 VIEW)   Final Result      Endotracheal tube projects over the distal trachea 1.8 cm from the phylicia.      Right central line projects over the SVC. No pneumothorax.      Elevation of the right hemidiaphragm with overlying atelectasis.      Retrocardiac opacity may represent atelectasis or consolidation.      Interstitial edema is noted.      Cardiomegaly.         DX-ABDOMEN FOR TUBE PLACEMENT   Final Result      Enteric tube projects over the stomach.      DX-CHEST-PORTABLE (1 VIEW)   Final Result         1. Lower lung volume with hypoventilatory change and scattered bilateral atelectasis.      DX-CHEST-PORTABLE (1 VIEW)   Final Result      Slight increase inflation and improvement of bibasilar atelectasis.      ECHOCARDIOGRAM-COMP W/ CONT   Final Result      LE VENOUS DUPLEX (DVT)   Final Result      DX-CHEST-PORTABLE (1 VIEW)   Final Result      No significant interval change compared to the prior  examination. .      DX-CHEST-PORTABLE (1 VIEW)   Final Result      Bibasilar opacities may represent hypoinflation atelectasis. Underlying pleural effusions not excluded.         DX-CHEST-PORTABLE (1 VIEW)   Final Result      1.  Bibasilar atelectasis.      2.  Mild cardiomegaly.          Hospital Medications:    Current Facility-Administered Medications:   •  omeprazole (PRILOSEC) capsule 20 mg, 20 mg, Oral, DAILY, Chris Ching DRYLAN, 20 mg at 04/02/18 0935  •  enoxaparin (LOVENOX) inj 40 mg, 40 mg, Subcutaneous, Q12HRS, Amarjit Martines M.D., 40 mg at 04/03/18 0919  •  budesonide-formoterol (SYMBICORT) 160-4.5 MCG/ACT inhaler 2 Puff, 2 Puff, Inhalation, BID, Baron Sewell M.D., 2 Puff at 04/03/18 0937  •  ammonium lactate (LAC-HYDRIN) 12 % lotion, , Topical, DAILY, Shelley Reyes M.D., Stopped at 04/02/18 0900  •  furosemide (LASIX) tablet 40 mg, 40 mg, Oral, Q DAY, Chris Ching DRYLAN, 40 mg at 04/03/18 0920  •  labetalol (NORMODYNE,TRANDATE) injection 10-20 mg, 10-20 mg, Intravenous, Q4HRS PRN, Amarjit Martines M.D., 10 mg at 03/24/18 1148  •  amLODIPine (NORVASC) tablet 5 mg, 5 mg, Oral, Q DAY, Amarjit Martines M.D., 5 mg at 04/02/18 0935  •  potassium bicarbonate (KLYTE) 25 MEQ effervescent tablet TBEF 25 mEq, 25 mEq, Oral, TID, Amarjit Martines M.D., 25 mEq at 04/01/18 1334  •  insulin regular (HUMULIN R) injection 2-9 Units, 2-9 Units, Subcutaneous, Q6HRS, Stopped at 04/03/18 0600 **AND** Accu-Chek Q6 if NPO, , , Q6H **AND** NOTIFY MD and PharmD, , , Once **AND** glucose 4 g chewable tablet 16 g, 16 g, Oral, Q15 MIN PRN **AND** dextrose 50% (D50W) injection 25 mL, 25 mL, Intravenous, Q15 MIN PRN, YVROSE Collado.O.  •  Respiratory Care per Protocol, , Nebulization, Continuous RT, Chris Ching D.O.  •  senna-docusate (PERICOLACE or SENOKOT S) 8.6-50 MG per tablet 2 Tab, 2 Tab, Oral, BID, 2 Tab at 04/01/18 0939 **AND** polyethylene glycol/lytes (MIRALAX) PACKET 1 Packet, 1 Packet,  Oral, QDAY PRN **AND** magnesium hydroxide (MILK OF MAGNESIA) suspension 30 mL, 30 mL, Oral, QDAY PRN **AND** bisacodyl (DULCOLAX) suppository 10 mg, 10 mg, Rectal, QDAY PRN, YVROSE Collado.O., 10 mg at 03/23/18 1525  •  ascorbic acid (ascorbic acid) tablet 500 mg, 500 mg, Per NG Tube, DAILY, YVROSE Collado.O., 500 mg at 04/03/18 0919  •  aspirin (ASA) chewable tab 81 mg, 81 mg, Per NG Tube, DAILY, YVROSE Collado.O., 81 mg at 04/03/18 0919  •  lactobacillus granules (LACTINEX/FLORANEX) packet 1 Packet, 1 Packet, Nasogastric, TID WITH MEALS, DAVONTE ColladoO., 1 Packet at 04/02/18 0934  •  levothyroxine (SYNTHROID) tablet 50 mcg, 50 mcg, Per NG Tube, AM ES, YVROSE Collado.O., 50 mcg at 04/03/18 0539  •  lisinopril (PRINIVIL) tablet 10 mg, 10 mg, Per NG Tube, DAILY, DAVONTE ColladoO., 10 mg at 04/03/18 0920  •  acetaminophen (TYLENOL) tablet 650 mg, 650 mg, Per NG Tube, Q6HRS PRN, YVROSE Collado.O., 650 mg at 04/01/18 0236  •  oxyCODONE immediate release (ROXICODONE) tablet 30 mg, 30 mg, Per NG Tube, Q4HRS PRN, YVROSE Collado.O., 30 mg at 04/03/18 0932  •  zinc sulfate (ZINCATE) capsule 220 mg, 220 mg, Oral, DAILY, DAVONTE RomeroODorina, 220 mg at 04/03/18 0935  •  albuterol inhaler 2 Puff, 2 Puff, Inhalation, Q6HRS PRN, Benjie Marcus M.D., 2 Puff at 04/01/18 0930  •  ipratropium-albuterol (DUONEB) nebulizer solution 3 mL, 3 mL, Nebulization, Q4H PRN (RT), Benjie Marcus M.D.    Current Outpatient Medications:  Prescriptions Prior to Admission   Medication Sig Dispense Refill Last Dose   • oxyCODONE (OXY-IR) 30 MG immediate release tablet Take 30 mg by mouth every four hours as needed for Moderate Pain.   3/15/2018 at Unknown time   • METFORMIN HCL PO Take 1 Tab by mouth 2 Times a Day.   3/15/2018 at pm   • ascorbic acid (VITAMIN C) 500 MG tablet Take 1 Tab by mouth every day. 30 Tab 3 3/15/2018 at Unknown time   • Omega-3 Fatty Acids (FISH OIL) 1000 MG Cap capsule Take 1 Cap by  mouth 3 times a day, with meals. 90 Cap  3/15/2018 at Unknown time   • levothyroxine (SYNTHROID) 50 MCG Tab Take 50 mcg by mouth Every morning on an empty stomach.   3/15/2018 at unknown   • omeprazole (PRILOSEC) 20 MG delayed-release capsule Take 20 mg by mouth every day.   3/15/2018 at unknown   • acetaminophen (TYLENOL) 325 MG Tab Take 2 Tabs by mouth every 6 hours as needed (Mild Pain; (Pain scale 1-3); Temp greater than 100.5 F). 30 Tab 0 3/11/2018 at Unknown time   • furosemide (LASIX) 40 MG Tab Take 1 Tab by mouth every day. 30 Tab 0 3/15/2018 at unknown   • lisinopril (PRINIVIL) 20 MG Tab Take 0.5 Tabs by mouth every day. 30 Tab 1 3/15/2018 at unknown   • budesonide-formoterol (SYMBICORT) 160-4.5 MCG/ACT Aerosol Inhale 2 Puffs by mouth 2 Times a Day.   3/14/2018 at unknown   • albuterol 108 (90 BASE) MCG/ACT Aero Soln inhalation aerosol Inhale 2 Puffs by mouth every 6 hours as needed for Shortness of Breath.   3/2/2018 at Unknown time       Medication Allergy:  No Known Allergies    Assessment and Plan:     1.  Acute on chronic hypoxemic and hypercapnic respiratory failure.   Hospital chronic CO2 retention. Patient was on ventilatory support for five days and has been extubated five days ago and now PCO2 has gone up to 74. He has not been compliant with CPAP therapy. Not in any current distress. But obviously could fail and required transfer back to the ICU.  This man will require more than BiPAP. He has advanced lung disease with a component of obesity hypoventilation with significant hypoxemia and hypercarbia. He would benefit immensely from nocturnal noninvasive ventilation to prevent recurrent hospitalization and even death.  He may benefit from long-term acute care rather than skilled nursing considering the severity of his respiratory disease.    -2. Chronic severe lymphedema with stasis dermatitis.     -Continue with wound care treatment.  -Left posterior thigh wound is improved with conservative  treatment. No need for I&D.  -Will likely require infectious disease evaluation pending patient's response to ongoing treatment.     3. Type II diabetes mellitus.     -Continue with sliding scale at this time.  -Have elected to begin low-dose steroids to attempt at treating instructed component of his underlying lung disease. He has probable potential worsening of his blood sugar and longer acting insulin such as NPH or Lantus may be considered.     4. Morbid obesity.     -Obstructive sleep apnea will place the patient on BiPAP at night if the patient tolerates.   We'll TRY TO AVOID SEDATION MEDICATIONS    5. Hypertension.     -Under adequate control at this time, though did require intermittent medication for hypertension.  -Continue with oral therapy, with ACE inhibitor but observe for worsening renal function.   -At this time tolerating.     6. Chronic right bundle-branch block with wide QRS.      -No obvious sign of acute ischemic event although EKG is been ordered as well as troponin.   -Levels were negative overnight.  -Compared to previous EKGs no significant change.     7. COPD.   Continue with albuterol ipratropium nebulizer and maintenance with Symbicort inhaler  -Continue with respiratory therapy protocols and also again to keep saturation above 90%.  -Adding IV steroids for treatment of obstructive disease component.   -May consider as possible rescue therapy.

## 2018-04-03 NOTE — CARE PLAN
Problem: Safety  Goal: Will remain free from injury  Outcome: PROGRESSING AS EXPECTED  Bed locked and in lowest position, non skid socks in place, call light in reach    Problem: Knowledge Deficit  Goal: Knowledge of disease process/condition, treatment plan, diagnostic tests, and medications will improve  Outcome: PROGRESSING AS EXPECTED  Updated with plan of care, cont wound care, pain control

## 2018-04-03 NOTE — PALLIATIVE CARE
"Palliative Care follow-up  Updates received from the SW team and MD on plan to DC to LTAC vs SNF with Trilogy BiPap if able to be arranged. PC RN f/u with Fer and his daughter Howard on goals/POC. They are in agreement with DC plan once arranged and understand the needs of the team for a safe DC. PC RN discussed long term goals, code status and AD. He expressed wanting to remain a full code and this RN discussed long term support wishes regarding trach and long term life support if his situation warranted that. He shook his head \"no\" and Howard supported his wish for no trach/long term life support. This was written into his AD appointing Howard as POA and statements 2, 3, and 5. Howard planned to call this RN with her uncle Fransico's phone number for the alternate POA prior to notarizing. Certificate of capacity signed by MD.      Updated:     Plan: f/u for notarization after Fransico's number received    Thank you for allowing Palliative Care to participate in this patient's care. Please feel free to call [phone] with any questions or concerns.  "

## 2018-04-03 NOTE — DISCHARGE PLANNING
Received choice form from W for patients LTAC services, referral has been sent to [facility] per patient request.

## 2018-04-03 NOTE — DISCHARGE PLANNING
"Anticipated Discharge Disposition: LTAC vs SNF    Action: LSW met with pt bedside about new dc plan of doctor recommending LTAC. LSW spoke extensively about LTAC and pt reports that he has been at [facility] in [place] before and reported, \"it was okay\". LSW had pt sign choice for [facility] and in [facility]. Pt is need of trilogy ventilator at night. LSW faxed choice form to [facility].     Barriers to Discharge: None    Plan: LSW to f/u with LTAC for acceptance or denial.    "

## 2018-04-03 NOTE — DISCHARGE PLANNING
Call from Sonali with [facility], patient will have an on-site eval visit with [facility] in the a.m to determine if patient can be accepted for SNF.     ALEXX Bojorquez has been notified.

## 2018-04-03 NOTE — DISCHARGE PLANNING
Anticipated Discharge Disposition: SNF    Action: LSW met with pt bedside about copay and SNF choice. Pt called his daughter about [facility] due to pt out of SNF days and high copay amount of 167$ . Pt reports they would like to go to [facility] and will do a payment plan with them. Per [facility] Pt was noncompliant at [facility] during last stay. LSW explained this to pt and asked if a liaison from [facility] can come see him to discuss compliance. [facility]CS Mahsa reports she spoke with [facility] and liaison Coreen will come evaluate pt in the morning to speak about co pay and compliance. LSW gave pt IMM copy but pt refused to sign. LSW noted refused to sign on IMM form.     Barriers to Discharge: Pt has accepting facilities of [facility] and [facility] with payment plans to help pt but pt refuses to go to facilities reported they are not good.     Plan: Coreen from [facility] to come see pt in the a.m. To speak about compliance and payment plan.

## 2018-04-03 NOTE — PROGRESS NOTES
"Discussed need for CPAP, patient stating \"LATER.\" Education provided, offered to call RT and place patient on CPAP. Patient still refusing, understands importance.   "

## 2018-04-03 NOTE — PROGRESS NOTES
Pt uncooperative with plan of care at times, refusing some care and medications.  Updated with plan of care, call light in reach and encourage to call for assistance.

## 2018-04-03 NOTE — THERAPY
"Speech Language Therapy dysphagia treatment completed.   Functional Status:  Patient currently on Dys2/NTL diet with intermittent supervision. Per RN and patient, patient appears to be tolerating diet without difficulty. Patient awake, alert, pleasant, and cooperative today. Patient consumed PO trials of soft solids, mixed consistencies, crackers, and thin liquids via cup sip and straw. Patient had moderate amount of throat clearing after PO trials of crackers w/ reported feelings of pharyngeal residue, stating \"I can feel those in there. They're stuck a little bit. Those other ones were okay because they were wet\" and patient required thin liquid wash to clear globus sensation. No other overt s/sx of aspiration were noted on any other consistencies trialed. Laryngeal elevation palpated as weak. Of note, patient did report that he has been noncompliant with current diet, reporting that his daughter brings in chicken wings and shrimp from [employer]. Patient educated extensively on dysphagia, s/sx of aspiration, risks for pneumonia, and SLP recs. He verbalized understanding. At this time, recommend patient upgrade to Dys3/thin liquid diet with intermittent supervision. RN aware. SLP is following for continued diet tolerance.     Recommendations: At this time, recommend patient upgrade to Dys3/thin liquid diet with intermittent supervision.   Plan of Care: Will benefit from Speech Therapy 3 times per week  Post-Acute Therapy: Discharge to a transitional care facility for continued skilled therapy services.    See \"Rehab Therapy-Acute\" Patient Summary Report for complete documentation.     "

## 2018-04-04 LAB
ANION GAP SERPL CALC-SCNC: 10 MMOL/L (ref 0–11.9)
BUN SERPL-MCNC: 29 MG/DL (ref 8–22)
CALCIUM SERPL-MCNC: 9.5 MG/DL (ref 8.5–10.5)
CHLORIDE SERPL-SCNC: 91 MMOL/L (ref 96–112)
CO2 SERPL-SCNC: 35 MMOL/L (ref 20–33)
CREAT SERPL-MCNC: 0.63 MG/DL (ref 0.5–1.4)
ERYTHROCYTE [DISTWIDTH] IN BLOOD BY AUTOMATED COUNT: 49 FL (ref 35.9–50)
GLUCOSE BLD-MCNC: 130 MG/DL (ref 65–99)
GLUCOSE BLD-MCNC: 135 MG/DL (ref 65–99)
GLUCOSE BLD-MCNC: 135 MG/DL (ref 65–99)
GLUCOSE BLD-MCNC: 157 MG/DL (ref 65–99)
GLUCOSE BLD-MCNC: 160 MG/DL (ref 65–99)
GLUCOSE SERPL-MCNC: 143 MG/DL (ref 65–99)
HCT VFR BLD AUTO: 41.6 % (ref 42–52)
HGB BLD-MCNC: 12.7 G/DL (ref 14–18)
MCH RBC QN AUTO: 28 PG (ref 27–33)
MCHC RBC AUTO-ENTMCNC: 30.5 G/DL (ref 33.7–35.3)
MCV RBC AUTO: 91.8 FL (ref 81.4–97.8)
PLATELET # BLD AUTO: 238 K/UL (ref 164–446)
PMV BLD AUTO: 11.3 FL (ref 9–12.9)
POTASSIUM SERPL-SCNC: 4.2 MMOL/L (ref 3.6–5.5)
RBC # BLD AUTO: 4.53 M/UL (ref 4.7–6.1)
SODIUM SERPL-SCNC: 136 MMOL/L (ref 135–145)
WBC # BLD AUTO: 8.8 K/UL (ref 4.8–10.8)

## 2018-04-04 PROCEDURE — 80048 BASIC METABOLIC PNL TOTAL CA: CPT

## 2018-04-04 PROCEDURE — 97530 THERAPEUTIC ACTIVITIES: CPT

## 2018-04-04 PROCEDURE — 700102 HCHG RX REV CODE 250 W/ 637 OVERRIDE(OP): Performed by: HOSPITALIST

## 2018-04-04 PROCEDURE — 700102 HCHG RX REV CODE 250 W/ 637 OVERRIDE(OP): Performed by: INTERNAL MEDICINE

## 2018-04-04 PROCEDURE — A9270 NON-COVERED ITEM OR SERVICE: HCPCS | Performed by: INTERNAL MEDICINE

## 2018-04-04 PROCEDURE — 82962 GLUCOSE BLOOD TEST: CPT | Mod: 91

## 2018-04-04 PROCEDURE — 700111 HCHG RX REV CODE 636 W/ 250 OVERRIDE (IP): Performed by: HOSPITALIST

## 2018-04-04 PROCEDURE — 36415 COLL VENOUS BLD VENIPUNCTURE: CPT

## 2018-04-04 PROCEDURE — A9270 NON-COVERED ITEM OR SERVICE: HCPCS | Performed by: HOSPITALIST

## 2018-04-04 PROCEDURE — 94762 N-INVAS EAR/PLS OXIMTRY CONT: CPT

## 2018-04-04 PROCEDURE — 99232 SBSQ HOSP IP/OBS MODERATE 35: CPT | Performed by: INTERNAL MEDICINE

## 2018-04-04 PROCEDURE — 85027 COMPLETE CBC AUTOMATED: CPT

## 2018-04-04 PROCEDURE — 770006 HCHG ROOM/CARE - MED/SURG/GYN SEMI*

## 2018-04-04 RX ADMIN — LISINOPRIL 10 MG: 20 TABLET ORAL at 09:52

## 2018-04-04 RX ADMIN — ASPIRIN 81 MG: 81 TABLET, CHEWABLE ORAL at 09:51

## 2018-04-04 RX ADMIN — Medication: at 23:00

## 2018-04-04 RX ADMIN — OXYCODONE HYDROCHLORIDE 20 MG: 10 TABLET ORAL at 15:22

## 2018-04-04 RX ADMIN — OXYCODONE HYDROCHLORIDE 20 MG: 10 TABLET ORAL at 06:33

## 2018-04-04 RX ADMIN — INSULIN HUMAN 2 UNITS: 100 INJECTION, SOLUTION PARENTERAL at 23:17

## 2018-04-04 RX ADMIN — OMEPRAZOLE 20 MG: 20 CAPSULE, DELAYED RELEASE ORAL at 09:51

## 2018-04-04 RX ADMIN — LEVOTHYROXINE SODIUM 50 MCG: 50 TABLET ORAL at 06:27

## 2018-04-04 RX ADMIN — OXYCODONE HYDROCHLORIDE AND ACETAMINOPHEN 500 MG: 500 TABLET ORAL at 09:51

## 2018-04-04 RX ADMIN — ENOXAPARIN SODIUM 40 MG: 100 INJECTION SUBCUTANEOUS at 20:33

## 2018-04-04 RX ADMIN — AMLODIPINE BESYLATE 5 MG: 5 TABLET ORAL at 09:51

## 2018-04-04 RX ADMIN — Medication 220 MG: at 09:54

## 2018-04-04 RX ADMIN — OXYCODONE HYDROCHLORIDE 20 MG: 10 TABLET ORAL at 10:57

## 2018-04-04 RX ADMIN — OXYCODONE HYDROCHLORIDE 20 MG: 10 TABLET ORAL at 01:48

## 2018-04-04 RX ADMIN — ENOXAPARIN SODIUM 40 MG: 100 INJECTION SUBCUTANEOUS at 09:52

## 2018-04-04 RX ADMIN — OXYCODONE HYDROCHLORIDE 20 MG: 10 TABLET ORAL at 20:33

## 2018-04-04 RX ADMIN — FUROSEMIDE 40 MG: 40 TABLET ORAL at 09:52

## 2018-04-04 ASSESSMENT — ENCOUNTER SYMPTOMS
NAUSEA: 0
SPUTUM PRODUCTION: 1
FEVER: 0
COUGH: 1
DEPRESSION: 0
CHILLS: 0
VOMITING: 0
BACK PAIN: 0
SHORTNESS OF BREATH: 1
NERVOUS/ANXIOUS: 1
ABDOMINAL PAIN: 0

## 2018-04-04 ASSESSMENT — COGNITIVE AND FUNCTIONAL STATUS - GENERAL
SUGGESTED CMS G CODE MODIFIER DAILY ACTIVITY: CL
WALKING IN HOSPITAL ROOM: TOTAL
DRESSING REGULAR LOWER BODY CLOTHING: A LOT
DAILY ACTIVITIY SCORE: 12
SUGGESTED CMS G CODE MODIFIER DAILY ACTIVITY: CL
HELP NEEDED FOR BATHING: A LOT
TOILETING: A LOT
TURNING FROM BACK TO SIDE WHILE IN FLAT BAD: UNABLE
DRESSING REGULAR LOWER BODY CLOTHING: A LOT
MOVING TO AND FROM BED TO CHAIR: UNABLE
DRESSING REGULAR UPPER BODY CLOTHING: A LOT
MOVING FROM LYING ON BACK TO SITTING ON SIDE OF FLAT BED: UNABLE
STANDING UP FROM CHAIR USING ARMS: TOTAL
TOILETING: A LOT
DAILY ACTIVITIY SCORE: 12
MOVING TO AND FROM BED TO CHAIR: UNABLE
STANDING UP FROM CHAIR USING ARMS: TOTAL
SUGGESTED CMS G CODE MODIFIER MOBILITY: CN
CLIMB 3 TO 5 STEPS WITH RAILING: TOTAL
SUGGESTED CMS G CODE MODIFIER MOBILITY: CN
PERSONAL GROOMING: A LOT
EATING MEALS: A LOT
TURNING FROM BACK TO SIDE WHILE IN FLAT BAD: UNABLE
MOBILITY SCORE: 6
MOVING FROM LYING ON BACK TO SITTING ON SIDE OF FLAT BED: UNABLE
HELP NEEDED FOR BATHING: A LOT
PERSONAL GROOMING: A LOT
EATING MEALS: A LOT
CLIMB 3 TO 5 STEPS WITH RAILING: TOTAL
MOBILITY SCORE: 6
WALKING IN HOSPITAL ROOM: TOTAL
DRESSING REGULAR UPPER BODY CLOTHING: A LOT

## 2018-04-04 ASSESSMENT — GAIT ASSESSMENTS: GAIT LEVEL OF ASSIST: UNABLE TO PARTICIPATE

## 2018-04-04 ASSESSMENT — PAIN SCALES - GENERAL
PAINLEVEL_OUTOF10: 9
PAINLEVEL_OUTOF10: 5
PAINLEVEL_OUTOF10: 8
PAINLEVEL_OUTOF10: 7
PAINLEVEL_OUTOF10: 6
PAINLEVEL_OUTOF10: 8
PAINLEVEL_OUTOF10: 9
PAINLEVEL_OUTOF10: 7

## 2018-04-04 NOTE — PROGRESS NOTES
"Pt in good spirits today, but declines several offers for wound care and repositioning reporting to this nurse that \"I will do it tonight\".  Wound nurse notified that pt declines to reposition, and request by this nurse for skin check  With this wound nurse. Pt assessed by wound Rn on 4/2.  "

## 2018-04-04 NOTE — PROGRESS NOTES
"Pulmonary Progress Note    Patient ID:   Name:             Fre Estrada     YOB: 1960  Age:                 57 y.o.  male   MRN:               [id number]                                                  Subjective: He did try CPAP last night and was able to tolerate.    Interval Changes:was on ventilator x 5 days and extubated on 3/25. Has CO2 retention either from significant COPD and/or obesity hypoventilation syndrome. Has used airway pressures as an therapy for the past 2 nights. He is a candidate for home noninvasive nocturnal ventilation when discharged.    Objective:   Vitals/ General Appearance:   Weight/BMI: Body mass index is 43.85 kg/m².  Blood pressure 124/71, pulse 82, temperature 36.7 °C (98.1 °F), resp. rate 17, height 1.753 m (5' 9\"), weight (!) 134.7 kg (296 lb 15.4 oz), SpO2 94 %.  Vitals:    04/03/18 2048 04/03/18 2130 04/03/18 2300 04/04/18 0325   BP: 118/77  138/78 124/71   Pulse: 100  67 82   Resp: 18  18 17   Temp: 36.8 °C (98.3 °F)  36.7 °C (98 °F) 36.7 °C (98.1 °F)   SpO2: 97% 96% 95% 94%   Weight: (!) 134.7 kg (296 lb 15.4 oz)      Height:         Oxygen Therapy:  Pulse Oximetry: 94 %, O2 (LPM): 6, O2 Delivery: Silicone Nasal Cannula    Constitutional:   Well developed, obese, No acute distress  HENMT:  Normocephalic, Atraumatic, Oropharynx moist mucous membranes, No oral exudates, Nose normal.  No thyromegaly.  Eyes:  EOMI, Conjunctiva normal, No discharge.  Neck:  Normal range of motion, No cervical tenderness,  no JVD.  Cardiovascular:  Normal heart rate, Normal rhythm, No murmurs, No rubs, No gallops.   Extremitites with intact distal pulses, no cyanosis, less edema with persistent eliphantiasis of lower extremities  Lungs:  Normal breath sounds, has few rales to auscultation bilaterally on the basis,   no wheezing.   Abdomen: Bowel sounds normal, Soft, No tenderness, No guarding, No rebound, No masses, No hepatosplenomegaly.  Skin: Warm, Dry, No erythema, No rash, no " induration.  Neurologic: Alert & oriented x 3, No focal deficits noted, cranial nerves II through X are grossly intact.  Psychiatric: Affect normal, Judgment normal, Mood normal.    Labs:  Recent Labs      04/02/18 0307 04/03/18 0457 04/04/18 0310   WBC  9.4  7.2  8.8   RBC  4.35*  4.73  4.53*   HEMOGLOBIN  12.4*  13.4*  12.7*   HEMATOCRIT  40.2*  44.0  41.6*   MCV  92.4  93.0  91.8   MCH  28.5  28.3  28.0   MCHC  30.8*  30.5*  30.5*   RDW  50.0  50.1*  49.0   PLATELETCT  218  210  238   MPV  11.0  10.8  11.3                 Recent Labs      04/02/18 0307 04/03/18 0457 04/04/18 0310   SODIUM  136  137  139  136   POTASSIUM  4.1  4.1  4.2  4.2   CHLORIDE  91*  91*  92*  91*   CO2  41*  40*  38*  35*   GLUCOSE  146*  144*  128*  143*   BUN  28*  29*  27*  29*     Recent Labs      04/02/18 0307 04/03/18 0457 04/04/18 0310   SODIUM  136  137  139  136   POTASSIUM  4.1  4.1  4.2  4.2   CHLORIDE  91*  91*  92*  91*   CO2  41*  40*  38*  35*   BUN  28*  29*  27*  29*   CREATININE  0.61  0.58  0.63  0.63   CALCIUM  9.6  9.8  9.6  9.5     No results found for this or any previous visit.      Imaging:   DX-CHEST-PORTABLE (1 VIEW)   Final Result         1. No significant interval change.         DX-ABDOMEN FOR TUBE PLACEMENT   Final Result         Feeding tube with tip projecting over the expected area of the stomach.      DX-CHEST-PORTABLE (1 VIEW)   Final Result         1. Interval removal of right central venous catheter. No other significant interval change.         DX-CHEST-PORTABLE (1 VIEW)   Final Result         1. Interval extubation with lower lung volumes and bibasilar atelectasis.      DX-ABDOMEN FOR TUBE PLACEMENT   Final Result      Feeding tube tip overlies the gastric fundus.      DX-CHEST-PORTABLE (1 VIEW)   Final Result      Stable bibasilar opacities, likely due to a combination of pulmonary edema and atelectasis.      DX-CHEST-PORTABLE (1 VIEW)   Final Result      No  significant change compared with 3/23.      [id number] VIEW   Final Result      Nonspecific gaseous distention of colon and small bowel. Adynamic ileus would be a possibility.      DX-CHEST-PORTABLE (1 VIEW)   Final Result      Improving bibasilar opacities as described above.      DX-CHEST-PORTABLE (1 VIEW)   Final Result      Findings consistent with a combination of pulmonary edema and atelectasis, with no significant change.      DX-CHEST-PORTABLE (1 VIEW)   Final Result         1. No significant interval change.      CT-CHEST (THORAX) W/O   Final Result      Patchy left perihilar opacities likely represent pneumonia.      Bilateral lower lobe and right middle lobe opacities likely represent atelectasis/consolidation.      Mild atelectasis versus pneumonitis is seen in the lingula.      Trace pleural fluid bilaterally.      Prominence of the main pulmonary artery compatible with pulmonary arterial hypertension.      Mildly prominent mediastinal and right hilar lymph nodes are nonspecific and may be reactive.      Cardiomegaly.      Follow-up to radiographic resolution is recommended.            DX-CHEST-PORTABLE (1 VIEW)   Final Result      Endotracheal tube projects over the distal trachea 1.8 cm from the phylicia.      Right central line projects over the SVC. No pneumothorax.      Elevation of the right hemidiaphragm with overlying atelectasis.      Retrocardiac opacity may represent atelectasis or consolidation.      Interstitial edema is noted.      Cardiomegaly.         DX-ABDOMEN FOR TUBE PLACEMENT   Final Result      Enteric tube projects over the stomach.      DX-CHEST-PORTABLE (1 VIEW)   Final Result         1. Lower lung volume with hypoventilatory change and scattered bilateral atelectasis.      DX-CHEST-PORTABLE (1 VIEW)   Final Result      Slight increase inflation and improvement of bibasilar atelectasis.      ECHOCARDIOGRAM-COMP W/ CONT   Final Result      LE VENOUS DUPLEX (DVT)   Final Result       DX-CHEST-PORTABLE (1 VIEW)   Final Result      No significant interval change compared to the prior examination. .      DX-CHEST-PORTABLE (1 VIEW)   Final Result      Bibasilar opacities may represent hypoinflation atelectasis. Underlying pleural effusions not excluded.         DX-CHEST-PORTABLE (1 VIEW)   Final Result      1.  Bibasilar atelectasis.      2.  Mild cardiomegaly.          Hospital Medications:    Current Facility-Administered Medications:   •  oxyCODONE immediate release (ROXICODONE) tablet 20 mg, 20 mg, Per NG Tube, Q4HRS PRN, China Anderson M.D., 20 mg at 04/04/18 1057  •  tiotropium (SPIRIVA) 18 MCG inhalation capsule 1 Cap, 1 Cap, Inhalation, QDAILY (RT), China Anderson M.D.  •  omeprazole (PRILOSEC) capsule 20 mg, 20 mg, Oral, DAILY, Chris Ching, D.ODorina, 20 mg at 04/04/18 0951  •  enoxaparin (LOVENOX) inj 40 mg, 40 mg, Subcutaneous, Q12HRS, Amarjit Martines M.D., 40 mg at 04/04/18 0952  •  budesonide-formoterol (SYMBICORT) 160-4.5 MCG/ACT inhaler 2 Puff, 2 Puff, Inhalation, BID, Baron Sewell M.D., 2 Puff at 04/03/18 0937  •  ammonium lactate (LAC-HYDRIN) 12 % lotion, , Topical, DAILY, Shelley Reyes M.D., Stopped at 04/02/18 0900  •  furosemide (LASIX) tablet 40 mg, 40 mg, Oral, Q DAY, Chris Ching, D.O., 40 mg at 04/04/18 0952  •  labetalol (NORMODYNE,TRANDATE) injection 10-20 mg, 10-20 mg, Intravenous, Q4HRS PRN, Amarjit Martines M.D., 10 mg at 03/24/18 1148  •  amLODIPine (NORVASC) tablet 5 mg, 5 mg, Oral, Q DAY, Amarjit Martines M.D., 5 mg at 04/04/18 0951  •  potassium bicarbonate (KLYTE) 25 MEQ effervescent tablet TBEF 25 mEq, 25 mEq, Oral, TID, Amarjit Martines M.D., 25 mEq at 04/01/18 1334  •  insulin regular (HUMULIN R) injection 2-9 Units, 2-9 Units, Subcutaneous, Q6HRS, Stopped at 04/04/18 0000 **AND** Accu-Chek Q6 if NPO, , , Q6H **AND** NOTIFY MD and PharmD, , , Once **AND** glucose 4 g chewable tablet 16 g, 16 g, Oral, Q15 MIN PRN **AND** dextrose 50% (D50W)  injection 25 mL, 25 mL, Intravenous, Q15 MIN PRN, Chris Ching D.O.  •  Respiratory Care per Protocol, , Nebulization, Continuous RT, Chris Ching D.O.  •  senna-docusate (PERICOLACE or SENOKOT S) 8.6-50 MG per tablet 2 Tab, 2 Tab, Oral, BID, 1 Tab at 04/03/18 2134 **AND** polyethylene glycol/lytes (MIRALAX) PACKET 1 Packet, 1 Packet, Oral, QDAY PRN **AND** magnesium hydroxide (MILK OF MAGNESIA) suspension 30 mL, 30 mL, Oral, QDAY PRN **AND** bisacodyl (DULCOLAX) suppository 10 mg, 10 mg, Rectal, QDAY PRN, DAVONTE ColladoO., 10 mg at 03/23/18 1525  •  ascorbic acid (ascorbic acid) tablet 500 mg, 500 mg, Per NG Tube, DAILY, DAVONTE ColladoO., 500 mg at 04/04/18 0951  •  aspirin (ASA) chewable tab 81 mg, 81 mg, Per NG Tube, DAILY, DAVONTE ColladoO., 81 mg at 04/04/18 0951  •  lactobacillus granules (LACTINEX/FLORANEX) packet 1 Packet, 1 Packet, Nasogastric, TID WITH MEALS, DAVONTE ColladoO., 1 Packet at 04/02/18 0934  •  levothyroxine (SYNTHROID) tablet 50 mcg, 50 mcg, Per NG Tube, AM ES, DAVONTE ColladoO., 50 mcg at 04/04/18 0627  •  lisinopril (PRINIVIL) tablet 10 mg, 10 mg, Per NG Tube, DAILY, DAVONTE ColladoO., 10 mg at 04/04/18 0952  •  acetaminophen (TYLENOL) tablet 650 mg, 650 mg, Per NG Tube, Q6HRS PRN, DAVONTE ColladoO., 650 mg at 04/01/18 0236  •  zinc sulfate (ZINCATE) capsule 220 mg, 220 mg, Oral, DAILY, DAVONTE RomeroODorina, 220 mg at 04/04/18 0954  •  albuterol inhaler 2 Puff, 2 Puff, Inhalation, Q6HRS PRN, Benjie Marcus M.D., 2 Puff at 04/01/18 0930  •  ipratropium-albuterol (DUONEB) nebulizer solution 3 mL, 3 mL, Nebulization, Q4H PRN (RT), Benjie Marcus M.D.    Current Outpatient Medications:  Prescriptions Prior to Admission   Medication Sig Dispense Refill Last Dose   • oxyCODONE (OXY-IR) 30 MG immediate release tablet Take 30 mg by mouth every four hours as needed for Moderate Pain.   3/15/2018 at Unknown time   • METFORMIN HCL PO Take 1 Tab by mouth 2  Times a Day.   3/15/2018 at pm   • ascorbic acid (VITAMIN C) 500 MG tablet Take 1 Tab by mouth every day. 30 Tab 3 3/15/2018 at Unknown time   • Omega-3 Fatty Acids (FISH OIL) 1000 MG Cap capsule Take 1 Cap by mouth 3 times a day, with meals. 90 Cap  3/15/2018 at Unknown time   • levothyroxine (SYNTHROID) 50 MCG Tab Take 50 mcg by mouth Every morning on an empty stomach.   3/15/2018 at unknown   • omeprazole (PRILOSEC) 20 MG delayed-release capsule Take 20 mg by mouth every day.   3/15/2018 at unknown   • acetaminophen (TYLENOL) 325 MG Tab Take 2 Tabs by mouth every 6 hours as needed (Mild Pain; (Pain scale 1-3); Temp greater than 100.5 F). 30 Tab 0 3/11/2018 at Unknown time   • furosemide (LASIX) 40 MG Tab Take 1 Tab by mouth every day. 30 Tab 0 3/15/2018 at unknown   • lisinopril (PRINIVIL) 20 MG Tab Take 0.5 Tabs by mouth every day. 30 Tab 1 3/15/2018 at unknown   • budesonide-formoterol (SYMBICORT) 160-4.5 MCG/ACT Aerosol Inhale 2 Puffs by mouth 2 Times a Day.   3/14/2018 at unknown   • albuterol 108 (90 BASE) MCG/ACT Aero Soln inhalation aerosol Inhale 2 Puffs by mouth every 6 hours as needed for Shortness of Breath.   3/2/2018 at Unknown time       Medication Allergy:  No Known Allergies    Assessment and Plan:     1.  Acute on chronic hypoxemic and hypercapnic respiratory failure.   Chronic CO2 retention. Patient was on ventilatory support for five days and has been extubated  and now PCO2 has gone up to 74.. Not in any current distress. But obviously could fail and required transfer back to the ICU.  This man will require more than BiPAP. He has advanced lung disease with a component of obesity hypoventilation with significant hypoxemia and hypercarbia. He would benefit immensely from nocturnal noninvasive ventilation to prevent recurrent hospitalization and even death.  He may benefit from long-term acute care rather than skilled nursing considering the severity of his respiratory disease.    -2. Chronic  severe lymphedema with stasis dermatitis.     -Continue with wound care treatment.  -Left posterior thigh wound is improved with conservative treatment. No need for I&D.  -Will likely require infectious disease evaluation pending patient's response to ongoing treatment.     3. Type II diabetes mellitus.     -Continue with sliding scale at this time.  -Have elected to begin low-dose steroids to attempt at treating instructed component of his underlying lung disease. He has probable potential worsening of his blood sugar and longer acting insulin such as NPH or Lantus may be considered.     4. Morbid obesity.     -Obstructive sleep apnea will place the patient on BiPAP at night if the patient tolerates.   We'll TRY TO AVOID SEDATION MEDICATIONS    5. Hypertension.     -Under adequate control at this time, though did require intermittent medication for hypertension.  -Continue with oral therapy, with ACE inhibitor but observe for worsening renal function.   -At this time tolerating.     6. Chronic right bundle-branch block with wide QRS.      -No obvious sign of acute ischemic event although EKG is been ordered as well as troponin.   -Levels were negative overnight.  -Compared to previous EKGs no significant change.     7. COPD.   Continue with albuterol ipratropium nebulizer and maintenance with Symbicort inhaler  -Continue with respiratory therapy protocols and also again to keep saturation above 90%.  -Adding IV steroids for treatment of obstructive disease component.   -May consider as possible rescue therapy.

## 2018-04-04 NOTE — DISCHARGE PLANNING
Anticipated Discharge Disposition: SNF VS LTAC    Action: LSW spoke with Kiana garcia from [facility] who reports at this time they are unable to take the patient due to an unclear discharge plan.    Barriers to Discharge: pt has limited support at home due to his daughter working nights. Pt reports compliance with medications.     Pt has accepting facilities of [facility] and [facility] but pt does not want to go to these facilities due to not liking them. Pt is almost out of SNF days and then will go into copay amount of 167$ daily.     Plan: LSW to confirm dc plan with pt after LTAC.

## 2018-04-04 NOTE — CARE PLAN
Problem: Bowel/Gastric:  Goal: Normal bowel function is maintained or improved  Outcome: PROGRESSING AS EXPECTED      Problem: Knowledge Deficit  Goal: Knowledge of the prescribed therapeutic regimen will improve  Outcome: PROGRESSING SLOWER THAN EXPECTED      Problem: Pain Management  Goal: Pain level will decrease to patient's comfort goal  Outcome: PROGRESSING AS EXPECTED

## 2018-04-04 NOTE — PROGRESS NOTES
"Received report and assumed care of patient. Patient is alert and oriented x4. Patient is in no signs of distress very irritable. Patient stating \"No one has gotten me up since I've been here.\" Patient then agreed to get to edge of bed with this RN and when this RN and CNA walked in to get patient to edge of bed, patient stated \"I don't want to get up now I have breathing problems, let me get my meds in first.\" Patient reassured of assistance but continues to refuse at this time. Patient agreeable to getting to edge of bed in about an hour. Will check back with patient. At this time patient refuses to turn for assessment of sacral area. Patient educated on importance and verbalizes understanding but continues to refuse at this time. Patient also refuses to apply daily cleanse of BLE and wants to \"Skip for today.\" Patient was updated on the plan of care for the day. Call light within reach, bed in low position, 2 side rails up. All fall precautions in place. Will continue to monitor.    "

## 2018-04-04 NOTE — PALLIATIVE CARE
Palliative Care follow-up  Message left for patient's daughter to obtain her uncle's phone number for Fer's AD so it can be finished up and notarized. This RN requested a call back when she has the information needed.    Plan: notarize AD when completed    Thank you for allowing Palliative Care to participate in this patient's care. Please feel free to call [phone] with any questions or concerns.

## 2018-04-04 NOTE — PROGRESS NOTES
[facility] Hospitalist Progress Note    Date of Service: 4/3/2018    Chief Complaint  57 y.o. male admitted 3/16/2018 with ongoing cellulitis of bilateral lower extremities with a history of chronic venous stasis and lymphedema. He had acute respiratory distress in ER.    Interval Problem Update  Patient seen and evaluated on rounds  Did wear BIPAP for sometime  HCO3 improved, PCO2 improved  Wean Oxycodone  Discussed with Dr Song  Recommend LTAC  Discussed with [facility], may not be LTAC candidate  SW working on disposition at this time    Consultants/Specialty  Intensivist / Pulmonology  Palliative care    Disposition  Needs LTAC vs SNF  SW working on disposition  Can transfer to medical RNF while disposition is awaited      Review of Systems   Constitutional: Negative for chills and fever.   Respiratory: Positive for cough, sputum production and shortness of breath.    Cardiovascular: Negative for chest pain.   Gastrointestinal: Negative for abdominal pain, nausea and vomiting.   Musculoskeletal: Positive for joint pain. Negative for back pain.   Psychiatric/Behavioral: Negative for depression. The patient is nervous/anxious.       Physical Exam  Laboratory/Imaging   Hemodynamics  Temp (24hrs), Av.2 °C (97.2 °F), Min:35.9 °C (96.7 °F), Max:36.8 °C (98.3 °F)   Temperature: 36.8 °C (98.3 °F)  Pulse  Av.1  Min: 54  Max: 116    Blood Pressure: 118/77      Respiratory      Respiration: 18, Pulse Oximetry: 96 %, O2 Daily Delivery Respiratory : Silicone Nasal Cannula     Work Of Breathing / Effort: Mild  RUL Breath Sounds: Diminished, RML Breath Sounds: Diminished, RLL Breath Sounds: Diminished, LARY Breath Sounds: Diminished, LLL Breath Sounds: Diminished    Fluids    Intake/Output Summary (Last 24 hours) at 18 2226  Last data filed at 18 1700   Gross per 24 hour   Intake              760 ml   Output              800 ml   Net              -40 ml       Nutrition  Orders Placed This Encounter    Procedures   • DIET ORDER     Standing Status:   Standing     Number of Occurrences:   1     Order Specific Question:   Diet:     Answer:   Diabetic [3]     Order Specific Question:   Texture/Fiber modifications:     Answer:   Dysphagia 3(Mechanical Soft)specify fluid consistency(question 6) [3]     Order Specific Question:   Consistency/Fluid modifications:     Answer:   Thin Liquids [3]     Physical Exam   Constitutional: He is oriented to person, place, and time. He appears well-developed.   Morbidly obese   HENT:   Head: Normocephalic.   Cardiovascular: Regular rhythm and normal heart sounds.    No murmur heard.  Tachycardia   Pulmonary/Chest: No respiratory distress. He has decreased breath sounds. He has no wheezes. He has no rhonchi. He has rales. He exhibits no crepitus.   Abdominal: Soft. Bowel sounds are normal. He exhibits distension. There is no tenderness. There is no rebound and no guarding.   Musculoskeletal: He exhibits edema (2+ LE).   Neurological: He is alert and oriented to person, place, and time.   Skin: Skin is warm and dry.   Scaly skin on head  Lower extremity wounds   Vitals reviewed.      Recent Labs      04/01/18   0433  04/02/18 0307 04/03/18 0457   WBC  11.9*  9.4  7.2   RBC  4.19*  4.35*  4.73   HEMOGLOBIN  12.1*  12.4*  13.4*   HEMATOCRIT  39.6*  40.2*  44.0   MCV  94.5  92.4  93.0   MCH  28.9  28.5  28.3   MCHC  30.6*  30.8*  30.5*   RDW  51.4*  50.0  50.1*   PLATELETCT  225  218  210   MPV  10.7  11.0  10.8     Recent Labs      04/01/18   0433  04/02/18 0307 04/03/18 0457   SODIUM  136  136  137  139   POTASSIUM  4.3  4.1  4.1  4.2   CHLORIDE  90*  91*  91*  92*   CO2  43*  41*  40*  38*   GLUCOSE  147*  146*  144*  128*   BUN  26*  28*  29*  27*   CREATININE  0.56  0.61  0.58  0.63   CALCIUM  9.6  9.6  9.8  9.6                      Assessment/Plan     * Acute on chronic respiratory failure with hypoxia and hypercapnia (CMS-HCC)- (present on admission)    Assessment & Plan    Intubated 3-20 extubated 3/25/2018  Morbid obesity  Obesity hypoventilation syndrome / ALIREZA  Improving oxygen requirements  RT protocol  Diuresing with Lasix, PO 40 daily  BIPAP at night  Pulmonology team on board  Needs LTAC or SNF placement with BIPAP  RT protocol  Wean off opioids as these are not helping        CO2 narcosis- (present on admission)   Assessment & Plan    Has chronic CO2 retention with metabolic compensation  Secondary to underlying obesity hypoventilation syndrome, obstructive sleep apnea, morbid obesity  Evaluate by Dr Song  HS BIPAP recommended  LTAC placement if able  Wean oxycodone as this is worsening underlying CO2 retention        Bilateral edema of lower extremity- (present on admission)   Assessment & Plan    Chronic lymphedema and stasis dermatitis  Continue po Lasix  Monitor intake output and fluid status        Diabetes type 2, controlled (CMS-HCC)- (present on admission)   Assessment & Plan    HgbA1c:6.4  Stable on ISS         Non compliance w medication regimen- (present on admission)   Assessment & Plan    Reinforced compliance   Patient has also been noncompliant with CPAP as he does not like the feeling  Counseled and educated again        Narcotic addiction (CMS-HCC)- (present on admission)   Assessment & Plan    Mnimize sedating agents  Wean as not helping respiratory status        Bilateral lower leg cellulitis- (present on admission)   Assessment & Plan    Continue Wound care  Cx  MSSA   Completed 10 days of unasyn  Continue clinical monitoring        Stasis dermatitis- (present on admission)   Assessment & Plan    With MSSA cellulitis    Completed antibiotic course   Continue wound care        Hypothyroidism- (present on admission)   Assessment & Plan    Stable continue Synthroid        COPD (chronic obstructive pulmonary disease) (CMS-HCC)- (present on admission)   Assessment & Plan    RT protocol  Pulm on board  No acute exacerbation at this time         HTN (hypertension)- (present on admission)   Assessment & Plan    Stable continue lasix  amlodipine and  Lisinopril  Monitor BP and adjust        Morbid obesity (CMS-HCC)- (present on admission)   Assessment & Plan    Body mass index is 48.02 kg/m².   Complicated by obstructive sleep apnea and obesity hypoventilation syndrome  Encouraged weight loss          Pneumonia- (present on admission)   Assessment & Plan    Adequately treated          Quality-Core Measures   Reviewed items::  Labs reviewed and Medications reviewed  DVT prophylaxis pharmacological::  Enoxaparin (Lovenox)  Antibiotics:  Treating active infection/contamination beyond 24 hours perioperative coverage

## 2018-04-04 NOTE — THERAPY
"Physical Therapy Treatment completed.   Bed Mobility:  Supine to Sit: Moderate Assist (HOB elevated)  Transfers: Sit to Stand: Moderate Assist (from raised EOB->FWW)  Gait: Level Of Assist: Unable to Participate       Plan of Care: Will benefit from Physical Therapy 3 times per week  Discharge Recommendations: Equipment: Will Continue to Assess for Equipment Needs. Post-acute therapy Discharge to a transitional care facility for continued skilled therapy services.     See \"Rehab Therapy-Acute\" Patient Summary Report for complete documentation.       "

## 2018-04-05 LAB
ANION GAP SERPL CALC-SCNC: 8 MMOL/L (ref 0–11.9)
BUN SERPL-MCNC: 23 MG/DL (ref 8–22)
CALCIUM SERPL-MCNC: 9.7 MG/DL (ref 8.5–10.5)
CHLORIDE SERPL-SCNC: 91 MMOL/L (ref 96–112)
CO2 SERPL-SCNC: 39 MMOL/L (ref 20–33)
CREAT SERPL-MCNC: 0.62 MG/DL (ref 0.5–1.4)
GLUCOSE BLD-MCNC: 110 MG/DL (ref 65–99)
GLUCOSE BLD-MCNC: 135 MG/DL (ref 65–99)
GLUCOSE BLD-MCNC: 172 MG/DL (ref 65–99)
GLUCOSE SERPL-MCNC: 135 MG/DL (ref 65–99)
POTASSIUM SERPL-SCNC: 4.4 MMOL/L (ref 3.6–5.5)
SODIUM SERPL-SCNC: 138 MMOL/L (ref 135–145)

## 2018-04-05 PROCEDURE — 82962 GLUCOSE BLOOD TEST: CPT

## 2018-04-05 PROCEDURE — 770006 HCHG ROOM/CARE - MED/SURG/GYN SEMI*

## 2018-04-05 PROCEDURE — A9270 NON-COVERED ITEM OR SERVICE: HCPCS | Performed by: HOSPITALIST

## 2018-04-05 PROCEDURE — 99232 SBSQ HOSP IP/OBS MODERATE 35: CPT | Performed by: INTERNAL MEDICINE

## 2018-04-05 PROCEDURE — 36415 COLL VENOUS BLD VENIPUNCTURE: CPT

## 2018-04-05 PROCEDURE — 700102 HCHG RX REV CODE 250 W/ 637 OVERRIDE(OP): Performed by: HOSPITALIST

## 2018-04-05 PROCEDURE — 700111 HCHG RX REV CODE 636 W/ 250 OVERRIDE (IP): Performed by: HOSPITALIST

## 2018-04-05 PROCEDURE — 94762 N-INVAS EAR/PLS OXIMTRY CONT: CPT

## 2018-04-05 PROCEDURE — 700102 HCHG RX REV CODE 250 W/ 637 OVERRIDE(OP): Performed by: INTERNAL MEDICINE

## 2018-04-05 PROCEDURE — 97110 THERAPEUTIC EXERCISES: CPT

## 2018-04-05 PROCEDURE — A9270 NON-COVERED ITEM OR SERVICE: HCPCS | Performed by: INTERNAL MEDICINE

## 2018-04-05 PROCEDURE — 97535 SELF CARE MNGMENT TRAINING: CPT

## 2018-04-05 PROCEDURE — 97530 THERAPEUTIC ACTIVITIES: CPT

## 2018-04-05 PROCEDURE — 80048 BASIC METABOLIC PNL TOTAL CA: CPT

## 2018-04-05 RX ADMIN — OXYCODONE HYDROCHLORIDE 20 MG: 10 TABLET ORAL at 13:13

## 2018-04-05 RX ADMIN — OXYCODONE HYDROCHLORIDE 20 MG: 10 TABLET ORAL at 22:00

## 2018-04-05 RX ADMIN — ASPIRIN 81 MG: 81 TABLET, CHEWABLE ORAL at 08:51

## 2018-04-05 RX ADMIN — OXYCODONE HYDROCHLORIDE AND ACETAMINOPHEN 500 MG: 500 TABLET ORAL at 08:51

## 2018-04-05 RX ADMIN — LISINOPRIL 10 MG: 20 TABLET ORAL at 08:51

## 2018-04-05 RX ADMIN — BUDESONIDE AND FORMOTEROL FUMARATE DIHYDRATE 2 PUFF: 160; 4.5 AEROSOL RESPIRATORY (INHALATION) at 22:00

## 2018-04-05 RX ADMIN — OXYCODONE HYDROCHLORIDE 20 MG: 10 TABLET ORAL at 08:52

## 2018-04-05 RX ADMIN — OXYCODONE HYDROCHLORIDE 20 MG: 10 TABLET ORAL at 17:48

## 2018-04-05 RX ADMIN — OMEPRAZOLE 20 MG: 20 CAPSULE, DELAYED RELEASE ORAL at 08:51

## 2018-04-05 RX ADMIN — FUROSEMIDE 40 MG: 40 TABLET ORAL at 08:51

## 2018-04-05 RX ADMIN — LEVOTHYROXINE SODIUM 50 MCG: 50 TABLET ORAL at 08:51

## 2018-04-05 RX ADMIN — OXYCODONE HYDROCHLORIDE 20 MG: 10 TABLET ORAL at 00:51

## 2018-04-05 RX ADMIN — Medication 220 MG: at 10:24

## 2018-04-05 RX ADMIN — ENOXAPARIN SODIUM 40 MG: 100 INJECTION SUBCUTANEOUS at 22:00

## 2018-04-05 RX ADMIN — INSULIN HUMAN 2 UNITS: 100 INJECTION, SOLUTION PARENTERAL at 12:36

## 2018-04-05 RX ADMIN — ALBUTEROL SULFATE 2 PUFF: 90 AEROSOL, METERED RESPIRATORY (INHALATION) at 12:33

## 2018-04-05 RX ADMIN — AMLODIPINE BESYLATE 5 MG: 5 TABLET ORAL at 08:51

## 2018-04-05 RX ADMIN — ENOXAPARIN SODIUM 40 MG: 100 INJECTION SUBCUTANEOUS at 08:51

## 2018-04-05 ASSESSMENT — ENCOUNTER SYMPTOMS
SPUTUM PRODUCTION: 1
VOMITING: 0
ABDOMINAL PAIN: 0
BACK PAIN: 0
NERVOUS/ANXIOUS: 1
DEPRESSION: 0
NAUSEA: 0
CHILLS: 0
COUGH: 1
SHORTNESS OF BREATH: 1
FEVER: 0

## 2018-04-05 ASSESSMENT — PAIN SCALES - GENERAL
PAINLEVEL_OUTOF10: 8
PAINLEVEL_OUTOF10: 9
PAINLEVEL_OUTOF10: 9
PAINLEVEL_OUTOF10: 8
PAINLEVEL_OUTOF10: 8

## 2018-04-05 ASSESSMENT — COGNITIVE AND FUNCTIONAL STATUS - GENERAL
DRESSING REGULAR UPPER BODY CLOTHING: A LITTLE
DAILY ACTIVITIY SCORE: 17
TOILETING: A LOT
HELP NEEDED FOR BATHING: A LOT
DRESSING REGULAR LOWER BODY CLOTHING: A LOT
SUGGESTED CMS G CODE MODIFIER DAILY ACTIVITY: CK

## 2018-04-05 NOTE — PROGRESS NOTES
"Pulmonary Progress Note    Patient ID:   Name:             Fer Estrada     YOB: 1960  Age:                 57 y.o.  male   MRN:               [id number]                                                  Subjective: He did try CPAP last night and was able to tolerate.    Interval Changes:was on ventilator x 5 days and extubated on 3/25. Has CO2 retention either from significant COPD and/or obesity hypoventilation syndrome. Has used airway pressures as an therapy for the past several nights sporadically. He is a candidate for home noninvasive nocturnal ventilation when discharged.    Objective:   Vitals/ General Appearance:   Weight/BMI: Body mass index is 43.04 kg/m².  Blood pressure 126/82, pulse 79, temperature 36.2 °C (97.2 °F), resp. rate 16, height 1.753 m (5' 9\"), weight (!) 132.2 kg (291 lb 7.2 oz), SpO2 96 %.  Vitals:    04/04/18 2252 04/05/18 0236 04/05/18 0600 04/05/18 0800   BP:   111/71 126/82   Pulse: (!) 111  98 79   Resp: (!) 22  20 16   Temp:   36.3 °C (97.3 °F) 36.2 °C (97.2 °F)   SpO2: 94% 95% 95% 96%   Weight:       Height:         Oxygen Therapy:  Pulse Oximetry: 96 %, O2 (LPM): 6, O2 Delivery: Silicone Nasal Cannula    Constitutional:   Well developed, obese, No acute distress  HENMT:  Normocephalic, Atraumatic, Oropharynx moist mucous membranes, No oral exudates, Nose normal.  No thyromegaly.  Eyes:  EOMI, Conjunctiva normal, No discharge.  Neck:  Normal range of motion, No cervical tenderness,  no JVD.  Cardiovascular:  Normal heart rate, Normal rhythm, No murmurs, No rubs, No gallops.   Extremitites with intact distal pulses, no cyanosis, less edema with persistent eliphantiasis of lower extremities  Lungs:  Normal breath sounds, has few rales to auscultation bilaterally on the basis,   no wheezing.   Abdomen: Bowel sounds normal, Soft, No tenderness, No guarding, No rebound, No masses, No hepatosplenomegaly.  Skin: Warm, Dry, No erythema, No rash, no induration.  Neurologic: " Alert & oriented x 3, No focal deficits noted, cranial nerves II through X are grossly intact.  Psychiatric: Affect normal, Judgment normal, Mood normal.    Labs:  Recent Labs      04/03/18 0457 04/04/18 0310   WBC  7.2  8.8   RBC  4.73  4.53*   HEMOGLOBIN  13.4*  12.7*   HEMATOCRIT  44.0  41.6*   MCV  93.0  91.8   MCH  28.3  28.0   MCHC  30.5*  30.5*   RDW  50.1*  49.0   PLATELETCT  210  238   MPV  10.8  11.3                 Recent Labs      04/03/18 0457 04/04/18 0310 04/05/18   0306   SODIUM  139  136  138   POTASSIUM  4.2  4.2  4.4   CHLORIDE  92*  91*  91*   CO2  38*  35*  39*   GLUCOSE  128*  143*  135*   BUN  27*  29*  23*     Recent Labs      04/03/18 0457 04/04/18 0310 04/05/18   0306   SODIUM  139  136  138   POTASSIUM  4.2  4.2  4.4   CHLORIDE  92*  91*  91*   CO2  38*  35*  39*   BUN  27*  29*  23*   CREATININE  0.63  0.63  0.62   CALCIUM  9.6  9.5  9.7     No results found for this or any previous visit.      Imaging:   DX-CHEST-PORTABLE (1 VIEW)   Final Result         1. No significant interval change.         DX-ABDOMEN FOR TUBE PLACEMENT   Final Result         Feeding tube with tip projecting over the expected area of the stomach.      DX-CHEST-PORTABLE (1 VIEW)   Final Result         1. Interval removal of right central venous catheter. No other significant interval change.         DX-CHEST-PORTABLE (1 VIEW)   Final Result         1. Interval extubation with lower lung volumes and bibasilar atelectasis.      DX-ABDOMEN FOR TUBE PLACEMENT   Final Result      Feeding tube tip overlies the gastric fundus.      DX-CHEST-PORTABLE (1 VIEW)   Final Result      Stable bibasilar opacities, likely due to a combination of pulmonary edema and atelectasis.      DX-CHEST-PORTABLE (1 VIEW)   Final Result      No significant change compared with 3/23.      [id number] VIEW   Final Result      Nonspecific gaseous distention of colon and small bowel. Adynamic ileus would be a possibility.       DX-CHEST-PORTABLE (1 VIEW)   Final Result      Improving bibasilar opacities as described above.      DX-CHEST-PORTABLE (1 VIEW)   Final Result      Findings consistent with a combination of pulmonary edema and atelectasis, with no significant change.      DX-CHEST-PORTABLE (1 VIEW)   Final Result         1. No significant interval change.      CT-CHEST (THORAX) W/O   Final Result      Patchy left perihilar opacities likely represent pneumonia.      Bilateral lower lobe and right middle lobe opacities likely represent atelectasis/consolidation.      Mild atelectasis versus pneumonitis is seen in the lingula.      Trace pleural fluid bilaterally.      Prominence of the main pulmonary artery compatible with pulmonary arterial hypertension.      Mildly prominent mediastinal and right hilar lymph nodes are nonspecific and may be reactive.      Cardiomegaly.      Follow-up to radiographic resolution is recommended.            DX-CHEST-PORTABLE (1 VIEW)   Final Result      Endotracheal tube projects over the distal trachea 1.8 cm from the phylicia.      Right central line projects over the SVC. No pneumothorax.      Elevation of the right hemidiaphragm with overlying atelectasis.      Retrocardiac opacity may represent atelectasis or consolidation.      Interstitial edema is noted.      Cardiomegaly.         DX-ABDOMEN FOR TUBE PLACEMENT   Final Result      Enteric tube projects over the stomach.      DX-CHEST-PORTABLE (1 VIEW)   Final Result         1. Lower lung volume with hypoventilatory change and scattered bilateral atelectasis.      DX-CHEST-PORTABLE (1 VIEW)   Final Result      Slight increase inflation and improvement of bibasilar atelectasis.      ECHOCARDIOGRAM-COMP W/ CONT   Final Result      LE VENOUS DUPLEX (DVT)   Final Result      DX-CHEST-PORTABLE (1 VIEW)   Final Result      No significant interval change compared to the prior examination. .      DX-CHEST-PORTABLE (1 VIEW)   Final Result      Bibasilar  opacities may represent hypoinflation atelectasis. Underlying pleural effusions not excluded.         DX-CHEST-PORTABLE (1 VIEW)   Final Result      1.  Bibasilar atelectasis.      2.  Mild cardiomegaly.          Hospital Medications:    Current Facility-Administered Medications:   •  oxyCODONE immediate release (ROXICODONE) tablet 20 mg, 20 mg, Per NG Tube, Q4HRS PRN, China Anderson M.D., 20 mg at 04/05/18 0852  •  tiotropium (SPIRIVA) 18 MCG inhalation capsule 1 Cap, 1 Cap, Inhalation, QDAILY (RT), China Anderson M.D., Stopped at 04/05/18 0800  •  omeprazole (PRILOSEC) capsule 20 mg, 20 mg, Oral, DAILY, Chris Ching D.ODorina, 20 mg at 04/05/18 0851  •  enoxaparin (LOVENOX) inj 40 mg, 40 mg, Subcutaneous, Q12HRS, Amarjit Martines M.D., 40 mg at 04/05/18 0851  •  budesonide-formoterol (SYMBICORT) 160-4.5 MCG/ACT inhaler 2 Puff, 2 Puff, Inhalation, BID, Baron Sewell M.D., 2 Puff at 04/03/18 0937  •  ammonium lactate (LAC-HYDRIN) 12 % lotion, , Topical, DAILY, Shelley Reyes M.D., Stopped at 04/05/18 0900  •  furosemide (LASIX) tablet 40 mg, 40 mg, Oral, Q DAY, Chris Ching D.ODorina, 40 mg at 04/05/18 0851  •  labetalol (NORMODYNE,TRANDATE) injection 10-20 mg, 10-20 mg, Intravenous, Q4HRS PRN, Amarjit Martines M.D., 10 mg at 03/24/18 1148  •  amLODIPine (NORVASC) tablet 5 mg, 5 mg, Oral, Q DAY, Amarjit Martines M.D., 5 mg at 04/05/18 0851  •  potassium bicarbonate (KLYTE) 25 MEQ effervescent tablet TBEF 25 mEq, 25 mEq, Oral, TID, Amarjit Martines M.D., 25 mEq at 04/01/18 1334  •  insulin regular (HUMULIN R) injection 2-9 Units, 2-9 Units, Subcutaneous, Q6HRS, Stopped at 04/05/18 0631 **AND** Accu-Chek Q6 if NPO, , , Q6H **AND** NOTIFY MD and PharmD, , , Once **AND** glucose 4 g chewable tablet 16 g, 16 g, Oral, Q15 MIN PRN **AND** dextrose 50% (D50W) injection 25 mL, 25 mL, Intravenous, Q15 MIN PRN, Chris Ching, D.O.  •  Respiratory Care per Protocol, , Nebulization, Continuous RT, Chris Ching,  D.O.  •  senna-docusate (PERICOLACE or SENOKOT S) 8.6-50 MG per tablet 2 Tab, 2 Tab, Oral, BID, 1 Tab at 04/03/18 2134 **AND** polyethylene glycol/lytes (MIRALAX) PACKET 1 Packet, 1 Packet, Oral, QDAY PRN **AND** magnesium hydroxide (MILK OF MAGNESIA) suspension 30 mL, 30 mL, Oral, QDAY PRN **AND** bisacodyl (DULCOLAX) suppository 10 mg, 10 mg, Rectal, QDAY PRN, Chris Ching D.O., 10 mg at 03/23/18 1525  •  ascorbic acid (ascorbic acid) tablet 500 mg, 500 mg, Per NG Tube, DAILY, YVROSE Collado.O., 500 mg at 04/05/18 0851  •  aspirin (ASA) chewable tab 81 mg, 81 mg, Per NG Tube, DAILY, YVROSE Collado.O., 81 mg at 04/05/18 0851  •  lactobacillus granules (LACTINEX/FLORANEX) packet 1 Packet, 1 Packet, Nasogastric, TID WITH MEALS, YVROSE Collado.O., 1 Packet at 04/02/18 0934  •  levothyroxine (SYNTHROID) tablet 50 mcg, 50 mcg, Per NG Tube, AM ES, Chris Ching D.O., 50 mcg at 04/05/18 0851  •  lisinopril (PRINIVIL) tablet 10 mg, 10 mg, Per NG Tube, DAILY, YVROSE Collado.O., 10 mg at 04/05/18 0851  •  acetaminophen (TYLENOL) tablet 650 mg, 650 mg, Per NG Tube, Q6HRS PRN, YVROSE Collado.O., 650 mg at 04/01/18 0236  •  zinc sulfate (ZINCATE) capsule 220 mg, 220 mg, Oral, DAILY, YVROSE Romero.O., 220 mg at 04/05/18 1024  •  albuterol inhaler 2 Puff, 2 Puff, Inhalation, Q6HRS PRN, Benjie Marcus M.D., 2 Puff at 04/01/18 0930  •  ipratropium-albuterol (DUONEB) nebulizer solution 3 mL, 3 mL, Nebulization, Q4H PRN (RT), Benjie Marcus M.D.    Current Outpatient Medications:  Prescriptions Prior to Admission   Medication Sig Dispense Refill Last Dose   • oxyCODONE (OXY-IR) 30 MG immediate release tablet Take 30 mg by mouth every four hours as needed for Moderate Pain.   3/15/2018 at Unknown time   • METFORMIN HCL PO Take 1 Tab by mouth 2 Times a Day.   3/15/2018 at pm   • ascorbic acid (VITAMIN C) 500 MG tablet Take 1 Tab by mouth every day. 30 Tab 3 3/15/2018 at Unknown time   • Omega-3  Fatty Acids (FISH OIL) 1000 MG Cap capsule Take 1 Cap by mouth 3 times a day, with meals. 90 Cap  3/15/2018 at Unknown time   • levothyroxine (SYNTHROID) 50 MCG Tab Take 50 mcg by mouth Every morning on an empty stomach.   3/15/2018 at unknown   • omeprazole (PRILOSEC) 20 MG delayed-release capsule Take 20 mg by mouth every day.   3/15/2018 at unknown   • acetaminophen (TYLENOL) 325 MG Tab Take 2 Tabs by mouth every 6 hours as needed (Mild Pain; (Pain scale 1-3); Temp greater than 100.5 F). 30 Tab 0 3/11/2018 at Unknown time   • furosemide (LASIX) 40 MG Tab Take 1 Tab by mouth every day. 30 Tab 0 3/15/2018 at unknown   • lisinopril (PRINIVIL) 20 MG Tab Take 0.5 Tabs by mouth every day. 30 Tab 1 3/15/2018 at unknown   • budesonide-formoterol (SYMBICORT) 160-4.5 MCG/ACT Aerosol Inhale 2 Puffs by mouth 2 Times a Day.   3/14/2018 at unknown   • albuterol 108 (90 BASE) MCG/ACT Aero Soln inhalation aerosol Inhale 2 Puffs by mouth every 6 hours as needed for Shortness of Breath.   3/2/2018 at Unknown time       Medication Allergy:  No Known Allergies    Assessment and Plan:     1.  Acute on chronic hypoxemic and hypercapnic respiratory failure.   Chronic CO2 retention. Patient was on ventilatory support for five days and has been extubated  and now PCO2 has gone up to 74.. Not in any current distress. But obviously could fail and required transfer back to the ICU.  This man will require more than BiPAP. He has advanced lung disease with a component of obesity hypoventilation with significant hypoxemia and hypercarbia. He would benefit immensely from nocturnal noninvasive ventilation to prevent recurrent hospitalization and even death.  He may benefit from long-term acute care rather than skilled nursing considering the severity of his respiratory disease.    -2. Chronic severe lymphedema with stasis dermatitis.     -Continue with wound care treatment.  -Left posterior thigh wound is improved with conservative treatment. No  need for I&D.  -Will likely require infectious disease evaluation pending patient's response to ongoing treatment.     3. Type II diabetes mellitus.     -Continue with sliding scale at this time.  -Have elected to begin low-dose steroids to attempt at treating instructed component of his underlying lung disease. He has probable potential worsening of his blood sugar and longer acting insulin such as NPH or Lantus may be considered.     4. Morbid obesity.     -Obstructive sleep apnea will place the patient on BiPAP at night if the patient tolerates.   We'll TRY TO AVOID SEDATION MEDICATIONS    5. Hypertension.     -Under adequate control at this time, though did require intermittent medication for hypertension.  -Continue with oral therapy, with ACE inhibitor but observe for worsening renal function.   -At this time tolerating.     6. Chronic right bundle-branch block with wide QRS.      -No obvious sign of acute ischemic event although EKG is been ordered as well as troponin.   -Levels were negative overnight.  -Compared to previous EKGs no significant change.     7. COPD.   Continue with albuterol ipratropium nebulizer and maintenance with Symbicort inhaler  -Continue with respiratory therapy protocols and also again to keep saturation above 90%.  -Adding IV steroids for treatment of obstructive disease component.   -May consider as possible rescue therapy.

## 2018-04-05 NOTE — CARE PLAN
Problem: Communication  Goal: The ability to communicate needs accurately and effectively will improve  Outcome: PROGRESSING AS EXPECTED  Discussed Plan of Care, daily medications with patient, reviewed diet, and activity.  Patient verbalized understanding, pt verbalized frustration because the legs have been the same, no improvement seen, for 13 years.  Pt is upset that pain meds were lowered.     Problem: Safety  Goal: Will remain free from injury  Outcome: PROGRESSING AS EXPECTED  Bed locked and in lowest position. Bed alarm on. Treaded socks. Call light and belongings with reach. Patient educated to call for assistance. Pt verbalized understanding. Hourly rounding in place.

## 2018-04-05 NOTE — CARE PLAN
Problem: Safety  Goal: Will remain free from injury  Bed alarm on, bed in low position, 2 side rails up, call light within reach, will continue to monitor.    Problem: Respiratory:  Goal: Respiratory status will improve  Patient continues to stay on 6L NC. Respiratory sleep study completed tonight.    Problem: Skin Integrity  Goal: Risk for impaired skin integrity will decrease  Patient refuses to Q2turn. Was able to retake pictures of left posterior thigh wound. Also took picture of sacrum and uploaded into Epic. Patient's bilateral LE washed with foam soap, dried and ammonium lactate applied.

## 2018-04-05 NOTE — CARE PLAN
Problem: Safety  Goal: Will remain free from injury  Outcome: PROGRESSING AS EXPECTED      Problem: Venous Thromboembolism (VTW)/Deep Vein Thrombosis (DVT) Prevention:  Goal: Patient will participate in Venous Thrombosis (VTE)/Deep Vein Thrombosis (DVT)Prevention Measures  Outcome: PROGRESSING AS EXPECTED      Problem: Knowledge Deficit  Goal: Knowledge of disease process/condition, treatment plan, diagnostic tests, and medications will improve  Outcome: PROGRESSING AS EXPECTED    Goal: Knowledge of the prescribed therapeutic regimen will improve  Outcome: PROGRESSING AS EXPECTED      Problem: Mobility  Goal: Risk for activity intolerance will decrease  Outcome: PROGRESSING SLOWER THAN EXPECTED      Problem: Psychosocial Needs:  Goal: Level of anxiety will decrease  Outcome: PROGRESSING AS EXPECTED

## 2018-04-05 NOTE — PROGRESS NOTES
"2030 - Received report and assumed care of patient. Patient is alert and oriented x4. Patient is in no signs of distress, very agitated and irritable. Patient's linen changed and had BM. Patient's sacrum/buttocks area excoriated, picture taken and uploaded into Epic. Also Left posterior thigh picture taken to update wound LDA's in Epic. Patient was updated on the plan of care for the day. Call light within reach, bed in low position, 2 side rails up. All fall precautions in place. Will continue to monitor.    2300 - Respiratory in to connect patient to sleep study. Patient placed on 4L NC for study but could not tolerate and stated \"cannot breath.\" Requested to be on 6L. Now on 6L NC sating 96%. Patient's BLE washed with foam soap and water, dried and ammonium lactate applied. Patient now resting comfortably. Continues to refusing A2Euprn. Educated on importance verbalizes understanding but continues to refuse. Legs elevated on pillows.   "

## 2018-04-05 NOTE — THERAPY
"Occupational Therapy Evaluation completed.   Functional Status:  Pt seen for OT tx today, pt continues to be limited by BLE pain with attempts of mobility, BLE weakness which impairs pt's balance and generalized strength/endurance deficits which is limiting I with eob/oob activities. Pt required mod a for bringing BLE's to eob, able to pull self up using therapist's hand from raised hob, declined bed rail use, max a for LB dressing and pericare in standing, STS eob x4 using FWW, initally required cga/min a and increased assist required with subsequent stands due to increased fatigue. Pt tolerated standing ~50sec on 1st attempt and rest 3 trials tolerated around 10-15 seconds each, with prolonged restbreaks between each stand. Pt will continue to benefit from acute skilled services and post acute recommended.   Plan of Care: Will benefit from Occupational Therapy 3 times per week  Discharge Recommendations:  Equipment: Will Continue to Assess for Equipment Needs. Post-acute therapy Discharge to a transitional care facility for continued skilled therapy services.    See \"Rehab Therapy-Acute\" Patient Summary Report for complete documentation.    "

## 2018-04-05 NOTE — CARE PLAN
Problem: Infection  Goal: Will remain free from infection  Outcome: PROGRESSING AS EXPECTED  Assessed for sign and symptoms of infection. Educated pt on standard precautions, hand washing and bathroom hygiene. Assessed potential routes of infection: BL legs, O2 NC, and PIVs

## 2018-04-05 NOTE — CARE PLAN
Problem: Communication  Goal: The ability to communicate needs accurately and effectively will improve  Outcome: PROGRESSING AS EXPECTED      Problem: Safety  Goal: Will remain free from injury  Outcome: PROGRESSING AS EXPECTED    Goal: Will remain free from falls  Outcome: PROGRESSING AS EXPECTED      Problem: Infection  Goal: Will remain free from infection  Outcome: PROGRESSING AS EXPECTED      Problem: Bowel/Gastric:  Goal: Normal bowel function is maintained or improved  Outcome: PROGRESSING AS EXPECTED      Problem: Knowledge Deficit  Goal: Knowledge of disease process/condition, treatment plan, diagnostic tests, and medications will improve  Outcome: PROGRESSING AS EXPECTED    Goal: Knowledge of the prescribed therapeutic regimen will improve  Outcome: PROGRESSING AS EXPECTED

## 2018-04-05 NOTE — PROGRESS NOTES
[facility] Hospitalist Progress Note    Date of Service: 2018    Chief Complaint  57 y.o. male admitted 3/16/2018 with ongoing cellulitis of bilateral lower extremities with a history of chronic venous stasis and lymphedema. He had acute respiratory distress in ER.    Interval Problem Update  Patient seen and evaluated on rounds  Did wear BIPAP last night  HCO3 improved now  Wean Oxycodone, continue current dose for now  Patient upset regarding above  Advised if HCO2 improved then can be transitioned back to baseline  Needs nocturnal o2 desat study  Recommend LTAC  Discussed with [facility], may not be LTAC candidate  SW working on disposition at this time  Appears local SNF refusing to accept patient at this time  Difficult disposition    Consultants/Specialty  Intensivist / Pulmonology  Palliative care    Disposition  Needs LTAC vs SNF  SW working on disposition  Can transfer to medical RNF while disposition is awaited      Review of Systems   Constitutional: Negative for chills and fever.   Respiratory: Positive for cough, sputum production and shortness of breath.    Cardiovascular: Negative for chest pain.   Gastrointestinal: Negative for abdominal pain, nausea and vomiting.   Musculoskeletal: Positive for joint pain. Negative for back pain.   Psychiatric/Behavioral: Negative for depression. The patient is nervous/anxious.       Physical Exam  Laboratory/Imaging   Hemodynamics  Temp (24hrs), Av.6 °C (97.8 °F), Min:35.9 °C (96.7 °F), Max:36.9 °C (98.4 °F)   Temperature: 35.9 °C (96.7 °F)  Pulse  Av.2  Min: 54  Max: 116    Blood Pressure: 117/69      Respiratory      Respiration: (!) 24, Pulse Oximetry: 96 %     Work Of Breathing / Effort: Mild  RUL Breath Sounds:  (right anterior and left anterior clear), RML Breath Sounds: Diminished, RLL Breath Sounds: Diminished, LARY Breath Sounds: Diminished, LLL Breath Sounds: Diminished    Fluids    Intake/Output Summary (Last 24 hours) at 18  1846  Last data filed at 04/04/18 1701   Gross per 24 hour   Intake             1090 ml   Output             1200 ml   Net             -110 ml       Nutrition  Orders Placed This Encounter   Procedures   • DIET ORDER     Standing Status:   Standing     Number of Occurrences:   1     Order Specific Question:   Diet:     Answer:   Diabetic [3]     Order Specific Question:   Texture/Fiber modifications:     Answer:   Dysphagia 3(Mechanical Soft)specify fluid consistency(question 6) [3]     Order Specific Question:   Consistency/Fluid modifications:     Answer:   Thin Liquids [3]     Physical Exam   Constitutional: He is oriented to person, place, and time. He appears well-developed.   Morbidly obese   HENT:   Head: Normocephalic.   Cardiovascular: Regular rhythm and normal heart sounds.    No murmur heard.  Tachycardia   Pulmonary/Chest: No respiratory distress. He has decreased breath sounds. He has no wheezes. He has no rhonchi. He has rales. He exhibits no crepitus.   Abdominal: Soft. Bowel sounds are normal. He exhibits distension. There is no tenderness. There is no rebound and no guarding.   Musculoskeletal: He exhibits edema (2+ LE).   Neurological: He is alert and oriented to person, place, and time.   Skin: Skin is warm and dry.   Scaly skin on head  Lower extremity wounds   Vitals reviewed.      Recent Labs      04/02/18   0307  04/03/18   0457  04/04/18   0310   WBC  9.4  7.2  8.8   RBC  4.35*  4.73  4.53*   HEMOGLOBIN  12.4*  13.4*  12.7*   HEMATOCRIT  40.2*  44.0  41.6*   MCV  92.4  93.0  91.8   MCH  28.5  28.3  28.0   MCHC  30.8*  30.5*  30.5*   RDW  50.0  50.1*  49.0   PLATELETCT  218  210  238   MPV  11.0  10.8  11.3     Recent Labs      04/02/18   0307  04/03/18   0457  04/04/18   0310   SODIUM  136  137  139  136   POTASSIUM  4.1  4.1  4.2  4.2   CHLORIDE  91*  91*  92*  91*   CO2  41*  40*  38*  35*   GLUCOSE  146*  144*  128*  143*   BUN  28*  29*  27*  29*   CREATININE  0.61  0.58  0.63   0.63   CALCIUM  9.6  9.8  9.6  9.5                      Assessment/Plan     * Acute on chronic respiratory failure with hypoxia and hypercapnia (CMS-HCC)- (present on admission)   Assessment & Plan    Intubated 3-20 extubated 3/25/2018  Morbid obesity  Obesity hypoventilation syndrome / ALIREZA  Improving oxygen requirements  RT protocol  Diuresing with Lasix, PO 40 daily  BIPAP at night  Pulmonology team on board  Needs LTAC or SNF placement with BIPAP  RT protocol  Wean off opioids as these are not helping  Obtain nocturnal desaturation study and have pulm request outpatient BIPAP        CO2 narcosis- (present on admission)   Assessment & Plan    Has chronic CO2 retention with metabolic compensation  Secondary to underlying obesity hypoventilation syndrome, obstructive sleep apnea, morbid obesity  Evaluate by Dr Song  HS BIPAP recommended  LTAC placement if able  Wean oxycodone as this is worsening underlying CO2 retention        Bilateral edema of lower extremity- (present on admission)   Assessment & Plan    Chronic lymphedema and stasis dermatitis  Continue po Lasix  Monitor intake output and fluid status        Diabetes type 2, controlled (CMS-HCC)- (present on admission)   Assessment & Plan    HgbA1c:6.4  Stable on ISS         Non compliance w medication regimen- (present on admission)   Assessment & Plan    Reinforced compliance   Patient has also been noncompliant with CPAP as he does not like the feeling  Counseled and educated again  Improving compliance now        Narcotic addiction (CMS-HCC)- (present on admission)   Assessment & Plan    Mnimize sedating agents  Wean as not helping respiratory status        Bilateral lower leg cellulitis- (present on admission)   Assessment & Plan    Continue Wound care  Cx  MSSA   Completed 10 days of unasyn  Continue clinical monitoring        Stasis dermatitis- (present on admission)   Assessment & Plan    With MSSA cellulitis    Completed antibiotic course   Continue  wound care        Hypothyroidism- (present on admission)   Assessment & Plan    Stable continue Synthroid        COPD (chronic obstructive pulmonary disease) (CMS-HCC)- (present on admission)   Assessment & Plan    RT protocol  Pulm on board  No acute exacerbation at this time        HTN (hypertension)- (present on admission)   Assessment & Plan    Stable continue lasix  amlodipine and  Lisinopril  Monitor BP and adjust        Morbid obesity (CMS-HCC)- (present on admission)   Assessment & Plan    Body mass index is 48.02 kg/m².   Complicated by obstructive sleep apnea and obesity hypoventilation syndrome  Encouraged weight loss          Pneumonia- (present on admission)   Assessment & Plan    Adequately treated          Quality-Core Measures   Reviewed items::  Labs reviewed and Medications reviewed  DVT prophylaxis pharmacological::  Enoxaparin (Lovenox)  Antibiotics:  Treating active infection/contamination beyond 24 hours perioperative coverage

## 2018-04-05 NOTE — PROGRESS NOTES
Pt sat at the edge of the bed for about two hours, had 2 BM. Getting back to bed is very difficult because he can not lay flat - O2 70%. Pt needs a lift to avoid laying flat. Pt refused the inhalers; he is very frustrated about his progress. Complaints of pain - 8/10. Will notified MD to increase pain meds

## 2018-04-06 PROBLEM — R06.89 CO2 NARCOSIS: Status: RESOLVED | Noted: 2018-04-01 | Resolved: 2018-04-06

## 2018-04-06 PROBLEM — E43 SEVERE PROTEIN-CALORIE MALNUTRITION (HCC): Status: ACTIVE | Noted: 2018-04-06

## 2018-04-06 LAB
ANION GAP SERPL CALC-SCNC: 7 MMOL/L (ref 0–11.9)
BUN SERPL-MCNC: 30 MG/DL (ref 8–22)
CALCIUM SERPL-MCNC: 9.5 MG/DL (ref 8.5–10.5)
CHLORIDE SERPL-SCNC: 91 MMOL/L (ref 96–112)
CO2 SERPL-SCNC: 39 MMOL/L (ref 20–33)
CREAT SERPL-MCNC: 0.81 MG/DL (ref 0.5–1.4)
GLUCOSE BLD-MCNC: 120 MG/DL (ref 65–99)
GLUCOSE BLD-MCNC: 125 MG/DL (ref 65–99)
GLUCOSE BLD-MCNC: 131 MG/DL (ref 65–99)
GLUCOSE SERPL-MCNC: 139 MG/DL (ref 65–99)
POTASSIUM SERPL-SCNC: 4.8 MMOL/L (ref 3.6–5.5)
SODIUM SERPL-SCNC: 137 MMOL/L (ref 135–145)

## 2018-04-06 PROCEDURE — 700102 HCHG RX REV CODE 250 W/ 637 OVERRIDE(OP): Performed by: INTERNAL MEDICINE

## 2018-04-06 PROCEDURE — 82962 GLUCOSE BLOOD TEST: CPT | Mod: 91

## 2018-04-06 PROCEDURE — A9270 NON-COVERED ITEM OR SERVICE: HCPCS | Performed by: INTERNAL MEDICINE

## 2018-04-06 PROCEDURE — 94660 CPAP INITIATION&MGMT: CPT

## 2018-04-06 PROCEDURE — A9270 NON-COVERED ITEM OR SERVICE: HCPCS | Performed by: HOSPITALIST

## 2018-04-06 PROCEDURE — 700102 HCHG RX REV CODE 250 W/ 637 OVERRIDE(OP): Performed by: HOSPITALIST

## 2018-04-06 PROCEDURE — 36415 COLL VENOUS BLD VENIPUNCTURE: CPT

## 2018-04-06 PROCEDURE — 92526 ORAL FUNCTION THERAPY: CPT

## 2018-04-06 PROCEDURE — 770006 HCHG ROOM/CARE - MED/SURG/GYN SEMI*

## 2018-04-06 PROCEDURE — 80048 BASIC METABOLIC PNL TOTAL CA: CPT

## 2018-04-06 PROCEDURE — 99232 SBSQ HOSP IP/OBS MODERATE 35: CPT | Performed by: INTERNAL MEDICINE

## 2018-04-06 PROCEDURE — 700111 HCHG RX REV CODE 636 W/ 250 OVERRIDE (IP): Performed by: HOSPITALIST

## 2018-04-06 RX ORDER — POLYETHYLENE GLYCOL 3350 17 G/17G
1 POWDER, FOR SOLUTION ORAL 2 TIMES DAILY
Status: DISCONTINUED | OUTPATIENT
Start: 2018-04-06 | End: 2018-04-16 | Stop reason: HOSPADM

## 2018-04-06 RX ORDER — LISINOPRIL 20 MG/1
20 TABLET ORAL DAILY
Status: DISCONTINUED | OUTPATIENT
Start: 2018-04-07 | End: 2018-04-09

## 2018-04-06 RX ORDER — ATORVASTATIN CALCIUM 40 MG/1
40 TABLET, FILM COATED ORAL
Status: DISCONTINUED | OUTPATIENT
Start: 2018-04-06 | End: 2018-04-16 | Stop reason: HOSPADM

## 2018-04-06 RX ORDER — AMOXICILLIN 250 MG
1 CAPSULE ORAL 2 TIMES DAILY
Status: DISCONTINUED | OUTPATIENT
Start: 2018-04-06 | End: 2018-04-16 | Stop reason: HOSPADM

## 2018-04-06 RX ORDER — ASCORBIC ACID 500 MG
500 TABLET ORAL 3 TIMES DAILY
Status: DISCONTINUED | OUTPATIENT
Start: 2018-04-06 | End: 2018-04-16 | Stop reason: HOSPADM

## 2018-04-06 RX ADMIN — OXYCODONE HYDROCHLORIDE 20 MG: 10 TABLET ORAL at 16:57

## 2018-04-06 RX ADMIN — ENOXAPARIN SODIUM 40 MG: 100 INJECTION SUBCUTANEOUS at 21:35

## 2018-04-06 RX ADMIN — Medication: at 07:25

## 2018-04-06 RX ADMIN — METFORMIN HYDROCHLORIDE 500 MG: 500 TABLET, FILM COATED ORAL at 16:12

## 2018-04-06 RX ADMIN — OXYCODONE HYDROCHLORIDE 20 MG: 10 TABLET ORAL at 12:56

## 2018-04-06 RX ADMIN — ACETAMINOPHEN 650 MG: 325 TABLET, FILM COATED ORAL at 01:16

## 2018-04-06 RX ADMIN — LISINOPRIL 10 MG: 20 TABLET ORAL at 07:24

## 2018-04-06 RX ADMIN — OXYCODONE HYDROCHLORIDE 20 MG: 10 TABLET ORAL at 05:05

## 2018-04-06 RX ADMIN — OMEPRAZOLE 20 MG: 20 CAPSULE, DELAYED RELEASE ORAL at 07:24

## 2018-04-06 RX ADMIN — BUDESONIDE AND FORMOTEROL FUMARATE DIHYDRATE 2 PUFF: 160; 4.5 AEROSOL RESPIRATORY (INHALATION) at 07:25

## 2018-04-06 RX ADMIN — Medication 220 MG: at 07:24

## 2018-04-06 RX ADMIN — THERA TABS 1 TABLET: TAB at 16:12

## 2018-04-06 RX ADMIN — FUROSEMIDE 40 MG: 40 TABLET ORAL at 07:24

## 2018-04-06 RX ADMIN — ENOXAPARIN SODIUM 40 MG: 100 INJECTION SUBCUTANEOUS at 07:24

## 2018-04-06 RX ADMIN — LEVOTHYROXINE SODIUM 50 MCG: 50 TABLET ORAL at 05:05

## 2018-04-06 RX ADMIN — ATORVASTATIN CALCIUM 40 MG: 40 TABLET, FILM COATED ORAL at 21:34

## 2018-04-06 RX ADMIN — OXYCODONE HYDROCHLORIDE AND ACETAMINOPHEN 500 MG: 500 TABLET ORAL at 07:24

## 2018-04-06 RX ADMIN — OXYCODONE HYDROCHLORIDE 20 MG: 10 TABLET ORAL at 21:28

## 2018-04-06 RX ADMIN — OXYCODONE HYDROCHLORIDE AND ACETAMINOPHEN 500 MG: 500 TABLET ORAL at 14:27

## 2018-04-06 RX ADMIN — ASPIRIN 81 MG: 81 TABLET, CHEWABLE ORAL at 07:24

## 2018-04-06 RX ADMIN — BUDESONIDE AND FORMOTEROL FUMARATE DIHYDRATE 2 PUFF: 160; 4.5 AEROSOL RESPIRATORY (INHALATION) at 21:35

## 2018-04-06 RX ADMIN — OXYCODONE HYDROCHLORIDE AND ACETAMINOPHEN 500 MG: 500 TABLET ORAL at 21:33

## 2018-04-06 RX ADMIN — OXYCODONE HYDROCHLORIDE 20 MG: 10 TABLET ORAL at 08:52

## 2018-04-06 RX ADMIN — AMLODIPINE BESYLATE 5 MG: 5 TABLET ORAL at 07:24

## 2018-04-06 ASSESSMENT — PAIN SCALES - GENERAL
PAINLEVEL_OUTOF10: 6
PAINLEVEL_OUTOF10: 8
PAINLEVEL_OUTOF10: 9
PAINLEVEL_OUTOF10: 8
PAINLEVEL_OUTOF10: 9

## 2018-04-06 ASSESSMENT — ENCOUNTER SYMPTOMS
ABDOMINAL PAIN: 0
CHILLS: 0
WHEEZING: 0
HEARTBURN: 0
WEAKNESS: 1
NECK PAIN: 0
DOUBLE VISION: 0
FEVER: 0
SHORTNESS OF BREATH: 1
BACK PAIN: 0
HEMOPTYSIS: 0
NAUSEA: 0
BLURRED VISION: 0
MYALGIAS: 0
CONSTIPATION: 1
BLOOD IN STOOL: 0
DIAPHORESIS: 0
NERVOUS/ANXIOUS: 1
FALLS: 0
SPUTUM PRODUCTION: 0
DIARRHEA: 0
COUGH: 0
HEADACHES: 0
DIZZINESS: 0
DEPRESSION: 0
WEIGHT LOSS: 0
FLANK PAIN: 0
CLAUDICATION: 0
VOMITING: 0

## 2018-04-06 NOTE — DISCHARGE PLANNING
Updated Trilogy rx and [facility]  Call placed to Juan with [facility], notified of fax information.    When  has accepted patient for services, please call Juan @ [phone] to deliver equipment.

## 2018-04-06 NOTE — PROGRESS NOTES
Report received at bedside, patient care assumed, tele box off, pt is medical. Pt AAO x 4, no signs of distress noted at this time. POC discussed with pt and all questions answered. Pt c/o 8/10 pain in lower legs. Medicated per MAR. Pt denies any additional needs at this time. Bed in lowest position, bed alarm off, pt calls appropriately. Call light and personal possessions within reach. Will continue to monitor.

## 2018-04-06 NOTE — PROGRESS NOTES
Filled out prescription for Trilogy.He has OHS E66.2 and Acute on chronic respiratory failure with CO2 retention J96.22 and will need nocturnal ventilation to avoid repeat hospital admissions and significant complications including death.

## 2018-04-06 NOTE — PROGRESS NOTES
Assumed pt care at 0715.  Received report from night RN.  Assessment completed.  Pt AAOx4.  Pt complains of pain.  Bed in lowest position, locked, and bed alarm is on.  Pt calls appropriately.  Treaded socks in place, call light within reach and staff numbers provided.  Pt needs met at this time.

## 2018-04-06 NOTE — DISCHARGE PLANNING
Anticipated Discharge Disposition: Home triology     Action: LSW asked Dr. Song for home triology machine. LSW asked supervisors for [facility] for care giving services for 30 days.     Barriers to Discharge: Pt has no help at home.     Plan: Pt to get home triology machine.

## 2018-04-06 NOTE — DISCHARGE PLANNING
Received visit from Kaylee with [facility], revised Rx has been delivered for MD. Song to fill out and sign. Once form is completed, form will need to be sent to [facility].    Per Regi, [facility] is contacted to monitor patient on Trilogy.  Any question on equipment and set up, can be directed to Juan @[phone].

## 2018-04-06 NOTE — THERAPY
"Speech Language Therapy dysphagia treatment completed.     Functional Status: Patient with poor recall of reason for current diet, dysphagia, and risk of aspiration pneumonia. Extensive education was provided regarding his swallow function and SLP recs and patient stating \"Oh, yeah that's what the other chick said.\" Throat clearing x3 was observed prior to PO intake with patient reporting it was from one of his medications. Patient consumed a D3 texture with no overt s/sx of aspiration. With trials of thin liquid via straw, patient had immediate throat clearing x2 however only had slight delayed throat clear with single cup sips. Patient throat clearing before, during, and in between trials of PO and suspect throat clearing to not be directly related to PO. At this time, recommend continue D3/thins with close monitoring.      Recommendations: continue D3/thins, NO straws     Plan of Care: Will benefit from Speech Therapy 3 times per week    Post-Acute Therapy: Discharge to a transitional care facility for continued skilled therapy services.    See \"Rehab Therapy-Acute\" Patient Summary Report for complete documentation.     "

## 2018-04-06 NOTE — PROGRESS NOTES
No changes in pt's condition, 6L NC, pt refused to turn, and  Repositioned. Pt wants more Oxi to relieve BL leg pain.

## 2018-04-06 NOTE — DISCHARGE PLANNING
Anticipated Discharge Disposition: Pt to dc with HH for respiratory help and PT/OT.    Action: LSW faxed HH choice for [facility] to [facility].    Barriers to Discharge: Pt hasnt walked. Limited support at home.     Plan: LSW to f/u with supervisor anton for [facility] for care giving services.

## 2018-04-06 NOTE — THERAPY
Pt refusing PT @ this time. Willing to participate on Monday, time set for noon. PT to initiate transfers to the w/c.

## 2018-04-06 NOTE — DISCHARGE PLANNING
Anticipated Discharge Disposition: Home trilogy machine     Action: LSW paed Dr. Song about home trilogy order as well as sent him orders in email to update and send back.    Barriers to Discharge: None    Plan: LSW to obtain signed home trilogy machine orders and fax to [facility].

## 2018-04-06 NOTE — FACE TO FACE
Face to Face Supporting Documentation - Home Health    The encounter with this patient was in whole or in part the primary reason for home health admission.    Date of encounter:   Patient:                    MRN:                       YOB: 2019  Fer Estrada  [id number]  1960     Home health to see patient for:  Skilled Nursing care for assessment, interventions & education, Wound Care, Registered dietitian consult, Medical social work consult, Home health aide, Physical Therapy evaluation and treatment and Occupational therapy evaluation and treatment    Skilled need for:  Exacerbation of Chronic Disease State Chronic hypoxemic respiatory failure    Skilled nursing interventions to include:  Comment: Wound care, monitoring of co morbid conditions    Homebound status evidenced by:  Need the aid of supportive devices such as crutches, canes, wheelchairs or walkers, Require the use of special transportation or Needs the assistance of another person in order to leave the home. Leaving home requires a considerable and taxing effort. There is a normal inability to leave the home.    Community Physician to provide follow up care: Giovani Lara M.D.     Optional Interventions? No      I certify the face to face encounter for this home health care referral meets the CMS requirements and the encounter/clinical assessment with the patient was, in whole, or in part, for the medical condition(s) listed above, which is the primary reason for home health care. Based on my clinical findings: the service(s) are medically necessary, support the need for home health care, and the homebound criteria are met.  I certify that this patient has had a face to face encounter by myself.  China Anderson M.D. - NPI: [id number]

## 2018-04-06 NOTE — DISCHARGE PLANNING
Received choice form from [facility] for patients SNF services, referral has been sent to [facility] per patient request.

## 2018-04-06 NOTE — PROGRESS NOTES
"Pulmonary Progress Note    Patient ID:   Name:             Fer Estrada     YOB: 1960  Age:                 57 y.o.  male   MRN:               [id number]                                                  Subjective: Continues on supplemental oxygen. Still having problems with ambulation because of his legs. No change in chronic dyspnea.    Interval Changes:was on ventilator x 5 days and extubated on 3/25. Has CO2 retention either from significant COPD and/or obesity hypoventilation syndrome. Has used airway pressures as an therapy for the past several nights sporadically. He is a candidate for home noninvasive nocturnal ventilation when discharged.    Objective:   Vitals/ General Appearance:   Weight/BMI: Body mass index is 42.97 kg/m².  Blood pressure 117/64, pulse 86, temperature 36.4 °C (97.6 °F), resp. rate 18, height 1.753 m (5' 9\"), weight (!) 132 kg (291 lb 0.1 oz), SpO2 95 %.  Vitals:    04/05/18 1520 04/05/18 1945 04/06/18 0317 04/06/18 0827   BP: (!) 95/75 107/66 101/60 117/64   Pulse: (!) 101 97 100 86   Resp: 18 17 18 18   Temp: 36.4 °C (97.6 °F) 36.4 °C (97.6 °F) 36.3 °C (97.4 °F) 36.4 °C (97.6 °F)   SpO2: 94% 95% 96% 95%   Weight:  (!) 132 kg (291 lb 0.1 oz)     Height:         Oxygen Therapy:  Pulse Oximetry: 95 %, O2 (LPM): 4.5, O2 Delivery: Silicone Nasal Cannula    Constitutional:   Well developed, obese, No acute distress  HENMT:  Normocephalic, Atraumatic, Oropharynx moist mucous membranes, No oral exudates, Nose normal.  No thyromegaly.  Eyes:  EOMI, Conjunctiva normal, No discharge.  Neck:  Normal range of motion, No cervical tenderness,  no JVD.  Cardiovascular:  Normal heart rate, Normal rhythm, No murmurs, No rubs, No gallops.   Extremitites with intact distal pulses, no cyanosis, less edema with persistent eliphantiasis of lower extremities  Lungs:  Normal breath sounds, has few rales to auscultation bilaterally on the basis,   no wheezing.   Abdomen: Bowel sounds normal, " Soft, No tenderness, No guarding, No rebound, No masses, No hepatosplenomegaly.  Skin: Warm, Dry, No erythema, No rash, no induration.  Neurologic: Alert & oriented x 3, No focal deficits noted, cranial nerves II through X are grossly intact.  Psychiatric: Affect normal, Judgment normal, Mood normal.    Labs:  Recent Labs      04/04/18 0310   WBC  8.8   RBC  4.53*   HEMOGLOBIN  12.7*   HEMATOCRIT  41.6*   MCV  91.8   MCH  28.0   MCHC  30.5*   RDW  49.0   PLATELETCT  238   MPV  11.3                 Recent Labs      04/04/18 0310 04/05/18   0306  04/06/18   0239   SODIUM  136  138  137   POTASSIUM  4.2  4.4  4.8   CHLORIDE  91*  91*  91*   CO2  35*  39*  39*   GLUCOSE  143*  135*  139*   BUN  29*  23*  30*     Recent Labs      04/04/18 0310 04/05/18   0306  04/06/18   0239   SODIUM  136  138  137   POTASSIUM  4.2  4.4  4.8   CHLORIDE  91*  91*  91*   CO2  35*  39*  39*   BUN  29*  23*  30*   CREATININE  0.63  0.62  0.81   CALCIUM  9.5  9.7  9.5     No results found for this or any previous visit.      Imaging:   DX-CHEST-PORTABLE (1 VIEW)   Final Result         1. No significant interval change.         DX-ABDOMEN FOR TUBE PLACEMENT   Final Result         Feeding tube with tip projecting over the expected area of the stomach.      DX-CHEST-PORTABLE (1 VIEW)   Final Result         1. Interval removal of right central venous catheter. No other significant interval change.         DX-CHEST-PORTABLE (1 VIEW)   Final Result         1. Interval extubation with lower lung volumes and bibasilar atelectasis.      DX-ABDOMEN FOR TUBE PLACEMENT   Final Result      Feeding tube tip overlies the gastric fundus.      DX-CHEST-PORTABLE (1 VIEW)   Final Result      Stable bibasilar opacities, likely due to a combination of pulmonary edema and atelectasis.      DX-CHEST-PORTABLE (1 VIEW)   Final Result      No significant change compared with 3/23.      [id number] VIEW   Final Result      Nonspecific gaseous distention of  colon and small bowel. Adynamic ileus would be a possibility.      DX-CHEST-PORTABLE (1 VIEW)   Final Result      Improving bibasilar opacities as described above.      DX-CHEST-PORTABLE (1 VIEW)   Final Result      Findings consistent with a combination of pulmonary edema and atelectasis, with no significant change.      DX-CHEST-PORTABLE (1 VIEW)   Final Result         1. No significant interval change.      CT-CHEST (THORAX) W/O   Final Result      Patchy left perihilar opacities likely represent pneumonia.      Bilateral lower lobe and right middle lobe opacities likely represent atelectasis/consolidation.      Mild atelectasis versus pneumonitis is seen in the lingula.      Trace pleural fluid bilaterally.      Prominence of the main pulmonary artery compatible with pulmonary arterial hypertension.      Mildly prominent mediastinal and right hilar lymph nodes are nonspecific and may be reactive.      Cardiomegaly.      Follow-up to radiographic resolution is recommended.            DX-CHEST-PORTABLE (1 VIEW)   Final Result      Endotracheal tube projects over the distal trachea 1.8 cm from the phylicia.      Right central line projects over the SVC. No pneumothorax.      Elevation of the right hemidiaphragm with overlying atelectasis.      Retrocardiac opacity may represent atelectasis or consolidation.      Interstitial edema is noted.      Cardiomegaly.         DX-ABDOMEN FOR TUBE PLACEMENT   Final Result      Enteric tube projects over the stomach.      DX-CHEST-PORTABLE (1 VIEW)   Final Result         1. Lower lung volume with hypoventilatory change and scattered bilateral atelectasis.      DX-CHEST-PORTABLE (1 VIEW)   Final Result      Slight increase inflation and improvement of bibasilar atelectasis.      ECHOCARDIOGRAM-COMP W/ CONT   Final Result      LE VENOUS DUPLEX (DVT)   Final Result      DX-CHEST-PORTABLE (1 VIEW)   Final Result      No significant interval change compared to the prior examination.  .      DX-CHEST-PORTABLE (1 VIEW)   Final Result      Bibasilar opacities may represent hypoinflation atelectasis. Underlying pleural effusions not excluded.         DX-CHEST-PORTABLE (1 VIEW)   Final Result      1.  Bibasilar atelectasis.      2.  Mild cardiomegaly.          Hospital Medications:    Current Facility-Administered Medications:   •  oxyCODONE immediate release (ROXICODONE) tablet 20 mg, 20 mg, Per NG Tube, Q4HRS PRN, China Anderson M.D., 20 mg at 04/06/18 0852  •  tiotropium (SPIRIVA) 18 MCG inhalation capsule 1 Cap, 1 Cap, Inhalation, QDAILY (RT), China Anderson M.D., Stopped at 04/05/18 0800  •  omeprazole (PRILOSEC) capsule 20 mg, 20 mg, Oral, DAILY, Chris Ching DDorinaODorina, 20 mg at 04/06/18 0724  •  enoxaparin (LOVENOX) inj 40 mg, 40 mg, Subcutaneous, Q12HRS, Amarjit Martines M.D., 40 mg at 04/06/18 0724  •  budesonide-formoterol (SYMBICORT) 160-4.5 MCG/ACT inhaler 2 Puff, 2 Puff, Inhalation, BID, Baron Sewell M.D., 2 Puff at 04/06/18 0725  •  ammonium lactate (LAC-HYDRIN) 12 % lotion, , Topical, DAILY, Shelley Reyes M.D.  •  furosemide (LASIX) tablet 40 mg, 40 mg, Oral, Q DAY, Chris Ching D.ODorina, 40 mg at 04/06/18 0724  •  labetalol (NORMODYNE,TRANDATE) injection 10-20 mg, 10-20 mg, Intravenous, Q4HRS PRN, Amarjit Martines M.D., 10 mg at 03/24/18 1148  •  amLODIPine (NORVASC) tablet 5 mg, 5 mg, Oral, Q DAY, Amarjit Martines M.D., 5 mg at 04/06/18 0724  •  potassium bicarbonate (KLYTE) 25 MEQ effervescent tablet TBEF 25 mEq, 25 mEq, Oral, TID, Amarjit Martines M.D., Stopped at 04/05/18 1500  •  insulin regular (HUMULIN R) injection 2-9 Units, 2-9 Units, Subcutaneous, Q6HRS, Stopped at 04/05/18 1800 **AND** Accu-Chek Q6 if NPO, , , Q6H **AND** NOTIFY MD and PharmD, , , Once **AND** glucose 4 g chewable tablet 16 g, 16 g, Oral, Q15 MIN PRN **AND** dextrose 50% (D50W) injection 25 mL, 25 mL, Intravenous, Q15 MIN PRN, Chris Ching D.O.  •  Respiratory Care per Protocol, ,  Nebulization, Continuous RT, DAVONTE ColladoO.  •  senna-docusate (PERICOLACE or SENOKOT S) 8.6-50 MG per tablet 2 Tab, 2 Tab, Oral, BID, 1 Tab at 04/03/18 2134 **AND** polyethylene glycol/lytes (MIRALAX) PACKET 1 Packet, 1 Packet, Oral, QDAY PRN **AND** magnesium hydroxide (MILK OF MAGNESIA) suspension 30 mL, 30 mL, Oral, QDAY PRN **AND** bisacodyl (DULCOLAX) suppository 10 mg, 10 mg, Rectal, QDAY PRN, Chris Ching D.O., 10 mg at 03/23/18 1525  •  ascorbic acid (ascorbic acid) tablet 500 mg, 500 mg, Per NG Tube, DAILY, YVROSE Collado.O., 500 mg at 04/06/18 0724  •  aspirin (ASA) chewable tab 81 mg, 81 mg, Per NG Tube, DAILY, YVROSE Collado.O., 81 mg at 04/06/18 0724  •  lactobacillus granules (LACTINEX/FLORANEX) packet 1 Packet, 1 Packet, Nasogastric, TID WITH MEALS, YVROSE Collado.O., Stopped at 04/05/18 1730  •  levothyroxine (SYNTHROID) tablet 50 mcg, 50 mcg, Per NG Tube, AM ES, YVROSE Collado.O., 50 mcg at 04/06/18 0505  •  lisinopril (PRINIVIL) tablet 10 mg, 10 mg, Per NG Tube, DAILY, Chirs Ching D.O., 10 mg at 04/06/18 0724  •  acetaminophen (TYLENOL) tablet 650 mg, 650 mg, Per NG Tube, Q6HRS PRN, YVROSE Collado.O., 650 mg at 04/06/18 0116  •  zinc sulfate (ZINCATE) capsule 220 mg, 220 mg, Oral, DAILY, DAVONTE RomeroO., 220 mg at 04/06/18 0724  •  albuterol inhaler 2 Puff, 2 Puff, Inhalation, Q6HRS PRN, Benjie Marcus M.D., 2 Puff at 04/05/18 1233  •  ipratropium-albuterol (DUONEB) nebulizer solution 3 mL, 3 mL, Nebulization, Q4H PRN (RT), Benjie Marcus M.D.    Current Outpatient Medications:  Prescriptions Prior to Admission   Medication Sig Dispense Refill Last Dose   • oxyCODONE (OXY-IR) 30 MG immediate release tablet Take 30 mg by mouth every four hours as needed for Moderate Pain.   3/15/2018 at Unknown time   • METFORMIN HCL PO Take 1 Tab by mouth 2 Times a Day.   3/15/2018 at pm   • ascorbic acid (VITAMIN C) 500 MG tablet Take 1 Tab by mouth every day. 30 Tab  3 3/15/2018 at Unknown time   • Omega-3 Fatty Acids (FISH OIL) 1000 MG Cap capsule Take 1 Cap by mouth 3 times a day, with meals. 90 Cap  3/15/2018 at Unknown time   • levothyroxine (SYNTHROID) 50 MCG Tab Take 50 mcg by mouth Every morning on an empty stomach.   3/15/2018 at unknown   • omeprazole (PRILOSEC) 20 MG delayed-release capsule Take 20 mg by mouth every day.   3/15/2018 at unknown   • acetaminophen (TYLENOL) 325 MG Tab Take 2 Tabs by mouth every 6 hours as needed (Mild Pain; (Pain scale 1-3); Temp greater than 100.5 F). 30 Tab 0 3/11/2018 at Unknown time   • furosemide (LASIX) 40 MG Tab Take 1 Tab by mouth every day. 30 Tab 0 3/15/2018 at unknown   • lisinopril (PRINIVIL) 20 MG Tab Take 0.5 Tabs by mouth every day. 30 Tab 1 3/15/2018 at unknown   • budesonide-formoterol (SYMBICORT) 160-4.5 MCG/ACT Aerosol Inhale 2 Puffs by mouth 2 Times a Day.   3/14/2018 at unknown   • albuterol 108 (90 BASE) MCG/ACT Aero Soln inhalation aerosol Inhale 2 Puffs by mouth every 6 hours as needed for Shortness of Breath.   3/2/2018 at Unknown time       Medication Allergy:  No Known Allergies    Assessment and Plan:     1.  Acute on chronic hypoxemic and hypercapnic respiratory failure.   Chronic CO2 retention. Patient was on ventilatory support for five days and has been extubated  and now PCO2 has gone up to 74.. Not in any current distress. But obviously could fail and required transfer back to the ICU.  This man will require more than BiPAP. He has advanced lung disease with a component of obesity hypoventilation with significant hypoxemia and hypercarbia. He would benefit immensely from nocturnal noninvasive ventilation to prevent recurrent hospitalization and even death.  He may benefit from long-term acute care rather than skilled nursing considering the severity of his respiratory disease.    -2. Chronic severe lymphedema with stasis dermatitis.     -Continue with wound care treatment.  -Left posterior thigh wound is  improved with conservative treatment. No need for I&D.  -Will likely require infectious disease evaluation pending patient's response to ongoing treatment.     3. Type II diabetes mellitus.     -Continue with sliding scale at this time.  -Have elected to begin low-dose steroids to attempt at treating instructed component of his underlying lung disease. He has probable potential worsening of his blood sugar and longer acting insulin such as NPH or Lantus may be considered.     4. Morbid obesity.     -Obstructive sleep apnea will place the patient on BiPAP at night if the patient tolerates.   We'll TRY TO AVOID SEDATION MEDICATIONS    5. Hypertension.     -Under adequate control at this time, though did require intermittent medication for hypertension.  -Continue with oral therapy, with ACE inhibitor but observe for worsening renal function.   -At this time tolerating.     6. Chronic right bundle-branch block with wide QRS.      -No obvious sign of acute ischemic event although EKG is been ordered as well as troponin.   -Levels were negative overnight.  -Compared to previous EKGs no significant change.     7. COPD.   Continue with albuterol ipratropium nebulizer and maintenance with Symbicort inhaler  -Continue with respiratory therapy protocols and also again to keep saturation above 90%.  -Adding IV steroids for treatment of obstructive disease component.   -May consider as possible rescue therapy.

## 2018-04-06 NOTE — PROGRESS NOTES
[facility] Hospitalist Progress Note    Date of Service: 2018    Chief Complaint  57 y.o. male admitted 3/16/2018 with ongoing cellulitis of bilateral lower extremities with a history of chronic venous stasis and lymphedema. He had acute respiratory distress in ER.    Interval Problem Update  Patient seen and evaluated on rounds  Noc desaturation done  Was off BIPAP For above, HCO3 worse today  Wean Oxycodone, continue current dose for now  Patient upset regarding above  Advised if HCO3 improved then can be transitioned back to baseline  Recommend LTAC  SW working on this  Appears local SNF refusing to accept patient at this time  Difficult disposition    Consultants/Specialty  Intensivist / Pulmonology  Palliative care    Disposition  Needs LTAC vs SNF  SW working on disposition  Can transfer to medical RNF while disposition is awaited      Review of Systems   Constitutional: Negative for chills and fever.   Respiratory: Positive for cough, sputum production and shortness of breath.    Cardiovascular: Negative for chest pain.   Gastrointestinal: Negative for abdominal pain, nausea and vomiting.   Musculoskeletal: Positive for joint pain. Negative for back pain.   Psychiatric/Behavioral: Negative for depression. The patient is nervous/anxious.       Physical Exam  Laboratory/Imaging   Hemodynamics  Temp (24hrs), Av.3 °C (97.4 °F), Min:36.2 °C (97.2 °F), Max:36.4 °C (97.6 °F)   Temperature: 36.4 °C (97.6 °F)  Pulse  Av.4  Min: 54  Max: 116    Blood Pressure: 107/66      Respiratory      Respiration: 17, Pulse Oximetry: 95 %, O2 Daily Delivery Respiratory : Silicone Nasal Cannula     Work Of Breathing / Effort: Mild  RUL Breath Sounds: Clear, RML Breath Sounds: Diminished, RLL Breath Sounds: Diminished, LARY Breath Sounds: Diminished, LLL Breath Sounds: Diminished    Fluids    Intake/Output Summary (Last 24 hours) at 18 2222  Last data filed at 18 1900   Gross per 24 hour   Intake             1200  ml   Output              900 ml   Net              300 ml       Nutrition  Orders Placed This Encounter   Procedures   • DIET ORDER     Standing Status:   Standing     Number of Occurrences:   1     Order Specific Question:   Diet:     Answer:   Diabetic [3]     Order Specific Question:   Texture/Fiber modifications:     Answer:   Dysphagia 3(Mechanical Soft)specify fluid consistency(question 6) [3]     Order Specific Question:   Consistency/Fluid modifications:     Answer:   Thin Liquids [3]     Physical Exam   Constitutional: He is oriented to person, place, and time. He appears well-developed.   Morbidly obese   HENT:   Head: Normocephalic.   Cardiovascular: Regular rhythm and normal heart sounds.    No murmur heard.  Tachycardia   Pulmonary/Chest: No respiratory distress. He has decreased breath sounds. He has no wheezes. He has no rhonchi. He has rales. He exhibits no crepitus.   Abdominal: Soft. Bowel sounds are normal. He exhibits distension. There is no tenderness. There is no rebound and no guarding.   Musculoskeletal: He exhibits edema (2+ LE).   Neurological: He is alert and oriented to person, place, and time.   Skin: Skin is warm and dry.   Scaly skin on head  Lower extremity wounds   Vitals reviewed.      Recent Labs      04/03/18 0457 04/04/18   0310   WBC  7.2  8.8   RBC  4.73  4.53*   HEMOGLOBIN  13.4*  12.7*   HEMATOCRIT  44.0  41.6*   MCV  93.0  91.8   MCH  28.3  28.0   MCHC  30.5*  30.5*   RDW  50.1*  49.0   PLATELETCT  210  238   MPV  10.8  11.3     Recent Labs      04/03/18 0457  04/04/18   0310  04/05/18   0306   SODIUM  139  136  138   POTASSIUM  4.2  4.2  4.4   CHLORIDE  92*  91*  91*   CO2  38*  35*  39*   GLUCOSE  128*  143*  135*   BUN  27*  29*  23*   CREATININE  0.63  0.63  0.62   CALCIUM  9.6  9.5  9.7                      Assessment/Plan     * Acute on chronic respiratory failure with hypoxia and hypercapnia (CMS-HCC)- (present on admission)   Assessment & Plan    Intubated 3-20  extubated 3/25/2018  Morbid obesity  Obesity hypoventilation syndrome / ALIREZA  Improving oxygen requirements  RT protocol  Diuresing with Lasix, PO 40 daily  BIPAP at night  Pulmonology team on board  Needs LTAC or SNF placement with BIPAP  RT protocol  Wean off opioids as these are not helping  Nocturnal desaturation study done, have pulm request outpatient BIPAP        CO2 narcosis- (present on admission)   Assessment & Plan    Has chronic CO2 retention with metabolic compensation  Secondary to underlying obesity hypoventilation syndrome, obstructive sleep apnea, morbid obesity  Evaluate by Dr Song  HS BIPAP recommended  LTAC placement if able  Wean oxycodone as this is worsening underlying CO2 retention        Bilateral edema of lower extremity- (present on admission)   Assessment & Plan    Chronic lymphedema and stasis dermatitis  Continue po Lasix  Monitor intake output and fluid status        Diabetes type 2, controlled (CMS-HCC)- (present on admission)   Assessment & Plan    HgbA1c:6.4  Stable on ISS         Non compliance w medication regimen- (present on admission)   Assessment & Plan    Reinforced compliance   Patient has also been noncompliant with CPAP as he does not like the feeling  Counseled and educated again  Improving compliance now        Narcotic addiction (CMS-HCC)- (present on admission)   Assessment & Plan    Mnimize sedating agents  Wean as not helping respiratory status        Bilateral lower leg cellulitis- (present on admission)   Assessment & Plan    Continue Wound care  Cx  MSSA   Completed 10 days of unasyn  Continue clinical monitoring        Stasis dermatitis- (present on admission)   Assessment & Plan    With MSSA cellulitis    Completed antibiotic course   Continue wound care        Hypothyroidism- (present on admission)   Assessment & Plan    Stable continue Synthroid        COPD (chronic obstructive pulmonary disease) (CMS-HCC)- (present on admission)   Assessment & Plan    RT  protocol  Pulm on board  No acute exacerbation at this time        HTN (hypertension)- (present on admission)   Assessment & Plan    Stable continue lasix  amlodipine and  Lisinopril  Monitor BP and adjust        Morbid obesity (CMS-HCC)- (present on admission)   Assessment & Plan    Body mass index is 48.02 kg/m².   Complicated by obstructive sleep apnea and obesity hypoventilation syndrome  Encouraged weight loss          Pneumonia- (present on admission)   Assessment & Plan    Adequately treated          Quality-Core Measures   Reviewed items::  Labs reviewed and Medications reviewed  DVT prophylaxis pharmacological::  Enoxaparin (Lovenox)  Antibiotics:  Treating active infection/contamination beyond 24 hours perioperative coverage

## 2018-04-06 NOTE — PROGRESS NOTES
[facility] Hospitalist Progress Note    Date of Service: 4/6/2018    Chief Complaint  57 y.o. male admitted 3/16/2018 with ongoing cellulitis of bilateral lower extremities with a history of chronic venous stasis and lymphedema. He had acute respiratory distress in ER. S/p ICU stay with MV dependence this hospitalization with treatment for pneumonia and cellulitis.     Interval Problem Update  Patient seen and evaluated on rounds  Feels well  Wants opioid escalated, counseled and educated regarding risks  Remains non motivated. Lack of medical compliance.   Counseled and educated.   SW arranging home BIPAP, RUSSELL for caregiver  Weaning Oxycodone, continue current dose for now  Advised if HCO3 improved then can be transitioned back to baseline  Overall difficult disposition    Consultants/Specialty  Pulmonology   Palliative care    Disposition  Refused by LTACs  Refused by SNFs  [facility] to do RUSSELL for home caregiver  Arrange home health care  Arrange BIPAP for use at home  Once above arranged can discharge home  Can transfer to medical RNF while disposition is awaited, no telemetry needs at this time      Review of Systems   Constitutional: Positive for malaise/fatigue. Negative for chills, diaphoresis, fever and weight loss.   HENT: Negative for hearing loss and tinnitus.    Eyes: Negative for blurred vision and double vision.   Respiratory: Positive for shortness of breath. Negative for cough, hemoptysis, sputum production and wheezing.    Cardiovascular: Positive for leg swelling. Negative for chest pain and claudication.   Gastrointestinal: Positive for constipation. Negative for abdominal pain, blood in stool, diarrhea, heartburn, melena, nausea and vomiting.   Genitourinary: Negative for dysuria, flank pain, frequency, hematuria and urgency.   Musculoskeletal: Positive for joint pain. Negative for back pain, falls, myalgias and neck pain.   Skin: Positive for rash. Negative for itching.   Neurological: Positive for  weakness. Negative for dizziness and headaches.   Psychiatric/Behavioral: Negative for depression. The patient is nervous/anxious.       Physical Exam  Laboratory/Imaging   Hemodynamics  Temp (24hrs), Av.4 °C (97.5 °F), Min:36.3 °C (97.4 °F), Max:36.4 °C (97.6 °F)   Temperature: 36.4 °C (97.6 °F)  Pulse  Av.5  Min: 54  Max: 116    Blood Pressure: 117/64      Respiratory      Respiration: 18, Pulse Oximetry: 95 %        RUL Breath Sounds: Clear, RML Breath Sounds: Diminished, RLL Breath Sounds: Diminished, LARY Breath Sounds: Clear, LLL Breath Sounds: Diminished    Fluids    Intake/Output Summary (Last 24 hours) at 18 1539  Last data filed at 18 1900   Gross per 24 hour   Intake              480 ml   Output                0 ml   Net              480 ml       Nutrition  Orders Placed This Encounter   Procedures   • DIET ORDER     Standing Status:   Standing     Number of Occurrences:   1     Order Specific Question:   Diet:     Answer:   Diabetic [3]     Order Specific Question:   Texture/Fiber modifications:     Answer:   Dysphagia 3(Mechanical Soft)specify fluid consistency(question 6) [3]     Order Specific Question:   Consistency/Fluid modifications:     Answer:   Thin Liquids [3]     Comments:   NO straws     Physical Exam   Constitutional: He is oriented to person, place, and time. He appears well-developed. No distress.   Morbidly obese, Body mass index is 42.97 kg/m².   HENT:   Head: Normocephalic.   Mouth/Throat: Oropharynx is clear and moist. No oropharyngeal exudate.   Eyes: Conjunctivae are normal. Pupils are equal, round, and reactive to light. No scleral icterus.   Neck: No JVD present.   Cardiovascular: Normal rate, regular rhythm and normal heart sounds.  Exam reveals no gallop and no friction rub.    No murmur heard.  Tachycardia   Pulmonary/Chest: No stridor. No respiratory distress. He has decreased breath sounds. He has no wheezes. He has no rhonchi. He has no rales. He exhibits  no crepitus.   Diminished in bases. Poor inspiratory effort   Abdominal: Soft. Bowel sounds are normal. He exhibits distension. There is no tenderness. There is no rebound and no guarding.   Musculoskeletal: He exhibits edema (2+ LE). He exhibits no tenderness or deformity.   Neurological: He is alert and oriented to person, place, and time. No cranial nerve deficit.   Skin: Skin is warm and dry. He is not diaphoretic.   Scaly skin on head  Lower extremity wounds  B/L Severe stasis dermatitis   Psychiatric: He has a normal mood and affect. His behavior is normal. Judgment and thought content normal.   Vitals reviewed.      Recent Labs      04/04/18   0310   WBC  8.8   RBC  4.53*   HEMOGLOBIN  12.7*   HEMATOCRIT  41.6*   MCV  91.8   MCH  28.0   MCHC  30.5*   RDW  49.0   PLATELETCT  238   MPV  11.3     Recent Labs      04/04/18   0310  04/05/18   0306  04/06/18   0239   SODIUM  136  138  137   POTASSIUM  4.2  4.4  4.8   CHLORIDE  91*  91*  91*   CO2  35*  39*  39*   GLUCOSE  143*  135*  139*   BUN  29*  23*  30*   CREATININE  0.63  0.62  0.81   CALCIUM  9.5  9.7  9.5                      Assessment/Plan     * Acute on chronic respiratory failure with hypoxia and hypercapnia (CMS-HCC)- (present on admission)   Assessment & Plan    Chronic issues, nearly baseline now   Intubated 3-20 extubated 3/25/2018 this hospital stay  Morbid obesity, Body mass index is 42.97 kg/m².  Obesity hypoventilation syndrome / ALIREZA is contributing   Suspect underlying pulmonary hypertension / RV dysfunction  Underlying history of COPD  Non compliance with intervention / medical recommendations & therapy complicates care  Underlying debility with poor inspiratory effort  Narcotic dependence further decreasing inspiratory efforts    Plan:  At least 8 hours of BIPAP therapy at night  Recommend BIPAP twice daily for 1 hours (11am-12 pm) & (3pm-4pm)  Aggressive nursing interventions, RT protocol  Ambulate as able  Continue Lasix, PO 40  daily  Monitor HCO3 on chemistries   Symbicort / Spiriva  Wean off opioids as these are not helping him  Nocturnal desaturation study done, have pulm request outpatient BIPAP  Arrange BIPAP for home    Poor prognosis 2/2 multiple factors listed above most importantly 2/2 patient's personal non compliance and lack of motivation         Severe protein-calorie malnutrition (CMS-HCC)   Assessment & Plan    Evident from SC fat loss and pre albumin levels  Optimize nutrition        Bilateral edema of lower extremity- (present on admission)   Assessment & Plan    Chronic lymphedema and stasis dermatitis  Continue po Lasix  Monitor intake output and fluid status  Ambulation as able  Significant contribution from debility        Diabetes type 2, controlled (CMS-HCC)- (present on admission)   Assessment & Plan    No apparent manifestations  Metformin 1000 mg BID  ASA 81, Atorvastatin 40 mg (check lipid profile)        Non compliance w medication regimen- (present on admission)   Assessment & Plan    Reinforced compliance   Patient has also been noncompliant with BIPAP as he does not like the feeling  Counseled and educated again  Improving compliance now  Will continue to reinforce care        Narcotic addiction (CMS-HCC)- (present on admission)   Assessment & Plan    Mnimize sedating agents  Wean as not helping respiratory status        Stasis dermatitis- (present on admission)   Assessment & Plan    With MSSA cellulitis this hospital course   Completed antibiotic course   Continue wound care / Skin care  Aggressive nursing interventions        Hypothyroidism- (present on admission)   Assessment & Plan    Synthroid, TSH wnl 03/2018        COPD (chronic obstructive pulmonary disease) (CMS-HCC)- (present on admission)   Assessment & Plan    RT protocol  Pulm on board  No acute exacerbation at this time        HTN (hypertension)- (present on admission)   Assessment & Plan    Stop amlodipine as this will contribute to worsening  edema  Lisinopril 20 mg  Titrate therapy as clinically appropriate        Morbid obesity (CMS-HCC)- (present on admission)   Assessment & Plan    Body mass index is 48.02 kg/m².   Complicated by obstructive sleep apnea and obesity hypoventilation syndrome  Encouraged weight loss          Quality-Core Measures   Reviewed items::  Labs reviewed and Medications reviewed  DVT prophylaxis pharmacological::  Enoxaparin (Lovenox)  Antibiotics:  Treating active infection/contamination beyond 24 hours perioperative coverage

## 2018-04-06 NOTE — DISCHARGE PLANNING
Received Rx for MD. Song.   Referral with Rx and Oximetry report has been handed to Juan with [facility]. Juan will review all information to process.

## 2018-04-07 LAB
ANION GAP SERPL CALC-SCNC: 7 MMOL/L (ref 0–11.9)
BUN SERPL-MCNC: 33 MG/DL (ref 8–22)
CALCIUM SERPL-MCNC: 9.6 MG/DL (ref 8.5–10.5)
CHLORIDE SERPL-SCNC: 91 MMOL/L (ref 96–112)
CO2 SERPL-SCNC: 35 MMOL/L (ref 20–33)
CREAT SERPL-MCNC: 0.7 MG/DL (ref 0.5–1.4)
GLUCOSE SERPL-MCNC: 123 MG/DL (ref 65–99)
POTASSIUM SERPL-SCNC: 4.7 MMOL/L (ref 3.6–5.5)
SODIUM SERPL-SCNC: 133 MMOL/L (ref 135–145)

## 2018-04-07 PROCEDURE — 770006 HCHG ROOM/CARE - MED/SURG/GYN SEMI*

## 2018-04-07 PROCEDURE — A9270 NON-COVERED ITEM OR SERVICE: HCPCS | Performed by: INTERNAL MEDICINE

## 2018-04-07 PROCEDURE — 94660 CPAP INITIATION&MGMT: CPT

## 2018-04-07 PROCEDURE — 36415 COLL VENOUS BLD VENIPUNCTURE: CPT

## 2018-04-07 PROCEDURE — 700102 HCHG RX REV CODE 250 W/ 637 OVERRIDE(OP): Performed by: INTERNAL MEDICINE

## 2018-04-07 PROCEDURE — 80048 BASIC METABOLIC PNL TOTAL CA: CPT

## 2018-04-07 PROCEDURE — 99232 SBSQ HOSP IP/OBS MODERATE 35: CPT | Performed by: INTERNAL MEDICINE

## 2018-04-07 PROCEDURE — 700111 HCHG RX REV CODE 636 W/ 250 OVERRIDE (IP): Performed by: HOSPITALIST

## 2018-04-07 RX ORDER — ASCORBIC ACID 500 MG
500 TABLET ORAL ONCE
Status: ACTIVE | OUTPATIENT
Start: 2018-04-07 | End: 2018-04-08

## 2018-04-07 RX ADMIN — BUDESONIDE AND FORMOTEROL FUMARATE DIHYDRATE 2 PUFF: 160; 4.5 AEROSOL RESPIRATORY (INHALATION) at 09:27

## 2018-04-07 RX ADMIN — OXYCODONE HYDROCHLORIDE AND ACETAMINOPHEN 500 MG: 500 TABLET ORAL at 09:26

## 2018-04-07 RX ADMIN — OXYCODONE HYDROCHLORIDE 20 MG: 10 TABLET ORAL at 06:42

## 2018-04-07 RX ADMIN — METFORMIN HYDROCHLORIDE 1000 MG: 500 TABLET, FILM COATED ORAL at 09:25

## 2018-04-07 RX ADMIN — FUROSEMIDE 40 MG: 40 TABLET ORAL at 09:26

## 2018-04-07 RX ADMIN — ENOXAPARIN SODIUM 40 MG: 100 INJECTION SUBCUTANEOUS at 09:24

## 2018-04-07 RX ADMIN — OMEPRAZOLE 20 MG: 20 CAPSULE, DELAYED RELEASE ORAL at 09:25

## 2018-04-07 RX ADMIN — ATORVASTATIN CALCIUM 40 MG: 40 TABLET, FILM COATED ORAL at 22:01

## 2018-04-07 RX ADMIN — THERA TABS 1 TABLET: TAB at 09:00

## 2018-04-07 RX ADMIN — LEVOTHYROXINE SODIUM 50 MCG: 50 TABLET ORAL at 06:30

## 2018-04-07 RX ADMIN — OXYCODONE HYDROCHLORIDE 20 MG: 10 TABLET ORAL at 17:51

## 2018-04-07 RX ADMIN — OXYCODONE HYDROCHLORIDE 20 MG: 10 TABLET ORAL at 01:23

## 2018-04-07 RX ADMIN — ASPIRIN 81 MG: 81 TABLET, COATED ORAL at 09:26

## 2018-04-07 RX ADMIN — OXYCODONE HYDROCHLORIDE 20 MG: 10 TABLET ORAL at 22:01

## 2018-04-07 RX ADMIN — OXYCODONE HYDROCHLORIDE 20 MG: 10 TABLET ORAL at 12:39

## 2018-04-07 RX ADMIN — LISINOPRIL 20 MG: 20 TABLET ORAL at 09:26

## 2018-04-07 ASSESSMENT — COGNITIVE AND FUNCTIONAL STATUS - GENERAL
DRESSING REGULAR LOWER BODY CLOTHING: A LOT
DRESSING REGULAR UPPER BODY CLOTHING: A LITTLE
SUGGESTED CMS G CODE MODIFIER MOBILITY: CN
MOVING FROM LYING ON BACK TO SITTING ON SIDE OF FLAT BED: UNABLE
STANDING UP FROM CHAIR USING ARMS: TOTAL
CLIMB 3 TO 5 STEPS WITH RAILING: TOTAL
WALKING IN HOSPITAL ROOM: TOTAL
TURNING FROM BACK TO SIDE WHILE IN FLAT BAD: UNABLE
MOVING TO AND FROM BED TO CHAIR: UNABLE
MOBILITY SCORE: 6

## 2018-04-07 ASSESSMENT — ENCOUNTER SYMPTOMS
NAUSEA: 0
DEPRESSION: 0
SHORTNESS OF BREATH: 1
FALLS: 0
NERVOUS/ANXIOUS: 1
SPUTUM PRODUCTION: 0
CONSTIPATION: 1
NECK PAIN: 0
DIAPHORESIS: 0
DIZZINESS: 0
COUGH: 0
BLURRED VISION: 0
WHEEZING: 0
BACK PAIN: 0
VOMITING: 0
HEARTBURN: 0
HEMOPTYSIS: 0
FEVER: 0
BLOOD IN STOOL: 0
CLAUDICATION: 0
DOUBLE VISION: 0
MYALGIAS: 0
FLANK PAIN: 0
ABDOMINAL PAIN: 0
DIARRHEA: 0
WEAKNESS: 1
HEADACHES: 0
CHILLS: 0
WEIGHT LOSS: 0

## 2018-04-07 ASSESSMENT — PAIN SCALES - GENERAL
PAINLEVEL_OUTOF10: 6
PAINLEVEL_OUTOF10: 6
PAINLEVEL_OUTOF10: 4
PAINLEVEL_OUTOF10: 8
PAINLEVEL_OUTOF10: 8

## 2018-04-07 NOTE — DIETARY
Nutrition Services:  Consult received for supplements.    -Pt on Diabetic, Mechanical Soft, Thin Liquid diet and consistently eating % of meals.  -Pt also has BMI of 42.97 (Obese class III).   -Per RD judgement, supplements are not needed for this pt, but ok for pt to have PRN. Will communicate this to nutrition representatives.     RD available PRN and to rescreen weekly. Consult as needed.

## 2018-04-07 NOTE — PROGRESS NOTES
"Not motivated to move around even with turning from side to sides, unless he has a BM and will use  Bedpan. Complaining and pt stated \" hard for me to turn due to pain\".  "

## 2018-04-07 NOTE — PROGRESS NOTES
"Pulmonary Progress Note    Patient ID:   Name:             Fer Estrada     YOB: 1960  Age:                 57 y.o.  male   MRN:               [id number]                                                  Subjective: Continues on supplemental oxygen. Still having problems with ambulation because of his legs. No change in chronic dyspnea.    Interval Changes:was on ventilator x 5 days and extubated on 3/25. Has CO2 retention either from significant COPD and/or obesity hypoventilation syndrome. Has used airway pressures as an therapy for the past several nights sporadically. He is a candidate for home noninvasive nocturnal ventilation when discharged. We have set up a home Trilogy ventilator for when he is discharged    Objective:   Vitals/ General Appearance:   Weight/BMI: Body mass index is 42.97 kg/m².  Blood pressure 107/76, pulse 84, temperature 36.2 °C (97.2 °F), resp. rate 18, height 1.753 m (5' 9\"), weight (!) 132 kg (291 lb 0.1 oz), SpO2 95 %.  Vitals:    04/06/18 1550 04/06/18 2100 04/07/18 0612 04/07/18 0814   BP: 100/66 (!) 93/58 140/72 107/76   Pulse: 88 90 88 84   Resp: 18 18 18 18   Temp: 36.4 °C (97.5 °F) 36.1 °C (96.9 °F) 36.7 °C (98 °F) 36.2 °C (97.2 °F)   SpO2: 94% 94% 94% 95%   Weight:       Height:         Oxygen Therapy:  Pulse Oximetry: 95 %, O2 (LPM): 5, O2 Delivery: Silicone Nasal Cannula    Constitutional:   Well developed, obese, No acute distress  HENMT:  Normocephalic, Atraumatic, Oropharynx moist mucous membranes, No oral exudates, Nose normal.  No thyromegaly.  Eyes:  EOMI, Conjunctiva normal, No discharge.  Neck:  Normal range of motion, No cervical tenderness,  no JVD.  Cardiovascular:  Normal heart rate, Normal rhythm, No murmurs, No rubs, No gallops.   Extremitites with intact distal pulses, no cyanosis, less edema with persistent eliphantiasis of lower extremities  Lungs:  Normal breath sounds, has few rales to auscultation bilaterally on the basis,   no wheezing. "   Abdomen: Bowel sounds normal, Soft, No tenderness, No guarding, No rebound, No masses, No hepatosplenomegaly.  Skin: Warm, Dry, No erythema, No rash, no induration.  Neurologic: Alert & oriented x 3, No focal deficits noted, cranial nerves II through X are grossly intact.  Psychiatric: Affect normal, Judgment normal, Mood normal.    Labs:                  Recent Labs      04/05/18   0306  04/06/18   0239   SODIUM  138  137   POTASSIUM  4.4  4.8   CHLORIDE  91*  91*   CO2  39*  39*   GLUCOSE  135*  139*   BUN  23*  30*     Recent Labs      04/05/18   0306  04/06/18   0239   SODIUM  138  137   POTASSIUM  4.4  4.8   CHLORIDE  91*  91*   CO2  39*  39*   BUN  23*  30*   CREATININE  0.62  0.81   CALCIUM  9.7  9.5     No results found for this or any previous visit.      Imaging:   DX-CHEST-PORTABLE (1 VIEW)   Final Result         1. No significant interval change.         DX-ABDOMEN FOR TUBE PLACEMENT   Final Result         Feeding tube with tip projecting over the expected area of the stomach.      DX-CHEST-PORTABLE (1 VIEW)   Final Result         1. Interval removal of right central venous catheter. No other significant interval change.         DX-CHEST-PORTABLE (1 VIEW)   Final Result         1. Interval extubation with lower lung volumes and bibasilar atelectasis.      DX-ABDOMEN FOR TUBE PLACEMENT   Final Result      Feeding tube tip overlies the gastric fundus.      DX-CHEST-PORTABLE (1 VIEW)   Final Result      Stable bibasilar opacities, likely due to a combination of pulmonary edema and atelectasis.      DX-CHEST-PORTABLE (1 VIEW)   Final Result      No significant change compared with 3/23.      UM-CYAMADO-2 VIEW   Final Result      Nonspecific gaseous distention of colon and small bowel. Adynamic ileus would be a possibility.      DX-CHEST-PORTABLE (1 VIEW)   Final Result      Improving bibasilar opacities as described above.      DX-CHEST-PORTABLE (1 VIEW)   Final Result      Findings consistent with a  combination of pulmonary edema and atelectasis, with no significant change.      DX-CHEST-PORTABLE (1 VIEW)   Final Result         1. No significant interval change.      CT-CHEST (THORAX) W/O   Final Result      Patchy left perihilar opacities likely represent pneumonia.      Bilateral lower lobe and right middle lobe opacities likely represent atelectasis/consolidation.      Mild atelectasis versus pneumonitis is seen in the lingula.      Trace pleural fluid bilaterally.      Prominence of the main pulmonary artery compatible with pulmonary arterial hypertension.      Mildly prominent mediastinal and right hilar lymph nodes are nonspecific and may be reactive.      Cardiomegaly.      Follow-up to radiographic resolution is recommended.            DX-CHEST-PORTABLE (1 VIEW)   Final Result      Endotracheal tube projects over the distal trachea 1.8 cm from the phylicia.      Right central line projects over the SVC. No pneumothorax.      Elevation of the right hemidiaphragm with overlying atelectasis.      Retrocardiac opacity may represent atelectasis or consolidation.      Interstitial edema is noted.      Cardiomegaly.         DX-ABDOMEN FOR TUBE PLACEMENT   Final Result      Enteric tube projects over the stomach.      DX-CHEST-PORTABLE (1 VIEW)   Final Result         1. Lower lung volume with hypoventilatory change and scattered bilateral atelectasis.      DX-CHEST-PORTABLE (1 VIEW)   Final Result      Slight increase inflation and improvement of bibasilar atelectasis.      ECHOCARDIOGRAM-COMP W/ CONT   Final Result      LE VENOUS DUPLEX (DVT)   Final Result      DX-CHEST-PORTABLE (1 VIEW)   Final Result      No significant interval change compared to the prior examination. .      DX-CHEST-PORTABLE (1 VIEW)   Final Result      Bibasilar opacities may represent hypoinflation atelectasis. Underlying pleural effusions not excluded.         DX-CHEST-PORTABLE (1 VIEW)   Final Result      1.  Bibasilar atelectasis.       2.  Mild cardiomegaly.          Hospital Medications:    Current Facility-Administered Medications:   •  lisinopril (PRINIVIL) tablet 20 mg, 20 mg, Per NG Tube, DAILY, China Anderson M.D., 20 mg at 04/07/18 0926  •  ascorbic acid (ascorbic acid) tablet 500 mg, 500 mg, Oral, TID, China Anderson M.D., 500 mg at 04/07/18 0926  •  aspirin EC (ECOTRIN) tablet 81 mg, 81 mg, Oral, DAILY, China Anderson M.D., 81 mg at 04/07/18 0926  •  metFORMIN (GLUCOPHAGE) tablet 1,000 mg, 1,000 mg, Oral, BID WITH MEALS, China Anderson M.D., 1,000 mg at 04/07/18 0925  •  atorvastatin (LIPITOR) tablet 40 mg, 40 mg, Oral, QHS, China Anderson M.D., 40 mg at 04/06/18 2134  •  polyethylene glycol/lytes (MIRALAX) PACKET 1 Packet, 1 Packet, Oral, BID, China Anderson M.D., 1 Packet at 04/07/18 0925  •  senna-docusate (PERICOLACE or SENOKOT S) 8.6-50 MG per tablet 1 Tab, 1 Tab, Oral, BID, China Anderson M.D., 1 Tab at 04/07/18 0925  •  multivitamin (THERAGRAN) tablet 1 Tab, 1 Tab, Oral, DAILY, China Anderson M.D., 1 Tab at 04/07/18 0900  •  oxyCODONE immediate release (ROXICODONE) tablet 20 mg, 20 mg, Per NG Tube, Q4HRS PRN, China Anderson M.D., 20 mg at 04/07/18 0642  •  tiotropium (SPIRIVA) 18 MCG inhalation capsule 1 Cap, 1 Cap, Inhalation, QDAILY (RT), China Anderson M.D., Stopped at 04/05/18 0800  •  omeprazole (PRILOSEC) capsule 20 mg, 20 mg, Oral, DAILY, Chris Ching D.O., 20 mg at 04/07/18 0925  •  enoxaparin (LOVENOX) inj 40 mg, 40 mg, Subcutaneous, Q12HRS, Amarjit Martines M.D., 40 mg at 04/07/18 0924  •  budesonide-formoterol (SYMBICORT) 160-4.5 MCG/ACT inhaler 2 Puff, 2 Puff, Inhalation, BID, Baron Sewell M.D., 2 Puff at 04/07/18 0927  •  ammonium lactate (LAC-HYDRIN) 12 % lotion, , Topical, DAILY, Shelley Reyes M.D.  •  furosemide (LASIX) tablet 40 mg, 40 mg, Oral, Q DAY, Chris Ching D.O., 40 mg at 04/07/18 0926  •  Respiratory Care per Protocol, , Nebulization, Continuous RT, Chris Ching D.O.  •  levothyroxine (SYNTHROID) tablet  50 mcg, 50 mcg, Per NG Tube, AM ES, Chris Ching D.O., 50 mcg at 04/07/18 0630  •  acetaminophen (TYLENOL) tablet 650 mg, 650 mg, Per NG Tube, Q6HRS PRN, Chris Ching D.O., 650 mg at 04/06/18 0116  •  zinc sulfate (ZINCATE) capsule 220 mg, 220 mg, Oral, DAILY, Scott Almodovar D.O., 220 mg at 04/06/18 0724  •  albuterol inhaler 2 Puff, 2 Puff, Inhalation, Q6HRS PRN, Benjie Marcus M.D., 2 Puff at 04/05/18 1233  •  ipratropium-albuterol (DUONEB) nebulizer solution 3 mL, 3 mL, Nebulization, Q4H PRN (RT), Benjie Marcus M.D.    Current Outpatient Medications:  Prescriptions Prior to Admission   Medication Sig Dispense Refill Last Dose   • oxyCODONE (OXY-IR) 30 MG immediate release tablet Take 30 mg by mouth every four hours as needed for Moderate Pain.   3/15/2018 at Unknown time   • METFORMIN HCL PO Take 1 Tab by mouth 2 Times a Day.   3/15/2018 at pm   • ascorbic acid (VITAMIN C) 500 MG tablet Take 1 Tab by mouth every day. 30 Tab 3 3/15/2018 at Unknown time   • Omega-3 Fatty Acids (FISH OIL) 1000 MG Cap capsule Take 1 Cap by mouth 3 times a day, with meals. 90 Cap  3/15/2018 at Unknown time   • levothyroxine (SYNTHROID) 50 MCG Tab Take 50 mcg by mouth Every morning on an empty stomach.   3/15/2018 at unknown   • omeprazole (PRILOSEC) 20 MG delayed-release capsule Take 20 mg by mouth every day.   3/15/2018 at unknown   • acetaminophen (TYLENOL) 325 MG Tab Take 2 Tabs by mouth every 6 hours as needed (Mild Pain; (Pain scale 1-3); Temp greater than 100.5 F). 30 Tab 0 3/11/2018 at Unknown time   • furosemide (LASIX) 40 MG Tab Take 1 Tab by mouth every day. 30 Tab 0 3/15/2018 at unknown   • lisinopril (PRINIVIL) 20 MG Tab Take 0.5 Tabs by mouth every day. 30 Tab 1 3/15/2018 at unknown   • budesonide-formoterol (SYMBICORT) 160-4.5 MCG/ACT Aerosol Inhale 2 Puffs by mouth 2 Times a Day.   3/14/2018 at unknown   • albuterol 108 (90 BASE) MCG/ACT Aero Soln inhalation aerosol Inhale 2 Puffs by mouth every 6 hours  as needed for Shortness of Breath.   3/2/2018 at Unknown time       Medication Allergy:  No Known Allergies    Assessment and Plan:     1.  Acute on chronic hypoxemic and hypercapnic respiratory failure.   Chronic CO2 retention. Patient was on ventilatory support for five days and has been extubated  and now PCO2 has gone up to 74.. Not in any current distress. But obviously could fail and required transfer back to the ICU.  This man will require more than BiPAP. He has advanced lung disease with a component of obesity hypoventilation with significant hypoxemia and hypercarbia. He would benefit immensely from nocturnal noninvasive ventilation to prevent recurrent hospitalization and even death.  We have ordered a home Trilogy ventilator.  He may benefit from long-term acute care rather than skilled nursing considering the severity of his respiratory disease.    -2. Chronic severe lymphedema with stasis dermatitis.     -Continue with wound care treatment.  -Left posterior thigh wound is improved with conservative treatment. No need for I&D.  -Will likely require infectious disease evaluation pending patient's response to ongoing treatment.     3. Type II diabetes mellitus.     -Continue with sliding scale at this time.  -Have elected to begin low-dose steroids to attempt at treating instructed component of his underlying lung disease. He has probable potential worsening of his blood sugar and longer acting insulin such as NPH or Lantus may be considered.     4. Morbid obesity.     -Obstructive sleep apnea will place the patient on BiPAP at night if the patient tolerates.   We'll TRY TO AVOID SEDATION MEDICATIONS    5. Hypertension.     -Under adequate control at this time, though did require intermittent medication for hypertension.  -Continue with oral therapy, with ACE inhibitor but observe for worsening renal function.   -At this time tolerating.     6. Chronic right bundle-branch block with wide QRS.      -No  obvious sign of acute ischemic event although EKG is been ordered as well as troponin.   -Levels were negative overnight.  -Compared to previous EKGs no significant change.     7. COPD.   Continue with albuterol ipratropium nebulizer and maintenance with Symbicort inhaler  -Continue with respiratory therapy protocols and also again to keep saturation above 90%.  -Adding IV steroids for treatment of obstructive disease component.   -May consider as possible rescue therapy.

## 2018-04-07 NOTE — PROGRESS NOTES
"AM Assessment completed, see Doc flow sheet for reference  Advises to turn side to side to prevent skin breakdown on his back.  During medication time, pt pick only the meds he would take.stated \" I don't take that at home and not starting it now\"  Refuses to get OOB  To be able to wash his legs and apply skin barrier.Pt stated \" It is hard for me to do that\"  I ask \" how are you be able to wash your legs  Or take care of yourself when you go home?\" Pt stated \" that is why I need Home Health Nurse to see me\" relayed this conversation with SW.  "

## 2018-04-07 NOTE — PROGRESS NOTES
Report received at bedside, patient care assumed, tele box off pt is medical. Pt AAO x 4, no signs of distress noted at this time. POC discussed with pt and all questions answered. Pt c/o 8/10 pain in bilateral legs. Medicated per MAR. Pt denies any additional needs at this time. Bed in lowest position, bed alarm off, pt calls appropriately. Call light and personal possessions within reach. Will continue to monitor.

## 2018-04-08 LAB
CHOLEST SERPL-MCNC: 213 MG/DL (ref 100–199)
GLUCOSE BLD-MCNC: 177 MG/DL (ref 65–99)
HDLC SERPL-MCNC: 29 MG/DL
LDLC SERPL CALC-MCNC: 137 MG/DL
TRIGL SERPL-MCNC: 236 MG/DL (ref 0–149)

## 2018-04-08 PROCEDURE — 99232 SBSQ HOSP IP/OBS MODERATE 35: CPT | Performed by: INTERNAL MEDICINE

## 2018-04-08 PROCEDURE — 700102 HCHG RX REV CODE 250 W/ 637 OVERRIDE(OP): Performed by: INTERNAL MEDICINE

## 2018-04-08 PROCEDURE — 36415 COLL VENOUS BLD VENIPUNCTURE: CPT

## 2018-04-08 PROCEDURE — 82962 GLUCOSE BLOOD TEST: CPT

## 2018-04-08 PROCEDURE — A9270 NON-COVERED ITEM OR SERVICE: HCPCS | Performed by: HOSPITALIST

## 2018-04-08 PROCEDURE — 770006 HCHG ROOM/CARE - MED/SURG/GYN SEMI*

## 2018-04-08 PROCEDURE — 94660 CPAP INITIATION&MGMT: CPT

## 2018-04-08 PROCEDURE — A9270 NON-COVERED ITEM OR SERVICE: HCPCS | Performed by: INTERNAL MEDICINE

## 2018-04-08 PROCEDURE — 80061 LIPID PANEL: CPT

## 2018-04-08 PROCEDURE — 700111 HCHG RX REV CODE 636 W/ 250 OVERRIDE (IP): Performed by: HOSPITALIST

## 2018-04-08 PROCEDURE — 700102 HCHG RX REV CODE 250 W/ 637 OVERRIDE(OP): Performed by: HOSPITALIST

## 2018-04-08 RX ADMIN — Medication 220 MG: at 10:36

## 2018-04-08 RX ADMIN — OXYCODONE HYDROCHLORIDE 20 MG: 10 TABLET ORAL at 14:50

## 2018-04-08 RX ADMIN — LEVOTHYROXINE SODIUM 50 MCG: 50 TABLET ORAL at 05:54

## 2018-04-08 RX ADMIN — BUDESONIDE AND FORMOTEROL FUMARATE DIHYDRATE 2 PUFF: 160; 4.5 AEROSOL RESPIRATORY (INHALATION) at 21:08

## 2018-04-08 RX ADMIN — LISINOPRIL 20 MG: 20 TABLET ORAL at 10:38

## 2018-04-08 RX ADMIN — OXYCODONE HYDROCHLORIDE AND ACETAMINOPHEN 500 MG: 500 TABLET ORAL at 10:39

## 2018-04-08 RX ADMIN — OXYCODONE HYDROCHLORIDE 20 MG: 10 TABLET ORAL at 02:09

## 2018-04-08 RX ADMIN — FUROSEMIDE 40 MG: 40 TABLET ORAL at 10:38

## 2018-04-08 RX ADMIN — OMEPRAZOLE 20 MG: 20 CAPSULE, DELAYED RELEASE ORAL at 10:36

## 2018-04-08 RX ADMIN — ASPIRIN 81 MG: 81 TABLET, COATED ORAL at 10:39

## 2018-04-08 RX ADMIN — ENOXAPARIN SODIUM 40 MG: 100 INJECTION SUBCUTANEOUS at 10:39

## 2018-04-08 RX ADMIN — BUDESONIDE AND FORMOTEROL FUMARATE DIHYDRATE 2 PUFF: 160; 4.5 AEROSOL RESPIRATORY (INHALATION) at 10:39

## 2018-04-08 RX ADMIN — ATORVASTATIN CALCIUM 40 MG: 40 TABLET, FILM COATED ORAL at 21:08

## 2018-04-08 RX ADMIN — OXYCODONE HYDROCHLORIDE 20 MG: 10 TABLET ORAL at 19:57

## 2018-04-08 RX ADMIN — METFORMIN HYDROCHLORIDE 500 MG: 500 TABLET, FILM COATED ORAL at 18:42

## 2018-04-08 RX ADMIN — Medication: at 15:00

## 2018-04-08 RX ADMIN — ENOXAPARIN SODIUM 40 MG: 100 INJECTION SUBCUTANEOUS at 21:08

## 2018-04-08 RX ADMIN — OXYCODONE HYDROCHLORIDE 20 MG: 10 TABLET ORAL at 05:54

## 2018-04-08 RX ADMIN — ACETAMINOPHEN 650 MG: 325 TABLET, FILM COATED ORAL at 22:02

## 2018-04-08 RX ADMIN — METFORMIN HYDROCHLORIDE 500 MG: 500 TABLET, FILM COATED ORAL at 10:37

## 2018-04-08 RX ADMIN — THERA TABS 1 TABLET: TAB at 10:36

## 2018-04-08 RX ADMIN — OXYCODONE HYDROCHLORIDE 20 MG: 10 TABLET ORAL at 10:38

## 2018-04-08 ASSESSMENT — ENCOUNTER SYMPTOMS
HEARTBURN: 0
CONSTIPATION: 1
DIZZINESS: 0
CHILLS: 0
NAUSEA: 0
BLOOD IN STOOL: 0
HEMOPTYSIS: 0
BLURRED VISION: 0
WEAKNESS: 1
COUGH: 0
NERVOUS/ANXIOUS: 1
DOUBLE VISION: 0
DIAPHORESIS: 0
FALLS: 0
FEVER: 0
CLAUDICATION: 0
FLANK PAIN: 0
SHORTNESS OF BREATH: 1
MYALGIAS: 0
HEADACHES: 0
BACK PAIN: 0
DEPRESSION: 0
WEIGHT LOSS: 0
DIARRHEA: 0
ABDOMINAL PAIN: 0
WHEEZING: 0
SPUTUM PRODUCTION: 0
VOMITING: 0
NECK PAIN: 0

## 2018-04-08 ASSESSMENT — PAIN SCALES - GENERAL
PAINLEVEL_OUTOF10: 6
PAINLEVEL_OUTOF10: 4
PAINLEVEL_OUTOF10: 9
PAINLEVEL_OUTOF10: 9
PAINLEVEL_OUTOF10: 8
PAINLEVEL_OUTOF10: 9
PAINLEVEL_OUTOF10: 9
PAINLEVEL_OUTOF10: 8
PAINLEVEL_OUTOF10: 0

## 2018-04-08 NOTE — CARE PLAN
Problem: Skin Integrity  Goal: Skin Integrity is maintained or improved    Intervention: TURN EVERY 2 HOURS WHILE ON BEDREST  Full skin assessment completed. Cellulitis/dti on BLE, ulceration on buttock and thigh. Non blanching areas bilateral ears. Mepilex, soft nasal cannula already in place.  Educated patient on importance of turning. q2h turn in place. Verbalizes understanding. Refused cleansing and applying lotion to Bilateral LE. Will continue to monitor.

## 2018-04-08 NOTE — PROGRESS NOTES
Report received at bedside, patient care assumed, tele box off pt is medical. Pt AAO x 4, no signs of distress noted at this time. POC discussed with pt and all questions answered. Pt c/o 8/10 pain in back. Medicated per MAR. Pt denies any additional needs at this time. Bed in lowest position, bed alarm off, pt calls appropriately. Call light and personal possessions within reach. Will continue to monitor.

## 2018-04-08 NOTE — PROGRESS NOTES
Assumed care report received. Pt resting in bed  8/10 generalized. Refused full assessment/turn and medications. Will attempt again after breakfast. Plan of care discussed no other concerns at this time. Call light within reach. Fall precautions in place.

## 2018-04-08 NOTE — PROGRESS NOTES
[facility] Hospitalist Progress Note    Date of Service: 4/8/2018    Chief Complaint  57 y.o. male admitted 3/16/2018 with ongoing cellulitis of bilateral lower extremities with a history of chronic venous stasis and lymphedema. He had acute respiratory distress in ER. S/p ICU stay with MV dependence this hospitalization with treatment for pneumonia and cellulitis.     Interval Problem Update  Patient seen and evaluated on rounds  Feels well  Wants opioid escalated, counseled and educated regarding risks  Remains non motivated. Lack of medical compliance.   Non compliant with nursing intervention, medications use  Counseled and educated.   SW arranging home BIPAP, RUSSELL for caregiver  Weaning Oxycodone, continue current dose for now  Advised if HCO3 improved then can be transitioned back to baseline  Overall difficult disposition  Remains non compliant with medications  Takes them as he wants     Consultants/Specialty  Pulmonology   Palliative care    Disposition  Refused by LTACs  Refused by SNFs  [facility] to do RUSSELL for home caregiver  Arrange home health care  Arrange BIPAP for use at home  Once above arranged can discharge home  Can transfer to medical RNF while disposition is awaited, no telemetry needs at this time      Review of Systems   Constitutional: Positive for malaise/fatigue. Negative for chills, diaphoresis, fever and weight loss.   HENT: Negative for hearing loss and tinnitus.    Eyes: Negative for blurred vision and double vision.   Respiratory: Positive for shortness of breath. Negative for cough, hemoptysis, sputum production and wheezing.    Cardiovascular: Positive for leg swelling. Negative for chest pain and claudication.   Gastrointestinal: Positive for constipation. Negative for abdominal pain, blood in stool, diarrhea, heartburn, melena, nausea and vomiting.   Genitourinary: Negative for dysuria, flank pain, frequency, hematuria and urgency.   Musculoskeletal: Positive for joint pain. Negative for  back pain, falls, myalgias and neck pain.   Skin: Positive for rash. Negative for itching.   Neurological: Positive for weakness. Negative for dizziness and headaches.   Psychiatric/Behavioral: Negative for depression. The patient is nervous/anxious.       Physical Exam  Laboratory/Imaging   Hemodynamics  Temp (24hrs), Av.3 °C (97.4 °F), Min:36.1 °C (97 °F), Max:36.6 °C (97.8 °F)   Temperature: 36.1 °C (97 °F)  Pulse  Av.6  Min: 54  Max: 116    Blood Pressure: 116/60      Respiratory      Respiration: 19, Pulse Oximetry: 92 %        RUL Breath Sounds: Diminished, RML Breath Sounds: Diminished, RLL Breath Sounds: Diminished, LARY Breath Sounds: Diminished, LLL Breath Sounds: Diminished    Fluids    Intake/Output Summary (Last 24 hours) at 18 1611  Last data filed at 18 1400   Gross per 24 hour   Intake                0 ml   Output             2750 ml   Net            -2750 ml       Nutrition  Orders Placed This Encounter   Procedures   • DIET ORDER     Standing Status:   Standing     Number of Occurrences:   1     Order Specific Question:   Diet:     Answer:   Diabetic [3]     Order Specific Question:   Texture/Fiber modifications:     Answer:   Dysphagia 3(Mechanical Soft)specify fluid consistency(question 6) [3]     Order Specific Question:   Consistency/Fluid modifications:     Answer:   Thin Liquids [3]     Comments:   NO straws     Physical Exam   Constitutional: He is oriented to person, place, and time. He appears well-developed. No distress.   Morbidly obese, Body mass index is 42.97 kg/m².   HENT:   Head: Normocephalic.   Mouth/Throat: Oropharynx is clear and moist. No oropharyngeal exudate.   Eyes: Conjunctivae are normal. Pupils are equal, round, and reactive to light. No scleral icterus.   Neck: No JVD present.   Cardiovascular: Normal rate, regular rhythm and normal heart sounds.  Exam reveals no gallop and no friction rub.    No murmur heard.  Tachycardia   Pulmonary/Chest: No  stridor. No respiratory distress. He has decreased breath sounds. He has no wheezes. He has no rhonchi. He has no rales. He exhibits no crepitus.   Diminished in bases. Poor inspiratory effort   Abdominal: Soft. Bowel sounds are normal. He exhibits distension. There is no tenderness. There is no rebound and no guarding.   Musculoskeletal: He exhibits edema (2+ LE). He exhibits no tenderness or deformity.   Neurological: He is alert and oriented to person, place, and time. No cranial nerve deficit.   Skin: Skin is warm and dry. He is not diaphoretic.   Scaly skin on head  Lower extremity wounds  B/L Severe stasis dermatitis   Psychiatric: He has a normal mood and affect. His behavior is normal. Judgment and thought content normal.   Vitals reviewed.          Recent Labs      04/06/18   0239  04/07/18   2108   SODIUM  137  133*   POTASSIUM  4.8  4.7   CHLORIDE  91*  91*   CO2  39*  35*   GLUCOSE  139*  123*   BUN  30*  33*   CREATININE  0.81  0.70   CALCIUM  9.5  9.6             Recent Labs      04/08/18   0340   TRIGLYCERIDE  236*   HDL  29*   LDL  137*          Assessment/Plan     * Acute on chronic respiratory failure with hypoxia and hypercapnia (CMS-HCC)- (present on admission)   Assessment & Plan    Chronic issues, nearly baseline now   Intubated 3-20 extubated 3/25/2018 this hospital stay  Morbid obesity, Body mass index is 42.97 kg/m².  Obesity hypoventilation syndrome / ALIREZA is contributing   Suspect underlying pulmonary hypertension / RV dysfunction  Underlying history of COPD  Non compliance with intervention / medical recommendations & therapy complicates care  Underlying debility with poor inspiratory effort  Narcotic dependence further decreasing inspiratory efforts    Plan:  At least 8 hours of BIPAP therapy at night  Recommend BIPAP twice daily for 1 hours (11am-12 pm) & (3pm-4pm)  Aggressive nursing interventions, RT protocol  Ambulate as able  Continue Lasix, PO 40 daily  Monitor HCO3 on chemistries    Symbicort / Spiriva  Wean off opioids as these are not helping him  Nocturnal desaturation study done, have pulm request outpatient BIPAP  Arrange BIPAP for home    Poor prognosis 2/2 multiple factors listed above most importantly 2/2 patient's personal non compliance and lack of motivation         Severe protein-calorie malnutrition (CMS-HCC)   Assessment & Plan    Evident from SC fat loss and pre albumin levels  Optimize nutrition        Bilateral edema of lower extremity- (present on admission)   Assessment & Plan    Chronic lymphedema and stasis dermatitis  Continue po Lasix  Monitor intake output and fluid status  Ambulation as able  Significant contribution from debility        Diabetes type 2, controlled (CMS-HCC)- (present on admission)   Assessment & Plan    No apparent manifestations  Metformin 1000 mg BID  ASA 81, Atorvastatin 40 mg         Non compliance w medication regimen- (present on admission)   Assessment & Plan    Reinforced compliance   Patient has also been noncompliant with BIPAP as he does not like the feeling  Counseled and educated again  Improving compliance now  Will continue to reinforce care        Narcotic addiction (CMS-HCC)- (present on admission)   Assessment & Plan    Mnimize sedating agents  Wean as not helping respiratory status        Stasis dermatitis- (present on admission)   Assessment & Plan    With MSSA cellulitis this hospital course   Completed antibiotic course   Continue wound care / Skin care  Aggressive nursing interventions        Hypothyroidism- (present on admission)   Assessment & Plan    Synthroid, TSH wnl 03/2018        COPD (chronic obstructive pulmonary disease) (CMS-HCC)- (present on admission)   Assessment & Plan    RT protocol  Pulm on board  No acute exacerbation at this time        HTN (hypertension)- (present on admission)   Assessment & Plan    Stop amlodipine as this will contribute to worsening edema  Lisinopril 20 mg  Titrate therapy as clinically  appropriate        Morbid obesity (CMS-HCC)- (present on admission)   Assessment & Plan    Body mass index is 48.02 kg/m².   Complicated by obstructive sleep apnea and obesity hypoventilation syndrome  Encouraged weight loss          Quality-Core Measures   Reviewed items::  Labs reviewed and Medications reviewed  DVT prophylaxis pharmacological::  Enoxaparin (Lovenox)  Antibiotics:  Treating active infection/contamination beyond 24 hours perioperative coverage

## 2018-04-08 NOTE — PROGRESS NOTES
"Pulmonary Progress Note    Patient ID:   Name:             Fer Estrada     YOB: 1960  Age:                 57 y.o.  male   MRN:               [id number]                                                  Subjective: Continues on supplemental oxygen. Still having problems with ambulation because of his legs. No change in chronic dyspnea.    Interval Changes:was on ventilator x 5 days and extubated on 3/25. Has CO2 retention either from significant COPD and/or obesity hypoventilation syndrome. Has used airway pressures as an therapy for the past several nights sporadically. He is a candidate for home noninvasive nocturnal ventilation when discharged. We have set up a home Trilogy ventilator for when he is discharged    Objective:   Vitals/ General Appearance:   Weight/BMI: Body mass index is 43.04 kg/m².  Blood pressure 116/60, pulse 78, temperature 36.1 °C (97 °F), resp. rate 19, height 1.753 m (5' 9\"), weight (!) 132.2 kg (291 lb 7.2 oz), SpO2 92 %.  Vitals:    04/07/18 1534 04/07/18 2000 04/08/18 0400 04/08/18 0800   BP: (!) 94/52 120/68 129/75 116/60   Pulse: (!) 102 98 80 78   Resp: 18 18 18 19   Temp: 36.7 °C (98 °F) 36.3 °C (97.4 °F) 36.6 °C (97.8 °F) 36.1 °C (97 °F)   SpO2: 91% 94% 98% 92%   Weight:  (!) 132.2 kg (291 lb 7.2 oz)     Height:         Oxygen Therapy:  Pulse Oximetry: 92 %, O2 (LPM): 5, O2 Delivery: Silicone Nasal Cannula    Constitutional:   Well developed, obese, No acute distress  HENMT:  Normocephalic, Atraumatic, Oropharynx moist mucous membranes, No oral exudates, Nose normal.  No thyromegaly.  Eyes:  EOMI, Conjunctiva normal, No discharge.  Neck:  Normal range of motion, No cervical tenderness,  no JVD.  Cardiovascular:  Normal heart rate, Normal rhythm, No murmurs, No rubs, No gallops.   Extremitites with intact distal pulses, no cyanosis, less edema with persistent eliphantiasis of lower extremities  Lungs:  Normal breath sounds, has few rales to auscultation bilaterally on " the basis,   no wheezing.   Abdomen: Bowel sounds normal, Soft, No tenderness, No guarding, No rebound, No masses, No hepatosplenomegaly.  Skin: Warm, Dry, No erythema, No rash, no induration.  Neurologic: Alert & oriented x 3, No focal deficits noted, cranial nerves II through X are grossly intact.  Psychiatric: Affect normal, Judgment normal, Mood normal.    Labs:                  Recent Labs      04/06/18   0239  04/07/18   2108   SODIUM  137  133*   POTASSIUM  4.8  4.7   CHLORIDE  91*  91*   CO2  39*  35*   GLUCOSE  139*  123*   BUN  30*  33*     Recent Labs      04/06/18   0239  04/07/18   2108   SODIUM  137  133*   POTASSIUM  4.8  4.7   CHLORIDE  91*  91*   CO2  39*  35*   BUN  30*  33*   CREATININE  0.81  0.70   CALCIUM  9.5  9.6     No results found for this or any previous visit.      Imaging:   DX-CHEST-PORTABLE (1 VIEW)   Final Result         1. No significant interval change.         DX-ABDOMEN FOR TUBE PLACEMENT   Final Result         Feeding tube with tip projecting over the expected area of the stomach.      DX-CHEST-PORTABLE (1 VIEW)   Final Result         1. Interval removal of right central venous catheter. No other significant interval change.         DX-CHEST-PORTABLE (1 VIEW)   Final Result         1. Interval extubation with lower lung volumes and bibasilar atelectasis.      DX-ABDOMEN FOR TUBE PLACEMENT   Final Result      Feeding tube tip overlies the gastric fundus.      DX-CHEST-PORTABLE (1 VIEW)   Final Result      Stable bibasilar opacities, likely due to a combination of pulmonary edema and atelectasis.      DX-CHEST-PORTABLE (1 VIEW)   Final Result      No significant change compared with 3/23.      [id number] VIEW   Final Result      Nonspecific gaseous distention of colon and small bowel. Adynamic ileus would be a possibility.      DX-CHEST-PORTABLE (1 VIEW)   Final Result      Improving bibasilar opacities as described above.      DX-CHEST-PORTABLE (1 VIEW)   Final Result       Findings consistent with a combination of pulmonary edema and atelectasis, with no significant change.      DX-CHEST-PORTABLE (1 VIEW)   Final Result         1. No significant interval change.      CT-CHEST (THORAX) W/O   Final Result      Patchy left perihilar opacities likely represent pneumonia.      Bilateral lower lobe and right middle lobe opacities likely represent atelectasis/consolidation.      Mild atelectasis versus pneumonitis is seen in the lingula.      Trace pleural fluid bilaterally.      Prominence of the main pulmonary artery compatible with pulmonary arterial hypertension.      Mildly prominent mediastinal and right hilar lymph nodes are nonspecific and may be reactive.      Cardiomegaly.      Follow-up to radiographic resolution is recommended.            DX-CHEST-PORTABLE (1 VIEW)   Final Result      Endotracheal tube projects over the distal trachea 1.8 cm from the phylicia.      Right central line projects over the SVC. No pneumothorax.      Elevation of the right hemidiaphragm with overlying atelectasis.      Retrocardiac opacity may represent atelectasis or consolidation.      Interstitial edema is noted.      Cardiomegaly.         DX-ABDOMEN FOR TUBE PLACEMENT   Final Result      Enteric tube projects over the stomach.      DX-CHEST-PORTABLE (1 VIEW)   Final Result         1. Lower lung volume with hypoventilatory change and scattered bilateral atelectasis.      DX-CHEST-PORTABLE (1 VIEW)   Final Result      Slight increase inflation and improvement of bibasilar atelectasis.      ECHOCARDIOGRAM-COMP W/ CONT   Final Result      LE VENOUS DUPLEX (DVT)   Final Result      DX-CHEST-PORTABLE (1 VIEW)   Final Result      No significant interval change compared to the prior examination. .      DX-CHEST-PORTABLE (1 VIEW)   Final Result      Bibasilar opacities may represent hypoinflation atelectasis. Underlying pleural effusions not excluded.         DX-CHEST-PORTABLE (1 VIEW)   Final Result       1.  Bibasilar atelectasis.      2.  Mild cardiomegaly.          Hospital Medications:    Current Facility-Administered Medications:   •  ascorbic acid (ascorbic acid) tablet 500 mg, 500 mg, Oral, Once, China Anderson M.D.  •  lisinopril (PRINIVIL) tablet 20 mg, 20 mg, Per NG Tube, DAILY, China Anderson M.D., 20 mg at 04/07/18 0926  •  ascorbic acid (ascorbic acid) tablet 500 mg, 500 mg, Oral, TID, China Anderson M.D., 500 mg at 04/07/18 0926  •  aspirin EC (ECOTRIN) tablet 81 mg, 81 mg, Oral, DAILY, China Anderson M.D., 81 mg at 04/07/18 0926  •  metFORMIN (GLUCOPHAGE) tablet 1,000 mg, 1,000 mg, Oral, BID WITH MEALS, China Anderson M.D., 1,000 mg at 04/07/18 0925  •  atorvastatin (LIPITOR) tablet 40 mg, 40 mg, Oral, QHS, China Anderson M.D., 40 mg at 04/07/18 2201  •  polyethylene glycol/lytes (MIRALAX) PACKET 1 Packet, 1 Packet, Oral, BID, China Anderson M.D.  •  senna-docusate (PERICOLACE or SENOKOT S) 8.6-50 MG per tablet 1 Tab, 1 Tab, Oral, BID, China Anderson M.D.  •  multivitamin (THERAGRAN) tablet 1 Tab, 1 Tab, Oral, DAILY, China Anderson M.D., 1 Tab at 04/07/18 0900  •  oxyCODONE immediate release (ROXICODONE) tablet 20 mg, 20 mg, Per NG Tube, Q4HRS PRN, China Anderson M.D., 20 mg at 04/08/18 0554  •  tiotropium (SPIRIVA) 18 MCG inhalation capsule 1 Cap, 1 Cap, Inhalation, QDAILY (RT), China Anderson M.D., Stopped at 04/05/18 0800  •  omeprazole (PRILOSEC) capsule 20 mg, 20 mg, Oral, DAILY, Chris Ching D.O., 20 mg at 04/07/18 0925  •  enoxaparin (LOVENOX) inj 40 mg, 40 mg, Subcutaneous, Q12HRS, Amarjit Martines M.D., 40 mg at 04/07/18 0924  •  budesonide-formoterol (SYMBICORT) 160-4.5 MCG/ACT inhaler 2 Puff, 2 Puff, Inhalation, BID, Baron Sewell M.D., 2 Puff at 04/07/18 0927  •  ammonium lactate (LAC-HYDRIN) 12 % lotion, , Topical, DAILY, Shelley Reyes M.D.  •  furosemide (LASIX) tablet 40 mg, 40 mg, Oral, Q DAY, Chris Ching D.TRACY., 40 mg at 04/07/18 0926  •  Respiratory Care per Protocol, , Nebulization,  Continuous RT, Chris Ching D.O.  •  levothyroxine (SYNTHROID) tablet 50 mcg, 50 mcg, Per NG Tube, AM ES, Chris Ching D.O., 50 mcg at 04/08/18 0554  •  acetaminophen (TYLENOL) tablet 650 mg, 650 mg, Per NG Tube, Q6HRS PRN, Chris Ching D.O., 650 mg at 04/06/18 0116  •  zinc sulfate (ZINCATE) capsule 220 mg, 220 mg, Oral, DAILY, Scott Almodovar D.O., 220 mg at 04/06/18 0724  •  albuterol inhaler 2 Puff, 2 Puff, Inhalation, Q6HRS PRN, Benjie Marcus M.D., 2 Puff at 04/05/18 1233  •  ipratropium-albuterol (DUONEB) nebulizer solution 3 mL, 3 mL, Nebulization, Q4H PRN (RT), Benjie Marcus M.D.    Current Outpatient Medications:  Prescriptions Prior to Admission   Medication Sig Dispense Refill Last Dose   • oxyCODONE (OXY-IR) 30 MG immediate release tablet Take 30 mg by mouth every four hours as needed for Moderate Pain.   3/15/2018 at Unknown time   • METFORMIN HCL PO Take 1 Tab by mouth 2 Times a Day.   3/15/2018 at pm   • ascorbic acid (VITAMIN C) 500 MG tablet Take 1 Tab by mouth every day. 30 Tab 3 3/15/2018 at Unknown time   • Omega-3 Fatty Acids (FISH OIL) 1000 MG Cap capsule Take 1 Cap by mouth 3 times a day, with meals. 90 Cap  3/15/2018 at Unknown time   • levothyroxine (SYNTHROID) 50 MCG Tab Take 50 mcg by mouth Every morning on an empty stomach.   3/15/2018 at unknown   • omeprazole (PRILOSEC) 20 MG delayed-release capsule Take 20 mg by mouth every day.   3/15/2018 at unknown   • acetaminophen (TYLENOL) 325 MG Tab Take 2 Tabs by mouth every 6 hours as needed (Mild Pain; (Pain scale 1-3); Temp greater than 100.5 F). 30 Tab 0 3/11/2018 at Unknown time   • furosemide (LASIX) 40 MG Tab Take 1 Tab by mouth every day. 30 Tab 0 3/15/2018 at unknown   • lisinopril (PRINIVIL) 20 MG Tab Take 0.5 Tabs by mouth every day. 30 Tab 1 3/15/2018 at unknown   • budesonide-formoterol (SYMBICORT) 160-4.5 MCG/ACT Aerosol Inhale 2 Puffs by mouth 2 Times a Day.   3/14/2018 at unknown   • albuterol 108 (90 BASE)  MCG/ACT Aero Soln inhalation aerosol Inhale 2 Puffs by mouth every 6 hours as needed for Shortness of Breath.   3/2/2018 at Unknown time       Medication Allergy:  No Known Allergies    Assessment and Plan:     1.  Acute on chronic hypoxemic and hypercapnic respiratory failure.   Chronic CO2 retention. Patient was on ventilatory support for five days and has been extubated  and now PCO2 has gone up to 74.. Not in any current distress. But obviously could fail and required transfer back to the ICU.  This man will require more than BiPAP. He has advanced lung disease with a component of obesity hypoventilation with significant hypoxemia and hypercarbia. He would benefit immensely from nocturnal noninvasive ventilation to prevent recurrent hospitalization and even death.  We have ordered a home Trilogy ventilator.  He may benefit from long-term acute care rather than skilled nursing considering the severity of his respiratory disease.    -2. Chronic severe lymphedema with stasis dermatitis.     -Continue with wound care treatment.  -Left posterior thigh wound is improved with conservative treatment. No need for I&D.  -Will likely require infectious disease evaluation pending patient's response to ongoing treatment.     3. Type II diabetes mellitus.     -Continue with sliding scale at this time.  -Have elected to begin low-dose steroids to attempt at treating instructed component of his underlying lung disease. He has probable potential worsening of his blood sugar and longer acting insulin such as NPH or Lantus may be considered.     4. Morbid obesity.     -Obstructive sleep apnea will place the patient on BiPAP at night if the patient tolerates.   We'll TRY TO AVOID SEDATION MEDICATIONS    5. Hypertension.     -Under adequate control at this time, though did require intermittent medication for hypertension.  -Continue with oral therapy, with ACE inhibitor but observe for worsening renal function.   -At this time  tolerating.     6. Chronic right bundle-branch block with wide QRS.      -No obvious sign of acute ischemic event although EKG is been ordered as well as troponin.   -Levels were negative overnight.  -Compared to previous EKGs no significant change.     7. COPD.   Continue with albuterol ipratropium nebulizer and maintenance with Symbicort inhaler  -Continue with respiratory therapy protocols and also again to keep saturation above 90%.  -Adding IV steroids for treatment of obstructive disease component.   -May consider as possible rescue therapy.

## 2018-04-09 ENCOUNTER — PATIENT OUTREACH (OUTPATIENT)
Dept: HEALTH INFORMATION MANAGEMENT | Facility: OTHER | Age: 58
End: 2018-04-09

## 2018-04-09 PROCEDURE — A9270 NON-COVERED ITEM OR SERVICE: HCPCS | Performed by: INTERNAL MEDICINE

## 2018-04-09 PROCEDURE — 99232 SBSQ HOSP IP/OBS MODERATE 35: CPT | Performed by: INTERNAL MEDICINE

## 2018-04-09 PROCEDURE — 700111 HCHG RX REV CODE 636 W/ 250 OVERRIDE (IP): Performed by: INTERNAL MEDICINE

## 2018-04-09 PROCEDURE — 97530 THERAPEUTIC ACTIVITIES: CPT

## 2018-04-09 PROCEDURE — 700111 HCHG RX REV CODE 636 W/ 250 OVERRIDE (IP): Performed by: HOSPITALIST

## 2018-04-09 PROCEDURE — A9270 NON-COVERED ITEM OR SERVICE: HCPCS | Performed by: HOSPITALIST

## 2018-04-09 PROCEDURE — 700102 HCHG RX REV CODE 250 W/ 637 OVERRIDE(OP): Performed by: HOSPITALIST

## 2018-04-09 PROCEDURE — 770006 HCHG ROOM/CARE - MED/SURG/GYN SEMI*

## 2018-04-09 PROCEDURE — 700102 HCHG RX REV CODE 250 W/ 637 OVERRIDE(OP): Performed by: INTERNAL MEDICINE

## 2018-04-09 RX ORDER — ONDANSETRON 2 MG/ML
4 INJECTION INTRAMUSCULAR; INTRAVENOUS EVERY 4 HOURS PRN
Status: DISCONTINUED | OUTPATIENT
Start: 2018-04-09 | End: 2018-04-16 | Stop reason: HOSPADM

## 2018-04-09 RX ORDER — TIOTROPIUM BROMIDE 18 UG/1
1 CAPSULE ORAL; RESPIRATORY (INHALATION) DAILY
Status: DISCONTINUED | OUTPATIENT
Start: 2018-04-09 | End: 2018-04-16 | Stop reason: HOSPADM

## 2018-04-09 RX ORDER — OXYCODONE HYDROCHLORIDE 20 MG/1
20 TABLET ORAL EVERY 4 HOURS PRN
Qty: 42 TAB | Refills: 0 | Status: SHIPPED | OUTPATIENT
Start: 2018-04-09 | End: 2018-04-16

## 2018-04-09 RX ORDER — LISINOPRIL 10 MG/1
10 TABLET ORAL DAILY
Status: DISCONTINUED | OUTPATIENT
Start: 2018-04-10 | End: 2018-04-10

## 2018-04-09 RX ORDER — POLYETHYLENE GLYCOL 3350 17 G/17G
17 POWDER, FOR SOLUTION ORAL 2 TIMES DAILY
Qty: 60 EACH | Refills: 0 | Status: SHIPPED | OUTPATIENT
Start: 2018-04-09 | End: 2018-04-24

## 2018-04-09 RX ORDER — ATORVASTATIN CALCIUM 40 MG/1
40 TABLET, FILM COATED ORAL
Qty: 30 TAB | Refills: 0 | Status: SHIPPED | OUTPATIENT
Start: 2018-04-10

## 2018-04-09 RX ORDER — CALCIUM CARBONATE 500 MG/1
1000 TABLET, CHEWABLE ORAL
Status: DISCONTINUED | OUTPATIENT
Start: 2018-04-09 | End: 2018-04-16 | Stop reason: HOSPADM

## 2018-04-09 RX ORDER — AMOXICILLIN 250 MG
1 CAPSULE ORAL 2 TIMES DAILY
Qty: 60 TAB | Refills: 0 | Status: SHIPPED | OUTPATIENT
Start: 2018-04-10

## 2018-04-09 RX ORDER — TIOTROPIUM BROMIDE 18 UG/1
18 CAPSULE ORAL; RESPIRATORY (INHALATION) DAILY
Qty: 30 CAP | Refills: 0 | Status: SHIPPED | OUTPATIENT
Start: 2018-04-10

## 2018-04-09 RX ORDER — ASPIRIN 81 MG/1
81 TABLET ORAL DAILY
Qty: 30 TAB | Refills: 0 | Status: SHIPPED | OUTPATIENT
Start: 2018-04-10

## 2018-04-09 RX ORDER — ZINC SULFATE 50(220)MG
220 CAPSULE ORAL DAILY
Qty: 30 CAP | Refills: 0 | Status: SHIPPED | OUTPATIENT
Start: 2018-04-10

## 2018-04-09 RX ORDER — AMMONIUM LACTATE 12 G/100G
2 LOTION TOPICAL DAILY
Qty: 2 BOTTLE | Refills: 0 | Status: SHIPPED | OUTPATIENT
Start: 2018-04-10

## 2018-04-09 RX ADMIN — METFORMIN HYDROCHLORIDE 500 MG: 500 TABLET, FILM COATED ORAL at 09:35

## 2018-04-09 RX ADMIN — OXYCODONE HYDROCHLORIDE 20 MG: 10 TABLET ORAL at 22:18

## 2018-04-09 RX ADMIN — LISINOPRIL 20 MG: 20 TABLET ORAL at 09:32

## 2018-04-09 RX ADMIN — FUROSEMIDE 40 MG: 40 TABLET ORAL at 09:32

## 2018-04-09 RX ADMIN — ASPIRIN 81 MG: 81 TABLET, COATED ORAL at 09:31

## 2018-04-09 RX ADMIN — OXYCODONE HYDROCHLORIDE AND ACETAMINOPHEN 500 MG: 500 TABLET ORAL at 14:15

## 2018-04-09 RX ADMIN — ANTACID TABLETS 1000 MG: 500 TABLET, CHEWABLE ORAL at 20:14

## 2018-04-09 RX ADMIN — OMEPRAZOLE 20 MG: 20 CAPSULE, DELAYED RELEASE ORAL at 09:32

## 2018-04-09 RX ADMIN — OXYCODONE HYDROCHLORIDE 20 MG: 10 TABLET ORAL at 09:27

## 2018-04-09 RX ADMIN — OXYCODONE HYDROCHLORIDE 20 MG: 10 TABLET ORAL at 01:22

## 2018-04-09 RX ADMIN — THERA TABS 1 TABLET: TAB at 09:32

## 2018-04-09 RX ADMIN — LEVOTHYROXINE SODIUM 50 MCG: 50 TABLET ORAL at 05:40

## 2018-04-09 RX ADMIN — OXYCODONE HYDROCHLORIDE AND ACETAMINOPHEN 500 MG: 500 TABLET ORAL at 09:32

## 2018-04-09 RX ADMIN — ACETAMINOPHEN 650 MG: 325 TABLET, FILM COATED ORAL at 20:14

## 2018-04-09 RX ADMIN — ONDANSETRON 4 MG: 2 INJECTION, SOLUTION INTRAMUSCULAR; INTRAVENOUS at 17:27

## 2018-04-09 RX ADMIN — ENOXAPARIN SODIUM 40 MG: 100 INJECTION SUBCUTANEOUS at 09:37

## 2018-04-09 RX ADMIN — OXYCODONE HYDROCHLORIDE 20 MG: 10 TABLET ORAL at 05:40

## 2018-04-09 RX ADMIN — Medication 220 MG: at 09:31

## 2018-04-09 RX ADMIN — ONDANSETRON 4 MG: 2 INJECTION, SOLUTION INTRAMUSCULAR; INTRAVENOUS at 22:56

## 2018-04-09 RX ADMIN — OXYCODONE HYDROCHLORIDE 20 MG: 10 TABLET ORAL at 13:28

## 2018-04-09 RX ADMIN — BUDESONIDE AND FORMOTEROL FUMARATE DIHYDRATE 2 PUFF: 160; 4.5 AEROSOL RESPIRATORY (INHALATION) at 09:34

## 2018-04-09 RX ADMIN — POLYETHYLENE GLYCOL 3350 1 PACKET: 17 POWDER, FOR SOLUTION ORAL at 09:31

## 2018-04-09 ASSESSMENT — ENCOUNTER SYMPTOMS
NAUSEA: 0
SHORTNESS OF BREATH: 1
MYALGIAS: 0
WHEEZING: 0
BACK PAIN: 0
CLAUDICATION: 0
HEADACHES: 0
FLANK PAIN: 0
SPUTUM PRODUCTION: 0
BLURRED VISION: 0
HEARTBURN: 0
NECK PAIN: 0
WEIGHT LOSS: 0
CHILLS: 0
BLOOD IN STOOL: 0
HEMOPTYSIS: 0
DOUBLE VISION: 0
VOMITING: 0
COUGH: 0
DEPRESSION: 0
DIARRHEA: 0
DIZZINESS: 0
FEVER: 0
FALLS: 0
DIAPHORESIS: 0
NERVOUS/ANXIOUS: 1
ABDOMINAL PAIN: 0
CONSTIPATION: 1
WEAKNESS: 1

## 2018-04-09 ASSESSMENT — COPD QUESTIONNAIRES
COPD SCREENING SCORE: 5
DO YOU EVER COUGH UP ANY MUCUS OR PHLEGM?: NO/ONLY WITH OCCASIONAL COLDS OR INFECTIONS
DURING THE PAST 4 WEEKS HOW MUCH DID YOU FEEL SHORT OF BREATH: SOME OF THE TIME
HAVE YOU SMOKED AT LEAST 100 CIGARETTES IN YOUR ENTIRE LIFE: YES

## 2018-04-09 ASSESSMENT — PATIENT HEALTH QUESTIONNAIRE - PHQ9
1. LITTLE INTEREST OR PLEASURE IN DOING THINGS: NOT AT ALL
SUM OF ALL RESPONSES TO PHQ9 QUESTIONS 1 AND 2: 0
2. FEELING DOWN, DEPRESSED, IRRITABLE, OR HOPELESS: NOT AT ALL
1. LITTLE INTEREST OR PLEASURE IN DOING THINGS: NOT AT ALL
SUM OF ALL RESPONSES TO PHQ9 QUESTIONS 1 AND 2: 0
2. FEELING DOWN, DEPRESSED, IRRITABLE, OR HOPELESS: NOT AT ALL

## 2018-04-09 ASSESSMENT — PAIN SCALES - GENERAL
PAINLEVEL_OUTOF10: 9
PAINLEVEL_OUTOF10: 0
PAINLEVEL_OUTOF10: 0
PAINLEVEL_OUTOF10: 9
PAINLEVEL_OUTOF10: 9
PAINLEVEL_OUTOF10: 4

## 2018-04-09 ASSESSMENT — LIFESTYLE VARIABLES: DO YOU DRINK ALCOHOL: NO

## 2018-04-09 NOTE — DISCHARGE PLANNING
Anticipated Discharge Disposition: Home with 24/7 caregiver through [facility], HH care through [facility], manual 22 in bariatric WC provided through [facility], Triology vent provided through [facility], Home O2 through [facility]    Action: LSW faxed updated written WC order to [facility]. LSW obtained choice for Home O2 through [employer] and LSW faxed choice form to [employer] [facility].    Barriers to Discharge: Pt has appealed discharge through [facility].    Plan: F/u for HH acceptance, O2 delivery , [facility] acceptance of care giving services and [employer] decision of appeal.

## 2018-04-09 NOTE — PROGRESS NOTES
A Trilogy ventilator was prescribed for this man. This man does not primarily have obstructive sleep apnea as a diagnosis.  His primary diagnosis is obesity hypoventilation syndrome with severe chronic obstructive pulmonary disease.  He has acute and chronic respiratory failure. He has chronic CO2 retention.

## 2018-04-09 NOTE — DISCHARGE PLANNING
Anticipated Discharge Disposition: Appeal of Discharge .    Action: LSW met with pt bedside about dc plan. Pt would like to appeal dc. LSW assisted pt in calling [employer] through medicare. LSW given reference number of [id number] .     Barriers to Discharge: Pt does not feel medically safe to go home with current services provided. Pt appealed dc from hospital.    Plan: LSW left VM for Lise at bed about pt appeal of discharge. F/U with [facility] for decision of appeal.

## 2018-04-09 NOTE — DISCHARGE PLANNING
Anticipated Discharge Disposition: Home with 24/7 caregiver, triology machine and HH of [facility].    Action: LSW paged Dr. Song for updated progress note stating sleep apnea is not primary diagnosis for machine. LSW wrote supervisors for 30 day caretaker for pt for 24/7 assistance. Referral sent again to HH of [facility]. LSW also wrote PFA to come screen for medicaid.     Barriers to Discharge: Pt is underinsured. Limited support at home. SNF can not take pt on triology machine.    Plan: Pt to dc home with triology vent,  for PT/OT and 24/7 caregiver for 30 days paid for by [employer].

## 2018-04-09 NOTE — DISCHARGE PLANNING
Spoke with [facility], referral has been re faxed to [phone] as per request.

## 2018-04-09 NOTE — FACE TO FACE
Face to Face Note  -  Durable Medical Equipment    China Anderson M.D. - NPI: [id number]  I certify that this patient is under my care and that they had a durable medical equipment(DME)face to face encounter by myself that meets the physician DME face-to-face encounter requirements with this patient on:    Date of encounter:   Patient:                    MRN:                       YOB: 2019  Fer Estrada  [id number]  1960     The encounter with the patient was in whole, or in part, for the following medical condition, which is the primary reason for durable medical equipment:  COPD    I certify that, based on my findings, the following durable medical equipment is medically necessary:  Oxygen.    HOME O2 Saturation Measurements:(Values must be present for Home Oxygen orders)  Room air sat at rest: 85  Room air sat with amb: 85 (transferring)  With liters of O2: 5, O2 sat at rest with O2: 96  With Liters of O2: 5, O2 sat with amb with O2 : 90  Is the patient mobile?: No    My Clinical findings support the need for the above equipment due to:  Hypoxia    Supporting Symptoms: Chronic hypoxemic RF

## 2018-04-09 NOTE — DISCHARGE PLANNING
Received call from Frederick at [facility] they have accepted patient on service.

## 2018-04-09 NOTE — DISCHARGE PLANNING
Anticipated Discharge Disposition: Home    Action: LSW manually [facility] at [phone]    Barriers to Discharge: right fax and manual fax have continually failed to be sent to HH agency.    Plan: f/u with HH agency for acceptance and recieval of referral.

## 2018-04-09 NOTE — THERAPY
Physical Therapy Treatment completed.   Bed Mobility:  Supine to Sit: Moderate Assist  Transfers: Sit to Stand: Minimal Assist (from w/c height->FWW)  Gait: Level Of Assist: Unable to Participate        Plan of Care: Will benefit from Physical Therapy 3 times per week  Discharge Recommendations: Equipment: Will Continue to Assess for Equipment Needs

## 2018-04-09 NOTE — DISCHARGE PLANNING
Received choice form from YOLETTE Bojorquez.  Referral sent to [facility] at 1350 on 04-09-18

## 2018-04-09 NOTE — PROGRESS NOTES
A&Ox4. Reports moderate BLE pain, medicated per MAR with PO pain medication with good results per pt. BLE remains moderately swollen, discolored and cracking, pt continues to decline to allow this RN to wash and apply ammonium lactate lotion despite extensive education. Adamantly refusing shower, despite education. Remains on 4lpm via nasal canula, continues to c/o SOB on exertion which is baseline. Pt voiced frustration with current mobility status, pt is max assist and unable to perform ADLs self, education done on importance of mobilizing frequently and continue to work with PT for strengthening, pt verbalizes an understanding.

## 2018-04-09 NOTE — WOUND TEAM
Patient seen by wound team per consult order for assessment of bilateral ears. Tops of both ears, beneath oxygen tubing are red, but blanching, skin is intact. Patient is utilizing silicone oxygen tubing with mepilex lite rolled around the tubing. This was removed and gray foam pads applied to oxygen tubing. Patient to keep this in place at all times while using oxygen. Discussed POC with JOSE LUIS Palmer.

## 2018-04-09 NOTE — PROGRESS NOTES
Remains non compliant  [facility] has arranged home BIPAP, care givers, HHC  No further acute inpatient needs. Can be discharged home at this point  Patient wants to appeal his discharge  SW to help with the process    China Anderson M.D.  04/09/18  12:45 PM

## 2018-04-09 NOTE — DISCHARGE PLANNING
Anticipated Discharge Disposition: Bariatric Wc/ motorized WC    Action: LSW met with pt bedside to fill out [employer] application for any other DME needs. LSW explained that a WC is trying to be order for pt and a mobility assessment will be done for motorized WC but in meantime pt will be provided with bariatric manual WC until assessment can be done for motorized. Pt agreeable. LSW to obtain proof of income, address and drivers license so Care chest application can be sent. Care chest can help pt with diabetes education as well and providing pt with help for medicine. Once pt is screened and on medicaid a caregiver can be provided for pt for 40-56 hours a week.     Barriers to Discharge: Pt underinsured. Limited social support.    Plan: get order from doctor for WC.

## 2018-04-09 NOTE — CARE PLAN
Problem: Discharge Barriers/Planning  Goal: Patient's Continuum of care needs are met    Intervention: Identify potential discharge barriers on admission and throughout hospital stay  SW working on complex discharge plan  Pt to discharge 04/10/18 pending  approval      Problem: Pain  Goal: Alleviation of Pain or a reduction in pain to the patient's comfort goal  Outcome: PROGRESSING AS EXPECTED  Pain controlled at this time with PO pain medication per pt  Education done on importance of frequent movement and need to elevate LEs when in bed, pt verbalizes an understanding, but continues to not comply

## 2018-04-10 PROCEDURE — 700111 HCHG RX REV CODE 636 W/ 250 OVERRIDE (IP): Performed by: HOSPITALIST

## 2018-04-10 PROCEDURE — 700102 HCHG RX REV CODE 250 W/ 637 OVERRIDE(OP): Performed by: INTERNAL MEDICINE

## 2018-04-10 PROCEDURE — 700102 HCHG RX REV CODE 250 W/ 637 OVERRIDE(OP): Performed by: HOSPITALIST

## 2018-04-10 PROCEDURE — 770006 HCHG ROOM/CARE - MED/SURG/GYN SEMI*

## 2018-04-10 PROCEDURE — A9270 NON-COVERED ITEM OR SERVICE: HCPCS | Performed by: INTERNAL MEDICINE

## 2018-04-10 PROCEDURE — A9270 NON-COVERED ITEM OR SERVICE: HCPCS | Performed by: HOSPITALIST

## 2018-04-10 PROCEDURE — 99232 SBSQ HOSP IP/OBS MODERATE 35: CPT | Performed by: INTERNAL MEDICINE

## 2018-04-10 RX ORDER — LISINOPRIL 5 MG/1
5 TABLET ORAL DAILY
Status: DISCONTINUED | OUTPATIENT
Start: 2018-04-11 | End: 2018-04-12

## 2018-04-10 RX ADMIN — ASPIRIN 81 MG: 81 TABLET, COATED ORAL at 10:22

## 2018-04-10 RX ADMIN — OXYCODONE HYDROCHLORIDE 20 MG: 10 TABLET ORAL at 05:40

## 2018-04-10 RX ADMIN — Medication: at 10:25

## 2018-04-10 RX ADMIN — OXYCODONE HYDROCHLORIDE 20 MG: 10 TABLET ORAL at 18:58

## 2018-04-10 RX ADMIN — ENOXAPARIN SODIUM 40 MG: 100 INJECTION SUBCUTANEOUS at 10:23

## 2018-04-10 RX ADMIN — Medication 220 MG: at 10:23

## 2018-04-10 RX ADMIN — OXYCODONE HYDROCHLORIDE AND ACETAMINOPHEN 500 MG: 500 TABLET ORAL at 21:46

## 2018-04-10 RX ADMIN — OXYCODONE HYDROCHLORIDE 20 MG: 10 TABLET ORAL at 15:08

## 2018-04-10 RX ADMIN — FUROSEMIDE 40 MG: 40 TABLET ORAL at 10:21

## 2018-04-10 RX ADMIN — ATORVASTATIN CALCIUM 40 MG: 40 TABLET, FILM COATED ORAL at 21:40

## 2018-04-10 RX ADMIN — OXYCODONE HYDROCHLORIDE 20 MG: 10 TABLET ORAL at 10:21

## 2018-04-10 RX ADMIN — ENOXAPARIN SODIUM 40 MG: 100 INJECTION SUBCUTANEOUS at 21:47

## 2018-04-10 RX ADMIN — LEVOTHYROXINE SODIUM 50 MCG: 50 TABLET ORAL at 05:40

## 2018-04-10 RX ADMIN — OXYCODONE HYDROCHLORIDE AND ACETAMINOPHEN 500 MG: 500 TABLET ORAL at 10:23

## 2018-04-10 RX ADMIN — OXYCODONE HYDROCHLORIDE 20 MG: 10 TABLET ORAL at 02:38

## 2018-04-10 RX ADMIN — BUDESONIDE AND FORMOTEROL FUMARATE DIHYDRATE 2 PUFF: 160; 4.5 AEROSOL RESPIRATORY (INHALATION) at 21:46

## 2018-04-10 ASSESSMENT — ENCOUNTER SYMPTOMS
BLOOD IN STOOL: 0
CLAUDICATION: 0
NERVOUS/ANXIOUS: 1
FLANK PAIN: 0
DIZZINESS: 0
SHORTNESS OF BREATH: 1
WHEEZING: 0
SPUTUM PRODUCTION: 0
CONSTIPATION: 1
HEADACHES: 0
FALLS: 0
HEARTBURN: 0
HEMOPTYSIS: 0
BACK PAIN: 0
CHILLS: 0
BLURRED VISION: 0
DIARRHEA: 0
ABDOMINAL PAIN: 0
WEIGHT LOSS: 0
WEAKNESS: 1
DEPRESSION: 0
COUGH: 0
NAUSEA: 0
NECK PAIN: 0
DOUBLE VISION: 0
VOMITING: 0
DIAPHORESIS: 0
FEVER: 0
MYALGIAS: 0

## 2018-04-10 ASSESSMENT — PAIN SCALES - GENERAL
PAINLEVEL_OUTOF10: 9
PAINLEVEL_OUTOF10: 8
PAINLEVEL_OUTOF10: 9
PAINLEVEL_OUTOF10: 8
PAINLEVEL_OUTOF10: 9
PAINLEVEL_OUTOF10: 8
PAINLEVEL_OUTOF10: 8
PAINLEVEL_OUTOF10: 9
PAINLEVEL_OUTOF10: 8

## 2018-04-10 NOTE — DISCHARGE PLANNING
Anticipated Discharge Disposition: Home with Home O2 through [employer], triology [facility], HH through [facility], WC through [employer], 24/7 caregiver through [facility]    Action: Pt has appealed dc. LSW received email from bed day (gala) that [facility] has received appeal paperwork and will get back to bed day this LSW with appeal decision.     Barriers to Discharge: Pt reports he does not have a reclining chair or anywhere to sleep at night.     Plan: LSW to f/u with [facility].

## 2018-04-10 NOTE — PROGRESS NOTES
[facility] Hospitalist Progress Note    Date of Service: 4/9/2018    Chief Complaint  57 y.o. male admitted 3/16/2018 with ongoing cellulitis of bilateral lower extremities with a history of chronic venous stasis and lymphedema. He had acute respiratory distress in ER. S/p ICU stay with MV dependence this hospitalization with treatment for pneumonia and cellulitis.     Interval Problem Update  Patient seen and evaluated on rounds  Feels well  Remains non motivated. Lack of medical compliance.   Would not shower, participate in ambulation  Non compliant with nursing interventions, medication use  Counseled and educated daily  SW arranging home BIPAP, [facility] for caregiver  This has been arranged  Cleared for discharge at this time as no further inpatient needs  Patient is refusing discharge and appealing it with medicare at this time  Slightly lower blood pressures, holding oxycodone and decreased lisinopril  Poor skin care, high risk of infections 2/2 poor hygiene and refusal to participate in care    This patient is at high risk of poor prognosis including death 2.2 to his personal non compliance and lack of motivation and involvement in his personal care at this time    Consultants/Specialty  Pulmonology   Palliative care    Disposition  Refused by LTACs  Refused by SNFs  [facility] to do [facility] for home caregiver  Arrange home health care  Arrange BIPAP for use at home  Once above arranged can discharge home  Can transfer to medical RNF while disposition is awaited, no telemetry needs at this time  Patient has appealed his discharge at this time  Await medicare decision regarding his appeal at this time      Review of Systems   Constitutional: Positive for malaise/fatigue. Negative for chills, diaphoresis, fever and weight loss.   HENT: Negative for hearing loss and tinnitus.    Eyes: Negative for blurred vision and double vision.   Respiratory: Positive for shortness of breath. Negative for cough, hemoptysis, sputum production  and wheezing.    Cardiovascular: Positive for leg swelling. Negative for chest pain and claudication.   Gastrointestinal: Positive for constipation. Negative for abdominal pain, blood in stool, diarrhea, heartburn, melena, nausea and vomiting.   Genitourinary: Negative for dysuria, flank pain, frequency, hematuria and urgency.   Musculoskeletal: Positive for joint pain. Negative for back pain, falls, myalgias and neck pain.   Skin: Positive for rash. Negative for itching.   Neurological: Positive for weakness. Negative for dizziness and headaches.   Psychiatric/Behavioral: Negative for depression. The patient is nervous/anxious.       Physical Exam  Laboratory/Imaging   Hemodynamics  Temp (24hrs), Av.4 °C (97.5 °F), Min:35.9 °C (96.6 °F), Max:36.8 °C (98.3 °F)   Temperature: 36.8 °C (98.3 °F)  Pulse  Av.6  Min: 54  Max: 116    Blood Pressure: (!) 91/63 ( notified)      Respiratory      Respiration: 18, Pulse Oximetry: 92 %     Work Of Breathing / Effort: Mild  RUL Breath Sounds: Diminished, RML Breath Sounds: Diminished, RLL Breath Sounds: Diminished, LARY Breath Sounds: Diminished, LLL Breath Sounds: Diminished    Fluids  No intake or output data in the 24 hours ending 18 2234    Nutrition  Orders Placed This Encounter   Procedures   • DIET ORDER     Standing Status:   Standing     Number of Occurrences:   1     Order Specific Question:   Diet:     Answer:   Diabetic [3]     Order Specific Question:   Texture/Fiber modifications:     Answer:   Dysphagia 3(Mechanical Soft)specify fluid consistency(question 6) [3]     Order Specific Question:   Consistency/Fluid modifications:     Answer:   Thin Liquids [3]     Comments:   NO straws     Physical Exam   Constitutional: He is oriented to person, place, and time. He appears well-developed. No distress.   Morbidly obese, Body mass index is 42.97 kg/m².   HENT:   Head: Normocephalic.   Mouth/Throat: Oropharynx is clear and moist. No oropharyngeal  exudate.   Eyes: Conjunctivae are normal. Pupils are equal, round, and reactive to light. No scleral icterus.   Neck: No JVD present.   Cardiovascular: Normal rate, regular rhythm and normal heart sounds.  Exam reveals no gallop and no friction rub.    No murmur heard.  Tachycardia   Pulmonary/Chest: No stridor. No respiratory distress. He has decreased breath sounds. He has no wheezes. He has no rhonchi. He has no rales. He exhibits no crepitus.   Diminished in bases. Poor inspiratory effort   Abdominal: Soft. Bowel sounds are normal. He exhibits distension. There is no tenderness. There is no rebound and no guarding.   Musculoskeletal: He exhibits edema (2+ LE). He exhibits no tenderness or deformity.   Neurological: He is alert and oriented to person, place, and time. No cranial nerve deficit.   Skin: Skin is warm and dry. He is not diaphoretic.   Scaly skin on head  Lower extremity wounds  B/L Severe stasis dermatitis   Psychiatric: He has a normal mood and affect. His behavior is normal. Judgment and thought content normal.   Vitals reviewed.          Recent Labs      04/07/18   2108   SODIUM  133*   POTASSIUM  4.7   CHLORIDE  91*   CO2  35*   GLUCOSE  123*   BUN  33*   CREATININE  0.70   CALCIUM  9.6             Recent Labs      04/08/18   0340   TRIGLYCERIDE  236*   HDL  29*   LDL  137*          Assessment/Plan     * Acute on chronic respiratory failure with hypoxia and hypercapnia (CMS-HCC)- (present on admission)   Assessment & Plan    Chronic issues, nearly baseline now   Intubated 3-20 extubated 3/25/2018 this hospital stay  Morbid obesity, Body mass index is 42.97 kg/m².  Obesity hypoventilation syndrome / ALIREZA is contributing   Suspect underlying pulmonary hypertension / RV dysfunction  Underlying history of COPD  Non compliance with intervention / medical recommendations & therapy complicates care  Underlying debility with poor inspiratory effort  Narcotic dependence further decreasing inspiratory  efforts    Plan:  At least 8 hours of BIPAP therapy at night  Recommend BIPAP twice daily for 1 hours (11am-12 pm) & (3pm-4pm)  Aggressive nursing interventions, RT protocol  Ambulate as able  Continue Lasix, PO 40 daily  Monitor HCO3 on chemistries   Symbicort / Spiriva  Wean off opioids as these are not helping him  Nocturnal desaturation study done, have pulm request outpatient BIPAP  Arrange BIPAP for home    Poor prognosis 2/2 multiple factors listed above most importantly 2/2 patient's personal non compliance and lack of motivation         Severe protein-calorie malnutrition (CMS-HCC)   Assessment & Plan    Evident from SC fat loss and pre albumin levels  Optimize nutrition        Bilateral edema of lower extremity- (present on admission)   Assessment & Plan    Chronic lymphedema and stasis dermatitis  Continue po Lasix  Monitor intake output and fluid status  Ambulation as able  Significant contribution from debility        Diabetes type 2, controlled (CMS-HCC)- (present on admission)   Assessment & Plan    No apparent manifestations  Metformin 1000 mg BID  ASA 81, Atorvastatin 40 mg         Non compliance w medication regimen- (present on admission)   Assessment & Plan    Reinforced compliance   Patient has also been noncompliant with BIPAP as he does not like the feeling  Counseled and educated again  Improving compliance now  Will continue to reinforce care        Narcotic addiction (CMS-HCC)- (present on admission)   Assessment & Plan    Mnimize sedating agents  Wean as not helping respiratory status        Stasis dermatitis- (present on admission)   Assessment & Plan    With MSSA cellulitis this hospital course   Completed antibiotic course   Continue wound care / Skin care  Aggressive nursing interventions        Hypothyroidism- (present on admission)   Assessment & Plan    Synthroid, TSH wnl 03/2018        COPD (chronic obstructive pulmonary disease) (CMS-HCC)- (present on admission)   Assessment &  Plan    RT protocol  Pulm on board  No acute exacerbation at this time        HTN (hypertension)- (present on admission)   Assessment & Plan    Stop amlodipine as this will contribute to worsening edema  Lisinopril 10 mg as slightly hypotensive with 20 mg  Titrate therapy as clinically appropriate        Morbid obesity (CMS-HCC)- (present on admission)   Assessment & Plan    Body mass index is 48.02 kg/m².   Complicated by obstructive sleep apnea and obesity hypoventilation syndrome  Encouraged weight loss          Quality-Core Measures   Reviewed items::  Labs reviewed and Medications reviewed  DVT prophylaxis pharmacological::  Enoxaparin (Lovenox)  Antibiotics:  Treating active infection/contamination beyond 24 hours perioperative coverage

## 2018-04-10 NOTE — PROGRESS NOTES
Patient resting quietly in bed, no S/S of distress, AA&Ox4. Denies SOB, chest pain, dizziness. No tele as pt is medical status. Call light within reach,  pt calls appropriately and does not get out of bed. Bed in lowest position, bed locked, RN and CNA numbers provided, no further needs at this time. No changes from EPIC. Hourly rounding in place.

## 2018-04-10 NOTE — THERAPY
"Attempted to see pt for Occupational Therapy treatment today. Pt adamantly refused stating, \"I just had therapy yesterday\" . Informed pt, he had Physical Therapy. Educated pt il the difference between OT and PT. Pt continued to state he would not do therapy due to his pain level was 9-10. RN has pre-medicated pt. CNA in room attempting to encourage pt as well. Pt stated again,  adamantly , \"No, not today\". RN informed of OT's attempt and pt's refusal. RN stated pt has been refusing some of his meds as well. Will attempt OT treatment next scheduled OT session as pt is willing to participate.    "

## 2018-04-10 NOTE — PROGRESS NOTES
Report received by dayshift RN. Assumed care of pt. Assessment complete, no telemetry needs at this time. Daughter at bedside. Pt A&Ox4. Pt reports pain 9/10 at this time, but BP is still on the low end (89/49). Oxycodone held per MD orders. Will recheck BP. In the mean time, Tylenol given.

## 2018-04-10 NOTE — DISCHARGE PLANNING
I called to advise ALEXX Maryellen on [facility] and was asked to document in DC planning note for her. Below are CM notes from auth/cert    4/9  3:57 pm  I received a VM from Maryellen COFFEY notifying me of discharge appeal at approx 2 pm. Per website, appeal pending, I called Annabella MOTLEY to check right fax folder for our notification. Once received, I called and spoke to Tanna at [facility] asking how far back they needed records for . She notified me she wanted 4/1 to 4/9 and did not need all physician orders only the admission and discharge orders. I called Maryellen COFFEY asking for IMM, and DND letters. Maryellen attempting to locate or obtain forms. Maryellen able to provide 2nd IMM and DND, the 1st IMM was not located but I advised ALEXX to try and locate it. I faxed a total of 5 faxes including IMM/DND to [phone] via Word/Right Fax. No confirmation available due to source of faxing. I will follow up with [facility] in the morning to confirm all faxes received.     Control ID# [id number]  4/10 1059  I called [facility] and spoke to Veto who reports that he received some of the faxes but each one was cut off. He advises refaxing. I refaxed a total of 11 faxes each 40 pages. I called again and spoke to Barby, advised of above and that the faxes are on their way. She states she will note the file and include my direct line if any aren't received.  4/10 1152  I called [facility] and spoke to Amari who confirms that they have received todays faxed records.  4/10 1524  I called [facility] and per Fiordaliza the case is pending validation (looking through records) then they will forward the case onto a physician. Updated Maryellen COFFEY.

## 2018-04-11 PROCEDURE — 99232 SBSQ HOSP IP/OBS MODERATE 35: CPT | Performed by: INTERNAL MEDICINE

## 2018-04-11 PROCEDURE — 700102 HCHG RX REV CODE 250 W/ 637 OVERRIDE(OP): Performed by: HOSPITALIST

## 2018-04-11 PROCEDURE — 700111 HCHG RX REV CODE 636 W/ 250 OVERRIDE (IP): Performed by: HOSPITALIST

## 2018-04-11 PROCEDURE — 770006 HCHG ROOM/CARE - MED/SURG/GYN SEMI*

## 2018-04-11 PROCEDURE — 92526 ORAL FUNCTION THERAPY: CPT

## 2018-04-11 PROCEDURE — A9270 NON-COVERED ITEM OR SERVICE: HCPCS | Performed by: INTERNAL MEDICINE

## 2018-04-11 PROCEDURE — 700102 HCHG RX REV CODE 250 W/ 637 OVERRIDE(OP): Performed by: INTERNAL MEDICINE

## 2018-04-11 PROCEDURE — A9270 NON-COVERED ITEM OR SERVICE: HCPCS | Performed by: HOSPITALIST

## 2018-04-11 PROCEDURE — G8997 SWALLOW GOAL STATUS: HCPCS | Mod: CH

## 2018-04-11 PROCEDURE — G8998 SWALLOW D/C STATUS: HCPCS | Mod: CH

## 2018-04-11 PROCEDURE — 700111 HCHG RX REV CODE 636 W/ 250 OVERRIDE (IP): Performed by: INTERNAL MEDICINE

## 2018-04-11 RX ADMIN — ACETAMINOPHEN 650 MG: 325 TABLET, FILM COATED ORAL at 02:31

## 2018-04-11 RX ADMIN — LEVOTHYROXINE SODIUM 50 MCG: 50 TABLET ORAL at 06:34

## 2018-04-11 RX ADMIN — Medication: at 09:21

## 2018-04-11 RX ADMIN — BUDESONIDE AND FORMOTEROL FUMARATE DIHYDRATE 2 PUFF: 160; 4.5 AEROSOL RESPIRATORY (INHALATION) at 09:19

## 2018-04-11 RX ADMIN — OXYCODONE HYDROCHLORIDE AND ACETAMINOPHEN 500 MG: 500 TABLET ORAL at 09:18

## 2018-04-11 RX ADMIN — OXYCODONE HYDROCHLORIDE 20 MG: 10 TABLET ORAL at 04:47

## 2018-04-11 RX ADMIN — OXYCODONE HYDROCHLORIDE 20 MG: 10 TABLET ORAL at 00:28

## 2018-04-11 RX ADMIN — OXYCODONE HYDROCHLORIDE 20 MG: 10 TABLET ORAL at 09:18

## 2018-04-11 RX ADMIN — OXYCODONE HYDROCHLORIDE 20 MG: 10 TABLET ORAL at 14:01

## 2018-04-11 RX ADMIN — ACETAMINOPHEN 650 MG: 325 TABLET, FILM COATED ORAL at 21:35

## 2018-04-11 RX ADMIN — Medication 220 MG: at 09:19

## 2018-04-11 RX ADMIN — OXYCODONE HYDROCHLORIDE 20 MG: 10 TABLET ORAL at 18:50

## 2018-04-11 RX ADMIN — ONDANSETRON 4 MG: 2 INJECTION, SOLUTION INTRAMUSCULAR; INTRAVENOUS at 15:56

## 2018-04-11 RX ADMIN — METFORMIN HYDROCHLORIDE 1000 MG: 500 TABLET, FILM COATED ORAL at 09:19

## 2018-04-11 RX ADMIN — FUROSEMIDE 40 MG: 40 TABLET ORAL at 09:19

## 2018-04-11 RX ADMIN — ENOXAPARIN SODIUM 40 MG: 100 INJECTION SUBCUTANEOUS at 09:19

## 2018-04-11 RX ADMIN — ASPIRIN 81 MG: 81 TABLET, COATED ORAL at 09:18

## 2018-04-11 RX ADMIN — OMEPRAZOLE 20 MG: 20 CAPSULE, DELAYED RELEASE ORAL at 09:18

## 2018-04-11 RX ADMIN — OXYCODONE HYDROCHLORIDE 20 MG: 10 TABLET ORAL at 22:49

## 2018-04-11 ASSESSMENT — ENCOUNTER SYMPTOMS
WHEEZING: 0
CLAUDICATION: 0
NECK PAIN: 0
DIARRHEA: 0
FEVER: 0
WEIGHT LOSS: 0
BLURRED VISION: 0
WEAKNESS: 1
FALLS: 0
DOUBLE VISION: 0
BLOOD IN STOOL: 0
CHILLS: 0
BACK PAIN: 0
DIZZINESS: 0
VOMITING: 0
DIAPHORESIS: 0
DEPRESSION: 0
FLANK PAIN: 0
MYALGIAS: 0
SHORTNESS OF BREATH: 1
NERVOUS/ANXIOUS: 1
CONSTIPATION: 1
NAUSEA: 0
HEADACHES: 0
SPUTUM PRODUCTION: 0
COUGH: 0
ABDOMINAL PAIN: 0
HEARTBURN: 0
HEMOPTYSIS: 0

## 2018-04-11 ASSESSMENT — PAIN SCALES - GENERAL
PAINLEVEL_OUTOF10: 8

## 2018-04-11 NOTE — CARE PLAN
Problem: Safety  Goal: Will remain free from falls  Outcome: PROGRESSING AS EXPECTED  Fall risk assessed, pt educated. Bed in lowest and locked position, call light and personal possessions within reach, frequent rounding in place.    Problem: Knowledge Deficit  Goal: Knowledge of disease process/condition, treatment plan, diagnostic tests, and medications will improve  Outcome: PROGRESSING AS EXPECTED  Pt educated about medications with each administration. All questions answered.

## 2018-04-11 NOTE — DISCHARGE PLANNING
Called [facility] spoke to Jenniffer to check status. At this time the case is pending outcome calls and we should know the determination this afternoon. Will continue to follow for determination.

## 2018-04-11 NOTE — PROGRESS NOTES
[facility] Hospitalist Progress Note    Date of Service: 4/10/2018    Chief Complaint  57 y.o. male admitted 3/16/2018 with ongoing cellulitis of bilateral lower extremities with a history of chronic venous stasis and lymphedema. He had acute respiratory distress in ER. S/p ICU stay with MV dependence this hospitalization with treatment for pneumonia and cellulitis. He has chronic respiratory failure that is multifactorial however largely due to medical noncompliance    Interval Problem Update  Remains non motivated. Lack of medical compliance.   Would not shower, participate in ambulation  Non compliant with nursing interventions, medication use  Counseled and educated daily  SW arranging home BIPAP, RUSSELL for caregiver  Cleared for DC at this time as no further inpatient needs, he has 24-hour care at home  Patient is refusing discharge and appealing it with medicare at this time  Slightly lower blood pressures, holding oxycodone and decreased lisinopril  Poor skin care, high risk of infections 2/2 poor hygiene and refusal to participate in care    This patient is at high risk of poor prognosis including death 2.2 to his personal non compliance and lack of motivation and involvement in his personal care at this time    Consultants/Specialty  Pulmonology   Palliative care    Disposition  Refused by LTACs  Refused by SNFs  [facility] to do RUSSELL for home caregiver  Arrange home health care  Arrange BIPAP for use at home  Once above arranged can discharge home  Can transfer to medical RNF while disposition is awaited, no telemetry needs at this time  Patient has appealed his discharge at this time  Await medicare decision regarding his appeal at this time      Review of Systems   Constitutional: Positive for malaise/fatigue. Negative for chills, diaphoresis, fever and weight loss.   HENT: Negative for hearing loss and tinnitus.    Eyes: Negative for blurred vision and double vision.   Respiratory: Positive for shortness of  breath. Negative for cough, hemoptysis, sputum production and wheezing.    Cardiovascular: Positive for leg swelling. Negative for chest pain and claudication.   Gastrointestinal: Positive for constipation. Negative for abdominal pain, blood in stool, diarrhea, heartburn, melena, nausea and vomiting.   Genitourinary: Negative for dysuria, flank pain, frequency, hematuria and urgency.   Musculoskeletal: Positive for joint pain. Negative for back pain, falls, myalgias and neck pain.   Skin: Positive for rash. Negative for itching.   Neurological: Positive for weakness. Negative for dizziness and headaches.   Psychiatric/Behavioral: Negative for depression. The patient is nervous/anxious.       Physical Exam  Laboratory/Imaging   Hemodynamics  Temp (24hrs), Av.4 °C (97.6 °F), Min:36.2 °C (97.2 °F), Max:36.8 °C (98.2 °F)   Temperature: 36.4 °C (97.6 °F)  Pulse  Av.7  Min: 54  Max: 116    Blood Pressure: (!) 97/67      Respiratory      Respiration: 17, Pulse Oximetry: 95 %     Work Of Breathing / Effort: Mild  RUL Breath Sounds: Diminished, RML Breath Sounds: Diminished, RLL Breath Sounds: Diminished, LARY Breath Sounds: Diminished, LLL Breath Sounds: Diminished    Fluids    Intake/Output Summary (Last 24 hours) at 04/10/18 1738  Last data filed at 04/10/18 1031   Gross per 24 hour   Intake              730 ml   Output              150 ml   Net              580 ml       Nutrition  Orders Placed This Encounter   Procedures   • DIET ORDER     Standing Status:   Standing     Number of Occurrences:   1     Order Specific Question:   Diet:     Answer:   Diabetic [3]     Order Specific Question:   Texture/Fiber modifications:     Answer:   Dysphagia 3(Mechanical Soft)specify fluid consistency(question 6) [3]     Order Specific Question:   Consistency/Fluid modifications:     Answer:   Thin Liquids [3]     Comments:   NO straws     Physical Exam   Constitutional: He is oriented to person, place, and time. He appears  well-developed. No distress.   Morbidly obese, Body mass index is 42.97 kg/m².   HENT:   Head: Normocephalic.   Mouth/Throat: Oropharynx is clear and moist. No oropharyngeal exudate.   Eyes: Conjunctivae are normal. Pupils are equal, round, and reactive to light. No scleral icterus.   Neck: No JVD present.   Cardiovascular: Normal rate, regular rhythm and normal heart sounds.  Exam reveals no gallop and no friction rub.    No murmur heard.  Pulmonary/Chest: No stridor. No respiratory distress. He has decreased breath sounds. He has no wheezes. He has no rhonchi. He has no rales. He exhibits no crepitus.   Diminished in bases. Poor inspiratory effort   Abdominal: Soft. Bowel sounds are normal. He exhibits distension. There is no tenderness. There is no rebound and no guarding.   Musculoskeletal: He exhibits edema (2+ LE). He exhibits no tenderness or deformity.   Neurological: He is alert and oriented to person, place, and time. No cranial nerve deficit.   Skin: Skin is warm and dry. He is not diaphoretic.   Scaly skin on head  Lower extremity wounds  B/L Severe stasis dermatitis, thick flaking skin   Psychiatric: He has a normal mood and affect. His behavior is normal. Judgment and thought content normal.   Vitals reviewed.          Recent Labs      04/07/18   2108   SODIUM  133*   POTASSIUM  4.7   CHLORIDE  91*   CO2  35*   GLUCOSE  123*   BUN  33*   CREATININE  0.70   CALCIUM  9.6             Recent Labs      04/08/18   0340   TRIGLYCERIDE  236*   HDL  29*   LDL  137*          Assessment/Plan     * Acute on chronic respiratory failure with hypoxia and hypercapnia (CMS-[facility])- (present on admission)   Assessment & Plan    Chronic issues, nearly baseline now  Intubated 3-20 extubated 3/25/2018 this hospital stay  Morbid obesity, Body mass index is 42.97 kg/m².  Obesity hypoventilation syndrome / ALIREZA is contributing   Suspect underlying pulmonary hypertension / RV dysfunction  Underlying history of COPD  Non compliance  with intervention / medical recommendations & therapy complicates care  Underlying debility with poor inspiratory effort  Narcotic dependence further decreasing inspiratory efforts    Plan:  At least 8 hours of BIPAP therapy at night  Recommend BIPAP twice daily for 1 hours (11am-12 pm) & (3pm-4pm)  Aggressive nursing interventions, RT protocol  Ambulate as able  Continue Lasix, PO 40 daily  Monitor HCO3 on chemistries   Symbicort / Spiriva  Wean off opioids as these are not helping him  Nocturnal desaturation study done, have pulm request outpatient BIPAP  Arrange BIPAP for home    Poor prognosis 2/2 multiple factors listed above most importantly 2/2 patient's personal non compliance and lack of motivation         Severe protein-calorie malnutrition (CMS-HCC)   Assessment & Plan    Evident from SC fat loss and pre albumin levels  Optimize nutrition        Bilateral edema of lower extremity- (present on admission)   Assessment & Plan    Chronic lymphedema and stasis dermatitis  Continue po Lasix  Monitor intake output and fluid status  Ambulation as able  Significant contribution from debility        Diabetes type 2, controlled (CMS-HCC)- (present on admission)   Assessment & Plan    No apparent manifestations  Metformin 1000 mg BID  ASA 81, Atorvastatin 40 mg         Non compliance w medication regimen- (present on admission)   Assessment & Plan    Reinforced compliance   Patient has also been noncompliant with BIPAP as he does not like the feeling  Counseled and educated again  Improving compliance now  Will continue to reinforce care        Narcotic addiction (CMS-HCC)- (present on admission)   Assessment & Plan    Mnimize sedating agents  Wean as not helping respiratory status        Stasis dermatitis- (present on admission)   Assessment & Plan    With MSSA cellulitis this hospital course   Completed antibiotic course   Continue wound care / Skin care- he has extensive dry skin, however refuses showering and  hygiene.  Aggressive nursing interventions  He has a 24-hour caregiver at home who can assist PT, OT and wound care.        Hypothyroidism- (present on admission)   Assessment & Plan    Synthroid, TSH wnl 03/2018        COPD (chronic obstructive pulmonary disease) (CMS-HCC)- (present on admission)   Assessment & Plan    RT protocol  Pulm on board  No acute exacerbation at this time        HTN (hypertension)- (present on admission)   Assessment & Plan    Stop amlodipine as this will contribute to worsening edema  Lisinopril 10 mg as slightly hypotensive with 20 mg  Titrate therapy as clinically appropriate        Morbid obesity (CMS-HCC)- (present on admission)   Assessment & Plan    Body mass index is 48.02 kg/m².   Complicated by obstructive sleep apnea and obesity hypoventilation syndrome  Encouraged weight loss          Quality-Core Measures   Reviewed items::  Labs reviewed and Medications reviewed  DVT prophylaxis pharmacological::  Enoxaparin (Lovenox)  : Has completed abx course.

## 2018-04-11 NOTE — PROGRESS NOTES
Report received at bedside, patient care assumed, tele box off pt is medical. Pt AAO x 4, no signs of distress noted at this time. POC discussed with pt and all questions answered. Pt c/o 8/10 pain in bilat legs. Repositioned for comfort. Pt denies any additional needs at this time. Bed in lowest position, bed alarm off, pt calls appropriately. Call light and personal possessions within reach. Will continue to monitor.

## 2018-04-11 NOTE — THERAPY
"Attempted to see pt for Occupational Therapy therapy treatment today . Pt refused stating, \"I just got up to the chair and you are asking me to do more than I can do\". \"I need to rest\". \" I'm not strong enough\" I\"m too weak\". Informed pt that is why therapy is trying to work with him to increase general endurance and strength. Pt referred OT to the RN stating, \"Go talk to her\"; \"She wants to talk with you\". RN stated she did not need to speak to OT and that pt is consistently refusing activity. Spoke with OTR about dropping frequency for OT treatment due to pt is non-compliant with OT POC. RN informed of refusal. Will continue OT tx as per plan  1X a week as pt is willing to participate.    "

## 2018-04-11 NOTE — THERAPY
"Speech Language Therapy dysphagia treatment completed.   Functional Status: Patient awake, alert, and seen with Dys3/thin liquid diet breakfast tray. Food was noted to be chopped up, but patient reported he requested this d/t weakness in BUE. Patient seen with Dys3/thin liquid breakfast. Patient consumed approximately 90% of breakfast tray with this clinician present. Patient also consumed thins via straw and meds whole w/ thin liquid wash. Patient independently sat HOB up, consumed small bites/sips, and utilized slow feeding rate. Patient reported he prefers this diet, as it is mechanical soft and his baseline. Patient had throat clear x2 during tx session, both before PO trials and during, but throat clear did not appear related to PO intake and patient reported \"I do this all the time.\" Laryngeal elevation palpated as complete. Initiation of swallow trigger was timely. At this time, patient appears at the level to continue Dys3/thin liquid diet with use of swallow precautions. Patient does not appear to require any further acute SLP services, as he reports this is his baseline diet. Patient will be discharged from SLP services 2/2 goals met. Patient educated on dysphagia, s/sx of aspiration, risks for pneumonia, SLP recs, and to inform RN or MD if any dysphagia arises. Patient verbalized understanding of all education. RN present for part of session and aware. Please re-consult SLP if needed.     Recommendations: At this time, patient appears at the level to continue Dys3/thin liquid diet with use of swallow precautions. Patient does not appear to require any further acute SLP services, as he reports this is his baseline diet. Patient will be discharged from SLP services 2/2 goals met. Please re-consult SLP if necessary.   Plan of Care: Patient with no further skilled SLP needs in the acute care setting at this time  Post-Acute Therapy: Currently anticipate no further skilled therapy needs once patient is " "discharged from the inpatient setting.    See \"Rehab Therapy-Acute\" Patient Summary Report for complete documentation.     "

## 2018-04-11 NOTE — DISCHARGE PLANNING
Received JAMAL from Justine Ordoñez. She reports that the outcome of this discharge appeal per the physician advisor is that they agree with the termination of services effective 4/11/18 and the patients date of liability begins 4/12/18. Griffin Ordoñez states she will contact the patient directly. Left VM with ALEXX Bojorquez.

## 2018-04-12 LAB
ALBUMIN SERPL BCP-MCNC: 3.7 G/DL (ref 3.2–4.9)
ALBUMIN SERPL BCP-MCNC: 4 G/DL (ref 3.2–4.9)
BASOPHILS # BLD AUTO: 0.6 % (ref 0–1.8)
BASOPHILS # BLD: 0.06 K/UL (ref 0–0.12)
BNP SERPL-MCNC: 3 PG/ML (ref 0–100)
BUN SERPL-MCNC: 76 MG/DL (ref 8–22)
BUN SERPL-MCNC: 78 MG/DL (ref 8–22)
CALCIUM SERPL-MCNC: 9.6 MG/DL (ref 8.5–10.5)
CALCIUM SERPL-MCNC: 9.7 MG/DL (ref 8.5–10.5)
CHLORIDE SERPL-SCNC: 88 MMOL/L (ref 96–112)
CHLORIDE SERPL-SCNC: 89 MMOL/L (ref 96–112)
CO2 SERPL-SCNC: 29 MMOL/L (ref 20–33)
CO2 SERPL-SCNC: 30 MMOL/L (ref 20–33)
CREAT SERPL-MCNC: 2.64 MG/DL (ref 0.5–1.4)
CREAT SERPL-MCNC: 2.8 MG/DL (ref 0.5–1.4)
EOSINOPHIL # BLD AUTO: 0.32 K/UL (ref 0–0.51)
EOSINOPHIL NFR BLD: 3.3 % (ref 0–6.9)
ERYTHROCYTE [DISTWIDTH] IN BLOOD BY AUTOMATED COUNT: 49.7 FL (ref 35.9–50)
GLUCOSE SERPL-MCNC: 129 MG/DL (ref 65–99)
GLUCOSE SERPL-MCNC: 194 MG/DL (ref 65–99)
HCT VFR BLD AUTO: 40.4 % (ref 42–52)
HGB BLD-MCNC: 12.6 G/DL (ref 14–18)
IMM GRANULOCYTES # BLD AUTO: 0.15 K/UL (ref 0–0.11)
IMM GRANULOCYTES NFR BLD AUTO: 1.6 % (ref 0–0.9)
LYMPHOCYTES # BLD AUTO: 1.79 K/UL (ref 1–4.8)
LYMPHOCYTES NFR BLD: 18.7 % (ref 22–41)
MCH RBC QN AUTO: 28.3 PG (ref 27–33)
MCHC RBC AUTO-ENTMCNC: 31.2 G/DL (ref 33.7–35.3)
MCV RBC AUTO: 90.8 FL (ref 81.4–97.8)
MONOCYTES # BLD AUTO: 0.77 K/UL (ref 0–0.85)
MONOCYTES NFR BLD AUTO: 8 % (ref 0–13.4)
NEUTROPHILS # BLD AUTO: 6.48 K/UL (ref 1.82–7.42)
NEUTROPHILS NFR BLD: 67.8 % (ref 44–72)
NRBC # BLD AUTO: 0 K/UL
NRBC BLD-RTO: 0 /100 WBC
PHOSPHATE SERPL-MCNC: 3.8 MG/DL (ref 2.5–4.5)
PHOSPHATE SERPL-MCNC: 4.2 MG/DL (ref 2.5–4.5)
PLATELET # BLD AUTO: 184 K/UL (ref 164–446)
PMV BLD AUTO: 12.8 FL (ref 9–12.9)
POTASSIUM SERPL-SCNC: 5.5 MMOL/L (ref 3.6–5.5)
POTASSIUM SERPL-SCNC: 5.7 MMOL/L (ref 3.6–5.5)
POTASSIUM SERPL-SCNC: 6.3 MMOL/L (ref 3.6–5.5)
RBC # BLD AUTO: 4.45 M/UL (ref 4.7–6.1)
SODIUM SERPL-SCNC: 129 MMOL/L (ref 135–145)
SODIUM SERPL-SCNC: 131 MMOL/L (ref 135–145)
WBC # BLD AUTO: 9.6 K/UL (ref 4.8–10.8)

## 2018-04-12 PROCEDURE — 700102 HCHG RX REV CODE 250 W/ 637 OVERRIDE(OP): Performed by: INTERNAL MEDICINE

## 2018-04-12 PROCEDURE — 36415 COLL VENOUS BLD VENIPUNCTURE: CPT

## 2018-04-12 PROCEDURE — 83880 ASSAY OF NATRIURETIC PEPTIDE: CPT

## 2018-04-12 PROCEDURE — A9270 NON-COVERED ITEM OR SERVICE: HCPCS | Performed by: INTERNAL MEDICINE

## 2018-04-12 PROCEDURE — 82962 GLUCOSE BLOOD TEST: CPT

## 2018-04-12 PROCEDURE — 94760 N-INVAS EAR/PLS OXIMETRY 1: CPT

## 2018-04-12 PROCEDURE — A9270 NON-COVERED ITEM OR SERVICE: HCPCS | Performed by: HOSPITALIST

## 2018-04-12 PROCEDURE — 94640 AIRWAY INHALATION TREATMENT: CPT

## 2018-04-12 PROCEDURE — 700105 HCHG RX REV CODE 258: Performed by: STUDENT IN AN ORGANIZED HEALTH CARE EDUCATION/TRAINING PROGRAM

## 2018-04-12 PROCEDURE — 80069 RENAL FUNCTION PANEL: CPT | Mod: 91

## 2018-04-12 PROCEDURE — 99233 SBSQ HOSP IP/OBS HIGH 50: CPT | Performed by: INTERNAL MEDICINE

## 2018-04-12 PROCEDURE — 700111 HCHG RX REV CODE 636 W/ 250 OVERRIDE (IP): Performed by: INTERNAL MEDICINE

## 2018-04-12 PROCEDURE — 700111 HCHG RX REV CODE 636 W/ 250 OVERRIDE (IP): Performed by: HOSPITALIST

## 2018-04-12 PROCEDURE — 85025 COMPLETE CBC W/AUTO DIFF WBC: CPT

## 2018-04-12 PROCEDURE — 360977 HOYER XLARGE SLING FROM 301-500LBS: Performed by: INTERNAL MEDICINE

## 2018-04-12 PROCEDURE — 700101 HCHG RX REV CODE 250: Performed by: STUDENT IN AN ORGANIZED HEALTH CARE EDUCATION/TRAINING PROGRAM

## 2018-04-12 PROCEDURE — 700105 HCHG RX REV CODE 258: Performed by: HOSPITALIST

## 2018-04-12 PROCEDURE — 700102 HCHG RX REV CODE 250 W/ 637 OVERRIDE(OP): Performed by: HOSPITALIST

## 2018-04-12 PROCEDURE — 700102 HCHG RX REV CODE 250 W/ 637 OVERRIDE(OP): Performed by: STUDENT IN AN ORGANIZED HEALTH CARE EDUCATION/TRAINING PROGRAM

## 2018-04-12 PROCEDURE — 700105 HCHG RX REV CODE 258: Performed by: INTERNAL MEDICINE

## 2018-04-12 PROCEDURE — 84132 ASSAY OF SERUM POTASSIUM: CPT

## 2018-04-12 PROCEDURE — 700111 HCHG RX REV CODE 636 W/ 250 OVERRIDE (IP): Performed by: STUDENT IN AN ORGANIZED HEALTH CARE EDUCATION/TRAINING PROGRAM

## 2018-04-12 PROCEDURE — 770006 HCHG ROOM/CARE - MED/SURG/GYN SEMI*

## 2018-04-12 RX ORDER — SODIUM CHLORIDE 9 MG/ML
500 INJECTION, SOLUTION INTRAVENOUS ONCE
Status: COMPLETED | OUTPATIENT
Start: 2018-04-12 | End: 2018-04-12

## 2018-04-12 RX ORDER — SODIUM CHLORIDE 9 MG/ML
500 INJECTION, SOLUTION INTRAVENOUS ONCE
Status: DISCONTINUED | OUTPATIENT
Start: 2018-04-12 | End: 2018-04-12

## 2018-04-12 RX ORDER — SODIUM CHLORIDE 9 MG/ML
INJECTION, SOLUTION INTRAVENOUS
Status: ACTIVE
Start: 2018-04-12 | End: 2018-04-12

## 2018-04-12 RX ORDER — HEPARIN SODIUM 5000 [USP'U]/ML
5000 INJECTION, SOLUTION INTRAVENOUS; SUBCUTANEOUS EVERY 8 HOURS
Status: DISCONTINUED | OUTPATIENT
Start: 2018-04-12 | End: 2018-04-16 | Stop reason: HOSPADM

## 2018-04-12 RX ORDER — SODIUM CHLORIDE 9 MG/ML
INJECTION, SOLUTION INTRAVENOUS
Status: COMPLETED | OUTPATIENT
Start: 2018-04-12 | End: 2018-04-12

## 2018-04-12 RX ORDER — SODIUM POLYSTYRENE SULFONATE 15 G/60ML
15 SUSPENSION ORAL; RECTAL ONCE
Status: DISCONTINUED | OUTPATIENT
Start: 2018-04-12 | End: 2018-04-12

## 2018-04-12 RX ORDER — SODIUM POLYSTYRENE SULFONATE 15 G/60ML
15 SUSPENSION ORAL; RECTAL ONCE
Status: DISPENSED | OUTPATIENT
Start: 2018-04-12 | End: 2018-04-13

## 2018-04-12 RX ORDER — DEXTROSE MONOHYDRATE 25 G/50ML
50 INJECTION, SOLUTION INTRAVENOUS ONCE
Status: DISCONTINUED | OUTPATIENT
Start: 2018-04-12 | End: 2018-04-12

## 2018-04-12 RX ORDER — SODIUM CHLORIDE 9 MG/ML
INJECTION, SOLUTION INTRAVENOUS CONTINUOUS
Status: DISCONTINUED | OUTPATIENT
Start: 2018-04-12 | End: 2018-04-14

## 2018-04-12 RX ORDER — DEXTROSE MONOHYDRATE 25 G/50ML
25 INJECTION, SOLUTION INTRAVENOUS ONCE
Status: COMPLETED | OUTPATIENT
Start: 2018-04-12 | End: 2018-04-12

## 2018-04-12 RX ADMIN — LEVOTHYROXINE SODIUM 50 MCG: 50 TABLET ORAL at 05:51

## 2018-04-12 RX ADMIN — ALBUTEROL SULFATE 10 MG: 5 SOLUTION RESPIRATORY (INHALATION) at 08:19

## 2018-04-12 RX ADMIN — DEXTROSE MONOHYDRATE 50 ML: 25 INJECTION, SOLUTION INTRAVENOUS at 05:51

## 2018-04-12 RX ADMIN — SODIUM CHLORIDE 500 ML: 9 INJECTION, SOLUTION INTRAVENOUS at 11:35

## 2018-04-12 RX ADMIN — ATORVASTATIN CALCIUM 40 MG: 40 TABLET, FILM COATED ORAL at 20:29

## 2018-04-12 RX ADMIN — OXYCODONE HYDROCHLORIDE 20 MG: 10 TABLET ORAL at 15:53

## 2018-04-12 RX ADMIN — OXYCODONE HYDROCHLORIDE 20 MG: 10 TABLET ORAL at 20:29

## 2018-04-12 RX ADMIN — OMEPRAZOLE 20 MG: 20 CAPSULE, DELAYED RELEASE ORAL at 10:45

## 2018-04-12 RX ADMIN — OXYCODONE HYDROCHLORIDE AND ACETAMINOPHEN 500 MG: 500 TABLET ORAL at 20:29

## 2018-04-12 RX ADMIN — ENOXAPARIN SODIUM 40 MG: 100 INJECTION SUBCUTANEOUS at 10:45

## 2018-04-12 RX ADMIN — Medication 220 MG: at 10:46

## 2018-04-12 RX ADMIN — CALCIUM GLUCONATE 1000 MG: 94 INJECTION, SOLUTION INTRAVENOUS at 05:50

## 2018-04-12 RX ADMIN — ASPIRIN 81 MG: 81 TABLET, COATED ORAL at 10:46

## 2018-04-12 RX ADMIN — SODIUM CHLORIDE 500 ML: 9 INJECTION, SOLUTION INTRAVENOUS at 09:30

## 2018-04-12 RX ADMIN — SODIUM CHLORIDE: 9 INJECTION, SOLUTION INTRAVENOUS at 20:36

## 2018-04-12 RX ADMIN — INSULIN HUMAN 10 UNITS: 100 INJECTION, SOLUTION PARENTERAL at 05:51

## 2018-04-12 RX ADMIN — HEPARIN SODIUM 5000 UNITS: 5000 INJECTION, SOLUTION INTRAVENOUS; SUBCUTANEOUS at 20:31

## 2018-04-12 RX ADMIN — OXYCODONE HYDROCHLORIDE 20 MG: 10 TABLET ORAL at 10:53

## 2018-04-12 RX ADMIN — SODIUM CHLORIDE: 9 INJECTION, SOLUTION INTRAVENOUS at 11:39

## 2018-04-12 RX ADMIN — OXYCODONE HYDROCHLORIDE 20 MG: 10 TABLET ORAL at 03:51

## 2018-04-12 RX ADMIN — SODIUM CHLORIDE 500 ML: 9 INJECTION, SOLUTION INTRAVENOUS at 08:58

## 2018-04-12 RX ADMIN — STANDARDIZED SENNA CONCENTRATE AND DOCUSATE SODIUM 1 TABLET: 8.6; 5 TABLET, FILM COATED ORAL at 20:29

## 2018-04-12 ASSESSMENT — PAIN SCALES - GENERAL
PAINLEVEL_OUTOF10: 7
PAINLEVEL_OUTOF10: 8
PAINLEVEL_OUTOF10: 7
PAINLEVEL_OUTOF10: 8
PAINLEVEL_OUTOF10: 9
PAINLEVEL_OUTOF10: 8
PAINLEVEL_OUTOF10: 8
PAINLEVEL_OUTOF10: 9
PAINLEVEL_OUTOF10: 9
PAINLEVEL_OUTOF10: 8

## 2018-04-12 ASSESSMENT — ENCOUNTER SYMPTOMS
CONSTIPATION: 1
NECK PAIN: 0
ABDOMINAL PAIN: 0
DIAPHORESIS: 0
HEARTBURN: 0
DEPRESSION: 0
FEVER: 0
NERVOUS/ANXIOUS: 1
SHORTNESS OF BREATH: 1
FLANK PAIN: 0
DIZZINESS: 1
COUGH: 0
FALLS: 0
WEAKNESS: 1
VOMITING: 0
NAUSEA: 0
WEIGHT LOSS: 0
HEMOPTYSIS: 0
MYALGIAS: 0
BLURRED VISION: 0
DOUBLE VISION: 0
BLOOD IN STOOL: 0
CHILLS: 0
DIARRHEA: 0
WHEEZING: 0
HEADACHES: 0
CLAUDICATION: 0
SPUTUM PRODUCTION: 0
BACK PAIN: 0

## 2018-04-12 NOTE — PROGRESS NOTES
Carina from Lab called with critical result of potassium at 6.6 and creatinine 2.87. Critical lab result read back to Carina. Asked for redraw; lab values were not expected to be this high. Will follow up with redraw.

## 2018-04-12 NOTE — DISCHARGE PLANNING
Anticipated Discharge Disposition: Home    Action: LSW met with pt bedside about decision of appeal. LSW informed pt that appeal has been denied. Pt informed that he can appeal the denial but will be held liable for costs. Pt reports he can not pay and will just go home but is concerned he has no where to sleep. Pt reports he will need assistance with WC van home. LSW will arrange transport for pt home in the a.m.     Barriers to Discharge: Pt feels that they don't have enough support at home even with services being provided. LSW suggested pt prop pillows up on bed due to not having a chair to sit in until Monday.     Plan: Arrange transport in a.m. For patients discharge.

## 2018-04-12 NOTE — CARE PLAN
"Problem: Safety  Goal: Will remain free from falls  Outcome: PROGRESSING AS EXPECTED  Patient mobility is assessed. Patient is sitting upright in chair. Declines turning or transferring to the bed at this time. Education has been provided. Verbalizes understanding by stating that he \"will let us know when he wants to go back to bed\". Will continue to monitor patient.    Problem: Knowledge Deficit  Goal: Knowledge of disease process/condition, treatment plan, diagnostic tests, and medications will improve  Outcome: PROGRESSING AS EXPECTED  Patient's understanding of disease process is assessed. Patient is educated on condition. All medications indications and side effects are explained. Questions are answered and are encouraged.      "

## 2018-04-12 NOTE — DISCHARGE PLANNING
Anticipated Discharge Disposition: Home    Action: LSW spoke with bedside RN about pt dc to home this morning. Pt not medically clear. LSW holding transport.     Barriers to Discharge: Pt not medically clear.    Plan: LSW to arrange transport by  van when pt is medically clear.

## 2018-04-12 NOTE — PROGRESS NOTES
Patient alert and oriented x4. Complains of pain in bilateral lower legs. Will medicate with PRN pain medication. Sitting in bedside chair wearing 5L of O2 by [place]. Denies nausea/SOB/dizziness. Will continue to monitor.

## 2018-04-12 NOTE — PROGRESS NOTES
Patient was pleasant all day but fairly non-compliant with nursing care    Patient refused certain medications, Q2 turns, and Cpap before and after lunch per order.    Patient was agreeable to wound care eventually but kept delaying when the RN wanted to do it    Patient refused to work with OT today, but did complete many ADLs with CNA.    Patient discharge appeal was denied, RN gave patient a 2nd appeal number to patient and he states that he does not think that he will complete a second appeal but is going to talk to his daughter after dinner.

## 2018-04-12 NOTE — PROGRESS NOTES
Report received from night RN. Assumed care of patient at 0700. Patient is sitting up in his chair at this time. Call light and personal belongings are within reach. Patient educated to utilize call light. Bed is in low and locked position. Will continue to monitor.

## 2018-04-12 NOTE — PROGRESS NOTES
Patient up to commode and back to chair.  Patient declines to return to bed or elevate legs at this time.  Patient declines wound cleaning or waffle cushion at this time.  Educated patient on importance of preventing skin breakdown and wound care.  Patient verbalized understanding but continues to refuse.

## 2018-04-12 NOTE — PROGRESS NOTES
RN assumes care of patient after receiving bedside report from night shift RN  Patient has no needs at this time, call bell is with in reach, bed is locked and in lowest position.

## 2018-04-12 NOTE — PROGRESS NOTES
[facility] Hospitalist Progress Note    Date of Service: 4/11/2018    Chief Complaint  57 y.o. male admitted 3/16/2018 with ongoing cellulitis of bilateral lower extremities with a history of chronic venous stasis and lymphedema. He had acute respiratory distress in ER. S/p ICU stay with MV dependence this hospitalization with treatment for pneumonia and cellulitis. He has chronic respiratory failure that is multifactorial however largely due to medical noncompliance    Interval Problem Update  Remains non motivated. Lack of medical compliance.   Would not shower, participate in ambulation  Non compliant with nursing interventions, medication use  Counseled and educated daily  SW arranging home BIPAP, RUSSELL for caregiver  Cleared for DC at this time as no further inpatient needs, he has 24-hour care at home  Patient is refusing discharge and appealing it with medicare at this time  Slightly lower blood pressures, holding oxycodone and decreased lisinopril  Poor skin care, high risk of infections 2/2 poor hygiene and refusal to participate in care    This patient is at high risk of poor prognosis including death 2.2 to his personal non compliance and lack of motivation and involvement in his personal care at this time    Consultants/Specialty  Pulmonology   Palliative care    Disposition  Refused by LTACs  Refused by SNFs  [facility] to do RUSSELL for home caregiver  Arrange home health care  Arrange BIPAP for use at home  Once above arranged can discharge home  Can transfer to medical RNF while disposition is awaited, no telemetry needs at this time  Patient has appealed his discharge at this time  Await medicare decision regarding his appeal at this time      Review of Systems   Constitutional: Positive for malaise/fatigue. Negative for chills, diaphoresis, fever and weight loss.   HENT: Negative for hearing loss and tinnitus.    Eyes: Negative for blurred vision and double vision.   Respiratory: Positive for shortness of  breath. Negative for cough, hemoptysis, sputum production and wheezing.    Cardiovascular: Positive for leg swelling. Negative for chest pain and claudication.   Gastrointestinal: Positive for constipation. Negative for abdominal pain, blood in stool, diarrhea, heartburn, melena, nausea and vomiting.   Genitourinary: Negative for dysuria, flank pain, frequency, hematuria and urgency.   Musculoskeletal: Positive for joint pain. Negative for back pain, falls, myalgias and neck pain.   Skin: Positive for rash. Negative for itching.   Neurological: Positive for weakness. Negative for dizziness and headaches.   Psychiatric/Behavioral: Negative for depression. The patient is nervous/anxious.       Physical Exam  Laboratory/Imaging   Hemodynamics  Temp (24hrs), Av.4 °C (97.5 °F), Min:36.1 °C (96.9 °F), Max:36.8 °C (98.2 °F)   Temperature: 36.1 °C (97 °F)  Pulse  Av.8  Min: 54  Max: 116    Blood Pressure: 109/96      Respiratory      Respiration: 18, Pulse Oximetry: 95 %        RUL Breath Sounds: Clear, RML Breath Sounds: Diminished, RLL Breath Sounds: Diminished, LARY Breath Sounds: Clear, LLL Breath Sounds: Diminished    Fluids    Intake/Output Summary (Last 24 hours) at 18 1717  Last data filed at 18 0600   Gross per 24 hour   Intake              240 ml   Output                0 ml   Net              240 ml       Nutrition  Orders Placed This Encounter   Procedures   • DIET ORDER     Standing Status:   Standing     Number of Occurrences:   1     Order Specific Question:   Diet:     Answer:   Diabetic [3]     Order Specific Question:   Texture/Fiber modifications:     Answer:   Dysphagia 3(Mechanical Soft)specify fluid consistency(question 6) [3]     Order Specific Question:   Consistency/Fluid modifications:     Answer:   Thin Liquids [3]     Comments:   NO straws     Physical Exam   Constitutional: He is oriented to person, place, and time. He appears well-developed. No distress.   Morbidly obese,  Body mass index is 42.97 kg/m².   HENT:   Head: Normocephalic.   Mouth/Throat: Oropharynx is clear and moist. No oropharyngeal exudate.   Eyes: Conjunctivae are normal. Pupils are equal, round, and reactive to light. No scleral icterus.   Neck: No JVD present.   Cardiovascular: Normal rate, regular rhythm and normal heart sounds.  Exam reveals no gallop and no friction rub.    No murmur heard.  Pulmonary/Chest: No stridor. No respiratory distress. He has decreased breath sounds. He has no wheezes. He has no rhonchi. He has no rales. He exhibits no crepitus.   Diminished in bases. Poor inspiratory effort   Abdominal: Soft. Bowel sounds are normal. He exhibits distension. There is no tenderness. There is no rebound and no guarding.   Musculoskeletal: He exhibits edema (2+ LE). He exhibits no tenderness or deformity.   Neurological: He is alert and oriented to person, place, and time. No cranial nerve deficit.   Skin: Skin is warm and dry. He is not diaphoretic.   Scaly skin on head  Lower extremity wounds  B/L Severe stasis dermatitis, thick flaking skin   Psychiatric: He has a normal mood and affect. His behavior is normal. Judgment and thought content normal.   Vitals reviewed.                               Assessment/Plan     * Acute on chronic respiratory failure with hypoxia and hypercapnia (CMS-HCC)- (present on admission)   Assessment & Plan    Chronic issues, nearly baseline now  Intubated 3-20 extubated 3/25/2018 this hospital stay  Morbid obesity, Body mass index is 42.97 kg/m².  Obesity hypoventilation syndrome / ALIREZA is contributing   Suspect underlying pulmonary hypertension / RV dysfunction  Underlying history of COPD  Non compliance with intervention / medical recommendations & therapy complicates care  Underlying debility with poor inspiratory effort  Narcotic dependence further decreasing inspiratory efforts    Plan:  At least 8 hours of BIPAP therapy at night  Recommend BIPAP twice daily for 1 hours  (11am-12 pm) & (3pm-4pm)  Aggressive nursing interventions, RT protocol  Ambulate as able  Continue Lasix, PO 40 daily  Monitor HCO3 on chemistries   Symbicort / Spiriva  Wean off opioids as these are not helping him  Nocturnal desaturation study done, have pulm request outpatient BIPAP  Arrange BIPAP for home    Poor prognosis 2/2 multiple factors listed above most importantly 2/2 patient's personal non compliance and lack of motivation         Severe protein-calorie malnutrition (CMS-HCC)   Assessment & Plan    Evident from SC fat loss and pre albumin levels  Optimize nutrition        Bilateral edema of lower extremity- (present on admission)   Assessment & Plan    Chronic lymphedema and stasis dermatitis  Continue po Lasix  Monitor intake output and fluid status  Ambulation as able  Significant contribution from debility        Diabetes type 2, controlled (CMS-HCC)- (present on admission)   Assessment & Plan    No apparent manifestations  Metformin 1000 mg BID  ASA 81, Atorvastatin 40 mg         Non compliance w medication regimen- (present on admission)   Assessment & Plan    Reinforced compliance   Patient has also been noncompliant with BIPAP as he does not like the feeling  Counseled and educated again  Improving compliance now  Will continue to reinforce care        Narcotic addiction (CMS-HCC)- (present on admission)   Assessment & Plan    Mnimize sedating agents  Wean as not helping respiratory status        Stasis dermatitis- (present on admission)   Assessment & Plan    With MSSA cellulitis this hospital course   Completed antibiotic course   Continue wound care / Skin care- he has extensive dry skin, however refuses showering and hygiene.  Aggressive nursing interventions  He has a 24-hour caregiver at home who can assist PT, OT and wound care.        Hypothyroidism- (present on admission)   Assessment & Plan    Synthroid, TSH wnl 03/2018        COPD (chronic obstructive pulmonary disease) (CMS-HCC)-  (present on admission)   Assessment & Plan    RT protocol  Pulm on board  No acute exacerbation at this time        HTN (hypertension)- (present on admission)   Assessment & Plan    Stop amlodipine as this will contribute to worsening edema  Lisinopril 10 mg as slightly hypotensive with 20 mg  Titrate therapy as clinically appropriate        Morbid obesity (CMS-HCC)- (present on admission)   Assessment & Plan    Body mass index is 48.02 kg/m².   Complicated by obstructive sleep apnea and obesity hypoventilation syndrome  Encouraged weight loss          Quality-Core Measures   Reviewed items::  Labs reviewed and Medications reviewed  DVT prophylaxis pharmacological::  Enoxaparin (Lovenox)  : Has completed abx course.

## 2018-04-12 NOTE — PROGRESS NOTES
Manual blood pressure after second 500 cc bolus 64/48, but patient sitting comfortably and states he no longer feels dizzy.  Patient A&O x 4 at this time.  Attempted to take blood pressure on patient's forearm as patient states this is more accurate, but unable to get reading.  Patient refuses additional blood pressure readings at this time.  Notified Dr. Saeed, received order to bladder scan patient, but patient declines bladder scan at this time

## 2018-04-12 NOTE — PROGRESS NOTES
First bolus of 500 ml NS was completed. Patient feeling lightheaded and dizzy, manual BP taken on the patient. BP was 70/50. Second bolus of 500 mL NS will be initiated. Dr. Saeed notified and will assess patient.

## 2018-04-12 NOTE — PROGRESS NOTES
Patient sitting up in wheel chair at this time and states he has been sitting in chair all night.  Educated patient about need for Q2 hour turns.  Patient continues to refuse turns.  Patient declines to get back into bed at this time.  Will continue to educate patient.

## 2018-04-12 NOTE — CODE DOCUMENTATION
When rapid team arrived, pt sitting in chair feeling more fatigued than normal and hypotensive with SBP between 60s-80. After a few minutes, pt's BP kaylynn ot 85/52 and stated that he felt better and was looking for breakfast (pt AOx4). Dr. Saeed paged by primary RN and an order was received for a 500 mL bolus of 0.9%NS and to recheck BP after bolus. Orders placed by rapid response nurse.

## 2018-04-12 NOTE — PROGRESS NOTES
Change in patient condition due to: Cardiac-blood pressure 63/43 and patient complaining of feeling dizzy  Rapid Response called at: 0853  Dr Saeed notified at 0900    See Code Blue timeline for rapid response events.     Patient Remained on Unit.  Received orders from Dr. Saeed for 500 cc NS bolus, with second 500 cc NS bolus of patient's SBP< 90 after first bolus.

## 2018-04-13 LAB
ALBUMIN SERPL BCP-MCNC: 4 G/DL (ref 3.2–4.9)
BASOPHILS # BLD AUTO: 0.5 % (ref 0–1.8)
BASOPHILS # BLD: 0.06 K/UL (ref 0–0.12)
BUN SERPL-MCNC: 64 MG/DL (ref 8–22)
CALCIUM SERPL-MCNC: 9.8 MG/DL (ref 8.5–10.5)
CHLORIDE SERPL-SCNC: 93 MMOL/L (ref 96–112)
CO2 SERPL-SCNC: 30 MMOL/L (ref 20–33)
CREAT SERPL-MCNC: 1.24 MG/DL (ref 0.5–1.4)
EOSINOPHIL # BLD AUTO: 0.25 K/UL (ref 0–0.51)
EOSINOPHIL NFR BLD: 2.3 % (ref 0–6.9)
ERYTHROCYTE [DISTWIDTH] IN BLOOD BY AUTOMATED COUNT: 50.8 FL (ref 35.9–50)
GLUCOSE BLD-MCNC: 111 MG/DL (ref 65–99)
GLUCOSE BLD-MCNC: 149 MG/DL (ref 65–99)
GLUCOSE SERPL-MCNC: 111 MG/DL (ref 65–99)
HCT VFR BLD AUTO: 39.1 % (ref 42–52)
HGB BLD-MCNC: 12 G/DL (ref 14–18)
IMM GRANULOCYTES # BLD AUTO: 0.18 K/UL (ref 0–0.11)
IMM GRANULOCYTES NFR BLD AUTO: 1.6 % (ref 0–0.9)
LYMPHOCYTES # BLD AUTO: 1.39 K/UL (ref 1–4.8)
LYMPHOCYTES NFR BLD: 12.7 % (ref 22–41)
MCH RBC QN AUTO: 28 PG (ref 27–33)
MCHC RBC AUTO-ENTMCNC: 30.7 G/DL (ref 33.7–35.3)
MCV RBC AUTO: 91.4 FL (ref 81.4–97.8)
MONOCYTES # BLD AUTO: 1.23 K/UL (ref 0–0.85)
MONOCYTES NFR BLD AUTO: 11.3 % (ref 0–13.4)
NEUTROPHILS # BLD AUTO: 7.82 K/UL (ref 1.82–7.42)
NEUTROPHILS NFR BLD: 71.6 % (ref 44–72)
NRBC # BLD AUTO: 0 K/UL
NRBC BLD-RTO: 0 /100 WBC
PHOSPHATE SERPL-MCNC: 3.4 MG/DL (ref 2.5–4.5)
PLATELET # BLD AUTO: 185 K/UL (ref 164–446)
PMV BLD AUTO: 11.8 FL (ref 9–12.9)
POTASSIUM SERPL-SCNC: 5.9 MMOL/L (ref 3.6–5.5)
RBC # BLD AUTO: 4.28 M/UL (ref 4.7–6.1)
SODIUM SERPL-SCNC: 132 MMOL/L (ref 135–145)
WBC # BLD AUTO: 10.9 K/UL (ref 4.8–10.8)

## 2018-04-13 PROCEDURE — 82962 GLUCOSE BLOOD TEST: CPT

## 2018-04-13 PROCEDURE — 700102 HCHG RX REV CODE 250 W/ 637 OVERRIDE(OP): Performed by: INTERNAL MEDICINE

## 2018-04-13 PROCEDURE — 700105 HCHG RX REV CODE 258: Performed by: INTERNAL MEDICINE

## 2018-04-13 PROCEDURE — 85025 COMPLETE CBC W/AUTO DIFF WBC: CPT

## 2018-04-13 PROCEDURE — 700111 HCHG RX REV CODE 636 W/ 250 OVERRIDE (IP): Performed by: INTERNAL MEDICINE

## 2018-04-13 PROCEDURE — 700101 HCHG RX REV CODE 250: Performed by: INTERNAL MEDICINE

## 2018-04-13 PROCEDURE — A9270 NON-COVERED ITEM OR SERVICE: HCPCS | Performed by: INTERNAL MEDICINE

## 2018-04-13 PROCEDURE — 770006 HCHG ROOM/CARE - MED/SURG/GYN SEMI*

## 2018-04-13 PROCEDURE — 97530 THERAPEUTIC ACTIVITIES: CPT

## 2018-04-13 PROCEDURE — 99233 SBSQ HOSP IP/OBS HIGH 50: CPT | Performed by: INTERNAL MEDICINE

## 2018-04-13 PROCEDURE — 94660 CPAP INITIATION&MGMT: CPT

## 2018-04-13 PROCEDURE — 36415 COLL VENOUS BLD VENIPUNCTURE: CPT

## 2018-04-13 PROCEDURE — 80069 RENAL FUNCTION PANEL: CPT

## 2018-04-13 RX ORDER — SODIUM POLYSTYRENE SULFONATE 15 G/60ML
30 SUSPENSION ORAL; RECTAL ONCE
Status: DISPENSED | OUTPATIENT
Start: 2018-04-13 | End: 2018-04-14

## 2018-04-13 RX ORDER — DEXTROSE MONOHYDRATE 25 G/50ML
50 INJECTION, SOLUTION INTRAVENOUS ONCE
Status: COMPLETED | OUTPATIENT
Start: 2018-04-13 | End: 2018-04-14

## 2018-04-13 RX ADMIN — DEXTROSE MONOHYDRATE 50 ML: 25 INJECTION, SOLUTION INTRAVENOUS at 11:56

## 2018-04-13 RX ADMIN — STANDARDIZED SENNA CONCENTRATE AND DOCUSATE SODIUM 1 TABLET: 8.6; 5 TABLET, FILM COATED ORAL at 09:00

## 2018-04-13 RX ADMIN — OXYCODONE HYDROCHLORIDE 20 MG: 10 TABLET ORAL at 09:36

## 2018-04-13 RX ADMIN — BUDESONIDE AND FORMOTEROL FUMARATE DIHYDRATE 2 PUFF: 160; 4.5 AEROSOL RESPIRATORY (INHALATION) at 09:39

## 2018-04-13 RX ADMIN — OXYCODONE HYDROCHLORIDE 20 MG: 10 TABLET ORAL at 18:11

## 2018-04-13 RX ADMIN — OXYCODONE HYDROCHLORIDE AND ACETAMINOPHEN 500 MG: 500 TABLET ORAL at 09:36

## 2018-04-13 RX ADMIN — CALCIUM GLUCONATE 1 G: 94 INJECTION, SOLUTION INTRAVENOUS at 11:56

## 2018-04-13 RX ADMIN — Medication: at 09:37

## 2018-04-13 RX ADMIN — OXYCODONE HYDROCHLORIDE 20 MG: 10 TABLET ORAL at 05:33

## 2018-04-13 RX ADMIN — OMEPRAZOLE 20 MG: 20 CAPSULE, DELAYED RELEASE ORAL at 09:35

## 2018-04-13 RX ADMIN — INSULIN HUMAN 10 UNITS: 100 INJECTION, SOLUTION PARENTERAL at 11:57

## 2018-04-13 RX ADMIN — HEPARIN SODIUM 5000 UNITS: 5000 INJECTION, SOLUTION INTRAVENOUS; SUBCUTANEOUS at 20:53

## 2018-04-13 RX ADMIN — OXYCODONE HYDROCHLORIDE 20 MG: 10 TABLET ORAL at 01:10

## 2018-04-13 RX ADMIN — OXYCODONE HYDROCHLORIDE 20 MG: 10 TABLET ORAL at 22:45

## 2018-04-13 RX ADMIN — ASPIRIN 81 MG: 81 TABLET, COATED ORAL at 09:36

## 2018-04-13 RX ADMIN — STANDARDIZED SENNA CONCENTRATE AND DOCUSATE SODIUM 1 TABLET: 8.6; 5 TABLET, FILM COATED ORAL at 20:46

## 2018-04-13 RX ADMIN — ATORVASTATIN CALCIUM 40 MG: 40 TABLET, FILM COATED ORAL at 20:46

## 2018-04-13 RX ADMIN — ONDANSETRON 4 MG: 2 INJECTION, SOLUTION INTRAMUSCULAR; INTRAVENOUS at 07:59

## 2018-04-13 RX ADMIN — ONDANSETRON 4 MG: 2 INJECTION, SOLUTION INTRAMUSCULAR; INTRAVENOUS at 22:45

## 2018-04-13 RX ADMIN — BUDESONIDE AND FORMOTEROL FUMARATE DIHYDRATE 2 PUFF: 160; 4.5 AEROSOL RESPIRATORY (INHALATION) at 20:47

## 2018-04-13 RX ADMIN — LEVOTHYROXINE SODIUM 50 MCG: 50 TABLET ORAL at 05:33

## 2018-04-13 RX ADMIN — OXYCODONE HYDROCHLORIDE 20 MG: 10 TABLET ORAL at 14:01

## 2018-04-13 ASSESSMENT — PAIN SCALES - GENERAL
PAINLEVEL_OUTOF10: 9
PAINLEVEL_OUTOF10: 9
PAINLEVEL_OUTOF10: 8
PAINLEVEL_OUTOF10: 8

## 2018-04-13 ASSESSMENT — ENCOUNTER SYMPTOMS
DOUBLE VISION: 0
BLOOD IN STOOL: 0
CHILLS: 0
DIAPHORESIS: 0
WEIGHT LOSS: 0
NECK PAIN: 0
HEMOPTYSIS: 0
CLAUDICATION: 0
NERVOUS/ANXIOUS: 1
FALLS: 0
WEAKNESS: 1
WHEEZING: 0
HEADACHES: 0
FLANK PAIN: 0
COUGH: 0
BACK PAIN: 0
HEARTBURN: 0
VOMITING: 0
MYALGIAS: 0
BLURRED VISION: 0
NAUSEA: 0
DIZZINESS: 1
FEVER: 0
SPUTUM PRODUCTION: 0
CONSTIPATION: 1
ABDOMINAL PAIN: 0
DEPRESSION: 0
DIARRHEA: 0
SHORTNESS OF BREATH: 1

## 2018-04-13 NOTE — ASSESSMENT & PLAN NOTE
Resolved, Suspect due to dehydration as he has had low by mouth intake and also had been getting diuretics. Occasionally refusing blood pressure checks.  -Strict I/O monitoring  -Consider resuming low dose Lasix, would like him to f/u with PCP first  -Hold any nephrotoxins  -DC metformin  -Cr in AM

## 2018-04-13 NOTE — CARE PLAN
Problem: Safety  Goal: Will remain free from injury    Intervention: Provide assistance with mobility  Pt call light within reach and uses when needing help with any cares.

## 2018-04-13 NOTE — PROGRESS NOTES
[facility] Hospitalist Progress Note    Date of Service: 4/12/2018    Chief Complaint  57 y.o. male admitted 3/16/2018 with ongoing cellulitis of bilateral lower extremities with a history of chronic venous stasis and lymphedema. He had acute respiratory distress in ER. S/p ICU stay with MV dependence this hospitalization with treatment for pneumonia and cellulitis. He has chronic respiratory failure that is multifactorial however largely due to medical noncompliance    Interval Problem Update  Remains non motivated. Lack of medical compliance.   Non compliant with nursing interventions, medication use  SW arranging home BIPAP, RUSSELL for caregiver  Cleared for DC at this time as no further inpatient needs, he has 24-hour care at home  Slightly lower blood pressures, holding oxycodone and decreased lisinopril  Poor skin care, high risk of infections 2/2 poor hygiene and refusal to participate in care    This patient is at high risk of poor prognosis including death 2.2 to his personal non compliance and lack of motivation and involvement in his personal care at this time    4/12: Discharge held due to the development of a chaotic, likely due to volume depletion and Lasix. Gentle fluids given, hyperkalemia treated medically.    Consultants/Specialty  Pulmonology   Palliative care    Disposition  Refused by LTACs  Refused by SNFs  [facility] to do R[facility]SELL for home caregiver  Arrange BIPAP for use at home and home health      Review of Systems   Constitutional: Positive for malaise/fatigue. Negative for chills, diaphoresis, fever and weight loss.   HENT: Negative for hearing loss and tinnitus.    Eyes: Negative for blurred vision and double vision.   Respiratory: Positive for shortness of breath. Negative for cough, hemoptysis, sputum production and wheezing.    Cardiovascular: Positive for leg swelling. Negative for chest pain and claudication.   Gastrointestinal: Positive for constipation. Negative for abdominal pain, blood in  stool, diarrhea, heartburn, melena, nausea and vomiting.   Genitourinary: Negative for dysuria, flank pain, frequency, hematuria and urgency.   Musculoskeletal: Positive for joint pain. Negative for back pain, falls, myalgias and neck pain.   Skin: Positive for rash. Negative for itching.   Neurological: Positive for dizziness (intermittent) and weakness. Negative for headaches.   Psychiatric/Behavioral: Negative for depression. The patient is nervous/anxious.       Physical Exam  Laboratory/Imaging   Hemodynamics  Temp (24hrs), Av.4 °C (97.6 °F), Min:36.2 °C (97.1 °F), Max:36.7 °C (98 °F)   Temperature: 36.4 °C (97.5 °F)  Pulse  Av  Min: 54  Max: 116    Blood Pressure: (!) 95/59      Respiratory      Respiration: 18, Pulse Oximetry: 92 %, O2 Daily Delivery Respiratory : Silicone Nasal Cannula     Given By:: Mask, Work Of Breathing / Effort: Mild  RUL Breath Sounds: Clear, RML Breath Sounds: Clear, RLL Breath Sounds: Diminished, LARY Breath Sounds: Clear, LLL Breath Sounds: Diminished    Fluids    Intake/Output Summary (Last 24 hours) at 18 1829  Last data filed at 18 1800   Gross per 24 hour   Intake               50 ml   Output              675 ml   Net             -625 ml       Nutrition  Orders Placed This Encounter   Procedures   • DIET ORDER     Standing Status:   Standing     Number of Occurrences:   1     Order Specific Question:   Diet:     Answer:   Diabetic [3]     Order Specific Question:   Texture/Fiber modifications:     Answer:   Dysphagia 3(Mechanical Soft)specify fluid consistency(question 6) [3]     Order Specific Question:   Consistency/Fluid modifications:     Answer:   Thin Liquids [3]     Comments:   NO straws     Physical Exam   Constitutional: He is oriented to person, place, and time. He appears well-developed. No distress.   Morbidly obese, Body mass index is 42.97 kg/m².   HENT:   Head: Normocephalic.   Mouth/Throat: Oropharynx is clear and moist. No oropharyngeal  exudate.   Eyes: Conjunctivae are normal. Pupils are equal, round, and reactive to light. No scleral icterus.   Neck: No JVD present.   Cardiovascular: Normal rate, regular rhythm and normal heart sounds.  Exam reveals no gallop and no friction rub.    No murmur heard.  Pulmonary/Chest: No stridor. No respiratory distress. He has decreased breath sounds. He has no wheezes. He has no rhonchi. He has no rales. He exhibits no crepitus.   Diminished in bases. Poor inspiratory effort   Abdominal: Soft. Bowel sounds are normal. He exhibits distension. There is no tenderness. There is no rebound and no guarding.   Musculoskeletal: He exhibits edema (2+ LE). He exhibits no tenderness or deformity.   Neurological: He is alert and oriented to person, place, and time. No cranial nerve deficit.   Skin: Skin is warm and dry. He is not diaphoretic.   Scaly skin on head  Lower extremity wounds  B/L Severe stasis dermatitis, thick flaking skin   Psychiatric: He has a normal mood and affect. His behavior is normal. Judgment and thought content normal.   Vitals reviewed.      Recent Labs      04/12/18   0216   WBC  9.6   RBC  4.45*   HEMOGLOBIN  12.6*   HEMATOCRIT  40.4*   MCV  90.8   MCH  28.3   MCHC  31.2*   RDW  49.7   PLATELETCT  184   MPV  12.8     Recent Labs      04/12/18   0359  04/12/18   0819  04/12/18   1028   SODIUM  131*   --   129*   POTASSIUM  6.3*  5.5  5.7*   CHLORIDE  89*   --   88*   CO2  29   --   30   GLUCOSE  129*   --   194*   BUN  76*   --   78*   CREATININE  2.64*   --   2.80*   CALCIUM  9.7   --   9.6         Recent Labs      04/12/18   1028   BNPBTYPENAT  3              Assessment/Plan     * Acute on chronic respiratory failure with hypoxia and hypercapnia (CMS-HCC)- (present on admission)   Assessment & Plan    Chronic issues, nearly baseline now  Intubated 3-20 extubated 3/25/2018 this hospital stay  Morbid obesity, Body mass index is 42.97 kg/m².  Obesity hypoventilation syndrome / ALIREZA is contributing    Suspect underlying pulmonary hypertension / RV dysfunction  Underlying history of COPD  Non compliance with intervention / medical recommendations & therapy complicates care  Underlying debility with poor inspiratory effort  Narcotic dependence further decreasing inspiratory efforts    Plan:  At least 8 hours of BIPAP therapy at night  Recommend BIPAP twice daily for 1 hours (11am-12 pm) & (3pm-4pm)  Aggressive nursing interventions, RT protocol  Ambulate as able  Hold lasix due to JOE  Symbicort / Spiriva  Wean off opioids as these are not helping him  Nocturnal desaturation study done, have pulm request outpatient BIPAP, arranged    Poor prognosis 2/2 multiple factors listed above most importantly 2/2 patient's personal non compliance and lack of motivation         JOE (acute kidney injury) (CMS-HCC)- (present on admission)   Assessment & Plan    Suspect due to dehydration as he has had low by mouth intake and also has been getting diuretics. He is making urine. Occasionally refusing blood pressure checks.  -Bolus 1.5 L  -Strict I/O monitoring  -Hold Lasix  -DC any nephrotoxins  -DC metformin  -Check bladder scan (pt refusing)  -Gentle IVF        Hyperkalemia- (present on admission)   Assessment & Plan    Due to acute kidney injury, insulin, glucose, calcium gluconate, Kayexalate  Continue telemetry        Severe protein-calorie malnutrition (CMS-HCC)   Assessment & Plan    Evident from SC fat loss and pre albumin levels  Optimize nutrition        Bilateral edema of lower extremity- (present on admission)   Assessment & Plan    Chronic lymphedema and stasis dermatitis  Hold Lasix due to JOE  Monitor intake output and fluid status  Ambulation as able  Significant contribution from debility        Diabetes type 2, controlled (CMS-HCC)- (present on admission)   Assessment & Plan    No apparent manifestations  Metformin 1000 mg BID  ASA 81, Atorvastatin 40 mg         Non compliance w medication regimen- (present on  admission)   Assessment & Plan    Reinforced compliance   Patient has also been noncompliant with BIPAP as he does not like the feeling  Counseled and educated again  Improving compliance now  Will continue to reinforce care        Narcotic addiction (CMS-HCC)- (present on admission)   Assessment & Plan    Mnimize sedating agents  Wean as not helping respiratory status        Stasis dermatitis- (present on admission)   Assessment & Plan    With MSSA cellulitis this hospital course   Completed antibiotic course   Continue wound care / Skin care- he has extensive dry skin, however refuses showering and hygiene.  Aggressive nursing interventions  He has a 24-hour caregiver at home who can assist PT, OT and wound care.        Hypothyroidism- (present on admission)   Assessment & Plan    Synthroid, TSH wnl 03/2018        COPD (chronic obstructive pulmonary disease) (CMS-HCC)- (present on admission)   Assessment & Plan    RT protocol  Pulm on board  No acute exacerbation at this time        HTN (hypertension)- (present on admission)   Assessment & Plan    Stop amlodipine as this will contribute to worsening edema  Lisinopril 10 mg as slightly hypotensive with 20 mg  Titrate therapy as clinically appropriate        Morbid obesity (CMS-HCC)- (present on admission)   Assessment & Plan    Body mass index is 48.02 kg/m².   Complicated by obstructive sleep apnea and obesity hypoventilation syndrome  Encouraged weight loss          Quality-Core Measures   Reviewed items::  Labs reviewed and Medications reviewed  DVT prophylaxis pharmacological::  Enoxaparin (Lovenox)  : Has completed abx course.

## 2018-04-13 NOTE — CARE PLAN
Problem: Safety  Goal: Will remain free from injury    Intervention: Provide assistance with mobility  Pt calls appropriately for assistance     Goal: Will remain free from falls  Outcome: PROGRESSING SLOWER THAN EXPECTED

## 2018-04-13 NOTE — PROGRESS NOTES
Assumed pt care at 1927.  Received report from day RN.  Assessment completed.  Pt AOx4.  Pt comlaints of pain at this time in legs 9/10, continues with pain management as ordered.  No other s/s of discomfort or distress. Pt unable to ambulate due to cellulitis and pain. Skin excoriated on bottom with dusky bilateral legs, lotion applied, pt refused to let me look and do treatment for skin to bottom. Pt very upset about not going home today and has continued with an attitude toward staff when trying to help with cares. Bed in lowest position but pt is sleeping in chair refusing to get into the bed,  bed alarm has been refused by pt .  Pt calls appropriately.  Treaded socks in place, call light within reach and staff numbers provided.  Pt needs met at this time.

## 2018-04-13 NOTE — ASSESSMENT & PLAN NOTE
Now resolved, was due to acute kidney injury while on an ACE inhibitor  Repeat in AM  Continue telemetry  Hold off on any Ace or arb

## 2018-04-13 NOTE — THERAPY
"Physical Therapy Treatment completed.   Bed Mobility:  Supine to Sit: Moderate Assist  Transfers: Sit to Stand: Minimal Assist (from EOB->FWW)       Plan of Care: Will benefit from Physical Therapy 3 times per week  Discharge Recommendations: Equipment: Will Continue to Assess for Equipment Needs.   See \"Rehab Therapy-Acute\" Patient Summary Report for complete documentation.       "

## 2018-04-13 NOTE — PROGRESS NOTES
Pt A&O x's 4, VSS, pain to BLE is an 8/10, will medicate pt when time is appropriate. Pt unable to ambulate d/t pain and SOB on exertion. Pt is refusing to wash BLE as ordered but will allow us to apply medicated lotions because of pain on contact. Pt is on edge of bed but refusing to let us put him in bed or up in a chair. Pt is on 5 L NC with SOB upon exertions. BSC at bedside to decrease respiratory demand. Pt refused any bowel protocol and claims that our meds are upsetting is stomach. Pt asked to have medications after breakfast. Pt refused bed alarm and treaded socks are inapropriate for his wounds. Pt calls appropriately for assistance, bed is locked and in lowest position, call light and personal items within reach, will continue to monitor.

## 2018-04-14 LAB
ALBUMIN SERPL BCP-MCNC: 3.8 G/DL (ref 3.2–4.9)
BUN SERPL-MCNC: 53 MG/DL (ref 8–22)
CALCIUM SERPL-MCNC: 9.8 MG/DL (ref 8.5–10.5)
CHLORIDE SERPL-SCNC: 93 MMOL/L (ref 96–112)
CO2 SERPL-SCNC: 31 MMOL/L (ref 20–33)
CREAT SERPL-MCNC: 1.03 MG/DL (ref 0.5–1.4)
GLUCOSE SERPL-MCNC: 163 MG/DL (ref 65–99)
PHOSPHATE SERPL-MCNC: 3.5 MG/DL (ref 2.5–4.5)
POTASSIUM SERPL-SCNC: 5 MMOL/L (ref 3.6–5.5)
SODIUM SERPL-SCNC: 132 MMOL/L (ref 135–145)

## 2018-04-14 PROCEDURE — 36415 COLL VENOUS BLD VENIPUNCTURE: CPT

## 2018-04-14 PROCEDURE — 700102 HCHG RX REV CODE 250 W/ 637 OVERRIDE(OP): Performed by: HOSPITALIST

## 2018-04-14 PROCEDURE — A9270 NON-COVERED ITEM OR SERVICE: HCPCS | Performed by: INTERNAL MEDICINE

## 2018-04-14 PROCEDURE — 700102 HCHG RX REV CODE 250 W/ 637 OVERRIDE(OP): Performed by: INTERNAL MEDICINE

## 2018-04-14 PROCEDURE — 99232 SBSQ HOSP IP/OBS MODERATE 35: CPT | Performed by: INTERNAL MEDICINE

## 2018-04-14 PROCEDURE — 94660 CPAP INITIATION&MGMT: CPT

## 2018-04-14 PROCEDURE — 770006 HCHG ROOM/CARE - MED/SURG/GYN SEMI*

## 2018-04-14 PROCEDURE — 700111 HCHG RX REV CODE 636 W/ 250 OVERRIDE (IP): Performed by: INTERNAL MEDICINE

## 2018-04-14 PROCEDURE — 80069 RENAL FUNCTION PANEL: CPT

## 2018-04-14 PROCEDURE — A9270 NON-COVERED ITEM OR SERVICE: HCPCS | Performed by: HOSPITALIST

## 2018-04-14 RX ADMIN — OXYCODONE HYDROCHLORIDE AND ACETAMINOPHEN 500 MG: 500 TABLET ORAL at 21:05

## 2018-04-14 RX ADMIN — OXYCODONE HYDROCHLORIDE AND ACETAMINOPHEN 500 MG: 500 TABLET ORAL at 15:25

## 2018-04-14 RX ADMIN — BUDESONIDE AND FORMOTEROL FUMARATE DIHYDRATE 2 PUFF: 160; 4.5 AEROSOL RESPIRATORY (INHALATION) at 21:06

## 2018-04-14 RX ADMIN — ASPIRIN 81 MG: 81 TABLET, COATED ORAL at 08:25

## 2018-04-14 RX ADMIN — OXYCODONE HYDROCHLORIDE AND ACETAMINOPHEN 500 MG: 500 TABLET ORAL at 08:25

## 2018-04-14 RX ADMIN — HEPARIN SODIUM 5000 UNITS: 5000 INJECTION, SOLUTION INTRAVENOUS; SUBCUTANEOUS at 15:24

## 2018-04-14 RX ADMIN — OXYCODONE HYDROCHLORIDE 20 MG: 10 TABLET ORAL at 03:01

## 2018-04-14 RX ADMIN — STANDARDIZED SENNA CONCENTRATE AND DOCUSATE SODIUM 1 TABLET: 8.6; 5 TABLET, FILM COATED ORAL at 21:04

## 2018-04-14 RX ADMIN — ACETAMINOPHEN 650 MG: 325 TABLET, FILM COATED ORAL at 23:36

## 2018-04-14 RX ADMIN — ACETAMINOPHEN 650 MG: 325 TABLET, FILM COATED ORAL at 15:42

## 2018-04-14 RX ADMIN — LEVOTHYROXINE SODIUM 50 MCG: 50 TABLET ORAL at 06:27

## 2018-04-14 RX ADMIN — HEPARIN SODIUM 5000 UNITS: 5000 INJECTION, SOLUTION INTRAVENOUS; SUBCUTANEOUS at 06:27

## 2018-04-14 RX ADMIN — Medication: at 15:29

## 2018-04-14 RX ADMIN — OXYCODONE HYDROCHLORIDE 20 MG: 10 TABLET ORAL at 13:03

## 2018-04-14 RX ADMIN — HEPARIN SODIUM 5000 UNITS: 5000 INJECTION, SOLUTION INTRAVENOUS; SUBCUTANEOUS at 21:06

## 2018-04-14 RX ADMIN — STANDARDIZED SENNA CONCENTRATE AND DOCUSATE SODIUM 1 TABLET: 8.6; 5 TABLET, FILM COATED ORAL at 08:25

## 2018-04-14 RX ADMIN — OXYCODONE HYDROCHLORIDE 20 MG: 10 TABLET ORAL at 08:25

## 2018-04-14 RX ADMIN — OXYCODONE HYDROCHLORIDE 20 MG: 10 TABLET ORAL at 21:04

## 2018-04-14 RX ADMIN — OXYCODONE HYDROCHLORIDE 20 MG: 10 TABLET ORAL at 17:05

## 2018-04-14 RX ADMIN — Medication 220 MG: at 08:25

## 2018-04-14 RX ADMIN — OMEPRAZOLE 20 MG: 20 CAPSULE, DELAYED RELEASE ORAL at 08:25

## 2018-04-14 RX ADMIN — ATORVASTATIN CALCIUM 40 MG: 40 TABLET, FILM COATED ORAL at 21:05

## 2018-04-14 ASSESSMENT — ENCOUNTER SYMPTOMS
SHORTNESS OF BREATH: 1
COUGH: 0
HEARTBURN: 0
BLURRED VISION: 0
DIZZINESS: 1
VOMITING: 0
NERVOUS/ANXIOUS: 1
CHILLS: 0
CONSTIPATION: 1
DOUBLE VISION: 0
NAUSEA: 0
DIARRHEA: 0
BLOOD IN STOOL: 0
BACK PAIN: 0
DIAPHORESIS: 0
WEIGHT LOSS: 0
DEPRESSION: 0
WEAKNESS: 1
ABDOMINAL PAIN: 0
CLAUDICATION: 0
WHEEZING: 0
FALLS: 0
FEVER: 0
MYALGIAS: 0
SPUTUM PRODUCTION: 0
HEADACHES: 0
FLANK PAIN: 0
NECK PAIN: 0
HEMOPTYSIS: 0

## 2018-04-14 ASSESSMENT — PAIN SCALES - GENERAL
PAINLEVEL_OUTOF10: 9
PAINLEVEL_OUTOF10: 8
PAINLEVEL_OUTOF10: 9
PAINLEVEL_OUTOF10: 7
PAINLEVEL_OUTOF10: 8
PAINLEVEL_OUTOF10: 7
PAINLEVEL_OUTOF10: 8
PAINLEVEL_OUTOF10: 9
PAINLEVEL_OUTOF10: 7

## 2018-04-14 NOTE — FLOWSHEET NOTE
04/13/18 3387   Events/Summary/Plan   Events/Summary/Plan (pt waiting for daughter to get here, refused at this time )   CPAP/BiPAP [facility]   Nocturnal CPAP or BiPAP BiPap   #System Evaluation Yes   Home Unit Used? No   Equipment Inspected for Cleanliness, Operation, and Safety? Yes   Settings (If Known) 14/8   FiO2 or LPM 6   Pt is currently not wearing CPAP/BiPap unit Yes

## 2018-04-14 NOTE — PROGRESS NOTES
Report received at bedside.  RN assumed care.  Chart reviewed.  Patient resting in chair.  Patient updated plan of care for today. Patient is not prepared for discharge today, hospitalist notified. Bed alarm not necessary.  Call light in reach.  Bed in lowest position.  Non-slip socks on.  Patient alert and oriented x4.  Pain addressed.

## 2018-04-14 NOTE — CARE PLAN
I gave patient the lab results below.  Patient verbalized understanding and had no further questions at this time.     Problem: Discharge Barriers/Planning  Goal: Patient's Continuum of care needs are met  Outcome: PROGRESSING AS EXPECTED  Identified potential discharge barriers.

## 2018-04-14 NOTE — PROGRESS NOTES
Pt refusing wound care, some medications and ambulation. Pt is agreeable to pain medication but is only agreeable to certain meds and refusing IVF's. Pt was offered recliner and asked to get back into bed but will only sit on the edge of the bed with the refusal of his bed alarm. Pt has increased breakdown to buttocks and posterior upper thighs but is refusing to reposition. Mepliex removed and barrier cream applied for itching, pt said that he would be agreeable to placing Mepilex later after the itching has subsided. Mepilex at bedside. Will continue to monitor and encourage participations and compliance.

## 2018-04-14 NOTE — CARE PLAN
Problem: Safety  Goal: Will remain free from injury  Educated patient on use of call light, no slip socks on, bed lowest position. All needs attended to. Patient verbalized understanding.   Goal: Will remain free from falls  Fall precautions in place: treaded slipper socks on, mobility signs posted, hourly rounding, bed in lowest position, belongings and call light are within reach, room cleared of potential fall hazards, and room near nurses station.

## 2018-04-14 NOTE — CARE PLAN
Problem: Safety  Goal: Free from accidental injury  Outcome: PROGRESSING AS EXPECTED  Assessed for fall risk    Problem: Psychosocial needs  Goal: Anxiety reduction  Outcome: PROGRESSING AS EXPECTED  Identified anxiety triggers, encouraged patient participation in care.

## 2018-04-14 NOTE — PROGRESS NOTES
Received report from AM RN. Assumed pt care. A&Ox4. Pain 8/10 in bilateral legs. Pt is currently sitting on edge of bed. Call light within reach. Plan of care explained. RN and CNA numbers provided, no further needs at this time. Hourly rounding in progress.

## 2018-04-14 NOTE — PROGRESS NOTES
Hospitalist notified RN that pt will not be discharging today. Charge RN is aware. Pt has been updated.

## 2018-04-14 NOTE — DISCHARGE PLANNING
Anticipated Discharge Disposition: Home with triology machine through [employer] , O2 through [employer], 24/7 home cargivers through [employer], WC through [employer]     Action: LSW met with pt bedside. Pt signed IMM and provided medicare rights. Pt reports he feels unwell to go home still. LSW informed pt of possible dc on 4/15/18 . Pt reports his recliner got delivered so he has somewhere to sleep now but still does not feel well enough to go home.    Barriers to Discharge: Pt not medically clear. Pt non conplient.     Plan: LSW to arrange O2 to be delivered through [employer] before dc. LSW to arrange WC van back to pt home. LSW to call Juan with [employer] to deliver triology machine upon dc. CCS to call 24/7 caregiver upon dc to meet pt at home.

## 2018-04-14 NOTE — PROGRESS NOTES
[facility] Hospitalist Progress Note    Date of Service: 4/13/2018    Chief Complaint  57 y.o. male admitted 3/16/2018 with ongoing cellulitis of bilateral lower extremities with a history of chronic venous stasis and lymphedema. He had acute respiratory distress in ER. S/p ICU stay with MV dependence this hospitalization with treatment for pneumonia and cellulitis. He has chronic respiratory failure that is multifactorial however largely due to medical noncompliance    Interval Problem Update  Remains non motivated. Lack of medical compliance.   Non compliant with nursing interventions, medication use  SW arranging home BIPAP, [facility] for caregiver  Cleared for DC at this time as no further inpatient needs, he has 24-hour care at home  Slightly lower blood pressures, holding oxycodone and decreased lisinopril  Poor skin care, high risk of infections 2/2 poor hygiene and refusal to participate in care    This patient is at high risk of poor prognosis including death 2.2 to his personal non compliance and lack of motivation and involvement in his personal care at this time    4/12: Discharge held due to the development of a chaotic, likely due to volume depletion and Lasix. Gentle fluids given, hyperkalemia treated medically.  4/13: K still high (refused kayexalate), Cr improved.  Continue gentle IVF.    Consultants/Specialty  Pulmonology   Palliative care    Disposition  Refused by LTACs  Refused by SNFs  [facility] to do [facility] for home caregiver  Arrange BIPAP for use at home and home health      Review of Systems   Constitutional: Positive for malaise/fatigue. Negative for chills, diaphoresis, fever and weight loss.   HENT: Negative for hearing loss and tinnitus.    Eyes: Negative for blurred vision and double vision.   Respiratory: Positive for shortness of breath. Negative for cough, hemoptysis, sputum production and wheezing.    Cardiovascular: Positive for leg swelling. Negative for chest pain and claudication.    Gastrointestinal: Positive for constipation. Negative for abdominal pain, blood in stool, diarrhea, heartburn, melena, nausea and vomiting.   Genitourinary: Negative for dysuria, flank pain, frequency, hematuria and urgency.   Musculoskeletal: Positive for joint pain. Negative for back pain, falls, myalgias and neck pain.   Skin: Positive for rash. Negative for itching.   Neurological: Positive for dizziness (intermittent) and weakness. Negative for headaches.   Psychiatric/Behavioral: Negative for depression. The patient is nervous/anxious.       Physical Exam  Laboratory/Imaging   Hemodynamics  Temp (24hrs), Av.7 °C (98 °F), Min:35.9 °C (96.7 °F), Max:37.1 °C (98.7 °F)   Temperature: 36.7 °C (98 °F)  Pulse  Av.2  Min: 54  Max: 116    Blood Pressure: 100/53      Respiratory      Respiration: 18, Pulse Oximetry: 94 %     Work Of Breathing / Effort: Moderate;Shallow;Tachypnea  RUL Breath Sounds: Diminished, RML Breath Sounds: Diminished, RLL Breath Sounds: Diminished, LARY Breath Sounds: Diminished, LLL Breath Sounds: Diminished    Fluids    Intake/Output Summary (Last 24 hours) at 18 1746  Last data filed at 18 1600   Gross per 24 hour   Intake             2580 ml   Output             1850 ml   Net              730 ml       Nutrition  Orders Placed This Encounter   Procedures   • DIET ORDER     Standing Status:   Standing     Number of Occurrences:   1     Order Specific Question:   Diet:     Answer:   Diabetic [3]     Order Specific Question:   Texture/Fiber modifications:     Answer:   Dysphagia 3(Mechanical Soft)specify fluid consistency(question 6) [3]     Order Specific Question:   Consistency/Fluid modifications:     Answer:   Thin Liquids [3]     Comments:   NO straws     Physical Exam   Constitutional: He is oriented to person, place, and time. He appears well-developed. No distress.   Morbidly obese, Body mass index is 42.97 kg/m².   HENT:   Head: Normocephalic.   Mouth/Throat:  Oropharynx is clear and moist. No oropharyngeal exudate.   Eyes: Conjunctivae are normal. Pupils are equal, round, and reactive to light. No scleral icterus.   Neck: No JVD present.   Cardiovascular: Normal rate, regular rhythm and normal heart sounds.  Exam reveals no gallop and no friction rub.    No murmur heard.  Pulmonary/Chest: No stridor. No respiratory distress. He has decreased breath sounds. He has no wheezes. He has no rhonchi. He has no rales. He exhibits no crepitus.   Diminished in bases. Poor inspiratory effort   Abdominal: Soft. Bowel sounds are normal. He exhibits distension. There is no tenderness. There is no rebound and no guarding.   Musculoskeletal: He exhibits edema (2+ LE). He exhibits no tenderness or deformity.   Neurological: He is alert and oriented to person, place, and time. No cranial nerve deficit.   Skin: Skin is warm and dry. He is not diaphoretic.   Scaly skin on head  Lower extremity wounds  B/L Severe stasis dermatitis, thick flaking skin   Psychiatric: He has a normal mood and affect. His behavior is normal. Judgment and thought content normal.   Vitals reviewed.      Recent Labs      04/12/18   0216  04/13/18   0224   WBC  9.6  10.9*   RBC  4.45*  4.28*   HEMOGLOBIN  12.6*  12.0*   HEMATOCRIT  40.4*  39.1*   MCV  90.8  91.4   MCH  28.3  28.0   MCHC  31.2*  30.7*   RDW  49.7  50.8*   PLATELETCT  184  185   MPV  12.8  11.8     Recent Labs      04/12/18   0359  04/12/18   0819  04/12/18   1028  04/13/18   0224   SODIUM  131*   --   129*  132*   POTASSIUM  6.3*  5.5  5.7*  5.9*   CHLORIDE  89*   --   88*  93*   CO2  29   --   30  30   GLUCOSE  129*   --   194*  111*   BUN  76*   --   78*  64*   CREATININE  2.64*   --   2.80*  1.24   CALCIUM  9.7   --   9.6  9.8         Recent Labs      04/12/18   1028   BNPBTYPENAT  3              Assessment/Plan     * Acute on chronic respiratory failure with hypoxia and hypercapnia (CMS-HCC)- (present on admission)   Assessment & Plan    Chronic  issues, nearly baseline now  Intubated 3-20 extubated 3/25/2018 this hospital stay  Morbid obesity, Body mass index is 42.97 kg/m².  Obesity hypoventilation syndrome / ALIREZA is contributing   Suspect underlying pulmonary hypertension / RV dysfunction  Underlying history of COPD  Non compliance with intervention / medical recommendations & therapy complicates care  Underlying debility with poor inspiratory effort  Narcotic dependence further decreasing inspiratory efforts    Plan:  At least 8 hours of BIPAP therapy at night  Recommend BIPAP twice daily for 1 hours (11am-12 pm) & (3pm-4pm)  Aggressive nursing interventions, RT protocol  Ambulate as able  Hold lasix due to JOE  Symbicort / Spiriva  Wean off opioids as these are not helping him  Nocturnal desaturation study done, have pulm request outpatient BIPAP, arranged    Poor prognosis 2/2 multiple factors listed above most importantly 2/2 patient's personal non compliance and lack of motivation         JOE (acute kidney injury) (CMS-HCC)- (present on admission)   Assessment & Plan    Suspect due to dehydration as he has had low by mouth intake and also has been getting diuretics. He is making urine and Cr improved w IVF. Occasionally refusing blood pressure checks.  -Bolus 1.5 L  -Strict I/O monitoring  -Hold Lasix  -DC any nephrotoxins  -DC metformin  -Check bladder scan (pt refusing)  -Gentle IVF  -Cr in AM        Hyperkalemia- (present on admission)   Assessment & Plan    Due to acute kidney injury- improved after treatment yesterday however high again this morning.   Repeat dose of Insulin, glucose, calcium gluconate  He is refusing Kayexalate  Continue telemetry  Repeat potassium in a.m.        Severe protein-calorie malnutrition (CMS-HCC)   Assessment & Plan    Evident from SC fat loss and pre albumin levels  Optimize nutrition        Bilateral edema of lower extremity- (present on admission)   Assessment & Plan    Chronic lymphedema and stasis  dermatitis  Hold Lasix due to JOE  Monitor intake output and fluid status  Ambulation as able  Significant contribution from debility        Diabetes type 2, controlled (CMS-HCC)- (present on admission)   Assessment & Plan    No apparent manifestations  Metformin 1000 mg BID  ASA 81, Atorvastatin 40 mg         Non compliance w medication regimen- (present on admission)   Assessment & Plan    Reinforced compliance   Patient has also been noncompliant with BIPAP as he does not like the feeling  Counseled and educated again  Improving compliance now  Will continue to reinforce care        Narcotic addiction (CMS-HCC)- (present on admission)   Assessment & Plan    Mnimize sedating agents  Wean as not helping respiratory status        Stasis dermatitis- (present on admission)   Assessment & Plan    With MSSA cellulitis this hospital course   Completed antibiotic course   Continue wound care / Skin care- he has extensive dry skin, however refuses showering and hygiene.  Aggressive nursing interventions  He has a 24-hour caregiver at home who can assist PT, OT and wound care.        Hypothyroidism- (present on admission)   Assessment & Plan    Synthroid, TSH wnl 03/2018        COPD (chronic obstructive pulmonary disease) (CMS-HCC)- (present on admission)   Assessment & Plan    RT protocol  Pulm on board  No acute exacerbation at this time        HTN (hypertension)- (present on admission)   Assessment & Plan    Stop amlodipine as this will contribute to worsening edema  Lisinopril 10 mg as slightly hypotensive with 20 mg  Titrate therapy as clinically appropriate        Morbid obesity (CMS-HCC)- (present on admission)   Assessment & Plan    Body mass index is 48.02 kg/m².   Complicated by obstructive sleep apnea and obesity hypoventilation syndrome  Encouraged weight loss          Quality-Core Measures   Reviewed items::  Labs reviewed and Medications reviewed  DVT prophylaxis pharmacological::  Enoxaparin (Lovenox)  : Has  completed abx course.

## 2018-04-14 NOTE — PROGRESS NOTES
[facility] Hospitalist Progress Note    Date of Service: 4/14/2018    Chief Complaint  57 y.o. male admitted 3/16/2018 with ongoing cellulitis of bilateral lower extremities with a history of chronic venous stasis and lymphedema. He had acute respiratory distress in ER. S/p ICU stay with MV dependence this hospitalization with treatment for pneumonia and cellulitis. He has chronic respiratory failure that is multifactorial however largely due to medical noncompliance    Interval Problem Update  Remains non motivated. Lack of medical compliance.   Non compliant with nursing interventions, medication use  SW arranging home BIPAP, [facility] for caregiver  Cleared for DC at this time as no further inpatient needs, he has 24-hour care at home  Slightly lower blood pressures, holding oxycodone and decreased lisinopril  Poor skin care, high risk of infections 2/2 poor hygiene and refusal to participate in care    This patient is at high risk of poor prognosis including death 2.2 to his personal non compliance and lack of motivation and involvement in his personal care at this time    4/12: Discharge held due to the development of a chaotic, likely due to volume depletion and Lasix. Gentle fluids given, hyperkalemia treated medically.  4/13: K still high (refused kayexalate), Cr improved.  Continue gentle IVF.  4/14: AK resolved, K at high end of normal. IV fluids discontinued. Sugars are high 160s    Consultants/Specialty  Pulmonology   Palliative care    Disposition  Refused by LTACs  Refused by SNFs  [facility] to do [facility] for home caregiver  Arrange BIPAP for use at home and home health      Review of Systems   Constitutional: Positive for malaise/fatigue. Negative for chills, diaphoresis, fever and weight loss.   HENT: Negative for hearing loss and tinnitus.    Eyes: Negative for blurred vision and double vision.   Respiratory: Positive for shortness of breath. Negative for cough, hemoptysis, sputum production and wheezing.     Cardiovascular: Positive for leg swelling. Negative for chest pain and claudication.   Gastrointestinal: Positive for constipation. Negative for abdominal pain, blood in stool, diarrhea, heartburn, melena, nausea and vomiting.   Genitourinary: Negative for dysuria, flank pain, frequency, hematuria and urgency.   Musculoskeletal: Positive for joint pain. Negative for back pain, falls, myalgias and neck pain.   Skin: Positive for rash. Negative for itching.   Neurological: Positive for dizziness (intermittent) and weakness. Negative for headaches.   Psychiatric/Behavioral: Negative for depression. The patient is nervous/anxious.       Physical Exam  Laboratory/Imaging   Hemodynamics  Temp (24hrs), Av.5 °C (97.7 °F), Min:36.1 °C (97 °F), Max:36.7 °C (98 °F)   Temperature: 36.1 °C (97 °F)  Pulse  Av.3  Min: 54  Max: 116    Blood Pressure: 116/66      Respiratory      Respiration: 18, Pulse Oximetry: 93 %     Work Of Breathing / Effort: Moderate;Shallow  RUL Breath Sounds: Diminished, RML Breath Sounds: Diminished, RLL Breath Sounds: Diminished, LARY Breath Sounds: Diminished, LLL Breath Sounds: Diminished    Fluids    Intake/Output Summary (Last 24 hours) at 18 1452  Last data filed at 18 1443   Gross per 24 hour   Intake              840 ml   Output             1050 ml   Net             -210 ml       Nutrition  Orders Placed This Encounter   Procedures   • DIET ORDER     Standing Status:   Standing     Number of Occurrences:   1     Order Specific Question:   Diet:     Answer:   Diabetic [3]     Order Specific Question:   Texture/Fiber modifications:     Answer:   Dysphagia 3(Mechanical Soft)specify fluid consistency(question 6) [3]     Order Specific Question:   Consistency/Fluid modifications:     Answer:   Thin Liquids [3]     Comments:   NO straws     Physical Exam   Constitutional: He is oriented to person, place, and time. He appears well-developed. No distress.   Morbidly obese, Body mass  index is 42.97 kg/m²., Sitting in chair   HENT:   Head: Normocephalic.   Mouth/Throat: Oropharynx is clear and moist. No oropharyngeal exudate.   Eyes: Conjunctivae are normal. Pupils are equal, round, and reactive to light. No scleral icterus.   Neck: No JVD present.   Cardiovascular: Normal rate, regular rhythm and normal heart sounds.  Exam reveals no gallop and no friction rub.    No murmur heard.  Pulmonary/Chest: No stridor. No respiratory distress. He has decreased breath sounds. He has no wheezes. He has no rhonchi. He has no rales. He exhibits no crepitus.   Diminished in bases. Poor inspiratory effort   Abdominal: Soft. Bowel sounds are normal. He exhibits distension. There is no tenderness. There is no rebound and no guarding.   Musculoskeletal: He exhibits edema (2+ LE). He exhibits no tenderness or deformity.   Neurological: He is alert and oriented to person, place, and time. No cranial nerve deficit.   Skin: Skin is warm and dry. He is not diaphoretic.   Scaly skin on head  Lower extremity wounds  B/L Severe stasis dermatitis, thick flaking skin   Psychiatric: He has a normal mood and affect. His behavior is normal. Judgment and thought content normal.   Vitals reviewed.      Recent Labs      04/12/18   0216  04/13/18   0224   WBC  9.6  10.9*   RBC  4.45*  4.28*   HEMOGLOBIN  12.6*  12.0*   HEMATOCRIT  40.4*  39.1*   MCV  90.8  91.4   MCH  28.3  28.0   MCHC  31.2*  30.7*   RDW  49.7  50.8*   PLATELETCT  184  185   MPV  12.8  11.8     Recent Labs      04/12/18   1028  04/13/18   0224  04/14/18   0701   SODIUM  129*  132*  132*   POTASSIUM  5.7*  5.9*  5.0   CHLORIDE  88*  93*  93*   CO2  30  30  31   GLUCOSE  194*  111*  163*   BUN  78*  64*  53*   CREATININE  2.80*  1.24  1.03   CALCIUM  9.6  9.8  9.8         Recent Labs      04/12/18   1028   BNPBTYPENAT  3              Assessment/Plan     * Acute on chronic respiratory failure with hypoxia and hypercapnia (CMS-HCC)- (present on admission)    Assessment & Plan    Chronic issues, nearly baseline now  Intubated 3-20 extubated 3/25/2018 this hospital stay  Morbid obesity, Body mass index is 42.97 kg/m².  Obesity hypoventilation syndrome / ALIREZA is contributing   Suspect underlying pulmonary hypertension / RV dysfunction  Underlying history of COPD  Non compliance with intervention / medical recommendations & therapy complicates care  Underlying debility with poor inspiratory effort  Narcotic dependence further decreasing inspiratory efforts    Plan:  At least 8 hours of BIPAP therapy at night  Recommend BIPAP twice daily for 1 hours (11am-12 pm) & (3pm-4pm)  Aggressive nursing interventions, RT protocol  Ambulate as able  Hold lasix due to JOE  Symbicort / Spiriva  Wean off opioids as these are not helping him  Nocturnal desaturation study done, have pulm request outpatient BIPAP, arranged    Poor prognosis 2/2 multiple factors listed above most importantly 2/2 patient's personal non compliance and lack of motivation         JOE (acute kidney injury) (CMS-HCC)- (present on admission)   Assessment & Plan    Resolved, Suspect due to dehydration as he has had low by mouth intake and also had been getting diuretics. Occasionally refusing blood pressure checks.  -Strict I/O monitoring  -Consider resuming low dose Lasix  -DC any nephrotoxins  -DC metformin, DC IVF  -Cr in AM        Hyperkalemia- (present on admission)   Assessment & Plan    Due to acute kidney injury while on an ACE inhibitor- improved after treatment yesterday, still remains on the high end of normal  Repeat in a.m.  Continue telemetry  Hold off on any Ace or arb        Severe protein-calorie malnutrition (CMS-HCC)   Assessment & Plan    Evident from SC fat loss and pre albumin levels  Optimize nutrition        Bilateral edema of lower extremity- (present on admission)   Assessment & Plan    Chronic lymphedema and stasis dermatitis  Hold Lasix due to JOE, consider resuming at a very  low-dose  Monitor intake output and fluid status  Ambulation as able  Significant contribution from debility        Diabetes type 2, controlled (CMS-HCC)- (present on admission)   Assessment & Plan    No apparent manifestations  ASA 81, Atorvastatin 40 mg  Consider Januvia rather than metformin due to developing JOE        Non compliance w medication regimen- (present on admission)   Assessment & Plan    Reinforced compliance   Patient has also been noncompliant with BIPAP as he does not like the feeling  Counseled and educated again  Improving compliance now  Will continue to reinforce care        Narcotic addiction (CMS-HCC)- (present on admission)   Assessment & Plan    Mnimize sedating agents  Wean as not helping respiratory status        Stasis dermatitis- (present on admission)   Assessment & Plan    With MSSA cellulitis this hospital course   Completed antibiotic course   Continue wound care / Skin care- he has extensive dry skin, however refuses showering and hygiene.  Aggressive nursing interventions  He has a 24-hour caregiver at home who can assist PT, OT and wound care.        Hypothyroidism- (present on admission)   Assessment & Plan    Synthroid, TSH wnl 03/2018        COPD (chronic obstructive pulmonary disease) (CMS-HCC)- (present on admission)   Assessment & Plan    RT protocol  Pulm on board  No acute exacerbation at this time        HTN (hypertension)- (present on admission)   Assessment & Plan    Stop amlodipine as this will contribute to worsening edema  Hold lisinopril due to AKA and hypotension  Consider resuming at a very low dose, 2.5 daily, holding off now due to potassium on the high end of normal  Titrate therapy as clinically appropriate        Morbid obesity (CMS-HCC)- (present on admission)   Assessment & Plan    Body mass index is 48.02 kg/m².   Complicated by obstructive sleep apnea and obesity hypoventilation syndrome  Encouraged weight loss          Quality-Core Measures   Reviewed  items::  Labs reviewed and Medications reviewed  DVT prophylaxis pharmacological::  Heparin  : Has completed abx course.

## 2018-04-15 LAB
ALBUMIN SERPL BCP-MCNC: 3.8 G/DL (ref 3.2–4.9)
BUN SERPL-MCNC: 42 MG/DL (ref 8–22)
CALCIUM SERPL-MCNC: 9.8 MG/DL (ref 8.5–10.5)
CHLORIDE SERPL-SCNC: 95 MMOL/L (ref 96–112)
CO2 SERPL-SCNC: 30 MMOL/L (ref 20–33)
CREAT SERPL-MCNC: 0.89 MG/DL (ref 0.5–1.4)
GLUCOSE SERPL-MCNC: 135 MG/DL (ref 65–99)
PHOSPHATE SERPL-MCNC: 3.2 MG/DL (ref 2.5–4.5)
POTASSIUM SERPL-SCNC: 4.8 MMOL/L (ref 3.6–5.5)
SODIUM SERPL-SCNC: 133 MMOL/L (ref 135–145)

## 2018-04-15 PROCEDURE — 770006 HCHG ROOM/CARE - MED/SURG/GYN SEMI*

## 2018-04-15 PROCEDURE — 700111 HCHG RX REV CODE 636 W/ 250 OVERRIDE (IP): Performed by: INTERNAL MEDICINE

## 2018-04-15 PROCEDURE — 700102 HCHG RX REV CODE 250 W/ 637 OVERRIDE(OP): Performed by: INTERNAL MEDICINE

## 2018-04-15 PROCEDURE — A9270 NON-COVERED ITEM OR SERVICE: HCPCS | Performed by: INTERNAL MEDICINE

## 2018-04-15 PROCEDURE — 80069 RENAL FUNCTION PANEL: CPT

## 2018-04-15 PROCEDURE — 36415 COLL VENOUS BLD VENIPUNCTURE: CPT

## 2018-04-15 PROCEDURE — 99232 SBSQ HOSP IP/OBS MODERATE 35: CPT | Performed by: INTERNAL MEDICINE

## 2018-04-15 PROCEDURE — A9270 NON-COVERED ITEM OR SERVICE: HCPCS | Performed by: HOSPITALIST

## 2018-04-15 PROCEDURE — 700102 HCHG RX REV CODE 250 W/ 637 OVERRIDE(OP): Performed by: HOSPITALIST

## 2018-04-15 RX ORDER — FUROSEMIDE 40 MG/1
20 TABLET ORAL DAILY
Qty: 30 TAB | Refills: 0 | Status: ON HOLD | OUTPATIENT
Start: 2018-04-20 | End: 2018-05-04

## 2018-04-15 RX ADMIN — LEVOTHYROXINE SODIUM 50 MCG: 50 TABLET ORAL at 06:21

## 2018-04-15 RX ADMIN — OXYCODONE HYDROCHLORIDE 20 MG: 10 TABLET ORAL at 21:40

## 2018-04-15 RX ADMIN — ASPIRIN 81 MG: 81 TABLET, COATED ORAL at 08:32

## 2018-04-15 RX ADMIN — HEPARIN SODIUM 5000 UNITS: 5000 INJECTION, SOLUTION INTRAVENOUS; SUBCUTANEOUS at 13:32

## 2018-04-15 RX ADMIN — OXYCODONE HYDROCHLORIDE 20 MG: 10 TABLET ORAL at 13:32

## 2018-04-15 RX ADMIN — OXYCODONE HYDROCHLORIDE AND ACETAMINOPHEN 500 MG: 500 TABLET ORAL at 13:32

## 2018-04-15 RX ADMIN — Medication: at 16:06

## 2018-04-15 RX ADMIN — BUDESONIDE AND FORMOTEROL FUMARATE DIHYDRATE 2 PUFF: 160; 4.5 AEROSOL RESPIRATORY (INHALATION) at 21:41

## 2018-04-15 RX ADMIN — HEPARIN SODIUM 5000 UNITS: 5000 INJECTION, SOLUTION INTRAVENOUS; SUBCUTANEOUS at 21:41

## 2018-04-15 RX ADMIN — OXYCODONE HYDROCHLORIDE 20 MG: 10 TABLET ORAL at 01:12

## 2018-04-15 RX ADMIN — OXYCODONE HYDROCHLORIDE 20 MG: 10 TABLET ORAL at 09:32

## 2018-04-15 RX ADMIN — OXYCODONE HYDROCHLORIDE AND ACETAMINOPHEN 500 MG: 500 TABLET ORAL at 08:32

## 2018-04-15 RX ADMIN — ONDANSETRON 4 MG: 2 INJECTION, SOLUTION INTRAMUSCULAR; INTRAVENOUS at 00:14

## 2018-04-15 RX ADMIN — Medication 220 MG: at 08:33

## 2018-04-15 RX ADMIN — OXYCODONE HYDROCHLORIDE 20 MG: 10 TABLET ORAL at 05:15

## 2018-04-15 RX ADMIN — OXYCODONE HYDROCHLORIDE AND ACETAMINOPHEN 500 MG: 500 TABLET ORAL at 21:40

## 2018-04-15 RX ADMIN — ATORVASTATIN CALCIUM 40 MG: 40 TABLET, FILM COATED ORAL at 21:40

## 2018-04-15 RX ADMIN — OMEPRAZOLE 20 MG: 20 CAPSULE, DELAYED RELEASE ORAL at 08:32

## 2018-04-15 RX ADMIN — BUDESONIDE AND FORMOTEROL FUMARATE DIHYDRATE 2 PUFF: 160; 4.5 AEROSOL RESPIRATORY (INHALATION) at 08:33

## 2018-04-15 RX ADMIN — HEPARIN SODIUM 5000 UNITS: 5000 INJECTION, SOLUTION INTRAVENOUS; SUBCUTANEOUS at 06:21

## 2018-04-15 RX ADMIN — OXYCODONE HYDROCHLORIDE 20 MG: 10 TABLET ORAL at 17:27

## 2018-04-15 RX ADMIN — ONDANSETRON 4 MG: 2 INJECTION, SOLUTION INTRAMUSCULAR; INTRAVENOUS at 22:44

## 2018-04-15 RX ADMIN — STANDARDIZED SENNA CONCENTRATE AND DOCUSATE SODIUM 1 TABLET: 8.6; 5 TABLET, FILM COATED ORAL at 08:39

## 2018-04-15 ASSESSMENT — ENCOUNTER SYMPTOMS
DIAPHORESIS: 0
VOMITING: 0
DIARRHEA: 0
CONSTIPATION: 1
CLAUDICATION: 0
CHILLS: 0
ABDOMINAL PAIN: 0
HEARTBURN: 0
DOUBLE VISION: 0
BLURRED VISION: 0
HEMOPTYSIS: 0
WEAKNESS: 1
SHORTNESS OF BREATH: 1
WEIGHT LOSS: 0
SPUTUM PRODUCTION: 0
HEADACHES: 0
BLOOD IN STOOL: 0
BACK PAIN: 0
WHEEZING: 0
DIZZINESS: 1
COUGH: 0
NERVOUS/ANXIOUS: 1
FLANK PAIN: 0
NAUSEA: 0
FEVER: 0
FALLS: 0
MYALGIAS: 0
DEPRESSION: 0
NECK PAIN: 0

## 2018-04-15 ASSESSMENT — PAIN SCALES - GENERAL
PAINLEVEL_OUTOF10: 8
PAINLEVEL_OUTOF10: 7
PAINLEVEL_OUTOF10: 8
PAINLEVEL_OUTOF10: 10
PAINLEVEL_OUTOF10: 9
PAINLEVEL_OUTOF10: 8
PAINLEVEL_OUTOF10: 8
PAINLEVEL_OUTOF10: 10
PAINLEVEL_OUTOF10: 0

## 2018-04-15 NOTE — PROGRESS NOTES
[facility] Hospitalist Progress Note    Date of Service: 4/15/2018    Chief Complaint  57 y.o. male admitted 3/16/2018 with ongoing cellulitis of bilateral lower extremities with a history of chronic venous stasis and lymphedema. He had acute respiratory distress in ER. S/p ICU stay with MV dependence this hospitalization with treatment for pneumonia and cellulitis. He has chronic respiratory failure that is multifactorial however largely due to medical noncompliance    Interval Problem Update  Remains non motivated. Lack of medical compliance.   Non compliant with nursing interventions, medication use  SW arranging home BIPAP, RUSSELL for caregiver  Cleared for DC at this time as no further inpatient needs, he has 24-hour care at home  Slightly lower blood pressures, holding oxycodone and decreased lisinopril  Poor skin care, high risk of infections 2/2 poor hygiene and refusal to participate in care    This patient is at high risk of poor prognosis including death 2.2 to his personal non compliance and lack of motivation and involvement in his personal care at this time    4/12: Discharge held due to the development of a chaotic, likely due to volume depletion and Lasix. Gentle fluids given, hyperkalemia treated medically.  4/13: K still high (refused kayexalate), Cr improved.  Continue gentle IVF.  4/14: AK resolved, K at high end of normal. IV fluids discontinued. Sugars are high 160s  4/15: JOE resolved, K normal.  Home O2 unable to be set up on Sunday...    Consultants/Specialty  Pulmonology   Palliative care    Disposition  Refused by LTACs  Refused by SNFs  [facility] to do RUSSELL for home caregiver  Arrange BIPAP for use at home and home health      Review of Systems   Constitutional: Positive for malaise/fatigue. Negative for chills, diaphoresis, fever and weight loss.   HENT: Negative for hearing loss and tinnitus.    Eyes: Negative for blurred vision and double vision.   Respiratory: Positive for shortness of breath.  Negative for cough, hemoptysis, sputum production and wheezing.    Cardiovascular: Positive for leg swelling. Negative for chest pain and claudication.   Gastrointestinal: Positive for constipation. Negative for abdominal pain, blood in stool, diarrhea, heartburn, melena, nausea and vomiting.   Genitourinary: Negative for dysuria, flank pain, frequency, hematuria and urgency.   Musculoskeletal: Positive for joint pain. Negative for back pain, falls, myalgias and neck pain.   Skin: Positive for rash (Painful, flaking skin). Negative for itching.   Neurological: Positive for dizziness (intermittent) and weakness. Negative for headaches.   Psychiatric/Behavioral: Negative for depression. The patient is nervous/anxious.       Physical Exam  Laboratory/Imaging   Hemodynamics  Temp (24hrs), Av.4 °C (97.6 °F), Min:36.2 °C (97.2 °F), Max:36.7 °C (98 °F)   Temperature: 36.7 °C (98 °F)  Pulse  Av.3  Min: 54  Max: 116    Blood Pressure: 143/59      Respiratory      Respiration: 18, Pulse Oximetry: 96 %        RUL Breath Sounds: Clear, RML Breath Sounds: Diminished, RLL Breath Sounds: Diminished, LARY Breath Sounds: Clear, LLL Breath Sounds: Diminished    Fluids    Intake/Output Summary (Last 24 hours) at 04/15/18 1339  Last data filed at 04/15/18 0600   Gross per 24 hour   Intake              720 ml   Output              650 ml   Net               70 ml       Nutrition  Orders Placed This Encounter   Procedures   • DIET ORDER     Standing Status:   Standing     Number of Occurrences:   1     Order Specific Question:   Diet:     Answer:   Diabetic [3]     Order Specific Question:   Texture/Fiber modifications:     Answer:   Dysphagia 3(Mechanical Soft)specify fluid consistency(question 6) [3]     Order Specific Question:   Consistency/Fluid modifications:     Answer:   Thin Liquids [3]     Comments:   NO straws     Physical Exam   Constitutional: He is oriented to person, place, and time. He appears well-developed. No  distress.   Morbidly obese, Body mass index is 42.97 kg/m²., Sitting in chair   HENT:   Head: Normocephalic.   Mouth/Throat: Oropharynx is clear and moist. No oropharyngeal exudate.   Eyes: Conjunctivae are normal. Pupils are equal, round, and reactive to light. No scleral icterus.   Neck: No JVD present.   Cardiovascular: Normal rate, regular rhythm and normal heart sounds.  Exam reveals no gallop and no friction rub.    No murmur heard.  Pulmonary/Chest: No stridor. No respiratory distress. He has decreased breath sounds. He has no wheezes. He has no rhonchi. He has no rales. He exhibits no crepitus.   Diminished in bases. Poor inspiratory effort   Abdominal: Soft. Bowel sounds are normal. He exhibits distension. There is no tenderness. There is no rebound and no guarding.   Musculoskeletal: He exhibits edema (2+ LE). He exhibits no tenderness or deformity.   Neurological: He is alert and oriented to person, place, and time. No cranial nerve deficit.   Skin: Skin is warm and dry. He is not diaphoretic.   Scaly skin on head  Lower extremity wounds  B/L Severe stasis dermatitis, thick flaking skin   Psychiatric: He has a normal mood and affect. His behavior is normal. Judgment and thought content normal.   Vitals reviewed.      Recent Labs      04/13/18   0224   WBC  10.9*   RBC  4.28*   HEMOGLOBIN  12.0*   HEMATOCRIT  39.1*   MCV  91.4   MCH  28.0   MCHC  30.7*   RDW  50.8*   PLATELETCT  185   MPV  11.8     Recent Labs      04/13/18   0224  04/14/18   0701  04/15/18   0336   SODIUM  132*  132*  133*   POTASSIUM  5.9*  5.0  4.8   CHLORIDE  93*  93*  95*   CO2  30  31  30   GLUCOSE  111*  163*  135*   BUN  64*  53*  42*   CREATININE  1.24  1.03  0.89   CALCIUM  9.8  9.8  9.8                      Assessment/Plan     * Acute on chronic respiratory failure with hypoxia and hypercapnia (CMS-HCC)- (present on admission)   Assessment & Plan    Chronic issues, nearly baseline now  Intubated 3-20 extubated 3/25/2018 this  hospital stay  Morbid obesity, Body mass index is 42.97 kg/m².  Obesity hypoventilation syndrome / ALIREZA is contributing   Suspect underlying pulmonary hypertension / RV dysfunction  Underlying history of COPD  Non compliance with intervention / medical recommendations & therapy complicates care  Underlying debility with poor inspiratory effort  Narcotic dependence further decreasing inspiratory efforts    Plan:  At least 8 hours of BIPAP therapy at night  Recommend BIPAP twice daily for 1 hours (11am-12 pm) & (3pm-4pm)  Aggressive nursing interventions, RT protocol  Ambulate as able  Hold lasix due to JOE  Symbicort / Spiriva  Wean off opioids as these are not helping him  Nocturnal desaturation study done, have pulm request outpatient BIPAP, arranged    Poor prognosis 2/2 multiple factors listed above most importantly 2/2 patient's personal non compliance and lack of motivation         JOE (acute kidney injury) (CMS-HCC)- (present on admission)   Assessment & Plan    Resolved, Suspect due to dehydration as he has had low by mouth intake and also had been getting diuretics. Occasionally refusing blood pressure checks.  -Strict I/O monitoring  -Consider resuming low dose Lasix, would like him to f/u with PCP first  -Hold any nephrotoxins  -DC metformin  -Cr in AM        Hyperkalemia- (present on admission)   Assessment & Plan    Now resolved, was due to acute kidney injury while on an ACE inhibitor  Repeat in AM  Continue telemetry  Hold off on any Ace or arb        Severe protein-calorie malnutrition (CMS-HCC)   Assessment & Plan    Evident from SC fat loss and pre albumin levels  Optimize nutrition        Bilateral edema of lower extremity- (present on admission)   Assessment & Plan    Chronic lymphedema and stasis dermatitis  Hold Lasix due to JOE, consider resuming at a very low-dose  Monitor intake output and fluid status  Ambulation as able  Significant contribution from debility        Diabetes type 2,  controlled (CMS-HCC)- (present on admission)   Assessment & Plan    No apparent manifestations  ASA 81, Atorvastatin 40 mg  Consider Januvia rather than metformin due to developing JOE        Non compliance w medication regimen- (present on admission)   Assessment & Plan    Reinforced compliance   Patient has also been noncompliant with BIPAP as he does not like the feeling  Counseled and educated again  Improving compliance now  Will continue to reinforce care        Narcotic addiction (CMS-HCC)- (present on admission)   Assessment & Plan    Mnimize sedating agents  Wean as not helping respiratory status        Stasis dermatitis- (present on admission)   Assessment & Plan    With MSSA cellulitis this hospital course   Completed antibiotic course   Continue wound care / Skin care- he has extensive dry skin, however refuses showering and hygiene.  Aggressive nursing interventions  He has a 24-hour caregiver at home who can assist PT, OT and wound care.        Hypothyroidism- (present on admission)   Assessment & Plan    Synthroid, TSH wnl 03/2018        COPD (chronic obstructive pulmonary disease) (CMS-HCC)- (present on admission)   Assessment & Plan    RT protocol  Pulm on board  No acute exacerbation at this time        HTN (hypertension)- (present on admission)   Assessment & Plan    Stop amlodipine as this will contribute to worsening edema  Hold lisinopril due to AKA and hypotension  Consider resuming at a very low dose, 2.5 daily, holding off now due to potassium on the high end of normal  Titrate therapy as clinically appropriate        Morbid obesity (CMS-HCC)- (present on admission)   Assessment & Plan    Body mass index is 48.02 kg/m².   Complicated by obstructive sleep apnea and obesity hypoventilation syndrome  Encouraged weight loss          Quality-Core Measures   Reviewed items::  Labs reviewed and Medications reviewed  DVT prophylaxis pharmacological::  Heparin  : Has completed abx course.

## 2018-04-15 NOTE — CARE PLAN
"Problem: Knowledge Deficit  Goal: Knowledge of disease process/condition, treatment plan, diagnostic tests, and medications will improve  Outcome: PROGRESSING AS EXPECTED  Educated pt on all morning medication. Pt refused medication although he was given adequate education on indication and mechanism of action of each.    Problem: Discharge Barriers/Planning  Goal: Patient's Continuum of care needs are met  Outcome: PROGRESSING AS EXPECTED  Notified pt that his discharge barriers are being handled by Social Work staff, and there is a possibility that he will be discharged today.   Reassured pt that all interventions will be in place by discharge and pt will have home oxygen.  Collaborated with Social Work to determine pt's discharge plan and barriers.    Problem: Skin Integrity  Goal: Skin Integrity is maintained or improved  Outcome: PROGRESSING AS EXPECTED  Highly encouraged pt let RN wash pt legs today and dry. Pt stated \"Right now is not a good time. I'm not done with coffee.\" RN understood and informed pt to notify RN once breakfast is finished so we can apply lotion and wash lower extremities.      "

## 2018-04-15 NOTE — PROGRESS NOTES
Pt refusing wound care at this time. Pt states that day shift already performed it and does not need further wound care during this shift.

## 2018-04-15 NOTE — PROGRESS NOTES
Received report from AM RN. Assumed pt care. A&Ox4. Pain 8/10 in bilateral legs. Pt is currently in the chair. Call light within reach. Plan of care explained. RN and CNA numbers provided, no further needs at this time. Hourly rounding in progress.

## 2018-04-15 NOTE — PROGRESS NOTES
"Report received at beside. RN assumed care. Chart reviewed. Patient is resting in chair. RN placed pt's feet up on chair on pillow per request. Patient updated on plan of care regarding social work's plan for home oxygen delivery and planned discharge for tomorrow to verify all necessary interventions are in place to make tomorrow's discharge a smooth transition to home.  Assessment completed. AO x 4. Pain 8/10 at BLE. Patient has multiple complaints at this time regarding his lower extremities. When asked if pt would like his Ammonium Lactate lotion, pt exclaims \"I don't understand how you think washing my legs will make them any better.\" Pt was educated on indication for wound care which is prevention of infection. Pt states \"There's already an infection! Look at them! How is this treating infection?!\" Pt was again reminded that wound care is to prevent infection. Pt then states \"You guys are going to send me home, but look at my legs. I'm just going to end up back in the ER in 2 days. I'm calling it now.\" RN reminds pt that MD will round this morning and concerns for possible infection are important to staff. Pt then stated \"You know what I wish? I wish I didn't have you as my nurse.\" Pt was asked by RN if there was anything RN can do for pt at this time. Pt states \"Unless you can get me antibiotics for my legs, no. OH, you can get me water.\" Pt was then provided water and reminded that his concerns are heard by staff and will be solved momentarily. RN asked pt when a good time to provide wound care to legs would be. Pt states \"They JUST did that last night. I don't understand why we have to keep doing this.\" Pt was again educated on wound care for prevention of infection and it's part in healing assistance.   "

## 2018-04-15 NOTE — DISCHARGE PLANNING
Medical Social Work    SW requested [facility] to assist in seeing if [employer] can deliver WC, home O2 and Trilogy machine today.

## 2018-04-15 NOTE — DISCHARGE PLANNING
Per [facility] request, call placed to [employer], left message for return call.  Office is currently closed.  Awaiting return call from on call .

## 2018-04-15 NOTE — CARE PLAN
Problem: Safety  Goal: Will remain free from injury  Educated patient on use of call light, bed lowest position. All needs attended to. Patient verbalized understanding.   Goal: Will remain free from falls  Fall precautions in place: mobility signs posted, hourly rounding, bed in lowest position, belongings and call light are within reach, room cleared of potential fall hazards, and room near nurses station.

## 2018-04-15 NOTE — DISCHARGE PLANNING
Medical Social Work    Awaiting delivery from [facility] (home O2, Trilogy, and WC). Anticipate pt will d/c tomorrow once these are delivered.

## 2018-04-15 NOTE — PROGRESS NOTES
Wound care provided to pt's BLE. Both feet were placed in pink buckets, both legs were cleansed with warm free running water into pink bucket with cup. Pt's legs were then taken out of buckets and patted dry on dry towel. Pt's legs were then soaped and cleansed a second time in pink buckets and hand towel. BLE then dried again on dry towel and patted dry. Ammonium Lactate was applied to both legs and feet.

## 2018-04-16 VITALS
SYSTOLIC BLOOD PRESSURE: 138 MMHG | RESPIRATION RATE: 18 BRPM | OXYGEN SATURATION: 98 % | WEIGHT: 300.05 LBS | HEIGHT: 69 IN | DIASTOLIC BLOOD PRESSURE: 88 MMHG | TEMPERATURE: 97.1 F | HEART RATE: 90 BPM | BODY MASS INDEX: 44.44 KG/M2

## 2018-04-16 LAB
ALBUMIN SERPL BCP-MCNC: 3.8 G/DL (ref 3.2–4.9)
BUN SERPL-MCNC: 29 MG/DL (ref 8–22)
CALCIUM SERPL-MCNC: 9.9 MG/DL (ref 8.5–10.5)
CHLORIDE SERPL-SCNC: 94 MMOL/L (ref 96–112)
CO2 SERPL-SCNC: 29 MMOL/L (ref 20–33)
CREAT SERPL-MCNC: 0.83 MG/DL (ref 0.5–1.4)
GLUCOSE SERPL-MCNC: 147 MG/DL (ref 65–99)
PHOSPHATE SERPL-MCNC: 3.3 MG/DL (ref 2.5–4.5)
POTASSIUM SERPL-SCNC: 4.9 MMOL/L (ref 3.6–5.5)
SODIUM SERPL-SCNC: 132 MMOL/L (ref 135–145)

## 2018-04-16 PROCEDURE — A9270 NON-COVERED ITEM OR SERVICE: HCPCS | Performed by: HOSPITALIST

## 2018-04-16 PROCEDURE — 700102 HCHG RX REV CODE 250 W/ 637 OVERRIDE(OP): Performed by: INTERNAL MEDICINE

## 2018-04-16 PROCEDURE — A9270 NON-COVERED ITEM OR SERVICE: HCPCS | Performed by: INTERNAL MEDICINE

## 2018-04-16 PROCEDURE — 36415 COLL VENOUS BLD VENIPUNCTURE: CPT

## 2018-04-16 PROCEDURE — 700111 HCHG RX REV CODE 636 W/ 250 OVERRIDE (IP): Performed by: INTERNAL MEDICINE

## 2018-04-16 PROCEDURE — 99239 HOSP IP/OBS DSCHRG MGMT >30: CPT | Performed by: INTERNAL MEDICINE

## 2018-04-16 PROCEDURE — 80069 RENAL FUNCTION PANEL: CPT

## 2018-04-16 PROCEDURE — 700102 HCHG RX REV CODE 250 W/ 637 OVERRIDE(OP): Performed by: HOSPITALIST

## 2018-04-16 RX ADMIN — OXYCODONE HYDROCHLORIDE 20 MG: 10 TABLET ORAL at 14:14

## 2018-04-16 RX ADMIN — OXYCODONE HYDROCHLORIDE 20 MG: 10 TABLET ORAL at 10:27

## 2018-04-16 RX ADMIN — HEPARIN SODIUM 5000 UNITS: 5000 INJECTION, SOLUTION INTRAVENOUS; SUBCUTANEOUS at 05:47

## 2018-04-16 RX ADMIN — ASPIRIN 81 MG: 81 TABLET, COATED ORAL at 05:48

## 2018-04-16 RX ADMIN — OXYCODONE HYDROCHLORIDE 20 MG: 10 TABLET ORAL at 01:51

## 2018-04-16 RX ADMIN — OXYCODONE HYDROCHLORIDE AND ACETAMINOPHEN 500 MG: 500 TABLET ORAL at 05:48

## 2018-04-16 RX ADMIN — BUDESONIDE AND FORMOTEROL FUMARATE DIHYDRATE 2 PUFF: 160; 4.5 AEROSOL RESPIRATORY (INHALATION) at 05:47

## 2018-04-16 RX ADMIN — OMEPRAZOLE 20 MG: 20 CAPSULE, DELAYED RELEASE ORAL at 05:49

## 2018-04-16 RX ADMIN — OXYCODONE HYDROCHLORIDE AND ACETAMINOPHEN 500 MG: 500 TABLET ORAL at 14:14

## 2018-04-16 RX ADMIN — HEPARIN SODIUM 5000 UNITS: 5000 INJECTION, SOLUTION INTRAVENOUS; SUBCUTANEOUS at 14:14

## 2018-04-16 RX ADMIN — LEVOTHYROXINE SODIUM 50 MCG: 50 TABLET ORAL at 05:49

## 2018-04-16 RX ADMIN — Medication 220 MG: at 05:47

## 2018-04-16 RX ADMIN — OXYCODONE HYDROCHLORIDE 20 MG: 10 TABLET ORAL at 05:48

## 2018-04-16 ASSESSMENT — PAIN SCALES - GENERAL
PAINLEVEL_OUTOF10: 9

## 2018-04-16 ASSESSMENT — LIFESTYLE VARIABLES: EVER_SMOKED: YES

## 2018-04-16 NOTE — DISCHARGE INSTRUCTIONS
Discharge Instructions    Discharged to other by medical transportation with escort. Discharged via ambulance, hospital escort: Yes.  Special equipment needed: Oxygen    Be sure to schedule a follow-up appointment with your primary care doctor or any specialists as instructed.   You have many arrangements made (CPAP, O2, rx filled)   DC metformin and lisinopril (change from previous med rec)   Your lasix dose will be HELD until seen by PCP and repeat Potassium and Creatinine labs are drawn      Discharge Plan:   Influenza Vaccine Indication: Not indicated: Previously immunized this influenza season and > 8 years of age    I understand that a diet low in cholesterol, fat, and sodium is recommended for good health. Unless I have been given specific instructions below for another diet, I accept this instruction as my diet prescription.   Other diet: heart healthy low sodium    Special Instructions: None    · Is patient discharged on Warfarin / Coumadin?   No       Acute Respiratory Failure, Adult  Acute respiratory failure occurs when there is not enough oxygen passing from your lungs to your body. When this happens, your lungs have trouble removing carbon dioxide from the blood. This causes your blood oxygen level to drop too low as carbon dioxide builds up.  Acute respiratory failure is a medical emergency. It can develop quickly, but it is temporary if treated promptly. Your lung capacity, or how much air your lungs can hold, may improve with time, exercise, and treatment.  What are the causes?  There are many possible causes of acute respiratory failure, including:  · Lung injury.  · Chest injury or damage to the ribs or tissues near the lungs.  · Lung conditions that affect the flow of air and blood into and out of the lungs, such as pneumonia, acute respiratory distress syndrome, and cystic fibrosis.  · Medical conditions, such as strokes or spinal cord injuries, that affect the muscles and nerves that control  breathing.  · Blood infection (sepsis).  · Inflammation of the pancreas (pancreatitis).  · A blood clot in the lungs (pulmonary embolism).  · A large-volume blood transfusion.  · Burns.  · Near-drowning.  · Seizure.  · Smoke inhalation.  · Reaction to medicines.  · Alcohol or drug overdose.  What increases the risk?  This condition is more likely to develop in people who have:  · A blocked airway.  · Asthma.  · A condition or disease that damages or weakens the muscles, nerves, bones, or tissues that are involved in breathing.  · A serious infection.  · A health problem that blocks the unconscious reflex that is involved in breathing, such as hypothyroidism or sleep apnea.  · A lung injury or trauma.  What are the signs or symptoms?  Trouble breathing is the main symptom of acute respiratory failure. Symptoms may also include:  · Rapid breathing.  · Restlessness or anxiety.  · Skin, lips, or fingernails that appear blue (cyanosis).  · Rapid heart rate.  · Abnormal heart rhythms (arrhythmias).  · Confusion or changes in behavior.  · Tiredness or loss of energy.  · Feeling sleepy or having a loss of consciousness.  How is this diagnosed?  Your health care provider can diagnose acute respiratory failure with a medical history and physical exam. During the exam, your health care provider will listen to your heart and check for crackling or wheezing sounds in your lungs. Your may also have tests to confirm the diagnosis and determine what is causing respiratory failure. These tests may include:  · Measuring the amount of oxygen in your blood (pulse oximetry). The measurement comes from a small device that is placed on your finger, earlobe, or toe.  · Other blood tests to measure blood gases and to look for signs of infection.  · Sampling your cerebral spinal fluid or tracheal fluid to check for infections.  · Chest X-ray to look for fluid in spaces that should be filled with air.  · Electrocardiogram (ECG) to look at the  heart's electrical activity.  How is this treated?  Treatment for this condition usually takes places in a hospital intensive care unit (ICU). Treatment depends on what is causing the condition. It may include one or more treatments until your symptoms improve. Treatment may include:  · Supplemental oxygen. Extra oxygen is given through a tube in the nose, a face mask, or a newberry.  · A device such as a continuous positive airway pressure (CPAP) or bi-level positive airway pressure (BiPAP or BPAP) machine. This treatment uses mild air pressure to keep the airways open. A mask or other device will be placed over your nose or mouth. A tube that is connected to a motor will deliver oxygen through the mask.  · Ventilator. This treatment helps move air into and out of the lungs. This may be done with a bag and mask or a machine. For this treatment, a tube is placed in your windpipe (trachea) so air and oxygen can flow to the lungs.  · Extracorporeal membrane oxygenation (ECMO). This treatment temporarily takes over the function of the heart and lungs, supplying oxygen and removing carbon dioxide. ECMO gives the lungs a chance to recover. It may be used if a ventilator is not effective.  · Tracheostomy. This is a procedure that creates a hole in the neck to insert a breathing tube.  · Receiving fluids and medicines.  · Rocking the bed to help breathing.  Follow these instructions at home:  · Take over-the-counter and prescription medicines only as told by your health care provider.  · Return to normal activities as told by your health care provider. Ask your health care provider what activities are safe for you.  · Keep all follow-up visits as told by your health care provider. This is important.  How is this prevented?  Treating infections and medical conditions that may lead to acute respiratory failure can help prevent the condition from developing.  Contact a health care provider if:  · You have a fever.  · Your  symptoms do not improve or they get worse.  Get help right away if:  · You are having trouble breathing.  · You lose consciousness.  · Your have cyanosis or turn blue.  · You develop a rapid heart rate.  · You are confused.  These symptoms may represent a serious problem that is an emergency. Do not wait to see if the symptoms will go away. Get medical help right away. Call your local emergency services (911 in the [place] Do not drive yourself to the hospital.   This information is not intended to replace advice given to you by your health care provider. Make sure you discuss any questions you have with your health care provider.  Document Released: 12/23/2014 Document Revised: 07/15/2017 Document Reviewed: 07/05/2017  [employer] Interactive Patient Education © 2017 [employer]      Cellulitis, Adult  Introduction  Cellulitis is a skin infection. The infected area is usually red and sore. This condition occurs most often in the arms and lower legs. It is very important to get treated for this condition.  Follow these instructions at home:  · Take over-the-counter and prescription medicines only as told by your doctor.  · If you were prescribed an antibiotic medicine, take it as told by your doctor. Do not stop taking the antibiotic even if you start to feel better.  · Drink enough fluid to keep your pee (urine) clear or pale yellow.  · Do not touch or rub the infected area.  · Raise (elevate) the infected area above the level of your heart while you are sitting or lying down.  · Place warm or cold wet cloths (warm or cold compresses) on the infected area. Do this as told by your doctor.  · Keep all follow-up visits as told by your doctor. This is important. These visits let your doctor make sure your infection is not getting worse.  Contact a doctor if:  · You have a fever.  · Your symptoms do not get better after 1-2 days of treatment.  · Your bone or joint under the infected area starts to hurt after the skin has  healed.  · Your infection comes back. This can happen in the same area or another area.  · You have a swollen bump in the infected area.  · You have new symptoms.  · You feel ill and also have muscle aches and pains.  Get help right away if:  · Your symptoms get worse.  · You feel very sleepy.  · You throw up (vomit) or have watery poop (diarrhea) for a long time.  · There are red streaks coming from the infected area.  · Your red area gets larger.  · Your red area turns darker.  This information is not intended to replace advice given to you by your health care provider. Make sure you discuss any questions you have with your health care provider.  Document Released: 06/05/2009 Document Revised: 05/25/2017 Document Reviewed: 10/26/2016  © 2017 Elsevier      Oxycodone extended-release capsules  What is this medicine?  OXYCODONE (ox i KOE done) is a pain reliever. It is used to treat constant pain that lasts for more than a few days.  This medicine may be used for other purposes; ask your health care provider or pharmacist if you have questions.  COMMON BRAND NAME(S): XTAMPZA  What should I tell my health care provider before I take this medicine?  They need to know if you have any of these conditions:  -[place]'s disease  -brain tumor  -gallbladder disease  -head injury  -heart disease  -history of a drug or alcohol abuse problem  -if you often drink alcohol  -kidney disease  -liver disease  -lung or breathing disease, like asthma  -mental illness  -pancreatic disease  -seizures  -stomach or intestine problems  -thyroid disease  -an unusual or allergic reaction to oxycodone, codeine, hydrocodone, morphine, other medicines, foods, dyes, or preservatives  -pregnant or trying to get pregnant  -breast-feeding  How should I use this medicine?  Take this medicine by mouth with a full glass of water. Follow the directions on the prescription label. Take this medicine with food. You should always take it with the same amount  of food each time. Take your medicine at regular intervals. Do not take it more often than directed. Do not stop taking except on your doctor's advice.  A special MedGuide will be given to you by the pharmacist with each prescription and refill. Be sure to read this information carefully each time.  Talk to your pediatrician regarding the use of this medicine in children. Special care may be needed.  Overdosage: If you think you have taken too much of this medicine contact a poison control center or emergency room at once.  NOTE: This medicine is only for you. Do not share this medicine with others.  What if I miss a dose?  If you miss a dose, take it as soon as you can. If it is almost time for your next dose, take only that dose. Do not take double or extra doses.  What may interact with this medicine?  This medicine may interact with the following medications:  -alcohol  -antihistamines for allergy, cough and cold  -antiviral medicines for HIV or AIDS  -atropine  -certain antibiotics like clarithromycin, erythromycin, linezolid, rifampin  -certain medicines for anxiety or sleep  -certain medicines for bladder problems like oxybutynin, tolterodine  -certain medicines for depression like amitriptyline, fluoxetine, sertraline  -certain medicines for fungal infections like ketoconazole, itraconazole, voriconazole  -certain medicines for migraine headache like almotriptan, eletriptan, frovatriptan, naratriptan, rizatriptan, sumatriptan, zolmitriptan  -certain medicines for nausea or vomiting like dolasetron, ondansetron, palonosetron  -certain medicines for Parkinson's disease like benztropine, trihexyphenidyl  -certain medicines for seizures like phenobarbital, phenytoin, primidone  -certain medicines for stomach problems like dicyclomine, hyoscyamine  -certain medicines for travel sickness like scopolamine  -diuretics  -general anesthetics like halothane, isoflurane, methoxyflurane, propofol  -ipratropium  -local  anesthetics like lidocaine, pramoxine, tetracaine  -MAOIs like Carbex, Eldepryl, Marplan, Nardil, and Parnate  -medicines that relax muscles for surgery  -methylene blue  -nilotinib  -other narcotic medicines for pain or cough  -phenothiazines like chlorpromazine, mesoridazine, prochlorperazine, thioridazine  This list may not describe all possible interactions. Give your health care provider a list of all the medicines, herbs, non-prescription drugs, or dietary supplements you use. Also tell them if you smoke, drink alcohol, or use illegal drugs. Some items may interact with your medicine.  What should I watch for while using this medicine?  Tell your doctor or health care professional if your pain does not go away, if it gets worse, or if you have new or a different type of pain. You may develop tolerance to the medicine. Tolerance means that you will need a higher dose of the medication for pain relief. Tolerance is normal and is expected if you take this medicine for a long time.  Do not suddenly stop taking your medicine because you may develop a severe reaction. Your body becomes used to the medicine. This does NOT mean you are addicted. Addiction is a behavior related to getting and using a drug for a non-medical reason. If you have pain, you have a medical reason to take pain medicine. Your doctor will tell you how much medicine to take. If your doctor wants you to stop the medicine, the dose will be slowly lowered over time to avoid any side effects.  There are different types of narcotic medicines (opiates). If you take more than one type at the same time or if you are taking another medicine that also causes drowsiness, you may have more side effects. Give your health care provider a list of all medicines you use. Your doctor will tell you how much medicine to take. Do not take more medicine than directed. Call emergency for help if you have problems breathing or unusual sleepiness.  You may get drowsy or  dizzy. Do not drive, use machinery, or do anything that needs mental alertness until you know how the medicine affects you. Do not stand or sit up quickly, especially if you are an older patient. This reduces the risk of dizzy or fainting spells. Alcohol may interfere with the effect of this medicine. Avoid alcoholic drinks.  This medicine will cause constipation. Try to have a bowel movement at least every 2 to 3 days. If you do not have a bowel movement for 3 days, call your doctor or health care professional.  Your mouth may get dry. Chewing sugarless gum or sucking on hard candy, and drinking plenty of water may help. Contact your doctor if the problem does not go away or is severe.  What side effects may I notice from receiving this medicine?  Side effects that you should report to your doctor or health care professional as soon as possible:  -allergic reactions like skin rash, itching or hives, swelling of the face, lips, or tongue  -breathing problems  -confusion  -signs and symptoms of low blood pressure like dizziness; feeling faint or lightheaded, falls; unusually weak or tired  -trouble passing urine or change in the amount of urine  -trouble swallowing  Side effects that usually do not require medical attention (report to your doctor or health care professional if they continue or are bothersome):  -constipation  -dry mouth  -nausea, vomiting  -tiredness  This list may not describe all possible side effects. Call your doctor for medical advice about side effects. You may report side effects to FDA at 0-627-FDA-3071.  Where should I keep my medicine?  Keep out of the reach of children. This medicine can be abused. Keep your medicine in a safe place to protect it from theft. Do not share this medicine with anyone. Selling or giving away this medicine is dangerous and against the law. Follow the directions in the [employer].  Store at room temperature between 15 and 30 degrees C (59 and 86 degrees F). Protect  from light. Keep container tightly closed.  This medicine may cause accidental overdose and death if it is taken by other adults, children, or pets. Flush any unused medicine down the toilet to reduce the chance of harm. Do not use the medicine after the expiration date.  NOTE: This sheet is a summary. It may not cover all possible information. If you have questions about this medicine, talk to your doctor, pharmacist, or health care provider.  © 2018 Elsevier/Gold Standard (2017-01-18 18:07:49)          Depression / Suicide Risk    As you are discharged from this [facility] facility, it is important to learn how to keep safe from harming yourself.    Recognize the warning signs:  · Abrupt changes in personality, positive or negative- including increase in energy   · Giving away possessions  · Change in eating patterns- significant weight changes-  positive or negative  · Change in sleeping patterns- unable to sleep or sleeping all the time   · Unwillingness or inability to communicate  · Depression  · Unusual sadness, discouragement and loneliness  · Talk of wanting to die  · Neglect of personal appearance   · Rebelliousness- reckless behavior  · Withdrawal from people/activities they love  · Confusion- inability to concentrate     If you or a loved one observes any of these behaviors or has concerns about self-harm, here's what you can do:  · Talk about it- your feelings and reasons for harming yourself  · Remove any means that you might use to hurt yourself (examples: pills, rope, extension cords, firearm)  · Get professional help from the community (Mental Health, Substance Abuse, psychological counseling)  · Do not be alone:Call your Safe Contact- someone whom you trust who will be there for you.  · Call your local CRISIS HOTLINE 056-7790 or 282-521-9877  · Call your local Children's Mobile Crisis Response Team [place] (788) 818-1679 or www.HowStuffWorks  · Call the toll free National Suicide  Prevention Hotlines   · National Suicide Prevention Lifeline 947-226-KOMY (6566)  · National Hope Line Network 800-SUICIDE (277-8982)

## 2018-04-16 NOTE — CARE PLAN
Problem: Safety  Goal: Will remain free from injury  Educated patient on use of call light, bed locked and in lowest position. All needs attended to. Patient verbalized understanding.   Goal: Will remain free from falls  Fall precautions in place: mobility signs posted, hourly rounding, bed in lowest position, belongings and call light are within reach, room cleared of potential fall hazards, and room near nurses station.

## 2018-04-16 NOTE — DISCHARGE PLANNING
Anticipated Discharge Disposition: Home with O2 and DME    Action: LSW arranged transportation through [employer] for 1:30 p.m. Bedside RN and Charge updated.     Barriers to Discharge: None    Plan: Pt to dc home with [facility]. LSW to arrange all other DME to be delivered home.

## 2018-04-16 NOTE — PROGRESS NOTES
Received report from AM RN. Assumed pt care. A&Ox4. Pain 10/10 in bilateral legs. Pt is currently in bed. Call light within reach. Plan of care explained. RN and CNA numbers provided, no further needs at this time. Hourly rounding in progress.

## 2018-04-16 NOTE — DISCHARGE PLANNING
CCS received transport form to arrange transportation to transfer the patient home today. CCS e-mail the transport form to [facility]. CCS called [facility] and spoke to [place]. Transportation has been arranged to discharge the patient at 1330 via the [facility] van. SW on floor has been notified.

## 2018-04-16 NOTE — PROGRESS NOTES
Pt states he can not leave without clothes and socks. XXL socks have been ordered from supply but do not fit pt. Social work has been contacted. Volunteer services are working to find pt clothing that will fit before discharge scheduled at 1:30pm.

## 2018-04-16 NOTE — DISCHARGE PLANNING
Transportation for discharge has changed. Transportation has now been scheduled for 1630 via the [employer] van. Juan from [facility] has been notified and Latricia from [facility] was also notified.

## 2018-04-16 NOTE — CARE PLAN
Problem: Knowledge Deficit  Goal: Knowledge of disease process/condition, treatment plan, diagnostic tests, and medications will improve  Outcome: PROGRESSING AS EXPECTED  Educated pt on plan of care for today.    Problem: Discharge Barriers/Planning  Goal: Patient's Continuum of care needs are met  Outcome: PROGRESSING AS EXPECTED  Identified discharge barriers as socks and home oxygen.    Problem: Skin Integrity  Goal: Skin Integrity is maintained or improved  Outcome: PROGRESSING AS EXPECTED  Educated pt that the best way to decrease further infection is to wash with soap and water.  Encouraged pt to utilize home lotion with antibiotic properties when managing skin care at home. Pt groaned and nodded.

## 2018-04-16 NOTE — PROGRESS NOTES
Report received at beside. RN assumed care. Chart reviewed. Patient is resting on edge of bed, agitated and frustrated regarding discharge. Patient updated on plan of care regarding discharge for today.  Assessment completed. AO x 4.  Assumed pain present. Call light within reach. Bed in lowest position. Non slip socks on.

## 2018-04-16 NOTE — PROGRESS NOTES
Volunteer services provided pt with clothes but no socks were available. Pt asked if legs could be wrapped in roll gauze prior to discharge at 4:30pm. RN agreed.  was then contacted again per pt request to verify that respiratory services will be available at home upon arrival at. Pt was reassured that all things are in place for discharge.

## 2018-04-17 NOTE — DISCHARGE SUMMARY
CHIEF COMPLAINT ON ADMISSION  Chief Complaint   Patient presents with   • Open Wound   • Leg Swelling   • Abdominal Pain       CODE STATUS  Full Code    HPI & HOSPITAL COURSE  This is a 57 y.o. Male with a very complicated past medical history further complicated by long-standing medical noncompliance; including a history of chronic lower extremity edema complicated by recurrent episodes of cellulitis due to poor hygiene, he has lymphedema and chronic venous stasis, obesity, ALIREZA, chronic pain, type II diabetes, hypothyroidism, hypertension here with shortness of breath found to have acute on chronic respiratory failure with hypoxia. At baseline he wears 4 L of oxygen. He is requiring 6-8 L initially however he decompensated and required intubation on 3/20. He was treated with steroids as well as diuretic therapy and was successfully extubated on 3/25/2018. His tenuous respiratory status was largely thought related to obesity hypoventilation as well as uncontrolled ALIREZA due to noncompliance. He has underlying COPD as well which I'll copy complicated by pulmonary hypertension. He was given diuretics and did have good urine output which appeared largely stable.    For his lower extremity wounds, he has chronic exudate however refused on multiple occasions to have proper wound care and hygiene performed of his lower extremities.    For his type II diabetes his sugars remained relatively well controlled.    There were Many barriers to discharge however social work worked with him extensively in order to arrange home oxygen as well as CPAP and transportation. They also arranged to have a 24-hour caregiver funded so that he will be more compliant and more successful at home. In order to further assist with his respiratory status, and prevent future respiratory depression his narcotic dose was decreased.    It was attempted to have him work with physical therapy however he refused this on multiple occasions.    He appealed  his discharge however, the appeal was denied.    He did develop acute kidney injury which appeared to be related to volume depletion and diuresis. Unfortunately, his diuretics had to be discontinued. This does limit the ability to control his lower extremity edema however he will need ongoing titration regarding his diuretics as an outpatient. He had hyperkalemia associated with acute kidney injury that needed to be corrected medically. He had no events on telemetry and his potassium came down as his renal function normalized. He was discontinued from ACE inhibitor therapy to prevent future acute kidney injury and hyperkalemia.    As mentioned above he was arranged to have home oxygen, CPAP, 24-hour care, wound care and home health.    The patient met 2-midnight criteria for an inpatient stay at the time of discharge.    Therefore, he is discharged in guarded and stable condition with close outpatient follow-up.    SPECIFIC OUTPATIENT FOLLOW-UP  An appointment was made for pulmonology on 4/27/2018  He will need follow-up with primary care within 7 days    DISCHARGE PROBLEM LIST  Principal Problem:    Acute on chronic respiratory failure with hypoxia and hypercapnia (CMS-HCC) POA: Yes  Active Problems:    Morbid obesity (CMS-HCC) POA: Yes    HTN (hypertension) POA: Yes    COPD (chronic obstructive pulmonary disease) (CMS-HCC) POA: Yes    Hypothyroidism POA: Yes    Stasis dermatitis POA: Yes    Narcotic addiction (CMS-HCC) POA: Yes    Non compliance w medication regimen POA: Yes    Diabetes type 2, controlled (CMS-HCC) POA: Yes    Bilateral edema of lower extremity POA: Yes    Severe protein-calorie malnutrition (CMS-HCC) POA: No    Hyperkalemia POA: Yes    JOE (acute kidney injury) (CMS-HCC) POA: Yes  Resolved Problems:    * No resolved hospital problems. *      FOLLOW UP  Future Appointments  Date Time Provider Department Center   4/27/2018 8:20 AM [facility] None     Giovani Lara,  M.D.  [address]  [address]  [facility] [address]  [phone]    Go on 4/17/2018  Please arrive at 12:05 pm for your appointment. If you are unable to make appointment please call to cancel or reschedule. Thank you       MEDICATIONS ON DISCHARGE   Fer Estrada   Home Medication Instructions HIPOLITO:[id number]    Printed on:04/16/18 8716   Medication Information                      acetaminophen (TYLENOL) 325 MG Tab  Take 2 Tabs by mouth every 6 hours as needed (Mild Pain; (Pain scale 1-3); Temp greater than 100.5 F).             albuterol 108 (90 BASE) MCG/ACT Aero Soln inhalation aerosol  Inhale 2 Puffs by mouth every 6 hours as needed for Shortness of Breath.             ammonium lactate (LAC-HYDRIN) 12 % Lotion  Apply 2 Applications to affected area(s) every day.             ascorbic acid (VITAMIN C) 500 MG tablet  Take 1 Tab by mouth every day.             aspirin EC 81 MG EC tablet  Take 1 Tab by mouth every day.             atorvastatin (LIPITOR) 40 MG Tab  Take 1 Tab by mouth every bedtime.             budesonide-formoterol (SYMBICORT) 160-4.5 MCG/ACT Aerosol  Inhale 2 Puffs by mouth 2 Times a Day.             furosemide (LASIX) 40 MG Tab  Take 0.5 Tabs by mouth every day.             levothyroxine (SYNTHROID) 50 MCG Tab  Take 50 mcg by mouth Every morning on an empty stomach.             multivitamin (THERAGRAN) Tab  Take 1 Tab by mouth every day.             Omega-3 Fatty Acids (FISH OIL) 1000 MG Cap capsule  Take 1 Cap by mouth 3 times a day, with meals.             omeprazole (PRILOSEC) 20 MG delayed-release capsule  Take 20 mg by mouth every day.             oxyCODONE immediate release 20 MG Tab  Take 1 Tab by mouth every four hours as needed (moderate pain) for up to 7 days.             polyethylene glycol/lytes (MIRALAX) Pack  Take 1 Packet by mouth 2 times a day.             senna-docusate (PERICOLACE OR SENOKOT S) 8.6-50 MG Tab  Take 1 Tab by mouth 2 Times a Day.             tiotropium (SPIRIVA) 18  MCG Cap  Inhale 1 Cap by mouth every day.             zinc sulfate (ZINCATE) 220 (50 Zn) MG Cap  Take 1 Cap by mouth every day.                 DIET  Orders Placed This Encounter   Procedures   • DIET ORDER     Standing Status:   Standing     Number of Occurrences:   1     Order Specific Question:   Diet:     Answer:   Diabetic [3]     Order Specific Question:   Texture/Fiber modifications:     Answer:   Dysphagia 3(Mechanical Soft)specify fluid consistency(question 6) [3]     Order Specific Question:   Consistency/Fluid modifications:     Answer:   Thin Liquids [3]     Comments:   NO straws       ACTIVITY  As tolerated.  Weight bearing as tolerated      CONSULTATIONS  Dr. Sewell -Pulmonology    PROCEDURES  3/20: Intubation, right IJ placement, bronchoscopy    LABORATORY  Lab Results   Component Value Date/Time    SODIUM 132 (L) 04/16/2018 03:03 AM    POTASSIUM 4.9 04/16/2018 03:03 AM    CHLORIDE 94 (L) 04/16/2018 03:03 AM    CO2 29 04/16/2018 03:03 AM    GLUCOSE 147 (H) 04/16/2018 03:03 AM    BUN 29 (H) 04/16/2018 03:03 AM    CREATININE 0.83 04/16/2018 03:03 AM        Lab Results   Component Value Date/Time    WBC 10.9 (H) 04/13/2018 02:24 AM    HEMOGLOBIN 12.0 (L) 04/13/2018 02:24 AM    HEMATOCRIT 39.1 (L) 04/13/2018 02:24 AM    PLATELETCT 185 04/13/2018 02:24 AM        Total time of the discharge process exceeds 45 minutes

## 2018-04-17 NOTE — PROGRESS NOTES
"Pt notified of delay and was asked if he would like his night time medication that is scheduled for 1800. Pt agitated stating \"Why do you guys always throw out a time. First it was 10am, then it was 1:30pm, and then it was 4:30pm, and look at the time now! It's 5:35!\" Pt then declined all night time medication and stated \"No, I don't want any more, I want to go home.\"  "

## 2018-04-17 NOTE — PROGRESS NOTES
Social work contacted. Pt transport has not arrived. Demetrice at [phone] has been contacted regarding delay.

## 2018-04-17 NOTE — PROGRESS NOTES
Discharge instructions given and discussed, signed copy in chart. Pt A&Ox4 but refused to verbalize understanding of all discharge instructions. Pt blankly looked at wall and refused to answer RN. RN was unsure if pt was having a seizure or not responding on purpose due to agitation. Charge RN called due to pt's inability to respond. Charge RN finished discharge instructions, and pt responded in agitation about teach back method to verify pt understands all discharge information. Pt unable to re-iterate what has been taught or provided to him in discharge packet. All prescriptions with pt and pt has been notified of where to  prescription medication. Pt discharged in stable condition on 5O2 via wheelchair escorted by oxygen. Personal belongings with patient. IV removed and tolerated well.

## 2018-04-17 NOTE — PROGRESS NOTES
[facility] is in [place] and has contacted RN that they will be here momentarily to transport MR Estrada home.

## 2018-04-17 NOTE — DISCHARGE PLANNING
Per CCT Maryellen request, spoke with Ross in [facility]. They are running late, apparently the transport to [place] has runn quite late.  Bedside nurse Edinson advised.

## 2018-04-18 LAB
FUNGUS SPEC CULT: ABNORMAL
FUNGUS SPEC CULT: ABNORMAL
FUNGUS SPEC CULT: NORMAL
SIGNIFICANT IND 70042: ABNORMAL
SIGNIFICANT IND 70042: NORMAL
SITE SITE: ABNORMAL
SITE SITE: NORMAL
SOURCE SOURCE: ABNORMAL
SOURCE SOURCE: NORMAL

## 2018-04-20 NOTE — DOCUMENTATION QUERY
"DOCUMENTATION QUERY    PROVIDERS: Please select “Cosign w/ note” to reply to query.    To better represent the severity of illness of your patient, please review the following information and exercise your independent professional judgment in responding to this query.     Conflicting documentation for sepsis diagnosis. \"Sepsis protocol initiated\" is documented in the ED Report.  Sepsis is documented on progress notes as well the H&P as reason for chest x-ray on 3/16/18.  However,  It is not documented elsewhere in the chart.       Based upon the clinical findings, risk factors, and treatment, please select one of the following options:    Please select all that apply:    • Sepsis present on admission  • Sepsis developed after admission  • Sepsis ruled out  • Other explanation of clinical findings (please document)  • Unable to determine        The medical record reflects the following:   Clinical Findings  MSSA Cellulitis Bilat LE   Pneumonia    Hypoxia & hypercapnia   Treatment  IVF boluses for infection   IV antibiotics    wound care   Risk Factors  Stasis dermatitis   Venous stasis   DM II with dermatitis   COPD exac   Acute on chronic respiratory failure   Location within medical record  History and Physical    Progress Notes     Thank you,   Elizabeth Sawallisch        "

## 2018-04-24 ENCOUNTER — RESOLUTE PROFESSIONAL BILLING HOSPITAL PROF FEE (OUTPATIENT)
Dept: HOSPITALIST | Facility: MEDICAL CENTER | Age: 58
End: 2018-04-24
Payer: MEDICARE

## 2018-04-24 ENCOUNTER — APPOINTMENT (OUTPATIENT)
Dept: RADIOLOGY | Facility: MEDICAL CENTER | Age: 58
DRG: 871 | End: 2018-04-24
Attending: EMERGENCY MEDICINE
Payer: MEDICARE

## 2018-04-24 ENCOUNTER — HOSPITAL ENCOUNTER (INPATIENT)
Facility: MEDICAL CENTER | Age: 58
LOS: 11 days | DRG: 871 | End: 2018-05-05
Attending: EMERGENCY MEDICINE | Admitting: INTERNAL MEDICINE
Payer: MEDICARE

## 2018-04-24 ENCOUNTER — APPOINTMENT (OUTPATIENT)
Dept: RADIOLOGY | Facility: MEDICAL CENTER | Age: 58
DRG: 871 | End: 2018-04-24
Attending: INTERNAL MEDICINE
Payer: MEDICARE

## 2018-04-24 DIAGNOSIS — R60.0 BILATERAL EDEMA OF LOWER EXTREMITY: ICD-10-CM

## 2018-04-24 DIAGNOSIS — I87.2 VENOUS STASIS DERMATITIS OF BOTH LOWER EXTREMITIES: ICD-10-CM

## 2018-04-24 DIAGNOSIS — L03.119 CELLULITIS OF LOWER EXTREMITY, UNSPECIFIED LATERALITY: ICD-10-CM

## 2018-04-24 DIAGNOSIS — L03.115 BILATERAL LOWER LEG CELLULITIS: ICD-10-CM

## 2018-04-24 DIAGNOSIS — L03.116 BILATERAL LOWER LEG CELLULITIS: ICD-10-CM

## 2018-04-24 LAB
ALBUMIN SERPL BCP-MCNC: 3.5 G/DL (ref 3.2–4.9)
ALBUMIN/GLOB SERPL: 0.8 G/DL
ALP SERPL-CCNC: 73 U/L (ref 30–99)
ALT SERPL-CCNC: <5 U/L (ref 2–50)
ANION GAP SERPL CALC-SCNC: 6 MMOL/L (ref 0–11.9)
APPEARANCE UR: ABNORMAL
APTT PPP: 31.2 SEC (ref 24.7–36)
AST SERPL-CCNC: 8 U/L (ref 12–45)
BACTERIA #/AREA URNS HPF: ABNORMAL /HPF
BASOPHILS # BLD AUTO: 0.5 % (ref 0–1.8)
BASOPHILS # BLD: 0.06 K/UL (ref 0–0.12)
BILIRUB SERPL-MCNC: 0.7 MG/DL (ref 0.1–1.5)
BILIRUB UR QL STRIP.AUTO: NEGATIVE
BNP SERPL-MCNC: 5 PG/ML (ref 0–100)
BUN SERPL-MCNC: 30 MG/DL (ref 8–22)
CALCIUM SERPL-MCNC: 9.3 MG/DL (ref 8.5–10.5)
CHLORIDE SERPL-SCNC: 93 MMOL/L (ref 96–112)
CO2 SERPL-SCNC: 36 MMOL/L (ref 20–33)
COLOR UR: YELLOW
CREAT SERPL-MCNC: 1.07 MG/DL (ref 0.5–1.4)
CRP SERPL HS-MCNC: 9.56 MG/DL (ref 0–0.75)
EKG IMPRESSION: NORMAL
EOSINOPHIL # BLD AUTO: 0.35 K/UL (ref 0–0.51)
EOSINOPHIL NFR BLD: 2.8 % (ref 0–6.9)
EPI CELLS #/AREA URNS HPF: NEGATIVE /HPF
ERYTHROCYTE [DISTWIDTH] IN BLOOD BY AUTOMATED COUNT: 49 FL (ref 35.9–50)
FLUAV RNA SPEC QL NAA+PROBE: NEGATIVE
FLUBV RNA SPEC QL NAA+PROBE: NEGATIVE
GLOBULIN SER CALC-MCNC: 4.2 G/DL (ref 1.9–3.5)
GLUCOSE SERPL-MCNC: 121 MG/DL (ref 65–99)
GLUCOSE UR STRIP.AUTO-MCNC: NEGATIVE MG/DL
HCT VFR BLD AUTO: 38.3 % (ref 42–52)
HGB BLD-MCNC: 11.7 G/DL (ref 14–18)
HYALINE CASTS #/AREA URNS LPF: ABNORMAL /LPF
IMM GRANULOCYTES # BLD AUTO: 0.12 K/UL (ref 0–0.11)
IMM GRANULOCYTES NFR BLD AUTO: 0.9 % (ref 0–0.9)
INR PPP: 1.23 (ref 0.87–1.13)
KETONES UR STRIP.AUTO-MCNC: NEGATIVE MG/DL
LACTATE BLD-SCNC: 1.2 MMOL/L (ref 0.5–2)
LACTATE BLD-SCNC: 1.8 MMOL/L (ref 0.5–2)
LEUKOCYTE ESTERASE UR QL STRIP.AUTO: ABNORMAL
LYMPHOCYTES # BLD AUTO: 1.6 K/UL (ref 1–4.8)
LYMPHOCYTES NFR BLD: 12.6 % (ref 22–41)
MCH RBC QN AUTO: 28.2 PG (ref 27–33)
MCHC RBC AUTO-ENTMCNC: 30.5 G/DL (ref 33.7–35.3)
MCV RBC AUTO: 92.3 FL (ref 81.4–97.8)
MICRO URNS: ABNORMAL
MONOCYTES # BLD AUTO: 1.11 K/UL (ref 0–0.85)
MONOCYTES NFR BLD AUTO: 8.8 % (ref 0–13.4)
NEUTROPHILS # BLD AUTO: 9.44 K/UL (ref 1.82–7.42)
NEUTROPHILS NFR BLD: 74.4 % (ref 44–72)
NITRITE UR QL STRIP.AUTO: POSITIVE
NRBC # BLD AUTO: 0 K/UL
NRBC BLD-RTO: 0 /100 WBC
PH UR STRIP.AUTO: 5 [PH]
PLATELET # BLD AUTO: 248 K/UL (ref 164–446)
PMV BLD AUTO: 10.5 FL (ref 9–12.9)
POTASSIUM SERPL-SCNC: 3.9 MMOL/L (ref 3.6–5.5)
PREALB SERPL-MCNC: 9 MG/DL (ref 18–38)
PROT SERPL-MCNC: 7.7 G/DL (ref 6–8.2)
PROT UR QL STRIP: NEGATIVE MG/DL
PROTHROMBIN TIME: 15.2 SEC (ref 12–14.6)
RBC # BLD AUTO: 4.15 M/UL (ref 4.7–6.1)
RBC # URNS HPF: ABNORMAL /HPF
RBC UR QL AUTO: ABNORMAL
SODIUM SERPL-SCNC: 135 MMOL/L (ref 135–145)
SP GR UR STRIP.AUTO: 1.01
TROPONIN I SERPL-MCNC: <0.01 NG/ML (ref 0–0.04)
UROBILINOGEN UR STRIP.AUTO-MCNC: 1 MG/DL
WBC # BLD AUTO: 12.7 K/UL (ref 4.8–10.8)
WBC #/AREA URNS HPF: ABNORMAL /HPF

## 2018-04-24 PROCEDURE — 36415 COLL VENOUS BLD VENIPUNCTURE: CPT

## 2018-04-24 PROCEDURE — 87077 CULTURE AEROBIC IDENTIFY: CPT | Mod: 91

## 2018-04-24 PROCEDURE — 83880 ASSAY OF NATRIURETIC PEPTIDE: CPT

## 2018-04-24 PROCEDURE — 71045 X-RAY EXAM CHEST 1 VIEW: CPT

## 2018-04-24 PROCEDURE — 85610 PROTHROMBIN TIME: CPT

## 2018-04-24 PROCEDURE — 86140 C-REACTIVE PROTEIN: CPT

## 2018-04-24 PROCEDURE — 94760 N-INVAS EAR/PLS OXIMETRY 1: CPT

## 2018-04-24 PROCEDURE — 93005 ELECTROCARDIOGRAM TRACING: CPT | Performed by: EMERGENCY MEDICINE

## 2018-04-24 PROCEDURE — A9270 NON-COVERED ITEM OR SERVICE: HCPCS | Performed by: INTERNAL MEDICINE

## 2018-04-24 PROCEDURE — 96365 THER/PROPH/DIAG IV INF INIT: CPT

## 2018-04-24 PROCEDURE — 85730 THROMBOPLASTIN TIME PARTIAL: CPT

## 2018-04-24 PROCEDURE — 83605 ASSAY OF LACTIC ACID: CPT

## 2018-04-24 PROCEDURE — 700102 HCHG RX REV CODE 250 W/ 637 OVERRIDE(OP): Performed by: INTERNAL MEDICINE

## 2018-04-24 PROCEDURE — 87205 SMEAR GRAM STAIN: CPT

## 2018-04-24 PROCEDURE — 80053 COMPREHEN METABOLIC PANEL: CPT

## 2018-04-24 PROCEDURE — 84134 ASSAY OF PREALBUMIN: CPT

## 2018-04-24 PROCEDURE — 700111 HCHG RX REV CODE 636 W/ 250 OVERRIDE (IP): Performed by: INTERNAL MEDICINE

## 2018-04-24 PROCEDURE — 85025 COMPLETE CBC W/AUTO DIFF WBC: CPT

## 2018-04-24 PROCEDURE — 700105 HCHG RX REV CODE 258: Performed by: EMERGENCY MEDICINE

## 2018-04-24 PROCEDURE — 700111 HCHG RX REV CODE 636 W/ 250 OVERRIDE (IP): Performed by: EMERGENCY MEDICINE

## 2018-04-24 PROCEDURE — 85652 RBC SED RATE AUTOMATED: CPT

## 2018-04-24 PROCEDURE — 81001 URINALYSIS AUTO W/SCOPE: CPT

## 2018-04-24 PROCEDURE — 99285 EMERGENCY DEPT VISIT HI MDM: CPT

## 2018-04-24 PROCEDURE — 96375 TX/PRO/DX INJ NEW DRUG ADDON: CPT

## 2018-04-24 PROCEDURE — 87502 INFLUENZA DNA AMP PROBE: CPT

## 2018-04-24 PROCEDURE — 87086 URINE CULTURE/COLONY COUNT: CPT

## 2018-04-24 PROCEDURE — 87070 CULTURE OTHR SPECIMN AEROBIC: CPT

## 2018-04-24 PROCEDURE — 87186 SC STD MICRODIL/AGAR DIL: CPT

## 2018-04-24 PROCEDURE — 87040 BLOOD CULTURE FOR BACTERIA: CPT

## 2018-04-24 PROCEDURE — 700105 HCHG RX REV CODE 258: Performed by: INTERNAL MEDICINE

## 2018-04-24 PROCEDURE — 99223 1ST HOSP IP/OBS HIGH 75: CPT | Mod: AI | Performed by: INTERNAL MEDICINE

## 2018-04-24 PROCEDURE — 770020 HCHG ROOM/CARE - TELE (206)

## 2018-04-24 PROCEDURE — 84484 ASSAY OF TROPONIN QUANT: CPT

## 2018-04-24 RX ORDER — BISACODYL 10 MG
10 SUPPOSITORY, RECTAL RECTAL
Status: DISCONTINUED | OUTPATIENT
Start: 2018-04-24 | End: 2018-05-05 | Stop reason: HOSPADM

## 2018-04-24 RX ORDER — ALBUTEROL SULFATE 90 UG/1
2 AEROSOL, METERED RESPIRATORY (INHALATION) EVERY 6 HOURS PRN
Status: DISCONTINUED | OUTPATIENT
Start: 2018-04-24 | End: 2018-05-05 | Stop reason: HOSPADM

## 2018-04-24 RX ORDER — OXYCODONE HYDROCHLORIDE 20 MG/1
20 TABLET ORAL EVERY 4 HOURS PRN
Status: ON HOLD | COMMUNITY
End: 2018-05-04

## 2018-04-24 RX ORDER — AMOXICILLIN 250 MG
2 CAPSULE ORAL 2 TIMES DAILY
Status: DISCONTINUED | OUTPATIENT
Start: 2018-04-24 | End: 2018-05-05 | Stop reason: HOSPADM

## 2018-04-24 RX ORDER — DIPHENHYDRAMINE HCL 25 MG
25 TABLET ORAL EVERY 6 HOURS PRN
Status: DISCONTINUED | OUTPATIENT
Start: 2018-04-24 | End: 2018-05-05 | Stop reason: HOSPADM

## 2018-04-24 RX ORDER — OMEPRAZOLE 20 MG/1
20 CAPSULE, DELAYED RELEASE ORAL DAILY
Status: DISCONTINUED | OUTPATIENT
Start: 2018-04-25 | End: 2018-05-05 | Stop reason: HOSPADM

## 2018-04-24 RX ORDER — ATORVASTATIN CALCIUM 40 MG/1
40 TABLET, FILM COATED ORAL
Status: DISCONTINUED | OUTPATIENT
Start: 2018-04-24 | End: 2018-05-05 | Stop reason: HOSPADM

## 2018-04-24 RX ORDER — SODIUM CHLORIDE 9 MG/ML
500 INJECTION, SOLUTION INTRAVENOUS
Status: DISCONTINUED | OUTPATIENT
Start: 2018-04-24 | End: 2018-05-05 | Stop reason: HOSPADM

## 2018-04-24 RX ORDER — FLUCONAZOLE 100 MG/1
100 TABLET ORAL DAILY
Status: COMPLETED | OUTPATIENT
Start: 2018-04-24 | End: 2018-04-26

## 2018-04-24 RX ORDER — ACETAMINOPHEN 325 MG/1
650 TABLET ORAL EVERY 6 HOURS PRN
Status: DISCONTINUED | OUTPATIENT
Start: 2018-04-24 | End: 2018-05-02

## 2018-04-24 RX ORDER — POLYETHYLENE GLYCOL 3350 17 G/17G
1 POWDER, FOR SOLUTION ORAL
Status: DISCONTINUED | OUTPATIENT
Start: 2018-04-24 | End: 2018-05-05 | Stop reason: HOSPADM

## 2018-04-24 RX ORDER — ONDANSETRON 2 MG/ML
4 INJECTION INTRAMUSCULAR; INTRAVENOUS EVERY 4 HOURS PRN
Status: DISCONTINUED | OUTPATIENT
Start: 2018-04-24 | End: 2018-05-05 | Stop reason: HOSPADM

## 2018-04-24 RX ORDER — OXYCODONE HYDROCHLORIDE 5 MG/1
20 TABLET ORAL EVERY 4 HOURS PRN
Status: DISCONTINUED | OUTPATIENT
Start: 2018-04-24 | End: 2018-04-29

## 2018-04-24 RX ORDER — HEPARIN SODIUM 5000 [USP'U]/ML
5000 INJECTION, SOLUTION INTRAVENOUS; SUBCUTANEOUS EVERY 8 HOURS
Status: DISCONTINUED | OUTPATIENT
Start: 2018-04-24 | End: 2018-05-05 | Stop reason: HOSPADM

## 2018-04-24 RX ORDER — IPRATROPIUM BROMIDE AND ALBUTEROL SULFATE 2.5; .5 MG/3ML; MG/3ML
3 SOLUTION RESPIRATORY (INHALATION)
Status: DISPENSED | OUTPATIENT
Start: 2018-04-24 | End: 2018-04-25

## 2018-04-24 RX ORDER — ZINC SULFATE 50(220)MG
220 CAPSULE ORAL DAILY
Status: DISCONTINUED | OUTPATIENT
Start: 2018-04-25 | End: 2018-05-05 | Stop reason: HOSPADM

## 2018-04-24 RX ORDER — DEXTROSE MONOHYDRATE 25 G/50ML
25 INJECTION, SOLUTION INTRAVENOUS
Status: DISCONTINUED | OUTPATIENT
Start: 2018-04-24 | End: 2018-04-29

## 2018-04-24 RX ORDER — ONDANSETRON 4 MG/1
4 TABLET, ORALLY DISINTEGRATING ORAL EVERY 4 HOURS PRN
Status: DISCONTINUED | OUTPATIENT
Start: 2018-04-24 | End: 2018-05-05 | Stop reason: HOSPADM

## 2018-04-24 RX ORDER — METRONIDAZOLE 500 MG/1
500 TABLET ORAL EVERY 8 HOURS
Status: DISCONTINUED | OUTPATIENT
Start: 2018-04-24 | End: 2018-04-24

## 2018-04-24 RX ORDER — LEVOTHYROXINE SODIUM 0.03 MG/1
50 TABLET ORAL
Status: DISCONTINUED | OUTPATIENT
Start: 2018-04-25 | End: 2018-05-05 | Stop reason: HOSPADM

## 2018-04-24 RX ORDER — TIOTROPIUM BROMIDE 18 UG/1
1 CAPSULE ORAL; RESPIRATORY (INHALATION) DAILY
Status: DISCONTINUED | OUTPATIENT
Start: 2018-04-25 | End: 2018-05-05 | Stop reason: HOSPADM

## 2018-04-24 RX ORDER — ACETAMINOPHEN 500 MG
1000 TABLET ORAL EVERY 6 HOURS
Status: DISCONTINUED | OUTPATIENT
Start: 2018-04-25 | End: 2018-05-05 | Stop reason: HOSPADM

## 2018-04-24 RX ORDER — AMMONIUM LACTATE 12 G/100G
2 LOTION TOPICAL DAILY
Status: DISCONTINUED | OUTPATIENT
Start: 2018-04-24 | End: 2018-05-05 | Stop reason: HOSPADM

## 2018-04-24 RX ORDER — CHLORAL HYDRATE 500 MG
1000 CAPSULE ORAL
Status: DISCONTINUED | OUTPATIENT
Start: 2018-04-24 | End: 2018-05-05 | Stop reason: HOSPADM

## 2018-04-24 RX ORDER — HYDROMORPHONE HYDROCHLORIDE 1 MG/ML
1 INJECTION, SOLUTION INTRAMUSCULAR; INTRAVENOUS; SUBCUTANEOUS ONCE
Status: DISCONTINUED | OUTPATIENT
Start: 2018-04-24 | End: 2018-04-24

## 2018-04-24 RX ORDER — SODIUM CHLORIDE 9 MG/ML
10 INJECTION, SOLUTION INTRAVENOUS
Status: DISCONTINUED | OUTPATIENT
Start: 2018-04-24 | End: 2018-05-05 | Stop reason: HOSPADM

## 2018-04-24 RX ORDER — ASCORBIC ACID 500 MG
500 TABLET ORAL DAILY
Status: DISCONTINUED | OUTPATIENT
Start: 2018-04-25 | End: 2018-05-05 | Stop reason: HOSPADM

## 2018-04-24 RX ORDER — BUDESONIDE AND FORMOTEROL FUMARATE DIHYDRATE 160; 4.5 UG/1; UG/1
2 AEROSOL RESPIRATORY (INHALATION) 2 TIMES DAILY
Status: DISCONTINUED | OUTPATIENT
Start: 2018-04-24 | End: 2018-05-05 | Stop reason: HOSPADM

## 2018-04-24 RX ORDER — AMOXICILLIN 250 MG
1 CAPSULE ORAL 2 TIMES DAILY
Status: DISCONTINUED | OUTPATIENT
Start: 2018-04-24 | End: 2018-04-24

## 2018-04-24 RX ORDER — FUROSEMIDE 10 MG/ML
20 INJECTION INTRAMUSCULAR; INTRAVENOUS
Status: DISCONTINUED | OUTPATIENT
Start: 2018-04-24 | End: 2018-05-05 | Stop reason: HOSPADM

## 2018-04-24 RX ADMIN — OXYCODONE HYDROCHLORIDE 20 MG: 5 TABLET ORAL at 21:16

## 2018-04-24 RX ADMIN — ATORVASTATIN CALCIUM 40 MG: 40 TABLET, FILM COATED ORAL at 20:38

## 2018-04-24 RX ADMIN — STANDARDIZED SENNA CONCENTRATE AND DOCUSATE SODIUM 2 TABLET: 8.6; 5 TABLET, FILM COATED ORAL at 20:37

## 2018-04-24 RX ADMIN — HYDROMORPHONE HYDROCHLORIDE 1 MG: 10 INJECTION, SOLUTION INTRAMUSCULAR; INTRAVENOUS; SUBCUTANEOUS at 14:48

## 2018-04-24 RX ADMIN — ALBUTEROL SULFATE 2 PUFF: 90 AEROSOL, METERED RESPIRATORY (INHALATION) at 19:25

## 2018-04-24 RX ADMIN — FLUCONAZOLE 100 MG: 100 TABLET ORAL at 20:37

## 2018-04-24 RX ADMIN — OMEGA-3 FATTY ACIDS CAP 1000 MG 1000 MG: 1000 CAP at 20:50

## 2018-04-24 RX ADMIN — HEPARIN SODIUM 5000 UNITS: 5000 INJECTION, SOLUTION INTRAVENOUS; SUBCUTANEOUS at 21:18

## 2018-04-24 RX ADMIN — BUDESONIDE AND FORMOTEROL FUMARATE DIHYDRATE 2 PUFF: 160; 4.5 AEROSOL RESPIRATORY (INHALATION) at 20:50

## 2018-04-24 RX ADMIN — OXYCODONE HYDROCHLORIDE 20 MG: 5 TABLET ORAL at 17:12

## 2018-04-24 RX ADMIN — PIPERACILLIN SODIUM AND TAZOBACTAM SODIUM 3.38 G: 3; .375 INJECTION, POWDER, FOR SOLUTION INTRAVENOUS at 15:08

## 2018-04-24 RX ADMIN — FUROSEMIDE 20 MG: 10 INJECTION, SOLUTION INTRAMUSCULAR; INTRAVENOUS at 20:37

## 2018-04-24 RX ADMIN — VANCOMYCIN HYDROCHLORIDE 3000 MG: 100 INJECTION, POWDER, LYOPHILIZED, FOR SOLUTION INTRAVENOUS at 22:53

## 2018-04-24 RX ADMIN — AMPICILLIN SODIUM AND SULBACTAM SODIUM 3 G: 2; 1 INJECTION, POWDER, FOR SOLUTION INTRAMUSCULAR; INTRAVENOUS at 20:37

## 2018-04-24 RX ADMIN — DIPHENHYDRAMINE HCL 25 MG: 25 TABLET ORAL at 23:53

## 2018-04-24 ASSESSMENT — COPD QUESTIONNAIRES
DURING THE PAST 4 WEEKS HOW MUCH DID YOU FEEL SHORT OF BREATH: NONE/LITTLE OF THE TIME
COPD SCREENING SCORE: 3
HAVE YOU SMOKED AT LEAST 100 CIGARETTES IN YOUR ENTIRE LIFE: YES
DO YOU EVER COUGH UP ANY MUCUS OR PHLEGM?: NO/ONLY WITH OCCASIONAL COLDS OR INFECTIONS

## 2018-04-24 ASSESSMENT — ENCOUNTER SYMPTOMS
CHILLS: 0
FEVER: 0
ABDOMINAL PAIN: 0
FOCAL WEAKNESS: 0
NAUSEA: 0
BACK PAIN: 1
HEADACHES: 0
HEMOPTYSIS: 0
PALPITATIONS: 0
NECK PAIN: 0
DEPRESSION: 0
SHORTNESS OF BREATH: 1
BLURRED VISION: 0
WEAKNESS: 1
BRUISES/BLEEDS EASILY: 0
MYALGIAS: 1
SPUTUM PRODUCTION: 0
TINGLING: 0
DIZZINESS: 0
VOMITING: 0
COUGH: 1
LOSS OF CONSCIOUSNESS: 0

## 2018-04-24 ASSESSMENT — LIFESTYLE VARIABLES: EVER_SMOKED: YES

## 2018-04-24 ASSESSMENT — PAIN SCALES - GENERAL
PAINLEVEL_OUTOF10: 9
PAINLEVEL_OUTOF10: 10

## 2018-04-24 NOTE — ED NOTES
"Chief Complaint   Patient presents with   • Leg Pain     Chronic bilateral lower leg wounds and pain. Pt recetly admitted for sepsis has been home for 1 week. C/O increased pain and inability to do ADL's at home.      Pt states has taken 30 mg oxycodone every 4 hours for the last 13 years and doctor recently decreased to 10 mg every 4 hours. Pt states now out of his pain medication.  Bilateral lower extremities red with yellow exudate.     Pulse (!) 121   Temp 36.4 °C (97.5 °F)   Resp (!) 28   Ht 1.753 m (5' 9\")   Wt (!) 136.1 kg (300 lb)   SpO2 98%   BMI 44.30 kg/m²     "

## 2018-04-24 NOTE — ED PROVIDER NOTES
ED Provider Note    Scribed for Blane Lomeli D.O. by Amanda Ramirez. 4/24/2018  2:16 PM    Primary care provider: Giovani Lara M.D.  Means of arrival: ambulance   History obtained from: patient   History limited by: none     CHIEF COMPLAINT  Chief Complaint   Patient presents with   • Leg Pain     Chronic bilateral lower leg wounds and pain. Pt recetly admitted for sepsis has been home for 1 week. C/O increased pain and inability to do ADL's at home.        HPI  Fer Estrada is a 57 y.o. male with a history of chronic bilateral lower extremity wounds, diabetes, COPD, CHF, hypertension, and asthma who presents to the Emergency Department via ambulance for evaluation of worsening bilateral leg pain and erythema onset one week ago. Patient reports associated chills and purulent drainage from his wounds. Per nurses note, the patient has taken Oxycodone with minimal relief and is now out of his prescription. Patient has a history of cellulitis and was admitted to the hospital several weeks ago for treatment of sepsis. He was sent home one week ago. His pain has been increasing in severity this past week and he reports an inability to take care of himself at home. No complaints of fevers. No other acute complaints or concerns.       REVIEW OF SYSTEMS  Pertinent positives include bilateral lower extremity pain, bilateral lower extremity wounds, erythema to bilateral lower extremities with purulent drainage from wounds, and chills. Pertinent negatives include no fevers.  All other systems reviewed and negative. C      PAST MEDICAL HISTORY  Past Medical History:   Diagnosis Date   • Asthma    • Chronic obstructive pulmonary disease (CMS-HCC)    • Congestive heart failure (CMS-HCC)    • Hypertension    • Type II or unspecified type diabetes mellitus without mention of complication, not stated as uncontrolled        SURGICAL HISTORY  No known past surgical history.     SOCIAL HISTORY  Social History   Substance  "Use Topics   • Smoking status: Former Smoker     Quit date: 12/14/2005   • Smokeless tobacco: Never Used   • Alcohol use No      History   Drug Use No       FAMILY HISTORY  No family history noted    CURRENT MEDICATIONS  Home Medications     Reviewed by May Norris (Pharmacy Tech) on 04/24/18 at 1509  Med List Status: Complete   Medication Last Dose Status   albuterol 108 (90 BASE) MCG/ACT Aero Soln inhalation aerosol 4/24/2018 Active   ammonium lactate (LAC-HYDRIN) 12 % Lotion 4/23/2018 Active   ascorbic acid (VITAMIN C) 500 MG tablet 4/23/2018 Active   aspirin EC 81 MG EC tablet 4/23/2018 Active   atorvastatin (LIPITOR) 40 MG Tab 4/23/2018 Active   budesonide-formoterol (SYMBICORT) 160-4.5 MCG/ACT Aerosol 4/23/2018 Active   furosemide (LASIX) 40 MG Tab 4/23/2018 Active   levothyroxine (SYNTHROID) 50 MCG Tab 4/23/2018 Active   multivitamin (THERAGRAN) Tab 4/23/2018 Active   Omega-3 Fatty Acids (FISH OIL) 1000 MG Cap capsule 4/23/2018 Active   omeprazole (PRILOSEC) 20 MG delayed-release capsule 4/23/2018 Active   Oxycodone HCl 20 MG Tab 4/24/2018 Active   senna-docusate (PERICOLACE OR SENOKOT S) 8.6-50 MG Tab 4/23/2018 Active   tiotropium (SPIRIVA) 18 MCG Cap 4/23/2018 Active   zinc sulfate (ZINCATE) 220 (50 Zn) MG Cap 4/23/2018 Active                ALLERGIES  No Known Allergies    PHYSICAL EXAM  VITAL SIGNS: Pulse (!) 121   Temp 36.4 °C (97.5 °F)   Resp (!) 28   Ht 1.753 m (5' 9\")   Wt (!) 136.1 kg (300 lb)   SpO2 98%   BMI 44.30 kg/m²     Nursing notes and vitals reviewed.  Constitutional: Slight distress, Non-toxic appearance.   Eyes: PERRLA, EOMI, Conjunctiva normal, No discharge.   Cardiovascular: Tachycardic, Normal rhythm, No murmurs, No rubs, No gallops.   Thorax & Lungs: No respiratory distress, No rales, No rhonchi, No wheezing, No chest tenderness.   Abdomen: Bowel sounds normal, Soft, ecchymosis scattered throughout abdominal wall, No guarding, No rebound, No masses, No pulsatile masses. "   : Significant excoriation to the buttock area.   Skin: Warm. Ecchymosis scattered throughout abdominal wall. Significant excoriation to the buttock area. Bilateral lower extremities are edematous, is yellow plaque formation, there is bruising fluid from the bilateral dorsal aspect  Musculoskeletal: Pedal edema bilaterally skin findings as above, upper extremities no erythema, no edema  Neurologic: Alert & oriented x 3, Normal motor function, Normal sensory function, No focal deficits noted.  Psychiatric: Affect normal for clinical presentation.      DIAGNOSTIC STUDIES/PROCEDURES    LABS  Results for orders placed or performed during the hospital encounter of 04/24/18   LACTIC ACID   Result Value Ref Range    Lactic Acid 1.2 0.5 - 2.0 mmol/L   CBC WITH DIFFERENTIAL   Result Value Ref Range    WBC 12.7 (H) 4.8 - 10.8 K/uL    RBC 4.15 (L) 4.70 - 6.10 M/uL    Hemoglobin 11.7 (L) 14.0 - 18.0 g/dL    Hematocrit 38.3 (L) 42.0 - 52.0 %    MCV 92.3 81.4 - 97.8 fL    MCH 28.2 27.0 - 33.0 pg    MCHC 30.5 (L) 33.7 - 35.3 g/dL    RDW 49.0 35.9 - 50.0 fL    Platelet Count 248 164 - 446 K/uL    MPV 10.5 9.0 - 12.9 fL    Neutrophils-Polys 74.40 (H) 44.00 - 72.00 %    Lymphocytes 12.60 (L) 22.00 - 41.00 %    Monocytes 8.80 0.00 - 13.40 %    Eosinophils 2.80 0.00 - 6.90 %    Basophils 0.50 0.00 - 1.80 %    Immature Granulocytes 0.90 0.00 - 0.90 %    Nucleated RBC 0.00 /100 WBC    Neutrophils (Absolute) 9.44 (H) 1.82 - 7.42 K/uL    Lymphs (Absolute) 1.60 1.00 - 4.80 K/uL    Monos (Absolute) 1.11 (H) 0.00 - 0.85 K/uL    Eos (Absolute) 0.35 0.00 - 0.51 K/uL    Baso (Absolute) 0.06 0.00 - 0.12 K/uL    Immature Granulocytes (abs) 0.12 (H) 0.00 - 0.11 K/uL    NRBC (Absolute) 0.00 K/uL   COMP METABOLIC PANEL   Result Value Ref Range    Sodium 135 135 - 145 mmol/L    Potassium 3.9 3.6 - 5.5 mmol/L    Chloride 93 (L) 96 - 112 mmol/L    Co2 36 (H) 20 - 33 mmol/L    Anion Gap 6.0 0.0 - 11.9    Glucose 121 (H) 65 - 99 mg/dL    Bun 30 (H) 8 -  22 mg/dL    Creatinine 1.07 0.50 - 1.40 mg/dL    Calcium 9.3 8.5 - 10.5 mg/dL    AST(SGOT) 8 (L) 12 - 45 U/L    ALT(SGPT) <5 2 - 50 U/L    Alkaline Phosphatase 73 30 - 99 U/L    Total Bilirubin 0.7 0.1 - 1.5 mg/dL    Albumin 3.5 3.2 - 4.9 g/dL    Total Protein 7.7 6.0 - 8.2 g/dL    Globulin 4.2 (H) 1.9 - 3.5 g/dL    A-G Ratio 0.8 g/dL   URINALYSIS   Result Value Ref Range    Color Yellow     Character Cloudy (A)     Specific Gravity 1.013 <1.035    Ph 5.0 5.0 - 8.0    Glucose Negative Negative mg/dL    Ketones Negative Negative mg/dL    Protein Negative Negative mg/dL    Bilirubin Negative Negative    Urobilinogen, Urine 1.0 Negative    Nitrite Positive (A) Negative    Leukocyte Esterase Large (A) Negative    Occult Blood Trace (A) Negative    Micro Urine Req Microscopic    TROPONIN   Result Value Ref Range    Troponin I <0.01 0.00 - 0.04 ng/mL   BTYPE NATRIURETIC PEPTIDE   Result Value Ref Range    B Natriuretic Peptide 5 0 - 100 pg/mL   PROTHROMBIN TIME   Result Value Ref Range    PT 15.2 (H) 12.0 - 14.6 sec    INR 1.23 (H) 0.87 - 1.13   APTT   Result Value Ref Range    APTT 31.2 24.7 - 36.0 sec   Influenza By PCR, A/B   Result Value Ref Range    Influenza virus A RNA Negative Negative    Influenza virus B, PCR Negative Negative   ESTIMATED GFR   Result Value Ref Range    GFR If African American >60 >60 mL/min/1.73 m 2    GFR If Non African American >60 >60 mL/min/1.73 m 2   URINE MICROSCOPIC (W/UA)   Result Value Ref Range    WBC Packed WBC /hpf    RBC 0-2 (A) /hpf    Bacteria Moderate (A) None /hpf    Epithelial Cells Negative /hpf    Hyaline Cast 0-2 /lpf   EKG (ER)   Result Value Ref Range    Report       [facility] Emergency Dept.    Test Date:  2018  Pt Name:    TANIA CARRASCO                 Department: ER  MRN:        [id number]                      Room:        17  Gender:     Male                         Technician: [id number]  :        1960                   Requested  By:YANETH IZAGUIRRE  Order #:    [id number]                    Reading MD:    Measurements  Intervals                                Axis  Rate:       106                          P:          41  AL:         192                          QRS:        90  QRSD:       152                          T:          -25  QT:         356  QTc:        473    Interpretive Statements  SINUS TACHYCARDIA  RBBB AND LPFB  Compared to ECG 03/20/2018 13:26:28  Left posterior fascicular block now present  Sinus rhythm no longer present       All labs reviewed by me.    EKG  Interpreted by me, as shown above.     RADIOLOGY  DX-CHEST-PORTABLE (1 VIEW)   Final Result      Partial clearing of right lung infiltrates compared to previous.   Left perihilar/left lung base atelectasis/possible pneumonia similar to previous findings.        The radiologist's interpretation of all radiological studies have been reviewed by me.    COURSE & MEDICAL DECISION MAKING  Pertinent Labs & Imaging studies reviewed. (See chart for details)    Review of past medical records shows the patient was discharged from the hospital on 4/16/2018. He was offered wound care when he was discharged, however, he denied it at that time.     2:16 PM - Patient seen and examined at bedside. Patient will be treated with Dilaudid 1 mg, Zosyn 3.375 g in  mL IVPB, Duoneb 3mL. Ordered lactic acid, CBC, CMP, urinalysis, urine culture, blood culture, culture wound with gram stain, DX chest, troponin, BNP, PTT, APTT, Influenza A/B to evaluate his symptoms.     2:24 PM- Placed IV with guided US.      3:36 PM- Reviewed the patient's lab and imaging results which are shown above.     3:44 PM- Spoke with Dr. Hahn (hospitalist) who accepts the patient for admission.    This is a 57-year-old, presents with cellulitis his bilateral extremities as well as clearing of pneumonia. The patient received Zosyn empirically initially secondary to the cellulitis and possible pseudomonas  etiology. The patient has no evidence of necrotizing fasciitis, peripheral arterial occlusion. Patient has received antibiotics, IV fluids, pain medication OB admitted to Dr. Trent for further evaluation and management. The patient has no evidence of surgical emergency, is not septic.    DISPOSITION:  Patient will be admitted to Dr. Hahn (hospitalist) in guarded condition.    FINAL IMPRESSION  Bilateral lower extremity cellulitis  Pneumonia with improvement     Amanda AIKEN (Scribe), am scribing for, and in the presence of, Blane Lomeli D.O    Electronically signed by: Amanda Ramirez (Scribe), 4/24/2018    IBlane D.O. personally performed the services described in this documentation, as scribed by Amanda Ramirez in my presence, and it is both accurate and complete.    The note accurately reflects work and decisions made by me.  Blane Lomeli  4/24/2018  10:53 PM

## 2018-04-24 NOTE — ED NOTES
Med rec updated and complete.  Allergies reviewed.  Met with pt at bedside and dicussed current medications.  Pt stated that he is taking 30 mg OXYCODONE  Every 4 hours.  Unable to verify 30 mg of oxycodone with any pharmacy that  Pt stated he filled at.  Last fill was oxycodone 20 mg  In April .  Oxycodone 20 mg added to med rec, not 30 mg.  Pharmacist notified.

## 2018-04-25 ENCOUNTER — APPOINTMENT (OUTPATIENT)
Dept: RADIOLOGY | Facility: MEDICAL CENTER | Age: 58
DRG: 871 | End: 2018-04-25
Attending: INTERNAL MEDICINE
Payer: MEDICARE

## 2018-04-25 LAB
ANION GAP SERPL CALC-SCNC: 12 MMOL/L (ref 0–11.9)
BASOPHILS # BLD AUTO: 0.2 % (ref 0–1.8)
BASOPHILS # BLD: 0.03 K/UL (ref 0–0.12)
BUN SERPL-MCNC: 32 MG/DL (ref 8–22)
CALCIUM SERPL-MCNC: 9 MG/DL (ref 8.5–10.5)
CHLORIDE SERPL-SCNC: 96 MMOL/L (ref 96–112)
CO2 SERPL-SCNC: 28 MMOL/L (ref 20–33)
CREAT SERPL-MCNC: 1.11 MG/DL (ref 0.5–1.4)
EOSINOPHIL # BLD AUTO: 0.1 K/UL (ref 0–0.51)
EOSINOPHIL NFR BLD: 0.7 % (ref 0–6.9)
ERYTHROCYTE [DISTWIDTH] IN BLOOD BY AUTOMATED COUNT: 48.4 FL (ref 35.9–50)
ERYTHROCYTE [SEDIMENTATION RATE] IN BLOOD BY WESTERGREN METHOD: 82 MM/HOUR (ref 0–20)
GLUCOSE SERPL-MCNC: 137 MG/DL (ref 65–99)
GRAM STN SPEC: NORMAL
HCT VFR BLD AUTO: 39.5 % (ref 42–52)
HGB BLD-MCNC: 12.4 G/DL (ref 14–18)
IMM GRANULOCYTES # BLD AUTO: 0.16 K/UL (ref 0–0.11)
IMM GRANULOCYTES NFR BLD AUTO: 1.1 % (ref 0–0.9)
LYMPHOCYTES # BLD AUTO: 1.19 K/UL (ref 1–4.8)
LYMPHOCYTES NFR BLD: 8 % (ref 22–41)
MAGNESIUM SERPL-MCNC: 1.4 MG/DL (ref 1.5–2.5)
MCH RBC QN AUTO: 28.6 PG (ref 27–33)
MCHC RBC AUTO-ENTMCNC: 31.4 G/DL (ref 33.7–35.3)
MCV RBC AUTO: 91 FL (ref 81.4–97.8)
MONOCYTES # BLD AUTO: 1.15 K/UL (ref 0–0.85)
MONOCYTES NFR BLD AUTO: 7.7 % (ref 0–13.4)
NEUTROPHILS # BLD AUTO: 12.3 K/UL (ref 1.82–7.42)
NEUTROPHILS NFR BLD: 82.3 % (ref 44–72)
NRBC # BLD AUTO: 0 K/UL
NRBC BLD-RTO: 0 /100 WBC
PLATELET # BLD AUTO: 253 K/UL (ref 164–446)
PMV BLD AUTO: 10.9 FL (ref 9–12.9)
POTASSIUM SERPL-SCNC: 3.9 MMOL/L (ref 3.6–5.5)
RBC # BLD AUTO: 4.34 M/UL (ref 4.7–6.1)
SIGNIFICANT IND 70042: NORMAL
SITE SITE: NORMAL
SODIUM SERPL-SCNC: 136 MMOL/L (ref 135–145)
SOURCE SOURCE: NORMAL
VANCOMYCIN SERPL-MCNC: 50.8 UG/ML
WBC # BLD AUTO: 14.9 K/UL (ref 4.8–10.8)

## 2018-04-25 PROCEDURE — A9270 NON-COVERED ITEM OR SERVICE: HCPCS | Performed by: INTERNAL MEDICINE

## 2018-04-25 PROCEDURE — 85025 COMPLETE CBC W/AUTO DIFF WBC: CPT

## 2018-04-25 PROCEDURE — 700111 HCHG RX REV CODE 636 W/ 250 OVERRIDE (IP): Performed by: INTERNAL MEDICINE

## 2018-04-25 PROCEDURE — 700105 HCHG RX REV CODE 258: Performed by: INTERNAL MEDICINE

## 2018-04-25 PROCEDURE — 80048 BASIC METABOLIC PNL TOTAL CA: CPT

## 2018-04-25 PROCEDURE — 97162 PT EVAL MOD COMPLEX 30 MIN: CPT

## 2018-04-25 PROCEDURE — 700102 HCHG RX REV CODE 250 W/ 637 OVERRIDE(OP): Performed by: INTERNAL MEDICINE

## 2018-04-25 PROCEDURE — 770020 HCHG ROOM/CARE - TELE (206)

## 2018-04-25 PROCEDURE — 80202 ASSAY OF VANCOMYCIN: CPT

## 2018-04-25 PROCEDURE — 99233 SBSQ HOSP IP/OBS HIGH 50: CPT | Performed by: INTERNAL MEDICINE

## 2018-04-25 PROCEDURE — 83735 ASSAY OF MAGNESIUM: CPT

## 2018-04-25 PROCEDURE — G8979 MOBILITY GOAL STATUS: HCPCS | Mod: CK

## 2018-04-25 PROCEDURE — 97166 OT EVAL MOD COMPLEX 45 MIN: CPT

## 2018-04-25 PROCEDURE — G8988 SELF CARE GOAL STATUS: HCPCS | Mod: CJ

## 2018-04-25 PROCEDURE — 87641 MR-STAPH DNA AMP PROBE: CPT

## 2018-04-25 PROCEDURE — 36415 COLL VENOUS BLD VENIPUNCTURE: CPT

## 2018-04-25 PROCEDURE — G8978 MOBILITY CURRENT STATUS: HCPCS | Mod: CM

## 2018-04-25 PROCEDURE — 87640 STAPH A DNA AMP PROBE: CPT

## 2018-04-25 PROCEDURE — G8987 SELF CARE CURRENT STATUS: HCPCS | Mod: CK

## 2018-04-25 RX ORDER — SODIUM CHLORIDE 9 MG/ML
1000 INJECTION, SOLUTION INTRAVENOUS ONCE
Status: COMPLETED | OUTPATIENT
Start: 2018-04-25 | End: 2018-04-25

## 2018-04-25 RX ORDER — MICONAZOLE NITRATE 20 MG/G
CREAM TOPICAL EVERY 6 HOURS
Status: DISCONTINUED | OUTPATIENT
Start: 2018-04-25 | End: 2018-05-05 | Stop reason: HOSPADM

## 2018-04-25 RX ORDER — MAGNESIUM SULFATE HEPTAHYDRATE 40 MG/ML
2 INJECTION, SOLUTION INTRAVENOUS ONCE
Status: COMPLETED | OUTPATIENT
Start: 2018-04-25 | End: 2018-04-25

## 2018-04-25 RX ADMIN — ACETAMINOPHEN 1000 MG: 325 TABLET, FILM COATED ORAL at 00:00

## 2018-04-25 RX ADMIN — ACETAMINOPHEN 1000 MG: 325 TABLET, FILM COATED ORAL at 06:00

## 2018-04-25 RX ADMIN — AMPICILLIN SODIUM AND SULBACTAM SODIUM 3 G: 2; 1 INJECTION, POWDER, FOR SOLUTION INTRAMUSCULAR; INTRAVENOUS at 04:38

## 2018-04-25 RX ADMIN — BUDESONIDE AND FORMOTEROL FUMARATE DIHYDRATE 2 PUFF: 160; 4.5 AEROSOL RESPIRATORY (INHALATION) at 17:03

## 2018-04-25 RX ADMIN — AMPICILLIN SODIUM AND SULBACTAM SODIUM 3 G: 2; 1 INJECTION, POWDER, FOR SOLUTION INTRAMUSCULAR; INTRAVENOUS at 15:53

## 2018-04-25 RX ADMIN — HEPARIN SODIUM 5000 UNITS: 5000 INJECTION, SOLUTION INTRAVENOUS; SUBCUTANEOUS at 06:26

## 2018-04-25 RX ADMIN — ASPIRIN 81 MG: 81 TABLET, COATED ORAL at 06:23

## 2018-04-25 RX ADMIN — MICONAZOLE NITRATE: 20 CREAM TOPICAL at 20:11

## 2018-04-25 RX ADMIN — Medication 220 MG: at 06:26

## 2018-04-25 RX ADMIN — FUROSEMIDE 20 MG: 10 INJECTION, SOLUTION INTRAMUSCULAR; INTRAVENOUS at 06:26

## 2018-04-25 RX ADMIN — AMPICILLIN SODIUM AND SULBACTAM SODIUM 3 G: 2; 1 INJECTION, POWDER, FOR SOLUTION INTRAMUSCULAR; INTRAVENOUS at 21:28

## 2018-04-25 RX ADMIN — MICONAZOLE NITRATE: 20 CREAM TOPICAL at 16:48

## 2018-04-25 RX ADMIN — BUDESONIDE AND FORMOTEROL FUMARATE DIHYDRATE 2 PUFF: 160; 4.5 AEROSOL RESPIRATORY (INHALATION) at 06:27

## 2018-04-25 RX ADMIN — OXYCODONE HYDROCHLORIDE 20 MG: 5 TABLET ORAL at 20:10

## 2018-04-25 RX ADMIN — HEPARIN SODIUM 5000 UNITS: 5000 INJECTION, SOLUTION INTRAVENOUS; SUBCUTANEOUS at 14:00

## 2018-04-25 RX ADMIN — OMEGA-3 FATTY ACIDS CAP 1000 MG 1000 MG: 1000 CAP at 11:30

## 2018-04-25 RX ADMIN — MAGNESIUM SULFATE IN WATER 2 G: 40 INJECTION, SOLUTION INTRAVENOUS at 07:43

## 2018-04-25 RX ADMIN — OMEPRAZOLE 20 MG: 20 CAPSULE, DELAYED RELEASE ORAL at 06:25

## 2018-04-25 RX ADMIN — FLUCONAZOLE 100 MG: 100 TABLET ORAL at 06:24

## 2018-04-25 RX ADMIN — ONDANSETRON 4 MG: 2 INJECTION, SOLUTION INTRAMUSCULAR; INTRAVENOUS at 06:38

## 2018-04-25 RX ADMIN — OMEGA-3 FATTY ACIDS CAP 1000 MG 1000 MG: 1000 CAP at 16:46

## 2018-04-25 RX ADMIN — OXYCODONE HYDROCHLORIDE 20 MG: 5 TABLET ORAL at 14:56

## 2018-04-25 RX ADMIN — SODIUM CHLORIDE 1000 ML: 9 INJECTION, SOLUTION INTRAVENOUS at 06:18

## 2018-04-25 RX ADMIN — OXYCODONE HYDROCHLORIDE 20 MG: 5 TABLET ORAL at 10:32

## 2018-04-25 RX ADMIN — HEPARIN SODIUM 5000 UNITS: 5000 INJECTION, SOLUTION INTRAVENOUS; SUBCUTANEOUS at 21:26

## 2018-04-25 RX ADMIN — MAGNESIUM SULFATE IN WATER 2 G: 40 INJECTION, SOLUTION INTRAVENOUS at 14:56

## 2018-04-25 RX ADMIN — LEVOTHYROXINE SODIUM 50 MCG: 50 TABLET ORAL at 06:24

## 2018-04-25 RX ADMIN — OXYCODONE HYDROCHLORIDE AND ACETAMINOPHEN 500 MG: 500 TABLET ORAL at 06:23

## 2018-04-25 RX ADMIN — STANDARDIZED SENNA CONCENTRATE AND DOCUSATE SODIUM 2 TABLET: 8.6; 5 TABLET, FILM COATED ORAL at 06:24

## 2018-04-25 RX ADMIN — ATORVASTATIN CALCIUM 40 MG: 40 TABLET, FILM COATED ORAL at 17:02

## 2018-04-25 RX ADMIN — OXYCODONE HYDROCHLORIDE 20 MG: 5 TABLET ORAL at 06:26

## 2018-04-25 RX ADMIN — ACETAMINOPHEN 1000 MG: 325 TABLET, FILM COATED ORAL at 16:46

## 2018-04-25 RX ADMIN — AMPICILLIN SODIUM AND SULBACTAM SODIUM 3 G: 2; 1 INJECTION, POWDER, FOR SOLUTION INTRAMUSCULAR; INTRAVENOUS at 09:48

## 2018-04-25 ASSESSMENT — COGNITIVE AND FUNCTIONAL STATUS - GENERAL
MOVING FROM LYING ON BACK TO SITTING ON SIDE OF FLAT BED: A LITTLE
TURNING FROM BACK TO SIDE WHILE IN FLAT BAD: A LOT
SUGGESTED CMS G CODE MODIFIER DAILY ACTIVITY: CK
WALKING IN HOSPITAL ROOM: TOTAL
SUGGESTED CMS G CODE MODIFIER MOBILITY: CL
MOBILITY SCORE: 11
CLIMB 3 TO 5 STEPS WITH RAILING: TOTAL
DAILY ACTIVITIY SCORE: 18
MOVING TO AND FROM BED TO CHAIR: A LOT
DRESSING REGULAR LOWER BODY CLOTHING: A LOT
TOILETING: A LOT
HELP NEEDED FOR BATHING: A LOT
STANDING UP FROM CHAIR USING ARMS: A LOT

## 2018-04-25 ASSESSMENT — ENCOUNTER SYMPTOMS
PALPITATIONS: 0
ABDOMINAL PAIN: 0
HALLUCINATIONS: 0
WEAKNESS: 0
HEADACHES: 0
NAUSEA: 0
DIARRHEA: 0
DIZZINESS: 0
MYALGIAS: 1
FOCAL WEAKNESS: 0
BLOOD IN STOOL: 0
SORE THROAT: 0
HEARTBURN: 0
SHORTNESS OF BREATH: 0
DEPRESSION: 0
BACK PAIN: 1
CHILLS: 0
FEVER: 0
COUGH: 0
VOMITING: 0

## 2018-04-25 ASSESSMENT — PAIN SCALES - GENERAL
PAINLEVEL_OUTOF10: 10
PAINLEVEL_OUTOF10: 9
PAINLEVEL_OUTOF10: 10

## 2018-04-25 ASSESSMENT — GAIT ASSESSMENTS: GAIT LEVEL OF ASSIST: REFUSED

## 2018-04-25 ASSESSMENT — PATIENT HEALTH QUESTIONNAIRE - PHQ9
1. LITTLE INTEREST OR PLEASURE IN DOING THINGS: NOT AT ALL
2. FEELING DOWN, DEPRESSED, IRRITABLE, OR HOPELESS: NOT AT ALL
1. LITTLE INTEREST OR PLEASURE IN DOING THINGS: SEVERAL DAYS
SUM OF ALL RESPONSES TO PHQ9 QUESTIONS 1 AND 2: 0
SUM OF ALL RESPONSES TO PHQ9 QUESTIONS 1 AND 2: 1

## 2018-04-25 ASSESSMENT — LIFESTYLE VARIABLES: EVER_SMOKED: YES

## 2018-04-25 ASSESSMENT — ACTIVITIES OF DAILY LIVING (ADL): TOILETING: REQUIRES ASSIST

## 2018-04-25 NOTE — CARE PLAN
Problem: Infection  Goal: Will remain free from infection  Outcome: PROGRESSING AS EXPECTED  Implement standard precautions and perform hand washing before and after patient contact.    Problem: Knowledge Deficit  Goal: Knowledge of disease process/condition, treatment plan, diagnostic tests, and medications will improve  Outcome: NOT MET  Assess knowledge level of disease process, treatment plan, dianostic test and medications.

## 2018-04-25 NOTE — RESPIRATORY CARE
COPD EDUCATION by COPD CLINICAL EDUCATOR  4/25/2018 at 6:42 AM by Estela Nascimento     Patient reviewed by COPD education team. Patient does not qualify for COPD program.

## 2018-04-25 NOTE — DISCHARGE PLANNING
Transitional Care Navigator:    Chart reviewed for post acute needs.  Pt is a 57 yom who lives locally, recently discharged to home with home healthcare following.  Now returned for worsening condition of leg wounds. LACE+ of 67, and mobility limited to 2 feet.  Currently, this patient is an appropriate candidate for skilled therapies, although with continued non-adherence, he may be more appropriate for LTC.  He has been to [facility] in January of this year.     Please consider an order for SNF prior to planned day of discharge.

## 2018-04-25 NOTE — WOUND TEAM
"[facility]  [facility]patient Services  Initial Wound and Skin Care Evaluation    Admission Date:     HPI, PMH, SH: Reviewed  Unit where seen by Wound Team: [id number]    WOUND CONSULT RELATED TO: Bilateral lower extremity wounds and Incontinence associated dermatitis    SUBJECTIVE:  \"When I say no, I get in trouble!\"    Self Report / Pain Level: 10/10, pt medicated with PO PRN Pain medications, pt states it is not enough, staff RN to follow up.    OBJECTIVE:    WOUND TYPE, LOCATION, CHARACTERISTICS (Pressure ulcers: location, stage, POA or date identified)    Location and type of wound: IAD with full thickness breakdown, posterior bilateral thighs and sacrum, all POA        Periwound:                               Intact  Drainage:                                          None    Tissue Type and %:                              100% red to buttocks and perineum, Bilateral                                                                  upper thighs brown, dried slough.  Wound Edges:                              Closed  Odor:                                NONE  Exposed structure(s):                             NONE  S&S of Infection:                                     NONE      Measurements:                                      Taken 04/25/2018  Length:                                                    > 75% of buttocks, perineum and bilateral upper                                                                  Thighs (SEE PHOTO)     Width:                                > 75% of buttocks, perineum and bilateral upper                                                                   Thighs (SEE PHOTO)  Depth:      Location and type of wound: Bilateral lower legs and feet, partial to full thickness wounds, all POA        Periwound:                               Intact  Drainage:                                          Heavy Serosanguinous drainage primarily from                                                  "                  posterior calf's    Tissue Type and %:                              100% Mixture of Yellow, Pink, Red and Brown                                                                   necrotic tissue  Wound Edges:                              Primarily open with some areas attached.  Odor:                                NONE  Exposed structure(s):                             NONE  S&S of Infection:                                     NONE      Measurements:                                       Taken 04/25/2018  Length:                              > 95% of bilateral lower legs and feet (SEE                                                                    PHOTO)  Width:                               > 95% of bilateral lower legs and feet (SEE                                                                      PHOTO)  Depth:      Location and type of wound: left lateral heel, unstageable pressure ulcer POA        Periwound:                               Macerated and possible DTI noted to distal end of                                                                   wound  Drainage:                                          Moderate, Serosanginous    Tissue Type and %:                              Pink and Red with 25% of yellow slough noted  Wound Edges:                              OPEN  Odor:                                NONE  Exposed structure(s):                             NONE  S&S of Infection:                                     NONE      Measurements:                                      Taken 04/25/2018  Length:                                                    5.5 cm                           Width:                                4.5 cm  Depth:                                                      0.1 cm        INTERVENTIONS BY WOUND TEAM: Silicone oxygen tubing applied with gray foam to ears for pressure ulcer prevention. Buttocks assessed with staff RN, staff RN photographed area. RAJESH  "noted, skin open to air, staff RN's to apply Veronica cream q 6hs to clean dry IAD areas. Bilateral lower extremities and feet edematous, pink, red, yellow and brown with macerated and necrotic tissue. Bilateral feet soaked in warm water with no rinse foam cleanser, while bilateral lower legs were cleaned using a washcloth, warm water and soap. Lower legs, feet and in between toes were then dried thoroughly. Amlactin lotion was applied to intact skin only of R lower leg and part of L lower leg.  Aquacel silver hydrofiber was applied to dorsum of bilateral feet, L lateral heel and L lateral calf. Abdominal pads placed over aquacel, all secured in place with roll gauze, and secured with tape. Right foot roll gauze only over foot, left foot and lower leg with roll gauze. Patient refusing to elevate legs, continues to sit at edge of bed, blue dri brianne pad beneath feet.       Interdisciplinary consultation: patient, staff RN, LPS consult has been ordered    EVALUATION: Veronica cream applied to buttocks to inhibit fungal growth. Amlactin lotion applied to bilateral lower legs to reduce epidermal keratinization and promote skin moisture. Aquacel Silver Hydrofiber applied to open areas of bilateral lower extremities due to antimicrobial properties and to absorb exudate. Abdominal pad to absorb moisture. Pt is noncompliant and has refused amlactin lotion application by staff RN per MAR. Pt refuses to lay in bed and when asked about a cardiac chair to sit in and elevate legs pt stated, \"it better be comfortable, none of your chairs are comfortable.\"    Factors affecting wound healing: immobility, non compliance, obesity, diabetes  Goals: manage exudate    NURSING PLAN OF CARE ORDERS (X):    Dressing changes: See Dressing Maintenance orders: X  Skin care: See Skin Care orders: X  Rectal tube care: See Rectal Tube Care orders:   Other orders:    RSKIN: CURRENT (X) ORDERED (O)  Q shift Hugh:  X  Q shift pressure point assessments:  " X  Pressure redistribution mattress        JOSE LUIS      Bariatric JOSE LUIS      Bariatric foam        Heel float boots  X     Heels floated on pillows      Barrier wipes      Barrier Cream      Barrier paste  See MAR   Sacral silicone dressing      Silicone O2 tubing  X  With gray foam pads  Anchorfast      Trach with Optifoam split foam       Waffle cushion X and waffle overlay to mattress     Rectal tube or BMS      Antifungal tx  X see MAR  Turn q 2 hours  X  Up to chair     Ambulate   PT/OT  X   Dietician      PO   X  TF   TPN     PVN    NPO   # days   Other       WOUND TEAM PLAN OF CARE (X):   NPWT change 3 x week:        Dressing changes by wound team:       Follow up as needed:  X     Other (explain):    Anticipated discharge plans (X):  SNF:           Home Care:  X as previously ordered at last admission         Outpatient Wound Center:            Self Care:            Other:

## 2018-04-25 NOTE — PROGRESS NOTES
Patient arrived to the room with nasal flaring, increased work of breathing and unable to complete full sentences.  Emotional support was given to patient.  He was assured that the admission data base could wait until he was able to breath easier.  Oxygen set at 6liters.  Will continue to monitor.

## 2018-04-25 NOTE — THERAPY
"Physical Therapy Evaluation completed.   Bed Mobility:  Supine to Sit:  (up at EOB)  Transfers: Sit to Stand: Minimal Assist (x 2 person with bed height raised.)  Gait: Level Of Assist: Refused (pt reports unable. )   Plan of Care: Will benefit from Physical Therapy 2 times per week  Discharge Recommendations: Equipment: Will Continue to Assess for Equipment Needs.   Pt presents with chronic B LE cellulitis. Pt will benefit from inpt PT to address problems and assist with d/c plan. Pt will need plan for post acute care as he is not able to manage on his own at home.       See \"Rehab Therapy-Acute\" Patient Summary Report for complete documentation.     "

## 2018-04-25 NOTE — PROGRESS NOTES
"LIMB PRESERVATION SERVICE      DATE OF CONSULTATION: 4/25/2018    REASON FOR CONSULT: Ulcer of left lateral heel and  BLE ulcers     HISTORY OF PRESENT ILLNESS: Patient is a 57 y.o. male, with a past medical history that includes type 2 diabetes, noncompliance,  obesity, COPD, and chronic BLE ulcers, admitted for worsening of his leg ulcers.  He is very reluctant to provide any information about his wounds, becomes agitated very easily.  He states that he has had these ulcers for 14 years, and that \"nothing works\" to heal them.  He states he has had Unna's boots in the past, states that the wound team has applied them to his legs during previous admissions, though there is no documentation of Unna's boots in the wound team notes.  He also states that he has compression stockings and Circaids at home but does not use them, because \"they don't work\".  He spends most of his time in a recliner, including at night for sleep. He uses a urinal and bedside commode at home because he is unable to walk.  He was just discharged on 4/16/18 from [facility] after being hospitalized for the same problems for which he is now admitted.  He has refused SNF placement in the past.  He is refusing to allow for debridement of his wounds or for arterial studies to be done this admission.  He has been seen by the wound team this admission and allowed for wrapping of his legs with kerlix.     PAST MEDICAL HISTORY:   Past Medical History:   Diagnosis Date   • Asthma    • At risk for sleep apnea    • Chronic obstructive pulmonary disease (CMS-HCC)    • Congestive heart failure (CMS-HCC)    • Hypertension    • Type II or unspecified type diabetes mellitus without mention of complication, not stated as uncontrolled         MEDICATIONS:   Current Facility-Administered Medications   Medication Dose   • magnesium sulfate IVPB premix 2 g  2 g   • miconazole 2%-zinc oxide (ELDER) topical cream     • albuterol inhaler 2 Puff  2 Puff   • ammonium lactate " (LAC-HYDRIN) 12 % lotion 2 Application  2 Application   • ascorbic acid (ascorbic acid) tablet 500 mg  500 mg   • aspirin EC (ECOTRIN) tablet 81 mg  81 mg   • atorvastatin (LIPITOR) tablet 40 mg  40 mg   • budesonide-formoterol (SYMBICORT) 160-4.5 MCG/ACT inhaler 2 Puff  2 Puff   • levothyroxine (SYNTHROID) tablet 50 mcg  50 mcg   • multivitamin (THERAGRAN) tablet 1 Tab  1 Tab   • fish oil capsule 1,000 mg  1,000 mg   • omeprazole (PRILOSEC) capsule 20 mg  20 mg   • oxyCODONE immediate-release (ROXICODONE) tablet 20 mg  20 mg   • tiotropium (SPIRIVA) 18 MCG inhalation capsule 1 Cap  1 Cap   • zinc sulfate (ZINCATE) capsule 220 mg  220 mg   • senna-docusate (PERICOLACE or SENOKOT S) 8.6-50 MG per tablet 2 Tab  2 Tab    And   • polyethylene glycol/lytes (MIRALAX) PACKET 1 Packet  1 Packet    And   • magnesium hydroxide (MILK OF MAGNESIA) suspension 30 mL  30 mL    And   • bisacodyl (DULCOLAX) suppository 10 mg  10 mg   • Respiratory Care per Protocol     • heparin injection 5,000 Units  5,000 Units   • MD ALERT... vancomycin per pharmacy protocol 1 Each  1 Each   • acetaminophen (TYLENOL) tablet 650 mg  650 mg   • insulin regular (HUMULIN R) injection 1-6 Units  1-6 Units    And   • glucose 4 g chewable tablet 16 g  16 g    And   • dextrose 50% (D50W) injection 25 mL  25 mL   • ondansetron (ZOFRAN) syringe/vial injection 4 mg  4 mg   • ondansetron (ZOFRAN ODT) dispertab 4 mg  4 mg   • fluconazole (DIFLUCAN) tablet 100 mg  100 mg   • NS infusion 707 mL  10 mL/kg (Ideal)   • NS (BOLUS) infusion 500 mL  500 mL   • furosemide (LASIX) injection 20 mg  20 mg   • ampicillin/sulbactam (UNASYN) 3 g in  mL IVPB  3 g   • acetaminophen (TYLENOL) tablet 1,000 mg  1,000 mg   • diphenhydrAMINE (BENADRYL) tablet/capsule 25 mg  25 mg       ALLERGIES:  No Known Allergies     PAST SURGICAL HISTORY: No past surgical history on file.    SOCIAL HISTORY:   Social History     Social History   • Marital status:      Spouse name:  "N/A   • Number of children: N/A   • Years of education: N/A     Social History Main Topics   • Smoking status: Former Smoker     Quit date: 12/14/2005   • Smokeless tobacco: Never Used   • Alcohol use No   • Drug use: No   • Sexual activity: Not on file     Other Topics Concern   • Not on file     Social History Narrative   • No narrative on file        FAMILY HISTORY: No family history on file.    REVIEW OF SYSTEMS:   Review of Systems   Unable to perform ROS: Other   Patient unwilling to answer questions    PHYSICAL EXAMINATION:   VITAL SIGNS: Blood pressure 101/63, pulse 88, temperature 36.1 °C (96.9 °F), resp. rate 17, height 1.753 m (5' 9\"), weight (!) 131.5 kg (290 lb), SpO2 89 %.  Physical Exam   Constitutional: He is oriented to person, place, and time and well-developed, well-nourished, and in no distress.   obese   Eyes: Pupils are equal, round, and reactive to light.   Cardiovascular:   Pt would not allow for assessment of pulses   Pulmonary/Chest:   SOB with minimal exertion   Musculoskeletal: He exhibits edema.   Neurological: He is alert and oriented to person, place, and time.   Skin:   Kerlix wraps to BLEs, strikethrough of yellow drainage noted   Psychiatric:   hostile         DIAGNOSTIC DATA:   Recent Labs      04/24/18   1428  04/25/18   0334   WBC  12.7*  14.9*   RBC  4.15*  4.34*   HEMOGLOBIN  11.7*  12.4*   HEMATOCRIT  38.3*  39.5*   MCV  92.3  91.0   MCH  28.2  28.6   MCHC  30.5*  31.4*   RDW  49.0  48.4   PLATELETCT  248  253   MPV  10.5  10.9     Recent Labs      04/24/18   1428  04/25/18   0335   SODIUM  135  136   POTASSIUM  3.9  3.9   CHLORIDE  93*  96   CO2  36*  28   GLUCOSE  121*  137*   BUN  30*  32*         ASSESSMENT AND PLAN:     1. Bilateral lower leg ulcers- Patient unwilling to allow for assessment.  Photos taken by wound team reviewed.  I suspect that these are venous in etiology.  However he may have underlying arterial insufficiency as well.  He will not allow for arterial " studies to be done.  LPS to sign off.  IP wound team to manage these wounds.   If pt agrees to allow for arterial studies to be completed, wound team may place multilayer compression wraps if BLE perfusion is shown to be adequate    2. Diabetes mellitus type 2- A1c in January of this year.  Diabetes educator has met with patient.

## 2018-04-25 NOTE — ASSESSMENT & PLAN NOTE
resolved  White blood cell counts as well as fevers have subsided.  Patient does not have any diaphoresis or chills or fevers.  ID on case  Patient's cultures were positive for Proteus enterococcus faecalis and MSSA.  Spoke to [facility] patient is approved to go to their facility once we have the patient  OR debridement 5/1

## 2018-04-25 NOTE — PROGRESS NOTES
Bedside report received from night nurse. Assumed care of pt. Pt awake, sitting up in bed. A/Ox4. Pt is SOB and requesting his inhaler. Medicated per MAR. No other complaints at this time. POC reviewed and white board updated. Tele box on. Call light in reach. Bed locked in lowest position with 2 upper bed rails up.

## 2018-04-25 NOTE — CONSULTS
Diabetes Education:  Patient with existing type 2 diabetes and bilateral DFU, HbA1c= 6.4%.  Patient had been taking metformin 500 mg twice daily and testing 3x/day.  All FSBG <130, so his PCP decreased metformin to once daily.      Reviewed basic type 2 care including nutrition, exercise, FSBG testing, medications, sick day care, foot care, preventing and treating hypoglycemia, preventing complications.  Written material provided.      I recommend he increase his metformin to 500 mg twice daily again to promote weight loss/prevent weight gain.  He has metformin and testing supplies at home.

## 2018-04-25 NOTE — PROGRESS NOTES
"Pharmacy Kinetics 57 y.o. male on vancomycin day # 2 2018    Currently on Vancomycin Pulse Dosing    Indication for Treatment: SSTI      Pertinent history per medical record: Admitted on 2018 for chronic bilateral lower extremity cellulitis (+pain, erythema and yellow exudate) with history of chronic leg wounds (recently admitted for same). Patient reports puslike drainage and significant pain in the base of the L heel. Previous wound cx have grown MSSA. Empiric antibiotics initiated.      Other antibiotics: Unasyn 3 g IV Q6h     Allergies: Patient has no known allergies.      List concerns for renal function: BMI 44 kg/m2; BUN:SCr>20:1     Pertinent cultures to date:   18 - wound (L foot): NGTD  18 - blood (peripheral) x 2: NGTD    Recent Labs      18   1428  18   0334   WBC  12.7*  14.9*   NEUTSPOLYS  74.40*  82.30*     Recent Labs      18   1428  18   0335   BUN  30*  32*   CREATININE  1.07  1.11   ALBUMIN  3.5   --      Intake/Output Summary (Last 24 hours) at 18 0853  Last data filed at 18 0800   Gross per 24 hour   Intake             1720 ml   Output              400 ml   Net             1320 ml      Blood pressure 102/57, pulse (!) 102, temperature 36.1 °C (96.9 °F), resp. rate 16, height 1.753 m (5' 9\"), weight (!) 136.1 kg (300 lb), SpO2 97 %. Temp (24hrs), Av.4 °C (97.6 °F), Min:36.1 °C (96.9 °F), Max:36.9 °C (98.4 °F)    A/P   1. Vancomycin dose change: Pulse dosing - next dose to be determined once level results.   2. Next vancomycin level:  @ 1300  3. Goal trough: 12-16 mcg//mL  4. A/P: Patient afebrile overnight with persistent leukocytosis; IG 1.1%. Tachycardic, BP labile. Renal indices elevated from baseline. Concerns for vancomycin clearance/accumulation noted above. Blood and wound cx negative thus far; previous wound cx grew MSSA. Patient has received vanco at this facility previously and tolerated ~15 mg/kg IV Q12h dosing in 2016, " however patient was here last month and level prior to the 3rd total dose was significantly elevated at 53.7 mcg/mL. Random ~18 hour level ordered was collected at ~14 hours post-loading dose, this resulted supratherapeutic at 50.8 mcg/mL. Patient appears to be clearing vanco extremely slow compared to previously. Suspect bump in renal function in conjunction with body habitus effecting clearance significantly. Will pulse dose at this time and check a level tomorrow @ 1300 (~38 hour level). Subsequent dosing to be determined once level results. Pharmacy to monitor and adjust as needed.     Madelaine Cage, PharmD, BCOP

## 2018-04-25 NOTE — ASSESSMENT & PLAN NOTE
Patient this point is on a PCA with fentanyl 50 µg every 1 hour with 300ug limit /4hr.  Patient is on oxycodone 40 mg every 6 hours.  Patient is on MS Contin 30 mg twice daily.

## 2018-04-25 NOTE — DISCHARGE PLANNING
Call placed to Juan with [employer] @[phone], he states that patient has been none compliant with equipment. Juan has attempted to visit patient to verify equipment functions and patient will not answer the door nor answer phone calls when called. Juan states he has made daily visits trying to services patients needs with no luck.    Call placed to  of [facility] services, spoke to Nasrin who states patient has denied  services and has sent staff away each time. Nurse Catherine states patient says he is staying at [facility] for a week or so and does not want anyone coming to see him and does not need services.     Call placed to [facility], spoke to Kacie ().  States patient is non-compliant, verbal abusive to staff and refuses staff to come into the home numerous times. Patient has notified agency that he no longer wants anyone coming to his home.

## 2018-04-25 NOTE — THERAPY
"Occupational Therapy Evaluation completed.   Functional Status:  Pt presents to skilled OT services following chronic BLE cellulitis, sepsis, recently d/c from [facility] with 24/7 care and [facility] which pt declined per SW via PT. Pt was able to perform STS with min a of 2 with FWW x3 and maintained static standing for ~1 min at a time. Pt easily agitated/irritated when asked for details regarding ADLs stating \"I don't do it, I manage\" reports staying in recliner all day long since d/c from hospital. Will follow while in house at low frequency 1-2x/wk given pt's non-compliance and self limiting behaviors with OT during last hospital admission. Pt would likely benefit from long term care placement such as group home, [facility] given pt's non-compliance with recommendations, inability to care for self at home and recent hospital admissions.   Plan of Care: Will benefit from Occupational Therapy 2 times per week  Discharge Recommendations:  Equipment: Will Continue to Assess for Equipment Needs.   See \"Rehab Therapy-Acute\" Patient Summary Report for complete documentation.    "

## 2018-04-25 NOTE — ASSESSMENT & PLAN NOTE
Continue at this point with RT protocol and nebulizer treatments. The patient sporadically uses nebulizers in the past I explained to him the necessity of keeping his airways open.

## 2018-04-25 NOTE — PROGRESS NOTES
"Pharmacy Kinetics 57 y.o. male on vancomycin day # 1 2018    Currently on Vancomycin - New start - Loading dose of 3,000mg x one     Indication for Treatment: SSTI     Pertinent history per medical record: Admitted on 2018 for bilateral lower extremity cellulitis (+pain, erythema and yellow exudate) with history of chronic leg wounds (recently admitted for same). Empiric antibiotics initiated.     Other antibiotics: Unasyn     Allergies: Patient has no known allergies.     List concerns for renal function: Obesity (BMI 44)     Pertinent cultures to date:   : Wound Cx = in process   : Blood cultures = in process     Recent Labs      18   1428   WBC  12.7*   NEUTSPOLYS  74.40*     Recent Labs      18   1428   BUN  30*   CREATININE  1.07   ALBUMIN  3.5     No results for input(s): VANCOTROUGH, VANCOPEAK, VANCORANDOM in the last 72 hours.No intake or output data in the 24 hours ending 18 1823   Blood pressure 153/74, pulse (!) 110, temperature 36.4 °C (97.5 °F), resp. rate 20, height 1.753 m (5' 9\"), weight (!) 136.1 kg (300 lb), SpO2 95 %. Temp (24hrs), Av.4 °C (97.5 °F), Min:36.4 °C (97.5 °F), Max:36.4 °C (97.5 °F)      A/P   1. Vancomycin dose change: No.   2. Next vancomycin level: Tomorrow,  at 1200   3. Goal trough: 12 - 16 mcg/mL   4. Comments: Morbidly obese patient with BMI of 44, very high risk for accumulation of vancomycin. Loading dose of 3,000mg x one is not quite 25mg per Kg, max one time dose is 3,000mg. Did not order maintenance dosing regimen at this time. Ordered a random vancomycin level for tomorrow at noon which is ~ 18 hours post loading dose to assess pharmacokinetics. Obese patients typically require larger doses in the loading phase and then much lower doses as drug achieves steady state concentrations and begins to accumulate. Anticipate every 24 hour dosing regimen may be appropriate. Clinical pharmacist to follow daily and adjust/order doses " as indicated. Cultures in process.     Amanda Weeks, PharmD

## 2018-04-25 NOTE — CARE PLAN
Problem: Safety  Goal: Will remain free from falls  Outcome: PROGRESSING AS EXPECTED  Pt educated to use call light before getting out of bed. Call light in reach. Bed locked in lowest position with 2 upper bed rails up. Room floor is free from clutter. Treaded socks on pt. Bed alarm on.

## 2018-04-25 NOTE — PROGRESS NOTES
Angelina from Lab called with critical result of Vanco at [id number]. Critical lab result read back to Angelina.   Dr. Saeed notified of critical lab result at 1440 in person.

## 2018-04-25 NOTE — PROGRESS NOTES
"Patient wanting pain medication, b/p low educated patient not safe to give pain medication at this time since his b/p is low. Patient stating \"It's not low, nothing is going to happen.\" \"your machine is broken and you only took it in one arm.\" stated to patient we have to provide a bolus of saline and understand he is in pain but we have to keep him safe. Stated that will recheck b/p if stable and safe we will give pain medication   "

## 2018-04-25 NOTE — PROGRESS NOTES
2 RN skin check completed. Back of head, ears, shoulders, elbows, wrists, spinal column, sacrum, knees, and heels all assessed with signs of excoriation and redness. Skin consult in place.

## 2018-04-25 NOTE — PROGRESS NOTES
Report received at bedside.  RN assumed care.  Chart reviewed.  Patient sitting on the side of the bed. Updated plan of care with patient. Patient verified on telemetry.  Bed alarm turned off. Patient is not on overestimator.  Call light in reach.  Bed in lowest position.  Non-slip socks on.  Patient alert and oriented x4.  Pain addressed.

## 2018-04-25 NOTE — DIETARY
Nutrition Services: consult for diabetes diet education + high BMI  BMI - Pt with BMI >40 (=42.83). Weight loss counseling not appropriate in acute care setting.   Went over diabetes diet education with pt. Pt reported obstacles to following diabetes diet such as: living alone, no car, inability to go to store, inability to walk, and lack of support. Despite reported obstacles pt was somewhat receptive during diet education, asking appropriate questions, and verbalized understanding. Encouraged pt to have RN/MD contact RD if any questions arise regarding diet.     Recommend - Referral to outpatient nutrition services for weight management/medical nutrition therapy after D/C.     RD will monitor per dept policy

## 2018-04-25 NOTE — PROGRESS NOTES
Patient is sitting on the side of the bed and is  calm.  Complains of pain but will wait for his next scheduled pain medication.  Call bell in reach. All needs addressed at this time.  Will continue to monitor.

## 2018-04-25 NOTE — CARE PLAN
Problem: Pain Management  Goal: Pain level will decrease to patient's comfort goal  Outcome: PROGRESSING SLOWER THAN EXPECTED  Pt pain assessed using pain scale and descriptors. Medicated per MAR. Pt complaining of additional pain. Rest and repositioning offered.

## 2018-04-25 NOTE — ASSESSMENT & PLAN NOTE
Body mass index is 44.93 kg/m².  Patient was counseled on weight loss management. The patient will need an outpatient bariatric evaluation and bariatric surgery. For him bariatric surgery will be life saving without it he will live more than 5-10 years.

## 2018-04-25 NOTE — H&P
Hospital Medicine History and Physical    Date of Service  4/24/2018    Chief Complaint  Chief Complaint   Patient presents with   • Leg Pain     Chronic bilateral lower leg wounds and pain. Pt recetly admitted for sepsis has been home for 1 week. C/O increased pain and inability to do ADL's at home.        History of Presenting Illness  57 y.o. male who presented 4/24/2018 with above chief complaint. And recently had a very prolonged hospital course at this hospital where he was in ICU and intubated for several days and was discharged with a very great discharge plan including close home follow-up as well as CPAP at night given his history of extreme medical nonadherence. Apparently he is not seen. Since being at home in the setting increased drainage of his legs and they're extremely painful to the point where he can't even move. He says the drainage appears to be puslike both no blood in there extremely painful especially at the base of the left heel which he is concerned it might be an ulcer developing. He also is on chronic oxygen and says that he tried to use his CPAP at home but is not as effective as the one that was given to him here in the hospital. He feels very short of breath and tired. Denies any associated nausea vomiting fevers but does endorse mild cough. His pain is currently a 10 out of 10 in his legs.    Primary Care Physician  Giovani Lara M.D.    Code Status  fc/fc    Review of Systems  Review of Systems   Constitutional: Positive for malaise/fatigue. Negative for chills and fever.   HENT: Negative for hearing loss.    Eyes: Negative for blurred vision.   Respiratory: Positive for cough and shortness of breath. Negative for hemoptysis and sputum production.    Cardiovascular: Positive for leg swelling (acute on chronic). Negative for chest pain and palpitations.   Gastrointestinal: Negative for abdominal pain, nausea and vomiting.   Genitourinary: Negative for dysuria and urgency.    Musculoskeletal: Positive for back pain, joint pain and myalgias. Negative for neck pain.   Skin: Negative for itching.   Neurological: Positive for weakness. Negative for dizziness, tingling, focal weakness, loss of consciousness and headaches.   Endo/Heme/Allergies: Does not bruise/bleed easily.   Psychiatric/Behavioral: Negative for depression.   All other systems reviewed and are negative.         Past Medical History  Past Medical History:   Diagnosis Date   • Asthma    • Chronic obstructive pulmonary disease (CMS-HCC)    • Congestive heart failure (CMS-HCC)    • Hypertension    • Type II or unspecified type diabetes mellitus without mention of complication, not stated as uncontrolled        Surgical History  Denies any past surgeries    Medications  No current facility-administered medications on file prior to encounter.      Current Outpatient Prescriptions on File Prior to Encounter   Medication Sig Dispense Refill   • furosemide (LASIX) 40 MG Tab Take 0.5 Tabs by mouth every day. 30 Tab 0   • aspirin EC 81 MG EC tablet Take 1 Tab by mouth every day. 30 Tab 0   • tiotropium (SPIRIVA) 18 MCG Cap Inhale 1 Cap by mouth every day. 30 Cap 0   • atorvastatin (LIPITOR) 40 MG Tab Take 1 Tab by mouth every bedtime. 30 Tab 0   • ammonium lactate (LAC-HYDRIN) 12 % Lotion Apply 2 Applications to affected area(s) every day. 2 Bottle 0   • senna-docusate (PERICOLACE OR SENOKOT S) 8.6-50 MG Tab Take 1 Tab by mouth 2 Times a Day. 60 Tab 0   • zinc sulfate (ZINCATE) 220 (50 Zn) MG Cap Take 1 Cap by mouth every day. 30 Cap 0   • multivitamin (THERAGRAN) Tab Take 1 Tab by mouth every day. 30 Tab 0   • ascorbic acid (VITAMIN C) 500 MG tablet Take 1 Tab by mouth every day. 30 Tab 3   • Omega-3 Fatty Acids (FISH OIL) 1000 MG Cap capsule Take 1 Cap by mouth 3 times a day, with meals. 90 Cap    • levothyroxine (SYNTHROID) 50 MCG Tab Take 50 mcg by mouth Every morning on an empty stomach.     • omeprazole (PRILOSEC) 20 MG  delayed-release capsule Take 20 mg by mouth every day.     • budesonide-formoterol (SYMBICORT) 160-4.5 MCG/ACT Aerosol Inhale 2 Puffs by mouth 2 Times a Day.     • albuterol 108 (90 BASE) MCG/ACT Aero Soln inhalation aerosol Inhale 2 Puffs by mouth every 6 hours as needed for Shortness of Breath.         Family History  No family history of medical issues    Social History  Social History   Substance Use Topics   • Smoking status: Former Smoker     Quit date: 2005   • Smokeless tobacco: Never Used   • Alcohol use No       Allergies  No Known Allergies     Physical Exam  Laboratory   Hemodynamics  Temp (24hrs), Av.6 °C (97.8 °F), Min:36.4 °C (97.5 °F), Max:36.9 °C (98.4 °F)   Temperature: 36.4 °C (97.6 °F)  Pulse  Av  Min: 103  Max: 130 Heart Rate (Monitored): (!) 109  Blood Pressure: 105/69      Respiratory      Respiration: 19, Pulse Oximetry: 94 %, O2 Daily Delivery Respiratory : Silicone Nasal Cannula     Work Of Breathing / Effort: Mild  RML Breath Sounds: Diminished, RLL Breath Sounds: Diminished, LLL Breath Sounds: Diminished    Physical Exam   Constitutional: He is oriented to person, place, and time. He appears well-developed and well-nourished. No distress.   Appears in mild pain   HENT:   Head: Normocephalic and atraumatic.   Mouth/Throat: No oropharyngeal exudate.   Eyes: Pupils are equal, round, and reactive to light. Right eye exhibits no discharge. Left eye exhibits no discharge.   Neck: Normal range of motion. Neck supple.   Cardiovascular: Normal rate, regular rhythm, normal heart sounds and intact distal pulses.    No murmur heard.  Pulmonary/Chest: Effort normal. No stridor. No respiratory distress. He has no wheezes. He has rales.   Abdominal: Soft. Bowel sounds are normal. He exhibits no distension. There is no tenderness.   Obese     Musculoskeletal: Normal range of motion. He exhibits edema and tenderness.   Neurological: He is alert and oriented to person, place, and time. No  "cranial nerve deficit.   Skin: Skin is warm and dry. There is erythema.   Severe lichenification of B/L LE's, early ulcer development in the medical R heel on the plantar surface  Erythema B/L LE\"s, foul smelling, severe TTP throughout B/L LE's   Psychiatric: He has a normal mood and affect.   Nursing note and vitals reviewed.      Recent Labs      04/24/18   1428   WBC  12.7*   RBC  4.15*   HEMOGLOBIN  11.7*   HEMATOCRIT  38.3*   MCV  92.3   MCH  28.2   MCHC  30.5*   RDW  49.0   PLATELETCT  248   MPV  10.5     Recent Labs      04/24/18   1428   SODIUM  135   POTASSIUM  3.9   CHLORIDE  93*   CO2  36*   GLUCOSE  121*   BUN  30*   CREATININE  1.07   CALCIUM  9.3     Recent Labs      04/24/18   1428   APTT  31.2   INR  1.23*     Recent Labs      04/24/18   1428   BNPBTYPENAT  5           Imaging  DX-CHEST-PORTABLE (1 VIEW)   Final Result      Partial clearing of right lung infiltrates compared to previous.   Left perihilar/left lung base atelectasis/possible pneumonia similar to previous findings.      DX-FOOT-COMPLETE 3+ LEFT    (Results Pending)     EKG personally reviewed by me shows sinus tachycardia with a heart rate of 106 in the left anterior posterior block as well as a right bundle branch block which are old with associated repolarization abnormality is been no evidence of acute ST segment changes    Assessment/Plan     I anticipate this patient will require at least two midnights for appropriate medical management, necessitating inpatient admission.    * Bilateral lower leg cellulitis- (present on admission)   Assessment & Plan    -Secondary to underlying lymphedema and chronic venous stasis with lack of integrity of skin  - wound Care consult as outlined above and in antibiotics as outlined above        Sepsis (CMS-[facility], located [facility])- (present on admission)   Assessment & Plan    -This is sepsis (without associated acute organ dysfunction).   -Secondary to superimposed cellulitis on top of severe chronic venous stasis " changes  -Empiric antibiotics with IV vancomycin and Zosyn  -Consider infectious disease consultation  -Limb preservation is been consulted  -Wound culture was obtained in the ER but will likely be polymicrobial and unclear how accurate we'll actually be  -X-ray of the foot to rule out osteomyelitis, ESR and CRP        Tachycardia- (present on admission)   Assessment & Plan    -Secondary to sepsis and pain        Chronic pain- (present on admission)   Assessment & Plan    -Multimodal pain therapy        Morbid obesity with BMI of 45.0-49.9, adult (CMS-HCC)- (present on admission)   Assessment & Plan    -Encourage weight loss but would D be difficult given gross physical debility        Chronic respiratory failure (CMS-HCC)- (present on admission)   Assessment & Plan    -Acute on chronic, via a small component of volume overload and diastolic congestive heart failure exacerbation        Severe protein-calorie malnutrition (CMS-HCC)- (present on admission)   Assessment & Plan    -Nutrition consult        Diabetes type 2, controlled (CMS-HCC)- (present on admission)   Assessment & Plan    -Insulin sliding scale and adjust as needed to maintain blood sugar less than 140        Non compliance w medication regimen- (present on admission)   Assessment & Plan    -Encourage adherence        Narcotic addiction (CMS-HCC)- (present on admission)   Assessment & Plan    -Limit the use of IV narcotics        Stasis dermatitis- (present on admission)   Assessment & Plan    -Severe, full lack of integrity of skin as well as history of medical nonadherence  -Likely associated lymphedema, not a surgical candidate  - wound Care consult  -See below  -Pain control  -Ammonium lactate solution        COPD (chronic obstructive pulmonary disease) (CMS-HCC)- (present on admission)   Assessment & Plan    -No clear current exacerbation at this time, respiratory therapy protocol and monitor closely        HTN (hypertension)- (present on  admission)   Assessment & Plan    -Continue outpatient blood pressure medications and add IV Lasix 20 mg daily for lymphedema            VTE prophylaxis heparin 5k units [id number].

## 2018-04-25 NOTE — ASSESSMENT & PLAN NOTE
Continue at this point with aggressive diabetic management. Continue with Accu-Cheks and sliding scale coverage. Continue at this point with metformin

## 2018-04-25 NOTE — PROGRESS NOTES
"Patient refused thurough skin check, patient stated \"I will not do anything until I can get my pain medication.\" able to assess earlier in shift patient's bottom which has severe excoriation and upper thigh flaking skin, red and weeping. Bilateral legs possible ulcer on heel and weeping wounds on bilateral legs, swelling. Flaking skin on back.   "

## 2018-04-26 ENCOUNTER — APPOINTMENT (OUTPATIENT)
Dept: RADIOLOGY | Facility: MEDICAL CENTER | Age: 58
DRG: 871 | End: 2018-04-26
Attending: INTERNAL MEDICINE
Payer: MEDICARE

## 2018-04-26 PROBLEM — G47.33 OSA (OBSTRUCTIVE SLEEP APNEA): Status: ACTIVE | Noted: 2018-04-26

## 2018-04-26 LAB
ALBUMIN SERPL BCP-MCNC: 3.6 G/DL (ref 3.2–4.9)
BACTERIA UR CULT: ABNORMAL
BACTERIA UR CULT: ABNORMAL
BACTERIA WND AEROBE CULT: ABNORMAL
BASOPHILS # BLD AUTO: 0.6 % (ref 0–1.8)
BASOPHILS # BLD: 0.07 K/UL (ref 0–0.12)
BUN SERPL-MCNC: 32 MG/DL (ref 8–22)
CALCIUM SERPL-MCNC: 9.1 MG/DL (ref 8.5–10.5)
CHLORIDE SERPL-SCNC: 97 MMOL/L (ref 96–112)
CO2 SERPL-SCNC: 26 MMOL/L (ref 20–33)
CREAT SERPL-MCNC: 1.18 MG/DL (ref 0.5–1.4)
EOSINOPHIL # BLD AUTO: 0.36 K/UL (ref 0–0.51)
EOSINOPHIL NFR BLD: 3 % (ref 0–6.9)
ERYTHROCYTE [DISTWIDTH] IN BLOOD BY AUTOMATED COUNT: 50.4 FL (ref 35.9–50)
GLUCOSE BLD-MCNC: 105 MG/DL (ref 65–99)
GLUCOSE BLD-MCNC: 127 MG/DL (ref 65–99)
GLUCOSE SERPL-MCNC: 133 MG/DL (ref 65–99)
GRAM STN SPEC: ABNORMAL
HCT VFR BLD AUTO: 37.1 % (ref 42–52)
HGB BLD-MCNC: 11.2 G/DL (ref 14–18)
IMM GRANULOCYTES # BLD AUTO: 0.12 K/UL (ref 0–0.11)
IMM GRANULOCYTES NFR BLD AUTO: 1 % (ref 0–0.9)
LYMPHOCYTES # BLD AUTO: 1.21 K/UL (ref 1–4.8)
LYMPHOCYTES NFR BLD: 10.2 % (ref 22–41)
MAGNESIUM SERPL-MCNC: 2.2 MG/DL (ref 1.5–2.5)
MCH RBC QN AUTO: 28.5 PG (ref 27–33)
MCHC RBC AUTO-ENTMCNC: 30.2 G/DL (ref 33.7–35.3)
MCV RBC AUTO: 94.4 FL (ref 81.4–97.8)
MONOCYTES # BLD AUTO: 0.67 K/UL (ref 0–0.85)
MONOCYTES NFR BLD AUTO: 5.6 % (ref 0–13.4)
NEUTROPHILS # BLD AUTO: 9.45 K/UL (ref 1.82–7.42)
NEUTROPHILS NFR BLD: 79.6 % (ref 44–72)
NRBC # BLD AUTO: 0 K/UL
NRBC BLD-RTO: 0 /100 WBC
PHOSPHATE SERPL-MCNC: 4.5 MG/DL (ref 2.5–4.5)
PLATELET # BLD AUTO: 198 K/UL (ref 164–446)
PMV BLD AUTO: 11 FL (ref 9–12.9)
POTASSIUM SERPL-SCNC: 4.2 MMOL/L (ref 3.6–5.5)
PROCALCITONIN SERPL-MCNC: 0.27 NG/ML
RBC # BLD AUTO: 3.93 M/UL (ref 4.7–6.1)
SCCMEC + MECA PNL NOSE NAA+PROBE: NEGATIVE
SCCMEC + MECA PNL NOSE NAA+PROBE: POSITIVE
SIGNIFICANT IND 70042: ABNORMAL
SIGNIFICANT IND 70042: ABNORMAL
SITE SITE: ABNORMAL
SITE SITE: ABNORMAL
SODIUM SERPL-SCNC: 134 MMOL/L (ref 135–145)
SOURCE SOURCE: ABNORMAL
SOURCE SOURCE: ABNORMAL
VANCOMYCIN SERPL-MCNC: 21.7 UG/ML
WBC # BLD AUTO: 11.9 K/UL (ref 4.8–10.8)

## 2018-04-26 PROCEDURE — A9270 NON-COVERED ITEM OR SERVICE: HCPCS | Performed by: INTERNAL MEDICINE

## 2018-04-26 PROCEDURE — 700105 HCHG RX REV CODE 258: Performed by: INTERNAL MEDICINE

## 2018-04-26 PROCEDURE — 73630 X-RAY EXAM OF FOOT: CPT | Mod: LT

## 2018-04-26 PROCEDURE — 80069 RENAL FUNCTION PANEL: CPT

## 2018-04-26 PROCEDURE — 85025 COMPLETE CBC W/AUTO DIFF WBC: CPT

## 2018-04-26 PROCEDURE — 770001 HCHG ROOM/CARE - MED/SURG/GYN PRIV*

## 2018-04-26 PROCEDURE — 84145 PROCALCITONIN (PCT): CPT

## 2018-04-26 PROCEDURE — 83735 ASSAY OF MAGNESIUM: CPT

## 2018-04-26 PROCEDURE — 700111 HCHG RX REV CODE 636 W/ 250 OVERRIDE (IP): Performed by: INTERNAL MEDICINE

## 2018-04-26 PROCEDURE — 36415 COLL VENOUS BLD VENIPUNCTURE: CPT

## 2018-04-26 PROCEDURE — 80202 ASSAY OF VANCOMYCIN: CPT

## 2018-04-26 PROCEDURE — 700102 HCHG RX REV CODE 250 W/ 637 OVERRIDE(OP): Performed by: INTERNAL MEDICINE

## 2018-04-26 PROCEDURE — 99233 SBSQ HOSP IP/OBS HIGH 50: CPT | Performed by: INTERNAL MEDICINE

## 2018-04-26 PROCEDURE — 82962 GLUCOSE BLOOD TEST: CPT

## 2018-04-26 RX ORDER — SODIUM CHLORIDE 9 MG/ML
INJECTION, SOLUTION INTRAVENOUS
Status: ACTIVE
Start: 2018-04-26 | End: 2018-04-27

## 2018-04-26 RX ADMIN — DIPHENHYDRAMINE HCL 25 MG: 25 TABLET ORAL at 09:46

## 2018-04-26 RX ADMIN — FUROSEMIDE 20 MG: 10 INJECTION, SOLUTION INTRAMUSCULAR; INTRAVENOUS at 06:01

## 2018-04-26 RX ADMIN — FLUCONAZOLE 100 MG: 100 TABLET ORAL at 06:01

## 2018-04-26 RX ADMIN — OMEGA-3 FATTY ACIDS CAP 1000 MG 1000 MG: 1000 CAP at 09:46

## 2018-04-26 RX ADMIN — AMPICILLIN SODIUM AND SULBACTAM SODIUM 3 G: 2; 1 INJECTION, POWDER, FOR SOLUTION INTRAMUSCULAR; INTRAVENOUS at 14:53

## 2018-04-26 RX ADMIN — OXYCODONE HYDROCHLORIDE 20 MG: 5 TABLET ORAL at 06:10

## 2018-04-26 RX ADMIN — OXYCODONE HYDROCHLORIDE AND ACETAMINOPHEN 500 MG: 500 TABLET ORAL at 06:01

## 2018-04-26 RX ADMIN — ATORVASTATIN CALCIUM 40 MG: 40 TABLET, FILM COATED ORAL at 17:29

## 2018-04-26 RX ADMIN — OXYCODONE HYDROCHLORIDE 20 MG: 5 TABLET ORAL at 10:04

## 2018-04-26 RX ADMIN — MICONAZOLE NITRATE: 20 CREAM TOPICAL at 14:45

## 2018-04-26 RX ADMIN — Medication 2 APPLICATION: at 17:00

## 2018-04-26 RX ADMIN — LEVOTHYROXINE SODIUM 50 MCG: 50 TABLET ORAL at 06:00

## 2018-04-26 RX ADMIN — ASPIRIN 81 MG: 81 TABLET, COATED ORAL at 06:01

## 2018-04-26 RX ADMIN — ACETAMINOPHEN 1000 MG: 325 TABLET, FILM COATED ORAL at 06:00

## 2018-04-26 RX ADMIN — AMPICILLIN SODIUM AND SULBACTAM SODIUM 3 G: 2; 1 INJECTION, POWDER, FOR SOLUTION INTRAMUSCULAR; INTRAVENOUS at 09:48

## 2018-04-26 RX ADMIN — OXYCODONE HYDROCHLORIDE 20 MG: 5 TABLET ORAL at 00:24

## 2018-04-26 RX ADMIN — OXYCODONE HYDROCHLORIDE 20 MG: 5 TABLET ORAL at 22:50

## 2018-04-26 RX ADMIN — HEPARIN SODIUM 5000 UNITS: 5000 INJECTION, SOLUTION INTRAVENOUS; SUBCUTANEOUS at 06:01

## 2018-04-26 RX ADMIN — HEPARIN SODIUM 5000 UNITS: 5000 INJECTION, SOLUTION INTRAVENOUS; SUBCUTANEOUS at 14:52

## 2018-04-26 RX ADMIN — BUDESONIDE AND FORMOTEROL FUMARATE DIHYDRATE 2 PUFF: 160; 4.5 AEROSOL RESPIRATORY (INHALATION) at 06:01

## 2018-04-26 RX ADMIN — BUDESONIDE AND FORMOTEROL FUMARATE DIHYDRATE 2 PUFF: 160; 4.5 AEROSOL RESPIRATORY (INHALATION) at 17:29

## 2018-04-26 RX ADMIN — OXYCODONE HYDROCHLORIDE 20 MG: 5 TABLET ORAL at 18:46

## 2018-04-26 RX ADMIN — OMEPRAZOLE 20 MG: 20 CAPSULE, DELAYED RELEASE ORAL at 06:00

## 2018-04-26 RX ADMIN — HEPARIN SODIUM 5000 UNITS: 5000 INJECTION, SOLUTION INTRAVENOUS; SUBCUTANEOUS at 21:39

## 2018-04-26 RX ADMIN — MICONAZOLE NITRATE: 20 CREAM TOPICAL at 21:43

## 2018-04-26 RX ADMIN — AMPICILLIN SODIUM AND SULBACTAM SODIUM 3 G: 2; 1 INJECTION, POWDER, FOR SOLUTION INTRAMUSCULAR; INTRAVENOUS at 21:39

## 2018-04-26 RX ADMIN — ACETAMINOPHEN 1000 MG: 325 TABLET, FILM COATED ORAL at 00:24

## 2018-04-26 RX ADMIN — AMPICILLIN SODIUM AND SULBACTAM SODIUM 3 G: 2; 1 INJECTION, POWDER, FOR SOLUTION INTRAMUSCULAR; INTRAVENOUS at 04:25

## 2018-04-26 RX ADMIN — OXYCODONE HYDROCHLORIDE 20 MG: 5 TABLET ORAL at 14:53

## 2018-04-26 ASSESSMENT — ENCOUNTER SYMPTOMS
DIZZINESS: 0
MYALGIAS: 1
NAUSEA: 0
FEVER: 0
DEPRESSION: 0
HEARTBURN: 0
COUGH: 0
BACK PAIN: 1
FOCAL WEAKNESS: 0
CHILLS: 0
PALPITATIONS: 0
ABDOMINAL PAIN: 0
DIARRHEA: 0
VOMITING: 0
BLOOD IN STOOL: 0
HALLUCINATIONS: 0
SHORTNESS OF BREATH: 0
WEAKNESS: 0
SORE THROAT: 0
HEADACHES: 0

## 2018-04-26 ASSESSMENT — PAIN SCALES - GENERAL
PAINLEVEL_OUTOF10: 7
PAINLEVEL_OUTOF10: 10
PAINLEVEL_OUTOF10: 9
PAINLEVEL_OUTOF10: 10

## 2018-04-26 NOTE — PROGRESS NOTES
"Pt resting in bed at this time. Pt c/o of 10/10 pain, medicated per MAR. Pt's dressing on tristen. Legs are soaked with serosangeounous discharge. Pt refusing to let this RN change them and states \"RN will not do this tonight since wound team dressed them earlier in the day\". Pt also refusing midnight vitals. Call light in place. Bed in low and locked position. Hourly rounding in place.  "

## 2018-04-26 NOTE — PROGRESS NOTES
Report received from night RN. Assumed care of patient at 0700. Pt is sitting at edge of bed. Pt on contact precautions for MRSA. Pt is on 5L O2 NC. Pt dressings on bilat legs are saturated. Pt refused dressing change. Education given and will continue to attempt dressing change. Bed is in low position, pt is oriented to call light. Will continue to monitor.

## 2018-04-26 NOTE — PROGRESS NOTES
Pt continues to refuse his dressing change. Bilateral leg dressing are soaked in discharge. Pt was educated about why dressing change was needed and continues to refuse.

## 2018-04-26 NOTE — PROGRESS NOTES
Per infection prevention, patient does not need to be on contact isolation for MRSA at this time as wound culture does not show MRSA in wound.

## 2018-04-26 NOTE — PROGRESS NOTES
[facility] Hospitalist Progress Note    Date of Service: 2018    Chief Complaint  57 y.o. male with a history of chronic respiratory failure on 5 L at baseline due to history of COPD, uncontrolled ALIREZA due to noncompliance, history of chronic lower extremity lymphedema with noncompliance therefore chronic wounds, obesity, type II diabetes, hypertension and hyperlipidemia admitted 2018 with lower extremity rash, pain, wounds found to have bilateral lower extremity cellulitis    Interval Problem Update  : MRSA nasal swab pending, DC vancomycin negative. Tolerating Unasyn, Diflucan. He refuses arterial studies that limb preservation recommended.    Consultants/Specialty  LPS    Disposition  F/U Cultures  MRSA swab  Continue IV abx, diuresis, watch renal function        Review of Systems   Constitutional: Positive for malaise/fatigue. Negative for chills and fever.   HENT: Negative for sore throat.    Respiratory: Negative for cough and shortness of breath.    Cardiovascular: Negative for chest pain and palpitations.   Gastrointestinal: Negative for abdominal pain, blood in stool, diarrhea, heartburn, nausea and vomiting.   Genitourinary: Negative for dysuria and frequency.   Musculoskeletal: Positive for back pain, joint pain and myalgias.   Skin: Positive for itching and rash.   Neurological: Negative for dizziness, focal weakness, weakness and headaches.   Psychiatric/Behavioral: Negative for depression and hallucinations.   All other systems reviewed and are negative.     Physical Exam  Laboratory/Imaging   Hemodynamics  Temp (24hrs), Av.2 °C (97.2 °F), Min:36.1 °C (96.9 °F), Max:36.4 °C (97.6 °F)   Temperature: 36.1 °C (97 °F)  Pulse  Av  Min: 88  Max: 130   Blood Pressure: 107/68      Respiratory      Respiration: 16, Pulse Oximetry: 90 %     Work Of Breathing / Effort: Moderate;Grunting  RUL Breath Sounds: Clear, RML Breath Sounds: Diminished, RLL Breath Sounds: Diminished, LARY Breath Sounds:  Clear, LLL Breath Sounds: Diminished    Fluids    Intake/Output Summary (Last 24 hours) at 04/25/18 1908  Last data filed at 04/25/18 1700   Gross per 24 hour   Intake          1821.67 ml   Output              650 ml   Net          1171.67 ml       Nutrition  Orders Placed This Encounter   Procedures   • Diet Order     Standing Status:   Standing     Number of Occurrences:   1     Order Specific Question:   Diet:     Answer:   Cardiac [6]     Order Specific Question:   Diet:     Answer:   Diabetic [3]     Physical Exam   Constitutional: He is oriented to person, place, and time. He appears well-developed and well-nourished. No distress.   HENT:   Head: Normocephalic.   Mouth/Throat: No oropharyngeal exudate.   Eyes: Conjunctivae are normal. Pupils are equal, round, and reactive to light.   Neck: Normal range of motion. No tracheal deviation present.   Cardiovascular: Normal rate and regular rhythm.    No murmur heard.  Pulmonary/Chest: Effort normal. No respiratory distress. He has no wheezes. He exhibits no tenderness.   Abdominal: Soft. He exhibits no distension. There is no tenderness. There is no guarding.   Musculoskeletal: He exhibits edema (significant pitting edema bilaterally past knees up to thighs) and tenderness.   Lymphadenopathy:     He has no cervical adenopathy.   Neurological: He is alert and oriented to person, place, and time. No cranial nerve deficit.   Skin: Rash noted. There is erythema.   Dry flaking skin, excoriated, bilateral lower extremities   Psychiatric:   Blunt, poor insight, impulsive   Nursing note and vitals reviewed.      Recent Labs      04/24/18   1428  04/25/18   0334   WBC  12.7*  14.9*   RBC  4.15*  4.34*   HEMOGLOBIN  11.7*  12.4*   HEMATOCRIT  38.3*  39.5*   MCV  92.3  91.0   MCH  28.2  28.6   MCHC  30.5*  31.4*   RDW  49.0  48.4   PLATELETCT  248  253   MPV  10.5  10.9     Recent Labs      04/24/18   1428  04/25/18   0335   SODIUM  135  136   POTASSIUM  3.9  3.9   CHLORIDE   93*  96   CO2  36*  28   GLUCOSE  121*  137*   BUN  30*  32*   CREATININE  1.07  1.11   CALCIUM  9.3  9.0     Recent Labs      04/24/18   1428   APTT  31.2   INR  1.23*     Recent Labs      04/24/18   1428   BNPBTYPENAT  5              Assessment/Plan     * Bilateral lower leg cellulitis- (present on admission)   Assessment & Plan    -Secondary to underlying lymphedema and chronic venous stasis with lack of integrity of skin  - He is noncompliant with wound care recommendations and hygiene.  -He was seen by limb preservation, they're recommending compression socks however the patient refuses to get an arterial study therefore he is not a candidate for compression  -Continue Unasyn, Diflucan  -Check MRSA nasal swab, if negative DC vancomycin        Sepsis (CMS-HCC)- (present on admission)   Assessment & Plan    -This is sepsis (without associated acute organ dysfunction).   -Secondary to superimposed cellulitis on top of severe chronic venous stasis changes  -Empiric antibiotics with IV vancomycin and Unasyn  -Consider infectious disease consultation  -Limb preservation is been consulted, they recommended arterial study, patient refuses  -Wound culture was obtained in the ER but will likely be polymicrobial and unclear how accurate we'll actually be  -X-ray of the foot to rule out osteomyelitis is pending, ESR and CRP  -Final cultures, continue Unasyn, vancomycin, Diflucan        Tachycardia- (present on admission)   Assessment & Plan    -Secondary to sepsis and pain        Chronic pain- (present on admission)   Assessment & Plan    -Multimodal pain therapy, his narcotics were decreased last hospital stay  Continue oxycodone 20 every 4 when necessary        Morbid obesity with BMI of 45.0-49.9, adult (CMS-HCC)- (present on admission)   Assessment & Plan    -Encourage weight loss but would D be difficult given gross physical debility        Chronic respiratory failure (CMS-HCC)- (present on admission)   Assessment &  Plan    -Acute on chronic, via a small component of volume overload and diastolic congestive heart failure exacerbation  -Continue Lasix 20 IV daily        Severe protein-calorie malnutrition (CMS-HCC)- (present on admission)   Assessment & Plan    -Nutrition consult        Diabetes type 2, controlled (CMS-HCC)- (present on admission)   Assessment & Plan    -Insulin sliding scale and adjust as needed to maintain blood sugar less than 140        Non compliance w medication regimen- (present on admission)   Assessment & Plan    -Encourage adherence        Narcotic addiction (CMS-HCC)- (present on admission)   Assessment & Plan    -Limit the use of IV narcotics        Stasis dermatitis- (present on admission)   Assessment & Plan    -Severe, full lack of integrity of skin as well as history of medical nonadherence  -Likely associated lymphedema, not a surgical candidate  - wound Care consult  -See below  -Pain control  -Ammonium lactate solution        COPD (chronic obstructive pulmonary disease) (CMS-HCC)- (present on admission)   Assessment & Plan    -No clear current exacerbation at this time, respiratory therapy protocol and monitor closely        HTN (hypertension)- (present on admission)   Assessment & Plan    -Continue outpatient blood pressure medications and add IV Lasix 20 mg daily for lymphedema          Quality-Core Measures   Reviewed items::  Labs reviewed, Medications reviewed and Radiology images reviewed  Montoya catheter::  No Montoya  Antibiotics:  Treating active infection/contamination beyond 24 hours perioperative coverage

## 2018-04-26 NOTE — WOUND TEAM
"Wound team by to speak with patient regarding LPS team recommendation to have arterial studies completed, to see if compression dressings is appropriate. Patient continues to refuse, states, \" I've had this done before three times. I know what's wrong and no one is doing anything to help my pain! I won't do another test when no one will do anything for my pain. I know what I need to do.\" Nursing has dressing care orders to follow, patient currently refusing dressing changes by nursing.  "

## 2018-04-26 NOTE — CARE PLAN
Problem: Knowledge Deficit  Goal: Knowledge of disease process/condition, treatment plan, diagnostic tests, and medications will improve  Outcome: PROGRESSING AS EXPECTED  Pt educated about disease process. Reason why medications are taken. And informed about treatment plan.     Problem: Pain Management  Goal: Pain level will decrease to patient's comfort goal  Outcome: PROGRESSING AS EXPECTED  Medicated per MAR. Educated on pain scale. implemented non pharmacological methods: distraction, reposition, rest.

## 2018-04-26 NOTE — CARE PLAN
Problem: Pain Management  Goal: Pain level will decrease to patient's comfort goal  Outcome: PROGRESSING AS EXPECTED  Patient educated to pain scale system, encouraged to verbalize discomfort, and education provided on non-pharmacological pain management. Pt educated on pain medication. Pt verbalizes understanding.    Problem: Skin Integrity  Goal: Risk for impaired skin integrity will decrease  Outcome: PROGRESSING AS EXPECTED  Patient skin assessed. Risk factors that lead to skin breakdown/impairment identified. Interventions to prevent skin breakdown are in place. Pt education provided. Pt verbalizes understanding.

## 2018-04-26 NOTE — PROGRESS NOTES
"Discussed plan of care with Pt. Pt states that he would be willing to get the foot xray after breakfast. Pt educated on wound change, pt responded that he would be \"willing to have dressing changed after lunch\".  "

## 2018-04-26 NOTE — PROGRESS NOTES
Assumed care of PT A&O 4. Pt resting in bed with no signs of labored breathing. On 5L n.c . Tele monitor in place, cardiac rhythm being monitored. Call light within reach, bed in lowest position, upper bed rails up. Pt was updated on plan of care for the night . Will continue to monitor.   Pt refusing bed alarm, 2 RN's educated on importance of calling staff and agrees to call.

## 2018-04-26 NOTE — PROGRESS NOTES
[facility] Hospitalist Progress Note    Date of Service: 2018    Chief Complaint  57 y.o. male with a history of chronic respiratory failure on 5 L at baseline due to history of COPD, uncontrolled ALIREZA due to noncompliance, history of chronic lower extremity lymphedema with noncompliance therefore chronic wounds, obesity, type II diabetes, hypertension and hyperlipidemia admitted 2018 with lower extremity rash, pain, wounds found to have bilateral lower extremity cellulitis    Interval Problem Update  : MRSA nasal swab pending, DC vancomycin negative. Tolerating Unasyn, Diflucan. He refuses arterial studies that limb preservation recommended.  : Refusing glucose checks, sliding scale. He complains of leg pain, requesting narcotic dose increase     Consultants/Specialty  LPS    Disposition  Continue IV abx, diuresis, watch renal function  Wound care        Review of Systems   Constitutional: Positive for malaise/fatigue. Negative for chills and fever.   HENT: Negative for sore throat.    Respiratory: Negative for cough and shortness of breath.    Cardiovascular: Negative for chest pain and palpitations.   Gastrointestinal: Negative for abdominal pain, blood in stool, diarrhea, heartburn, nausea and vomiting.   Genitourinary: Negative for dysuria and frequency.   Musculoskeletal: Positive for back pain, joint pain and myalgias.   Skin: Positive for itching and rash.        Extensive sloughing and weeping   Neurological: Negative for dizziness, focal weakness, weakness and headaches.   Psychiatric/Behavioral: Negative for depression and hallucinations.   All other systems reviewed and are negative.     Physical Exam  Laboratory/Imaging   Hemodynamics  Temp (24hrs), Av.3 °C (97.3 °F), Min:36 °C (96.8 °F), Max:36.7 °C (98 °F)   Temperature: 36.4 °C (97.6 °F)  Pulse  Av.6  Min: 86  Max: 130   Blood Pressure: 119/53      Respiratory      Respiration: 20, Pulse Oximetry: 96 %     Work Of Breathing /  Effort: Shallow;Moderate  RUL Breath Sounds: Clear, RML Breath Sounds: Diminished, RLL Breath Sounds: Diminished, LARY Breath Sounds: Clear, LLL Breath Sounds: Diminished    Fluids    Intake/Output Summary (Last 24 hours) at 04/26/18 1527  Last data filed at 04/26/18 1003   Gross per 24 hour   Intake           621.67 ml   Output             1050 ml   Net          -428.33 ml       Nutrition  Orders Placed This Encounter   Procedures   • Diet Order     Standing Status:   Standing     Number of Occurrences:   1     Order Specific Question:   Diet:     Answer:   Cardiac [6]     Order Specific Question:   Diet:     Answer:   Diabetic [3]     Physical Exam   Constitutional: He is oriented to person, place, and time. He appears well-developed and well-nourished. No distress.   HENT:   Head: Normocephalic.   Mouth/Throat: No oropharyngeal exudate.   Eyes: Conjunctivae are normal. Pupils are equal, round, and reactive to light.   Neck: Normal range of motion. No tracheal deviation present.   Cardiovascular: Normal rate and regular rhythm.    No murmur heard.  Pulmonary/Chest: Effort normal. No respiratory distress. He has no wheezes. He exhibits no tenderness.   Abdominal: Soft. He exhibits no distension. There is no tenderness. There is no guarding.   Musculoskeletal: He exhibits edema (significant pitting edema bilaterally past knees up to thighs) and tenderness.   Lymphadenopathy:     He has no cervical adenopathy.   Neurological: He is alert and oriented to person, place, and time. No cranial nerve deficit.   Skin: Rash noted. There is erythema.   Dry flaking skin, excoriated, bilateral lower extremities   Psychiatric:   Blunt, poor insight, impulsive   Nursing note and vitals reviewed.      Recent Labs      04/24/18   1428  04/25/18   0334  04/26/18   0209   WBC  12.7*  14.9*  11.9*   RBC  4.15*  4.34*  3.93*   HEMOGLOBIN  11.7*  12.4*  11.2*   HEMATOCRIT  38.3*  39.5*  37.1*   MCV  92.3  91.0  94.4   MCH  28.2  28.6   28.5   MCHC  30.5*  31.4*  30.2*   RDW  49.0  48.4  50.4*   PLATELETCT  248  253  198   MPV  10.5  10.9  11.0     Recent Labs      04/24/18   1428  04/25/18   0335  04/26/18   0209   SODIUM  135  136  134*   POTASSIUM  3.9  3.9  4.2   CHLORIDE  93*  96  97   CO2  36*  28  26   GLUCOSE  121*  137*  133*   BUN  30*  32*  32*   CREATININE  1.07  1.11  1.18   CALCIUM  9.3  9.0  9.1     Recent Labs      04/24/18   1428   APTT  31.2   INR  1.23*     Recent Labs      04/24/18   1428   BNPBTYPENAT  5              Assessment/Plan     * Bilateral lower leg cellulitis- (present on admission)   Assessment & Plan    -Secondary to underlying lymphedema and chronic venous stasis with lack of integrity of skin  -He is noncompliant with wound care recommendations and hygiene.  -He was seen by limb preservation, they're recommending compression socks however the patient refuses to get an arterial study therefore he is not a candidate for compression  -MRSA nasal swab negative (no contact iso needed)  -4/24 Wound Culture (LLE): Polymicrobial: Proteus mirabilis, Staph A (MSSA), Enterococcus  -Continue Unasyn, Diflucan        Acute on chronic diastolic (congestive) heart failure- (present on admission)   Assessment & Plan    Mild, due to not being able to prescribe Lasix on discharge due to recent AK I  -Lasix 20 IV daily  -Encouraged compliance with blood pressure rx and CPAP        Sepsis (CMS-HCC)- (present on admission)   Assessment & Plan    -This is sepsis (without associated acute organ dysfunction).   -Secondary to superimposed cellulitis on top of severe chronic venous stasis changes  -Left leg wound culture is growing Proteus, enterococcus, and SSA: Treat with Unasyn x10d  -Urine is growing Klebsiella, resistant to Unasyn, treated with Omnicef ×5 days  -Consider infectious disease consultation  -Limb preservation is been consulted, they recommended arterial study, patient refuses  -X-ray of the foot to rule out osteomyelitis  is pending, ESR and CRP  -Sepsis has rsolved        ALIREZA (obstructive sleep apnea)   Assessment & Plan    Uncontrolled, due to noncompliance. He was arranged to have a machine landed for him and supplied for him on previous discharge. He is still not been using it per some notes although patient to me states that he has.  -Encourage compliance with CPAP nightly        Chronic pain- (present on admission)   Assessment & Plan    -Multimodal pain therapy, his narcotics were decreased last hospital stay  Continue oxycodone 20 every 4 when necessary        Morbid obesity with BMI of 45.0-49.9, adult (CMS-HCC)- (present on admission)   Assessment & Plan    -Encourage weight loss but would be difficult given gross physical debility        Chronic respiratory failure (CMS-HCC)- (present on admission)   Assessment & Plan    -Acute on chronic, via a small component of volume overload and diastolic congestive heart failure exacerbation  -Continue Lasix 20 IV daily        Severe protein-calorie malnutrition (CMS-HCC)- (present on admission)   Assessment & Plan    -Nutrition consult        Diabetes type 2, controlled (CMS-HCC)- (present on admission)   Assessment & Plan    -Insulin sliding scale and adjust as needed to maintain blood sugar less than 140  -He is refusing insulin checks and SSI        Non compliance w medication regimen- (present on admission)   Assessment & Plan    -Encourage adherence        Narcotic addiction (CMS-HCC)- (present on admission)   Assessment & Plan    -Limit the use of IV narcotics        Stasis dermatitis- (present on admission)   Assessment & Plan    -Severe, full lack of integrity of skin as well as history of medical nonadherence  -Likely associated lymphedema, not a surgical candidate  - wound Care consult  -See below  -Pain control  -Ammonium lactate solution        COPD (chronic obstructive pulmonary disease) (CMS-HCC)- (present on admission)   Assessment & Plan    -No clear current  exacerbation at this time, respiratory therapy protocol and monitor closely        HTN (hypertension)- (present on admission)   Assessment & Plan    -Continue outpatient blood pressure medications and add IV Lasix 20 mg daily for lymphedema          Quality-Core Measures   Reviewed items::  Labs reviewed, Medications reviewed and Radiology images reviewed  Montoya catheter::  No Montoya  Antibiotics:  Treating active infection/contamination beyond 24 hours perioperative coverage

## 2018-04-26 NOTE — ASSESSMENT & PLAN NOTE
Continue at this point diuresis with Lasix. Continue at this point with beta blockers. Continue to monitor fluid status. Most recent left ventricular ejection fraction is70%.

## 2018-04-27 LAB
ALBUMIN SERPL BCP-MCNC: 3.3 G/DL (ref 3.2–4.9)
BUN SERPL-MCNC: 27 MG/DL (ref 8–22)
CALCIUM SERPL-MCNC: 9.2 MG/DL (ref 8.5–10.5)
CHLORIDE SERPL-SCNC: 98 MMOL/L (ref 96–112)
CO2 SERPL-SCNC: 34 MMOL/L (ref 20–33)
CREAT SERPL-MCNC: 0.7 MG/DL (ref 0.5–1.4)
GLUCOSE BLD-MCNC: 101 MG/DL (ref 65–99)
GLUCOSE BLD-MCNC: 118 MG/DL (ref 65–99)
GLUCOSE BLD-MCNC: 135 MG/DL (ref 65–99)
GLUCOSE SERPL-MCNC: 127 MG/DL (ref 65–99)
PHOSPHATE SERPL-MCNC: 3.5 MG/DL (ref 2.5–4.5)
POTASSIUM SERPL-SCNC: 4.6 MMOL/L (ref 3.6–5.5)
SODIUM SERPL-SCNC: 138 MMOL/L (ref 135–145)

## 2018-04-27 PROCEDURE — 36415 COLL VENOUS BLD VENIPUNCTURE: CPT

## 2018-04-27 PROCEDURE — 700102 HCHG RX REV CODE 250 W/ 637 OVERRIDE(OP): Performed by: INTERNAL MEDICINE

## 2018-04-27 PROCEDURE — 700111 HCHG RX REV CODE 636 W/ 250 OVERRIDE (IP): Performed by: INTERNAL MEDICINE

## 2018-04-27 PROCEDURE — A9270 NON-COVERED ITEM OR SERVICE: HCPCS | Performed by: INTERNAL MEDICINE

## 2018-04-27 PROCEDURE — 99232 SBSQ HOSP IP/OBS MODERATE 35: CPT | Performed by: INTERNAL MEDICINE

## 2018-04-27 PROCEDURE — 80069 RENAL FUNCTION PANEL: CPT

## 2018-04-27 PROCEDURE — 700105 HCHG RX REV CODE 258: Performed by: INTERNAL MEDICINE

## 2018-04-27 PROCEDURE — 82962 GLUCOSE BLOOD TEST: CPT

## 2018-04-27 PROCEDURE — 770001 HCHG ROOM/CARE - MED/SURG/GYN PRIV*

## 2018-04-27 RX ADMIN — OMEPRAZOLE 20 MG: 20 CAPSULE, DELAYED RELEASE ORAL at 06:08

## 2018-04-27 RX ADMIN — OXYCODONE HYDROCHLORIDE 20 MG: 5 TABLET ORAL at 08:01

## 2018-04-27 RX ADMIN — ATORVASTATIN CALCIUM 40 MG: 40 TABLET, FILM COATED ORAL at 18:10

## 2018-04-27 RX ADMIN — ACETAMINOPHEN 1000 MG: 325 TABLET, FILM COATED ORAL at 06:07

## 2018-04-27 RX ADMIN — MICONAZOLE NITRATE: 20 CREAM TOPICAL at 15:08

## 2018-04-27 RX ADMIN — Medication 220 MG: at 06:08

## 2018-04-27 RX ADMIN — OXYCODONE HYDROCHLORIDE 20 MG: 5 TABLET ORAL at 03:12

## 2018-04-27 RX ADMIN — HEPARIN SODIUM 5000 UNITS: 5000 INJECTION, SOLUTION INTRAVENOUS; SUBCUTANEOUS at 06:08

## 2018-04-27 RX ADMIN — OXYCODONE HYDROCHLORIDE AND ACETAMINOPHEN 500 MG: 500 TABLET ORAL at 06:08

## 2018-04-27 RX ADMIN — FUROSEMIDE 20 MG: 10 INJECTION, SOLUTION INTRAMUSCULAR; INTRAVENOUS at 09:40

## 2018-04-27 RX ADMIN — MICONAZOLE NITRATE: 20 CREAM TOPICAL at 21:17

## 2018-04-27 RX ADMIN — MICONAZOLE NITRATE: 20 CREAM TOPICAL at 03:03

## 2018-04-27 RX ADMIN — AMPICILLIN SODIUM AND SULBACTAM SODIUM 3 G: 2; 1 INJECTION, POWDER, FOR SOLUTION INTRAMUSCULAR; INTRAVENOUS at 08:01

## 2018-04-27 RX ADMIN — OXYCODONE HYDROCHLORIDE 20 MG: 5 TABLET ORAL at 12:08

## 2018-04-27 RX ADMIN — OXYCODONE HYDROCHLORIDE 20 MG: 5 TABLET ORAL at 16:12

## 2018-04-27 RX ADMIN — AMPICILLIN SODIUM AND SULBACTAM SODIUM 3 G: 2; 1 INJECTION, POWDER, FOR SOLUTION INTRAMUSCULAR; INTRAVENOUS at 03:03

## 2018-04-27 RX ADMIN — DIPHENHYDRAMINE HCL 25 MG: 25 TABLET ORAL at 15:12

## 2018-04-27 RX ADMIN — OXYCODONE HYDROCHLORIDE 20 MG: 5 TABLET ORAL at 20:16

## 2018-04-27 RX ADMIN — HEPARIN SODIUM 5000 UNITS: 5000 INJECTION, SOLUTION INTRAVENOUS; SUBCUTANEOUS at 15:07

## 2018-04-27 RX ADMIN — HEPARIN SODIUM 5000 UNITS: 5000 INJECTION, SOLUTION INTRAVENOUS; SUBCUTANEOUS at 21:11

## 2018-04-27 RX ADMIN — BUDESONIDE AND FORMOTEROL FUMARATE DIHYDRATE 2 PUFF: 160; 4.5 AEROSOL RESPIRATORY (INHALATION) at 06:08

## 2018-04-27 RX ADMIN — ACETAMINOPHEN 1000 MG: 325 TABLET, FILM COATED ORAL at 12:11

## 2018-04-27 RX ADMIN — AMPICILLIN SODIUM AND SULBACTAM SODIUM 3 G: 2; 1 INJECTION, POWDER, FOR SOLUTION INTRAMUSCULAR; INTRAVENOUS at 21:11

## 2018-04-27 RX ADMIN — AMPICILLIN SODIUM AND SULBACTAM SODIUM 3 G: 2; 1 INJECTION, POWDER, FOR SOLUTION INTRAMUSCULAR; INTRAVENOUS at 15:07

## 2018-04-27 RX ADMIN — ACETAMINOPHEN 1000 MG: 325 TABLET, FILM COATED ORAL at 18:07

## 2018-04-27 RX ADMIN — MICONAZOLE NITRATE: 20 CREAM TOPICAL at 08:05

## 2018-04-27 RX ADMIN — ASPIRIN 81 MG: 81 TABLET, COATED ORAL at 06:07

## 2018-04-27 RX ADMIN — LEVOTHYROXINE SODIUM 50 MCG: 50 TABLET ORAL at 06:08

## 2018-04-27 ASSESSMENT — PAIN SCALES - GENERAL
PAINLEVEL_OUTOF10: 10
PAINLEVEL_OUTOF10: 10
PAINLEVEL_OUTOF10: 9
PAINLEVEL_OUTOF10: 10
PAINLEVEL_OUTOF10: 10
PAINLEVEL_OUTOF10: 5
PAINLEVEL_OUTOF10: 10
PAINLEVEL_OUTOF10: 10
PAINLEVEL_OUTOF10: 5
PAINLEVEL_OUTOF10: 3

## 2018-04-27 ASSESSMENT — ENCOUNTER SYMPTOMS
HEARTBURN: 0
PALPITATIONS: 0
DIZZINESS: 0
MYALGIAS: 1
FEVER: 0
HEADACHES: 0
DEPRESSION: 0
NAUSEA: 0
BLOOD IN STOOL: 0
DIARRHEA: 0
ABDOMINAL PAIN: 0
HALLUCINATIONS: 0
BACK PAIN: 1
WEAKNESS: 0
COUGH: 0
VOMITING: 0
CHILLS: 0
SHORTNESS OF BREATH: 0
SORE THROAT: 0
FOCAL WEAKNESS: 0

## 2018-04-27 NOTE — PROGRESS NOTES
RT was paged at this time in regards to pt's order for CPAP. RT Yosely states she saw this order already and will be up shortly with it.

## 2018-04-27 NOTE — DISCHARGE PLANNING
Received choice form from [facility] for patients LTAC services, referral has been sent to [facility] [place] per patient request.

## 2018-04-27 NOTE — PROGRESS NOTES
"Pt was assisted back to bed. Dressings are again soaked in drainage. Pt refusing this RN to change them out. Pt states \" they got done today and they are just going to get wet again, plus they really hurt\". Will ask again.  "

## 2018-04-27 NOTE — DISCHARGE PLANNING
Anticipated Discharge Disposition: Pending medical team collaboration.     Action: LSW met with pt bedside about readmission. Per last admission this LSW set up 24/7 caregiver support through [employer], Triolgoy machine through [employer], Home Health through [facility], O2 through [employer], manual wheel chair, respiratory therapist through [employer].     When pt was asked what happened from going home a week ago pt reported that the Home Health agency did not show up, respiratory didn't show up and the triology machine did not work and he sent [facility] caregivers away due to being not able to help and implied he did not want any other caregivers but white. LSW spoke about expectations of services and pt needing to communicate issues instead of denying all services. LSW had CCS confirm with all above agencies that multiple attempts were made to contact him and provide services. Pt relayed this info but denies any person coming to the home. LSW verified with agencies but wants the pt to be able to work with them without issues. LSW confirmed with pt that the agencies are willing to continue services but an agreement of between pt and agency needs to be confirmed.     Barriers to Discharge: Pt readmitted.    Plan: Pt to continue services when discharged. LSW to meet with IDT for further dc recommendations.

## 2018-04-27 NOTE — PROGRESS NOTES
Assumed care of PT A&O x 4. Pt sitting on edge of bed with no signs of labored breathing. On 5L n.c .Medical pt now. Call light within reach, bed in lowest position, upper bed rails up. Pt was updated on plan of care for the night . Hourly rounding in place.

## 2018-04-27 NOTE — HEART FAILURE PROGRAM
Cardiovascular Nurse Navigator () Advanced Heart Failure Program Inpatient Progress Note:    Patient has had Acute on chronic diastolic heart failure on his problems list since his admission in July of 2017.    During that admission last July, patient was seen by cardiology three times. It seems from reviewing those notes that HF was a working diagnosis based upon patient telling Dr. Gleason that he had HF.    No outpatient cardiology encounters to substantiate per chart review.    Echo results from 03.18.18: EF 70%, normal diastolic function, normal LV chamber size and wall thickness, No MS, No MR, No AS, No AI, No TS, No TR, No PS, No PI. RV not well visualized.    If HF is going to remain on patient's problems list, especially if it is going to be documented as an active problem on this admission, a cardiology consult would be very helpful. TT to Dr. Saeed to discuss.    This is important because:    · 50% of patients diagnosed with HF will die within 5 years.  · If HF is in the hospital medicine notes/problem list as an active problem, patient will be told that he has HF by nursing and receive education on this diagnosis and be given an appointment at the [facility].  · Appointments at the [facility] are a limited resource, ideally they should be reserved for hospital discharges with a confirmed diagnosis of HF.  · It burdensome for most patients financially and logistically to get to appointments, so unnecessary appointments should be avoided.  · When patients receive a HF diagnosis, it is understandably stressful and frightening.  · When patients make the effort and spend the money to get to an appointment at the [facility] and then are told that they don't actually have HF, they are (although relieved), understandably frustrated.    Thank you and please call with questions, Mayte

## 2018-04-27 NOTE — PROGRESS NOTES
"Pt assisted to stand on edge of bed to change sheets since they were wet from his bandages. Pt continues to refuse his dressing change and is irritated about this RN not bringing in his pain med at 0250. Pt was educated that the oxycodone is a PRN medication and not a scheduled one meaning he needs to ask this RN for it when needed. Pt states \" other nurses have brought it in without me asking for it\". Pt again educated about the meaning of a  PRN medication.   "

## 2018-04-27 NOTE — DISCHARGE PLANNING
Anticipated Discharge Disposition: LTAC    Action: LSW met with pt bedside about LTAC choice. Pt agreeable and says he's been to LTAC located at [facility].     Barriers to Discharge: None    Plan: f/u with LTAC for acceptance or denial .

## 2018-04-27 NOTE — CARE PLAN
Problem: Pain Management  Goal: Pain level will decrease to patient's comfort goal  Outcome: PROGRESSING AS EXPECTED  Medicated per MAR. Educated on pain scale. implemented non pharmacological methods: distraction, reposition, rest.    Problem: Skin Integrity  Goal: Risk for impaired skin integrity will decrease  Outcome: PROGRESSING AS EXPECTED  Moisturizer in room. Veronica cream applied. Heels floated. Waffle cushion in use.

## 2018-04-27 NOTE — PROGRESS NOTES
Pt resting in bed on his left side. RT came by to give pt his C-PAP and he refuses to put it on. Pt agitated towards RT. PT was educated on how the MD ordered this and importance of having it on and continues to refuse.  Hourly rounding in place.

## 2018-04-28 LAB
ALBUMIN SERPL BCP-MCNC: 3.3 G/DL (ref 3.2–4.9)
BASOPHILS # BLD AUTO: 0.8 % (ref 0–1.8)
BASOPHILS # BLD: 0.07 K/UL (ref 0–0.12)
BUN SERPL-MCNC: 22 MG/DL (ref 8–22)
CALCIUM SERPL-MCNC: 9.2 MG/DL (ref 8.5–10.5)
CHLORIDE SERPL-SCNC: 97 MMOL/L (ref 96–112)
CO2 SERPL-SCNC: 32 MMOL/L (ref 20–33)
CREAT SERPL-MCNC: 0.58 MG/DL (ref 0.5–1.4)
EOSINOPHIL # BLD AUTO: 0.34 K/UL (ref 0–0.51)
EOSINOPHIL NFR BLD: 3.7 % (ref 0–6.9)
ERYTHROCYTE [DISTWIDTH] IN BLOOD BY AUTOMATED COUNT: 49.1 FL (ref 35.9–50)
GLUCOSE BLD-MCNC: 137 MG/DL (ref 65–99)
GLUCOSE SERPL-MCNC: 146 MG/DL (ref 65–99)
HCT VFR BLD AUTO: 34.3 % (ref 42–52)
HGB BLD-MCNC: 10.3 G/DL (ref 14–18)
IMM GRANULOCYTES # BLD AUTO: 0.16 K/UL (ref 0–0.11)
IMM GRANULOCYTES NFR BLD AUTO: 1.8 % (ref 0–0.9)
LYMPHOCYTES # BLD AUTO: 1.45 K/UL (ref 1–4.8)
LYMPHOCYTES NFR BLD: 16 % (ref 22–41)
MCH RBC QN AUTO: 28.1 PG (ref 27–33)
MCHC RBC AUTO-ENTMCNC: 30 G/DL (ref 33.7–35.3)
MCV RBC AUTO: 93.5 FL (ref 81.4–97.8)
MONOCYTES # BLD AUTO: 0.74 K/UL (ref 0–0.85)
MONOCYTES NFR BLD AUTO: 8.1 % (ref 0–13.4)
NEUTROPHILS # BLD AUTO: 6.32 K/UL (ref 1.82–7.42)
NEUTROPHILS NFR BLD: 69.6 % (ref 44–72)
NRBC # BLD AUTO: 0 K/UL
NRBC BLD-RTO: 0 /100 WBC
PHOSPHATE SERPL-MCNC: 2.5 MG/DL (ref 2.5–4.5)
PLATELET # BLD AUTO: 235 K/UL (ref 164–446)
PMV BLD AUTO: 10.7 FL (ref 9–12.9)
POTASSIUM SERPL-SCNC: 4.4 MMOL/L (ref 3.6–5.5)
RBC # BLD AUTO: 3.67 M/UL (ref 4.7–6.1)
SODIUM SERPL-SCNC: 137 MMOL/L (ref 135–145)
WBC # BLD AUTO: 9.1 K/UL (ref 4.8–10.8)

## 2018-04-28 PROCEDURE — 80069 RENAL FUNCTION PANEL: CPT

## 2018-04-28 PROCEDURE — 82962 GLUCOSE BLOOD TEST: CPT

## 2018-04-28 PROCEDURE — 36415 COLL VENOUS BLD VENIPUNCTURE: CPT

## 2018-04-28 PROCEDURE — 700105 HCHG RX REV CODE 258

## 2018-04-28 PROCEDURE — 700102 HCHG RX REV CODE 250 W/ 637 OVERRIDE(OP): Performed by: INTERNAL MEDICINE

## 2018-04-28 PROCEDURE — A9270 NON-COVERED ITEM OR SERVICE: HCPCS | Performed by: HOSPITALIST

## 2018-04-28 PROCEDURE — 99233 SBSQ HOSP IP/OBS HIGH 50: CPT | Performed by: HOSPITALIST

## 2018-04-28 PROCEDURE — 700111 HCHG RX REV CODE 636 W/ 250 OVERRIDE (IP): Performed by: INTERNAL MEDICINE

## 2018-04-28 PROCEDURE — A9270 NON-COVERED ITEM OR SERVICE: HCPCS | Performed by: INTERNAL MEDICINE

## 2018-04-28 PROCEDURE — 85025 COMPLETE CBC W/AUTO DIFF WBC: CPT

## 2018-04-28 PROCEDURE — 700102 HCHG RX REV CODE 250 W/ 637 OVERRIDE(OP): Performed by: HOSPITALIST

## 2018-04-28 PROCEDURE — 700105 HCHG RX REV CODE 258: Performed by: INTERNAL MEDICINE

## 2018-04-28 PROCEDURE — 770001 HCHG ROOM/CARE - MED/SURG/GYN PRIV*

## 2018-04-28 RX ORDER — MORPHINE SULFATE 15 MG/1
15 TABLET, FILM COATED, EXTENDED RELEASE ORAL EVERY 12 HOURS
Status: DISCONTINUED | OUTPATIENT
Start: 2018-04-28 | End: 2018-04-29

## 2018-04-28 RX ORDER — SODIUM CHLORIDE 9 MG/ML
INJECTION, SOLUTION INTRAVENOUS
Status: COMPLETED
Start: 2018-04-28 | End: 2018-04-28

## 2018-04-28 RX ADMIN — FUROSEMIDE 20 MG: 10 INJECTION, SOLUTION INTRAMUSCULAR; INTRAVENOUS at 06:33

## 2018-04-28 RX ADMIN — ASPIRIN 81 MG: 81 TABLET, COATED ORAL at 06:31

## 2018-04-28 RX ADMIN — AMPICILLIN SODIUM AND SULBACTAM SODIUM 3 G: 2; 1 INJECTION, POWDER, FOR SOLUTION INTRAMUSCULAR; INTRAVENOUS at 15:11

## 2018-04-28 RX ADMIN — OXYCODONE HYDROCHLORIDE 20 MG: 5 TABLET ORAL at 06:33

## 2018-04-28 RX ADMIN — OXYCODONE HYDROCHLORIDE 20 MG: 5 TABLET ORAL at 23:07

## 2018-04-28 RX ADMIN — ACETAMINOPHEN 1000 MG: 325 TABLET, FILM COATED ORAL at 23:07

## 2018-04-28 RX ADMIN — MORPHINE SULFATE 15 MG: 15 TABLET, EXTENDED RELEASE ORAL at 21:27

## 2018-04-28 RX ADMIN — OXYCODONE HYDROCHLORIDE 20 MG: 5 TABLET ORAL at 14:48

## 2018-04-28 RX ADMIN — MICONAZOLE NITRATE: 20 CREAM TOPICAL at 21:27

## 2018-04-28 RX ADMIN — HEPARIN SODIUM 5000 UNITS: 5000 INJECTION, SOLUTION INTRAVENOUS; SUBCUTANEOUS at 13:02

## 2018-04-28 RX ADMIN — SODIUM CHLORIDE 500 ML: 9 INJECTION, SOLUTION INTRAVENOUS at 23:09

## 2018-04-28 RX ADMIN — LEVOTHYROXINE SODIUM 50 MCG: 50 TABLET ORAL at 06:32

## 2018-04-28 RX ADMIN — OMEPRAZOLE 20 MG: 20 CAPSULE, DELAYED RELEASE ORAL at 06:32

## 2018-04-28 RX ADMIN — MORPHINE SULFATE 15 MG: 15 TABLET, EXTENDED RELEASE ORAL at 14:48

## 2018-04-28 RX ADMIN — Medication 220 MG: at 06:33

## 2018-04-28 RX ADMIN — ACETAMINOPHEN 1000 MG: 325 TABLET, FILM COATED ORAL at 17:50

## 2018-04-28 RX ADMIN — BUDESONIDE AND FORMOTEROL FUMARATE DIHYDRATE 2 PUFF: 160; 4.5 AEROSOL RESPIRATORY (INHALATION) at 23:07

## 2018-04-28 RX ADMIN — MICONAZOLE NITRATE: 20 CREAM TOPICAL at 14:49

## 2018-04-28 RX ADMIN — HEPARIN SODIUM 5000 UNITS: 5000 INJECTION, SOLUTION INTRAVENOUS; SUBCUTANEOUS at 06:33

## 2018-04-28 RX ADMIN — OXYCODONE HYDROCHLORIDE 20 MG: 5 TABLET ORAL at 18:57

## 2018-04-28 RX ADMIN — DIPHENHYDRAMINE HCL 25 MG: 25 TABLET ORAL at 17:56

## 2018-04-28 RX ADMIN — AMPICILLIN SODIUM AND SULBACTAM SODIUM 3 G: 2; 1 INJECTION, POWDER, FOR SOLUTION INTRAMUSCULAR; INTRAVENOUS at 09:40

## 2018-04-28 RX ADMIN — ACETAMINOPHEN 1000 MG: 325 TABLET, FILM COATED ORAL at 06:31

## 2018-04-28 RX ADMIN — MICONAZOLE NITRATE: 20 CREAM TOPICAL at 03:08

## 2018-04-28 RX ADMIN — OXYCODONE HYDROCHLORIDE 20 MG: 5 TABLET ORAL at 02:18

## 2018-04-28 RX ADMIN — OXYCODONE HYDROCHLORIDE AND ACETAMINOPHEN 500 MG: 500 TABLET ORAL at 06:32

## 2018-04-28 RX ADMIN — ATORVASTATIN CALCIUM 40 MG: 40 TABLET, FILM COATED ORAL at 21:27

## 2018-04-28 RX ADMIN — AMPICILLIN SODIUM AND SULBACTAM SODIUM 3 G: 2; 1 INJECTION, POWDER, FOR SOLUTION INTRAMUSCULAR; INTRAVENOUS at 23:06

## 2018-04-28 RX ADMIN — HEPARIN SODIUM 5000 UNITS: 5000 INJECTION, SOLUTION INTRAVENOUS; SUBCUTANEOUS at 21:27

## 2018-04-28 RX ADMIN — AMPICILLIN SODIUM AND SULBACTAM SODIUM 3 G: 2; 1 INJECTION, POWDER, FOR SOLUTION INTRAMUSCULAR; INTRAVENOUS at 03:07

## 2018-04-28 RX ADMIN — MICONAZOLE NITRATE: 20 CREAM TOPICAL at 09:40

## 2018-04-28 RX ADMIN — ACETAMINOPHEN 1000 MG: 325 TABLET, FILM COATED ORAL at 13:02

## 2018-04-28 RX ADMIN — OXYCODONE HYDROCHLORIDE 20 MG: 5 TABLET ORAL at 10:36

## 2018-04-28 RX ADMIN — BUDESONIDE AND FORMOTEROL FUMARATE DIHYDRATE 2 PUFF: 160; 4.5 AEROSOL RESPIRATORY (INHALATION) at 06:34

## 2018-04-28 ASSESSMENT — ENCOUNTER SYMPTOMS
DEPRESSION: 0
SORE THROAT: 0
HALLUCINATIONS: 0
NAUSEA: 0
COUGH: 0
BACK PAIN: 1
CHILLS: 0
DIZZINESS: 0
HEADACHES: 0
DIARRHEA: 0
MYALGIAS: 1
BLOOD IN STOOL: 0
ABDOMINAL PAIN: 0
VOMITING: 0
SHORTNESS OF BREATH: 0
NERVOUS/ANXIOUS: 1
WEAKNESS: 0
FEVER: 0

## 2018-04-28 ASSESSMENT — PAIN SCALES - GENERAL
PAINLEVEL_OUTOF10: 9
PAINLEVEL_OUTOF10: 10
PAINLEVEL_OUTOF10: 9
PAINLEVEL_OUTOF10: 10

## 2018-04-28 NOTE — CARE PLAN
Problem: Pain Management  Goal: Pain level will decrease to patient's comfort goal  Outcome: PROGRESSING SLOWER THAN EXPECTED  Pt pain assessed using pain scale and descriptors. Medicated per MAR. Pt complaining of additional pain. Rest and repositioning provided.

## 2018-04-28 NOTE — CARE PLAN
Problem: Safety  Goal: Will remain free from falls  Outcome: PROGRESSING AS EXPECTED  Pt educated to use call light before getting out of bed. Call light in reach. Bed locked in lowest position with 2 upper bed rails up. Pt encouraged to sit at edge of bed before standing up. One person assistance given to help pt to the commode and back to bed with walker. Room floor is free from clutter. Treaded socks on pt.

## 2018-04-28 NOTE — PROGRESS NOTES
Bedside report received from day nurse. Assumed care of pt. Pt awake, sitting at edge of bed. A/Ox4. Pt refused 1600 vitals. Will continue to monitor. No complaints at this time. POC reviewed and white board updated. Pt is medical. Call light in reach. Bed locked in lowest position with 2 upper bed rails up.

## 2018-04-28 NOTE — PROGRESS NOTES
Assumed care of patient after bedside report.  Patient up in chair, awake and alert and able to make needs known.  Positive safety awareness verbalized.

## 2018-04-28 NOTE — PROGRESS NOTES
[facility] Hospitalist Progress Note    Date of Service: 4/28/2018    Chief Complaint  57 y.o. male with a history of chronic respiratory failure on 5 L at baseline due to history of COPD, uncontrolled ALIREZA due to noncompliance, history of chronic lower extremity lymphedema with noncompliance therefore chronic wounds, obesity, type II diabetes, hypertension and hyperlipidemia admitted 4/24/2018 with lower extremity rash, pain, wounds found to have bilateral lower extremity cellulitis    Interval Problem Update  4/25: MRSA nasal swab pending, DC vancomycin negative. Tolerating Unasyn, Diflucan. He refuses arterial studies that limb preservation recommended.  4/26: Refusing glucose checks, sliding scale. He complains of leg pain, requesting narcotic dose increase   4/27: WBC improving, tolerating abx, legs still w significant weeping.    4/28: Continues to have bilateral leg pain and edema and weeping. Added MS Contin 15 b.i.d. for now. Patient unable to walk due to leg pain. Patient agreeable to bilateral BKA so consulted orthopedic surgeon Dr. Alejandra. Transfer to ortho.    Consultants/Specialty  LPS    Disposition  Continue IV abx, diuresis, watch renal function  Wound care  Transfer to ortho - possible bilateral BKA  Difficult DC        Review of Systems   Constitutional: Positive for malaise/fatigue. Negative for chills and fever.   HENT: Negative for sore throat.    Respiratory: Negative for cough and shortness of breath.    Cardiovascular: Negative for chest pain.   Gastrointestinal: Negative for abdominal pain, blood in stool, diarrhea, nausea and vomiting.   Genitourinary: Negative for dysuria.   Musculoskeletal: Positive for back pain, joint pain and myalgias.   Skin: Positive for itching and rash.        Extensive sloughing and weeping   Neurological: Negative for dizziness, weakness and headaches.   Psychiatric/Behavioral: Negative for depression and hallucinations. The patient is nervous/anxious.        Physical Exam  Laboratory/Imaging   Hemodynamics  Temp (24hrs), Av.6 °C (97.8 °F), Min:36.3 °C (97.4 °F), Max:36.7 °C (98.1 °F)   Temperature: 36.7 °C (98 °F)  Pulse  Av.4  Min: 69  Max: 130   Blood Pressure: 124/75      Respiratory      Respiration: 18, Pulse Oximetry: 95 %     Work Of Breathing / Effort: Moderate  RUL Breath Sounds: Clear, RML Breath Sounds: Diminished, RLL Breath Sounds: Diminished, LARY Breath Sounds: Clear, LLL Breath Sounds: Diminished    Fluids    Intake/Output Summary (Last 24 hours) at 18 1415  Last data filed at 18 1300   Gross per 24 hour   Intake              700 ml   Output             1225 ml   Net             -525 ml       Nutrition  Orders Placed This Encounter   Procedures   • Diet Order     Standing Status:   Standing     Number of Occurrences:   1     Order Specific Question:   Diet:     Answer:   Cardiac [6]     Order Specific Question:   Diet:     Answer:   Diabetic [3]     Physical Exam   Constitutional: He is oriented to person, place, and time. He appears well-developed and well-nourished. No distress.   Obese   HENT:   Head: Normocephalic.   Eyes: Conjunctivae are normal. Pupils are equal, round, and reactive to light.   Cardiovascular: Normal rate and regular rhythm.    No murmur heard.  Pulmonary/Chest: Effort normal. No respiratory distress. He has no wheezes. He exhibits no tenderness.   Abdominal: Soft. He exhibits no distension. There is no tenderness. There is no guarding.   Musculoskeletal: He exhibits edema (significant pitting edema bilaterally past knees up to thighs) and tenderness.   Neurological: He is alert and oriented to person, place, and time. No cranial nerve deficit.   Skin: Rash noted. There is erythema.   flaking skin, excoriated, bilateral lower extremities, weeping bandages   Psychiatric:   Blunt, poor insight, impulsive   Nursing note and vitals reviewed.      Recent Labs      18   0209  18   0329   WBC  11.9*  9.1    RBC  3.93*  3.67*   HEMOGLOBIN  11.2*  10.3*   HEMATOCRIT  37.1*  34.3*   MCV  94.4  93.5   MCH  28.5  28.1   MCHC  30.2*  30.0*   RDW  50.4*  49.1   PLATELETCT  198  235   MPV  11.0  10.7     Recent Labs      04/26/18   0209  04/27/18   0344  04/28/18   0329   SODIUM  134*  138  137   POTASSIUM  4.2  4.6  4.4   CHLORIDE  97  98  97   CO2  26  34*  32   GLUCOSE  133*  127*  146*   BUN  32*  27*  22   CREATININE  1.18  0.70  0.58   CALCIUM  9.1  9.2  9.2                      Assessment/Plan     * Bilateral lower leg cellulitis- (present on admission)   Assessment & Plan    -Secondary to underlying lymphedema and chronic venous stasis with lack of integrity of skin  -He is noncompliant with wound care recommendations and hygiene.  -He was seen by limb preservation, recommended compression socks but he refuses to get an arterial study therefore he is not a candidate for compression  -MRSA nasal swab negative (no contact iso needed)  -4/24 Wound Culture (LLE): Polymicrobial: Proteus mirabilis, Staph A (MSSA), Enterococcus  -Continue Unasyn, Diflucan x10d course  - Considering possible bilateral BKA  - Orthopedic surgeon Dr. Alejandra consulted        Acute on chronic diastolic (congestive) heart failure- (present on admission)   Assessment & Plan    Mild, due to not being able to prescribe Lasix on discharge due to recent AK I  -Lasix 20 IV daily  -Encouraged compliance with blood pressure rx and CPAP        Sepsis (CMS-HCC)- (present on admission)   Assessment & Plan    -This is sepsis (without associated acute organ dysfunction).  WBC improving  -Secondary to superimposed cellulitis on top of severe chronic venous stasis changes  -Left leg wound culture is growing Proteus, enterococcus, and SSA: Treat with Unasyn x10d  -Urine is growing Klebsiella, resistant to Unasyn, treated with Omnicef ×5 days  -Consider infectious disease consultation  -Limb preservation is been consulted, they recommended arterial study,  patient refuses  -X-ray of the foot to rule out osteomyelitis is pending, ESR and CRP  -Sepsis has rsolved        ALIREZA (obstructive sleep apnea)   Assessment & Plan    Uncontrolled, due to noncompliance. He was arranged to have a machine landed for him and supplied for him on previous discharge. He is still not been using it per some notes although patient to me states that he has.  -Encourage compliance with CPAP nightly        Chronic pain- (present on admission)   Assessment & Plan    -Multimodal pain therapy, his narcotics were decreased last hospital stay  Continue oxycodone 20 every 4 when necessary        Morbid obesity with BMI of 45.0-49.9, adult ([facility])- (present on admission)   Assessment & Plan    -Encourage weight loss but would be difficult given gross physical debility        Chronic respiratory failure (CMS-HCC)- (present on admission)   Assessment & Plan    -Acute on chronic, via a small component of volume overload and diastolic congestive heart failure exacerbation  -Continue Lasix 20 IV daily        Severe protein-calorie malnutrition ([facility])- (present on admission)   Assessment & Plan    -Nutrition consult        Diabetes type 2, controlled ([facility])- (present on admission)   Assessment & Plan    -Insulin sliding scale and adjust as needed to maintain blood sugar less than 140  -He is refusing insulin checks and SSI        Non compliance w medication regimen- (present on admission)   Assessment & Plan    - Is causing patient to have recurrent edema and recurrent cellulitis  - He says he has been dealing with this for 14 years  -Encourage adherence        Narcotic addiction (CMS-HCC)- (present on admission)   Assessment & Plan    -Limit the use of IV narcotics        Stasis dermatitis- (present on admission)   Assessment & Plan    -Severe, full lack of integrity of skin as well as history of medical nonadherence  -Likely associated lymphedema, not a surgical candidate  - wound Care consult  -See  below  -Pain control  -Ammonium lactate solution        COPD (chronic obstructive pulmonary disease) (HCC)- (present on admission)   Assessment & Plan    -No clear current exacerbation at this time, respiratory therapy protocol and monitor closely        HTN (hypertension)- (present on admission)   Assessment & Plan    -Continue outpatient blood pressure medications and add IV Lasix 20 mg daily for lymphedema          Quality-Core Measures   Reviewed items::  Labs reviewed and Medications reviewed  Montoya catheter::  No Montoya  Antibiotics:  Treating active infection/contamination beyond 24 hours perioperative coverage

## 2018-04-28 NOTE — PROGRESS NOTES
Daily dressing changed according to wound care instructions.  Patient aware of transfer to medical status.  Report called to Lucía AMAYA.  Awaiting transport.

## 2018-04-29 LAB
ALBUMIN SERPL BCP-MCNC: 3.6 G/DL (ref 3.2–4.9)
BACTERIA BLD CULT: NORMAL
BACTERIA BLD CULT: NORMAL
BASOPHILS # BLD AUTO: 0.5 % (ref 0–1.8)
BASOPHILS # BLD: 0.04 K/UL (ref 0–0.12)
BUN SERPL-MCNC: 25 MG/DL (ref 8–22)
CALCIUM SERPL-MCNC: 9.5 MG/DL (ref 8.5–10.5)
CHLORIDE SERPL-SCNC: 97 MMOL/L (ref 96–112)
CO2 SERPL-SCNC: 32 MMOL/L (ref 20–33)
COMMENT 1642: NORMAL
CREAT SERPL-MCNC: 0.73 MG/DL (ref 0.5–1.4)
EOSINOPHIL # BLD AUTO: 0.34 K/UL (ref 0–0.51)
EOSINOPHIL NFR BLD: 4.1 % (ref 0–6.9)
ERYTHROCYTE [DISTWIDTH] IN BLOOD BY AUTOMATED COUNT: 48.8 FL (ref 35.9–50)
GLUCOSE BLD-MCNC: 126 MG/DL (ref 65–99)
GLUCOSE SERPL-MCNC: 131 MG/DL (ref 65–99)
HCT VFR BLD AUTO: 38 % (ref 42–52)
HGB BLD-MCNC: 11.1 G/DL (ref 14–18)
IMM GRANULOCYTES # BLD AUTO: 0.24 K/UL (ref 0–0.11)
IMM GRANULOCYTES NFR BLD AUTO: 2.9 % (ref 0–0.9)
LYMPHOCYTES # BLD AUTO: 1.77 K/UL (ref 1–4.8)
LYMPHOCYTES NFR BLD: 21.3 % (ref 22–41)
MCH RBC QN AUTO: 27.6 PG (ref 27–33)
MCHC RBC AUTO-ENTMCNC: 29.2 G/DL (ref 33.7–35.3)
MCV RBC AUTO: 94.5 FL (ref 81.4–97.8)
MONOCYTES # BLD AUTO: 0.66 K/UL (ref 0–0.85)
MONOCYTES NFR BLD AUTO: 7.9 % (ref 0–13.4)
MORPHOLOGY BLD-IMP: NORMAL
NEUTROPHILS # BLD AUTO: 5.26 K/UL (ref 1.82–7.42)
NEUTROPHILS NFR BLD: 63.3 % (ref 44–72)
NRBC # BLD AUTO: 0 K/UL
NRBC BLD-RTO: 0 /100 WBC
PHOSPHATE SERPL-MCNC: 2.7 MG/DL (ref 2.5–4.5)
PLATELET # BLD AUTO: 247 K/UL (ref 164–446)
PLATELET BLD QL SMEAR: NORMAL
PMV BLD AUTO: 10.7 FL (ref 9–12.9)
POTASSIUM SERPL-SCNC: 4.4 MMOL/L (ref 3.6–5.5)
RBC # BLD AUTO: 4.02 M/UL (ref 4.7–6.1)
RBC BLD AUTO: NORMAL
SIGNIFICANT IND 70042: NORMAL
SIGNIFICANT IND 70042: NORMAL
SITE SITE: NORMAL
SITE SITE: NORMAL
SODIUM SERPL-SCNC: 137 MMOL/L (ref 135–145)
SOURCE SOURCE: NORMAL
SOURCE SOURCE: NORMAL
WBC # BLD AUTO: 8.3 K/UL (ref 4.8–10.8)

## 2018-04-29 PROCEDURE — 700102 HCHG RX REV CODE 250 W/ 637 OVERRIDE(OP): Performed by: HOSPITALIST

## 2018-04-29 PROCEDURE — 85025 COMPLETE CBC W/AUTO DIFF WBC: CPT

## 2018-04-29 PROCEDURE — 700111 HCHG RX REV CODE 636 W/ 250 OVERRIDE (IP): Performed by: HOSPITALIST

## 2018-04-29 PROCEDURE — 80069 RENAL FUNCTION PANEL: CPT

## 2018-04-29 PROCEDURE — 770001 HCHG ROOM/CARE - MED/SURG/GYN PRIV*

## 2018-04-29 PROCEDURE — 82962 GLUCOSE BLOOD TEST: CPT

## 2018-04-29 PROCEDURE — 700111 HCHG RX REV CODE 636 W/ 250 OVERRIDE (IP): Performed by: INTERNAL MEDICINE

## 2018-04-29 PROCEDURE — A9270 NON-COVERED ITEM OR SERVICE: HCPCS | Performed by: HOSPITALIST

## 2018-04-29 PROCEDURE — 99356 PR PROLONGED SVC I/P OR OBS SETTING 1ST HOUR: CPT | Performed by: HOSPITALIST

## 2018-04-29 PROCEDURE — 36415 COLL VENOUS BLD VENIPUNCTURE: CPT

## 2018-04-29 PROCEDURE — 700102 HCHG RX REV CODE 250 W/ 637 OVERRIDE(OP): Performed by: INTERNAL MEDICINE

## 2018-04-29 PROCEDURE — A9270 NON-COVERED ITEM OR SERVICE: HCPCS | Performed by: INTERNAL MEDICINE

## 2018-04-29 PROCEDURE — 99233 SBSQ HOSP IP/OBS HIGH 50: CPT | Performed by: HOSPITALIST

## 2018-04-29 PROCEDURE — 700105 HCHG RX REV CODE 258: Performed by: INTERNAL MEDICINE

## 2018-04-29 RX ORDER — OXYCODONE HYDROCHLORIDE 10 MG/1
20 TABLET ORAL 4 TIMES DAILY
Status: DISCONTINUED | OUTPATIENT
Start: 2018-04-29 | End: 2018-04-30

## 2018-04-29 RX ORDER — MORPHINE SULFATE 30 MG/1
30 TABLET, FILM COATED, EXTENDED RELEASE ORAL EVERY 12 HOURS
Status: DISCONTINUED | OUTPATIENT
Start: 2018-04-29 | End: 2018-05-05 | Stop reason: HOSPADM

## 2018-04-29 RX ORDER — FUROSEMIDE 10 MG/ML
60 INJECTION INTRAMUSCULAR; INTRAVENOUS ONCE
Status: COMPLETED | OUTPATIENT
Start: 2018-04-29 | End: 2018-04-29

## 2018-04-29 RX ADMIN — Medication 220 MG: at 08:54

## 2018-04-29 RX ADMIN — LEVOTHYROXINE SODIUM 50 MCG: 50 TABLET ORAL at 06:03

## 2018-04-29 RX ADMIN — OXYCODONE HYDROCHLORIDE 20 MG: 10 TABLET ORAL at 16:07

## 2018-04-29 RX ADMIN — OMEPRAZOLE 20 MG: 20 CAPSULE, DELAYED RELEASE ORAL at 08:54

## 2018-04-29 RX ADMIN — ATORVASTATIN CALCIUM 40 MG: 40 TABLET, FILM COATED ORAL at 20:21

## 2018-04-29 RX ADMIN — ASPIRIN 81 MG: 81 TABLET, COATED ORAL at 08:56

## 2018-04-29 RX ADMIN — HEPARIN SODIUM 5000 UNITS: 5000 INJECTION, SOLUTION INTRAVENOUS; SUBCUTANEOUS at 14:07

## 2018-04-29 RX ADMIN — FENTANYL CITRATE: 50 INJECTION, SOLUTION INTRAMUSCULAR; INTRAVENOUS at 14:07

## 2018-04-29 RX ADMIN — MORPHINE SULFATE 30 MG: 30 TABLET, EXTENDED RELEASE ORAL at 08:54

## 2018-04-29 RX ADMIN — MICONAZOLE NITRATE: 20 CREAM TOPICAL at 20:38

## 2018-04-29 RX ADMIN — OXYCODONE HYDROCHLORIDE 20 MG: 10 TABLET ORAL at 08:55

## 2018-04-29 RX ADMIN — FUROSEMIDE 20 MG: 10 INJECTION, SOLUTION INTRAMUSCULAR; INTRAVENOUS at 08:54

## 2018-04-29 RX ADMIN — AMPICILLIN SODIUM AND SULBACTAM SODIUM 3 G: 2; 1 INJECTION, POWDER, FOR SOLUTION INTRAMUSCULAR; INTRAVENOUS at 08:54

## 2018-04-29 RX ADMIN — FENTANYL CITRATE 50 MCG: 50 INJECTION INTRAMUSCULAR; INTRAVENOUS at 08:55

## 2018-04-29 RX ADMIN — TIOTROPIUM BROMIDE 1 CAPSULE: 18 CAPSULE ORAL; RESPIRATORY (INHALATION) at 17:00

## 2018-04-29 RX ADMIN — FUROSEMIDE 60 MG: 10 INJECTION, SOLUTION INTRAVENOUS at 11:05

## 2018-04-29 RX ADMIN — METFORMIN HYDROCHLORIDE 500 MG: 500 TABLET, FILM COATED ORAL at 09:22

## 2018-04-29 RX ADMIN — BUDESONIDE AND FORMOTEROL FUMARATE DIHYDRATE 2 PUFF: 160; 4.5 AEROSOL RESPIRATORY (INHALATION) at 17:00

## 2018-04-29 RX ADMIN — HEPARIN SODIUM 5000 UNITS: 5000 INJECTION, SOLUTION INTRAVENOUS; SUBCUTANEOUS at 06:03

## 2018-04-29 RX ADMIN — OXYCODONE HYDROCHLORIDE AND ACETAMINOPHEN 500 MG: 500 TABLET ORAL at 08:54

## 2018-04-29 RX ADMIN — MICONAZOLE NITRATE: 20 CREAM TOPICAL at 08:45

## 2018-04-29 RX ADMIN — MORPHINE SULFATE 30 MG: 30 TABLET, EXTENDED RELEASE ORAL at 20:21

## 2018-04-29 RX ADMIN — STANDARDIZED SENNA CONCENTRATE AND DOCUSATE SODIUM 2 TABLET: 8.6; 5 TABLET, FILM COATED ORAL at 20:25

## 2018-04-29 RX ADMIN — OXYCODONE HYDROCHLORIDE 20 MG: 10 TABLET ORAL at 12:13

## 2018-04-29 RX ADMIN — OXYCODONE HYDROCHLORIDE 20 MG: 10 TABLET ORAL at 20:21

## 2018-04-29 RX ADMIN — OXYCODONE HYDROCHLORIDE 20 MG: 5 TABLET ORAL at 03:07

## 2018-04-29 RX ADMIN — STANDARDIZED SENNA CONCENTRATE AND DOCUSATE SODIUM 1 TABLET: 8.6; 5 TABLET, FILM COATED ORAL at 08:54

## 2018-04-29 RX ADMIN — OXYCODONE HYDROCHLORIDE 20 MG: 5 TABLET ORAL at 06:03

## 2018-04-29 RX ADMIN — AMPICILLIN SODIUM AND SULBACTAM SODIUM 3 G: 2; 1 INJECTION, POWDER, FOR SOLUTION INTRAMUSCULAR; INTRAVENOUS at 20:25

## 2018-04-29 RX ADMIN — AMPICILLIN SODIUM AND SULBACTAM SODIUM 3 G: 2; 1 INJECTION, POWDER, FOR SOLUTION INTRAMUSCULAR; INTRAVENOUS at 03:07

## 2018-04-29 RX ADMIN — ACETAMINOPHEN 1000 MG: 325 TABLET, FILM COATED ORAL at 11:05

## 2018-04-29 RX ADMIN — HEPARIN SODIUM 5000 UNITS: 5000 INJECTION, SOLUTION INTRAVENOUS; SUBCUTANEOUS at 20:25

## 2018-04-29 RX ADMIN — AMPICILLIN SODIUM AND SULBACTAM SODIUM 3 G: 2; 1 INJECTION, POWDER, FOR SOLUTION INTRAMUSCULAR; INTRAVENOUS at 15:37

## 2018-04-29 ASSESSMENT — PAIN SCALES - GENERAL
PAINLEVEL_OUTOF10: 8
PAINLEVEL_OUTOF10: 8
PAINLEVEL_OUTOF10: 10
PAINLEVEL_OUTOF10: 8
PAINLEVEL_OUTOF10: 10
PAINLEVEL_OUTOF10: 10
PAINLEVEL_OUTOF10: 8

## 2018-04-29 ASSESSMENT — ENCOUNTER SYMPTOMS
SHORTNESS OF BREATH: 1
CONSTIPATION: 0
COUGH: 0
LOSS OF CONSCIOUSNESS: 0
HEARTBURN: 0
MYALGIAS: 1
NERVOUS/ANXIOUS: 0
FOCAL WEAKNESS: 0
HEADACHES: 0
WHEEZING: 0
SEIZURES: 0
BRUISES/BLEEDS EASILY: 0
NAUSEA: 0
FEVER: 0
CHILLS: 1
BLOOD IN STOOL: 0
GASTROINTESTINAL NEGATIVE: 1
DOUBLE VISION: 0
SENSORY CHANGE: 1
BLURRED VISION: 1
DIARRHEA: 0
DEPRESSION: 1
HEMOPTYSIS: 0
ABDOMINAL PAIN: 0
VOMITING: 0
DIAPHORESIS: 0
PALPITATIONS: 0
DIZZINESS: 0

## 2018-04-29 ASSESSMENT — LIFESTYLE VARIABLES: SUBSTANCE_ABUSE: 0

## 2018-04-29 NOTE — CARE PLAN
Problem: Safety  Goal: Will remain free from injury  Outcome: PROGRESSING AS EXPECTED  Patient has remained free from further injury this shift. Call light is within reach, bed is locked in lowest position, assistive devices are in the bathroom, and patient is rounded on hourly    Problem: Knowledge Deficit  Goal: Knowledge of disease process/condition, treatment plan, diagnostic tests, and medications will improve  Outcome: PROGRESSING SLOWER THAN EXPECTED  Patient continues to refuses aspects of his care such as his insulin, his fingersticks, vitals being taken, and his diabetic diet. Patient is educated that all these aspects of his care are to help his wounds heal, nonetheless, he states that he knows better and that we are only trying to make money off of him. Patient education will continue to be delivered regarding aspects of care he is refusing

## 2018-04-29 NOTE — PROGRESS NOTES
[facility] Hospitalist Progress Note    Date of Service: 4/29/2018    Chief Complaint  57 y.o. male admitted 4/24/2018 with bilateral lower extremity pain.    Interval Problem Update  Patient admitted initially with sepsis secondary to bilateral cell cellulitis and skin ulcers of the lower extremities. The patient sepsis now has resolved. The ulcers at this point remained and the patient has had multiple admissions since 2004 for the same thing. Initially the patient was evaluated by orthopedics and they thought possibly amputation of the feet would be appropriate however patient is very adamant about not having his feet amputated. After evaluating his legs and getting a surgical opinion the patient should not have his feet amputated but at this point will need vascular surgical evaluation as well as debridement and Unna boots. The patient will continue at this point with antibiotics using Unasyn. The patient has a polymicrobial infection including Proteus as well as MSSA as well as enterococcus. The patient did have a Klebsiella infection in the urine which has been treated. At this point the plan will be to get him to long-term acute care with wound care and antibiotics.    Consultants/Specialty  Surgery Dr. Stock  Infectious diseases.  Orthopedics.      Disposition  To be determined most likely LTAC transfer.    I spent a total of 90 minutes during this clinical encounter of which > 50% was devoted to counseling and coordinating care including review of records, pertinent lab data and studies, as well as discussing diagnostic evaluation and work up, planned therapeutic interventions and future disposition of care. Where indicated, the assessment and plan reflect discussion of patient with consultants, other healthcare providers, family members, and additional research needed to obtain further information in formulating the plan of care of this patient.         Review of Systems   Constitutional: Positive for  chills. Negative for diaphoresis and fever.   HENT: Negative.  Negative for hearing loss and tinnitus.    Eyes: Positive for blurred vision. Negative for double vision.   Respiratory: Positive for shortness of breath. Negative for cough, hemoptysis and wheezing.    Cardiovascular: Positive for leg swelling. Negative for chest pain and palpitations.   Gastrointestinal: Negative.  Negative for abdominal pain, blood in stool, constipation, diarrhea, heartburn, nausea and vomiting.   Genitourinary: Negative.  Negative for dysuria, frequency and urgency.   Musculoskeletal: Positive for myalgias. Negative for joint pain.   Skin: Positive for rash.   Neurological: Positive for sensory change (fingers 3-5 both hands). Negative for dizziness, focal weakness, seizures, loss of consciousness and headaches.   Endo/Heme/Allergies: Negative.  Does not bruise/bleed easily.   Psychiatric/Behavioral: Positive for depression. Negative for substance abuse and suicidal ideas. The patient is not nervous/anxious.    All other systems reviewed and are negative.     Physical Exam  Laboratory/Imaging   Hemodynamics  Temp (24hrs), Av.7 °C (98 °F), Min:36.5 °C (97.7 °F), Max:36.7 °C (98.1 °F)   Temperature: 36.7 °C (98.1 °F)  Pulse  Av.4  Min: 69  Max: 130   Blood Pressure: 155/99      Respiratory      Respiration: 20, Pulse Oximetry: 93 %        RUL Breath Sounds: Clear, RML Breath Sounds: Diminished, RLL Breath Sounds: Diminished, LARY Breath Sounds: Clear, LLL Breath Sounds: Diminished    Fluids    Intake/Output Summary (Last 24 hours) at 18 1615  Last data filed at 18 1200   Gross per 24 hour   Intake                0 ml   Output             1625 ml   Net            -1625 ml       Nutrition  Orders Placed This Encounter   Procedures   • Diet Order     Standing Status:   Standing     Number of Occurrences:   1     Order Specific Question:   Diet:     Answer:   Cardiac [6]     Order Specific Question:   Diet:     Answer:    Diabetic [3]     Physical Exam   Constitutional: He is oriented to person, place, and time. He appears well-developed and well-nourished. He is cooperative. No distress. Nasal cannula in place.   HENT:   Head: Normocephalic and atraumatic.   Right Ear: External ear normal.   Left Ear: External ear normal.   Nose: Nose normal.   Mouth/Throat: Oropharynx is clear and moist.   Eyes: Conjunctivae and EOM are normal. Pupils are equal, round, and reactive to light. Right eye exhibits no discharge. Left eye exhibits no discharge.   Neck: Normal range of motion. Neck supple. No JVD present. No thyromegaly present.   Cardiovascular: Normal rate and regular rhythm.    No murmur heard.  Pulmonary/Chest: He has no wheezes. He has no rales. He exhibits no tenderness.   Abdominal: He exhibits no distension and no mass. There is no tenderness. There is no rebound and no guarding.   Musculoskeletal: Normal range of motion. He exhibits no edema or tenderness.   Lymphadenopathy:     He has no cervical adenopathy.   Neurological: He is alert and oriented to person, place, and time. He has normal reflexes. No cranial nerve deficit.   Skin: Skin is warm and dry. Rash noted. He is not diaphoretic. There is erythema.   Psychiatric: His speech is normal. Judgment and thought content normal. His mood appears anxious. He is slowed. Cognition and memory are normal. He exhibits a depressed mood (very tearful and frustrated).   Nursing note and vitals reviewed.      Recent Labs      04/28/18 0329 04/29/18 0228   WBC  9.1  8.3   RBC  3.67*  4.02*   HEMOGLOBIN  10.3*  11.1*   HEMATOCRIT  34.3*  38.0*   MCV  93.5  94.5   MCH  28.1  27.6   MCHC  30.0*  29.2*   RDW  49.1  48.8   PLATELETCT  235  247   MPV  10.7  10.7     Recent Labs      04/27/18   0344  04/28/18 0329 04/29/18 0228   SODIUM  138  137  137   POTASSIUM  4.6  4.4  4.4   CHLORIDE  98  97  97   CO2  34*  32  32   GLUCOSE  127*  146*  131*   BUN  27*  22  25*   CREATININE   0.70  0.58  0.73   CALCIUM  9.2  9.2  9.5                      Assessment/Plan     * Bilateral lower leg cellulitis- (present on admission)   Assessment & Plan    Patient has extensive venous ulcers of both lower extremities since 2004. In the past the patient has had outpatient follow-ups with Dr. Aparicio as well as his primary care physician Dr. Emiliano Mckenzie.  The patient once again has failed outpatient management this is not his 1st time. I have spoken with vascular surgery to do at least arterial evaluations and they will at this point looking at the patient's overall condition and possibly debride his lower extremities.  Continue at this point with IV Unasyn for a complex infection with Proteus, Enterococcus faecalis, MSSA.        Left lower extremity dorsal view        Left lower extremity dorsal view        Right lower extremity dorsal view        Left frontal view        Frontal view right leg                  Acute on chronic diastolic (congestive) heart failure- (present on admission)   Assessment & Plan    Heart failure at this point and patient is secondary to obesity.  Patient at this point will be started on Lasix.  Monitor potassium levels  Continue at this point with beta blockers.  Continue at this point with aggressive cardiac care.        Sepsis (CMS-HCC)- (present on admission)   Assessment & Plan    Patient currently is no longer septic. The white blood cell count is down to 8.3. Blood pressure is normal.  Patient did have a complex infection with Proteus mirabilis, and Enterococcus faecalis and MSSA.  Spoke with ID and recommendation is at least 2 weeks of IV Unasyn.  Continue to monitor lactic acid level, blood pressure, vitals.          ALIREZA (obstructive sleep apnea)   Assessment & Plan    Patient uses CPAP at nighttime. The patient's weight at this point and COPD caused him to have obstructive sleep apnea. The patient really needs life-saving gastric surgery of some sort. We will need to  speak with the gastric surgeons for bariatrics.        Chronic pain- (present on admission)   Assessment & Plan    At this point I started him on a PCA with fentanyl. I have increased his oxycodone as well as MS Contin. The patient has extensive pain 10 out of 10 almost constantly with his lower extremity ulcers.        Morbid obesity with BMI of 40.0-44.9, adult ([facility])- (present on admission)   Assessment & Plan    Body mass index is 44.93 kg/m².  Patient at this point needs continued weight loss management needs outpatient or inpatient bariatric support and will need to speak with the surgeons regarding this.        Chronic respiratory failure (CMS-HCC)- (present on admission)   Assessment & Plan    Patient currently is not with acute respiratory failure. He does need chronic COPD management and oxygen support. I've also started on aggressive Lasix management.        Severe protein-calorie malnutrition ([facility])- (present on admission)   Assessment & Plan    Continue at this point with optimal nutritional support and diabetic parameters.        Diabetes type 2, controlled ([facility])- (present on admission)   Assessment & Plan    -accus with sliding scale coverage , continued as a reactive measure.  -diabetic diet is actively being given with 2000 sampson.  -diabetic education provided  -follow glycohemoglobin levels long term, most recent hemoglobin A1c 6.4 showing relatively good control.  -continue with oral antihyperglycemics, as a proactive measure I have placed him on metformin and Januvia.  -monitor for hypoglycemic episodes and adjust control if he should get low        Non compliance w medication regimen- (present on admission)   Assessment & Plan    Patient at this point has been counseled on staying compliant with his medication regimen. At this point I will continue with monitoring the patient's condition and guide him.        Narcotic addiction (CMS-HCC)- (present on admission)   Assessment & Plan    Optimize pain  management as the patient is in severe pain. Bowels and outpatient we will need to monitor him for narcotic addiction.        Stasis dermatitis- (present on admission)   Assessment & Plan    The patient at this point most likely will benefit from surgical debridement of both lower extremities. I spoke with Dr. Stock, he has evaluated the patient and feels that surgery is most likely going to be necessary.        COPD (chronic obstructive pulmonary disease) (HCC)- (present on admission)   Assessment & Plan    Continue medical management optimization RT protocol nebulizers and oxygen as needed        HTN (hypertension)- (present on admission)   Assessment & Plan    -Continue at this point with blood pressure management optimization his most recent blood pressure is 141/95 some of this is related to his pain.          Quality-Core Measures   Reviewed items::  EKG reviewed, Labs reviewed, Medications reviewed and Radiology images reviewed  Montoya catheter::  No Montoya  DVT prophylaxis pharmacological::  Heparin  Ulcer Prophylaxis::  Not indicated  Antibiotics:  Treating active infection/contamination beyond 24 hours perioperative coverage  Assessed for rehabilitation services:  Patient was assess for and/or received rehabilitation services during this hospitalization

## 2018-04-29 NOTE — PROGRESS NOTES
Consulted for consideration of BKA  Will have him seen by Pool Arzola for amputee counseling  Plan BKA Monday if he wishes

## 2018-04-29 NOTE — PROGRESS NOTES
Pt ambulated with FWW from wheelchair to cardiac chair. Patient resting quietly in chair, no S/S of distress, AA&Ox4, irritable. Denies SOB, chest pain, dizziness. Call light within reach,  pt calls appropriately and does not get up. Bed in lowest position, bed locked, RN and CNA numbers provided, no further needs at this time. No changes from EPIC. Hourly rounding in place.      Two RN skin check complete with Mervat AMAYA. Buttocks excoriated with small skin tear on thigh. Wounds on legs and feet wrapped with dressing, TR heels. All other skin intact.

## 2018-04-29 NOTE — PROGRESS NOTES
Pt requested Symbicort and Spiriva inhalers that he refused this morning. Education was provided about use of these inhalers on a schedule and that they are not meant to be used as a rescue inhaler. Pt stated that he understood and knew how they were supposed to be used.

## 2018-04-29 NOTE — PROGRESS NOTES
DATE OF SERVICE:  04/29/2018    TIME:  0830 a.m.    SUBJECTIVE:  The patient is getting his dressing change.  He has extensive   chronic venous stasis ulcerations in his lower extremities with concomitant   cellulitis.  He is morbidly obese.  No new complaints today.    Discussed with Dr. Ferreira.  We are going to get a vascular consultation to   ensure that his arterial supply is optimized.  Continue with wound care.  He   may require amputations.       ____________________________________     MD DENI CARDENAS / MATT    DD:  04/29/2018 13:01:18  DT:  04/29/2018 15:18:04    D#:  [id number]  Job#:  [id number]

## 2018-04-30 LAB
ALBUMIN SERPL BCP-MCNC: 3.7 G/DL (ref 3.2–4.9)
BUN SERPL-MCNC: 26 MG/DL (ref 8–22)
CALCIUM SERPL-MCNC: 9.9 MG/DL (ref 8.5–10.5)
CHLORIDE SERPL-SCNC: 93 MMOL/L (ref 96–112)
CO2 SERPL-SCNC: 35 MMOL/L (ref 20–33)
CREAT SERPL-MCNC: 0.82 MG/DL (ref 0.5–1.4)
GLUCOSE SERPL-MCNC: 140 MG/DL (ref 65–99)
PHOSPHATE SERPL-MCNC: 2.9 MG/DL (ref 2.5–4.5)
POTASSIUM SERPL-SCNC: 4.7 MMOL/L (ref 3.6–5.5)
SODIUM SERPL-SCNC: 137 MMOL/L (ref 135–145)

## 2018-04-30 PROCEDURE — 700102 HCHG RX REV CODE 250 W/ 637 OVERRIDE(OP): Performed by: INTERNAL MEDICINE

## 2018-04-30 PROCEDURE — 770001 HCHG ROOM/CARE - MED/SURG/GYN PRIV*

## 2018-04-30 PROCEDURE — 700102 HCHG RX REV CODE 250 W/ 637 OVERRIDE(OP): Performed by: HOSPITALIST

## 2018-04-30 PROCEDURE — A9270 NON-COVERED ITEM OR SERVICE: HCPCS | Performed by: INTERNAL MEDICINE

## 2018-04-30 PROCEDURE — 700111 HCHG RX REV CODE 636 W/ 250 OVERRIDE (IP): Performed by: HOSPITALIST

## 2018-04-30 PROCEDURE — 700111 HCHG RX REV CODE 636 W/ 250 OVERRIDE (IP): Performed by: INTERNAL MEDICINE

## 2018-04-30 PROCEDURE — A9270 NON-COVERED ITEM OR SERVICE: HCPCS | Performed by: HOSPITALIST

## 2018-04-30 PROCEDURE — 700105 HCHG RX REV CODE 258: Performed by: INTERNAL MEDICINE

## 2018-04-30 PROCEDURE — 80069 RENAL FUNCTION PANEL: CPT

## 2018-04-30 PROCEDURE — 99233 SBSQ HOSP IP/OBS HIGH 50: CPT | Performed by: HOSPITALIST

## 2018-04-30 PROCEDURE — 36415 COLL VENOUS BLD VENIPUNCTURE: CPT

## 2018-04-30 RX ORDER — OXYCODONE HYDROCHLORIDE 10 MG/1
40 TABLET ORAL EVERY 4 HOURS
Status: DISCONTINUED | OUTPATIENT
Start: 2018-04-30 | End: 2018-05-05 | Stop reason: HOSPADM

## 2018-04-30 RX ORDER — OXYCODONE HYDROCHLORIDE 10 MG/1
20 TABLET ORAL ONCE
Status: COMPLETED | OUTPATIENT
Start: 2018-04-30 | End: 2018-04-30

## 2018-04-30 RX ORDER — OXYCODONE HYDROCHLORIDE 10 MG/1
40 TABLET ORAL 4 TIMES DAILY
Status: DISCONTINUED | OUTPATIENT
Start: 2018-04-30 | End: 2018-04-30

## 2018-04-30 RX ORDER — LORAZEPAM 2 MG/ML
0.5 INJECTION INTRAMUSCULAR
Status: DISCONTINUED | OUTPATIENT
Start: 2018-04-30 | End: 2018-05-05 | Stop reason: HOSPADM

## 2018-04-30 RX ADMIN — AMPICILLIN SODIUM AND SULBACTAM SODIUM 3 G: 2; 1 INJECTION, POWDER, FOR SOLUTION INTRAMUSCULAR; INTRAVENOUS at 21:20

## 2018-04-30 RX ADMIN — FUROSEMIDE 20 MG: 10 INJECTION, SOLUTION INTRAMUSCULAR; INTRAVENOUS at 11:37

## 2018-04-30 RX ADMIN — ATORVASTATIN CALCIUM 40 MG: 40 TABLET, FILM COATED ORAL at 21:19

## 2018-04-30 RX ADMIN — OXYCODONE HYDROCHLORIDE 20 MG: 10 TABLET ORAL at 02:52

## 2018-04-30 RX ADMIN — TIOTROPIUM BROMIDE 1 CAPSULE: 18 CAPSULE ORAL; RESPIRATORY (INHALATION) at 11:37

## 2018-04-30 RX ADMIN — HEPARIN SODIUM 5000 UNITS: 5000 INJECTION, SOLUTION INTRAVENOUS; SUBCUTANEOUS at 05:59

## 2018-04-30 RX ADMIN — OXYCODONE HYDROCHLORIDE 40 MG: 10 TABLET ORAL at 17:09

## 2018-04-30 RX ADMIN — AMPICILLIN SODIUM AND SULBACTAM SODIUM 3 G: 2; 1 INJECTION, POWDER, FOR SOLUTION INTRAMUSCULAR; INTRAVENOUS at 16:24

## 2018-04-30 RX ADMIN — OXYCODONE HYDROCHLORIDE 40 MG: 10 TABLET ORAL at 21:19

## 2018-04-30 RX ADMIN — OMEPRAZOLE 20 MG: 20 CAPSULE, DELAYED RELEASE ORAL at 07:56

## 2018-04-30 RX ADMIN — MICONAZOLE NITRATE: 20 CREAM TOPICAL at 22:26

## 2018-04-30 RX ADMIN — AMPICILLIN SODIUM AND SULBACTAM SODIUM 3 G: 2; 1 INJECTION, POWDER, FOR SOLUTION INTRAMUSCULAR; INTRAVENOUS at 02:36

## 2018-04-30 RX ADMIN — MORPHINE SULFATE 30 MG: 30 TABLET, EXTENDED RELEASE ORAL at 07:56

## 2018-04-30 RX ADMIN — STANDARDIZED SENNA CONCENTRATE AND DOCUSATE SODIUM 2 TABLET: 8.6; 5 TABLET, FILM COATED ORAL at 07:56

## 2018-04-30 RX ADMIN — OMEGA-3 FATTY ACIDS CAP 1000 MG 1000 MG: 1000 CAP at 17:09

## 2018-04-30 RX ADMIN — OXYCODONE HYDROCHLORIDE 40 MG: 10 TABLET ORAL at 08:02

## 2018-04-30 RX ADMIN — Medication 220 MG: at 07:56

## 2018-04-30 RX ADMIN — THERA TABS 1 TABLET: TAB at 07:56

## 2018-04-30 RX ADMIN — ASPIRIN 81 MG: 81 TABLET, COATED ORAL at 07:56

## 2018-04-30 RX ADMIN — OMEGA-3 FATTY ACIDS CAP 1000 MG 1000 MG: 1000 CAP at 11:38

## 2018-04-30 RX ADMIN — FENTANYL CITRATE: 50 INJECTION, SOLUTION INTRAMUSCULAR; INTRAVENOUS at 22:11

## 2018-04-30 RX ADMIN — METFORMIN HYDROCHLORIDE 500 MG: 500 TABLET, FILM COATED ORAL at 07:56

## 2018-04-30 RX ADMIN — LEVOTHYROXINE SODIUM 50 MCG: 50 TABLET ORAL at 05:59

## 2018-04-30 RX ADMIN — BUDESONIDE AND FORMOTEROL FUMARATE DIHYDRATE 2 PUFF: 160; 4.5 AEROSOL RESPIRATORY (INHALATION) at 21:19

## 2018-04-30 RX ADMIN — OXYCODONE HYDROCHLORIDE 40 MG: 10 TABLET ORAL at 13:20

## 2018-04-30 RX ADMIN — HEPARIN SODIUM 5000 UNITS: 5000 INJECTION, SOLUTION INTRAVENOUS; SUBCUTANEOUS at 21:20

## 2018-04-30 RX ADMIN — MORPHINE SULFATE 30 MG: 30 TABLET, EXTENDED RELEASE ORAL at 21:19

## 2018-04-30 RX ADMIN — BUDESONIDE AND FORMOTEROL FUMARATE DIHYDRATE 2 PUFF: 160; 4.5 AEROSOL RESPIRATORY (INHALATION) at 11:38

## 2018-04-30 RX ADMIN — HEPARIN SODIUM 5000 UNITS: 5000 INJECTION, SOLUTION INTRAVENOUS; SUBCUTANEOUS at 13:20

## 2018-04-30 RX ADMIN — METFORMIN HYDROCHLORIDE 500 MG: 500 TABLET, FILM COATED ORAL at 17:09

## 2018-04-30 RX ADMIN — OXYCODONE HYDROCHLORIDE AND ACETAMINOPHEN 500 MG: 500 TABLET ORAL at 07:56

## 2018-04-30 RX ADMIN — OMEGA-3 FATTY ACIDS CAP 1000 MG 1000 MG: 1000 CAP at 07:56

## 2018-04-30 RX ADMIN — AMPICILLIN SODIUM AND SULBACTAM SODIUM 3 G: 2; 1 INJECTION, POWDER, FOR SOLUTION INTRAMUSCULAR; INTRAVENOUS at 07:56

## 2018-04-30 RX ADMIN — ACETAMINOPHEN 1000 MG: 325 TABLET, FILM COATED ORAL at 17:10

## 2018-04-30 ASSESSMENT — ENCOUNTER SYMPTOMS
BLURRED VISION: 1
COUGH: 0
ABDOMINAL PAIN: 0
SEIZURES: 0
BLOOD IN STOOL: 0
NAUSEA: 0
DOUBLE VISION: 0
BACK PAIN: 0
HEARTBURN: 0
LOSS OF CONSCIOUSNESS: 0
GASTROINTESTINAL NEGATIVE: 1
CONSTIPATION: 0
TINGLING: 1
WEIGHT LOSS: 0
NECK PAIN: 1
SHORTNESS OF BREATH: 1
MYALGIAS: 1
HEADACHES: 0
VOMITING: 0
DIARRHEA: 0
PALPITATIONS: 0
HEMOPTYSIS: 0
FOCAL WEAKNESS: 0
BRUISES/BLEEDS EASILY: 0
DIZZINESS: 0
NERVOUS/ANXIOUS: 0
SENSORY CHANGE: 1
DEPRESSION: 1
DIAPHORESIS: 1
CHILLS: 1
WHEEZING: 0

## 2018-04-30 ASSESSMENT — PATIENT HEALTH QUESTIONNAIRE - PHQ9
SUM OF ALL RESPONSES TO PHQ9 QUESTIONS 1 AND 2: 0
2. FEELING DOWN, DEPRESSED, IRRITABLE, OR HOPELESS: NOT AT ALL
1. LITTLE INTEREST OR PLEASURE IN DOING THINGS: NOT AT ALL
SUM OF ALL RESPONSES TO PHQ9 QUESTIONS 1 AND 2: 0
1. LITTLE INTEREST OR PLEASURE IN DOING THINGS: NOT AT ALL
2. FEELING DOWN, DEPRESSED, IRRITABLE, OR HOPELESS: NOT AT ALL

## 2018-04-30 ASSESSMENT — LIFESTYLE VARIABLES: SUBSTANCE_ABUSE: 0

## 2018-04-30 ASSESSMENT — PAIN SCALES - GENERAL: PAINLEVEL_OUTOF10: 9

## 2018-04-30 NOTE — PROGRESS NOTES
[facility] Hospitalist Progress Note    Date of Service: 4/30/2018    Chief Complaint  57 y.o. male admitted 4/24/2018 with bilateral lower extremity pain.    Interval Problem Update  Patient admitted with recurrent bilateral cellulitis of the lower extremities. The patient has severe swelling of both lower extremities with pitting edema. The patient does have pulses and vascular studies have never been done on the patient. Patient did have DVT studies in the past which were negative. The patient at this point was evaluated by myself as well as surgery. We at this point feel that the patient's legs can be salvaged without having an amputation thus there is no need for any orthopedic amputation at this point. The patient will need debridement and afterwards will need aggressive wound care management and LTAC. Continue at this point with antibodies I've asked ID to see the patient and they have happily agreed. The patient at this point will need continued pain management though as he is in severe pain most at times he has been in 10 out of 10 pain. I placed on a PCA along with adjusting his oxycodone and MS Contin. I am aware at this point that the patient is receiving high doses of narcotics but at this point to control his pain and with his previous tolerance to pain medications the patient will need aggressive management. Patient at this point has severe condition and will require long-term management to get him back to a normal. The patient does have obesity which will require a gastric sleeve versus other methods to have him reduce his weight. The patient is over 300 pounds.    Consultants/Specialty  Surgery Dr. Stock  Infectious diseases.  Orthopedics.      Disposition  To be determined most likely LTAC transfer.    I spent a total of 50 minutes during this clinical encounter of which > 50% was devoted to counseling and coordinating care including review of records, pertinent lab data and studies, as well as  discussing diagnostic evaluation and work up, planned therapeutic interventions and future disposition of care. Where indicated, the assessment and plan reflect discussion of patient with consultants, other healthcare providers, family members, and additional research needed to obtain further information in formulating the plan of care of this patient.         Review of Systems   Constitutional: Positive for chills and diaphoresis. Negative for malaise/fatigue and weight loss.   HENT: Negative.    Eyes: Positive for blurred vision. Negative for double vision.   Respiratory: Positive for shortness of breath. Negative for cough, hemoptysis and wheezing.    Cardiovascular: Positive for leg swelling. Negative for chest pain and palpitations.   Gastrointestinal: Negative.  Negative for abdominal pain, blood in stool, constipation, diarrhea, heartburn, nausea and vomiting.   Genitourinary: Negative.  Negative for dysuria, frequency and urgency.   Musculoskeletal: Positive for myalgias and neck pain. Negative for back pain and joint pain.   Skin: Positive for rash.   Neurological: Positive for tingling (in fingers) and sensory change (fingers 3-5 both hands). Negative for dizziness, focal weakness, seizures, loss of consciousness and headaches.   Endo/Heme/Allergies: Negative.  Does not bruise/bleed easily.   Psychiatric/Behavioral: Positive for depression. Negative for substance abuse and suicidal ideas. The patient is not nervous/anxious.    All other systems reviewed and are negative.     Physical Exam  Laboratory/Imaging   Hemodynamics  Temp (24hrs), Av.6 °C (97.9 °F), Min:36.4 °C (97.6 °F), Max:36.7 °C (98.1 °F)   Temperature: 36.7 °C (98.1 °F)  Pulse  Av.3  Min: 69  Max: 130   Blood Pressure: 145/91      Respiratory      Respiration: 18, Pulse Oximetry: 94 %     Work Of Breathing / Effort: Moderate  RUL Breath Sounds: Clear, RML Breath Sounds: Diminished, RLL Breath Sounds: Diminished, LARY Breath Sounds:  Clear, LLL Breath Sounds: Diminished    Fluids    Intake/Output Summary (Last 24 hours) at 04/30/18 1605  Last data filed at 04/30/18 0908   Gross per 24 hour   Intake           258.85 ml   Output                0 ml   Net           258.85 ml       Nutrition  Orders Placed This Encounter   Procedures   • Diet Order     Standing Status:   Standing     Number of Occurrences:   1     Order Specific Question:   Diet:     Answer:   Cardiac [6]     Order Specific Question:   Diet:     Answer:   Diabetic [3]     Physical Exam   Constitutional: He is oriented to person, place, and time. He appears well-developed and well-nourished. He is cooperative. Nasal cannula in place.   HENT:   Head: Normocephalic and atraumatic.   Right Ear: External ear normal.   Left Ear: External ear normal.   Mouth/Throat: No oropharyngeal exudate.   Eyes: Conjunctivae and EOM are normal. Pupils are equal, round, and reactive to light.   Neck: Normal range of motion. Neck supple. No tracheal deviation present.   Cardiovascular: Normal rate and regular rhythm.    No murmur heard.  Pulmonary/Chest: Effort normal and breath sounds normal. No stridor. No respiratory distress. He has no wheezes.   Abdominal: Soft. Bowel sounds are normal. He exhibits no distension. There is no tenderness.   Musculoskeletal: Normal range of motion. He exhibits no edema or deformity.   Neurological: He is alert and oriented to person, place, and time. He has normal reflexes.   Skin: Skin is warm and dry. Rash noted. There is erythema.   Psychiatric: His speech is normal. Judgment and thought content normal. His mood appears anxious. He is slowed. Cognition and memory are normal. He exhibits a depressed mood (very tearful and frustrated).   Nursing note and vitals reviewed.      Recent Labs      04/28/18   0329  04/29/18   0228   WBC  9.1  8.3   RBC  3.67*  4.02*   HEMOGLOBIN  10.3*  11.1*   HEMATOCRIT  34.3*  38.0*   MCV  93.5  94.5   MCH  28.1  27.6   MCHC  30.0*   29.2*   RDW  49.1  48.8   PLATELETCT  235  247   MPV  10.7  10.7     Recent Labs      04/28/18   0329  04/29/18   0228  04/30/18   0226   SODIUM  137  137  137   POTASSIUM  4.4  4.4  4.7   CHLORIDE  97  97  93*   CO2  32  32  35*   GLUCOSE  146*  131*  140*   BUN  22  25*  26*   CREATININE  0.58  0.73  0.82   CALCIUM  9.2  9.5  9.9                      Assessment/Plan     * Bilateral lower leg cellulitis- (present on admission)   Assessment & Plan    Patient at this point will need debridement and I spoke with surgery about this they will debride the patient at a specific time that they are able to get operative room and the patient will continue at this point with pain management as well as antibiotic management.  I placed the patient a PCA with fentanyl 100 µg every hour he's on oxycodone 40 mg every 6 hours and that he's also on MS Contin 30 mg twice daily. Even like this patient's pain remains at about an 8 and the patient still has acidity and no signs of narcotic toxicity.        Left lower extremity dorsal view        Left lower extremity dorsal view        Right lower extremity dorsal view        Left frontal view        Frontal view right leg                  Acute on chronic diastolic (congestive) heart failure- (present on admission)   Assessment & Plan    Continue at this point diuresis with Lasix. Continue at this point with beta blockers. Continue to monitor fluid status. Most recent left ventricular ejection fraction is70%.        Sepsis (CMS-HCC)- (present on admission)   Assessment & Plan    Currently patient is not septic.  White blood cell counts as well as fevers have subsided.  Patient does not have any diaphoresis or chills or fevers.  Patient is on Unasyn and we will continue Unasyn until the patient's cultures are improved. Spoke with ID and they will see the patient.  Patient's cultures were positive for Proteus enterococcus faecalis and MSSA.  Spoke with surgery NA at this point will take  the patient for debridement.  Spoke to LTAC patient is approved to go to their facility once we have the patient          ALIREZA (obstructive sleep apnea)   Assessment & Plan    Continue CPAP at nighttime.  Educated patient on the necessity of weight loss management.        Chronic pain- (present on admission)   Assessment & Plan    Patient this point is on a PCA with fentanyl 100 µg every 1 hour.  Patient is on oxycodone 40 mg every 6 hours.  Patient is on MS Contin 30 mg twice daily.        Morbid obesity with BMI of 40.0-44.9, adult ([facility])- (present on admission)   Assessment & Plan    Body mass index is 44.93 kg/m².  Patient was counseled on weight loss management. The patient will need an outpatient bariatric evaluation and bariatric surgery. For him bariatric surgery will be life saving without it he will live more than 5-10 years.        Chronic respiratory failure (CMS-HCC)- (present on admission)   Assessment & Plan    Continue at this point with RT protocol and nebulizer treatments. The patient sporadically uses nebulizers in the past I explained to him the necessity of keeping his airways open.        Severe protein-calorie malnutrition (HCC)- (present on admission)   Assessment & Plan    Patient will need at this point aggressive protein calorie nutritional support.        Diabetes type 2, controlled ([facility])- (present on admission)   Assessment & Plan    Continue at this point with aggressive diabetic management. Continue with Accu-Cheks and sliding scale coverage. Continue at this point with metformin        Non compliance w medication regimen- (present on admission)   Assessment & Plan    In the past patient was told that he was noncompliant with treatment.        Narcotic addiction (CMS-HCC)- (present on admission)   Assessment & Plan    Currently patient's pain is extreme.        Stasis dermatitis- (present on admission)   Assessment & Plan    After surgery is evaluated him 40 of dermatitis the patient  will need debridement at this point operative time is being scheduled.        COPD (chronic obstructive pulmonary disease) (HCC)- (present on admission)   Assessment & Plan    Remains on RT protocol. It is        HTN (hypertension)- (present on admission)   Assessment & Plan    Continue with medical management optimization of his blood pressure.  Most recent blood pressure is 131/90          Quality-Core Measures   Reviewed items::  EKG reviewed, Labs reviewed, Medications reviewed and Radiology images reviewed  Montoya catheter::  No Montoya  DVT prophylaxis pharmacological::  Heparin  Ulcer Prophylaxis::  Not indicated  Antibiotics:  Treating active infection/contamination beyond 24 hours perioperative coverage  Assessed for rehabilitation services:  Patient was assess for and/or received rehabilitation services during this hospitalization

## 2018-04-30 NOTE — PROGRESS NOTES
Dressing change performed. Pt was given fentanyl prior to dressing change (see MAR). Pt refused the warm soapy water soak and wash that is ordered, but allowed flushing with normal saline. Aquacel, abd pads, and rolled guaze applied.     Pt was agitated and uncooperative with RN and MD.     Pt was seen by luis DAUGHERTY, Ricky MUÑIZ, and Pool Arzola (amputee counseling).

## 2018-04-30 NOTE — PROGRESS NOTES
Received report from night RN. Pt a&o x4, sitting edge of bed, states pain 10/10. Plan- consults concerning vascular status of bilat lower legs/feet, pain management, dressing change. Goals- maintain skin integrity, no falls, infection prevention. Notes. Labs, and tests reviewed. Call light in place, bed down and locked, hourly rounding in place. 12 hour chart check complete.

## 2018-04-30 NOTE — CONSULTS
Vascular Surgery Consult Note  -------------------------------------------------------------------------------------------------  Date: 4/29/2018    Consulting Physician: Chino Stock M.D. [facility]  -------------------------------------------------------------------------------------------------    Reason for consultation: leg wounds    HPI: This is a 57 y.o. male who has a had severe lower extremity venous-stasis wounds since 2006.  He was following regularly with Dr. Bazzi(?) at [facility] and was in unna boot therapy, but the frequent visits were costing too much in travel money and he eventually lost continuity.  Without regular unna boot therapy his legs have gotten much worse and he is concerned about infection. He also has severe pain. Currently the wounds are wrapped with gauze up to near the knee, but the skin above this shows hyperpigmentation and lipodermatosclerosis. He has asked me not to remove the bandages as they were just put on and the legs hurt too much.    Past Medical History:   Diagnosis Date   • Asthma    • At risk for sleep apnea    • Chronic obstructive pulmonary disease (HCC)    • Congestive heart failure (HCC)    • Hypertension    • Type II or unspecified type diabetes mellitus without mention of complication, not stated as uncontrolled        No past surgical history on file.    Current Facility-Administered Medications   Medication Dose Route Frequency Provider Last Rate Last Dose   • oxyCODONE immediate release (ROXICODONE) tablet 20 mg  20 mg Oral 4X/DAY Basilio Ferreira M.D.   20 mg at 04/29/18 2021   • morphine ER (MS CONTIN) tablet 30 mg  30 mg Oral Q12HRS Basilio Ferreira M.D.   30 mg at 04/29/18 2021   • metFORMIN (GLUCOPHAGE) tablet 500 mg  500 mg Oral BID WITH MEALS Basilio Ferreira M.D.   Stopped at 04/29/18 1730   • fentaNYL (SUBLIMAZE) 50 mcg/mL in 50 mL (PCA)   Intravenous Continuous Basilio Ferreira M.D.       • miconazole 2%-zinc oxide (ELDER) topical cream    Topical Q6HR Catherine Saeed D.O.       • albuterol inhaler 2 Puff  2 Puff Inhalation Q6HRS PRN Nabeel Hahn M.D.   2 Puff at 04/24/18 1925   • ammonium lactate (LAC-HYDRIN) 12 % lotion 2 Application  2 Application Topical DAILY Nabeel Hahn M.D.   2 Application at 04/26/18 1700   • ascorbic acid (ascorbic acid) tablet 500 mg  500 mg Oral DAILY Nabeel Hahn M.D.   500 mg at 04/29/18 0854   • aspirin EC (ECOTRIN) tablet 81 mg  81 mg Oral DAILY Nabeel Hahn M.D.   81 mg at 04/29/18 0856   • atorvastatin (LIPITOR) tablet 40 mg  40 mg Oral QHS Nabeel Hahn M.D.   40 mg at 04/29/18 2021   • budesonide-formoterol (SYMBICORT) 160-4.5 MCG/ACT inhaler 2 Puff  2 Puff Inhalation BID Nabeel Hahn M.D.   2 Puff at 04/29/18 1700   • levothyroxine (SYNTHROID) tablet 50 mcg  50 mcg Oral AM ES Nabeel Hahn M.D.   50 mcg at 04/29/18 0603   • multivitamin (THERAGRAN) tablet 1 Tab  1 Tab Oral DAILY Nabeel Hahn M.D.       • fish oil capsule 1,000 mg  1,000 mg Oral TID WITH MEALS Nabeel Hahn M.D.   1,000 mg at 04/26/18 0946   • omeprazole (PRILOSEC) capsule 20 mg  20 mg Oral DAILY Nabeel Hahn M.D.   20 mg at 04/29/18 0854   • tiotropium (SPIRIVA) 18 MCG inhalation capsule 1 Cap  1 Cap Inhalation DAILY Nabeel Hahn M.D.   1 Cap at 04/29/18 1700   • zinc sulfate (ZINCATE) capsule 220 mg  220 mg Oral DAILY Nabeel Hahn M.D.   220 mg at 04/29/18 0854   • senna-docusate (PERICOLACE or SENOKOT S) 8.6-50 MG per tablet 2 Tab  2 Tab Oral BID Nabeel Hahn M.D.   2 Tab at 04/29/18 2025    And   • polyethylene glycol/lytes (MIRALAX) PACKET 1 Packet  1 Packet Oral QDAY PRSEGUNDO Hahn M.D.        And   • magnesium hydroxide (MILK OF MAGNESIA) suspension 30 mL  30 mL Oral QDAY PRSEGUNDO Hahn M.D.        And   • bisacodyl (DULCOLAX) suppository 10 mg  10 mg Rectal QDAY PRSEGUNDO Hahn M.D.       • Respiratory Care per Protocol   Nebulization Continuous RT Nabeel MOTLEY  "GISELL Hahn       • heparin injection 5,000 Units  5,000 Units Subcutaneous Q8HRS Nabeel Hahn M.D.   5,000 Units at 04/29/18 2025   • acetaminophen (TYLENOL) tablet 650 mg  650 mg Oral Q6HRS PRSEGUNDO Hahn M.D.   Stopped at 04/27/18 1805   • ondansetron (ZOFRAN) syringe/vial injection 4 mg  4 mg Intravenous Q4HRS PRSEGUNDO Hahn M.D.   4 mg at 04/25/18 0638   • ondansetron (ZOFRAN ODT) dispertab 4 mg  4 mg Oral Q4HRS PRSEGUNDO Hahn M.D.       • NS infusion 707 mL  10 mL/kg (Ideal) Intravenous Once PRSEGUNDO Hahn M.D.       • NS (BOLUS) infusion 500 mL  500 mL Intravenous Once PRSEGUNDO Hahn M.D.       • furosemide (LASIX) injection 20 mg  20 mg Intravenous Q DAY Nabeel Hahn M.D.   20 mg at 04/29/18 0854   • ampicillin/sulbactam (UNASYN) 3 g in  mL IVPB  3 g Intravenous Q6HRS Nabeel Hahn M.D. 200 mL/hr at 04/29/18 2025 3 g at 04/29/18 2025   • acetaminophen (TYLENOL) tablet 1,000 mg  1,000 mg Oral Q6HRS Nabeel Hahn M.D.   1,000 mg at 04/29/18 1105   • diphenhydrAMINE (BENADRYL) tablet/capsule 25 mg  25 mg Oral Q6HRS PRSEGUNDO Hahn M.D.   25 mg at 04/28/18 1756       Social History     Social History   • Marital status:      Spouse name: N/A   • Number of children: N/A   • Years of education: N/A     Occupational History   • Not on file.     Social History Main Topics   • Smoking status: Former Smoker     Quit date: 12/14/2005   • Smokeless tobacco: Never Used   • Alcohol use No   • Drug use: No   • Sexual activity: Not on file     Other Topics Concern   • Not on file     Social History Narrative   • No narrative on file       No family history on file.    Allergies:  Patient has no known allergies.    Review of Systems:  Noncontributory except as per HPI      Physical Exam:  Blood pressure 125/85, pulse (!) 109, temperature 36.4 °C (97.6 °F), resp. rate 17, height 1.753 m (5' 9\"), weight (!) 138 kg (304 lb 3.8 oz), SpO2 95 " %.    Constitutional: Alert, oriented, mild distress from pain  HEENT:  Normocephalic and atraumatic, EOMI  Neck:   Supple, no JVD, no bruits  Cardiovascular: Regular rate and rhythm, no murmurs  Pulmonary:  Good air entry bilaterally, no wheezing   Abdominal:  Soft, non-tender, non-distended  Musculoskeletal: Severe venous-stasis changes of both lower legs  Neurological:  CN II-XII grossly intact, no focal deficits  Vascular  Palpable DP pulses bilaterally      Labs:  Recent Labs      04/28/18 0329 04/29/18 0228   WBC  9.1  8.3   RBC  3.67*  4.02*   HEMOGLOBIN  10.3*  11.1*   HEMATOCRIT  34.3*  38.0*   MCV  93.5  94.5   MCH  28.1  27.6   MCHC  30.0*  29.2*   RDW  49.1  48.8   PLATELETCT  235  247   MPV  10.7  10.7     Recent Labs      04/27/18   0344  04/28/18 0329 04/29/18 0228   SODIUM  138  137  137   POTASSIUM  4.6  4.4  4.4   CHLORIDE  98  97  97   CO2  34*  32  32   GLUCOSE  127*  146*  131*   BUN  27*  22  25*   CREATININE  0.70  0.58  0.73   CALCIUM  9.2  9.2  9.5               Assessment: This is a 57 y.o. Male with severe venous stasis disease of both lower extremities.      Plan:   Patient needs operative debridement of the wounds and then unna boot therapy (probably life-long given the severity of his venous stasis changes).    Don't think amputation is warranted without optimum management of the venous disease first.  Also think patient will do very very poorly with amputation.  Appreciate Orthopedic surgery involvement but would be ok from my standpoint for the Ortho team to sign off     Thank you very much for this consultation.    Chino Stock M.D.  [facility]  [phone]  Cell: [phone]

## 2018-04-30 NOTE — CARE PLAN
Problem: Pain Management  Goal: Pain level will decrease to patient's comfort goal  Outcome: PROGRESSING AS EXPECTED  Pt has a PCA in place now. Pain is down to 8/10 from 10/10.     Problem: Mobility  Goal: Risk for activity intolerance will decrease  Outcome: PROGRESSING SLOWER THAN EXPECTED  Pt was able to stand a few minutes with the FWW. Did not tolerated well

## 2018-05-01 LAB
ALBUMIN SERPL BCP-MCNC: 3.5 G/DL (ref 3.2–4.9)
BUN SERPL-MCNC: 27 MG/DL (ref 8–22)
CALCIUM SERPL-MCNC: 9.3 MG/DL (ref 8.5–10.5)
CHLORIDE SERPL-SCNC: 92 MMOL/L (ref 96–112)
CO2 SERPL-SCNC: 36 MMOL/L (ref 20–33)
CREAT SERPL-MCNC: 0.72 MG/DL (ref 0.5–1.4)
GLUCOSE SERPL-MCNC: 152 MG/DL (ref 65–99)
PHOSPHATE SERPL-MCNC: 3 MG/DL (ref 2.5–4.5)
POTASSIUM SERPL-SCNC: 4.4 MMOL/L (ref 3.6–5.5)
SODIUM SERPL-SCNC: 134 MMOL/L (ref 135–145)

## 2018-05-01 PROCEDURE — 0HBKXZZ EXCISION OF RIGHT LOWER LEG SKIN, EXTERNAL APPROACH: ICD-10-PCS | Performed by: SURGERY

## 2018-05-01 PROCEDURE — 500440 HCHG DRESSING, STERILE ROLL (KERLIX): Performed by: SURGERY

## 2018-05-01 PROCEDURE — 160028 HCHG SURGERY MINUTES - 1ST 30 MINS LEVEL 3: Performed by: SURGERY

## 2018-05-01 PROCEDURE — 160048 HCHG OR STATISTICAL LEVEL 1-5: Performed by: SURGERY

## 2018-05-01 PROCEDURE — 700102 HCHG RX REV CODE 250 W/ 637 OVERRIDE(OP): Performed by: INTERNAL MEDICINE

## 2018-05-01 PROCEDURE — 700111 HCHG RX REV CODE 636 W/ 250 OVERRIDE (IP)

## 2018-05-01 PROCEDURE — 700111 HCHG RX REV CODE 636 W/ 250 OVERRIDE (IP): Performed by: INTERNAL MEDICINE

## 2018-05-01 PROCEDURE — 160035 HCHG PACU - 1ST 60 MINS PHASE I: Performed by: SURGERY

## 2018-05-01 PROCEDURE — A9270 NON-COVERED ITEM OR SERVICE: HCPCS | Performed by: INTERNAL MEDICINE

## 2018-05-01 PROCEDURE — 700102 HCHG RX REV CODE 250 W/ 637 OVERRIDE(OP): Performed by: HOSPITALIST

## 2018-05-01 PROCEDURE — 99233 SBSQ HOSP IP/OBS HIGH 50: CPT | Performed by: INTERNAL MEDICINE

## 2018-05-01 PROCEDURE — A6454 SELF-ADHER BAND W>=3" <5"/YD: HCPCS | Performed by: SURGERY

## 2018-05-01 PROCEDURE — 160002 HCHG RECOVERY MINUTES (STAT): Performed by: SURGERY

## 2018-05-01 PROCEDURE — 80069 RENAL FUNCTION PANEL: CPT

## 2018-05-01 PROCEDURE — 501445 HCHG STAPLER, SKIN DISP: Performed by: SURGERY

## 2018-05-01 PROCEDURE — 770001 HCHG ROOM/CARE - MED/SURG/GYN PRIV*

## 2018-05-01 PROCEDURE — 160036 HCHG PACU - EA ADDL 30 MINS PHASE I: Performed by: SURGERY

## 2018-05-01 PROCEDURE — 700101 HCHG RX REV CODE 250

## 2018-05-01 PROCEDURE — 160009 HCHG ANES TIME/MIN: Performed by: SURGERY

## 2018-05-01 PROCEDURE — 160039 HCHG SURGERY MINUTES - EA ADDL 1 MIN LEVEL 3: Performed by: SURGERY

## 2018-05-01 PROCEDURE — 500423 HCHG DRESSING, ABD COMBINE: Performed by: SURGERY

## 2018-05-01 PROCEDURE — 700105 HCHG RX REV CODE 258: Performed by: INTERNAL MEDICINE

## 2018-05-01 PROCEDURE — 0HBLXZZ EXCISION OF LEFT LOWER LEG SKIN, EXTERNAL APPROACH: ICD-10-PCS | Performed by: SURGERY

## 2018-05-01 PROCEDURE — A9270 NON-COVERED ITEM OR SERVICE: HCPCS | Performed by: HOSPITALIST

## 2018-05-01 PROCEDURE — 36415 COLL VENOUS BLD VENIPUNCTURE: CPT

## 2018-05-01 RX ORDER — NALOXONE HYDROCHLORIDE 0.4 MG/ML
0.4 INJECTION, SOLUTION INTRAMUSCULAR; INTRAVENOUS; SUBCUTANEOUS
Status: COMPLETED | OUTPATIENT
Start: 2018-05-01 | End: 2018-05-01

## 2018-05-01 RX ORDER — LABETALOL HYDROCHLORIDE 5 MG/ML
20 INJECTION, SOLUTION INTRAVENOUS ONCE
Status: ACTIVE | OUTPATIENT
Start: 2018-05-01 | End: 2018-05-02

## 2018-05-01 RX ORDER — NALOXONE HYDROCHLORIDE 0.4 MG/ML
INJECTION, SOLUTION INTRAMUSCULAR; INTRAVENOUS; SUBCUTANEOUS
Status: DISCONTINUED
Start: 2018-05-01 | End: 2018-05-01

## 2018-05-01 RX ORDER — ALBUTEROL SULFATE 90 UG/1
2 AEROSOL, METERED RESPIRATORY (INHALATION)
Status: DISCONTINUED | OUTPATIENT
Start: 2018-05-01 | End: 2018-05-05 | Stop reason: HOSPADM

## 2018-05-01 RX ORDER — LABETALOL HYDROCHLORIDE 5 MG/ML
INJECTION, SOLUTION INTRAVENOUS
Status: COMPLETED
Start: 2018-05-01 | End: 2018-05-01

## 2018-05-01 RX ADMIN — OMEPRAZOLE 20 MG: 20 CAPSULE, DELAYED RELEASE ORAL at 07:37

## 2018-05-01 RX ADMIN — STANDARDIZED SENNA CONCENTRATE AND DOCUSATE SODIUM 2 TABLET: 8.6; 5 TABLET, FILM COATED ORAL at 23:04

## 2018-05-01 RX ADMIN — BUDESONIDE AND FORMOTEROL FUMARATE DIHYDRATE 2 PUFF: 160; 4.5 AEROSOL RESPIRATORY (INHALATION) at 23:04

## 2018-05-01 RX ADMIN — ALBUTEROL SULFATE: 2.5 SOLUTION RESPIRATORY (INHALATION) at 18:15

## 2018-05-01 RX ADMIN — OXYCODONE HYDROCHLORIDE AND ACETAMINOPHEN 500 MG: 500 TABLET ORAL at 07:38

## 2018-05-01 RX ADMIN — AMPICILLIN SODIUM AND SULBACTAM SODIUM 3 G: 2; 1 INJECTION, POWDER, FOR SOLUTION INTRAMUSCULAR; INTRAVENOUS at 03:25

## 2018-05-01 RX ADMIN — AMPICILLIN SODIUM AND SULBACTAM SODIUM 3 G: 2; 1 INJECTION, POWDER, FOR SOLUTION INTRAMUSCULAR; INTRAVENOUS at 08:49

## 2018-05-01 RX ADMIN — ONDANSETRON 4 MG: 2 INJECTION, SOLUTION INTRAMUSCULAR; INTRAVENOUS at 08:49

## 2018-05-01 RX ADMIN — OXYCODONE HYDROCHLORIDE 40 MG: 10 TABLET ORAL at 01:28

## 2018-05-01 RX ADMIN — MICONAZOLE NITRATE: 20 CREAM TOPICAL at 13:20

## 2018-05-01 RX ADMIN — THERA TABS 1 TABLET: TAB at 07:37

## 2018-05-01 RX ADMIN — ACETAMINOPHEN 1000 MG: 325 TABLET, FILM COATED ORAL at 00:08

## 2018-05-01 RX ADMIN — NALOXONE HYDROCHLORIDE 0.2 MG: 0.4 INJECTION, SOLUTION INTRAMUSCULAR; INTRAVENOUS; SUBCUTANEOUS at 17:50

## 2018-05-01 RX ADMIN — OXYCODONE HYDROCHLORIDE 40 MG: 10 TABLET ORAL at 08:49

## 2018-05-01 RX ADMIN — Medication 220 MG: at 07:36

## 2018-05-01 RX ADMIN — ATORVASTATIN CALCIUM 40 MG: 40 TABLET, FILM COATED ORAL at 23:04

## 2018-05-01 RX ADMIN — BUDESONIDE AND FORMOTEROL FUMARATE DIHYDRATE 2 PUFF: 160; 4.5 AEROSOL RESPIRATORY (INHALATION) at 07:36

## 2018-05-01 RX ADMIN — MORPHINE SULFATE 30 MG: 30 TABLET, EXTENDED RELEASE ORAL at 23:04

## 2018-05-01 RX ADMIN — OXYCODONE HYDROCHLORIDE 40 MG: 10 TABLET ORAL at 23:03

## 2018-05-01 RX ADMIN — OXYCODONE HYDROCHLORIDE 40 MG: 10 TABLET ORAL at 13:03

## 2018-05-01 RX ADMIN — AMPICILLIN SODIUM AND SULBACTAM SODIUM 3 G: 2; 1 INJECTION, POWDER, FOR SOLUTION INTRAMUSCULAR; INTRAVENOUS at 18:35

## 2018-05-01 RX ADMIN — MORPHINE SULFATE 30 MG: 30 TABLET, EXTENDED RELEASE ORAL at 07:39

## 2018-05-01 RX ADMIN — ACETAMINOPHEN 1000 MG: 325 TABLET, FILM COATED ORAL at 05:40

## 2018-05-01 RX ADMIN — HEPARIN SODIUM 5000 UNITS: 5000 INJECTION, SOLUTION INTRAVENOUS; SUBCUTANEOUS at 05:41

## 2018-05-01 RX ADMIN — TIOTROPIUM BROMIDE 1 CAPSULE: 18 CAPSULE ORAL; RESPIRATORY (INHALATION) at 07:36

## 2018-05-01 RX ADMIN — AMPICILLIN SODIUM AND SULBACTAM SODIUM 3 G: 2; 1 INJECTION, POWDER, FOR SOLUTION INTRAMUSCULAR; INTRAVENOUS at 23:19

## 2018-05-01 RX ADMIN — FUROSEMIDE 20 MG: 10 INJECTION, SOLUTION INTRAMUSCULAR; INTRAVENOUS at 07:39

## 2018-05-01 RX ADMIN — OMEGA-3 FATTY ACIDS CAP 1000 MG 1000 MG: 1000 CAP at 07:38

## 2018-05-01 RX ADMIN — LABETALOL HYDROCHLORIDE 10 MG: 5 INJECTION, SOLUTION INTRAVENOUS at 16:12

## 2018-05-01 RX ADMIN — LEVOTHYROXINE SODIUM 50 MCG: 50 TABLET ORAL at 05:40

## 2018-05-01 RX ADMIN — OXYCODONE HYDROCHLORIDE 40 MG: 10 TABLET ORAL at 05:40

## 2018-05-01 ASSESSMENT — PATIENT HEALTH QUESTIONNAIRE - PHQ9
SUM OF ALL RESPONSES TO PHQ9 QUESTIONS 1 AND 2: 0
2. FEELING DOWN, DEPRESSED, IRRITABLE, OR HOPELESS: NOT AT ALL
1. LITTLE INTEREST OR PLEASURE IN DOING THINGS: NOT AT ALL

## 2018-05-01 ASSESSMENT — PAIN SCALES - GENERAL
PAINLEVEL_OUTOF10: 6
PAINLEVEL_OUTOF10: 5
PAINLEVEL_OUTOF10: 5
PAINLEVEL_OUTOF10: 10
PAINLEVEL_OUTOF10: ASSUMED PAIN PRESENT
PAINLEVEL_OUTOF10: 4
PAINLEVEL_OUTOF10: 10
PAINLEVEL_OUTOF10: 4
PAINLEVEL_OUTOF10: ASSUMED PAIN PRESENT
PAINLEVEL_OUTOF10: 1
PAINLEVEL_OUTOF10: ASSUMED PAIN PRESENT
PAINLEVEL_OUTOF10: 5
PAINLEVEL_OUTOF10: 10
PAINLEVEL_OUTOF10: 10

## 2018-05-01 ASSESSMENT — ENCOUNTER SYMPTOMS
DEPRESSION: 1
BACK PAIN: 0
NERVOUS/ANXIOUS: 0
NAUSEA: 0
CONSTIPATION: 0
PALPITATIONS: 0
SHORTNESS OF BREATH: 0
MYALGIAS: 1
HEARTBURN: 0
TINGLING: 1
DIZZINESS: 0
GASTROINTESTINAL NEGATIVE: 1
SENSORY CHANGE: 1
EYE PAIN: 0
DIARRHEA: 0
FOCAL WEAKNESS: 0
HEMOPTYSIS: 0
LOSS OF CONSCIOUSNESS: 0
BLOOD IN STOOL: 0
WEIGHT LOSS: 0
NECK PAIN: 0
WHEEZING: 0
BLURRED VISION: 0
DOUBLE VISION: 0
DIAPHORESIS: 0
CHILLS: 0
COUGH: 0
SEIZURES: 0
ABDOMINAL PAIN: 0
HEADACHES: 0
BRUISES/BLEEDS EASILY: 0

## 2018-05-01 ASSESSMENT — LIFESTYLE VARIABLES: SUBSTANCE_ABUSE: 0

## 2018-05-01 NOTE — CONSULTS
ADULT INFECTIOUS DISEASE CONSULT     Date of Service: 5/1/2018    Consult Requested By: Danielle Tan M.D.    Reason for Consultation: Bilateral lower extremity wounds    History of Present Illness:   Fer Estrada is a 57 y.o. male with history of diabetes mellitus, obesity, chronic vascular insufficiency has had chronic swelling of his lower extremities and wounds for many years. He recently has had multiple admissions to the hospital. He was seen by infectious diseases on 1/14/2018 for bilateral lower extremity cellulitis. He also is chronically dependent on narcotics. He had a prolonged admission in March 2018 for respiratory failure requiring intubation. He was extubated on 3/25/2018. He was discharged on 4/16/2018. He came back into the emergency room on 4/24/2018 with increasing leg pain and inability to take care of himself. He has had increasing pain. His wound cultures have grown Proteus, staph aureus and Enterococcus faecalis. He has been evaluated by vascular surgery. He is going for debridement today and infectious disease was called for antibiotics.    Review Of Systems:  Gen.-Denies fevers. Denies any chills. Complains of malaise and fatigue  HEENT- denies any sore throat, headache or vision changes  Pulmonary- complains of chronic cough and shortness of breath  Cardiovascular- denies any chest pain, leg swelling.    GI- denies any nausea vomiting diarrhea or abdominal pain  Musculoskeletal- complains of pain in the legs and chronic drainage and swelling  Neuro- denies any weakness or sensory change  Psych- denies any depression or suicidal ideation  Genitourinary- denies any frequency or dysuria        PMH:   Past Medical History:   Diagnosis Date   • Asthma    • At risk for sleep apnea    • Chronic obstructive pulmonary disease (HCC)    • Chronic venous stasis dermatitis of both lower extremities    • Congestive heart failure (HCC)    • Hypertension    • Hypothyroidism    • Type II or unspecified type  diabetes mellitus without mention of complication, not stated as uncontrolled          PSH:  Past Surgical History:   Procedure Laterality Date   • CARPAL TUNNEL RELEASE         FAMILY HX:  No family history on file.    SOCIAL HX:  Social History     Social History   • Marital status:      Spouse name: N/A   • Number of children: N/A   • Years of education: N/A     Occupational History   • Not on file.     Social History Main Topics   • Smoking status: Former Smoker     Quit date: 12/14/2005   • Smokeless tobacco: Never Used   • Alcohol use No   • Drug use: No   • Sexual activity: Not on file     Other Topics Concern   • Not on file     Social History Narrative   • No narrative on file     History   Smoking Status   • Former Smoker   • Quit date: 12/14/2005   Smokeless Tobacco   • Never Used     History   Alcohol Use No       Allergies/Intolerances:  No Known Allergies    History reviewed with the patient    Other Current Medications:    Current Facility-Administered Medications:   •  fentaNYL (SUBLIMAZE) 50 mcg/mL in 50 mL (PCA), , Intravenous, Continuous **AND** [DISCONTINUED] Pharmacy Consult Request ...Pain Management Review 1 Each, 1 Each, Other, PRN, Basilio Ferreira M.D.  •  HYDROmorphone (DILAUDID) injection 2 mg, 2 mg, Intravenous, QDAY PRN, Chino Stock M.D.  •  LORazepam (ATIVAN) injection 0.5 mg, 0.5 mg, Intravenous, QDAY PRN, Chino Stock M.D.  •  oxyCODONE immediate release (ROXICODONE) tablet 40 mg, 40 mg, Oral, Q4HRS, Waylon Walker M.D., 40 mg at 05/01/18 0849  •  morphine ER (MS CONTIN) tablet 30 mg, 30 mg, Oral, Q12HRS, Basilio Ferreira M.D., 30 mg at 05/01/18 0739  •  metFORMIN (GLUCOPHAGE) tablet 500 mg, 500 mg, Oral, BID WITH MEALS, Basilio Ferreira M.D., Stopped at 05/01/18 0738  •  miconazole 2%-zinc oxide (ELDER) topical cream, , Topical, Q6HR, Catherine Saeed D.O.  •  albuterol inhaler 2 Puff, 2 Puff, Inhalation, Q6HRS PRN, Nabeel Hahn M.D., 2 Puff at 04/24/18  1925  •  ammonium lactate (LAC-HYDRIN) 12 % lotion 2 Application, 2 Application, Topical, DAILY, Nabeel Hahn M.D., 2 Application at 04/26/18 1700  •  ascorbic acid (ascorbic acid) tablet 500 mg, 500 mg, Oral, DAILY, Nabeel Hahn M.D., 500 mg at 05/01/18 0738  •  aspirin EC (ECOTRIN) tablet 81 mg, 81 mg, Oral, DAILY, Nabeel Hahn M.D., Stopped at 05/01/18 0739  •  atorvastatin (LIPITOR) tablet 40 mg, 40 mg, Oral, QHS, Nabeel Hahn M.D., 40 mg at 04/30/18 2119  •  budesonide-formoterol (SYMBICORT) 160-4.5 MCG/ACT inhaler 2 Puff, 2 Puff, Inhalation, BID, Nabeel Hahn M.D., 2 Puff at 05/01/18 0736  •  levothyroxine (SYNTHROID) tablet 50 mcg, 50 mcg, Oral, AM ES, Nabeel Hahn M.D., 50 mcg at 05/01/18 0540  •  multivitamin (THERAGRAN) tablet 1 Tab, 1 Tab, Oral, DAILY, Nabeel Hahn M.D., 1 Tab at 05/01/18 0737  •  fish oil capsule 1,000 mg, 1,000 mg, Oral, TID WITH MEALS, Nabeel Hhan M.D., 1,000 mg at 05/01/18 0738  •  omeprazole (PRILOSEC) capsule 20 mg, 20 mg, Oral, DAILY, Nabeel Hahn M.D., 20 mg at 05/01/18 0737  •  tiotropium (SPIRIVA) 18 MCG inhalation capsule 1 Cap, 1 Cap, Inhalation, DAILY, Nabeel Hahn M.D., 1 Cap at 05/01/18 0736  •  zinc sulfate (ZINCATE) capsule 220 mg, 220 mg, Oral, DAILY, Nabeel Hahn M.D., 220 mg at 05/01/18 0736  •  senna-docusate (PERICOLACE or SENOKOT S) 8.6-50 MG per tablet 2 Tab, 2 Tab, Oral, BID, 2 Tab at 04/30/18 0756 **AND** polyethylene glycol/lytes (MIRALAX) PACKET 1 Packet, 1 Packet, Oral, QDAY PRN **AND** magnesium hydroxide (MILK OF MAGNESIA) suspension 30 mL, 30 mL, Oral, QDAY PRN **AND** bisacodyl (DULCOLAX) suppository 10 mg, 10 mg, Rectal, QDAY PRN, Nabeel Hahn M.D.  •  Respiratory Care per Protocol, , Nebulization, Continuous RT, Nabeel Hahn M.D.  •  heparin injection 5,000 Units, 5,000 Units, Subcutaneous, Q8HRS, Nabeel Hahn M.D., 5,000 Units at 05/01/18 0541  •  acetaminophen (TYLENOL) tablet 650  "mg, 650 mg, Oral, Q6HRS PRN, Nabeel Hahn M.D., Stopped at 18 1805  •  ondansetron (ZOFRAN) syringe/vial injection 4 mg, 4 mg, Intravenous, Q4HRS PRN, Nabeel Hahn M.D., 4 mg at 18 0849  •  ondansetron (ZOFRAN ODT) dispertab 4 mg, 4 mg, Oral, Q4HRS PRN, Nabeel Hahn M.D.  •  NS infusion 707 mL, 10 mL/kg (Ideal), Intravenous, Once PRN, Nabeel Hahn M.D.  •  NS (BOLUS) infusion 500 mL, 500 mL, Intravenous, Once PRN, Nabeel Hahn M.D.  •  furosemide (LASIX) injection 20 mg, 20 mg, Intravenous, Q DAY, Nabeel Hahn M.D., 20 mg at 18 0739  •  ampicillin/sulbactam (UNASYN) 3 g in  mL IVPB, 3 g, Intravenous, Q6HRS, Nabeel Hahn M.D., Last Rate: 200 mL/hr at 18 0849, 3 g at 18 0849  •  acetaminophen (TYLENOL) tablet 1,000 mg, 1,000 mg, Oral, Q6HRS, Nabeel Hahn M.D., 1,000 mg at 18 0540  •  diphenhydrAMINE (BENADRYL) tablet/capsule 25 mg, 25 mg, Oral, Q6HRS PRN, Nabeel Hahn M.D., 25 mg at 18 5649  [unfilled]    Most Recent Vital Signs:  /80   Pulse (!) 102   Temp 36.2 °C (97.1 °F)   Resp 18   Ht 1.753 m (5' 9\")   Wt (!) 138 kg (304 lb 3.8 oz)   SpO2 95%   BMI 44.93 kg/m²   Temp  Av.4 °C (97.6 °F)  Min: 36 °C (96.8 °F)  Max: 36.9 °C (98.4 °F)    Physical Exam:  General: Chronically ill-looking male. Looks older than his stated age  HEENT: sclera anicteric, PERRL, EOMI, MMM, no oral lesions  Neck: supple, no lymphadenopathy  Chest: Few rhonchi present  Cardiac: Regular, no murmurs no gallops heard  Abdomen: + bowel sounds, soft, non-tender, non-distended, no HSM  Extremities: Chronic lymphedema venous stasis changes and ulcerations.  Skin: Refer to pictures  Neuro: Alert and oriented times 3, non-focal exam    Pertinent Lab Results:  Recent Labs      18   WBC  8.3      Recent Labs      18   HEMOGLOBIN  11.1*   HEMATOCRIT  38.0*   MCV  94.5   MCH  27.6   PLATELETCT  247 " "        Recent Labs      04/29/18 0228 04/30/18 0226 05/01/18   0301   SODIUM  137  137  134*   POTASSIUM  4.4  4.7  4.4   CHLORIDE  97  93*  92*   CO2  32  35*  36*   CREATININE  0.73  0.82  0.72        Recent Labs      04/29/18 0228 04/30/18 0226 05/01/18   0301   ALBUMIN  3.6  3.7  3.5        Pertinent Micro:  Results     Procedure Component Value Units Date/Time    BLOOD CULTURE [[id number]] Collected:  04/24/18 1428    Order Status:  Completed Specimen:  Blood from Peripheral Updated:  04/29/18 1700     Significant Indicator NEG     Source BLD     Site PERIPHERAL     Blood Culture No growth after 5 days of incubation.    Narrative:       Per Hospital Policy: Only change Specimen Src: to \"Line\" if  specified by physician order.    BLOOD CULTURE [[id number]] Collected:  04/24/18 1420    Order Status:  Completed Specimen:  Blood from Peripheral Updated:  04/29/18 1700     Significant Indicator NEG     Source BLD     Site PERIPHERAL     Blood Culture No growth after 5 days of incubation.    Narrative:       Per Hospital Policy: Only change Specimen Src: to \"Line\" if  specified by physician order.    URINE CULTURE(NEW) [[id number]]  (Abnormal)  (Susceptibility) Collected:  04/24/18 1719    Order Status:  Completed Specimen:  Urine Updated:  04/29/18 0825     Significant Indicator POS (POS)     Source UR     Site --     Urine Culture -- (A)      Klebsiella pneumoniae  ,000 cfu/mL   (A)    Narrative:       Indication for culture:->Emergency Room Patient    Culture & Susceptibility     KLEBSIELLA PNEUMONIAE     Antibiotic Sensitivity Microscan Unit Status    Ampicillin Resistant >16 mcg/mL Final    Method: SENSITIVITY, CLARENCE    Ampicillin/sulbactam Resistant >16/8 mcg/mL Final    Method: SENSITIVITY, CLARENCE    Cefepime Sensitive <=8 mcg/mL Final    Method: SENSITIVITY, CLARENCE    Cefotaxime Sensitive <=2 mcg/mL Final    Method: SENSITIVITY, CLARENCE    Cefotetan Sensitive <=16 mcg/mL Final    Method: SENSITIVITY, CLARENCE "    Ceftazidime Sensitive <=1 mcg/mL Final    Method: SENSITIVITY, CLARENCE    Ceftriaxone Sensitive <=8 mcg/mL Final    Method: SENSITIVITY, CLARENCE    Cefuroxime Sensitive <=4 mcg/mL Final    Method: SENSITIVITY, CLARENCE    Cephalothin Intermediate 16 mcg/mL Final    Method: SENSITIVITY, CLARENCE    Ciprofloxacin Sensitive <=1 mcg/mL Final    Method: SENSITIVITY, CLARENCE    Gentamicin Sensitive <=4 mcg/mL Final    Method: SENSITIVITY, CLARENCE    Levofloxacin Sensitive <=2 mcg/mL Final    Method: SENSITIVITY, CLARENCE    Nitrofurantoin Intermediate 64 mcg/mL Final    Method: SENSITIVITY, CLARENCE    Pip/Tazobactam Sensitive <=16 mcg/mL Final    Method: SENSITIVITY, CLARENCE    Piperacillin Resistant >64 mcg/mL Final    Method: SENSITIVITY, CLARENCE    Tigecycline Sensitive <=2 mcg/mL Final    Method: SENSITIVITY, CLARENCE    Tobramycin Sensitive <=4 mcg/mL Final    Method: SENSITIVITY, CLARENCE    Trimeth/Sulfa Sensitive <=2/38 mcg/mL Final    Method: SENSITIVITY, CLARENCE                       CULTURE WOUND W/ GRAM STAIN [[id number]]  (Abnormal)  (Susceptibility) Collected:  04/24/18 1513    Order Status:  Completed Specimen:  Wound from Left Foot Updated:  04/26/18 1645     Significant Indicator POS (POS)     Source WND     Site LEFT FOOT     Culture Result Wound -- (A)     Gram Stain Result Moderate epithelial cells.  Moderate Gram positive cocci.  Few Gram positive rods.  Rare Gram negative rods.       Culture Result Wound Proteus mirabilis  Heavy growth   (A)      Staphylococcus aureus  Heavy growth   (A)      Enterococcus faecalis  Heavy growth  Combination therapy with ampicillin, penicillin, or  vancomycin (for susceptible strains) plus an aminoglycoside  is usually indicated for serious enterococcal infections,  such as endocarditis unless high-level resistance to both  gentamicin and streptomycin is documented; such combinations  are predicted to result in synergistic killing of the  Enterococcus.   (A)    Culture & Susceptibility     ENTEROCOCCUS FAECALIS      Antibiotic Sensitivity Microscan Unit Status    Ampicillin Sensitive <=2 mcg/mL Final    Method: SENSITIVITY, CLARENCE    Daptomycin Sensitive <=0.5 mcg/mL Final    Method: SENSITIVITY, CLARENCE    Gent Synergy Sensitive <=500 mcg/mL Final    Method: SENSITIVITY, CLARENCE    Penicillin Sensitive 2 mcg/mL Final    Method: SENSITIVITY, CLARENCE    Vancomycin Sensitive 1 mcg/mL Final    Method: SENSITIVITY, CLARENCE              PROTEUS MIRABILIS     Antibiotic Sensitivity Microscan Unit Status    Ampicillin Sensitive <=8 mcg/mL Final    Method: SENSITIVITY, CLARENCE    Cefepime Sensitive <=8 mcg/mL Final    Method: SENSITIVITY, CLARENCE    Cefotaxime Sensitive <=2 mcg/mL Final    Method: SENSITIVITY, CLARENCE    Cefotetan Sensitive <=16 mcg/mL Final    Method: SENSITIVITY, CLARENCE    Ceftazidime Sensitive <=1 mcg/mL Final    Method: SENSITIVITY, CLARENCE    Ceftriaxone Sensitive <=8 mcg/mL Final    Method: SENSITIVITY, CLARENCE    Cefuroxime Sensitive <=4 mcg/mL Final    Method: SENSITIVITY, CLARENCE    Ciprofloxacin Sensitive <=1 mcg/mL Final    Method: SENSITIVITY, CLARENCE    Ertapenem Sensitive <=1 mcg/mL Final    Method: SENSITIVITY, CLARENCE    Gentamicin Sensitive <=4 mcg/mL Final    Method: SENSITIVITY, CLARENCE    Pip/Tazobactam Sensitive <=16 mcg/mL Final    Method: SENSITIVITY, CLARENCE    Tobramycin Sensitive <=4 mcg/mL Final    Method: SENSITIVITY, CLARENCE    Trimeth/Sulfa Sensitive <=2/38 mcg/mL Final    Method: SENSITIVITY, CLARENCE              STAPHYLOCOCCUS AUREUS     Antibiotic Sensitivity Microscan Unit Status    Ampicillin/sulbactam Sensitive <=8/4 mcg/mL Final    Method: SENSITIVITY, CLARENCE    Clindamycin Resistant >4 mcg/mL Final    Method: SENSITIVITY, CLARENCE    Daptomycin Sensitive <=0.5 mcg/mL Final    Method: SENSITIVITY, CLARENCE    Erythromycin Resistant >4 mcg/mL Final    Method: SENSITIVITY, CLARENCE    Moxifloxacin Sensitive <=0.5 mcg/mL Final    Method: SENSITIVITY, CLARENCE    Oxacillin Sensitive <=0.25 mcg/mL Final    Method: SENSITIVITY, CLARENCE    Penicillin Resistant >8 mcg/mL Final     Method: SENSITIVITY, CLARENCE    Tetracycline Sensitive <=4 mcg/mL Final    Method: SENSITIVITY, CLARENCE    Trimeth/Sulfa Sensitive <=0.5/9.5 mcg/mL Final    Method: SENSITIVITY, CLARENCE    Vancomycin Sensitive 1 mcg/mL Final    Method: SENSITIVITY, CLARENCE                       S. AUREUS BY PCR, NASAL COMPLETE [[id number]]  (Abnormal) Collected:  04/25/18 1540    Order Status:  Completed Specimen:  Nasal from Other Updated:  04/26/18 1456     Staph aureus by PCR POSITIVE (A)     MRSA by PCR Negative    Narrative:       [id number] COSTELLO-HERNANDEZ ESTRELLITA    GRAM STAIN [[id number]] Collected:  04/24/18 1513    Order Status:  Completed Specimen:  Wound Updated:  04/25/18 0824     Significant Indicator .     Source WND     Site LEFT FOOT     Gram Stain Result Moderate epithelial cells.  Moderate Gram positive cocci.  Few Gram positive rods.  Rare Gram negative rods.      URINALYSIS [[id number]]  (Abnormal) Collected:  04/24/18 1719    Order Status:  Completed Specimen:  Urine Updated:  04/24/18 1755     Color Yellow     Character Cloudy (A)     Specific Gravity 1.013     Ph 5.0     Glucose Negative mg/dL      Ketones Negative mg/dL      Protein Negative mg/dL      Bilirubin Negative     Urobilinogen, Urine 1.0     Nitrite Positive (A)     Leukocyte Esterase Large (A)     Occult Blood Trace (A)     Micro Urine Req Microscopic    Narrative:       Indication for culture:->Emergency Room Patient    Influenza By PCR, A/B [[id number]] Collected:  04/24/18 1513    Order Status:  Completed Specimen:  Urine from Nasopharyngeal Updated:  04/24/18 1715     Influenza virus A RNA Negative     Influenza virus B, PCR Negative    Narrative:       Indication for culture:->Emergency Room Patient    Blood Culture [[id number]]     Order Status:  Canceled Specimen:  Blood from Peripheral     Blood Culture [[id number]]     Order Status:  Canceled Specimen:  Blood from Peripheral     Urinalysis [[id number]]     Order Status:  Canceled Specimen:  Urine          Blood Culture   Date Value Ref Range Status   04/24/2018 No growth after 5 days of incubation.  Final        Studies:  Dx-chest-portable (1 View)    Result Date: 4/24/2018 4/24/2018 2:36 PM HISTORY/REASON FOR EXAM:  Sepsis. Fever. TECHNIQUE/EXAM DESCRIPTION AND NUMBER OF VIEWS: Single portable view of the chest. COMPARISON: 3/28/2018 FINDINGS: Hypoinflation. Partial clearing of right lung infiltrates when compared to previous. Left perihilar/left lung base opacities similar to previous findings. Cardiomegaly. Elevation right hemidiaphragm unchanged.     Partial clearing of right lung infiltrates compared to previous. Left perihilar/left lung base atelectasis/possible pneumonia similar to previous findings.    Dx-foot-complete 3+ Left    Result Date: 4/26/2018 4/26/2018 9:20 AM HISTORY/REASON FOR EXAM:  Atraumatic Pain/Swelling/Deformity TECHNIQUE/EXAM DESCRIPTION AND NUMBER OF VIEWS: 3 views of the LEFT foot. COMPARISON: FINDINGS: The alignment is grossly normal. There is no evidence of displaced fracture or dislocation. There is diffuse soft tissue swelling. Evaluation limited by apparent bandage or clothing     Diffuse soft tissue swelling.  IMPRESSION:   Chronic lower extremity wounds  Chronic lymphedema  Morbid obesity  COPD    Diabetes mellitus    PLAN:   Fer Estrada is a 57 y.o. male with multiple medical problems admitted for pain and chronic wounds on his legs and failure to take care of himself.  Continue Unasyn for now. Patient is for debridement today.  I would recommend snf placement for wound care.  Continue supportive measures  I have reviewed all the records      Discussed with IM. Will continue to follow    Dulce Maria Michel M.D.

## 2018-05-01 NOTE — PROGRESS NOTES
Vascular    OR today for LE debridement and unna cheli Stock MD  General&Vascular Surgery  [facility]  Cell: [phone]  Office: [phone]

## 2018-05-01 NOTE — PROGRESS NOTES
[facility] Hospitalist Progress Note    Date of Service: 5/1/2018    Chief Complaint  57 y.o. male admitted 4/24/2018 with bilateral lower extremity pain.    Interval Problem Update  Patient admitted with recurrent bilateral cellulitis of the lower extremities. The patient has severe swelling of both lower extremities with pitting edema. The patient does have pulses and vascular studies have never been done on the patient. Patient did have DVT studies in the past which were negative. The patient at this point was evaluated by myself as well as surgery. We at this point feel that the patient's legs can be salvaged without having an amputation thus there is no need for any orthopedic amputation at this point. The patient will need debridement and afterwards will need aggressive wound care management and LTAC. Continue at this point with antibodies I've asked ID to see the patient and they have happily agreed. The patient at this point will need continued pain management though as he is in severe pain most at times he has been in 10 out of 10 pain. I placed on a PCA along with adjusting his oxycodone and MS Contin. I am aware at this point that the patient is receiving high doses of narcotics but at this point to control his pain and with his previous tolerance to pain medications the patient will need aggressive management. Patient at this point has severe condition and will require long-term management to get him back to a normal. The patient does have obesity which will require a gastric sleeve versus other methods to have him reduce his weight. The patient is over 300 pounds.    5/1 patient still complains of significant pain. Pending vascular surgery for debridement today. Patient's pain is local, 3-4/10, intermittent and does not radiate to other location, sharp and with some tingling. Can be controlled by pain meds. Dressing in place.  Patient otherwise denies fever, chills, nausea, vomiting, adb pain, SOB, CP,  headache, constipation, diarrhea, cough, or sputum.      Consultants/Specialty  Surgery Dr. Stock  Infectious diseases.  Orthopedics.      Disposition  To be determined most likely LTAC transfer.      Review of Systems   Constitutional: Negative for chills, diaphoresis, malaise/fatigue and weight loss.   HENT: Negative.    Eyes: Negative for blurred vision, double vision and pain.   Respiratory: Negative for cough, hemoptysis, shortness of breath and wheezing.    Cardiovascular: Positive for leg swelling. Negative for chest pain and palpitations.   Gastrointestinal: Negative.  Negative for abdominal pain, blood in stool, constipation, diarrhea, heartburn and nausea.   Genitourinary: Negative.  Negative for dysuria, frequency and urgency.   Musculoskeletal: Positive for myalgias. Negative for back pain, joint pain and neck pain.   Skin: Positive for rash.   Neurological: Positive for tingling (in fingers) and sensory change (fingers 3-5 both hands). Negative for dizziness, focal weakness, seizures, loss of consciousness and headaches.   Endo/Heme/Allergies: Negative.  Does not bruise/bleed easily.   Psychiatric/Behavioral: Positive for depression. Negative for substance abuse and suicidal ideas. The patient is not nervous/anxious.    All other systems reviewed and are negative.     Physical Exam  Laboratory/Imaging   Hemodynamics  Temp (24hrs), Av.3 °C (97.3 °F), Min:36.2 °C (97.1 °F), Max:36.5 °C (97.7 °F)   Temperature: 36.2 °C (97.1 °F)  Pulse  Av.9  Min: 69  Max: 130   Blood Pressure: 128/80      Respiratory      Respiration: 18, Pulse Oximetry: 95 %     Work Of Breathing / Effort: Moderate  RUL Breath Sounds: Clear, RML Breath Sounds: Diminished, RLL Breath Sounds: Diminished, LARY Breath Sounds: Clear, LLL Breath Sounds: Diminished    Fluids    Intake/Output Summary (Last 24 hours) at 18 0842  Last data filed at 18 0800   Gross per 24 hour   Intake            58.65 ml   Output              1425 ml   Net         -1366.35 ml       Nutrition  Orders Placed This Encounter   Procedures   • DIET NPO     Standing Status:   Standing     Number of Occurrences:   8     Order Specific Question:   Restrict to:     Answer:   Sips with Medications [3]     Physical Exam   Constitutional: He is oriented to person, place, and time. He appears well-developed and well-nourished. He is cooperative. Nasal cannula in place.   HENT:   Head: Normocephalic and atraumatic.   Right Ear: External ear normal.   Left Ear: External ear normal.   Mouth/Throat: No oropharyngeal exudate.   Eyes: Conjunctivae and EOM are normal. Pupils are equal, round, and reactive to light.   Neck: Normal range of motion. Neck supple. No JVD present. No tracheal deviation present. No thyromegaly present.   Cardiovascular: Normal rate, regular rhythm and normal heart sounds.  Exam reveals no gallop and no friction rub.    No murmur heard.  Pulmonary/Chest: Effort normal and breath sounds normal. No stridor. No respiratory distress. He has no wheezes. He has no rales. He exhibits no tenderness.   Abdominal: Soft. Bowel sounds are normal. He exhibits no distension and no mass. There is no tenderness. There is no rebound and no guarding.   Musculoskeletal: Normal range of motion. He exhibits edema. He exhibits no deformity.   Lymphadenopathy:     He has no cervical adenopathy.   Neurological: He is alert and oriented to person, place, and time. He has normal reflexes. No cranial nerve deficit.   Skin: Skin is warm and dry. Rash noted. There is erythema.   Psychiatric: His speech is normal. Judgment and thought content normal. His mood appears anxious. He is slowed. Cognition and memory are normal. He exhibits a depressed mood (very tearful and frustrated).   Nursing note and vitals reviewed.      Recent Labs      04/29/18   0228   WBC  8.3   RBC  4.02*   HEMOGLOBIN  11.1*   HEMATOCRIT  38.0*   MCV  94.5   MCH  27.6   MCHC  29.2*   RDW  48.8   PLATELETCT   247   MPV  10.7     Recent Labs      04/29/18   0228  04/30/18   0226  05/01/18   0301   SODIUM  137  137  134*   POTASSIUM  4.4  4.7  4.4   CHLORIDE  97  93*  92*   CO2  32  35*  36*   GLUCOSE  131*  140*  152*   BUN  25*  26*  27*   CREATININE  0.73  0.82  0.72   CALCIUM  9.5  9.9  9.3                      Assessment/Plan     * Bilateral lower leg cellulitis- (present on admission)   Assessment & Plan    OR debridement 5/1  continue at this point with pain management as well as antibiotic management.  Change and decrease PCA with fentanyl Patient this point is on a PCA with fentanyl 50 µg every 1 hour with 300ug limit /4hr. he's on oxycodone 40 mg every 6 hours and that he's also on MS Contin 30 mg twice daily. Even like this patient's pain remains at about an 8 and the patient still has no signs of narcotic toxicity.  Close monitor        [place])   Assessment & Plan    resolved  White blood cell counts as well as fevers have subsided.  Patient does not have any diaphoresis or chills or fevers.  ID on case  Patient's cultures were positive for Proteus enterococcus faecalis and MSSA.  Spoke to LTAC patient is approved to go to their facility once we have the patient  OR debridement 5/1        ALIREZA (obstructive sleep apnea)- (present on admission)   Assessment & Plan    Continue CPAP at nighttime.  Educated patient on the necessity of weight loss management.        Chronic pain- (present on admission)   Assessment & Plan    Patient this point is on a PCA with fentanyl 50 µg every 1 hour with 300ug limit /4hr.  Patient is on oxycodone 40 mg every 6 hours.  Patient is on MS Contin 30 mg twice daily.        Morbid obesity with BMI of 40.0-44.9, adult ([facility])- (present on admission)   Assessment & Plan    Body mass index is 44.93 kg/m².  Patient was counseled on weight loss management. The patient will need an outpatient bariatric evaluation and bariatric surgery. For him bariatric surgery  will be life saving without it he will live more than 5-10 years.        Chronic respiratory failure (CMS-HCC)- (present on admission)   Assessment & Plan    Continue at this point with RT protocol and nebulizer treatments. The patient sporadically uses nebulizers in the past I explained to him the necessity of keeping his airways open.        Severe protein-calorie malnutrition (HCC)- (present on admission)   Assessment & Plan    Patient will need at this point aggressive protein calorie nutritional support.        Diabetes type 2, controlled ([facility])- (present on admission)   Assessment & Plan    Continue at this point with aggressive diabetic management. Continue with Accu-Cheks and sliding scale coverage. Continue at this point with metformin        Non compliance w medication regimen- (present on admission)   Assessment & Plan    In the past patient was told that he was noncompliant with treatment.        Narcotic addiction (CMS-HCC)- (present on admission)   Assessment & Plan    Currently patient's pain is extreme.        Stasis dermatitis- (present on admission)   Assessment & Plan    OR debridement 5/1        COPD (chronic obstructive pulmonary disease) ([facility])- (present on admission)   Assessment & Plan    Remains on RT protocol. It is        HTN (hypertension)- (present on admission)   Assessment & Plan    Blood pressure stable  Continue home medication            Quality-Core Measures   Reviewed items::  EKG reviewed, Labs reviewed, Medications reviewed and Radiology images reviewed  Montoya catheter::  No Montoya  DVT prophylaxis pharmacological::  Heparin  Ulcer Prophylaxis::  Not indicated  Antibiotics:  Treating active infection/contamination beyond 24 hours perioperative coverage  Assessed for rehabilitation services:  Patient was assess for and/or received rehabilitation services during this hospitalization     For complexity-based billing, please refer to the history, exam, and decison making above. In  addition, I spent >35 minutes caring for the patient today. More than 50% of the time was spent counseling and coordinating care.    I have discussed with RN and CM and SW and other consultants about patient's plan.

## 2018-05-01 NOTE — DISCHARGE PLANNING
Pt has been accepted to [facility] pending med clearance and bed availability. [facility] aware that pt have another surgery today.

## 2018-05-01 NOTE — PROGRESS NOTES
Vascular    No acute changes overnight  When I came to change the patient's bandages around 6:00 tonight he said he did not want to do it because it was too late.  Patient does have ongoing pain and drainage from his legs.  I explained to the patient that I had a chance to review the images in the chart. Based on those images I recommend we schedule a trip to the operating room to perform a deep cleansing and even debridement of the wounds on the legs and then apply Unna boot dressings.    We discussed the risks of the procedure including but not limited to bleeding infection risks of anesthesia and also global risks apply to surgery. Patient understood this information and agreed to proceed.    Tentatively planning to perform debridement in the operating room tomorrow which is Tuesday, May 1.    Chino Stock MD  General&Vascular Surgery  [facility]  Cell: [phone]  Office: [phone]

## 2018-05-02 LAB
ALBUMIN SERPL BCP-MCNC: 3.2 G/DL (ref 3.2–4.9)
ANION GAP SERPL CALC-SCNC: 8 MMOL/L (ref 0–11.9)
BASOPHILS # BLD AUTO: 0.4 % (ref 0–1.8)
BASOPHILS # BLD: 0.06 K/UL (ref 0–0.12)
BUN SERPL-MCNC: 23 MG/DL (ref 8–22)
CALCIUM SERPL-MCNC: 9.3 MG/DL (ref 8.5–10.5)
CHLORIDE SERPL-SCNC: 93 MMOL/L (ref 96–112)
CO2 SERPL-SCNC: 34 MMOL/L (ref 20–33)
CREAT SERPL-MCNC: 0.75 MG/DL (ref 0.5–1.4)
EOSINOPHIL # BLD AUTO: 0.12 K/UL (ref 0–0.51)
EOSINOPHIL NFR BLD: 0.9 % (ref 0–6.9)
ERYTHROCYTE [DISTWIDTH] IN BLOOD BY AUTOMATED COUNT: 50.2 FL (ref 35.9–50)
GLUCOSE BLD-MCNC: 260 MG/DL (ref 65–99)
GLUCOSE SERPL-MCNC: 170 MG/DL (ref 65–99)
HCT VFR BLD AUTO: 33.7 % (ref 42–52)
HGB BLD-MCNC: 10.1 G/DL (ref 14–18)
IMM GRANULOCYTES # BLD AUTO: 0.19 K/UL (ref 0–0.11)
IMM GRANULOCYTES NFR BLD AUTO: 1.4 % (ref 0–0.9)
LYMPHOCYTES # BLD AUTO: 1.35 K/UL (ref 1–4.8)
LYMPHOCYTES NFR BLD: 9.6 % (ref 22–41)
MCH RBC QN AUTO: 27.8 PG (ref 27–33)
MCHC RBC AUTO-ENTMCNC: 30 G/DL (ref 33.7–35.3)
MCV RBC AUTO: 92.8 FL (ref 81.4–97.8)
MONOCYTES # BLD AUTO: 1.12 K/UL (ref 0–0.85)
MONOCYTES NFR BLD AUTO: 8 % (ref 0–13.4)
NEUTROPHILS # BLD AUTO: 11.15 K/UL (ref 1.82–7.42)
NEUTROPHILS NFR BLD: 79.7 % (ref 44–72)
NRBC # BLD AUTO: 0 K/UL
NRBC BLD-RTO: 0 /100 WBC
PHOSPHATE SERPL-MCNC: 3.5 MG/DL (ref 2.5–4.5)
PLATELET # BLD AUTO: 246 K/UL (ref 164–446)
PMV BLD AUTO: 11.1 FL (ref 9–12.9)
POTASSIUM SERPL-SCNC: 4.8 MMOL/L (ref 3.6–5.5)
RBC # BLD AUTO: 3.63 M/UL (ref 4.7–6.1)
SODIUM SERPL-SCNC: 135 MMOL/L (ref 135–145)
WBC # BLD AUTO: 14 K/UL (ref 4.8–10.8)

## 2018-05-02 PROCEDURE — A9270 NON-COVERED ITEM OR SERVICE: HCPCS | Performed by: HOSPITALIST

## 2018-05-02 PROCEDURE — 700102 HCHG RX REV CODE 250 W/ 637 OVERRIDE(OP): Performed by: INTERNAL MEDICINE

## 2018-05-02 PROCEDURE — 700105 HCHG RX REV CODE 258: Performed by: INTERNAL MEDICINE

## 2018-05-02 PROCEDURE — A9270 NON-COVERED ITEM OR SERVICE: HCPCS | Performed by: INTERNAL MEDICINE

## 2018-05-02 PROCEDURE — 99232 SBSQ HOSP IP/OBS MODERATE 35: CPT | Performed by: INTERNAL MEDICINE

## 2018-05-02 PROCEDURE — 36415 COLL VENOUS BLD VENIPUNCTURE: CPT

## 2018-05-02 PROCEDURE — 700102 HCHG RX REV CODE 250 W/ 637 OVERRIDE(OP): Performed by: HOSPITALIST

## 2018-05-02 PROCEDURE — 82962 GLUCOSE BLOOD TEST: CPT

## 2018-05-02 PROCEDURE — 80069 RENAL FUNCTION PANEL: CPT

## 2018-05-02 PROCEDURE — 85025 COMPLETE CBC W/AUTO DIFF WBC: CPT

## 2018-05-02 PROCEDURE — 700111 HCHG RX REV CODE 636 W/ 250 OVERRIDE (IP): Performed by: INTERNAL MEDICINE

## 2018-05-02 PROCEDURE — 770001 HCHG ROOM/CARE - MED/SURG/GYN PRIV*

## 2018-05-02 RX ORDER — OXYCODONE HYDROCHLORIDE 5 MG/1
5 TABLET ORAL EVERY 6 HOURS PRN
Status: DISCONTINUED | OUTPATIENT
Start: 2018-05-02 | End: 2018-05-05 | Stop reason: HOSPADM

## 2018-05-02 RX ORDER — OXYCODONE HYDROCHLORIDE 10 MG/1
10 TABLET ORAL EVERY 6 HOURS PRN
Status: DISCONTINUED | OUTPATIENT
Start: 2018-05-02 | End: 2018-05-05 | Stop reason: HOSPADM

## 2018-05-02 RX ADMIN — OMEGA-3 FATTY ACIDS CAP 1000 MG 1000 MG: 1000 CAP at 13:01

## 2018-05-02 RX ADMIN — AMPICILLIN SODIUM AND SULBACTAM SODIUM 3 G: 2; 1 INJECTION, POWDER, FOR SOLUTION INTRAMUSCULAR; INTRAVENOUS at 08:53

## 2018-05-02 RX ADMIN — MORPHINE SULFATE 30 MG: 30 TABLET, EXTENDED RELEASE ORAL at 21:25

## 2018-05-02 RX ADMIN — HEPARIN SODIUM 5000 UNITS: 5000 INJECTION, SOLUTION INTRAVENOUS; SUBCUTANEOUS at 05:54

## 2018-05-02 RX ADMIN — OXYCODONE HYDROCHLORIDE 40 MG: 10 TABLET ORAL at 08:53

## 2018-05-02 RX ADMIN — OXYCODONE HYDROCHLORIDE 40 MG: 10 TABLET ORAL at 17:20

## 2018-05-02 RX ADMIN — TIOTROPIUM BROMIDE 1 CAPSULE: 18 CAPSULE ORAL; RESPIRATORY (INHALATION) at 09:01

## 2018-05-02 RX ADMIN — OXYCODONE HYDROCHLORIDE 40 MG: 10 TABLET ORAL at 05:54

## 2018-05-02 RX ADMIN — HEPARIN SODIUM 5000 UNITS: 5000 INJECTION, SOLUTION INTRAVENOUS; SUBCUTANEOUS at 21:25

## 2018-05-02 RX ADMIN — METFORMIN HYDROCHLORIDE 500 MG: 500 TABLET, FILM COATED ORAL at 17:21

## 2018-05-02 RX ADMIN — OMEGA-3 FATTY ACIDS CAP 1000 MG 1000 MG: 1000 CAP at 08:54

## 2018-05-02 RX ADMIN — FUROSEMIDE 20 MG: 10 INJECTION, SOLUTION INTRAMUSCULAR; INTRAVENOUS at 08:55

## 2018-05-02 RX ADMIN — ACETAMINOPHEN 1000 MG: 325 TABLET, FILM COATED ORAL at 18:00

## 2018-05-02 RX ADMIN — OMEPRAZOLE 20 MG: 20 CAPSULE, DELAYED RELEASE ORAL at 08:56

## 2018-05-02 RX ADMIN — AMPICILLIN SODIUM AND SULBACTAM SODIUM 3 G: 2; 1 INJECTION, POWDER, FOR SOLUTION INTRAMUSCULAR; INTRAVENOUS at 21:22

## 2018-05-02 RX ADMIN — MICONAZOLE NITRATE: 20 CREAM TOPICAL at 15:42

## 2018-05-02 RX ADMIN — HEPARIN SODIUM 5000 UNITS: 5000 INJECTION, SOLUTION INTRAVENOUS; SUBCUTANEOUS at 13:01

## 2018-05-02 RX ADMIN — ACETAMINOPHEN 1000 MG: 325 TABLET, FILM COATED ORAL at 05:54

## 2018-05-02 RX ADMIN — MICONAZOLE NITRATE: 20 CREAM TOPICAL at 09:06

## 2018-05-02 RX ADMIN — Medication 220 MG: at 08:54

## 2018-05-02 RX ADMIN — Medication 2 APPLICATION: at 17:22

## 2018-05-02 RX ADMIN — ASPIRIN 81 MG: 81 TABLET, COATED ORAL at 08:54

## 2018-05-02 RX ADMIN — OXYCODONE HYDROCHLORIDE 40 MG: 10 TABLET ORAL at 13:01

## 2018-05-02 RX ADMIN — OXYCODONE HYDROCHLORIDE 40 MG: 10 TABLET ORAL at 01:46

## 2018-05-02 RX ADMIN — THERA TABS 1 TABLET: TAB at 08:54

## 2018-05-02 RX ADMIN — BUDESONIDE AND FORMOTEROL FUMARATE DIHYDRATE 2 PUFF: 160; 4.5 AEROSOL RESPIRATORY (INHALATION) at 21:24

## 2018-05-02 RX ADMIN — MORPHINE SULFATE 30 MG: 30 TABLET, EXTENDED RELEASE ORAL at 08:53

## 2018-05-02 RX ADMIN — OMEGA-3 FATTY ACIDS CAP 1000 MG 1000 MG: 1000 CAP at 17:20

## 2018-05-02 RX ADMIN — ATORVASTATIN CALCIUM 40 MG: 40 TABLET, FILM COATED ORAL at 21:24

## 2018-05-02 RX ADMIN — AMPICILLIN SODIUM AND SULBACTAM SODIUM 3 G: 2; 1 INJECTION, POWDER, FOR SOLUTION INTRAMUSCULAR; INTRAVENOUS at 04:04

## 2018-05-02 RX ADMIN — AMPICILLIN SODIUM AND SULBACTAM SODIUM 3 G: 2; 1 INJECTION, POWDER, FOR SOLUTION INTRAMUSCULAR; INTRAVENOUS at 15:39

## 2018-05-02 RX ADMIN — METFORMIN HYDROCHLORIDE 500 MG: 500 TABLET, FILM COATED ORAL at 08:55

## 2018-05-02 RX ADMIN — OXYCODONE HYDROCHLORIDE 40 MG: 10 TABLET ORAL at 21:24

## 2018-05-02 RX ADMIN — LEVOTHYROXINE SODIUM 50 MCG: 50 TABLET ORAL at 05:54

## 2018-05-02 RX ADMIN — BUDESONIDE AND FORMOTEROL FUMARATE DIHYDRATE 2 PUFF: 160; 4.5 AEROSOL RESPIRATORY (INHALATION) at 08:53

## 2018-05-02 RX ADMIN — OXYCODONE HYDROCHLORIDE AND ACETAMINOPHEN 500 MG: 500 TABLET ORAL at 08:54

## 2018-05-02 ASSESSMENT — ENCOUNTER SYMPTOMS
BLOOD IN STOOL: 0
TINGLING: 1
VOMITING: 0
GASTROINTESTINAL NEGATIVE: 1
NAUSEA: 0
SENSORY CHANGE: 1
SPEECH CHANGE: 0
COUGH: 0
EYE PAIN: 0
FOCAL WEAKNESS: 0
MYALGIAS: 1
BRUISES/BLEEDS EASILY: 0
DOUBLE VISION: 0
CHILLS: 0
DIZZINESS: 0
SHORTNESS OF BREATH: 0
BLURRED VISION: 0
LOSS OF CONSCIOUSNESS: 0
HEADACHES: 0
HEARTBURN: 0
PALPITATIONS: 0
NECK PAIN: 0
SEIZURES: 0
DEPRESSION: 1
ABDOMINAL PAIN: 0
CONSTIPATION: 0
SENSORY CHANGE: 0
DIAPHORESIS: 0
NERVOUS/ANXIOUS: 0
WHEEZING: 0
DIARRHEA: 0
HEMOPTYSIS: 0
FEVER: 0

## 2018-05-02 ASSESSMENT — PAIN SCALES - GENERAL
PAINLEVEL_OUTOF10: 10
PAINLEVEL_OUTOF10: 10
PAINLEVEL_OUTOF10: 8
PAINLEVEL_OUTOF10: 9
PAINLEVEL_OUTOF10: 10
PAINLEVEL_OUTOF10: 10

## 2018-05-02 ASSESSMENT — LIFESTYLE VARIABLES: SUBSTANCE_ABUSE: 0

## 2018-05-02 ASSESSMENT — PATIENT HEALTH QUESTIONNAIRE - PHQ9
2. FEELING DOWN, DEPRESSED, IRRITABLE, OR HOPELESS: NOT AT ALL
1. LITTLE INTEREST OR PLEASURE IN DOING THINGS: NOT AT ALL
1. LITTLE INTEREST OR PLEASURE IN DOING THINGS: NOT AT ALL
SUM OF ALL RESPONSES TO PHQ9 QUESTIONS 1 AND 2: 0
SUM OF ALL RESPONSES TO PHQ9 QUESTIONS 1 AND 2: 0

## 2018-05-02 NOTE — PROGRESS NOTES
[facility] Hospitalist Progress Note    Date of Service: 5/2/2018    Chief Complaint  57 y.o. male admitted 4/24/2018 with bilateral lower extremity pain.    Interval Problem Update  Patient admitted with recurrent bilateral cellulitis of the lower extremities. The patient has severe swelling of both lower extremities with pitting edema. The patient does have pulses and vascular studies have never been done on the patient. Patient did have DVT studies in the past which were negative. The patient at this point was evaluated by myself as well as surgery. We at this point feel that the patient's legs can be salvaged without having an amputation thus there is no need for any orthopedic amputation at this point. The patient will need debridement and afterwards will need aggressive wound care management and LTAC. Continue at this point with antibodies I've asked ID to see the patient and they have happily agreed. Patient was started on PCA fentanyl pump. Patient developed acute respiratory failure with CO2 retention with that. Patient's fentanyl pump was stopped. Patient at this point has severe condition and will require long-term management to get him back to a normal. The patient does have obesity which will require a gastric sleeve versus other methods to have him reduce his weight. The patient is over 300 pounds.    5/1 patient still complains of significant pain. Pending vascular surgery for debridement today. Patient's pain is local, 3-4/10, intermittent and does not radiate to other location, sharp and with some tingling. Can be controlled by pain meds. Dressing in place.  Patient otherwise denies fever, chills, nausea, vomiting, adb pain, SOB, CP, headache, constipation, diarrhea, cough, or sputum.  5/2 patient underwent surgical debridement yesterday with Unar Boot placement. Patient tolerated the procedure however developed CO2 retention and acute respiratory failure after that with fentanyl PCA. DC fentanyl PCA.  Patient still complained of severe pain. Patient's pain is local, 6-8/10, intermittent and does not radiate to other location, sharp and with some tingling. Can be controlled by pain meds. Dressing in place. Increased patient oral pain medication. We'll follow respiratory status closely. Patient otherwise denies fever, chills, nausea, vomiting, adb pain, SOB, CP, headache, constipation, diarrhea, cough, or sputum.    Consultants/Specialty  Surgery Dr. Stock  Infectious diseases.  Orthopedics.      Disposition  To be determined most likely LTAC transfer.      Review of Systems   Constitutional: Negative for chills, diaphoresis and malaise/fatigue.   HENT: Negative.    Eyes: Negative for blurred vision, double vision and pain.   Respiratory: Negative for cough, hemoptysis, shortness of breath and wheezing.    Cardiovascular: Positive for leg swelling. Negative for chest pain and palpitations.   Gastrointestinal: Negative.  Negative for abdominal pain, blood in stool, constipation, diarrhea, heartburn and nausea.   Genitourinary: Negative.  Negative for dysuria, frequency and urgency.   Musculoskeletal: Positive for myalgias. Negative for joint pain and neck pain.        Significant bilateral lower extremity pain   Skin: Positive for rash.   Neurological: Positive for tingling (in fingers) and sensory change (fingers 3-5 both hands). Negative for dizziness, focal weakness, seizures, loss of consciousness and headaches.   Endo/Heme/Allergies: Negative.  Does not bruise/bleed easily.   Psychiatric/Behavioral: Positive for depression. Negative for substance abuse and suicidal ideas. The patient is not nervous/anxious.    All other systems reviewed and are negative.     Physical Exam  Laboratory/Imaging   Hemodynamics  Temp (24hrs), Av.8 °C (98.3 °F), Min:36.3 °C (97.3 °F), Max:37.4 °C (99.3 °F)   Temperature: 36.3 °C (97.3 °F)  Pulse  Av.4  Min: 58  Max: 130 Heart Rate (Monitored): 96  Blood Pressure: 125/77,  NIBP: 119/68      Respiratory      Respiration: 17, Pulse Oximetry: 95 %     Work Of Breathing / Effort: Moderate  RUL Breath Sounds: Clear, RML Breath Sounds: Diminished, RLL Breath Sounds: Diminished, LARY Breath Sounds: Clear, LLL Breath Sounds: Diminished    Fluids    Intake/Output Summary (Last 24 hours) at 05/02/18 0851  Last data filed at 05/02/18 0800   Gross per 24 hour   Intake             1500 ml   Output              900 ml   Net              600 ml       Nutrition  Orders Placed This Encounter   Procedures   • DIET ORDER     Standing Status:   Standing     Number of Occurrences:   1     Order Specific Question:   Diet:     Answer:   Diabetic [3]     Physical Exam   Constitutional: He is oriented to person, place, and time. He is cooperative. No distress. Nasal cannula in place.   HENT:   Head: Normocephalic and atraumatic.   Right Ear: External ear normal.   Left Ear: External ear normal.   Mouth/Throat: No oropharyngeal exudate.   Eyes: Conjunctivae and EOM are normal. Pupils are equal, round, and reactive to light.   Neck: Normal range of motion. Neck supple. No JVD present. No tracheal deviation present. No thyromegaly present.   Cardiovascular: Normal rate, regular rhythm and normal heart sounds.  Exam reveals no gallop and no friction rub.    Pulmonary/Chest: Effort normal and breath sounds normal. No stridor. No respiratory distress. He has no rales. He exhibits no tenderness.   Abdominal: Soft. Bowel sounds are normal. He exhibits no distension and no mass. There is no tenderness. There is no rebound.   Musculoskeletal: Normal range of motion. He exhibits edema. He exhibits no deformity.   Lymphadenopathy:     He has no cervical adenopathy.   Neurological: He is alert and oriented to person, place, and time. He has normal reflexes. No cranial nerve deficit.   Skin: Skin is warm and dry. Rash noted. He is not diaphoretic. There is erythema.   Psychiatric: His speech is normal. Judgment and thought  content normal. His mood appears anxious. He is slowed. Cognition and memory are normal. He exhibits a depressed mood (very tearful and frustrated).   Nursing note and vitals reviewed.      Recent Labs      05/02/18   0418   WBC  14.0*   RBC  3.63*   HEMOGLOBIN  10.1*   HEMATOCRIT  33.7*   MCV  92.8   MCH  27.8   MCHC  30.0*   RDW  50.2*   PLATELETCT  246   MPV  11.1     Recent Labs      04/30/18   0226  05/01/18   0301  05/02/18   0418   SODIUM  137  134*  135   POTASSIUM  4.7  4.4  4.8   CHLORIDE  93*  92*  93*   CO2  35*  36*  34*   GLUCOSE  140*  152*  170*   BUN  26*  27*  23*   CREATININE  0.82  0.72  0.75   CALCIUM  9.9  9.3  9.3                      Assessment/Plan     * Bilateral lower leg cellulitis- (present on admission)   Assessment & Plan    OR debridement 5/1  continue at this point with pain management as well as antibiotic management.  DC PCA DUE to patient developed acute respiratory failure and CO2 retention.   Change patient's oxycodone to 40 mg every 6 hours with prn oxy if pain not abale to control and that he's also on MS Contin 30 mg twice daily.   Close monitor        Sepsis ([facility])- (present on admission)   Assessment & Plan    resolved  White blood cell counts as well as fevers have subsided.  Patient does not have any diaphoresis or chills or fevers.  ID on case  Patient's cultures were positive for Proteus enterococcus faecalis and MSSA.  Spoke to LTAC patient is approved to go to their facility once we have the patient  OR debridement 5/1        ALIREZA (obstructive sleep apnea)- (present on admission)   Assessment & Plan    Continue CPAP at nighttime.  Educated patient on the necessity of weight loss management.        Chronic pain- (present on admission)   Assessment & Plan    DC PCA fentanyl.  Patient is on oxycodone 40 mg every 6 hours+ prn iv dilaudid and po Oxy.  Patient is on MS Contin 30 mg twice daily.        Morbid obesity with BMI of 40.0-44.9, adult ([facility])- (present on  admission)   Assessment & Plan    Body mass index is 44.93 kg/m².  Patient was counseled on weight loss management. The patient will need an outpatient bariatric evaluation and bariatric surgery. For him bariatric surgery will be life saving without it he will live more than 5-10 years.        Chronic respiratory failure (CMS-[facility])- (present on admission)   Assessment & Plan    Continue at this point with RT protocol and nebulizer treatments. The patient sporadically uses nebulizers in the past I explained to him the necessity of keeping his airways open.        Severe protein-calorie malnutrition ([facility])- (present on admission)   Assessment & Plan    Patient will need at this point aggressive protein calorie nutritional support.        Diabetes type 2, controlled ([facility])- (present on admission)   Assessment & Plan    Continue at this point with aggressive diabetic management. Continue with Accu-Cheks and sliding scale coverage. Continue at this point with metformin        Non compliance w medication regimen- (present on admission)   Assessment & Plan    In the past patient was told that he was noncompliant with treatment.        Narcotic addiction (CMS-[facility])- (present on admission)   Assessment & Plan    Currently patient's pain is extreme.        Stasis dermatitis- (present on admission)   Assessment & Plan    OR debridement 5/1        COPD (chronic obstructive pulmonary disease) ([facility])- (present on admission)   Assessment & Plan    Remains on RT protocol. It is        HTN (hypertension)- (present on admission)   Assessment & Plan    Blood pressure stable  Continue home medication            Quality-Core Measures   Reviewed items::  EKG reviewed, Labs reviewed, Medications reviewed and Radiology images reviewed  Montoya catheter::  No Montoya  DVT prophylaxis pharmacological::  Heparin  Ulcer Prophylaxis::  Not indicated  Antibiotics:  Treating active infection/contamination beyond 24 hours perioperative coverage  Assessed  for rehabilitation services:  Patient was assess for and/or received rehabilitation services during this hospitalization     For complexity-based billing, please refer to the history, exam, and decison making above. In addition, I spent >35 minutes caring for the patient today. More than 50% of the time was spent counseling and coordinating care.    I have discussed with RN and CM and SW and other consultants about patient's plan.

## 2018-05-02 NOTE — OR NURSING
SPOKE WITH DR DAWSON REGARDING PT .    PT HAD BEEN RECEIVING FENTANYL ON FLOOR.  PT HAS BEEN VERY DROWSY TO DIFFICULT TO AROUSE. PT HAS COPD.    DR DAWSON ORDERS NARCAN 0.2 MG IV.

## 2018-05-02 NOTE — OR NURSING
Dr. Phan contacted and updated on patients 02 status. Patient oxygen saturation was 84 percent on 6 liters per nasal cannula. Patient on simple mask 6 liters and oxygen saturation 92 percent.     Antoine RT at bedside. Antoine RT assess pt. Recommend pt needs more time to wake up.    Pt has been drowsy. Arouses to voice and touch. Answering question appropriately.    Pt rec'd labetolol for hypertension sbp > 180.  Now running 140-150's systolic.

## 2018-05-02 NOTE — OR NURSING
NARCAN 0.2 MG IV GIVEN.  PT IMMEDIATELY OPENS EYES. WIDE AWAKE.  PT WANTS TO DANGLE AT EDGE OF BED AND CURSING.  EXPLAIN TO PT THAT HE VERY SLEEPY/ UNABLE TO AROUSE DUE TO POOR LUNG FUNCTION.

## 2018-05-02 NOTE — OR NURSING
DR SIU RETURNS CALL.  EXPLAINED THAT PT WAS ON CONTINUOUS PCA. PT WAS VERY SOMNULANT IN PACU. REQUIRING NARCAN 0.2 MG IV,    PACU RN CONCERNED ABOUT CO2 RETENTION ON FLOOR WITH HISTORY OF COPD AND BODY HABITUS.PT HAS PRN PAIN MEDICATIONS.   DR SIU  ORDERS TO DC FENTANYL PCA  AND CONTINUOUS PULSE OX.    MESSAGE RELAYED TO SALAZAR AMAYA   ORDER ENTERED INTO EPIC

## 2018-05-02 NOTE — PROGRESS NOTES
"Pt very irritable throughout the shift. Pt has been sitting up in chair for all of shift. Pt encouraged to elevate legs to help decrease swelling by at least reclining chair, pt refuses and states \"It wont work.\" Pt also refusing to even get out of chair at all even for BM uses a bedpan in the chair even when offered bedside commode.   Pt very upset of PCA pump being DC'd.  Pt refuses to participate in any ADL's at this time.   Will continue to encourage pt.   "

## 2018-05-02 NOTE — PROGRESS NOTES
Infectious Disease Progress Note    Author: Dulce Maria Michel M.D. Date & Time of service: 2018  4:29 PM    Chief Complaint:  Chronic venous stasis ulcerations    Interval History:  2018- MAXIMUM TEMPERATURE 99.3 WBC 14 platelets 246 creatinine 0.75  Labs Reviewed, Medications Reviewed, Radiology Reviewed and Wound Reviewed.    Review of Systems:  Review of Systems   Constitutional: Positive for malaise/fatigue. Negative for fever.   Respiratory: Negative for cough.    Cardiovascular: Positive for leg swelling. Negative for chest pain.   Gastrointestinal: Negative for nausea and vomiting.   Genitourinary: Negative for dysuria.   Musculoskeletal: Positive for myalgias.   Neurological: Negative for sensory change and speech change.       Hemodynamics:  Temp (24hrs), Av.8 °C (98.3 °F), Min:36.3 °C (97.3 °F), Max:37.4 °C (99.3 °F)  Temperature: 36.6 °C (97.9 °F)  Pulse  Av.4  Min: 58  Max: 130Heart Rate (Monitored): 96  Blood Pressure: 125/62, NIBP: 119/68       Physical Exam:  Physical Exam   Constitutional: He is oriented to person, place, and time. No distress.   Obese   HENT:   Mouth/Throat: No oropharyngeal exudate.   Eyes: No scleral icterus.   Neck: Neck supple.   Cardiovascular: Regular rhythm.    No murmur heard.  Pulmonary/Chest: He has no wheezes. He has no rales.   Abdominal: Soft. There is no tenderness. There is no rebound and no guarding.   Musculoskeletal:   Bilateral Unna boots on the legs   Neurological: He is alert and oriented to person, place, and time.   Vitals reviewed.      Meds:    Current Facility-Administered Medications:   •  oxyCODONE immediate-release **OR** oxyCODONE immediate-release  •  labetalol  •  albuterol  •  HYDROmorphone  •  LORazepam  •  oxyCODONE immediate-release  •  morphine ER  •  metFORMIN  •  miconazole 2%-zinc oxide  •  albuterol  •  ammonium lactate  •  ascorbic acid  •  aspirin  •  atorvastatin  •  budesonide-formoterol  •  levothyroxine  •   multivitamin  •  fish oil  •  omeprazole  •  tiotropium  •  zinc sulfate  •  senna-docusate **AND** polyethylene glycol/lytes **AND** magnesium hydroxide **AND** bisacodyl  •  Respiratory Care per Protocol  •  heparin  •  ondansetron  •  ondansetron  •  NS  •  NS  •  furosemide  •  ampicillin-sulbactam (UNASYN) IV  •  acetaminophen  •  diphenhydrAMINE    Labs:  Recent Labs      05/02/18 0418   WBC  14.0*   RBC  3.63*   HEMOGLOBIN  10.1*   HEMATOCRIT  33.7*   MCV  92.8   MCH  27.8   RDW  50.2*   PLATELETCT  246   MPV  11.1   NEUTSPOLYS  79.70*   LYMPHOCYTES  9.60*   MONOCYTES  8.00   EOSINOPHILS  0.90   BASOPHILS  0.40     Recent Labs      04/30/18 0226 05/01/18 0301 05/02/18 0418   SODIUM  137  134*  135   POTASSIUM  4.7  4.4  4.8   CHLORIDE  93*  92*  93*   CO2  35*  36*  34*   GLUCOSE  140*  152*  170*   BUN  26*  27*  23*     Recent Labs      04/30/18 0226 05/01/18 0301 05/02/18   0418   ALBUMIN  3.7  3.5  3.2   CREATININE  0.82  0.72  0.75       Imaging:  Dx-chest-portable (1 View)    Result Date: 4/24/2018 4/24/2018 2:36 PM HISTORY/REASON FOR EXAM:  Sepsis. Fever. TECHNIQUE/EXAM DESCRIPTION AND NUMBER OF VIEWS: Single portable view of the chest. COMPARISON: 3/28/2018 FINDINGS: Hypoinflation. Partial clearing of right lung infiltrates when compared to previous. Left perihilar/left lung base opacities similar to previous findings. Cardiomegaly. Elevation right hemidiaphragm unchanged.     Partial clearing of right lung infiltrates compared to previous. Left perihilar/left lung base atelectasis/possible pneumonia similar to previous findings.    Dx-foot-complete 3+ Left    Result Date: 4/26/2018 4/26/2018 9:20 AM HISTORY/REASON FOR EXAM:  Atraumatic Pain/Swelling/Deformity TECHNIQUE/EXAM DESCRIPTION AND NUMBER OF VIEWS: 3 views of the LEFT foot. COMPARISON: FINDINGS: The alignment is grossly normal. There is no evidence of displaced fracture or dislocation. There is diffuse soft tissue swelling.  "Evaluation limited by apparent bandage or clothing     Diffuse soft tissue swelling.      Micro:  Results     Procedure Component Value Units Date/Time    BLOOD CULTURE [[id number]] Collected:  04/24/18 1428    Order Status:  Completed Specimen:  Blood from Peripheral Updated:  04/29/18 1700     Significant Indicator NEG     Source BLD     Site PERIPHERAL     Blood Culture No growth after 5 days of incubation.    Narrative:       Per Hospital Policy: Only change Specimen Src: to \"Line\" if  specified by physician order.    BLOOD CULTURE [[id number]] Collected:  04/24/18 1420    Order Status:  Completed Specimen:  Blood from Peripheral Updated:  04/29/18 1700     Significant Indicator NEG     Source BLD     Site PERIPHERAL     Blood Culture No growth after 5 days of incubation.    Narrative:       Per Hospital Policy: Only change Specimen Src: to \"Line\" if  specified by physician order.    URINE CULTURE(NEW) [[id number]]  (Abnormal)  (Susceptibility) Collected:  04/24/18 1719    Order Status:  Completed Specimen:  Urine Updated:  04/29/18 0825     Significant Indicator POS (POS)     Source UR     Site --     Urine Culture -- (A)      Klebsiella pneumoniae  ,000 cfu/mL   (A)    Narrative:       Indication for culture:->Emergency Room Patient    Culture & Susceptibility     KLEBSIELLA PNEUMONIAE     Antibiotic Sensitivity Microscan Unit Status    Ampicillin Resistant >16 mcg/mL Final    Method: SENSITIVITY, CLARENCE    Ampicillin/sulbactam Resistant >16/8 mcg/mL Final    Method: SENSITIVITY, CLARENCE    Cefepime Sensitive <=8 mcg/mL Final    Method: SENSITIVITY, CLARENCE    Cefotaxime Sensitive <=2 mcg/mL Final    Method: SENSITIVITY, CLARENCE    Cefotetan Sensitive <=16 mcg/mL Final    Method: SENSITIVITY, CLARENCE    Ceftazidime Sensitive <=1 mcg/mL Final    Method: SENSITIVITY, CLARENCE    Ceftriaxone Sensitive <=8 mcg/mL Final    Method: SENSITIVITY, CLARENCE    Cefuroxime Sensitive <=4 mcg/mL Final    Method: SENSITIVITY, CLARENCE    Cephalothin " Intermediate 16 mcg/mL Final    Method: SENSITIVITY, CLARENCE    Ciprofloxacin Sensitive <=1 mcg/mL Final    Method: SENSITIVITY, CLARENCE    Gentamicin Sensitive <=4 mcg/mL Final    Method: SENSITIVITY, CLARENCE    Levofloxacin Sensitive <=2 mcg/mL Final    Method: SENSITIVITY, CLARENCE    Nitrofurantoin Intermediate 64 mcg/mL Final    Method: SENSITIVITY, CLARENCE    Pip/Tazobactam Sensitive <=16 mcg/mL Final    Method: SENSITIVITY, CLARENCE    Piperacillin Resistant >64 mcg/mL Final    Method: SENSITIVITY, CLARENCE    Tigecycline Sensitive <=2 mcg/mL Final    Method: SENSITIVITY, CLARENCE    Tobramycin Sensitive <=4 mcg/mL Final    Method: SENSITIVITY, CLARENCE    Trimeth/Sulfa Sensitive <=2/38 mcg/mL Final    Method: SENSITIVITY, CLARENCE                       CULTURE WOUND W/ GRAM STAIN [[id number]]  (Abnormal)  (Susceptibility) Collected:  04/24/18 1513    Order Status:  Completed Specimen:  Wound from Left Foot Updated:  04/26/18 1647     Significant Indicator POS (POS)     Source WND     Site LEFT FOOT     Culture Result Wound -- (A)     Gram Stain Result Moderate epithelial cells.  Moderate Gram positive cocci.  Few Gram positive rods.  Rare Gram negative rods.       Culture Result Wound Proteus mirabilis  Heavy growth   (A)      Staphylococcus aureus  Heavy growth   (A)      Enterococcus faecalis  Heavy growth  Combination therapy with ampicillin, penicillin, or  vancomycin (for susceptible strains) plus an aminoglycoside  is usually indicated for serious enterococcal infections,  such as endocarditis unless high-level resistance to both  gentamicin and streptomycin is documented; such combinations  are predicted to result in synergistic killing of the  Enterococcus.   (A)    Culture & Susceptibility     ENTEROCOCCUS FAECALIS     Antibiotic Sensitivity Microscan Unit Status    Ampicillin Sensitive <=2 mcg/mL Final    Method: SENSITIVITY, CLARENCE    Daptomycin Sensitive <=0.5 mcg/mL Final    Method: SENSITIVITY, CLARENCE    Gent Synergy Sensitive <=500 mcg/mL Final     Method: SENSITIVITY, CLARENCE    Penicillin Sensitive 2 mcg/mL Final    Method: SENSITIVITY, CLARENCE    Vancomycin Sensitive 1 mcg/mL Final    Method: SENSITIVITY, CLARENCE              PROTEUS MIRABILIS     Antibiotic Sensitivity Microscan Unit Status    Ampicillin Sensitive <=8 mcg/mL Final    Method: SENSITIVITY, CLARENCE    Cefepime Sensitive <=8 mcg/mL Final    Method: SENSITIVITY, CLARENCE    Cefotaxime Sensitive <=2 mcg/mL Final    Method: SENSITIVITY, CLARENCE    Cefotetan Sensitive <=16 mcg/mL Final    Method: SENSITIVITY, CLARENCE    Ceftazidime Sensitive <=1 mcg/mL Final    Method: SENSITIVITY, CLARENCE    Ceftriaxone Sensitive <=8 mcg/mL Final    Method: SENSITIVITY, CLARENCE    Cefuroxime Sensitive <=4 mcg/mL Final    Method: SENSITIVITY, CLARENCE    Ciprofloxacin Sensitive <=1 mcg/mL Final    Method: SENSITIVITY, CLARENCE    Ertapenem Sensitive <=1 mcg/mL Final    Method: SENSITIVITY, CLARENCE    Gentamicin Sensitive <=4 mcg/mL Final    Method: SENSITIVITY, CLARENCE    Pip/Tazobactam Sensitive <=16 mcg/mL Final    Method: SENSITIVITY, CLARENCE    Tobramycin Sensitive <=4 mcg/mL Final    Method: SENSITIVITY, CLARENCE    Trimeth/Sulfa Sensitive <=2/38 mcg/mL Final    Method: SENSITIVITY, CLARENCE              STAPHYLOCOCCUS AUREUS     Antibiotic Sensitivity Microscan Unit Status    Ampicillin/sulbactam Sensitive <=8/4 mcg/mL Final    Method: SENSITIVITY, CLARENCE    Clindamycin Resistant >4 mcg/mL Final    Method: SENSITIVITY, CLARENCE    Daptomycin Sensitive <=0.5 mcg/mL Final    Method: SENSITIVITY, CLARENCE    Erythromycin Resistant >4 mcg/mL Final    Method: SENSITIVITY, CLARENCE    Moxifloxacin Sensitive <=0.5 mcg/mL Final    Method: SENSITIVITY, CLARENCE    Oxacillin Sensitive <=0.25 mcg/mL Final    Method: SENSITIVITY, CLARENCE    Penicillin Resistant >8 mcg/mL Final    Method: SENSITIVITY, CLARENCE    Tetracycline Sensitive <=4 mcg/mL Final    Method: SENSITIVITY, CLARENCE    Trimeth/Sulfa Sensitive <=0.5/9.5 mcg/mL Final    Method: SENSITIVITY, CLARENCE    Vancomycin Sensitive 1 mcg/mL Final    Method:  SENSITIVITY, CLARENCE                       S. AUREUS BY PCR, NASAL COMPLETE [[id number]]  (Abnormal) Collected:  04/25/18 1540    Order Status:  Completed Specimen:  Nasal from Other Updated:  04/26/18 1456     Staph aureus by PCR POSITIVE (A)     MRSA by PCR Negative    Narrative:       [id number] TRANGZoeMARY HARO          Assessment:  Active Hospital Problems    Diagnosis   • *Bilateral lower leg cellulitis [L03.116, L03.115]   • Sepsis (CMS-HCC) [A41.9]   • Severe protein-calorie malnutrition ([facility]) [E43]   • Non compliance w medication regimen [Z91.14]   • Diabetes type 2, controlled ([facility]) [E11.9]   • Narcotic addiction (CMS-HCC) [F11.20]   • Stasis dermatitis [I87.2]   • COPD (chronic obstructive pulmonary disease) ([facility]) [J44.9]   • HTN (hypertension) [I10]   • ALIREZA (obstructive sleep apnea) [G47.33]   • Chronic pain [G89.29]   • Morbid obesity with BMI of 40.0-44.9, adult ([facility]) [E66.01, Z68.41]   • Chronic respiratory failure (CMS-HCC) [J96.10]       Plan:  Bilateral lower extremity wounds-ongoing workup  Chronic wounds due to venous stasis and lymphedema  Underwent debridement on 5/1/2018  His cultures had shown staph aureus Proteus mirabilis and Enterococcus faecalis  Currently on Unasyn  Aim for at least 10 days of IV antibiotics  Followed by oral    Leukocytosis-new  Monitor  Likely postop    Diabetes mellitus  Keep blood sugars less than 150  Check hemoglobin A1c    Obesity    Prognosis for healing of the wounds  Guarded            Discussed with internal medicine.

## 2018-05-02 NOTE — CARE PLAN
Problem: Pain Management  Goal: Pain level will decrease to patient's comfort goal    Intervention: Follow pain managment plan developed in collaboration with patient and Interdisciplinary Team  Pain not well managed per MAR, pt very upset about PCA being discontinued.       Problem: Mobility  Goal: Risk for activity intolerance will decrease    Intervention: Encourage patient to increase activity level in collaboration with Interdisciplinary Team  Pt encouraged to get out of chair, pt refuses.

## 2018-05-02 NOTE — OR NURSING
PT HAS MAINTAINED AROUSAL. HAS NOT C/O PAIN. C/O OF NOT BEING ABLE TO DANGLE.   PT PLACED ON TRANSPORT.     HOSPITALIST SERVICE CALLED.

## 2018-05-03 LAB
ALBUMIN SERPL BCP-MCNC: 3.2 G/DL (ref 3.2–4.9)
ANION GAP SERPL CALC-SCNC: 8 MMOL/L (ref 0–11.9)
BASOPHILS # BLD AUTO: 0.3 % (ref 0–1.8)
BASOPHILS # BLD: 0.03 K/UL (ref 0–0.12)
BUN SERPL-MCNC: 23 MG/DL (ref 8–22)
CALCIUM SERPL-MCNC: 9.2 MG/DL (ref 8.5–10.5)
CHLORIDE SERPL-SCNC: 92 MMOL/L (ref 96–112)
CO2 SERPL-SCNC: 37 MMOL/L (ref 20–33)
CREAT SERPL-MCNC: 0.8 MG/DL (ref 0.5–1.4)
EOSINOPHIL # BLD AUTO: 0.32 K/UL (ref 0–0.51)
EOSINOPHIL NFR BLD: 3.6 % (ref 0–6.9)
ERYTHROCYTE [DISTWIDTH] IN BLOOD BY AUTOMATED COUNT: 50.4 FL (ref 35.9–50)
EST. AVERAGE GLUCOSE BLD GHB EST-MCNC: 128 MG/DL
GLUCOSE SERPL-MCNC: 184 MG/DL (ref 65–99)
HBA1C MFR BLD: 6.1 % (ref 0–5.6)
HCT VFR BLD AUTO: 33.1 % (ref 42–52)
HGB BLD-MCNC: 9.9 G/DL (ref 14–18)
IMM GRANULOCYTES # BLD AUTO: 0.2 K/UL (ref 0–0.11)
IMM GRANULOCYTES NFR BLD AUTO: 2.2 % (ref 0–0.9)
LYMPHOCYTES # BLD AUTO: 1.44 K/UL (ref 1–4.8)
LYMPHOCYTES NFR BLD: 16.1 % (ref 22–41)
MCH RBC QN AUTO: 28.3 PG (ref 27–33)
MCHC RBC AUTO-ENTMCNC: 29.9 G/DL (ref 33.7–35.3)
MCV RBC AUTO: 94.6 FL (ref 81.4–97.8)
MONOCYTES # BLD AUTO: 0.76 K/UL (ref 0–0.85)
MONOCYTES NFR BLD AUTO: 8.5 % (ref 0–13.4)
NEUTROPHILS # BLD AUTO: 6.21 K/UL (ref 1.82–7.42)
NEUTROPHILS NFR BLD: 69.3 % (ref 44–72)
NRBC # BLD AUTO: 0 K/UL
NRBC BLD-RTO: 0 /100 WBC
PHOSPHATE SERPL-MCNC: 3 MG/DL (ref 2.5–4.5)
PLATELET # BLD AUTO: 222 K/UL (ref 164–446)
PMV BLD AUTO: 10.8 FL (ref 9–12.9)
POTASSIUM SERPL-SCNC: 4.1 MMOL/L (ref 3.6–5.5)
RBC # BLD AUTO: 3.5 M/UL (ref 4.7–6.1)
SODIUM SERPL-SCNC: 137 MMOL/L (ref 135–145)
WBC # BLD AUTO: 9 K/UL (ref 4.8–10.8)

## 2018-05-03 PROCEDURE — 700102 HCHG RX REV CODE 250 W/ 637 OVERRIDE(OP): Performed by: HOSPITALIST

## 2018-05-03 PROCEDURE — 97110 THERAPEUTIC EXERCISES: CPT

## 2018-05-03 PROCEDURE — 700102 HCHG RX REV CODE 250 W/ 637 OVERRIDE(OP): Performed by: INTERNAL MEDICINE

## 2018-05-03 PROCEDURE — 700111 HCHG RX REV CODE 636 W/ 250 OVERRIDE (IP): Performed by: SURGERY

## 2018-05-03 PROCEDURE — 700105 HCHG RX REV CODE 258: Performed by: INTERNAL MEDICINE

## 2018-05-03 PROCEDURE — A9270 NON-COVERED ITEM OR SERVICE: HCPCS | Performed by: HOSPITALIST

## 2018-05-03 PROCEDURE — 83036 HEMOGLOBIN GLYCOSYLATED A1C: CPT

## 2018-05-03 PROCEDURE — 97530 THERAPEUTIC ACTIVITIES: CPT

## 2018-05-03 PROCEDURE — 700111 HCHG RX REV CODE 636 W/ 250 OVERRIDE (IP): Performed by: INTERNAL MEDICINE

## 2018-05-03 PROCEDURE — A9270 NON-COVERED ITEM OR SERVICE: HCPCS | Performed by: INTERNAL MEDICINE

## 2018-05-03 PROCEDURE — 99232 SBSQ HOSP IP/OBS MODERATE 35: CPT | Performed by: INTERNAL MEDICINE

## 2018-05-03 PROCEDURE — 97535 SELF CARE MNGMENT TRAINING: CPT

## 2018-05-03 PROCEDURE — 85025 COMPLETE CBC W/AUTO DIFF WBC: CPT

## 2018-05-03 PROCEDURE — 36415 COLL VENOUS BLD VENIPUNCTURE: CPT

## 2018-05-03 PROCEDURE — 80069 RENAL FUNCTION PANEL: CPT

## 2018-05-03 PROCEDURE — 770001 HCHG ROOM/CARE - MED/SURG/GYN PRIV*

## 2018-05-03 RX ORDER — MORPHINE SULFATE 4 MG/ML
2-4 INJECTION, SOLUTION INTRAMUSCULAR; INTRAVENOUS
Status: DISCONTINUED | OUTPATIENT
Start: 2018-05-03 | End: 2018-05-03

## 2018-05-03 RX ADMIN — AMPICILLIN SODIUM AND SULBACTAM SODIUM 3 G: 2; 1 INJECTION, POWDER, FOR SOLUTION INTRAMUSCULAR; INTRAVENOUS at 12:12

## 2018-05-03 RX ADMIN — OXYCODONE HYDROCHLORIDE 40 MG: 10 TABLET ORAL at 20:45

## 2018-05-03 RX ADMIN — Medication 220 MG: at 09:33

## 2018-05-03 RX ADMIN — MORPHINE SULFATE 30 MG: 30 TABLET, EXTENDED RELEASE ORAL at 20:45

## 2018-05-03 RX ADMIN — STANDARDIZED SENNA CONCENTRATE AND DOCUSATE SODIUM 1 TABLET: 8.6; 5 TABLET, FILM COATED ORAL at 20:52

## 2018-05-03 RX ADMIN — MICONAZOLE NITRATE: 20 CREAM TOPICAL at 08:45

## 2018-05-03 RX ADMIN — OXYCODONE HYDROCHLORIDE 40 MG: 10 TABLET ORAL at 01:10

## 2018-05-03 RX ADMIN — OMEGA-3 FATTY ACIDS CAP 1000 MG 1000 MG: 1000 CAP at 09:32

## 2018-05-03 RX ADMIN — MICONAZOLE NITRATE: 20 CREAM TOPICAL at 01:12

## 2018-05-03 RX ADMIN — LEVOTHYROXINE SODIUM 50 MCG: 50 TABLET ORAL at 05:20

## 2018-05-03 RX ADMIN — METFORMIN HYDROCHLORIDE 500 MG: 500 TABLET, FILM COATED ORAL at 09:33

## 2018-05-03 RX ADMIN — HYDROMORPHONE HYDROCHLORIDE 2 MG: 10 INJECTION, SOLUTION INTRAMUSCULAR; INTRAVENOUS; SUBCUTANEOUS at 14:54

## 2018-05-03 RX ADMIN — OMEGA-3 FATTY ACIDS CAP 1000 MG 1000 MG: 1000 CAP at 17:28

## 2018-05-03 RX ADMIN — THERA TABS 1 TABLET: TAB at 09:33

## 2018-05-03 RX ADMIN — ACETAMINOPHEN 1000 MG: 325 TABLET, FILM COATED ORAL at 20:44

## 2018-05-03 RX ADMIN — OMEGA-3 FATTY ACIDS CAP 1000 MG 1000 MG: 1000 CAP at 12:12

## 2018-05-03 RX ADMIN — OMEPRAZOLE 20 MG: 20 CAPSULE, DELAYED RELEASE ORAL at 09:33

## 2018-05-03 RX ADMIN — MORPHINE SULFATE 30 MG: 30 TABLET, EXTENDED RELEASE ORAL at 09:33

## 2018-05-03 RX ADMIN — HYDROMORPHONE HYDROCHLORIDE 1 MG: 10 INJECTION, SOLUTION INTRAMUSCULAR; INTRAVENOUS; SUBCUTANEOUS at 23:41

## 2018-05-03 RX ADMIN — BUDESONIDE AND FORMOTEROL FUMARATE DIHYDRATE 2 PUFF: 160; 4.5 AEROSOL RESPIRATORY (INHALATION) at 09:35

## 2018-05-03 RX ADMIN — HEPARIN SODIUM 5000 UNITS: 5000 INJECTION, SOLUTION INTRAVENOUS; SUBCUTANEOUS at 13:42

## 2018-05-03 RX ADMIN — OXYCODONE HYDROCHLORIDE AND ACETAMINOPHEN 500 MG: 500 TABLET ORAL at 09:33

## 2018-05-03 RX ADMIN — AMPICILLIN SODIUM AND SULBACTAM SODIUM 3 G: 2; 1 INJECTION, POWDER, FOR SOLUTION INTRAMUSCULAR; INTRAVENOUS at 05:20

## 2018-05-03 RX ADMIN — OXYCODONE HYDROCHLORIDE 40 MG: 10 TABLET ORAL at 13:40

## 2018-05-03 RX ADMIN — HEPARIN SODIUM 5000 UNITS: 5000 INJECTION, SOLUTION INTRAVENOUS; SUBCUTANEOUS at 20:47

## 2018-05-03 RX ADMIN — BUDESONIDE AND FORMOTEROL FUMARATE DIHYDRATE 2 PUFF: 160; 4.5 AEROSOL RESPIRATORY (INHALATION) at 20:45

## 2018-05-03 RX ADMIN — ACETAMINOPHEN 1000 MG: 325 TABLET, FILM COATED ORAL at 02:13

## 2018-05-03 RX ADMIN — OXYCODONE HYDROCHLORIDE 40 MG: 10 TABLET ORAL at 09:32

## 2018-05-03 RX ADMIN — AMPICILLIN SODIUM AND SULBACTAM SODIUM 3 G: 2; 1 INJECTION, POWDER, FOR SOLUTION INTRAMUSCULAR; INTRAVENOUS at 17:41

## 2018-05-03 RX ADMIN — AMPICILLIN SODIUM AND SULBACTAM SODIUM 3 G: 2; 1 INJECTION, POWDER, FOR SOLUTION INTRAMUSCULAR; INTRAVENOUS at 23:41

## 2018-05-03 RX ADMIN — FUROSEMIDE 20 MG: 10 INJECTION, SOLUTION INTRAMUSCULAR; INTRAVENOUS at 09:33

## 2018-05-03 RX ADMIN — OXYCODONE HYDROCHLORIDE 40 MG: 10 TABLET ORAL at 05:20

## 2018-05-03 RX ADMIN — OXYCODONE HYDROCHLORIDE 40 MG: 10 TABLET ORAL at 17:29

## 2018-05-03 RX ADMIN — ATORVASTATIN CALCIUM 40 MG: 40 TABLET, FILM COATED ORAL at 20:52

## 2018-05-03 RX ADMIN — ASPIRIN 81 MG: 81 TABLET, COATED ORAL at 09:32

## 2018-05-03 RX ADMIN — MICONAZOLE NITRATE: 20 CREAM TOPICAL at 15:00

## 2018-05-03 RX ADMIN — METFORMIN HYDROCHLORIDE 500 MG: 500 TABLET, FILM COATED ORAL at 17:29

## 2018-05-03 RX ADMIN — HEPARIN SODIUM 5000 UNITS: 5000 INJECTION, SOLUTION INTRAVENOUS; SUBCUTANEOUS at 05:20

## 2018-05-03 ASSESSMENT — ENCOUNTER SYMPTOMS
SEIZURES: 0
SPUTUM PRODUCTION: 0
VOMITING: 0
GASTROINTESTINAL NEGATIVE: 1
DOUBLE VISION: 0
LOSS OF CONSCIOUSNESS: 0
CONSTIPATION: 0
BLURRED VISION: 0
DEPRESSION: 1
HEMOPTYSIS: 0
BLOOD IN STOOL: 0
PALPITATIONS: 0
CHILLS: 0
NECK PAIN: 0
NAUSEA: 0
MYALGIAS: 1
FOCAL WEAKNESS: 0
SENSORY CHANGE: 1
EYE PAIN: 0
DIAPHORESIS: 0
ABDOMINAL PAIN: 0
BRUISES/BLEEDS EASILY: 0
HEARTBURN: 0
FEVER: 0
WEIGHT LOSS: 0
COUGH: 0
SENSORY CHANGE: 0
SPEECH CHANGE: 0
NERVOUS/ANXIOUS: 0
HEADACHES: 0
DIZZINESS: 0
TINGLING: 1
SHORTNESS OF BREATH: 0

## 2018-05-03 ASSESSMENT — PAIN SCALES - GENERAL
PAINLEVEL_OUTOF10: 8
PAINLEVEL_OUTOF10: 8
PAINLEVEL_OUTOF10: 7
PAINLEVEL_OUTOF10: 5
PAINLEVEL_OUTOF10: 5
PAINLEVEL_OUTOF10: 7
PAINLEVEL_OUTOF10: 9

## 2018-05-03 ASSESSMENT — COGNITIVE AND FUNCTIONAL STATUS - GENERAL
DAILY ACTIVITIY SCORE: 18
MOBILITY SCORE: 11
HELP NEEDED FOR BATHING: A LOT
MOVING FROM LYING ON BACK TO SITTING ON SIDE OF FLAT BED: A LITTLE
WALKING IN HOSPITAL ROOM: TOTAL
CLIMB 3 TO 5 STEPS WITH RAILING: TOTAL
TOILETING: A LOT
TURNING FROM BACK TO SIDE WHILE IN FLAT BAD: A LOT
SUGGESTED CMS G CODE MODIFIER MOBILITY: CL
SUGGESTED CMS G CODE MODIFIER DAILY ACTIVITY: CK
DRESSING REGULAR LOWER BODY CLOTHING: A LOT
STANDING UP FROM CHAIR USING ARMS: A LOT
MOVING TO AND FROM BED TO CHAIR: A LOT

## 2018-05-03 ASSESSMENT — LIFESTYLE VARIABLES: SUBSTANCE_ABUSE: 0

## 2018-05-03 ASSESSMENT — GAIT ASSESSMENTS: GAIT LEVEL OF ASSIST: UNABLE TO PARTICIPATE

## 2018-05-03 NOTE — PROGRESS NOTES
Patient declining to move from chair throughout shift. Declining elevation of legs, continued education provided.

## 2018-05-03 NOTE — PROGRESS NOTES
[facility] Hospitalist Progress Note    Date of Service: 5/3/2018    Chief Complaint  57 y.o. male admitted 4/24/2018 with bilateral lower extremity pain.    Interval Problem Update  Patient admitted with recurrent bilateral cellulitis of the lower extremities. The patient has severe swelling of both lower extremities with pitting edema. The patient does have pulses and vascular studies have never been done on the patient. Patient did have DVT studies in the past which were negative. The patient at this point was evaluated by myself as well as surgery. We at this point feel that the patient's legs can be salvaged without having an amputation thus there is no need for any orthopedic amputation at this point. The patient will need debridement and afterwards will need aggressive wound care management and LTAC. Continue at this point with antibodies I've asked ID to see the patient and they have happily agreed. Patient was started on PCA fentanyl pump. Patient developed acute respiratory failure with CO2 retention with that. Patient's fentanyl pump was stopped. Patient at this point has severe condition and will require long-term management to get him back to a normal. The patient does have obesity which will require a gastric sleeve versus other methods to have him reduce his weight. The patient is over 300 pounds.    5/1 patient still complains of significant pain. Pending vascular surgery for debridement today. Patient's pain is local, 3-4/10, intermittent and does not radiate to other location, sharp and with some tingling. Can be controlled by pain meds. Dressing in place.  Patient otherwise denies fever, chills, nausea, vomiting, adb pain, SOB, CP, headache, constipation, diarrhea, cough, or sputum.  5/2 patient underwent surgical debridement yesterday with Unar Boot placement. Patient tolerated the procedure however developed CO2 retention and acute respiratory failure after that with fentanyl PCA. DC fentanyl PCA.  Patient still complained of severe pain. Patient's pain is local, 6-8/10, intermittent and does not radiate to other location, sharp and with some tingling. Can be controlled by pain meds. Dressing in place. Increased patient oral pain medication. We'll follow respiratory status closely. Patient otherwise denies fever, chills, nausea, vomiting, adb pain, SOB, CP, headache, constipation, diarrhea, cough, or sputum.  5/. Patient's pain is local, 6-8/10, intermittent and does not radiate to other location, sharp and with some tingling. Can be controlled by pain meds. Dressing in place.  Bandage changed by surgeon. Continue monitor. Pending LTAC transfer possibly tomorrow. Continue IV antibiotics and for total of at least 10 days.    Consultants/Specialty  Surgery Dr. Stock  Infectious diseases.  Orthopedics.      Disposition  To be determined most likely LTAC transfer.      Review of Systems   Constitutional: Negative for chills, diaphoresis, malaise/fatigue and weight loss.   HENT: Negative.    Eyes: Negative for blurred vision, double vision and pain.   Respiratory: Negative for cough, hemoptysis, sputum production and shortness of breath.    Cardiovascular: Positive for leg swelling. Negative for chest pain and palpitations.   Gastrointestinal: Negative.  Negative for abdominal pain, blood in stool, constipation, heartburn and nausea.   Genitourinary: Negative.  Negative for dysuria, frequency and hematuria.   Musculoskeletal: Positive for myalgias. Negative for joint pain and neck pain.        Significant bilateral lower extremity pain   Skin: Positive for rash.   Neurological: Positive for tingling (in fingers) and sensory change (fingers 3-5 both hands). Negative for dizziness, focal weakness, seizures, loss of consciousness and headaches.   Endo/Heme/Allergies: Negative.  Does not bruise/bleed easily.   Psychiatric/Behavioral: Positive for depression. Negative for substance abuse and suicidal ideas. The  patient is not nervous/anxious.    All other systems reviewed and are negative.     Physical Exam  Laboratory/Imaging   Hemodynamics  Temp (24hrs), Av.4 °C (97.6 °F), Min:36.1 °C (97 °F), Max:36.8 °C (98.3 °F)   Temperature: 36.2 °C (97.1 °F)  Pulse  Av.1  Min: 58  Max: 130    Blood Pressure: 136/73      Respiratory      Respiration: 18, Pulse Oximetry: 94 %     Work Of Breathing / Effort: Moderate  RUL Breath Sounds: Clear, RML Breath Sounds: Diminished, RLL Breath Sounds: Diminished, LARY Breath Sounds: Clear, LLL Breath Sounds: Diminished    Fluids    Intake/Output Summary (Last 24 hours) at 18 0918  Last data filed at 18 0520   Gross per 24 hour   Intake              740 ml   Output              250 ml   Net              490 ml       Nutrition  Orders Placed This Encounter   Procedures   • DIET ORDER     Standing Status:   Standing     Number of Occurrences:   1     Order Specific Question:   Diet:     Answer:   Diabetic [3]     Physical Exam   Constitutional: He is oriented to person, place, and time. He appears well-developed. He is cooperative. No distress. Nasal cannula in place.   HENT:   Head: Normocephalic and atraumatic.   Mouth/Throat: No oropharyngeal exudate.   Eyes: Conjunctivae are normal. Pupils are equal, round, and reactive to light.   Neck: Normal range of motion. Neck supple. No JVD present. No tracheal deviation present. No thyromegaly present.   Cardiovascular: Normal rate, regular rhythm and normal heart sounds.  Exam reveals no gallop and no friction rub.    Pulmonary/Chest: Effort normal and breath sounds normal. No stridor. No respiratory distress. He has no wheezes. He exhibits no tenderness.   Abdominal: Soft. Bowel sounds are normal. He exhibits no distension and no mass. There is no rebound.   Musculoskeletal: Normal range of motion. He exhibits edema. He exhibits no deformity.   Lymphadenopathy:     He has no cervical adenopathy.   Neurological: He is alert and  oriented to person, place, and time. He has normal reflexes. No cranial nerve deficit.   Skin: Skin is warm and dry. Rash noted. He is not diaphoretic. There is erythema.   Psychiatric: His speech is normal. Judgment and thought content normal. His mood appears anxious. He is slowed. Cognition and memory are normal. He exhibits a depressed mood (very tearful and frustrated).   Nursing note and vitals reviewed.      Recent Labs      05/02/18 0418 05/03/18   0336   WBC  14.0*  9.0   RBC  3.63*  3.50*   HEMOGLOBIN  10.1*  9.9*   HEMATOCRIT  33.7*  33.1*   MCV  92.8  94.6   MCH  27.8  28.3   MCHC  30.0*  29.9*   RDW  50.2*  50.4*   PLATELETCT  246  222   MPV  11.1  10.8     Recent Labs      05/01/18   0301  05/02/18 0418 05/03/18   0337   SODIUM  134*  135  137   POTASSIUM  4.4  4.8  4.1   CHLORIDE  92*  93*  92*   CO2  36*  34*  37*   GLUCOSE  152*  170*  184*   BUN  27*  23*  23*   CREATININE  0.72  0.75  0.80   CALCIUM  9.3  9.3  9.2                      Assessment/Plan     * Bilateral lower leg cellulitis- (present on admission)   Assessment & Plan    OR debridement 5/1  continue at this point with pain management as well as antibiotic management.  DC PCA DUE to patient developed acute respiratory failure and CO2 retention.   Change patient's oxycodone to 40 mg every 6 hours with prn oxy if pain not abale to control and that he's also on MS Contin 30 mg twice daily.   Close monitor, feeling stable        Sepsis (CMS-HCC)- (present on admission)   Assessment & Plan    resolved  White blood cell counts as well as fevers have subsided.  Patient does not have any diaphoresis or chills or fevers.  ID on case  Patient's cultures were positive for Proteus enterococcus faecalis and MSSA.  Spoke to LTAC patient is approved to go to their facility once we have the patient  OR debridement 5/1        ALIREZA (obstructive sleep apnea)- (present on admission)   Assessment & Plan    Continue CPAP at nighttime.  Educated patient  on the necessity of weight loss management.        Chronic pain- (present on admission)   Assessment & Plan    DC PCA fentanyl.  Patient is on oxycodone 40 mg every 6 hours+ prn iv dilaudid and po Oxy.  Patient is on MS Contin 30 mg twice daily.        Morbid obesity with BMI of 40.0-44.9, adult ([facility])- (present on admission)   Assessment & Plan    Body mass index is 44.93 kg/m².  Patient was counseled on weight loss management. The patient will need an outpatient bariatric evaluation and bariatric surgery. For him bariatric surgery will be life saving without it he will live more than 5-10 years.        Chronic respiratory failure (CMS-HCC)- (present on admission)   Assessment & Plan    Continue at this point with RT protocol and nebulizer treatments. The patient sporadically uses nebulizers in the past I explained to him the necessity of keeping his airways open.        Severe protein-calorie malnutrition ([facility])- (present on admission)   Assessment & Plan    Patient will need at this point aggressive protein calorie nutritional support.        Diabetes type 2, controlled ([facility])- (present on admission)   Assessment & Plan    Continue at this point with aggressive diabetic management. Continue with Accu-Cheks and sliding scale coverage. Continue at this point with metformin        Non compliance w medication regimen- (present on admission)   Assessment & Plan    In the past patient was told that he was noncompliant with treatment.        Narcotic addiction (CMS-HCC)- (present on admission)   Assessment & Plan    Currently patient's pain is extreme.        Stasis dermatitis- (present on admission)   Assessment & Plan    OR debridement 5/1        COPD (chronic obstructive pulmonary disease) ([facility])- (present on admission)   Assessment & Plan    Remains on RT protocol. It is        HTN (hypertension)- (present on admission)   Assessment & Plan    Blood pressure stable  Continue home medication            Quality-Core  Measures   Reviewed items::  EKG reviewed, Labs reviewed, Medications reviewed and Radiology images reviewed  Montoya catheter::  No Montoya  DVT prophylaxis pharmacological::  Heparin  Ulcer Prophylaxis::  Not indicated  Antibiotics:  Treating active infection/contamination beyond 24 hours perioperative coverage  Assessed for rehabilitation services:  Patient was assess for and/or received rehabilitation services during this hospitalization     For complexity-based billing, please refer to the history, exam, and decison making above. In addition, I spent >35 minutes caring for the patient today. More than 50% of the time was spent counseling and coordinating care.    I have discussed with RN and CM and SW and other consultants about patient's plan.

## 2018-05-03 NOTE — PROGRESS NOTES
"Have been encouraging pt to elevate legs throughout the shift. When offered many different options to elevate legs such as recliner, placing legs up on padded chair, or up on bed. Pt states \"I've tried all that, it doesn't work.\" Anything offered is shot down by pt stating \"I've tried it all it doesn't work\" Pt states many times understanding of importance of elevating legs but refuses to do so.     Ordered a bariatric bed as pt states his bed is too small and uncomfortable in hopes that he can get in bed and elevate legs that way and implement q2hr turns.   "

## 2018-05-03 NOTE — THERAPY
"Physical Therapy Treatment completed.   Bed Mobility:  Supine to Sit:  (received seated eob)  Transfers: Sit to Stand: Contact Guard Assist  Gait: Level Of Assist: Unable to Participate with Front-Wheel Walker       Plan of Care: Will benefit from Physical Therapy 2 times per week  Discharge Recommendations: Equipment: Will Continue to Assess for Equipment Needs.     See \"Rehab Therapy-Acute\" Patient Summary Report for complete documentation.     Pt still presenting w/ decreased functional mobility. Pts main limitation is his pain in B LE's. Pt can move LE's independently however requires a lot of effort. Pt was able to perform STS w/ less assist but fatigues quickly d/t heavy reliance on UE's. Had a long discussion w/ pt on his goals for therapy. Pt stating \"he wants to start walking again and get intense therapy.\" He also stated \"I just want to know what is wrong w/ my legs and that it gets fixed so he can continue w/ therapy.\" Advised pt on HEP to work on in the chair w/ pt agreeable. As per initial eval, pt will benefit from post acute therapy.  "
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
## 2018-05-03 NOTE — PROGRESS NOTES
Infectious Disease Progress Note    Author: Dulce Maria Michel M.D. Date & Time of service: 5/3/2018  12:30 PM    Chief Complaint:  Chronic venous stasis ulcerations    Interval History:  2018- MAXIMUM TEMPERATURE 99.3 WBC 14 platelets 246 creatinine 0.75  5/3/2018-MAXIMUM TEMPERATURE 98.2 WBC 9 platelets 222 no new issues overnight  Labs Reviewed, Medications Reviewed, Radiology Reviewed and Wound Reviewed.    Review of Systems:  Review of Systems   Constitutional: Positive for malaise/fatigue. Negative for fever.   Respiratory: Negative for cough.    Cardiovascular: Positive for leg swelling. Negative for chest pain.   Gastrointestinal: Negative for nausea and vomiting.   Genitourinary: Negative for dysuria.   Musculoskeletal: Positive for myalgias.        Ongoing drainage   Neurological: Negative for sensory change and speech change.       Hemodynamics:  Temp (24hrs), Av.3 °C (97.4 °F), Min:36.1 °C (97 °F), Max:36.8 °C (98.2 °F)  Temperature: 36.2 °C (97.1 °F)  Pulse  Av.1  Min: 58  Max: 130   Blood Pressure: 136/73       Physical Exam:  Physical Exam   Constitutional: He is oriented to person, place, and time. No distress.   Obese   HENT:   Mouth/Throat: No oropharyngeal exudate.   Eyes: No scleral icterus.   Neck: Neck supple.   Cardiovascular: Regular rhythm.    No murmur heard.  Pulmonary/Chest: He has no wheezes. He has no rales.   Abdominal: Soft. There is no tenderness. There is no rebound and no guarding.   Musculoskeletal:   Bilateral Unna boots on the legs   Neurological: He is alert and oriented to person, place, and time.   Vitals reviewed.      Meds:    Current Facility-Administered Medications:   •  oxyCODONE immediate-release **OR** oxyCODONE immediate-release  •  albuterol  •  HYDROmorphone  •  LORazepam  •  oxyCODONE immediate-release  •  morphine ER  •  metFORMIN  •  miconazole 2%-zinc oxide  •  albuterol  •  ammonium lactate  •  ascorbic acid  •  aspirin  •  atorvastatin  •   budesonide-formoterol  •  levothyroxine  •  multivitamin  •  fish oil  •  omeprazole  •  tiotropium  •  zinc sulfate  •  senna-docusate **AND** polyethylene glycol/lytes **AND** magnesium hydroxide **AND** bisacodyl  •  Respiratory Care per Protocol  •  heparin  •  ondansetron  •  ondansetron  •  NS  •  NS  •  furosemide  •  ampicillin-sulbactam (UNASYN) IV  •  acetaminophen  •  diphenhydrAMINE    Labs:  Recent Labs      05/02/18 0418 05/03/18   0336   WBC  14.0*  9.0   RBC  3.63*  3.50*   HEMOGLOBIN  10.1*  9.9*   HEMATOCRIT  33.7*  33.1*   MCV  92.8  94.6   MCH  27.8  28.3   RDW  50.2*  50.4*   PLATELETCT  246  222   MPV  11.1  10.8   NEUTSPOLYS  79.70*  69.30   LYMPHOCYTES  9.60*  16.10*   MONOCYTES  8.00  8.50   EOSINOPHILS  0.90  3.60   BASOPHILS  0.40  0.30     Recent Labs      05/01/18   0301 05/02/18 0418  05/03/18   0337   SODIUM  134*  135  137   POTASSIUM  4.4  4.8  4.1   CHLORIDE  92*  93*  92*   CO2  36*  34*  37*   GLUCOSE  152*  170*  184*   BUN  27*  23*  23*     Recent Labs      05/01/18   0301  05/02/18 0418  05/03/18   0337   ALBUMIN  3.5  3.2  3.2   CREATININE  0.72  0.75  0.80       Imaging:  Dx-chest-portable (1 View)    Result Date: 4/24/2018 4/24/2018 2:36 PM HISTORY/REASON FOR EXAM:  Sepsis. Fever. TECHNIQUE/EXAM DESCRIPTION AND NUMBER OF VIEWS: Single portable view of the chest. COMPARISON: 3/28/2018 FINDINGS: Hypoinflation. Partial clearing of right lung infiltrates when compared to previous. Left perihilar/left lung base opacities similar to previous findings. Cardiomegaly. Elevation right hemidiaphragm unchanged.     Partial clearing of right lung infiltrates compared to previous. Left perihilar/left lung base atelectasis/possible pneumonia similar to previous findings.    Dx-foot-complete 3+ Left    Result Date: 4/26/2018 4/26/2018 9:20 AM HISTORY/REASON FOR EXAM:  Atraumatic Pain/Swelling/Deformity TECHNIQUE/EXAM DESCRIPTION AND NUMBER OF VIEWS: 3 views of the LEFT foot.  "COMPARISON: FINDINGS: The alignment is grossly normal. There is no evidence of displaced fracture or dislocation. There is diffuse soft tissue swelling. Evaluation limited by apparent bandage or clothing     Diffuse soft tissue swelling.      Micro:  Results     Procedure Component Value Units Date/Time    BLOOD CULTURE [[id number]] Collected:  04/24/18 1428    Order Status:  Completed Specimen:  Blood from Peripheral Updated:  04/29/18 1700     Significant Indicator NEG     Source BLD     Site PERIPHERAL     Blood Culture No growth after 5 days of incubation.    Narrative:       Per Hospital Policy: Only change Specimen Src: to \"Line\" if  specified by physician order.    BLOOD CULTURE [[id number]] Collected:  04/24/18 1420    Order Status:  Completed Specimen:  Blood from Peripheral Updated:  04/29/18 1700     Significant Indicator NEG     Source BLD     Site PERIPHERAL     Blood Culture No growth after 5 days of incubation.    Narrative:       Per Hospital Policy: Only change Specimen Src: to \"Line\" if  specified by physician order.    URINE CULTURE(NEW) [[id number]]  (Abnormal)  (Susceptibility) Collected:  04/24/18 1719    Order Status:  Completed Specimen:  Urine Updated:  04/29/18 0825     Significant Indicator POS (POS)     Source UR     Site --     Urine Culture -- (A)      Klebsiella pneumoniae  ,000 cfu/mL   (A)    Narrative:       Indication for culture:->Emergency Room Patient    Culture & Susceptibility     KLEBSIELLA PNEUMONIAE     Antibiotic Sensitivity Microscan Unit Status    Ampicillin Resistant >16 mcg/mL Final    Method: SENSITIVITY, CLARENCE    Ampicillin/sulbactam Resistant >16/8 mcg/mL Final    Method: SENSITIVITY, CLARENCE    Cefepime Sensitive <=8 mcg/mL Final    Method: SENSITIVITY, CLARENCE    Cefotaxime Sensitive <=2 mcg/mL Final    Method: SENSITIVITY, CLARENCE    Cefotetan Sensitive <=16 mcg/mL Final    Method: SENSITIVITY, CLARENCE    Ceftazidime Sensitive <=1 mcg/mL Final    Method: SENSITIVITY, CLARENCE    " Ceftriaxone Sensitive <=8 mcg/mL Final    Method: SENSITIVITY, CLARENCE    Cefuroxime Sensitive <=4 mcg/mL Final    Method: SENSITIVITY, CLARENCE    Cephalothin Intermediate 16 mcg/mL Final    Method: SENSITIVITY, CLARENCE    Ciprofloxacin Sensitive <=1 mcg/mL Final    Method: SENSITIVITY, CLARENCE    Gentamicin Sensitive <=4 mcg/mL Final    Method: SENSITIVITY, CLARENCE    Levofloxacin Sensitive <=2 mcg/mL Final    Method: SENSITIVITY, CLARENCE    Nitrofurantoin Intermediate 64 mcg/mL Final    Method: SENSITIVITY, CLARENCE    Pip/Tazobactam Sensitive <=16 mcg/mL Final    Method: SENSITIVITY, CLARENCE    Piperacillin Resistant >64 mcg/mL Final    Method: SENSITIVITY, CLARENCE    Tigecycline Sensitive <=2 mcg/mL Final    Method: SENSITIVITY, CLARENCE    Tobramycin Sensitive <=4 mcg/mL Final    Method: SENSITIVITY, CLARENCE    Trimeth/Sulfa Sensitive <=2/38 mcg/mL Final    Method: SENSITIVITY, CLARENCE                       CULTURE WOUND W/ GRAM STAIN [[id number]]  (Abnormal)  (Susceptibility) Collected:  04/24/18 5193    Order Status:  Completed Specimen:  Wound from Left Foot Updated:  04/26/18 3108     Significant Indicator POS (POS)     Source WND     Site LEFT FOOT     Culture Result Wound -- (A)     Gram Stain Result Moderate epithelial cells.  Moderate Gram positive cocci.  Few Gram positive rods.  Rare Gram negative rods.       Culture Result Wound Proteus mirabilis  Heavy growth   (A)      Staphylococcus aureus  Heavy growth   (A)      Enterococcus faecalis  Heavy growth  Combination therapy with ampicillin, penicillin, or  vancomycin (for susceptible strains) plus an aminoglycoside  is usually indicated for serious enterococcal infections,  such as endocarditis unless high-level resistance to both  gentamicin and streptomycin is documented; such combinations  are predicted to result in synergistic killing of the  Enterococcus.   (A)    Culture & Susceptibility     ENTEROCOCCUS FAECALIS     Antibiotic Sensitivity Microscan Unit Status    Ampicillin Sensitive <=2 mcg/mL  Final    Method: SENSITIVITY, CLARENCE    Daptomycin Sensitive <=0.5 mcg/mL Final    Method: SENSITIVITY, CLARENCE    Gent Synergy Sensitive <=500 mcg/mL Final    Method: SENSITIVITY, CLARENCE    Penicillin Sensitive 2 mcg/mL Final    Method: SENSITIVITY, CLARENCE    Vancomycin Sensitive 1 mcg/mL Final    Method: SENSITIVITY, CLARENCE              PROTEUS MIRABILIS     Antibiotic Sensitivity Microscan Unit Status    Ampicillin Sensitive <=8 mcg/mL Final    Method: SENSITIVITY, CLARENCE    Cefepime Sensitive <=8 mcg/mL Final    Method: SENSITIVITY, CLARENCE    Cefotaxime Sensitive <=2 mcg/mL Final    Method: SENSITIVITY, CLARENCE    Cefotetan Sensitive <=16 mcg/mL Final    Method: SENSITIVITY, CLARENCE    Ceftazidime Sensitive <=1 mcg/mL Final    Method: SENSITIVITY, CLARENCE    Ceftriaxone Sensitive <=8 mcg/mL Final    Method: SENSITIVITY, CLARENCE    Cefuroxime Sensitive <=4 mcg/mL Final    Method: SENSITIVITY, CLARENCE    Ciprofloxacin Sensitive <=1 mcg/mL Final    Method: SENSITIVITY, CLARENCE    Ertapenem Sensitive <=1 mcg/mL Final    Method: SENSITIVITY, CLARENCE    Gentamicin Sensitive <=4 mcg/mL Final    Method: SENSITIVITY, CLARENCE    Pip/Tazobactam Sensitive <=16 mcg/mL Final    Method: SENSITIVITY, CLARENCE    Tobramycin Sensitive <=4 mcg/mL Final    Method: SENSITIVITY, CLARENCE    Trimeth/Sulfa Sensitive <=2/38 mcg/mL Final    Method: SENSITIVITY, CLARENCE              STAPHYLOCOCCUS AUREUS     Antibiotic Sensitivity Microscan Unit Status    Ampicillin/sulbactam Sensitive <=8/4 mcg/mL Final    Method: SENSITIVITY, CLARENCE    Clindamycin Resistant >4 mcg/mL Final    Method: SENSITIVITY, CLARENCE    Daptomycin Sensitive <=0.5 mcg/mL Final    Method: SENSITIVITY, CLARENCE    Erythromycin Resistant >4 mcg/mL Final    Method: SENSITIVITY, CLARENCE    Moxifloxacin Sensitive <=0.5 mcg/mL Final    Method: SENSITIVITY, CLARENCE    Oxacillin Sensitive <=0.25 mcg/mL Final    Method: SENSITIVITY, CLARENCE    Penicillin Resistant >8 mcg/mL Final    Method: SENSITIVITY, CLARENCE    Tetracycline Sensitive <=4 mcg/mL Final    Method:  SENSITIVITY, CLARENCE    Trimeth/Sulfa Sensitive <=0.5/9.5 mcg/mL Final    Method: SENSITIVITY, CLARENCE    Vancomycin Sensitive 1 mcg/mL Final    Method: SENSITIVITY, CLARENCE                       S. AUREUS BY PCR, NASAL COMPLETE [[id number]]  (Abnormal) Collected:  04/25/18 1540    Order Status:  Completed Specimen:  Nasal from Other Updated:  04/26/18 1456     Staph aureus by PCR POSITIVE (A)     MRSA by PCR Negative    Narrative:       [id number] ROBERTO HARO          Assessment:  Active Hospital Problems    Diagnosis   • *Bilateral lower leg cellulitis [L03.116, L03.115]   • Sepsis (CMS-HCC) [A41.9]   • Severe protein-calorie malnutrition ([facility]) [E43]   • Non compliance w medication regimen [Z91.14]   • Diabetes type 2, controlled ([facility]) [E11.9]   • Narcotic addiction (CMS-HCC) [F11.20]   • Stasis dermatitis [I87.2]   • COPD (chronic obstructive pulmonary disease) ([facility]) [J44.9]   • HTN (hypertension) [I10]   • ALIREZA (obstructive sleep apnea) [G47.33]   • Chronic pain [G89.29]   • Morbid obesity with BMI of 40.0-44.9, adult ([facility]) [E66.01, Z68.41]   • Chronic respiratory failure (CMS-HCC) [J96.10]       Plan:  Bilateral lower extremity wounds-  Chronic wounds due to venous stasis and lymphedema  Underwent debridement on 5/1/2018  His cultures had shown staph aureus Proteus mirabilis and Enterococcus faecalis  Currently on Unasyn  Aim for at least 10 days of IV antibiotics through 5/11/2018  Followed by oral  Continue wound care and try to keep the edema down    Leukocytosis-new  Monitor  Likely postop    Diabetes mellitus  Keep blood sugars less than 150  Check hemoglobin A1c    Obesity    Prognosis for healing of the wounds  Guarded        ID will sign off. Please call as needed    Discussed with internal medicine.

## 2018-05-03 NOTE — WOUND TEAM
Wound team by to see patient and assess what dressing is in place. Patient with bilateral Unna's boots in place, placed in OR by Dr. Stock. Per nursing and patient, Dr. Stock to come by this evening to change dressings. Viscopaste and coban left in room for MD to use when he arrives to floor. Will coordinate with Dr. Stock regarding POC for dressing/wound care going forward.

## 2018-05-03 NOTE — PROGRESS NOTES
Assumed patient care for NOC. Awake and alert. Patient reporting 9/10 pain. On 5.5 L of o2 via nasal cannula. Declining to move from his recliner to the bed at this time. Waffle cushion provided for chair. Heber bed to be provided to room. Patient refusing to elevate legs. Updated on POC, declines use of bed alarm. x1 assist to stand with FWW. Call light in reach, rounding implemented.

## 2018-05-03 NOTE — OP REPORT
DATE OF SERVICE:  05/01/2018    PREOPERATIVE DIAGNOSES:  1.  Severe bilateral lower extremity venous stasis ulcerations.  2.  Chronic obstructive pulmonary disease.  3.  Congestive heart failure.  4.  Diabetes.  5.  Hypertension.    POSTOPERATIVE DIAGNOSES:  1.  Severe bilateral lower extremity venous stasis ulcerations.  2.  Chronic obstructive pulmonary disease.  3.  Congestive heart failure.  4.  Diabetes.  5.  Hypertension.    PROCEDURE:  1.  Sharp excisional debridement of right lower extremity venous stasis   ulcerations including skin only, approximately 15 cubic cm of tissue was   removed.  2.  Sharp excisional debridement of left lower extremity venous stasis   ulcerations including skin only, approximately 15 cubic cm of tissue was   removed.    SURGEON:  Chino Stock MD    ASSISTANT:  None.    ANESTHESIA:  General.    BLOOD LOSS:  30 mL.    SPECIMENS:  None sent.    DISPOSITION:  Patient was extubated and sent to recovery in stable condition.    DESCRIPTION OF PROCEDURE:  Following informed consent, the patient was placed   supine on the operating table and general anesthesia was administered.    Bilateral lower extremities were scrubbed extensively with Betadine soak and   then they were prepped with Betadine paint and draped in sterile fashion.    Surgical time-out was called, everyone was in agreement.  Patient received   preoperative prophylactic antibiotics.    The procedure began with debridement of the left leg and the surface of the   venous stasis ulcerations was scraped clean using the straight edge of the   DeBakey forcep to physically scrape the surface of the ulcerations clean and   areas where the ulcers were deeper we actually debrided with a 10 blade   scalpel as well as scissors in order to remove the deeper layers in the more   extensive areas of ulceration.  We also used a large curette to debride the   irregular surfaces.  In total, we removed about 15 cubic cm of necrotic  tissue   from the venous stasis ulcerations on the left leg.    We then turned our attention to the venous stasis ulcerations on the right   leg.  These were debrided again using flat edge of the DeBakey forcep as well   as a 10 blade scalpel and also a curette until the surfaces were free of   necrotic sloughing tissue.  Approximately 15 cubic cm of tissue was removed.    The legs were then cleaned and wiped free of all debris.  We then applied Unna   boot dressings to both legs by first layering the medicated gauze wrap from   the foot all the way up to the knee on both sides.  We then covered this layer   with several layers of Kerlix gauze and ABD pads to increase absorption and   then on the outer layer, we placed a Coban wrap with a medium level of   compression.    At this point, the operation was complete.  All counts were correct.  Patient   tolerated the procedure well.  He was extubated and sent to recovery in stable   condition.       ____________________________________     MD SAROJ Spann / MATT    DD:  05/03/2018 00:08:18  DT:  05/03/2018 03:02:22    D#:  [id number]  Job#:  [id number]

## 2018-05-03 NOTE — THERAPY
"Occupational Therapy Treatment completed with focus on ADLs, ADL transfers and patient education.  Functional Status:  Pt seen for OT tx. Pt up in chair at beginning of session. Psychosocial intervention addressed w/ pt regarding current limitations, pain issues and prolonged hospitalization. Pt hyper-verbose about past medical hx and current limitations. Pt given emotional support and encouragement. Pt demonstrated ability to complete sit > stand w/ CGA. Pt limited on standing tolerance and weight shift d/t high pain levels. Pt prefers to sit up in chair, pt declined transfer to [facility]. Pt pleasant and cooperative. Pt expresses motivation to regain strength, endurance and independence in ADLs and ADL transfers. Pt accepting to education regarding increasing OOB activity. Pt easily distracted and required cues to maintain attention to tasks but easily redirected.   Plan of Care: Will benefit from Occupational Therapy 2 times per week  Discharge Recommendations:  Equipment Will Continue to Assess for Equipment Needs.    See \"Rehab Therapy-Acute\" Patient Summary Report for complete documentation.   "

## 2018-05-04 LAB
ALBUMIN SERPL BCP-MCNC: 3.5 G/DL (ref 3.2–4.9)
ANION GAP SERPL CALC-SCNC: 8 MMOL/L (ref 0–11.9)
BASOPHILS # BLD AUTO: 0.5 % (ref 0–1.8)
BASOPHILS # BLD: 0.05 K/UL (ref 0–0.12)
BUN SERPL-MCNC: 21 MG/DL (ref 8–22)
CALCIUM SERPL-MCNC: 9.4 MG/DL (ref 8.5–10.5)
CHLORIDE SERPL-SCNC: 93 MMOL/L (ref 96–112)
CO2 SERPL-SCNC: 36 MMOL/L (ref 20–33)
CREAT SERPL-MCNC: 0.73 MG/DL (ref 0.5–1.4)
EOSINOPHIL # BLD AUTO: 0.28 K/UL (ref 0–0.51)
EOSINOPHIL NFR BLD: 2.9 % (ref 0–6.9)
ERYTHROCYTE [DISTWIDTH] IN BLOOD BY AUTOMATED COUNT: 50.6 FL (ref 35.9–50)
GLUCOSE SERPL-MCNC: 121 MG/DL (ref 65–99)
HCT VFR BLD AUTO: 34.3 % (ref 42–52)
HGB BLD-MCNC: 10.3 G/DL (ref 14–18)
IMM GRANULOCYTES # BLD AUTO: 0.17 K/UL (ref 0–0.11)
IMM GRANULOCYTES NFR BLD AUTO: 1.7 % (ref 0–0.9)
LYMPHOCYTES # BLD AUTO: 1.67 K/UL (ref 1–4.8)
LYMPHOCYTES NFR BLD: 17.2 % (ref 22–41)
MCH RBC QN AUTO: 28.5 PG (ref 27–33)
MCHC RBC AUTO-ENTMCNC: 30 G/DL (ref 33.7–35.3)
MCV RBC AUTO: 94.8 FL (ref 81.4–97.8)
MONOCYTES # BLD AUTO: 0.71 K/UL (ref 0–0.85)
MONOCYTES NFR BLD AUTO: 7.3 % (ref 0–13.4)
NEUTROPHILS # BLD AUTO: 6.84 K/UL (ref 1.82–7.42)
NEUTROPHILS NFR BLD: 70.4 % (ref 44–72)
NRBC # BLD AUTO: 0 K/UL
NRBC BLD-RTO: 0 /100 WBC
PHOSPHATE SERPL-MCNC: 3 MG/DL (ref 2.5–4.5)
PLATELET # BLD AUTO: 251 K/UL (ref 164–446)
PMV BLD AUTO: 11 FL (ref 9–12.9)
POTASSIUM SERPL-SCNC: 4.3 MMOL/L (ref 3.6–5.5)
RBC # BLD AUTO: 3.62 M/UL (ref 4.7–6.1)
SODIUM SERPL-SCNC: 137 MMOL/L (ref 135–145)
WBC # BLD AUTO: 9.7 K/UL (ref 4.8–10.8)

## 2018-05-04 PROCEDURE — 700111 HCHG RX REV CODE 636 W/ 250 OVERRIDE (IP): Performed by: SURGERY

## 2018-05-04 PROCEDURE — 700111 HCHG RX REV CODE 636 W/ 250 OVERRIDE (IP): Performed by: INTERNAL MEDICINE

## 2018-05-04 PROCEDURE — A9270 NON-COVERED ITEM OR SERVICE: HCPCS | Performed by: INTERNAL MEDICINE

## 2018-05-04 PROCEDURE — 700102 HCHG RX REV CODE 250 W/ 637 OVERRIDE(OP): Performed by: HOSPITALIST

## 2018-05-04 PROCEDURE — 700102 HCHG RX REV CODE 250 W/ 637 OVERRIDE(OP): Performed by: INTERNAL MEDICINE

## 2018-05-04 PROCEDURE — 85025 COMPLETE CBC W/AUTO DIFF WBC: CPT

## 2018-05-04 PROCEDURE — 80069 RENAL FUNCTION PANEL: CPT

## 2018-05-04 PROCEDURE — 700105 HCHG RX REV CODE 258: Performed by: INTERNAL MEDICINE

## 2018-05-04 PROCEDURE — 36415 COLL VENOUS BLD VENIPUNCTURE: CPT

## 2018-05-04 PROCEDURE — 99232 SBSQ HOSP IP/OBS MODERATE 35: CPT | Performed by: INTERNAL MEDICINE

## 2018-05-04 PROCEDURE — A9270 NON-COVERED ITEM OR SERVICE: HCPCS | Performed by: HOSPITALIST

## 2018-05-04 PROCEDURE — 770001 HCHG ROOM/CARE - MED/SURG/GYN PRIV*

## 2018-05-04 RX ORDER — HEPARIN SODIUM 5000 [USP'U]/ML
5000 INJECTION, SOLUTION INTRAVENOUS; SUBCUTANEOUS EVERY 8 HOURS
Refills: 0
Start: 2018-05-04

## 2018-05-04 RX ORDER — ACETAMINOPHEN 500 MG
1000 TABLET ORAL EVERY 6 HOURS
Qty: 30 TAB | Refills: 0
Start: 2018-05-04

## 2018-05-04 RX ORDER — OXYCODONE HYDROCHLORIDE 5 MG/1
5 TABLET ORAL EVERY 6 HOURS PRN
Qty: 5 TAB | Refills: 0
Start: 2018-05-04 | End: 2018-05-06

## 2018-05-04 RX ORDER — OXYCODONE HYDROCHLORIDE 20 MG/1
40 TABLET ORAL EVERY 4 HOURS
Qty: 5 TAB | Refills: 0 | Status: SHIPPED | OUTPATIENT
Start: 2018-05-04 | End: 2018-05-07

## 2018-05-04 RX ORDER — FUROSEMIDE 10 MG/ML
20 INJECTION INTRAMUSCULAR; INTRAVENOUS DAILY
Refills: 0
Start: 2018-05-05

## 2018-05-04 RX ORDER — MORPHINE SULFATE 30 MG/1
30 TABLET, FILM COATED, EXTENDED RELEASE ORAL EVERY 12 HOURS
Qty: 4 TAB | Refills: 0 | Status: SHIPPED | OUTPATIENT
Start: 2018-05-04 | End: 2018-05-06

## 2018-05-04 RX ADMIN — HYDROMORPHONE HYDROCHLORIDE 2 MG: 10 INJECTION, SOLUTION INTRAMUSCULAR; INTRAVENOUS; SUBCUTANEOUS at 09:06

## 2018-05-04 RX ADMIN — OXYCODONE HYDROCHLORIDE AND ACETAMINOPHEN 500 MG: 500 TABLET ORAL at 10:38

## 2018-05-04 RX ADMIN — FUROSEMIDE 20 MG: 10 INJECTION, SOLUTION INTRAMUSCULAR; INTRAVENOUS at 10:37

## 2018-05-04 RX ADMIN — MICONAZOLE NITRATE: 20 CREAM TOPICAL at 03:55

## 2018-05-04 RX ADMIN — OMEGA-3 FATTY ACIDS CAP 1000 MG 1000 MG: 1000 CAP at 18:24

## 2018-05-04 RX ADMIN — MORPHINE SULFATE 30 MG: 30 TABLET, EXTENDED RELEASE ORAL at 21:09

## 2018-05-04 RX ADMIN — MORPHINE SULFATE 30 MG: 30 TABLET, EXTENDED RELEASE ORAL at 09:07

## 2018-05-04 RX ADMIN — OXYCODONE HYDROCHLORIDE 40 MG: 10 TABLET ORAL at 06:00

## 2018-05-04 RX ADMIN — METFORMIN HYDROCHLORIDE 500 MG: 500 TABLET, FILM COATED ORAL at 18:24

## 2018-05-04 RX ADMIN — HEPARIN SODIUM 5000 UNITS: 5000 INJECTION, SOLUTION INTRAVENOUS; SUBCUTANEOUS at 21:14

## 2018-05-04 RX ADMIN — BUDESONIDE AND FORMOTEROL FUMARATE DIHYDRATE 2 PUFF: 160; 4.5 AEROSOL RESPIRATORY (INHALATION) at 21:11

## 2018-05-04 RX ADMIN — LORAZEPAM 0.5 MG: 2 INJECTION INTRAMUSCULAR; INTRAVENOUS at 09:04

## 2018-05-04 RX ADMIN — HEPARIN SODIUM 5000 UNITS: 5000 INJECTION, SOLUTION INTRAVENOUS; SUBCUTANEOUS at 06:00

## 2018-05-04 RX ADMIN — OXYCODONE HYDROCHLORIDE 40 MG: 10 TABLET ORAL at 02:12

## 2018-05-04 RX ADMIN — LEVOTHYROXINE SODIUM 50 MCG: 50 TABLET ORAL at 06:00

## 2018-05-04 RX ADMIN — AMPICILLIN SODIUM AND SULBACTAM SODIUM 3 G: 2; 1 INJECTION, POWDER, FOR SOLUTION INTRAMUSCULAR; INTRAVENOUS at 06:00

## 2018-05-04 RX ADMIN — OXYCODONE HYDROCHLORIDE 40 MG: 10 TABLET ORAL at 09:07

## 2018-05-04 RX ADMIN — AMPICILLIN SODIUM AND SULBACTAM SODIUM 3 G: 2; 1 INJECTION, POWDER, FOR SOLUTION INTRAMUSCULAR; INTRAVENOUS at 13:21

## 2018-05-04 RX ADMIN — OXYCODONE HYDROCHLORIDE 40 MG: 10 TABLET ORAL at 22:22

## 2018-05-04 RX ADMIN — STANDARDIZED SENNA CONCENTRATE AND DOCUSATE SODIUM 1 TABLET: 8.6; 5 TABLET, FILM COATED ORAL at 21:10

## 2018-05-04 RX ADMIN — AMPICILLIN SODIUM AND SULBACTAM SODIUM 3 G: 2; 1 INJECTION, POWDER, FOR SOLUTION INTRAMUSCULAR; INTRAVENOUS at 21:12

## 2018-05-04 RX ADMIN — HEPARIN SODIUM 5000 UNITS: 5000 INJECTION, SOLUTION INTRAVENOUS; SUBCUTANEOUS at 15:30

## 2018-05-04 RX ADMIN — ATORVASTATIN CALCIUM 40 MG: 40 TABLET, FILM COATED ORAL at 21:09

## 2018-05-04 RX ADMIN — OXYCODONE HYDROCHLORIDE 40 MG: 10 TABLET ORAL at 18:23

## 2018-05-04 RX ADMIN — HYDROMORPHONE HYDROCHLORIDE 1 MG: 10 INJECTION, SOLUTION INTRAMUSCULAR; INTRAVENOUS; SUBCUTANEOUS at 01:03

## 2018-05-04 ASSESSMENT — PAIN SCALES - GENERAL
PAINLEVEL_OUTOF10: 9
PAINLEVEL_OUTOF10: 10
PAINLEVEL_OUTOF10: 8
PAINLEVEL_OUTOF10: 8
PAINLEVEL_OUTOF10: 7
PAINLEVEL_OUTOF10: 10

## 2018-05-04 ASSESSMENT — ENCOUNTER SYMPTOMS
BRUISES/BLEEDS EASILY: 0
DEPRESSION: 1
FEVER: 0
FOCAL WEAKNESS: 0
GASTROINTESTINAL NEGATIVE: 1
PALPITATIONS: 0
MYALGIAS: 1
HEMOPTYSIS: 0
HEADACHES: 0
BLOOD IN STOOL: 0
CHILLS: 0
DIAPHORESIS: 0
BLURRED VISION: 0
NAUSEA: 0
SHORTNESS OF BREATH: 0
EYE PAIN: 0
SEIZURES: 0
ABDOMINAL PAIN: 0
SPUTUM PRODUCTION: 0
LOSS OF CONSCIOUSNESS: 0
HEARTBURN: 0
NECK PAIN: 0
SENSORY CHANGE: 1
DIZZINESS: 0
CONSTIPATION: 0
NERVOUS/ANXIOUS: 0
TINGLING: 1
COUGH: 0

## 2018-05-04 ASSESSMENT — LIFESTYLE VARIABLES: SUBSTANCE_ABUSE: 0

## 2018-05-04 NOTE — PROGRESS NOTES
[facility] Hospitalist Progress Note    Date of Service: 5/4/2018    Chief Complaint  57 y.o. male admitted 4/24/2018 with bilateral lower extremity pain.    Interval Problem Update  Patient admitted with recurrent bilateral cellulitis of the lower extremities. The patient has severe swelling of both lower extremities with pitting edema. The patient does have pulses and vascular studies have never been done on the patient. Patient did have DVT studies in the past which were negative. The patient at this point was evaluated by myself as well as surgery. We at this point feel that the patient's legs can be salvaged without having an amputation thus there is no need for any orthopedic amputation at this point. The patient will need debridement and afterwards will need aggressive wound care management and LTAC. Continue at this point with antibodies I've asked ID to see the patient and they have happily agreed. Patient was started on PCA fentanyl pump. Patient developed acute respiratory failure with CO2 retention with that. Patient's fentanyl pump was stopped. Patient at this point has severe condition and will require long-term management to get him back to a normal. The patient does have obesity which will require a gastric sleeve versus other methods to have him reduce his weight. The patient is over 300 pounds.    5/1 patient still complains of significant pain. Pending vascular surgery for debridement today. Patient's pain is local, 3-4/10, intermittent and does not radiate to other location, sharp and with some tingling. Can be controlled by pain meds. Dressing in place.  Patient otherwise denies fever, chills, nausea, vomiting, adb pain, SOB, CP, headache, constipation, diarrhea, cough, or sputum.  5/2 patient underwent surgical debridement yesterday with Unar Boot placement. Patient tolerated the procedure however developed CO2 retention and acute respiratory failure after that with fentanyl PCA. DC fentanyl PCA.  Patient still complained of severe pain. Patient's pain is local, 6-8/10, intermittent and does not radiate to other location, sharp and with some tingling. Can be controlled by pain meds. Dressing in place. Increased patient oral pain medication. We'll follow respiratory status closely. Patient otherwise denies fever, chills, nausea, vomiting, adb pain, SOB, CP, headache, constipation, diarrhea, cough, or sputum.  5/. Patient's pain is local, 6-8/10, intermittent and does not radiate to other location, sharp and with some tingling. Can be controlled by pain meds. Dressing in place.  Bandage changed by surgeon. Continue monitor. Pending LTAC transfer possibly tomorrow. Continue IV antibiotics and for total of at least 10 days.  5/4 stable and had dressing changed this morning. Very painful per patient. Patient's pain is local, 6-8/10, intermittent and does not radiate to other location, sharp and with some tingling. Can be controlled by pain meds. Dressing in place.  Patient otherwise denies fever, chills, nausea, vomiting, adb pain, SOB, CP, headache, constipation, diarrhea, cough, or sputum.      Consultants/Specialty  Surgery Dr. Stock  Infectious diseases.  Orthopedics.      Disposition  To be determined most likely LTAC transfer.      Review of Systems   Constitutional: Negative for chills, diaphoresis and fever.   HENT: Negative.    Eyes: Negative for blurred vision and pain.   Respiratory: Negative for cough, hemoptysis, sputum production and shortness of breath.    Cardiovascular: Positive for leg swelling. Negative for chest pain and palpitations.   Gastrointestinal: Negative.  Negative for abdominal pain, blood in stool, constipation, heartburn and nausea.   Genitourinary: Negative.  Negative for dysuria, frequency and hematuria.   Musculoskeletal: Positive for myalgias. Negative for joint pain and neck pain.        Significant bilateral lower extremity pain   Skin: Positive for rash.   Neurological:  Positive for tingling (in fingers) and sensory change (fingers 3-5 both hands). Negative for dizziness, focal weakness, seizures, loss of consciousness and headaches.   Endo/Heme/Allergies: Negative.  Does not bruise/bleed easily.   Psychiatric/Behavioral: Positive for depression. Negative for substance abuse and suicidal ideas. The patient is not nervous/anxious.    All other systems reviewed and are negative.     Physical Exam  Laboratory/Imaging   Hemodynamics  Temp (24hrs), Av.3 °C (97.4 °F), Min:36.2 °C (97.1 °F), Max:36.4 °C (97.6 °F)   Temperature: 36.4 °C (97.5 °F)  Pulse  Av.7  Min: 58  Max: 130    Blood Pressure: (!) 177/94      Respiratory      Respiration: 20, Pulse Oximetry: 94 %     Work Of Breathing / Effort: Mild  RUL Breath Sounds: Clear, RML Breath Sounds: Diminished, RLL Breath Sounds: Diminished, LARY Breath Sounds: Clear, LLL Breath Sounds: Diminished    Fluids    Intake/Output Summary (Last 24 hours) at 18 0900  Last data filed at 18 0600   Gross per 24 hour   Intake              920 ml   Output             1350 ml   Net             -430 ml       Nutrition  Orders Placed This Encounter   Procedures   • DIET ORDER     Standing Status:   Standing     Number of Occurrences:   1     Order Specific Question:   Diet:     Answer:   Diabetic [3]     Physical Exam   Constitutional: He is oriented to person, place, and time. He appears well-developed. He is cooperative. No distress. Nasal cannula in place.   HENT:   Head: Normocephalic and atraumatic.   Mouth/Throat: No oropharyngeal exudate.   Eyes: Conjunctivae are normal. Pupils are equal, round, and reactive to light.   Neck: Normal range of motion. Neck supple. No thyromegaly present.   Cardiovascular: Normal rate, regular rhythm and normal heart sounds.  Exam reveals no gallop and no friction rub.    Pulmonary/Chest: Effort normal and breath sounds normal. No respiratory distress. He has no wheezes. He has no rales.    Abdominal: Soft. He exhibits no mass. There is no tenderness. There is no rebound and no guarding.   Musculoskeletal: Normal range of motion. He exhibits edema. He exhibits no deformity.   Lymphadenopathy:     He has no cervical adenopathy.   Neurological: He is alert and oriented to person, place, and time. He has normal reflexes. No cranial nerve deficit.   Skin: Skin is warm and dry. Rash noted. He is not diaphoretic. There is erythema.   Psychiatric: His speech is normal. Judgment and thought content normal. His mood appears anxious. He is slowed. Cognition and memory are normal. He exhibits a depressed mood (very tearful and frustrated).   Nursing note and vitals reviewed.      Recent Labs      05/02/18 0418 05/03/18 0336 05/04/18   0242   WBC  14.0*  9.0  9.7   RBC  3.63*  3.50*  3.62*   HEMOGLOBIN  10.1*  9.9*  10.3*   HEMATOCRIT  33.7*  33.1*  34.3*   MCV  92.8  94.6  94.8   MCH  27.8  28.3  28.5   MCHC  30.0*  29.9*  30.0*   RDW  50.2*  50.4*  50.6*   PLATELETCT  246  222  251   MPV  11.1  10.8  11.0     Recent Labs      05/02/18 0418 05/03/18 0337  05/04/18   0242   SODIUM  135  137  137   POTASSIUM  4.8  4.1  4.3   CHLORIDE  93*  92*  93*   CO2  34*  37*  36*   GLUCOSE  170*  184*  121*   BUN  23*  23*  21   CREATININE  0.75  0.80  0.73   CALCIUM  9.3  9.2  9.4                      Assessment/Plan     * Bilateral lower leg cellulitis- (present on admission)   Assessment & Plan    OR debridement 5/1  continue at this point with pain management as well as antibiotic management.  DC PCA DUE to patient developed acute respiratory failure and CO2 retention.   Change patient's oxycodone to 40 mg every 6 hours with prn oxy if pain not abale to control and that he's also on MS Contin 30 mg twice daily.   Close monitor, feeling stable  Dressing changed today by surgery. Okay to discharge tomorrow to LTAC        Sepsis (CMS-HCC)- (present on admission)   Assessment & Plan    resolved  White blood cell  counts as well as fevers have subsided.  Patient does not have any diaphoresis or chills or fevers.  ID on case  Patient's cultures were positive for Proteus enterococcus faecalis and MSSA.  Spoke to LTAC patient is approved to go to their facility once we have the patient  OR debridement 5/1        ALIREZA (obstructive sleep apnea)- (present on admission)   Assessment & Plan    Continue CPAP at nighttime.  Educated patient on the necessity of weight loss management.        Chronic pain- (present on admission)   Assessment & Plan    DC PCA fentanyl.  Patient is on oxycodone 40 mg every 6 hours+ prn iv dilaudid and po Oxy.  Patient is on MS Contin 30 mg twice daily.        Morbid obesity with BMI of 40.0-44.9, adult ([facility])- (present on admission)   Assessment & Plan    Body mass index is 44.93 kg/m².  Patient was counseled on weight loss management. The patient will need an outpatient bariatric evaluation and bariatric surgery. For him bariatric surgery will be life saving without it he will live more than 5-10 years.        Chronic respiratory failure (CMS-HCC)- (present on admission)   Assessment & Plan    Continue at this point with RT protocol and nebulizer treatments. The patient sporadically uses nebulizers in the past I explained to him the necessity of keeping his airways open.        Severe protein-calorie malnutrition (HCC)- (present on admission)   Assessment & Plan    Patient will need at this point aggressive protein calorie nutritional support.        Diabetes type 2, controlled ([facility])- (present on admission)   Assessment & Plan    Continue at this point with aggressive diabetic management. Continue with Accu-Cheks and sliding scale coverage. Continue at this point with metformin        Non compliance w medication regimen- (present on admission)   Assessment & Plan    In the past patient was told that he was noncompliant with treatment.        Narcotic addiction (CMS-HCC)- (present on admission)   Assessment  & Plan    Currently patient's pain is extreme.        Stasis dermatitis- (present on admission)   Assessment & Plan    OR debridement 5/1        COPD (chronic obstructive pulmonary disease) (HCC)- (present on admission)   Assessment & Plan    Remains on RT protocol. It is        HTN (hypertension)- (present on admission)   Assessment & Plan    Blood pressure stable  Continue home medication            Quality-Core Measures   Reviewed items::  EKG reviewed, Labs reviewed, Medications reviewed and Radiology images reviewed  Montoya catheter::  No Montoya  DVT prophylaxis pharmacological::  Heparin  Ulcer Prophylaxis::  Not indicated  Antibiotics:  Treating active infection/contamination beyond 24 hours perioperative coverage  Assessed for rehabilitation services:  Patient was assess for and/or received rehabilitation services during this hospitalization     For complexity-based billing, please refer to the history, exam, and decison making above. In addition, I spent >35 minutes caring for the patient today. More than 50% of the time was spent counseling and coordinating care.    I have discussed with RN and CM and SW and other consultants about patient's plan.

## 2018-05-04 NOTE — DISCHARGE PLANNING
Received transport request from [facility]ordin  Patient to transfer to [facility] 05-05-18 at 1000 via [facility].

## 2018-05-04 NOTE — WOUND TEAM
"[facility]  Inpatient Services  Wound and Skin Care Progress Note    Admission Date:  04/24/18     HPI, PMH, SH: Reviewed  Unit where seen by Wound Team: [id number]    WOUND CONSULT RELATED TO: Bilateral lower extremity wounds     SUBJECTIVE:  \"The left one is bad.\"    Self Report / Pain Level: still with pain despite pre-medication     OBJECTIVE:  In bed, previous dressing in place.    WOUND TYPE, LOCATION, CHARACTERISTICS (Pressure ulcers: location, stage, POA or date identified)    Location and type of wound: IAD with full thickness breakdown, posterior bilateral thighs and sacrum, all POA - Not assessed on this visit        Periwound:                               Intact  Drainage:                                          None    Tissue Type and %:                              100% red to buttocks and perineum, Bilateral                                                                  upper thighs brown, dried slough.  Wound Edges:                              Closed  Odor:                                NONE  Exposed structure(s):                             NONE  S&S of Infection:                                     NONE      Measurements:                                      Taken 04/25/2018  Length:                                                    > 75% of buttocks, perineum and bilateral upper                                                                  Thighs (SEE PHOTO)     Width:                                > 75% of buttocks, perineum and bilateral upper                                                                   Thighs (SEE PHOTO)  Depth:      Location and type of wound: Bilateral lower legs and feet, partial to full thickness wounds         Periwound:                               Some maceration otherwise intact   Drainage:                                          Heavy Serosanguinous drainage primarily from posterior calf's    Tissue Type and %:                              " Pink/red    Wound Edges:                              Primarily open with some areas attached.  Odor:                                NONE  Exposed structure(s):                             NONE  S&S of Infection:                                     NONE      NM -- see photos     Location and type of wound: left lateral heel, unstageable pressure ulcer POA -- post debridement, hard discolored tissue, may all be thick callous         Periwound:                               Macerated  Drainage:                                          Moderate, Serosanginous    Tissue Type and %:                              Pink and Red with 25% yellow/brown   Wound Edges:                              OPEN  Odor:                                NONE  Exposed structure(s):                             NONE  S&S of Infection:                                     NONE      NM -- see photo    INTERVENTIONS BY WOUND TEAM:  Met Dr. Stock at bedside and removed bilateral leg wraps.  Cleansed with warm wash cloth and NS.  Photograph and measurements taken.  Aquacel AG to bilateral calfs and dorsal feet.  Additional ABD pads to calfs.  Visco paste applied bilaterally covered with 2 layer compression wraps.                            Interdisciplinary consultation:  Dr. Stock, RN, patient     EVALUATION:  Patient had surgical debridement and compression wraps applied in OR with Dr. Stock 05/01.  Per Dr. Stock legs and swelling greatly improved.  Will change wraps every 3 days for now, OK to change every other day if drainage overwhelming dressing.     Factors affecting wound healing: immobility, non compliance, obesity, diabetes  Goals: manage exudate    NURSING PLAN OF CARE ORDERS (X):    Dressing changes: See Dressing Maintenance orders: X  Skin care: See Skin Care orders: X  Rectal tube care: See Rectal Tube Care orders:   Other orders:    WOUND TEAM PLAN OF CARE (X):   NPWT change 3 x week:        Dressing changes by wound team:        Follow up as needed:  X     Other (explain):    Anticipated discharge plans (X):  SNF:           Home Care:  X as previously ordered at last admission         Outpatient Wound Center:            Self Care:            Other:

## 2018-05-04 NOTE — PROGRESS NOTES
Bedside report received RN to RN with patient. Assuming care [id number].  Patient sleeping in recliner with feet elevated on side of bed.

## 2018-05-04 NOTE — PROGRESS NOTES
Patient awake and alert. Sitting in the chair. Declines moving to bed or reclining legs. Dressing in place to BLE. Medicated per MAR for pain. Updated on POC, call light in reach, rounding implemented.

## 2018-05-04 NOTE — PROGRESS NOTES
Patient is in recliner and refusing to be moved to bed. He is very drowsy and sliding forward with concern that he will slip off chair. He has been educated and now agrees to be moved to the bed.

## 2018-05-04 NOTE — CARE PLAN
Problem: Discharge Barriers/Planning  Goal: Patient's continuum of care needs will be met  Outcome: PROGRESSING AS EXPECTED  Patient accepted to [facility] pending medical clearance. /SW involved  Continuing IV abx.    Problem: Pain Management  Goal: Pain level will decrease to patient's comfort goal  Outcome: PROGRESSING SLOWER THAN EXPECTED  Patient reporting constant pain. Additional PRN interventions ordered this evening. Patient calling to request PRN medications in addition to scheduled medications.

## 2018-05-04 NOTE — DISCHARGE PLANNING
Medical Social Worker    ALEXX spoke with attending MD, pt is medically cleared tomorrow, 5/5/18, to transport to LTAC. It was requested that this SW reach out to [facility] Liaison to have transport arranged for tomorrow.     Add:  ALEXX spoke to [facility] Liaison- Kiana. ALEXX informed her of the above information. She stated that she will work on getting an accepting MD and will bring [facility] paperwork for pt's attending MD to complete, today.     Add:  [facility] ppwk has been delivered to attending MD by Cesar Bruno. Kiana requested a 1000 [facility] transport time to [facility]. ALEXX faxed transport and PCS form to LIBORIO Suresh to arrange transport tomorrow, 5/5/18, to [facility] via [facility].

## 2018-05-04 NOTE — DISCHARGE PLANNING
Medical Social Worker    ALEXX informed bedside/charge RNs that [facility] transport is tentatively arranged for 1000 tomorrow, 5/5/18 and that this SW has left report for the weekend SW to confirm this time. ALEXX informed bedside RN that a DC Summary has not yet been completed by attending MD and will need to be printed out and placed in packet upon completion of it. ALEXX handed DC Pack to bedside RN who stated that she will place it on pt's chart.     Add:  ALEXX informed pt's daughter that pt is to DC to [facility] tomorrow and provided their contact information to her.

## 2018-05-04 NOTE — PROGRESS NOTES
Patient upset after placing  on him while receiving IV dilaudid. Education provided, patient continues to be agitated and upset. Pain medications given per orders. Schedule of medications discussed with patient.

## 2018-05-05 ENCOUNTER — HOSPITAL ENCOUNTER (OUTPATIENT)
Facility: MEDICAL CENTER | Age: 58
End: 2018-06-06
Attending: INTERNAL MEDICINE | Admitting: INTERNAL MEDICINE
Payer: COMMERCIAL

## 2018-05-05 VITALS
HEART RATE: 103 BPM | SYSTOLIC BLOOD PRESSURE: 135 MMHG | BODY MASS INDEX: 45.06 KG/M2 | WEIGHT: 304.24 LBS | DIASTOLIC BLOOD PRESSURE: 80 MMHG | RESPIRATION RATE: 16 BRPM | HEIGHT: 69 IN | TEMPERATURE: 98.3 F | OXYGEN SATURATION: 92 %

## 2018-05-05 PROCEDURE — 700111 HCHG RX REV CODE 636 W/ 250 OVERRIDE (IP): Performed by: INTERNAL MEDICINE

## 2018-05-05 PROCEDURE — A9270 NON-COVERED ITEM OR SERVICE: HCPCS | Performed by: HOSPITALIST

## 2018-05-05 PROCEDURE — 700105 HCHG RX REV CODE 258: Performed by: INTERNAL MEDICINE

## 2018-05-05 PROCEDURE — 700102 HCHG RX REV CODE 250 W/ 637 OVERRIDE(OP): Performed by: INTERNAL MEDICINE

## 2018-05-05 PROCEDURE — 87641 MR-STAPH DNA AMP PROBE: CPT

## 2018-05-05 PROCEDURE — 700102 HCHG RX REV CODE 250 W/ 637 OVERRIDE(OP): Performed by: HOSPITALIST

## 2018-05-05 PROCEDURE — A9270 NON-COVERED ITEM OR SERVICE: HCPCS | Performed by: INTERNAL MEDICINE

## 2018-05-05 PROCEDURE — 99239 HOSP IP/OBS DSCHRG MGMT >30: CPT | Performed by: INTERNAL MEDICINE

## 2018-05-05 RX ADMIN — THERA TABS 1 TABLET: TAB at 08:40

## 2018-05-05 RX ADMIN — OMEPRAZOLE 20 MG: 20 CAPSULE, DELAYED RELEASE ORAL at 08:40

## 2018-05-05 RX ADMIN — FUROSEMIDE 20 MG: 10 INJECTION, SOLUTION INTRAMUSCULAR; INTRAVENOUS at 08:39

## 2018-05-05 RX ADMIN — OXYCODONE HYDROCHLORIDE 40 MG: 10 TABLET ORAL at 10:32

## 2018-05-05 RX ADMIN — BUDESONIDE AND FORMOTEROL FUMARATE DIHYDRATE 2 PUFF: 160; 4.5 AEROSOL RESPIRATORY (INHALATION) at 08:38

## 2018-05-05 RX ADMIN — MORPHINE SULFATE 30 MG: 30 TABLET, EXTENDED RELEASE ORAL at 08:40

## 2018-05-05 RX ADMIN — Medication 220 MG: at 08:40

## 2018-05-05 RX ADMIN — AMPICILLIN SODIUM AND SULBACTAM SODIUM 3 G: 2; 1 INJECTION, POWDER, FOR SOLUTION INTRAMUSCULAR; INTRAVENOUS at 01:07

## 2018-05-05 RX ADMIN — OXYCODONE HYDROCHLORIDE 40 MG: 10 TABLET ORAL at 06:09

## 2018-05-05 RX ADMIN — HEPARIN SODIUM 5000 UNITS: 5000 INJECTION, SOLUTION INTRAVENOUS; SUBCUTANEOUS at 06:12

## 2018-05-05 RX ADMIN — OMEGA-3 FATTY ACIDS CAP 1000 MG 1000 MG: 1000 CAP at 08:40

## 2018-05-05 RX ADMIN — ASPIRIN 81 MG: 81 TABLET, COATED ORAL at 08:40

## 2018-05-05 RX ADMIN — METFORMIN HYDROCHLORIDE 500 MG: 500 TABLET, FILM COATED ORAL at 08:40

## 2018-05-05 RX ADMIN — TIOTROPIUM BROMIDE 1 CAPSULE: 18 CAPSULE ORAL; RESPIRATORY (INHALATION) at 08:39

## 2018-05-05 RX ADMIN — OXYCODONE HYDROCHLORIDE AND ACETAMINOPHEN 500 MG: 500 TABLET ORAL at 08:40

## 2018-05-05 RX ADMIN — LEVOTHYROXINE SODIUM 50 MCG: 50 TABLET ORAL at 06:11

## 2018-05-05 RX ADMIN — AMPICILLIN SODIUM AND SULBACTAM SODIUM 3 G: 2; 1 INJECTION, POWDER, FOR SOLUTION INTRAMUSCULAR; INTRAVENOUS at 06:19

## 2018-05-05 RX ADMIN — OXYCODONE HYDROCHLORIDE 40 MG: 10 TABLET ORAL at 02:22

## 2018-05-05 ASSESSMENT — PAIN SCALES - GENERAL
PAINLEVEL_OUTOF10: 8
PAINLEVEL_OUTOF10: 10

## 2018-05-05 NOTE — CARE PLAN
Problem: Venous Thromboembolism (VTW)/Deep Vein Thrombosis (DVT) Prevention:  Goal: Patient will participate in Venous Thrombosis (VTE)/Deep Vein Thrombosis (DVT)Prevention Measures  Outcome: PROGRESSING AS EXPECTED      Problem: Pain Management  Goal: Pain level will decrease to patient's comfort goal  Outcome: PROGRESSING AS EXPECTED  Pain controlled with scheduled oxy and long acting morphine.    Problem: Respiratory:  Goal: Respiratory status will improve  Outcome: PROGRESSING AS EXPECTED  Breathing is monitored. IS encouraged.  on.     Problem: Skin Integrity  Goal: Risk for impaired skin integrity will decrease  Outcome: PROGRESSING SLOWER THAN EXPECTED  Patient refuses to turn to assess bottom and put cream on.

## 2018-05-05 NOTE — CARE PLAN
Problem: Infection  Goal: Will remain free from infection  Patient receiving Unasyn as an ATB for infection prevention.     Problem: Bowel/Gastric:  Goal: Normal bowel function is maintained or improved  Patient states he had a BM last night and refused morning stool softeners.

## 2018-05-05 NOTE — PROGRESS NOTES
Patient decided to leave with [facility] after speaking with ALEXX. I pain medicated him and went over his discharge paperwork. He stated he did not want to look over it but I read over it with him although he did not read it too. He seemed only concerned with whether he would still get his pain medications and I went over the medication list with him. I [facility] Prescription in folder with paperwork with [facility]. Gave report to LTAC RN.

## 2018-05-05 NOTE — DISCHARGE PLANNING
Medical Social Worker    ALEXX met with pt at bedside as attending MD and bedside RN stated that pt had concerns about DCing to [facility]. SW provided LOC education and explained purpose of LTAC to pt. Pt stated that he was traumatized from his time in the ICU and he is afraid to go to another facility. SW explained that pt has the option to appeal his DC through Medicare through an outside source named [employer]. Pt became argumentative and stated that he does not want to do that either. SW discussed pt's concerns and past trauma and pt became emotional. Pt stated that he knows that it is best for him to go to LTAC but he misses his family and wants to go home. Pt decided to DC to [facility] (LTAC) at 1000 and thanked this SW for talking with him. ALEXX explained that there are SW and DC Planners at [facility] as well and to reach out to them to discuss his care plan.

## 2018-05-05 NOTE — DISCHARGE INSTRUCTIONS
Discharge Instructions    Discharged to other by medical transportation with escort. Discharged via ambulance, hospital escort: Yes.  Special equipment needed: Not Applicable    NURSING TO CHECK BILATERAL EXTREMITY WRAPS AND ENSURE INTACT   WOUND TEAM TO CHANGE BLE WRAPS EVERY 3 DAYS. DRESSING LAST CHANGED 5/4.     Be sure to schedule a follow-up appointment with your primary care doctor or any specialists as instructed.     Discharge Plan:   Diet Plan: Discussed  Activity Level: Discussed  Confirmed Follow up Appointment: Patient to Call and Schedule Appointment  Confirmed Symptoms Management: Discussed  Medication Reconciliation Updated: Yes  Influenza Vaccine Indication: Not indicated: Previously immunized this influenza season and > 8 years of age    I understand that a diet low in cholesterol, fat, and sodium is recommended for good health. Unless I have been given specific instructions below for another diet, I accept this instruction as my diet prescription.   Other diet: Regular, Diabetic    Special Instructions: Discharge instructions for the Orthopedic Patient    Follow up with Primary Care Physician within 2 weeks of discharge to home, regarding:  Review of medications and diagnostic testing.  Surveillance for medical complications.  Workup and treatment of osteoporosis, if appropriate.     -Is this a Joint Replacement patient? No    -Is this patient being discharged with medication to prevent blood clots?  Yes, Aspirin Aspirin, ASA oral tablets  What is this medicine?  ASPIRIN (AS pir in) is a pain reliever. It is used to treat mild pain and fever. This medicine is also used as directed by a doctor to prevent and to treat heart attacks, to prevent strokes, and to treat arthritis or inflammation.  This medicine may be used for other purposes; ask your health care provider or pharmacist if you have questions.  COMMON BRAND NAME(S): Aspir-Low, Aspir-Tracy, Aspirtab, Kelly Advanced Aspirin, Kelly Aspirin,  Kelly Aspirin Extra Strength, Kelly Aspirin Plus, Kelly Extra Strength, Kelly Extra Strength Plus, Kelly Genuine Aspirin, Kelly Womens Aspirin, Bufferin, Bufferin Extra Strength, Bufferin Low Dose  What should I tell my health care provider before I take this medicine?  They need to know if you have any of these conditions:  -anemia  -asthma  -bleeding problems  -child with chickenpox, the flu, or other viral infection  -diabetes  -gout  -if you frequently drink alcohol containing drinks  -kidney disease  -liver disease  -low level of vitamin K  -lupus  -smoke tobacco  -stomach ulcers or other problems  -an unusual or allergic reaction to aspirin, tartrazine dye, other medicines, dyes, or preservatives  -pregnant or trying to get pregnant  -breast-feeding  How should I use this medicine?  Take this medicine by mouth with a glass of water. Follow the directions on the package or prescription label. You can take this medicine with or without food. If it upsets your stomach, take it with food. Do not take your medicine more often than directed.  Talk to your pediatrician regarding the use of this medicine in children. While this drug may be prescribed for children as young as 12 years of age for selected conditions, precautions do apply. Children and teenagers should not use this medicine to treat chicken pox or flu symptoms unless directed by a doctor.  Patients over 65 years old may have a stronger reaction and need a smaller dose.  Overdosage: If you think you have taken too much of this medicine contact a poison control center or emergency room at once.  NOTE: This medicine is only for you. Do not share this medicine with others.  What if I miss a dose?  If you are taking this medicine on a regular schedule and miss a dose, take it as soon as you can. If it is almost time for your next dose, take only that dose. Do not take double or extra doses.  What may interact with this medicine?  Do not take this medicine  with any of the following medications:  -cidofovir  -ketorolac  -probenecid  This medicine may also interact with the following medications:  -alcohol  -alendronate  -bismuth subsalicylate  -flavocoxid  -herbal supplements like feverfew, garlic, oskar, ginkgo biloba, horse chestnut  -medicines for diabetes or glaucoma like acetazolamide, methazolamide  -medicines for gout  -medicines that treat or prevent blood clots like enoxaparin, heparin, ticlopidine, warfarin  -other aspirin and aspirin-like medicines  -NSAIDs, medicines for pain and inflammation, like ibuprofen or naproxen  -pemetrexed  -sulfinpyrazone  -varicella live vaccine  This list may not describe all possible interactions. Give your health care provider a list of all the medicines, herbs, non-prescription drugs, or dietary supplements you use. Also tell them if you smoke, drink alcohol, or use illegal drugs. Some items may interact with your medicine.  What should I watch for while using this medicine?  If you are treating yourself for pain, tell your doctor or health care professional if the pain lasts more than 10 days, if it gets worse, or if there is a new or different kind of pain. Tell your doctor if you see redness or swelling. Also, check with your doctor if you have a fever that lasts for more than 3 days. Only take this medicine to prevent heart attacks or blood clotting if prescribed by your doctor or health care professional.  Do not take aspirin or aspirin-like medicines with this medicine. Too much aspirin can be dangerous. Always read the labels carefully.  This medicine can irritate your stomach or cause bleeding problems. Do not smoke cigarettes or drink alcohol while taking this medicine. Do not lie down for 30 minutes after taking this medicine to prevent irritation to your throat.  If you are scheduled for any medical or dental procedure, tell your healthcare provider that you are taking this medicine. You may need to stop taking  this medicine before the procedure.  This medicine may be used to treat migraines. If you take migraine medicines for 10 or more days a month, your migraines may get worse. Keep a diary of headache days and medicine use. Contact your healthcare professional if your migraine attacks occur more frequently.  What side effects may I notice from receiving this medicine?  Side effects that you should report to your doctor or health care professional as soon as possible:  -allergic reactions like skin rash, itching or hives, swelling of the face, lips, or tongue  -breathing problems  -changes in hearing, ringing in the ears  -confusion  -general ill feeling or flu-like symptoms  -pain on swallowing  -redness, blistering, peeling or loosening of the skin, including inside the mouth or nose  -signs and symptoms of bleeding such as bloody or black, tarry stools; red or dark-brown urine; spitting up blood or brown material that looks like coffee grounds; red spots on the skin; unusual bruising or bleeding from the eye, gums, or nose  -trouble passing urine or change in the amount of urine  -unusually weak or tired  -yellowing of the eyes or skin  Side effects that usually do not require medical attention (report to your doctor or health care professional if they continue or are bothersome):  -diarrhea or constipation  -headache  -nausea, vomiting  -stomach gas, heartburn  This list may not describe all possible side effects. Call your doctor for medical advice about side effects. You may report side effects to FDA at 7-789-FDA-4218.  Where should I keep my medicine?  Keep out of the reach of children.  Store at room temperature between 15 and 30 degrees C (59 and 86 degrees F). Protect from heat and moisture. Do not use this medicine if it has a strong vinegar smell. Throw away any unused medicine after the expiration date.  NOTE: This sheet is a summary. It may not cover all possible information. If you have questions about  this medicine, talk to your doctor, pharmacist, or health care provider.  © 2018 [employer] (2014-08-19 11:30:31)      · Is patient discharged on Warfarin / Coumadin?   No       Lymphedema  Introduction  Lymphedema is swelling that is caused by the abnormal collection of lymph under the skin. Lymph is fluid from the tissues in your body that travels in the lymphatic system. This system is part of the immune system and includes lymph nodes and lymph vessels. The lymph vessels collect and carry the excess fluid, fats, proteins, and wastes from the tissues of the body to the bloodstream. This system also works to clean and remove bacteria and waste products from the body.  Lymphedema occurs when the lymphatic system is blocked. When the lymph vessels or lymph nodes are blocked or damaged, lymph does not drain properly, causing an abnormal buildup of lymph. This leads to swelling in the arms or legs. Lymphedema cannot be cured by medicines, but various methods can be used to help reduce the swelling.  What are the causes?  There are two types of lymphedema. Primary lymphedema is caused by the absence or abnormality of the lymph vessel at birth. Secondary lymphedema is more common. It occurs when the lymph vessel is damaged or blocked. Common causes of lymph vessel blockage include:  · Skin infection, such as cellulitis.  · Infection by parasites (filariasis).  · Injury.  · Cancer.  · Radiation therapy.  · Formation of scar tissue.  · Surgery.  What are the signs or symptoms?  Symptoms of this condition include:  · Swelling of the arm or leg.  · A heavy or tight feeling in the arm or leg.  · Swelling of the feet, toes, or fingers. Shoes or rings may fit more tightly than before.  · Redness of the skin over the affected area.  · Limited movement of the affected limb.  · Sensitivity to touch or discomfort in the affected limb.  How is this diagnosed?  This condition may be diagnosed with:  · A physical  exam.  · Medical history.  · Bioimpedance spectroscopy. In this test, painless electrical currents are used to measure fluid levels in your body.  · Imaging tests, such as:  ¨ Lymphoscintigraphy. In this test, a low dose of a radioactive substance is injected to trace the flow of lymph through the lymph vessels.  ¨ MRI.  ¨ CT scan.  ¨ Duplex ultrasound. This test uses sound waves to produce images of the vessels and the blood flow on a screen.  ¨ Lymphangiography. In this test, a contrast dye is injected into the lymph vessel to help show blockages.  How is this treated?  Treatment for this condition may depend on the cause. Treatment may include:  · Exercise. Certain exercises can help fluid move out of the affected limb.  · Massage. Gentle massage of the affected limb can help move the fluid out of the area.  · Compression. Various methods may be used to apply pressure to the affected limb in order to reduce the swelling.  ¨ Wearing compression stockings or sleeves on the affected limb.  ¨ Bandaging the affected limb.  ¨ Using an external pump that is attached to a sleeve that alternates between applying pressure and releasing pressure.  · Surgery. This is usually only done for severe cases. For example, surgery may be done if you have trouble moving the limb or if the swelling does not get better with other treatments.  If an underlying condition is causing the lymphedema, treatment for that condition is needed. For example, antibiotic medicines may be used to treat an infection.  Follow these instructions at home:  Activities  · Exercise regularly as directed by your health care provider.  · Do not sit with your legs crossed.  · When possible, keep the affected limb raised (elevated) above the level of your heart.  · Avoid carrying things with an arm that is affected by lymphedema.  · Remember that the affected area is more likely to become injured or infected.  · Take these steps to help prevent  infection:  ¨ Keep the affected area clean and dry.  ¨ Protect your skin from cuts. For example, you should use gloves while cooking or gardening. Do not walk barefoot. If you shave the affected area, use an electric razor.  General instructions  · Take medicines only as directed by your health care provider.  · Eat a healthy diet that includes a lot of fruits and vegetables.  · Do not wear tight clothes, shoes, or jewelry.  · Do not use heating pads over the affected area.  · Avoid having blood pressure checked on the affected limb.  · Keep all follow-up visits as directed by your health care provider. This is important.  Contact a health care provider if:  · You continue to have swelling in your limb.  · You have a fever.  · You have a cut that does not heal.  · You have redness or pain in the affected area.  · You have new swelling in your limb that comes on suddenly.  · You develop purplish spots or sores (lesions) on your limb.  Get help right away if:  · You have a skin rash.  · You have chills or sweats.  · You have shortness of breath.  This information is not intended to replace advice given to you by your health care provider. Make sure you discuss any questions you have with your health care provider.  Document Released: 10/14/2008 Document Revised: 08/24/2017 Document Reviewed: 11/25/2015  © 2017 [employer]vier      Cellulitis, Adult  Introduction  Cellulitis is a skin infection. The infected area is usually red and sore. This condition occurs most often in the arms and lower legs. It is very important to get treated for this condition.  Follow these instructions at home:  · Take over-the-counter and prescription medicines only as told by your doctor.  · If you were prescribed an antibiotic medicine, take it as told by your doctor. Do not stop taking the antibiotic even if you start to feel better.  · Drink enough fluid to keep your pee (urine) clear or pale yellow.  · Do not touch or rub the infected  area.  · Raise (elevate) the infected area above the level of your heart while you are sitting or lying down.  · Place warm or cold wet cloths (warm or cold compresses) on the infected area. Do this as told by your doctor.  · Keep all follow-up visits as told by your doctor. This is important. These visits let your doctor make sure your infection is not getting worse.  Contact a doctor if:  · You have a fever.  · Your symptoms do not get better after 1-2 days of treatment.  · Your bone or joint under the infected area starts to hurt after the skin has healed.  · Your infection comes back. This can happen in the same area or another area.  · You have a swollen bump in the infected area.  · You have new symptoms.  · You feel ill and also have muscle aches and pains.  Get help right away if:  · Your symptoms get worse.  · You feel very sleepy.  · You throw up (vomit) or have watery poop (diarrhea) for a long time.  · There are red streaks coming from the infected area.  · Your red area gets larger.  · Your red area turns darker.  This information is not intended to replace advice given to you by your health care provider. Make sure you discuss any questions you have with your health care provider.  Document Released: 06/05/2009 Document Revised: 05/25/2017 Document Reviewed: 10/26/2016  © 2017 [employer]    Depression / Suicide Risk    As you are discharged from this [facility] facility, it is important to learn how to keep safe from harming yourself.    Recognize the warning signs:  · Abrupt changes in personality, positive or negative- including increase in energy   · Giving away possessions  · Change in eating patterns- significant weight changes-  positive or negative  · Change in sleeping patterns- unable to sleep or sleeping all the time   · Unwillingness or inability to communicate  · Depression  · Unusual sadness, discouragement and loneliness  · Talk of wanting to die  · Neglect of personal  appearance   · Rebelliousness- reckless behavior  · Withdrawal from people/activities they love  · Confusion- inability to concentrate     If you or a loved one observes any of these behaviors or has concerns about self-harm, here's what you can do:  · Talk about it- your feelings and reasons for harming yourself  · Remove any means that you might use to hurt yourself (examples: pills, rope, extension cords, firearm)  · Get professional help from the community (Mental Health, Substance Abuse, psychological counseling)  · Do not be alone:Call your Safe Contact- someone whom you trust who will be there for you.  · Call your local CRISIS HOTLINE 703-5940 or 841-551-8086  · Call your local Children's Mobile Crisis Response Team [place] (137) 278-2052 or www.[employer]  · Call the toll free National Suicide Prevention Hotlines   · National Suicide Prevention Lifeline 454-916-NARO (7578)  · National Hope Line Network 800-SUICIDE (281-7498)

## 2018-05-05 NOTE — DISCHARGE SUMMARY
CHIEF COMPLAINT ON ADMISSION  Chief Complaint   Patient presents with   • Leg Pain     Chronic bilateral lower leg wounds and pain. Pt recetly admitted for sepsis has been home for 1 week. C/O increased pain and inability to do ADL's at home.        CODE STATUS  Full Code    HPI & HOSPITAL COURSE  Patient admitted with recurrent bilateral cellulitis of the lower extremities. The patient has severe swelling of both lower extremities with pitting edema. The patient does have pulses and vascular studies have never been done on the patient. Patient did have DVT studies in the past which were negative. The patient was evaluated by surgery. We at this point feel that the patient's legs can be salvaged without having an amputation thus there is no need for any orthopedic amputation at this point. The patient underwent surgical debridement and afterwards underwent aggressive wound care management and will need LTAC. Patient was also treated with antibiotics. Infectious disease recommended patient to continue antibiotics for at least 10 days and reevaluate. During this hospitalization, Patient was started on PCA fentanyl pump. Patient developed acute respiratory failure with CO2 retention with that. Patient's fentanyl pump was stopped. Patient at this point has severe condition and will require long-term management to get him back to a normal. The patient does have obesity which will require a gastric sleeve versus other methods to have him reduce his weight. The patient is over 300 pounds.    Patient will continue wound care. Recommend patient's antibiotics to be at least until 5/11 for IV Unasyn and reevaluate. Infectious disease specialist has been evaluating patient during this hospital physician here.    Therefore, he is discharged in fair and stable condition for further post-acute management.     SPECIFIC OUTPATIENT FOLLOW-UP  LTAC    DISCHARGE PROBLEM LIST  Principal Problem:    Bilateral lower leg cellulitis POA:  Yes  Active Problems:    HTN (hypertension) POA: Yes    COPD (chronic obstructive pulmonary disease) ([facility]) POA: Yes    Stasis dermatitis POA: Yes    Narcotic addiction (CMS-HCC) POA: Yes    Non compliance w medication regimen POA: Yes    Diabetes type 2, controlled ([facility]) POA: Yes    Severe protein-calorie malnutrition ([facility]) POA: Yes    Chronic respiratory failure ([facility]) POA: Yes    Morbid obesity with BMI of 40.0-44.9, adult ([facility]) POA: Yes    Chronic pain POA: Yes    ALIREZA (obstructive sleep apnea) POA: Yes  Resolved Problems:    Sepsis (CMS-HCC) POA: Yes    Tachycardia POA: Yes    Past Medical History       Past Medical History:   Diagnosis Date   • Asthma     • Chronic obstructive pulmonary disease (CMS-HCC)     • Congestive heart failure (CMS-HCC)     • Hypertension     • Type II or unspecified type diabetes mellitus without mention of complication, not stated as uncontrolled           Surgical History  Denies any past surgeries     Medications  No current facility-administered medications on file prior to encounter.                 Current Outpatient Prescriptions on File Prior to Encounter   Medication Sig Dispense Refill   • furosemide (LASIX) 40 MG Tab Take 0.5 Tabs by mouth every day. 30 Tab 0   • aspirin EC 81 MG EC tablet Take 1 Tab by mouth every day. 30 Tab 0   • tiotropium (SPIRIVA) 18 MCG Cap Inhale 1 Cap by mouth every day. 30 Cap 0   • atorvastatin (LIPITOR) 40 MG Tab Take 1 Tab by mouth every bedtime. 30 Tab 0   • ammonium lactate (LAC-HYDRIN) 12 % Lotion Apply 2 Applications to affected area(s) every day. 2 Bottle 0   • senna-docusate (PERICOLACE OR SENOKOT S) 8.6-50 MG Tab Take 1 Tab by mouth 2 Times a Day. 60 Tab 0   • zinc sulfate (ZINCATE) 220 (50 Zn) MG Cap Take 1 Cap by mouth every day. 30 Cap 0   • multivitamin (THERAGRAN) Tab Take 1 Tab by mouth every day. 30 Tab 0   • ascorbic acid (VITAMIN C) 500 MG tablet Take 1 Tab by mouth every day. 30 Tab 3   • Omega-3 Fatty Acids (FISH OIL) 1000 MG  Cap capsule Take 1 Cap by mouth 3 times a day, with meals. 90 Cap     • levothyroxine (SYNTHROID) 50 MCG Tab Take 50 mcg by mouth Every morning on an empty stomach.       • omeprazole (PRILOSEC) 20 MG delayed-release capsule Take 20 mg by mouth every day.       • budesonide-formoterol (SYMBICORT) 160-4.5 MCG/ACT Aerosol Inhale 2 Puffs by mouth 2 Times a Day.       • albuterol 108 (90 BASE) MCG/ACT Aero Soln inhalation aerosol Inhale 2 Puffs by mouth every 6 hours as needed for Shortness of Breath.           Family History  No family history of medical issues     Social History         Social History   Substance Use Topics   • Smoking status: Former Smoker       Quit date: 12/14/2005   • Smokeless tobacco: Never Used   • Alcohol use No         Allergies  No Known Allergies         FOLLOW UP  No future appointments.  Giovani Lara M.D.  [address]  [address]  [facility] [address]  [phone]    Schedule an appointment as soon as possible for a visit in 1 week  Follow up appointment    [facility] ([facility])  [address]  [facility] [address]  [phone]          MEDICATIONS ON DISCHARGE   Fer Estrada   Home Medication Instructions HIPOLITO:[id number]    Printed on:05/05/18 2102   Medication Information                      acetaminophen (TYLENOL) 500 MG Tab  Take 2 Tabs by mouth every 6 hours.             albuterol 108 (90 BASE) MCG/ACT Aero Soln inhalation aerosol  Inhale 2 Puffs by mouth every 6 hours as needed for Shortness of Breath.             ammonium lactate (LAC-HYDRIN) 12 % Lotion  Apply 2 Applications to affected area(s) every day.             ascorbic acid (VITAMIN C) 500 MG tablet  Take 1 Tab by mouth every day.             aspirin EC 81 MG EC tablet  Take 1 Tab by mouth every day.             atorvastatin (LIPITOR) 40 MG Tab  Take 1 Tab by mouth every bedtime.             budesonide-formoterol (SYMBICORT) 160-4.5 MCG/ACT Aerosol  Inhale 2 Puffs by mouth 2 Times a Day.              furosemide (LASIX) 10 MG/ML Solution  2 mL by Intravenous route every day.             heparin 5000 UNIT/ML Solution  Inject 1 mL as instructed every 8 hours.             levothyroxine (SYNTHROID) 50 MCG Tab  Take 50 mcg by mouth Every morning on an empty stomach.             metFORMIN (GLUCOPHAGE) 500 MG Tab  Take 1 Tab by mouth 2 times a day, with meals.             morphine ER (MS CONTIN) 30 MG Tab CR tablet  Take 1 Tab by mouth every 12 hours              multivitamin (THERAGRAN) Tab  Take 1 Tab by mouth every day.             NS SOLN 100 mL with ampicillin/sulbactam 3 (2-1) g SOLR 3 g  3 g by Intravenous route every 6 hours.             Omega-3 Fatty Acids (FISH OIL) 1000 MG Cap capsule  Take 1 Cap by mouth 3 times a day, with meals.             omeprazole (PRILOSEC) 20 MG delayed-release capsule  Take 20 mg by mouth every day.             oxyCODONE immediate release 20 MG Tab  Take 2 Tabs by mouth every 4 hours              oxyCODONE immediate-release (ROXICODONE) 5 MG Tab  Take 1-2 Tab by mouth every 6 hours as needed  .             senna-docusate (PERICOLACE OR SENOKOT S) 8.6-50 MG Tab  Take 1 Tab by mouth 2 Times a Day.             tiotropium (SPIRIVA) 18 MCG Cap  Inhale 1 Cap by mouth every day.             zinc sulfate (ZINCATE) 220 (50 Zn) MG Cap  Take 1 Cap by mouth every day.                 DIET  Orders Placed This Encounter   Procedures   • DIET ORDER     Standing Status:   Standing     Number of Occurrences:   1     Order Specific Question:   Diet:     Answer:   Diabetic [3]       ACTIVITY  As tolerated and directed by rehab.  Class 4 - symptoms at rest.    LINES, DRAINS, AND WOUNDS  This is an automated list. Peripheral IVs will be removed prior to discharge.  PIV Group Left Forearm 20g Flexible Catheter (Active)   Line Secured Taped;Transparent 5/4/2018 11:00 PM   Site Condition / Description Assessed;Patent;Clean;Dry;Intact 5/4/2018 11:00 PM   Dressing Type / Description  Transparent;Clean;Dry;Intact 5/4/2018 11:00 PM   Dressing Status Observed 5/4/2018 11:00 PM   Infiltration Grading Used by [facility] 0 5/4/2018 11:00 PM   Phlebitis Scale (Used by [facility]) 0 5/4/2018 11:00 PM   Date Secondary Tubing Changed 05/04/18 5/4/2018 11:00 PM   NEXT Primary Tubing Change  05/05/18 5/3/2018  8:00 PM   NEXT Secondary Tubing Change  05/04/18 5/3/2018  8:00 PM       Surgical Incision  Incision Bilateral Leg Lower (Active)   Wound Bed Other (comment) 5/3/2018  8:00 PM   Drainage  Yellow;Minimal 5/3/2018  8:00 PM   Periwound Skin Red;Warm;Edematous 5/3/2018  8:00 PM   Dressing Options Silver Sulfadiazine (Silvadene) ;Roll Gauze 5/3/2018  8:00 PM   Dressing Status / Change Observed;Intact 5/3/2018  8:00 PM       Incontinence Associated Dermatitis  Upper;Lower;Medial;Posterior Buttock;Perineum;Sacrum;Thigh (Active)   Drainage  Other (Comments) 5/4/2018 10:22 PM   Periwound Skin Red;Warm 5/3/2018  8:00 PM   Periwound Protectant Antifungal Therapy 5/3/2018  8:00 PM   Weekly Photo (Inpatient Only) 04/25/18 4/25/2018 12:00 PM       Pressure Ulcer  Deep Tissue Injury POA Foot Left Lateral (Active)       Pressure Ulcer  Deep Tissue Injury POA Foot Right Lateral;Plantar (Active)       Pressure Ulcer  Unstageable POA Heel Left Lateral (Active)   Wound Bed Other (comment) 5/3/2018  8:00 PM   Drainage  Minimal;Serosanguinous;Purulent;Odor Present;Yellow 5/3/2018  2:00 PM   Periwound Skin Other (Comments) 5/3/2018  2:00 PM   Dressing Options Absorbent Abdominal Pad;Fluffed Dried Gauze Roll;Hydrofiber Silver;Tape 5/3/2018  2:00 PM   Dressing Status / Change Observed 5/3/2018  8:00 PM   Dressing Cleansing/Solutions Normal Saline 5/3/2018  2:00 PM   Length (cm) 5.5 4/25/2018 12:00 PM   Width (cm) 4.5 4/25/2018 12:00 PM   Depth (cm) 0.1 4/25/2018 12:00 PM   Weekly Photo (Inpatient Only) 04/25/18 4/25/2018 12:00 PM   Dressing Change Frequency Daily 5/1/2018 11:00 PM   Next Dressing Change  05/01/18 5/1/2018   8:00 AM       Wound POA Venous Ulcer Leg Left Lower (Active)   Wound Bed Other (comment) 5/4/2018 10:22 PM   Drainage  None 5/4/2018 10:22 PM   Periwound Skin Other (Comments) 5/4/2018 10:22 PM   Cleansing Normal Saline Irrigation 5/4/2018 10:22 PM   Dressing Options Compression Wrap Two Layer 5/4/2018 10:22 PM   Dressing Status / Change Observed 5/4/2018 10:22 PM   Dressing Cleansing/Solutions Not Applicable 5/4/2018 10:22 PM   Periwound Protectant Not Applicable 5/4/2018 10:22 PM   Weekly Photo (Inpatient Only) 05/04/18 5/4/2018 10:00 AM       Wound POA Venous Ulcer Leg Left Lower (Active)       Wound POA Venous Ulcer Leg Right Lower (Active)       Wound Other (comment) Buttock  (Active)       Wound POA Other (comment) Leg;Foot;Digit 1;Digit 2 ;Digit 3;Digit 4;Digit 5 Right Lower;Anterior;Posterior (Active)   Wound Bed Other (comment) 5/3/2018  8:00 PM   Drainage  Minimal;Serosanguinous;Odor Present 5/4/2018  8:00 AM   Periwound Skin Red;Warm;Edematous;Calloused 5/3/2018  2:00 PM   Cleansing Not Applicable 5/3/2018  2:00 PM   Dressing Options Hydrofiber Silver;Absorbent Abdominal Pad;Dry Gauze 5/3/2018  2:00 PM   Dressing Status / Change Intact;Observed 5/3/2018  8:00 PM   Dressing Cleansing/Solutions Normal Saline;Other (Comments) 5/3/2018  2:00 PM   Periwound Protectant Skin Moisturizer 5/3/2018  2:00 PM   Weekly Photo (Inpatient Only) 04/24/18 4/25/2018 12:00 PM   Dressing Change Frequency Daily 5/1/2018 11:00 PM   Next Dressing Change  05/01/18 5/1/2018  8:00 AM       Wound POA Other (comment) Leg;Foot;Digit 1;Digit 2 ;Digit 3;Digit 4;Digit 5 Left Lower;Anterior;Posterior (Active)   Wound Bed Other (comment) 5/3/2018  8:00 PM   Drainage  Minimal;Serosanguinous;Odor Present 5/4/2018  8:00 AM   Periwound Skin Red;Warm;Edematous;Calloused 5/3/2018  2:00 PM   Cleansing Normal Saline Irrigation 5/3/2018  2:00 PM   Dressing Options Hydrofiber Silver;Absorbent Abdominal Pad;Fluffed Dried Gauze Roll;Tape 5/3/2018   2:00 PM   Dressing Status / Change Intact;Observed 5/3/2018  8:00 PM   Dressing Cleansing/Solutions Normal Saline 5/3/2018  2:00 PM   Periwound Protectant Skin Moisturizer 5/3/2018  2:00 PM   Weekly Photo (Inpatient Only) 04/24/18 4/25/2018 12:00 PM   Dressing Change Frequency Daily 5/1/2018 11:00 PM   Next Dressing Change  05/01/18 5/1/2018  8:00 AM       Wound Venous Ulcer Leg Right (Active)   Wound Bed Other (comment) 5/4/2018 10:22 PM   Drainage  None 5/4/2018 10:22 PM   Periwound Skin Other (Comments) 5/4/2018 10:22 PM   Cleansing Normal Saline Irrigation 5/4/2018 10:22 PM   Dressing Options Compression Wrap Two Layer 5/4/2018 10:22 PM   Dressing Status / Change Observed 5/4/2018 10:22 PM   Dressing Cleansing/Solutions Not Applicable 5/4/2018 10:00 AM   Periwound Protectant Not Applicable 5/4/2018 10:00 AM   Weekly Photo (Inpatient Only) 05/04/18 5/4/2018 10:22 PM   Dressing Change Frequency Every 72 hrs 5/4/2018 10:22 PM   Next Dressing Change  05/07/18 5/4/2018 10:22 PM   Time Spent with Patient (mins) 75 5/4/2018 10:00 AM                  MENTAL STATUS ON TRANSFER  Level of Consciousness: Alert  Orientation : Oriented x 4  Speech: Speech Clear    CONSULTATIONS  Ortho  ID    PROCEDURES  5/1  PROCEDURE:  1.  Sharp excisional debridement of right lower extremity venous stasis   ulcerations including skin only, approximately 15 cubic cm of tissue was   removed.  2.  Sharp excisional debridement of left lower extremity venous stasis   ulcerations including skin only, approximately 15 cubic cm of tissue was   removed.    Physical Exam   Constitutional: He is oriented to person, place, and time. He appears well-developed. He is cooperative. No distress. Nasal cannula in place.   HENT:   Eyes: Conjunctivae are normal. Pupils are equal, round, and reactive to light.   Neck: Normal range of motion. Neck supple. No thyromegaly present.   Cardiovascular: Normal rate, regular rhythm and normal heart sounds.  Exam reveals  no gallop and no friction rub.    Pulmonary/Chest: Effort normal and breath sounds normal. No respiratory distress. He has no wheezes. He has no rales.   Abdominal: Soft. He exhibits no mass. There is no tenderness. There is no rebound and no guarding.   Musculoskeletal: Normal range of motion. He exhibits edema. He exhibits no deformity.   Lymphadenopathy:     He has no cervical adenopathy.   Neurological: He is alert and oriented to person, place, and time. He has normal reflexes. No cranial nerve deficit.   Skin: Skin is warm and dry. Rash noted. He is not diaphoretic. There is erythema.   Psychiatric: His speech is normal. Judgment and thought content normal. His mood appears anxious. He is slowed. Cognition and memory are normal. He exhibits a depressed mood   Nursing note and vitals reviewed.    LABORATORY  Lab Results   Component Value Date/Time    SODIUM 137 05/04/2018 02:42 AM    POTASSIUM 4.3 05/04/2018 02:42 AM    CHLORIDE 93 (L) 05/04/2018 02:42 AM    CO2 36 (H) 05/04/2018 02:42 AM    GLUCOSE 121 (H) 05/04/2018 02:42 AM    BUN 21 05/04/2018 02:42 AM    CREATININE 0.73 05/04/2018 02:42 AM        Lab Results   Component Value Date/Time    WBC 9.7 05/04/2018 02:42 AM    HEMOGLOBIN 10.3 (L) 05/04/2018 02:42 AM    HEMATOCRIT 34.3 (L) 05/04/2018 02:42 AM    PLATELETCT 251 05/04/2018 02:42 AM        Total time of the discharge process exceeds 36 minutes.

## 2018-05-05 NOTE — PROGRESS NOTES
Pt is irritable. Reports pain on BLE. Medicated as scheduled on mar. Dressing on BLE clean, dry, and intact. Drowsy but easily arousable. Pt refuses turns and assessments of his back due to pain and SOB when moving. Pt educated. Currently on 5L O2 nasal cannula. Tachycardic up to 120s when moving but HR slows down when resting.

## 2018-05-06 ENCOUNTER — RESOLUTE PROFESSIONAL BILLING HOSPITAL PROF FEE (OUTPATIENT)
Dept: HOSPITALIST | Facility: MEDICAL CENTER | Age: 58
End: 2018-05-06
Payer: MEDICARE

## 2018-05-06 LAB
ANION GAP SERPL CALC-SCNC: 5 MMOL/L (ref 0–11.9)
BASE EXCESS BLDA CALC-SCNC: 12 MMOL/L (ref -4–3)
BASOPHILS # BLD AUTO: 0.4 % (ref 0–1.8)
BASOPHILS # BLD: 0.04 K/UL (ref 0–0.12)
BODY TEMPERATURE: 36.9 CENTIGRADE
BUN SERPL-MCNC: 13 MG/DL (ref 8–22)
CALCIUM SERPL-MCNC: 9 MG/DL (ref 8.4–10.2)
CHLORIDE SERPL-SCNC: 91 MMOL/L (ref 96–112)
CO2 SERPL-SCNC: 42 MMOL/L (ref 20–33)
CREAT SERPL-MCNC: 0.59 MG/DL (ref 0.5–1.4)
EOSINOPHIL # BLD AUTO: 0.27 K/UL (ref 0–0.51)
EOSINOPHIL NFR BLD: 2.5 % (ref 0–6.9)
ERYTHROCYTE [DISTWIDTH] IN BLOOD BY AUTOMATED COUNT: 51.8 FL (ref 35.9–50)
GLUCOSE BLD-MCNC: 114 MG/DL (ref 65–99)
GLUCOSE SERPL-MCNC: 145 MG/DL (ref 65–99)
HCO3 BLDA-SCNC: 41 MMOL/L (ref 17–25)
HCT VFR BLD AUTO: 31.3 % (ref 42–52)
HGB BLD-MCNC: 9.3 G/DL (ref 14–18)
IMM GRANULOCYTES # BLD AUTO: 0.14 K/UL (ref 0–0.11)
IMM GRANULOCYTES NFR BLD AUTO: 1.3 % (ref 0–0.9)
LYMPHOCYTES # BLD AUTO: 1.71 K/UL (ref 1–4.8)
LYMPHOCYTES NFR BLD: 16.1 % (ref 22–41)
MAGNESIUM SERPL-MCNC: 1.5 MG/DL (ref 1.5–2.5)
MCH RBC QN AUTO: 28.4 PG (ref 27–33)
MCHC RBC AUTO-ENTMCNC: 29.7 G/DL (ref 33.7–35.3)
MCV RBC AUTO: 95.4 FL (ref 81.4–97.8)
MONOCYTES # BLD AUTO: 0.74 K/UL (ref 0–0.85)
MONOCYTES NFR BLD AUTO: 7 % (ref 0–13.4)
MRSA DNA SPEC QL NAA+PROBE: NORMAL
NEUTROPHILS # BLD AUTO: 7.71 K/UL (ref 1.82–7.42)
NEUTROPHILS NFR BLD: 72.7 % (ref 44–72)
NRBC # BLD AUTO: 0 K/UL
NRBC BLD-RTO: 0 /100 WBC
PCO2 BLDA: 80.8 MMHG (ref 26–37)
PCO2 TEMP ADJ BLDA: 80.4 MMHG (ref 26–37)
PH BLDA: 7.32 [PH] (ref 7.4–7.5)
PH TEMP ADJ BLDA: 7.32 [PH] (ref 7.4–7.5)
PHOSPHATE SERPL-MCNC: 3.2 MG/DL (ref 2.5–4.5)
PLATELET # BLD AUTO: 237 K/UL (ref 164–446)
PMV BLD AUTO: 10.6 FL (ref 9–12.9)
PO2 BLDA: 118.2 MMHG (ref 64–87)
PO2 TEMP ADJ BLDA: 117.6 MMHG (ref 64–87)
POTASSIUM SERPL-SCNC: 3.8 MMOL/L (ref 3.6–5.5)
RBC # BLD AUTO: 3.28 M/UL (ref 4.7–6.1)
SAO2 % BLDA: 98.2 % (ref 93–99)
SIGNIFICANT IND 70042: NORMAL
SITE SITE: NORMAL
SODIUM SERPL-SCNC: 138 MMOL/L (ref 135–145)
SOURCE SOURCE: NORMAL
WBC # BLD AUTO: 10.6 K/UL (ref 4.8–10.8)

## 2018-05-06 PROCEDURE — 82962 GLUCOSE BLOOD TEST: CPT

## 2018-05-06 PROCEDURE — 84100 ASSAY OF PHOSPHORUS: CPT

## 2018-05-06 PROCEDURE — 85025 COMPLETE CBC W/AUTO DIFF WBC: CPT

## 2018-05-06 PROCEDURE — 83735 ASSAY OF MAGNESIUM: CPT

## 2018-05-06 PROCEDURE — 82803 BLOOD GASES ANY COMBINATION: CPT

## 2018-05-06 PROCEDURE — 80048 BASIC METABOLIC PNL TOTAL CA: CPT

## 2018-05-06 PROCEDURE — 99223 1ST HOSP IP/OBS HIGH 75: CPT | Mod: AI | Performed by: INTERNAL MEDICINE

## 2018-05-07 LAB
ANION GAP SERPL CALC-SCNC: 7 MMOL/L (ref 0–11.9)
BASOPHILS # BLD AUTO: 0.4 % (ref 0–1.8)
BASOPHILS # BLD: 0.04 K/UL (ref 0–0.12)
BUN SERPL-MCNC: 13 MG/DL (ref 8–22)
CALCIUM SERPL-MCNC: 9.2 MG/DL (ref 8.4–10.2)
CHLORIDE SERPL-SCNC: 91 MMOL/L (ref 96–112)
CO2 SERPL-SCNC: 39 MMOL/L (ref 20–33)
CREAT SERPL-MCNC: 0.55 MG/DL (ref 0.5–1.4)
EOSINOPHIL # BLD AUTO: 0.4 K/UL (ref 0–0.51)
EOSINOPHIL NFR BLD: 4 % (ref 0–6.9)
ERYTHROCYTE [DISTWIDTH] IN BLOOD BY AUTOMATED COUNT: 51.9 FL (ref 35.9–50)
GLUCOSE SERPL-MCNC: 131 MG/DL (ref 65–99)
HCT VFR BLD AUTO: 31.3 % (ref 42–52)
HGB BLD-MCNC: 9.5 G/DL (ref 14–18)
IMM GRANULOCYTES # BLD AUTO: 0.14 K/UL (ref 0–0.11)
IMM GRANULOCYTES NFR BLD AUTO: 1.4 % (ref 0–0.9)
LYMPHOCYTES # BLD AUTO: 1.57 K/UL (ref 1–4.8)
LYMPHOCYTES NFR BLD: 15.7 % (ref 22–41)
MCH RBC QN AUTO: 28.4 PG (ref 27–33)
MCHC RBC AUTO-ENTMCNC: 30.4 G/DL (ref 33.7–35.3)
MCV RBC AUTO: 93.7 FL (ref 81.4–97.8)
MONOCYTES # BLD AUTO: 0.56 K/UL (ref 0–0.85)
MONOCYTES NFR BLD AUTO: 5.6 % (ref 0–13.4)
NEUTROPHILS # BLD AUTO: 7.32 K/UL (ref 1.82–7.42)
NEUTROPHILS NFR BLD: 72.9 % (ref 44–72)
NRBC # BLD AUTO: 0 K/UL
NRBC BLD-RTO: 0 /100 WBC
PLATELET # BLD AUTO: 246 K/UL (ref 164–446)
PMV BLD AUTO: 10.5 FL (ref 9–12.9)
POTASSIUM SERPL-SCNC: 4.3 MMOL/L (ref 3.6–5.5)
RBC # BLD AUTO: 3.34 M/UL (ref 4.7–6.1)
SODIUM SERPL-SCNC: 137 MMOL/L (ref 135–145)
WBC # BLD AUTO: 10 K/UL (ref 4.8–10.8)

## 2018-05-07 PROCEDURE — 85025 COMPLETE CBC W/AUTO DIFF WBC: CPT

## 2018-05-07 PROCEDURE — 80048 BASIC METABOLIC PNL TOTAL CA: CPT

## 2018-05-07 ASSESSMENT — ENCOUNTER SYMPTOMS
DIAPHORESIS: 0
SHORTNESS OF BREATH: 0
NAUSEA: 0
ABDOMINAL PAIN: 0
FEVER: 0
HEADACHES: 0
VOMITING: 0
CONSTIPATION: 0
PALPITATIONS: 0
DIARRHEA: 0
WHEEZING: 0
WEAKNESS: 0
CHILLS: 0
MYALGIAS: 1
COUGH: 0

## 2018-05-07 NOTE — CONSULTS
Full note will follow   Pt has respiratory acidosis probably chronic with mild acute component probably secondary to ALIREZA/OHS   PCO2 of 80, PH of 7.32  He is AAOX3 not drowsy and no confusion     He will need BIPAP at night and during the day as tolerated   He will need repeat ABG to insure improvement of his CO2 level   D/W RT

## 2018-05-07 NOTE — CONSULTS
Pulmonary Consultation       Patient ID:   Name:             Fer Estrada     YOB: 1960  Age:                 57 y.o.  male   MRN:               [id number]                                                  Reason of Consult:  Acute on chronic hypercapnic respiratory failure 2    Requesting physician:  Dr Sifuentes     History of Present Illness:  Pt is 57 yrs old with complex medical history including COPD, congestive heart failure, type 2 diabetes, bilateral lower extremity cellulitis and probable obstructive sleep apnea was admitted to [facility] to be treated for bilateral lower extremity cellulitis. The patient is noncompliant. He was recently admitted to [facility] intubated for about a week for acute and chronic hypercapnic respiratory failure with CO2 retention. The patient was prescribed trilogy ventilator to use after discharge.     The patient was readmitted to the hospital for bilateral lower extremity cellulitis. He was found to have high bicarbonate on basic metabolic panel of 42. Pulmonary consultation was requested for further evaluation and management of his chronic respiratory failure.    He denies any shortness of breath no confusion or drowsiness.    ROS:  Complete ROS was done and was negative except what mentioned in HPI     Past Medical History:  Past Medical History:   Diagnosis Date   • Asthma    • At risk for sleep apnea    • Chronic obstructive pulmonary disease (HCC)    • Chronic venous stasis dermatitis of both lower extremities    • Congestive heart failure (HCC)    • Hypertension    • Hypothyroidism    • Type II or unspecified type diabetes mellitus without mention of complication, not stated as uncontrolled        Past surgical history:  Past Surgical History:   Procedure Laterality Date   • INCISION AND DRAINAGE GENERAL Bilateral 5/1/2018    Procedure: INCISION AND DRAINAGE GENERAL;  Surgeon: Chino Stock M.D.;  Location: SURGERY [facility];   Service: Vascular   • CARPAL TUNNEL RELEASE            Family History   History reviewed. No pertinent family history.         Social History   Substance Use Topics   • Smoking status: Former Smoker       Quit date: 2005   • Smokeless tobacco: Never Used   • Alcohol use No            Current Outpatient Medications:  Prescriptions Prior to Admission   Medication Sig Dispense Refill Last Dose   • oxyCODONE immediate release 20 MG Tab Take 2 Tabs by mouth every 4 hours for 3 days. 5 Tab 0    • [] oxyCODONE immediate-release (ROXICODONE) 5 MG Tab Take 1 Tab by mouth every 6 hours as needed ((4-6)) for up to 2 days. 5 Tab 0    • acetaminophen (TYLENOL) 500 MG Tab Take 2 Tabs by mouth every 6 hours. 30 Tab 0    • NS SOLN 100 mL with ampicillin/sulbactam 3 (2-1) g SOLR 3 g 3 g by Intravenous route every 6 hours.      • furosemide (LASIX) 10 MG/ML Solution 2 mL by Intravenous route every day.  0    • metFORMIN (GLUCOPHAGE) 500 MG Tab Take 1 Tab by mouth 2 times a day, with meals. 60 Tab     • heparin 5000 UNIT/ML Solution Inject 1 mL as instructed every 8 hours.  0    • [] morphine ER (MS CONTIN) 30 MG Tab CR tablet Take 1 Tab by mouth every 12 hours for 2 days. 4 Tab 0    • aspirin EC 81 MG EC tablet Take 1 Tab by mouth every day. 30 Tab 0 2018 at 0930   • tiotropium (SPIRIVA) 18 MCG Cap Inhale 1 Cap by mouth every day. 30 Cap 0 2018 at 0930   • atorvastatin (LIPITOR) 40 MG Tab Take 1 Tab by mouth every bedtime. 30 Tab 0 2018 at 2000   • ammonium lactate (LAC-HYDRIN) 12 % Lotion Apply 2 Applications to affected area(s) every day. 2 Bottle 0 2018 at 1830   • senna-docusate (PERICOLACE OR SENOKOT S) 8.6-50 MG Tab Take 1 Tab by mouth 2 Times a Day. 60 Tab 0 2018 at 1830   • zinc sulfate (ZINCATE) 220 (50 Zn) MG Cap Take 1 Cap by mouth every day. 30 Cap 0 2018 at 0930   • multivitamin (THERAGRAN) Tab Take 1 Tab by mouth every day. 30 Tab 0 2018 at 0930   • ascorbic acid  (VITAMIN C) 500 MG tablet Take 1 Tab by mouth every day. 30 Tab 3 4/23/2018 at 0930   • Omega-3 Fatty Acids (FISH OIL) 1000 MG Cap capsule Take 1 Cap by mouth 3 times a day, with meals. 90 Cap  4/23/2018 at 1830   • levothyroxine (SYNTHROID) 50 MCG Tab Take 50 mcg by mouth Every morning on an empty stomach.   4/23/2018 at 0930   • omeprazole (PRILOSEC) 20 MG delayed-release capsule Take 20 mg by mouth every day.   4/23/2018 at 0930   • budesonide-formoterol (SYMBICORT) 160-4.5 MCG/ACT Aerosol Inhale 2 Puffs by mouth 2 Times a Day.   4/23/2018 at 2000   • albuterol 108 (90 BASE) MCG/ACT Aero Soln inhalation aerosol Inhale 2 Puffs by mouth every 6 hours as needed for Shortness of Breath.   4/24/2018 at 0930       Medication Allergy:  No Known Allergies          Objective:   Vitals/ General Appearance:   Weight/BMI: There is no height or weight on file to calculate BMI.  There were no vitals taken for this visit.  There were no vitals filed for this visit.  Oxygen Therapy:       Constitutional:   Well developed, Well nourished, No acute distress, obese  HENMT:  Normocephalic, Atraumatic, Oropharynx moist mucous membranes, No oral exudates, Nose normal.  No thyromegaly.  Eyes:  EOMI, Conjunctiva normal, No discharge.  Neck:  Normal range of motion, No cervical tenderness,  no JVD.  Cardiovascular:  Normal heart rate, Normal rhythm, No murmurs, No rubs, No gallops.   Extremitites with intact distal pulses, ++ edema.  Lungs:  Normal breath sounds, breath sounds clear to auscultation bilaterally,  no crackles, no wheezing.   Abdomen: Bowel sounds normal, Soft, No tenderness, No guarding, No rebound, No masses, No hepatosplenomegaly.  Skin: Warm, Dry, No erythema, No rash, no induration.  Neurologic: Alert & oriented x 3, No focal deficits noted, cranial nerves II through X are grossly intact.  Psychiatric: Affect normal, Judgment normal, Mood normal.    Labs:  Recent Labs      05/06/18   0430  05/07/18   0440   WBC  10.6   10.0   RBC  3.28*  3.34*   HEMOGLOBIN  9.3*  9.5*   HEMATOCRIT  31.3*  31.3*   MCV  95.4  93.7   MCH  28.4  28.4   MCHC  29.7*  30.4*   RDW  51.8*  51.9*   PLATELETCT  237  246   MPV  10.6  10.5                 Recent Labs      05/06/18   0430  05/07/18   0440   SODIUM  138  137   POTASSIUM  3.8  4.3   CHLORIDE  91*  91*   CO2  42*  39*   GLUCOSE  145*  131*   BUN  13  13     Recent Labs      05/06/18   0430  05/07/18   0440   SODIUM  138  137   POTASSIUM  3.8  4.3   CHLORIDE  91*  91*   CO2  42*  39*   BUN  13  13   CREATININE  0.59  0.55   MAGNESIUM  1.5   --    PHOSPHORUS  3.2   --    CALCIUM  9.0  9.2     No results found for this or any previous visit.      Imaging:   No orders to display           Assessment and Plan:  Acute on Chronic hypercapnic respiratory failure   -secondary to obstructive sleep apnea/obesity hypoventilation syndrome  -The patient will need to check ABG to evaluate his pH and CO2 level  -Depending on the ABG results he will need to be started on BiPAP treatment in the hospital.  -Discussed with RT in details.  -Will need to continue to allergy ventilator at night when he goes home.    Morbid obesity  -Healthy diet and weight loss was advised. The patient will greatly benefit from weight loss given his obesity hypoventilation syndrome and type 2 diabetes and obstructive sleep apnea.    History of COPD with no current exacerbation  No wheezing on exam and no clear exacerbation. Continue home inhalers and nebulizers as needed in the hospital. No need for systemic steroids.    Thank you very much for this consultation we will continue to follow with you.

## 2018-05-07 NOTE — [facility]
"Hospital Medicine Progress Note, Adult, Complex               Author: Kelley Sifuentes Date & Time created: 5/7/2018  1:46 PM     CC: bilateral leg cellulitis and lymphedema    Interval History:  Patient is admitted with worsening leg pain and drainage of purulent fluid. He is on unasyn after cultures have grown three bacteria per ID.  However IV is  Unable to be maintained and he is unable to tolerate positioning for picc line insertion so antibiotics have been changed to oral dosing.  He refuses a diabetic diet, salt restriction, or to elevate his legs. He refused wound care initially and now will allow wound care but only \"this time\"    Review of Systems:  Review of Systems   Constitutional: Negative for chills, diaphoresis and fever.        Patient states he needs double meals as he states he is hungry and our portions are too small   Respiratory: Negative for cough, shortness of breath and wheezing.    Cardiovascular: Positive for leg swelling. Negative for chest pain and palpitations.   Gastrointestinal: Negative for abdominal pain, constipation, diarrhea, nausea and vomiting.   Genitourinary: Negative for dysuria.   Musculoskeletal: Positive for myalgias.        Leg pain   Skin: Positive for rash.        Redness of legs   Neurological: Negative for weakness and headaches.       Physical Exam:  Physical Exam   Constitutional: He is oriented to person, place, and time. No distress.   HENT:   Mouth/Throat: Oropharynx is clear and moist.   Eyes: Conjunctivae are normal. No scleral icterus.   Cardiovascular: Normal rate and regular rhythm.    Pulmonary/Chest: Effort normal and breath sounds normal.   Abdominal: Soft. Bowel sounds are normal. He exhibits no distension.   Musculoskeletal: He exhibits edema.   4+ leg edema   Neurological: He is alert and oriented to person, place, and time.   Skin: He is not diaphoretic. There is erythema.   Redness of leg skin with some ulceration over medial left foot   Psychiatric: " His behavior is normal.   Nursing note and vitals reviewed.      Labs:  Recent Labs      05/06/18   1325   [id number]  7.32*   [id number]  80.8*   [id number]  118.2*   [id number]  98.2   ARTHCO3  41*   ARTBE  12*         Recent Labs      05/06/18   0430  05/07/18   0440   SODIUM  138  137   POTASSIUM  3.8  4.3   CHLORIDE  91*  91*   CO2  42*  39*   BUN  13  13   CREATININE  0.59  0.55   MAGNESIUM  1.5   --    PHOSPHORUS  3.2   --    CALCIUM  9.0  9.2     Recent Labs      05/06/18   0430  05/07/18   0440   GLUCOSE  145*  131*     Recent Labs      05/06/18   0430  05/07/18   0440   RBC  3.28*  3.34*   HEMOGLOBIN  9.3*  9.5*   HEMATOCRIT  31.3*  31.3*   PLATELETCT  237  246     Recent Labs      05/06/18   0430  05/07/18   0440   WBC  10.6  10.0   NEUTSPOLYS  72.70*  72.90*   LYMPHOCYTES  16.10*  15.70*   MONOCYTES  7.00  5.60   EOSINOPHILS  2.50  4.00   BASOPHILS  0.40  0.40           Hemodynamics:     T97.7 /78 HR92 RR21 O2sat 91%     GI/Nutrition:  Orders Placed This Encounter   Procedures   • DIET ORDER     Standing Status:   Standing     Number of Occurrences:   1     Order Specific Question:   Diet:     Answer:   Regular [1]     Medical Decision Making, by Problem:    Bilateral lower leg cellulitis on augmentin as patient unable to position for iv insertion   Continue through 5/11 per ID, may need longer duration  Lymphedema chronic, continue compression      HTN (hypertension) lasix, start lisinopril and check labs    COPD (chronic obstructive pulmonary disease) oxygen and respiratory therapy    Stasis dermatitis stable    Narcotic addiction continue pain meds    Iv medication for dressing changes    Non compliance w medication regimen    Patient educated about need to be compliant    Diabetes type 2, controlled, metformin    Severe protein-calorie malnutrition patient declines diet modifications    Chronic respiratory failure, oxygen    Morbid obesity with BMI of 40.0-44.9, adult     Chronic pain continue pain  meds    ALIREZA (obstructive sleep apnea) cpap refused by patient      Sepsis (CMS-HCC) resolved prior to arrival        Quality-Core Measures   Reviewed items::  Labs reviewed and Medications reviewed  Montoya catheter::  No Montoya  DVT prophylaxis pharmacological::  Heparin  Ulcer Prophylaxis::  Yes  Antibiotics:  Treating active infection/contamination beyond 24 hours perioperative coverage  Assessed for rehabilitation services:  Patient returned to prior level of function, rehabilitation not indicated at this time and Patient unable to tolerate rehabilitation therapeutic regimen

## 2018-05-08 LAB
ANION GAP SERPL CALC-SCNC: 9 MMOL/L (ref 0–11.9)
BUN SERPL-MCNC: 15 MG/DL (ref 8–22)
CALCIUM SERPL-MCNC: 9.2 MG/DL (ref 8.4–10.2)
CHLORIDE SERPL-SCNC: 87 MMOL/L (ref 96–112)
CO2 SERPL-SCNC: 39 MMOL/L (ref 20–33)
CREAT SERPL-MCNC: 0.63 MG/DL (ref 0.5–1.4)
ERYTHROCYTE [DISTWIDTH] IN BLOOD BY AUTOMATED COUNT: 52.4 FL (ref 35.9–50)
GLUCOSE SERPL-MCNC: 112 MG/DL (ref 65–99)
HCT VFR BLD AUTO: 37.1 % (ref 42–52)
HGB BLD-MCNC: 11.2 G/DL (ref 14–18)
MCH RBC QN AUTO: 28.2 PG (ref 27–33)
MCHC RBC AUTO-ENTMCNC: 30.2 G/DL (ref 33.7–35.3)
MCV RBC AUTO: 93.5 FL (ref 81.4–97.8)
PLATELET # BLD AUTO: 290 K/UL (ref 164–446)
PMV BLD AUTO: 10.1 FL (ref 9–12.9)
POTASSIUM SERPL-SCNC: 4 MMOL/L (ref 3.6–5.5)
RBC # BLD AUTO: 3.97 M/UL (ref 4.7–6.1)
SODIUM SERPL-SCNC: 135 MMOL/L (ref 135–145)
WBC # BLD AUTO: 13 K/UL (ref 4.8–10.8)

## 2018-05-08 PROCEDURE — 99232 SBSQ HOSP IP/OBS MODERATE 35: CPT | Performed by: INTERNAL MEDICINE

## 2018-05-08 PROCEDURE — 82962 GLUCOSE BLOOD TEST: CPT

## 2018-05-08 PROCEDURE — 85027 COMPLETE CBC AUTOMATED: CPT

## 2018-05-08 PROCEDURE — 80048 BASIC METABOLIC PNL TOTAL CA: CPT

## 2018-05-08 ASSESSMENT — ENCOUNTER SYMPTOMS
FEVER: 0
SHORTNESS OF BREATH: 1
SORE THROAT: 0
VOMITING: 0
PALPITATIONS: 0
ABDOMINAL PAIN: 0
NAUSEA: 0
DIARRHEA: 0
COUGH: 0
CONSTIPATION: 0
DIZZINESS: 0
EYE PAIN: 0
HEADACHES: 0

## 2018-05-08 NOTE — [facility]
Hospital Medicine Progress Note, Adult, Complex               Author: Soo TOLU Hernándezen Date & Time created: 5/8/2018  9:04 AM     CC: cellulitis/lymphedema    Interval History:  Several attempts at PIV made, not successful  Wbc higher today  Has severe vertigo when supine, unwilling to try for picc line    Review of Systems:  Review of Systems   Constitutional: Negative for fever.   HENT: Negative for congestion and sore throat.    Eyes: Negative for pain.   Respiratory: Positive for shortness of breath. Negative for cough.    Cardiovascular: Negative for chest pain and palpitations.   Gastrointestinal: Negative for abdominal pain, constipation, diarrhea, nausea and vomiting.   Genitourinary: Negative for dysuria.   Musculoskeletal:        Leg pain   Neurological: Negative for dizziness and headaches.       T 97.7 P 110 /73 RR 23 SaO2 93% I/O 2.3/2.2 4BM  Physical Exam   Constitutional: He appears well-developed. No distress.   HENT:   Head: Normocephalic and atraumatic.   Eyes: Conjunctivae are normal. Right eye exhibits no discharge. Left eye exhibits no discharge. No scleral icterus.   Neck: No tracheal deviation present.   Cardiovascular: Normal rate, regular rhythm and intact distal pulses.    Pulmonary/Chest: Effort normal. No respiratory distress.   diminished   Abdominal: Soft. Bowel sounds are normal. He exhibits no distension. There is no tenderness.   obese   Musculoskeletal: He exhibits edema (4+, legs wrapped).   Neurological: He is alert.   Skin: Skin is warm.   Legs wrapped   Vitals reviewed.      Labs:  Recent Labs      05/06/18   1325   [id number]  7.32*   [id number]  80.8*   [id number]  118.2*   [id number]  98.2   ARTHCO3  41*   ARTBE  12*         Recent Labs      05/06/18   0430  05/07/18   0440  05/08/18   0611   SODIUM  138  137  135   POTASSIUM  3.8  4.3  4.0   CHLORIDE  91*  91*  87*   CO2  42*  39*  39*   BUN  13  13  15   CREATININE  0.59  0.55  0.63   MAGNESIUM  1.5   --    --     PHOSPHORUS  3.2   --    --    CALCIUM  9.0  9.2  9.2     Recent Labs      05/06/18   0430  05/07/18   0440  05/08/18   0611   GLUCOSE  145*  131*  112*     Recent Labs      05/06/18   0430  05/07/18   0440  05/08/18   0611   RBC  3.28*  3.34*  3.97*   HEMOGLOBIN  9.3*  9.5*  11.2*   HEMATOCRIT  31.3*  31.3*  37.1*   PLATELETCT  237  246  290     Recent Labs      05/06/18   0430  05/07/18 0440  05/08/18   0611   WBC  10.6  10.0  13.0*   NEUTSPOLYS  72.70*  72.90*   --    LYMPHOCYTES  16.10*  15.70*   --    MONOCYTES  7.00  5.60   --    EOSINOPHILS  2.50  4.00   --    BASOPHILS  0.40  0.40   --         GI/Nutrition:  Orders Placed This Encounter   Procedures   • DIET ORDER     Standing Status:   Standing     Number of Occurrences:   1     Order Specific Question:   Diet:     Answer:   Regular [1]     Medical Decision Making, by Problem:  RLE cellulitis s/p debridement, polymicrobial (enterococcus, proteus, MSSA)   -Unasyn thru 5/11 recommended, no IV access so on Augmentin   -long term po suppression  B LE lymphedema   -increase diuresis, continue wraps  ALIREZA with chronic hypercapnia   -trilogy ordered, not compliant with bipap here  COPD   -spiriva, incruse, oxygen  DM   -metformin   -asa, lipitor  MSEW  Hypothyroidism   -synthroid  HTN   -lisinopril  Leukocytosis   -follow  Anemia  Debility    DC senna, mag oxide with increased BM  Try US guided PIV      Quality-Core Measures   Reviewed items::  Labs reviewed and Medications reviewed  Montoya catheter::  No Montoya  DVT prophylaxis pharmacological::  Heparin  Antibiotics:  Treating active infection/contamination beyond 24 hours perioperative coverage

## 2018-05-09 LAB
ANION GAP SERPL CALC-SCNC: 7 MMOL/L (ref 0–11.9)
BUN SERPL-MCNC: 11 MG/DL (ref 8–22)
CALCIUM SERPL-MCNC: 9.3 MG/DL (ref 8.4–10.2)
CHLORIDE SERPL-SCNC: 91 MMOL/L (ref 96–112)
CO2 SERPL-SCNC: 38 MMOL/L (ref 20–33)
CREAT SERPL-MCNC: 0.7 MG/DL (ref 0.5–1.4)
ERYTHROCYTE [DISTWIDTH] IN BLOOD BY AUTOMATED COUNT: 53.9 FL (ref 35.9–50)
GLUCOSE BLD-MCNC: 134 MG/DL (ref 65–99)
GLUCOSE SERPL-MCNC: 100 MG/DL (ref 65–99)
HCT VFR BLD AUTO: 34 % (ref 42–52)
HGB BLD-MCNC: 10.1 G/DL (ref 14–18)
MCH RBC QN AUTO: 28.1 PG (ref 27–33)
MCHC RBC AUTO-ENTMCNC: 29.7 G/DL (ref 33.7–35.3)
MCV RBC AUTO: 94.7 FL (ref 81.4–97.8)
PLATELET # BLD AUTO: 249 K/UL (ref 164–446)
PMV BLD AUTO: 10.3 FL (ref 9–12.9)
POTASSIUM SERPL-SCNC: 3.8 MMOL/L (ref 3.6–5.5)
RBC # BLD AUTO: 3.59 M/UL (ref 4.7–6.1)
SODIUM SERPL-SCNC: 136 MMOL/L (ref 135–145)
WBC # BLD AUTO: 9.3 K/UL (ref 4.8–10.8)

## 2018-05-09 PROCEDURE — 99232 SBSQ HOSP IP/OBS MODERATE 35: CPT | Performed by: INTERNAL MEDICINE

## 2018-05-09 PROCEDURE — 85027 COMPLETE CBC AUTOMATED: CPT

## 2018-05-09 PROCEDURE — 80048 BASIC METABOLIC PNL TOTAL CA: CPT

## 2018-05-09 ASSESSMENT — ENCOUNTER SYMPTOMS
VOMITING: 0
NAUSEA: 0
HEADACHES: 0
DIZZINESS: 0
SHORTNESS OF BREATH: 0
DIARRHEA: 0
SORE THROAT: 0
ABDOMINAL PAIN: 0
CONSTIPATION: 0
FEVER: 0
COUGH: 0

## 2018-05-09 NOTE — CONSULTS
Pulmonary Progress Note      Patient ID:   Name:             Fer Estrada     YOB: 1960  Age:                 57 y.o.  male   MRN:               [id number]                                                  Reason of Consult:  Acute on chronic hypercapnic respiratory failure         History of Present Illness:  Pt is 57 yrs old with complex medical history including COPD, congestive heart failure, type 2 diabetes, bilateral lower extremity cellulitis and probable obstructive sleep apnea was admitted to [facility] to be treated for bilateral lower extremity cellulitis. The patient is noncompliant. He was recently admitted to [facility] intubated for about a week for acute and chronic hypercapnic respiratory failure with CO2 retention. The patient was prescribed a trilogy ventilator to use after discharge.     The patient was readmitted to the hospital for bilateral lower extremity cellulitis. He was found to have high bicarbonate on basic metabolic panel of 42. Pulmonary consultation was requested for further evaluation and management of his chronic respiratory failure.    He denies any shortness of breath no confusion or drowsiness.    ROS:  Complete ROS was done and was negative except what mentioned in HPI     Interval History:  Good spirits this morning. Eating breakfast. Tolerated Trilogy/BiPAP for 4 hours last night. No distress.    Past Medical History:  Past Medical History:   Diagnosis Date   • Asthma    • At risk for sleep apnea    • Chronic obstructive pulmonary disease (HCC)    • Chronic venous stasis dermatitis of both lower extremities    • Congestive heart failure (HCC)    • Hypertension    • Hypothyroidism    • Type II or unspecified type diabetes mellitus without mention of complication, not stated as uncontrolled        Past surgical history:  Past Surgical History:   Procedure Laterality Date   • INCISION AND DRAINAGE GENERAL Bilateral 5/1/2018    Procedure: INCISION  AND DRAINAGE GENERAL;  Surgeon: Chino Stock M.D.;  Location: SURGERY [facility];  Service: Vascular   • CARPAL TUNNEL RELEASE            Family History   History reviewed. No pertinent family history.         Social History   Substance Use Topics   • Smoking status: Former Smoker       Quit date: 2005   • Smokeless tobacco: Never Used   • Alcohol use No            Current Outpatient Medications:  Prescriptions Prior to Admission   Medication Sig Dispense Refill Last Dose   • [] oxyCODONE immediate release 20 MG Tab Take 2 Tabs by mouth every 4 hours for 3 days. 5 Tab 0    • [] oxyCODONE immediate-release (ROXICODONE) 5 MG Tab Take 1 Tab by mouth every 6 hours as needed ((4-6)) for up to 2 days. 5 Tab 0    • acetaminophen (TYLENOL) 500 MG Tab Take 2 Tabs by mouth every 6 hours. 30 Tab 0    • NS SOLN 100 mL with ampicillin/sulbactam 3 (2-1) g SOLR 3 g 3 g by Intravenous route every 6 hours.      • furosemide (LASIX) 10 MG/ML Solution 2 mL by Intravenous route every day.  0    • metFORMIN (GLUCOPHAGE) 500 MG Tab Take 1 Tab by mouth 2 times a day, with meals. 60 Tab     • heparin 5000 UNIT/ML Solution Inject 1 mL as instructed every 8 hours.  0    • [] morphine ER (MS CONTIN) 30 MG Tab CR tablet Take 1 Tab by mouth every 12 hours for 2 days. 4 Tab 0    • aspirin EC 81 MG EC tablet Take 1 Tab by mouth every day. 30 Tab 0 2018 at 0930   • tiotropium (SPIRIVA) 18 MCG Cap Inhale 1 Cap by mouth every day. 30 Cap 0 2018 at 0930   • atorvastatin (LIPITOR) 40 MG Tab Take 1 Tab by mouth every bedtime. 30 Tab 0 2018 at 2000   • ammonium lactate (LAC-HYDRIN) 12 % Lotion Apply 2 Applications to affected area(s) every day. 2 Bottle 0 2018 at 1830   • senna-docusate (PERICOLACE OR SENOKOT S) 8.6-50 MG Tab Take 1 Tab by mouth 2 Times a Day. 60 Tab 0 2018 at 1830   • zinc sulfate (ZINCATE) 220 (50 Zn) MG Cap Take 1 Cap by mouth every day. 30 Cap 0 2018 at 0930   •  multivitamin (THERAGRAN) Tab Take 1 Tab by mouth every day. 30 Tab 0 4/23/2018 at 0930   • ascorbic acid (VITAMIN C) 500 MG tablet Take 1 Tab by mouth every day. 30 Tab 3 4/23/2018 at 0930   • Omega-3 Fatty Acids (FISH OIL) 1000 MG Cap capsule Take 1 Cap by mouth 3 times a day, with meals. 90 Cap  4/23/2018 at 1830   • levothyroxine (SYNTHROID) 50 MCG Tab Take 50 mcg by mouth Every morning on an empty stomach.   4/23/2018 at 0930   • omeprazole (PRILOSEC) 20 MG delayed-release capsule Take 20 mg by mouth every day.   4/23/2018 at 0930   • budesonide-formoterol (SYMBICORT) 160-4.5 MCG/ACT Aerosol Inhale 2 Puffs by mouth 2 Times a Day.   4/23/2018 at 2000   • albuterol 108 (90 BASE) MCG/ACT Aero Soln inhalation aerosol Inhale 2 Puffs by mouth every 6 hours as needed for Shortness of Breath.   4/24/2018 at 0930       Medication Allergy:  No Known Allergies          Objective:   Vitals/ General Appearance:   Weight/BMI: There is no height or weight on file to calculate BMI.  There were no vitals taken for this visit.  There were no vitals filed for this visit.  Oxygen Therapy:       Constitutional:   Well developed, Well nourished, No acute distress, obese  HENMT:  Normocephalic, Atraumatic, Oropharynx moist mucous membranes, No oral exudates, Nose normal.  No thyromegaly.  Eyes:  EOMI, Conjunctiva normal, No discharge.  Neck:  Normal range of motion, No cervical tenderness,  no JVD.  Cardiovascular:  Normal heart rate, Normal rhythm, No murmurs, No rubs, No gallops.   Extremitites with intact distal pulses, ++ edema. Cellulitis of lower extremities. Wrapped.  Lungs:  Normal breath sounds, breath sounds clear to auscultation bilaterally,  no crackles, no wheezing.   Abdomen: Bowel sounds normal, Soft, No tenderness, No guarding, No rebound, No masses, No hepatosplenomegaly.  Skin: Warm, Dry, No erythema, No rash, no induration.  Neurologic: Alert & oriented x 3, No focal deficits noted, cranial nerves II through X are  grossly intact.  Psychiatric: Affect normal,  Mood normal.    Labs:  Recent Labs      05/07/18   0440  05/08/18   0611  05/09/18   0700   WBC  10.0  13.0*  9.3   RBC  3.34*  3.97*  3.59*   HEMOGLOBIN  9.5*  11.2*  10.1*   HEMATOCRIT  31.3*  37.1*  34.0*   MCV  93.7  93.5  94.7   MCH  28.4  28.2  28.1   MCHC  30.4*  30.2*  29.7*   RDW  51.9*  52.4*  53.9*   PLATELETCT  246  290  249   MPV  10.5  10.1  10.3                 Recent Labs      05/07/18   0440  05/08/18   0611  05/09/18   0416   SODIUM  137  135  136   POTASSIUM  4.3  4.0  3.8   CHLORIDE  91*  87*  91*   CO2  39*  39*  38*   GLUCOSE  131*  112*  100*   BUN  13  15  11     Recent Labs      05/07/18   0440  05/08/18   0611  05/09/18   0416   SODIUM  137  135  136   POTASSIUM  4.3  4.0  3.8   CHLORIDE  91*  87*  91*   CO2  39*  39*  38*   BUN  13  15  11   CREATININE  0.55  0.63  0.70   CALCIUM  9.2  9.2  9.3     No results found for this or any previous visit.      Imaging:   No orders to display           Assessment and Plan:  Acute on Chronic hypercapnic respiratory failure   -secondary to obstructive sleep apnea/obesity hypoventilation syndrome  -Discussed with RT in details.  -Will need to continue to Trilogy ventilator at night when he goes home.      Morbid obesity  -Healthy diet and weight loss was advised. The patient will greatly benefit from weight loss given his obesity hypoventilation syndrome and type 2 diabetes and obstructive sleep apnea.    History of COPD with no current exacerbation  No wheezing on exam and no clear exacerbation. Continue home inhalers and nebulizers as needed in the hospital. No need for systemic steroids.

## 2018-05-09 NOTE — [facility]
Hospital Medicine Progress Note, Adult, Complex               Author: Soo Herr Date & Time created: 5/9/2018  11:15 AM     CC: cellulitis/lymphedema    Interval History:  IV placed  Wbc better  Used bipap x 4 hours  Doesn't think dilaudid orally works    Review of Systems:  Review of Systems   Constitutional: Negative for fever.   HENT: Negative for congestion and sore throat.    Respiratory: Negative for cough and shortness of breath.    Cardiovascular: Negative for chest pain.   Gastrointestinal: Negative for abdominal pain, constipation, diarrhea, nausea and vomiting.   Genitourinary: Negative for dysuria.   Musculoskeletal:        Leg pain   Neurological: Negative for dizziness and headaches.       T 97.8 P 95 /50 RR 24SaO2 93% I/O 1.6/1.7 noBM  Physical Exam   Constitutional: He appears well-developed. No distress.   HENT:   Head: Normocephalic and atraumatic.   Mouth/Throat: No oropharyngeal exudate.   Eyes: Conjunctivae are normal. Right eye exhibits no discharge. Left eye exhibits no discharge. No scleral icterus.   Neck: No tracheal deviation present.   Cardiovascular: Normal rate, regular rhythm and intact distal pulses.    Pulmonary/Chest: Effort normal. No respiratory distress. He has no wheezes.   diminished   Abdominal: Soft. Bowel sounds are normal. He exhibits no distension. There is no tenderness.   obese   Musculoskeletal: He exhibits edema (4+, legs wrapped).   Neurological: He is alert.   Skin: Skin is warm.   Legs with lymphedema, superficial ulceration, sores on feet  Bandages with drainage   Vitals reviewed.      Labs:  Recent Labs      05/06/18   1325   [id number]  7.32*   [id number]  80.8*   [id number]  118.2*   [id number]  98.2   ARTHCO3  41*   ARTBE  12*         Recent Labs      05/07/18   0440  05/08/18   0611  05/09/18   0416   SODIUM  137  135  136   POTASSIUM  4.3  4.0  3.8   CHLORIDE  91*  87*  91*   CO2  39*  39*  38*   BUN  13  15  11   CREATININE  0.55  0.63  0.70    CALCIUM  9.2  9.2  9.3     Recent Labs      05/07/18   0440  05/08/18   0611  05/09/18   0416   GLUCOSE  131*  112*  100*     Recent Labs      05/07/18   0440  05/08/18   0611  05/09/18   0700   RBC  3.34*  3.97*  3.59*   HEMOGLOBIN  9.5*  11.2*  10.1*   HEMATOCRIT  31.3*  37.1*  34.0*   PLATELETCT  246  290  249     Recent Labs      05/07/18   0440  05/08/18   0611  05/09/18   0700   WBC  10.0  13.0*  9.3   NEUTSPOLYS  72.90*   --    --    LYMPHOCYTES  15.70*   --    --    MONOCYTES  5.60   --    --    EOSINOPHILS  4.00   --    --    BASOPHILS  0.40   --    --         GI/Nutrition:  Orders Placed This Encounter   Procedures   • DIET ORDER     Standing Status:   Standing     Number of Occurrences:   1     Order Specific Question:   Diet:     Answer:   Regular [1]     Medical Decision Making, by Problem:  RLE cellulitis s/p debridement, polymicrobial (enterococcus, proteus, MSSA)   -Unasyn thru 5/14    -long term po suppression to follow  B LE lymphedema   -increased diuresis, continue wraps  ALIREZA with chronic hypercapnia   -trilogy ordered for home use   -bipap as tolerated  COPD   -spiriva, incruse, oxygen  DM   -metformin   -asa, lipitor  Chronic pain   -MS contin, oxycodone   -prn dilaudid  MSEW  Hypothyroidism   -synthroid  HTN   -lisinopril  Leukocytosis   -resolved  Anemia  Debility        Quality-Core Measures   Reviewed items::  Labs reviewed and Medications reviewed  Montoya catheter::  No Montoya  DVT prophylaxis pharmacological::  Heparin  Antibiotics:  Treating active infection/contamination beyond 24 hours perioperative coverage

## 2018-05-10 PROCEDURE — 99232 SBSQ HOSP IP/OBS MODERATE 35: CPT | Performed by: INTERNAL MEDICINE

## 2018-05-10 ASSESSMENT — ENCOUNTER SYMPTOMS
DIZZINESS: 0
VOMITING: 0
SORE THROAT: 0
ABDOMINAL PAIN: 0
CONSTIPATION: 0
NAUSEA: 0
DIARRHEA: 0
COUGH: 0
HEADACHES: 0
FEVER: 0
SHORTNESS OF BREATH: 0

## 2018-05-10 NOTE — CONSULTS
Pulmonary Progress Note      Patient ID:   Name:             Fer Estrada     YOB: 1960  Age:                 57 y.o.  male   MRN:               [id number]                                                  Reason of Consult:  Acute on chronic hypercapnic respiratory failure         History of Present Illness:  Pt is 57 yrs old with complex medical history including COPD, congestive heart failure, type 2 diabetes, bilateral lower extremity cellulitis and probable obstructive sleep apnea was admitted to [facility] to be treated for bilateral lower extremity cellulitis. The patient is noncompliant. He was recently admitted to [facility] intubated for about a week for acute and chronic hypercapnic respiratory failure with CO2 retention. The patient was prescribed a trilogy ventilator to use after discharge.     The patient was readmitted to the hospital for bilateral lower extremity cellulitis. He was found to have high bicarbonate on basic metabolic panel of 42. Pulmonary consultation was requested for further evaluation and management of his chronic respiratory failure.    He denies any shortness of breath no confusion or drowsiness.    ROS:  Complete ROS was done and was negative except what mentioned in HPI     Interval History:  Used BiPAP again last night.  Starting to become accustomed to it.  No distress this morning.    Past Medical History:  Past Medical History:   Diagnosis Date   • Asthma    • At risk for sleep apnea    • Chronic obstructive pulmonary disease (HCC)    • Chronic venous stasis dermatitis of both lower extremities    • Congestive heart failure (HCC)    • Hypertension    • Hypothyroidism    • Type II or unspecified type diabetes mellitus without mention of complication, not stated as uncontrolled        Past surgical history:  Past Surgical History:   Procedure Laterality Date   • INCISION AND DRAINAGE GENERAL Bilateral 5/1/2018    Procedure: INCISION AND DRAINAGE  GENERAL;  Surgeon: Chino Stock M.D.;  Location: SURGERY [facility];  Service: Vascular   • CARPAL TUNNEL RELEASE            Family History   History reviewed. No pertinent family history.         Social History   Substance Use Topics   • Smoking status: Former Smoker       Quit date: 2005   • Smokeless tobacco: Never Used   • Alcohol use No            Current Outpatient Medications:  Prescriptions Prior to Admission   Medication Sig Dispense Refill Last Dose   • [] oxyCODONE immediate release 20 MG Tab Take 2 Tabs by mouth every 4 hours for 3 days. 5 Tab 0    • [] oxyCODONE immediate-release (ROXICODONE) 5 MG Tab Take 1 Tab by mouth every 6 hours as needed ((4-6)) for up to 2 days. 5 Tab 0    • acetaminophen (TYLENOL) 500 MG Tab Take 2 Tabs by mouth every 6 hours. 30 Tab 0    • NS SOLN 100 mL with ampicillin/sulbactam 3 (2-1) g SOLR 3 g 3 g by Intravenous route every 6 hours.      • furosemide (LASIX) 10 MG/ML Solution 2 mL by Intravenous route every day.  0    • metFORMIN (GLUCOPHAGE) 500 MG Tab Take 1 Tab by mouth 2 times a day, with meals. 60 Tab     • heparin 5000 UNIT/ML Solution Inject 1 mL as instructed every 8 hours.  0    • [] morphine ER (MS CONTIN) 30 MG Tab CR tablet Take 1 Tab by mouth every 12 hours for 2 days. 4 Tab 0    • aspirin EC 81 MG EC tablet Take 1 Tab by mouth every day. 30 Tab 0 2018 at 0930   • tiotropium (SPIRIVA) 18 MCG Cap Inhale 1 Cap by mouth every day. 30 Cap 0 2018 at 0930   • atorvastatin (LIPITOR) 40 MG Tab Take 1 Tab by mouth every bedtime. 30 Tab 0 2018 at 2000   • ammonium lactate (LAC-HYDRIN) 12 % Lotion Apply 2 Applications to affected area(s) every day. 2 Bottle 0 2018 at 1830   • senna-docusate (PERICOLACE OR SENOKOT S) 8.6-50 MG Tab Take 1 Tab by mouth 2 Times a Day. 60 Tab 0 2018 at 1830   • zinc sulfate (ZINCATE) 220 (50 Zn) MG Cap Take 1 Cap by mouth every day. 30 Cap 0 2018 at 0930   • multivitamin  (THERAGRAN) Tab Take 1 Tab by mouth every day. 30 Tab 0 4/23/2018 at 0930   • ascorbic acid (VITAMIN C) 500 MG tablet Take 1 Tab by mouth every day. 30 Tab 3 4/23/2018 at 0930   • Omega-3 Fatty Acids (FISH OIL) 1000 MG Cap capsule Take 1 Cap by mouth 3 times a day, with meals. 90 Cap  4/23/2018 at 1830   • levothyroxine (SYNTHROID) 50 MCG Tab Take 50 mcg by mouth Every morning on an empty stomach.   4/23/2018 at 0930   • omeprazole (PRILOSEC) 20 MG delayed-release capsule Take 20 mg by mouth every day.   4/23/2018 at 0930   • budesonide-formoterol (SYMBICORT) 160-4.5 MCG/ACT Aerosol Inhale 2 Puffs by mouth 2 Times a Day.   4/23/2018 at 2000   • albuterol 108 (90 BASE) MCG/ACT Aero Soln inhalation aerosol Inhale 2 Puffs by mouth every 6 hours as needed for Shortness of Breath.   4/24/2018 at 0930       Medication Allergy:  No Known Allergies          Objective:   Vitals/ General Appearance:   Weight/BMI: There is no height or weight on file to calculate BMI.  There were no vitals taken for this visit.  There were no vitals filed for this visit.  Oxygen Therapy:       Constitutional:   Well developed, Well nourished, No acute distress, obese  HENMT:  Normocephalic, Atraumatic, Oropharynx moist mucous membranes, No oral exudates, Nose normal.  No thyromegaly.  Eyes:  EOMI, Conjunctiva normal, No discharge.  Neck:  Normal range of motion, No cervical tenderness,  no JVD.  Cardiovascular:  Normal heart rate, Normal rhythm, No murmurs, No rubs, No gallops.   Extremitites with intact distal pulses, ++ edema. Cellulitis of lower extremities. Wrapped.  Lungs:  Normal breath sounds, breath sounds clear to auscultation bilaterally,  no crackles, no wheezing.   Abdomen: Bowel sounds normal, Soft, No tenderness, No guarding, No rebound, No masses, No hepatosplenomegaly.  Skin: Warm, Dry, No erythema, No rash, no induration.  Neurologic: Alert & oriented x 3, No focal deficits noted, cranial nerves II through X are grossly  intact.  Psychiatric: Affect normal,  Mood normal.    Labs:  Recent Labs      05/08/18   0611  05/09/18   0700   WBC  13.0*  9.3   RBC  3.97*  3.59*   HEMOGLOBIN  11.2*  10.1*   HEMATOCRIT  37.1*  34.0*   MCV  93.5  94.7   MCH  28.2  28.1   MCHC  30.2*  29.7*   RDW  52.4*  53.9*   PLATELETCT  290  249   MPV  10.1  10.3                 Recent Labs      05/08/18   0611  05/09/18   0416   SODIUM  135  136   POTASSIUM  4.0  3.8   CHLORIDE  87*  91*   CO2  39*  38*   GLUCOSE  112*  100*   BUN  15  11     Recent Labs      05/08/18   0611  05/09/18   0416   SODIUM  135  136   POTASSIUM  4.0  3.8   CHLORIDE  87*  91*   CO2  39*  38*   BUN  15  11   CREATININE  0.63  0.70   CALCIUM  9.2  9.3     No results found for this or any previous visit.      Imaging:   No orders to display           Assessment and Plan:  Acute on Chronic hypercapnic respiratory failure   -secondary to obstructive sleep apnea/obesity hypoventilation syndrome  -Discussed with RT in details.  -Will need to continue to Trilogy ventilator at night when he goes home.      Morbid obesity  -Healthy diet and weight loss was advised. The patient will greatly benefit from weight loss given his obesity hypoventilation syndrome and type 2 diabetes and obstructive sleep apnea.    History of COPD with no current exacerbation  No wheezing on exam and no clear exacerbation. Continue home inhalers and nebulizers as needed in the hospital. No need for systemic steroids.

## 2018-05-10 NOTE — [facility]
Hospital Medicine Progress Note, Adult, Complex               Author: Soo MOTLEY [place] Date & Time created: 5/10/2018  1:48 PM     CC: cellulitis/lymphedema    Interval History:  Used Bipap for 5 hours  Agrees to IV lasix  Thinks legs are slightly better    Review of Systems:  Review of Systems   Constitutional: Negative for fever.   HENT: Negative for sore throat.    Respiratory: Negative for cough and shortness of breath.    Cardiovascular: Negative for chest pain.   Gastrointestinal: Negative for abdominal pain, constipation, diarrhea, nausea and vomiting.   Genitourinary: Negative for dysuria.   Musculoskeletal:        Leg pain slightly better   Neurological: Negative for dizziness and headaches.       T 98.6 P107 /56 RR 19SaO2 90% I/O2.3/1.4  1BM  Physical Exam   Constitutional: He is oriented to person, place, and time. He appears well-developed.   HENT:   Head: Normocephalic and atraumatic.   Mouth/Throat: No oropharyngeal exudate.   Eyes: Conjunctivae are normal. Right eye exhibits no discharge. Left eye exhibits no discharge. No scleral icterus.   Neck: No tracheal deviation present.   Cardiovascular: Normal rate, regular rhythm and intact distal pulses.    Pulmonary/Chest: Effort normal. No respiratory distress. He has no wheezes.   diminished   Abdominal: Soft. Bowel sounds are normal. He exhibits no distension. There is no tenderness.   obese   Musculoskeletal: He exhibits edema (4+, legs wrapped).   Neurological: He is alert and oriented to person, place, and time.   Skin: Skin is warm.   Vitals reviewed.      Labs:        Invalid input(s): [id number]      Recent Labs      05/08/18   0611  05/09/18   0416   SODIUM  135  136   POTASSIUM  4.0  3.8   CHLORIDE  87*  91*   CO2  39*  38*   BUN  15  11   CREATININE  0.63  0.70   CALCIUM  9.2  9.3     Recent Labs      05/08/18   0611  05/09/18   0416   GLUCOSE  112*  100*     Recent Labs      05/08/18   0611  05/09/18   0700   RBC  3.97*  3.59*    HEMOGLOBIN  11.2*  10.1*   HEMATOCRIT  37.1*  34.0*   PLATELETCT  290  249     Recent Labs      05/08/18   0611  05/09/18   0700   WBC  13.0*  9.3        GI/Nutrition:  Orders Placed This Encounter   Procedures   • DIET ORDER     Standing Status:   Standing     Number of Occurrences:   1     Order Specific Question:   Diet:     Answer:   Regular [1]     Medical Decision Making, by Problem:  RLE cellulitis s/p debridement, polymicrobial (enterococcus, proteus, MSSA)   -Unasyn thru 5/14    -long term po suppression to follow  B LE lymphedema   -increased diuresis (change to IV), continue wraps  ALIREZA with chronic hypercapnia/OHVS   -trilogy ordered for home use   -bipap as tolerated  COPD   -spiriva, incruse, oxygen  DM   -metformin   -asa, lipitor  Chronic pain   -MS contin, oxycodone   -prn dilaudid  MSEW  Hypothyroidism   -synthroid  HTN   -lisinopril  Anemia  Debility    Am labs    Quality-Core Measures   Reviewed items::  Medications reviewed  Montoya catheter::  No Montoya  DVT prophylaxis pharmacological::  Heparin  Antibiotics:  Treating active infection/contamination beyond 24 hours perioperative coverage

## 2018-05-11 LAB
ANION GAP SERPL CALC-SCNC: 7 MMOL/L (ref 0–11.9)
ANISOCYTOSIS BLD QL SMEAR: ABNORMAL
BASOPHILS # BLD AUTO: 0.5 % (ref 0–1.8)
BASOPHILS # BLD: 0.04 K/UL (ref 0–0.12)
BUN SERPL-MCNC: 16 MG/DL (ref 8–22)
CALCIUM SERPL-MCNC: 8.9 MG/DL (ref 8.4–10.2)
CHLORIDE SERPL-SCNC: 91 MMOL/L (ref 96–112)
CO2 SERPL-SCNC: 40 MMOL/L (ref 20–33)
COMMENT 1642: NORMAL
CREAT SERPL-MCNC: 0.91 MG/DL (ref 0.5–1.4)
EOSINOPHIL # BLD AUTO: 0.3 K/UL (ref 0–0.51)
EOSINOPHIL NFR BLD: 3.7 % (ref 0–6.9)
ERYTHROCYTE [DISTWIDTH] IN BLOOD BY AUTOMATED COUNT: 54.6 FL (ref 35.9–50)
GLUCOSE SERPL-MCNC: 146 MG/DL (ref 65–99)
HCT VFR BLD AUTO: 34.9 % (ref 42–52)
HGB BLD-MCNC: 10.3 G/DL (ref 14–18)
IMM GRANULOCYTES # BLD AUTO: 0.2 K/UL (ref 0–0.11)
IMM GRANULOCYTES NFR BLD AUTO: 2.4 % (ref 0–0.9)
LG PLATELETS BLD QL SMEAR: NORMAL
LYMPHOCYTES # BLD AUTO: 1.49 K/UL (ref 1–4.8)
LYMPHOCYTES NFR BLD: 18.2 % (ref 22–41)
MACROCYTES BLD QL SMEAR: ABNORMAL
MCH RBC QN AUTO: 28 PG (ref 27–33)
MCHC RBC AUTO-ENTMCNC: 29.5 G/DL (ref 33.7–35.3)
MCV RBC AUTO: 94.8 FL (ref 81.4–97.8)
MICROCYTES BLD QL SMEAR: ABNORMAL
MONOCYTES # BLD AUTO: 0.64 K/UL (ref 0–0.85)
MONOCYTES NFR BLD AUTO: 7.8 % (ref 0–13.4)
NEUTROPHILS # BLD AUTO: 5.52 K/UL (ref 1.82–7.42)
NEUTROPHILS NFR BLD: 67.4 % (ref 44–72)
NRBC # BLD AUTO: 0 K/UL
NRBC BLD-RTO: 0 /100 WBC
PLATELET # BLD AUTO: 262 K/UL (ref 164–446)
PLATELET BLD QL SMEAR: NORMAL
PMV BLD AUTO: 10.3 FL (ref 9–12.9)
POLYCHROMASIA BLD QL SMEAR: NORMAL
POTASSIUM SERPL-SCNC: 4.4 MMOL/L (ref 3.6–5.5)
RBC # BLD AUTO: 3.68 M/UL (ref 4.7–6.1)
RBC BLD AUTO: PRESENT
SODIUM SERPL-SCNC: 138 MMOL/L (ref 135–145)
WBC # BLD AUTO: 8.2 K/UL (ref 4.8–10.8)

## 2018-05-11 PROCEDURE — 99232 SBSQ HOSP IP/OBS MODERATE 35: CPT | Performed by: INTERNAL MEDICINE

## 2018-05-11 PROCEDURE — 85025 COMPLETE CBC W/AUTO DIFF WBC: CPT

## 2018-05-11 PROCEDURE — 80048 BASIC METABOLIC PNL TOTAL CA: CPT

## 2018-05-11 ASSESSMENT — ENCOUNTER SYMPTOMS
VOMITING: 0
SHORTNESS OF BREATH: 0
HEADACHES: 0
COUGH: 0
NAUSEA: 0
DIARRHEA: 0
FEVER: 0
SORE THROAT: 0
CHILLS: 0
ABDOMINAL PAIN: 0
DIZZINESS: 0

## 2018-05-11 NOTE — [facility]
Hospital Medicine Progress Note, Adult, Complex               Author: Soo TOLU [place] Date & Time created: 5/11/2018  8:41 AM     CC: cellulitis/lymphedema    Interval History:  Lasix with improved diuresis, tells me has had JOE previously from overdiuresis  Wore Bipap about 4 hours  Wound care today    Review of Systems:  Review of Systems   Constitutional: Negative for chills and fever.   HENT: Negative for sore throat.    Respiratory: Negative for cough and shortness of breath.    Cardiovascular: Negative for chest pain.   Gastrointestinal: Negative for abdominal pain, diarrhea, nausea and vomiting.   Genitourinary: Negative for dysuria.   Musculoskeletal:        Leg/foot pain   Neurological: Negative for dizziness and headaches.       T 98.6 P105BP 100/43 RR 20SaO2 95% I/O1.6/2.7 1BM  Physical Exam   Constitutional: He is oriented to person, place, and time. He appears well-developed. No distress.   Eating breakfast   HENT:   Head: Normocephalic and atraumatic.   Mouth/Throat: No oropharyngeal exudate.   Eyes: Conjunctivae are normal. Right eye exhibits no discharge. Left eye exhibits no discharge. No scleral icterus.   Neck: No tracheal deviation present.   Cardiovascular: Normal rate, regular rhythm and intact distal pulses.    Pulmonary/Chest: Effort normal. No respiratory distress.   diminished   Abdominal: Soft. Bowel sounds are normal. He exhibits no distension. There is no tenderness.   obese   Musculoskeletal: He exhibits edema (4+, legs wrapped).   Neurological: He is alert and oriented to person, place, and time.   Skin: Skin is warm.   Toes with fungal change, thick and cracked  Legs wrapped, drainage noted on pillow   Vitals reviewed.      Labs:        Invalid input(s): [id number]      Recent Labs      05/09/18   0416  05/11/18   0350   SODIUM  136  138   POTASSIUM  3.8  4.4   CHLORIDE  91*  91*   CO2  38*  40*   BUN  11  16   CREATININE  0.70  0.91   CALCIUM  9.3  8.9     Recent Labs       05/09/18   0416  05/11/18   0350   GLUCOSE  100*  146*     Recent Labs      05/09/18   0700  05/11/18   0350   RBC  3.59*  3.68*   HEMOGLOBIN  10.1*  10.3*   HEMATOCRIT  34.0*  34.9*   PLATELETCT  249  262     Recent Labs      05/09/18   0700  05/11/18   0350   WBC  9.3  8.2   NEUTSPOLYS   --   67.40   LYMPHOCYTES   --   18.20*   MONOCYTES   --   7.80   EOSINOPHILS   --   3.70   BASOPHILS   --   0.50        GI/Nutrition:  Orders Placed This Encounter   Procedures   • DIET ORDER     Standing Status:   Standing     Number of Occurrences:   1     Order Specific Question:   Diet:     Answer:   Regular [1]     Medical Decision Making, by Problem:  RLE cellulitis s/p debridement, polymicrobial (enterococcus, proteus, MSSA)   -Unasyn thru 5/14    -long term po suppression to follow  B LE lymphedema   -increased diuresis, change to daily with increased scr   -continue wraps  ALIREZA with chronic hypercapnia/OHVS   -trilogy ordered for home use   -bipap as tolerated  COPD   -spiriva, incruse, oxygen  DM   -metformin   -asa, lipitor  Chronic pain   -MS contin, oxycodone   -prn dilaudid  MSEW  Hypothyroidism   -synthroid  HTN   -lisinopril  Anemia  Debility    Am bmp  Podiatry consult    Quality-Core Measures   Reviewed items::  Medications reviewed and Labs reviewed  Montoya catheter::  No Montoya  DVT prophylaxis pharmacological::  Heparin  Antibiotics:  Treating active infection/contamination beyond 24 hours perioperative coverage

## 2018-05-11 NOTE — CONSULTS
Pulmonary Progress Note      Patient ID:   Name:             Fer Estrada     YOB: 1960  Age:                 57 y.o.  male   MRN:               [id number]                                                  Reason of Consult:  Acute on chronic hypercapnic respiratory failure         History of Present Illness:  Pt is 57 yrs old with complex medical history including COPD, congestive heart failure, type 2 diabetes, bilateral lower extremity cellulitis and probable obstructive sleep apnea was admitted to [facility] to be treated for bilateral lower extremity cellulitis. The patient is noncompliant. He was recently admitted to [facility] intubated for about a week for acute and chronic hypercapnic respiratory failure with CO2 retention. The patient was prescribed a trilogy ventilator to use after discharge.     The patient was readmitted to the hospital for bilateral lower extremity cellulitis. He was found to have high bicarbonate on basic metabolic panel of 42. Pulmonary consultation was requested for further evaluation and management of his chronic respiratory failure.    He denies any shortness of breath no confusion or drowsiness.    ROS:  Complete ROS was done and was negative except what mentioned in HPI     Interval History:  Still trying to use BiPAP at night.    Past Medical History:  Past Medical History:   Diagnosis Date   • Asthma    • At risk for sleep apnea    • Chronic obstructive pulmonary disease (HCC)    • Chronic venous stasis dermatitis of both lower extremities    • Congestive heart failure (HCC)    • Hypertension    • Hypothyroidism    • Type II or unspecified type diabetes mellitus without mention of complication, not stated as uncontrolled        Past surgical history:  Past Surgical History:   Procedure Laterality Date   • INCISION AND DRAINAGE GENERAL Bilateral 5/1/2018    Procedure: INCISION AND DRAINAGE GENERAL;  Surgeon: Chino Stock M.D.;  Location:  SURGERY [facility];  Service: Vascular   • CARPAL TUNNEL RELEASE            Family History   History reviewed. No pertinent family history.         Social History   Substance Use Topics   • Smoking status: Former Smoker       Quit date: 2005   • Smokeless tobacco: Never Used   • Alcohol use No            Current Outpatient Medications:  Prescriptions Prior to Admission   Medication Sig Dispense Refill Last Dose   • [] oxyCODONE immediate release 20 MG Tab Take 2 Tabs by mouth every 4 hours for 3 days. 5 Tab 0    • [] oxyCODONE immediate-release (ROXICODONE) 5 MG Tab Take 1 Tab by mouth every 6 hours as needed ((4-6)) for up to 2 days. 5 Tab 0    • acetaminophen (TYLENOL) 500 MG Tab Take 2 Tabs by mouth every 6 hours. 30 Tab 0    • NS SOLN 100 mL with ampicillin/sulbactam 3 (2-1) g SOLR 3 g 3 g by Intravenous route every 6 hours.      • furosemide (LASIX) 10 MG/ML Solution 2 mL by Intravenous route every day.  0    • metFORMIN (GLUCOPHAGE) 500 MG Tab Take 1 Tab by mouth 2 times a day, with meals. 60 Tab     • heparin 5000 UNIT/ML Solution Inject 1 mL as instructed every 8 hours.  0    • [] morphine ER (MS CONTIN) 30 MG Tab CR tablet Take 1 Tab by mouth every 12 hours for 2 days. 4 Tab 0    • aspirin EC 81 MG EC tablet Take 1 Tab by mouth every day. 30 Tab 0 2018 at 0930   • tiotropium (SPIRIVA) 18 MCG Cap Inhale 1 Cap by mouth every day. 30 Cap 0 2018 at 0930   • atorvastatin (LIPITOR) 40 MG Tab Take 1 Tab by mouth every bedtime. 30 Tab 0 2018 at 2000   • ammonium lactate (LAC-HYDRIN) 12 % Lotion Apply 2 Applications to affected area(s) every day. 2 Bottle 0 2018 at 1830   • senna-docusate (PERICOLACE OR SENOKOT S) 8.6-50 MG Tab Take 1 Tab by mouth 2 Times a Day. 60 Tab 0 2018 at 1830   • zinc sulfate (ZINCATE) 220 (50 Zn) MG Cap Take 1 Cap by mouth every day. 30 Cap 0 2018 at 0930   • multivitamin (THERAGRAN) Tab Take 1 Tab by mouth every day. 30 Tab 0  4/23/2018 at 0930   • ascorbic acid (VITAMIN C) 500 MG tablet Take 1 Tab by mouth every day. 30 Tab 3 4/23/2018 at 0930   • Omega-3 Fatty Acids (FISH OIL) 1000 MG Cap capsule Take 1 Cap by mouth 3 times a day, with meals. 90 Cap  4/23/2018 at 1830   • levothyroxine (SYNTHROID) 50 MCG Tab Take 50 mcg by mouth Every morning on an empty stomach.   4/23/2018 at 0930   • omeprazole (PRILOSEC) 20 MG delayed-release capsule Take 20 mg by mouth every day.   4/23/2018 at 0930   • budesonide-formoterol (SYMBICORT) 160-4.5 MCG/ACT Aerosol Inhale 2 Puffs by mouth 2 Times a Day.   4/23/2018 at 2000   • albuterol 108 (90 BASE) MCG/ACT Aero Soln inhalation aerosol Inhale 2 Puffs by mouth every 6 hours as needed for Shortness of Breath.   4/24/2018 at 0930       Medication Allergy:  No Known Allergies          Objective:   Vitals/ General Appearance:   Weight/BMI: There is no height or weight on file to calculate BMI.  There were no vitals taken for this visit.  There were no vitals filed for this visit.  Oxygen Therapy:       Constitutional:   Well developed, Well nourished, No acute distress, obese  HENMT:  Normocephalic, Atraumatic, Oropharynx moist mucous membranes, No oral exudates, Nose normal.  No thyromegaly.  Eyes:  EOMI, Conjunctiva normal, No discharge.  Neck:  Normal range of motion, No cervical tenderness,  no JVD.  Cardiovascular:  Normal heart rate, Normal rhythm, No murmurs, No rubs, No gallops.   Extremitites with intact distal pulses, ++ edema. Cellulitis of lower extremities. Wrapped.  Lungs:  Normal breath sounds, breath sounds clear to auscultation bilaterally,  no crackles, no wheezing.   Abdomen: Bowel sounds normal, Soft, No tenderness, No guarding, No rebound, No masses, No hepatosplenomegaly.  Skin: Warm, Dry, No erythema, No rash, no induration.  Neurologic: Alert & oriented x 3, No focal deficits noted, cranial nerves II through X are grossly intact.  Psychiatric: Affect normal,  Mood  normal.    Labs:  Recent Labs      05/09/18   0700  05/11/18   0350   WBC  9.3  8.2   RBC  3.59*  3.68*   HEMOGLOBIN  10.1*  10.3*   HEMATOCRIT  34.0*  34.9*   MCV  94.7  94.8   MCH  28.1  28.0   MCHC  29.7*  29.5*   RDW  53.9*  54.6*   PLATELETCT  249  262   MPV  10.3  10.3                 Recent Labs      05/09/18   0416  05/11/18   0350   SODIUM  136  138   POTASSIUM  3.8  4.4   CHLORIDE  91*  91*   CO2  38*  40*   GLUCOSE  100*  146*   BUN  11  16     Recent Labs      05/09/18   0416  05/11/18   0350   SODIUM  136  138   POTASSIUM  3.8  4.4   CHLORIDE  91*  91*   CO2  38*  40*   BUN  11  16   CREATININE  0.70  0.91   CALCIUM  9.3  8.9     No results found for this or any previous visit.      Imaging:   No orders to display           Assessment and Plan:  Acute on Chronic hypercapnic respiratory failure   -secondary to obstructive sleep apnea/obesity hypoventilation syndrome  -Discussed with RT in details.  -Will need to continue to Trilogy ventilator at night when he goes home.      Morbid obesity  -Healthy diet and weight loss was advised. The patient will greatly benefit from weight loss given his obesity hypoventilation syndrome and type 2 diabetes and obstructive sleep apnea.    History of COPD with no current exacerbation  No wheezing on exam and no clear exacerbation. Continue home inhalers and nebulizers as needed in the hospital. No need for systemic steroids.

## 2018-05-12 LAB
ANION GAP SERPL CALC-SCNC: 2 MMOL/L (ref 0–11.9)
BUN SERPL-MCNC: 15 MG/DL (ref 8–22)
CALCIUM SERPL-MCNC: 9.2 MG/DL (ref 8.4–10.2)
CHLORIDE SERPL-SCNC: 90 MMOL/L (ref 96–112)
CO2 SERPL-SCNC: 43 MMOL/L (ref 20–33)
CREAT SERPL-MCNC: 0.64 MG/DL (ref 0.5–1.4)
GLUCOSE SERPL-MCNC: 157 MG/DL (ref 65–99)
POTASSIUM SERPL-SCNC: 4.3 MMOL/L (ref 3.6–5.5)
SODIUM SERPL-SCNC: 135 MMOL/L (ref 135–145)

## 2018-05-12 PROCEDURE — 99232 SBSQ HOSP IP/OBS MODERATE 35: CPT | Performed by: INTERNAL MEDICINE

## 2018-05-12 PROCEDURE — 80048 BASIC METABOLIC PNL TOTAL CA: CPT

## 2018-05-12 ASSESSMENT — ENCOUNTER SYMPTOMS
FEVER: 0
HEADACHES: 0
ABDOMINAL PAIN: 0
SHORTNESS OF BREATH: 0
NAUSEA: 0
VOMITING: 0
COUGH: 0
SORE THROAT: 0
DIARRHEA: 0

## 2018-05-12 NOTE — CONSULTS
Pulmonary Progress Note      Patient ID:   Name:             Fer Estrada     YOB: 1960  Age:                 57 y.o.  male   MRN:               [id number]                                                  Reason of Consult:  Acute on chronic hypercapnic respiratory failure         History of Present Illness:  Pt is 57 yrs old with complex medical history including COPD, congestive heart failure, type 2 diabetes, bilateral lower extremity cellulitis and probable obstructive sleep apnea was admitted to [facility] to be treated for bilateral lower extremity cellulitis. The patient is noncompliant. He was recently admitted to [facility] intubated for about a week for acute and chronic hypercapnic respiratory failure with CO2 retention. The patient was prescribed a trilogy ventilator to use after discharge.     The patient was readmitted to the hospital for bilateral lower extremity cellulitis. He was found to have high bicarbonate on basic metabolic panel of 42. Pulmonary consultation was requested for further evaluation and management of his chronic respiratory failure.    He denies any shortness of breath no confusion or drowsiness.    ROS:  Complete ROS was done and was negative except what mentioned in HPI     Interval History:  Did not use BiPAP last night.    Past Medical History:  Past Medical History:   Diagnosis Date   • Asthma    • At risk for sleep apnea    • Chronic obstructive pulmonary disease (HCC)    • Chronic venous stasis dermatitis of both lower extremities    • Congestive heart failure (HCC)    • Hypertension    • Hypothyroidism    • Type II or unspecified type diabetes mellitus without mention of complication, not stated as uncontrolled        Past surgical history:  Past Surgical History:   Procedure Laterality Date   • INCISION AND DRAINAGE GENERAL Bilateral 5/1/2018    Procedure: INCISION AND DRAINAGE GENERAL;  Surgeon: Chino Stock M.D.;  Location: SURGERY  [facility];  Service: Vascular   • CARPAL TUNNEL RELEASE            Family History   History reviewed. No pertinent family history.         Social History   Substance Use Topics   • Smoking status: Former Smoker       Quit date: 2005   • Smokeless tobacco: Never Used   • Alcohol use No            Current Outpatient Medications:  Prescriptions Prior to Admission   Medication Sig Dispense Refill Last Dose   • [] oxyCODONE immediate release 20 MG Tab Take 2 Tabs by mouth every 4 hours for 3 days. 5 Tab 0    • [] oxyCODONE immediate-release (ROXICODONE) 5 MG Tab Take 1 Tab by mouth every 6 hours as needed ((4-6)) for up to 2 days. 5 Tab 0    • acetaminophen (TYLENOL) 500 MG Tab Take 2 Tabs by mouth every 6 hours. 30 Tab 0    • NS SOLN 100 mL with ampicillin/sulbactam 3 (2-1) g SOLR 3 g 3 g by Intravenous route every 6 hours.      • furosemide (LASIX) 10 MG/ML Solution 2 mL by Intravenous route every day.  0    • metFORMIN (GLUCOPHAGE) 500 MG Tab Take 1 Tab by mouth 2 times a day, with meals. 60 Tab     • heparin 5000 UNIT/ML Solution Inject 1 mL as instructed every 8 hours.  0    • [] morphine ER (MS CONTIN) 30 MG Tab CR tablet Take 1 Tab by mouth every 12 hours for 2 days. 4 Tab 0    • aspirin EC 81 MG EC tablet Take 1 Tab by mouth every day. 30 Tab 0 2018 at 0930   • tiotropium (SPIRIVA) 18 MCG Cap Inhale 1 Cap by mouth every day. 30 Cap 0 2018 at 0930   • atorvastatin (LIPITOR) 40 MG Tab Take 1 Tab by mouth every bedtime. 30 Tab 0 2018 at 2000   • ammonium lactate (LAC-HYDRIN) 12 % Lotion Apply 2 Applications to affected area(s) every day. 2 Bottle 0 2018 at 1830   • senna-docusate (PERICOLACE OR SENOKOT S) 8.6-50 MG Tab Take 1 Tab by mouth 2 Times a Day. 60 Tab 0 2018 at 1830   • zinc sulfate (ZINCATE) 220 (50 Zn) MG Cap Take 1 Cap by mouth every day. 30 Cap 0 2018 at 0930   • multivitamin (THERAGRAN) Tab Take 1 Tab by mouth every day. 30 Tab 0 2018  at 0930   • ascorbic acid (VITAMIN C) 500 MG tablet Take 1 Tab by mouth every day. 30 Tab 3 4/23/2018 at 0930   • Omega-3 Fatty Acids (FISH OIL) 1000 MG Cap capsule Take 1 Cap by mouth 3 times a day, with meals. 90 Cap  4/23/2018 at 1830   • levothyroxine (SYNTHROID) 50 MCG Tab Take 50 mcg by mouth Every morning on an empty stomach.   4/23/2018 at 0930   • omeprazole (PRILOSEC) 20 MG delayed-release capsule Take 20 mg by mouth every day.   4/23/2018 at 0930   • budesonide-formoterol (SYMBICORT) 160-4.5 MCG/ACT Aerosol Inhale 2 Puffs by mouth 2 Times a Day.   4/23/2018 at 2000   • albuterol 108 (90 BASE) MCG/ACT Aero Soln inhalation aerosol Inhale 2 Puffs by mouth every 6 hours as needed for Shortness of Breath.   4/24/2018 at 0930       Medication Allergy:  No Known Allergies          Objective:   Vitals/ General Appearance:   Weight/BMI: There is no height or weight on file to calculate BMI.  There were no vitals taken for this visit.  There were no vitals filed for this visit.  Oxygen Therapy:       Constitutional:   Well developed, Well nourished, No acute distress, obese  HENMT:  Normocephalic, Atraumatic, Oropharynx moist mucous membranes, No oral exudates, Nose normal.  No thyromegaly.  Eyes:  EOMI, Conjunctiva normal, No discharge.  Neck:  Normal range of motion, No cervical tenderness,  no JVD.  Cardiovascular:  Normal heart rate, Normal rhythm, No murmurs, No rubs, No gallops.   Extremitites with intact distal pulses, ++ edema. Cellulitis of lower extremities. Wrapped.  Lungs:  Normal breath sounds, breath sounds clear to auscultation bilaterally,  no crackles, no wheezing.   Abdomen: Bowel sounds normal, Soft, No tenderness, No guarding, No rebound, No masses, No hepatosplenomegaly.  Skin: Warm, Dry, No erythema, No rash, no induration.  Neurologic: Alert & oriented x 3, No focal deficits noted, cranial nerves II through X are grossly intact.  Psychiatric: Affect normal,  Mood normal.    Labs:  Recent Labs       05/11/18   0350   WBC  8.2   RBC  3.68*   HEMOGLOBIN  10.3*   HEMATOCRIT  34.9*   MCV  94.8   MCH  28.0   MCHC  29.5*   RDW  54.6*   PLATELETCT  262   MPV  10.3                 Recent Labs      05/11/18   0350  05/12/18   0430   SODIUM  138  135   POTASSIUM  4.4  4.3   CHLORIDE  91*  90*   CO2  40*  43*   GLUCOSE  146*  157*   BUN  16  15     Recent Labs      05/11/18   0350  05/12/18   0430   SODIUM  138  135   POTASSIUM  4.4  4.3   CHLORIDE  91*  90*   CO2  40*  43*   BUN  16  15   CREATININE  0.91  0.64   CALCIUM  8.9  9.2     No results found for this or any previous visit.      Imaging:   No orders to display           Assessment and Plan:  Acute on Chronic hypercapnic respiratory failure   -secondary to obstructive sleep apnea/obesity hypoventilation syndrome  -Discussed with RT in details.  -Will need to continue to Trilogy ventilator at night when he goes home.      Morbid obesity  -Healthy diet and weight loss was advised. The patient will greatly benefit from weight loss given his obesity hypoventilation syndrome and type 2 diabetes and obstructive sleep apnea.    History of COPD with no current exacerbation  No wheezing on exam and no clear exacerbation. Continue home inhalers and nebulizers as needed in the hospital. No need for systemic steroids.

## 2018-05-12 NOTE — [facility]
Hospital Medicine Progress Note, Adult, Complex               Author: Soo MOTLEY Nemesio Date & Time created: 5/12/2018  3:38 PM     CC: cellulitis/lymphedema    Interval History:  Did not wear bipap o/n  Legs still provide discomfort  Podiatry visited, clipped toenails    Review of Systems:  Review of Systems   Constitutional: Negative for fever.   HENT: Negative for sore throat.    Respiratory: Negative for cough and shortness of breath.    Cardiovascular: Negative for chest pain.   Gastrointestinal: Negative for abdominal pain, diarrhea, nausea and vomiting.   Genitourinary: Negative for dysuria.   Musculoskeletal:        Leg/foot pain   Neurological: Negative for headaches.       T 97.9 P9BP 120/55 RR 20SaO2 93% I/O1/1.4 noBM  Physical Exam   Constitutional: He is oriented to person, place, and time. He appears well-developed. No distress.   Eating breakfast   HENT:   Head: Normocephalic and atraumatic.   Mouth/Throat: No oropharyngeal exudate.   Eyes: Conjunctivae are normal. Right eye exhibits no discharge. Left eye exhibits no discharge.   Neck: No tracheal deviation present.   Cardiovascular: Normal rate, regular rhythm and intact distal pulses.    Pulmonary/Chest: Effort normal. No respiratory distress.   diminished   Abdominal: Soft. Bowel sounds are normal. He exhibits no distension. There is no tenderness.   obese   Musculoskeletal: He exhibits edema (4+, legs wrapped).   Neurological: He is alert and oriented to person, place, and time.   Skin: Skin is warm.   Legs wrapped no pictures seen   Vitals reviewed.      Labs:        Invalid input(s): [id number]      Recent Labs      05/11/18   0350  05/12/18   0430   SODIUM  138  135   POTASSIUM  4.4  4.3   CHLORIDE  91*  90*   CO2  40*  43*   BUN  16  15   CREATININE  0.91  0.64   CALCIUM  8.9  9.2     Recent Labs      05/11/18   0350  05/12/18   0430   GLUCOSE  146*  157*     Recent Labs      05/11/18   0350   RBC  3.68*   HEMOGLOBIN  10.3*   HEMATOCRIT   34.9*   PLATELETCT  262     Recent Labs      05/11/18   0350   WBC  8.2   NEUTSPOLYS  67.40   LYMPHOCYTES  18.20*   MONOCYTES  7.80   EOSINOPHILS  3.70   BASOPHILS  0.50        GI/Nutrition:  Orders Placed This Encounter   Procedures   • DIET ORDER     Standing Status:   Standing     Number of Occurrences:   1     Order Specific Question:   Diet:     Answer:   Regular [1]     Medical Decision Making, by Problem:  RLE cellulitis s/p debridement, polymicrobial (enterococcus, proteus, MSSA)   -Unasyn thru 5/14    -long term po suppression to follow  B LE lymphedema   -lasix   -add diamox   -continue wraps  ALIREZA with chronic hypercapnia/OHVS   -trilogy ordered for home use   -bipap as tolerated   -metabolic alkalosis is compensation  COPD   -spiriva, incruse, oxygen  DM   -metformin   -asa, lipitor  Chronic pain   -MS contin, oxycodone   -prn dilaudid  MSEW  Hypothyroidism   -synthroid  HTN   -lisinopril  Anemia  Debility      Quality-Core Measures   Reviewed items::  Medications reviewed and Labs reviewed  Montoya catheter::  No Montoya  DVT prophylaxis pharmacological::  Heparin  Antibiotics:  Treating active infection/contamination beyond 24 hours perioperative coverage

## 2018-05-13 PROCEDURE — 99232 SBSQ HOSP IP/OBS MODERATE 35: CPT | Performed by: INTERNAL MEDICINE

## 2018-05-13 ASSESSMENT — ENCOUNTER SYMPTOMS
NAUSEA: 0
HEADACHES: 0
SHORTNESS OF BREATH: 0
VOMITING: 0
COUGH: 0
FEVER: 0
DIARRHEA: 0
ABDOMINAL PAIN: 0

## 2018-05-13 NOTE — CONSULTS
Pulmonary Progress Note      Patient ID:   Name:             Fer Estrada     YOB: 1960  Age:                 57 y.o.  male   MRN:               [id number]                                                  Reason of Consult:  Acute on chronic hypercapnic respiratory failure         History of Present Illness:  Pt is 57 yrs old with complex medical history including COPD, congestive heart failure, type 2 diabetes, bilateral lower extremity cellulitis and probable obstructive sleep apnea was admitted to [facility] to be treated for bilateral lower extremity cellulitis. The patient is noncompliant. He was recently admitted to [facility] intubated for about a week for acute and chronic hypercapnic respiratory failure with CO2 retention. The patient was prescribed a trilogy ventilator to use after discharge.     The patient was readmitted to the hospital for bilateral lower extremity cellulitis. He was found to have high bicarbonate on basic metabolic panel of 42. Pulmonary consultation was requested for further evaluation and management of his chronic respiratory failure.    He denies any shortness of breath no confusion or drowsiness.    ROS:  Complete ROS was done and was negative except what mentioned in HPI     Interval History:  Did not use BiPAP last night.  Awake, alert this a.m.    Past Medical History:  Past Medical History:   Diagnosis Date   • Asthma    • At risk for sleep apnea    • Chronic obstructive pulmonary disease (HCC)    • Chronic venous stasis dermatitis of both lower extremities    • Congestive heart failure (HCC)    • Hypertension    • Hypothyroidism    • Type II or unspecified type diabetes mellitus without mention of complication, not stated as uncontrolled        Past surgical history:  Past Surgical History:   Procedure Laterality Date   • INCISION AND DRAINAGE GENERAL Bilateral 5/1/2018    Procedure: INCISION AND DRAINAGE GENERAL;  Surgeon: Chino Stock,  M.D.;  Location: SURGERY [facility];  Service: Vascular   • CARPAL TUNNEL RELEASE            Family History   History reviewed. No pertinent family history.         Social History   Substance Use Topics   • Smoking status: Former Smoker       Quit date: 2005   • Smokeless tobacco: Never Used   • Alcohol use No            Current Outpatient Medications:  Prescriptions Prior to Admission   Medication Sig Dispense Refill Last Dose   • [] oxyCODONE immediate release 20 MG Tab Take 2 Tabs by mouth every 4 hours for 3 days. 5 Tab 0    • [] oxyCODONE immediate-release (ROXICODONE) 5 MG Tab Take 1 Tab by mouth every 6 hours as needed ((4-6)) for up to 2 days. 5 Tab 0    • acetaminophen (TYLENOL) 500 MG Tab Take 2 Tabs by mouth every 6 hours. 30 Tab 0    • NS SOLN 100 mL with ampicillin/sulbactam 3 (2-1) g SOLR 3 g 3 g by Intravenous route every 6 hours.      • furosemide (LASIX) 10 MG/ML Solution 2 mL by Intravenous route every day.  0    • metFORMIN (GLUCOPHAGE) 500 MG Tab Take 1 Tab by mouth 2 times a day, with meals. 60 Tab     • heparin 5000 UNIT/ML Solution Inject 1 mL as instructed every 8 hours.  0    • [] morphine ER (MS CONTIN) 30 MG Tab CR tablet Take 1 Tab by mouth every 12 hours for 2 days. 4 Tab 0    • aspirin EC 81 MG EC tablet Take 1 Tab by mouth every day. 30 Tab 0 2018 at 0930   • tiotropium (SPIRIVA) 18 MCG Cap Inhale 1 Cap by mouth every day. 30 Cap 0 2018 at 0930   • atorvastatin (LIPITOR) 40 MG Tab Take 1 Tab by mouth every bedtime. 30 Tab 0 2018 at 2000   • ammonium lactate (LAC-HYDRIN) 12 % Lotion Apply 2 Applications to affected area(s) every day. 2 Bottle 0 2018 at 1830   • senna-docusate (PERICOLACE OR SENOKOT S) 8.6-50 MG Tab Take 1 Tab by mouth 2 Times a Day. 60 Tab 0 2018 at 1830   • zinc sulfate (ZINCATE) 220 (50 Zn) MG Cap Take 1 Cap by mouth every day. 30 Cap 0 2018 at 0930   • multivitamin (THERAGRAN) Tab Take 1 Tab by mouth every  day. 30 Tab 0 4/23/2018 at 0930   • ascorbic acid (VITAMIN C) 500 MG tablet Take 1 Tab by mouth every day. 30 Tab 3 4/23/2018 at 0930   • Omega-3 Fatty Acids (FISH OIL) 1000 MG Cap capsule Take 1 Cap by mouth 3 times a day, with meals. 90 Cap  4/23/2018 at 1830   • levothyroxine (SYNTHROID) 50 MCG Tab Take 50 mcg by mouth Every morning on an empty stomach.   4/23/2018 at 0930   • omeprazole (PRILOSEC) 20 MG delayed-release capsule Take 20 mg by mouth every day.   4/23/2018 at 0930   • budesonide-formoterol (SYMBICORT) 160-4.5 MCG/ACT Aerosol Inhale 2 Puffs by mouth 2 Times a Day.   4/23/2018 at 2000   • albuterol 108 (90 BASE) MCG/ACT Aero Soln inhalation aerosol Inhale 2 Puffs by mouth every 6 hours as needed for Shortness of Breath.   4/24/2018 at 0930       Medication Allergy:  No Known Allergies          Objective:   Vitals/ General Appearance:   Weight/BMI: There is no height or weight on file to calculate BMI.  There were no vitals taken for this visit.  There were no vitals filed for this visit.  Oxygen Therapy:       Constitutional:   Well developed, Well nourished, No acute distress, obese  HENMT:  Normocephalic, Atraumatic, Oropharynx moist mucous membranes, No oral exudates, Nose normal.  No thyromegaly.  Eyes:  EOMI, Conjunctiva normal, No discharge.  Neck:  Normal range of motion, No cervical tenderness,  no JVD.  Cardiovascular:  Normal heart rate, Normal rhythm, No murmurs, No rubs, No gallops.   Extremitites with intact distal pulses, ++ edema. Cellulitis of lower extremities. Wrapped.  Lungs:  Normal breath sounds, breath sounds clear to auscultation bilaterally,  no crackles, no wheezing.   Abdomen: Bowel sounds normal, Soft, No tenderness, No guarding, No rebound, No masses, No hepatosplenomegaly.  Skin: Warm, Dry, No erythema, No rash, no induration.  Neurologic: Alert & oriented x 3, No focal deficits noted, cranial nerves II through X are grossly intact.  Psychiatric: Affect normal,  Mood  normal.    Labs:  Recent Labs      05/11/18 0350   WBC  8.2   RBC  3.68*   HEMOGLOBIN  10.3*   HEMATOCRIT  34.9*   MCV  94.8   MCH  28.0   MCHC  29.5*   RDW  54.6*   PLATELETCT  262   MPV  10.3                 Recent Labs      05/11/18 0350  05/12/18   0430   SODIUM  138  135   POTASSIUM  4.4  4.3   CHLORIDE  91*  90*   CO2  40*  43*   GLUCOSE  146*  157*   BUN  16  15     Recent Labs      05/11/18 0350  05/12/18   0430   SODIUM  138  135   POTASSIUM  4.4  4.3   CHLORIDE  91*  90*   CO2  40*  43*   BUN  16  15   CREATININE  0.91  0.64   CALCIUM  8.9  9.2     No results found for this or any previous visit.      Imaging:   No orders to display           Assessment and Plan:  Acute on Chronic hypercapnic respiratory failure   -secondary to obstructive sleep apnea/obesity hypoventilation syndrome  -Discussed with RT in details.  -Will need to continue to Trilogy ventilator at night when he goes home.      Morbid obesity  -Healthy diet and weight loss was advised. The patient will greatly benefit from weight loss given his obesity hypoventilation syndrome and type 2 diabetes and obstructive sleep apnea.    History of COPD with no current exacerbation  No wheezing on exam and no clear exacerbation. Continue home inhalers and nebulizers as needed in the hospital. No need for systemic steroids.

## 2018-05-13 NOTE — [facility]
Hospital Medicine Progress Note, Adult, Complex               Author: Soo Herr Date & Time created: 5/13/2018  12:26 PM     CC: cellulitis/lymphedema    Interval History:  Willing to wear bipap tonight  Yokasta boots not done Friday, drainage noted through wraps at bottom of feet  Diamox started    Review of Systems:  Review of Systems   Constitutional: Negative for fever.   HENT: Negative for congestion.    Respiratory: Negative for cough and shortness of breath.    Cardiovascular: Negative for chest pain.   Gastrointestinal: Negative for abdominal pain, diarrhea, nausea and vomiting.   Genitourinary: Negative for dysuria.   Musculoskeletal:        Leg/foot pain   Neurological: Negative for headaches.       T 97.8 P108BP 114/66 RR 24SaO2 92% I/O2/2 1BM  Physical Exam   Constitutional: He is oriented to person, place, and time. He appears well-developed. No distress.   HENT:   Head: Normocephalic and atraumatic.   Mouth/Throat: No oropharyngeal exudate.   Eyes: Conjunctivae are normal. Right eye exhibits no discharge. Left eye exhibits no discharge. No scleral icterus.   Neck: No tracheal deviation present.   Cardiovascular: Normal rate, regular rhythm and intact distal pulses.    Pulmonary/Chest: Effort normal. No respiratory distress. He has no wheezes.   diminished   Abdominal: Soft. Bowel sounds are normal. He exhibits no distension. There is no tenderness.   obese   Musculoskeletal: He exhibits edema (4+, legs wrapped).   Neurological: He is alert and oriented to person, place, and time.   Skin: Skin is warm.   Vitals reviewed.      Labs:        Invalid input(s): [id number]      Recent Labs      05/11/18   0350  05/12/18   0430   SODIUM  138  135   POTASSIUM  4.4  4.3   CHLORIDE  91*  90*   CO2  40*  43*   BUN  16  15   CREATININE  0.91  0.64   CALCIUM  8.9  9.2     Recent Labs      05/11/18   0350  05/12/18   0430   GLUCOSE  146*  157*     Recent Labs      05/11/18   0350   RBC  3.68*   HEMOGLOBIN   10.3*   HEMATOCRIT  34.9*   PLATELETCT  262     Recent Labs      05/11/18   0350   WBC  8.2   NEUTSPOLYS  67.40   LYMPHOCYTES  18.20*   MONOCYTES  7.80   EOSINOPHILS  3.70   BASOPHILS  0.50        GI/Nutrition:  Orders Placed This Encounter   Procedures   • DIET ORDER     Standing Status:   Standing     Number of Occurrences:   1     Order Specific Question:   Diet:     Answer:   Regular [1]     Medical Decision Making, by Problem:  RLE cellulitis s/p debridement, polymicrobial (enterococcus, proteus, MSSA)   -Unasyn thru 5/14    -long term po suppression to follow  B LE lymphedema   -lasix, diamox   -eval with wound care in am  ALIREZA with chronic hypercapnia/OHVS   -trilogy ordered for home use   -bipap as tolerated   -metabolic alkalosis is compensation  COPD   -spiriva, incruse, oxygen  DM   -metformin   -asa, lipitor  Chronic pain   -MS contin, oxycodone   -prn dilaudid  MSEW  Hypothyroidism   -synthroid  HTN   -lisinopril  Anemia  Debility      Quality-Core Measures   Reviewed items::  Medications reviewed  Montoya catheter::  No Montoya  DVT prophylaxis pharmacological::  Heparin  Antibiotics:  Treating active infection/contamination beyond 24 hours perioperative coverage

## 2018-05-14 LAB
ALBUMIN SERPL BCP-MCNC: 2.7 G/DL (ref 3.2–4.9)
ALBUMIN/GLOB SERPL: 0.6 G/DL
ALP SERPL-CCNC: 87 U/L (ref 30–99)
ALT SERPL-CCNC: 8 U/L (ref 2–50)
ANION GAP SERPL CALC-SCNC: 6 MMOL/L (ref 0–11.9)
AST SERPL-CCNC: 13 U/L (ref 12–45)
BILIRUB SERPL-MCNC: 0.2 MG/DL (ref 0.1–1.5)
BNP SERPL-MCNC: 65 PG/ML (ref 0–100)
BUN SERPL-MCNC: 15 MG/DL (ref 8–22)
CALCIUM SERPL-MCNC: 9.3 MG/DL (ref 8.4–10.2)
CHLORIDE SERPL-SCNC: 89 MMOL/L (ref 96–112)
CO2 SERPL-SCNC: 38 MMOL/L (ref 20–33)
CREAT SERPL-MCNC: 0.68 MG/DL (ref 0.5–1.4)
ERYTHROCYTE [DISTWIDTH] IN BLOOD BY AUTOMATED COUNT: 56.6 FL (ref 35.9–50)
ERYTHROCYTE [SEDIMENTATION RATE] IN BLOOD BY WESTERGREN METHOD: 114 MM/HOUR (ref 0–20)
GLOBULIN SER CALC-MCNC: 4.9 G/DL (ref 1.9–3.5)
GLUCOSE SERPL-MCNC: 127 MG/DL (ref 65–99)
HCT VFR BLD AUTO: 33.9 % (ref 42–52)
HGB BLD-MCNC: 10.1 G/DL (ref 14–18)
MCH RBC QN AUTO: 28.4 PG (ref 27–33)
MCHC RBC AUTO-ENTMCNC: 29.8 G/DL (ref 33.7–35.3)
MCV RBC AUTO: 95.2 FL (ref 81.4–97.8)
PLATELET # BLD AUTO: 249 K/UL (ref 164–446)
PMV BLD AUTO: 10.2 FL (ref 9–12.9)
POTASSIUM SERPL-SCNC: 4.2 MMOL/L (ref 3.6–5.5)
PROT SERPL-MCNC: 7.6 G/DL (ref 6–8.2)
RBC # BLD AUTO: 3.56 M/UL (ref 4.7–6.1)
SODIUM SERPL-SCNC: 133 MMOL/L (ref 135–145)
WBC # BLD AUTO: 9.4 K/UL (ref 4.8–10.8)

## 2018-05-14 PROCEDURE — 85027 COMPLETE CBC AUTOMATED: CPT

## 2018-05-14 PROCEDURE — 85652 RBC SED RATE AUTOMATED: CPT

## 2018-05-14 PROCEDURE — 83880 ASSAY OF NATRIURETIC PEPTIDE: CPT

## 2018-05-14 PROCEDURE — 80053 COMPREHEN METABOLIC PANEL: CPT

## 2018-05-14 ASSESSMENT — ENCOUNTER SYMPTOMS
NAUSEA: 0
COUGH: 0
DEPRESSION: 0
DIARRHEA: 0
VOMITING: 0
SHORTNESS OF BREATH: 0
ABDOMINAL PAIN: 0
HEADACHES: 0
FEVER: 0

## 2018-05-14 NOTE — [facility]
Hospital Medicine Progress Note, Adult, Complex               Author: Soo MOTLEY Glencoe Date & Time created: 5/14/2018  10:15 AM     CC: cellulitis/lymphedema    Interval History:  Did not wear bipap, said he will tonight  Wound care P  Edema not significantly changed  Co2 better with diamox    Review of Systems:  Review of Systems   Constitutional: Negative for fever.   HENT: Negative for congestion.    Respiratory: Negative for cough and shortness of breath.    Cardiovascular: Negative for chest pain.   Gastrointestinal: Negative for abdominal pain, diarrhea, nausea and vomiting.   Genitourinary: Negative for dysuria.   Musculoskeletal:        Leg/foot pain   Skin: Negative for rash.   Neurological: Negative for headaches.   Psychiatric/Behavioral: Negative for depression.       T 97.8 P91BP 110/64 RR 22SaO2 91% I/O2.2/1.8 1BM  Physical Exam   Constitutional: He is oriented to person, place, and time. He appears well-developed and well-nourished. No distress.   HENT:   Head: Normocephalic and atraumatic.   Mouth/Throat: No oropharyngeal exudate.   Eyes: Conjunctivae are normal. Right eye exhibits no discharge. Left eye exhibits no discharge. No scleral icterus.   Neck: No tracheal deviation present.   Cardiovascular: Normal rate, regular rhythm and intact distal pulses.    Pulmonary/Chest: Effort normal. No respiratory distress. He has no wheezes.   diminished   Abdominal: Soft. Bowel sounds are normal. He exhibits no distension. There is no tenderness.   obese   Musculoskeletal: He exhibits edema (4+, legs wrapped, pinkish hue on lower thighs above knee).   Neurological: He is alert and oriented to person, place, and time.   Skin: Skin is warm.   Vitals reviewed.      Labs:        Invalid input(s): [id number]  Recent Labs      05/14/18   0645   BNPBTYPENAT  65     Recent Labs      05/12/18   0430  05/14/18   0645   SODIUM  135  133*   POTASSIUM  4.3  4.2   CHLORIDE  90*  89*   CO2  43*  38*   BUN  15  15    CREATININE  0.64  0.68   CALCIUM  9.2  9.3     Recent Labs      05/12/18   0430  05/14/18   0645   ALTSGPT   --   8   ASTSGOT   --   13   ALKPHOSPHAT   --   87   TBILIRUBIN   --   0.2   GLUCOSE  157*  127*     Recent Labs      05/14/18   0645   RBC  3.56*   HEMOGLOBIN  10.1*   HEMATOCRIT  33.9*   PLATELETCT  249     Recent Labs      05/14/18   0645   WBC  9.4   ASTSGOT  13   ALTSGPT  8   ALKPHOSPHAT  87   TBILIRUBIN  0.2        GI/Nutrition:  Orders Placed This Encounter   Procedures   • DIET ORDER     Standing Status:   Standing     Number of Occurrences:   1     Order Specific Question:   Diet:     Answer:   Regular [1]     Medical Decision Making, by Problem:  RLE cellulitis s/p debridement, polymicrobial (enterococcus, proteus, MSSA)   -Unasyn thru today   -long term po suppression to follow  B LE lymphedema   -lasix, diamox   -add additional dose IV lasix, monitoring renal function  ALIREZA with chronic hypercapnia/OHVS   -trilogy ordered for home use   -bipap as tolerated   -metabolic alkalosis is compensation  COPD   -spiriva, incruse, oxygen  DM   -metformin   -asa, lipitor  Chronic pain   -MS contin, oxycodone   -prn dilaudid  MSEW  Hypothyroidism   -synthroid  HTN   -lisinopril  Anemia  Debility      Quality-Core Measures   Reviewed items::  Medications reviewed and Labs reviewed  Montoya catheter::  No Montoya  DVT prophylaxis pharmacological::  Heparin  Antibiotics:  Treating active infection/contamination beyond 24 hours perioperative coverage

## 2018-05-15 LAB
MYCOBACTERIUM SPEC CULT: NORMAL
MYCOBACTERIUM SPEC CULT: NORMAL
RHODAMINE-AURAMINE STN SPEC: NORMAL
RHODAMINE-AURAMINE STN SPEC: NORMAL
SIGNIFICANT IND 70042: NORMAL
SIGNIFICANT IND 70042: NORMAL
SITE SITE: NORMAL
SITE SITE: NORMAL
SOURCE SOURCE: NORMAL
SOURCE SOURCE: NORMAL

## 2018-05-15 PROCEDURE — 302244 HCHG LTACH STAT

## 2018-05-15 ASSESSMENT — ENCOUNTER SYMPTOMS
MYALGIAS: 1
NAUSEA: 0
HEADACHES: 0
HEARTBURN: 0
DIARRHEA: 0
CONSTIPATION: 0
WEAKNESS: 0
COUGH: 0
FEVER: 0
SHORTNESS OF BREATH: 0

## 2018-05-15 NOTE — [facility]
Hospital Medicine Progress Note, Adult, Complex               Author: Kelley Sifuentes Date & Time created: 5/15/2018  9:24 AM     CC: bilateral leg cellulitis and lymphedema    Interval History:  Patient is admitted with worsening leg pain and drainage of purulent fluid. Unasyn completed 5/14 per ID  Swelling is less on two diuretics  He reports severe pain with dressing changes of his legs  He is unable to lay flat chronically    Review of Systems:  Review of Systems   Constitutional: Negative for fever.   Respiratory: Negative for cough and shortness of breath.    Cardiovascular: Positive for leg swelling. Negative for chest pain.        Swelling in legs much less   Gastrointestinal: Negative for constipation, diarrhea, heartburn and nausea.   Genitourinary: Negative for dysuria.   Musculoskeletal: Positive for myalgias.        Leg pain   Skin: Positive for rash.        Redness of legs improving   Neurological: Negative for weakness and headaches.       Physical Exam:  Physical Exam   Constitutional: He is oriented to person, place, and time. No distress.   HENT:   Mouth/Throat: Oropharynx is clear and moist. No oropharyngeal exudate.   Eyes: No scleral icterus.   Cardiovascular: Normal rate and regular rhythm.    Pulmonary/Chest: Effort normal and breath sounds normal.   Abdominal: Soft. He exhibits no distension.   Musculoskeletal: He exhibits edema.   3+ leg edema   Neurological: He is alert and oriented to person, place, and time.   Skin: Skin is warm and dry. He is not diaphoretic. There is erythema.   Psychiatric: His behavior is normal.   Nursing note and vitals reviewed.      Labs:        Invalid input(s): [id number]  Recent Labs      05/14/18   0645   BNPBTYPENAT  65     Recent Labs      05/14/18   0645   SODIUM  133*   POTASSIUM  4.2   CHLORIDE  89*   CO2  38*   BUN  15   CREATININE  0.68   CALCIUM  9.3     Recent Labs      05/14/18   0645   ALTSGPT  8   ASTSGOT  13   ALKPHOSPHAT  87   TBILIRUBIN  0.2    GLUCOSE  127*     Recent Labs      05/14/18   0645   RBC  3.56*   HEMOGLOBIN  10.1*   HEMATOCRIT  33.9*   PLATELETCT  249     Recent Labs      05/14/18   0645   WBC  9.4   ASTSGOT  13   ALTSGPT  8   ALKPHOSPHAT  87   TBILIRUBIN  0.2           Hemodynamics:     T98.1 BP91/51 HR98 RR24 O2sat 93%     GI/Nutrition:  Orders Placed This Encounter   Procedures   • DIET ORDER     Standing Status:   Standing     Number of Occurrences:   1     Order Specific Question:   Diet:     Answer:   Regular [1]     Medical Decision Making, by Problem:    Bilateral lower leg cellulitis on augmentin to treat  Lymphedema chronic, continue compression   Lasix and diamox   Renal function stable on this      HTN (hypertension) monitor blood pressure, lower on multiple diuretics    COPD (chronic obstructive pulmonary disease) oxygen and respiratory therapy    Stasis dermatitis stable    Narcotic addiction continue pain meds    Iv medication for dressing changes only    Non compliance w medication regimen    Patient educated about treatment plan    Diabetes type 2, controlled, metformin    Severe protein-calorie malnutrition     Chronic respiratory failure, oxygen    Morbid obesity with BMI of 40.0-44.9, adult     Chronic pain continue pain meds    ALIREZA (obstructive sleep apnea) cpap intermittently used by patient      Sepsis (CMS-HCC) resolved prior to arrival        Quality-Core Measures   Reviewed items::  Labs reviewed and Medications reviewed  Montoya catheter::  No Montoya  DVT prophylaxis pharmacological::  Heparin  Ulcer Prophylaxis::  Yes  Antibiotics:  Treating active infection/contamination beyond 24 hours perioperative coverage  Assessed for rehabilitation services:  Patient returned to prior level of function, rehabilitation not indicated at this time and Patient unable to tolerate rehabilitation therapeutic regimen

## 2018-05-16 ASSESSMENT — ENCOUNTER SYMPTOMS
HEADACHES: 0
CONSTIPATION: 0
VOMITING: 0
HEARTBURN: 0
PALPITATIONS: 0
SHORTNESS OF BREATH: 0
COUGH: 0
NAUSEA: 1
DIAPHORESIS: 0
FEVER: 0
MYALGIAS: 1
CHILLS: 0
DIARRHEA: 0

## 2018-05-16 NOTE — [facility]
"Hospital Medicine Progress Note, Adult, Complex               Author: Kelley Sifuentes Date & Time created: 5/16/2018  3:01 PM     CC: bilateral leg cellulitis and lymphedema    Interval History:  Patient is admitted with worsening leg pain and drainage of purulent fluid. Unasyn completed 5/14 per ID  Patient refused all his morning medications today  He complained of some nausea but when asked why he refused iv medication he states he was \"done with it all\" and that nausea was not the only reason he refused  He is refusing vital signs to be checked at times  He refuses physical therapy     Review of Systems:  Review of Systems   Constitutional: Negative for chills, diaphoresis and fever.   Respiratory: Negative for cough and shortness of breath.    Cardiovascular: Positive for leg swelling. Negative for chest pain and palpitations.        Swelling in legs much less   Gastrointestinal: Positive for nausea. Negative for constipation, diarrhea, heartburn and vomiting.   Genitourinary: Positive for frequency. Negative for dysuria and urgency.   Musculoskeletal: Positive for myalgias.        Leg pain   Skin: Positive for rash.        Redness of legs improving   Neurological: Negative for headaches.       Physical Exam:  Physical Exam   Constitutional: He is oriented to person, place, and time. No distress.   HENT:   Mouth/Throat: No oropharyngeal exudate.   Eyes: Conjunctivae are normal. No scleral icterus.   Cardiovascular: Normal rate and regular rhythm.    Pulmonary/Chest: Effort normal. He has no wheezes. He has no rales.   Abdominal: Soft. Bowel sounds are normal. He exhibits no distension.   Musculoskeletal: He exhibits edema.   3+ leg edema   Neurological: He is alert and oriented to person, place, and time.   Skin: Skin is warm. No rash noted. He is not diaphoretic. No erythema.   Psychiatric: His behavior is normal.   Nursing note and vitals reviewed.      Labs:        Invalid input(s): [id number]  Recent " Labs      05/14/18   0645   BNPBTYPENAT  65     Recent Labs      05/14/18   0645   SODIUM  133*   POTASSIUM  4.2   CHLORIDE  89*   CO2  38*   BUN  15   CREATININE  0.68   CALCIUM  9.3     Recent Labs      05/14/18   0645   ALTSGPT  8   ASTSGOT  13   ALKPHOSPHAT  87   TBILIRUBIN  0.2   GLUCOSE  127*     Recent Labs      05/14/18   0645   RBC  3.56*   HEMOGLOBIN  10.1*   HEMATOCRIT  33.9*   PLATELETCT  249     Recent Labs      05/14/18   0645   WBC  9.4   ASTSGOT  13   ALTSGPT  8   ALKPHOSPHAT  87   TBILIRUBIN  0.2           Hemodynamics:     T99.4 /75  RR21 O2sat 91%     GI/Nutrition:  Orders Placed This Encounter   Procedures   • DIET ORDER     Standing Status:   Standing     Number of Occurrences:   1     Order Specific Question:   Diet:     Answer:   Regular [1]     Medical Decision Making, by Problem:    Bilateral lower leg cellulitis, oral augmentin  Lymphedema chronic, continue compression   Lasix and diamox if allowed by patient, he refused doses this morning   Renal function stable, monitor labs      HTN (hypertension) monitor blood pressure, lower on multiple diuretics    COPD (chronic obstructive pulmonary disease) oxygen and respiratory therapy    Stasis dermatitis stable    Narcotic addiction continue pain meds    Iv medication for dressing changes only    Non compliance w medication regimen    Patient educated about treatment plan but continues to refuse therapy services, medications, vital signs to be checked and dietary recommendations    Diabetes type 2, controlled, metformin    Severe protein-calorie malnutrition     Chronic respiratory failure, oxygen    Morbid obesity with BMI of 40.0-44.9, adult     Chronic pain continue pain meds    ALIREZA (obstructive sleep apnea) cpap intermittently used by patient as he refuses to use this as recommended      Sepsis (CMS-HCC) resolved prior to arrival        Quality-Core Measures   Reviewed items::  Labs reviewed and Medications reviewed  Lindsay  catheter::  No Montoya  DVT prophylaxis pharmacological::  Heparin  Ulcer Prophylaxis::  Yes  Antibiotics:  Treating active infection/contamination beyond 24 hours perioperative coverage  Assessed for rehabilitation services:  Patient returned to prior level of function, rehabilitation not indicated at this time and Patient unable to tolerate rehabilitation therapeutic regimen

## 2018-05-17 LAB
ANION GAP SERPL CALC-SCNC: 7 MMOL/L (ref 0–11.9)
BUN SERPL-MCNC: 26 MG/DL (ref 8–22)
CALCIUM SERPL-MCNC: 9.8 MG/DL (ref 8.4–10.2)
CHLORIDE SERPL-SCNC: 92 MMOL/L (ref 96–112)
CO2 SERPL-SCNC: 36 MMOL/L (ref 20–33)
CREAT SERPL-MCNC: 0.69 MG/DL (ref 0.5–1.4)
ERYTHROCYTE [DISTWIDTH] IN BLOOD BY AUTOMATED COUNT: 54.3 FL (ref 35.9–50)
GLUCOSE SERPL-MCNC: 135 MG/DL (ref 65–99)
HCT VFR BLD AUTO: 34.6 % (ref 42–52)
HGB BLD-MCNC: 10.4 G/DL (ref 14–18)
MCH RBC QN AUTO: 28.5 PG (ref 27–33)
MCHC RBC AUTO-ENTMCNC: 30.1 G/DL (ref 33.7–35.3)
MCV RBC AUTO: 94.8 FL (ref 81.4–97.8)
PLATELET # BLD AUTO: 246 K/UL (ref 164–446)
PMV BLD AUTO: 10.3 FL (ref 9–12.9)
POTASSIUM SERPL-SCNC: 4.7 MMOL/L (ref 3.6–5.5)
RBC # BLD AUTO: 3.65 M/UL (ref 4.7–6.1)
SODIUM SERPL-SCNC: 135 MMOL/L (ref 135–145)
WBC # BLD AUTO: 8.7 K/UL (ref 4.8–10.8)

## 2018-05-17 PROCEDURE — 80048 BASIC METABOLIC PNL TOTAL CA: CPT

## 2018-05-17 PROCEDURE — 85027 COMPLETE CBC AUTOMATED: CPT

## 2018-05-17 ASSESSMENT — ENCOUNTER SYMPTOMS
SHORTNESS OF BREATH: 0
FEVER: 0
NAUSEA: 0
ABDOMINAL PAIN: 0
HEADACHES: 0
HEARTBURN: 0
PALPITATIONS: 0
VOMITING: 0
MYALGIAS: 1
CONSTIPATION: 0
DIARRHEA: 0
WHEEZING: 0

## 2018-05-17 NOTE — PROGRESS NOTES
Pulmonary Critical Care Progress Note        Chief Complaint: Acute on chronic respiratory failure with hypoxia and hypercapnia.    History of Present Illness: 57 y.o. male with complex medical history including COPD, congestive heart failure, type 2 diabetes, bilateral lower extremity cellulitis and probable obstructive sleep apnea was admitted to [facility] to be treated for bilateral lower extremity cellulitis. The patient is noncompliant. He was recently admitted to [facility] intubated for about a week for acute and chronic hypercapnic respiratory failure with CO2 retention. The patient was prescribed a trilogy ventilator to use after discharge.      The patient was readmitted to the hospital for bilateral lower extremity cellulitis. He was found to have high bicarbonate on basic metabolic panel of 42. Pulmonary consultation was requested for further evaluation and management of his chronic respiratory failure.    ROS:  Respiratory: positive shortness of breath, unchanged, Cardiac: negative chest pain, GI: negative abdominal pain.  All other systems negative.    Interval Events:  24 hour interval history reviewed.  He remains comfortable on supplemental oxygen by nasal cannula and has not used the BiPAP over the last several days. He is afebrile and hemodynamically stable. He is alert and responsive. Wound care continues with significant pain. His edema seems less on diuretic therapy. No new problems overnight.    PFSH:  No change.    Respiratory:  Exam: symmetrical but diminished bilateral breath sounds with basilar rales but no wheezing or egophony.  ImagingAvailable data reviewed. The chest film on April 24 demonstrated some improvement in previously identified right lung densities with stable left infrahilar and basilar density. No new findings. No film today.     HemoDynamics:  Exam: regular rhythm (Sinus)  Imaging: Available data reviewed. The echocardiogram on March 18, 2018 was a  limited study but demonstrated grossly normal left ventricular systolic function.    Neuro:  Exam: no focal deficits noted mental status intact  Imaging: None - Reviewed    Recent Labs      05/17/18   0820   SODIUM  135   POTASSIUM  4.7   CHLORIDE  92*   CO2  36*   BUN  26*   CREATININE  0.69   CALCIUM  9.8     GI/Nutrition:  Exam: abdomen is soft and non-tender, normal active bowel sounds  Imaging: Available data reviewed  taking PO  Liver Function  Recent Labs      05/17/18   0820   GLUCOSE  135*       Heme:  Recent Labs      05/17/18   0820   RBC  3.65*   HEMOGLOBIN  10.4*   HEMATOCRIT  34.6*   PLATELETCT  246       Infectious Disease:  Micro: cultures reviewed; no new positive cultures.  Recent Labs      05/17/18   0820   WBC  8.7       Quality  Measures:  Core Measures & Quality Metrics    Problems:  1. Acute on chronic respiratory failure with hypoxia and hypercarbia. He is on an oxygen nasal cannula at this time and has not tolerated nocturnal BiPAP therapy.  2. Chronic obstructive pulmonary disease.  3. Obesity, suspected obesity hypoventilation syndrome and possible obstructive sleep apnea hypopnea syndrome.  4. Right leg cellulitis with previous debridement and lymphedema.  5. Diabetes mellitus, controlled.  6. Chronic pain syndrome requiring chronic narcotic analgesia.  7. Hypothyroidism, on replacement therapy.  8. Systemic arterial hypertension, controlled.  9. Anemia, stable without evident ongoing blood loss.    Plan:  Continued titrated supplemental oxygen, bronchodilators and respiratory protocols.  Encourage use of nocturnal ventilation with eventual home nocturnal ventilatory support.  Continue nutritional support, therapies, mobilization and prophylaxis.    Discussed patient condition and risk of morbidity and/or mortality with RN and RT .

## 2018-05-17 NOTE — PROGRESS NOTES
Pulmonary Critical Care Progress Note        Chief Complaint: Acute on chronic respiratory failure with hypoxia and hypercapnia.    History of Present Illness: 57 y.o. male with complex medical history including COPD, congestive heart failure, type 2 diabetes, bilateral lower extremity cellulitis and probable obstructive sleep apnea was admitted to [facility] to be treated for bilateral lower extremity cellulitis. The patient is noncompliant. He was recently admitted to [facility] intubated for about a week for acute and chronic hypercapnic respiratory failure with CO2 retention. The patient was prescribed a trilogy ventilator to use after discharge.      The patient was readmitted to the hospital for bilateral lower extremity cellulitis. He was found to have high bicarbonate on basic metabolic panel of 42. Pulmonary consultation was requested for further evaluation and management of his chronic respiratory failure.    ROS:  Respiratory: positive shortness of breath, unchanged, Cardiac: negative chest pain, GI: negative abdominal pain.  All other systems negative.    Interval Events:  24 hour interval history reviewed.  He remains comfortable on supplemental oxygen by nasal cannula and has not used the BiPAP over the last several days. He is afebrile and hemodynamically stable. He is alert and responsive. Wound care continues with significant pain. His edema seems less on diuretic therapy. No new problems overnight.    PFSH:  No change.    Respiratory:  Exam: symmetrical but diminished bilateral breath sounds with basilar rales but no wheezing or egophony.  ImagingAvailable data reviewed. The chest film on April 24 demonstrated some improvement in previously identified right lung densities with stable left infrahilar and basilar density. No new findings. No film today.     HemoDynamics:  Exam: regular rhythm (Sinus)  Imaging: Available data reviewed. The echocardiogram on March 18, 2018 was a  limited study but demonstrated grossly normal left ventricular systolic function.    Neuro:  Exam: no focal deficits noted mental status intact  Imaging: None - Reviewed    Recent Labs      05/17/18   0820   SODIUM  135   POTASSIUM  4.7   CHLORIDE  92*   CO2  36*   BUN  26*   CREATININE  0.69   CALCIUM  9.8     GI/Nutrition:  Exam: abdomen is soft and non-tender, normal active bowel sounds  Imaging: Available data reviewed  taking PO  Liver Function  Recent Labs      05/17/18   0820   GLUCOSE  135*       Heme:  Recent Labs      05/17/18   0820   RBC  3.65*   HEMOGLOBIN  10.4*   HEMATOCRIT  34.6*   PLATELETCT  246       Infectious Disease:  Micro: cultures reviewed; no new positive cultures.  Recent Labs      05/17/18   0820   WBC  8.7       Quality  Measures:  Core Measures & Quality Metrics    Problems:  1. Acute on chronic respiratory failure with hypoxia and hypercarbia. He is on an oxygen nasal cannula at this time and has not tolerated nocturnal BiPAP therapy.  2. Chronic obstructive pulmonary disease.  3. Obesity, suspected obesity hypoventilation syndrome and possible obstructive sleep apnea hypopnea syndrome.  4. Right leg cellulitis with previous debridement and lymphedema.  5. Diabetes mellitus, controlled.  6. Chronic pain syndrome requiring chronic narcotic analgesia.  7. Hypothyroidism, on replacement therapy.  8. Systemic arterial hypertension, controlled.  9. Anemia, stable without evident ongoing blood loss.    Plan:  Continued titrated supplemental oxygen, bronchodilators and respiratory protocols.  Encourage use of nocturnal ventilation with eventual home nocturnal ventilatory support.  Continue nutritional support, therapies, mobilization and prophylaxis.    Discussed patient condition and risk of morbidity and/or mortality with RN and RT .

## 2018-05-17 NOTE — PROGRESS NOTES
Pulmonary Critical Care Progress Note        Chief Complaint: Acute on chronic respiratory failure with hypoxia and hypercapnia.    History of Present Illness: 57 y.o. male with complex medical history including COPD, congestive heart failure, type 2 diabetes, bilateral lower extremity cellulitis and probable obstructive sleep apnea was admitted to [facility] to be treated for bilateral lower extremity cellulitis. The patient is noncompliant. He was recently admitted to [facility] intubated for about a week for acute and chronic hypercapnic respiratory failure with CO2 retention. The patient was prescribed a trilogy ventilator to use after discharge.      The patient was readmitted to the hospital for bilateral lower extremity cellulitis. He was found to have high bicarbonate on basic metabolic panel of 42. Pulmonary consultation was requested for further evaluation and management of his chronic respiratory failure.    ROS:  Respiratory: positive shortness of breath, unchanged, Cardiac: negative chest pain, GI: negative abdominal pain.  All other systems negative.    Interval Events:  24 hour interval history reviewed.  He remains comfortable on supplemental oxygen by nasal cannula and has not used the BiPAP over the last several days. He is afebrile and hemodynamically stable. He is alert and responsive. Wound care continues with significant pain. His edema seems less on diuretic therapy. No new problems overnight.    PFSH:  No change.    Respiratory:  Exam: symmetrical but diminished bilateral breath sounds with basilar rales but no wheezing or egophony.  ImagingAvailable data reviewed. The chest film on April 24 demonstrated some improvement in previously identified right lung densities with stable left infrahilar and basilar density. No new findings. No film today.     HemoDynamics:  Exam: regular rhythm (Sinus)  Imaging: Available data reviewed. The echocardiogram on March 18, 2018 was a  limited study but demonstrated grossly normal left ventricular systolic function.    Neuro:  Exam: no focal deficits noted mental status intact  Imaging: None - Reviewed    Recent Labs      05/17/18   0820   SODIUM  135   POTASSIUM  4.7   CHLORIDE  92*   CO2  36*   BUN  26*   CREATININE  0.69   CALCIUM  9.8     GI/Nutrition:  Exam: abdomen is soft and non-tender, normal active bowel sounds  Imaging: Available data reviewed  taking PO  Liver Function  Recent Labs      05/17/18   0820   GLUCOSE  135*       Heme:  Recent Labs      05/17/18   0820   RBC  3.65*   HEMOGLOBIN  10.4*   HEMATOCRIT  34.6*   PLATELETCT  246       Infectious Disease:  Micro: cultures reviewed; no new positive cultures.  Recent Labs      05/17/18   0820   WBC  8.7       Quality  Measures:  Core Measures & Quality Metrics    Problems:  1. Acute on chronic respiratory failure with hypoxia and hypercarbia. He is on an oxygen nasal cannula at this time and has not tolerated nocturnal BiPAP therapy.  2. Chronic obstructive pulmonary disease.  3. Obesity, suspected obesity hypoventilation syndrome and possible obstructive sleep apnea hypopnea syndrome.  4. Right leg cellulitis with previous debridement and lymphedema.  5. Diabetes mellitus, controlled.  6. Chronic pain syndrome requiring chronic narcotic analgesia.  7. Hypothyroidism, on replacement therapy.  8. Systemic arterial hypertension, controlled.  9. Anemia, stable without evident ongoing blood loss.    Plan:  Continued titrated supplemental oxygen, bronchodilators and respiratory protocols.  Encourage use of nocturnal ventilation with eventual home nocturnal ventilatory support.  Continue nutritional support, therapies, mobilization and prophylaxis.    Discussed patient condition and risk of morbidity and/or mortality with RN and RT .

## 2018-05-17 NOTE — [facility]
"Hospital Medicine Progress Note, Adult, Complex               Author: Kelley Sifuentes Date & Time created: 5/17/2018  4:30 PM     CC: bilateral leg cellulitis and lymphedema    Interval History:  Patient is admitted with worsening leg pain and drainage of purulent fluid. Unasyn completed 5/14 per ID  Today patient refuses to work with physical therapy and states he will get up after he leaves here. He is agreeable to medication and labs this morning but states he doesn't want it \"all the time\"    Review of Systems:  Review of Systems   Constitutional: Negative for fever.   Respiratory: Negative for shortness of breath and wheezing.    Cardiovascular: Positive for leg swelling. Negative for chest pain and palpitations.        Swelling in legs improving   Gastrointestinal: Negative for abdominal pain, constipation, diarrhea, heartburn, nausea and vomiting.   Genitourinary: Positive for frequency. Negative for dysuria.   Musculoskeletal: Positive for myalgias.        Leg pain   Skin: Negative for itching and rash.   Neurological: Negative for headaches.       Physical Exam:  Physical Exam   Constitutional: He is oriented to person, place, and time. No distress.   HENT:   Mouth/Throat: Oropharynx is clear and moist. No oropharyngeal exudate.   Eyes: No scleral icterus.   Cardiovascular: Normal rate and regular rhythm.    Pulmonary/Chest: Effort normal and breath sounds normal. Tachypnea noted.   Abdominal: Soft. He exhibits no distension.   Musculoskeletal: He exhibits edema.   3+ leg edema   Neurological: He is alert and oriented to person, place, and time.   Skin: Skin is warm and dry. No rash noted. He is not diaphoretic.   Psychiatric: His behavior is normal.   Nursing note and vitals reviewed.      Labs:        Invalid input(s): [id number]      Recent Labs      05/17/18   0820   SODIUM  135   POTASSIUM  4.7   CHLORIDE  92*   CO2  36*   BUN  26*   CREATININE  0.69   CALCIUM  9.8     Recent Labs      05/17/18   " 0820   GLUCOSE  135*     Recent Labs      05/17/18   0820   RBC  3.65*   HEMOGLOBIN  10.4*   HEMATOCRIT  34.6*   PLATELETCT  246     Recent Labs      05/17/18   0820   WBC  8.7           Hemodynamics:     T98.9 /55 HR96 RR20 O2sat 94%     GI/Nutrition:  Orders Placed This Encounter   Procedures   • DIET ORDER     Standing Status:   Standing     Number of Occurrences:   1     Order Specific Question:   Diet:     Answer:   Regular [1]     Medical Decision Making, by Problem:    Bilateral lower leg cellulitis, oral augmentin  Lymphedema chronic, continue compression   Lasix and diamox to treat   Renal function stable      HTN (hypertension) monitor blood pressure with diuresis    COPD (chronic obstructive pulmonary disease) oxygen and respiratory therapy    Stasis dermatitis stable    Narcotic addiction continue pain meds    Iv medication for dressing changes only    Non compliance w medication regimen    Patient educated about treatment plan but continues to refuse interventions, will attempt to treat partially as allowed, I suspect the optimal medical management will not be obtained due to the patient's refusals      Diabetes type 2, controlled, metformin    Severe protein-calorie malnutrition     Chronic respiratory failure, oxygen    Morbid obesity with BMI of 40.0-44.9, adult     Chronic pain continue pain meds    ALIREAZ (obstructive sleep apnea) cpap not used consistently by patient as he refuses to use this as recommended      Sepsis (CMS-HCC) resolved prior to arrival        Quality-Core Measures   Reviewed items::  Labs reviewed and Medications reviewed  Montoya catheter::  No Montoya  DVT prophylaxis pharmacological::  Heparin  Ulcer Prophylaxis::  Yes  Antibiotics:  Treating active infection/contamination beyond 24 hours perioperative coverage  Assessed for rehabilitation services:  Patient returned to prior level of function, rehabilitation not indicated at this time and Patient unable to tolerate  rehabilitation therapeutic regimen

## 2018-05-17 NOTE — PROGRESS NOTES
Pulmonary Critical Care Progress Note        Chief Complaint: Acute on chronic respiratory failure with hypoxia and hypercapnia.    History of Present Illness: 57 y.o. male with complex medical history including COPD, congestive heart failure, type 2 diabetes, bilateral lower extremity cellulitis and probable obstructive sleep apnea was admitted to [facility] to be treated for bilateral lower extremity cellulitis. The patient is noncompliant. He was recently admitted to [facility] intubated for about a week for acute and chronic hypercapnic respiratory failure with CO2 retention. The patient was prescribed a trilogy ventilator to use after discharge.      The patient was readmitted to the hospital for bilateral lower extremity cellulitis. He was found to have high bicarbonate on basic metabolic panel of 42. Pulmonary consultation was requested for further evaluation and management of his chronic respiratory failure.    ROS:  Respiratory: positive shortness of breath, unchanged, Cardiac: negative chest pain, GI: negative abdominal pain.  All other systems negative.    Interval Events:  24 hour interval history reviewed.  He remains comfortable on supplemental oxygen by nasal cannula and has not used the BiPAP over the last several days. He is afebrile and hemodynamically stable. He is alert and responsive.    PFSH:  No change.    Respiratory:  Exam: symmetrical but diminished bilateral breath sounds with basilar rales but no wheezing or egophony.  ImagingAvailable data reviewed. The chest film on April 24 demonstrated some improvement in previously identified right lung densities with stable left infrahilar and basilar density. No new findings. No film today.     HemoDynamics:  Exam: regular rhythm (Sinus)  Imaging: Available data reviewed. The echocardiogram on March 18, 2018 was a limited study but demonstrated grossly normal left ventricular systolic function.    Neuro:  Exam: no focal deficits  noted mental status intact  Imaging: None - Reviewed    Recent Labs      05/17/18   0820   SODIUM  135   POTASSIUM  4.7   CHLORIDE  92*   CO2  36*   BUN  26*   CREATININE  0.69   CALCIUM  9.8     GI/Nutrition:  Exam: abdomen is soft and non-tender, normal active bowel sounds  Imaging: Available data reviewed  taking PO  Liver Function  Recent Labs      05/17/18   0820   GLUCOSE  135*       Heme:  Recent Labs      05/17/18   0820   RBC  3.65*   HEMOGLOBIN  10.4*   HEMATOCRIT  34.6*   PLATELETCT  246       Infectious Disease:  Micro: cultures reviewed; no new positive cultures.  Recent Labs      05/17/18   0820   WBC  8.7       Quality  Measures:  Core Measures & Quality Metrics    Problems:  1. Acute on chronic respiratory failure with hypoxia and hypercarbia. He is on an oxygen nasal cannula at this time and has not tolerated nocturnal BiPAP therapy.  2. Chronic obstructive pulmonary disease.  3. Obesity, suspected obesity hypoventilation syndrome and possible obstructive sleep apnea hypopnea syndrome.  4. Right leg cellulitis with previous debridement and lymphedema.  5. Diabetes mellitus, controlled.  6. Chronic pain syndrome requiring chronic narcotic analgesia.  7. Hypothyroidism, on replacement therapy.  8. Systemic arterial hypertension, controlled.  9. Anemia, stable without evident ongoing blood loss.    Plan:  Continued titrated supplemental oxygen, bronchodilators and respiratory protocols.  Encourage use of nocturnal ventilation with eventual home nocturnal ventilatory support.  Continue nutritional support, therapies, mobilization and prophylaxis.    Discussed patient condition and risk of morbidity and/or mortality with RN and RT and with Dr. Herr.

## 2018-05-18 ENCOUNTER — APPOINTMENT (OUTPATIENT)
Dept: RADIOLOGY | Facility: MEDICAL CENTER | Age: 58
End: 2018-05-18
Attending: INTERNAL MEDICINE
Payer: COMMERCIAL

## 2018-05-18 LAB
ANION GAP SERPL CALC-SCNC: 7 MMOL/L (ref 0–11.9)
BUN SERPL-MCNC: 30 MG/DL (ref 8–22)
CALCIUM SERPL-MCNC: 9.4 MG/DL (ref 8.4–10.2)
CHLORIDE SERPL-SCNC: 92 MMOL/L (ref 96–112)
CO2 SERPL-SCNC: 34 MMOL/L (ref 20–33)
CREAT SERPL-MCNC: 0.76 MG/DL (ref 0.5–1.4)
ERYTHROCYTE [DISTWIDTH] IN BLOOD BY AUTOMATED COUNT: 55.4 FL (ref 35.9–50)
GLUCOSE SERPL-MCNC: 113 MG/DL (ref 65–99)
HCT VFR BLD AUTO: 36 % (ref 42–52)
HGB BLD-MCNC: 10.4 G/DL (ref 14–18)
MCH RBC QN AUTO: 27.4 PG (ref 27–33)
MCHC RBC AUTO-ENTMCNC: 28.9 G/DL (ref 33.7–35.3)
MCV RBC AUTO: 95 FL (ref 81.4–97.8)
PLATELET # BLD AUTO: 246 K/UL (ref 164–446)
PMV BLD AUTO: 10.4 FL (ref 9–12.9)
POTASSIUM SERPL-SCNC: 4.2 MMOL/L (ref 3.6–5.5)
RBC # BLD AUTO: 3.79 M/UL (ref 4.7–6.1)
SODIUM SERPL-SCNC: 133 MMOL/L (ref 135–145)
WBC # BLD AUTO: 9.2 K/UL (ref 4.8–10.8)

## 2018-05-18 PROCEDURE — 80048 BASIC METABOLIC PNL TOTAL CA: CPT

## 2018-05-18 PROCEDURE — 85027 COMPLETE CBC AUTOMATED: CPT

## 2018-05-18 ASSESSMENT — ENCOUNTER SYMPTOMS
DIZZINESS: 1
SHORTNESS OF BREATH: 0
CONSTIPATION: 0
WHEEZING: 0
FEVER: 0
CHILLS: 0
HEARTBURN: 0
ABDOMINAL PAIN: 0
VOMITING: 0
HEADACHES: 0
MYALGIAS: 1

## 2018-05-18 NOTE — [facility]
Hospital Medicine Progress Note, Adult, Complex               Author: Kelley Sifuentes Date & Time created: 5/18/2018  4:21 PM     CC: bilateral leg cellulitis and lymphedema    Interval History:  Patient is admitted with worsening leg pain and drainage of purulent fluid. Unasyn completed 5/14 per ID  He refuses to use bipap   Today he states he will lay in a specialty bed and is agreeable to do this as I have ordered medication to control his vertigo that occurs when he lays down however he has refused interventions when it finally comes time to move    Review of Systems:  Review of Systems   Constitutional: Negative for chills and fever.   Respiratory: Negative for shortness of breath and wheezing.    Cardiovascular: Positive for leg swelling. Negative for chest pain.        Swelling in legs improving   Gastrointestinal: Negative for abdominal pain, constipation, heartburn and vomiting.   Genitourinary: Positive for frequency. Negative for dysuria and urgency.   Musculoskeletal: Positive for myalgias.        Leg pain   Skin: Negative for rash.   Neurological: Positive for dizziness. Negative for headaches.        Severe vertigo with laying down for 20  years       Physical Exam:  Physical Exam   Constitutional: He is oriented to person, place, and time. No distress.   HENT:   Mouth/Throat: Oropharynx is clear and moist. No oropharyngeal exudate.   Eyes: No scleral icterus.   Cardiovascular: Normal rate and regular rhythm.    Pulmonary/Chest: Effort normal. Tachypnea noted. He has no wheezes. He has no rales.   Abdominal: Soft. Bowel sounds are normal. He exhibits no distension.   Musculoskeletal: He exhibits edema.   3+ leg edema   Neurological: He is alert and oriented to person, place, and time.   Skin: Skin is warm. No rash noted. He is not diaphoretic. No erythema.    wound over buttocks   Psychiatric: His behavior is normal.   Nursing note and vitals reviewed.      Labs:        Invalid input(s):  [id number]      Recent Labs      05/17/18   0820  05/18/18   0430   SODIUM  135  133*   POTASSIUM  4.7  4.2   CHLORIDE  92*  92*   CO2  36*  34*   BUN  26*  30*   CREATININE  0.69  0.76   CALCIUM  9.8  9.4     Recent Labs      05/17/18   0820  05/18/18   0430   GLUCOSE  135*  113*     Recent Labs      05/17/18   0820  05/18/18   0430   RBC  3.65*  3.79*   HEMOGLOBIN  10.4*  10.4*   HEMATOCRIT  34.6*  36.0*   PLATELETCT  246  246     Recent Labs      05/17/18   0820  05/18/18   0430   WBC  8.7  9.2           Hemodynamics:     T98.3 /58  RR18 O2sat 94%     GI/Nutrition:  Orders Placed This Encounter   Procedures   • DIET ORDER     Standing Status:   Standing     Number of Occurrences:   1     Order Specific Question:   Diet:     Answer:   Regular [1]     Medical Decision Making, by Problem:  Bilateral lower leg cellulitis, oral augmentin  Lymphedema chronic, continue compression   Lasix and diamox to treat   Renal function stable   Place dasilva today due to new buttock skin breakdown as patient refuses to move out of a chair      HTN (hypertension) monitor blood pressure     COPD (chronic obstructive pulmonary disease) oxygen and respiratory therapy    Stasis dermatitis stable    Narcotic addiction continue pain meds    Iv medication for dressing changes only    Non compliance w medication regimen    Patient continues to refuse bipap will attempt to move out of chair today      Diabetes type 2, controlled, metformin    Severe protein-calorie malnutrition     Chronic respiratory failure, oxygen    Morbid obesity with BMI of 40.0-44.9, adult     Chronic pain continue pain meds    ALIREZA (obstructive sleep apnea) bipap refused by patient       Sepsis (CMS-HCC) resolved prior to arrival        Quality-Core Measures   Reviewed items::  Labs reviewed and Medications reviewed  Dasilva catheter::  No Dasilva  DVT prophylaxis pharmacological::  Heparin  Ulcer Prophylaxis::  Yes  Antibiotics:  Treating active  infection/contamination beyond 24 hours perioperative coverage  Assessed for rehabilitation services:  Patient returned to prior level of function, rehabilitation not indicated at this time and Patient unable to tolerate rehabilitation therapeutic regimen

## 2018-05-19 ENCOUNTER — APPOINTMENT (OUTPATIENT)
Dept: RADIOLOGY | Facility: MEDICAL CENTER | Age: 58
End: 2018-05-19
Attending: INTERNAL MEDICINE
Payer: COMMERCIAL

## 2018-05-19 LAB
ALBUMIN SERPL BCP-MCNC: 2.7 G/DL (ref 3.2–4.9)
ALBUMIN/GLOB SERPL: 0.6 G/DL
ALP SERPL-CCNC: 108 U/L (ref 30–99)
ALT SERPL-CCNC: 11 U/L (ref 2–50)
ANION GAP SERPL CALC-SCNC: 7 MMOL/L (ref 0–11.9)
ANION GAP SERPL CALC-SCNC: 7 MMOL/L (ref 0–11.9)
AST SERPL-CCNC: 17 U/L (ref 12–45)
BASE EXCESS BLDA CALC-SCNC: 10 MMOL/L (ref -4–3)
BASOPHILS # BLD AUTO: 0.4 % (ref 0–1.8)
BASOPHILS # BLD: 0.04 K/UL (ref 0–0.12)
BILIRUB SERPL-MCNC: 0.5 MG/DL (ref 0.1–1.5)
BODY TEMPERATURE: 36.7 CENTIGRADE
BUN SERPL-MCNC: 28 MG/DL (ref 8–22)
BUN SERPL-MCNC: 30 MG/DL (ref 8–22)
CALCIUM SERPL-MCNC: 9.2 MG/DL (ref 8.4–10.2)
CALCIUM SERPL-MCNC: 9.3 MG/DL (ref 8.4–10.2)
CHLORIDE SERPL-SCNC: 93 MMOL/L (ref 96–112)
CHLORIDE SERPL-SCNC: 93 MMOL/L (ref 96–112)
CO2 SERPL-SCNC: 34 MMOL/L (ref 20–33)
CO2 SERPL-SCNC: 35 MMOL/L (ref 20–33)
CREAT SERPL-MCNC: 0.74 MG/DL (ref 0.5–1.4)
CREAT SERPL-MCNC: 0.78 MG/DL (ref 0.5–1.4)
EOSINOPHIL # BLD AUTO: 0.19 K/UL (ref 0–0.51)
EOSINOPHIL NFR BLD: 2.1 % (ref 0–6.9)
ERYTHROCYTE [DISTWIDTH] IN BLOOD BY AUTOMATED COUNT: 55.6 FL (ref 35.9–50)
ERYTHROCYTE [DISTWIDTH] IN BLOOD BY AUTOMATED COUNT: 55.6 FL (ref 35.9–50)
GLOBULIN SER CALC-MCNC: 4.7 G/DL (ref 1.9–3.5)
GLUCOSE SERPL-MCNC: 188 MG/DL (ref 65–99)
GLUCOSE SERPL-MCNC: 191 MG/DL (ref 65–99)
GRAM STN SPEC: NORMAL
HCO3 BLDA-SCNC: 36 MMOL/L (ref 17–25)
HCT VFR BLD AUTO: 35.4 % (ref 42–52)
HCT VFR BLD AUTO: 35.4 % (ref 42–52)
HGB BLD-MCNC: 10.4 G/DL (ref 14–18)
HGB BLD-MCNC: 10.4 G/DL (ref 14–18)
IMM GRANULOCYTES # BLD AUTO: 0.17 K/UL (ref 0–0.11)
IMM GRANULOCYTES NFR BLD AUTO: 1.8 % (ref 0–0.9)
LACTATE BLD-SCNC: 1.45 MMOL/L (ref 0.5–2)
LV EJECT FRACT  99904: 60
LV EJECT FRACT MOD 2C 99903: 51.76
LV EJECT FRACT MOD 4C 99902: 58.4
LV EJECT FRACT MOD BP 99901: 55.24
LYMPHOCYTES # BLD AUTO: 1.22 K/UL (ref 1–4.8)
LYMPHOCYTES NFR BLD: 13.2 % (ref 22–41)
MAGNESIUM SERPL-MCNC: 1.7 MG/DL (ref 1.5–2.5)
MCH RBC QN AUTO: 28 PG (ref 27–33)
MCH RBC QN AUTO: 28 PG (ref 27–33)
MCHC RBC AUTO-ENTMCNC: 29.4 G/DL (ref 33.7–35.3)
MCHC RBC AUTO-ENTMCNC: 29.4 G/DL (ref 33.7–35.3)
MCV RBC AUTO: 95.4 FL (ref 81.4–97.8)
MCV RBC AUTO: 95.4 FL (ref 81.4–97.8)
MONOCYTES # BLD AUTO: 0.7 K/UL (ref 0–0.85)
MONOCYTES NFR BLD AUTO: 7.6 % (ref 0–13.4)
NEUTROPHILS # BLD AUTO: 6.9 K/UL (ref 1.82–7.42)
NEUTROPHILS NFR BLD: 74.9 % (ref 44–72)
NRBC # BLD AUTO: 0 K/UL
NRBC BLD-RTO: 0 /100 WBC
O2/TOTAL GAS SETTING VFR VENT: 40 % (ref 30–60)
PCO2 BLDA: 57 MMHG (ref 26–37)
PCO2 TEMP ADJ BLDA: 56.3 MMHG (ref 26–37)
PH BLDA: 7.41 [PH] (ref 7.4–7.5)
PH TEMP ADJ BLDA: 7.42 [PH] (ref 7.4–7.5)
PHOSPHATE SERPL-MCNC: 4.6 MG/DL (ref 2.5–4.5)
PLATELET # BLD AUTO: 267 K/UL (ref 164–446)
PLATELET # BLD AUTO: 267 K/UL (ref 164–446)
PMV BLD AUTO: 10.5 FL (ref 9–12.9)
PMV BLD AUTO: 10.5 FL (ref 9–12.9)
PO2 BLDA: 62 MMHG (ref 64–87)
PO2 TEMP ADJ BLDA: 60.7 MMHG (ref 64–87)
POTASSIUM SERPL-SCNC: 4.4 MMOL/L (ref 3.6–5.5)
POTASSIUM SERPL-SCNC: 4.4 MMOL/L (ref 3.6–5.5)
PROCALCITONIN SERPL-MCNC: 0.09 NG/ML
PROT SERPL-MCNC: 7.4 G/DL (ref 6–8.2)
RBC # BLD AUTO: 3.71 M/UL (ref 4.7–6.1)
RBC # BLD AUTO: 3.71 M/UL (ref 4.7–6.1)
SAO2 % BLDA: 91.7 % (ref 93–99)
SIGNIFICANT IND 70042: NORMAL
SITE SITE: NORMAL
SODIUM SERPL-SCNC: 134 MMOL/L (ref 135–145)
SODIUM SERPL-SCNC: 135 MMOL/L (ref 135–145)
SOURCE SOURCE: NORMAL
TRIGL SERPL-MCNC: 178 MG/DL (ref 0–149)
TROPONIN I SERPL-MCNC: 0.02 NG/ML (ref 0–0.04)
TROPONIN I SERPL-MCNC: 0.08 NG/ML (ref 0–0.04)
WBC # BLD AUTO: 9.2 K/UL (ref 4.8–10.8)
WBC # BLD AUTO: 9.2 K/UL (ref 4.8–10.8)

## 2018-05-19 PROCEDURE — 82962 GLUCOSE BLOOD TEST: CPT

## 2018-05-19 PROCEDURE — 87186 SC STD MICRODIL/AGAR DIL: CPT

## 2018-05-19 PROCEDURE — 85025 COMPLETE CBC W/AUTO DIFF WBC: CPT

## 2018-05-19 PROCEDURE — 84145 PROCALCITONIN (PCT): CPT

## 2018-05-19 PROCEDURE — 84100 ASSAY OF PHOSPHORUS: CPT

## 2018-05-19 PROCEDURE — 93306 TTE W/DOPPLER COMPLETE: CPT | Mod: 26 | Performed by: INTERNAL MEDICINE

## 2018-05-19 PROCEDURE — 93306 TTE W/DOPPLER COMPLETE: CPT

## 2018-05-19 PROCEDURE — 83735 ASSAY OF MAGNESIUM: CPT

## 2018-05-19 PROCEDURE — 82803 BLOOD GASES ANY COMBINATION: CPT

## 2018-05-19 PROCEDURE — 71045 X-RAY EXAM CHEST 1 VIEW: CPT

## 2018-05-19 PROCEDURE — 87086 URINE CULTURE/COLONY COUNT: CPT

## 2018-05-19 PROCEDURE — 87077 CULTURE AEROBIC IDENTIFY: CPT

## 2018-05-19 PROCEDURE — 87070 CULTURE OTHR SPECIMN AEROBIC: CPT

## 2018-05-19 PROCEDURE — 87040 BLOOD CULTURE FOR BACTERIA: CPT

## 2018-05-19 PROCEDURE — 83605 ASSAY OF LACTIC ACID: CPT

## 2018-05-19 PROCEDURE — 84484 ASSAY OF TROPONIN QUANT: CPT | Mod: 91

## 2018-05-19 PROCEDURE — 80053 COMPREHEN METABOLIC PANEL: CPT

## 2018-05-19 PROCEDURE — 80048 BASIC METABOLIC PNL TOTAL CA: CPT

## 2018-05-19 PROCEDURE — 84478 ASSAY OF TRIGLYCERIDES: CPT

## 2018-05-19 PROCEDURE — 87205 SMEAR GRAM STAIN: CPT

## 2018-05-19 NOTE — PROGRESS NOTES
Pulmonary Critical Care Progress Note        Chief Complaint: Acute on chronic respiratory failure with hypoxia and hypercapnia.    History of Present Illness: 57 y.o. male with complex medical history including COPD, congestive heart failure, type 2 diabetes, bilateral lower extremity cellulitis and probable obstructive sleep apnea was admitted to [facility] to be treated for bilateral lower extremity cellulitis. The patient is noncompliant. He was recently admitted to [facility] intubated for about a week for acute and chronic hypercapnic respiratory failure with CO2 retention. The patient was prescribed a trilogy ventilator to use after discharge.      The patient was readmitted to the hospital for bilateral lower extremity cellulitis. He was found to have high bicarbonate on basic metabolic panel of 42. Pulmonary consultation was requested for further evaluation and management of his chronic respiratory failure.    ROS:  Respiratory: positive shortness of breath, unchanged, Cardiac: negative chest pain, GI: negative abdominal pain.  All other systems negative.    Interval Events:  24 hour interval history reviewed.  He remains comfortable on supplemental oxygen by nasal cannula and continues to decline nocturnal BiPAP therapy. He is afebrile and hemodynamically stable. He is alert and responsive. Wound care continues with significant pain. His edema seems less on diuretic therapy. No new problems overnight.    PFSH:  No change.    Respiratory:  Exam: symmetrical but diminished bilateral breath sounds with basilar rales but no wheezing or egophony.  ImagingAvailable data reviewed. The chest film on April 24 demonstrated some improvement in previously identified right lung densities with stable left infrahilar and basilar density. No new findings. No film today.     HemoDynamics:  Exam: regular rhythm (Sinus)  Imaging: Available data reviewed. The echocardiogram on March 18, 2018 was a limited  study but demonstrated grossly normal left ventricular systolic function.    Neuro:  Exam: no focal deficits noted mental status intact  Imaging: None - Reviewed    Recent Labs      05/17/18   0820  05/18/18   0430   SODIUM  135  133*   POTASSIUM  4.7  4.2   CHLORIDE  92*  92*   CO2  36*  34*   BUN  26*  30*   CREATININE  0.69  0.76   CALCIUM  9.8  9.4     GI/Nutrition:  Exam: abdomen is soft and non-tender, normal active bowel sounds  Imaging: Available data reviewed  taking PO  Liver Function  Recent Labs      05/17/18   0820  05/18/18   0430   GLUCOSE  135*  113*       Heme:  Recent Labs      05/17/18   0820  05/18/18   0430   RBC  3.65*  3.79*   HEMOGLOBIN  10.4*  10.4*   HEMATOCRIT  34.6*  36.0*   PLATELETCT  246  246       Infectious Disease:  Micro: cultures reviewed; no new positive cultures.  Recent Labs      05/17/18   0820 05/18/18   0430   WBC  8.7  9.2       Quality  Measures:  Core Measures & Quality Metrics    Problems:  1. Acute on chronic respiratory failure with hypoxia and hypercarbia. He is on an oxygen nasal cannula at this time and has not tolerated nocturnal BiPAP therapy.  2. Chronic obstructive pulmonary disease.  3. Obesity, suspected obesity hypoventilation syndrome and possible obstructive sleep apnea hypopnea syndrome.  4. Right leg cellulitis with previous debridement and lymphedema.  5. Diabetes mellitus, controlled.  6. Chronic pain syndrome requiring chronic narcotic analgesia.  7. Hypothyroidism, on replacement therapy.  8. Systemic arterial hypertension, controlled.  9. Anemia, stable without evident ongoing blood loss.    Plan:  Continued titrated supplemental oxygen, bronchodilators and respiratory protocols.  Encourage use of nocturnal ventilation with eventual home nocturnal ventilatory support. Encourage sleep center follow up with an eventual polysomnogram after discharge.  Continue nutritional support, therapies, mobilization and prophylaxis.    Discussed patient condition  and risk of morbidity and/or mortality with RN and RT .

## 2018-05-19 NOTE — PROGRESS NOTES
CRITICAL CARE MEDICINE   BRIEF PROGRESS NOTE    Date of service: 5/19/2018  Time: 0121 am     I was notified at 0036 on 5/19/2018 regarding the patient  being transferred to the intensive care unit and being intubated. This patient is currently at [facility] room 122 and I spoke with Eduardo the respiratory therapist. The patient was intubated due to acute respiratory failure after initially being admitted to the hospital at [facility] for an acute respiratory failure with probable cor pulmonale. The patient had been diuresed and was responding well to treatment. He also had lower extremity cellulitis. Recently was on amoxicillin and was off of IV antibiotics. His been refusing BiPAP for weeks and unfortunately developed acute respiratory failure with probable hypercapnic failure. ABG was not obtained as the patient was in respiratory distress and was immediately intubated.    I spoke with Eduardo the respiratory therapist for ventilatory orders and did provide orders for the ventilator. This included tidal volume of 450, PEEP of 10 although the patient was initially started on a PEEP of 8, and 50% oxygen. Respiratory rate of 16. Ordered arterial blood gas, chest x-ray I reviewed his wall which shows ET tube in good position, poor quality of the image due to low lung volumes but elevation of the right hemidiaphragm, interstitial infiltrates with curly B lines, and left retrocardiac infiltrate with probable pneumonia.    Verbal orders were given to the nurse with a repeat back and I did review hospital orders from both hospitalist as well as our pulmonary physician Dr. Arnold has been following this patient.    I did broaden antibiotics to include both cefepime and vancomycin based on the x-ray as well as CMP, CBC, BNP, and other treatment including bronchodilator therapy every 4 hours, arterial blood gas stat, chest x-ray in the a.m., and discontinue the oral amoxicillin. The patient also to have typical  respiratory bundle to be followed and spontaneous breathing trials. Feeding tube was also recommended. An orders were to have a 2nd peripheral IV obtained. The patient is to be started on propofol infusion with fentanyl IV push for pain as well.    I discussed the case with Dr. Gregg Ash who is taking call for further typical medical orders and discussed the case at length with him. I will call the on-call pulmonologist for tomorrow to give signout regarding events overnight. This is usually performed at approximate 6 AM. At this point I do not believe the patient needs transfer to our main campus unless the patient has significant sign of shock or worsen respiratory distress.    I also spoke with both the respiratory therapist as well as bedside RN.    Chris Ching D.O.  Critical Care Medicine

## 2018-05-19 NOTE — PROGRESS NOTES
Pulmonary Critical Care Progress Note        Chief Complaint: Acute on chronic respiratory failure with hypoxia and hypercapnia.    History of Present Illness: 57 y.o. male with complex medical history including COPD, congestive heart failure, type 2 diabetes, bilateral lower extremity cellulitis and probable obstructive sleep apnea was admitted to [facility] to be treated for bilateral lower extremity cellulitis. The patient is noncompliant. He was recently admitted to [facility] intubated for about a week for acute and chronic hypercapnic respiratory failure with CO2 retention. The patient was prescribed a trilogy ventilator to use after discharge.      The patient was readmitted to the hospital for bilateral lower extremity cellulitis. He was found to have high bicarbonate on basic metabolic panel of 42. Pulmonary consultation was requested for further evaluation and management of his chronic respiratory failure.    ROS:  Respiratory: positive shortness of breath, unchanged, Cardiac: negative chest pain, GI: negative abdominal pain.  All other systems negative.    Interval Events:  24 hour interval history reviewed.  He developed increased dyspnea overnight and required intubation. He is now on full ventilatory support. With an assist-control set rate of 16 he breathes at a rate of 28 but exhibits no dyspnea or dyssynchrony. The peak airway pressure is about 30 cm water with a set tidal volume of 450 ml and PEEP = 8. Oxygen saturation is about 95% on 40% oxygen. The ABG demonstrates an adequate oxygen saturation with a compensated chronic respiratory acidosis. He is sedated on a propofol infusion and hemodynamically stable. He is afebrile with a maximum temperature of 98.9 degrees overnight. Wound care continues and his edema seems less after diuretic therapy.     PFSH:  No change.    Respiratory:  Exam: symmetrical but diminished bilateral breath sounds with basilar rales but no wheezing or  egophony.  ImagingAvailable data reviewed. The chest film today demonstrates stable interstitial prominence with no new focal consolidation. The endotracheal tube seems to be in good position.    HemoDynamics:  Exam: regular rhythm (Sinus)  Imaging: Available data reviewed. The echocardiogram on March 18, 2018 was a limited study but demonstrated grossly normal left ventricular systolic function.    Neuro:  Exam: no focal deficits noted mental status intact  Imaging: None - Reviewed    Recent Labs      05/17/18 0820 05/18/18 0430 05/19/18   0030   SODIUM  135  133*  134*  135   POTASSIUM  4.7  4.2  4.4  4.4   CHLORIDE  92*  92*  93*  93*   CO2  36*  34*  34*  35*   BUN  26*  30*  30*  28*   CREATININE  0.69  0.76  0.78  0.74   MAGNESIUM   --    --   1.7   PHOSPHORUS   --    --   4.6*   CALCIUM  9.8  9.4  9.3  9.2     GI/Nutrition:  Exam: abdomen is soft and non-tender, normal active bowel sounds  Imaging: Available data reviewed  NPO  Liver Function  Recent Labs      05/17/18 0820 05/18/18 0430 05/19/18   0030   ALTSGPT   --    --   11   ASTSGOT   --    --   17   ALKPHOSPHAT   --    --   108*   TBILIRUBIN   --    --   0.5   GLUCOSE  135*  113*  188*  191*       Heme:  Recent Labs      05/17/18 0820 05/18/18 0430 05/19/18   0030   RBC  3.65*  3.79*  3.71*  3.71*   HEMOGLOBIN  10.4*  10.4*  10.4*  10.4*   HEMATOCRIT  34.6*  36.0*  35.4*  35.4*   PLATELETCT  246  246  267  267       Infectious Disease:  Micro: cultures reviewed; no new positive cultures.  Recent Labs      05/17/18 0820 05/18/18 0430 05/19/18   0030   WBC  8.7  9.2  9.2  9.2   NEUTSPOLYS   --    --   74.90*   LYMPHOCYTES   --    --   13.20*   MONOCYTES   --    --   7.60   EOSINOPHILS   --    --   2.10   BASOPHILS   --    --   0.40   ASTSGOT   --    --   17   ALTSGPT   --    --   11   ALKPHOSPHAT   --    --   108*   TBILIRUBIN   --    --   0.5       Quality  Measures:  Core Measures & Quality Metrics    Problems:  1.  Acute on chronic respiratory failure with hypoxia and hypercarbia. Overnight deterioration requiring intubation and full support mechanical ventilation. This seems to be an exacerbation of severe underlying COPD, chronic hypoventilation and sleep disordered breathing. There is little to suggest new pneumonia, thromboembolism or increasing pulmonary edema.  2. Chronic obstructive pulmonary disease.  3. Obesity, suspected obesity hypoventilation syndrome and obstructive sleep apnea hypopnea syndrome.  4. Right leg cellulitis with previous debridement and lymphedema.  5. Diabetes mellitus, controlled.  6. Chronic pain syndrome requiring chronic narcotic analgesia.  7. Hypothyroidism, on replacement therapy.  8. Systemic arterial hypertension, controlled.  9. Anemia, stable without evident ongoing blood loss.    Plan:  Continued full support mechanical ventilation with a lung-protective ventilation strategy. Titrate supplemental oxygen, continue bronchodilators and respiratory protocols.  Continue nutritional support with enteral tube feeding. Continue therapies, mobilization and prophylaxis.    Discussed patient condition and risk of morbidity and/or mortality with RN and RT.  He is critically ill and I have spent 35 minutes today in global assessment and ventilator adjustment, in reviewing laboratory and imaging studies on the unit and in consultation with nursing and respiratory therapy. Discussed with Dr. Ching - see his overnight note. No time overlap and does not include procedures.

## 2018-05-19 NOTE — [facility]
Hospital Medicine Progress Note, Adult, Complex               Author: Kelley Sifuentes Date & Time created: 5/19/2018  11:24 AM     CC: bilateral leg cellulitis and lymphedema    Interval History:  Patient is admitted with worsening leg pain and drainage of purulent fluid. Unasyn completed 5/14 per ID  He refuses to use bipap   Overnight the patient was intubated due to acute respiratory failure with CO2 retention    Review of Systems:  Review of Systems   Unable to perform ROS: Intubated       Physical Exam:  Physical Exam   Constitutional: He is sedated, intubated and restrained.   HENT:   Mouth/Throat: Oropharynx is clear and moist. No oropharyngeal exudate.   Eyes: Conjunctivae are normal. Right eye exhibits no discharge. Left eye exhibits no discharge.   Neck: Neck supple.   Cardiovascular: Normal rate and regular rhythm.    Pulmonary/Chest: Effort normal. Tachypnea noted. He is intubated. He has no wheezes. He has no rales.   Abdominal: Soft. He exhibits no distension.   Musculoskeletal: He exhibits edema.   3+ leg edema   Skin: Skin is warm. He is not diaphoretic. No erythema.   Psychiatric: His behavior is normal.   Nursing note and vitals reviewed.      Labs:  Recent Labs      05/19/18   0245   [id number]  7.41   [id number]  57.0*   [id number]  62.0*   [id number]  91.7*   ARTHCO3  36*   FIO2  40   ARTBE  10*     Recent Labs      05/19/18   0030  05/19/18   0650   TROPONINI  0.08*  0.02     Recent Labs      05/17/18   0820  05/18/18   0430  05/19/18   0030   SODIUM  135  133*  134*  135   POTASSIUM  4.7  4.2  4.4  4.4   CHLORIDE  92*  92*  93*  93*   CO2  36*  34*  34*  35*   BUN  26*  30*  30*  28*   CREATININE  0.69  0.76  0.78  0.74   MAGNESIUM   --    --   1.7   PHOSPHORUS   --    --   4.6*   CALCIUM  9.8  9.4  9.3  9.2     Recent Labs      05/17/18   0820  05/18/18   0430  05/19/18   0030   ALTSGPT   --    --   11   ASTSGOT   --    --   17   ALKPHOSPHAT   --    --   108*   TBILIRUBIN   --    --   0.5    GLUCOSE  135*  113*  188*  191*     Recent Labs      05/17/18   0820  05/18/18   0430  05/19/18   0030   RBC  3.65*  3.79*  3.71*  3.71*   HEMOGLOBIN  10.4*  10.4*  10.4*  10.4*   HEMATOCRIT  34.6*  36.0*  35.4*  35.4*   PLATELETCT  246  246  267  267     Recent Labs      05/17/18   0820  05/18/18   0430  05/19/18   0030   WBC  8.7  9.2  9.2  9.2   NEUTSPOLYS   --    --   74.90*   LYMPHOCYTES   --    --   13.20*   MONOCYTES   --    --   7.60   EOSINOPHILS   --    --   2.10   BASOPHILS   --    --   0.40   ASTSGOT   --    --   17   ALTSGPT   --    --   11   ALKPHOSPHAT   --    --   108*   TBILIRUBIN   --    --   0.5           Hemodynamics:     T98 /54  RR18 O2sat 92% on ventilator    GI/Nutrition:  Orders Placed This Encounter   Procedures   • DIET NPO     Standing Status:   Standing     Number of Occurrences:   1     Order Specific Question:   Restrict to:     Answer:   Strict [1]     Medical Decision Making, by Problem:  Bilateral lower leg cellulitis, oral augmentin  Lymphedema chronic, continue compression   Lasix and diamox to treat as tolerated by blood pressure   Renal function stable, follow labs     Buttock DTI, dasilva placed, continue skin care    Acute on chronic respiratory failure, ventilator support    Elevated troponin, EKG is reviewed by myself and shows right heart strain but no acute changes when compared to prior EKG's      HTN (hypertension) monitor blood pressure     COPD (chronic obstructive pulmonary disease) oxygen and respiratory therapy    Stasis dermatitis stable    Narcotic addiction pain medication    Non compliance w medication regimen    Patient continues to refuse bipap will attempt to move out of chair today      Diabetes type 2, controlled, metformin    Severe protein-calorie malnutrition     Chronic respiratory failure, oxygen    Morbid obesity with BMI of 40.0-44.9, adult     Chronic pain continue pain meds    ALIREZA (obstructive sleep apnea) bipap refused by patient,  now patient on ventilator       Sepsis (CMS-HCC) resolved prior to arrival        Quality-Core Measures   Reviewed items::  Labs reviewed and Medications reviewed  Montoya catheter::  No Montoya  DVT prophylaxis pharmacological::  Heparin  Ulcer Prophylaxis::  Yes  Antibiotics:  Treating active infection/contamination beyond 24 hours perioperative coverage  Assessed for rehabilitation services:  Patient returned to prior level of function, rehabilitation not indicated at this time and Patient unable to tolerate rehabilitation therapeutic regimen

## 2018-05-20 LAB
ANION GAP SERPL CALC-SCNC: 6 MMOL/L (ref 0–11.9)
BASE EXCESS BLDA CALC-SCNC: 9 MMOL/L (ref -4–3)
BODY TEMPERATURE: 37.3 CENTIGRADE
BUN SERPL-MCNC: 24 MG/DL (ref 8–22)
CALCIUM SERPL-MCNC: 8.9 MG/DL (ref 8.4–10.2)
CHLORIDE SERPL-SCNC: 98 MMOL/L (ref 96–112)
CO2 SERPL-SCNC: 32 MMOL/L (ref 20–33)
CREAT SERPL-MCNC: 0.65 MG/DL (ref 0.5–1.4)
ERYTHROCYTE [DISTWIDTH] IN BLOOD BY AUTOMATED COUNT: 51.9 FL (ref 35.9–50)
GLUCOSE BLD-MCNC: 123 MG/DL (ref 65–99)
GLUCOSE BLD-MCNC: 137 MG/DL (ref 65–99)
GLUCOSE SERPL-MCNC: 164 MG/DL (ref 65–99)
HCO3 BLDA-SCNC: 31 MMOL/L (ref 17–25)
HCT VFR BLD AUTO: 29.2 % (ref 42–52)
HGB BLD-MCNC: 9.1 G/DL (ref 14–18)
MCH RBC QN AUTO: 27.8 PG (ref 27–33)
MCHC RBC AUTO-ENTMCNC: 31.2 G/DL (ref 33.7–35.3)
MCV RBC AUTO: 89.3 FL (ref 81.4–97.8)
O2/TOTAL GAS SETTING VFR VENT: 40 % (ref 30–60)
PCO2 BLDA: 36.5 MMHG (ref 26–37)
PCO2 TEMP ADJ BLDA: 37 MMHG (ref 26–37)
PH BLDA: 7.55 [PH] (ref 7.4–7.5)
PH TEMP ADJ BLDA: 7.55 [PH] (ref 7.4–7.5)
PLATELET # BLD AUTO: 209 K/UL (ref 164–446)
PMV BLD AUTO: 10.5 FL (ref 9–12.9)
PO2 BLDA: 72.1 MMHG (ref 64–87)
PO2 TEMP ADJ BLDA: 73.6 MMHG (ref 64–87)
POTASSIUM SERPL-SCNC: 3 MMOL/L (ref 3.6–5.5)
RBC # BLD AUTO: 3.27 M/UL (ref 4.7–6.1)
SAO2 % BLDA: 94.7 % (ref 93–99)
SODIUM SERPL-SCNC: 136 MMOL/L (ref 135–145)
WBC # BLD AUTO: 6.8 K/UL (ref 4.8–10.8)

## 2018-05-20 PROCEDURE — 82803 BLOOD GASES ANY COMBINATION: CPT

## 2018-05-20 PROCEDURE — 85027 COMPLETE CBC AUTOMATED: CPT

## 2018-05-20 PROCEDURE — 84145 PROCALCITONIN (PCT): CPT

## 2018-05-20 PROCEDURE — 80048 BASIC METABOLIC PNL TOTAL CA: CPT

## 2018-05-20 NOTE — [facility]
Hospital Medicine Progress Note, Adult, Complex               Author: Kelley Sifuentes Date & Time created: 5/20/2018  12:02 PM     CC: bilateral leg cellulitis and lymphedema    Interval History:  Patient is admitted with worsening leg pain and drainage of purulent fluid. Unasyn completed 5/14 per ID  He refuses to use bipap   5/19 patient was intubated due to hypoventilation  Today he is alert on propofol, no fever and no leukocytosis    Review of Systems:  Review of Systems   Unable to perform ROS: Intubated       Physical Exam:  Physical Exam   Constitutional: No distress. He is sedated, intubated and restrained.   HENT:   Mouth/Throat: No oropharyngeal exudate.   Eyes: Right eye exhibits no discharge. Left eye exhibits no discharge.   Neck: Neck supple.   Cardiovascular: Normal rate and regular rhythm.    Pulmonary/Chest: Effort normal and breath sounds normal. Tachypnea noted. He is intubated. He has no rales.   Abdominal: Soft. He exhibits no distension.   Musculoskeletal: He exhibits edema.   3+ leg edema   Neurological: He is alert.   Skin: Skin is warm and dry. He is not diaphoretic. No erythema.   Psychiatric: His behavior is normal.   Nursing note and vitals reviewed.      Labs:  Recent Labs      05/19/18   0245  05/20/18   0015   [id number]  7.41  7.55*   [id number]  57.0*  36.5   [id number]  62.0*  72.1   [id number]  91.7*  94.7   ARTHCO3  36*  31*   FIO2  40  40   ARTBE  10*  9*     Recent Labs      05/19/18   0030  05/19/18   0650   TROPONINI  0.08*  0.02     Recent Labs      05/18/18   0430  05/19/18   0030  05/20/18   0325   SODIUM  133*  134*  135  136   POTASSIUM  4.2  4.4  4.4  3.0*   CHLORIDE  92*  93*  93*  98   CO2  34*  34*  35*  32   BUN  30*  30*  28*  24*   CREATININE  0.76  0.78  0.74  0.65   MAGNESIUM   --   1.7   --    PHOSPHORUS   --   4.6*   --    CALCIUM  9.4  9.3  9.2  8.9     Recent Labs      05/18/18   0430  05/19/18   0030  05/20/18   0325   ALTSGPT   --   11   --    ASTSGOT    --   17   --    ALKPHOSPHAT   --   108*   --    TBILIRUBIN   --   0.5   --    GLUCOSE  113*  188*  191*  164*     Recent Labs      05/18/18 0430  05/19/18   0030  05/20/18   0325   RBC  3.79*  3.71*  3.71*  3.27*   HEMOGLOBIN  10.4*  10.4*  10.4*  9.1*   HEMATOCRIT  36.0*  35.4*  35.4*  29.2*   PLATELETCT  246  267  267  209     Recent Labs      05/18/18 0430 05/19/18   0030  05/20/18   0325   WBC  9.2  9.2  9.2  6.8   NEUTSPOLYS   --   74.90*   --    LYMPHOCYTES   --   13.20*   --    MONOCYTES   --   7.60   --    EOSINOPHILS   --   2.10   --    BASOPHILS   --   0.40   --    ASTSGOT   --   17   --    ALTSGPT   --   11   --    ALKPHOSPHAT   --   108*   --    TBILIRUBIN   --   0.5   --            Hemodynamics:     T98.4 /70 HR91 RR19 O2sat 96% on ventilator    GI/Nutrition:  Orders Placed This Encounter   Procedures   • DIET NPO     Standing Status:   Standing     Number of Occurrences:   1     Order Specific Question:   Restrict to:     Answer:   With Tube Feed [4]     Medical Decision Making, by Problem:  Bilateral lower leg cellulitis, oral augmentin to continue for suppression of infection, will monitor leg wound healing and stop depending on clinical improvement    Lymphedema chronic, continue compression   Lasix and diamox   Leg elevation also helping edema   Renal function stable, follow labs     Buttock DTI, dasilva placed, skin care    Acute on chronic respiratory failure, ventilator support   Stop cefepime as no evidence of pneumonia   Pulmonary medicine in agreement    Elevated troponin, improved to normal, EKG is reviewed by myself and shows right heart strain but no acute changes when compared to prior EKG's      HTN (hypertension) monitor blood pressure     COPD (chronic obstructive pulmonary disease) oxygen and respiratory therapy    Stasis dermatitis stable    Narcotic addiction pain medication    Non compliance w medication regimen, noncompliance with bipap contributing to respiratory  failure acutely      Diabetes type 2, controlled, metformin    Severe protein-calorie malnutrition     Chronic respiratory failure, oxygen    Morbid obesity with BMI of 40.0-44.9, adult     Chronic pain continue pain meds    ALIREZA (obstructive sleep apnea) bipap refused by patient, now patient on ventilator       Sepsis (CMS-[facility]) resolved prior to arrival        Quality-Core Measures   Reviewed items::  Labs reviewed and Medications reviewed  Montoya catheter::  No Montoya  DVT prophylaxis pharmacological::  Heparin  Ulcer Prophylaxis::  Yes  Antibiotics:  Treating active infection/contamination beyond 24 hours perioperative coverage  Assessed for rehabilitation services:  Patient returned to prior level of function, rehabilitation not indicated at this time and Patient unable to tolerate rehabilitation therapeutic regimen

## 2018-05-20 NOTE — PROGRESS NOTES
Pulmonary Critical Care Progress Note        Chief Complaint: Acute on chronic respiratory failure with hypoxia and hypercapnia.    History of Present Illness: 57 y.o. male with complex medical history including COPD, congestive heart failure, type 2 diabetes, bilateral lower extremity cellulitis and probable obstructive sleep apnea was admitted to [facility] to be treated for bilateral lower extremity cellulitis. The patient is noncompliant. He was recently admitted to [facility] intubated for about a week for acute and chronic hypercapnic respiratory failure with CO2 retention. The patient was prescribed a trilogy ventilator to use after discharge.      The patient was readmitted to the hospital for bilateral lower extremity cellulitis. He was found to have high bicarbonate on basic metabolic panel of 42. Pulmonary consultation was requested for further evaluation and management of his chronic respiratory failure.    ROS:  Respiratory: positive shortness of breath, unchanged, Cardiac: negative chest pain, GI: negative abdominal pain.  All other systems negative.    Interval Events:  24 hour interval history reviewed.  He developed increased dyspnea overnight on May 18 and required intubation. He is now on full ventilatory support. With an assist-control set rate of 16 he breathes at a rate of 28 but exhibits no dyspnea or dyssynchrony. The peak airway pressure is about 30 cm water with a set tidal volume of 450 ml and PEEP = 8. Oxygen saturation is about 95% on 40% oxygen. The ABG demonstrates an adequate oxygen saturation with a compensated chronic respiratory acidosis. He is less sedated on the propofol infusion and hemodynamically stable. He is afebrile with a maximum temperature of 98.4 degrees overnight. Wound care continues and his edema seems less after diuretic therapy.     PFSH:  No change.    Respiratory:  Exam: symmetrical but diminished bilateral breath sounds with basilar rales but no  wheezing or egophony.  ImagingAvailable data reviewed. The chest film on 5/19 demonstrated stable interstitial prominence with no new focal consolidation. The endotracheal tube seemed to be in good position. No film today.    HemoDynamics:  Exam: regular rhythm (Sinus)  Imaging: Available data reviewed. The echocardiogram on March 18, 2018 was a limited study but demonstrated grossly normal left ventricular systolic function.    Neuro:  Exam: no focal deficits noted mental status intact  Imaging: None - Reviewed    Recent Labs      05/18/18 0430 05/19/18 0030 05/20/18   0325   SODIUM  133*  134*  135  136   POTASSIUM  4.2  4.4  4.4  3.0*   CHLORIDE  92*  93*  93*  98   CO2  34*  34*  35*  32   BUN  30*  30*  28*  24*   CREATININE  0.76  0.78  0.74  0.65   MAGNESIUM   --   1.7   --    PHOSPHORUS   --   4.6*   --    CALCIUM  9.4  9.3  9.2  8.9     GI/Nutrition:  Exam: abdomen is soft and non-tender, normal active bowel sounds  Imaging: Available data reviewed  NPO, enteral tube feeding initiated.  Liver Function  Recent Labs      05/18/18 0430 05/19/18 0030 05/20/18   0325   ALTSGPT   --   11   --    ASTSGOT   --   17   --    ALKPHOSPHAT   --   108*   --    TBILIRUBIN   --   0.5   --    GLUCOSE  113*  188*  191*  164*       Heme:  Recent Labs      05/18/18 0430 05/19/18 0030 05/20/18   0325   RBC  3.79*  3.71*  3.71*  3.27*   HEMOGLOBIN  10.4*  10.4*  10.4*  9.1*   HEMATOCRIT  36.0*  35.4*  35.4*  29.2*   PLATELETCT  246  267  267  209       Infectious Disease:  Micro: cultures reviewed; no new positive cultures. The tracheal aspirate demonstrated gram-negative rods with identification and sensitivity pending.  Recent Labs      05/18/18 0430 05/19/18 0030 05/20/18   0325   WBC  9.2  9.2  9.2  6.8   NEUTSPOLYS   --   74.90*   --    LYMPHOCYTES   --   13.20*   --    MONOCYTES   --   7.60   --    EOSINOPHILS   --   2.10   --    BASOPHILS   --   0.40   --    ASTSGOT   --   17   --     ALTSGPT   --   11   --    ALKPHOSPHAT   --   108*   --    TBILIRUBIN   --   0.5   --        Quality  Measures:  Core Measures & Quality Metrics    Problems:  1. Acute on chronic respiratory failure with hypoxia and hypercarbia. Overnight deterioration requiring intubation and full support mechanical ventilation. This seems to be an exacerbation of severe underlying COPD, chronic hypoventilation and sleep disordered breathing. There is little to suggest thromboembolism or increasing pulmonary edema. Pneumonia is a possibility with fevewr and gram-negative bacteria in sputum.  2. Chronic obstructive pulmonary disease.  3. Obesity, suspected obesity hypoventilation syndrome and obstructive sleep apnea hypopnea syndrome.  4. Right leg cellulitis with previous debridement and lymphedema.  5. Diabetes mellitus, controlled.  6. Chronic pain syndrome requiring chronic narcotic analgesia.  7. Hypothyroidism, on replacement therapy.  8. Systemic arterial hypertension, controlled.  9. Anemia, stable without evident ongoing blood loss.    Plan:  Continued full support mechanical ventilation with a lung-protective ventilation strategy. Titrate supplemental oxygen, continue bronchodilators and respiratory protocols.  Continue antibiotics pending culture results. Procalcitonin level.  Continue nutritional support with enteral tube feeding. Continue therapies, mobilization and prophylaxis.    Discussed patient condition and risk of morbidity and/or mortality with RN and RT and with Dr. Sifuentes.

## 2018-05-21 LAB
ANION GAP SERPL CALC-SCNC: 9 MMOL/L (ref 0–11.9)
BACTERIA SPEC RESP CULT: ABNORMAL
BACTERIA SPEC RESP CULT: ABNORMAL
BACTERIA UR CULT: NORMAL
BASE EXCESS BLDA CALC-SCNC: 4 MMOL/L (ref -4–3)
BASOPHILS # BLD AUTO: 0.2 % (ref 0–1.8)
BASOPHILS # BLD: 0.02 K/UL (ref 0–0.12)
BODY TEMPERATURE: 37.4 CENTIGRADE
BUN SERPL-MCNC: 18 MG/DL (ref 8–22)
CALCIUM SERPL-MCNC: 8.9 MG/DL (ref 8.4–10.2)
CHLORIDE SERPL-SCNC: 99 MMOL/L (ref 96–112)
CO2 SERPL-SCNC: 27 MMOL/L (ref 20–33)
COMMENT 1642: NORMAL
CREAT SERPL-MCNC: 0.43 MG/DL (ref 0.5–1.4)
EOSINOPHIL # BLD AUTO: 0.1 K/UL (ref 0–0.51)
EOSINOPHIL NFR BLD: 1 % (ref 0–6.9)
ERYTHROCYTE [DISTWIDTH] IN BLOOD BY AUTOMATED COUNT: 54.2 FL (ref 35.9–50)
GLUCOSE SERPL-MCNC: 167 MG/DL (ref 65–99)
GRAM STN SPEC: ABNORMAL
HCO3 BLDA-SCNC: 27 MMOL/L (ref 17–25)
HCT VFR BLD AUTO: 32.8 % (ref 42–52)
HGB BLD-MCNC: 10 G/DL (ref 14–18)
IMM GRANULOCYTES # BLD AUTO: 0.06 K/UL (ref 0–0.11)
IMM GRANULOCYTES NFR BLD AUTO: 0.6 % (ref 0–0.9)
LYMPHOCYTES # BLD AUTO: 1 K/UL (ref 1–4.8)
LYMPHOCYTES NFR BLD: 10.1 % (ref 22–41)
MCH RBC QN AUTO: 28.3 PG (ref 27–33)
MCHC RBC AUTO-ENTMCNC: 30.5 G/DL (ref 33.7–35.3)
MCV RBC AUTO: 92.9 FL (ref 81.4–97.8)
MONOCYTES # BLD AUTO: 0.71 K/UL (ref 0–0.85)
MONOCYTES NFR BLD AUTO: 7.1 % (ref 0–13.4)
NEUTROPHILS # BLD AUTO: 8.06 K/UL (ref 1.82–7.42)
NEUTROPHILS NFR BLD: 81 % (ref 44–72)
NRBC # BLD AUTO: 0 K/UL
NRBC BLD-RTO: 0 /100 WBC
O2/TOTAL GAS SETTING VFR VENT: 100 % (ref 30–60)
PCO2 BLDA: 32.8 MMHG (ref 26–37)
PCO2 TEMP ADJ BLDA: 33.4 MMHG (ref 26–37)
PH BLDA: 7.53 [PH] (ref 7.4–7.5)
PH TEMP ADJ BLDA: 7.52 [PH] (ref 7.4–7.5)
PLATELET # BLD AUTO: 199 K/UL (ref 164–446)
PMV BLD AUTO: 11 FL (ref 9–12.9)
PO2 BLDA: 102.8 MMHG (ref 64–87)
PO2 TEMP ADJ BLDA: 105.2 MMHG (ref 64–87)
POTASSIUM SERPL-SCNC: 2.9 MMOL/L (ref 3.6–5.5)
PROCALCITONIN SERPL-MCNC: 0.43 NG/ML
RBC # BLD AUTO: 3.53 M/UL (ref 4.7–6.1)
SAO2 % BLDA: 98.2 % (ref 93–99)
SIGNIFICANT IND 70042: ABNORMAL
SIGNIFICANT IND 70042: NORMAL
SITE SITE: ABNORMAL
SITE SITE: NORMAL
SODIUM SERPL-SCNC: 135 MMOL/L (ref 135–145)
SOURCE SOURCE: ABNORMAL
SOURCE SOURCE: NORMAL
VANCOMYCIN TROUGH SERPL-MCNC: 25 UG/ML (ref 10–20)
WBC # BLD AUTO: 10 K/UL (ref 4.8–10.8)

## 2018-05-21 PROCEDURE — 80048 BASIC METABOLIC PNL TOTAL CA: CPT

## 2018-05-21 PROCEDURE — 82803 BLOOD GASES ANY COMBINATION: CPT

## 2018-05-21 PROCEDURE — 85025 COMPLETE CBC W/AUTO DIFF WBC: CPT

## 2018-05-21 PROCEDURE — 80202 ASSAY OF VANCOMYCIN: CPT

## 2018-05-21 NOTE — PROGRESS NOTES
Pulmonary Critical Care Progress Note        Chief Complaint: Acute on chronic respiratory failure with hypoxia and hypercapnia.    History of Present Illness: 57 y.o. male with complex medical history including COPD, congestive heart failure, type 2 diabetes, bilateral lower extremity cellulitis and probable obstructive sleep apnea was admitted to [facility] to be treated for bilateral lower extremity cellulitis. The patient is noncompliant. He was recently admitted to [facility] intubated for about a week for acute and chronic hypercapnic respiratory failure with CO2 retention. The patient was prescribed a trilogy ventilator to use after discharge.      The patient was readmitted to the hospital for bilateral lower extremity cellulitis. He was found to have high bicarbonate on basic metabolic panel of 42. Pulmonary consultation was requested for further evaluation and management of his chronic respiratory failure.    ROS:  Respiratory: positive shortness of breath, unchanged, Cardiac: negative chest pain, GI: negative abdominal pain.  All other systems negative.    Interval Events:  24 hour interval history reviewed.    5/20 - He developed increased dyspnea overnight on May 18 and required intubation. He is now on full ventilatory support. With an assist-control set rate of 16 he breathes at a rate of 28 but exhibits no dyspnea or dyssynchrony. The peak airway pressure is about 30 cm water with a set tidal volume of 450 ml and PEEP = 8. Oxygen saturation is about 95% on 40% oxygen. The ABG demonstrates an adequate oxygen saturation with a compensated chronic respiratory acidosis. He is less sedated on the propofol infusion and hemodynamically stable. He is afebrile with a maximum temperature of 98.4 degrees overnight. Wound care continues and his edema seems less after diuretic therapy.     5/21 - SR - 99.0 degrees - 139/73 - 92 - 16 - 94%; d/w RT: 16/8/450/50, yellow plugges noted; procalcitonin  slightly elevated yesterday; 5/19 cxr showed hypoventilation and interstitial prominence; 5/19 BC negative; on augmentin and cefepime plus vancomycin      PFSH:  No change.    Respiratory:  Exam: symmetrical but diminished bilateral breath sounds with basilar rales but no wheezing or egophony.  ImagingAvailable data reviewed. The chest film on 5/19 demonstrated stable interstitial prominence with no new focal consolidation. The endotracheal tube seemed to be in good position. No film today.    HemoDynamics:  Exam: regular rhythm (Sinus)  Imaging: Available data reviewed. The echocardiogram on March 18, 2018 was a limited study but demonstrated grossly normal left ventricular systolic function.    Neuro:  Exam: no focal deficits noted mental status intact  Imaging: None - Reviewed    Recent Labs      05/19/18 0030 05/20/18   0325   SODIUM  134*  135  136   POTASSIUM  4.4  4.4  3.0*   CHLORIDE  93*  93*  98   CO2  34*  35*  32   BUN  30*  28*  24*   CREATININE  0.78  0.74  0.65   MAGNESIUM  1.7   --    PHOSPHORUS  4.6*   --    CALCIUM  9.3  9.2  8.9     GI/Nutrition:  Exam: abdomen is soft and non-tender, normal active bowel sounds  Imaging: Available data reviewed  NPO, enteral tube feeding initiated.  Liver Function  Recent Labs      05/19/18 0030 05/20/18   0325   ALTSGPT  11   --    ASTSGOT  17   --    ALKPHOSPHAT  108*   --    TBILIRUBIN  0.5   --    GLUCOSE  188*  191*  164*       Heme:  Recent Labs      05/19/18 0030 05/20/18   0325   RBC  3.71*  3.71*  3.27*   HEMOGLOBIN  10.4*  10.4*  9.1*   HEMATOCRIT  35.4*  35.4*  29.2*   PLATELETCT  267  267  209       Infectious Disease:  Micro: cultures reviewed; no new positive cultures. The tracheal aspirate demonstrated gram-negative rods with identification and sensitivity pending.  Recent Labs      05/19/18 0030 05/20/18   0325   WBC  9.2  9.2  6.8   NEUTSPOLYS  74.90*   --    LYMPHOCYTES  13.20*   --    MONOCYTES  7.60   --    EOSINOPHILS   2.10   --    BASOPHILS  0.40   --    ASTSGOT  17   --    ALTSGPT  11   --    ALKPHOSPHAT  108*   --    TBILIRUBIN  0.5   --        Quality  Measures:  Core Measures & Quality Metrics    Problems:  1. VDRF, acute on chronic respiratory failure  2. Chronic obstructive pulmonary disease.  3. Obesity, suspected obesity hypoventilation syndrome and obstructive sleep apnea hypopnea syndrome.  4. Right leg cellulitis with previous debridement and lymphedema.  5. Diabetes mellitus, controlled.  6. Chronic pain syndrome requiring chronic narcotic analgesia.  7. Hypothyroidism, on replacement therapy.  8. Systemic arterial hypertension, controlled.  9. Anemia, stable without evident ongoing blood loss.    Plan:  Continued full support mechanical ventilation with a lung-protective ventilation strategy. Titrate supplemental oxygen, continue bronchodilators and respiratory protocols.  Continue antibiotics pending culture results.    Continue nutritional support with enteral tube feeding. Continue therapies, mobilization and prophylaxis.    Discussed patient condition and risk of morbidity and/or mortality with RN and RT and with Dr. Sifuentes.

## 2018-05-21 NOTE — [facility]
Hospital Medicine Progress Note, Adult, Complex               Author: Kelley Sifuentes Date & Time created: 5/21/2018  2:09 PM     CC: bilateral leg cellulitis and lymphedema    Interval History:  Patient is admitted with worsening leg pain and drainage of purulent fluid. Unasyn completed 5/14 per ID  He refuses to use bipap   5/19 patient was intubated due to hypoventilation  He is alert and afebrile  Klebsiella is growing in sputum culture    Review of Systems:  Review of Systems   Unable to perform ROS: Intubated       Physical Exam:  Physical Exam   Constitutional: No distress. He is sedated, intubated and restrained.   HENT:   Mouth/Throat: Oropharynx is clear and moist. No oropharyngeal exudate.   Eyes: Right eye exhibits no discharge. Left eye exhibits no discharge.   Neck: Neck supple.   Cardiovascular: Normal rate and regular rhythm.    Pulmonary/Chest: Effort normal and breath sounds normal. Tachypnea noted. He is intubated. He has no rales.   Abdominal: Soft. Bowel sounds are normal. He exhibits no distension.   Musculoskeletal: He exhibits edema.   2+ leg edema, much improved  Left leg and foot ulcer improving  Right leg ulcerations healed   Neurological: He is alert.   Skin: Skin is warm. No rash noted. No erythema.   Psychiatric: His behavior is normal.   Nursing note and vitals reviewed.      Labs:  Recent Labs      05/19/18   0245  05/20/18   0015  05/21/18   0827   KQXVJ18U  7.41  7.55*  7.53*   [id number]  57.0*  36.5  32.8   [id number]  62.0*  72.1  102.8*   LFZW5CWU  91.7*  94.7  98.2   ARTHCO3  36*  31*  27*   FIO2  40  40  100*   ARTBE  10*  9*  4*     Recent Labs      05/19/18   0030  05/19/18   0650   TROPONINI  0.08*  0.02     Recent Labs      05/19/18   0030  05/20/18   0325  05/21/18   0827   SODIUM  134*  135  136  135   POTASSIUM  4.4  4.4  3.0*  2.9*   CHLORIDE  93*  93*  98  99   CO2  34*  35*  32  27   BUN  30*  28*  24*  18   CREATININE  0.78  0.74  0.65  0.43*   MAGNESIUM  1.7    --    --    PHOSPHORUS  4.6*   --    --    CALCIUM  9.3  9.2  8.9  8.9     Recent Labs      05/19/18   0030  05/20/18 0325 05/21/18   0827   ALTSGPT  11   --    --    ASTSGOT  17   --    --    ALKPHOSPHAT  108*   --    --    TBILIRUBIN  0.5   --    --    GLUCOSE  188*  191*  164*  167*     Recent Labs      05/19/18 0030  05/20/18 0325 05/21/18   0827   RBC  3.71*  3.71*  3.27*  3.53*   HEMOGLOBIN  10.4*  10.4*  9.1*  10.0*   HEMATOCRIT  35.4*  35.4*  29.2*  32.8*   PLATELETCT  267  267  209  199     Recent Labs      05/19/18 0030 05/20/18 0325 05/21/18   0827   WBC  9.2  9.2  6.8  10.0   NEUTSPOLYS  74.90*   --   81.00*   LYMPHOCYTES  13.20*   --   10.10*   MONOCYTES  7.60   --   7.10   EOSINOPHILS  2.10   --   1.00   BASOPHILS  0.40   --   0.20   ASTSGOT  17   --    --    ALTSGPT  11   --    --    ALKPHOSPHAT  108*   --    --    TBILIRUBIN  0.5   --    --            Hemodynamics:     T99.2 /73 HR76 RR18 O2sat 100% on ventilator    GI/Nutrition:  Orders Placed This Encounter   Procedures   • DIET NPO     Standing Status:   Standing     Number of Occurrences:   1     Order Specific Question:   Restrict to:     Answer:   With Tube Feed [4]     Medical Decision Making, by Problem:  Bilateral lower leg cellulitis, oral augmentin for suppression of infection, wounds healing with improvement in edema with legs elevated    Lymphedema chronic, continue compression   Lasix and diamox   Leg elevation significantly helping his swelling     Buttock DTI, dasilva placed, skin care    Acute on chronic respiratory failure, ventilator support   Cefepime to treat klebsiella in sputum   Discussed with pulmonary medicine    Elevated troponin, improved to normal, EKG is reviewed by myself and shows right heart strain but no acute changes when compared to prior EKG's      HTN (hypertension) monitor blood pressure     COPD (chronic obstructive pulmonary disease) oxygen and respiratory therapy    Stasis dermatitis  stable    Narcotic addiction pain medication    Non compliance w medication regimen, noncompliance with bipap contributing to respiratory failure acutely      Diabetes type 2, controlled, metformin    Severe protein-calorie malnutrition, with patient eating variable caloric intake, poor dietary choices, and now unable to take po nutrition due to intubation    Chronic respiratory failure, oxygen    Morbid obesity with BMI of 40.0-44.9, adult     Chronic pain continue pain meds    ALIREZA (obstructive sleep apnea) bipap refused by patient          Quality-Core Measures   Reviewed items::  Labs reviewed and Medications reviewed  Montoya catheter::  No Montoya  DVT prophylaxis pharmacological::  Heparin  Ulcer Prophylaxis::  Yes  Antibiotics:  Treating active infection/contamination beyond 24 hours perioperative coverage  Assessed for rehabilitation services:  Patient returned to prior level of function, rehabilitation not indicated at this time and Patient unable to tolerate rehabilitation therapeutic regimen

## 2018-05-22 LAB
ANION GAP SERPL CALC-SCNC: 6 MMOL/L (ref 0–11.9)
BUN SERPL-MCNC: 26 MG/DL (ref 8–22)
CALCIUM SERPL-MCNC: 8.5 MG/DL (ref 8.4–10.2)
CHLORIDE SERPL-SCNC: 101 MMOL/L (ref 96–112)
CO2 SERPL-SCNC: 29 MMOL/L (ref 20–33)
CREAT SERPL-MCNC: 0.71 MG/DL (ref 0.5–1.4)
ERYTHROCYTE [DISTWIDTH] IN BLOOD BY AUTOMATED COUNT: 55.9 FL (ref 35.9–50)
GLUCOSE SERPL-MCNC: 151 MG/DL (ref 65–99)
HCT VFR BLD AUTO: 30.3 % (ref 42–52)
HGB BLD-MCNC: 9 G/DL (ref 14–18)
MCH RBC QN AUTO: 27.8 PG (ref 27–33)
MCHC RBC AUTO-ENTMCNC: 29.7 G/DL (ref 33.7–35.3)
MCV RBC AUTO: 93.5 FL (ref 81.4–97.8)
PLATELET # BLD AUTO: 194 K/UL (ref 164–446)
PMV BLD AUTO: 10.7 FL (ref 9–12.9)
POTASSIUM SERPL-SCNC: 3.2 MMOL/L (ref 3.6–5.5)
RBC # BLD AUTO: 3.24 M/UL (ref 4.7–6.1)
SODIUM SERPL-SCNC: 136 MMOL/L (ref 135–145)
WBC # BLD AUTO: 13.4 K/UL (ref 4.8–10.8)

## 2018-05-22 PROCEDURE — 87086 URINE CULTURE/COLONY COUNT: CPT

## 2018-05-22 PROCEDURE — 85027 COMPLETE CBC AUTOMATED: CPT

## 2018-05-22 PROCEDURE — 80048 BASIC METABOLIC PNL TOTAL CA: CPT

## 2018-05-22 PROCEDURE — 87040 BLOOD CULTURE FOR BACTERIA: CPT | Mod: 91

## 2018-05-22 ASSESSMENT — ENCOUNTER SYMPTOMS
BACK PAIN: 1
FEVER: 0
ABDOMINAL PAIN: 0
COUGH: 0
HEADACHES: 0
VOMITING: 0
SHORTNESS OF BREATH: 0
NAUSEA: 0
DIARRHEA: 0

## 2018-05-22 NOTE — PROGRESS NOTES
Pulmonary Critical Care Progress Note        Chief Complaint: Acute on chronic respiratory failure with hypoxia and hypercapnia.    History of Present Illness: 57 y.o. male with complex medical history including COPD, congestive heart failure, type 2 diabetes, bilateral lower extremity cellulitis and probable obstructive sleep apnea was admitted to [facility] to be treated for bilateral lower extremity cellulitis. The patient is noncompliant. He was recently admitted to [facility] intubated for about a week for acute and chronic hypercapnic respiratory failure with CO2 retention. The patient was prescribed a trilogy ventilator to use after discharge.      The patient was readmitted to the hospital for bilateral lower extremity cellulitis. He was found to have high bicarbonate on basic metabolic panel of 42. Pulmonary consultation was requested for further evaluation and management of his chronic respiratory failure.    ROS:  Respiratory: positive shortness of breath, unchanged, Cardiac: negative chest pain, GI: negative abdominal pain.  All other systems negative.    Interval Events:  24 hour interval history reviewed.    5/20 - He developed increased dyspnea overnight on May 18 and required intubation. He is now on full ventilatory support. With an assist-control set rate of 16 he breathes at a rate of 28 but exhibits no dyspnea or dyssynchrony. The peak airway pressure is about 30 cm water with a set tidal volume of 450 ml and PEEP = 8. Oxygen saturation is about 95% on 40% oxygen. The ABG demonstrates an adequate oxygen saturation with a compensated chronic respiratory acidosis. He is less sedated on the propofol infusion and hemodynamically stable. He is afebrile with a maximum temperature of 98.4 degrees overnight. Wound care continues and his edema seems less after diuretic therapy.     5/21 - SR - 99.0 degrees - 139/73 - 92 - 16 - 94%; d/w RT: 16/8/450/50, yellow plugges noted; procalcitonin  slightly elevated yesterday; 5/19 cxr showed hypoventilation and interstitial prominence; 5/19 BC negative; on augmentin and cefepime plus vancomycin    5/22 - d/w RT, RN, and Dr. Herr; needed an FIO2 boost overnight; now, 16/4508/45;   T 100.2 P99BP 105/49 RR 18SaO2 94%; same abx; 5/19 TA=klebsiella; alert      PFSH:  No change.    Respiratory:  Exam: symmetrical but diminished bilateral breath sounds with basilar rales but no wheezing or egophony.  ImagingAvailable data reviewed. The chest film on 5/19 demonstrated stable interstitial prominence with no new focal consolidation. The endotracheal tube seemed to be in good position. No film today.    HemoDynamics:  Exam: regular rhythm (Sinus)  Imaging: Available data reviewed. The echocardiogram on March 18, 2018 was a limited study but demonstrated grossly normal left ventricular systolic function.    Neuro:  Exam: no focal deficits noted mental status intact  Imaging: None - Reviewed    Recent Labs      05/20/18 0325 05/21/18 0827 05/22/18   0435   SODIUM  136  135  136   POTASSIUM  3.0*  2.9*  3.2*   CHLORIDE  98  99  101   CO2  32  27  29   BUN  24*  18  26*   CREATININE  0.65  0.43*  0.71   CALCIUM  8.9  8.9  8.5     GI/Nutrition:  Exam: abdomen is soft and non-tender, normal active bowel sounds  Imaging: Available data reviewed  NPO, enteral tube feeding initiated.  Liver Function  Recent Labs      05/20/18 0325 05/21/18   0827 05/22/18   0435   GLUCOSE  164*  167*  151*       Heme:  Recent Labs      05/20/18 0325 05/21/18   0827 05/22/18   0435   RBC  3.27*  3.53*  3.24*   HEMOGLOBIN  9.1*  10.0*  9.0*   HEMATOCRIT  29.2*  32.8*  30.3*   PLATELETCT  209  199  194       Infectious Disease:  Micro: cultures reviewed; no new positive cultures. The tracheal aspirate demonstrated gram-negative rods with identification and sensitivity pending.  Recent Labs      05/20/18 0325 05/21/18   0827 05/22/18   0435   WBC  6.8  10.0  13.4*   NEUTSPOLYS    --   81.00*   --    LYMPHOCYTES   --   10.10*   --    MONOCYTES   --   7.10   --    EOSINOPHILS   --   1.00   --    BASOPHILS   --   0.20   --        Quality  Measures:  Core Measures & Quality Metrics    Problems:  1. VDRF, acute on chronic respiratory failure  2. Chronic obstructive pulmonary disease.  3. Obesity, suspected obesity hypoventilation syndrome and obstructive sleep apnea hypopnea syndrome.  4. Chronic lymphedema, compression, diuresis  5. Diabetes mellitus, controlled.  6. Chronic pain syndrome requiring chronic narcotic analgesia.  7. Hypothyroidism, on replacement therapy.  8. Systemic arterial hypertension, controlled.  9. Anemia, stable without evident ongoing blood loss.  10. Dysphagia, moderate PCM, hypoalbuminemia    Plan:  Continued full support mechanical ventilation with a lung-protective ventilation strategy. Titrate supplemental oxygen, continue bronchodilators and respiratory protocols.  Continue antibiotics , adjust accordingly as needed    Continue nutritional support with enteral tube feeding. Continue therapies, mobilization and prophylaxis.    Discussed patient condition and risk of morbidity and/or mortality with RN and RT and with Dr. Herr.

## 2018-05-22 NOTE — [facility]
Hospital Medicine Progress Note, Adult, Complex               Author: Soo TOLU Herr Date & Time created: 5/22/2018  6:07 AM     CC: cellulitis/lymphedema    Interval History:  BP labile at times  Remains on AC  Wbc doubled over past 3 days  k+ still low, not replaced yesterday  Required increase Fio2 briefly overnight, back to 40%  Propofol down to 20    Review of Systems:  Review of Systems   Constitutional: Negative for fever.   Respiratory: Negative for cough and shortness of breath.    Cardiovascular: Negative for chest pain.   Gastrointestinal: Negative for abdominal pain, diarrhea, nausea and vomiting.   Genitourinary: Negative for dysuria.   Musculoskeletal: Positive for back pain.        Leg/foot pain   Neurological: Negative for headaches.       T 100.2 P99BP 105/49 RR 18SaO2 94% I/O2.8/955 2BM tele SR  Physical Exam   Constitutional: He appears well-developed and well-nourished.   HENT:   Head: Normocephalic and atraumatic.   Mouth/Throat: No oropharyngeal exudate.   NGT/ETT   Eyes: Conjunctivae are normal. Right eye exhibits no discharge. Left eye exhibits no discharge. No scleral icterus.   Neck: No tracheal deviation present.   Cardiovascular: Normal rate, regular rhythm and intact distal pulses.    Pulmonary/Chest: Effort normal. No respiratory distress. He has no wheezes.   diminished   Abdominal: Soft. Bowel sounds are normal. He exhibits no distension. There is no tenderness.   obese   Musculoskeletal: He exhibits edema (2+, legs wrapped).   Neurological: He is alert.   Skin: Skin is warm.   Vitals reviewed.      Labs:  Recent Labs      05/20/18   0015  05/21/18   0827   [id number]  7.55*  7.53*   [id number]  36.5  32.8   [id number]  72.1  102.8*   [id number]  94.7  98.2   ARTHCO3  31*  27*   FIO2  40  100*   ARTBE  9*  4*     Recent Labs      05/19/18   0650   TROPONINI  0.02     Recent Labs      05/20/18   0325  05/21/18   0827  05/22/18   0435   SODIUM  136  135  136   POTASSIUM  3.0*  2.9*  3.2*    CHLORIDE  98  99  101   CO2  32  27  29   BUN  24*  18  26*   CREATININE  0.65  0.43*  0.71   CALCIUM  8.9  8.9  8.5     Recent Labs      05/20/18 0325 05/21/18 0827 05/22/18   0435   GLUCOSE  164*  167*  151*     Recent Labs      05/20/18 0325 05/21/18 0827 05/22/18   0435   RBC  3.27*  3.53*  3.24*   HEMOGLOBIN  9.1*  10.0*  9.0*   HEMATOCRIT  29.2*  32.8*  30.3*   PLATELETCT  209  199  194     Recent Labs      05/20/18 0325 05/21/18 0827 05/22/18   0435   WBC  6.8  10.0  13.4*   NEUTSPOLYS   --   81.00*   --    LYMPHOCYTES   --   10.10*   --    MONOCYTES   --   7.10   --    EOSINOPHILS   --   1.00   --    BASOPHILS   --   0.20   --         GI/Nutrition:  Orders Placed This Encounter   Procedures   • DIET NPO     Standing Status:   Standing     Number of Occurrences:   1     Order Specific Question:   Restrict to:     Answer:   With Tube Feed [4]     Medical Decision Making, by Problem:  RLE cellulitis s/p debridement, polymicrobial (enterococcus, proteus, MSSA)   -improved with elevation, continue wound care   -hold Augmentin while on IV abx  B LE lymphedema   -lasix, diamox  ALIREZA with chronic hypercapnia/OHVS s/p acute VDRF   -SBT per pulm, continue wean off propofol   -Am CXR   -continue diuresis/abx  Klebsiella HAP   -on broad spectrum for legs (vanco, cefepime)   -10 day course  COPD   -spiriva, incruse held with ETT  DM   -metformin   -asa, lipitor  Hypokalemia   -replete  Leukocytosis   -cx   -add fluconazole  Chronic pain   -oxy IR 40mg q 4 ordered by Dr. Sifuentes   -prn fentanyl  MSEW  Hypothyroidism   -synthroid  HTN hx, now lowish   -dc lisinopril   -midodrine prn   -check cortisol  Anemia  Debility      Quality-Core Measures   Reviewed items::  Medications reviewed and Labs reviewed  Montoya catheter::  Unconscious / Sedated Patient on a Ventilator  DVT prophylaxis pharmacological::  Heparin  Antibiotics:  Treating active infection/contamination beyond 24 hours perioperative  coverage

## 2018-05-23 LAB
ANION GAP SERPL CALC-SCNC: 5 MMOL/L (ref 0–11.9)
BUN SERPL-MCNC: 36 MG/DL (ref 8–22)
CALCIUM SERPL-MCNC: 8.4 MG/DL (ref 8.4–10.2)
CHLORIDE SERPL-SCNC: 101 MMOL/L (ref 96–112)
CO2 SERPL-SCNC: 28 MMOL/L (ref 20–33)
CORTIS SERPL-MCNC: 8.8 UG/DL (ref 0–23)
CREAT SERPL-MCNC: 0.57 MG/DL (ref 0.5–1.4)
ERYTHROCYTE [DISTWIDTH] IN BLOOD BY AUTOMATED COUNT: 57.6 FL (ref 35.9–50)
GLUCOSE SERPL-MCNC: 122 MG/DL (ref 65–99)
HCT VFR BLD AUTO: 31 % (ref 42–52)
HGB BLD-MCNC: 9.1 G/DL (ref 14–18)
LACTATE BLD-SCNC: 0.98 MMOL/L (ref 0.5–2)
MCH RBC QN AUTO: 27.8 PG (ref 27–33)
MCHC RBC AUTO-ENTMCNC: 29.4 G/DL (ref 33.7–35.3)
MCV RBC AUTO: 94.8 FL (ref 81.4–97.8)
PLATELET # BLD AUTO: 177 K/UL (ref 164–446)
PMV BLD AUTO: 11.1 FL (ref 9–12.9)
POTASSIUM SERPL-SCNC: 3.8 MMOL/L (ref 3.6–5.5)
RBC # BLD AUTO: 3.27 M/UL (ref 4.7–6.1)
SODIUM SERPL-SCNC: 134 MMOL/L (ref 135–145)
VANCOMYCIN TROUGH SERPL-MCNC: 23.9 UG/ML (ref 10–20)
VANCOMYCIN TROUGH SERPL-MCNC: 41.4 UG/ML (ref 10–20)
WBC # BLD AUTO: 10.9 K/UL (ref 4.8–10.8)

## 2018-05-23 PROCEDURE — 83605 ASSAY OF LACTIC ACID: CPT

## 2018-05-23 PROCEDURE — 82533 TOTAL CORTISOL: CPT

## 2018-05-23 PROCEDURE — 85027 COMPLETE CBC AUTOMATED: CPT

## 2018-05-23 PROCEDURE — 80202 ASSAY OF VANCOMYCIN: CPT

## 2018-05-23 PROCEDURE — 80048 BASIC METABOLIC PNL TOTAL CA: CPT

## 2018-05-23 ASSESSMENT — ENCOUNTER SYMPTOMS
BACK PAIN: 1
HEADACHES: 0
CONSTIPATION: 0
DIARRHEA: 0
NAUSEA: 0
VOMITING: 0
FEVER: 0
SORE THROAT: 1
COUGH: 0
ABDOMINAL PAIN: 0
SHORTNESS OF BREATH: 0

## 2018-05-23 NOTE — [facility]
Hospital Medicine Progress Note, Adult, Complex               Author: Soo TOLU Herr Date & Time created: 5/23/2018  5:28 AM     CC: cellulitis/lymphedema    Interval History:  Propofol at 26  Midline placed    Review of Systems:  Review of Systems   Constitutional: Negative for fever.   HENT: Positive for sore throat.    Respiratory: Negative for cough and shortness of breath.    Cardiovascular: Negative for chest pain.   Gastrointestinal: Negative for abdominal pain, constipation, diarrhea, nausea and vomiting.   Genitourinary: Negative for dysuria.   Musculoskeletal: Positive for back pain.        Leg/foot pain   Neurological: Negative for headaches.       T 98.2P80BP 109/68 RR 21SaO2 96% I/O3.9/1.8 2BM tele SR  Physical Exam   Constitutional: He appears well-developed and well-nourished.   HENT:   Head: Normocephalic and atraumatic.   Mouth/Throat: No oropharyngeal exudate.   NGT/ETT   Eyes: Conjunctivae are normal. Right eye exhibits no discharge. Left eye exhibits no discharge. No scleral icterus.   Neck: No tracheal deviation present.   Cardiovascular: Normal rate, regular rhythm and intact distal pulses.    Pulmonary/Chest: Effort normal. No respiratory distress. He exhibits no tenderness.   diminished   Abdominal: Soft. Bowel sounds are normal. He exhibits no distension. There is no tenderness.   obese   Musculoskeletal: He exhibits edema (2+, legs wrapped).   Neurological: He is alert.   Skin: Skin is warm.   Legs wrapped   Vitals reviewed.      Labs:  Recent Labs      05/21/18 0827   VEBKW92H  7.53*   [id number]  32.8   [id number]  102.8*   KOSL5RVC  98.2   ARTHCO3  27*   FIO2  100*   ARTBE  4*         Recent Labs      05/21/18   0827 05/22/18   0435  05/23/18   0325   SODIUM  135  136  134*   POTASSIUM  2.9*  3.2*  3.8   CHLORIDE  99  101  101   CO2  27  29  28   BUN  18  26*  36*   CREATININE  0.43*  0.71  0.57   CALCIUM  8.9  8.5  8.4     Recent Labs      05/21/18 0827 05/22/18   0435   05/23/18   0325   GLUCOSE  167*  151*  122*     Recent Labs      05/21/18   0827  05/22/18   0435  05/23/18   0325   RBC  3.53*  3.24*  3.27*   HEMOGLOBIN  10.0*  9.0*  9.1*   HEMATOCRIT  32.8*  30.3*  31.0*   PLATELETCT  199  194  177     Recent Labs      05/21/18   0827  05/22/18   0435  05/23/18   0325   WBC  10.0  13.4*  10.9*   NEUTSPOLYS  81.00*   --    --    LYMPHOCYTES  10.10*   --    --    MONOCYTES  7.10   --    --    EOSINOPHILS  1.00   --    --    BASOPHILS  0.20   --    --         GI/Nutrition:  Orders Placed This Encounter   Procedures   • DIET NPO     Standing Status:   Standing     Number of Occurrences:   1     Order Specific Question:   Restrict to:     Answer:   With Tube Feed [4]   • DIET TUBE FEED     Standing Status:   Standing     Number of Occurrences:   1     Order Specific Question:   Diet     Answer:   Diet Tube Feed [35]     Order Specific Question:   Formula:     Answer:   Impact Peptide 1.5 [41]     Comments:   goal rate while on propofal is 45 when off propofal goal rate of 60 mL/hr     Order Specific Question:   Goal Rate (mL/Hour)     Answer:   45     Order Specific Question:   Route     Answer:   NG     Medical Decision Making, by Problem:  RLE cellulitis s/p debridement, polymicrobial (enterococcus, proteus, MSSA)   -improved with elevation, continue wound care   -hold Augmentin while on IV abx  B LE lymphedema   -lasix, diamox  ALIREZA with chronic hypercapnia/OHVS s/p acute VDRF   -SBT per pulm, continue wean off propofol as able   -CXR P   -continue diuresis/abx  Klebsiella HAP   -on broad spectrum for legs (vanco, cefepime)   -10 day course  COPD   -spiriva, incruse held with ETT  DM   -metformin   -asa, lipitor  Hypokalemia   -repleted  Leukocytosis   -improved  Chronic pain   -oxy IR 40mg q 4 ordered by Dr. Sifuentes   -prn fentanyl  MSEW  Hypothyroidism   -synthroid  HTN hx, now lowish   -stable off lisinopril without lows  Anemia  Debility      Quality-Core Measures   Reviewed  items::  Medications reviewed and Labs reviewed  Montoya catheter::  Unconscious / Sedated Patient on a Ventilator  DVT prophylaxis pharmacological::  Heparin  Antibiotics:  Treating active infection/contamination beyond 24 hours perioperative coverage

## 2018-05-23 NOTE — PROGRESS NOTES
Pulmonary Critical Care Progress Note        Chief Complaint: Acute on chronic respiratory failure with hypoxia and hypercapnia.    History of Present Illness: 57 y.o. male with complex medical history including COPD, congestive heart failure, type 2 diabetes, bilateral lower extremity cellulitis and probable obstructive sleep apnea was admitted to [facility] to be treated for bilateral lower extremity cellulitis. The patient is noncompliant. He was recently admitted to [facility] intubated for about a week for acute and chronic hypercapnic respiratory failure with CO2 retention. The patient was prescribed a trilogy ventilator to use after discharge.      The patient was readmitted to the hospital for bilateral lower extremity cellulitis. He was found to have high bicarbonate on basic metabolic panel of 42. Pulmonary consultation was requested for further evaluation and management of his chronic respiratory failure.    ROS:  Respiratory: positive shortness of breath, unchanged, Cardiac: negative chest pain, GI: negative abdominal pain.  All other systems negative.    Interval Events:  24 hour interval history reviewed.    5/20 - He developed increased dyspnea overnight on May 18 and required intubation. He is now on full ventilatory support. With an assist-control set rate of 16 he breathes at a rate of 28 but exhibits no dyspnea or dyssynchrony. The peak airway pressure is about 30 cm water with a set tidal volume of 450 ml and PEEP = 8. Oxygen saturation is about 95% on 40% oxygen. The ABG demonstrates an adequate oxygen saturation with a compensated chronic respiratory acidosis. He is less sedated on the propofol infusion and hemodynamically stable. He is afebrile with a maximum temperature of 98.4 degrees overnight. Wound care continues and his edema seems less after diuretic therapy.     5/21 - SR - 99.0 degrees - 139/73 - 92 - 16 - 94%; d/w RT: 16/8/450/50, yellow plugges noted; procalcitonin  slightly elevated yesterday; 5/19 cxr showed hypoventilation and interstitial prominence; 5/19 BC negative; on augmentin and cefepime plus vancomycin    5/22 - d/w RT, RN, and Dr. Herr; needed an FIO2 boost overnight; now, 16/4508/45;   T 100.2 P99BP 105/49 RR 18SaO2 94%; same abx; 5/19 TA=klebsiella; alert    5/23 - remains on 16/450/45/8, d/w RT; 98.2 degrees - 80- 109/68 - 21 - 96% - SR; leukocytosis a bit better, anemia persists, slight hyponatremia, slightly worsened azotemia noted, ongoing antiinfectives; midline placed; propofol down to 26      PFSH:  No change.    Respiratory:  Exam: symmetrical but diminished bilateral breath sounds with basilar rales but no wheezing or egophony.  ImagingAvailable data reviewed. The chest film on 5/19 demonstrated stable interstitial prominence with no new focal consolidation. The endotracheal tube seemed to be in good position. No film today.    HemoDynamics:  Exam: regular rhythm (Sinus)  Imaging: Available data reviewed. The echocardiogram on March 18, 2018 was a limited study but demonstrated grossly normal left ventricular systolic function.    Neuro:  Exam: no focal deficits noted mental status intact  Imaging: None - Reviewed    Recent Labs      05/21/18 0827 05/22/18 0435  05/23/18   0325   SODIUM  135  136  134*   POTASSIUM  2.9*  3.2*  3.8   CHLORIDE  99  101  101   CO2  27  29  28   BUN  18  26*  36*   CREATININE  0.43*  0.71  0.57   CALCIUM  8.9  8.5  8.4     GI/Nutrition:  Exam: abdomen is soft and non-tender, normal active bowel sounds  Imaging: Available data reviewed  NPO, enteral tube feeding initiated.  Liver Function  Recent Labs      05/21/18 0827 05/22/18   0435  05/23/18   0325   GLUCOSE  167*  151*  122*       Heme:  Recent Labs      05/21/18 0827 05/22/18   0435  05/23/18   0325   RBC  3.53*  3.24*  3.27*   HEMOGLOBIN  10.0*  9.0*  9.1*   HEMATOCRIT  32.8*  30.3*  31.0*   PLATELETCT  199  194  177       Infectious Disease:  Micro:  cultures reviewed; no new positive cultures. The tracheal aspirate demonstrated gram-negative rods with identification and sensitivity pending.  Recent Labs      05/21/18   0827  05/22/18   0435  05/23/18   0325   WBC  10.0  13.4*  10.9*   NEUTSPOLYS  81.00*   --    --    LYMPHOCYTES  10.10*   --    --    MONOCYTES  7.10   --    --    EOSINOPHILS  1.00   --    --    BASOPHILS  0.20   --    --        Quality  Measures:  Core Measures & Quality Metrics    Problems:  1. VDRF, acute on chronic respiratory failure  2. Chronic obstructive pulmonary disease.  3. Obesity, suspected obesity hypoventilation syndrome and obstructive sleep apnea hypopnea syndrome.  4. Chronic lymphedema, compression, diuresis  5. Diabetes mellitus, controlled.  6. Chronic pain syndrome requiring chronic narcotic analgesia.  7. Hypothyroidism, on replacement therapy.  8. Systemic arterial hypertension, controlled.  9. Anemia, stable without evident ongoing blood loss.  10. Dysphagia, moderate PCM, hypoalbuminemia    Plan:  Continued full support mechanical ventilation with a lung-protective ventilation strategy. Titrate supplemental oxygen, continue bronchodilators and respiratory protocols.  Continue antibiotics , adjust accordingly as needed    Continue nutritional support with enteral tube feeding. Continue therapies, mobilization and prophylaxis.    Discussed patient condition and risk of morbidity and/or mortality with RN and RT and with Dr. Herr.

## 2018-05-24 LAB
ANION GAP SERPL CALC-SCNC: 6 MMOL/L (ref 0–11.9)
ANION GAP SERPL CALC-SCNC: 7 MMOL/L (ref 0–11.9)
BACTERIA UR CULT: NORMAL
BASOPHILS # BLD AUTO: 0.6 % (ref 0–1.8)
BASOPHILS # BLD: 0.08 K/UL (ref 0–0.12)
BUN SERPL-MCNC: 32 MG/DL (ref 8–22)
BUN SERPL-MCNC: 34 MG/DL (ref 8–22)
CALCIUM SERPL-MCNC: 8.7 MG/DL (ref 8.4–10.2)
CALCIUM SERPL-MCNC: 8.7 MG/DL (ref 8.4–10.2)
CHLORIDE SERPL-SCNC: 98 MMOL/L (ref 96–112)
CHLORIDE SERPL-SCNC: 98 MMOL/L (ref 96–112)
CO2 SERPL-SCNC: 29 MMOL/L (ref 20–33)
CO2 SERPL-SCNC: 30 MMOL/L (ref 20–33)
COMMENT 1642: NORMAL
CREAT SERPL-MCNC: 0.51 MG/DL (ref 0.5–1.4)
CREAT SERPL-MCNC: 0.54 MG/DL (ref 0.5–1.4)
EOSINOPHIL # BLD AUTO: 0.44 K/UL (ref 0–0.51)
EOSINOPHIL NFR BLD: 3.5 % (ref 0–6.9)
ERYTHROCYTE [DISTWIDTH] IN BLOOD BY AUTOMATED COUNT: 56.8 FL (ref 35.9–50)
GLUCOSE SERPL-MCNC: 121 MG/DL (ref 65–99)
GLUCOSE SERPL-MCNC: 127 MG/DL (ref 65–99)
HCT VFR BLD AUTO: 36.2 % (ref 42–52)
HEPIND PLTAB  51052: NEGATIVE
HGB BLD-MCNC: 10.5 G/DL (ref 14–18)
HYPOCHROMIA BLD QL SMEAR: ABNORMAL
IMM GRANULOCYTES # BLD AUTO: 0.38 K/UL (ref 0–0.11)
IMM GRANULOCYTES NFR BLD AUTO: 3 % (ref 0–0.9)
LYMPHOCYTES # BLD AUTO: 1.31 K/UL (ref 1–4.8)
LYMPHOCYTES NFR BLD: 10.5 % (ref 22–41)
MAGNESIUM SERPL-MCNC: 1.8 MG/DL (ref 1.5–2.5)
MCH RBC QN AUTO: 27.8 PG (ref 27–33)
MCHC RBC AUTO-ENTMCNC: 29 G/DL (ref 33.7–35.3)
MCV RBC AUTO: 95.8 FL (ref 81.4–97.8)
MONOCYTES # BLD AUTO: 0.9 K/UL (ref 0–0.85)
MONOCYTES NFR BLD AUTO: 7.2 % (ref 0–13.4)
NEUTROPHILS # BLD AUTO: 9.39 K/UL (ref 1.82–7.42)
NEUTROPHILS NFR BLD: 75.2 % (ref 44–72)
NRBC # BLD AUTO: 0 K/UL
NRBC BLD-RTO: 0 /100 WBC
PHOSPHATE SERPL-MCNC: 2.4 MG/DL (ref 2.5–4.5)
PLATELET # BLD AUTO: 143 K/UL (ref 164–446)
PLATELET BLD QL SMEAR: NORMAL
PMV BLD AUTO: 11.9 FL (ref 9–12.9)
POLYCHROMASIA BLD QL SMEAR: NORMAL
POTASSIUM SERPL-SCNC: 4 MMOL/L (ref 3.6–5.5)
POTASSIUM SERPL-SCNC: 4.5 MMOL/L (ref 3.6–5.5)
RBC # BLD AUTO: 3.78 M/UL (ref 4.7–6.1)
RBC BLD AUTO: PRESENT
SIGNIFICANT IND 70042: NORMAL
SITE SITE: NORMAL
SODIUM SERPL-SCNC: 133 MMOL/L (ref 135–145)
SODIUM SERPL-SCNC: 135 MMOL/L (ref 135–145)
SOURCE SOURCE: NORMAL
WBC # BLD AUTO: 12.5 K/UL (ref 4.8–10.8)

## 2018-05-24 PROCEDURE — 86022 PLATELET ANTIBODIES: CPT

## 2018-05-24 PROCEDURE — 85025 COMPLETE CBC W/AUTO DIFF WBC: CPT

## 2018-05-24 PROCEDURE — 83735 ASSAY OF MAGNESIUM: CPT

## 2018-05-24 PROCEDURE — 84100 ASSAY OF PHOSPHORUS: CPT

## 2018-05-24 PROCEDURE — 80048 BASIC METABOLIC PNL TOTAL CA: CPT

## 2018-05-24 NOTE — [facility]
Hospital Medicine Progress Note, Adult, Complex               Author: Soo MOTLEY Nemesio Date & Time created: 5/24/2018  6:17 AM     CC: cellulitis/lymphedema    Interval History:  Propofol at 26 still  Lactic acid normal  Plts trending down  Restless    Review of Systems:  Review of Systems   Unable to perform ROS: Intubated   Musculoskeletal:        Leg/foot pain       T 97.8P87BP 117/59 RR 22SaO2 93% I/O not in chart tele SR  Physical Exam   Constitutional: He appears well-developed and well-nourished. No distress.   HENT:   Head: Normocephalic and atraumatic.   Mouth/Throat: No oropharyngeal exudate.   NGT/ETT   Eyes: Conjunctivae are normal. Right eye exhibits no discharge. Left eye exhibits no discharge. No scleral icterus.   Neck: No tracheal deviation present.   Cardiovascular: Normal rate, regular rhythm and intact distal pulses.    Pulmonary/Chest: Effort normal. No respiratory distress. He has no wheezes. He exhibits no tenderness.   diminished   Abdominal: Soft. Bowel sounds are normal. He exhibits no distension. There is no tenderness. There is no rebound.   obese   Musculoskeletal: He exhibits edema (2+, legs wrapped).   Neurological: He is alert.   Skin: Skin is warm.   Legs wrapped   Vitals reviewed.      Labs:  Recent Labs      05/21/18   0827   [id number]  7.53*   [id number]  32.8   [id number]  102.8*   [id number]  98.2   ARTHCO3  27*   FIO2  100*   ARTBE  4*         Recent Labs      05/22/18 0435  05/23/18   0325  05/24/18   0340   SODIUM  136  134*  133*   POTASSIUM  3.2*  3.8  4.5   CHLORIDE  101  101  98   CO2  29  28  29   BUN  26*  36*  34*   CREATININE  0.71  0.57  0.51   CALCIUM  8.5  8.4  8.7     Recent Labs      05/22/18   0435  05/23/18   0325  05/24/18   0340   GLUCOSE  151*  122*  127*     Recent Labs      05/22/18 0435  05/23/18   0325  05/24/18   0340   RBC  3.24*  3.27*  3.78*   HEMOGLOBIN  9.0*  9.1*  10.5*   HEMATOCRIT  30.3*  31.0*  36.2*   PLATELETCT  194  177  143*     Recent Labs       05/21/18   0827  05/22/18   0435  05/23/18   0325  05/24/18   0340   WBC  10.0  13.4*  10.9*  12.5*   NEUTSPOLYS  81.00*   --    --   75.20*   LYMPHOCYTES  10.10*   --    --   10.50*   MONOCYTES  7.10   --    --   7.20   EOSINOPHILS  1.00   --    --   3.50   BASOPHILS  0.20   --    --   0.60        GI/Nutrition:  Orders Placed This Encounter   Procedures   • DIET NPO     Standing Status:   Standing     Number of Occurrences:   1     Order Specific Question:   Restrict to:     Answer:   With Tube Feed [4]   • DIET TUBE FEED     Standing Status:   Standing     Number of Occurrences:   1     Order Specific Question:   Diet     Answer:   Diet Tube Feed [35]     Order Specific Question:   Formula:     Answer:   Impact Peptide 1.5 [41]     Comments:   goal rate while on propofal is 45 when off propofal goal rate of 60 mL/hr     Order Specific Question:   Goal Rate (mL/Hour)     Answer:   45     Order Specific Question:   Route     Answer:   NG     Medical Decision Making, by Problem:  RLE cellulitis s/p debridement, polymicrobial (enterococcus, proteus, MSSA)   -improved with elevation, continue wound care   -hold Augmentin while on IV abx  B LE lymphedema   -lasix, diamox   -add metolazone  ALIREZA with chronic hypercapnia/OHVS s/p acute VDRF   -SBT per pulm, continue wean off propofol as able   -continue diuresis/abx   -add seroquel for agitation  Klebsiella HAP   -on broad spectrum for legs (vanco, cefepime)  COPD   -spiriva, incruse held with ETT  DM   -metformin   -asa, lipitor  Hypokalemia   -repleted  Leukocytosis  Mild thrombocytopenia   -suspect medications/infection   -check HIT ab   -Dc pepcid  Chronic pain   -oxy IR 40mg q 4 ordered by Dr. Sifuentes   -prn fentanyl  MSEW  Hypothyroidism   -synthroid  Anemia  Debility      Quality-Core Measures   Reviewed items::  Medications reviewed and Labs reviewed  Montoya catheter::  Unconscious / Sedated Patient on a Ventilator  DVT prophylaxis pharmacological::   Heparin  Antibiotics:  Treating active infection/contamination beyond 24 hours perioperative coverage

## 2018-05-24 NOTE — PROGRESS NOTES
Pulmonary Critical Care Progress Note        Chief Complaint: Acute on chronic respiratory failure with hypoxia and hypercapnia.    History of Present Illness: 57 y.o. male with complex medical history including COPD, congestive heart failure, type 2 diabetes, bilateral lower extremity cellulitis and probable obstructive sleep apnea was admitted to [facility] to be treated for bilateral lower extremity cellulitis. The patient is noncompliant. He was recently admitted to [facility] intubated for about a week for acute and chronic hypercapnic respiratory failure with CO2 retention. The patient was prescribed a trilogy ventilator to use after discharge.      The patient was readmitted to the hospital for bilateral lower extremity cellulitis. He was found to have high bicarbonate on basic metabolic panel of 42. Pulmonary consultation was requested for further evaluation and management of his chronic respiratory failure.    ROS:  Respiratory: positive shortness of breath, unchanged, Cardiac: negative chest pain, GI: negative abdominal pain.  All other systems negative.    Interval Events:  24 hour interval history reviewed.    5/20 - He developed increased dyspnea overnight on May 18 and required intubation. He is now on full ventilatory support. With an assist-control set rate of 16 he breathes at a rate of 28 but exhibits no dyspnea or dyssynchrony. The peak airway pressure is about 30 cm water with a set tidal volume of 450 ml and PEEP = 8. Oxygen saturation is about 95% on 40% oxygen. The ABG demonstrates an adequate oxygen saturation with a compensated chronic respiratory acidosis. He is less sedated on the propofol infusion and hemodynamically stable. He is afebrile with a maximum temperature of 98.4 degrees overnight. Wound care continues and his edema seems less after diuretic therapy.     5/21 - SR - 99.0 degrees - 139/73 - 92 - 16 - 94%; d/w RT: 16/8/450/50, yellow plugges noted; procalcitonin  slightly elevated yesterday; 5/19 cxr showed hypoventilation and interstitial prominence; 5/19 BC negative; on augmentin and cefepime plus vancomycin    5/22 - d/w RT, RN, and Dr. Herr; needed an FIO2 boost overnight; now, 16/4508/45;   T 100.2 P99BP 105/49 RR 18SaO2 94%; same abx; 5/19 TA=klebsiella; alert    5/23 - remains on 16/450/45/8, d/w RT; 98.2 degrees - 80- 109/68 - 21 - 96% - SR; leukocytosis a bit better, anemia persists, slight hyponatremia, slightly worsened azotemia noted, ongoing antiinfectives; midline placed; propofol down to 26    5/24 - d/w RT, try some initial weaning today if possible; currently on 16/450/45/8; no distress, resting comfortably; remains on propofol at 26;  Leukocytosis, anemia, hyponatremia, azotemia, hyperglycemia noted, plts down to 143; SR - 97.5 degrees - 117/59 - 87 - 22 - 93%; cxr for tomorrow ordered, on Maxipime and vanco; 5/22 BC and urine ctx neg;       PFSH:  No change.    Respiratory:  Exam: symmetrical but diminished bilateral breath sounds with basilar rales but no wheezing or egophony.  ImagingAvailable data reviewed. The chest film on 5/19 demonstrated stable interstitial prominence with no new focal consolidation. The endotracheal tube seemed to be in good position. No film today.    HemoDynamics:  Exam: regular rhythm (Sinus)  Imaging: Available data reviewed. The echocardiogram on March 18, 2018 was a limited study but demonstrated grossly normal left ventricular systolic function.    Neuro:  Exam: no focal deficits noted mental status intact  Imaging: None - Reviewed    Recent Labs      05/22/18   0435  05/23/18   0325  05/24/18   0340   SODIUM  136  134*  133*   POTASSIUM  3.2*  3.8  4.5   CHLORIDE  101  101  98   CO2  29  28  29   BUN  26*  36*  34*   CREATININE  0.71  0.57  0.51   CALCIUM  8.5  8.4  8.7     GI/Nutrition:  Exam: abdomen is soft and non-tender, normal active bowel sounds  Imaging: Available data reviewed  NPO, enteral tube feeding  initiated.  Liver Function  Recent Labs      05/22/18 0435 05/23/18 0325 05/24/18   0340   GLUCOSE  151*  122*  127*       Heme:  Recent Labs      05/22/18 0435 05/23/18 0325 05/24/18   0340   RBC  3.24*  3.27*  3.78*   HEMOGLOBIN  9.0*  9.1*  10.5*   HEMATOCRIT  30.3*  31.0*  36.2*   PLATELETCT  194  177  143*       Infectious Disease:  Micro: cultures reviewed; no new positive cultures. The tracheal aspirate demonstrated gram-negative rods with identification and sensitivity pending.  Recent Labs      05/21/18   0827  05/22/18 0435 05/23/18 0325 05/24/18   0340   WBC  10.0  13.4*  10.9*  12.5*   NEUTSPOLYS  81.00*   --    --   75.20*   LYMPHOCYTES  10.10*   --    --   10.50*   MONOCYTES  7.10   --    --   7.20   EOSINOPHILS  1.00   --    --   3.50   BASOPHILS  0.20   --    --   0.60       Quality  Measures:  Radiology images reviewed, Medications reviewed and Labs reviewed                      Problems:  1. VDRF, acute on chronic respiratory failure  2. Chronic obstructive pulmonary disease.  3. Obesity, suspected obesity hypoventilation syndrome and obstructive sleep apnea hypopnea syndrome.  4. Chronic lymphedema, compression, diuresis  5. Diabetes mellitus, controlled.  6. Chronic pain syndrome requiring chronic narcotic analgesia.  7. Hypothyroidism, on replacement therapy.  8. Systemic arterial hypertension, controlled.  9. Anemia, stable without evident ongoing blood loss.  10. Dysphagia, moderate PCM, hypoalbuminemia    Plan:  Continued full support mechanical ventilation with a lung-protective ventilation strategy. Titrate supplemental oxygen, continue bronchodilators and respiratory protocols.  Continue antibiotics , adjust accordingly as needed    Continue nutritional support with enteral tube feeding. Continue therapies, mobilization and prophylaxis.    Discussed patient condition and risk of morbidity and/or mortality with RN and RT and with Dr. Herr.

## 2018-05-25 ENCOUNTER — APPOINTMENT (OUTPATIENT)
Dept: RADIOLOGY | Facility: MEDICAL CENTER | Age: 58
End: 2018-05-25
Attending: INTERNAL MEDICINE
Payer: COMMERCIAL

## 2018-05-25 LAB
ALBUMIN SERPL BCP-MCNC: 2.2 G/DL (ref 3.2–4.9)
ALBUMIN/GLOB SERPL: 0.5 G/DL
ALP SERPL-CCNC: 93 U/L (ref 30–99)
ALT SERPL-CCNC: 14 U/L (ref 2–50)
ANION GAP SERPL CALC-SCNC: 8 MMOL/L (ref 0–11.9)
AST SERPL-CCNC: 18 U/L (ref 12–45)
BACTERIA BLD CULT: NORMAL
BACTERIA BLD CULT: NORMAL
BILIRUB SERPL-MCNC: 0.4 MG/DL (ref 0.1–1.5)
BUN SERPL-MCNC: 28 MG/DL (ref 8–22)
CALCIUM SERPL-MCNC: 8.8 MG/DL (ref 8.4–10.2)
CHLORIDE SERPL-SCNC: 99 MMOL/L (ref 96–112)
CO2 SERPL-SCNC: 28 MMOL/L (ref 20–33)
CREAT SERPL-MCNC: 0.5 MG/DL (ref 0.5–1.4)
ERYTHROCYTE [DISTWIDTH] IN BLOOD BY AUTOMATED COUNT: 53 FL (ref 35.9–50)
GLOBULIN SER CALC-MCNC: 4.4 G/DL (ref 1.9–3.5)
GLUCOSE SERPL-MCNC: 127 MG/DL (ref 65–99)
HCT VFR BLD AUTO: 29.9 % (ref 42–52)
HGB BLD-MCNC: 8.9 G/DL (ref 14–18)
MAGNESIUM SERPL-MCNC: 2 MG/DL (ref 1.5–2.5)
MCH RBC QN AUTO: 27.6 PG (ref 27–33)
MCHC RBC AUTO-ENTMCNC: 29.8 G/DL (ref 33.7–35.3)
MCV RBC AUTO: 92.9 FL (ref 81.4–97.8)
PLATELET # BLD AUTO: 218 K/UL (ref 164–446)
PMV BLD AUTO: 10.8 FL (ref 9–12.9)
POTASSIUM SERPL-SCNC: 4.7 MMOL/L (ref 3.6–5.5)
PROT SERPL-MCNC: 6.6 G/DL (ref 6–8.2)
RBC # BLD AUTO: 3.22 M/UL (ref 4.7–6.1)
SIGNIFICANT IND 70042: NORMAL
SIGNIFICANT IND 70042: NORMAL
SITE SITE: NORMAL
SITE SITE: NORMAL
SODIUM SERPL-SCNC: 135 MMOL/L (ref 135–145)
SOURCE SOURCE: NORMAL
SOURCE SOURCE: NORMAL
WBC # BLD AUTO: 12.8 K/UL (ref 4.8–10.8)

## 2018-05-25 PROCEDURE — 85027 COMPLETE CBC AUTOMATED: CPT

## 2018-05-25 PROCEDURE — 80053 COMPREHEN METABOLIC PANEL: CPT

## 2018-05-25 PROCEDURE — 71045 X-RAY EXAM CHEST 1 VIEW: CPT

## 2018-05-25 PROCEDURE — 83735 ASSAY OF MAGNESIUM: CPT

## 2018-05-25 NOTE — PROGRESS NOTES
Pulmonary Critical Care Progress Note        Chief Complaint: Acute on chronic respiratory failure with hypoxia and hypercapnia.    History of Present Illness: 57 y.o. male with complex medical history including COPD, congestive heart failure, type 2 diabetes, bilateral lower extremity cellulitis and probable obstructive sleep apnea was admitted to [facility] to be treated for bilateral lower extremity cellulitis. The patient is noncompliant. He was recently admitted to [facility] intubated for about a week for acute and chronic hypercapnic respiratory failure with CO2 retention. The patient was prescribed a trilogy ventilator to use after discharge.      The patient was readmitted to the hospital for bilateral lower extremity cellulitis. He was found to have high bicarbonate on basic metabolic panel of 42. Pulmonary consultation was requested for further evaluation and management of his chronic respiratory failure.    ROS:  Respiratory: positive shortness of breath, unchanged, Cardiac: negative chest pain, GI: negative abdominal pain.  All other systems negative.    Interval Events:  24 hour interval history reviewed.    5/20 - He developed increased dyspnea overnight on May 18 and required intubation. He is now on full ventilatory support. With an assist-control set rate of 16 he breathes at a rate of 28 but exhibits no dyspnea or dyssynchrony. The peak airway pressure is about 30 cm water with a set tidal volume of 450 ml and PEEP = 8. Oxygen saturation is about 95% on 40% oxygen. The ABG demonstrates an adequate oxygen saturation with a compensated chronic respiratory acidosis. He is less sedated on the propofol infusion and hemodynamically stable. He is afebrile with a maximum temperature of 98.4 degrees overnight. Wound care continues and his edema seems less after diuretic therapy.     5/21 - SR - 99.0 degrees - 139/73 - 92 - 16 - 94%; d/w RT: 16/8/450/50, yellow plugges noted; procalcitonin  slightly elevated yesterday; 5/19 cxr showed hypoventilation and interstitial prominence; 5/19 BC negative; on augmentin and cefepime plus vancomycin    5/22 - d/w RT, RN, and Dr. Herr; needed an FIO2 boost overnight; now, 16/4508/45;   T 100.2 P99BP 105/49 RR 18SaO2 94%; same abx; 5/19 TA=klebsiella; alert    5/23 - remains on 16/450/45/8, d/w RT; 98.2 degrees - 80- 109/68 - 21 - 96% - SR; leukocytosis a bit better, anemia persists, slight hyponatremia, slightly worsened azotemia noted, ongoing antiinfectives; midline placed; propofol down to 26    5/24 - d/w RT, try some initial weaning today if possible; currently on 16/450/45/8; no distress, resting comfortably; remains on propofol at 26;  Leukocytosis, anemia, hyponatremia, azotemia, hyperglycemia noted, plts down to 143; SR - 97.5 degrees - 117/59 - 87 - 22 - 93%; cxr for tomorrow ordered, on Maxipime and vanco; 5/22 BC and urine ctx neg;     5/25 - d/w RT and RN; off of propofol, try weaning; on 16/450/8/45, resting comfortable in NAD; good diuresis with metolazone; 97.8 degrees - 93 - 127/70 - 18 - SR/BBB; leukocytosis, anemia today are similar to prior; cxr today shows right > left bibasilar atx and opacities; on Maxipine, fluconazole, and vanco; legs remian wrapped, edema noted      PFSH:  No change.    Respiratory:  Exam: symmetrical but diminished bilateral breath sounds with basilar rales but no wheezing or egophony.  ImagingAvailable data reviewed. The chest film on 5/19 demonstrated stable interstitial prominence with no new focal consolidation. The endotracheal tube seemed to be in good position. No film today.    HemoDynamics:  Exam: regular rhythm (Sinus)  Imaging: Available data reviewed. The echocardiogram on March 18, 2018 was a limited study but demonstrated grossly normal left ventricular systolic function.    Neuro:  Exam: no focal deficits noted mental status intact  Imaging: None - Reviewed    Recent Labs      05/24/18   0340  05/24/18    1540  05/25/18   0401   SODIUM  133*  135  135   POTASSIUM  4.5  4.0  4.7   CHLORIDE  98  98  99   CO2  29  30  28   BUN  34*  32*  28*   CREATININE  0.51  0.54  0.50   MAGNESIUM   --   1.8  2.0   PHOSPHORUS   --   2.4*   --    CALCIUM  8.7  8.7  8.8     GI/Nutrition:  Exam: abdomen is soft and non-tender, normal active bowel sounds  Imaging: Available data reviewed  NPO, enteral tube feeding initiated.  Liver Function  Recent Labs      05/24/18 0340 05/24/18   1540  05/25/18   0401   ALTSGPT   --    --   14   ASTSGOT   --    --   18   ALKPHOSPHAT   --    --   93   TBILIRUBIN   --    --   0.4   GLUCOSE  127*  121*  127*       Heme:  Recent Labs      05/23/18 0325 05/24/18 0340 05/25/18   0433   RBC  3.27*  3.78*  3.22*   HEMOGLOBIN  9.1*  10.5*  8.9*   HEMATOCRIT  31.0*  36.2*  29.9*   PLATELETCT  177  143*  218       Infectious Disease:  Micro: cultures reviewed; no new positive cultures. The tracheal aspirate demonstrated gram-negative rods with identification and sensitivity pending.  Recent Labs      05/23/18 0325 05/24/18 0340 05/25/18 0401 05/25/18   0433   WBC  10.9*  12.5*   --   12.8*   NEUTSPOLYS   --   75.20*   --    --    LYMPHOCYTES   --   10.50*   --    --    MONOCYTES   --   7.20   --    --    EOSINOPHILS   --   3.50   --    --    BASOPHILS   --   0.60   --    --    ASTSGOT   --    --   18   --    ALTSGPT   --    --   14   --    ALKPHOSPHAT   --    --   93   --    TBILIRUBIN   --    --   0.4   --        Quality  Measures:  Radiology images reviewed, Medications reviewed and Labs reviewed                      Problems:  1. VDRF, acute on chronic respiratory failure  2. Chronic obstructive pulmonary disease.  3. Obesity, suspected obesity hypoventilation syndrome and obstructive sleep apnea hypopnea syndrome.  4. Chronic lymphedema, compression, diuresis  5. Diabetes mellitus, controlled.  6. Chronic pain syndrome requiring chronic narcotic analgesia.  7. Hypothyroidism, on replacement  therapy.  8. Systemic arterial hypertension, controlled.  9. Anemia, stable without evident ongoing blood loss.  10. Dysphagia, moderate PCM, hypoalbuminemia  11. Abnormal chest imaging - ongoing diuresis, consider tap if need be  12. Debility    Plan:  Continued full support mechanical ventilation with a lung-protective ventilation strategy. Titrate supplemental oxygen, continue bronchodilators and respiratory protocols.  Continue antibiotics , adjust accordingly as needed    Continue nutritional support with enteral tube feeding. Continue therapies, mobilization and prophylaxis.    Discussed patient condition and risk of morbidity and/or mortality with RN and RT and with Dr. Herr.

## 2018-05-26 LAB
ANION GAP SERPL CALC-SCNC: 8 MMOL/L (ref 0–11.9)
BUN SERPL-MCNC: 23 MG/DL (ref 8–22)
CALCIUM SERPL-MCNC: 9.1 MG/DL (ref 8.4–10.2)
CHLORIDE SERPL-SCNC: 95 MMOL/L (ref 96–112)
CO2 SERPL-SCNC: 30 MMOL/L (ref 20–33)
CREAT SERPL-MCNC: 0.44 MG/DL (ref 0.5–1.4)
ERYTHROCYTE [DISTWIDTH] IN BLOOD BY AUTOMATED COUNT: 50.5 FL (ref 35.9–50)
GLUCOSE SERPL-MCNC: 147 MG/DL (ref 65–99)
HCT VFR BLD AUTO: 30.3 % (ref 42–52)
HGB BLD-MCNC: 9.5 G/DL (ref 14–18)
MCH RBC QN AUTO: 28 PG (ref 27–33)
MCHC RBC AUTO-ENTMCNC: 31.4 G/DL (ref 33.7–35.3)
MCV RBC AUTO: 89.4 FL (ref 81.4–97.8)
PHOSPHATE SERPL-MCNC: 2.6 MG/DL (ref 2.5–4.5)
PLATELET # BLD AUTO: 251 K/UL (ref 164–446)
PMV BLD AUTO: 11.1 FL (ref 9–12.9)
POTASSIUM SERPL-SCNC: 3.9 MMOL/L (ref 3.6–5.5)
RBC # BLD AUTO: 3.39 M/UL (ref 4.7–6.1)
SODIUM SERPL-SCNC: 133 MMOL/L (ref 135–145)
VANCOMYCIN TROUGH SERPL-MCNC: 13 UG/ML (ref 10–20)
WBC # BLD AUTO: 12 K/UL (ref 4.8–10.8)

## 2018-05-26 PROCEDURE — 80202 ASSAY OF VANCOMYCIN: CPT

## 2018-05-26 PROCEDURE — 84100 ASSAY OF PHOSPHORUS: CPT

## 2018-05-26 PROCEDURE — 80048 BASIC METABOLIC PNL TOTAL CA: CPT

## 2018-05-26 PROCEDURE — 85027 COMPLETE CBC AUTOMATED: CPT

## 2018-05-26 NOTE — PROGRESS NOTES
Pulmonary Critical Care Progress Note        Chief Complaint: Acute on chronic respiratory failure with hypoxia and hypercapnia.    History of Present Illness: 57 y.o. male with complex medical history including COPD, congestive heart failure, type 2 diabetes, bilateral lower extremity cellulitis and probable obstructive sleep apnea was admitted to [facility] to be treated for bilateral lower extremity cellulitis. The patient is noncompliant. He was recently admitted to [facility] intubated for about a week for acute and chronic hypercapnic respiratory failure with CO2 retention. The patient was prescribed a trilogy ventilator to use after discharge.      The patient was readmitted to the hospital for bilateral lower extremity cellulitis. He was found to have high bicarbonate on basic metabolic panel of 42. Pulmonary consultation was requested for further evaluation and management of his chronic respiratory failure.    ROS:  Respiratory: positive shortness of breath, unchanged, Cardiac: negative chest pain, GI: negative abdominal pain.  All other systems negative.    Interval Events:  24 hour interval history reviewed.    5/20 - He developed increased dyspnea overnight on May 18 and required intubation. He is now on full ventilatory support. With an assist-control set rate of 16 he breathes at a rate of 28 but exhibits no dyspnea or dyssynchrony. The peak airway pressure is about 30 cm water with a set tidal volume of 450 ml and PEEP = 8. Oxygen saturation is about 95% on 40% oxygen. The ABG demonstrates an adequate oxygen saturation with a compensated chronic respiratory acidosis. He is less sedated on the propofol infusion and hemodynamically stable. He is afebrile with a maximum temperature of 98.4 degrees overnight. Wound care continues and his edema seems less after diuretic therapy.     5/21 - SR - 99.0 degrees - 139/73 - 92 - 16 - 94%; d/w RT: 16/8/450/50, yellow plugges noted; procalcitonin  slightly elevated yesterday; 5/19 cxr showed hypoventilation and interstitial prominence; 5/19 BC negative; on augmentin and cefepime plus vancomycin    5/22 - d/w RT, RN, and Dr. Herr; needed an FIO2 boost overnight; now, 16/4508/45;   T 100.2 P99BP 105/49 RR 18SaO2 94%; same abx; 5/19 TA=klebsiella; alert    5/23 - remains on 16/450/45/8, d/w RT; 98.2 degrees - 80- 109/68 - 21 - 96% - SR; leukocytosis a bit better, anemia persists, slight hyponatremia, slightly worsened azotemia noted, ongoing antiinfectives; midline placed; propofol down to 26    5/24 - d/w RT, try some initial weaning today if possible; currently on 16/450/45/8; no distress, resting comfortably; remains on propofol at 26;  Leukocytosis, anemia, hyponatremia, azotemia, hyperglycemia noted, plts down to 143; SR - 97.5 degrees - 117/59 - 87 - 22 - 93%; cxr for tomorrow ordered, on Maxipime and vanco; 5/22 BC and urine ctx neg;     5/25 - d/w RT and RN; off of propofol, try weaning; on 16/450/8/45, resting comfortable in NAD; good diuresis with metolazone; 97.8 degrees - 93 - 127/70 - 18 - SR/BBB; leukocytosis, anemia today are similar to prior; cxr today shows right > left bibasilar atx and opacities; on Maxipine, fluconazole, and vanco; legs remian wrapped, edema noted    526 - d/w RT, was able to tolerate 3 hours of 10/8 yesterday, will try more today as able; SR - 98.6 degrees - 140/76 - 92 - 17 - 97%; leukocytosis and anemia noted and similar to prior; new hyponatremia, hyperglycemia, mild azotemia is a bit better; on lasix, diamox, and metolazone    PFSH:  No change.    Respiratory:  Exam: symmetrical but diminished bilateral breath sounds with basilar rales but no wheezing or egophony.  ImagingAvailable data reviewed. The chest film on 5/19 demonstrated stable interstitial prominence with no new focal consolidation. The endotracheal tube seemed to be in good position. No film today.    HemoDynamics:  Exam: regular rhythm (Sinus)  Imaging:  Available data reviewed. The echocardiogram on March 18, 2018 was a limited study but demonstrated grossly normal left ventricular systolic function.    Neuro:  Exam: no focal deficits noted mental status intact  Imaging: None - Reviewed    Recent Labs      05/24/18   1540  05/25/18   0401  05/26/18   0359   SODIUM  135  135  133*   POTASSIUM  4.0  4.7  3.9   CHLORIDE  98  99  95*   CO2  30  28  30   BUN  32*  28*  23*   CREATININE  0.54  0.50  0.44*   MAGNESIUM  1.8  2.0   --    PHOSPHORUS  2.4*   --   2.6   CALCIUM  8.7  8.8  9.1     GI/Nutrition:  Exam: abdomen is soft and non-tender, normal active bowel sounds  Imaging: Available data reviewed  NPO, enteral tube feeding initiated.  Liver Function  Recent Labs      05/24/18   1540  05/25/18 0401 05/26/18 0359   ALTSGPT   --   14   --    ASTSGOT   --   18   --    ALKPHOSPHAT   --   93   --    TBILIRUBIN   --   0.4   --    GLUCOSE  121*  127*  147*       Heme:  Recent Labs      05/24/18   0340  05/25/18 0433 05/26/18   0359   RBC  3.78*  3.22*  3.39*   HEMOGLOBIN  10.5*  8.9*  9.5*   HEMATOCRIT  36.2*  29.9*  30.3*   PLATELETCT  143*  218  251       Infectious Disease:  Micro: cultures reviewed; no new positive cultures. The tracheal aspirate demonstrated gram-negative rods with identification and sensitivity pending.  Recent Labs      05/24/18   0340  05/25/18   0401 05/25/18 0433 05/26/18   0359   WBC  12.5*   --   12.8*  12.0*   NEUTSPOLYS  75.20*   --    --    --    LYMPHOCYTES  10.50*   --    --    --    MONOCYTES  7.20   --    --    --    EOSINOPHILS  3.50   --    --    --    BASOPHILS  0.60   --    --    --    ASTSGOT   --   18   --    --    ALTSGPT   --   14   --    --    ALKPHOSPHAT   --   93   --    --    TBILIRUBIN   --   0.4   --    --        Quality  Measures:  Radiology images reviewed, Medications reviewed and Labs reviewed                      Problems:  1. VDRF, acute on chronic respiratory failure  2. Chronic obstructive pulmonary  disease.  3. Obesity, suspected obesity hypoventilation syndrome and obstructive sleep apnea hypopnea syndrome.  4. Chronic lymphedema, compression, diuresis  5. Diabetes mellitus, controlled.  6. Chronic pain syndrome requiring chronic narcotic analgesia.  7. Hypothyroidism, on replacement therapy.  8. Systemic arterial hypertension, controlled.  9. Anemia, stable without evident ongoing blood loss.  10. Dysphagia, moderate PCM, hypoalbuminemia  11. Abnormal chest imaging - ongoing diuresis, consider tap if need be  12. Debility    Plan:  Continued full support mechanical ventilation with a lung-protective ventilation strategy. Titrate supplemental oxygen, continue bronchodilators and respiratory protocols.  Continue antibiotics , adjust accordingly as needed    Continue nutritional support with enteral tube feeding. Continue therapies, mobilization and prophylaxis.    Discussed patient condition and risk of morbidity and/or mortality with RN and RT and with Dr. Herr.

## 2018-05-26 NOTE — [facility]
Hospital Medicine Progress Note, Adult, Complex               Author: Soo Herr Date & Time created: 5/26/2018  10:33 AM     CC: cellulitis/lymphedema    Interval History:  Remains on same vent settings      Review of Systems:  Review of Systems   Unable to perform ROS: Intubated       T 98.6P92BP 140/76RR 17SaO2 97% I/O 3.3/7 no BM, last recorded 5/21 tele SR/BBB  Physical Exam   Constitutional: He appears well-developed and well-nourished. No distress.   HENT:   Head: Normocephalic and atraumatic.   Mouth/Throat: No oropharyngeal exudate.   NGT/ETT  Winces with swallowing   Eyes: Conjunctivae are normal. Right eye exhibits no discharge. Left eye exhibits no discharge. No scleral icterus.   Neck: No tracheal deviation present.   Cardiovascular: Normal rate, regular rhythm and intact distal pulses.    Pulmonary/Chest: Effort normal. No respiratory distress. He has no wheezes. He exhibits no tenderness.   Diminished but clear anteriorly   Abdominal: Soft. Bowel sounds are normal. He exhibits no distension.   obese   Musculoskeletal: He exhibits edema (1+).   Neurological:   sedated   Skin: Skin is warm.   Legs wrapped, wound pictures reviewed from 5/25   Vitals reviewed.      Labs:        Invalid input(s): [id number]      Recent Labs      05/24/18   1540  05/25/18   0401  05/26/18   0359   SODIUM  135  135  133*   POTASSIUM  4.0  4.7  3.9   CHLORIDE  98  99  95*   CO2  30  28  30   BUN  32*  28*  23*   CREATININE  0.54  0.50  0.44*   MAGNESIUM  1.8  2.0   --    PHOSPHORUS  2.4*   --   2.6   CALCIUM  8.7  8.8  9.1     Recent Labs      05/24/18   1540  05/25/18   0401  05/26/18   0359   ALTSGPT   --   14   --    ASTSGOT   --   18   --    ALKPHOSPHAT   --   93   --    TBILIRUBIN   --   0.4   --    GLUCOSE  121*  127*  147*     Recent Labs      05/24/18   0340  05/25/18   0433  05/26/18   0359   RBC  3.78*  3.22*  3.39*   HEMOGLOBIN  10.5*  8.9*  9.5*   HEMATOCRIT  36.2*  29.9*  30.3*   PLATELETCT  143*  218   251     Recent Labs      05/24/18   0340  05/25/18   0401  05/25/18   0433  05/26/18   0359   WBC  12.5*   --   12.8*  12.0*   NEUTSPOLYS  75.20*   --    --    --    LYMPHOCYTES  10.50*   --    --    --    MONOCYTES  7.20   --    --    --    EOSINOPHILS  3.50   --    --    --    BASOPHILS  0.60   --    --    --    ASTSGOT   --   18   --    --    ALTSGPT   --   14   --    --    ALKPHOSPHAT   --   93   --    --    TBILIRUBIN   --   0.4   --    --         GI/Nutrition:  Orders Placed This Encounter   Procedures   • DIET NPO     Standing Status:   Standing     Number of Occurrences:   1     Order Specific Question:   Restrict to:     Answer:   With Tube Feed [4]   • DIET TUBE FEED     Standing Status:   Standing     Number of Occurrences:   1     Order Specific Question:   Diet     Answer:   Diet Tube Feed [35]     Order Specific Question:   Formula:     Answer:   Impact Peptide 1.5 [41]     Comments:   goal rate while on propofal is 45 when off propofal goal rate of 60 mL/hr     Order Specific Question:   Goal Rate (mL/Hour)     Answer:   45     Order Specific Question:   Route     Answer:   NG     Medical Decision Making, by Problem:  RLE cellulitis s/p debridement, polymicrobial (enterococcus, proteus, MSSA)   -improved with elevation, continue wound care (calves, heels the worst)   -hold Augmentin while on IV abx  B LE lymphedema   -lasix, diamox, metolazone with good result   -continue as renal function allows  ALIREZA with chronic hypercapnia/OHVS s/p acute VDRF   -SBT per pulm, continue wean off propofol as able   -continue diuresis/abx   R effusion   -diuresis, may need tap   -f/u cxr in a few days  Klebsiella HAP   -on broad spectrum for legs (vanco, cefepime)  COPD   -spiriva, incruse held with ETT  DM   -metformin   -asa, lipitor  BBB   -normal ef, wall motion on echo 5/19  Leukocytosis   -no change, afebrile  Chronic pain   -oxy IR 40mg q 4 ordered by Dr. Sifuentes   -prn fentanyl  Constipation   -add bowel  care   -TN today  MSEW  Hypothyroidism   -synthroid  Anemia  Debility      Quality-Core Measures   Reviewed items::  Medications reviewed and Labs reviewed  Montoya catheter::  Unconscious / Sedated Patient on a Ventilator  DVT prophylaxis pharmacological::  Heparin  Antibiotics:  Treating active infection/contamination beyond 24 hours perioperative coverage

## 2018-05-27 LAB
ANION GAP SERPL CALC-SCNC: 7 MMOL/L (ref 0–11.9)
BACTERIA BLD CULT: NORMAL
BUN SERPL-MCNC: 27 MG/DL (ref 8–22)
CALCIUM SERPL-MCNC: 9.1 MG/DL (ref 8.4–10.2)
CHLORIDE SERPL-SCNC: 94 MMOL/L (ref 96–112)
CO2 SERPL-SCNC: 30 MMOL/L (ref 20–33)
CREAT SERPL-MCNC: 0.44 MG/DL (ref 0.5–1.4)
ERYTHROCYTE [DISTWIDTH] IN BLOOD BY AUTOMATED COUNT: 50.8 FL (ref 35.9–50)
GLUCOSE SERPL-MCNC: 146 MG/DL (ref 65–99)
HCT VFR BLD AUTO: 26.9 % (ref 42–52)
HGB BLD-MCNC: 8.5 G/DL (ref 14–18)
MCH RBC QN AUTO: 27.9 PG (ref 27–33)
MCHC RBC AUTO-ENTMCNC: 31.6 G/DL (ref 33.7–35.3)
MCV RBC AUTO: 88.2 FL (ref 81.4–97.8)
PLATELET # BLD AUTO: 224 K/UL (ref 164–446)
PMV BLD AUTO: 11.5 FL (ref 9–12.9)
POTASSIUM SERPL-SCNC: 4.3 MMOL/L (ref 3.6–5.5)
RBC # BLD AUTO: 3.05 M/UL (ref 4.7–6.1)
SIGNIFICANT IND 70042: NORMAL
SITE SITE: NORMAL
SODIUM SERPL-SCNC: 131 MMOL/L (ref 135–145)
SOURCE SOURCE: NORMAL
WBC # BLD AUTO: 13.4 K/UL (ref 4.8–10.8)

## 2018-05-27 PROCEDURE — 85027 COMPLETE CBC AUTOMATED: CPT

## 2018-05-27 PROCEDURE — 87186 SC STD MICRODIL/AGAR DIL: CPT

## 2018-05-27 PROCEDURE — 87070 CULTURE OTHR SPECIMN AEROBIC: CPT

## 2018-05-27 PROCEDURE — 87205 SMEAR GRAM STAIN: CPT

## 2018-05-27 PROCEDURE — 80048 BASIC METABOLIC PNL TOTAL CA: CPT

## 2018-05-27 PROCEDURE — 87077 CULTURE AEROBIC IDENTIFY: CPT

## 2018-05-27 NOTE — [facility]
Hospital Medicine Progress Note, Adult, Complex               Author: Soo TOLU Herr Date & Time created: 5/27/2018  8:26 AM     CC: cellulitis/lymphedema    Interval History:  Currently on cpap  Good BM  Ongoing excellent diuresis  In good spirits, watching TV      Review of Systems:  Review of Systems   Unable to perform ROS: Intubated       T [id number] 133/26MY08RrE8 92% I/O 2.6/4.6 1BM tele SR/BBB  Physical Exam   Constitutional: He appears well-developed and well-nourished.   HENT:   Head: Normocephalic and atraumatic.   NGT/ETT     Eyes: Conjunctivae are normal. Right eye exhibits no discharge. Left eye exhibits no discharge. No scleral icterus.   Neck: No tracheal deviation present.   Cardiovascular: Normal rate, regular rhythm and intact distal pulses.    Pulmonary/Chest: Effort normal. No respiratory distress. He has no wheezes. He exhibits no tenderness.   Diminished but clear anteriorly   Abdominal: Soft. Bowel sounds are normal. He exhibits no distension. There is no tenderness. There is no rebound.   obese   Musculoskeletal: He exhibits edema (1+).   Neurological: He is alert.   JERRY   Skin: Skin is warm.   Legs wrapped, wound pictures reviewed from 5/25   Vitals reviewed.      Labs:        Invalid input(s): [id number]      Recent Labs      05/24/18   1540  05/25/18   0401  05/26/18 0359 05/27/18 0433   SODIUM  135  135  133*  131*   POTASSIUM  4.0  4.7  3.9  4.3   CHLORIDE  98  99  95*  94*   CO2  30  28  30  30   BUN  32*  28*  23*  27*   CREATININE  0.54  0.50  0.44*  0.44*   MAGNESIUM  1.8  2.0   --    --    PHOSPHORUS  2.4*   --   2.6   --    CALCIUM  8.7  8.8  9.1  9.1     Recent Labs      05/25/18   0401  05/26/18   0359  05/27/18   0433   ALTSGPT  14   --    --    ASTSGOT  18   --    --    ALKPHOSPHAT  93   --    --    TBILIRUBIN  0.4   --    --    GLUCOSE  127*  147*  146*     Recent Labs      05/25/18   0433  05/26/18   0359  05/27/18   0433   RBC  3.22*  3.39*  3.05*   HEMOGLOBIN   8.9*  9.5*  8.5*   HEMATOCRIT  29.9*  30.3*  26.9*   PLATELETCT  218  251  224     Recent Labs      05/25/18   0401  05/25/18   0433  05/26/18   0359  05/27/18   0433   WBC   --   12.8*  12.0*  13.4*   ASTSGOT  18   --    --    --    ALTSGPT  14   --    --    --    ALKPHOSPHAT  93   --    --    --    TBILIRUBIN  0.4   --    --    --         GI/Nutrition:  Orders Placed This Encounter   Procedures   • DIET NPO     Standing Status:   Standing     Number of Occurrences:   1     Order Specific Question:   Restrict to:     Answer:   With Tube Feed [4]   • DIET TUBE FEED     Standing Status:   Standing     Number of Occurrences:   1     Order Specific Question:   Diet     Answer:   Diet Tube Feed [35]     Order Specific Question:   Formula:     Answer:   Impact Peptide 1.5 [41]     Comments:   goal rate while on propofal is 45 when off propofal goal rate of 60 mL/hr     Order Specific Question:   Goal Rate (mL/Hour)     Answer:   45     Order Specific Question:   Route     Answer:   NG     Medical Decision Making, by Problem:  RLE cellulitis s/p debridement, polymicrobial (enterococcus, proteus, MSSA)   -improved with elevation, continue wound care (calves, heels the worst)   -hold Augmentin while on IV abx  B LE lymphedema   -lasix, diamox, metolazone with good result   -continue diuresis as renal function allows   -compression  ALIREZA with chronic hypercapnia/OHVS s/p acute VDRF   -SBT per pulm, continue wean off propofol as able   -continue diuresis/abx    -repeat sputum cx and am CXR  R effusion   -diuresis, may need tap   -am CXR  Klebsiella HAP   -on broad spectrum for legs (vanco, cefepime)  COPD   -spiriva, incruse held with ETT  DM   -metformin   -asa, lipitor  BBB   -normal ef, wall motion on echo 5/19  Leukocytosis   -no change, afebrile  Chronic pain   -oxy IR 40mg q 4 ordered by Dr. Sifuentes   -prn fentanyl  Constipation   -resolved  MSEW  Hypothyroidism   -synthroid  Anemia  Debility      Quality-Core Measures    Reviewed items::  Medications reviewed and Labs reviewed  Montoya catheter::  Unconscious / Sedated Patient on a Ventilator  DVT prophylaxis pharmacological::  Heparin  Antibiotics:  Treating active infection/contamination beyond 24 hours perioperative coverage

## 2018-05-27 NOTE — PROGRESS NOTES
Pulmonary Critical Care Progress Note        Chief Complaint: Acute on chronic respiratory failure with hypoxia and hypercapnia.    History of Present Illness: 57 y.o. male with complex medical history including COPD, congestive heart failure, type 2 diabetes, bilateral lower extremity cellulitis and probable obstructive sleep apnea was admitted to [facility] to be treated for bilateral lower extremity cellulitis. The patient is noncompliant. He was recently admitted to [facility] intubated for about a week for acute and chronic hypercapnic respiratory failure with CO2 retention. The patient was prescribed a trilogy ventilator to use after discharge.      The patient was readmitted to the hospital for bilateral lower extremity cellulitis. He was found to have high bicarbonate on basic metabolic panel of 42. Pulmonary consultation was requested for further evaluation and management of his chronic respiratory failure.    ROS:  Respiratory: positive shortness of breath, unchanged, Cardiac: negative chest pain, GI: negative abdominal pain.  All other systems negative.    Interval Events:  24 hour interval history reviewed.    5/20 - He developed increased dyspnea overnight on May 18 and required intubation. He is now on full ventilatory support. With an assist-control set rate of 16 he breathes at a rate of 28 but exhibits no dyspnea or dyssynchrony. The peak airway pressure is about 30 cm water with a set tidal volume of 450 ml and PEEP = 8. Oxygen saturation is about 95% on 40% oxygen. The ABG demonstrates an adequate oxygen saturation with a compensated chronic respiratory acidosis. He is less sedated on the propofol infusion and hemodynamically stable. He is afebrile with a maximum temperature of 98.4 degrees overnight. Wound care continues and his edema seems less after diuretic therapy.     5/21 - SR - 99.0 degrees - 139/73 - 92 - 16 - 94%; d/w RT: 16/8/450/50, yellow plugges noted; procalcitonin  slightly elevated yesterday; 5/19 cxr showed hypoventilation and interstitial prominence; 5/19 BC negative; on augmentin and cefepime plus vancomycin    5/22 - d/w RT, RN, and Dr. Herr; needed an FIO2 boost overnight; now, 16/4508/45;   T 100.2 P99BP 105/49 RR 18SaO2 94%; same abx; 5/19 TA=klebsiella; alert    5/23 - remains on 16/450/45/8, d/w RT; 98.2 degrees - 80- 109/68 - 21 - 96% - SR; leukocytosis a bit better, anemia persists, slight hyponatremia, slightly worsened azotemia noted, ongoing antiinfectives; midline placed; propofol down to 26    5/24 - d/w RT, try some initial weaning today if possible; currently on 16/450/45/8; no distress, resting comfortably; remains on propofol at 26;  Leukocytosis, anemia, hyponatremia, azotemia, hyperglycemia noted, plts down to 143; SR - 97.5 degrees - 117/59 - 87 - 22 - 93%; cxr for tomorrow ordered, on Maxipime and vanco; 5/22 BC and urine ctx neg;     5/25 - d/w RT and RN; off of propofol, try weaning; on 16/450/8/45, resting comfortable in NAD; good diuresis with metolazone; 97.8 degrees - 93 - 127/70 - 18 - SR/BBB; leukocytosis, anemia today are similar to prior; cxr today shows right > left bibasilar atx and opacities; on Maxipine, fluconazole, and vanco; legs remian wrapped, edema noted    526 - d/w RT, was able to tolerate 3 hours of 10/8 yesterday, will try more today as able; SR - 98.6 degrees - 140/76 - 92 - 17 - 97%; leukocytosis and anemia noted and similar to prior; new hyponatremia, hyperglycemia, mild azotemia is a bit better; on lasix, diamox, and metolazone    5/27 - d/w RT, on sbt now - he is watching TV and unaware of weaning, yesterday did only 1 hour; SR - 98 degrees - 133/62 - 77 - 32 - 92; leukocytosis, anemia, hyponatremia, azotemia, hyperglycemia today; last cxr 5/25 stable abnormal; on same diuretics and antiinfectives     PFSH:  No change.    Respiratory:  Exam: symmetrical but diminished bilateral breath sounds with basilar rales but no  wheezing or egophony.  ImagingAvailable data reviewed. The chest film on 5/19 demonstrated stable interstitial prominence with no new focal consolidation. The endotracheal tube seemed to be in good position. No film today.    HemoDynamics:  Exam: regular rhythm (Sinus)  Imaging: Available data reviewed. The echocardiogram on March 18, 2018 was a limited study but demonstrated grossly normal left ventricular systolic function.    Neuro:  Exam: no focal deficits noted mental status intact  Imaging: None - Reviewed    Recent Labs      05/24/18   1540  05/25/18 0401 05/26/18 0359 05/27/18   0433   SODIUM  135  135  133*  131*   POTASSIUM  4.0  4.7  3.9  4.3   CHLORIDE  98  99  95*  94*   CO2  30  28  30  30   BUN  32*  28*  23*  27*   CREATININE  0.54  0.50  0.44*  0.44*   MAGNESIUM  1.8  2.0   --    --    PHOSPHORUS  2.4*   --   2.6   --    CALCIUM  8.7  8.8  9.1  9.1     GI/Nutrition:  Exam: abdomen is soft and non-tender, normal active bowel sounds  Imaging: Available data reviewed  NPO, enteral tube feeding initiated.  Liver Function  Recent Labs      05/25/18 0401 05/26/18 0359 05/27/18 0433   ALTSGPT  14   --    --    ASTSGOT  18   --    --    ALKPHOSPHAT  93   --    --    TBILIRUBIN  0.4   --    --    GLUCOSE  127*  147*  146*       Heme:  Recent Labs      05/25/18 0433 05/26/18 0359 05/27/18 0433   RBC  3.22*  3.39*  3.05*   HEMOGLOBIN  8.9*  9.5*  8.5*   HEMATOCRIT  29.9*  30.3*  26.9*   PLATELETCT  218  251  224       Infectious Disease:  Micro: cultures reviewed; no new positive cultures. The tracheal aspirate demonstrated gram-negative rods with identification and sensitivity pending.  Recent Labs      05/25/18   0401 05/25/18 0433 05/26/18 0359 05/27/18 0433   WBC   --   12.8*  12.0*  13.4*   ASTSGOT  18   --    --    --    ALTSGPT  14   --    --    --    ALKPHOSPHAT  93   --    --    --    TBILIRUBIN  0.4   --    --    --        Quality  Measures:  Radiology images reviewed,  Medications reviewed and Labs reviewed                      Problems:  1. VDRF, acute on chronic respiratory failure  2. Chronic obstructive pulmonary disease.  3. Obesity, suspected obesity hypoventilation syndrome and obstructive sleep apnea hypopnea syndrome.  4. Chronic lymphedema, compression, diuresis  5. Diabetes mellitus, controlled.  6. Chronic pain syndrome requiring chronic narcotic analgesia.  7. Hypothyroidism, on replacement therapy.  8. Systemic arterial hypertension, controlled.  9. Anemia, stable without evident ongoing blood loss.  10. Dysphagia, moderate PCM, hypoalbuminemia  11. Abnormal chest imaging - ongoing diuresis, consider tap if need be  12. Debility  13. Leukocytosis  14. Kleb HAP  15. BBB    Plan:  Continued full support mechanical ventilation with a lung-protective ventilation strategy. Titrate supplemental oxygen, continue bronchodilators and respiratory protocols.  Continue antibiotics , adjust accordingly as needed    Continue nutritional support with enteral tube feeding. Continue therapies, mobilization and prophylaxis.    Discussed patient condition and risk of morbidity and/or mortality with RN and RT and with Dr. Herr.

## 2018-05-28 ENCOUNTER — APPOINTMENT (OUTPATIENT)
Dept: RADIOLOGY | Facility: MEDICAL CENTER | Age: 58
End: 2018-05-28
Attending: HOSPITALIST
Payer: COMMERCIAL

## 2018-05-28 LAB
ALBUMIN SERPL BCP-MCNC: 2.9 G/DL (ref 3.2–4.9)
ALBUMIN/GLOB SERPL: 0.6 G/DL
ALP SERPL-CCNC: 96 U/L (ref 30–99)
ALT SERPL-CCNC: 18 U/L (ref 2–50)
ANION GAP SERPL CALC-SCNC: 7 MMOL/L (ref 0–11.9)
AST SERPL-CCNC: 18 U/L (ref 12–45)
BILIRUB SERPL-MCNC: 0.4 MG/DL (ref 0.1–1.5)
BUN SERPL-MCNC: 30 MG/DL (ref 8–22)
CALCIUM SERPL-MCNC: 9.7 MG/DL (ref 8.4–10.2)
CHLORIDE SERPL-SCNC: 93 MMOL/L (ref 96–112)
CO2 SERPL-SCNC: 32 MMOL/L (ref 20–33)
CREAT SERPL-MCNC: 0.49 MG/DL (ref 0.5–1.4)
ERYTHROCYTE [DISTWIDTH] IN BLOOD BY AUTOMATED COUNT: 51.1 FL (ref 35.9–50)
GLOBULIN SER CALC-MCNC: 4.9 G/DL (ref 1.9–3.5)
GLUCOSE SERPL-MCNC: 161 MG/DL (ref 65–99)
GRAM STN SPEC: NORMAL
HCT VFR BLD AUTO: 34.5 % (ref 42–52)
HGB BLD-MCNC: 10.6 G/DL (ref 14–18)
MCH RBC QN AUTO: 27.3 PG (ref 27–33)
MCHC RBC AUTO-ENTMCNC: 30.7 G/DL (ref 33.7–35.3)
MCV RBC AUTO: 88.9 FL (ref 81.4–97.8)
PLATELET # BLD AUTO: 309 K/UL (ref 164–446)
PMV BLD AUTO: 10 FL (ref 9–12.9)
POTASSIUM SERPL-SCNC: 3.9 MMOL/L (ref 3.6–5.5)
PROT SERPL-MCNC: 7.8 G/DL (ref 6–8.2)
RBC # BLD AUTO: 3.88 M/UL (ref 4.7–6.1)
SIGNIFICANT IND 70042: NORMAL
SITE SITE: NORMAL
SODIUM SERPL-SCNC: 132 MMOL/L (ref 135–145)
SOURCE SOURCE: NORMAL
WBC # BLD AUTO: 12.9 K/UL (ref 4.8–10.8)

## 2018-05-28 PROCEDURE — 80053 COMPREHEN METABOLIC PANEL: CPT

## 2018-05-28 PROCEDURE — 85027 COMPLETE CBC AUTOMATED: CPT

## 2018-05-28 PROCEDURE — 71045 X-RAY EXAM CHEST 1 VIEW: CPT

## 2018-05-28 NOTE — PROGRESS NOTES
Pulmonary Critical Care Progress Note        Chief Complaint: Acute on chronic respiratory failure with hypoxia and hypercapnia.    History of Present Illness: 57 y.o. male with complex medical history including COPD, congestive heart failure, type 2 diabetes, bilateral lower extremity cellulitis and probable obstructive sleep apnea was admitted to [facility] to be treated for bilateral lower extremity cellulitis. The patient is noncompliant. He was recently admitted to [facility], [place] intubated for about a week for acute and chronic hypercapnic respiratory failure with CO2 retention. The patient was prescribed a trilogy ventilator to use after discharge.      The patient was readmitted to the hospital for bilateral lower extremity cellulitis. He was found to have high bicarbonate on basic metabolic panel of 42. Pulmonary consultation was requested for further evaluation and management of his chronic respiratory failure.    ROS:  Respiratory: positive shortness of breath, unchanged, Cardiac: negative chest pain, GI: negative abdominal pain.  All other systems negative.    Interval Events:  24 hour interval history reviewed.    5/20 - He developed increased dyspnea overnight on May 18 and required intubation. He is now on full ventilatory support. With an assist-control set rate of 16 he breathes at a rate of 28 but exhibits no dyspnea or dyssynchrony. The peak airway pressure is about 30 cm water with a set tidal volume of 450 ml and PEEP = 8. Oxygen saturation is about 95% on 40% oxygen. The ABG demonstrates an adequate oxygen saturation with a compensated chronic respiratory acidosis. He is less sedated on the propofol infusion and hemodynamically stable. He is afebrile with a maximum temperature of 98.4 degrees overnight. Wound care continues and his edema seems less after diuretic therapy.     5/21 - SR - 99.0 degrees - 139/73 - 92 - 16 - 94%; d/w RT: 16/8/450/50, yellow plugges noted; procalcitonin  slightly elevated yesterday; 5/19 cxr showed hypoventilation and interstitial prominence; 5/19 BC negative; on augmentin and cefepime plus vancomycin    5/22 - d/w RT, RN, and Dr. Herr; needed an FIO2 boost overnight; now, 16/4508/45;   T 100.2 P99BP 105/49 RR 18SaO2 94%; same abx; 5/19 TA=klebsiella; alert    5/23 - remains on 16/450/45/8, d/w RT; 98.2 degrees - 80- 109/68 - 21 - 96% - SR; leukocytosis a bit better, anemia persists, slight hyponatremia, slightly worsened azotemia noted, ongoing antiinfectives; midline placed; propofol down to 26    5/24 - d/w RT, try some initial weaning today if possible; currently on 16/450/45/8; no distress, resting comfortably; remains on propofol at 26;  Leukocytosis, anemia, hyponatremia, azotemia, hyperglycemia noted, plts down to 143; SR - 97.5 degrees - 117/59 - 87 - 22 - 93%; cxr for tomorrow ordered, on Maxipime and vanco; 5/22 BC and urine ctx neg;     5/25 - d/w RT and RN; off of propofol, try weaning; on 16/450/8/45, resting comfortable in NAD; good diuresis with metolazone; 97.8 degrees - 93 - 127/70 - 18 - SR/BBB; leukocytosis, anemia today are similar to prior; cxr today shows right > left bibasilar atx and opacities; on Maxipine, fluconazole, and vanco; legs remian wrapped, edema noted    526 - d/w RT, was able to tolerate 3 hours of 10/8 yesterday, will try more today as able; SR - 98.6 degrees - 140/76 - 92 - 17 - 97%; leukocytosis and anemia noted and similar to prior; new hyponatremia, hyperglycemia, mild azotemia is a bit better; on lasix, diamox, and metolazone    5/27 - d/w RT, on sbt now - he is watching TV and unaware of weaning, yesterday did only 1 hour; SR - 98 degrees - 133/62 - 77 - 32 - 92; leukocytosis, anemia, hyponatremia, azotemia, hyperglycemia today; last cxr 5/25 stable abnormal; on same diuretics and antiinfectives     5/28 - d/w RT, did well with weaning but eventually tired out, weaker today; back on AC at # AMl white-yellow secretions;  sr/BBB - 98.2 degrees - 84 - 138/74 - 28 - 94%; leukocytosis and anemia without sig delta from prior;     PFSH:  No change.    Respiratory:  Exam: symmetrical but diminished bilateral breath sounds with basilar rales but no wheezing or egophony.  ImagingAvailable data reviewed. The chest film on 5/19 demonstrated stable interstitial prominence with no new focal consolidation. The endotracheal tube seemed to be in good position. No film today.    HemoDynamics:  Exam: regular rhythm (Sinus)  Imaging: Available data reviewed. The echocardiogram on March 18, 2018 was a limited study but demonstrated grossly normal left ventricular systolic function.    Neuro:  Exam: no focal deficits noted mental status intact  Imaging: None - Reviewed    Recent Labs      05/26/18 0359 05/27/18 0433 05/28/18 0318   SODIUM  133*  131*  132*   POTASSIUM  3.9  4.3  3.9   CHLORIDE  95*  94*  93*   CO2  30  30  32   BUN  23*  27*  30*   CREATININE  0.44*  0.44*  0.49*   PHOSPHORUS  2.6   --    --    CALCIUM  9.1  9.1  9.7     GI/Nutrition:  Exam: abdomen is soft and non-tender, normal active bowel sounds  Imaging: Available data reviewed  NPO, enteral tube feeding initiated.  Liver Function  Recent Labs      05/26/18 0359 05/27/18 0433 05/28/18 0318   ALTSGPT   --    --   18   ASTSGOT   --    --   18   ALKPHOSPHAT   --    --   96   TBILIRUBIN   --    --   0.4   GLUCOSE  147*  146*  161*       Heme:  Recent Labs      05/26/18 0359 05/27/18 0433 05/28/18 0318   RBC  3.39*  3.05*  3.88*   HEMOGLOBIN  9.5*  8.5*  10.6*   HEMATOCRIT  30.3*  26.9*  34.5*   PLATELETCT  251  224  309       Infectious Disease:  Micro: cultures reviewed; no new positive cultures. The tracheal aspirate demonstrated gram-negative rods with identification and sensitivity pending.  Recent Labs      05/26/18 0359 05/27/18 0433 05/28/18 0318   WBC  12.0*  13.4*  12.9*   ASTSGOT   --    --   18   ALTSGPT   --    --   18   ALKPHOSPHAT   --     --   96   TBILIRUBIN   --    --   0.4       Quality  Measures:  Radiology images reviewed, Medications reviewed and Labs reviewed                      Problems:  1. VDRF, acute on chronic respiratory failure  2. Chronic obstructive pulmonary disease.  3. Obesity, suspected obesity hypoventilation syndrome and obstructive sleep apnea hypopnea syndrome.  4. Chronic lymphedema, compression, diuresis  5. Diabetes mellitus, controlled.  6. Chronic pain syndrome requiring chronic narcotic analgesia.  7. Hypothyroidism, on replacement therapy.  8. Systemic arterial hypertension, controlled.  9. Anemia, stable without evident ongoing blood loss.  10. Dysphagia, moderate PCM, hypoalbuminemia  11. Abnormal chest imaging - ongoing diuresis, consider tap if need be  12. Debility  13. Leukocytosis  14. Kleb HAP  15. BBB    Plan:  Continued full support mechanical ventilation with a lung-protective ventilation strategy. Titrate supplemental oxygen, continue bronchodilators and respiratory protocols.  Continue antibiotics , adjust accordingly as needed    Continue nutritional support with enteral tube feeding. Continue therapies, mobilization and prophylaxis.    Discussed patient condition and risk of morbidity and/or mortality with RN and RT and with Dr. Herr.

## 2018-05-28 NOTE — [facility]
Hospital Medicine Progress Note, Adult, Complex               Author: Soo Herr Date & Time created: 5/28/2018  9:37 AM     CC: cellulitis/lymphedema    Interval History:  Remains on CPAP  CXR without change  12L negative since starting metolazone  Repeat sputum with organisms    Review of Systems:  Review of Systems   Unable to perform ROS: Intubated       T 98.2P84BP 138/[id number] 94% I/O 2.4/5.6 noBM tele SR/BBB  Physical Exam   Constitutional: He appears well-developed and well-nourished. No distress.   HENT:   Head: Normocephalic and atraumatic.   NGT/ETT     Eyes: Conjunctivae are normal. Right eye exhibits no discharge. Left eye exhibits no discharge. No scleral icterus.   Neck: No tracheal deviation present.   Cardiovascular: Normal rate, regular rhythm and intact distal pulses.    Pulmonary/Chest: Effort normal. No respiratory distress. He has no wheezes. He exhibits no tenderness.   Diminished but clear anteriorly   Abdominal: Soft. Bowel sounds are normal. He exhibits no distension. There is no tenderness. There is no rebound.   obese   Musculoskeletal: He exhibits edema (tr).   Neurological:   resting   Skin: Skin is warm.   Legs wrapped, wound pictures reviewed from 5/25   Vitals reviewed.      Labs:        Invalid input(s): [id number]      Recent Labs      05/26/18 0359 05/27/18 0433 05/28/18 0318   SODIUM  133*  131*  132*   POTASSIUM  3.9  4.3  3.9   CHLORIDE  95*  94*  93*   CO2  30  30  32   BUN  23*  27*  30*   CREATININE  0.44*  0.44*  0.49*   PHOSPHORUS  2.6   --    --    CALCIUM  9.1  9.1  9.7     Recent Labs      05/26/18 0359 05/27/18 0433  05/28/18 0318   ALTSGPT   --    --   18   ASTSGOT   --    --   18   ALKPHOSPHAT   --    --   96   TBILIRUBIN   --    --   0.4   GLUCOSE  147*  146*  161*     Recent Labs      05/26/18 0359 05/27/18 0433  05/28/18 0318   RBC  3.39*  3.05*  3.88*   HEMOGLOBIN  9.5*  8.5*  10.6*   HEMATOCRIT  30.3*  26.9*  34.5*   PLATELETCT   251  224  309     Recent Labs      05/26/18   0359  05/27/18   0433  05/28/18   0318   WBC  12.0*  13.4*  12.9*   ASTSGOT   --    --   18   ALTSGPT   --    --   18   ALKPHOSPHAT   --    --   96   TBILIRUBIN   --    --   0.4        GI/Nutrition:  Orders Placed This Encounter   Procedures   • DIET NPO     Standing Status:   Standing     Number of Occurrences:   1     Order Specific Question:   Restrict to:     Answer:   With Tube Feed [4]   • DIET TUBE FEED     Standing Status:   Standing     Number of Occurrences:   1     Order Specific Question:   Diet     Answer:   Diet Tube Feed [35]     Order Specific Question:   Formula:     Answer:   Impact Peptide 1.5 [41]     Comments:   goal rate while on propofal is 45 when off propofal goal rate of 60 mL/hr     Order Specific Question:   Goal Rate (mL/Hour)     Answer:   45     Order Specific Question:   Route     Answer:   NG     Medical Decision Making, by Problem:  RLE cellulitis s/p debridement, polymicrobial (enterococcus, proteus, MSSA)   -improved with elevation, continue wound care (calves, heels the worst)   -resume Augmentin tomorrow  B LE lymphedema   -lasix, diamox, metolazone with good result   -continue diuresis as renal function allows   -compression  ALIREZA with chronic hypercapnia/OHVS s/p acute VDRF   -SBT per pulm, continue wean off propofol as able   -continue diuresis/abx    -repeat sputum cx  Without organisms   -CXR unchanged infiltrates/edema  Klebsiella HAP   -cefepime/vanco complete today  COPD   -spiriva, incruse held with ETT  DM   -metformin   -asa, lipitor  BBB   -normal ef, wall motion on echo 5/19  Leukocytosis   -no change, afebrile  Chronic pain   -oxy IR 40mg q 4 until can take CR   -prn fentanyl for wound care  MSEW  Hypothyroidism   -synthroid  Hyponatremia   -follow on diuretics  Anemia  Debility      Quality-Core Measures   Reviewed items::  Medications reviewed, Labs reviewed and Radiology images reviewed  Montoya catheter::  Unconscious  / Sedated Patient on a Ventilator  DVT prophylaxis pharmacological::  Heparin  Antibiotics:  Treating active infection/contamination beyond 24 hours perioperative coverage

## 2018-05-29 LAB
ANION GAP SERPL CALC-SCNC: 9 MMOL/L (ref 0–11.9)
BACTERIA SPEC RESP CULT: ABNORMAL
BACTERIA SPEC RESP CULT: ABNORMAL
BUN SERPL-MCNC: 34 MG/DL (ref 8–22)
CALCIUM SERPL-MCNC: 9.6 MG/DL (ref 8.4–10.2)
CHLORIDE SERPL-SCNC: 93 MMOL/L (ref 96–112)
CO2 SERPL-SCNC: 29 MMOL/L (ref 20–33)
CREAT SERPL-MCNC: 0.51 MG/DL (ref 0.5–1.4)
ERYTHROCYTE [DISTWIDTH] IN BLOOD BY AUTOMATED COUNT: 50.2 FL (ref 35.9–50)
GLUCOSE SERPL-MCNC: 167 MG/DL (ref 65–99)
GRAM STN SPEC: ABNORMAL
HCT VFR BLD AUTO: 35.4 % (ref 42–52)
HGB BLD-MCNC: 11 G/DL (ref 14–18)
MCH RBC QN AUTO: 27.4 PG (ref 27–33)
MCHC RBC AUTO-ENTMCNC: 31.1 G/DL (ref 33.7–35.3)
MCV RBC AUTO: 88.3 FL (ref 81.4–97.8)
PLATELET # BLD AUTO: 297 K/UL (ref 164–446)
PMV BLD AUTO: 10.6 FL (ref 9–12.9)
POTASSIUM SERPL-SCNC: 4.1 MMOL/L (ref 3.6–5.5)
RBC # BLD AUTO: 4.01 M/UL (ref 4.7–6.1)
SIGNIFICANT IND 70042: ABNORMAL
SITE SITE: ABNORMAL
SODIUM SERPL-SCNC: 131 MMOL/L (ref 135–145)
SOURCE SOURCE: ABNORMAL
WBC # BLD AUTO: 15.3 K/UL (ref 4.8–10.8)

## 2018-05-29 PROCEDURE — 80048 BASIC METABOLIC PNL TOTAL CA: CPT

## 2018-05-29 PROCEDURE — 85027 COMPLETE CBC AUTOMATED: CPT

## 2018-05-29 NOTE — [facility]
Hospital Medicine Progress Note, Adult, Complex               Author: Kelley Sifuentes Date & Time created: 5/29/2018  9:35 AM     CC: bilateral leg cellulitis and lymphedema    Interval History:  Patient is admitted with worsening leg pain and drainage of purulent fluid. Unasyn completed 5/14 per ID  He refuses to use bipap   5/19 patient was intubated due to hypoventilation  Patient finished antibiotics for klebsiella pneumonia 5/28  Repeat sputum shows organisms  BUN and creatinine are rising with decrease in diuresis on diuretics    Review of Systems:  Review of Systems   Unable to perform ROS: Intubated       Physical Exam:  Physical Exam   Constitutional: No distress. He is sedated, intubated and restrained.   HENT:   Mouth/Throat: No oropharyngeal exudate.   Eyes: Right eye exhibits no discharge. Left eye exhibits no discharge. No scleral icterus.   Neck: Neck supple.   Cardiovascular: Normal rate and regular rhythm.    Pulmonary/Chest: Effort normal and breath sounds normal. Tachypnea noted. He is intubated. He has no rales.   Abdominal: Bowel sounds are normal. He exhibits no distension.   Musculoskeletal: He exhibits edema.   2+ leg edema, much improved     Neurological: He is alert.   Skin: Skin is warm and dry. No rash noted. He is not diaphoretic.   Psychiatric: His behavior is normal.   Nursing note and vitals reviewed.      Labs:        Invalid input(s): [id number]      Recent Labs      05/27/18 0433 05/28/18 0318 05/29/18   0343   SODIUM  131*  132*  131*   POTASSIUM  4.3  3.9  4.1   CHLORIDE  94*  93*  93*   CO2  30  32  29   BUN  27*  30*  34*   CREATININE  0.44*  0.49*  0.51   CALCIUM  9.1  9.7  9.6     Recent Labs      05/27/18 0433 05/28/18 0318 05/29/18   0343   ALTSGPT   --   18   --    ASTSGOT   --   18   --    ALKPHOSPHAT   --   96   --    TBILIRUBIN   --   0.4   --    GLUCOSE  146*  161*  167*     Recent Labs      05/27/18 0433 05/28/18 0318 05/29/18   0343   RBC  3.05*   3.88*  4.01*   HEMOGLOBIN  8.5*  10.6*  11.0*   HEMATOCRIT  26.9*  34.5*  35.4*   PLATELETCT  224  309  297     Recent Labs      05/27/18   0433  05/28/18   0318  05/29/18   0343   WBC  13.4*  12.9*  15.3*   ASTSGOT   --   18   --    ALTSGPT   --   18   --    ALKPHOSPHAT   --   96   --    TBILIRUBIN   --   0.4   --            Hemodynamics:     T98.8 /63 HR83 RR20 O2sat 95% on ventilator  I/O 2896/2550    GI/Nutrition:  Orders Placed This Encounter   Procedures   • DIET NPO     Standing Status:   Standing     Number of Occurrences:   1     Order Specific Question:   Restrict to:     Answer:   With Tube Feed [4]   • DIET TUBE FEED     Standing Status:   Standing     Number of Occurrences:   1     Order Specific Question:   Diet     Answer:   Diet Tube Feed [35]     Order Specific Question:   Formula:     Answer:   Impact Peptide 1.5 [41]     Comments:   goal rate while on propofal is 45 when off propofal goal rate of 60 mL/hr     Order Specific Question:   Goal Rate (mL/Hour)     Answer:   45     Order Specific Question:   Route     Answer:   NG     Medical Decision Making, by Problem:  Bilateral lower leg cellulitis, oral augmentin for suppression to resume now that patient is off therapy for pneumonia   Edema improved    Lymphedema chronic, continue compression   Continue lasix, stop diamox today due to renal function and hyponatremia   Leg elevation to continue while intubated     Buttock DTI, dasilva placed, skin care    Acute on chronic respiratory failure, ventilator support   Cefepime to treat klebsiella in sputum through 5/28    Elevated troponin, improved to normal, due to heart strain from respiratory distress      HTN (hypertension) monitor on diuresis    COPD (chronic obstructive pulmonary disease) oxygen and respiratory therapy    Stasis dermatitis stable    Narcotic addiction pain medication    Non compliance w medication regimen, noncompliance with bipap contributing to respiratory failure  acutely      Diabetes type 2,  metformin    Severe protein-calorie malnutrition, with patient eating variable caloric intake, poor dietary choices, and now unable to take po nutrition due to intubation, will continue tube feeds    Chronic respiratory failure, oxygen    Morbid obesity with BMI of 40.0-44.9, adult     Chronic pain continue pain meds    ALIREZA (obstructive sleep apnea) bipap refused by patient, will readdress once extubated          Quality-Core Measures   Reviewed items::  Labs reviewed and Medications reviewed  Montoya catheter::  No Montoya  DVT prophylaxis pharmacological::  Heparin  Ulcer Prophylaxis::  Yes  Antibiotics:  Treating active infection/contamination beyond 24 hours perioperative coverage  Assessed for rehabilitation services:  Patient returned to prior level of function, rehabilitation not indicated at this time and Patient unable to tolerate rehabilitation therapeutic regimen

## 2018-05-29 NOTE — PROGRESS NOTES
Pulmonary Critical Care Progress Note        Chief Complaint: Acute on chronic respiratory failure with hypoxia and hypercapnia.    History of Present Illness: 57 y.o. male with complex medical history including COPD, congestive heart failure, type 2 diabetes, bilateral lower extremity cellulitis and probable obstructive sleep apnea was admitted to [facility] to be treated for bilateral lower extremity cellulitis. The patient is noncompliant. He was recently admitted to [facility] intubated for about a week for acute and chronic hypercapnic respiratory failure with CO2 retention. The patient was prescribed a trilogy ventilator to use after discharge.      The patient was readmitted to the hospital for bilateral lower extremity cellulitis. He was found to have high bicarbonate on basic metabolic panel of 42. Pulmonary consultation was requested for further evaluation and management of his chronic respiratory failure.    ROS:  Respiratory: positive shortness of breath, unchanged, Cardiac: negative chest pain, GI: negative abdominal pain.  All other systems negative.    Interval Events:  24 hour interval history reviewed.    5/20 - He developed increased dyspnea overnight on May 18 and required intubation. He is now on full ventilatory support. With an assist-control set rate of 16 he breathes at a rate of 28 but exhibits no dyspnea or dyssynchrony. The peak airway pressure is about 30 cm water with a set tidal volume of 450 ml and PEEP = 8. Oxygen saturation is about 95% on 40% oxygen. The ABG demonstrates an adequate oxygen saturation with a compensated chronic respiratory acidosis. He is less sedated on the propofol infusion and hemodynamically stable. He is afebrile with a maximum temperature of 98.4 degrees overnight. Wound care continues and his edema seems less after diuretic therapy.     5/21 - SR - 99.0 degrees - 139/73 - 92 - 16 - 94%; d/w RT: 16/8/450/50, yellow plugges noted; procalcitonin  slightly elevated yesterday; 5/19 cxr showed hypoventilation and interstitial prominence; 5/19 BC negative; on augmentin and cefepime plus vancomycin    5/22 - d/w RT, RN, and Dr. Herr; needed an FIO2 boost overnight; now, 16/4508/45;   T 100.2 P99BP 105/49 RR 18SaO2 94%; same abx; 5/19 TA=klebsiella; alert    5/23 - remains on 16/450/45/8, d/w RT; 98.2 degrees - 80- 109/68 - 21 - 96% - SR; leukocytosis a bit better, anemia persists, slight hyponatremia, slightly worsened azotemia noted, ongoing antiinfectives; midline placed; propofol down to 26    5/24 - d/w RT, try some initial weaning today if possible; currently on 16/450/45/8; no distress, resting comfortably; remains on propofol at 26;  Leukocytosis, anemia, hyponatremia, azotemia, hyperglycemia noted, plts down to 143; SR - 97.5 degrees - 117/59 - 87 - 22 - 93%; cxr for tomorrow ordered, on Maxipime and vanco; 5/22 BC and urine ctx neg;     5/25 - d/w RT and RN; off of propofol, try weaning; on 16/450/8/45, resting comfortable in NAD; good diuresis with metolazone; 97.8 degrees - 93 - 127/70 - 18 - SR/BBB; leukocytosis, anemia today are similar to prior; cxr today shows right > left bibasilar atx and opacities; on Maxipine, fluconazole, and vanco; legs remian wrapped, edema noted    526 - d/w RT, was able to tolerate 3 hours of 10/8 yesterday, will try more today as able; SR - 98.6 degrees - 140/76 - 92 - 17 - 97%; leukocytosis and anemia noted and similar to prior; new hyponatremia, hyperglycemia, mild azotemia is a bit better; on lasix, diamox, and metolazone    5/27 - d/w RT, on sbt now - he is watching TV and unaware of weaning, yesterday did only 1 hour; SR - 98 degrees - 133/62 - 77 - 32 - 92; leukocytosis, anemia, hyponatremia, azotemia, hyperglycemia today; last cxr 5/25 stable abnormal; on same diuretics and antiinfectives     5/28 - d/w RT, did well with weaning but eventually tired out, weaker today; back on AC at # AMl white-yellow secretions;  sr/BBB - 98.2 degrees - 84 - 138/74 - 28 - 94%; leukocytosis and anemia without sig delta from prior;     5/29 - d/w RT, weaning as able; rest settings are 16/450/8/55; 5/28 cxr showed stable infiltrates/opacities, ongoing diuresis; cpap just initiated today,albeit with a high PS 16 cmH2O pressure; SR - 98.1 degrees - 129/68 - 89 - 20 - 94%; leukocytosis, anemia, hyperglycemia, azotemia persist; on diamox, lasix, metolazone; on fluconazole; TA shows LFGNR 5/27    PFSH:  No change.    Respiratory:  Exam: symmetrical but diminished bilateral breath sounds with basilar rales but no wheezing or egophony.  ImagingAvailable data reviewed. The chest film on 5/19 demonstrated stable interstitial prominence with no new focal consolidation. The endotracheal tube seemed to be in good position. No film today.    HemoDynamics:  Exam: regular rhythm (Sinus)  Imaging: Available data reviewed. The echocardiogram on March 18, 2018 was a limited study but demonstrated grossly normal left ventricular systolic function.    Neuro:  Exam: no focal deficits noted mental status intact  Imaging: None - Reviewed    Recent Labs      05/27/18 0433 05/28/18 0318 05/29/18   0343   SODIUM  131*  132*  131*   POTASSIUM  4.3  3.9  4.1   CHLORIDE  94*  93*  93*   CO2  30  32  29   BUN  27*  30*  34*   CREATININE  0.44*  0.49*  0.51   CALCIUM  9.1  9.7  9.6     GI/Nutrition:  Exam: abdomen is soft and non-tender, normal active bowel sounds  Imaging: Available data reviewed  NPO, enteral tube feeding initiated.  Liver Function  Recent Labs      05/27/18 0433 05/28/18 0318 05/29/18   0343   ALTSGPT   --   18   --    ASTSGOT   --   18   --    ALKPHOSPHAT   --   96   --    TBILIRUBIN   --   0.4   --    GLUCOSE  146*  161*  167*       Heme:  Recent Labs      05/27/18 0433 05/28/18 0318  05/29/18   0343   RBC  3.05*  3.88*  4.01*   HEMOGLOBIN  8.5*  10.6*  11.0*   HEMATOCRIT  26.9*  34.5*  35.4*   PLATELETCT  224  309  297       Infectious  Disease:  Micro: cultures reviewed; no new positive cultures. The tracheal aspirate demonstrated gram-negative rods with identification and sensitivity pending.  Recent Labs      05/27/18   0433  05/28/18   0318  05/29/18   0343   WBC  13.4*  12.9*  15.3*   ASTSGOT   --   18   --    ALTSGPT   --   18   --    ALKPHOSPHAT   --   96   --    TBILIRUBIN   --   0.4   --        Quality  Measures:  Radiology images reviewed, Medications reviewed and Labs reviewed                      Problems:  1. VDRF, acute on chronic respiratory failure  2. Chronic obstructive pulmonary disease.  3. Obesity, suspected obesity hypoventilation syndrome and obstructive sleep apnea hypopnea syndrome.  4. Chronic lymphedema, compression, diuresis  5. Diabetes mellitus, controlled.  6. Chronic pain syndrome requiring chronic narcotic analgesia.  7. Hypothyroidism, on replacement therapy.  8. Systemic arterial hypertension, controlled.  9. Anemia, stable without evident ongoing blood loss.  10. Dysphagia, moderate PCM, hypoalbuminemia  11. Abnormal chest imaging - ongoing diuresis, consider tap if need be  12. Debility  13. Leukocytosis  14. Kleb HAP  15. BBB    Plan:  Continued full support mechanical ventilation with a lung-protective ventilation strategy. Titrate supplemental oxygen, continue bronchodilators and respiratory protocols.  Continue antibiotics , adjust accordingly as needed    Continue nutritional support with enteral tube feeding. Continue therapies, mobilization and prophylaxis.    Discussed patient condition and risk of morbidity and/or mortality with RN and RT and with Dr. Herr.

## 2018-05-30 LAB
ANION GAP SERPL CALC-SCNC: 11 MMOL/L (ref 0–11.9)
BACTERIA BLD CULT: NORMAL
BUN SERPL-MCNC: 35 MG/DL (ref 8–22)
CALCIUM SERPL-MCNC: 9.9 MG/DL (ref 8.4–10.2)
CHLORIDE SERPL-SCNC: 89 MMOL/L (ref 96–112)
CO2 SERPL-SCNC: 31 MMOL/L (ref 20–33)
CREAT SERPL-MCNC: 0.52 MG/DL (ref 0.5–1.4)
ERYTHROCYTE [DISTWIDTH] IN BLOOD BY AUTOMATED COUNT: 49.5 FL (ref 35.9–50)
GLUCOSE SERPL-MCNC: 152 MG/DL (ref 65–99)
HCT VFR BLD AUTO: 35.7 % (ref 42–52)
HGB BLD-MCNC: 11.4 G/DL (ref 14–18)
MCH RBC QN AUTO: 27.5 PG (ref 27–33)
MCHC RBC AUTO-ENTMCNC: 31.9 G/DL (ref 33.7–35.3)
MCV RBC AUTO: 86.2 FL (ref 81.4–97.8)
PLATELET # BLD AUTO: 278 K/UL (ref 164–446)
PMV BLD AUTO: 10.3 FL (ref 9–12.9)
POTASSIUM SERPL-SCNC: 4.3 MMOL/L (ref 3.6–5.5)
RBC # BLD AUTO: 4.14 M/UL (ref 4.7–6.1)
SIGNIFICANT IND 70042: NORMAL
SITE SITE: NORMAL
SODIUM SERPL-SCNC: 131 MMOL/L (ref 135–145)
SOURCE SOURCE: NORMAL
WBC # BLD AUTO: 15.1 K/UL (ref 4.8–10.8)

## 2018-05-30 PROCEDURE — 85027 COMPLETE CBC AUTOMATED: CPT

## 2018-05-30 PROCEDURE — 80048 BASIC METABOLIC PNL TOTAL CA: CPT

## 2018-05-30 ASSESSMENT — ENCOUNTER SYMPTOMS
SPUTUM PRODUCTION: 0
DIARRHEA: 0
ABDOMINAL PAIN: 0
WHEEZING: 0
CHILLS: 0
CONSTIPATION: 0
DIAPHORESIS: 0
NAUSEA: 0
COUGH: 0
HEADACHES: 0
MYALGIAS: 0
SHORTNESS OF BREATH: 0

## 2018-05-30 NOTE — [facility]
Hospital Medicine Progress Note, Adult, Complex               Author: Kelley Sifuentes Date & Time created: 5/30/2018  11:47 AM     CC: bilateral leg cellulitis and lymphedema    Interval History:  Patient is admitted with worsening leg pain and drainage of purulent fluid. Unasyn completed 5/14 per ID  He refuses to use bipap   5/19 patient was intubated due to hypoventilation  Patient finished antibiotics for klebsiella pneumonia 5/28  Repeat sputum shows organisms  BUN and creatinine are rising with decrease in diuresis on diuretics    Review of Systems:  Review of Systems   Constitutional: Negative for chills and diaphoresis.   Respiratory: Negative for cough, sputum production, shortness of breath and wheezing.    Cardiovascular: Negative for chest pain.   Gastrointestinal: Negative for abdominal pain, constipation, diarrhea and nausea.   Genitourinary: Negative for dysuria.   Musculoskeletal: Negative for myalgias.   Skin: Negative for itching and rash.   Neurological: Negative for headaches.       Physical Exam:  Physical Exam   Constitutional: No distress. He is sedated, intubated and restrained.   HENT:   Mouth/Throat: No oropharyngeal exudate.   Eyes: Conjunctivae are normal. No scleral icterus.   Neck: Neck supple.   Cardiovascular: Normal rate and regular rhythm.    No murmur heard.  Pulmonary/Chest: Effort normal. Tachypnea noted. He is intubated. No respiratory distress. He has no wheezes. He has no rales.   Abdominal: Bowel sounds are normal. He exhibits no distension.   Musculoskeletal: He exhibits edema.   2+ leg edema, much improved  Compression wraps over lower legs   Neurological: He is alert.   Skin: Skin is warm. No rash noted. He is not diaphoretic. No erythema.   Psychiatric: His behavior is normal.   Nursing note and vitals reviewed.      Labs:        Invalid input(s): [id number]      Recent Labs      05/28/18   0318  05/29/18   0343  05/30/18   0258   SODIUM  132*  131*  131*   POTASSIUM   3.9  4.1  4.3   CHLORIDE  93*  93*  89*   CO2  32  29  31   BUN  30*  34*  35*   CREATININE  0.49*  0.51  0.52   CALCIUM  9.7  9.6  9.9     Recent Labs      05/28/18 0318 05/29/18 0343  05/30/18   0258   ALTSGPT  18   --    --    ASTSGOT  18   --    --    ALKPHOSPHAT  96   --    --    TBILIRUBIN  0.4   --    --    GLUCOSE  161*  167*  152*     Recent Labs      05/28/18 0318 05/29/18 0343  05/30/18   0258   RBC  3.88*  4.01*  4.14*   HEMOGLOBIN  10.6*  11.0*  11.4*   HEMATOCRIT  34.5*  35.4*  35.7*   PLATELETCT  309  297  278     Recent Labs      05/28/18 0318 05/29/18 0343 05/30/18   0258   WBC  12.9*  15.3*  15.1*   ASTSGOT  18   --    --    ALTSGPT  18   --    --    ALKPHOSPHAT  96   --    --    TBILIRUBIN  0.4   --    --            Hemodynamics:     T98.1 /62 HR87 RR24 O2sat 91% on ventilator      GI/Nutrition:  Orders Placed This Encounter   Procedures   • DIET NPO     Standing Status:   Standing     Number of Occurrences:   1     Order Specific Question:   Restrict to:     Answer:   With Tube Feed [4]   • DIET TUBE FEED     Standing Status:   Standing     Number of Occurrences:   1     Order Specific Question:   Diet     Answer:   Diet Tube Feed [35]     Order Specific Question:   Formula:     Answer:   Diabeticsource AC [34]     Order Specific Question:   Goal Rate (mL/Hour)     Answer:   75     Order Specific Question:   Route     Answer:   NG     Medical Decision Making, by Problem:  Bilateral lower leg cellulitis, oral augmentin for suppression now that iv therapy completed   Edema improved with elevation    Lymphedema chronic, continue compression   Continue lasix,      Buttock DTI, dasilva placed, skin care    Acute on chronic respiratory failure, ventilator support   Cefepime to treat klebsiella in sputum through 5/28    Elevated troponin, improved to normal, due to heart strain from respiratory distress      HTN (hypertension) monitor on diuresis    COPD (chronic obstructive pulmonary  disease) oxygen and respiratory therapy    Stasis dermatitis stable    Narcotic addiction pain medication    Non compliance w medication regimen, noncompliance with bipap contributing to respiratory failure acutely      Diabetes type 2,  metformin    Severe protein-calorie malnutrition, with patient eating variable caloric intake, poor dietary choices, and now unable to take po nutrition due to intubation, will continue tube feeds    Chronic respiratory failure, oxygen    Morbid obesity with BMI of 40.0-44.9, adult     Chronic pain continue pain meds    ALIREZA (obstructive sleep apnea) bipap refused by patient, will readdress once extubated          Quality-Core Measures   Reviewed items::  Labs reviewed and Medications reviewed  Montoya catheter::  No Montoya  DVT prophylaxis pharmacological::  Heparin  Ulcer Prophylaxis::  Yes  Antibiotics:  Treating active infection/contamination beyond 24 hours perioperative coverage  Assessed for rehabilitation services:  Patient returned to prior level of function, rehabilitation not indicated at this time and Patient unable to tolerate rehabilitation therapeutic regimen

## 2018-05-30 NOTE — PROGRESS NOTES
Pulmonary Critical Care Progress Note        Chief Complaint: Acute on chronic respiratory failure with hypoxia and hypercapnia.    History of Present Illness: 57 y.o. male with complex medical history including COPD, congestive heart failure, type 2 diabetes, bilateral lower extremity cellulitis and probable obstructive sleep apnea was admitted to [facility] to be treated for bilateral lower extremity cellulitis. The patient is noncompliant. He was recently admitted to [facility] intubated for about a week for acute and chronic hypercapnic respiratory failure with CO2 retention. The patient was prescribed a trilogy ventilator to use after discharge.      The patient was readmitted to the hospital for bilateral lower extremity cellulitis. He was found to have high bicarbonate on basic metabolic panel of 42. Pulmonary consultation was requested for further evaluation and management of his chronic respiratory failure.    ROS:  Respiratory: positive shortness of breath, unchanged, Cardiac: negative chest pain, GI: negative abdominal pain.  All other systems negative.    Interval Events:  24 hour interval history reviewed.    5/20 - He developed increased dyspnea overnight on May 18 and required intubation. He is now on full ventilatory support. With an assist-control set rate of 16 he breathes at a rate of 28 but exhibits no dyspnea or dyssynchrony. The peak airway pressure is about 30 cm water with a set tidal volume of 450 ml and PEEP = 8. Oxygen saturation is about 95% on 40% oxygen. The ABG demonstrates an adequate oxygen saturation with a compensated chronic respiratory acidosis. He is less sedated on the propofol infusion and hemodynamically stable. He is afebrile with a maximum temperature of 98.4 degrees overnight. Wound care continues and his edema seems less after diuretic therapy.     5/21 - SR - 99.0 degrees - 139/73 - 92 - 16 - 94%; d/w RT: 16/8/450/50, yellow plugges noted; procalcitonin  slightly elevated yesterday; 5/19 cxr showed hypoventilation and interstitial prominence; 5/19 BC negative; on augmentin and cefepime plus vancomycin    5/22 - d/w RT, RN, and Dr. Herr; needed an FIO2 boost overnight; now, 16/4508/45;   T 100.2 P99BP 105/49 RR 18SaO2 94%; same abx; 5/19 TA=klebsiella; alert    5/23 - remains on 16/450/45/8, d/w RT; 98.2 degrees - 80- 109/68 - 21 - 96% - SR; leukocytosis a bit better, anemia persists, slight hyponatremia, slightly worsened azotemia noted, ongoing antiinfectives; midline placed; propofol down to 26    5/24 - d/w RT, try some initial weaning today if possible; currently on 16/450/45/8; no distress, resting comfortably; remains on propofol at 26;  Leukocytosis, anemia, hyponatremia, azotemia, hyperglycemia noted, plts down to 143; SR - 97.5 degrees - 117/59 - 87 - 22 - 93%; cxr for tomorrow ordered, on Maxipime and vanco; 5/22 BC and urine ctx neg;     5/25 - d/w RT and RN; off of propofol, try weaning; on 16/450/8/45, resting comfortable in NAD; good diuresis with metolazone; 97.8 degrees - 93 - 127/70 - 18 - SR/BBB; leukocytosis, anemia today are similar to prior; cxr today shows right > left bibasilar atx and opacities; on Maxipine, fluconazole, and vanco; legs remian wrapped, edema noted    526 - d/w RT, was able to tolerate 3 hours of 10/8 yesterday, will try more today as able; SR - 98.6 degrees - 140/76 - 92 - 17 - 97%; leukocytosis and anemia noted and similar to prior; new hyponatremia, hyperglycemia, mild azotemia is a bit better; on lasix, diamox, and metolazone    5/27 - d/w RT, on sbt now - he is watching TV and unaware of weaning, yesterday did only 1 hour; SR - 98 degrees - 133/62 - 77 - 32 - 92; leukocytosis, anemia, hyponatremia, azotemia, hyperglycemia today; last cxr 5/25 stable abnormal; on same diuretics and antiinfectives     5/28 - d/w RT, did well with weaning but eventually tired out, weaker today; back on AC at # AMl white-yellow secretions;  sr/BBB - 98.2 degrees - 84 - 138/74 - 28 - 94%; leukocytosis and anemia without sig delta from prior;     5/29 - d/w RT, weaning as able; rest settings are 16/450/8/55; 5/28 cxr showed stable infiltrates/opacities, ongoing diuresis; cpap just initiated today,albeit with a high PS 16 cmH2O pressure; SR - 98.1 degrees - 129/68 - 89 - 20 - 94%; leukocytosis, anemia, hyperglycemia, azotemia persist; on diamox, lasix, metolazone; on fluconazole; TA shows LFGNR 5/27 5/30 - SR - 98.1 degrees - 101/62 - 87 - 24 - 91%; augmentin started for cellulitis, diamox stopped, also on fluconazole; tolerated 20 hours of weaning yesterday, today placed back on 10/8, on 45%; cxr yesterday with stable opacities; TA with light growth of Kleb        PFSH:  No change.    Respiratory:  Exam: symmetrical but diminished bilateral breath sounds with basilar rales but no wheezing or egophony.  ImagingAvailable data reviewed. The chest film on 5/19 demonstrated stable interstitial prominence with no new focal consolidation. The endotracheal tube seemed to be in good position. No film today.    HemoDynamics:  Exam: regular rhythm (Sinus)  Imaging: Available data reviewed. The echocardiogram on March 18, 2018 was a limited study but demonstrated grossly normal left ventricular systolic function.    Neuro:  Exam: no focal deficits noted mental status intact  Imaging: None - Reviewed    Recent Labs      05/28/18 0318 05/29/18   0343  05/30/18   0258   SODIUM  132*  131*  131*   POTASSIUM  3.9  4.1  4.3   CHLORIDE  93*  93*  89*   CO2  32  29  31   BUN  30*  34*  35*   CREATININE  0.49*  0.51  0.52   CALCIUM  9.7  9.6  9.9     GI/Nutrition:  Exam: abdomen is soft and non-tender, normal active bowel sounds  Imaging: Available data reviewed  NPO, enteral tube feeding initiated.  Liver Function  Recent Labs      05/28/18 0318 05/29/18   0343  05/30/18   0258   ALTSGPT  18   --    --    ASTSGOT  18   --    --    ALKPHOSPHAT  96   --    --     TBILIRUBIN  0.4   --    --    GLUCOSE  161*  167*  152*       Heme:  Recent Labs      05/28/18 0318 05/29/18   0343  05/30/18   0258   RBC  3.88*  4.01*  4.14*   HEMOGLOBIN  10.6*  11.0*  11.4*   HEMATOCRIT  34.5*  35.4*  35.7*   PLATELETCT  309  297  278       Infectious Disease:  Micro: cultures reviewed; no new positive cultures. The tracheal aspirate demonstrated gram-negative rods with identification and sensitivity pending.  Recent Labs      05/28/18 0318 05/29/18   0343  05/30/18   0258   WBC  12.9*  15.3*  15.1*   ASTSGOT  18   --    --    ALTSGPT  18   --    --    ALKPHOSPHAT  96   --    --    TBILIRUBIN  0.4   --    --        Quality  Measures:  Radiology images reviewed, Medications reviewed and Labs reviewed                      Problems:  1. VDRF, acute on chronic respiratory failure  2. Chronic obstructive pulmonary disease.  3. Obesity, suspected obesity hypoventilation syndrome and obstructive sleep apnea hypopnea syndrome.  4. Chronic lymphedema, compression, diuresis  5. Diabetes mellitus, controlled.  6. Chronic pain syndrome requiring chronic narcotic analgesia.  7. Hypothyroidism, on replacement therapy.  8. Systemic arterial hypertension, controlled.  9. Anemia, stable without evident ongoing blood loss.  10. Dysphagia, moderate PCM, hypoalbuminemia  11. Abnormal chest imaging - ongoing diuresis, consider tap if need be  12. Debility  13. Leukocytosis  14. Kleb HAP  15. BBB  16. Full code  17. Hyponatremia  18. Azotemia  19. Hypoalbuminemia    Plan:  Continued full support mechanical ventilation with a lung-protective ventilation strategy. Titrate supplemental oxygen, continue bronchodilators and respiratory protocols.  Weaning as tolerated.  Continue antibiotics , adjust accordingly as needed    Continue nutritional support with enteral tube feeding. Continue therapies, mobilization and prophylaxis.    Discussed patient condition and risk of morbidity and/or mortality with RN and RT  and with Dr. Sifuentes.

## 2018-05-31 ASSESSMENT — ENCOUNTER SYMPTOMS
NAUSEA: 0
HEADACHES: 0
CHILLS: 0
DIAPHORESIS: 0
SPUTUM PRODUCTION: 0
PALPITATIONS: 0
SHORTNESS OF BREATH: 0
COUGH: 0
MYALGIAS: 0
DIARRHEA: 0
WHEEZING: 0
CONSTIPATION: 0

## 2018-05-31 NOTE — PROGRESS NOTES
Pulmonary Critical Care Progress Note        Chief Complaint: Acute on chronic respiratory failure with hypoxia and hypercapnia.    History of Present Illness: 57 y.o. male with complex medical history including COPD, congestive heart failure, type 2 diabetes, bilateral lower extremity cellulitis and probable obstructive sleep apnea was admitted to [facility] to be treated for bilateral lower extremity cellulitis. The patient is noncompliant. He was recently admitted to [facility] intubated for about a week for acute and chronic hypercapnic respiratory failure with CO2 retention. The patient was prescribed a trilogy ventilator to use after discharge.      The patient was readmitted to the hospital for bilateral lower extremity cellulitis. He was found to have high bicarbonate on basic metabolic panel of 42. Pulmonary consultation was requested for further evaluation and management of his chronic respiratory failure.    ROS:  Respiratory: positive shortness of breath, unchanged, Cardiac: negative chest pain, GI: negative abdominal pain.  All other systems negative.    Interval Events:  24 hour interval history reviewed.    5/20 - He developed increased dyspnea overnight on May 18 and required intubation. He is now on full ventilatory support. With an assist-control set rate of 16 he breathes at a rate of 28 but exhibits no dyspnea or dyssynchrony. The peak airway pressure is about 30 cm water with a set tidal volume of 450 ml and PEEP = 8. Oxygen saturation is about 95% on 40% oxygen. The ABG demonstrates an adequate oxygen saturation with a compensated chronic respiratory acidosis. He is less sedated on the propofol infusion and hemodynamically stable. He is afebrile with a maximum temperature of 98.4 degrees overnight. Wound care continues and his edema seems less after diuretic therapy.     5/21 - SR - 99.0 degrees - 139/73 - 92 - 16 - 94%; d/w RT: 16/8/450/50, yellow plugges noted; procalcitonin  slightly elevated yesterday; 5/19 cxr showed hypoventilation and interstitial prominence; 5/19 BC negative; on augmentin and cefepime plus vancomycin    5/22 - d/w RT, RN, and Dr. Herr; needed an FIO2 boost overnight; now, 16/4508/45;   T 100.2 P99BP 105/49 RR 18SaO2 94%; same abx; 5/19 TA=klebsiella; alert    5/23 - remains on 16/450/45/8, d/w RT; 98.2 degrees - 80- 109/68 - 21 - 96% - SR; leukocytosis a bit better, anemia persists, slight hyponatremia, slightly worsened azotemia noted, ongoing antiinfectives; midline placed; propofol down to 26    5/24 - d/w RT, try some initial weaning today if possible; currently on 16/450/45/8; no distress, resting comfortably; remains on propofol at 26;  Leukocytosis, anemia, hyponatremia, azotemia, hyperglycemia noted, plts down to 143; SR - 97.5 degrees - 117/59 - 87 - 22 - 93%; cxr for tomorrow ordered, on Maxipime and vanco; 5/22 BC and urine ctx neg;     5/25 - d/w RT and RN; off of propofol, try weaning; on 16/450/8/45, resting comfortable in NAD; good diuresis with metolazone; 97.8 degrees - 93 - 127/70 - 18 - SR/BBB; leukocytosis, anemia today are similar to prior; cxr today shows right > left bibasilar atx and opacities; on Maxipine, fluconazole, and vanco; legs remian wrapped, edema noted    526 - d/w RT, was able to tolerate 3 hours of 10/8 yesterday, will try more today as able; SR - 98.6 degrees - 140/76 - 92 - 17 - 97%; leukocytosis and anemia noted and similar to prior; new hyponatremia, hyperglycemia, mild azotemia is a bit better; on lasix, diamox, and metolazone    5/27 - d/w RT, on sbt now - he is watching TV and unaware of weaning, yesterday did only 1 hour; SR - 98 degrees - 133/62 - 77 - 32 - 92; leukocytosis, anemia, hyponatremia, azotemia, hyperglycemia today; last cxr 5/25 stable abnormal; on same diuretics and antiinfectives     5/28 - d/w RT, did well with weaning but eventually tired out, weaker today; back on AC at # AMl white-yellow secretions;  sr/BBB - 98.2 degrees - 84 - 138/74 - 28 - 94%; leukocytosis and anemia without sig delta from prior;     5/29 - d/w RT, weaning as able; rest settings are 16/450/8/55; 5/28 cxr showed stable infiltrates/opacities, ongoing diuresis; cpap just initiated today,albeit with a high PS 16 cmH2O pressure; SR - 98.1 degrees - 129/68 - 89 - 20 - 94%; leukocytosis, anemia, hyperglycemia, azotemia persist; on diamox, lasix, metolazone; on fluconazole; TA shows LFGNR 5/27 5/30 - SR - 98.1 degrees - 101/62 - 87 - 24 - 91%; augmentin started for cellulitis, diamox stopped, also on fluconazole; tolerated 20 hours of weaning yesterday, today placed back on 10/8, on 45%; cxr yesterday with stable opacities; TA with light growth of Kleb    5/31 - has done 24 hours on 10/8 and is none the worse for the wear; discussed issues with RT and the patient, trying to clarify the plan once extubated, if he fares poorly; SR - 97.9 degrees - 157/63 - 92 - 16 - 99%; remains on augmentin and fluconazole        PFSH:  No change.    Respiratory:  Exam: symmetrical but diminished bilateral breath sounds with basilar rales but no wheezing or egophony.  ImagingAvailable data reviewed. The chest film on 5/19 demonstrated stable interstitial prominence with no new focal consolidation. The endotracheal tube seemed to be in good position. No film today.    HemoDynamics:  Exam: regular rhythm (Sinus)  Imaging: Available data reviewed. The echocardiogram on March 18, 2018 was a limited study but demonstrated grossly normal left ventricular systolic function.    Neuro:  Exam: no focal deficits noted mental status intact  Imaging: None - Reviewed    Recent Labs      05/29/18   0343  05/30/18   0258   SODIUM  131*  131*   POTASSIUM  4.1  4.3   CHLORIDE  93*  89*   CO2  29  31   BUN  34*  35*   CREATININE  0.51  0.52   CALCIUM  9.6  9.9     GI/Nutrition:  Exam: abdomen is soft and non-tender, normal active bowel sounds  Imaging: Available data reviewed  NPO,  enteral tube feeding initiated.  Liver Function  Recent Labs      05/29/18   0343  05/30/18   0258   GLUCOSE  167*  152*       Heme:  Recent Labs      05/29/18   0343  05/30/18   0258   RBC  4.01*  4.14*   HEMOGLOBIN  11.0*  11.4*   HEMATOCRIT  35.4*  35.7*   PLATELETCT  297  278       Infectious Disease:  Micro: cultures reviewed; no new positive cultures. The tracheal aspirate demonstrated gram-negative rods with identification and sensitivity pending.  Recent Labs      05/29/18   0343  05/30/18   0258   WBC  15.3*  15.1*       Quality  Measures:  Radiology images reviewed, Medications reviewed and Labs reviewed                      Problems:  1. VDRF, acute on chronic respiratory failure  2. Chronic obstructive pulmonary disease.  3. Obesity, suspected obesity hypoventilation syndrome and obstructive sleep apnea hypopnea syndrome.  4. Chronic lymphedema, compression, diuresis  5. Diabetes mellitus, controlled.  6. Chronic pain syndrome requiring chronic narcotic analgesia.  7. Hypothyroidism, on replacement therapy.  8. Systemic arterial hypertension, controlled.  9. Anemia, stable without evident ongoing blood loss.  10. Dysphagia, moderate PCM, hypoalbuminemia  11. Abnormal chest imaging - ongoing diuresis, consider tap if need be  12. Debility  13. Leukocytosis  14. Kleb HAP  15. BBB  16. Full code  17. Hyponatremia  18. Azotemia  19. Hypoalbuminemia    Plan:  Continued full support mechanical ventilation with a lung-protective ventilation strategy. Titrate supplemental oxygen, continue bronchodilators and respiratory protocols.  Weaning as tolerated.  Continue antibiotics , adjust accordingly as needed    Continue nutritional support with enteral tube feeding. Continue therapies, mobilization and prophylaxis.    Discussed patient condition and risk of morbidity and/or mortality with RN and RT and with Dr. Sifuentes.

## 2018-05-31 NOTE — [facility]
Hospital Medicine Progress Note, Adult, Complex               Author: Kelley Sifuentes Date & Time created: 5/31/2018  12:36 PM     CC: bilateral leg cellulitis and lymphedema    Interval History:  Patient is admitted with worsening leg pain and drainage of purulent fluid. Unasyn completed 5/14 per ID  He refuses to use bipap   5/19 patient was intubated due to hypoventilation  Patient finished antibiotics for klebsiella pneumonia 5/28  Today he is asking to proceed with taper of his pain medication in hopes it will help his breathing  He had pressure support decreased and did not tolerate this as he felt short of breath and developed tachycardia    Review of Systems:  Review of Systems   Constitutional: Negative for chills and diaphoresis.   HENT: Positive for congestion.    Respiratory: Negative for cough, sputum production, shortness of breath and wheezing.    Cardiovascular: Negative for chest pain and palpitations.   Gastrointestinal: Negative for constipation, diarrhea and nausea.   Genitourinary: Negative for dysuria.   Musculoskeletal: Negative for myalgias.   Neurological: Negative for headaches.       Physical Exam:  Physical Exam   Constitutional: No distress. He is sedated, intubated and restrained.   HENT:   Mouth/Throat: No oropharyngeal exudate.   Eyes: No scleral icterus.   Cardiovascular: Normal rate and regular rhythm.    No murmur heard.  Pulmonary/Chest: Effort normal. Tachypnea noted. He is intubated. No respiratory distress. He has no wheezes. He has no rales.   Abdominal: Soft. Bowel sounds are normal. He exhibits no distension.   Musculoskeletal: He exhibits edema.   2+ leg edema, much improved  Compression wraps over lower legs   Neurological: He is alert.   Skin: Skin is warm and dry. No rash noted. He is not diaphoretic.   Psychiatric: His behavior is normal.   Nursing note and vitals reviewed.      Labs:        Invalid input(s): [id number]      Recent Labs      05/29/18   4138   05/30/18   0258   SODIUM  131*  131*   POTASSIUM  4.1  4.3   CHLORIDE  93*  89*   CO2  29  31   BUN  34*  35*   CREATININE  0.51  0.52   CALCIUM  9.6  9.9     Recent Labs      05/29/18   0343  05/30/18   0258   GLUCOSE  167*  152*     Recent Labs      05/29/18 0343  05/30/18   0258   RBC  4.01*  4.14*   HEMOGLOBIN  11.0*  11.4*   HEMATOCRIT  35.4*  35.7*   PLATELETCT  297  278     Recent Labs      05/29/18 0343  05/30/18   0258   WBC  15.3*  15.1*           Hemodynamics:     T97.9 /63 HR92 RR16 O2sat 99% on ventilator      GI/Nutrition:  Orders Placed This Encounter   Procedures   • DIET NPO     Standing Status:   Standing     Number of Occurrences:   1     Order Specific Question:   Restrict to:     Answer:   With Tube Feed [4]   • DIET TUBE FEED     Standing Status:   Standing     Number of Occurrences:   1     Order Specific Question:   Diet     Answer:   Diet Tube Feed [35]     Order Specific Question:   Formula:     Answer:   Diabeticsource AC [34]     Order Specific Question:   Goal Rate (mL/Hour)     Answer:   75     Order Specific Question:   Route     Answer:   NG     Medical Decision Making, by Problem:  Bilateral lower leg cellulitis, oral augmentin for suppression now that iv therapy completed   Edema improved     Lymphedema chronic, continue compression   Continue lasix     Buttock DTI, dasilva placed for skin protection    Acute on chronic respiratory failure, ventilator support   Cefepime to treat klebsiella in sputum through 5/28   Taper pressure as tolerated, patient aware he may require a tracheostomy  Will taper pain medication today    Elevated troponin, improved to normal, due to heart strain from respiratory distress      HTN (hypertension) monitor     COPD (chronic obstructive pulmonary disease) oxygen and respiratory therapy    Stasis dermatitis stable    Narcotic addiction taper pain medication slowly    Non compliance w medication regimen, noncompliance with bipap contributing to  respiratory failure acutely      Diabetes type 2,  metformin    Severe protein-calorie malnutrition, with patient eating variable caloric intake, poor dietary choices, and now unable to take po nutrition due to intubation,     tube feeds    Chronic respiratory failure, oxygen    Morbid obesity with BMI of 40.0-44.9, adult     Chronic pain continue pain meds    ALIREZA (obstructive sleep apnea) bipap refused by patient, he is now aware that he must use it if extubated           Quality-Core Measures   Reviewed items::  Labs reviewed and Medications reviewed  Montoya catheter::  No Montoya  DVT prophylaxis pharmacological::  Heparin  Ulcer Prophylaxis::  Yes  Antibiotics:  Treating active infection/contamination beyond 24 hours perioperative coverage  Assessed for rehabilitation services:  Patient returned to prior level of function, rehabilitation not indicated at this time and Patient unable to tolerate rehabilitation therapeutic regimen

## 2018-06-01 ASSESSMENT — ENCOUNTER SYMPTOMS
COUGH: 0
NAUSEA: 0
DIAPHORESIS: 0
CHILLS: 0
MYALGIAS: 0
SHORTNESS OF BREATH: 0
SPUTUM PRODUCTION: 0
FEVER: 0
CONSTIPATION: 0
HEADACHES: 0
DIARRHEA: 0

## 2018-06-01 NOTE — PROGRESS NOTES
Pulmonary Critical Care Progress Note        Chief Complaint: Acute on chronic respiratory failure with hypoxia and hypercapnia.    History of Present Illness: 57 y.o. male with complex medical history including COPD, congestive heart failure, type 2 diabetes, bilateral lower extremity cellulitis and probable obstructive sleep apnea was admitted to [facility] to be treated for bilateral lower extremity cellulitis. The patient is noncompliant. He was recently admitted to [facility] intubated for about a week for acute and chronic hypercapnic respiratory failure with CO2 retention. The patient was prescribed a trilogy ventilator to use after discharge.      The patient was readmitted to the hospital for bilateral lower extremity cellulitis. He was found to have high bicarbonate on basic metabolic panel of 42. Pulmonary consultation was requested for further evaluation and management of his chronic respiratory failure.    ROS:  Respiratory: positive shortness of breath, unchanged, Cardiac: negative chest pain, GI: negative abdominal pain.  All other systems negative.    Interval Events:  24 hour interval history reviewed.    5/20 - He developed increased dyspnea overnight on May 18 and required intubation. He is now on full ventilatory support. With an assist-control set rate of 16 he breathes at a rate of 28 but exhibits no dyspnea or dyssynchrony. The peak airway pressure is about 30 cm water with a set tidal volume of 450 ml and PEEP = 8. Oxygen saturation is about 95% on 40% oxygen. The ABG demonstrates an adequate oxygen saturation with a compensated chronic respiratory acidosis. He is less sedated on the propofol infusion and hemodynamically stable. He is afebrile with a maximum temperature of 98.4 degrees overnight. Wound care continues and his edema seems less after diuretic therapy.     5/21 - SR - 99.0 degrees - 139/73 - 92 - 16 - 94%; d/w RT: 16/8/450/50, yellow plugges noted; procalcitonin  slightly elevated yesterday; 5/19 cxr showed hypoventilation and interstitial prominence; 5/19 BC negative; on augmentin and cefepime plus vancomycin    5/22 - d/w RT, RN, and Dr. Herr; needed an FIO2 boost overnight; now, 16/4508/45;   T 100.2 P99BP 105/49 RR 18SaO2 94%; same abx; 5/19 TA=klebsiella; alert    5/23 - remains on 16/450/45/8, d/w RT; 98.2 degrees - 80- 109/68 - 21 - 96% - SR; leukocytosis a bit better, anemia persists, slight hyponatremia, slightly worsened azotemia noted, ongoing antiinfectives; midline placed; propofol down to 26    5/24 - d/w RT, try some initial weaning today if possible; currently on 16/450/45/8; no distress, resting comfortably; remains on propofol at 26;  Leukocytosis, anemia, hyponatremia, azotemia, hyperglycemia noted, plts down to 143; SR - 97.5 degrees - 117/59 - 87 - 22 - 93%; cxr for tomorrow ordered, on Maxipime and vanco; 5/22 BC and urine ctx neg;     5/25 - d/w RT and RN; off of propofol, try weaning; on 16/450/8/45, resting comfortable in NAD; good diuresis with metolazone; 97.8 degrees - 93 - 127/70 - 18 - SR/BBB; leukocytosis, anemia today are similar to prior; cxr today shows right > left bibasilar atx and opacities; on Maxipine, fluconazole, and vanco; legs remian wrapped, edema noted    526 - d/w RT, was able to tolerate 3 hours of 10/8 yesterday, will try more today as able; SR - 98.6 degrees - 140/76 - 92 - 17 - 97%; leukocytosis and anemia noted and similar to prior; new hyponatremia, hyperglycemia, mild azotemia is a bit better; on lasix, diamox, and metolazone    5/27 - d/w RT, on sbt now - he is watching TV and unaware of weaning, yesterday did only 1 hour; SR - 98 degrees - 133/62 - 77 - 32 - 92; leukocytosis, anemia, hyponatremia, azotemia, hyperglycemia today; last cxr 5/25 stable abnormal; on same diuretics and antiinfectives     5/28 - d/w RT, did well with weaning but eventually tired out, weaker today; back on AC at # AMl white-yellow secretions;  sr/BBB - 98.2 degrees - 84 - 138/74 - 28 - 94%; leukocytosis and anemia without sig delta from prior;     5/29 - d/w RT, weaning as able; rest settings are 16/450/8/55; 5/28 cxr showed stable infiltrates/opacities, ongoing diuresis; cpap just initiated today,albeit with a high PS 16 cmH2O pressure; SR - 98.1 degrees - 129/68 - 89 - 20 - 94%; leukocytosis, anemia, hyperglycemia, azotemia persist; on diamox, lasix, metolazone; on fluconazole; TA shows LFGNR 5/27 5/30 - SR - 98.1 degrees - 101/62 - 87 - 24 - 91%; augmentin started for cellulitis, diamox stopped, also on fluconazole; tolerated 20 hours of weaning yesterday, today placed back on 10/8, on 45%; cxr yesterday with stable opacities; TA with light growth of Kleb    5/31 - has done 24 hours on 10/8 and is none the worse for the wear; discussed issues with RT and the patient, trying to clarify the plan once extubated, if he fares poorly; SR - 97.9 degrees - 157/63 - 92 - 16 - 99%; remains on augmentin and fluconazole    6/1 - d/w RT, failed weaning at noon yesterday, turned purpl according to RT; back on AC and 50%; has now been intubated times 2 weeks, failing weaning, needs trach if he agrees;SR; 139/89 - 82 - 22 - 93% - 98.0 degrees; no new labs, imaging or micro today, fluconazole and augmentin antiinfectives        PFSH:  No change.    Respiratory:  Exam: symmetrical but diminished bilateral breath sounds with basilar rales but no wheezing or egophony.  ImagingAvailable data reviewed. The chest film on 5/19 demonstrated stable interstitial prominence with no new focal consolidation. The endotracheal tube seemed to be in good position. No film today.    HemoDynamics:  Exam: regular rhythm (Sinus)  Imaging: Available data reviewed. The echocardiogram on March 18, 2018 was a limited study but demonstrated grossly normal left ventricular systolic function.    Neuro:  Exam: no focal deficits noted mental status intact  Imaging: None - Reviewed    Recent Labs       05/30/18   0258   SODIUM  131*   POTASSIUM  4.3   CHLORIDE  89*   CO2  31   BUN  35*   CREATININE  0.52   CALCIUM  9.9     GI/Nutrition:  Exam: abdomen is soft and non-tender, normal active bowel sounds  Imaging: Available data reviewed  NPO, enteral tube feeding initiated.  Liver Function  Recent Labs      05/30/18 0258   GLUCOSE  152*       Heme:  Recent Labs      05/30/18 0258   RBC  4.14*   HEMOGLOBIN  11.4*   HEMATOCRIT  35.7*   PLATELETCT  278       Infectious Disease:  Micro: cultures reviewed; no new positive cultures. The tracheal aspirate demonstrated gram-negative rods with identification and sensitivity pending.  Recent Labs      05/30/18 0258   WBC  15.1*       Quality  Measures:  Radiology images reviewed, Medications reviewed and Labs reviewed                      Problems:  1. VDRF, acute on chronic respiratory failure  2. Chronic obstructive pulmonary disease.  3. Obesity, suspected obesity hypoventilation syndrome and obstructive sleep apnea hypopnea syndrome.  4. Chronic lymphedema, compression, diuresis  5. Diabetes mellitus, controlled.  6. Chronic pain syndrome requiring chronic narcotic analgesia.  7. Hypothyroidism, on replacement therapy.  8. Systemic arterial hypertension, controlled.  9. Anemia, stable without evident ongoing blood loss.  10. Dysphagia, moderate PCM, hypoalbuminemia  11. Abnormal chest imaging - ongoing diuresis, consider tap if need be  12. Debility  13. Leukocytosis  14. Kleb HAP  15. BBB  16. Full code  17. Hyponatremia  18. Azotemia  19. Hypoalbuminemia    Plan:  Continued full support mechanical ventilation with a lung-protective ventilation strategy. Titrate supplemental oxygen, continue bronchodilators and respiratory protocols.  Weaning as tolerated.  Continue antibiotics , adjust accordingly as needed    Continue nutritional support with enteral tube feeding. Continue therapies, mobilization and prophylaxis.    Discussed patient condition and risk of  morbidity and/or mortality with RN and RT and with Dr. Sifuentes.

## 2018-06-01 NOTE — [facility]
Hospital Medicine Progress Note, Adult, Complex               Author: Kelley Sifuentes Date & Time created: 6/1/2018  11:31 AM     CC: bilateral leg cellulitis and lymphedema    Interval History:  Patient is admitted with worsening leg pain and drainage of purulent fluid. Unasyn completed 5/14 per ID  He refuses to use bipap   5/19 patient was intubated due to hypoventilation  Patient finished antibiotics for klebsiella pneumonia 5/28  Patient's pain is controlled on oxycodone 30mg every 4 hours and he is asking to continue a slow taper to see if this helps his lungs    Review of Systems:  Review of Systems   Constitutional: Negative for chills, diaphoresis and fever.   HENT: Negative for congestion.    Respiratory: Negative for cough, sputum production and shortness of breath.    Cardiovascular: Positive for leg swelling. Negative for chest pain.        Swelling much improved   Gastrointestinal: Negative for constipation, diarrhea and nausea.        Abdomen distended  Patient complains of gas in stomach   Genitourinary: Negative for dysuria.   Musculoskeletal: Negative for myalgias.        Pain controlled on lower dose oxycodone   Neurological: Negative for headaches.       Physical Exam:  Physical Exam   Constitutional: No distress. He is sedated, intubated and restrained.   HENT:   Mouth/Throat: Oropharynx is clear and moist. No oropharyngeal exudate.   Eyes: Conjunctivae are normal. No scleral icterus.   Cardiovascular: Normal rate and regular rhythm.    No murmur heard.  Pulmonary/Chest: Effort normal. Tachypnea noted. He is intubated. No respiratory distress. He has decreased breath sounds. He has no rales.   Abdominal: Soft. He exhibits distension. There is no tenderness.   Musculoskeletal: He exhibits edema.   2+ leg edema, much improved  Compression wraps over lower legs   Neurological: He is alert.   Skin: Skin is warm. No rash noted. He is not diaphoretic.   Psychiatric: His behavior is normal.   Nursing  note and vitals reviewed.      Labs:        Invalid input(s): [id number]      Recent Labs      05/30/18   0258   SODIUM  131*   POTASSIUM  4.3   CHLORIDE  89*   CO2  31   BUN  35*   CREATININE  0.52   CALCIUM  9.9     Recent Labs      05/30/18 0258   GLUCOSE  152*     Recent Labs      05/30/18 0258   RBC  4.14*   HEMOGLOBIN  11.4*   HEMATOCRIT  35.7*   PLATELETCT  278     Recent Labs      05/30/18 0258   WBC  15.1*           Hemodynamics:     T98 /89 HR82 RR22 O2sat 93% on ventilator      GI/Nutrition:  Orders Placed This Encounter   Procedures   • DIET NPO     Standing Status:   Standing     Number of Occurrences:   1     Order Specific Question:   Restrict to:     Answer:   With Tube Feed [4]   • DIET TUBE FEED     Standing Status:   Standing     Number of Occurrences:   1     Order Specific Question:   Diet     Answer:   Diet Tube Feed [35]     Order Specific Question:   Formula:     Answer:   Diabeticsource AC [34]     Order Specific Question:   Goal Rate (mL/Hour)     Answer:   75     Order Specific Question:   Route     Answer:   NG     Medical Decision Making, by Problem:  Bilateral lower leg cellulitis, oral augmentin for suppression    Iv antibiotics completed   culturelle and simethicone for distention with gas    Lymphedema chronic, continue leg compression    lasix     Buttock DTI, dasilva placed for skin protection    Acute on chronic respiratory failure, ventilator support   Cefepime to treat klebsiella in sputum through 5/28   Taper pressure as tolerated, patient aware he may require a tracheostomy    Elevated troponin, improved to normal, due to heart strain from respiratory distress      HTN (hypertension) monitor     COPD (chronic obstructive pulmonary disease) oxygen and respiratory therapy    Stasis dermatitis stable    Narcotic addiction taper pain medication slowly    Non compliance w medication regimen, noncompliance with bipap contributing to respiratory failure acutely, patient  agreeable to try bipap again if extubated      Diabetes type 2,  metformin    Severe protein-calorie malnutrition, with patient eating variable caloric intake, poor dietary choices, and now unable to take po nutrition due to intubation,     tube feeds    Chronic respiratory failure, oxygen    Morbid obesity with BMI of 40.0-44.9, adult     Chronic pain continue pain meds    ALIREZA (obstructive sleep apnea) bipap needed          Quality-Core Measures   Reviewed items::  Labs reviewed and Medications reviewed  Montoya catheter::  No Montoya  DVT prophylaxis pharmacological::  Heparin  Ulcer Prophylaxis::  Yes  Antibiotics:  Treating active infection/contamination beyond 24 hours perioperative coverage  Assessed for rehabilitation services:  Patient returned to prior level of function, rehabilitation not indicated at this time and Patient unable to tolerate rehabilitation therapeutic regimen

## 2018-06-02 LAB
ANION GAP SERPL CALC-SCNC: 13 MMOL/L (ref 0–11.9)
BUN SERPL-MCNC: 38 MG/DL (ref 8–22)
CALCIUM SERPL-MCNC: 9.8 MG/DL (ref 8.4–10.2)
CHLORIDE SERPL-SCNC: 85 MMOL/L (ref 96–112)
CO2 SERPL-SCNC: 29 MMOL/L (ref 20–33)
CREAT SERPL-MCNC: 0.65 MG/DL (ref 0.5–1.4)
ERYTHROCYTE [DISTWIDTH] IN BLOOD BY AUTOMATED COUNT: 49.1 FL (ref 35.9–50)
GLUCOSE SERPL-MCNC: 160 MG/DL (ref 65–99)
HCT VFR BLD AUTO: 39.4 % (ref 42–52)
HGB BLD-MCNC: 12.4 G/DL (ref 14–18)
MCH RBC QN AUTO: 27.5 PG (ref 27–33)
MCHC RBC AUTO-ENTMCNC: 31.5 G/DL (ref 33.7–35.3)
MCV RBC AUTO: 87.4 FL (ref 81.4–97.8)
PLATELET # BLD AUTO: 127 K/UL (ref 164–446)
PMV BLD AUTO: 12.8 FL (ref 9–12.9)
POTASSIUM SERPL-SCNC: 4.3 MMOL/L (ref 3.6–5.5)
RBC # BLD AUTO: 4.51 M/UL (ref 4.7–6.1)
SODIUM SERPL-SCNC: 127 MMOL/L (ref 135–145)
WBC # BLD AUTO: 11.1 K/UL (ref 4.8–10.8)

## 2018-06-02 PROCEDURE — 85027 COMPLETE CBC AUTOMATED: CPT

## 2018-06-02 PROCEDURE — 80048 BASIC METABOLIC PNL TOTAL CA: CPT

## 2018-06-02 ASSESSMENT — ENCOUNTER SYMPTOMS
CONSTIPATION: 0
COUGH: 0
HEADACHES: 0
VOMITING: 0
NAUSEA: 0
DIARRHEA: 0
DIAPHORESIS: 0
SPUTUM PRODUCTION: 0
FEVER: 0
MYALGIAS: 0

## 2018-06-02 NOTE — [facility]
Hospital Medicine Progress Note, Adult, Complex               Author: Kelley Sifuentes Date & Time created: 6/2/2018  9:43 AM     CC: bilateral leg cellulitis and lymphedema    Interval History:  Patient is admitted with worsening leg pain and drainage of purulent fluid. Unasyn completed 5/14 per ID  He refuses to use bipap   5/19 patient was intubated due to hypoventilation  Patient finished antibiotics for klebsiella pneumonia 5/28  His pain is controlled and he agrees to slow taper of oxycodone to help his respiratory status  Leg swelling is much improved    Review of Systems:  Review of Systems   Constitutional: Negative for diaphoresis and fever.   HENT: Negative for congestion.    Respiratory: Negative for cough and sputum production.    Cardiovascular: Positive for leg swelling. Negative for chest pain.   Gastrointestinal: Negative for constipation, diarrhea, nausea and vomiting.   Genitourinary: Negative for dysuria and hematuria.   Musculoskeletal: Negative for myalgias.        Pain controlled    Skin: Negative for itching.   Neurological: Negative for headaches.       Physical Exam:  Physical Exam   Constitutional: No distress. He is sedated, intubated and restrained.   HENT:   Mouth/Throat: No oropharyngeal exudate.   Eyes: No scleral icterus.   Cardiovascular: Normal rate and regular rhythm.    No murmur heard.  Pulmonary/Chest: Effort normal. Tachypnea noted. He is intubated. He has decreased breath sounds. He has no wheezes. He has no rales.   Abdominal: Soft. Bowel sounds are normal. He exhibits distension. There is no tenderness.   Musculoskeletal: He exhibits edema.   2+ leg edema, much improved  Compression wraps over lower legs   Neurological: He is alert.   Skin: Skin is warm and dry. He is not diaphoretic. No erythema.   Psychiatric: His behavior is normal.   Nursing note and vitals reviewed.      Labs:        Invalid input(s): [id number]      Recent Labs      06/02/18   0330   SODIUM  127*    POTASSIUM  4.3   CHLORIDE  85*   CO2  29   BUN  38*   CREATININE  0.65   CALCIUM  9.8     Recent Labs      06/02/18   0330   GLUCOSE  160*     Recent Labs      06/02/18   0330   RBC  4.51*   HEMOGLOBIN  12.4*   HEMATOCRIT  39.4*   PLATELETCT  127*     Recent Labs      06/02/18   0330   WBC  11.1*           Hemodynamics:     T99.4 /72 HR98 RR29 O2sat 92% on ventilator  I/O 2570/2750    GI/Nutrition:  Orders Placed This Encounter   Procedures   • DIET NPO     Standing Status:   Standing     Number of Occurrences:   1     Order Specific Question:   Restrict to:     Answer:   With Tube Feed [4]   • DIET TUBE FEED     Standing Status:   Standing     Number of Occurrences:   1     Order Specific Question:   Diet     Answer:   Diet Tube Feed [35]     Order Specific Question:   Formula:     Answer:   Diabeticsource AC [34]     Order Specific Question:   Goal Rate (mL/Hour)     Answer:   75     Order Specific Question:   Route     Answer:   NG     Medical Decision Making, by Problem:  Bilateral lower leg cellulitis, oral augmentin for suppression    Iv antibiotics completed   culturelle and simethicone for abdominal discomfort    Lymphedema chronic, continue leg compression    continue diuresis     Buttock DTI, dasilva placed for skin protection    Hyponatremia, due to diuresis, will monitor labs, may need to decrease diuretic medications    Acute on chronic respiratory failure, ventilator support   Cefepime to treat klebsiella in sputum through 5/28   Taper pressure as tolerated, patient aware he may require a tracheostomy after our discussion    Elevated troponin, improved to normal, due to heart strain from respiratory distress      HTN (hypertension) monitor     COPD (chronic obstructive pulmonary disease) oxygen and respiratory therapy    Stasis dermatitis stable    Narcotic addiction taper pain medication slowly    Non compliance w medication regimen, noncompliance with bipap contributing to respiratory failure  acutely, patient agreeable to try bipap again once extubated      Diabetes type 2,  metformin    Severe protein-calorie malnutrition, with patient eating variable caloric intake, poor dietary choices, and now unable to take po nutrition due to intubation,     tube feeds    Chronic respiratory failure, oxygen    Morbid obesity with BMI of 40.0-44.9, adult     Chronic pain continue pain meds    ALIREZA (obstructive sleep apnea) bipap needed          Quality-Core Measures   Reviewed items::  Labs reviewed and Medications reviewed  Montoya catheter::  No Montoya  DVT prophylaxis pharmacological::  Heparin  Ulcer Prophylaxis::  Yes  Antibiotics:  Treating active infection/contamination beyond 24 hours perioperative coverage  Assessed for rehabilitation services:  Patient returned to prior level of function, rehabilitation not indicated at this time and Patient unable to tolerate rehabilitation therapeutic regimen

## 2018-06-03 LAB
ANION GAP SERPL CALC-SCNC: 10 MMOL/L (ref 0–11.9)
BUN SERPL-MCNC: 37 MG/DL (ref 8–22)
CALCIUM SERPL-MCNC: 9.9 MG/DL (ref 8.4–10.2)
CHLORIDE SERPL-SCNC: 86 MMOL/L (ref 96–112)
CO2 SERPL-SCNC: 32 MMOL/L (ref 20–33)
CREAT SERPL-MCNC: 0.64 MG/DL (ref 0.5–1.4)
GLUCOSE SERPL-MCNC: 170 MG/DL (ref 65–99)
POTASSIUM SERPL-SCNC: 3.9 MMOL/L (ref 3.6–5.5)
SODIUM SERPL-SCNC: 128 MMOL/L (ref 135–145)

## 2018-06-03 PROCEDURE — 80048 BASIC METABOLIC PNL TOTAL CA: CPT

## 2018-06-03 ASSESSMENT — ENCOUNTER SYMPTOMS
COUGH: 0
CONSTIPATION: 0
DIARRHEA: 0
PALPITATIONS: 0
FEVER: 0
DIAPHORESIS: 0
SPUTUM PRODUCTION: 0
CHILLS: 0
HEADACHES: 0
NAUSEA: 0

## 2018-06-03 NOTE — [facility]
Hospital Medicine Progress Note, Adult, Complex               Author: Kelley Sifuentes Date & Time created: 6/3/2018  8:58 AM     CC: bilateral leg cellulitis and lymphedema    Interval History:  Patient is admitted with worsening leg pain and drainage of purulent fluid. Unasyn completed 5/14 per ID  He refuses to use bipap   5/19 patient was intubated due to hypoventilation  Patient finished antibiotics for klebsiella pneumonia 5/28  His pain is controlled but he asks to not taper his pain medication yet  He is unable to tolerate prolonged time on cpap    Review of Systems:  Review of Systems   Constitutional: Negative for chills, diaphoresis and fever.   HENT: Negative for congestion.    Respiratory: Negative for cough and sputum production.    Cardiovascular: Positive for leg swelling. Negative for chest pain and palpitations.   Gastrointestinal: Negative for constipation, diarrhea and nausea.   Genitourinary: Negative for dysuria, frequency and urgency.   Musculoskeletal:        Pain controlled    Neurological: Negative for headaches.       Physical Exam:  Physical Exam   Constitutional: No distress. He is sedated, intubated and restrained.   Eyes: Conjunctivae are normal. No scleral icterus.   Cardiovascular: Normal rate and regular rhythm.    No murmur heard.  Pulmonary/Chest: Effort normal. Tachypnea noted. He is intubated. He has decreased breath sounds. He has no wheezes. He has no rales.   Abdominal: Soft. He exhibits distension.   Musculoskeletal: He exhibits edema.   2+ leg edema, much improved  Compression wraps over lower legs   Neurological: He is alert.   Skin: Skin is warm. He is not diaphoretic. No erythema.   Psychiatric: His behavior is normal.   Nursing note and vitals reviewed.      Labs:        Invalid input(s): [id number]      Recent Labs      06/02/18   0330  06/03/18   0325   SODIUM  127*  128*   POTASSIUM  4.3  3.9   CHLORIDE  85*  86*   CO2  29  32   BUN  38*  37*   CREATININE  0.65   0.64   CALCIUM  9.8  9.9     Recent Labs      06/02/18   0330  06/03/18   0325   GLUCOSE  160*  170*     Recent Labs      06/02/18   0330   RBC  4.51*   HEMOGLOBIN  12.4*   HEMATOCRIT  39.4*   PLATELETCT  127*     Recent Labs      06/02/18   0330   WBC  11.1*           Hemodynamics:     T98.9 /70 HR93 RR26 O2sat 100% on ventilator      GI/Nutrition:  Orders Placed This Encounter   Procedures   • DIET NPO     Standing Status:   Standing     Number of Occurrences:   1     Order Specific Question:   Restrict to:     Answer:   With Tube Feed [4]   • DIET TUBE FEED     Standing Status:   Standing     Number of Occurrences:   1     Order Specific Question:   Diet     Answer:   Diet Tube Feed [35]     Order Specific Question:   Formula:     Answer:   Diabeticsource AC [34]     Order Specific Question:   Goal Rate (mL/Hour)     Answer:   75     Order Specific Question:   Route     Answer:   NG     Medical Decision Making, by Problem:  Bilateral lower leg cellulitis, oral augmentin for suppression    Iv antibiotics completed   culturelle and simethicone for abdominal gas, abdomen still distended but less today    Lymphedema chronic, continue leg compression    diuresis given, will monitor edema clinically   Will likely need to increase diuresis again     Buttock DTI, dasilva placed for skin protection    Hyponatremia, due to diuresis, improved with decrease in lasix and metolazone    Acute on chronic respiratory failure, ventilator support   Cefepime to treat klebsiella in sputum through 5/28   Taper pressure as tolerated, patient aware he may require a tracheostomy     Elevated troponin, improved to normal, due to heart strain from respiratory distress      HTN (hypertension) monitor     COPD (chronic obstructive pulmonary disease) oxygen and respiratory therapy    Stasis dermatitis improved with less leg edema and wound care      Narcotic addiction taper pain medication slowly    Non compliance w medication regimen,  noncompliance with bipap contributing to respiratory failure acutely, patient agreeable to try bipap again once extubated      Diabetes type 2,  metformin    Severe protein-calorie malnutrition, with patient eating variable caloric intake, poor dietary choices, and now unable to take po nutrition due to intubation,     tube feeds    Chronic respiratory failure, oxygen    Morbid obesity with BMI of 40.0-44.9, adult     Chronic pain continue pain meds    ALIREZA (obstructive sleep apnea) bipap needed          Quality-Core Measures   Reviewed items::  Labs reviewed and Medications reviewed  Montoya catheter::  No Montoya  DVT prophylaxis pharmacological::  Heparin  Ulcer Prophylaxis::  Yes  Antibiotics:  Treating active infection/contamination beyond 24 hours perioperative coverage  Assessed for rehabilitation services:  Patient returned to prior level of function, rehabilitation not indicated at this time and Patient unable to tolerate rehabilitation therapeutic regimen

## 2018-06-04 LAB
ANION GAP SERPL CALC-SCNC: 10 MMOL/L (ref 0–11.9)
BASE EXCESS BLDA CALC-SCNC: 6 MMOL/L (ref -4–3)
BODY TEMPERATURE: ABNORMAL CENTIGRADE
BUN SERPL-MCNC: 31 MG/DL (ref 8–22)
CALCIUM SERPL-MCNC: 9.9 MG/DL (ref 8.4–10.2)
CHLORIDE SERPL-SCNC: 88 MMOL/L (ref 96–112)
CO2 SERPL-SCNC: 31 MMOL/L (ref 20–33)
CREAT SERPL-MCNC: 0.63 MG/DL (ref 0.5–1.4)
GLUCOSE SERPL-MCNC: 163 MG/DL (ref 65–99)
HCO3 BLDA-SCNC: 32 MMOL/L (ref 17–25)
O2/TOTAL GAS SETTING VFR VENT: 40 % (ref 30–60)
PCO2 BLDA: 51.6 MMHG (ref 26–37)
PH BLDA: 7.41 [PH] (ref 7.4–7.5)
PO2 BLDA: 72.2 MMHG (ref 64–87)
POTASSIUM SERPL-SCNC: 3.5 MMOL/L (ref 3.6–5.5)
SAO2 % BLDA: 93 % (ref 93–99)
SODIUM SERPL-SCNC: 129 MMOL/L (ref 135–145)

## 2018-06-04 PROCEDURE — 82803 BLOOD GASES ANY COMBINATION: CPT

## 2018-06-04 PROCEDURE — 80048 BASIC METABOLIC PNL TOTAL CA: CPT

## 2018-06-04 ASSESSMENT — ENCOUNTER SYMPTOMS
CONSTIPATION: 0
SHORTNESS OF BREATH: 1
VOMITING: 0
COUGH: 0
DIAPHORESIS: 0
FEVER: 0
CHILLS: 0
DIARRHEA: 0
HEADACHES: 0
SPUTUM PRODUCTION: 0
NAUSEA: 0

## 2018-06-04 NOTE — PROGRESS NOTES
Pulmonary Critical Care Progress Note        Chief Complaint: Acute on chronic respiratory failure with hypoxia and hypercapnia.    History of Present Illness: 57 y.o. male with complex medical history including COPD, congestive heart failure, type 2 diabetes, bilateral lower extremity cellulitis and probable obstructive sleep apnea was admitted to [facility] to be treated for bilateral lower extremity cellulitis. The patient is noncompliant. He was recently admitted to [facility] intubated for about a week for acute and chronic hypercapnic respiratory failure with CO2 retention. The patient was prescribed a trilogy ventilator to use after discharge.      The patient was readmitted to the hospital for bilateral lower extremity cellulitis. He was found to have high bicarbonate on basic metabolic panel of 42. Pulmonary consultation was requested for further evaluation and management of his chronic respiratory failure.    ROS:  Respiratory: positive shortness of breath, unchanged, Cardiac: negative chest pain, GI: negative abdominal pain.  All other systems negative.    Interval Events:  24 hour interval history reviewed.    5/20 - He developed increased dyspnea overnight on May 18 and required intubation. He is now on full ventilatory support. With an assist-control set rate of 16 he breathes at a rate of 28 but exhibits no dyspnea or dyssynchrony. The peak airway pressure is about 30 cm water with a set tidal volume of 450 ml and PEEP = 8. Oxygen saturation is about 95% on 40% oxygen. The ABG demonstrates an adequate oxygen saturation with a compensated chronic respiratory acidosis. He is less sedated on the propofol infusion and hemodynamically stable. He is afebrile with a maximum temperature of 98.4 degrees overnight. Wound care continues and his edema seems less after diuretic therapy.     5/21 - SR - 99.0 degrees - 139/73 - 92 - 16 - 94%; d/w RT: 16/8/450/50, yellow plugges noted; procalcitonin  slightly elevated yesterday; 5/19 cxr showed hypoventilation and interstitial prominence; 5/19 BC negative; on augmentin and cefepime plus vancomycin    5/22 - d/w RT, RN, and Dr. Herr; needed an FIO2 boost overnight; now, 16/4508/45;   T 100.2 P99BP 105/49 RR 18SaO2 94%; same abx; 5/19 TA=klebsiella; alert    5/23 - remains on 16/450/45/8, d/w RT; 98.2 degrees - 80- 109/68 - 21 - 96% - SR; leukocytosis a bit better, anemia persists, slight hyponatremia, slightly worsened azotemia noted, ongoing antiinfectives; midline placed; propofol down to 26    5/24 - d/w RT, try some initial weaning today if possible; currently on 16/450/45/8; no distress, resting comfortably; remains on propofol at 26;  Leukocytosis, anemia, hyponatremia, azotemia, hyperglycemia noted, plts down to 143; SR - 97.5 degrees - 117/59 - 87 - 22 - 93%; cxr for tomorrow ordered, on Maxipime and vanco; 5/22 BC and urine ctx neg;     5/25 - d/w RT and RN; off of propofol, try weaning; on 16/450/8/45, resting comfortable in NAD; good diuresis with metolazone; 97.8 degrees - 93 - 127/70 - 18 - SR/BBB; leukocytosis, anemia today are similar to prior; cxr today shows right > left bibasilar atx and opacities; on Maxipine, fluconazole, and vanco; legs remian wrapped, edema noted    526 - d/w RT, was able to tolerate 3 hours of 10/8 yesterday, will try more today as able; SR - 98.6 degrees - 140/76 - 92 - 17 - 97%; leukocytosis and anemia noted and similar to prior; new hyponatremia, hyperglycemia, mild azotemia is a bit better; on lasix, diamox, and metolazone    5/27 - d/w RT, on sbt now - he is watching TV and unaware of weaning, yesterday did only 1 hour; SR - 98 degrees - 133/62 - 77 - 32 - 92; leukocytosis, anemia, hyponatremia, azotemia, hyperglycemia today; last cxr 5/25 stable abnormal; on same diuretics and antiinfectives     5/28 - d/w RT, did well with weaning but eventually tired out, weaker today; back on AC at # AMl white-yellow secretions;  sr/BBB - 98.2 degrees - 84 - 138/74 - 28 - 94%; leukocytosis and anemia without sig delta from prior;     5/29 - d/w RT, weaning as able; rest settings are 16/450/8/55; 5/28 cxr showed stable infiltrates/opacities, ongoing diuresis; cpap just initiated today,albeit with a high PS 16 cmH2O pressure; SR - 98.1 degrees - 129/68 - 89 - 20 - 94%; leukocytosis, anemia, hyperglycemia, azotemia persist; on diamox, lasix, metolazone; on fluconazole; TA shows LFGNR 5/27 5/30 - SR - 98.1 degrees - 101/62 - 87 - 24 - 91%; augmentin started for cellulitis, diamox stopped, also on fluconazole; tolerated 20 hours of weaning yesterday, today placed back on 10/8, on 45%; cxr yesterday with stable opacities; TA with light growth of Kleb    5/31 - has done 24 hours on 10/8 and is none the worse for the wear; discussed issues with RT and the patient, trying to clarify the plan once extubated, if he fares poorly; SR - 97.9 degrees - 157/63 - 92 - 16 - 99%; remains on augmentin and fluconazole    6/1 - d/w RT, failed weaning at noon yesterday, turned purpl according to RT; back on AC and 50%; has now been intubated times 2 weeks, failing weaning, needs trach if he agrees;SR; 139/89 - 82 - 22 - 93% - 98.0 degrees; no new labs, imaging or micro today, fluconazole and augmentin antiinfectives    6/2 failed weaning trials. On full vent support.   Intubated for 14 days. Awaiting trach         PFSH:  No change.    Respiratory:  Exam: symmetrical but diminished bilateral breath sounds with basilar rales but no wheezing or egophony.  ImagingAvailable data reviewed. The chest film on 5/19 demonstrated stable interstitial prominence with no new focal consolidation. The endotracheal tube seemed to be in good position. No film today.    HemoDynamics:  Exam: regular rhythm (Sinus)  Imaging: Available data reviewed. The echocardiogram on March 18, 2018 was a limited study but demonstrated grossly normal left ventricular systolic  function.    Neuro:  Exam: no focal deficits noted mental status intact  Imaging: None - Reviewed    Recent Labs      06/02/18 0330  06/03/18   0325   SODIUM  127*  128*   POTASSIUM  4.3  3.9   CHLORIDE  85*  86*   CO2  29  32   BUN  38*  37*   CREATININE  0.65  0.64   CALCIUM  9.8  9.9     GI/Nutrition:  Exam: abdomen is soft and non-tender, normal active bowel sounds  Imaging: Available data reviewed  NPO, enteral tube feeding initiated.  Liver Function  Recent Labs      06/02/18 0330 06/03/18   0325   GLUCOSE  160*  170*       Heme:  Recent Labs      06/02/18 0330   RBC  4.51*   HEMOGLOBIN  12.4*   HEMATOCRIT  39.4*   PLATELETCT  127*       Infectious Disease:  Micro: cultures reviewed; no new positive cultures. The tracheal aspirate demonstrated gram-negative rods with identification and sensitivity pending.  Recent Labs      06/02/18 0330   WBC  11.1*       Quality  Measures:  Radiology images reviewed, Medications reviewed and Labs reviewed                      Problems:  1. VDRF, acute on chronic respiratory failure  2. Chronic obstructive pulmonary disease.  3. Obesity, suspected obesity hypoventilation syndrome and obstructive sleep apnea hypopnea syndrome.  4. Chronic lymphedema, compression, diuresis  5. Diabetes mellitus, controlled.  6. Chronic pain syndrome requiring chronic narcotic analgesia.  7. Hypothyroidism, on replacement therapy.  8. Systemic arterial hypertension, controlled.  9. Anemia, stable without evident ongoing blood loss.  10. Dysphagia, moderate PCM, hypoalbuminemia  11. Abnormal chest imaging - ongoing diuresis, consider tap if need be  12. Debility  13. Leukocytosis  14. Kleb HAP  15. BBB  16. Full code  17. Hyponatremia  18. Azotemia  19. Hypoalbuminemia    Plan:  Continued full support mechanical ventilation with a lung-protective ventilation strategy. Titrate supplemental oxygen, continue bronchodilators and respiratory protocols.  Weaning as tolerated.  Continue  antibiotics , adjust accordingly as needed    Continue nutritional support with enteral tube feeding. Continue therapies, mobilization and prophylaxis.    Discussed patient condition and risk of morbidity and/or mortality with RN and RT   Probably will need trach

## 2018-06-04 NOTE — PROGRESS NOTES
Pulmonary Critical Care Progress Note        Chief Complaint: Acute on chronic respiratory failure with hypoxia and hypercapnia.    History of Present Illness: 57 y.o. male with complex medical history including COPD, congestive heart failure, type 2 diabetes, bilateral lower extremity cellulitis and probable obstructive sleep apnea was admitted to [facility] to be treated for bilateral lower extremity cellulitis. The patient is noncompliant. He was recently admitted to [facility] intubated for about a week for acute and chronic hypercapnic respiratory failure with CO2 retention. The patient was prescribed a trilogy ventilator to use after discharge.      The patient was readmitted to the hospital for bilateral lower extremity cellulitis. He was found to have high bicarbonate on basic metabolic panel of 42. Pulmonary consultation was requested for further evaluation and management of his chronic respiratory failure.    ROS:  Respiratory: positive shortness of breath, unchanged, Cardiac: negative chest pain, GI: negative abdominal pain.  All other systems negative.    Interval Events:  24 hour interval history reviewed.    5/20 - He developed increased dyspnea overnight on May 18 and required intubation. He is now on full ventilatory support. With an assist-control set rate of 16 he breathes at a rate of 28 but exhibits no dyspnea or dyssynchrony. The peak airway pressure is about 30 cm water with a set tidal volume of 450 ml and PEEP = 8. Oxygen saturation is about 95% on 40% oxygen. The ABG demonstrates an adequate oxygen saturation with a compensated chronic respiratory acidosis. He is less sedated on the propofol infusion and hemodynamically stable. He is afebrile with a maximum temperature of 98.4 degrees overnight. Wound care continues and his edema seems less after diuretic therapy.     5/21 - SR - 99.0 degrees - 139/73 - 92 - 16 - 94%; d/w RT: 16/8/450/50, yellow plugges noted; procalcitonin  slightly elevated yesterday; 5/19 cxr showed hypoventilation and interstitial prominence; 5/19 BC negative; on augmentin and cefepime plus vancomycin    5/22 - d/w RT, RN, and Dr. Herr; needed an FIO2 boost overnight; now, 16/4508/45;   T 100.2 P99BP 105/49 RR 18SaO2 94%; same abx; 5/19 TA=klebsiella; alert    5/23 - remains on 16/450/45/8, d/w RT; 98.2 degrees - 80- 109/68 - 21 - 96% - SR; leukocytosis a bit better, anemia persists, slight hyponatremia, slightly worsened azotemia noted, ongoing antiinfectives; midline placed; propofol down to 26    5/24 - d/w RT, try some initial weaning today if possible; currently on 16/450/45/8; no distress, resting comfortably; remains on propofol at 26;  Leukocytosis, anemia, hyponatremia, azotemia, hyperglycemia noted, plts down to 143; SR - 97.5 degrees - 117/59 - 87 - 22 - 93%; cxr for tomorrow ordered, on Maxipime and vanco; 5/22 BC and urine ctx neg;     5/25 - d/w RT and RN; off of propofol, try weaning; on 16/450/8/45, resting comfortable in NAD; good diuresis with metolazone; 97.8 degrees - 93 - 127/70 - 18 - SR/BBB; leukocytosis, anemia today are similar to prior; cxr today shows right > left bibasilar atx and opacities; on Maxipine, fluconazole, and vanco; legs remian wrapped, edema noted    526 - d/w RT, was able to tolerate 3 hours of 10/8 yesterday, will try more today as able; SR - 98.6 degrees - 140/76 - 92 - 17 - 97%; leukocytosis and anemia noted and similar to prior; new hyponatremia, hyperglycemia, mild azotemia is a bit better; on lasix, diamox, and metolazone    5/27 - d/w RT, on sbt now - he is watching TV and unaware of weaning, yesterday did only 1 hour; SR - 98 degrees - 133/62 - 77 - 32 - 92; leukocytosis, anemia, hyponatremia, azotemia, hyperglycemia today; last cxr 5/25 stable abnormal; on same diuretics and antiinfectives     5/28 - d/w RT, did well with weaning but eventually tired out, weaker today; back on AC at # AMl white-yellow secretions;  sr/BBB - 98.2 degrees - 84 - 138/74 - 28 - 94%; leukocytosis and anemia without sig delta from prior;     5/29 - d/w RT, weaning as able; rest settings are 16/450/8/55; 5/28 cxr showed stable infiltrates/opacities, ongoing diuresis; cpap just initiated today,albeit with a high PS 16 cmH2O pressure; SR - 98.1 degrees - 129/68 - 89 - 20 - 94%; leukocytosis, anemia, hyperglycemia, azotemia persist; on diamox, lasix, metolazone; on fluconazole; TA shows LFGNR 5/27 5/30 - SR - 98.1 degrees - 101/62 - 87 - 24 - 91%; augmentin started for cellulitis, diamox stopped, also on fluconazole; tolerated 20 hours of weaning yesterday, today placed back on 10/8, on 45%; cxr yesterday with stable opacities; TA with light growth of Kleb    5/31 - has done 24 hours on 10/8 and is none the worse for the wear; discussed issues with RT and the patient, trying to clarify the plan once extubated, if he fares poorly; SR - 97.9 degrees - 157/63 - 92 - 16 - 99%; remains on augmentin and fluconazole    6/1 - d/w RT, failed weaning at noon yesterday, turned purpl according to RT; back on AC and 50%; has now been intubated times 2 weeks, failing weaning, needs trach if he agrees;SR; 139/89 - 82 - 22 - 93% - 98.0 degrees; no new labs, imaging or micro today, fluconazole and augmentin antiinfectives    6/2 failed weaning trials. On full vent support.   Intubated for 14 days. Awaiting trach     6/3 did not tolerate weaning trials, weak. No fever no pain       PFSH:  No change.    Respiratory:  Exam: symmetrical but diminished bilateral breath sounds with basilar rales but no wheezing or egophony.  ImagingAvailable data reviewed. The chest film on 5/19 demonstrated stable interstitial prominence with no new focal consolidation. The endotracheal tube seemed to be in good position. No film today.    HemoDynamics:  Exam: regular rhythm (Sinus)  Imaging: Available data reviewed. The echocardiogram on March 18, 2018 was a limited study but  demonstrated grossly normal left ventricular systolic function.    Neuro:  Exam: no focal deficits noted mental status intact  Imaging: None - Reviewed    Recent Labs      06/02/18 0330  06/03/18 0325 06/04/18   0330   SODIUM  127*  128*  129*   POTASSIUM  4.3  3.9  3.5*   CHLORIDE  85*  86*  88*   CO2  29  32  31   BUN  38*  37*  31*   CREATININE  0.65  0.64  0.63   CALCIUM  9.8  9.9  9.9     GI/Nutrition:  Exam: abdomen is soft and non-tender, normal active bowel sounds  Imaging: Available data reviewed  NPO, enteral tube feeding initiated.  Liver Function  Recent Labs      06/02/18 0330 06/03/18 0325 06/04/18   0330   GLUCOSE  160*  170*  163*       Heme:  Recent Labs      06/02/18 0330   RBC  4.51*   HEMOGLOBIN  12.4*   HEMATOCRIT  39.4*   PLATELETCT  127*       Infectious Disease:  Micro: cultures reviewed; no new positive cultures. The tracheal aspirate demonstrated gram-negative rods with identification and sensitivity pending.  Recent Labs      06/02/18 0330   WBC  11.1*       Quality  Measures:  Radiology images reviewed, Medications reviewed and Labs reviewed                      Problems:  1. VDRF, acute on chronic respiratory failure  2. Chronic obstructive pulmonary disease.  3. Obesity, suspected obesity hypoventilation syndrome and obstructive sleep apnea hypopnea syndrome.  4. Chronic lymphedema, compression, diuresis  5. Diabetes mellitus, controlled.  6. Chronic pain syndrome requiring chronic narcotic analgesia.  7. Hypothyroidism, on replacement therapy.  8. Systemic arterial hypertension, controlled.  9. Anemia, stable without evident ongoing blood loss.  10. Dysphagia, moderate PCM, hypoalbuminemia  11. Abnormal chest imaging - ongoing diuresis, consider tap if need be  12. Debility  13. Leukocytosis  14. Kleb HAP  15. BBB  16. Full code  17. Hyponatremia  18. Azotemia  19. Hypoalbuminemia    Plan:  Continued full support mechanical ventilation with a lung-protective ventilation  strategy. Titrate supplemental oxygen, continue bronchodilators and respiratory protocols.  Weaning as tolerated.  Continue antibiotics , adjust accordingly as needed    Continue nutritional support with enteral tube feeding. Continue therapies, mobilization and prophylaxis.    Discussed patient condition and risk of morbidity and/or mortality with RN and RT   Probably will need trach. D/w with hospitalist

## 2018-06-04 NOTE — [facility]
Hospital Medicine Progress Note, Adult, Complex               Author: Kelley Sifuentes Date & Time created: 6/4/2018  11:06 AM     CC: bilateral leg cellulitis and lymphedema    Interval History:  Patient is admitted with worsening leg pain and drainage of purulent fluid. Unasyn completed 5/14 per ID  He refuses to use bipap   5/19 patient was intubated due to hypoventilation  Patient finished antibiotics for klebsiella pneumonia 5/28  He remains on the ventilator and continually asks to have support increased when placed on cpap    Review of Systems:  Review of Systems   Constitutional: Negative for chills, diaphoresis and fever.   HENT: Negative for congestion.    Respiratory: Positive for shortness of breath. Negative for cough and sputum production.         Patient asking to turn up pressure or oxygen level due to shortness of breath on cpap setting   Cardiovascular: Positive for leg swelling. Negative for chest pain.   Gastrointestinal: Negative for constipation, diarrhea, nausea and vomiting.   Genitourinary: Negative for dysuria, frequency and urgency.   Musculoskeletal:        Pain controlled    Neurological: Negative for headaches.       Physical Exam:  Physical Exam   Constitutional: He is oriented to person, place, and time. No distress. He is sedated, intubated and restrained.   Eyes: No scleral icterus.   Cardiovascular: Normal rate and regular rhythm.    No murmur heard.  Pulmonary/Chest: Effort normal. Tachypnea noted. He is intubated. He has decreased breath sounds. He has no wheezes. He has no rales.   Abdominal: Soft. He exhibits distension. He exhibits no mass. There is no tenderness. There is no rebound and no guarding.   Musculoskeletal: He exhibits edema.   2+ leg edema, much improved  Compression wraps over lower legs   Neurological: He is alert and oriented to person, place, and time.   Skin: Skin is warm and dry. No rash noted. He is not diaphoretic. No erythema.   Psychiatric: His behavior is  normal.   Nursing note and vitals reviewed.      Labs:        Invalid input(s): [id number]      Recent Labs      06/02/18   0330  06/03/18   0325  06/04/18   0330   SODIUM  127*  128*  129*   POTASSIUM  4.3  3.9  3.5*   CHLORIDE  85*  86*  88*   CO2  29  32  31   BUN  38*  37*  31*   CREATININE  0.65  0.64  0.63   CALCIUM  9.8  9.9  9.9     Recent Labs      06/02/18   0330  06/03/18   0325  06/04/18   0330   GLUCOSE  160*  170*  163*     Recent Labs      06/02/18   0330   RBC  4.51*   HEMOGLOBIN  12.4*   HEMATOCRIT  39.4*   PLATELETCT  127*     Recent Labs      06/02/18 0330   WBC  11.1*           Hemodynamics:     T98.9 /80 HR88 RR26 O2sat 94% on ventilator      GI/Nutrition:  Orders Placed This Encounter   Procedures   • DIET NPO     Standing Status:   Standing     Number of Occurrences:   1     Order Specific Question:   Restrict to:     Answer:   With Tube Feed [4]   • DIET TUBE FEED     Standing Status:   Standing     Number of Occurrences:   1     Order Specific Question:   Diet     Answer:   Diet Tube Feed [35]     Order Specific Question:   Formula:     Answer:   Diabeticsource AC [34]     Order Specific Question:   Goal Rate (mL/Hour)     Answer:   75     Order Specific Question:   Route     Answer:   NG     Medical Decision Making, by Problem:  Bilateral lower leg cellulitis, oral augmentin for suppression    Iv antibiotics completed   culturelle and simethicone for gas    Lymphedema chronic, continue leg compression    diuresis given, will monitor edema clinically   Monitor electrolytes and azotemia     Buttock DTI, dasilva placed for skin protection    Hyponatremia, due to diuresis, improving slowly with decrease of diuretic therapy    Acute on chronic respiratory failure, ventilator support   Cefepime to treat klebsiella in sputum through 5/28   I discussed tracheostomy placement with the patient, who agrees, Dr. Reyes and Dr. Boogie from surgery, will plan to have done this week    Elevated  troponin, improved to normal, due to heart strain from respiratory distress      HTN (hypertension) monitor     COPD (chronic obstructive pulmonary disease) oxygen and respiratory therapy    Stasis dermatitis improved with less leg edema and wound care      Narcotic addiction taper pain medication slowly    Non compliance w medication regimen, noncompliance with bipap contributing to respiratory failure acutely, patient agreeable to try bipap again once extubated      Diabetes type 2,  metformin    Severe protein-calorie malnutrition, with patient eating variable caloric intake, poor dietary choices, and now unable to take po nutrition due to intubation,     tube feeds    Chronic respiratory failure, oxygen    Morbid obesity with BMI of 40.0-44.9, adult     Chronic pain continue pain meds    ALIREZA (obstructive sleep apnea) bipap needed          Quality-Core Measures   Reviewed items::  Labs reviewed and Medications reviewed  Montoya catheter::  No Montoya  DVT prophylaxis pharmacological::  Heparin  Ulcer Prophylaxis::  Yes  Antibiotics:  Treating active infection/contamination beyond 24 hours perioperative coverage  Assessed for rehabilitation services:  Patient returned to prior level of function, rehabilitation not indicated at this time and Patient unable to tolerate rehabilitation therapeutic regimen

## 2018-06-05 ENCOUNTER — APPOINTMENT (OUTPATIENT)
Dept: RADIOLOGY | Facility: MEDICAL CENTER | Age: 58
End: 2018-06-05
Attending: HOSPITALIST
Payer: COMMERCIAL

## 2018-06-05 LAB
ANION GAP SERPL CALC-SCNC: 10 MMOL/L (ref 0–11.9)
BUN SERPL-MCNC: 29 MG/DL (ref 8–22)
CALCIUM SERPL-MCNC: 9.6 MG/DL (ref 8.4–10.2)
CHLORIDE SERPL-SCNC: 88 MMOL/L (ref 96–112)
CO2 SERPL-SCNC: 30 MMOL/L (ref 20–33)
CREAT SERPL-MCNC: 0.57 MG/DL (ref 0.5–1.4)
ERYTHROCYTE [DISTWIDTH] IN BLOOD BY AUTOMATED COUNT: 46 FL (ref 35.9–50)
GLUCOSE SERPL-MCNC: 183 MG/DL (ref 65–99)
HCT VFR BLD AUTO: 36.9 % (ref 42–52)
HGB BLD-MCNC: 11.9 G/DL (ref 14–18)
MCH RBC QN AUTO: 27.5 PG (ref 27–33)
MCHC RBC AUTO-ENTMCNC: 32.2 G/DL (ref 33.7–35.3)
MCV RBC AUTO: 85.2 FL (ref 81.4–97.8)
PLATELET # BLD AUTO: 216 K/UL (ref 164–446)
PMV BLD AUTO: 11 FL (ref 9–12.9)
POTASSIUM SERPL-SCNC: 3.6 MMOL/L (ref 3.6–5.5)
RBC # BLD AUTO: 4.33 M/UL (ref 4.7–6.1)
SODIUM SERPL-SCNC: 128 MMOL/L (ref 135–145)
WBC # BLD AUTO: 12.7 K/UL (ref 4.8–10.8)

## 2018-06-05 PROCEDURE — 80048 BASIC METABOLIC PNL TOTAL CA: CPT

## 2018-06-05 PROCEDURE — 71045 X-RAY EXAM CHEST 1 VIEW: CPT

## 2018-06-05 PROCEDURE — 85027 COMPLETE CBC AUTOMATED: CPT

## 2018-06-05 NOTE — [facility]
Hospital Medicine Progress Note, Adult, Complex               Author: Soo Herr Date & Time created: 6/5/2018  5:24 AM     CC: cellulitis/lymphedema    Interval History:  Did not tolerate SBT  Trach planned per notes but unclear if scheduled  Wbc rising without fever  No complaints of pain    Review of Systems:  Review of Systems   Unable to perform ROS: Intubated       T [id number] 145/27UV72EdL1 95% I/O 4.2/1.9 1BM tele SR/BBB  Physical Exam   Constitutional: He appears well-developed and well-nourished. No distress.   HENT:   Head: Normocephalic and atraumatic.   NGT/ETT     Eyes: Conjunctivae are normal. Right eye exhibits no discharge. Left eye exhibits no discharge. No scleral icterus.   Neck: No tracheal deviation present.   Cardiovascular: Normal rate, regular rhythm and intact distal pulses.    Pulmonary/Chest: Effort normal. No respiratory distress. He has no wheezes. He exhibits no tenderness.   Diminished but clear anteriorly   Abdominal: Soft. Bowel sounds are normal. He exhibits no distension. There is no tenderness.   obese   Musculoskeletal: He exhibits edema (tr).   Neurological: He is alert.   Skin: Skin is warm.   Legs wrapped, wound pictures reviewed from 6/4   Vitals reviewed.      Labs:  Recent Labs      06/04/18   1100   [id number]  7.41   [id number]  51.6*   OCZGH077V  72.2   XGLG4AMF  93.0   ARTHCO3  32*   FIO2  40   ARTBE  6*         Recent Labs      06/03/18   0325  06/04/18   0330  06/05/18   0350   SODIUM  128*  129*  128*   POTASSIUM  3.9  3.5*  3.6   CHLORIDE  86*  88*  88*   CO2  32  31  30   BUN  37*  31*  29*   CREATININE  0.64  0.63  0.57   CALCIUM  9.9  9.9  9.6     Recent Labs      06/03/18   0325  06/04/18   0330  06/05/18   0350   GLUCOSE  170*  163*  183*     Recent Labs      06/05/18   0350   RBC  4.33*   HEMOGLOBIN  11.9*   HEMATOCRIT  36.9*   PLATELETCT  216     Recent Labs      06/05/18   0350   WBC  12.7*        GI/Nutrition:  Orders Placed This Encounter   Procedures    • DIET NPO     Standing Status:   Standing     Number of Occurrences:   1     Order Specific Question:   Restrict to:     Answer:   With Tube Feed [4]   • DIET TUBE FEED     Standing Status:   Standing     Number of Occurrences:   1     Order Specific Question:   Diet     Answer:   Diet Tube Feed [35]     Order Specific Question:   Formula:     Answer:   Diabeticsource AC [34]     Order Specific Question:   Goal Rate (mL/Hour)     Answer:   75     Order Specific Question:   Route     Answer:   NG     Medical Decision Making, by Problem:  RLE cellulitis s/p debridement, polymicrobial (enterococcus, proteus, MSSA)   -improved with elevation, continue wound care    -Augmentin suppression per ID  B LE lymphedema   -currently off diuresis by Dr. Sifuentes, resume if increased edema or clinically indicated   -compression  ALIREZA with chronic hypercapnia/OHVS s/p acute VDRF   -trach soon?   -ETT for 17 days  Klebsiella HAP-treated  Leukocytosis   -check CXR, sputum, UA  COPD   -spiriva, incruse held with ETT  DM   -metformin   -asa, lipitor  BBB   -normal ef, wall motion on echo 5/19  Chronic pain   -oxy IR 30mg q 4 until can take CR   -prn fentanyl for wound care  MSEW  Hypothyroidism   -synthroid  Hyponatremia   -following  Nutrition   -TF   -anticipate po after trach  Anemia  Debility      Quality-Core Measures   Reviewed items::  Medications reviewed and Labs reviewed  Montoya catheter::  Unconscious / Sedated Patient on a Ventilator  DVT prophylaxis pharmacological::  Heparin  Antibiotics:  Treating active infection/contamination beyond 24 hours perioperative coverage

## 2018-06-06 ENCOUNTER — HOSPITAL ENCOUNTER (OUTPATIENT)
Facility: MEDICAL CENTER | Age: 58
End: 2018-06-06
Attending: SURGERY | Admitting: SURGERY
Payer: COMMERCIAL

## 2018-06-06 ENCOUNTER — HOSPITAL ENCOUNTER (OUTPATIENT)
Facility: MEDICAL CENTER | Age: 58
End: 2018-06-25
Attending: HOSPITALIST | Admitting: HOSPITALIST
Payer: COMMERCIAL

## 2018-06-06 ENCOUNTER — APPOINTMENT (OUTPATIENT)
Dept: RADIOLOGY | Facility: MEDICAL CENTER | Age: 58
End: 2018-06-06
Attending: HOSPITALIST
Payer: COMMERCIAL

## 2018-06-06 VITALS — HEIGHT: 69 IN | WEIGHT: 283 LBS | BODY MASS INDEX: 41.92 KG/M2

## 2018-06-06 LAB
ANION GAP SERPL CALC-SCNC: 9 MMOL/L (ref 0–11.9)
APTT PPP: 29.2 SEC (ref 24.7–36)
BUN SERPL-MCNC: 29 MG/DL (ref 8–22)
CALCIUM SERPL-MCNC: 9.6 MG/DL (ref 8.4–10.2)
CHLORIDE SERPL-SCNC: 90 MMOL/L (ref 96–112)
CO2 SERPL-SCNC: 32 MMOL/L (ref 20–33)
CREAT SERPL-MCNC: 0.57 MG/DL (ref 0.5–1.4)
ERYTHROCYTE [DISTWIDTH] IN BLOOD BY AUTOMATED COUNT: 45.9 FL (ref 35.9–50)
GLUCOSE SERPL-MCNC: 134 MG/DL (ref 65–99)
HCT VFR BLD AUTO: 37 % (ref 42–52)
HGB BLD-MCNC: 11.8 G/DL (ref 14–18)
INR PPP: 1 (ref 0.87–1.13)
MCH RBC QN AUTO: 26.9 PG (ref 27–33)
MCHC RBC AUTO-ENTMCNC: 31.9 G/DL (ref 33.7–35.3)
MCV RBC AUTO: 84.3 FL (ref 81.4–97.8)
PLATELET # BLD AUTO: 215 K/UL (ref 164–446)
PMV BLD AUTO: 11.5 FL (ref 9–12.9)
POTASSIUM SERPL-SCNC: 3.9 MMOL/L (ref 3.6–5.5)
PROTHROMBIN TIME: 13.1 SEC (ref 12–14.6)
RBC # BLD AUTO: 4.39 M/UL (ref 4.7–6.1)
SODIUM SERPL-SCNC: 131 MMOL/L (ref 135–145)
WBC # BLD AUTO: 9.3 K/UL (ref 4.8–10.8)

## 2018-06-06 PROCEDURE — 160039 HCHG SURGERY MINUTES - EA ADDL 1 MIN LEVEL 3: Performed by: SURGERY

## 2018-06-06 PROCEDURE — 80048 BASIC METABOLIC PNL TOTAL CA: CPT

## 2018-06-06 PROCEDURE — 85730 THROMBOPLASTIN TIME PARTIAL: CPT

## 2018-06-06 PROCEDURE — 501838 HCHG SUTURE GENERAL: Performed by: SURGERY

## 2018-06-06 PROCEDURE — 160009 HCHG ANES TIME/MIN: Performed by: SURGERY

## 2018-06-06 PROCEDURE — 85610 PROTHROMBIN TIME: CPT

## 2018-06-06 PROCEDURE — 71045 X-RAY EXAM CHEST 1 VIEW: CPT

## 2018-06-06 PROCEDURE — 85027 COMPLETE CBC AUTOMATED: CPT

## 2018-06-06 PROCEDURE — 700101 HCHG RX REV CODE 250

## 2018-06-06 PROCEDURE — 700111 HCHG RX REV CODE 636 W/ 250 OVERRIDE (IP)

## 2018-06-06 PROCEDURE — A6402 STERILE GAUZE <= 16 SQ IN: HCPCS | Performed by: SURGERY

## 2018-06-06 PROCEDURE — 160048 HCHG OR STATISTICAL LEVEL 1-5: Performed by: SURGERY

## 2018-06-06 PROCEDURE — 160028 HCHG SURGERY MINUTES - 1ST 30 MINS LEVEL 3: Performed by: SURGERY

## 2018-06-06 RX ORDER — BUPIVACAINE HYDROCHLORIDE AND EPINEPHRINE 5; 5 MG/ML; UG/ML
INJECTION, SOLUTION PERINEURAL
Status: DISCONTINUED | OUTPATIENT
Start: 2018-06-06 | End: 2018-06-06 | Stop reason: HOSPADM

## 2018-06-06 NOTE — [facility]
Hospital Medicine Progress Note, Adult, Complex               Author: Soo TOLU Herr Date & Time created: 6/6/2018  5:48 AM     CC: cellulitis/lymphedema    Interval History:  Trach today with probably GT, patient nervous but consents, and capable of informed decision making  WBC normal today  CXR unchanged    Review of Systems:  Review of Systems   Unable to perform ROS: Intubated       T 37M68JA 133/14EY72OsT3 93% I/O 2/2.1 noBM tele SR/BBB  Physical Exam   Constitutional: He appears well-developed and well-nourished.   HENT:   Head: Normocephalic and atraumatic.   NGT/ETT     Eyes: Conjunctivae are normal. Right eye exhibits no discharge. Left eye exhibits no discharge. No scleral icterus.   Neck: No tracheal deviation present.   Cardiovascular: Normal rate, regular rhythm and intact distal pulses.    Pulmonary/Chest: Effort normal. No respiratory distress. He has no wheezes. He exhibits no tenderness.   Diminished but clear anteriorly   Abdominal: Soft. Bowel sounds are normal. He exhibits no distension. There is no tenderness.   obese   Musculoskeletal: He exhibits edema (tr).   Neurological: He is alert.   Skin: Skin is warm.   Legs wrapped  Facial skin flaking   Vitals reviewed.      Labs:  Recent Labs      06/04/18   1100   ELKZE32H  7.41   [id number]  51.6*   XBJRD053I  72.2   WFKH9SJE  93.0   ARTHCO3  32*   FIO2  40   ARTBE  6*         Recent Labs      06/04/18   0330  06/05/18   0350  06/06/18   0300   SODIUM  129*  128*  131*   POTASSIUM  3.5*  3.6  3.9   CHLORIDE  88*  88*  90*   CO2  31  30  32   BUN  31*  29*  29*   CREATININE  0.63  0.57  0.57   CALCIUM  9.9  9.6  9.6     Recent Labs      06/04/18   0330  06/05/18   0350  06/06/18   0300   GLUCOSE  163*  183*  134*     Recent Labs      06/05/18   0350  06/06/18   0405   RBC  4.33*  4.39*   HEMOGLOBIN  11.9*  11.8*   HEMATOCRIT  36.9*  37.0*   PLATELETCT  216  215     Recent Labs      06/05/18   0350  06/06/18   0405   WBC  12.7*  9.3         GI/Nutrition:  Orders Placed This Encounter   Procedures   • DIET NPO     Standing Status:   Standing     Number of Occurrences:   1     Order Specific Question:   Restrict to:     Answer:   With Tube Feed [4]   • DIET TUBE FEED     Standing Status:   Standing     Number of Occurrences:   1     Order Specific Question:   Diet     Answer:   Diet Tube Feed [35]     Order Specific Question:   Formula:     Answer:   Diabeticsource AC [34]     Order Specific Question:   Goal Rate (mL/Hour)     Answer:   75     Order Specific Question:   Route     Answer:   NG     Medical Decision Making, by Problem:  RLE cellulitis s/p debridement, polymicrobial (enterococcus, proteus, MSSA)   -improved with elevation, continue wound care    -Augmentin suppression per ID  B LE lymphedema   -compression  ALIREZA with chronic hypercapnia/OHVS s/p acute VDRF   -trach today   -will need further diuresis for vent liberation  Klebsiella HAP-treated  Leukocytosis-resolved  COPD   -spiriva, incruse held with ETT  DM   -metformin   -asa, lipitor  BBB   -normal ef, wall motion on echo 5/19  Chronic pain   -oxy IR 30mg q 4 until can take CR   -prn fentanyl for wound care  MSEW  Hypothyroidism   -synthroid  Hyponatremia   -following  Nutrition   -TF  Anemia  Debility      Quality-Core Measures   Reviewed items::  Medications reviewed, Labs reviewed and Radiology images reviewed  Montoya catheter::  Unconscious / Sedated Patient on a Ventilator  DVT prophylaxis pharmacological::  Heparin  Antibiotics:  Treating active infection/contamination beyond 24 hours perioperative coverage

## 2018-06-06 NOTE — PROGRESS NOTES
Pulmonary Critical Care Progress Note        Chief Complaint: Acute on chronic respiratory failure with hypoxia and hypercapnia.    History of Present Illness: 57 y.o. male with complex medical history including COPD, congestive heart failure, type 2 diabetes, bilateral lower extremity cellulitis and probable obstructive sleep apnea was admitted to [facility] to be treated for bilateral lower extremity cellulitis. The patient is noncompliant. He was recently admitted to [facility] intubated for about a week for acute and chronic hypercapnic respiratory failure with CO2 retention. The patient was prescribed a trilogy ventilator to use after discharge.      The patient was readmitted to the hospital for bilateral lower extremity cellulitis. He was found to have high bicarbonate on basic metabolic panel of 42. Pulmonary consultation was requested for further evaluation and management of his chronic respiratory failure.    ROS:  Respiratory: positive shortness of breath, unchanged, Cardiac: negative chest pain, GI: negative abdominal pain.  All other systems negative.    Interval Events:  24 hour interval history reviewed.    5/20 - He developed increased dyspnea overnight on May 18 and required intubation. He is now on full ventilatory support. With an assist-control set rate of 16 he breathes at a rate of 28 but exhibits no dyspnea or dyssynchrony. The peak airway pressure is about 30 cm water with a set tidal volume of 450 ml and PEEP = 8. Oxygen saturation is about 95% on 40% oxygen. The ABG demonstrates an adequate oxygen saturation with a compensated chronic respiratory acidosis. He is less sedated on the propofol infusion and hemodynamically stable. He is afebrile with a maximum temperature of 98.4 degrees overnight. Wound care continues and his edema seems less after diuretic therapy.     5/21 - SR - 99.0 degrees - 139/73 - 92 - 16 - 94%; d/w RT: 16/8/450/50, yellow plugges noted; procalcitonin  slightly elevated yesterday; 5/19 cxr showed hypoventilation and interstitial prominence; 5/19 BC negative; on augmentin and cefepime plus vancomycin    5/22 - d/w RT, RN, and Dr. Herr; needed an FIO2 boost overnight; now, 16/4508/45;   T 100.2 P99BP 105/49 RR 18SaO2 94%; same abx; 5/19 TA=klebsiella; alert    5/23 - remains on 16/450/45/8, d/w RT; 98.2 degrees - 80- 109/68 - 21 - 96% - SR; leukocytosis a bit better, anemia persists, slight hyponatremia, slightly worsened azotemia noted, ongoing antiinfectives; midline placed; propofol down to 26    5/24 - d/w RT, try some initial weaning today if possible; currently on 16/450/45/8; no distress, resting comfortably; remains on propofol at 26;  Leukocytosis, anemia, hyponatremia, azotemia, hyperglycemia noted, plts down to 143; SR - 97.5 degrees - 117/59 - 87 - 22 - 93%; cxr for tomorrow ordered, on Maxipime and vanco; 5/22 BC and urine ctx neg;     5/25 - d/w RT and RN; off of propofol, try weaning; on 16/450/8/45, resting comfortable in NAD; good diuresis with metolazone; 97.8 degrees - 93 - 127/70 - 18 - SR/BBB; leukocytosis, anemia today are similar to prior; cxr today shows right > left bibasilar atx and opacities; on Maxipine, fluconazole, and vanco; legs remian wrapped, edema noted    526 - d/w RT, was able to tolerate 3 hours of 10/8 yesterday, will try more today as able; SR - 98.6 degrees - 140/76 - 92 - 17 - 97%; leukocytosis and anemia noted and similar to prior; new hyponatremia, hyperglycemia, mild azotemia is a bit better; on lasix, diamox, and metolazone    5/27 - d/w RT, on sbt now - he is watching TV and unaware of weaning, yesterday did only 1 hour; SR - 98 degrees - 133/62 - 77 - 32 - 92; leukocytosis, anemia, hyponatremia, azotemia, hyperglycemia today; last cxr 5/25 stable abnormal; on same diuretics and antiinfectives     5/28 - d/w RT, did well with weaning but eventually tired out, weaker today; back on AC at # AMl white-yellow secretions;  sr/BBB - 98.2 degrees - 84 - 138/74 - 28 - 94%; leukocytosis and anemia without sig delta from prior;     5/29 - d/w RT, weaning as able; rest settings are 16/450/8/55; 5/28 cxr showed stable infiltrates/opacities, ongoing diuresis; cpap just initiated today,albeit with a high PS 16 cmH2O pressure; SR - 98.1 degrees - 129/68 - 89 - 20 - 94%; leukocytosis, anemia, hyperglycemia, azotemia persist; on diamox, lasix, metolazone; on fluconazole; TA shows LFGNR 5/27 5/30 - SR - 98.1 degrees - 101/62 - 87 - 24 - 91%; augmentin started for cellulitis, diamox stopped, also on fluconazole; tolerated 20 hours of weaning yesterday, today placed back on 10/8, on 45%; cxr yesterday with stable opacities; TA with light growth of Kleb    5/31 - has done 24 hours on 10/8 and is none the worse for the wear; discussed issues with RT and the patient, trying to clarify the plan once extubated, if he fares poorly; SR - 97.9 degrees - 157/63 - 92 - 16 - 99%; remains on augmentin and fluconazole    6/1 - d/w RT, failed weaning at noon yesterday, turned purpl according to RT; back on AC and 50%; has now been intubated times 2 weeks, failing weaning, needs trach if he agrees;SR; 139/89 - 82 - 22 - 93% - 98.0 degrees; no new labs, imaging or micro today, fluconazole and augmentin antiinfectives    6/2 failed weaning trials. On full vent support.   Intubated for 14 days. Awaiting trach     6/3 did not tolerate weaning trials, weak. No fever no pain     6/4 could not tolerated weaning trials again with tachycardia and tachypnea plans for trach. Consulted surgery     6/5 awaiting tracheostomy this week     6/6  He remains on full mechanical ventilatory support.  He has been unable to wean over the past weeks.  Tracheostomy probably this afternoon.    PFSH:  No change.    Respiratory:  Exam: symmetrical but diminished bilateral breath sounds with basilar rales but no wheezing or egophony.  ImagingAvailable data reviewed. The chest film on 5/19  demonstrated stable interstitial prominence with no new focal consolidation. The endotracheal tube seemed to be in good position. No film today.    HemoDynamics:  Exam: regular rhythm (Sinus)  Imaging: Available data reviewed. The echocardiogram on March 18, 2018 was a limited study but demonstrated grossly normal left ventricular systolic function.    Neuro:  Exam: no focal deficits noted mental status intact  Imaging: None - Reviewed    Recent Labs      06/04/18   0330  06/05/18   0350  06/06/18   0300   SODIUM  129*  128*  131*   POTASSIUM  3.5*  3.6  3.9   CHLORIDE  88*  88*  90*   CO2  31  30  32   BUN  31*  29*  29*   CREATININE  0.63  0.57  0.57   CALCIUM  9.9  9.6  9.6     GI/Nutrition:  Exam: abdomen is soft and non-tender, normal active bowel sounds  Imaging: Available data reviewed  NPO, enteral tube feeding initiated.  Liver Function  Recent Labs      06/04/18   0330 06/05/18 0350 06/06/18   0300   GLUCOSE  163*  183*  134*       Heme:  Recent Labs      06/05/18 0350 06/06/18   0405   RBC  4.33*  4.39*   HEMOGLOBIN  11.9*  11.8*   HEMATOCRIT  36.9*  37.0*   PLATELETCT  216  215   PROTHROMBTM   --   13.1   APTT   --   29.2   INR   --   1.00       Infectious Disease:  Micro: cultures reviewed; no new positive cultures. The tracheal aspirate demonstrated gram-negative rods with identification and sensitivity pending.  Recent Labs      06/05/18 0350 06/06/18   0405   WBC  12.7*  9.3       Quality  Measures:  Radiology images reviewed, Medications reviewed and Labs reviewed                      Problems:  1. VDRF, acute on chronic respiratory failure  2. Chronic obstructive pulmonary disease.  3. Obesity, suspected obesity hypoventilation syndrome and obstructive sleep apnea hypopnea syndrome.  4. Chronic lymphedema, compression, diuresis  5. Diabetes mellitus, controlled.  6. Chronic pain syndrome requiring chronic narcotic analgesia.  7. Hypothyroidism, on replacement therapy.  8. Systemic  arterial hypertension, controlled.  9. Anemia, stable without evident ongoing blood loss.  10. Dysphagia, moderate PCM, hypoalbuminemia  11. Abnormal chest imaging - ongoing diuresis, consider tap if need be  12. Debility  13. Leukocytosis  14. Kleb HAP  15. BBB  16. Full code  17. Hyponatremia  18. Azotemia  19. Hypoalbuminemia    Plan:  Continued full support mechanical ventilation with a lung-protective ventilation strategy. Titrate supplemental oxygen, continue bronchodilators and respiratory protocols.  Weaning as tolerated once tracheostomy has been placed  Continue antibiotics , adjust accordingly as needed    Continue nutritional support with enteral tube feeding. Continue therapies, mobilization and prophylaxis.    Discussed patient condition and risk of morbidity and/or mortality with RN and RT   Tracheostomy pending  D/w with hospitalist

## 2018-06-06 NOTE — OR SURGEON
Immediate Post OP Note    PreOp Diagnosis:   1.  Ventilator dependent respiratory failure  2.  COPD  3.  Morbid obesity  4.  Obstructive sleep apnea  5.  Peripheral vascular disease  6.  Diabetes    PostOp Diagnosis: same    Procedure(s):  TRACHEOSTOMY/PERCUTANEOUS - Wound Class: Clean Contaminated    Surgeon(s):  Scott Boogie M.D.    Anesthesiologist/Type of Anesthesia:  Anesthesiologist: Eric Stanton M.D.  Anesthesia Technician: Joss Velásquez/General    Surgical Staff:  Circulator: Ellen Robbins R.N.; Malgorzata Peace R.N.; Evelio Mckeon R.N.  Scrub Person: Luis Eduardo Clayton    Specimens removed if any:  * No specimens in log *    Estimated Blood Loss: 15mL    Findings: thick short neck, but palpable trachea below cricoid and above sternal notch    Complications: none noted    Transferred back to [facility] in stable condition    6/6/2018 4:29 PM Scott Boogie M.D.

## 2018-06-06 NOTE — CONSULTS
Surgical Consultation    Date: 6/5/2018    Requesting Physician: Dr. Sifuentes  PCP: Giovani Lara M.D.  Consulting Physician: Scott Boogie M.D.    CC: Ventilator-dependent respiratory failure    HPI: This is a 57 y.o. male with COPD, diabetes, morbid obesity, instructed sleep apnea and hypertension who presented on 5/5/2018 with bilateral lower extremity lymphedema, pain and cellulitis. He was intubated on 5/19/2018 due to hyperventilation, and has been managed on the ventilator since that time. Pulmonary medicine was involved. The patient has failed all weaning trials.  I was consulted for tracheostomy placement. The patient is nonverbal, but awake and alert on the ventilator. He is writing questions on the tablet.    Past Medical History:   Diagnosis Date   • Asthma    • At risk for sleep apnea    • Chronic obstructive pulmonary disease (HCC)    • Chronic venous stasis dermatitis of both lower extremities    • Congestive heart failure (HCC)    • Hypertension    • Hypothyroidism    • Type II or unspecified type diabetes mellitus without mention of complication, not stated as uncontrolled        Past Surgical History:   Procedure Laterality Date   • INCISION AND DRAINAGE GENERAL Bilateral 5/1/2018    Procedure: INCISION AND DRAINAGE GENERAL;  Surgeon: Chino Stock M.D.;  Location: SURGERY [facility];  Service: Vascular   • CARPAL TUNNEL RELEASE         No current facility-administered medications for this encounter.        Social History     Social History   • Marital status:      Spouse name: N/A   • Number of children: N/A   • Years of education: N/A     Occupational History   • Not on file.     Social History Main Topics   • Smoking status: Former Smoker     Quit date: 12/14/2005   • Smokeless tobacco: Never Used   • Alcohol use No   • Drug use: No   • Sexual activity: Not on file     Other Topics Concern   • Not on file     Social History Narrative   • No narrative on file       No family  history on file.    Allergies:  Patient has no known allergies.    Review of Systems:  Negative except as noted above    Physical Exam:  There were no vitals taken for this visit.    Constitutional: he is oriented to person, place, and time.  He is disheveled and fatigued appearing, but in no distress.   Head: Normocephalic and atraumatic.   Neck: Normal range of motion. Neck is obese and short, but the trachea is palpable beneath the cricoid cartilage and above the sternal notch. No tracheal deviation present. No thyromegaly noted.   Cardiovascular: Heart regular  Pulmonary/Chest: Distant and diminished breath sounds, remains on ventilator with endotracheal tube in place  Abdominal: Soft, obese, nontender, nondistended.   Musculoskeletal: Bilateral lower extremity edema  Neurological: he is alert and oriented to person, place, and time. No gross deficits   Skin: Skin is warm and dry. No rash noted. Bilateral lower extremities are wrapped in gauze, and he is in AFO prosthesis  Psychiatric: he has a normal mood and affect.  Behavior is normal.       Labs:  Recent Labs      06/05/18   0350   WBC  12.7*   RBC  4.33*   HEMOGLOBIN  11.9*   HEMATOCRIT  36.9*   MCV  85.2   MCH  27.5   MCHC  32.2*   RDW  46.0   PLATELETCT  216   MPV  11.0     Recent Labs      06/03/18   0325  06/04/18   0330  06/05/18   0350   SODIUM  128*  129*  128*   POTASSIUM  3.9  3.5*  3.6   CHLORIDE  86*  88*  88*   CO2  32  31  30   GLUCOSE  170*  163*  183*   BUN  37*  31*  29*   CREATININE  0.64  0.63  0.57   CALCIUM  9.9  9.9  9.6               Radiology:  DX-CHEST-PORTABLE (1 VIEW)   Final Result      Stable right hemidiaphragm elevation and diffuse reticular opacity is compatible with edema and atelectasis      DX-CHEST-PORTABLE (1 VIEW)   Final Result         1.  Pulmonary edema and/or infiltrates are identified, which are stable since the prior exam.   2.  Cardiomegaly      DX-CHEST-PORTABLE (1 VIEW)   Final Result         1. Intubated.       2. Low lung volume with hypoventilatory change and bibasilar opacities.      ECHOCARDIOGRAM COMP W/O CONT   Final Result      DX-ABDOMEN FOR TUBE PLACEMENT   Final Result      Enteric tube placement with the tip projecting over the duodenum      DX-CHEST-PORTABLE (1 VIEW)   Final Result      Stable chest x-ray findings.      DX-CHEST-PORTABLE (1 VIEW)   Final Result      Endotracheal tube in place.      Pulmonary edema.          Assessment: This is a 57 y.o. male with multiple uncontrolled medical problems including COPD and obstructive sleep apnea, who was admitted for bilateral lower extremity cellulitis and lymphedema, and intubated for acute on chronic hypoventilation. He has now become ventilator dependent and has failed weaning attempts.  Tracheostomy is been requested    Recommendations:   -I spoke to the patient for some time regarding the procedure of percutaneous tracheostomy. Although he does have a short neck, exam reveals that he is a candidate for percutaneous tracheostomy as he is appropriate neck extension and palpable trachea. We spoke about the procedure including the risks, benefits, alternatives. The risks include bleeding, infection, damage to surrounding structures, tracheal stenosis, need for further procedures, failure to achieve goals of procedure, need for open procedure, death. The patient understands this and wishes to proceed.  -I called his daughter Howard's phone number, but received a voice message without a mailbox set up. I will try again tomorrow  -I have added the procedure on for sometime tomorrow afternoon.  -Please ensure the patient is nothing by mouth after midnight. This was discussed with the nurse  -Please ensure the patient is not on any anticoagulation      Scott Boogie M.D.  [facility]  [phone]

## 2018-06-06 NOTE — OP REPORT
Operative Report    Date: 6/6/2018    PreOp Diagnosis:   1.  Ventilator dependent respiratory failure  2.  COPD  3.  Morbid obesity  4.  Obstructive sleep apnea  5.  Peripheral vascular disease  6.  Diabetes     PostOp Diagnosis: same     Procedure(s):  TRACHEOSTOMY/PERCUTANEOUS (8.0 adjustable Portex percutaneous tracheostomy) - Wound Class: Clean Contaminated     Surgeon(s):  Scott Boogie M.D.     Anesthesiologist/Type of Anesthesia:  Anesthesiologist: Eric Stanton M.D.  Anesthesia Technician: Joss Velásquez/General     Surgical Staff:  Circulator: Ellen Robbins R.N.; Malgorzata Peace R.N.; Evelio Mckeon R.N.  Scrub Person: Luis Eduardo Clayton     Specimens removed if any:  * No specimens in log *     Estimated Blood Loss: 15mL     Findings: thick short neck, but palpable trachea below cricoid and above sternal notch     Complications: none noted    Indications:  57-year-old male with ventilator-dependent respiratory failure, obstructive sleep apnea, COPD, diabetes, hypertension, peripheral vascular disease who has been unable to be weaned from ventilator for the better part of 3 weeks. I was consulted for tracheostomy placement, and offered a percutaneous tracheostomy placement in the operating room. I discussed the procedure in detail with the patient, who was awake on the ventilator and able to coherently understand and discuss via writing pad. We also discussed the risks, benefits, and alternatives and he wished to proceed.    Procedure in detail:   The patient was in the operating room and placed on the operating table in supine position. General endotracheal anesthesia was induced to the existing endotracheal tube. A shoulder roll was placed. Appropriate monitoring devices were placed by anesthesia team. The bronchoscope was then introduced to the existing endotracheal tube, which was retracted back and of such that the 2nd and 3rd tracheal rings could be visualized through the bronchoscope. A  22-gauge finder needle was inserted one finger's breadth above the sternal notch and seen to pass through the anterior trachea through the 2nd or 3rd tracheal ring. This was then exchanged for a 14-gauge needle with a angiocatheter, through which a wire was then placed distally into the airway. This was all done under direct vision. Using the provided items in the Portex adjustable percutaneous tracheostomy kit, the anterior stoma was dilated to the appropriate diameter, and an 8.0 adjustable Portex percutaneous tracheostomy device inserted. This was all performed under direct vision.  The anesthesia circuit was then attached to the tracheostomy, and the placement confirmed with end-tidal CO2 as well as direct visualization with the bronchoscope. There is a scant quantity of blood in the airways which was suctioned out through the bronchoscope, and hemostasis was ensured. The tracheostomy was sutured in in 4 quadrants using 3-0 Prolene, and an appropriate tracheostomy dressing was applied. Surgicel was also wrapped around the tube due to some mild oozing, and this was hemostatic. The patient tolerated this procedure well, and was ventilating well using the new tracheostomy at the end of the procedure. Sponge, as recommended, and needle counts were correct. The patient was then transferred back to [facility] in stable condition. The tracheostomy was positioned approximately 3 cm above the phylicia. A chest x-ray is pending and will be performed as a portable exam in the patient's room.      Scott Boogie M.D.  [facility]  [phone]

## 2018-06-06 NOTE — PROGRESS NOTES
Pulmonary Critical Care Progress Note        Chief Complaint: Acute on chronic respiratory failure with hypoxia and hypercapnia.    History of Present Illness: 57 y.o. male with complex medical history including COPD, congestive heart failure, type 2 diabetes, bilateral lower extremity cellulitis and probable obstructive sleep apnea was admitted to [facility] to be treated for bilateral lower extremity cellulitis. The patient is noncompliant. He was recently admitted to [facility] intubated for about a week for acute and chronic hypercapnic respiratory failure with CO2 retention. The patient was prescribed a trilogy ventilator to use after discharge.      The patient was readmitted to the hospital for bilateral lower extremity cellulitis. He was found to have high bicarbonate on basic metabolic panel of 42. Pulmonary consultation was requested for further evaluation and management of his chronic respiratory failure.    ROS:  Respiratory: positive shortness of breath, unchanged, Cardiac: negative chest pain, GI: negative abdominal pain.  All other systems negative.    Interval Events:  24 hour interval history reviewed.    5/20 - He developed increased dyspnea overnight on May 18 and required intubation. He is now on full ventilatory support. With an assist-control set rate of 16 he breathes at a rate of 28 but exhibits no dyspnea or dyssynchrony. The peak airway pressure is about 30 cm water with a set tidal volume of 450 ml and PEEP = 8. Oxygen saturation is about 95% on 40% oxygen. The ABG demonstrates an adequate oxygen saturation with a compensated chronic respiratory acidosis. He is less sedated on the propofol infusion and hemodynamically stable. He is afebrile with a maximum temperature of 98.4 degrees overnight. Wound care continues and his edema seems less after diuretic therapy.     5/21 - SR - 99.0 degrees - 139/73 - 92 - 16 - 94%; d/w RT: 16/8/450/50, yellow plugges noted; procalcitonin  slightly elevated yesterday; 5/19 cxr showed hypoventilation and interstitial prominence; 5/19 BC negative; on augmentin and cefepime plus vancomycin    5/22 - d/w RT, RN, and Dr. Herr; needed an FIO2 boost overnight; now, 16/4508/45;   T 100.2 P99BP 105/49 RR 18SaO2 94%; same abx; 5/19 TA=klebsiella; alert    5/23 - remains on 16/450/45/8, d/w RT; 98.2 degrees - 80- 109/68 - 21 - 96% - SR; leukocytosis a bit better, anemia persists, slight hyponatremia, slightly worsened azotemia noted, ongoing antiinfectives; midline placed; propofol down to 26    5/24 - d/w RT, try some initial weaning today if possible; currently on 16/450/45/8; no distress, resting comfortably; remains on propofol at 26;  Leukocytosis, anemia, hyponatremia, azotemia, hyperglycemia noted, plts down to 143; SR - 97.5 degrees - 117/59 - 87 - 22 - 93%; cxr for tomorrow ordered, on Maxipime and vanco; 5/22 BC and urine ctx neg;     5/25 - d/w RT and RN; off of propofol, try weaning; on 16/450/8/45, resting comfortable in NAD; good diuresis with metolazone; 97.8 degrees - 93 - 127/70 - 18 - SR/BBB; leukocytosis, anemia today are similar to prior; cxr today shows right > left bibasilar atx and opacities; on Maxipine, fluconazole, and vanco; legs remian wrapped, edema noted    526 - d/w RT, was able to tolerate 3 hours of 10/8 yesterday, will try more today as able; SR - 98.6 degrees - 140/76 - 92 - 17 - 97%; leukocytosis and anemia noted and similar to prior; new hyponatremia, hyperglycemia, mild azotemia is a bit better; on lasix, diamox, and metolazone    5/27 - d/w RT, on sbt now - he is watching TV and unaware of weaning, yesterday did only 1 hour; SR - 98 degrees - 133/62 - 77 - 32 - 92; leukocytosis, anemia, hyponatremia, azotemia, hyperglycemia today; last cxr 5/25 stable abnormal; on same diuretics and antiinfectives     5/28 - d/w RT, did well with weaning but eventually tired out, weaker today; back on AC at # AMl white-yellow secretions;  sr/BBB - 98.2 degrees - 84 - 138/74 - 28 - 94%; leukocytosis and anemia without sig delta from prior;     5/29 - d/w RT, weaning as able; rest settings are 16/450/8/55; 5/28 cxr showed stable infiltrates/opacities, ongoing diuresis; cpap just initiated today,albeit with a high PS 16 cmH2O pressure; SR - 98.1 degrees - 129/68 - 89 - 20 - 94%; leukocytosis, anemia, hyperglycemia, azotemia persist; on diamox, lasix, metolazone; on fluconazole; TA shows LFGNR 5/27 5/30 - SR - 98.1 degrees - 101/62 - 87 - 24 - 91%; augmentin started for cellulitis, diamox stopped, also on fluconazole; tolerated 20 hours of weaning yesterday, today placed back on 10/8, on 45%; cxr yesterday with stable opacities; TA with light growth of Kleb    5/31 - has done 24 hours on 10/8 and is none the worse for the wear; discussed issues with RT and the patient, trying to clarify the plan once extubated, if he fares poorly; SR - 97.9 degrees - 157/63 - 92 - 16 - 99%; remains on augmentin and fluconazole    6/1 - d/w RT, failed weaning at noon yesterday, turned purpl according to RT; back on AC and 50%; has now been intubated times 2 weeks, failing weaning, needs trach if he agrees;SR; 139/89 - 82 - 22 - 93% - 98.0 degrees; no new labs, imaging or micro today, fluconazole and augmentin antiinfectives    6/2 failed weaning trials. On full vent support.   Intubated for 14 days. Awaiting trach     6/3 did not tolerate weaning trials, weak. No fever no pain     6/4 could not tolerated weaning trials again with tachycardia and tachypnea plans for trach. Consulted surgery     6/5 awaiting tracheostomy this week     PFSH:  No change.    Respiratory:  Exam: symmetrical but diminished bilateral breath sounds with basilar rales but no wheezing or egophony.  ImagingAvailable data reviewed. The chest film on 5/19 demonstrated stable interstitial prominence with no new focal consolidation. The endotracheal tube seemed to be in good position. No film  today.    HemoDynamics:  Exam: regular rhythm (Sinus)  Imaging: Available data reviewed. The echocardiogram on March 18, 2018 was a limited study but demonstrated grossly normal left ventricular systolic function.    Neuro:  Exam: no focal deficits noted mental status intact  Imaging: None - Reviewed    Recent Labs      06/03/18 0325 06/04/18   0330  06/05/18   0350   SODIUM  128*  129*  128*   POTASSIUM  3.9  3.5*  3.6   CHLORIDE  86*  88*  88*   CO2  32  31  30   BUN  37*  31*  29*   CREATININE  0.64  0.63  0.57   CALCIUM  9.9  9.9  9.6     GI/Nutrition:  Exam: abdomen is soft and non-tender, normal active bowel sounds  Imaging: Available data reviewed  NPO, enteral tube feeding initiated.  Liver Function  Recent Labs      06/03/18 0325 06/04/18   0330 06/05/18   0350   GLUCOSE  170*  163*  183*       Heme:  Recent Labs      06/05/18   0350   RBC  4.33*   HEMOGLOBIN  11.9*   HEMATOCRIT  36.9*   PLATELETCT  216       Infectious Disease:  Micro: cultures reviewed; no new positive cultures. The tracheal aspirate demonstrated gram-negative rods with identification and sensitivity pending.  Recent Labs      06/05/18   0350   WBC  12.7*       Quality  Measures:  Radiology images reviewed, Medications reviewed and Labs reviewed                      Problems:  1. VDRF, acute on chronic respiratory failure  2. Chronic obstructive pulmonary disease.  3. Obesity, suspected obesity hypoventilation syndrome and obstructive sleep apnea hypopnea syndrome.  4. Chronic lymphedema, compression, diuresis  5. Diabetes mellitus, controlled.  6. Chronic pain syndrome requiring chronic narcotic analgesia.  7. Hypothyroidism, on replacement therapy.  8. Systemic arterial hypertension, controlled.  9. Anemia, stable without evident ongoing blood loss.  10. Dysphagia, moderate PCM, hypoalbuminemia  11. Abnormal chest imaging - ongoing diuresis, consider tap if need be  12. Debility  13. Leukocytosis  14. Kleb HAP  15. BBB  16. Full  code  17. Hyponatremia  18. Azotemia  19. Hypoalbuminemia    Plan:  Continued full support mechanical ventilation with a lung-protective ventilation strategy. Titrate supplemental oxygen, continue bronchodilators and respiratory protocols.  Weaning as tolerated.  Continue antibiotics , adjust accordingly as needed    Continue nutritional support with enteral tube feeding. Continue therapies, mobilization and prophylaxis.    Discussed patient condition and risk of morbidity and/or mortality with RN and RT    trach this week   D/w with hospitalist

## 2018-06-06 NOTE — PROGRESS NOTES
Pulmonary Critical Care Progress Note        Chief Complaint: Acute on chronic respiratory failure with hypoxia and hypercapnia.    History of Present Illness: 57 y.o. male with complex medical history including COPD, congestive heart failure, type 2 diabetes, bilateral lower extremity cellulitis and probable obstructive sleep apnea was admitted to [facility] to be treated for bilateral lower extremity cellulitis. The patient is noncompliant. He was recently admitted to [facility] intubated for about a week for acute and chronic hypercapnic respiratory failure with CO2 retention. The patient was prescribed a trilogy ventilator to use after discharge.      The patient was readmitted to the hospital for bilateral lower extremity cellulitis. He was found to have high bicarbonate on basic metabolic panel of 42. Pulmonary consultation was requested for further evaluation and management of his chronic respiratory failure.    ROS:  Respiratory: positive shortness of breath, unchanged, Cardiac: negative chest pain, GI: negative abdominal pain.  All other systems negative.    Interval Events:  24 hour interval history reviewed.    5/20 - He developed increased dyspnea overnight on May 18 and required intubation. He is now on full ventilatory support. With an assist-control set rate of 16 he breathes at a rate of 28 but exhibits no dyspnea or dyssynchrony. The peak airway pressure is about 30 cm water with a set tidal volume of 450 ml and PEEP = 8. Oxygen saturation is about 95% on 40% oxygen. The ABG demonstrates an adequate oxygen saturation with a compensated chronic respiratory acidosis. He is less sedated on the propofol infusion and hemodynamically stable. He is afebrile with a maximum temperature of 98.4 degrees overnight. Wound care continues and his edema seems less after diuretic therapy.     5/21 - SR - 99.0 degrees - 139/73 - 92 - 16 - 94%; d/w RT: 16/8/450/50, yellow plugges noted; procalcitonin  slightly elevated yesterday; 5/19 cxr showed hypoventilation and interstitial prominence; 5/19 BC negative; on augmentin and cefepime plus vancomycin    5/22 - d/w RT, RN, and Dr. Herr; needed an FIO2 boost overnight; now, 16/4508/45;   T 100.2 P99BP 105/49 RR 18SaO2 94%; same abx; 5/19 TA=klebsiella; alert    5/23 - remains on 16/450/45/8, d/w RT; 98.2 degrees - 80- 109/68 - 21 - 96% - SR; leukocytosis a bit better, anemia persists, slight hyponatremia, slightly worsened azotemia noted, ongoing antiinfectives; midline placed; propofol down to 26    5/24 - d/w RT, try some initial weaning today if possible; currently on 16/450/45/8; no distress, resting comfortably; remains on propofol at 26;  Leukocytosis, anemia, hyponatremia, azotemia, hyperglycemia noted, plts down to 143; SR - 97.5 degrees - 117/59 - 87 - 22 - 93%; cxr for tomorrow ordered, on Maxipime and vanco; 5/22 BC and urine ctx neg;     5/25 - d/w RT and RN; off of propofol, try weaning; on 16/450/8/45, resting comfortable in NAD; good diuresis with metolazone; 97.8 degrees - 93 - 127/70 - 18 - SR/BBB; leukocytosis, anemia today are similar to prior; cxr today shows right > left bibasilar atx and opacities; on Maxipine, fluconazole, and vanco; legs remian wrapped, edema noted    526 - d/w RT, was able to tolerate 3 hours of 10/8 yesterday, will try more today as able; SR - 98.6 degrees - 140/76 - 92 - 17 - 97%; leukocytosis and anemia noted and similar to prior; new hyponatremia, hyperglycemia, mild azotemia is a bit better; on lasix, diamox, and metolazone    5/27 - d/w RT, on sbt now - he is watching TV and unaware of weaning, yesterday did only 1 hour; SR - 98 degrees - 133/62 - 77 - 32 - 92; leukocytosis, anemia, hyponatremia, azotemia, hyperglycemia today; last cxr 5/25 stable abnormal; on same diuretics and antiinfectives     5/28 - d/w RT, did well with weaning but eventually tired out, weaker today; back on AC at # AMl white-yellow secretions;  sr/BBB - 98.2 degrees - 84 - 138/74 - 28 - 94%; leukocytosis and anemia without sig delta from prior;     5/29 - d/w RT, weaning as able; rest settings are 16/450/8/55; 5/28 cxr showed stable infiltrates/opacities, ongoing diuresis; cpap just initiated today,albeit with a high PS 16 cmH2O pressure; SR - 98.1 degrees - 129/68 - 89 - 20 - 94%; leukocytosis, anemia, hyperglycemia, azotemia persist; on diamox, lasix, metolazone; on fluconazole; TA shows LFGNR 5/27 5/30 - SR - 98.1 degrees - 101/62 - 87 - 24 - 91%; augmentin started for cellulitis, diamox stopped, also on fluconazole; tolerated 20 hours of weaning yesterday, today placed back on 10/8, on 45%; cxr yesterday with stable opacities; TA with light growth of Kleb    5/31 - has done 24 hours on 10/8 and is none the worse for the wear; discussed issues with RT and the patient, trying to clarify the plan once extubated, if he fares poorly; SR - 97.9 degrees - 157/63 - 92 - 16 - 99%; remains on augmentin and fluconazole    6/1 - d/w RT, failed weaning at noon yesterday, turned purpl according to RT; back on AC and 50%; has now been intubated times 2 weeks, failing weaning, needs trach if he agrees;SR; 139/89 - 82 - 22 - 93% - 98.0 degrees; no new labs, imaging or micro today, fluconazole and augmentin antiinfectives    6/2 failed weaning trials. On full vent support.   Intubated for 14 days. Awaiting trach     6/3 did not tolerate weaning trials, weak. No fever no pain     6/4 could not tolerated weaning trials again with tachycardia and tachypnea plans for trach. Consulted surgery     PFSH:  No change.    Respiratory:  Exam: symmetrical but diminished bilateral breath sounds with basilar rales but no wheezing or egophony.  ImagingAvailable data reviewed. The chest film on 5/19 demonstrated stable interstitial prominence with no new focal consolidation. The endotracheal tube seemed to be in good position. No film today.    HemoDynamics:  Exam: regular rhythm  (Sinus)  Imaging: Available data reviewed. The echocardiogram on March 18, 2018 was a limited study but demonstrated grossly normal left ventricular systolic function.    Neuro:  Exam: no focal deficits noted mental status intact  Imaging: None - Reviewed    Recent Labs      06/03/18 0325 06/04/18 0330  06/05/18   0350   SODIUM  128*  129*  128*   POTASSIUM  3.9  3.5*  3.6   CHLORIDE  86*  88*  88*   CO2  32  31  30   BUN  37*  31*  29*   CREATININE  0.64  0.63  0.57   CALCIUM  9.9  9.9  9.6     GI/Nutrition:  Exam: abdomen is soft and non-tender, normal active bowel sounds  Imaging: Available data reviewed  NPO, enteral tube feeding initiated.  Liver Function  Recent Labs      06/03/18 0325 06/04/18 0330 06/05/18   0350   GLUCOSE  170*  163*  183*       Heme:  Recent Labs      06/05/18   0350   RBC  4.33*   HEMOGLOBIN  11.9*   HEMATOCRIT  36.9*   PLATELETCT  216       Infectious Disease:  Micro: cultures reviewed; no new positive cultures. The tracheal aspirate demonstrated gram-negative rods with identification and sensitivity pending.  Recent Labs      06/05/18   0350   WBC  12.7*       Quality  Measures:  Radiology images reviewed, Medications reviewed and Labs reviewed                      Problems:  1. VDRF, acute on chronic respiratory failure  2. Chronic obstructive pulmonary disease.  3. Obesity, suspected obesity hypoventilation syndrome and obstructive sleep apnea hypopnea syndrome.  4. Chronic lymphedema, compression, diuresis  5. Diabetes mellitus, controlled.  6. Chronic pain syndrome requiring chronic narcotic analgesia.  7. Hypothyroidism, on replacement therapy.  8. Systemic arterial hypertension, controlled.  9. Anemia, stable without evident ongoing blood loss.  10. Dysphagia, moderate PCM, hypoalbuminemia  11. Abnormal chest imaging - ongoing diuresis, consider tap if need be  12. Debility  13. Leukocytosis  14. Kleb HAP  15. BBB  16. Full code  17. Hyponatremia  18. Azotemia  19.  Hypoalbuminemia    Plan:  Continued full support mechanical ventilation with a lung-protective ventilation strategy. Titrate supplemental oxygen, continue bronchodilators and respiratory protocols.  Weaning as tolerated.  Continue antibiotics , adjust accordingly as needed    Continue nutritional support with enteral tube feeding. Continue therapies, mobilization and prophylaxis.    Discussed patient condition and risk of morbidity and/or mortality with RN and RT    trach this week   D/w with hospitalist

## 2018-06-07 ENCOUNTER — APPOINTMENT (OUTPATIENT)
Dept: RADIOLOGY | Facility: MEDICAL CENTER | Age: 58
End: 2018-06-07
Attending: HOSPITALIST
Payer: COMMERCIAL

## 2018-06-07 LAB
APPEARANCE UR: ABNORMAL
BACTERIA #/AREA URNS HPF: NEGATIVE /HPF
BILIRUB UR QL STRIP.AUTO: NEGATIVE
COLOR UR: YELLOW
EPI CELLS #/AREA URNS HPF: NEGATIVE /HPF
GLUCOSE UR STRIP.AUTO-MCNC: NEGATIVE MG/DL
KETONES UR STRIP.AUTO-MCNC: NEGATIVE MG/DL
LEUKOCYTE ESTERASE UR QL STRIP.AUTO: ABNORMAL
MICRO URNS: ABNORMAL
MUCOUS THREADS #/AREA URNS HPF: ABNORMAL /HPF
NITRITE UR QL STRIP.AUTO: NEGATIVE
PH UR STRIP.AUTO: 7.5 [PH]
PROT UR QL STRIP: NEGATIVE MG/DL
RBC # URNS HPF: ABNORMAL /HPF
RBC UR QL AUTO: ABNORMAL
SP GR UR STRIP.AUTO: 1.01
WBC #/AREA URNS HPF: ABNORMAL /HPF

## 2018-06-07 PROCEDURE — 87086 URINE CULTURE/COLONY COUNT: CPT

## 2018-06-07 PROCEDURE — 81001 URINALYSIS AUTO W/SCOPE: CPT

## 2018-06-07 ASSESSMENT — ENCOUNTER SYMPTOMS
SORE THROAT: 0
ABDOMINAL PAIN: 0
VOMITING: 0
SHORTNESS OF BREATH: 0
COUGH: 0
DIARRHEA: 0
HEADACHES: 0
FEVER: 0
NAUSEA: 0
CONSTIPATION: 0
PALPITATIONS: 0

## 2018-06-07 NOTE — [facility]
Hospital Medicine Progress Note, Adult, Complex               Author: Soo TOLU Herr Date & Time created: 6/7/2018  5:30 AM     CC: LE wounds    Interval History:  Had trach placed without complication  Bloody secretions improved  Hoping to eat and get NGT out  Do not believe he needs restraints    Review of Systems:  Review of Systems   Constitutional: Negative for fever.   HENT: Negative for congestion and sore throat.    Respiratory: Negative for cough and shortness of breath.    Cardiovascular: Negative for chest pain and palpitations.   Gastrointestinal: Negative for abdominal pain, constipation, diarrhea, nausea and vomiting.   Genitourinary: Negative for dysuria.   Musculoskeletal: Negative for joint pain.   Neurological: Negative for headaches.       T 98.9 P 77 /70 RR 24 SaO2 96% I/O not in chart teleSR  Physical Exam   Constitutional: He is oriented to person, place, and time. He appears well-developed. No distress.   HENT:   Head: Normocephalic and atraumatic.   NGT   Eyes: Conjunctivae are normal. Right eye exhibits no discharge. Left eye exhibits no discharge. No scleral icterus.   Neck: No tracheal deviation present.   Trach   Cardiovascular: Normal rate and regular rhythm.    No murmur heard.  Pulmonary/Chest: Effort normal. No respiratory distress. He has no wheezes. He exhibits no tenderness.   diminished   Abdominal: Soft. Bowel sounds are normal. He exhibits no distension. There is no tenderness. There is no rebound.   Musculoskeletal: He exhibits edema (tr).   Neurological: He is alert and oriented to person, place, and time.   Skin: Skin is warm.   LE wrapped   Vitals reviewed.      Labs:  Recent Labs      06/04/18   1100   MTKIK50S  7.41   [id number]  51.6*   [id number]  72.2   IEJU9ARP  93.0   ARTHCO3  32*   FIO2  40   ARTBE  6*         Recent Labs      06/05/18   0350  06/06/18   0300   SODIUM  128*  131*   POTASSIUM  3.6  3.9   CHLORIDE  88*  90*   CO2  30  32   BUN  29*  29*    CREATININE  0.57  0.57   CALCIUM  9.6  9.6     Recent Labs      06/05/18   0350  06/06/18   0300   GLUCOSE  183*  134*     Recent Labs      06/05/18   0350  06/06/18   0405   RBC  4.33*  4.39*   HEMOGLOBIN  11.9*  11.8*   HEMATOCRIT  36.9*  37.0*   PLATELETCT  216  215   PROTHROMBTM   --   13.1   APTT   --   29.2   INR   --   1.00     Recent Labs      06/05/18   0350  06/06/18   0405   WBC  12.7*  9.3        GI/Nutrition:  No orders of the defined types were placed in this encounter.    Medical Decision Making, by Problem:    RLE cellulitis s/p debridement, polymicrobial (enterococcus, proteus, MSSA)              -improved with elevation, continue wound care               -Augmentin suppression per ID  B LE lymphedema              -compression  ALIREZA with chronic hypercapnia/OHVS s/p acute VDRF, Trach 6/6              -bloody secretions slowed              -start lasix  Klebsiella HAP-treated  Leukocytosis-resolved  COPD              -spiriva, incruse held with ETT  DM              -metformin              -asa, lipitor  BBB              -normal ef, wall motion on echo 5/19  Chronic pain              -oxy IR 30mg q 4 until can take CR              -prn fentanyl for wound care  MSEW  Hypothyroidism              -synthroid  Hyponatremia              -following  Nutrition              -TF   -SLP eval  Anemia  Debility    Quality-Core Measures   Reviewed items::  Medications reviewed  Montoya catheter::  Unconscious / Sedated Patient on a Ventilator  Antibiotics:  Treating active infection/contamination beyond 24 hours perioperative coverage

## 2018-06-08 LAB
ANION GAP SERPL CALC-SCNC: 11 MMOL/L (ref 0–11.9)
BASOPHILS # BLD AUTO: 0.5 % (ref 0–1.8)
BASOPHILS # BLD: 0.05 K/UL (ref 0–0.12)
BUN SERPL-MCNC: 24 MG/DL (ref 8–22)
CALCIUM SERPL-MCNC: 10 MG/DL (ref 8.4–10.2)
CHLORIDE SERPL-SCNC: 90 MMOL/L (ref 96–112)
CO2 SERPL-SCNC: 27 MMOL/L (ref 20–33)
CREAT SERPL-MCNC: 0.61 MG/DL (ref 0.5–1.4)
EOSINOPHIL # BLD AUTO: 0.4 K/UL (ref 0–0.51)
EOSINOPHIL NFR BLD: 4.1 % (ref 0–6.9)
ERYTHROCYTE [DISTWIDTH] IN BLOOD BY AUTOMATED COUNT: 45.5 FL (ref 35.9–50)
GLUCOSE SERPL-MCNC: 172 MG/DL (ref 65–99)
HCT VFR BLD AUTO: 37.3 % (ref 42–52)
HGB BLD-MCNC: 11.8 G/DL (ref 14–18)
IMM GRANULOCYTES # BLD AUTO: 0.16 K/UL (ref 0–0.11)
IMM GRANULOCYTES NFR BLD AUTO: 1.7 % (ref 0–0.9)
LYMPHOCYTES # BLD AUTO: 1.6 K/UL (ref 1–4.8)
LYMPHOCYTES NFR BLD: 16.5 % (ref 22–41)
MCH RBC QN AUTO: 26.7 PG (ref 27–33)
MCHC RBC AUTO-ENTMCNC: 31.6 G/DL (ref 33.7–35.3)
MCV RBC AUTO: 84.4 FL (ref 81.4–97.8)
MONOCYTES # BLD AUTO: 0.84 K/UL (ref 0–0.85)
MONOCYTES NFR BLD AUTO: 8.7 % (ref 0–13.4)
NEUTROPHILS # BLD AUTO: 6.63 K/UL (ref 1.82–7.42)
NEUTROPHILS NFR BLD: 68.5 % (ref 44–72)
NRBC # BLD AUTO: 0 K/UL
NRBC BLD-RTO: 0 /100 WBC
PLATELET # BLD AUTO: 269 K/UL (ref 164–446)
PMV BLD AUTO: 12.2 FL (ref 9–12.9)
POTASSIUM SERPL-SCNC: 3.7 MMOL/L (ref 3.6–5.5)
RBC # BLD AUTO: 4.42 M/UL (ref 4.7–6.1)
SODIUM SERPL-SCNC: 128 MMOL/L (ref 135–145)
WBC # BLD AUTO: 9.7 K/UL (ref 4.8–10.8)

## 2018-06-08 PROCEDURE — 85025 COMPLETE CBC W/AUTO DIFF WBC: CPT

## 2018-06-08 PROCEDURE — 80048 BASIC METABOLIC PNL TOTAL CA: CPT

## 2018-06-08 ASSESSMENT — ENCOUNTER SYMPTOMS
VOMITING: 0
FEVER: 0
CONSTIPATION: 0
COUGH: 0
ABDOMINAL PAIN: 0
SORE THROAT: 0
SHORTNESS OF BREATH: 0
NAUSEA: 0
DIARRHEA: 0
HEADACHES: 0

## 2018-06-08 NOTE — [facility]
Hospital Medicine Progress Note, Adult, Complex               Author: Soo Herr Date & Time created: 6/8/2018  6:05 AM     CC: LE wounds    Interval History:  Started ice chips  Anticipate diet next week  No concerns from patient    Review of Systems:  Review of Systems   Constitutional: Negative for fever.   HENT: Negative for congestion and sore throat.         Neck feels tight at trach site   Respiratory: Negative for cough and shortness of breath.    Cardiovascular: Negative for chest pain.   Gastrointestinal: Negative for abdominal pain, constipation, diarrhea, nausea and vomiting.   Genitourinary: Negative for dysuria.   Musculoskeletal: Negative for joint pain.   Neurological: Negative for headaches.       T 99.2P 98 /94 RR 21 SaO2 93% I/O 2.6/1.7 no BM teleSR  Physical Exam   Constitutional: He is oriented to person, place, and time. He appears well-developed. No distress.   HENT:   Head: Normocephalic and atraumatic.   NGT   Eyes: Conjunctivae are normal. Right eye exhibits no discharge. Left eye exhibits no discharge. No scleral icterus.   Neck: No tracheal deviation present.   Trach  Supraclavicular fat pads   Cardiovascular: Normal rate and regular rhythm.    No murmur heard.  Pulmonary/Chest: Effort normal. No respiratory distress. He has no wheezes. He exhibits no tenderness.   diminished   Abdominal: Soft. Bowel sounds are normal. He exhibits no distension. There is no tenderness. There is no rebound.   Musculoskeletal: He exhibits edema (tr).   Neurological: He is alert and oriented to person, place, and time.   Skin: Skin is warm.   LE wrapped   Vitals reviewed.      Labs:        Invalid input(s): [id number]      Recent Labs      06/06/18   0300  06/08/18   0420   SODIUM  131*  128*   POTASSIUM  3.9  3.7   CHLORIDE  90*  90*   CO2  32  27   BUN  29*  24*   CREATININE  0.57  0.61   CALCIUM  9.6  10.0     Recent Labs      06/06/18   0300  06/08/18   0420   GLUCOSE  134*  172*      Recent Labs      06/06/18   0405  06/08/18   0420   RBC  4.39*  4.42*   HEMOGLOBIN  11.8*  11.8*   HEMATOCRIT  37.0*  37.3*   PLATELETCT  215  269   PROTHROMBTM  13.1   --    APTT  29.2   --    INR  1.00   --      Recent Labs      06/06/18   0405  06/08/18   0420   WBC  9.3  9.7   NEUTSPOLYS   --   68.50   LYMPHOCYTES   --   16.50*   MONOCYTES   --   8.70   EOSINOPHILS   --   4.10   BASOPHILS   --   0.50        GI/Nutrition:  No orders of the defined types were placed in this encounter.    Medical Decision Making, by Problem:    RLE cellulitis s/p debridement, polymicrobial (enterococcus, proteus, MSSA)              -improved, continue wound care               -Augmentin suppression per ID  B LE lymphedema              -compression  ALIREZA with chronic hypercapnia/OHVS s/p acute VDRF, Trach 6/6              -lasix, add diamox   -vent weaning per pulmonary  Klebsiella HAP-treated  COPD              -spiriva, incruse held with ETT  DM              -metformin              -asa, lipitor  BBB              -normal ef, wall motion on echo 5/19  Chronic pain              -oxy IR 30mg q 4 until can take CR              -prn fentanyl for wound care  MSEW  Hypothyroidism              -synthroid  Hyponatremia              -following  Nutrition              -TF   -SLP eval  Anemia  Debility   -request PT/OT as off ETT    Quality-Core Measures   Reviewed items::  Medications reviewed and Labs reviewed  Montoya catheter::  Unconscious / Sedated Patient on a Ventilator  Antibiotics:  Treating active infection/contamination beyond 24 hours perioperative coverage

## 2018-06-08 NOTE — PROGRESS NOTES
Progress Notes  Date of Service: 6/6/2018 12:14 PM  Jaden Song M.D.   Pulmonary      []Hide copied text  Pulmonary Critical Care Progress Note         Chief Complaint: Acute on chronic respiratory failure with hypoxia and hypercapnia.     History of Present Illness: 57 y.o. male with complex medical history including COPD, congestive heart failure, type 2 diabetes, bilateral lower extremity cellulitis and probable obstructive sleep apnea was admitted to [facility] to be treated for bilateral lower extremity cellulitis. The patient is noncompliant. He was recently admitted to [facility] intubated for about a week for acute and chronic hypercapnic respiratory failure with CO2 retention. The patient was prescribed a trilogy ventilator to use after discharge.      The patient was readmitted to the hospital for bilateral lower extremity cellulitis. He was found to have high bicarbonate on basic metabolic panel of 42. Pulmonary consultation was requested for further evaluation and management of his chronic respiratory failure.     ROS:  Respiratory: positive shortness of breath, unchanged, Cardiac: negative chest pain, GI: negative abdominal pain.  All other systems negative.     Interval Events:  24 hour interval history reviewed.     5/20 - He developed increased dyspnea overnight on May 18 and required intubation. He is now on full ventilatory support. With an assist-control set rate of 16 he breathes at a rate of 28 but exhibits no dyspnea or dyssynchrony. The peak airway pressure is about 30 cm water with a set tidal volume of 450 ml and PEEP = 8. Oxygen saturation is about 95% on 40% oxygen. The ABG demonstrates an adequate oxygen saturation with a compensated chronic respiratory acidosis. He is less sedated on the propofol infusion and hemodynamically stable. He is afebrile with a maximum temperature of 98.4 degrees overnight. Wound care continues and his edema seems less after diuretic  therapy.      5/21 - SR - 99.0 degrees - 139/73 - 92 - 16 - 94%; d/w RT: 16/8/450/50, yellow plugges noted; procalcitonin slightly elevated yesterday; 5/19 cxr showed hypoventilation and interstitial prominence; 5/19 BC negative; on augmentin and cefepime plus vancomycin     5/22 - d/w RT, RN, and Dr. Herr; needed an FIO2 boost overnight; now, 16/4508/45;   T 100.2 P99BP 105/49 RR 18SaO2 94%; same abx; 5/19 TA=klebsiella; alert     5/23 - remains on 16/450/45/8, d/w RT; 98.2 degrees - 80- 109/68 - 21 - 96% - SR; leukocytosis a bit better, anemia persists, slight hyponatremia, slightly worsened azotemia noted, ongoing antiinfectives; midline placed; propofol down to 26     5/24 - d/w RT, try some initial weaning today if possible; currently on 16/450/45/8; no distress, resting comfortably; remains on propofol at 26;  Leukocytosis, anemia, hyponatremia, azotemia, hyperglycemia noted, plts down to 143; SR - 97.5 degrees - 117/59 - 87 - 22 - 93%; cxr for tomorrow ordered, on Maxipime and vanco; 5/22 BC and urine ctx neg;      5/25 - d/w RT and RN; off of propofol, try weaning; on 16/450/8/45, resting comfortable in NAD; good diuresis with metolazone; 97.8 degrees - 93 - 127/70 - 18 - SR/BBB; leukocytosis, anemia today are similar to prior; cxr today shows right > left bibasilar atx and opacities; on Maxipine, fluconazole, and vanco; legs remian wrapped, edema noted     526 - d/w RT, was able to tolerate 3 hours of 10/8 yesterday, will try more today as able; SR - 98.6 degrees - 140/76 - 92 - 17 - 97%; leukocytosis and anemia noted and similar to prior; new hyponatremia, hyperglycemia, mild azotemia is a bit better; on lasix, diamox, and metolazone     5/27 - d/w RT, on sbt now - he is watching TV and unaware of weaning, yesterday did only 1 hour; SR - 98 degrees - 133/62 - 77 - 32 - 92; leukocytosis, anemia, hyponatremia, azotemia, hyperglycemia today; last cxr 5/25 stable abnormal; on same diuretics and antiinfectives       5/28 - d/w RT, did well with weaning but eventually tired out, weaker today; back on AC at # AMl white-yellow secretions; sr/BBB - 98.2 degrees - 84 - 138/74 - 28 - 94%; leukocytosis and anemia without sig delta from prior;      5/29 - d/w RT, weaning as able; rest settings are 16/450/8/55; 5/28 cxr showed stable infiltrates/opacities, ongoing diuresis; cpap just initiated today,albeit with a high PS 16 cmH2O pressure; SR - 98.1 degrees - 129/68 - 89 - 20 - 94%; leukocytosis, anemia, hyperglycemia, azotemia persist; on diamox, lasix, metolazone; on fluconazole; TA shows LFGNR 5/27 5/30 - SR - 98.1 degrees - 101/62 - 87 - 24 - 91%; augmentin started for cellulitis, diamox stopped, also on fluconazole; tolerated 20 hours of weaning yesterday, today placed back on 10/8, on 45%; cxr yesterday with stable opacities; TA with light growth of Kleb     5/31 - has done 24 hours on 10/8 and is none the worse for the wear; discussed issues with RT and the patient, trying to clarify the plan once extubated, if he fares poorly; SR - 97.9 degrees - 157/63 - 92 - 16 - 99%; remains on augmentin and fluconazole     6/1 - d/w RT, failed weaning at noon yesterday, turned purpl according to RT; back on AC and 50%; has now been intubated times 2 weeks, failing weaning, needs trach if he agrees;SR; 139/89 - 82 - 22 - 93% - 98.0 degrees; no new labs, imaging or micro today, fluconazole and augmentin antiinfectives     6/2 failed weaning trials. On full vent support.   Intubated for 14 days. Awaiting trach      6/3 did not tolerate weaning trials, weak. No fever no pain      6/4 could not tolerated weaning trials again with tachycardia and tachypnea plans for trach. Consulted surgery      6/5 awaiting tracheostomy this week      6/6  He remains on full mechanical ventilatory support.  He has been unable to wean over the past weeks.  Tracheostomy probably this afternoon.    6/7  On full mechanical ventilatory support status post  tracheostomy.  No significant distress on the ventilator.  Postprocedure chest x-ray with no significant changes.    6/8  Some dyssynchrony on the ventilator last evening.  Was switched from assist control to pressure support and is tolerating it better.  No distress this morning.     PFSH:  No change.     Respiratory:  Exam: symmetrical but diminished bilateral breath sounds with basilar rales but no wheezing or egophony.  ImagingAvailable data reviewed. The chest film  demonstrates stable interstitial prominence with no new focal consolidation.      HemoDynamics:  Exam: regular rhythm (Sinus)  Imaging: Available data reviewed. The echocardiogram on March 18, 2018 was a limited study but demonstrated grossly normal left ventricular systolic function.     Neuro:  Exam: no focal deficits noted mental status intact  Imaging: None - Reviewed           Recent Labs      06/04/18   0330  06/05/18   0350  06/06/18   0300   SODIUM  129*  128*  131*   POTASSIUM  3.5*  3.6  3.9   CHLORIDE  88*  88*  90*   CO2  31  30  32   BUN  31*  29*  29*   CREATININE  0.63  0.57  0.57   CALCIUM  9.9  9.6  9.6      GI/Nutrition:  Exam: abdomen is soft and non-tender, normal active bowel sounds  Imaging: Available data reviewed  NPO, enteral tube feeding initiated.  Liver Function        Recent Labs      06/04/18   0330  06/05/18   0350  06/06/18   0300   GLUCOSE  163*  183*  134*         Heme:       Recent Labs      06/05/18   0350  06/06/18   0405   RBC  4.33*  4.39*   HEMOGLOBIN  11.9*  11.8*   HEMATOCRIT  36.9*  37.0*   PLATELETCT  216  215   PROTHROMBTM   --   13.1   APTT   --   29.2   INR   --   1.00         Infectious Disease:  Micro: cultures reviewed; no new positive cultures. The tracheal aspirate demonstrated gram-negative rods with identification and sensitivity pending.       Recent Labs      06/05/18   0350  06/06/18   0405   WBC  12.7*  9.3         Quality  Measures:  Radiology images reviewed, Medications reviewed and Labs  reviewed        Problems:  1. VDRF, acute on chronic respiratory failure  2. Chronic obstructive pulmonary disease.  3. Obesity, suspected obesity hypoventilation syndrome and obstructive sleep apnea hypopnea syndrome.  4. Chronic lymphedema, compression, diuresis  5. Diabetes mellitus, controlled.  6. Chronic pain syndrome requiring chronic narcotic analgesia.  7. Hypothyroidism, on replacement therapy.  8. Systemic arterial hypertension, controlled.  9. Anemia, stable without evident ongoing blood loss.  10. Dysphagia, moderate PCM, hypoalbuminemia  11. Abnormal chest imaging - ongoing diuresis, consider tap if need be  12. Debility  13. Leukocytosis  14. Kleb HAP  15. BBB  16. Full code  17. Hyponatremia  18. Azotemia  19. Hypoalbuminemia     Plan:  Continued full support mechanical ventilation with a lung-protective ventilation strategy. Titrate supplemental oxygen, continue bronchodilators and respiratory protocols.  Switched to pressure support ventilation 15/10  Weaning as tolerated once tracheostomy has been placed  Continue antibiotics , adjust accordingly as needed     Continue nutritional support with enteral tube feeding. Continue therapies, mobilization and prophylaxis.     Discussed patient condition and risk of morbidity and/or mortality with RN and RT   Tracheostomy completed  We will try to initiate weaning.  Possible brief trial of CPAP/pressure support 8/8  D/w with hospitalist                 Admission (Discharged) on 5/5/2018            Detailed Report

## 2018-06-08 NOTE — PROGRESS NOTES
Progress Notes  Date of Service: 6/6/2018 12:14 PM  Jaden Song M.D.   Pulmonary      []Hide copied text  Pulmonary Critical Care Progress Note         Chief Complaint: Acute on chronic respiratory failure with hypoxia and hypercapnia.     History of Present Illness: 57 y.o. male with complex medical history including COPD, congestive heart failure, type 2 diabetes, bilateral lower extremity cellulitis and probable obstructive sleep apnea was admitted to [facility] to be treated for bilateral lower extremity cellulitis. The patient is noncompliant. He was recently admitted to [facility] intubated for about a week for acute and chronic hypercapnic respiratory failure with CO2 retention. The patient was prescribed a trilogy ventilator to use after discharge.      The patient was readmitted to the hospital for bilateral lower extremity cellulitis. He was found to have high bicarbonate on basic metabolic panel of 42. Pulmonary consultation was requested for further evaluation and management of his chronic respiratory failure.     ROS:  Respiratory: positive shortness of breath, unchanged, Cardiac: negative chest pain, GI: negative abdominal pain.  All other systems negative.     Interval Events:  24 hour interval history reviewed.     5/20 - He developed increased dyspnea overnight on May 18 and required intubation. He is now on full ventilatory support. With an assist-control set rate of 16 he breathes at a rate of 28 but exhibits no dyspnea or dyssynchrony. The peak airway pressure is about 30 cm water with a set tidal volume of 450 ml and PEEP = 8. Oxygen saturation is about 95% on 40% oxygen. The ABG demonstrates an adequate oxygen saturation with a compensated chronic respiratory acidosis. He is less sedated on the propofol infusion and hemodynamically stable. He is afebrile with a maximum temperature of 98.4 degrees overnight. Wound care continues and his edema seems less after diuretic  therapy.      5/21 - SR - 99.0 degrees - 139/73 - 92 - 16 - 94%; d/w RT: 16/8/450/50, yellow plugges noted; procalcitonin slightly elevated yesterday; 5/19 cxr showed hypoventilation and interstitial prominence; 5/19 BC negative; on augmentin and cefepime plus vancomycin     5/22 - d/w RT, RN, and Dr. Herr; needed an FIO2 boost overnight; now, 16/4508/45;   T 100.2 P99BP 105/49 RR 18SaO2 94%; same abx; 5/19 TA=klebsiella; alert     5/23 - remains on 16/450/45/8, d/w RT; 98.2 degrees - 80- 109/68 - 21 - 96% - SR; leukocytosis a bit better, anemia persists, slight hyponatremia, slightly worsened azotemia noted, ongoing antiinfectives; midline placed; propofol down to 26     5/24 - d/w RT, try some initial weaning today if possible; currently on 16/450/45/8; no distress, resting comfortably; remains on propofol at 26;  Leukocytosis, anemia, hyponatremia, azotemia, hyperglycemia noted, plts down to 143; SR - 97.5 degrees - 117/59 - 87 - 22 - 93%; cxr for tomorrow ordered, on Maxipime and vanco; 5/22 BC and urine ctx neg;      5/25 - d/w RT and RN; off of propofol, try weaning; on 16/450/8/45, resting comfortable in NAD; good diuresis with metolazone; 97.8 degrees - 93 - 127/70 - 18 - SR/BBB; leukocytosis, anemia today are similar to prior; cxr today shows right > left bibasilar atx and opacities; on Maxipine, fluconazole, and vanco; legs remian wrapped, edema noted     526 - d/w RT, was able to tolerate 3 hours of 10/8 yesterday, will try more today as able; SR - 98.6 degrees - 140/76 - 92 - 17 - 97%; leukocytosis and anemia noted and similar to prior; new hyponatremia, hyperglycemia, mild azotemia is a bit better; on lasix, diamox, and metolazone     5/27 - d/w RT, on sbt now - he is watching TV and unaware of weaning, yesterday did only 1 hour; SR - 98 degrees - 133/62 - 77 - 32 - 92; leukocytosis, anemia, hyponatremia, azotemia, hyperglycemia today; last cxr 5/25 stable abnormal; on same diuretics and antiinfectives       5/28 - d/w RT, did well with weaning but eventually tired out, weaker today; back on AC at # AMl white-yellow secretions; sr/BBB - 98.2 degrees - 84 - 138/74 - 28 - 94%; leukocytosis and anemia without sig delta from prior;      5/29 - d/w RT, weaning as able; rest settings are 16/450/8/55; 5/28 cxr showed stable infiltrates/opacities, ongoing diuresis; cpap just initiated today,albeit with a high PS 16 cmH2O pressure; SR - 98.1 degrees - 129/68 - 89 - 20 - 94%; leukocytosis, anemia, hyperglycemia, azotemia persist; on diamox, lasix, metolazone; on fluconazole; TA shows LFGNR 5/27 5/30 - SR - 98.1 degrees - 101/62 - 87 - 24 - 91%; augmentin started for cellulitis, diamox stopped, also on fluconazole; tolerated 20 hours of weaning yesterday, today placed back on 10/8, on 45%; cxr yesterday with stable opacities; TA with light growth of Kleb     5/31 - has done 24 hours on 10/8 and is none the worse for the wear; discussed issues with RT and the patient, trying to clarify the plan once extubated, if he fares poorly; SR - 97.9 degrees - 157/63 - 92 - 16 - 99%; remains on augmentin and fluconazole     6/1 - d/w RT, failed weaning at noon yesterday, turned purpl according to RT; back on AC and 50%; has now been intubated times 2 weeks, failing weaning, needs trach if he agrees;SR; 139/89 - 82 - 22 - 93% - 98.0 degrees; no new labs, imaging or micro today, fluconazole and augmentin antiinfectives     6/2 failed weaning trials. On full vent support.   Intubated for 14 days. Awaiting trach      6/3 did not tolerate weaning trials, weak. No fever no pain      6/4 could not tolerated weaning trials again with tachycardia and tachypnea plans for trach. Consulted surgery      6/5 awaiting tracheostomy this week      6/6  He remains on full mechanical ventilatory support.  He has been unable to wean over the past weeks.  Tracheostomy probably this afternoon.    6/7  On full mechanical ventilatory support status post  tracheostomy.  No significant distress on the ventilator.  Postprocedure chest x-ray with no significant changes.     PFSH:  No change.     Respiratory:  Exam: symmetrical but diminished bilateral breath sounds with basilar rales but no wheezing or egophony.  ImagingAvailable data reviewed. The chest film  demonstrates stable interstitial prominence with no new focal consolidation.      HemoDynamics:  Exam: regular rhythm (Sinus)  Imaging: Available data reviewed. The echocardiogram on March 18, 2018 was a limited study but demonstrated grossly normal left ventricular systolic function.     Neuro:  Exam: no focal deficits noted mental status intact  Imaging: None - Reviewed           Recent Labs      06/04/18   0330  06/05/18   0350  06/06/18   0300   SODIUM  129*  128*  131*   POTASSIUM  3.5*  3.6  3.9   CHLORIDE  88*  88*  90*   CO2  31  30  32   BUN  31*  29*  29*   CREATININE  0.63  0.57  0.57   CALCIUM  9.9  9.6  9.6      GI/Nutrition:  Exam: abdomen is soft and non-tender, normal active bowel sounds  Imaging: Available data reviewed  NPO, enteral tube feeding initiated.  Liver Function        Recent Labs      06/04/18   0330 06/05/18 0350  06/06/18   0300   GLUCOSE  163*  183*  134*         Heme:       Recent Labs      06/05/18   0350  06/06/18   0405   RBC  4.33*  4.39*   HEMOGLOBIN  11.9*  11.8*   HEMATOCRIT  36.9*  37.0*   PLATELETCT  216  215   PROTHROMBTM   --   13.1   APTT   --   29.2   INR   --   1.00         Infectious Disease:  Micro: cultures reviewed; no new positive cultures. The tracheal aspirate demonstrated gram-negative rods with identification and sensitivity pending.       Recent Labs      06/05/18   0350  06/06/18   0405   WBC  12.7*  9.3         Quality  Measures:  Radiology images reviewed, Medications reviewed and Labs reviewed        Problems:  1. VDRF, acute on chronic respiratory failure  2. Chronic obstructive pulmonary disease.  3. Obesity, suspected obesity hypoventilation  syndrome and obstructive sleep apnea hypopnea syndrome.  4. Chronic lymphedema, compression, diuresis  5. Diabetes mellitus, controlled.  6. Chronic pain syndrome requiring chronic narcotic analgesia.  7. Hypothyroidism, on replacement therapy.  8. Systemic arterial hypertension, controlled.  9. Anemia, stable without evident ongoing blood loss.  10. Dysphagia, moderate PCM, hypoalbuminemia  11. Abnormal chest imaging - ongoing diuresis, consider tap if need be  12. Debility  13. Leukocytosis  14. Kleb HAP  15. BBB  16. Full code  17. Hyponatremia  18. Azotemia  19. Hypoalbuminemia     Plan:  Continued full support mechanical ventilation with a lung-protective ventilation strategy. Titrate supplemental oxygen, continue bronchodilators and respiratory protocols.  Weaning as tolerated once tracheostomy has been placed  Continue antibiotics , adjust accordingly as needed     Continue nutritional support with enteral tube feeding. Continue therapies, mobilization and prophylaxis.     Discussed patient condition and risk of morbidity and/or mortality with RN and RT   Tracheostomy completed  We will try to initiate weaning.  D/w with hospitalist                 Admission (Discharged) on 5/5/2018            Detailed Report

## 2018-06-09 LAB
ANION GAP SERPL CALC-SCNC: 11 MMOL/L (ref 0–11.9)
BACTERIA UR CULT: NORMAL
BUN SERPL-MCNC: 32 MG/DL (ref 8–22)
CALCIUM SERPL-MCNC: 10 MG/DL (ref 8.4–10.2)
CHLORIDE SERPL-SCNC: 93 MMOL/L (ref 96–112)
CO2 SERPL-SCNC: 25 MMOL/L (ref 20–33)
CREAT SERPL-MCNC: 0.59 MG/DL (ref 0.5–1.4)
GLUCOSE SERPL-MCNC: 184 MG/DL (ref 65–99)
POTASSIUM SERPL-SCNC: 3.5 MMOL/L (ref 3.6–5.5)
SIGNIFICANT IND 70042: NORMAL
SITE SITE: NORMAL
SODIUM SERPL-SCNC: 129 MMOL/L (ref 135–145)
SOURCE SOURCE: NORMAL

## 2018-06-09 PROCEDURE — 80048 BASIC METABOLIC PNL TOTAL CA: CPT

## 2018-06-09 ASSESSMENT — ENCOUNTER SYMPTOMS
VOMITING: 0
CONSTIPATION: 0
DIARRHEA: 0
SHORTNESS OF BREATH: 0
HEADACHES: 0
COUGH: 0
NAUSEA: 0
FEVER: 0
SORE THROAT: 0
ABDOMINAL PAIN: 0
INSOMNIA: 0

## 2018-06-09 NOTE — PROGRESS NOTES
Progress Notes  Date of Service: 6/9/18  Padma Bacon MD  Pulmonary      []Hide copied text  Pulmonary Critical Care Progress Note         Chief Complaint: Acute on chronic respiratory failure with hypoxia and hypercapnia.     History of Present Illness: 57 y.o. male with complex medical history including COPD, congestive heart failure, type 2 diabetes, bilateral lower extremity cellulitis and probable obstructive sleep apnea was admitted to [facility] to be treated for bilateral lower extremity cellulitis. The patient is noncompliant. He was recently admitted to [facility] intubated for about a week for acute and chronic hypercapnic respiratory failure with CO2 retention. The patient was prescribed a trilogy ventilator to use after discharge.      The patient was readmitted to the hospital for bilateral lower extremity cellulitis. He was found to have high bicarbonate on basic metabolic panel of 42. Pulmonary consultation was requested for further evaluation and management of his chronic respiratory failure.     ROS:  Respiratory: positive shortness of breath, unchanged, Cardiac: negative chest pain, GI: negative abdominal pain.  All other systems negative.     Interval Events:  24 hour interval history reviewed.     5/20 - He developed increased dyspnea overnight on May 18 and required intubation. He is now on full ventilatory support. With an assist-control set rate of 16 he breathes at a rate of 28 but exhibits no dyspnea or dyssynchrony. The peak airway pressure is about 30 cm water with a set tidal volume of 450 ml and PEEP = 8. Oxygen saturation is about 95% on 40% oxygen. The ABG demonstrates an adequate oxygen saturation with a compensated chronic respiratory acidosis. He is less sedated on the propofol infusion and hemodynamically stable. He is afebrile with a maximum temperature of 98.4 degrees overnight. Wound care continues and his edema seems less after diuretic therapy.      5/21 - SR  - 99.0 degrees - 139/73 - 92 - 16 - 94%; d/w RT: 16/8/450/50, yellow plugges noted; procalcitonin slightly elevated yesterday; 5/19 cxr showed hypoventilation and interstitial prominence; 5/19 BC negative; on augmentin and cefepime plus vancomycin     5/22 - d/w RT, RN, and Dr. Herr; needed an FIO2 boost overnight; now, 16/4508/45;   T 100.2 P99BP 105/49 RR 18SaO2 94%; same abx; 5/19 TA=klebsiella; alert     5/23 - remains on 16/450/45/8, d/w RT; 98.2 degrees - 80- 109/68 - 21 - 96% - SR; leukocytosis a bit better, anemia persists, slight hyponatremia, slightly worsened azotemia noted, ongoing antiinfectives; midline placed; propofol down to 26     5/24 - d/w RT, try some initial weaning today if possible; currently on 16/450/45/8; no distress, resting comfortably; remains on propofol at 26;  Leukocytosis, anemia, hyponatremia, azotemia, hyperglycemia noted, plts down to 143; SR - 97.5 degrees - 117/59 - 87 - 22 - 93%; cxr for tomorrow ordered, on Maxipime and vanco; 5/22 BC and urine ctx neg;      5/25 - d/w RT and RN; off of propofol, try weaning; on 16/450/8/45, resting comfortable in NAD; good diuresis with metolazone; 97.8 degrees - 93 - 127/70 - 18 - SR/BBB; leukocytosis, anemia today are similar to prior; cxr today shows right > left bibasilar atx and opacities; on Maxipine, fluconazole, and vanco; legs remian wrapped, edema noted     526 - d/w RT, was able to tolerate 3 hours of 10/8 yesterday, will try more today as able; SR - 98.6 degrees - 140/76 - 92 - 17 - 97%; leukocytosis and anemia noted and similar to prior; new hyponatremia, hyperglycemia, mild azotemia is a bit better; on lasix, diamox, and metolazone     5/27 - d/w RT, on sbt now - he is watching TV and unaware of weaning, yesterday did only 1 hour; SR - 98 degrees - 133/62 - 77 - 32 - 92; leukocytosis, anemia, hyponatremia, azotemia, hyperglycemia today; last cxr 5/25 stable abnormal; on same diuretics and antiinfectives      5/28 - d/w RT, did  well with weaning but eventually tired out, weaker today; back on AC at # AMl white-yellow secretions; sr/BBB - 98.2 degrees - 84 - 138/74 - 28 - 94%; leukocytosis and anemia without sig delta from prior;      5/29 - d/w RT, weaning as able; rest settings are 16/450/8/55; 5/28 cxr showed stable infiltrates/opacities, ongoing diuresis; cpap just initiated today,albeit with a high PS 16 cmH2O pressure; SR - 98.1 degrees - 129/68 - 89 - 20 - 94%; leukocytosis, anemia, hyperglycemia, azotemia persist; on diamox, lasix, metolazone; on fluconazole; TA shows LFGNR 5/27 5/30 - SR - 98.1 degrees - 101/62 - 87 - 24 - 91%; augmentin started for cellulitis, diamox stopped, also on fluconazole; tolerated 20 hours of weaning yesterday, today placed back on 10/8, on 45%; cxr yesterday with stable opacities; TA with light growth of Kleb     5/31 - has done 24 hours on 10/8 and is none the worse for the wear; discussed issues with RT and the patient, trying to clarify the plan once extubated, if he fares poorly; SR - 97.9 degrees - 157/63 - 92 - 16 - 99%; remains on augmentin and fluconazole     6/1 - d/w RT, failed weaning at noon yesterday, turned purpl according to RT; back on AC and 50%; has now been intubated times 2 weeks, failing weaning, needs trach if he agrees;SR; 139/89 - 82 - 22 - 93% - 98.0 degrees; no new labs, imaging or micro today, fluconazole and augmentin antiinfectives     6/2 failed weaning trials. On full vent support.   Intubated for 14 days. Awaiting trach      6/3 did not tolerate weaning trials, weak. No fever no pain      6/4 could not tolerated weaning trials again with tachycardia and tachypnea plans for trach. Consulted surgery      6/5 awaiting tracheostomy this week      6/6  He remains on full mechanical ventilatory support.  He has been unable to wean over the past weeks.  Tracheostomy probably this afternoon.    6/7  On full mechanical ventilatory support status post tracheostomy.  No  significant distress on the ventilator.  Postprocedure chest x-ray with no significant changes.    6/8  Some dyssynchrony on the ventilator last evening.  Was switched from assist control to pressure support and is tolerating it better.  No distress this morning.    6/9, Tolerating pressure support, awake and alert, abdominal distention but not tender.  Overall respiratory status better.  Reviewed bedside with nurse and patient.     PFSH:  No change.     Respiratory:  Exam: symmetrical but diminished bilateral breath sounds with basilar rales but no wheezing or egophony.  ImagingAvailable data reviewed. The chest film  demonstrates stable interstitial prominence with no new focal consolidation.      HemoDynamics:  Exam: regular rhythm (Sinus)  Imaging: Available data reviewed. The echocardiogram on March 18, 2018 was a limited study but demonstrated grossly normal left ventricular systolic function.     Neuro:  Exam: no focal deficits noted mental status intact  Imaging: None - Reviewed           Recent Labs      06/04/18   0330  06/05/18   0350  06/06/18   0300   SODIUM  129*  128*  131*   POTASSIUM  3.5*  3.6  3.9   CHLORIDE  88*  88*  90*   CO2  31  30  32   BUN  31*  29*  29*   CREATININE  0.63  0.57  0.57   CALCIUM  9.9  9.6  9.6      GI/Nutrition:  Exam: abdomen is soft and non-tender, normal active bowel sounds  Imaging: Available data reviewed  NPO, enteral tube feeding initiated.  Liver Function        Recent Labs      06/04/18   0330  06/05/18   0350  06/06/18   0300   GLUCOSE  163*  183*  134*         Heme:       Recent Labs      06/05/18   0350  06/06/18   0405   RBC  4.33*  4.39*   HEMOGLOBIN  11.9*  11.8*   HEMATOCRIT  36.9*  37.0*   PLATELETCT  216  215   PROTHROMBTM   --   13.1   APTT   --   29.2   INR   --   1.00         Infectious Disease:  Micro: cultures reviewed; no new positive cultures. The tracheal aspirate demonstrated gram-negative rods with identification and sensitivity pending.        Recent Labs      06/05/18   0350  06/06/18   0405   WBC  12.7*  9.3         Quality  Measures:  Radiology images reviewed, Medications reviewed and Labs reviewed        Problems:  1. VDRF, acute on chronic respiratory failure  2. Chronic obstructive pulmonary disease.  3. Obesity, suspected obesity hypoventilation syndrome and obstructive sleep apnea hypopnea syndrome.  4. Chronic lymphedema, compression, diuresis  5. Diabetes mellitus, controlled.  6. Chronic pain syndrome requiring chronic narcotic analgesia.  7. Hypothyroidism, on replacement therapy.  8. Systemic arterial hypertension, controlled.  9. Anemia, stable without evident ongoing blood loss.  10. Dysphagia, moderate PCM, hypoalbuminemia  11. Abnormal chest imaging - ongoing diuresis, consider tap if need be  12. Debility  13. Leukocytosis  14. Kleb HAP  15. BBB  16. Full code  17. Hyponatremia  18. Azotemia  19. Hypoalbuminemia     Plan:  Continued full support mechanical ventilation with a lung-protective ventilation strategy. Titrate supplemental oxygen, continue bronchodilators and respiratory protocols.  Switched to pressure support ventilation 15/10  Weaning as tolerated once tracheostomy has been placed  Continue antibiotics , adjust accordingly as needed     Continue nutritional support with enteral tube feeding. Continue therapies, mobilization and prophylaxis.     Discussed patient condition and risk of morbidity and/or mortality with RN and RT   Tracheostomy completed  We will try to initiate weaning.  Possible brief trial of CPAP/pressure support 8/8  D/w with hospitalist                 Admission (Discharged) on 5/5/2018            Detailed Report

## 2018-06-09 NOTE — [facility]
Hospital Medicine Progress Note, Adult, Complex               Author: Soo Herr Date & Time created: 6/9/2018  7:43 AM     CC: LE wounds    Interval History:  Started diet, not thrilled with puree  No physical complaints  Agrees to vanilla boost  Has declined face washing per RN    Review of Systems:  Review of Systems   Constitutional: Negative for fever.   HENT: Negative for sore throat.    Respiratory: Negative for cough and shortness of breath.    Cardiovascular: Negative for chest pain.   Gastrointestinal: Negative for abdominal pain, constipation, diarrhea, nausea and vomiting.   Genitourinary: Negative for dysuria.   Musculoskeletal: Negative for joint pain.   Neurological: Negative for headaches.   Psychiatric/Behavioral: The patient does not have insomnia.        T 98.7P 96 /94 RR 25 SaO2 93% I/O 1.9/2.1 no BM teleSR  Physical Exam   Constitutional: He is oriented to person, place, and time. He appears well-developed.   HENT:   Head: Normocephalic and atraumatic.   NGT   Eyes: Conjunctivae are normal. Right eye exhibits no discharge. Left eye exhibits no discharge. No scleral icterus.   Neck: No tracheal deviation present.   Trach  Supraclavicular fat pads   Cardiovascular: Normal rate and regular rhythm.    No murmur heard.  Pulmonary/Chest: Effort normal. No respiratory distress. He has no wheezes. He exhibits no tenderness.   diminished   Abdominal: Soft. Bowel sounds are normal. He exhibits no distension. There is no tenderness. There is no rebound.   Musculoskeletal: He exhibits edema (tr).   Neurological: He is alert and oriented to person, place, and time.   Skin: Skin is warm.   LE wrapped  Seborrhea rash on face   Vitals reviewed.      Labs:        Invalid input(s): [id number]      Recent Labs      06/08/18   0420  06/09/18   0400   SODIUM  128*  129*   POTASSIUM  3.7  3.5*   CHLORIDE  90*  93*   CO2  27  25   BUN  24*  32*   CREATININE  0.61  0.59   CALCIUM  10.0  10.0     Recent  Labs      06/08/18   0420  06/09/18   0400   GLUCOSE  172*  184*     Recent Labs      06/08/18   0420   RBC  4.42*   HEMOGLOBIN  11.8*   HEMATOCRIT  37.3*   PLATELETCT  269     Recent Labs      06/08/18   0420   WBC  9.7   NEUTSPOLYS  68.50   LYMPHOCYTES  16.50*   MONOCYTES  8.70   EOSINOPHILS  4.10   BASOPHILS  0.50        GI/Nutrition:  Orders Placed This Encounter   Procedures   • DIET ORDER     Standing Status:   Standing     Number of Occurrences:   1     Order Specific Question:   Diet:     Answer:   Regular [1]     Comments:   with supervision     Order Specific Question:   Texture/Fiber modifications:     Answer:   Dysphagia 1(Pureed)specify fluid consistency(question 6) [1]     Order Specific Question:   Consistency/Fluid modifications:     Answer:   Thin Liquids [3]     Medical Decision Making, by Problem:    RLE cellulitis s/p debridement, polymicrobial (enterococcus, proteus, MSSA)              -improved, continue wound care               -Augmentin suppression per ID  B LE lymphedema              -compression  ALIREZA with chronic hypercapnia/OHVS s/p acute VDRF, Trach 6/6              -lasix, diamox   -vent weaning per pulmonary  Klebsiella HAP-treated  COPD              -spiriva, incruse held with ETT  DM              -metformin              -asa, lipitor  BBB              -normal ef, wall motion on echo 5/19  Chronic pain              -oxy IR 30mg q 4 until can take CR              -prn fentanyl for wound care  MSEW  Hypothyroidism              -synthroid  Hyponatremia              -following  Hypokalemia   -increase repletion  Constipation   -dulcolax  Nutrition              -change TF to nocturnal   -add boost  Anemia  Debility   -request PT/OT     Quality-Core Measures   Reviewed items::  Medications reviewed and Labs reviewed  Montoya catheter::  Unconscious / Sedated Patient on a Ventilator  Antibiotics:  Treating active infection/contamination beyond 24 hours perioperative coverage

## 2018-06-10 LAB
ANION GAP SERPL CALC-SCNC: 13 MMOL/L (ref 0–11.9)
BUN SERPL-MCNC: 50 MG/DL (ref 8–22)
CALCIUM SERPL-MCNC: 10 MG/DL (ref 8.4–10.2)
CHLORIDE SERPL-SCNC: 93 MMOL/L (ref 96–112)
CO2 SERPL-SCNC: 24 MMOL/L (ref 20–33)
CREAT SERPL-MCNC: 1.57 MG/DL (ref 0.5–1.4)
GLUCOSE SERPL-MCNC: 147 MG/DL (ref 65–99)
POTASSIUM SERPL-SCNC: 4.6 MMOL/L (ref 3.6–5.5)
SODIUM SERPL-SCNC: 130 MMOL/L (ref 135–145)

## 2018-06-10 PROCEDURE — 80048 BASIC METABOLIC PNL TOTAL CA: CPT

## 2018-06-10 ASSESSMENT — ENCOUNTER SYMPTOMS
ABDOMINAL PAIN: 0
HEADACHES: 0
SHORTNESS OF BREATH: 0
CONSTIPATION: 1
COUGH: 0
DIARRHEA: 0
VOMITING: 0
FEVER: 0
SORE THROAT: 0
CHILLS: 0
NAUSEA: 0
INSOMNIA: 0

## 2018-06-10 NOTE — PROGRESS NOTES
Progress Notes  Date of Service: 6/10/2018   Padma Bacon MD  Pulmonary      []Hide copied text  Pulmonary Critical Care Progress Note         Chief Complaint: Acute on chronic respiratory failure with hypoxia and hypercapnia.     History of Present Illness: 57 y.o. male with complex medical history including COPD, congestive heart failure, type 2 diabetes, bilateral lower extremity cellulitis and probable obstructive sleep apnea was admitted to [facility] to be treated for bilateral lower extremity cellulitis. The patient is noncompliant. He was recently admitted to [facility] intubated for about a week for acute and chronic hypercapnic respiratory failure with CO2 retention. The patient was prescribed a trilogy ventilator to use after discharge.      The patient was readmitted to the hospital for bilateral lower extremity cellulitis. He was found to have high bicarbonate on basic metabolic panel of 42. Pulmonary consultation was requested for further evaluation and management of his chronic respiratory failure.     ROS:  Respiratory: positive shortness of breath, unchanged, Cardiac: negative chest pain, GI: negative abdominal pain.  All other systems negative.     Interval Events:  24 hour interval history reviewed.     5/20 - He developed increased dyspnea overnight on May 18 and required intubation. He is now on full ventilatory support. With an assist-control set rate of 16 he breathes at a rate of 28 but exhibits no dyspnea or dyssynchrony. The peak airway pressure is about 30 cm water with a set tidal volume of 450 ml and PEEP = 8. Oxygen saturation is about 95% on 40% oxygen. The ABG demonstrates an adequate oxygen saturation with a compensated chronic respiratory acidosis. He is less sedated on the propofol infusion and hemodynamically stable. He is afebrile with a maximum temperature of 98.4 degrees overnight. Wound care continues and his edema seems less after diuretic therapy.      5/21  - SR - 99.0 degrees - 139/73 - 92 - 16 - 94%; d/w RT: 16/8/450/50, yellow plugges noted; procalcitonin slightly elevated yesterday; 5/19 cxr showed hypoventilation and interstitial prominence; 5/19 BC negative; on augmentin and cefepime plus vancomycin     5/22 - d/w RT, RN, and Dr. Herr; needed an FIO2 boost overnight; now, 16/4508/45;   T 100.2 P99BP 105/49 RR 18SaO2 94%; same abx; 5/19 TA=klebsiella; alert     5/23 - remains on 16/450/45/8, d/w RT; 98.2 degrees - 80- 109/68 - 21 - 96% - SR; leukocytosis a bit better, anemia persists, slight hyponatremia, slightly worsened azotemia noted, ongoing antiinfectives; midline placed; propofol down to 26     5/24 - d/w RT, try some initial weaning today if possible; currently on 16/450/45/8; no distress, resting comfortably; remains on propofol at 26;  Leukocytosis, anemia, hyponatremia, azotemia, hyperglycemia noted, plts down to 143; SR - 97.5 degrees - 117/59 - 87 - 22 - 93%; cxr for tomorrow ordered, on Maxipime and vanco; 5/22 BC and urine ctx neg;      5/25 - d/w RT and RN; off of propofol, try weaning; on 16/450/8/45, resting comfortable in NAD; good diuresis with metolazone; 97.8 degrees - 93 - 127/70 - 18 - SR/BBB; leukocytosis, anemia today are similar to prior; cxr today shows right > left bibasilar atx and opacities; on Maxipine, fluconazole, and vanco; legs remian wrapped, edema noted     526 - d/w RT, was able to tolerate 3 hours of 10/8 yesterday, will try more today as able; SR - 98.6 degrees - 140/76 - 92 - 17 - 97%; leukocytosis and anemia noted and similar to prior; new hyponatremia, hyperglycemia, mild azotemia is a bit better; on lasix, diamox, and metolazone     5/27 - d/w RT, on sbt now - he is watching TV and unaware of weaning, yesterday did only 1 hour; SR - 98 degrees - 133/62 - 77 - 32 - 92; leukocytosis, anemia, hyponatremia, azotemia, hyperglycemia today; last cxr 5/25 stable abnormal; on same diuretics and antiinfectives      5/28 - d/w  RT, did well with weaning but eventually tired out, weaker today; back on AC at # AMl white-yellow secretions; sr/BBB - 98.2 degrees - 84 - 138/74 - 28 - 94%; leukocytosis and anemia without sig delta from prior;      5/29 - d/w RT, weaning as able; rest settings are 16/450/8/55; 5/28 cxr showed stable infiltrates/opacities, ongoing diuresis; cpap just initiated today,albeit with a high PS 16 cmH2O pressure; SR - 98.1 degrees - 129/68 - 89 - 20 - 94%; leukocytosis, anemia, hyperglycemia, azotemia persist; on diamox, lasix, metolazone; on fluconazole; TA shows LFGNR 5/27 5/30 - SR - 98.1 degrees - 101/62 - 87 - 24 - 91%; augmentin started for cellulitis, diamox stopped, also on fluconazole; tolerated 20 hours of weaning yesterday, today placed back on 10/8, on 45%; cxr yesterday with stable opacities; TA with light growth of Kleb     5/31 - has done 24 hours on 10/8 and is none the worse for the wear; discussed issues with RT and the patient, trying to clarify the plan once extubated, if he fares poorly; SR - 97.9 degrees - 157/63 - 92 - 16 - 99%; remains on augmentin and fluconazole     6/1 - d/w RT, failed weaning at noon yesterday, turned purpl according to RT; back on AC and 50%; has now been intubated times 2 weeks, failing weaning, needs trach if he agrees;SR; 139/89 - 82 - 22 - 93% - 98.0 degrees; no new labs, imaging or micro today, fluconazole and augmentin antiinfectives     6/2 failed weaning trials. On full vent support.   Intubated for 14 days. Awaiting trach      6/3 did not tolerate weaning trials, weak. No fever no pain      6/4 could not tolerated weaning trials again with tachycardia and tachypnea plans for trach. Consulted surgery      6/5 awaiting tracheostomy this week      6/6  He remains on full mechanical ventilatory support.  He has been unable to wean over the past weeks.  Tracheostomy probably this afternoon.    6/7  On full mechanical ventilatory support status post tracheostomy.  No  significant distress on the ventilator.  Postprocedure chest x-ray with no significant changes.    6/8  Some dyssynchrony on the ventilator last evening.  Was switched from assist control to pressure support and is tolerating it better.  No distress this morning.    6/9, Tolerating pressure support, awake and alert, abdominal distention but not tender.  Overall respiratory status better.  Reviewed bedside with nurse and patient.    6/10, poor appetite.  Tolerating pressure support, off assist control mode, wound care continues.  Mild hyponatremia in the same range.     PFSH:  No change.     Respiratory:  Exam: symmetrical but diminished bilateral breath sounds with basilar rales but no wheezing or egophony.  ImagingAvailable data reviewed. The chest film  demonstrates stable interstitial prominence with no new focal consolidation.      HemoDynamics:  Exam: regular rhythm (Sinus)  Imaging: Available data reviewed. The echocardiogram on March 18, 2018 was a limited study but demonstrated grossly normal left ventricular systolic function.     Neuro:  Exam: no focal deficits noted mental status intact  Imaging: None - Reviewed           Recent Labs      06/04/18   0330  06/05/18   0350  06/06/18   0300   SODIUM  129*  128*  131*   POTASSIUM  3.5*  3.6  3.9   CHLORIDE  88*  88*  90*   CO2  31  30  32   BUN  31*  29*  29*   CREATININE  0.63  0.57  0.57   CALCIUM  9.9  9.6  9.6      GI/Nutrition:  Exam: abdomen is soft and non-tender, normal active bowel sounds  Imaging: Available data reviewed  NPO, enteral tube feeding initiated.  Liver Function        Recent Labs      06/04/18   0330  06/05/18   0350  06/06/18   0300   GLUCOSE  163*  183*  134*         Heme:       Recent Labs      06/05/18   0350  06/06/18   0405   RBC  4.33*  4.39*   HEMOGLOBIN  11.9*  11.8*   HEMATOCRIT  36.9*  37.0*   PLATELETCT  216  215   PROTHROMBTM   --   13.1   APTT   --   29.2   INR   --   1.00         Infectious Disease:  Micro: cultures  reviewed; no new positive cultures. The tracheal aspirate demonstrated gram-negative rods with identification and sensitivity pending.       Recent Labs      06/05/18   0350  06/06/18   0405   WBC  12.7*  9.3         Quality  Measures:  Radiology images reviewed, Medications reviewed and Labs reviewed        Problems:  1. VDRF, acute on chronic respiratory failure  2. Chronic obstructive pulmonary disease.  3. Obesity, suspected obesity hypoventilation syndrome and obstructive sleep apnea hypopnea syndrome.  4. Chronic lymphedema, compression, diuresis  5. Diabetes mellitus, controlled.  6. Chronic pain syndrome requiring chronic narcotic analgesia.  7. Hypothyroidism, on replacement therapy.  8. Systemic arterial hypertension, controlled.  9. Anemia, stable without evident ongoing blood loss.  10. Dysphagia, moderate PCM, hypoalbuminemia  11. Abnormal chest imaging - ongoing diuresis, consider tap if need be  12. Debility  13. Leukocytosis  14. Kleb HAP  15. BBB  16. Full code  17. Hyponatremia  18. Azotemia  19. Hypoalbuminemia     Plan:  Continued full support mechanical ventilation with a lung-protective ventilation strategy. Titrate supplemental oxygen, continue bronchodilators and respiratory protocols.  Switched to pressure support ventilation 15/10  Weaning as tolerated once tracheostomy has been placed  Continue antibiotics , adjust accordingly as needed     Continue nutritional support with enteral tube feeding. Continue therapies, mobilization and prophylaxis.     Discussed patient condition and risk of morbidity and/or mortality with RN and RT   Tracheostomy completed  We will try to initiate weaning.  Possible brief trial of CPAP/pressure support 8/8  D/w with hospitalist                 Admission (Discharged) on 5/5/2018            Detailed Report

## 2018-06-10 NOTE — [facility]
Hospital Medicine Progress Note, Adult, Complex               Author: Soo MOTLEY Nemesio Date & Time created: 6/10/2018  7:25 AM     CC: LE wounds    Interval History:  Not eating due to diet consistency  Increased scr o/n  Declined bowel care, last bm 6/4, explained importance to take today to avoid obstruction/perforation    Review of Systems:  Review of Systems   Constitutional: Negative for chills and fever.   HENT: Negative for sore throat.    Respiratory: Negative for cough and shortness of breath.    Cardiovascular: Negative for chest pain.   Gastrointestinal: Positive for constipation. Negative for abdominal pain, diarrhea, nausea and vomiting.   Genitourinary: Negative for dysuria.   Musculoskeletal: Negative for joint pain.   Neurological: Negative for headaches.   Psychiatric/Behavioral: The patient does not have insomnia.        T 99.2P 92 /53 [id number] 91% I/O 1.2/1.4  no BM teleSR  Physical Exam   Constitutional: He is oriented to person, place, and time. He appears well-developed. No distress.   HENT:   Head: Normocephalic and atraumatic.   Mouth/Throat: No oropharyngeal exudate.   NGT   Eyes: Conjunctivae are normal. Right eye exhibits no discharge. Left eye exhibits no discharge. No scleral icterus.   Neck: No tracheal deviation present.   Trach  Supraclavicular fat pads   Cardiovascular: Normal rate and regular rhythm.    No murmur heard.  Pulmonary/Chest: Effort normal. No respiratory distress. He has no wheezes. He exhibits no tenderness.   diminished   Abdominal: Soft. Bowel sounds are normal. He exhibits no distension. There is no tenderness. There is no rebound.   Musculoskeletal: He exhibits edema (tr).   Neurological: He is alert and oriented to person, place, and time.   Skin: Skin is warm.   LE wrapped  Seborrhea rash on face   Vitals reviewed.      Labs:        Invalid input(s): [id number]      Recent Labs      06/08/18   0420  06/09/18   0400  06/10/18   0325   SODIUM  128*  129*   130*   POTASSIUM  3.7  3.5*  4.6   CHLORIDE  90*  93*  93*   CO2  27  25  24   BUN  24*  32*  50*   CREATININE  0.61  0.59  1.57*   CALCIUM  10.0  10.0  10.0     Recent Labs      06/08/18   0420  06/09/18   0400  06/10/18   0325   GLUCOSE  172*  184*  147*     Recent Labs      06/08/18   0420   RBC  4.42*   HEMOGLOBIN  11.8*   HEMATOCRIT  37.3*   PLATELETCT  269     Recent Labs      06/08/18   0420   WBC  9.7   NEUTSPOLYS  68.50   LYMPHOCYTES  16.50*   MONOCYTES  8.70   EOSINOPHILS  4.10   BASOPHILS  0.50        GI/Nutrition:  Orders Placed This Encounter   Procedures   • DIET ORDER     Standing Status:   Standing     Number of Occurrences:   1     Order Specific Question:   Diet:     Answer:   Regular [1]     Comments:   with supervision  vanila boost with all meals     Order Specific Question:   Texture/Fiber modifications:     Answer:   Dysphagia 1(Pureed)specify fluid consistency(question 6) [1]     Order Specific Question:   Consistency/Fluid modifications:     Answer:   Thin Liquids [3]     Medical Decision Making, by Problem:    RLE cellulitis s/p debridement, polymicrobial (enterococcus, proteus, MSSA)              -improved, continue wound care               -Augmentin suppression per ID  B LE lymphedema              -compression  ALIREZA with chronic hypercapnia/OHVS s/p acute VDRF, Trach 6/6   -vent weaning per pulmonary  JOE   -DC lisinopril, lasix, diamox   -IVF for 2L   -f/u in am  Klebsiella HAP-treated  COPD              -spiriva, incruse held with ETT  DM              -metformin (monitor need to DC based on renal function)              -asa, lipitor  BBB              -normal ef, wall motion on echo 5/19  Chronic pain              -oxy IR 30mg q 4 until can take CR              -prn fentanyl for wound care  MSEW  Hypothyroidism              -synthroid  Hyponatremia              -following  Hypokalemia   -resolved  Constipation   -encourage compliance with bowel care  Nutrition              -nocturnal  TF   -boost  Anemia  Debility   -requested PT/OT     Quality-Core Measures   Reviewed items::  Medications reviewed and Labs reviewed  Montoya catheter::  Unconscious / Sedated Patient on a Ventilator  Antibiotics:  Treating active infection/contamination beyond 24 hours perioperative coverage

## 2018-06-11 LAB
ANION GAP SERPL CALC-SCNC: 10 MMOL/L (ref 0–11.9)
BASOPHILS # BLD AUTO: 0.3 % (ref 0–1.8)
BASOPHILS # BLD: 0.03 K/UL (ref 0–0.12)
BUN SERPL-MCNC: 65 MG/DL (ref 8–22)
CALCIUM SERPL-MCNC: 9.5 MG/DL (ref 8.4–10.2)
CHLORIDE SERPL-SCNC: 93 MMOL/L (ref 96–112)
CO2 SERPL-SCNC: 25 MMOL/L (ref 20–33)
CREAT SERPL-MCNC: 1.58 MG/DL (ref 0.5–1.4)
EOSINOPHIL # BLD AUTO: 0.47 K/UL (ref 0–0.51)
EOSINOPHIL NFR BLD: 4.7 % (ref 0–6.9)
ERYTHROCYTE [DISTWIDTH] IN BLOOD BY AUTOMATED COUNT: 47.9 FL (ref 35.9–50)
GLUCOSE SERPL-MCNC: 133 MG/DL (ref 65–99)
HCT VFR BLD AUTO: 34.5 % (ref 42–52)
HGB BLD-MCNC: 10.8 G/DL (ref 14–18)
IMM GRANULOCYTES # BLD AUTO: 0.19 K/UL (ref 0–0.11)
IMM GRANULOCYTES NFR BLD AUTO: 1.9 % (ref 0–0.9)
LYMPHOCYTES # BLD AUTO: 1.7 K/UL (ref 1–4.8)
LYMPHOCYTES NFR BLD: 16.9 % (ref 22–41)
MCH RBC QN AUTO: 26.7 PG (ref 27–33)
MCHC RBC AUTO-ENTMCNC: 31.3 G/DL (ref 33.7–35.3)
MCV RBC AUTO: 85.2 FL (ref 81.4–97.8)
MONOCYTES # BLD AUTO: 0.71 K/UL (ref 0–0.85)
MONOCYTES NFR BLD AUTO: 7.1 % (ref 0–13.4)
NEUTROPHILS # BLD AUTO: 6.93 K/UL (ref 1.82–7.42)
NEUTROPHILS NFR BLD: 69.1 % (ref 44–72)
NRBC # BLD AUTO: 0 K/UL
NRBC BLD-RTO: 0 /100 WBC
PLATELET # BLD AUTO: 250 K/UL (ref 164–446)
PMV BLD AUTO: 11.4 FL (ref 9–12.9)
POTASSIUM SERPL-SCNC: 4.4 MMOL/L (ref 3.6–5.5)
RBC # BLD AUTO: 4.05 M/UL (ref 4.7–6.1)
SODIUM SERPL-SCNC: 128 MMOL/L (ref 135–145)
WBC # BLD AUTO: 10 K/UL (ref 4.8–10.8)

## 2018-06-11 PROCEDURE — 85025 COMPLETE CBC W/AUTO DIFF WBC: CPT

## 2018-06-11 PROCEDURE — 80048 BASIC METABOLIC PNL TOTAL CA: CPT

## 2018-06-11 ASSESSMENT — ENCOUNTER SYMPTOMS
CONSTIPATION: 1
SHORTNESS OF BREATH: 1
VOMITING: 0
HEADACHES: 0
DIARRHEA: 0
FEVER: 0
SORE THROAT: 0
NAUSEA: 0
COUGH: 0
ABDOMINAL PAIN: 0

## 2018-06-11 NOTE — [facility]
Hospital Medicine Progress Note, Adult, Complex               Author: Soo Herr Date & Time created: 6/11/2018  7:52 AM     CC: MIRIAN wounds    Interval History:  Agrees with relistor  Thinks trach is too tight  Declines facial cream for seborrhea flakes  Refuses to eat puree but drinks boost  Scr unchanged    Review of Systems:  Review of Systems   Constitutional: Negative for fever.   HENT: Negative for sore throat.    Respiratory: Positive for shortness of breath. Negative for cough.    Cardiovascular: Negative for chest pain.   Gastrointestinal: Positive for constipation. Negative for abdominal pain, diarrhea, nausea and vomiting.   Genitourinary: Negative for dysuria.   Musculoskeletal: Negative for joint pain.   Neurological: Negative for headaches.       T 98.3P 89BP 151/[id number] 97% I/O 2.8/550  no BM teleSR  Physical Exam   Constitutional: He is oriented to person, place, and time. He appears well-developed. No distress.   HENT:   Head: Normocephalic and atraumatic.   Mouth/Throat: No oropharyngeal exudate.   NGT   Eyes: Conjunctivae are normal. Right eye exhibits no discharge. Left eye exhibits no discharge. No scleral icterus.   Neck: No tracheal deviation present.   Trach with dried blood  Supraclavicular fat pads   Cardiovascular: Normal rate and regular rhythm.    No murmur heard.  Pulmonary/Chest: Effort normal. No respiratory distress. He has no wheezes. He exhibits no tenderness.   diminished   Abdominal: Soft. Bowel sounds are normal. He exhibits no distension. There is no tenderness. There is no rebound.   Musculoskeletal: He exhibits edema (tr).   Neurological: He is alert and oriented to person, place, and time.   Skin: Skin is warm.   LE wrapped  Seborrhea flaking on face   Vitals reviewed.      Labs:        Invalid input(s): [id number]      Recent Labs      06/09/18   0400  06/10/18   0325  06/11/18   0423   SODIUM  129*  130*  128*   POTASSIUM  3.5*  4.6  4.4   CHLORIDE  93*  93*   93*   CO2  25  24  25   BUN  32*  50*  65*   CREATININE  0.59  1.57*  1.58*   CALCIUM  10.0  10.0  9.5     Recent Labs      06/09/18   0400  06/10/18   0325  06/11/18   0423   GLUCOSE  184*  147*  133*     Recent Labs      06/11/18   0423   RBC  4.05*   HEMOGLOBIN  10.8*   HEMATOCRIT  34.5*   PLATELETCT  250     Recent Labs      06/11/18   0423   WBC  10.0   NEUTSPOLYS  69.10   LYMPHOCYTES  16.90*   MONOCYTES  7.10   EOSINOPHILS  4.70   BASOPHILS  0.30        GI/Nutrition:  Orders Placed This Encounter   Procedures   • DIET ORDER     Standing Status:   Standing     Number of Occurrences:   1     Order Specific Question:   Diet:     Answer:   Regular [1]     Comments:   with supervision  vanila boost with all meals     Order Specific Question:   Texture/Fiber modifications:     Answer:   Dysphagia 1(Pureed)specify fluid consistency(question 6) [1]     Order Specific Question:   Consistency/Fluid modifications:     Answer:   Thin Liquids [3]     Medical Decision Making, by Problem:  RLE cellulitis s/p debridement, polymicrobial (enterococcus, proteus, MSSA)              -improved, continue wound care               -Augmentin suppression per ID  B LE lymphedema              -compression  ALIREZA with chronic hypercapnia/OHVS s/p acute VDRF, Trach 6/6   -vent weaning per pulmonary  JOE   -continue IVF   -remains off diuretics for 24 hours   -f/u in am  Klebsiella HAP-treated  COPD              -spiriva, incruse held with ETT  DM              -metformin (monitor need to DC based on renal function)              -asa, lipitor  BBB              -normal ef, wall motion on echo 5/19  Chronic pain              -oxy IR 30mg q 4 until can take CR              -prn fentanyl for wound care  MSEW  Hypothyroidism              -synthroid  Hyponatremia              -following  Constipation   -encourage compliance with bowel care   -trial of relistor  Nutrition              -nocturnal TF   -boost  Anemia  Debility   -requested PT/OT      Quality-Core Measures   Reviewed items::  Medications reviewed and Labs reviewed  Montoya catheter::  Unconscious / Sedated Patient on a Ventilator  Antibiotics:  Treating active infection/contamination beyond 24 hours perioperative coverage

## 2018-06-12 LAB
ANION GAP SERPL CALC-SCNC: 8 MMOL/L (ref 0–11.9)
BUN SERPL-MCNC: 35 MG/DL (ref 8–22)
CALCIUM SERPL-MCNC: 9.6 MG/DL (ref 8.4–10.2)
CHLORIDE SERPL-SCNC: 97 MMOL/L (ref 96–112)
CO2 SERPL-SCNC: 25 MMOL/L (ref 20–33)
CREAT SERPL-MCNC: 0.63 MG/DL (ref 0.5–1.4)
ERYTHROCYTE [DISTWIDTH] IN BLOOD BY AUTOMATED COUNT: 46.1 FL (ref 35.9–50)
GLUCOSE SERPL-MCNC: 121 MG/DL (ref 65–99)
HCT VFR BLD AUTO: 34.1 % (ref 42–52)
HGB BLD-MCNC: 10.8 G/DL (ref 14–18)
MCH RBC QN AUTO: 26.8 PG (ref 27–33)
MCHC RBC AUTO-ENTMCNC: 31.7 G/DL (ref 33.7–35.3)
MCV RBC AUTO: 84.6 FL (ref 81.4–97.8)
PLATELET # BLD AUTO: 234 K/UL (ref 164–446)
PMV BLD AUTO: 10.8 FL (ref 9–12.9)
POTASSIUM SERPL-SCNC: 4.2 MMOL/L (ref 3.6–5.5)
RBC # BLD AUTO: 4.03 M/UL (ref 4.7–6.1)
SODIUM SERPL-SCNC: 130 MMOL/L (ref 135–145)
WBC # BLD AUTO: 7.3 K/UL (ref 4.8–10.8)

## 2018-06-12 PROCEDURE — 85027 COMPLETE CBC AUTOMATED: CPT

## 2018-06-12 PROCEDURE — 80048 BASIC METABOLIC PNL TOTAL CA: CPT

## 2018-06-12 ASSESSMENT — ENCOUNTER SYMPTOMS
VOMITING: 0
COUGH: 0
BRUISES/BLEEDS EASILY: 0
CHILLS: 0
SHORTNESS OF BREATH: 0
CONSTIPATION: 1
MYALGIAS: 0
ABDOMINAL PAIN: 0
DIAPHORESIS: 0
NAUSEA: 0
FEVER: 0
WHEEZING: 0

## 2018-06-12 NOTE — [facility]
Hospital Medicine Progress Note, Adult, Complex               Author: Kelley Sifuentes Date & Time created: 6/12/2018  2:55 PM     CC: bilateral leg cellulitis and lymphedema    Interval History:  He declined to use cpap and developed respiratory failure requiring mechanical ventilation. Tracheostomy was placed 6/6 and he continues to require pressure support.  He has been cleared by speech therapy for a puree diet but does not care for this consistency of food.  Today he has again declined relistor or any other stool softeners despite having constipation    Review of Systems:  Review of Systems   Constitutional: Negative for chills, diaphoresis and fever.   Respiratory: Negative for cough, shortness of breath and wheezing.    Cardiovascular: Negative for chest pain and leg swelling.   Gastrointestinal: Positive for constipation. Negative for abdominal pain, nausea and vomiting.   Genitourinary: Negative for dysuria.   Musculoskeletal: Negative for myalgias.   Skin: Negative for itching and rash.   Endo/Heme/Allergies: Does not bruise/bleed easily.       Physical Exam:  Physical Exam   Constitutional: He is oriented to person, place, and time. No distress.   HENT:   Dry flaking skin over face   Eyes: No scleral icterus.   Neck: Neck supple.   Cardiovascular: Normal rate and regular rhythm.  PMI is displaced.    No murmur heard.  Pulmonary/Chest: No respiratory distress. He has no wheezes. He has no rales.   Abdominal: Soft. Bowel sounds are normal. He exhibits distension. There is no tenderness.   Musculoskeletal: He exhibits edema.   1+ leg edema with compression wraps in place   Neurological: He is alert and oriented to person, place, and time.   Skin: Skin is warm and dry.   Nursing note and vitals reviewed.      Labs:        Invalid input(s): [id number]      Recent Labs      06/10/18   0325  06/11/18   0423  06/12/18   1228   SODIUM  130*  128*  130*   POTASSIUM  4.6  4.4  4.2   CHLORIDE  93*  93*  97   CO2   24  25  25   BUN  50*  65*  35*   CREATININE  1.57*  1.58*  0.63   CALCIUM  10.0  9.5  9.6     Recent Labs      06/10/18   0325  06/11/18   0423  06/12/18   1228   GLUCOSE  147*  133*  121*     Recent Labs      06/11/18   0423  06/12/18   1228   RBC  4.05*  4.03*   HEMOGLOBIN  10.8*  10.8*   HEMATOCRIT  34.5*  34.1*   PLATELETCT  250  234     Recent Labs      06/11/18   0423  06/12/18   1228   WBC  10.0  7.3   NEUTSPOLYS  69.10   --    LYMPHOCYTES  16.90*   --    MONOCYTES  7.10   --    EOSINOPHILS  4.70   --    BASOPHILS  0.30   --            Hemodynamics:   T98.9 /67 HR84 RR18 O2 sat 93%     GI/Nutrition:  Orders Placed This Encounter   Procedures   • DIET ORDER     Standing Status:   Standing     Number of Occurrences:   1     Order Specific Question:   Diet:     Answer:   Regular [1]     Comments:   with supervision  vanila boost with all meals     Order Specific Question:   Texture/Fiber modifications:     Answer:   Dysphagia 1(Pureed)specify fluid consistency(question 6) [1]     Order Specific Question:   Consistency/Fluid modifications:     Answer:   Thin Liquids [3]     Medical Decision Making, by Problem:  Bilateral lower leg cellulitis, oral augmentin for suppression per ID     Lymphedema chronic, continue leg compression               diuresis stopped due to azotemia and edema improved                Hyponatremia, due to diuresis, monitor sodium level     Acute on chronic respiratory failure, ventilator support              Cefepime to treat klebsiella in sputum through 5/28         continue trach care     Elevated troponin, improved to normal, due to heart strain from respiratory distress       HTN (hypertension) controlled    COPD (chronic obstructive pulmonary disease) oxygen and respiratory therapy    Stasis dermatitis improved with less leg edema and wound care, continue leg elevation and compression       Narcotic addiction, continue current narcotic regimen    Non compliance w medication  regimen, noncompliance with bipap contributing to respiratory failure acutely       Diabetes type 2,  metformin    Severe protein-calorie malnutrition, with patient eating variable caloric intake, poor dietary choices, and now unable to take po nutrition due to intubation,            will provide supplements and advance diet as safe due to his dysphagia    Chronic respiratory failure, oxygen    Morbid obesity with BMI of 40.0-44.9, adult     Chronic pain continue pain meds    ALIREZA (obstructive sleep apnea) pressure support      Quality-Core Measures   Reviewed items::  Labs reviewed and Medications reviewed  DVT prophylaxis pharmacological::  Heparin  Ulcer Prophylaxis::  Yes  Assessed for rehabilitation services:  Patient was assess for and/or received rehabilitation services during this hospitalization

## 2018-06-13 ASSESSMENT — ENCOUNTER SYMPTOMS
DIAPHORESIS: 0
HEARTBURN: 0
SHORTNESS OF BREATH: 0
ABDOMINAL PAIN: 0
MYALGIAS: 0
CONSTIPATION: 1
BRUISES/BLEEDS EASILY: 0
PALPITATIONS: 0
NAUSEA: 0
COUGH: 0
FEVER: 0

## 2018-06-14 ASSESSMENT — ENCOUNTER SYMPTOMS
BRUISES/BLEEDS EASILY: 0
PALPITATIONS: 0
CONSTIPATION: 0
SHORTNESS OF BREATH: 0
FEVER: 0
WHEEZING: 0
HEARTBURN: 0
CHILLS: 0
MYALGIAS: 0
ABDOMINAL PAIN: 0

## 2018-06-14 NOTE — [facility]
Hospital Medicine Progress Note, Adult, Complex               Author: Kelley Sifuentes Date & Time created: 6/14/2018  1:23 PM     CC: bilateral leg cellulitis and lymphedema    Interval History:  He declined to use cpap and developed respiratory failure requiring mechanical ventilation. Tracheostomy was placed 6/6 and he continues to require pressure support.  Blood pressure has been more elevated overnight  He denies fever and asks to keep his pain medication at current dose and frequency    Review of Systems:  Review of Systems   Constitutional: Negative for chills and fever.   Respiratory: Negative for shortness of breath and wheezing.    Cardiovascular: Negative for palpitations and leg swelling.   Gastrointestinal: Negative for abdominal pain, constipation and heartburn.   Genitourinary: Negative for dysuria and urgency.   Musculoskeletal: Negative for myalgias.   Skin: Negative for itching.   Endo/Heme/Allergies: Does not bruise/bleed easily.       Physical Exam:  Physical Exam   Constitutional: He is oriented to person, place, and time. No distress.   HENT:   Mouth/Throat: Oropharynx is clear and moist. No oropharyngeal exudate.   Dry flaking skin over face   Eyes: No scleral icterus.   Neck: Neck supple.   Cardiovascular: Normal rate and regular rhythm.  PMI is displaced.    No murmur heard.  Pulmonary/Chest: Effort normal and breath sounds normal. No respiratory distress. He has no wheezes.   Abdominal: Soft. He exhibits no distension. There is no tenderness.   Musculoskeletal: He exhibits edema.   compression wraps in place   Neurological: He is alert and oriented to person, place, and time.   Skin: Skin is warm and dry. No rash noted. He is not diaphoretic.   Nursing note and vitals reviewed.      Labs:        Invalid input(s): [id number]      Recent Labs      06/12/18   1228   SODIUM  130*   POTASSIUM  4.2   CHLORIDE  97   CO2  25   BUN  35*   CREATININE  0.63   CALCIUM  9.6     Recent Labs       06/12/18   1228   GLUCOSE  121*     Recent Labs      06/12/18   1228   RBC  4.03*   HEMOGLOBIN  10.8*   HEMATOCRIT  34.1*   PLATELETCT  234     Recent Labs      06/12/18   1228   WBC  7.3           Hemodynamics:   T99.6 /77 HR99 RR24 O2 sat 99%     GI/Nutrition:  Orders Placed This Encounter   Procedures   • DIET ORDER     Standing Status:   Standing     Number of Occurrences:   1     Order Specific Question:   Diet:     Answer:   Regular [1]     Comments:   with supervision  vanila boost with all meals     Order Specific Question:   Texture/Fiber modifications:     Answer:   Dysphagia 3(Mechanical Soft)specify fluid consistency(question 6) [3]     Order Specific Question:   Consistency/Fluid modifications:     Answer:   Thin Liquids [3]     Medical Decision Making, by Problem:  Bilateral lower leg cellulitis, oral augmentin for suppression per ID through 6/24     Lymphedema chronic, continue leg compression               diuresis done                 Hyponatremia, improving off diuresis   Iv fluids given, will stop today     Acute on chronic respiratory failure, ventilator support              Cefepime to treat klebsiella in sputum through 5/28         continue trach care, cpap well tolerated     Elevated troponin, improved to normal, due to heart strain from respiratory distress       HTN (hypertension) stop iv fluids, start lisinopril    COPD (chronic obstructive pulmonary disease) oxygen and respiratory therapy    Stasis dermatitis improved with less leg edema and wound care, continue leg elevation and compression dressings to legs       Narcotic addiction, continue current narcotic regimen    Non compliance w medication regimen, noncompliance with bipap contributing to respiratory failure acutely       Diabetes type 2,  metformin    Severe protein-calorie malnutrition, with patient eating variable caloric intake, poor dietary choices, and now unable to take po nutrition due to intubation,            will  provide supplements, patient declines to eat most of his food due to puree consistency, will advance consistency of diet as cleared by speech therapy    Chronic respiratory failure, oxygen    Morbid obesity with BMI of 40.0-44.9, adult     Chronic pain continue pain meds    ALIREZA (obstructive sleep apnea) pressure support      Quality-Core Measures   Reviewed items::  Labs reviewed and Medications reviewed  DVT prophylaxis pharmacological::  Heparin  Ulcer Prophylaxis::  Yes  Assessed for rehabilitation services:  Patient was assess for and/or received rehabilitation services during this hospitalization

## 2018-06-15 ENCOUNTER — APPOINTMENT (OUTPATIENT)
Dept: RADIOLOGY | Facility: MEDICAL CENTER | Age: 58
End: 2018-06-15
Attending: INTERNAL MEDICINE
Payer: COMMERCIAL

## 2018-06-15 LAB
ANION GAP SERPL CALC-SCNC: 6 MMOL/L (ref 0–11.9)
BASE EXCESS BLDA CALC-SCNC: -2 MMOL/L (ref -4–3)
BODY TEMPERATURE: ABNORMAL CENTIGRADE
BUN SERPL-MCNC: 19 MG/DL (ref 8–22)
CALCIUM SERPL-MCNC: 9.6 MG/DL (ref 8.4–10.2)
CHLORIDE SERPL-SCNC: 100 MMOL/L (ref 96–112)
CO2 SERPL-SCNC: 27 MMOL/L (ref 20–33)
CREAT SERPL-MCNC: 0.65 MG/DL (ref 0.5–1.4)
ERYTHROCYTE [DISTWIDTH] IN BLOOD BY AUTOMATED COUNT: 47.3 FL (ref 35.9–50)
GLUCOSE SERPL-MCNC: 102 MG/DL (ref 65–99)
HCO3 BLDA-SCNC: 24 MMOL/L (ref 17–25)
HCT VFR BLD AUTO: 34 % (ref 42–52)
HGB BLD-MCNC: 10.6 G/DL (ref 14–18)
MCH RBC QN AUTO: 26.6 PG (ref 27–33)
MCHC RBC AUTO-ENTMCNC: 31.2 G/DL (ref 33.7–35.3)
MCV RBC AUTO: 85.4 FL (ref 81.4–97.8)
O2/TOTAL GAS SETTING VFR VENT: 50 % (ref 30–60)
PCO2 BLDA: 47.9 MMHG (ref 26–37)
PH BLDA: 7.32 [PH] (ref 7.4–7.5)
PLATELET # BLD AUTO: 216 K/UL (ref 164–446)
PMV BLD AUTO: 10.7 FL (ref 9–12.9)
PO2 BLDA: 90 MMHG (ref 64–87)
POTASSIUM SERPL-SCNC: 4.3 MMOL/L (ref 3.6–5.5)
RBC # BLD AUTO: 3.98 M/UL (ref 4.7–6.1)
SAO2 % BLDA: 95.5 % (ref 93–99)
SODIUM SERPL-SCNC: 133 MMOL/L (ref 135–145)
WBC # BLD AUTO: 8.3 K/UL (ref 4.8–10.8)

## 2018-06-15 PROCEDURE — 71045 X-RAY EXAM CHEST 1 VIEW: CPT

## 2018-06-15 PROCEDURE — 82803 BLOOD GASES ANY COMBINATION: CPT

## 2018-06-15 PROCEDURE — 302244 HCHG LTACH STAT

## 2018-06-15 PROCEDURE — 80048 BASIC METABOLIC PNL TOTAL CA: CPT

## 2018-06-15 PROCEDURE — 85027 COMPLETE CBC AUTOMATED: CPT

## 2018-06-15 ASSESSMENT — ENCOUNTER SYMPTOMS
COUGH: 0
MYALGIAS: 0
BRUISES/BLEEDS EASILY: 0
DIAPHORESIS: 0
NAUSEA: 0
SHORTNESS OF BREATH: 0
SPUTUM PRODUCTION: 0
HEARTBURN: 0
CONSTIPATION: 0
ABDOMINAL PAIN: 0
FEVER: 0
VOMITING: 0

## 2018-06-15 NOTE — [facility]
Hospital Medicine Progress Note, Adult, Complex               Author: Kelley Sifuentes Date & Time created: 6/15/2018  9:38 AM     CC: bilateral leg cellulitis and lymphedema    Interval History:  He declined to use cpap and developed respiratory failure requiring mechanical ventilation. Tracheostomy was placed 6/6 and he continues to require pressure support.  Today he has been without a bowel movement for 10 days and has agreed to stool softeners but refuses suppository or enema  He refuses to be bathed or cleaned     Review of Systems:  Review of Systems   Constitutional: Negative for diaphoresis and fever.   Respiratory: Negative for cough, sputum production and shortness of breath.    Cardiovascular: Negative for chest pain and leg swelling.   Gastrointestinal: Negative for abdominal pain, constipation, heartburn, nausea and vomiting.   Genitourinary: Negative for dysuria.   Musculoskeletal: Negative for myalgias.   Skin: Positive for rash.        Rash of dry skin over face patient insists on leaving alone   Endo/Heme/Allergies: Does not bruise/bleed easily.       Physical Exam:  Physical Exam   Constitutional: He is oriented to person, place, and time. No distress.   HENT:   Mouth/Throat: No oropharyngeal exudate.   Dry flaking skin over face   Eyes: Conjunctivae are normal. No scleral icterus.   Neck: Neck supple.   Cardiovascular: Normal rate and regular rhythm.  PMI is displaced.    No murmur heard.  Pulmonary/Chest: Effort normal. He has no wheezes. He has no rales.   Abdominal: Soft. He exhibits no distension. There is no tenderness.   Musculoskeletal: He exhibits edema.   compression wraps in place   Neurological: He is alert and oriented to person, place, and time. Coordination normal.   Skin: Skin is warm. No rash noted. He is not diaphoretic. No erythema.   Psychiatric: His behavior is normal.   Nursing note and vitals reviewed.      Labs:        Invalid input(s): [id number]      Recent Labs       06/12/18   1228  06/15/18   0355   SODIUM  130*  133*   POTASSIUM  4.2  4.3   CHLORIDE  97  100   CO2  25  27   BUN  35*  19   CREATININE  0.63  0.65   CALCIUM  9.6  9.6     Recent Labs      06/12/18   1228  06/15/18   0355   GLUCOSE  121*  102*     Recent Labs      06/12/18   1228  06/15/18   0355   RBC  4.03*  3.98*   HEMOGLOBIN  10.8*  10.6*   HEMATOCRIT  34.1*  34.0*   PLATELETCT  234  216     Recent Labs      06/12/18   1228  06/15/18   0355   WBC  7.3  8.3           Hemodynamics:   T98.1 /64 HR73 RR21 O2 sat 93% on ventilator      GI/Nutrition:  Orders Placed This Encounter   Procedures   • DIET ORDER     Standing Status:   Standing     Number of Occurrences:   1     Order Specific Question:   Diet:     Answer:   Regular [1]     Comments:   with supervision  vanila boost with all meals     Order Specific Question:   Texture/Fiber modifications:     Answer:   Dysphagia 3(Mechanical Soft)specify fluid consistency(question 6) [3]     Order Specific Question:   Consistency/Fluid modifications:     Answer:   Thin Liquids [3]     Medical Decision Making, by Problem:  Bilateral lower leg cellulitis, oral augmentin for suppression per ID through 6/24     Lymphedema chronic, continue leg compression               improved with diuresis                Hyponatremia, improving off diuretic therapy   Iv fluids given due to dehydrated state     Acute on chronic respiratory failure, ventilator support              Cefepime to treat klebsiella in sputum through 5/28         continue trach care, cpap still required with pressure support of 15     Elevated troponin, improved to normal, due to heart strain from respiratory distress       HTN (hypertension) improved off iv fluids and with lisinopril added    COPD (chronic obstructive pulmonary disease) oxygen and respiratory therapy    Stasis dermatitis improved with less leg edema and wound care, continue leg elevation and compression dressings        Narcotic addiction,  continue current narcotic regimen    Non compliance w medication regimen, noncompliance with bipap contributing to respiratory failure acutely       Diabetes type 2,  metformin    Severe protein-calorie malnutrition, with patient eating variable caloric intake, poor dietary choices, and now unable to take po nutrition due to intubation,            will provide supplements, patient declines to eat most of his food due to puree consistency, will advance consistency of diet as cleared by speech therapy    Chronic respiratory failure, oxygen    Morbid obesity with BMI of 40.0-44.9, adult     Chronic pain continue pain meds    ALIREZA (obstructive sleep apnea) pressure support      Quality-Core Measures   Reviewed items::  Labs reviewed and Medications reviewed  DVT prophylaxis pharmacological::  Heparin  Ulcer Prophylaxis::  Yes  Assessed for rehabilitation services:  Patient was assess for and/or received rehabilitation services during this hospitalization

## 2018-06-16 ENCOUNTER — APPOINTMENT (OUTPATIENT)
Dept: RADIOLOGY | Facility: MEDICAL CENTER | Age: 58
End: 2018-06-16
Attending: INTERNAL MEDICINE
Payer: COMMERCIAL

## 2018-06-16 PROCEDURE — 74018 RADEX ABDOMEN 1 VIEW: CPT

## 2018-06-16 NOTE — [facility]
Hospital Medicine Progress Note, Adult, Complex               Author: Kelley Sifuentes Date & Time created: 6/16/2018  11:04 AM     CC: bilateral leg cellulitis and lymphedema    Interval History:  He declined to use cpap and developed respiratory failure requiring mechanical ventilation. Tracheostomy was placed 6/6 and he continues to require pressure support.  Patient developed respiratory distress with oxygen desaturations last night  He was placed on propofol for tachycardia and increased work of breathing in the ventilator  Today he is on levophed for low blood pressure    Review of Systems:  Review of Systems   Unable to perform ROS: Mental acuity       Physical Exam:  Physical Exam   Constitutional: No distress.   HENT:   Mouth/Throat: Oropharynx is clear and moist.   Face flaking skin cleared   Eyes: Conjunctivae are normal. No scleral icterus.   Neck: Neck supple.   Cardiovascular: Normal rate and regular rhythm.  PMI is displaced.    No murmur heard.  Pulmonary/Chest: Effort normal. He has decreased breath sounds. He has no wheezes. He has no rales.   Abdominal: Soft. He exhibits no distension.   Musculoskeletal: He exhibits edema.   compression wraps in place   Skin: Skin is warm and dry. No rash noted. He is not diaphoretic.   Psychiatric: His behavior is normal.   Nursing note and vitals reviewed.      Labs:  Recent Labs      06/15/18   1640   [id number]  7.32*   [id number]  47.9*   [id number]  90.0*   [id number]  95.5   ARTHCO3  24   FIO2  50   ARTBE  -2         Recent Labs      06/15/18   0355   SODIUM  133*   POTASSIUM  4.3   CHLORIDE  100   CO2  27   BUN  19   CREATININE  0.65   CALCIUM  9.6     Recent Labs      06/15/18   0355   GLUCOSE  102*     Recent Labs      06/15/18   0355   RBC  3.98*   HEMOGLOBIN  10.6*   HEMATOCRIT  34.0*   PLATELETCT  216     Recent Labs      06/15/18   0355   WBC  8.3           Hemodynamics:   T97.8 /49 HR77 RR20 O2 sat 95% on ventilator      GI/Nutrition:  Orders Placed  This Encounter   Procedures   • DIET ORDER     Standing Status:   Standing     Number of Occurrences:   1     Order Specific Question:   Diet:     Answer:   Regular [1]     Comments:   with supervision  lela boost with all meals     Order Specific Question:   Texture/Fiber modifications:     Answer:   Dysphagia 3(Mechanical Soft)specify fluid consistency(question 6) [3]     Order Specific Question:   Consistency/Fluid modifications:     Answer:   Thin Liquids [3]     Medical Decision Making, by Problem:  Hypotension after propofol, will continue to titrate pressors and plan is to discontinue levophed today if tolerated   Taper propofol    Bilateral lower leg cellulitis, oral augmentin for suppression per ID through 6/24     Lymphedema chronic, continue leg compression             fluids given overnight for low pressures                Hyponatremia, improving off diuretic therapy     Acute on chronic respiratory failure, ventilator support              Cefepime to treat klebsiella in sputum through 5/28         continue trach care   Propofol to assist in ventilation   Chest xray is reviewed by myself and shows no new infiltrate or pneumothorax     Elevated troponin, improved to normal, due to heart strain from respiratory distress       HTN (hypertension) now low blood pressure    COPD (chronic obstructive pulmonary disease) oxygen and respiratory therapy    Stasis dermatitis improved with less leg edema and wound care, continue leg elevation and compression dressings        Narcotic addiction, continue current narcotic regimen    Non compliance w medication regimen, noncompliance with bipap contributing to respiratory failure acutely       Diabetes type 2,  metformin    Severe protein-calorie malnutrition, with patient eating variable caloric intake, poor dietary choices, and now unable to take po nutrition due to intubation,            on tube feeds now    Chronic respiratory failure, oxygen    Morbid obesity  with BMI of 40.0-44.9, adult     Chronic pain continue pain meds    ALIREZA (obstructive sleep apnea) pressure support    38 minutes of critical care time were spent with the patient with no overlap with other providers    Quality-Core Measures   Reviewed items::  Labs reviewed and Medications reviewed  DVT prophylaxis pharmacological::  Heparin  Ulcer Prophylaxis::  Yes  Assessed for rehabilitation services:  Patient was assess for and/or received rehabilitation services during this hospitalization

## 2018-06-17 ENCOUNTER — APPOINTMENT (OUTPATIENT)
Dept: RADIOLOGY | Facility: MEDICAL CENTER | Age: 58
End: 2018-06-17
Attending: INTERNAL MEDICINE
Payer: COMMERCIAL

## 2018-06-17 LAB
ANION GAP SERPL CALC-SCNC: 9 MMOL/L (ref 0–11.9)
BUN SERPL-MCNC: 15 MG/DL (ref 8–22)
CALCIUM SERPL-MCNC: 9.6 MG/DL (ref 8.4–10.2)
CHLORIDE SERPL-SCNC: 99 MMOL/L (ref 96–112)
CO2 SERPL-SCNC: 26 MMOL/L (ref 20–33)
CREAT SERPL-MCNC: 0.51 MG/DL (ref 0.5–1.4)
ERYTHROCYTE [DISTWIDTH] IN BLOOD BY AUTOMATED COUNT: 45.1 FL (ref 35.9–50)
GLUCOSE SERPL-MCNC: 115 MG/DL (ref 65–99)
HCT VFR BLD AUTO: 34.6 % (ref 42–52)
HGB BLD-MCNC: 11.1 G/DL (ref 14–18)
MCH RBC QN AUTO: 26.7 PG (ref 27–33)
MCHC RBC AUTO-ENTMCNC: 32.1 G/DL (ref 33.7–35.3)
MCV RBC AUTO: 83.4 FL (ref 81.4–97.8)
PLATELET # BLD AUTO: 243 K/UL (ref 164–446)
PMV BLD AUTO: 11.5 FL (ref 9–12.9)
POTASSIUM SERPL-SCNC: 3.4 MMOL/L (ref 3.6–5.5)
RBC # BLD AUTO: 4.15 M/UL (ref 4.7–6.1)
SODIUM SERPL-SCNC: 134 MMOL/L (ref 135–145)
WBC # BLD AUTO: 10 K/UL (ref 4.8–10.8)

## 2018-06-17 PROCEDURE — 80048 BASIC METABOLIC PNL TOTAL CA: CPT

## 2018-06-17 PROCEDURE — 74018 RADEX ABDOMEN 1 VIEW: CPT

## 2018-06-17 PROCEDURE — 85027 COMPLETE CBC AUTOMATED: CPT

## 2018-06-17 ASSESSMENT — ENCOUNTER SYMPTOMS
FEVER: 0
COUGH: 0
ABDOMINAL PAIN: 0
CONSTIPATION: 1
VOMITING: 0
DIARRHEA: 0
SHORTNESS OF BREATH: 1
MYALGIAS: 0
PALPITATIONS: 0
DIAPHORESIS: 0
NAUSEA: 0

## 2018-06-17 NOTE — PROGRESS NOTES
Progress Notes  Date of Service: 6/10/2018   Padma Bacon MD  Pulmonary      []Hide copied text  Pulmonary Critical Care Progress Note         Chief Complaint: Acute on chronic respiratory failure with hypoxia and hypercapnia.     History of Present Illness: 57 y.o. male with complex medical history including COPD, congestive heart failure, type 2 diabetes, bilateral lower extremity cellulitis and probable obstructive sleep apnea was admitted to [facility] to be treated for bilateral lower extremity cellulitis. The patient is noncompliant. He was recently admitted to [facility] intubated for about a week for acute and chronic hypercapnic respiratory failure with CO2 retention. The patient was prescribed a trilogy ventilator to use after discharge.      The patient was readmitted to the hospital for bilateral lower extremity cellulitis. He was found to have high bicarbonate on basic metabolic panel of 42. Pulmonary consultation was requested for further evaluation and management of his chronic respiratory failure.     ROS:  Respiratory: positive shortness of breath, unchanged, Cardiac: negative chest pain, GI: negative abdominal pain.  All other systems negative.     Interval Events:  24 hour interval history reviewed.  He underwent tracheostomy on June 6. He is able to tolerate pressure support during the day at 15 cm water. He noted more dyspnea with the positive end-expiratory pressure down to 8 cm water is back up to 10. He generates a spontaneous tidal volume of about 500 mL. His respiratory rate is about 20 breaths per minute without evident dyspnea or ventilator dyssynchrony and he denies dyspnea. He remains afebrile and hemodynamically stable. He is tolerating a dysphagia diet remains constipated. He remains alert and responsive and out of bed to chair.       PFSH:  No change.     Respiratory:  Exam: symmetrical but diminished bilateral breath sounds with basilar rales but no wheezing or  egophony.  Imaging: Available data reviewed. The chest film on June 6 demonstrates stable basilar interstitial prominence with no new focal consolidation. No film today.     HemoDynamics:  Exam: regular rhythm (Sinus)  Imaging: Available data reviewed. The echocardiogram on March 18, 2018 was a limited study but demonstrated grossly normal left ventricular systolic function.     Neuro:  Exam: no focal deficits noted mental status intact  Imaging: None - Reviewed           Recent Labs      06/04/18   0330  06/05/18   0350  06/06/18   0300   SODIUM  129*  128*  131*   POTASSIUM  3.5*  3.6  3.9   CHLORIDE  88*  88*  90*   CO2  31  30  32   BUN  31*  29*  29*   CREATININE  0.63  0.57  0.57   CALCIUM  9.9  9.6  9.6      GI/Nutrition:  Exam: abdomen is soft and non-tender, normal active bowel sounds  Imaging: Available data reviewed  NPO, limited oral intake on dysphagia diet.  Liver Function        Recent Labs      06/04/18   0330  06/05/18   0350  06/06/18   0300   GLUCOSE  163*  183*  134*         Heme:       Recent Labs      06/05/18   0350  06/06/18   0405   RBC  4.33*  4.39*   HEMOGLOBIN  11.9*  11.8*   HEMATOCRIT  36.9*  37.0*   PLATELETCT  216  215   PROTHROMBTM   --   13.1   APTT   --   29.2   INR   --   1.00         Infectious Disease:  Micro: cultures reviewed; no new positive cultures.       Recent Labs      06/05/18   0350  06/06/18   0405   WBC  12.7*  9.3         Quality  Measures:  Radiology images reviewed, Medications reviewed and Labs reviewed        Problems:  1. Acute and chronic respiratory failure with hypoxemia and hypercarbia, requiring tracheostomy and mechanical ventilation.  2. Chronic obstructive pulmonary disease.  3. Obesity, suspected obesity hypoventilation syndrome and obstructive sleep apnea hypopnea syndrome.  4. Chronic lymphedema.  5. Diabetes mellitus, controlled.  6. Chronic pain syndrome requiring chronic narcotic analgesia.  7. Hypothyroidism, on replacement therapy.  8. Systemic  arterial hypertension, controlled.  9. Anemia, stable without evident ongoing blood loss.  10. Dysphagia, malnutrition, hypoalbuminemia.  11. Abnormal chest imaging - stable without evidence for evolving infection or edema.  12. Hyponatremia, improving.  13. Debility.       Plan:  Continue weaning trials with a gradual reduction in pressure support as tolerated. Titrate supplemental oxygen, continue bronchodilators and respiratory protocols.  Continue nutritional support, therapies, mobilization and prophylaxis.     Discussed patient condition and risk of morbidity and/or mortality with RN and RT and with the patient.                    Admission (Discharged) on 5/5/2018            Detailed Report

## 2018-06-17 NOTE — [facility]
Hospital Medicine Progress Note, Adult, Complex               Author: Kelley Sifuentes Date & Time created: 6/17/2018  10:23 AM     CC: bilateral leg cellulitis and lymphedema    Interval History:  He declined to use cpap and developed respiratory failure requiring mechanical ventilation. Tracheostomy was placed 6/6 and he continues to require pressure support.  Patient developed respiratory distress with oxygen desaturations two nights ago with movement and required propofol drip to maintain oxygenation  Today he is off propofol and levophed and responding appropriately  He has not had a bowel movement in over 11 days    Review of Systems:  Review of Systems   Constitutional: Negative for diaphoresis and fever.   HENT: Negative for congestion.    Respiratory: Positive for shortness of breath. Negative for cough.         Difficulty breathing if laid on his right side   Cardiovascular: Negative for chest pain and palpitations.   Gastrointestinal: Positive for constipation. Negative for abdominal pain, diarrhea, nausea and vomiting.        Abdomen distended but patient refuses stool softeners and states he only passes stool once a week or so at home   Genitourinary: Negative for dysuria and urgency.   Musculoskeletal: Negative for myalgias.   Skin: Negative for rash.       Physical Exam:  Physical Exam   Constitutional: He is oriented to person, place, and time. No distress.   HENT:   Mouth/Throat: Oropharynx is clear and moist.   Eyes: No scleral icterus.   Neck: Neck supple.   Cardiovascular: Normal rate and regular rhythm.  PMI is displaced.    No murmur heard.  Pulmonary/Chest: Effort normal. He has decreased breath sounds. He has no wheezes. He has no rales.   Abdominal: Soft. He exhibits distension. There is tenderness. There is no rebound and no guarding.   Distant bowel sounds  Patient winces when abdomen is palpated but denies tenderness   Musculoskeletal: He exhibits edema.   compression wraps in place    Neurological: He is alert and oriented to person, place, and time.   Skin: Skin is warm. No rash noted.   Psychiatric: His behavior is normal.   Nursing note and vitals reviewed.      Labs:  Recent Labs      06/15/18   1640   [id number]  7.32*   [id number]  47.9*   [id number]  90.0*   [id number]  95.5   ARTHCO3  24   FIO2  50   ARTBE  -2         Recent Labs      06/15/18   0355  06/17/18   0410   SODIUM  133*  134*   POTASSIUM  4.3  3.4*   CHLORIDE  100  99   CO2  27  26   BUN  19  15   CREATININE  0.65  0.51   CALCIUM  9.6  9.6     Recent Labs      06/15/18   0355  06/17/18   0410   GLUCOSE  102*  115*     Recent Labs      06/15/18   0355  06/17/18   0410   RBC  3.98*  4.15*   HEMOGLOBIN  10.6*  11.1*   HEMATOCRIT  34.0*  34.6*   PLATELETCT  216  243     Recent Labs      06/15/18   0355  06/17/18   0410   WBC  8.3  10.0           Hemodynamics:   T98.7 /68 HR88 RR21 O2 sat 97% on ventilator      GI/Nutrition:  Orders Placed This Encounter   Procedures   • DIET NPO     Standing Status:   Standing     Number of Occurrences:   1     Order Specific Question:   Restrict to:     Answer:   Strict [1]     Comments:   Meds OK via NGT     Medical Decision Making, by Problem:  Ileus vs small bowel obstruction on xray of the abdomen   Will order a CT scan today to evaluate   Patient educated by myself on the importance of passing stool, but continues to decline stool softeners    Will taper oxycodone due to obstipation    Bilateral lower leg cellulitis, oral augmentin for suppression per ID through 6/24     Lymphedema chronic, continue leg compression   Fluids given last two days due to low volume status and hypotension                Hyponatremia, improving off diuretics     Acute on chronic respiratory failure, ventilator support              Cefepime to treat klebsiella in sputum through 5/28     Elevated troponin, improved to normal, due to heart strain from respiratory distress       HTN (hypertension) in better range  now    COPD (chronic obstructive pulmonary disease) oxygen and respiratory therapy    Stasis dermatitis improved with less leg edema and wound care, continue leg elevation and compression dressings        Narcotic addiction, decrease narcotic dose    Non compliance w medication regimen, noncompliance with bipap contributing to respiratory failure acutely       Diabetes type 2,  metformin    Severe protein-calorie malnutrition, with patient eating variable caloric intake, poor dietary choices, restart oral diet when swallow evaluation done and patient does well    Chronic respiratory failure, oxygen    Morbid obesity with BMI of 40.0-44.9, adult     Chronic pain continue pain meds    ALIREZA (obstructive sleep apnea) pressure support      Quality-Core Measures   Reviewed items::  Labs reviewed and Medications reviewed  DVT prophylaxis pharmacological::  Heparin  Ulcer Prophylaxis::  Yes  Assessed for rehabilitation services:  Patient was assess for and/or received rehabilitation services during this hospitalization

## 2018-06-17 NOTE — PROGRESS NOTES
Progress Notes  Date of Service: 6/10/2018   Padma Bacon MD  Pulmonary      []Hide copied text  Pulmonary Critical Care Progress Note         Chief Complaint: Acute on chronic respiratory failure with hypoxia and hypercapnia.     History of Present Illness: 57 y.o. male with complex medical history including COPD, congestive heart failure, type 2 diabetes, bilateral lower extremity cellulitis and probable obstructive sleep apnea was admitted to [facility] to be treated for bilateral lower extremity cellulitis. The patient is noncompliant. He was recently admitted to [facility] intubated for about a week for acute and chronic hypercapnic respiratory failure with CO2 retention. The patient was prescribed a trilogy ventilator to use after discharge.      The patient was readmitted to the hospital for bilateral lower extremity cellulitis. He was found to have high bicarbonate on basic metabolic panel of 42. Pulmonary consultation was requested for further evaluation and management of his chronic respiratory failure.     ROS:  Respiratory: positive shortness of breath, unchanged, Cardiac: negative chest pain, GI: negative abdominal pain.  All other systems negative.     Interval Events:  24 hour interval history reviewed.  He underwent tracheostomy on June 6. He was recently able to tolerate pressure support during the day at 15 cm water. He noted more dyspnea with the positive end-expiratory pressure down to 8 cm water and is back up to 10. Two nights ago he demonstrated increasing shortness of breath and briefly required propofol and intravenous norepinephrine. He is now on full support mechanical ventilation with assist control set rate of 16 breaths per minute a total rate of 22 without dyspnea or ventilator dyssynchrony.. He remains afebrile. He is tolerating a dysphagia diet remains constipated. He remains alert and responsive and out of bed to chair.       PFSH:  No  change.     Respiratory:  Exam: symmetrical but diminished bilateral breath sounds with basilar rales but no wheezing or egophony.  Imaging: Available data reviewed. The chest film on June 6 demonstrates stable basilar interstitial prominence with no new focal consolidation. No film today.     HemoDynamics:  Exam: regular rhythm (Sinus)  Imaging: Available data reviewed. The echocardiogram on March 18, 2018 was a limited study but demonstrated grossly normal left ventricular systolic function.     Neuro:  Exam: no focal deficits noted mental status intact  Imaging: None - Reviewed           Recent Labs      06/04/18   0330  06/05/18   0350  06/06/18   0300   SODIUM  129*  128*  131*   POTASSIUM  3.5*  3.6  3.9   CHLORIDE  88*  88*  90*   CO2  31  30  32   BUN  31*  29*  29*   CREATININE  0.63  0.57  0.57   CALCIUM  9.9  9.6  9.6      GI/Nutrition:  Exam: abdomen is soft and non-tender, normal active bowel sounds  Imaging: Available data reviewed. The abdominal film suggests ileus.  NPO, limited oral intake on dysphagia diet.  Liver Function        Recent Labs      06/04/18   0330  06/05/18   0350  06/06/18   0300   GLUCOSE  163*  183*  134*         Heme:       Recent Labs      06/05/18   0350  06/06/18   0405   RBC  4.33*  4.39*   HEMOGLOBIN  11.9*  11.8*   HEMATOCRIT  36.9*  37.0*   PLATELETCT  216  215   PROTHROMBTM   --   13.1   APTT   --   29.2   INR   --   1.00         Infectious Disease:  Micro: cultures reviewed; no new positive cultures.       Recent Labs      06/05/18   0350  06/06/18   0405   WBC  12.7*  9.3         Quality  Measures:  Radiology images reviewed, Medications reviewed and Labs reviewed        Problems:  1. Acute and chronic respiratory failure with hypoxemia and hypercarbia, requiring tracheostomy and mechanical ventilation.  2. Chronic obstructive pulmonary disease.  3. Obesity, suspected obesity hypoventilation syndrome and obstructive sleep apnea hypopnea syndrome.  4. Chronic  lymphedema.  5. Diabetes mellitus, controlled.  6. Chronic pain syndrome requiring chronic narcotic analgesia.  7. Hypothyroidism, on replacement therapy.  8. Systemic arterial hypertension, controlled.  9. Anemia, stable without evident ongoing blood loss.  10. Dysphagia, malnutrition, hypoalbuminemia.  11. Abnormal chest imaging - stable without evidence for evolving infection or edema.  12. Hyponatremia, improving.  13. Debility.       Plan:  Resume weaning trials with a gradual reduction in pressure support as tolerated. Titrate supplemental oxygen, continue bronchodilators and respiratory protocols.  Continue nutritional support, therapies, mobilization and prophylaxis.     Discussed patient condition and risk of morbidity and/or mortality with RN and RT and with the patient.                    Admission (Discharged) on 5/5/2018            Detailed Report

## 2018-06-17 NOTE — PROGRESS NOTES
Progress Notes  Date of Service: 6/10/2018   Padma Bacon MD  Pulmonary      []Hide copied text  Pulmonary Critical Care Progress Note         Chief Complaint: Acute on chronic respiratory failure with hypoxia and hypercapnia.     History of Present Illness: 57 y.o. male with complex medical history including COPD, congestive heart failure, type 2 diabetes, bilateral lower extremity cellulitis and probable obstructive sleep apnea was admitted to [facility] to be treated for bilateral lower extremity cellulitis. The patient is noncompliant. He was recently admitted to [facility] intubated for about a week for acute and chronic hypercapnic respiratory failure with CO2 retention. The patient was prescribed a trilogy ventilator to use after discharge.      The patient was readmitted to the hospital for bilateral lower extremity cellulitis. He was found to have high bicarbonate on basic metabolic panel of 42. Pulmonary consultation was requested for further evaluation and management of his chronic respiratory failure.     ROS:  Respiratory: positive shortness of breath, unchanged, Cardiac: negative chest pain, GI: negative abdominal pain.  All other systems negative.     Interval Events:  24 hour interval history reviewed.  He underwent tracheostomy on June 6. He was recently able to tolerate pressure support during the day at 15 cm water. He noted more dyspnea with the positive end-expiratory pressure down to 8 cm water is back up to 10. Overnight he demonstrated increasing shortness of breath and briefly required intravenous norepinephrine. He is now on full support mechanical ventilation with assist control set rate of 16 breaths per minute a total rate of 22 without dyspnea or ventilator dyssynchrony.. He remains afebrile.. He is tolerating a dysphagia diet remains constipated. He remains alert and responsive and out of bed to chair.       PFSH:  No change.     Respiratory:  Exam: symmetrical but  diminished bilateral breath sounds with basilar rales but no wheezing or egophony.  Imaging: Available data reviewed. The chest film on June 6 demonstrates stable basilar interstitial prominence with no new focal consolidation. No film today.     HemoDynamics:  Exam: regular rhythm (Sinus)  Imaging: Available data reviewed. The echocardiogram on March 18, 2018 was a limited study but demonstrated grossly normal left ventricular systolic function.     Neuro:  Exam: no focal deficits noted mental status intact  Imaging: None - Reviewed           Recent Labs      06/04/18   0330  06/05/18   0350  06/06/18   0300   SODIUM  129*  128*  131*   POTASSIUM  3.5*  3.6  3.9   CHLORIDE  88*  88*  90*   CO2  31  30  32   BUN  31*  29*  29*   CREATININE  0.63  0.57  0.57   CALCIUM  9.9  9.6  9.6      GI/Nutrition:  Exam: abdomen is soft and non-tender, normal active bowel sounds  Imaging: Available data reviewed. The abdominal film suggests ileus.  NPO, limited oral intake on dysphagia diet.  Liver Function        Recent Labs      06/04/18   0330  06/05/18   0350  06/06/18   0300   GLUCOSE  163*  183*  134*         Heme:       Recent Labs      06/05/18   0350  06/06/18   0405   RBC  4.33*  4.39*   HEMOGLOBIN  11.9*  11.8*   HEMATOCRIT  36.9*  37.0*   PLATELETCT  216  215   PROTHROMBTM   --   13.1   APTT   --   29.2   INR   --   1.00         Infectious Disease:  Micro: cultures reviewed; no new positive cultures.       Recent Labs      06/05/18   0350  06/06/18   0405   WBC  12.7*  9.3         Quality  Measures:  Radiology images reviewed, Medications reviewed and Labs reviewed        Problems:  1. Acute and chronic respiratory failure with hypoxemia and hypercarbia, requiring tracheostomy and mechanical ventilation.  2. Chronic obstructive pulmonary disease.  3. Obesity, suspected obesity hypoventilation syndrome and obstructive sleep apnea hypopnea syndrome.  4. Chronic lymphedema.  5. Diabetes mellitus, controlled.  6. Chronic  pain syndrome requiring chronic narcotic analgesia.  7. Hypothyroidism, on replacement therapy.  8. Systemic arterial hypertension, controlled.  9. Anemia, stable without evident ongoing blood loss.  10. Dysphagia, malnutrition, hypoalbuminemia.  11. Abnormal chest imaging - stable without evidence for evolving infection or edema.  12. Hyponatremia, improving.  13. Debility.       Plan:  Resume weaning trials with a gradual reduction in pressure support as tolerated. Titrate supplemental oxygen, continue bronchodilators and respiratory protocols.  Continue nutritional support, therapies, mobilization and prophylaxis.     Discussed patient condition and risk of morbidity and/or mortality with RN and RT and with the patient.                    Admission (Discharged) on 5/5/2018            Detailed Report

## 2018-06-17 NOTE — PROGRESS NOTES
Progress Notes  Date of Service: 6/10/2018   Padma Bacon MD  Pulmonary      []Hide copied text  Pulmonary Critical Care Progress Note         Chief Complaint: Acute on chronic respiratory failure with hypoxia and hypercapnia.     History of Present Illness: 57 y.o. male with complex medical history including COPD, congestive heart failure, type 2 diabetes, bilateral lower extremity cellulitis and probable obstructive sleep apnea was admitted to [facility] to be treated for bilateral lower extremity cellulitis. The patient is noncompliant. He was recently admitted to [facility] intubated for about a week for acute and chronic hypercapnic respiratory failure with CO2 retention. The patient was prescribed a trilogy ventilator to use after discharge.      The patient was readmitted to the hospital for bilateral lower extremity cellulitis. He was found to have high bicarbonate on basic metabolic panel of 42. Pulmonary consultation was requested for further evaluation and management of his chronic respiratory failure.     ROS:  Respiratory: positive shortness of breath, unchanged, Cardiac: negative chest pain, GI: negative abdominal pain.  All other systems negative.     Interval Events:  24 hour interval history reviewed.  He underwent tracheostomy on June 6. He is able to tolerate pressure support during the day at 15 cm water with 10 cm of positive end-expiratory pressure. His respiratory rate is about 20 breaths per minute without evident dyspnea or ventilator dyssynchrony and he denies dyspnea. He remains afebrile and hemodynamically stable. He is tolerating a dysphagia diet. He remains alert and responsive and out of bed to chair.       PFSH:  No change.     Respiratory:  Exam: symmetrical but diminished bilateral breath sounds with basilar rales but no wheezing or egophony.  Imaging: Available data reviewed. The chest film on June 6 demonstrates stable basilar interstitial prominence with no new  focal consolidation. No film today.     HemoDynamics:  Exam: regular rhythm (Sinus)  Imaging: Available data reviewed. The echocardiogram on March 18, 2018 was a limited study but demonstrated grossly normal left ventricular systolic function.     Neuro:  Exam: no focal deficits noted mental status intact  Imaging: None - Reviewed           Recent Labs      06/04/18 0330 06/05/18 0350  06/06/18   0300   SODIUM  129*  128*  131*   POTASSIUM  3.5*  3.6  3.9   CHLORIDE  88*  88*  90*   CO2  31  30  32   BUN  31*  29*  29*   CREATININE  0.63  0.57  0.57   CALCIUM  9.9  9.6  9.6      GI/Nutrition:  Exam: abdomen is soft and non-tender, normal active bowel sounds  Imaging: Available data reviewed  NPO, limited oral intake on dysphagia diet.  Liver Function        Recent Labs      06/04/18 0330 06/05/18 0350 06/06/18   0300   GLUCOSE  163*  183*  134*         Heme:       Recent Labs      06/05/18 0350 06/06/18   0405   RBC  4.33*  4.39*   HEMOGLOBIN  11.9*  11.8*   HEMATOCRIT  36.9*  37.0*   PLATELETCT  216  215   PROTHROMBTM   --   13.1   APTT   --   29.2   INR   --   1.00         Infectious Disease:  Micro: cultures reviewed; no new positive cultures.       Recent Labs      06/05/18 0350 06/06/18   0405   WBC  12.7*  9.3         Quality  Measures:  Radiology images reviewed, Medications reviewed and Labs reviewed        Problems:  1. Acute and chronic respiratory failure with hypoxemia and hypercarbia, requiring tracheostomy and mechanical ventilation.  2. Chronic obstructive pulmonary disease.  3. Obesity, suspected obesity hypoventilation syndrome and obstructive sleep apnea hypopnea syndrome.  4. Chronic lymphedema.  5. Diabetes mellitus, controlled.  6. Chronic pain syndrome requiring chronic narcotic analgesia.  7. Hypothyroidism, on replacement therapy.  8. Systemic arterial hypertension, controlled.  9. Anemia, stable without evident ongoing blood loss.  10. Dysphagia, malnutrition,  hypoalbuminemia.  11. Abnormal chest imaging - stable without evidence for evolving infection or edema.  12. Hyponatremia, improving.  13. Debility.       Plan:  Continue weaning trials with pressure support as tolerated. Reduced positive end-expiratory pressure of bit. Titrate supplemental oxygen, continue bronchodilators and respiratory protocols.  Continue nutritional support, therapies, mobilization and prophylaxis.     Discussed patient condition and risk of morbidity and/or mortality with RN and RT and with the patient.                    Admission (Discharged) on 5/5/2018            Detailed Report

## 2018-06-17 NOTE — PROGRESS NOTES
Progress Notes  Date of Service: 6/10/2018   Padma Bacon MD  Pulmonary      []Hide copied text  Pulmonary Critical Care Progress Note         Chief Complaint: Acute on chronic respiratory failure with hypoxia and hypercapnia.     History of Present Illness: 57 y.o. male with complex medical history including COPD, congestive heart failure, type 2 diabetes, bilateral lower extremity cellulitis and probable obstructive sleep apnea was admitted to [facility] to be treated for bilateral lower extremity cellulitis. The patient is noncompliant. He was recently admitted to [facility] intubated for about a week for acute and chronic hypercapnic respiratory failure with CO2 retention. The patient was prescribed a trilogy ventilator to use after discharge.      The patient was readmitted to the hospital for bilateral lower extremity cellulitis. He was found to have high bicarbonate on basic metabolic panel of 42. Pulmonary consultation was requested for further evaluation and management of his chronic respiratory failure.     ROS:  Respiratory: positive shortness of breath, unchanged, Cardiac: negative chest pain, GI: negative abdominal pain.  All other systems negative.     Interval Events:  24 hour interval history reviewed.  He underwent tracheostomy on June 6. He is able to tolerate pressure support during the day at 15 cm water with the positive end-expiratory pressure down to 8 cm water.. He generates a spontaneous tidal volume of about 500 mL. His respiratory rate is about 20 breaths per minute without evident dyspnea or ventilator dyssynchrony and he denies dyspnea. He remains afebrile and hemodynamically stable. He is tolerating a dysphagia diet remains constipated. He remains alert and responsive and out of bed to chair.       PFSH:  No change.     Respiratory:  Exam: symmetrical but diminished bilateral breath sounds with basilar rales but no wheezing or egophony.  Imaging: Available data  reviewed. The chest film on June 6 demonstrates stable basilar interstitial prominence with no new focal consolidation. No film today.     HemoDynamics:  Exam: regular rhythm (Sinus)  Imaging: Available data reviewed. The echocardiogram on March 18, 2018 was a limited study but demonstrated grossly normal left ventricular systolic function.     Neuro:  Exam: no focal deficits noted mental status intact  Imaging: None - Reviewed           Recent Labs      06/04/18   0330  06/05/18   0350  06/06/18   0300   SODIUM  129*  128*  131*   POTASSIUM  3.5*  3.6  3.9   CHLORIDE  88*  88*  90*   CO2  31  30  32   BUN  31*  29*  29*   CREATININE  0.63  0.57  0.57   CALCIUM  9.9  9.6  9.6      GI/Nutrition:  Exam: abdomen is soft and non-tender, normal active bowel sounds  Imaging: Available data reviewed  NPO, limited oral intake on dysphagia diet.  Liver Function        Recent Labs      06/04/18   0330 06/05/18   0350  06/06/18   0300   GLUCOSE  163*  183*  134*         Heme:       Recent Labs      06/05/18   0350  06/06/18   0405   RBC  4.33*  4.39*   HEMOGLOBIN  11.9*  11.8*   HEMATOCRIT  36.9*  37.0*   PLATELETCT  216  215   PROTHROMBTM   --   13.1   APTT   --   29.2   INR   --   1.00         Infectious Disease:  Micro: cultures reviewed; no new positive cultures.       Recent Labs      06/05/18   0350  06/06/18   0405   WBC  12.7*  9.3         Quality  Measures:  Radiology images reviewed, Medications reviewed and Labs reviewed        Problems:  1. Acute and chronic respiratory failure with hypoxemia and hypercarbia, requiring tracheostomy and mechanical ventilation.  2. Chronic obstructive pulmonary disease.  3. Obesity, suspected obesity hypoventilation syndrome and obstructive sleep apnea hypopnea syndrome.  4. Chronic lymphedema.  5. Diabetes mellitus, controlled.  6. Chronic pain syndrome requiring chronic narcotic analgesia.  7. Hypothyroidism, on replacement therapy.  8. Systemic arterial hypertension,  controlled.  9. Anemia, stable without evident ongoing blood loss.  10. Dysphagia, malnutrition, hypoalbuminemia.  11. Abnormal chest imaging - stable without evidence for evolving infection or edema.  12. Hyponatremia, improving.  13. Debility.       Plan:  Continue weaning trials with a gradual reduction in pressure support as tolerated. Titrate supplemental oxygen, continue bronchodilators and respiratory protocols.  Continue nutritional support, therapies, mobilization and prophylaxis.     Discussed patient condition and risk of morbidity and/or mortality with RN and RT and with the patient.                    Admission (Discharged) on 5/5/2018            Detailed Report

## 2018-06-17 NOTE — PROGRESS NOTES
Progress Notes  Date of Service: 6/10/2018   Pamda Bacon MD  Pulmonary      []Hide copied text  Pulmonary Critical Care Progress Note         Chief Complaint: Acute on chronic respiratory failure with hypoxia and hypercapnia.     History of Present Illness: 57 y.o. male with complex medical history including COPD, congestive heart failure, type 2 diabetes, bilateral lower extremity cellulitis and probable obstructive sleep apnea was admitted to [facility] to be treated for bilateral lower extremity cellulitis. The patient is noncompliant. He was recently admitted to [facility] intubated for about a week for acute and chronic hypercapnic respiratory failure with CO2 retention. The patient was prescribed a trilogy ventilator to use after discharge.      The patient was readmitted to the hospital for bilateral lower extremity cellulitis. He was found to have high bicarbonate on basic metabolic panel of 42. Pulmonary consultation was requested for further evaluation and management of his chronic respiratory failure.     ROS:  Respiratory: positive shortness of breath, unchanged, Cardiac: negative chest pain, GI: negative abdominal pain.  All other systems negative.     Interval Events:  24 hour interval history reviewed.  He underwent tracheostomy on June 6. He is able to tolerate pressure support during the day at 15 cm water. He noted more dyspnea with the positive end-expiratory pressure down to 8 cm water is back up to 10. He generates a spontaneous tidal volume of about 500 mL. His respiratory rate is about 20 breaths per minute without evident dyspnea or ventilator dyssynchrony and he denies dyspnea. He remains afebrile and hemodynamically stable. He is tolerating a dysphagia diet remains constipated. He remains alert and responsive and out of bed to chair.       PFSH:  No change.     Respiratory:  Exam: symmetrical but diminished bilateral breath sounds with basilar rales but no wheezing or  egophony.  Imaging: Available data reviewed. The chest film on June 6 demonstrates stable basilar interstitial prominence with no new focal consolidation. No film today.     HemoDynamics:  Exam: regular rhythm (Sinus)  Imaging: Available data reviewed. The echocardiogram on March 18, 2018 was a limited study but demonstrated grossly normal left ventricular systolic function.     Neuro:  Exam: no focal deficits noted mental status intact  Imaging: None - Reviewed           Recent Labs      06/04/18   0330  06/05/18   0350  06/06/18   0300   SODIUM  129*  128*  131*   POTASSIUM  3.5*  3.6  3.9   CHLORIDE  88*  88*  90*   CO2  31  30  32   BUN  31*  29*  29*   CREATININE  0.63  0.57  0.57   CALCIUM  9.9  9.6  9.6      GI/Nutrition:  Exam: abdomen is soft and non-tender, normal active bowel sounds  Imaging: Available data reviewed  NPO, limited oral intake on dysphagia diet.  Liver Function        Recent Labs      06/04/18   0330  06/05/18   0350  06/06/18   0300   GLUCOSE  163*  183*  134*         Heme:       Recent Labs      06/05/18   0350  06/06/18   0405   RBC  4.33*  4.39*   HEMOGLOBIN  11.9*  11.8*   HEMATOCRIT  36.9*  37.0*   PLATELETCT  216  215   PROTHROMBTM   --   13.1   APTT   --   29.2   INR   --   1.00         Infectious Disease:  Micro: cultures reviewed; no new positive cultures.       Recent Labs      06/05/18   0350  06/06/18   0405   WBC  12.7*  9.3         Quality  Measures:  Radiology images reviewed, Medications reviewed and Labs reviewed        Problems:  1. Acute and chronic respiratory failure with hypoxemia and hypercarbia, requiring tracheostomy and mechanical ventilation.  2. Chronic obstructive pulmonary disease.  3. Obesity, suspected obesity hypoventilation syndrome and obstructive sleep apnea hypopnea syndrome.  4. Chronic lymphedema.  5. Diabetes mellitus, controlled.  6. Chronic pain syndrome requiring chronic narcotic analgesia.  7. Hypothyroidism, on replacement therapy.  8. Systemic  arterial hypertension, controlled.  9. Anemia, stable without evident ongoing blood loss.  10. Dysphagia, malnutrition, hypoalbuminemia.  11. Abnormal chest imaging - stable without evidence for evolving infection or edema.  12. Hyponatremia, improving.  13. Debility.       Plan:  Continue weaning trials with a gradual reduction in pressure support as tolerated. Titrate supplemental oxygen, continue bronchodilators and respiratory protocols.  Continue nutritional support, therapies, mobilization and prophylaxis.     Discussed patient condition and risk of morbidity and/or mortality with RN and RT and with the patient.                    Admission (Discharged) on 5/5/2018            Detailed Report

## 2018-06-17 NOTE — PROGRESS NOTES
Progress Notes  Date of Service: 6/10/2018   Padma Bacon MD  Pulmonary      []Hide copied text  Pulmonary Critical Care Progress Note         Chief Complaint: Acute on chronic respiratory failure with hypoxia and hypercapnia.     History of Present Illness: 57 y.o. male with complex medical history including COPD, congestive heart failure, type 2 diabetes, bilateral lower extremity cellulitis and probable obstructive sleep apnea was admitted to [facility] to be treated for bilateral lower extremity cellulitis. The patient is noncompliant. He was recently admitted to [facility] intubated for about a week for acute and chronic hypercapnic respiratory failure with CO2 retention. The patient was prescribed a trilogy ventilator to use after discharge.      The patient was readmitted to the hospital for bilateral lower extremity cellulitis. He was found to have high bicarbonate on basic metabolic panel of 42. Pulmonary consultation was requested for further evaluation and management of his chronic respiratory failure.     ROS:  Respiratory: positive shortness of breath, unchanged, Cardiac: negative chest pain, GI: negative abdominal pain.  All other systems negative.     Interval Events:  24 hour interval history reviewed.  He underwent tracheostomy on June 6. He is able to tolerate pressure support during the day at 15 cm water with 10 cm of positive end-expiratory pressure. Degenerative spontaneous tidal volume of about 6 mL. His respiratory rate is about 20 breaths per minute without evident dyspnea or ventilator dyssynchrony and he denies dyspnea. He remains afebrile and hemodynamically stable. He is tolerating a dysphagia diet remains constipated. He remains alert and responsive and out of bed to chair.       PFSH:  No change.     Respiratory:  Exam: symmetrical but diminished bilateral breath sounds with basilar rales but no wheezing or egophony.  Imaging: Available data reviewed. The chest film on  June 6 demonstrates stable basilar interstitial prominence with no new focal consolidation. No film today.     HemoDynamics:  Exam: regular rhythm (Sinus)  Imaging: Available data reviewed. The echocardiogram on March 18, 2018 was a limited study but demonstrated grossly normal left ventricular systolic function.     Neuro:  Exam: no focal deficits noted mental status intact  Imaging: None - Reviewed           Recent Labs      06/04/18 0330  06/05/18   0350  06/06/18   0300   SODIUM  129*  128*  131*   POTASSIUM  3.5*  3.6  3.9   CHLORIDE  88*  88*  90*   CO2  31  30  32   BUN  31*  29*  29*   CREATININE  0.63  0.57  0.57   CALCIUM  9.9  9.6  9.6      GI/Nutrition:  Exam: abdomen is soft and non-tender, normal active bowel sounds  Imaging: Available data reviewed  NPO, limited oral intake on dysphagia diet.  Liver Function        Recent Labs      06/04/18   0330 06/05/18   0350  06/06/18   0300   GLUCOSE  163*  183*  134*         Heme:       Recent Labs      06/05/18   0350  06/06/18   0405   RBC  4.33*  4.39*   HEMOGLOBIN  11.9*  11.8*   HEMATOCRIT  36.9*  37.0*   PLATELETCT  216  215   PROTHROMBTM   --   13.1   APTT   --   29.2   INR   --   1.00         Infectious Disease:  Micro: cultures reviewed; no new positive cultures.       Recent Labs      06/05/18 0350 06/06/18   0405   WBC  12.7*  9.3         Quality  Measures:  Radiology images reviewed, Medications reviewed and Labs reviewed        Problems:  1. Acute and chronic respiratory failure with hypoxemia and hypercarbia, requiring tracheostomy and mechanical ventilation.  2. Chronic obstructive pulmonary disease.  3. Obesity, suspected obesity hypoventilation syndrome and obstructive sleep apnea hypopnea syndrome.  4. Chronic lymphedema.  5. Diabetes mellitus, controlled.  6. Chronic pain syndrome requiring chronic narcotic analgesia.  7. Hypothyroidism, on replacement therapy.  8. Systemic arterial hypertension, controlled.  9. Anemia, stable without  evident ongoing blood loss.  10. Dysphagia, malnutrition, hypoalbuminemia.  11. Abnormal chest imaging - stable without evidence for evolving infection or edema.  12. Hyponatremia, improving.  13. Debility.       Plan:  Continue weaning trials with pressure support as tolerated. Reduced positive end-expiratory pressure of bit. Titrate supplemental oxygen, continue bronchodilators and respiratory protocols.  Continue nutritional support, therapies, mobilization and prophylaxis.     Discussed patient condition and risk of morbidity and/or mortality with RN and RT and with the patient.                    Admission (Discharged) on 5/5/2018            Detailed Report

## 2018-06-18 ENCOUNTER — APPOINTMENT (OUTPATIENT)
Dept: RADIOLOGY | Facility: MEDICAL CENTER | Age: 58
End: 2018-06-18
Attending: INTERNAL MEDICINE
Payer: COMMERCIAL

## 2018-06-18 LAB
ANION GAP SERPL CALC-SCNC: 10 MMOL/L (ref 0–11.9)
BUN SERPL-MCNC: 12 MG/DL (ref 8–22)
CALCIUM SERPL-MCNC: 9.4 MG/DL (ref 8.4–10.2)
CHLORIDE SERPL-SCNC: 98 MMOL/L (ref 96–112)
CO2 SERPL-SCNC: 26 MMOL/L (ref 20–33)
CREAT SERPL-MCNC: 0.51 MG/DL (ref 0.5–1.4)
ERYTHROCYTE [DISTWIDTH] IN BLOOD BY AUTOMATED COUNT: 47.1 FL (ref 35.9–50)
GLUCOSE SERPL-MCNC: 94 MG/DL (ref 65–99)
HCT VFR BLD AUTO: 34.5 % (ref 42–52)
HGB BLD-MCNC: 10.9 G/DL (ref 14–18)
MCH RBC QN AUTO: 26.8 PG (ref 27–33)
MCHC RBC AUTO-ENTMCNC: 31.6 G/DL (ref 33.7–35.3)
MCV RBC AUTO: 84.8 FL (ref 81.4–97.8)
PLATELET # BLD AUTO: 186 K/UL (ref 164–446)
PMV BLD AUTO: 11.8 FL (ref 9–12.9)
POTASSIUM SERPL-SCNC: 3.3 MMOL/L (ref 3.6–5.5)
RBC # BLD AUTO: 4.07 M/UL (ref 4.7–6.1)
SODIUM SERPL-SCNC: 134 MMOL/L (ref 135–145)
WBC # BLD AUTO: 7.1 K/UL (ref 4.8–10.8)

## 2018-06-18 PROCEDURE — 80048 BASIC METABOLIC PNL TOTAL CA: CPT

## 2018-06-18 PROCEDURE — 85027 COMPLETE CBC AUTOMATED: CPT

## 2018-06-18 ASSESSMENT — ENCOUNTER SYMPTOMS
FEVER: 0
VOMITING: 0
CONSTIPATION: 1
COUGH: 0
SHORTNESS OF BREATH: 1
NAUSEA: 0
CHILLS: 0
DIARRHEA: 0
WHEEZING: 0

## 2018-06-18 NOTE — [facility]
Hospital Medicine Progress Note, Adult, Complex               Author: Kelley Sifuentes Date & Time created: 6/18/2018  11:22 AM     CC: bilateral leg cellulitis and lymphedema    Interval History:  He declined to use cpap and developed respiratory failure requiring mechanical ventilation. Tracheostomy was placed 6/6 and he continues to require pressure support.  He was given enemas yesterday with no stool produced  He continues to have abdominal distention with difficulty ventilating his lungs due to his large abdomen    Review of Systems:  Review of Systems   Constitutional: Negative for chills and fever.   HENT: Negative for congestion.    Respiratory: Positive for shortness of breath. Negative for cough and wheezing.         Difficulty breathing when turned   Cardiovascular: Negative for chest pain.   Gastrointestinal: Positive for constipation. Negative for diarrhea, nausea and vomiting.        Abdomen distended   Patient continues to refuse stool softeners   Tenderness with movement   Genitourinary: Negative for dysuria.   Skin: Negative for itching.       Physical Exam:  Physical Exam   Constitutional: He is oriented to person, place, and time. No distress.   HENT:   Mouth/Throat: No oropharyngeal exudate.   Eyes: Conjunctivae are normal.   Neck: Neck supple.   Cardiovascular: Normal rate and regular rhythm.  PMI is displaced.    No murmur heard.  Pulmonary/Chest: Effort normal. He has decreased breath sounds. He has no wheezes. He has no rales.   Abdominal: Soft. He exhibits distension. There is tenderness. There is no rebound and no guarding.   Distant bowel sounds  Patient winces when abdomen is palpated or when he is moved   Musculoskeletal: He exhibits edema.   compression wraps in place   Neurological: He is alert and oriented to person, place, and time.   Skin: Skin is warm and dry. He is not diaphoretic.   Psychiatric: His behavior is normal.   Nursing note and vitals reviewed.      Labs:  Recent Labs       06/15/18   1640   ISFEO80A  7.32*   [id number]  47.9*   [id number]  90.0*   [id number]  95.5   ARTHCO3  24   FIO2  50   ARTBE  -2         Recent Labs      06/17/18   0410  06/18/18   0435   SODIUM  134*  134*   POTASSIUM  3.4*  3.3*   CHLORIDE  99  98   CO2  26  26   BUN  15  12   CREATININE  0.51  0.51   CALCIUM  9.6  9.4     Recent Labs      06/17/18   0410  06/18/18   0435   GLUCOSE  115*  94     Recent Labs      06/17/18   0410  06/18/18   0435   RBC  4.15*  4.07*   HEMOGLOBIN  11.1*  10.9*   HEMATOCRIT  34.6*  34.5*   PLATELETCT  243  186     Recent Labs      06/17/18 0410  06/18/18   0435   WBC  10.0  7.1           Hemodynamics:   T99.4 /78 HR98 RR38 O2 sat 97% on ventilator      GI/Nutrition:  Orders Placed This Encounter   Procedures   • DIET NPO     Standing Status:   Standing     Number of Occurrences:   1     Order Specific Question:   Restrict to:     Answer:   Strict [1]     Comments:   Meds OK via NGT     Medical Decision Making, by Problem:  Ileus vs small bowel obstruction on xray of the abdomen   Patient unable to fit into CT scanner, no bowel movement with enema, will call [facility] to see if CT scanner there can accommodate his size   Patient educated by myself on the importance of passing stool, but continues to decline stool softeners    Taper oxycodone due to obstipation    Bilateral lower leg cellulitis, oral augmentin for suppression per ID through 6/24     Lymphedema chronic, continue leg compression                Hyponatremia, improving off diuretics     Acute on chronic respiratory failure, ventilator support              Cefepime to treat klebsiella in sputum through 5/28     Elevated troponin, improved to normal, due to heart strain from respiratory distress       HTN (hypertension) controlled    COPD (chronic obstructive pulmonary disease) oxygen and respiratory therapy    Stasis dermatitis improved with less leg edema and wound care       Narcotic addiction, decrease narcotic  dose    Non compliance w medication regimen, noncompliance with bipap contributing to respiratory failure        Diabetes type 2,  metformin when taking nutrition    Severe protein-calorie malnutrition, with patient eating variable caloric intake, poor dietary choices, restart oral diet when patient able to tolerate an oral diet    Chronic respiratory failure, oxygen    Morbid obesity with BMI of 40.0-44.9, adult     Chronic pain continue pain meds    ALIREZA (obstructive sleep apnea) pressure support      Quality-Core Measures   Reviewed items::  Labs reviewed and Medications reviewed  DVT prophylaxis pharmacological::  Heparin  Ulcer Prophylaxis::  Yes  Assessed for rehabilitation services:  Patient was assess for and/or received rehabilitation services during this hospitalization

## 2018-06-18 NOTE — PROGRESS NOTES
Progress Notes  Date of Service: 6/10/2018   Padma Bacon MD  Pulmonary      []Hide copied text  Pulmonary Critical Care Progress Note         Chief Complaint: Acute on chronic respiratory failure with hypoxia and hypercapnia.     History of Present Illness: 57 y.o. male with complex medical history including COPD, congestive heart failure, type 2 diabetes, bilateral lower extremity cellulitis and probable obstructive sleep apnea was admitted to [facility] to be treated for bilateral lower extremity cellulitis. The patient is noncompliant. He was recently admitted to [facility] intubated for about a week for acute and chronic hypercapnic respiratory failure with CO2 retention. The patient was prescribed a trilogy ventilator to use after discharge.      The patient was readmitted to the hospital for bilateral lower extremity cellulitis. He was found to have high bicarbonate on basic metabolic panel of 42. Pulmonary consultation was requested for further evaluation and management of his chronic respiratory failure.     ROS:  Respiratory: positive shortness of breath, unchanged, Cardiac: negative chest pain, GI: negative abdominal pain.  All other systems negative.     Interval Events:  24 hour interval history reviewed.  He underwent tracheostomy on June 6. He was recently able to tolerate pressure support during the day at 15 cm water. He noted more dyspnea with the positive end-expiratory pressure down to 8 cm water and is back up to 10. Two nights ago he demonstrated increasing shortness of breath and briefly required propofol and intravenous norepinephrine. He is now on full support mechanical ventilation with assist control set rate of 16 breaths per minute a total rate of 22 without dyspnea or ventilator dyssynchrony.. He remains afebrile. He is tolerating a dysphagia diet remains constipated. He remains alert and responsive and out of bed to chair.    6/18 - SR - 99.4 degrees - 132/78 - 98 - 38  - 97%; on full support today: 16/450/10/40%; GCS 15; hypokalemia and hyponatremia today; on augmentin       PFSH:  No change.     Respiratory:  Exam: symmetrical but diminished bilateral breath sounds with basilar rales but no wheezing or egophony.  Imaging: Available data reviewed. The chest film on June 6 demonstrates stable basilar interstitial prominence with no new focal consolidation. No film today.     HemoDynamics:  Exam: regular rhythm (Sinus)  Imaging: Available data reviewed. The echocardiogram on March 18, 2018 was a limited study but demonstrated grossly normal left ventricular systolic function.     Neuro:  Exam: no focal deficits noted mental status intact  Imaging: None - Reviewed           Recent Labs      06/04/18   0330  06/05/18   0350  06/06/18   0300   SODIUM  129*  128*  131*   POTASSIUM  3.5*  3.6  3.9   CHLORIDE  88*  88*  90*   CO2  31  30  32   BUN  31*  29*  29*   CREATININE  0.63  0.57  0.57   CALCIUM  9.9  9.6  9.6      GI/Nutrition:  Exam: abdomen is soft and non-tender, normal active bowel sounds  Imaging: Available data reviewed. The abdominal film suggests ileus.  NPO, limited oral intake on dysphagia diet.  Liver Function        Recent Labs      06/04/18   0330  06/05/18   0350  06/06/18   0300   GLUCOSE  163*  183*  134*         Heme:       Recent Labs      06/05/18   0350  06/06/18   0405   RBC  4.33*  4.39*   HEMOGLOBIN  11.9*  11.8*   HEMATOCRIT  36.9*  37.0*   PLATELETCT  216  215   PROTHROMBTM   --   13.1   APTT   --   29.2   INR   --   1.00         Infectious Disease:  Micro: cultures reviewed; no new positive cultures.       Recent Labs      06/05/18   0350  06/06/18   0405   WBC  12.7*  9.3         Quality  Measures:  Radiology images reviewed, Medications reviewed and Labs reviewed        Problems:  1. Acute and chronic respiratory failure with hypoxemia and hypercarbia, requiring tracheostomy and mechanical ventilation.  2. Chronic obstructive pulmonary disease.  3.  Obesity, suspected obesity hypoventilation syndrome and obstructive sleep apnea hypopnea syndrome.  4. Chronic lymphedema.  5. Diabetes mellitus, controlled.  6. Chronic pain syndrome requiring chronic narcotic analgesia.  7. Hypothyroidism, on replacement therapy.  8. Systemic arterial hypertension, controlled.  9. Anemia, stable without evident ongoing blood loss.  10. Dysphagia, malnutrition, hypoalbuminemia.  11. Abnormal chest imaging - stable without evidence for evolving infection or edema.  12. Hyponatremia, improving.  13. Debility.       Plan:  Resume weaning trials with a gradual reduction in pressure support as tolerated. Titrate supplemental oxygen, continue bronchodilators and respiratory protocols.  Continue nutritional support, therapies, mobilization and prophylaxis.     Discussed patient condition and risk of morbidity and/or mortality with RN and RT and with the patient.                    Admission (Discharged) on 5/5/2018            Detailed Report

## 2018-06-18 NOTE — PROGRESS NOTES
[]Hide copied text  Pulmonary Critical Care Progress Note         Chief Complaint: Acute on chronic respiratory failure with hypoxia and hypercapnia.     History of Present Illness: 57 y.o. male with complex medical history including COPD, congestive heart failure, type 2 diabetes, bilateral lower extremity cellulitis and probable obstructive sleep apnea was admitted to [facility] to be treated for bilateral lower extremity cellulitis. The patient is noncompliant. He was recently admitted to [facility] intubated for about a week for acute and chronic hypercapnic respiratory failure with CO2 retention. The patient was prescribed a trilogy ventilator to use after discharge.      The patient was readmitted to the hospital for bilateral lower extremity cellulitis. He was found to have high bicarbonate on basic metabolic panel of 42. Pulmonary consultation was requested for further evaluation and management of his chronic respiratory failure.     ROS:  Respiratory: positive shortness of breath, unchanged, Cardiac: negative chest pain, GI: negative abdominal pain.  All other systems negative.     Interval Events:  24 hour interval history reviewed.    6/18 - SR - 99.4 degrees - 132/78 - 98 - 38 - 97%; on full support today: 16/450/10/40%; GCS 15; hypokalemia and hyponatremia today; on augmentin.  He underwent a tracheostomy on June 6.  He has been able to tolerate pressure support at 15 cm H2O with PEEP and tolerated 8 cmH2O better than 8 cmH2O.  Recently he experienced increasing shortness of breath and required propofol and intravenous norepinephrine.  He remains alert and is occasionally watching TV and getting out of bed to a chair.  He is not recently experienced any fever.  Is been able to tolerate a dysphagia diet but remains constipated.       PFSH:  No change.     Respiratory:  Exam: symmetrical but diminished bilateral breath sounds with basilar rales but no wheezing or egophony.  Imaging:  Available data reviewed. The chest film on June 6 demonstrates stable basilar interstitial prominence with no new focal consolidation. No film today.     HemoDynamics:  Exam: regular rhythm (Sinus)  Imaging: Available data reviewed. The echocardiogram on March 18, 2018 was a limited study but demonstrated grossly normal left ventricular systolic function.     Neuro:  Exam: no focal deficits noted mental status intact  Imaging: None - Reviewed           Recent Labs      06/04/18   0330  06/05/18   0350  06/06/18   0300   SODIUM  129*  128*  131*   POTASSIUM  3.5*  3.6  3.9   CHLORIDE  88*  88*  90*   CO2  31  30  32   BUN  31*  29*  29*   CREATININE  0.63  0.57  0.57   CALCIUM  9.9  9.6  9.6      GI/Nutrition:  Exam: abdomen is soft and non-tender, normal active bowel sounds  Imaging: Available data reviewed. The abdominal film suggests ileus.  NPO, limited oral intake on dysphagia diet.  Liver Function        Recent Labs      06/04/18   0330  06/05/18   0350  06/06/18   0300   GLUCOSE  163*  183*  134*         Heme:       Recent Labs      06/05/18   0350  06/06/18   0405   RBC  4.33*  4.39*   HEMOGLOBIN  11.9*  11.8*   HEMATOCRIT  36.9*  37.0*   PLATELETCT  216  215   PROTHROMBTM   --   13.1   APTT   --   29.2   INR   --   1.00         Infectious Disease:  Micro: cultures reviewed; no new positive cultures.       Recent Labs      06/05/18   0350  06/06/18   0405   WBC  12.7*  9.3         Quality  Measures:  Radiology images reviewed, Medications reviewed and Labs reviewed        Problems:  1. Acute and chronic respiratory failure with hypoxemia and hypercarbia, requiring tracheostomy and mechanical ventilation.  2. Chronic obstructive pulmonary disease.  3. Obesity, suspected obesity hypoventilation syndrome and obstructive sleep apnea hypopnea syndrome.  4. Chronic lymphedema.  5. Diabetes mellitus, controlled.  6. Chronic pain syndrome requiring chronic narcotic analgesia.  7. Hypothyroidism, on replacement  therapy.  8. Systemic arterial hypertension, controlled.  9. Anemia, stable without evident ongoing blood loss.  10. Dysphagia, malnutrition, hypoalbuminemia.  11. Abnormal chest imaging - stable without evidence for evolving infection or edema.  12. Hyponatremia, improving.  13. Debility.       Plan:  Resume weaning trials with a gradual reduction in pressure support as tolerated. Titrate supplemental oxygen, continue bronchodilators and respiratory protocols.  Continue nutritional support, therapies, mobilization and prophylaxis.     Discussed patient condition and risk of morbidity and/or mortality with RN and RT and with the patient.                    Admission (Discharged) on 5/5/2018            Detailed Report

## 2018-06-19 ENCOUNTER — APPOINTMENT (OUTPATIENT)
Dept: RADIOLOGY | Facility: MEDICAL CENTER | Age: 58
End: 2018-06-19
Attending: HOSPITALIST
Payer: COMMERCIAL

## 2018-06-19 LAB
ANION GAP SERPL CALC-SCNC: 10 MMOL/L (ref 0–11.9)
BUN SERPL-MCNC: 10 MG/DL (ref 8–22)
CALCIUM SERPL-MCNC: 9.1 MG/DL (ref 8.4–10.2)
CHLORIDE SERPL-SCNC: 100 MMOL/L (ref 96–112)
CO2 SERPL-SCNC: 26 MMOL/L (ref 20–33)
CREAT SERPL-MCNC: 0.49 MG/DL (ref 0.5–1.4)
ERYTHROCYTE [DISTWIDTH] IN BLOOD BY AUTOMATED COUNT: 45.1 FL (ref 35.9–50)
GLUCOSE SERPL-MCNC: 97 MG/DL (ref 65–99)
HCT VFR BLD AUTO: 33 % (ref 42–52)
HGB BLD-MCNC: 10.4 G/DL (ref 14–18)
MCH RBC QN AUTO: 26.4 PG (ref 27–33)
MCHC RBC AUTO-ENTMCNC: 31.5 G/DL (ref 33.7–35.3)
MCV RBC AUTO: 83.8 FL (ref 81.4–97.8)
PLATELET # BLD AUTO: 214 K/UL (ref 164–446)
PMV BLD AUTO: 11 FL (ref 9–12.9)
POTASSIUM SERPL-SCNC: 3.5 MMOL/L (ref 3.6–5.5)
RBC # BLD AUTO: 3.94 M/UL (ref 4.7–6.1)
SODIUM SERPL-SCNC: 136 MMOL/L (ref 135–145)
WBC # BLD AUTO: 9.1 K/UL (ref 4.8–10.8)

## 2018-06-19 PROCEDURE — 80048 BASIC METABOLIC PNL TOTAL CA: CPT

## 2018-06-19 PROCEDURE — 74018 RADEX ABDOMEN 1 VIEW: CPT

## 2018-06-19 PROCEDURE — 85027 COMPLETE CBC AUTOMATED: CPT

## 2018-06-19 ASSESSMENT — ENCOUNTER SYMPTOMS
ABDOMINAL PAIN: 0
VOMITING: 0
DIARRHEA: 0
NAUSEA: 1
CONSTIPATION: 0
SHORTNESS OF BREATH: 1
FEVER: 0
HEADACHES: 0
COUGH: 0

## 2018-06-19 NOTE — PROGRESS NOTES
[]Hide copied text  Pulmonary Critical Care Progress Note         Chief Complaint: Acute on chronic respiratory failure with hypoxia and hypercapnia.     History of Present Illness: 57 y.o. male with complex medical history including COPD, congestive heart failure, type 2 diabetes, bilateral lower extremity cellulitis and probable obstructive sleep apnea was admitted to [facility] to be treated for bilateral lower extremity cellulitis. The patient is noncompliant. He was recently admitted to [facility] intubated for about a week for acute and chronic hypercapnic respiratory failure with CO2 retention. The patient was prescribed a trilogy ventilator to use after discharge.      The patient was readmitted to the hospital for bilateral lower extremity cellulitis. He was found to have high bicarbonate on basic metabolic panel of 42. Pulmonary consultation was requested for further evaluation and management of his chronic respiratory failure.     ROS:  Respiratory: positive shortness of breath, unchanged, Cardiac: negative chest pain, GI: negative abdominal pain.  All other systems negative.     Interval Events:  24 hour interval history reviewed.    6/18 - SR - 99.4 degrees - 132/78 - 98 - 38 - 97%; on full support today: 16/450/10/40%; GCS 15; hypokalemia and hyponatremia today; on augmentin.  He underwent a tracheostomy on June 6.  He has been able to tolerate pressure support at 15 cm H2O with PEEP and tolerated 8 cmH2O better than 8 cmH2O.  Recently he experienced increasing shortness of breath and required propofol and intravenous norepinephrine.  He remains alert and is occasionally watching TV and getting out of bed to a chair.  He is not recently experienced any fever.  Is been able to tolerate a dysphagia diet but remains constipated.       6/19 - remains on full support, awaiting CT abdomen; d/w RT; SR - 98.2 degrees - 135/80 - 98 - 24 - 94%; on 16/450/10/35; potassium better but still  low; other labs without significant change; abd x-ray 6/17: 1.  Distended air-filled loops of small bowel and colon, appear similar to prior study, could represent severe ileus or evolving obstructive changes. Radiographic follow-up to resolution recommended    PFSH:  No change.     Respiratory:  Exam: symmetrical but diminished bilateral breath sounds with basilar rales but no wheezing or egophony.  Imaging: Available data reviewed. The chest film on June 6 demonstrates stable basilar interstitial prominence with no new focal consolidation. No film today.     HemoDynamics:  Exam: regular rhythm (Sinus)  Imaging: Available data reviewed. The echocardiogram on March 18, 2018 was a limited study but demonstrated grossly normal left ventricular systolic function.     Neuro:  Exam: no focal deficits noted mental status intact  Imaging: None - Reviewed           Recent Labs      06/04/18   0330  06/05/18   0350  06/06/18   0300   SODIUM  129*  128*  131*   POTASSIUM  3.5*  3.6  3.9   CHLORIDE  88*  88*  90*   CO2  31  30  32   BUN  31*  29*  29*   CREATININE  0.63  0.57  0.57   CALCIUM  9.9  9.6  9.6      GI/Nutrition:  Exam: abdomen is soft and non-tender, normal active bowel sounds  Imaging: Available data reviewed. The abdominal film suggests ileus.  NPO, limited oral intake on dysphagia diet.  Liver Function        Recent Labs      06/04/18   0330  06/05/18   0350  06/06/18   0300   GLUCOSE  163*  183*  134*         Heme:       Recent Labs      06/05/18   0350  06/06/18   0405   RBC  4.33*  4.39*   HEMOGLOBIN  11.9*  11.8*   HEMATOCRIT  36.9*  37.0*   PLATELETCT  216  215   PROTHROMBTM   --   13.1   APTT   --   29.2   INR   --   1.00         Infectious Disease:  Micro: cultures reviewed; no new positive cultures.       Recent Labs      06/05/18   0350  06/06/18   0405   WBC  12.7*  9.3         Quality  Measures:  Radiology images reviewed, Medications reviewed and Labs reviewed        Problems:  1. Acute and chronic  respiratory failure with hypoxemia and hypercarbia, requiring tracheostomy and mechanical ventilation.  2. Chronic obstructive pulmonary disease.  3. Obesity, suspected obesity hypoventilation syndrome and obstructive sleep apnea hypopnea syndrome.  4. Chronic lymphedema.  5. Diabetes mellitus, controlled.  6. Chronic pain syndrome requiring chronic narcotic analgesia.  7. Hypothyroidism, on replacement therapy.  8. Systemic arterial hypertension, controlled.  9. Anemia, stable without evident ongoing blood loss.  10. Dysphagia, malnutrition, hypoalbuminemia.  11. Abnormal chest imaging - stable without evidence for evolving infection or edema.  12. Hyponatremia, improving.  13. Debility.  14. Distended small bowel, query ileus vs obstruction       Plan:  Resume weaning trials with a gradual reduction in pressure support as tolerated. Titrate supplemental oxygen, continue bronchodilators and respiratory protocols.  Continue nutritional support, therapies, mobilization and prophylaxis.     Discussed patient condition and risk of morbidity and/or mortality with RN and RT and with the patient.                    Admission (Discharged) on 5/5/2018            Detailed Report

## 2018-06-19 NOTE — [facility]
Hospital Medicine Progress Note, Adult, Complex               Author: Soo Herr Date & Time created: 6/19/2018  7:11 AM     CC: LE wounds    Interval History:  Had BM overnight  Still with SOB with vent support decrease  Some nausea, not interested in diet, NGT remains to LIWS    Review of Systems:  Review of Systems   Constitutional: Negative for fever.   Respiratory: Positive for shortness of breath. Negative for cough.    Cardiovascular: Negative for chest pain.   Gastrointestinal: Positive for nausea. Negative for abdominal pain, constipation, diarrhea and vomiting.   Genitourinary: Negative for dysuria.   Musculoskeletal: Negative for joint pain.   Neurological: Negative for headaches.       T 98.2P [id number] 97% I/O not in chart teleSR  Physical Exam   Constitutional: He is oriented to person, place, and time. He appears well-developed. No distress.   HENT:   Head: Normocephalic and atraumatic.   NGT   Eyes: Conjunctivae are normal. Right eye exhibits no discharge. Left eye exhibits no discharge. No scleral icterus.   Neck: No tracheal deviation present.   Trach with dried blood  Supraclavicular fat pads   Cardiovascular: Normal rate and regular rhythm.    No murmur heard.  Pulmonary/Chest: Effort normal. No respiratory distress. He exhibits no tenderness.   diminished   Abdominal: Soft. Bowel sounds are normal. He exhibits distension. There is no tenderness. There is no rebound.   Musculoskeletal: He exhibits edema (1+).   Neurological: He is alert and oriented to person, place, and time.   Skin: Skin is warm.   LE wrapped  Seborrhea flaking on face   Vitals reviewed.      Labs:        Invalid input(s): [id number]      Recent Labs      06/17/18   0410  06/18/18   0435  06/19/18   0527   SODIUM  134*  134*  136   POTASSIUM  3.4*  3.3*  3.5*   CHLORIDE  99  98  100   CO2  26  26  26   BUN  15  12  10   CREATININE  0.51  0.51  0.49*   CALCIUM  9.6  9.4  9.1     Recent Labs      06/17/18    0410  06/18/18   0435  06/19/18   0527   GLUCOSE  115*  94  97     Recent Labs      06/17/18   0410  06/18/18   0435  06/19/18   0527   RBC  4.15*  4.07*  3.94*   HEMOGLOBIN  11.1*  10.9*  10.4*   HEMATOCRIT  34.6*  34.5*  33.0*   PLATELETCT  243  186  214     Recent Labs      06/17/18   0410  06/18/18   0435  06/19/18   0527   WBC  10.0  7.1  9.1        GI/Nutrition:  Orders Placed This Encounter   Procedures   • DIET NPO     Standing Status:   Standing     Number of Occurrences:   1     Order Specific Question:   Restrict to:     Answer:   Strict [1]     Comments:   Meds OK via NGT     Medical Decision Making, by Problem:  RLE cellulitis s/p debridement, polymicrobial (enterococcus, proteus, MSSA)              -improved, continue wound care               -Augmentin suppression per ID thru 6/26  B LE lymphedema              -compression  ALIREZA with chronic hypercapnia/OHVS s/p acute VDRF, Trach 6/6   -vent weaning per pulmonary as tolerated  JOE-resolved   -watch with initiation of zestril 6/14  Ileus   -repeat KUB   -if improved clamp NGT and hopeful to start TF, otherwise will need TPN  Klebsiella HAP-treated  COPD              -spiriva, incruse held with ETT  DM              -metformin               -asa, lipitor  BBB              -normal ef, wall motion on echo 5/19  Chronic pain              -oxy IR 30mg q 6               -prn fentanyl for wound care  MSEW  Hypothyroidism              -synthroid  Hyponatremia              -following  Constipation   -encourage compliance with bowel care   -DC scopolamine patch as contributer  Nutrition              -npo, may need TPN  Anemia  Debility   -PT/OT    Quality-Core Measures   Reviewed items::  Medications reviewed and Labs reviewed  Montoya catheter::  Unconscious / Sedated Patient on a Ventilator  Antibiotics:  Treating active infection/contamination beyond 24 hours perioperative coverage

## 2018-06-20 LAB
ANION GAP SERPL CALC-SCNC: 12 MMOL/L (ref 0–11.9)
BUN SERPL-MCNC: 8 MG/DL (ref 8–22)
CALCIUM SERPL-MCNC: 9.6 MG/DL (ref 8.4–10.2)
CHLORIDE SERPL-SCNC: 100 MMOL/L (ref 96–112)
CO2 SERPL-SCNC: 24 MMOL/L (ref 20–33)
CREAT SERPL-MCNC: 0.65 MG/DL (ref 0.5–1.4)
GLUCOSE SERPL-MCNC: 83 MG/DL (ref 65–99)
POTASSIUM SERPL-SCNC: 3.6 MMOL/L (ref 3.6–5.5)
SODIUM SERPL-SCNC: 136 MMOL/L (ref 135–145)

## 2018-06-20 PROCEDURE — 80048 BASIC METABOLIC PNL TOTAL CA: CPT

## 2018-06-20 ASSESSMENT — ENCOUNTER SYMPTOMS
HEADACHES: 1
CONSTIPATION: 0
VOMITING: 0
SHORTNESS OF BREATH: 1
COUGH: 0
NAUSEA: 1
SORE THROAT: 0
FEVER: 0
DIARRHEA: 0
ABDOMINAL PAIN: 0

## 2018-06-20 NOTE — [facility]
Hospital Medicine Progress Note, Adult, Complex               Author: Soo TOLU Herr Date & Time created: 6/20/2018  5:46 AM     CC: LE wounds    Interval History:  KUB with interval improvement  Tolerated clamping of NGT, required zofran o/n, no abd pain  Thinks legs were wrapped too tight at ankles    Review of Systems:  Review of Systems   Constitutional: Negative for fever.   HENT: Negative for sore throat.    Respiratory: Positive for shortness of breath. Negative for cough.    Cardiovascular: Negative for chest pain.   Gastrointestinal: Positive for nausea (resolved with zofran). Negative for abdominal pain, constipation, diarrhea and vomiting.   Genitourinary: Negative for dysuria.   Musculoskeletal: Negative for joint pain.   Neurological: Positive for headaches (from coughing).       T 98.4P [id number] 96% I/O 1.1/1.1 1BM tele  Physical Exam   Constitutional: He is oriented to person, place, and time. He appears well-developed.   HENT:   Head: Normocephalic and atraumatic.   NGT   Eyes: Conjunctivae are normal. Right eye exhibits no discharge. Left eye exhibits no discharge. No scleral icterus.   Neck: No tracheal deviation present.   Trach   Supraclavicular fat pads   Cardiovascular: Normal rate and regular rhythm.    No murmur heard.  Pulmonary/Chest: Effort normal. No respiratory distress. He has no wheezes. He exhibits no tenderness.   diminished   Abdominal: Soft. Bowel sounds are normal. He exhibits distension. There is no tenderness. There is no rebound.   Musculoskeletal: He exhibits edema (1+).   Neurological: He is alert and oriented to person, place, and time.   Skin: Skin is warm.   LE wrapped     Vitals reviewed.      Labs:        Invalid input(s): [id number]      Recent Labs      06/18/18   0435  06/19/18   0527  06/20/18   0122   SODIUM  134*  136  136   POTASSIUM  3.3*  3.5*  3.6   CHLORIDE  98  100  100   CO2  26  26  24   BUN  12  10  8   CREATININE  0.51  0.49*  0.65    CALCIUM  9.4  9.1  9.6     Recent Labs      06/18/18   0435  06/19/18   0527  06/20/18   0122   GLUCOSE  94  97  83     Recent Labs      06/18/18   0435  06/19/18   0527   RBC  4.07*  3.94*   HEMOGLOBIN  10.9*  10.4*   HEMATOCRIT  34.5*  33.0*   PLATELETCT  186  214     Recent Labs      06/18/18   0435  06/19/18   0527   WBC  7.1  9.1        GI/Nutrition:  Orders Placed This Encounter   Procedures   • DIET NPO     Standing Status:   Standing     Number of Occurrences:   1     Order Specific Question:   Restrict to:     Answer:   Strict [1]     Comments:   Meds OK via NGT     Medical Decision Making, by Problem:  RLE cellulitis s/p debridement, polymicrobial (enterococcus, proteus, MSSA)              -improved, continue wound care today              -Augmentin suppression per ID thru 6/26  B LE lymphedema              -compression  ALIREZA with chronic hypercapnia/OHVS s/p acute VDRF, Trach 6/6   -vent weaning per pulmonary as tolerated  JOE-resolved   -watch with initiation of zestril 6/14  Ileus   -repeat KUB with interval slight improvement   -start trickle feeds, if unable to tolerate will need TPN  Klebsiella HAP-treated  COPD              -spiriva, incruse held with ETT  DM              -metformin               -asa, lipitor  BBB              -normal ef, wall motion on echo 5/19  Chronic pain              -oxy IR 30mg q 6               -prn fentanyl for wound care  MSEW  Hypothyroidism              -synthroid  Hyponatremia-resolved  Constipation   -encourage compliance with bowel care  Nutrition              -resume TF as tolerated, may need TPN  Anemia  Debility   -PT/OT    Quality-Core Measures   Reviewed items::  Medications reviewed, Labs reviewed and Radiology images reviewed  Montoya catheter::  Unconscious / Sedated Patient on a Ventilator  DVT prophylaxis pharmacological::  Heparin  Antibiotics:  Treating active infection/contamination beyond 24 hours perioperative coverage

## 2018-06-20 NOTE — PROGRESS NOTES
[]Hide copied text  Pulmonary Critical Care Progress Note         Chief Complaint: Acute on chronic respiratory failure with hypoxia and hypercapnia.     History of Present Illness: 57 y.o. male with complex medical history including COPD, congestive heart failure, type 2 diabetes, bilateral lower extremity cellulitis and probable obstructive sleep apnea was admitted to [facility] to be treated for bilateral lower extremity cellulitis. The patient is noncompliant. He was recently admitted to [facility] intubated for about a week for acute and chronic hypercapnic respiratory failure with CO2 retention. The patient was prescribed a trilogy ventilator to use after discharge.      The patient was readmitted to the hospital for bilateral lower extremity cellulitis. He was found to have high bicarbonate on basic metabolic panel of 42. Pulmonary consultation was requested for further evaluation and management of his chronic respiratory failure.     ROS:  Respiratory: positive shortness of breath, unchanged, Cardiac: negative chest pain, GI: negative abdominal pain.  All other systems negative.     Interval Events:  24 hour interval history reviewed.    6/18 - SR - 99.4 degrees - 132/78 - 98 - 38 - 97%; on full support today: 16/450/10/40%; GCS 15; hypokalemia and hyponatremia today; on augmentin.  He underwent a tracheostomy on June 6.  He has been able to tolerate pressure support at 15 cm H2O with PEEP and tolerated 8 cmH2O better than 8 cmH2O.  Recently he experienced increasing shortness of breath and required propofol and intravenous norepinephrine.  He remains alert and is occasionally watching TV and getting out of bed to a chair.  He is not recently experienced any fever.  Is been able to tolerate a dysphagia diet but remains constipated.       6/19 - remains on full support, awaiting CT abdomen; d/w RT; SR - 98.2 degrees - 135/80 - 98 - 24 - 94%; on 16/450/10/35; potassium better but still  low; other labs without significant change; abd x-ray 6/17: 1.  Distended air-filled loops of small bowel and colon, appear similar to prior study, could represent severe ileus or evolving obstructive changes. Radiographic follow-up to resolution recommended      6/20 - d/w RT, tired to hit the RT yesterday; tolerated 15/10 SBT yesterday; alert this AM, KUB better; 98.4 degrees - 80 - 158/90 - 96% - SR; labs reviewed; remains on augmentin    PFSH:  No change.     Respiratory:  Exam: symmetrical but diminished bilateral breath sounds with basilar rales but no wheezing or egophony.  Imaging: Available data reviewed. The chest film on June 6 demonstrates stable basilar interstitial prominence with no new focal consolidation. No film today.     HemoDynamics:  Exam: regular rhythm (Sinus)  Imaging: Available data reviewed. The echocardiogram on March 18, 2018 was a limited study but demonstrated grossly normal left ventricular systolic function.     Neuro:  Exam: no focal deficits noted mental status intact  Imaging: None - Reviewed           Recent Labs      06/04/18   0330  06/05/18   0350  06/06/18   0300   SODIUM  129*  128*  131*   POTASSIUM  3.5*  3.6  3.9   CHLORIDE  88*  88*  90*   CO2  31  30  32   BUN  31*  29*  29*   CREATININE  0.63  0.57  0.57   CALCIUM  9.9  9.6  9.6      GI/Nutrition:  Exam: abdomen is soft and non-tender, normal active bowel sounds  Imaging: Available data reviewed. The abdominal film suggests ileus.  NPO, limited oral intake on dysphagia diet.  Liver Function        Recent Labs      06/04/18   0330  06/05/18   0350  06/06/18   0300   GLUCOSE  163*  183*  134*         Heme:       Recent Labs      06/05/18   0350  06/06/18   0405   RBC  4.33*  4.39*   HEMOGLOBIN  11.9*  11.8*   HEMATOCRIT  36.9*  37.0*   PLATELETCT  216  215   PROTHROMBTM   --   13.1   APTT   --   29.2   INR   --   1.00         Infectious Disease:  Micro: cultures reviewed; no new positive cultures.       Recent Labs       06/05/18   0350  06/06/18   0405   WBC  12.7*  9.3         Quality  Measures:  Radiology images reviewed, Medications reviewed and Labs reviewed        Problems:  1. Acute and chronic respiratory failure with hypoxemia and hypercarbia, requiring tracheostomy and mechanical ventilation.  2. Chronic obstructive pulmonary disease.  3. Obesity, suspected obesity hypoventilation syndrome and obstructive sleep apnea hypopnea syndrome.  4. Chronic lymphedema.  5. Diabetes mellitus, controlled.  6. Chronic pain syndrome requiring chronic narcotic analgesia.  7. Hypothyroidism, on replacement therapy.  8. Systemic arterial hypertension, controlled.  9. Anemia, stable without evident ongoing blood loss.  10. Dysphagia, malnutrition, hypoalbuminemia.  11. Abnormal chest imaging - stable without evidence for evolving infection or edema.  12. Hyponatremia, improving.  13. Debility.  14. Distended small bowel, query ileus vs obstruction       Plan:  Resume weaning trials with a gradual reduction in pressure support as tolerated. Titrate supplemental oxygen, continue bronchodilators and respiratory protocols.  Continue nutritional support, therapies, mobilization and prophylaxis.     Discussed patient condition and risk of morbidity and/or mortality with RN and RT and with the patient.                    Admission (Discharged) on 5/5/2018            Detailed Report

## 2018-06-21 LAB
ANION GAP SERPL CALC-SCNC: 13 MMOL/L (ref 0–11.9)
BUN SERPL-MCNC: 8 MG/DL (ref 8–22)
CALCIUM SERPL-MCNC: 9.2 MG/DL (ref 8.4–10.2)
CHLORIDE SERPL-SCNC: 101 MMOL/L (ref 96–112)
CO2 SERPL-SCNC: 22 MMOL/L (ref 20–33)
CREAT SERPL-MCNC: 0.54 MG/DL (ref 0.5–1.4)
GLUCOSE SERPL-MCNC: 82 MG/DL (ref 65–99)
LACTATE BLD-SCNC: 0.9 MMOL/L (ref 0.5–2)
MAGNESIUM SERPL-MCNC: 1.3 MG/DL (ref 1.5–2.5)
PHOSPHATE SERPL-MCNC: 3.8 MG/DL (ref 2.5–4.5)
POTASSIUM SERPL-SCNC: 3.7 MMOL/L (ref 3.6–5.5)
SODIUM SERPL-SCNC: 136 MMOL/L (ref 135–145)

## 2018-06-21 PROCEDURE — 83605 ASSAY OF LACTIC ACID: CPT

## 2018-06-21 PROCEDURE — 83735 ASSAY OF MAGNESIUM: CPT

## 2018-06-21 PROCEDURE — 84100 ASSAY OF PHOSPHORUS: CPT

## 2018-06-21 PROCEDURE — 80048 BASIC METABOLIC PNL TOTAL CA: CPT

## 2018-06-21 NOTE — [facility]
Hospital Medicine Progress Note, Adult, Complex               Author: Soo Herr Date & Time created: 6/21/2018  5:43 AM     CC: LE wounds    Interval History:  Tolerating TF at 10, no BM, a little nausea  CPAP 14 hours before tachypnea   Declined therapy yesterday  Wound care done, pictures reviewed, only 1 picture R leg    Review of Systems:  Review of Systems   Unable to perform ROS: Other       T 98.1P [id number] 99% I/O ?/1.5  noBM teleSR  Physical Exam   Constitutional: He appears well-developed. No distress.   HENT:   Head: Normocephalic and atraumatic.   NGT   Eyes: Conjunctivae are normal. Right eye exhibits no discharge. Left eye exhibits no discharge. No scleral icterus.   Neck: No tracheal deviation present.   Trach   Supraclavicular fat pads   Cardiovascular: Normal rate and regular rhythm.    No murmur heard.  Pulmonary/Chest: Effort normal. No respiratory distress. He has no wheezes. He exhibits no tenderness.   diminished   Abdominal: Soft. He exhibits distension. There is no tenderness. There is no rebound.   Hypoactive, few tinkly BS   Musculoskeletal: He exhibits edema (1+).   Neurological:   asleep   Skin: Skin is warm.   LE wrapped     Vitals reviewed.      Labs:        Invalid input(s): [id number]      Recent Labs      06/19/18 0527 06/20/18   0122  06/21/18   0337   SODIUM  136  136  136   POTASSIUM  3.5*  3.6  3.7   CHLORIDE  100  100  101   CO2  26  24  22   BUN  10  8  8   CREATININE  0.49*  0.65  0.54   MAGNESIUM   --    --   1.3*   PHOSPHORUS   --    --   3.8   CALCIUM  9.1  9.6  9.2     Recent Labs      06/19/18 0527 06/20/18   0122  06/21/18   0337   GLUCOSE  97  83  82     Recent Labs      06/19/18   0527   RBC  3.94*   HEMOGLOBIN  10.4*   HEMATOCRIT  33.0*   PLATELETCT  214     Recent Labs      06/19/18 0527   WBC  9.1        GI/Nutrition:  Orders Placed This Encounter   Procedures   • DIET NPO     Standing Status:   Standing     Number of Occurrences:   1      Order Specific Question:   Restrict to:     Answer:   Strict [1]     Comments:   Meds OK via NGT     Medical Decision Making, by Problem:  RLE cellulitis s/p debridement, polymicrobial (enterococcus, proteus, MSSA)              -continue wound care               -Augmentin suppression per ID thru 6/26  B LE lymphedema              -compression  ALIREZA with chronic hypercapnia/OHVS s/p acute VDRF, Trach 6/6   -vent weaning per pulmonary as tolerated   -cpap yesterday  JOE-resolved   -watch with initiation of zestril 6/14  Ileus   -repeat KUB with interval slight improvement   -continue trickle feeds, if unable to tolerate will need TPN   -continue reglan, increase dose  Klebsiella HAP-treated  COPD              -spiriva, incruse held with ETT  AGMA   -check lactic acid  HTN   -add BB  DM              -asa, lipitor for secondary prevention  BBB              -normal ef, wall motion on echo 5/19  Chronic pain              -oxy IR 30mg q 6               -prn fentanyl for wound care  MSEW  Hypothyroidism              -synthroid  Constipation   -encourage compliance with bowel care  Nutrition              -trickle TF as tolerated (not ready to advance), may need TPN  Anemia  Debility   -PT/OT    Quality-Core Measures   Reviewed items::  Medications reviewed and Labs reviewed  Montoya catheter::  Unconscious / Sedated Patient on a Ventilator  DVT prophylaxis pharmacological::  Heparin  Antibiotics:  Treating active infection/contamination beyond 24 hours perioperative coverage

## 2018-06-22 LAB
ANION GAP SERPL CALC-SCNC: 10 MMOL/L (ref 0–11.9)
BUN SERPL-MCNC: <5 MG/DL (ref 8–22)
CALCIUM SERPL-MCNC: 9.1 MG/DL (ref 8.4–10.2)
CHLORIDE SERPL-SCNC: 102 MMOL/L (ref 96–112)
CO2 SERPL-SCNC: 25 MMOL/L (ref 20–33)
CREAT SERPL-MCNC: 0.55 MG/DL (ref 0.5–1.4)
ERYTHROCYTE [DISTWIDTH] IN BLOOD BY AUTOMATED COUNT: 46 FL (ref 35.9–50)
GLUCOSE SERPL-MCNC: 86 MG/DL (ref 65–99)
HCT VFR BLD AUTO: 33.4 % (ref 42–52)
HGB BLD-MCNC: 10.6 G/DL (ref 14–18)
MAGNESIUM SERPL-MCNC: 1.7 MG/DL (ref 1.5–2.5)
MCH RBC QN AUTO: 27 PG (ref 27–33)
MCHC RBC AUTO-ENTMCNC: 31.7 G/DL (ref 33.7–35.3)
MCV RBC AUTO: 85 FL (ref 81.4–97.8)
PLATELET # BLD AUTO: 202 K/UL (ref 164–446)
PMV BLD AUTO: 10.5 FL (ref 9–12.9)
POTASSIUM SERPL-SCNC: 3.6 MMOL/L (ref 3.6–5.5)
RBC # BLD AUTO: 3.93 M/UL (ref 4.7–6.1)
SODIUM SERPL-SCNC: 137 MMOL/L (ref 135–145)
WBC # BLD AUTO: 5.6 K/UL (ref 4.8–10.8)

## 2018-06-22 PROCEDURE — 85027 COMPLETE CBC AUTOMATED: CPT

## 2018-06-22 PROCEDURE — 83735 ASSAY OF MAGNESIUM: CPT

## 2018-06-22 PROCEDURE — 80048 BASIC METABOLIC PNL TOTAL CA: CPT

## 2018-06-22 ASSESSMENT — ENCOUNTER SYMPTOMS
SHORTNESS OF BREATH: 0
NAUSEA: 1
VOMITING: 0
PALPITATIONS: 0
SORE THROAT: 0
CONSTIPATION: 0
COUGH: 0
FEVER: 0
HEADACHES: 0
CHILLS: 0
ABDOMINAL PAIN: 0

## 2018-06-22 NOTE — [facility]
Hospital Medicine Progress Note, Adult, Complex               Author: Soo Herr Date & Time created: 6/22/2018  5:07 AM     CC: LE wounds    Interval History:  No AP with TF  Mild nausea intermittently  Tolerated CPAP again  No complaints    Review of Systems:  Review of Systems   Constitutional: Negative for chills and fever.   HENT: Negative for sore throat.    Respiratory: Negative for cough and shortness of breath.    Cardiovascular: Negative for chest pain and palpitations.   Gastrointestinal: Positive for nausea (occasional). Negative for abdominal pain, constipation and vomiting.   Genitourinary: Negative for dysuria.   Neurological: Negative for headaches.       T 98.4P [id number] 98% I/O 1.5/1.7 1BM teleSR  Physical Exam   Constitutional: He appears well-developed. No distress.   HENT:   Head: Normocephalic and atraumatic.   NGT   Eyes: Conjunctivae are normal. Right eye exhibits no discharge. Left eye exhibits no discharge. No scleral icterus.   Neck: No tracheal deviation present.   Trach   Supraclavicular fat pads   Cardiovascular: Normal rate and regular rhythm.    No murmur heard.  Pulmonary/Chest: Effort normal. No respiratory distress. He has no wheezes. He exhibits no tenderness.   diminished   Abdominal: Soft. He exhibits distension. There is no tenderness.   Hypoactive   Musculoskeletal: He exhibits edema (tr).   Neurological: He is alert.   Skin: Skin is warm.   LE wrapped     Vitals reviewed.      Labs:        Invalid input(s): [id number]      Recent Labs      06/20/18   0122 06/21/18 0337 06/22/18   0310   SODIUM  136  136  137   POTASSIUM  3.6  3.7  3.6   CHLORIDE  100  101  102   CO2  24  22  25   BUN  8  8  <5*   CREATININE  0.65  0.54  0.55   MAGNESIUM   --   1.3*  1.7   PHOSPHORUS   --   3.8   --    CALCIUM  9.6  9.2  9.1     Recent Labs      06/20/18   0122  06/21/18   0337  06/22/18   0310   GLUCOSE  83  82  86     Recent Labs      06/19/18   0527  06/22/18   0310    RBC  3.94*  3.93*   HEMOGLOBIN  10.4*  10.6*   HEMATOCRIT  33.0*  33.4*   PLATELETCT  214  202     Recent Labs      06/19/18   0527  06/22/18   0310   WBC  9.1  5.6        GI/Nutrition:  Orders Placed This Encounter   Procedures   • DIET NPO     Standing Status:   Standing     Number of Occurrences:   1     Order Specific Question:   Restrict to:     Answer:   Strict [1]     Comments:   Meds OK via NGT     Medical Decision Making, by Problem:  RLE cellulitis s/p debridement, polymicrobial (enterococcus, proteus, MSSA)              -continue wound care               -Augmentin suppression per ID thru 6/26  B LE lymphedema              -compression  ALIREZA with chronic hypercapnia/OHVS s/p acute VDRF, Trach 6/6   -vent weaning per pulmonary as tolerated   -cpap daily tolerated  JOE-resolved  Ileus   -advance TF q shift, if unable to tolerate will need TPN   -continue reglan, increase dose  Klebsiella HAP-treated  COPD              -spiriva, incruse held with ETT  AGMA-resolved, negative lactic acid  HTN   -increase metoprolol, lisinopril  DM              -asa, lipitor for secondary prevention  BBB              -normal ef, wall motion on echo 5/19  Chronic pain              -oxy IR 30mg q 6               -prn fentanyl for wound care  MSEW  Hypothyroidism              -synthroid  Constipation   -encourage compliance with bowel care  Nutrition              -increase to 20, may need TPN   -if tolerates TF, po tomorrow  Anemia  Debility   -PT/OT    Quality-Core Measures   Reviewed items::  Medications reviewed and Labs reviewed  Montoya catheter::  Unconscious / Sedated Patient on a Ventilator  DVT prophylaxis pharmacological::  Heparin  Antibiotics:  Treating active infection/contamination beyond 24 hours perioperative coverage

## 2018-06-22 NOTE — PROGRESS NOTES
[]Hide copied text  Pulmonary Critical Care Progress Note         Chief Complaint: Acute on chronic respiratory failure with hypoxia and hypercapnia.     History of Present Illness: 57 y.o. male with complex medical history including COPD, congestive heart failure, type 2 diabetes, bilateral lower extremity cellulitis and probable obstructive sleep apnea was admitted to [facility] to be treated for bilateral lower extremity cellulitis. The patient is noncompliant. He was recently admitted to [facility] intubated for about a week for acute and chronic hypercapnic respiratory failure with CO2 retention. The patient was prescribed a trilogy ventilator to use after discharge.      The patient was readmitted to the hospital for bilateral lower extremity cellulitis. He was found to have high bicarbonate on basic metabolic panel of 42. Pulmonary consultation was requested for further evaluation and management of his chronic respiratory failure.     ROS:  Respiratory: positive shortness of breath, unchanged, Cardiac: negative chest pain, GI: negative abdominal pain.  All other systems negative.     Interval Events:  24 hour interval history reviewed.    6/18 - SR - 99.4 degrees - 132/78 - 98 - 38 - 97%; on full support today: 16/450/10/40%; GCS 15; hypokalemia and hyponatremia today; on augmentin.  He underwent a tracheostomy on June 6.  He has been able to tolerate pressure support at 15 cm H2O with PEEP and tolerated 8 cmH2O better than 8 cmH2O.  Recently he experienced increasing shortness of breath and required propofol and intravenous norepinephrine.  He remains alert and is occasionally watching TV and getting out of bed to a chair.  He is not recently experienced any fever.  Is been able to tolerate a dysphagia diet but remains constipated.       6/19 - remains on full support, awaiting CT abdomen; d/w RT; SR - 98.2 degrees - 135/80 - 98 - 24 - 94%; on 16/450/10/35; potassium better but still  low; other labs without significant change; abd x-ray 6/17: 1.  Distended air-filled loops of small bowel and colon, appear similar to prior study, could represent severe ileus or evolving obstructive changes. Radiographic follow-up to resolution recommended      6/20 - d/w RT, tired to hit the RT yesterday; tolerated 15/10 SBT yesterday; alert this AM, KUB better; 98.4 degrees - 80 - 158/90 - 96% - SR; labs reviewed; remains on augmentin  6/22 -on CPAP and pressure support.  He still requires the pressure support.  He still does not seem to understand the gravity of his situation and the need for ventilatory support.    PFSH:  No change.     Respiratory:  Exam: symmetrical but diminished bilateral breath sounds with basilar rales but no wheezing or egophony.  Imaging: Available data reviewed. The chest film on June 6 demonstrates stable basilar interstitial prominence with no new focal consolidation. No film today.     HemoDynamics:  Exam: regular rhythm (Sinus)  Imaging: Available data reviewed. The echocardiogram on March 18, 2018 was a limited study but demonstrated grossly normal left ventricular systolic function.     Neuro:  Exam: no focal deficits noted mental status intact  Imaging: None - Reviewed           Recent Labs      06/04/18   0330  06/05/18   0350  06/06/18   0300   SODIUM  129*  128*  131*   POTASSIUM  3.5*  3.6  3.9   CHLORIDE  88*  88*  90*   CO2  31  30  32   BUN  31*  29*  29*   CREATININE  0.63  0.57  0.57   CALCIUM  9.9  9.6  9.6      GI/Nutrition:  Exam: abdomen is soft and non-tender, normal active bowel sounds  Imaging: Available data reviewed. The abdominal film suggests ileus.  NPO, limited oral intake on dysphagia diet.  Liver Function        Recent Labs      06/04/18   0330  06/05/18   0350  06/06/18   0300   GLUCOSE  163*  183*  134*         Heme:       Recent Labs      06/05/18   0350  06/06/18   0405   RBC  4.33*  4.39*   HEMOGLOBIN  11.9*  11.8*   HEMATOCRIT  36.9*  37.0*    PLATELETCT  216  215   PROTHROMBTM   --   13.1   APTT   --   29.2   INR   --   1.00         Infectious Disease:  Micro: cultures reviewed; no new positive cultures.       Recent Labs      06/05/18   0350  06/06/18   0405   WBC  12.7*  9.3         Quality  Measures:  Radiology images reviewed, Medications reviewed and Labs reviewed        Problems:  1. Acute and chronic respiratory failure with hypoxemia and hypercarbia, requiring tracheostomy and mechanical ventilation.  Tolerate CPAP/pressure support  2. Chronic obstructive pulmonary disease.  3. Obesity, suspected obesity hypoventilation syndrome and obstructive sleep apnea hypopnea syndrome.  4. Chronic lymphedema.  5. Diabetes mellitus, controlled.  6. Chronic pain syndrome requiring chronic narcotic analgesia.  7. Hypothyroidism, on replacement therapy.  8. Systemic arterial hypertension, controlled.  9. Anemia, stable without evident ongoing blood loss.  10. Dysphagia, malnutrition, hypoalbuminemia.  11. Abnormal chest imaging - stable without evidence for evolving infection or edema.  12. Hyponatremia, improving.  13. Debility.  14. Distended small bowel, query ileus vs obstruction       Plan:  Resume weaning trials with a gradual reduction in pressure support as tolerated. Titrate supplemental oxygen, continue bronchodilators and respiratory protocols.  Continue nutritional support, therapies, mobilization and prophylaxis.     Discussed patient condition and risk of morbidity and/or mortality with RN and RT and with the patient.                    Admission (Discharged) on 5/5/2018            Detailed Report

## 2018-06-23 ASSESSMENT — ENCOUNTER SYMPTOMS: FEVER: 0

## 2018-06-23 NOTE — [facility]
Hospital Medicine Progress Note, Adult, Complex               Author: Soo TOLU Herr Date & Time created: 6/23/2018  8:14 AM     CC: LE wounds    Interval History:  Mostly cpap with high pressure support  TF at 40  Declining bowel care  Accepted in [place]    Review of Systems:  Review of Systems   Unable to perform ROS: Other   Constitutional: Negative for fever.       T 98.6P [id number] 97% I/O 2.2/2.2 3BM teleSR  Physical Exam   Constitutional: He appears well-developed. No distress.   HENT:   Head: Normocephalic and atraumatic.   NGT   Eyes: Right eye exhibits no discharge. Left eye exhibits no discharge.   Neck: No tracheal deviation present.   Trach   Supraclavicular fat pads   Cardiovascular: Normal rate and regular rhythm.    No murmur heard.  Pulmonary/Chest: Effort normal. No respiratory distress. He has no wheezes. He exhibits no tenderness.   diminished   Abdominal: Soft. Bowel sounds are normal. He exhibits distension. There is no tenderness.   Musculoskeletal: He exhibits edema (tr).   Neurological:   sleeping   Skin: Skin is warm.   LE wrapped     Vitals reviewed.      Labs:        Invalid input(s): [id number]      Recent Labs      06/21/18   0337  06/22/18   0310   SODIUM  136  137   POTASSIUM  3.7  3.6   CHLORIDE  101  102   CO2  22  25   BUN  8  <5*   CREATININE  0.54  0.55   MAGNESIUM  1.3*  1.7   PHOSPHORUS  3.8   --    CALCIUM  9.2  9.1     Recent Labs      06/21/18   0337  06/22/18   0310   GLUCOSE  82  86     Recent Labs      06/22/18   0310   RBC  3.93*   HEMOGLOBIN  10.6*   HEMATOCRIT  33.4*   PLATELETCT  202     Recent Labs      06/22/18   0310   WBC  5.6        GI/Nutrition:  Orders Placed This Encounter   Procedures   • DIET NPO     Standing Status:   Standing     Number of Occurrences:   1     Order Specific Question:   Restrict to:     Answer:   Strict [1]     Comments:   Meds OK via NGT     Medical Decision Making, by Problem:  RLE cellulitis s/p debridement,  polymicrobial (enterococcus, proteus, MSSA)              -continue wound care               -Augmentin suppression per ID thru 6/26  B LE lymphedema              -compression  ALIREZA with chronic hypercapnia/OHVS s/p acute VDRF, Trach 6/6   -vent weaning as tolerated   -cpap tolerated with high support   -pulm toilet  JOE-resolved  Ileus   -TF at 40, continue to goal   -resume po if patient desires   -continue reglan  Klebsiella HAP-treated  COPD              -spiriva, incruse held with ETT  AGMA-resolved, negative lactic acid  HTN   -increase metoprolol further, lisinopril  DM              -asa, lipitor for secondary prevention  BBB              -normal ef, wall motion on echo 5/19  Chronic pain              -oxy IR 30mg q 6               -prn fentanyl for wound care  MSEW  Hypothyroidism              -synthroid  Constipation   -encourage compliance with bowel care  Nutrition              -TF at 40   -start po  Anemia  Debility   -PT/OT    Quality-Core Measures   Reviewed items::  Medications reviewed  Montoya catheter::  Unconscious / Sedated Patient on a Ventilator  DVT prophylaxis pharmacological::  Heparin  Antibiotics:  Treating active infection/contamination beyond 24 hours perioperative coverage

## 2018-06-23 NOTE — PROGRESS NOTES
[]Hide copied text  Pulmonary Critical Care Progress Note         Chief Complaint: Acute on chronic respiratory failure with hypoxia and hypercapnia.     History of Present Illness: 57 y.o. male with complex medical history including COPD, congestive heart failure, type 2 diabetes, bilateral lower extremity cellulitis and probable obstructive sleep apnea was admitted to [facility] to be treated for bilateral lower extremity cellulitis. The patient is noncompliant. He was recently admitted to [facility] intubated for about a week for acute and chronic hypercapnic respiratory failure with CO2 retention. The patient was prescribed a trilogy ventilator to use after discharge.      The patient was readmitted to the hospital for bilateral lower extremity cellulitis. He was found to have high bicarbonate on basic metabolic panel of 42. Pulmonary consultation was requested for further evaluation and management of his chronic respiratory failure.     ROS:  Respiratory: positive shortness of breath, unchanged, Cardiac: negative chest pain, GI: negative abdominal pain.  All other systems negative.     Interval Events:  24 hour interval history reviewed.    6/18 - SR - 99.4 degrees - 132/78 - 98 - 38 - 97%; on full support today: 16/450/10/40%; GCS 15; hypokalemia and hyponatremia today; on augmentin.  He underwent a tracheostomy on June 6.  He has been able to tolerate pressure support at 15 cm H2O with PEEP and tolerated 8 cmH2O better than 8 cmH2O.  Recently he experienced increasing shortness of breath and required propofol and intravenous norepinephrine.  He remains alert and is occasionally watching TV and getting out of bed to a chair.  He is not recently experienced any fever.  Is been able to tolerate a dysphagia diet but remains constipated.       6/19 - remains on full support, awaiting CT abdomen; d/w RT; SR - 98.2 degrees - 135/80 - 98 - 24 - 94%; on 16/450/10/35; potassium better but still  low; other labs without significant change; abd x-ray 6/17: 1.  Distended air-filled loops of small bowel and colon, appear similar to prior study, could represent severe ileus or evolving obstructive changes. Radiographic follow-up to resolution recommended      6/20 - d/w RT, tired to hit the RT yesterday; tolerated 15/10 SBT yesterday; alert this AM, KUB better; 98.4 degrees - 80 - 158/90 - 96% - SR; labs reviewed; remains on augmentin  6/22 -on CPAP and pressure support.  He still requires the pressure support.  He still does not seem to understand the gravity of his situation and the need for ventilatory support.    6/23 -continues on CPAP/pressure support.  Has been accepted for continued care in [place].    PFSH:  No change.     Respiratory:  Exam: symmetrical but diminished bilateral breath sounds with basilar rales but no wheezing or egophony.  Imaging: Available data reviewed. The chest film on June 6 demonstrates stable basilar interstitial prominence with no new focal consolidation. No film today.     HemoDynamics:  Exam: regular rhythm (Sinus)  Imaging: Available data reviewed. The echocardiogram on March 18, 2018 was a limited study but demonstrated grossly normal left ventricular systolic function.     Neuro:  Exam: no focal deficits noted mental status intact  Imaging: None - Reviewed           Recent Labs      06/04/18   0330  06/05/18   0350  06/06/18   0300   SODIUM  129*  128*  131*   POTASSIUM  3.5*  3.6  3.9   CHLORIDE  88*  88*  90*   CO2  31  30  32   BUN  31*  29*  29*   CREATININE  0.63  0.57  0.57   CALCIUM  9.9  9.6  9.6      GI/Nutrition:  Exam: abdomen is soft and non-tender, normal active bowel sounds  Imaging: Available data reviewed. The abdominal film suggests ileus.  NPO, limited oral intake on dysphagia diet.  Liver Function        Recent Labs      06/04/18   0330  06/05/18   0350  06/06/18   0300   GLUCOSE  163*  183*  134*         Heme:       Recent Labs      06/05/18   0350   06/06/18   0405   RBC  4.33*  4.39*   HEMOGLOBIN  11.9*  11.8*   HEMATOCRIT  36.9*  37.0*   PLATELETCT  216  215   PROTHROMBTM   --   13.1   APTT   --   29.2   INR   --   1.00         Infectious Disease:  Micro: cultures reviewed; no new positive cultures.       Recent Labs      06/05/18   0350  06/06/18   0405   WBC  12.7*  9.3         Quality  Measures:  Radiology images reviewed, Medications reviewed and Labs reviewed        Problems:  1. Acute and chronic respiratory failure with hypoxemia and hypercarbia, requiring tracheostomy and mechanical ventilation.  Tolerate CPAP/pressure support  2. Chronic obstructive pulmonary disease.  3. Obesity, suspected obesity hypoventilation syndrome and obstructive sleep apnea hypopnea syndrome.  4. Chronic lymphedema.  5. Diabetes mellitus, controlled.  6. Chronic pain syndrome requiring chronic narcotic analgesia.  7. Hypothyroidism, on replacement therapy.  8. Systemic arterial hypertension, controlled.  9. Anemia, stable without evident ongoing blood loss.  10. Dysphagia, malnutrition, hypoalbuminemia.  11. Abnormal chest imaging - stable without evidence for evolving infection or edema.  12. Hyponatremia, improving.  13. Debility.  14. Distended small bowel, query ileus vs obstruction       Plan:  Resume weaning trials with a gradual reduction in pressure support as tolerated. Titrate supplemental oxygen, continue bronchodilators and respiratory protocols.  Continue nutritional support, therapies, mobilization and prophylaxis.   Accepted for continued care in [place].  Discussed patient condition and risk of morbidity and/or mortality with RN and RT and with the patient.                    Admission (Discharged) on 5/5/2018            Detailed Report

## 2018-06-24 LAB
ANION GAP SERPL CALC-SCNC: 7 MMOL/L (ref 0–11.9)
BUN SERPL-MCNC: 8 MG/DL (ref 8–22)
CALCIUM SERPL-MCNC: 9 MG/DL (ref 8.4–10.2)
CHLORIDE SERPL-SCNC: 104 MMOL/L (ref 96–112)
CO2 SERPL-SCNC: 27 MMOL/L (ref 20–33)
CREAT SERPL-MCNC: 0.52 MG/DL (ref 0.5–1.4)
ERYTHROCYTE [DISTWIDTH] IN BLOOD BY AUTOMATED COUNT: 46.3 FL (ref 35.9–50)
GLUCOSE SERPL-MCNC: 115 MG/DL (ref 65–99)
HCT VFR BLD AUTO: 34.5 % (ref 42–52)
HGB BLD-MCNC: 10.7 G/DL (ref 14–18)
MCH RBC QN AUTO: 26.4 PG (ref 27–33)
MCHC RBC AUTO-ENTMCNC: 31 G/DL (ref 33.7–35.3)
MCV RBC AUTO: 85.2 FL (ref 81.4–97.8)
PLATELET # BLD AUTO: 200 K/UL (ref 164–446)
PMV BLD AUTO: 10.6 FL (ref 9–12.9)
POTASSIUM SERPL-SCNC: 3.6 MMOL/L (ref 3.6–5.5)
RBC # BLD AUTO: 4.05 M/UL (ref 4.7–6.1)
SODIUM SERPL-SCNC: 138 MMOL/L (ref 135–145)
WBC # BLD AUTO: 9.3 K/UL (ref 4.8–10.8)

## 2018-06-24 PROCEDURE — 80048 BASIC METABOLIC PNL TOTAL CA: CPT

## 2018-06-24 PROCEDURE — 85027 COMPLETE CBC AUTOMATED: CPT

## 2018-06-24 ASSESSMENT — ENCOUNTER SYMPTOMS
VOMITING: 0
FEVER: 0
HEADACHES: 0
SHORTNESS OF BREATH: 0
CONSTIPATION: 0
NAUSEA: 0
ABDOMINAL PAIN: 0
SORE THROAT: 0
COUGH: 0
DIARRHEA: 0

## 2018-06-24 NOTE — PROGRESS NOTES
[]Hide copied text  Pulmonary Critical Care Progress Note         Chief Complaint: Acute on chronic respiratory failure with hypoxia and hypercapnia.     History of Present Illness: 57 y.o. male with complex medical history including COPD, congestive heart failure, type 2 diabetes, bilateral lower extremity cellulitis and probable obstructive sleep apnea was admitted to [facility] to be treated for bilateral lower extremity cellulitis. The patient is noncompliant. He was recently admitted to [facility] intubated for about a week for acute and chronic hypercapnic respiratory failure with CO2 retention. The patient was prescribed a trilogy ventilator to use after discharge.      The patient was readmitted to the hospital for bilateral lower extremity cellulitis. He was found to have high bicarbonate on basic metabolic panel of 42. Pulmonary consultation was requested for further evaluation and management of his chronic respiratory failure.     ROS:  Respiratory: positive shortness of breath, unchanged, Cardiac: negative chest pain, GI: negative abdominal pain.  All other systems negative.     Interval Events:  24 hour interval history reviewed.    6/18 - SR - 99.4 degrees - 132/78 - 98 - 38 - 97%; on full support today: 16/450/10/40%; GCS 15; hypokalemia and hyponatremia today; on augmentin.  He underwent a tracheostomy on June 6.  He has been able to tolerate pressure support at 15 cm H2O with PEEP and tolerated 8 cmH2O better than 8 cmH2O.  Recently he experienced increasing shortness of breath and required propofol and intravenous norepinephrine.  He remains alert and is occasionally watching TV and getting out of bed to a chair.  He is not recently experienced any fever.  Is been able to tolerate a dysphagia diet but remains constipated.       6/19 - remains on full support, awaiting CT abdomen; d/w RT; SR - 98.2 degrees - 135/80 - 98 - 24 - 94%; on 16/450/10/35; potassium better but still  low; other labs without significant change; abd x-ray 6/17: 1.  Distended air-filled loops of small bowel and colon, appear similar to prior study, could represent severe ileus or evolving obstructive changes. Radiographic follow-up to resolution recommended      6/20 - d/w RT, tired to hit the RT yesterday; tolerated 15/10 SBT yesterday; alert this AM, KUB better; 98.4 degrees - 80 - 158/90 - 96% - SR; labs reviewed; remains on augmentin  6/22 -on CPAP and pressure support.  He still requires the pressure support.  He still does not seem to understand the gravity of his situation and the need for ventilatory support.    6/23 -continues on CPAP/pressure support.  Has been accepted for continued care in [place].    6/34 - d/w RT, tolerating 12/8/40 all night, possibly to [place] in the AM;   TF at goal; 98.2 degrees - 76 - 131/92 - 26 - 95% - SR    PFSH:  No change.     Respiratory:  Exam: symmetrical but diminished bilateral breath sounds with basilar rales but no wheezing or egophony.  Imaging: Available data reviewed. The chest film on June 6 demonstrates stable basilar interstitial prominence with no new focal consolidation. No film today.     HemoDynamics:  Exam: regular rhythm (Sinus)  Imaging: Available data reviewed. The echocardiogram on March 18, 2018 was a limited study but demonstrated grossly normal left ventricular systolic function.     Neuro:  Exam: no focal deficits noted mental status intact  Imaging: None - Reviewed           Recent Labs      06/04/18   0330  06/05/18   0350  06/06/18   0300   SODIUM  129*  128*  131*   POTASSIUM  3.5*  3.6  3.9   CHLORIDE  88*  88*  90*   CO2  31  30  32   BUN  31*  29*  29*   CREATININE  0.63  0.57  0.57   CALCIUM  9.9  9.6  9.6      GI/Nutrition:  Exam: abdomen is soft and non-tender, normal active bowel sounds  Imaging: Available data reviewed. The abdominal film suggests ileus.  NPO, limited oral intake on dysphagia diet.  Liver Function        Recent  Labs      06/04/18   0330  06/05/18   0350  06/06/18   0300   GLUCOSE  163*  183*  134*         Heme:       Recent Labs      06/05/18   0350  06/06/18   0405   RBC  4.33*  4.39*   HEMOGLOBIN  11.9*  11.8*   HEMATOCRIT  36.9*  37.0*   PLATELETCT  216  215   PROTHROMBTM   --   13.1   APTT   --   29.2   INR   --   1.00         Infectious Disease:  Micro: cultures reviewed; no new positive cultures.       Recent Labs      06/05/18   0350  06/06/18   0405   WBC  12.7*  9.3         Quality  Measures:  Radiology images reviewed, Medications reviewed and Labs reviewed        Problems:  1. Acute and chronic respiratory failure with hypoxemia and hypercarbia, requiring tracheostomy and mechanical ventilation.  Tolerate CPAP/pressure support  2. Chronic obstructive pulmonary disease.  3. Obesity, suspected obesity hypoventilation syndrome and obstructive sleep apnea hypopnea syndrome.  4. Chronic lymphedema.  5. Diabetes mellitus, controlled.  6. Chronic pain syndrome requiring chronic narcotic analgesia.  7. Hypothyroidism, on replacement therapy.  8. Systemic arterial hypertension, controlled.  9. Anemia, stable without evident ongoing blood loss.  10. Dysphagia, malnutrition, hypoalbuminemia.  11. Abnormal chest imaging - stable without evidence for evolving infection or edema.  12. Hyponatremia, improving.  13. Debility.  14. Distended small bowel, query ileus vs obstruction       Plan:  Continue weaning trials with a gradual reduction in pressure support as tolerated. Titrate supplemental oxygen, continue bronchodilators and respiratory protocols.  Continue nutritional support, therapies, mobilization and prophylaxis.   Accepted for continued care in [place].  Discussed patient condition and risk of morbidity and/or mortality with RN and RT and with the patient.      Possible transfer to [place] in the AM.                  Admission (Discharged) on 5/5/2018            Detailed Report

## 2018-06-24 NOTE — [facility]
Hospital Medicine Progress Note, Adult, Complex               Author: Soo Herr Date & Time created: 6/24/2018  8:21 AM     CC: LE wounds    Interval History:  TF at goal, tolerating boost no nausea or vomiting  Requested increase in pain meds yesterday  Refuses bowel care frequently    Review of Systems:  Review of Systems   Constitutional: Negative for fever.   HENT: Negative for sore throat.    Respiratory: Negative for cough and shortness of breath.    Cardiovascular: Negative for chest pain.   Gastrointestinal: Negative for abdominal pain, constipation, diarrhea, nausea and vomiting.   Musculoskeletal: Negative for joint pain.   Neurological: Negative for headaches.       T 98.2P [id number] 95% I/O 3.4/2.3 noBM teleSR  Physical Exam   Constitutional: He appears well-developed. No distress.   HENT:   Head: Normocephalic and atraumatic.   NGT   Eyes: Conjunctivae are normal. Right eye exhibits no discharge. Left eye exhibits no discharge. No scleral icterus.   Neck: No tracheal deviation present.   Trach   Supraclavicular fat pads   Cardiovascular: Normal rate and regular rhythm.    No murmur heard.  Pulmonary/Chest: Effort normal. No respiratory distress. He has no wheezes. He exhibits no tenderness.   diminished   Abdominal: Soft. Bowel sounds are normal. He exhibits no distension. There is no tenderness.   obese   Musculoskeletal: He exhibits edema (tr).   Neurological: He is alert.   Skin: Skin is warm.   LE wrapped     Vitals reviewed.      Labs:        Invalid input(s): [id number]      Recent Labs      06/22/18   0310  06/24/18   0315   SODIUM  137  138   POTASSIUM  3.6  3.6   CHLORIDE  102  104   CO2  25  27   BUN  <5*  8   CREATININE  0.55  0.52   MAGNESIUM  1.7   --    CALCIUM  9.1  9.0     Recent Labs      06/22/18   0310  06/24/18   0315   GLUCOSE  86  115*     Recent Labs      06/22/18   0310  06/24/18   0315   RBC  3.93*  4.05*   HEMOGLOBIN  10.6*  10.7*   HEMATOCRIT  33.4*  34.5*    PLATELETCT  202  200     Recent Labs      06/22/18   0310  06/24/18   0315   WBC  5.6  9.3        GI/Nutrition:  Orders Placed This Encounter   Procedures   • Diet Order Regular     Standing Status:   Standing     Number of Occurrences:   1     Order Specific Question:   Diet:     Answer:   Regular [1]     Order Specific Question:   Texture/Fiber modifications:     Answer:   Dysphagia 1(Pureed)specify fluid consistency(question 6) [1]     Order Specific Question:   Consistency/Fluid modifications:     Answer:   Thin Liquids [3]     Medical Decision Making, by Problem:  RLE cellulitis s/p debridement, polymicrobial (enterococcus, proteus, MSSA)              -continue wound care               -Augmentin suppression per ID thru 6/26  B LE lymphedema              -compression  ALIREZA with chronic hypercapnia/OHVS s/p acute VDRF, Trach 6/6   -vent weaning as tolerated   -remains on CPAP with PS 12   -pulm toilet  JOE-resolved  Ileus   -tolerating TF/po   -continue reglan  Klebsiella HAP-treated  COPD              -spiriva, incruse held with ETT  AGMA-resolved, negative lactic acid  HTN   -metoprolol, lisinopril  DM              -asa, lipitor for secondary prevention  BBB              -normal ef, wall motion on echo 5/19  Chronic pain              -oxy IR 30mg q 6               -prn fentanyl for wound care  MSEW  Hypothyroidism              -synthroid  Constipation   -encourage compliance with bowel care  Nutrition              -TF/po  Anemia  Debility   -PT/OT  Transfer to [facility] in [place] tomorrow  Quality-Core Measures   Reviewed items::  Medications reviewed and Labs reviewed  Montoya catheter::  Unconscious / Sedated Patient on a Ventilator  DVT prophylaxis pharmacological::  Heparin  Antibiotics:  Treating active infection/contamination beyond 24 hours perioperative coverage

## 2018-06-25 NOTE — PROGRESS NOTES
[]Hide copied text  Pulmonary Critical Care Progress Note         Chief Complaint: Acute on chronic respiratory failure with hypoxia and hypercapnia.     History of Present Illness: 57 y.o. male with complex medical history including COPD, congestive heart failure, type 2 diabetes, bilateral lower extremity cellulitis and probable obstructive sleep apnea was admitted to [facility] to be treated for bilateral lower extremity cellulitis. The patient is noncompliant. He was recently admitted to [facility] intubated for about a week for acute and chronic hypercapnic respiratory failure with CO2 retention. The patient was prescribed a trilogy ventilator to use after discharge.      The patient was readmitted to the hospital for bilateral lower extremity cellulitis. He was found to have high bicarbonate on basic metabolic panel of 42. Pulmonary consultation was requested for further evaluation and management of his chronic respiratory failure.     ROS:  Respiratory: positive shortness of breath, unchanged, Cardiac: negative chest pain, GI: negative abdominal pain.  All other systems negative.     Interval Events:  24 hour interval history reviewed.    6/18 - SR - 99.4 degrees - 132/78 - 98 - 38 - 97%; on full support today: 16/450/10/40%; GCS 15; hypokalemia and hyponatremia today; on augmentin.  He underwent a tracheostomy on June 6.  He has been able to tolerate pressure support at 15 cm H2O with PEEP and tolerated 8 cmH2O better than 8 cmH2O.  Recently he experienced increasing shortness of breath and required propofol and intravenous norepinephrine.  He remains alert and is occasionally watching TV and getting out of bed to a chair.  He is not recently experienced any fever.  Is been able to tolerate a dysphagia diet but remains constipated.       6/19 - remains on full support, awaiting CT abdomen; d/w RT; SR - 98.2 degrees - 135/80 - 98 - 24 - 94%; on 16/450/10/35; potassium better but still  low; other labs without significant change; abd x-ray 6/17: 1.  Distended air-filled loops of small bowel and colon, appear similar to prior study, could represent severe ileus or evolving obstructive changes. Radiographic follow-up to resolution recommended      6/20 - d/w RT, tired to hit the RT yesterday; tolerated 15/10 SBT yesterday; alert this AM, KUB better; 98.4 degrees - 80 - 158/90 - 96% - SR; labs reviewed; remains on augmentin  6/22 -on CPAP and pressure support.  He still requires the pressure support.  He still does not seem to understand the gravity of his situation and the need for ventilatory support.    6/23 -continues on CPAP/pressure support.  Has been accepted for continued care in [place].    6/24 - d/w RT, tolerating 12/8/40 all night, possibly to [place] in the AM;   TF at goal; 98.2 degrees - 76 - 131/92 - 26 - 95% - SR    6/25 - discharge to [facility] today; SR - 98.5 degrees - 128/89 - 78 - 23 - 94%; no new labs, imaging, or micro; no antiinfectives; ready to go     PFSH:  No change.     Respiratory:  Exam: symmetrical but diminished bilateral breath sounds with basilar rales but no wheezing or egophony.  Imaging: Available data reviewed. The chest film on June 6 demonstrates stable basilar interstitial prominence with no new focal consolidation. No film today.     HemoDynamics:  Exam: regular rhythm (Sinus)  Imaging: Available data reviewed. The echocardiogram on March 18, 2018 was a limited study but demonstrated grossly normal left ventricular systolic function.     Neuro:  Exam: no focal deficits noted mental status intact  Imaging: None - Reviewed           Recent Labs      06/04/18   0330  06/05/18   0350  06/06/18   0300   SODIUM  129*  128*  131*   POTASSIUM  3.5*  3.6  3.9   CHLORIDE  88*  88*  90*   CO2  31  30  32   BUN  31*  29*  29*   CREATININE  0.63  0.57  0.57   CALCIUM  9.9  9.6  9.6      GI/Nutrition:  Exam: abdomen is soft and non-tender, normal active bowel  sounds  Imaging: Available data reviewed. The abdominal film suggests ileus.  NPO, limited oral intake on dysphagia diet.  Liver Function        Recent Labs      06/04/18   0330  06/05/18   0350  06/06/18   0300   GLUCOSE  163*  183*  134*         Heme:       Recent Labs      06/05/18   0350  06/06/18   0405   RBC  4.33*  4.39*   HEMOGLOBIN  11.9*  11.8*   HEMATOCRIT  36.9*  37.0*   PLATELETCT  216  215   PROTHROMBTM   --   13.1   APTT   --   29.2   INR   --   1.00         Infectious Disease:  Micro: cultures reviewed; no new positive cultures.       Recent Labs      06/05/18   0350  06/06/18   0405   WBC  12.7*  9.3         Quality  Measures:  Radiology images reviewed, Medications reviewed and Labs reviewed        Problems:  1. Acute and chronic respiratory failure with hypoxemia and hypercarbia, requiring tracheostomy and mechanical ventilation.  Tolerate CPAP/pressure support  2. Chronic obstructive pulmonary disease.  3. Obesity, suspected obesity hypoventilation syndrome and obstructive sleep apnea hypopnea syndrome.  4. Chronic lymphedema.  5. Diabetes mellitus, controlled.  6. Chronic pain syndrome requiring chronic narcotic analgesia.  7. Hypothyroidism, on replacement therapy.  8. Systemic arterial hypertension, controlled.  9. Anemia, stable without evident ongoing blood loss.  10. Dysphagia, malnutrition, hypoalbuminemia.  11. Abnormal chest imaging - stable without evidence for evolving infection or edema.  12. Hyponatremia, improving.  13. Debility.  14. Distended small bowel, query ileus vs obstruction       Plan:  Continue weaning trials with a gradual reduction in pressure support as tolerated. Titrate supplemental oxygen, continue bronchodilators and respiratory protocols.  Continue nutritional support, therapies, mobilization and prophylaxis.   Accepted for continued care in [place].  Discussed patient condition and risk of morbidity and/or mortality with RN and RT and with the patient.       Possible transfer to [place] in the AM.                  Admission (Discharged) on 5/5/2018            Detailed Report

## 2018-07-26 NOTE — CONSULTS
Pulmonary Progress Note      Patient ID:   Name:             Fer Estrada     YOB: 1960  Age:                 57 y.o.  male   MRN:               [id number]                                                  Reason of Consult:  Acute on chronic hypercapnic respiratory failure         History of Present Illness:  Pt is 57 yrs old with complex medical history including COPD, congestive heart failure, type 2 diabetes, bilateral lower extremity cellulitis and probable obstructive sleep apnea was admitted to [facility] to be treated for bilateral lower extremity cellulitis. The patient is noncompliant. He was recently admitted to [facility] intubated for about a week for acute and chronic hypercapnic respiratory failure with CO2 retention. The patient was prescribed a trilogy ventilator to use after discharge.     The patient was readmitted to the hospital for bilateral lower extremity cellulitis. He was found to have high bicarbonate on basic metabolic panel of 42. Pulmonary consultation was requested for further evaluation and management of his chronic respiratory failure.    He denies any shortness of breath no confusion or drowsiness.    ROS:  Complete ROS was done and was negative except what mentioned in HPI     Interval History:  He is having difficulty lying supine. He also has not used BiPAP for the last 2 nights. He is awake and alert with no evidence of CO2 narcosis.  Oxygen saturations are adequate on 3 L/m supplemental oxygen.    Past Medical History:  Past Medical History:   Diagnosis Date   • Asthma    • At risk for sleep apnea    • Chronic obstructive pulmonary disease (HCC)    • Chronic venous stasis dermatitis of both lower extremities    • Congestive heart failure (HCC)    • Hypertension    • Hypothyroidism    • Type II or unspecified type diabetes mellitus without mention of complication, not stated as uncontrolled        Past surgical history:  Past Surgical History:    Procedure Laterality Date   • INCISION AND DRAINAGE GENERAL Bilateral 2018    Procedure: INCISION AND DRAINAGE GENERAL;  Surgeon: Chino Stock M.D.;  Location: [facility];  Service: Vascular   • CARPAL TUNNEL RELEASE            Family History   History reviewed. No pertinent family history.         Social History   Substance Use Topics   • Smoking status: Former Smoker       Quit date: 2005   • Smokeless tobacco: Never Used   • Alcohol use No            Current Outpatient Medications:  Prescriptions Prior to Admission   Medication Sig Dispense Refill Last Dose   • [] oxyCODONE immediate release 20 MG Tab Take 2 Tabs by mouth every 4 hours for 3 days. 5 Tab 0    • [] oxyCODONE immediate-release (ROXICODONE) 5 MG Tab Take 1 Tab by mouth every 6 hours as needed ((4-6)) for up to 2 days. 5 Tab 0    • acetaminophen (TYLENOL) 500 MG Tab Take 2 Tabs by mouth every 6 hours. 30 Tab 0    • NS SOLN 100 mL with ampicillin/sulbactam 3 (2-1) g SOLR 3 g 3 g by Intravenous route every 6 hours.      • furosemide (LASIX) 10 MG/ML Solution 2 mL by Intravenous route every day.  0    • metFORMIN (GLUCOPHAGE) 500 MG Tab Take 1 Tab by mouth 2 times a day, with meals. 60 Tab     • heparin 5000 UNIT/ML Solution Inject 1 mL as instructed every 8 hours.  0    • [] morphine ER (MS CONTIN) 30 MG Tab CR tablet Take 1 Tab by mouth every 12 hours for 2 days. 4 Tab 0    • aspirin EC 81 MG EC tablet Take 1 Tab by mouth every day. 30 Tab 0 2018 at 0930   • tiotropium (SPIRIVA) 18 MCG Cap Inhale 1 Cap by mouth every day. 30 Cap 0 2018 at 0930   • atorvastatin (LIPITOR) 40 MG Tab Take 1 Tab by mouth every bedtime. 30 Tab 0 2018 at 2000   • ammonium lactate (LAC-HYDRIN) 12 % Lotion Apply 2 Applications to affected area(s) every day. 2 Bottle 0 2018 at 1830   • senna-docusate (PERICOLACE OR SENOKOT S) 8.6-50 MG Tab Take 1 Tab by mouth 2 Times a Day. 60 Tab 0 2018 at 1830   •  zinc sulfate (ZINCATE) 220 (50 Zn) MG Cap Take 1 Cap by mouth every day. 30 Cap 0 4/23/2018 at 0930   • multivitamin (THERAGRAN) Tab Take 1 Tab by mouth every day. 30 Tab 0 4/23/2018 at 0930   • ascorbic acid (VITAMIN C) 500 MG tablet Take 1 Tab by mouth every day. 30 Tab 3 4/23/2018 at 0930   • Omega-3 Fatty Acids (FISH OIL) 1000 MG Cap capsule Take 1 Cap by mouth 3 times a day, with meals. 90 Cap  4/23/2018 at 1830   • levothyroxine (SYNTHROID) 50 MCG Tab Take 50 mcg by mouth Every morning on an empty stomach.   4/23/2018 at 0930   • omeprazole (PRILOSEC) 20 MG delayed-release capsule Take 20 mg by mouth every day.   4/23/2018 at 0930   • budesonide-formoterol (SYMBICORT) 160-4.5 MCG/ACT Aerosol Inhale 2 Puffs by mouth 2 Times a Day.   4/23/2018 at 2000   • albuterol 108 (90 BASE) MCG/ACT Aero Soln inhalation aerosol Inhale 2 Puffs by mouth every 6 hours as needed for Shortness of Breath.   4/24/2018 at 0930       Medication Allergy:  No Known Allergies          Objective:   Vitals/ General Appearance:   Weight/BMI: There is no height or weight on file to calculate BMI.  There were no vitals taken for this visit.  There were no vitals filed for this visit.  Oxygen Therapy:       Constitutional:   Well developed, Well nourished, No acute distress, obese  HENMT:  Normocephalic, Atraumatic, Oropharynx moist mucous membranes, No oral exudates, Nose normal.  No thyromegaly.  Eyes:  EOMI, Conjunctiva normal, No discharge.  Neck:  Normal range of motion, No cervical tenderness,  no JVD.  Cardiovascular:  Normal heart rate, Normal rhythm, No murmurs, No rubs, No gallops.   Extremitites with intact distal pulses, ++ edema. Cellulitis of lower extremities. Wrapped.  Lungs:  Normal breath sounds, breath sounds clear to auscultation bilaterally,  no crackles, no wheezing.   Abdomen: Bowel sounds normal, Soft, No tenderness, No guarding, No rebound, No masses, No hepatosplenomegaly.  Skin: Warm, Dry, No erythema, No rash, no  induration.  Neurologic: Alert & oriented x 3, No focal deficits noted, cranial nerves II through X are grossly intact.  Psychiatric: Affect normal,  Mood normal.    Labs:  Recent Labs      05/06/18 0430 05/07/18 0440 05/08/18 0611   WBC  10.6  10.0  13.0*   RBC  3.28*  3.34*  3.97*   HEMOGLOBIN  9.3*  9.5*  11.2*   HEMATOCRIT  31.3*  31.3*  37.1*   MCV  95.4  93.7  93.5   MCH  28.4  28.4  28.2   MCHC  29.7*  30.4*  30.2*   RDW  51.8*  51.9*  52.4*   PLATELETCT  237  246  290   MPV  10.6  10.5  10.1                 Recent Labs      05/06/18 0430 05/07/18 0440 05/08/18 0611   SODIUM  138  137  135   POTASSIUM  3.8  4.3  4.0   CHLORIDE  91*  91*  87*   CO2  42*  39*  39*   GLUCOSE  145*  131*  112*   BUN  13  13  15     Recent Labs      05/06/18 0430 05/07/18 0440 05/08/18 0611   SODIUM  138  137  135   POTASSIUM  3.8  4.3  4.0   CHLORIDE  91*  91*  87*   CO2  42*  39*  39*   BUN  13  13  15   CREATININE  0.59  0.55  0.63   MAGNESIUM  1.5   --    --    PHOSPHORUS  3.2   --    --    CALCIUM  9.0  9.2  9.2     No results found for this or any previous visit.      Imaging:   No orders to display           Assessment and Plan:  Acute on Chronic hypercapnic respiratory failure   -secondary to obstructive sleep apnea/obesity hypoventilation syndrome  -Discussed with RT in details.  -Will need to continue to Trilogy ventilator at night when he goes home.  -However, so far he seems reluctant to use the BiPAP    Morbid obesity  -Healthy diet and weight loss was advised. The patient will greatly benefit from weight loss given his obesity hypoventilation syndrome and type 2 diabetes and obstructive sleep apnea.    History of COPD with no current exacerbation  No wheezing on exam and no clear exacerbation. Continue home inhalers and nebulizers as needed in the hospital. No need for systemic steroids.       Average

## 2019-02-07 NOTE — [facility]
Hospital Medicine Progress Note, Adult, Complex               Author: Kelley Sifuentes Date & Time created: 6/13/2018  2:28 PM     CC: bilateral leg cellulitis and lymphedema    Interval History:  He declined to use cpap and developed respiratory failure requiring mechanical ventilation. Tracheostomy was placed 6/6 and he continues to require pressure support.  He has been cleared by speech therapy for a puree diet but does not care for this consistency of food.  He again refused stool softeners and relistor yesterday but today they were given as he has gone several days with no bowel movement    Review of Systems:  Review of Systems   Constitutional: Negative for diaphoresis and fever.   Respiratory: Negative for cough and shortness of breath.    Cardiovascular: Negative for palpitations and leg swelling.   Gastrointestinal: Positive for constipation. Negative for abdominal pain, heartburn and nausea.   Genitourinary: Negative for dysuria.   Musculoskeletal: Negative for myalgias.   Skin: Positive for rash. Negative for itching.        Rash over face with dry skin   Endo/Heme/Allergies: Does not bruise/bleed easily.       Physical Exam:  Physical Exam   Constitutional: He is oriented to person, place, and time. No distress.   HENT:   Mouth/Throat: Oropharynx is clear and moist.   Dry flaking skin over face   Eyes: Conjunctivae are normal. No scleral icterus.   Neck: Neck supple.   Cardiovascular: Normal rate and regular rhythm.  PMI is displaced.    No murmur heard.  Pulmonary/Chest: Breath sounds normal. No respiratory distress.   Abdominal: Soft. He exhibits distension. There is no tenderness.   Musculoskeletal: He exhibits edema.   compression wraps in place   Neurological: He is alert and oriented to person, place, and time.   Skin: Skin is warm. He is not diaphoretic. No erythema.   Nursing note and vitals reviewed.      Labs:        Invalid input(s): [id number]      Recent Labs      06/11/18   2081   06/12/18   1228   SODIUM  128*  130*   POTASSIUM  4.4  4.2   CHLORIDE  93*  97   CO2  25  25   BUN  65*  35*   CREATININE  1.58*  0.63   CALCIUM  9.5  9.6     Recent Labs      06/11/18   0423  06/12/18   1228   GLUCOSE  133*  121*     Recent Labs      06/11/18   0423  06/12/18   1228   RBC  4.05*  4.03*   HEMOGLOBIN  10.8*  10.8*   HEMATOCRIT  34.5*  34.1*   PLATELETCT  250  234     Recent Labs      06/11/18   0423  06/12/18   1228   WBC  10.0  7.3   NEUTSPOLYS  69.10   --    LYMPHOCYTES  16.90*   --    MONOCYTES  7.10   --    EOSINOPHILS  4.70   --    BASOPHILS  0.30   --            Hemodynamics:   T98.9 /72 HR90 RR22 O2 sat 97%     GI/Nutrition:  Orders Placed This Encounter   Procedures   • DIET ORDER     Standing Status:   Standing     Number of Occurrences:   1     Order Specific Question:   Diet:     Answer:   Regular [1]     Comments:   with supervision  vanila boost with all meals     Order Specific Question:   Texture/Fiber modifications:     Answer:   Dysphagia 3(Mechanical Soft)specify fluid consistency(question 6) [3]     Order Specific Question:   Consistency/Fluid modifications:     Answer:   Thin Liquids [3]     Medical Decision Making, by Problem:  Bilateral lower leg cellulitis, oral augmentin for suppression per ID through 6/24     Lymphedema chronic, continue leg compression               diuresis done and edema still controlled                Hyponatremia, improving off diuresis     Acute on chronic respiratory failure, ventilator support              Cefepime to treat klebsiella in sputum through 5/28         continue trach care, patient on cpap today     Elevated troponin, improved to normal, due to heart strain from respiratory distress       HTN (hypertension) controlled    COPD (chronic obstructive pulmonary disease) oxygen and respiratory therapy    Stasis dermatitis improved with less leg edema and wound care, continue leg elevation and compression       Narcotic addiction, continue  current narcotic regimen    Non compliance w medication regimen, noncompliance with bipap contributing to respiratory failure acutely       Diabetes type 2,  metformin    Severe protein-calorie malnutrition, with patient eating variable caloric intake, poor dietary choices, and now unable to take po nutrition due to intubation,            will provide supplements, patient declines to eat most of his food due to puree consistency    Chronic respiratory failure, oxygen    Morbid obesity with BMI of 40.0-44.9, adult     Chronic pain continue pain meds    ALIREZA (obstructive sleep apnea) pressure support      Quality-Core Measures   Reviewed items::  Labs reviewed and Medications reviewed  DVT prophylaxis pharmacological::  Heparin  Ulcer Prophylaxis::  Yes  Assessed for rehabilitation services:  Patient was assess for and/or received rehabilitation services during this hospitalization       No

## 2019-08-21 NOTE — CARE PLAN
Problem: Communication  Goal: The ability to communicate needs accurately and effectively will improve  Outcome: PROGRESSING AS EXPECTED  Patient uses call light effectively    Problem: Respiratory:  Goal: Respiratory status will improve  Outcome: PROGRESSING SLOWER THAN EXPECTED  Attempt to begin weaning patient from oxygen. Turned NC down to 5 L, within 30 minutes patient stating he is short of breath and requesting oxygen be turned back up to 6 L; oxygen saturations 85-90 on 5 L.        You have You have an appointment for an angiogram on 8/27 at the [facility]. You must refrain from eating or drinking anything starting at 1159 on 8/26.   You MUST follow up with your primary care doctor at [facility] in order to manage your diabetes, and attend your clinic appointment with endocrinologist.

## 2020-09-30 NOTE — PROGRESS NOTES
12 hr RN CC   You can access the FollowMyHealth Patient Portal offered by [facility] by registering at the following website: [email or web address]. By joining [facility]’s FollowMyHealth portal, you will also be able to view your health information using other applications (apps) compatible with our system.

## 2021-09-01 NOTE — ED PROVIDER NOTES
ED Provider Note    Scribed for Mack Vicente M.D. by Yanna Bacon. 7/15/2017, 1:02 AM.    Primary care provider: Dr. Giovani Lara MD  Means of arrival: Ambulance  History obtained from: Patient  History limited by: None    CHIEF COMPLAINT  Chief Complaint   Patient presents with   • Shortness of Breath     pt. is having a CHF  exacerbation with increasing SOB and bilateral 4+ pitting edema and redness in his legs.   • Drug Withdrawal     Pt. states having withdrawals from oxycodone.   • Chest Pain     Pt. states centralized chest pain that has not been relieved with 0.4mg of nitro. 325 ASA, and 1inch of nitro paste per EMS.       HPI  Fer Estrada is a 56 y.o. male with a history of CHF who presents to the Emergency Department with shortness of breath and leg swelling. The patient reports his shortness of breath onset a few days ago and has been worsening. He endorses chronic bilateral lower limb swelling, but states this worsened tonight to the point of a toenail detaching. The patient received nitroglycerin and aspirin by EMS en route, and states his shortness of breath has improved. The patient ran out of oxycodone early and reports he is going through narcotic withdrawal. He denies fever, chills, abdominal pain, nausea, vomiting, cough, or other symptoms.     REVIEW OF SYSTEMS  See HPI for further details. All other systems are negative. C.    PAST MEDICAL HISTORY   has a past medical history of Type II or unspecified type diabetes mellitus without mention of complication, not stated as uncontrolled; Congestive heart failure (CMS-[facility]); Hypertension; Asthma; and Chronic obstructive pulmonary disease (CMS-[facility]).    SURGICAL HISTORY  patient denies any surgical history    SOCIAL HISTORY  Social History   Substance Use Topics   • Smoking status: Former Smoker     Quit date: 12/14/2005   • Smokeless tobacco: Never Used   • Alcohol Use: No      History   Drug Use No       FAMILY HISTORY  History reviewed. No  Patient was counseled on new medication Epclusa (sofosbuvir and velpatasvir)     The patient was counseled on the following:     - Take 1 tablet at the same time each day, with or without food.   - This medication is used to treat hepatitis C infection.   - Frequently reported side effects of this drug include: headache, loss of energy, nausea and trouble sleeping  - Talk to your doctor right away if you notice dark urine, fatigue, lack of appetite, nausea, abdominal pain, light-colored stools, vomiting or yellow skin.   - Go to the ED with signs of a significant reaction including wheezing; chest tightness; fever; itching; bad cough; blue skin color; seizures; or swelling of face, lips, tongue or     throat.   - Be sure to follow up with our clinic 4 weeks after starting the medication to ensure you are tolerating the medication well and that you are responding appropriately to the medication.   - Make sure to tell your doctor or pharmacist about all medications you are taking, including herbal supplements and OTC products.    - Do not stop taking this medication without talking to your provider.   - It is very important that you do not miss a dose of this medication.  Use a pill planner, medication adherence jaret or other tool to help you remember how to take your medication.        Patient Specific Counseling Points:     - Females of childbearing potential should consider postponing pregnancy until therapy is complete to reduce the risk of HCV transmission.   - This medication should not be used in pregnant females and it is not known if the medication is present in breast milk.   -counseled on interaction with antacids.    Payal Mccrary, Jose FranciscoD.       "pertinent family history.    CURRENT MEDICATIONS  Reviewed.  See Encounter Summary.     ALLERGIES  No Known Allergies    PHYSICAL EXAM  VITAL SIGNS: /72 mmHg  Pulse 87  Temp(Src) 37.1 °C (98.7 °F)  Resp 23  Ht 1.727 m (5' 8\")  Wt 131.543 kg (290 lb)  BMI 44.10 kg/m2  SpO2 98%  Constitutional: Alert in mild distress. Morbidly obese.   HENT: No signs of trauma, Bilateral external ears normal, Nose normal.   Eyes: Pupils are equal and reactive, Conjunctiva normal, Non-icteric.   Neck: Normal range of motion, No tenderness, Supple, No stridor.   Cardiovascular: Regular rate and rhythm, no murmurs.   Thorax & Lungs: Tachypneic, diminished breath sounds, No respiratory distress, No wheezing, No chest tenderness.   Abdomen: Bowel sounds normal, Soft, No tenderness, No masses, No pulsatile masses. No peritoneal signs.  Skin: Warm, Dry, No erythema, No rash.   Extremities: Chronic skin changes, bilateral chronic hemostasis changes.2+ nonpitting edema to bilateral lower extremities. Intact distal pulses, No cyanosis  Musculoskeletal: No major deformities noted.   Neurologic: Alert , Normal motor function, Normal sensory function, No focal deficits noted.   Psychiatric: Affect normal, Judgment normal, Mood normal.     DIAGNOSTIC STUDIES / PROCEDURES     LABS  Results for orders placed or performed during the hospital encounter of 07/15/17   CBC WITH DIFFERENTIAL   Result Value Ref Range    WBC 8.8 4.8 - 10.8 K/uL    RBC 4.95 4.70 - 6.10 M/uL    Hemoglobin 13.5 (L) 14.0 - 18.0 g/dL    Hematocrit 43.7 42.0 - 52.0 %    MCV 88.3 81.4 - 97.8 fL    MCH 27.3 27.0 - 33.0 pg    MCHC 30.9 (L) 33.7 - 35.3 g/dL    RDW 47.1 35.9 - 50.0 fL    Platelet Count 199 164 - 446 K/uL    MPV 11.6 9.0 - 12.9 fL    Neutrophils-Polys 80.80 (H) 44.00 - 72.00 %    Lymphocytes 11.80 (L) 22.00 - 41.00 %    Monocytes 5.30 0.00 - 13.40 %    Eosinophils 1.60 0.00 - 6.90 %    Basophils 0.20 0.00 - 1.80 %    Immature Granulocytes 0.30 0.00 - 0.90 % "    Nucleated RBC 0.00 /100 WBC    Neutrophils (Absolute) 7.13 1.82 - 7.42 K/uL    Lymphs (Absolute) 1.04 1.00 - 4.80 K/uL    Monos (Absolute) 0.47 0.00 - 0.85 K/uL    Eos (Absolute) 0.14 0.00 - 0.51 K/uL    Baso (Absolute) 0.02 0.00 - 0.12 K/uL    Immature Granulocytes (abs) 0.03 0.00 - 0.11 K/uL    NRBC (Absolute) 0.00 K/uL   LACTIC ACID   Result Value Ref Range    Lactic Acid 1.7 0.5 - 2.0 mmol/L   PROTHROMBIN TIME   Result Value Ref Range    PT 19.1 (H) 12.0 - 14.6 sec    INR 1.55 (H) 0.87 - 1.13   EKG (ER)   Result Value Ref Range    Report       [facility] Emergency Dept.    Test Date:  2017-07-15  Pt Name:    TANIA PANDAO                 Department: ER  MRN:        [id number]                      Room:        11  Gender:     M                            Technician: [id number]  :        1960                   Requested By:ER TRIAGE PROTOCOL  Order #:    [id number]                    Reading MD:    Measurements  Intervals                                Axis  Rate:       89                           P:          18  MO:         212                          QRS:        79  QRSD:       146                          T:          10  QT:         376  QTc:        458    Interpretive Statements  SINUS RHYTHM  FIRST DEGREE AV BLOCK  RIGHT BUNDLE BRANCH BLOCK  Compared to ECG 2017 22:47:44  First degree AV block now present  Right bundle-branch block now present  Right-axis deviation no longer present     EKG   Result Value Ref Range    Report       [facility] Emergency Dept.    Test Date:  2017-07-15  Pt Name:    TANIA CARRASCO                 Department: ER  MRN:        [id number]                      Room:       RD 11  Gender:     M                            Technician: [id number]  :        1960                   Requested By:BROWN ROMERO  Order #:    [id number]                    Reading MD:    Measurements  Intervals                                Axis  Rate:       78                            P:          4  CT:         196                          QRS:        57  QRSD:       148                          T:          7  QT:         388  QTc:        442    Interpretive Statements  SINUS RHYTHM  RBBB AND LPFB  Compared to ECG 07/15/2017 00:56:57  Left posterior fascicular block now present  First degree AV block no longer present        All labs were reviewed by me.    EKG  12 Lead EKG interpreted by me to show:  Sinus rhythm  Rate 89  Axis: Rightward deviation  Conduction: RBBB  Intervals: First degree AV block  No acute ST-T wave changes  Persistent inferior Q waves, new ST elevation with reciprocal lateral depressions, new RBBB when compared to old EKGs from 1/17/17 and 1/30/17.    RADIOLOGY  DX-CHEST-PORTABLE (1 VIEW)   Final Result      Bibasilar opacities, consistent with a combination of atelectasis and mild pulmonary edema.        The radiologist's interpretation of all radiological studies and images have been reviewed by me.    COURSE & MEDICAL DECISION MAKING  Pertinent Labs & Imaging studies reviewed. (See chart for details)      1:02 AM - Patient seen and examined at bedside. I explained to the patient I will order a full lab workup and a chest x-ray to evaluate. Ordered DX-chest, lactic acid, DNP, CBC, CMP, troponin, prothrombin time, APTT, blood culture, urinalysis, urine culture, APTT, prothrombin, EKG to evaluate his symptoms.     1:10 AM Paged cardiology.     1:22 AM I discussed the patient's case and the above findings with Dr. Gleason (cardiology) who will see the patient.      1:35 AM Cardiology at bedside.     2:36 AM Spoke with Dr. El, hospitalist, concerning patient case. Agreed to admit patient for further treatment and evaluation.     Decision Making:  This is a 56 y.o. year old male who presents with complaint of worsening leg swelling and shortness of breath. Screening EKG does show evidence of new right bundle branch block. There is significant widening  of the QRS complex which makes it difficult interpretation of T waves. I have discussed the case with cardiology given very minimal but possible likelihood of STEMI. Upon Dr. Gleason's evaluation of initial and repeat EKG at bedside we agree that this does not represent acute STEMI at this time. There was attempt at bedside echocardiogram by Dr. Gleason though unsuccessful secondary to the patient's poor cardiac windows likely secondary to body habitus. X-ray does show pulmonary edema consistent with CHF exacerbation as clinically suspected. Patient did receive aspirin and nitro prior to arrival. At this time I do believe it'll be necessary to have the patient brought in the hospital for ongoing inpatient care and stabilization for this acute CHF exacerbation with respiratory distress.    DISPOSITION:  Patient will be admitted to Dr. El in guarded condition.     FINAL IMPRESSION  1. Acute congestive heart failure, unspecified congestive heart failure type (CMS-HCC)          Yanna AIKEN (Scribe), am scribing for, and in the presence of, Mack Vicente M.D..    Electronically signed by: Yanna Bacon (Scribe), 7/15/2017    Mack AIKEN M.D. personally performed the services described in this documentation, as scribed by Yanna Bacon in my presence, and it is both accurate and complete.    The note accurately reflects work and decisions made by me.  Mack Vicente  7/15/2017  4:12 AM

## 2022-03-22 NOTE — DISCHARGE PLANNING
ALEXX Rush met with pt to obtain SNF Choice.  Pt requested the list and that SW come back tomorrow.  Pt lives on [facility] so ALEXX called pt's room to give pt [facility] [phone] [address] and confirmed that they accept Medicare.  Pt states that he wants SW to send referral to [facility].  ALEXX faxed choice form to AURELIA Suresh.  
CCS called [facility] and Spoke to Francisco. Per Felix request the SNF Referral has been sent.   
CCS received a call from Francisco at [facility]. A bed has become open and they can accept this patient today. Per Gallina transportation is available at 1400 via the [facility] can. SW on floor has been notified via phone.  
CCS received a call from the  on floor Zari. The MD does want the patient transferred today. CCS called and spoke to Francisco at [facility]. Transportation has been arranged to transfer the patient today at 1600 via the [facility] Van. Per Francisco the discharge Summary needs to faxed before the patient leaves the facility.  on floor Zrai has been notified via phone  
[facility]. No [facility] available today possible bed tomorrow 01/24/2017. Francisco also asked about the end date to the ABX. SM  on floor flaquito has been notified via voicemail.   
Medical SW    Referral: Pt discussed in AM IDT Rounds.    Intervention: Rn reports pt refused dressing change prior to rounds. Rn will try again after rounds. No social needs at this time.    Sw received report earlier from unit Estella Rouse who indicates pt will need both IV ABX and wound care upon d/c.    Plan: Sw to assist w/ d/c planning as needed.  
Patient discussed in morning rounds.  Patient reportedly lives alone and already has home O2 and a cane at home. Patient reportedly receiving wound care and ID is following.  Possible discharge needs for both.  SS available for assistance as needed.   
Received choice form from [facility]  at  1604  Referral sent to  [facility] at 1552 on 01/27/17.  
SW looked up patient hospitals #[id number]     
Adult

## 2023-03-24 NOTE — ASSESSMENT & PLAN NOTE
OR debridement 5/1  continue at this point with pain management as well as antibiotic management.  Change and decrease PCA with fentanyl Patient this point is on a PCA with fentanyl 50 µg every 1 hour with 300ug limit /4hr. he's on oxycodone 40 mg every 6 hours and that he's also on MS Contin 30 mg twice daily. Even like this patient's pain remains at about an 8 and the patient still has no signs of narcotic toxicity.  Close monitor   Sent Lacie her 2 yr PostOp lab orders with instructions. To contact us if ?'s. Orders in Epic.

## 2023-06-25 NOTE — PROGRESS NOTES
Bedside report received. Patient A&O X 4, tele- Medical,  6L NC,  Voids- condom cath/BSC, Activity- bed/edge of the bed, Diet- DM/dysph3. Complains of pain 5/10 medicated per MAR. POC discussed with patient. Pt verbalized understanding. Call light and belongings with in reach.  Bed locked and in lowest position, alarm and fall precautions in place.    No

## 2023-08-14 NOTE — PROGRESS NOTES
Discussed with social work of pt's options for declining discharge or proceeding with discharge today. Pt is unsure if he is ready to discharge today.   [de-identified] : 42 yo M without PMH who presents after recent hospitalization at [facility] [place] infection in the left axilla. He reports he was hospitalized for 6 days and treated for cellulitis. He had multiple scans and US but did not bring with him today. The surgeons there told him there was no drainable collections. He reports the erythema has significantly improved and the pain is subsiding but he continues to have a firm lump in the area. There is no drainage.

## 2023-10-04 NOTE — PROGRESS NOTES
[facility] Hospitalist Progress Note    Date of Service: 2018    Chief Complaint  57 y.o. male with a history of chronic respiratory failure on 5 L at baseline due to history of COPD, uncontrolled ALIREZA due to noncompliance, history of chronic lower extremity lymphedema with noncompliance therefore chronic wounds, obesity, type II diabetes, hypertension and hyperlipidemia admitted 2018 with lower extremity rash, pain, wounds found to have bilateral lower extremity cellulitis    Interval Problem Update  : MRSA nasal swab pending, DC vancomycin negative. Tolerating Unasyn, Diflucan. He refuses arterial studies that limb preservation recommended.  : Refusing glucose checks, sliding scale. He complains of leg pain, requesting narcotic dose increase   : WBC improving, tolerating abx, legs still w significant weeping.      Consultants/Specialty  LPS    Disposition  Continue IV abx, diuresis, watch renal function  Wound care  Transfer to medical  [facility]        Review of Systems   Constitutional: Positive for malaise/fatigue. Negative for chills and fever.   HENT: Negative for sore throat.    Respiratory: Negative for cough and shortness of breath.    Cardiovascular: Negative for chest pain and palpitations.   Gastrointestinal: Negative for abdominal pain, blood in stool, diarrhea, heartburn, nausea and vomiting.   Genitourinary: Negative for dysuria and frequency.   Musculoskeletal: Positive for back pain, joint pain and myalgias.   Skin: Positive for itching and rash.        Extensive sloughing and weeping   Neurological: Negative for dizziness, focal weakness, weakness and headaches.   Psychiatric/Behavioral: Negative for depression and hallucinations.   All other systems reviewed and are negative.     Physical Exam  Laboratory/Imaging   Hemodynamics  Temp (24hrs), Av.4 °C (97.6 °F), Min:36.3 °C (97.4 °F), Max:36.7 °C (98.1 °F)   Temperature: 36.3 °C (97.4 °F)  Pulse  Av.8  Min: 69  Max:  Thank you so much for allowing me to be your provider today. If relevant, I will have attached educational resources that will help you manage your condition.    Follow up with your PCP provider in 2-3 days for new, persistent and/or worsening symptoms. For any chest pain, shortness of breath, difficulty breathing and/or other life threatening emergencies, please call 911 or go to the nearest ER.         130   Blood Pressure: (!) 128/34      Respiratory      Respiration: 20     Work Of Breathing / Effort: Moderate  RUL Breath Sounds: Clear, RML Breath Sounds: Diminished, RLL Breath Sounds: Diminished, LARY Breath Sounds: Clear, LLL Breath Sounds: Diminished    Fluids    Intake/Output Summary (Last 24 hours) at 04/27/18 1528  Last data filed at 04/27/18 1300   Gross per 24 hour   Intake              200 ml   Output             1475 ml   Net            -1275 ml       Nutrition  Orders Placed This Encounter   Procedures   • Diet Order     Standing Status:   Standing     Number of Occurrences:   1     Order Specific Question:   Diet:     Answer:   Cardiac [6]     Order Specific Question:   Diet:     Answer:   Diabetic [3]     Physical Exam   Constitutional: He is oriented to person, place, and time. He appears well-developed and well-nourished. No distress.   Sitting at edge of bed   HENT:   Head: Normocephalic.   Mouth/Throat: No oropharyngeal exudate.   Eyes: Conjunctivae are normal. Pupils are equal, round, and reactive to light.   Neck: Normal range of motion. No tracheal deviation present.   Cardiovascular: Normal rate and regular rhythm.    No murmur heard.  Pulmonary/Chest: Effort normal. No respiratory distress. He has no wheezes. He exhibits no tenderness.   Abdominal: Soft. He exhibits no distension. There is no tenderness. There is no guarding.   Musculoskeletal: He exhibits edema (significant pitting edema bilaterally past knees up to thighs) and tenderness.   Lymphadenopathy:     He has no cervical adenopathy.   Neurological: He is alert and oriented to person, place, and time. No cranial nerve deficit.   Skin: Rash noted. There is erythema.   Dry flaking skin, excoriated, bilateral lower extremities   Psychiatric:   Blunt, poor insight, impulsive   Nursing note and vitals reviewed.      Recent Labs      04/25/18   0334  04/26/18   0209   WBC  14.9*  11.9*   RBC  4.34*  3.93*   HEMOGLOBIN  12.4*  11.2*    HEMATOCRIT  39.5*  37.1*   MCV  91.0  94.4   MCH  28.6  28.5   MCHC  31.4*  30.2*   RDW  48.4  50.4*   PLATELETCT  253  198   MPV  10.9  11.0     Recent Labs      04/25/18   0335  04/26/18   0209  04/27/18   0344   SODIUM  136  134*  138   POTASSIUM  3.9  4.2  4.6   CHLORIDE  96  97  98   CO2  28  26  34*   GLUCOSE  137*  133*  127*   BUN  32*  32*  27*   CREATININE  1.11  1.18  0.70   CALCIUM  9.0  9.1  9.2                      Assessment/Plan     * Bilateral lower leg cellulitis- (present on admission)   Assessment & Plan    -Secondary to underlying lymphedema and chronic venous stasis with lack of integrity of skin  -He is noncompliant with wound care recommendations and hygiene.  -He was seen by limb preservation, recommended compression socks but he refuses to get an arterial study therefore he is not a candidate for compression  -MRSA nasal swab negative (no contact iso needed)  -4/24 Wound Culture (LLE): Polymicrobial: Proteus mirabilis, Staph A (MSSA), Enterococcus  -Continue Unasyn, Diflucan x10d course        Acute on chronic diastolic (congestive) heart failure- (present on admission)   Assessment & Plan    Mild, due to not being able to prescribe Lasix on discharge due to recent AK I  -Lasix 20 IV daily  -Encouraged compliance with blood pressure rx and CPAP        Sepsis (CMS-[facility])- (present on admission)   Assessment & Plan    -This is sepsis (without associated acute organ dysfunction).  WBC improving  -Secondary to superimposed cellulitis on top of severe chronic venous stasis changes  -Left leg wound culture is growing Proteus, enterococcus, and SSA: Treat with Unasyn x10d  -Urine is growing Klebsiella, resistant to Unasyn, treated with Omnicef ×5 days  -Consider infectious disease consultation  -Limb preservation is been consulted, they recommended arterial study, patient refuses  -X-ray of the foot to rule out osteomyelitis is pending, ESR and CRP  -Sepsis has rsolved        ALIREZA (obstructive  sleep apnea)   Assessment & Plan    Uncontrolled, due to noncompliance. He was arranged to have a machine landed for him and supplied for him on previous discharge. He is still not been using it per some notes although patient to me states that he has.  -Encourage compliance with CPAP nightly        Chronic pain- (present on admission)   Assessment & Plan    -Multimodal pain therapy, his narcotics were decreased last hospital stay  Continue oxycodone 20 every 4 when necessary        Morbid obesity with BMI of 45.0-49.9, adult (CMS-HCC)- (present on admission)   Assessment & Plan    -Encourage weight loss but would be difficult given gross physical debility        Chronic respiratory failure (CMS-HCC)- (present on admission)   Assessment & Plan    -Acute on chronic, via a small component of volume overload and diastolic congestive heart failure exacerbation  -Continue Lasix 20 IV daily        Severe protein-calorie malnutrition (CMS-HCC)- (present on admission)   Assessment & Plan    -Nutrition consult        Diabetes type 2, controlled (CMS-HCC)- (present on admission)   Assessment & Plan    -Insulin sliding scale and adjust as needed to maintain blood sugar less than 140  -He is refusing insulin checks and SSI        Non compliance w medication regimen- (present on admission)   Assessment & Plan    -Encourage adherence        Narcotic addiction (CMS-HCC)- (present on admission)   Assessment & Plan    -Limit the use of IV narcotics        Stasis dermatitis- (present on admission)   Assessment & Plan    -Severe, full lack of integrity of skin as well as history of medical nonadherence  -Likely associated lymphedema, not a surgical candidate  - wound Care consult  -See below  -Pain control  -Ammonium lactate solution        COPD (chronic obstructive pulmonary disease) (CMS-HCC)- (present on admission)   Assessment & Plan    -No clear current exacerbation at this time, respiratory therapy protocol and monitor closely         HTN (hypertension)- (present on admission)   Assessment & Plan    -Continue outpatient blood pressure medications and add IV Lasix 20 mg daily for lymphedema          Quality-Core Measures   Reviewed items::  Labs reviewed, Medications reviewed and Radiology images reviewed  Montoya catheter::  No Montoya  Antibiotics:  Treating active infection/contamination beyond 24 hours perioperative coverage
